# Patient Record
Sex: FEMALE | Race: OTHER | HISPANIC OR LATINO | Employment: OTHER | URBAN - METROPOLITAN AREA
[De-identification: names, ages, dates, MRNs, and addresses within clinical notes are randomized per-mention and may not be internally consistent; named-entity substitution may affect disease eponyms.]

---

## 2017-01-13 ENCOUNTER — TELEPHONE (OUTPATIENT)
Dept: PHARMACY | Facility: CLINIC | Age: 62
End: 2017-01-13

## 2017-02-03 ENCOUNTER — TELEPHONE (OUTPATIENT)
Dept: PHARMACY | Facility: CLINIC | Age: 62
End: 2017-02-03

## 2017-02-03 NOTE — TELEPHONE ENCOUNTER
Approved to receive Lantus (Sanofi) until 2/2/2018.  Shipment will be received within 3-5 business days.  Next refill 5/1/2017

## 2017-04-03 ENCOUNTER — TELEPHONE (OUTPATIENT)
Dept: PHARMACY | Facility: CLINIC | Age: 62
End: 2017-04-03

## 2017-04-03 NOTE — TELEPHONE ENCOUNTER
Patient called in requesting a refill for Lantus (Sanofi).  Medication will ship to  [facility] office within 7-10 business days. (2) refills remaining.

## 2017-04-20 ENCOUNTER — TELEPHONE (OUTPATIENT)
Dept: PHARMACY | Facility: CLINIC | Age: 62
End: 2017-04-20

## 2017-04-20 NOTE — TELEPHONE ENCOUNTER
Patient called in requesting a refill for Humalog (Camilla).  Medication will ship to  [facility] within 7-10 business days. (2) refills remaining.

## 2017-06-05 ENCOUNTER — TELEPHONE (OUTPATIENT)
Dept: PHARMACY | Facility: CLINIC | Age: 62
End: 2017-06-05

## 2017-06-05 NOTE — TELEPHONE ENCOUNTER
Patient called in requesting a refill for Lantus (Sanofi) and Humalog (Camilla).  Medication will ship to  [facility] office within 7-10 business days. (1) refills remaining.

## 2017-06-15 ENCOUNTER — TELEPHONE (OUTPATIENT)
Dept: PHARMACY | Facility: CLINIC | Age: 62
End: 2017-06-15

## 2017-06-15 NOTE — TELEPHONE ENCOUNTER
Informed patient  we received Lantus (Sanofi) from Pharmacy Patient Assistance.  2 refills remaining.

## 2017-09-12 ENCOUNTER — TELEPHONE (OUTPATIENT)
Dept: PHARMACY | Facility: CLINIC | Age: 62
End: 2017-09-12

## 2017-11-10 ENCOUNTER — TELEPHONE (OUTPATIENT)
Dept: ENDOCRINOLOGY | Facility: CLINIC | Age: 62
End: 2017-11-10

## 2017-12-05 ENCOUNTER — HOSPITAL ENCOUNTER (OUTPATIENT)
Dept: RADIOLOGY | Facility: HOSPITAL | Age: 62
Discharge: HOME | End: 2017-12-05
Attending: INTERNAL MEDICINE
Payer: MEDICARE

## 2017-12-05 ENCOUNTER — OFFICE VISIT (OUTPATIENT)
Dept: INTERNAL MEDICINE | Facility: CLINIC | Age: 62
End: 2017-12-05
Payer: MEDICARE

## 2017-12-05 VITALS
HEIGHT: 65 IN | TEMPERATURE: 98 F | BODY MASS INDEX: 27.99 KG/M2 | DIASTOLIC BLOOD PRESSURE: 70 MMHG | SYSTOLIC BLOOD PRESSURE: 130 MMHG | HEART RATE: 68 BPM | WEIGHT: 168 LBS

## 2017-12-05 DIAGNOSIS — K62.5 RECTAL BLEEDING: Primary | ICD-10-CM

## 2017-12-05 DIAGNOSIS — M54.6 THORACIC BACK PAIN, UNSPECIFIED BACK PAIN LATERALITY, UNSPECIFIED CHRONICITY: ICD-10-CM

## 2017-12-05 DIAGNOSIS — E11.65 UNCONTROLLED TYPE 2 DIABETES MELLITUS WITH COMPLICATION, UNSPECIFIED LONG TERM INSULIN USE STATUS: ICD-10-CM

## 2017-12-05 DIAGNOSIS — N93.9 VAGINAL BLEEDING: ICD-10-CM

## 2017-12-05 DIAGNOSIS — Z12.31 VISIT FOR SCREENING MAMMOGRAM: ICD-10-CM

## 2017-12-05 DIAGNOSIS — K62.5 RECTAL BLEEDING: ICD-10-CM

## 2017-12-05 DIAGNOSIS — E11.8 UNCONTROLLED TYPE 2 DIABETES MELLITUS WITH COMPLICATION, UNSPECIFIED LONG TERM INSULIN USE STATUS: ICD-10-CM

## 2017-12-05 PROCEDURE — 90662 IIV NO PRSV INCREASED AG IM: CPT | Mod: S$GLB,,, | Performed by: INTERNAL MEDICINE

## 2017-12-05 PROCEDURE — 74000 XR ABDOMEN AP 1 VIEW: CPT | Mod: TC,PO

## 2017-12-05 PROCEDURE — 99214 OFFICE O/P EST MOD 30 MIN: CPT | Mod: S$GLB,,, | Performed by: INTERNAL MEDICINE

## 2017-12-05 PROCEDURE — 99999 PR PBB SHADOW E&M-EST. PATIENT-LVL IV: CPT | Mod: PBBFAC,,, | Performed by: INTERNAL MEDICINE

## 2017-12-05 PROCEDURE — 72070 X-RAY EXAM THORAC SPINE 2VWS: CPT | Mod: 26,,, | Performed by: RADIOLOGY

## 2017-12-05 PROCEDURE — 72070 X-RAY EXAM THORAC SPINE 2VWS: CPT | Mod: TC,PO

## 2017-12-05 PROCEDURE — 74000 XR ABDOMEN AP 1 VIEW: CPT | Mod: 26,,, | Performed by: RADIOLOGY

## 2017-12-05 PROCEDURE — G0008 ADMIN INFLUENZA VIRUS VAC: HCPCS | Mod: S$GLB,,, | Performed by: INTERNAL MEDICINE

## 2017-12-05 RX ORDER — LISINOPRIL 5 MG/1
5 TABLET ORAL DAILY
COMMUNITY
End: 2017-12-05 | Stop reason: SDUPTHER

## 2017-12-05 RX ORDER — LISINOPRIL 5 MG/1
5 TABLET ORAL DAILY
Qty: 90 TABLET | Refills: 3 | Status: SHIPPED | OUTPATIENT
Start: 2017-12-05 | End: 2018-03-27

## 2017-12-05 NOTE — PROGRESS NOTES
Subjective:       Patient ID: Riana Kay is a 62 y.o. female.    Chief Complaint: Groin Pain    Patient is a 62 y.o.female with type 2 diabetes who presents today for follow up.       Six months ago, she started bleeding from her colon. [facility]. Her colonoscopy showed a brown spot on her colon. Two weeks ago, she had the same problem. Bleeding from rectum and vagina/ urine. Blood was bright red. She went to [facility] and had a CT scan but has not gotten the results. Today, she is not bleeding at all. No fever when she would experience bleeding. Her bowel movements are normal.       Review of Systems   Constitutional: Negative for appetite change, chills, diaphoresis, fatigue and fever.   HENT: Negative for congestion, dental problem, ear discharge, ear pain, hearing loss, postnasal drip, sinus pressure and sore throat.    Eyes: Negative for discharge, redness and itching.   Respiratory: Negative for cough, chest tightness, shortness of breath and wheezing.    Cardiovascular: Negative for chest pain, palpitations and leg swelling.   Gastrointestinal: Positive for anal bleeding and blood in stool. Negative for abdominal pain, constipation, diarrhea, nausea and vomiting.   Endocrine: Negative for cold intolerance and heat intolerance.   Genitourinary: Positive for vaginal bleeding. Negative for difficulty urinating, frequency, hematuria and urgency.   Musculoskeletal: Negative for arthralgias, back pain, gait problem, myalgias and neck pain.   Skin: Negative for color change and rash.   Neurological: Negative for dizziness, syncope and headaches.   Hematological: Negative for adenopathy.   Psychiatric/Behavioral: Negative for behavioral problems and sleep disturbance. The patient is not nervous/anxious.        Objective:      Physical Exam   Constitutional: She is oriented to person, place, and time. She appears well-developed and well-nourished. No distress.   HENT:   Head:  Normocephalic and atraumatic.   Right Ear: External ear normal.   Left Ear: External ear normal.   Nose: Nose normal.   Mouth/Throat: Oropharynx is clear and moist. No oropharyngeal exudate.   Eyes: Conjunctivae and EOM are normal. Pupils are equal, round, and reactive to light. Right eye exhibits no discharge. Left eye exhibits no discharge. No scleral icterus.   Neck: Normal range of motion. Neck supple. No JVD present. No thyromegaly present.   Cardiovascular: Normal rate, regular rhythm, normal heart sounds and intact distal pulses.  Exam reveals no gallop and no friction rub.    No murmur heard.  Pulmonary/Chest: Effort normal and breath sounds normal. No stridor. No respiratory distress. She has no wheezes. She has no rales. She exhibits no tenderness.   Abdominal: Soft. Bowel sounds are normal. She exhibits no distension. There is no tenderness. There is no rebound.   Musculoskeletal: Normal range of motion. She exhibits no edema or tenderness.   Lymphadenopathy:     She has no cervical adenopathy.   Neurological: She is alert and oriented to person, place, and time. No cranial nerve deficit.   Skin: Skin is warm and dry. No rash noted. She is not diaphoretic. No erythema.   Psychiatric: She has a normal mood and affect. Her behavior is normal.   Nursing note and vitals reviewed.      Assessment and Plan:       1. Rectal bleeding  - get records from [place]  - labs, xray and urine today  - CT scan  - referral to colorectal surgery  - CBC auto differential; Future  - Iron and TIBC; Future  - Ferritin; Future  - Urinalysis; Future  - Urine culture; Future  - X-Ray Abdomen AP 1 View; Future  - Fecal Immunochemical Test (iFOBT); Future  - CT Abdomen Without Contrast; Future  - Ambulatory Referral to Colorectal Surgery    2. Vaginal bleeding  - pt unsure if vaginal or from bladder  - CBC auto differential; Future  - Iron and TIBC; Future  - Ferritin; Future  - Urinalysis; Future  - Urine culture; Future  -  X-Ray Abdomen AP 1 View; Future  - Fecal Immunochemical Test (iFOBT); Future  - CT Abdomen Without Contrast; Future  - Ambulatory Referral to Colorectal Surgery    3. Visit for screening mammogram  - Mammo Digital Screening Bilat with CAD; Future    4. Thoracic back pain, unspecified back pain laterality, unspecified chronicity  - X-Ray Thoracic Spine AP Lateral; Future    5. Uncontrolled type 2 diabetes mellitus with complication, unspecified long term insulin use status  - due for labs; will schedule annual          No Follow-up on file.

## 2017-12-06 ENCOUNTER — TELEPHONE (OUTPATIENT)
Dept: INTERNAL MEDICINE | Facility: CLINIC | Age: 62
End: 2017-12-06

## 2017-12-06 ENCOUNTER — TELEPHONE (OUTPATIENT)
Dept: PHARMACY | Facility: CLINIC | Age: 62
End: 2017-12-06

## 2017-12-08 ENCOUNTER — HOSPITAL ENCOUNTER (OUTPATIENT)
Dept: RADIOLOGY | Facility: HOSPITAL | Age: 62
Discharge: HOME | End: 2017-12-08
Attending: INTERNAL MEDICINE
Payer: MEDICARE

## 2017-12-08 DIAGNOSIS — Z12.31 VISIT FOR SCREENING MAMMOGRAM: ICD-10-CM

## 2017-12-08 DIAGNOSIS — E11.9 TYPE 2 DIABETES MELLITUS WITHOUT COMPLICATION: ICD-10-CM

## 2017-12-08 PROCEDURE — 77067 SCR MAMMO BI INCL CAD: CPT | Mod: 26,,, | Performed by: RADIOLOGY

## 2017-12-08 PROCEDURE — 77067 SCR MAMMO BI INCL CAD: CPT | Mod: TC,PO

## 2017-12-08 PROCEDURE — 77063 BREAST TOMOSYNTHESIS BI: CPT | Mod: 26,,, | Performed by: RADIOLOGY

## 2017-12-08 NOTE — TELEPHONE ENCOUNTER
I reached her and gave her dr's comments.  Ct is scheduled next week. I told her we'd be in touch.

## 2017-12-14 ENCOUNTER — LAB VISIT (OUTPATIENT)
Dept: LAB | Facility: HOSPITAL | Age: 62
End: 2017-12-14
Payer: MEDICARE

## 2017-12-14 ENCOUNTER — OFFICE VISIT (OUTPATIENT)
Dept: ENDOCRINOLOGY | Facility: CLINIC | Age: 62
End: 2017-12-14
Payer: MEDICARE

## 2017-12-14 VITALS
HEART RATE: 73 BPM | BODY MASS INDEX: 27.77 KG/M2 | DIASTOLIC BLOOD PRESSURE: 84 MMHG | HEIGHT: 65 IN | WEIGHT: 166.69 LBS | SYSTOLIC BLOOD PRESSURE: 121 MMHG

## 2017-12-14 DIAGNOSIS — Z79.4 TYPE 2 DIABETES MELLITUS WITH DIABETIC POLYNEUROPATHY, WITH LONG-TERM CURRENT USE OF INSULIN: ICD-10-CM

## 2017-12-14 DIAGNOSIS — I10 ESSENTIAL HYPERTENSION: ICD-10-CM

## 2017-12-14 DIAGNOSIS — E66.3 OVERWEIGHT (BMI 25.0-29.9): ICD-10-CM

## 2017-12-14 DIAGNOSIS — E11.42 TYPE 2 DIABETES MELLITUS WITH DIABETIC POLYNEUROPATHY, WITH LONG-TERM CURRENT USE OF INSULIN: Primary | ICD-10-CM

## 2017-12-14 DIAGNOSIS — Z79.4 TYPE 2 DIABETES MELLITUS WITH DIABETIC POLYNEUROPATHY, WITH LONG-TERM CURRENT USE OF INSULIN: Primary | ICD-10-CM

## 2017-12-14 DIAGNOSIS — E11.42 TYPE 2 DIABETES MELLITUS WITH DIABETIC POLYNEUROPATHY, WITH LONG-TERM CURRENT USE OF INSULIN: ICD-10-CM

## 2017-12-14 DIAGNOSIS — G47.30 SLEEP APNEA, UNSPECIFIED TYPE: ICD-10-CM

## 2017-12-14 DIAGNOSIS — R41.3 MEMORY LOSS: ICD-10-CM

## 2017-12-14 DIAGNOSIS — I25.10 ATHEROSCLEROSIS OF CORONARY ARTERY WITHOUT ANGINA PECTORIS, UNSPECIFIED VESSEL OR LESION TYPE, UNSPECIFIED WHETHER NATIVE OR TRANSPLANTED HEART: ICD-10-CM

## 2017-12-14 PROBLEM — E11.69 TYPE 2 DIABETES MELLITUS WITH OTHER SPECIFIED COMPLICATION: Status: ACTIVE | Noted: 2017-12-14

## 2017-12-14 LAB
ALBUMIN SERPL BCP-MCNC: 3.7 G/DL
ALP SERPL-CCNC: 87 U/L
ALT SERPL W/O P-5'-P-CCNC: 18 U/L
ANION GAP SERPL CALC-SCNC: 5 MMOL/L
AST SERPL-CCNC: 17 U/L
BILIRUB SERPL-MCNC: 0.7 MG/DL
BUN SERPL-MCNC: 13 MG/DL
CALCIUM SERPL-MCNC: 9.2 MG/DL
CHLORIDE SERPL-SCNC: 104 MMOL/L
CO2 SERPL-SCNC: 30 MMOL/L
CREAT SERPL-MCNC: 0.8 MG/DL
CREAT UR-MCNC: 115 MG/DL
EST. GFR  (AFRICAN AMERICAN): >60 ML/MIN/1.73 M^2
EST. GFR  (NON AFRICAN AMERICAN): >60 ML/MIN/1.73 M^2
ESTIMATED AVG GLUCOSE: 146 MG/DL
GLUCOSE SERPL-MCNC: 98 MG/DL
HBA1C MFR BLD HPLC: 6.7 %
MICROALBUMIN UR DL<=1MG/L-MCNC: 7 UG/ML
MICROALBUMIN/CREATININE RATIO: 6.1 UG/MG
POTASSIUM SERPL-SCNC: 3.9 MMOL/L
PROT SERPL-MCNC: 7.4 G/DL
SODIUM SERPL-SCNC: 139 MMOL/L
TSH SERPL DL<=0.005 MIU/L-ACNC: 1.02 UIU/ML

## 2017-12-14 PROCEDURE — 82570 ASSAY OF URINE CREATININE: CPT

## 2017-12-14 PROCEDURE — 99999 PR PBB SHADOW E&M-EST. PATIENT-LVL III: CPT | Mod: PBBFAC,,, | Performed by: NURSE PRACTITIONER

## 2017-12-14 PROCEDURE — 99215 OFFICE O/P EST HI 40 MIN: CPT | Mod: S$GLB,,, | Performed by: NURSE PRACTITIONER

## 2017-12-14 PROCEDURE — 36415 COLL VENOUS BLD VENIPUNCTURE: CPT

## 2017-12-14 PROCEDURE — 83036 HEMOGLOBIN GLYCOSYLATED A1C: CPT

## 2017-12-14 PROCEDURE — 80053 COMPREHEN METABOLIC PANEL: CPT

## 2017-12-14 PROCEDURE — 84443 ASSAY THYROID STIM HORMONE: CPT

## 2017-12-14 RX ORDER — INSULIN GLARGINE 100 [IU]/ML
INJECTION, SOLUTION SUBCUTANEOUS
Qty: 1 BOX | Refills: 6 | Status: SHIPPED | OUTPATIENT
Start: 2017-12-14 | End: 2018-02-20 | Stop reason: SDUPTHER

## 2017-12-14 RX ORDER — INSULIN PUMP SYRINGE, 3 ML
EACH MISCELLANEOUS
Qty: 1 EACH | Refills: 0 | Status: SHIPPED | OUTPATIENT
Start: 2017-12-14 | End: 2017-12-21 | Stop reason: SDUPTHER

## 2017-12-14 RX ORDER — INSULIN LISPRO 100 [IU]/ML
INJECTION, SOLUTION INTRAVENOUS; SUBCUTANEOUS
Qty: 1 BOX | Refills: 6 | Status: SHIPPED | OUTPATIENT
Start: 2017-12-14 | End: 2018-06-21

## 2017-12-14 RX ORDER — BLOOD SUGAR DIAGNOSTIC
STRIP MISCELLANEOUS
Qty: 150 EACH | Refills: 6 | Status: SHIPPED | OUTPATIENT
Start: 2017-12-14 | End: 2018-11-28

## 2017-12-14 RX ORDER — LANCETS
EACH MISCELLANEOUS
Qty: 150 EACH | Refills: 6 | Status: SHIPPED | OUTPATIENT
Start: 2017-12-14 | End: 2017-12-21 | Stop reason: SDUPTHER

## 2017-12-14 NOTE — PROGRESS NOTES
CC: This 62 y.o.  female presents for management of Diabetes   along with the current chronic medical conditions including:  Patient Active Problem List   Diagnosis    Anxiety    Fatty liver    S/P total hysterectomy    Memory loss    Osteopenia    GERD (gastroesophageal reflux disease)    DM (diabetes mellitus)    Sleep apnea    AR (allergic rhinitis)    Atrophic gastritis without mention of hemorrhage    Chronic fatigue syndrome    Coronary atherosclerosis of unspecified type of vessel, native or graft    Special screening for malignant neoplasms, colon    Abdominal pain, right upper quadrant    Nausea    Diabetes type 2, uncontrolled    Chest pain    HTN (hypertension)    Acute chest pain    Neck pain    Muscle spasms of neck    Radiculopathy of cervical spine      Status of these conditions is pending review.    HPI:   Diagnosed with T2DM x 19 years ago, pt has been on insulin x 3 years ago.  Accompanied by family member.  Loss insurance, has not been seen by our group since 2015.  Pt is being seen by me again today.  Has continue lantus 30 units qhs, humalog 6 units w/ breakfast, 10 units w/ lunch and dinner.   Off metformin related to kidneys.      CURRENT DM MEDS:   lantus 30 units qhs, humalog 6/10/10 units  with mod dose correction scale, starting at 150.     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   No missing doses of medication.   Pt is monitoring BG at home 3-4 times a day   No hypoglycemia, lowest mid 80s  Riana Kay brought glucometer or log to clinic today revealing the following BG readings:  Am 90s- 120s  Increases during the day time-evening to upper 100s/low 200s.       DIET/ MEAL PATTERN: 3 meals a day  Snacks (between lunch and dinner)     EXERCISE: no formal     STANDARDS OF CARE:   Eye exam: 2016  Foot exam: 2014 (sept/oct)   ASA: none  Statin: none  ACE-I/ARB: none    ROS:   GEN: + fatigue or weakness, no changes w/  appetite  CV:  Denies chest pain, palpitations, edema or cyanosis, denies syncope  SKIN: Skin is intact and heals well, no rashes, pruritis, easy bruising, no hair changes, no intolerance to heat/cold.  RESP: No SOB, cough, FISCHER  FEN/GI: Nml bowel movements, + (R) LQ abdominal pain/discomfort (> 6 mos), normal appetite  RENAL: No urinary complaints, no dysuria/hematuia/oliguria   ENDO: no heat/cold intolerance, no fatigue, no change in weight  ID: No skin breakdown, fevers, chills  PSYCH: Denies drug/ETOH abuse, no hx. of eating disorders or depression +anxiety  MS/NEURO: Denies numbness/ tingling in BLE; FROM of joints without swelling or pain. (L) arm in sling      Lab Results   Component Value Date    HGBA1C 7.7 (H) 08/11/2015     Lab Results   Component Value Date    TSH 0.606 04/10/2015       Chemistry        Component Value Date/Time     08/11/2015 0801    K 4.3 08/11/2015 0801     08/11/2015 0801    CO2 27 08/11/2015 0801    BUN 13 08/11/2015 0801    CREATININE 0.7 08/11/2015 0801     (H) 08/11/2015 0801        Component Value Date/Time    CALCIUM 9.3 08/11/2015 0801    ALKPHOS 106 08/11/2015 0801    AST 17 08/11/2015 0801    ALT 20 08/11/2015 0801    BILITOT 0.5 08/11/2015 0801          Lab Results   Component Value Date    LDLCALC 99.2 02/04/2015       PE:  GEN: Patient WNWD, WF, NAD, AAOx3, Friendly, talkative, well groomed  HEENT: EOMI, PERRLA, no lid lag, Clear oropharynx  NECK: No lymphadenophathy, trachea midline  CV: Regular rate and rhythm, no Murmur, rubs, gallops.    RESP:Clear to auscultation BL, No increase work of breathing, No cough, wheeze.  ABD: bs active x 4 quadsEXT: No C/C/Edema, No skin rash/breakdown, 2+ DP pulses BL  NEURO: CN 2-12 intact, 5/5 strength in 4 extremities, nml gait  FEET: No pressure areas, blisters, ulcers, calluses. Footware appropriate.  Protective Sensation (w/ 10 gram monofilament):  Right: Intact  Left: Intact    Visual Inspection:  Dry Skin -   Bilateral    Pedal Pulses:   Right: Present  Left: Present    Posterior tibialis:   Right:Present  Left: Present        ASSESSMENT and PLAN:  Riana was seen today for diabetes mellitus.    Diagnoses and associated orders for this visit:  1. Type 2 diabetes mellitus with other specified complication, with long-term current use of insulin -neuropathy Continue lantus 30 units at night  Change to novolog in 2018-humalog 8/12/12 w/ scale 180-230+2, etc  A1c, cmp, tsh, urine mac today  A1c, lipid next time  Info given on support group, meal planning/carbs  a1c goal less than 7%  Discussed addition of glp1a- trulicity or victoza in the future-will wait for now.  No h/o pancreatitis or med thyroid ca  Counseling >35 mins  rx sent for testing supplies, new meter, lantus   Info on avs for victoza   Podiatry referral   2. Overweight (BMI 25.0-29.9)  Body mass index is 27.74 kg/m². may increase insulin resistance. Goal lose 5% in the next 3-6 mos.    3. Essential hypertension  Controlled, continue med(s).   4. Sleep apnea, unspecified type  Not using cpap r/t not having insurance for a while, now has medicare   5. Memory loss  Needs assistance by family, still an issue, f/u with primary   6. Atherosclerosis of coronary artery without angina pectoris, unspecified vessel or lesion type, unspecified whether native or transplanted heart  Avoid hypoglycemia             Return in about 3 months (around 3/14/2018).

## 2017-12-14 NOTE — PATIENT INSTRUCTIONS
Humalog/novolog 8 units w/ breakfast, 12 units w/ lunch and dinner plus scale 180-230+2, 231-280+4, 281-330+6, 331-380+8.  lantus 30 units at night            Snacks can be an important part of a balanced, healthy meal plan. They allow you to eat more frequently, feeling full and satisfied throughout the day. Also, they allow you to spread carbohydrates evenly, which may stabilize blood sugars.  Plus, snacks are enjoyable!     The amount of carbohydrate needed at snacks varies. Generally, about 15-30 grams of carbohydrate per snack is recommended.  Below you will find some tasty treats.       0-5 gm carb   Crystal Light   Vitamin Water Zero   Herbal tea, unsweetened   2 tsp peanut butter on celery   1./2 cup sugar-free jell-o   1 sugar-free popsicle   ¼ cup blueberries   8oz Blue Melissa unsweetened almond milk   5 baby carrots & celery sticks, cucumbers, bell peppers dipped in ¼ cup salsa, 2Tbsp light ranch dressing or 2Tbsp plain Greek yogurt   10 Goldfish crackers   ½ oz low-fat cheese or string cheese   1 closed handful of nuts, unsalted   1 Tbsp of sunflower seeds, unsalted   1 cup Smart Pop popcorn   1 whole grain brown rice cake        15 gm carb   1 small piece of fruit or ½ banana or 1/2 cup lite canned fruit   3 tan cracker squares   3 cups Smart Pop popcorn, top spray butter, Beltre lite salt or cinnamon and Truvia   5 Vanilla Wafers   ½ cup low fat, no added sugar ice cream or frozen yogurt (Blue bell, Blue Bunny, Weight Watchers, Skinny Cow)   ½ turkey, ham, or chicken sandwich   ½ c fruit with ½ c Cottage cheese   4-6 unsalted wheat crackers with 1 oz low fat cheese or 1 tbsp peanut butter    30-45 goldfish crackers (depending on flavor)    7-8 Jain mini brown rice cakes (caramel, apple cinnamon, chocolate)    12 Jain mini brown rice cakes (cheddar, bbq, ranch)    1/3 cup hummus dip with raw veg   1/2 whole wheat jossy, 1Tbsp hummus   Mini Pizza (1/2 whole wheat  English muffin, low-fat  cheese, tomato sauce)   100 calorie snack pack (Oreo, Chips Ahoy, Ritz Mix, Baked Cheetos)   4-6 oz. light or Greek Style yogurt (Chobani, Yoplait, Okios, Stoneyfield)   ½ cup sugar-free pudding     6 in. wheat tortilla or jossy oven toasted chips (topped with spray butter flavoring, cinnamon, Truvia OR spray butter, garlic powder, chili powder)    18 BBQ Popchips (available at [employer], [employer], Fresh Market)                   Diabetes Support Group Meetings    Date Topics   February 9 Health Promotion/Nutrition   March 9 Taking Care of Your Kidneys   April 13 Taking Care of Your Feet   May 11 Ease Your Mind with Diabetes   June 8 Hurricane and Emergency Preparedness   July 13 Family & Caregiver Support for Diabetes   *August 17  Taking Care of Your Eyes   September 14 Devices & Technology   October 12 Recipes & Treats   November 9 Getting Pumped Up for Diabetes   December 14 Year-End Close Out            Meetings are held in the [facility] (A) of the [facility] located at [address] [place] [address]. Please call [phone] for additional information.    Free service, offered every 2nd Thursday of every month, except in August! Family members and/or friends are welcome as well!  Support group is for patients with type 1 or type 2 diabetes.    From 3:30p to 4:30p        Liraglutide injection  What is this medicine?  LIRAGLUTIDE (LIR a GLOO tide) is used to improve blood sugar control in adults with type 2 diabetes. This medicine may be used with other oral diabetes medicines.  How should I use this medicine?  This medicine is for injection under the skin of your upper leg, stomach area, or upper arm. You will be taught how to prepare and give this medicine. Use exactly as directed. Take your medicine at regular intervals. Do not take it more often than directed.  It is important that you put your used needles and syringes in a  special sharps container. Do not put them in a trash can. If you do not have a sharps container, call your pharmacist or healthcare provider to get one.  A special MedGuide will be given to you by the pharmacist with each prescription and refill. Be sure to read this information carefully each time.  Talk to your pediatrician regarding the use of this medicine in children. Special care may be needed.  What side effects may I notice from receiving this medicine?  Side effects that you should report to your doctor or health care professional as soon as possible:  · allergic reactions like skin rash, itching or hives, swelling of the face, lips, or tongue  · breathing problems  · fever, chills  · loss of appetite  · signs and symptoms of low blood sugar such as feeling anxious, confusion, dizziness, increased hunger, unusually weak or tired, sweating, shakiness, cold, irritable, headache, blurred vision, fast heartbeat, loss of consciousness  · trouble passing urine or change in the amount of urine  · unusual stomach pain or upset  · vomiting  Side effects that usually do not require medical attention (Report these to your doctor or health care professional if they continue or are bothersome.):  · constipation  · diarrhea  · fatigue  · headache  · nausea  What may interact with this medicine?  · acetaminophen  · atorvastatin  · birth control pills  · digoxin  · griseofulvin  · lisinoprilMany medications may cause changes in blood sugar, these include:  · alcohol containing beverages  · aspirin and aspirin-like drugs  · chloramphenicol  · chromium  · diuretics  · female hormones, such as estrogens or progestins, birth control pills  · heart medicines  · isoniazid  · male hormones or anabolic steroids  · medications for weight loss  · medicines for allergies, asthma, cold, or cough  · medicines for mental problems  · medicines called MAO inhibitors - Nardil, Parnate, Marplan, Eldepryl  · niacin  · NSAIDS, such as  ibuprofen  · pentamidine  · phenytoin  · probenecid  · quinolone antibiotics such as ciprofloxacin, levofloxacin, ofloxacin  · some herbal dietary supplements  · steroid medicines such as prednisone or cortisone  · thyroid hormonesSome medications can hide the warning symptoms of low blood sugar (hypoglycemia). You may need to monitor your blood sugar more closely if you are taking one of these medications. These include:  · beta-blockers, often used for high blood pressure or heart problems (examples include atenolol, metoprolol, propranolol)  · clonidine  · guanethidine  · reserpine  What if I miss a dose?  If you miss a dose, take it as soon as you can. If it is almost time for your next dose, take only that dose. Do not take double or extra doses.  Where should I keep my medicine?  Keep out of the reach of children.  Store unopened pen in a refrigerator between 2 and 8 degrees C (36 and 46 degrees F). Do not freeze or use if the medicine has been frozen. Protect from light and excessive heat. After you first use the pen, it can be stored at room temperature between 15 and 30 degrees C (59 and 86 degrees F) or in a refrigerator. Throw away your used pen after 30 days or after the expiration date, whichever comes first.  Do not store your pen with the needle attached. If the needle is left on, medicine may leak from the pen.  What should I tell my health care provider before I take this medicine?  They need to know if you have any of these conditions:  · endocrine tumors (MEN 2) or if someone in your family had these tumors  · gallstones  · high cholesterol  · history of alcohol abuse problem  · history of pancreatitis  · kidney disease or if you are on dialysis  · liver disease  · previous swelling of the tongue, face, or lips with difficulty breathing, difficulty swallowing, hoarseness, or tightening of the throat  · stomach problems  · suicidal thoughts, plans, or attempt; a previous suicide attempt by you or a  family member  · thyroid cancer or if someone in your family had thyroid cancer  · an unusual or allergic reaction to liraglutide, medicines, foods, dyes, or preservatives  · pregnant or trying to get pregnant  · breast-feeding  What should I watch for while using this medicine?  Visit your doctor or health care professional for regular checks on your progress.  A test called the HbA1C (A1C) will be monitored. This is a simple blood test. It measures your blood sugar control over the last 2 to 3 months. You will receive this test every 3 to 6 months.  Learn how to check your blood sugar. Learn the symptoms of low and high blood sugar and how to manage them.  Always carry a quick-source of sugar with you in case you have symptoms of low blood sugar. Examples include hard sugar candy or glucose tablets. Make sure others know that you can choke if you eat or drink when you develop serious symptoms of low blood sugar, such as seizures or unconsciousness. They must get medical help at once.  Tell your doctor or health care professional if you have high blood sugar. You might need to change the dose of your medicine. If you are sick or exercising more than usual, you might need to change the dose of your medicine.  Do not skip meals. Ask your doctor or health care professional if you should avoid alcohol. Many nonprescription cough and cold products contain sugar or alcohol. These can affect blood sugar.  Liraglutide pens and cartridges should never be shared. Even if the needle is changed, sharing may result in passing of viruses like hepatitis or HIV.  Wear a medical ID bracelet or chain, and carry a card that describes your disease and details of your medicine and dosage times.  Patients and their families should watch out for worsening depression or thoughts of suicide. Also watch out for sudden changes in feelings such as feeling anxious, agitated, panicky, irritable, hostile, aggressive, impulsive, severely  restless, overly excited and hyperactive, or not being able to sleep. If this happens, especially at the beginning of treatment or after a change in dose, call your health care professional.  NOTE:This sheet is a summary. It may not cover all possible information. If you have questions about this medicine, talk to your doctor, pharmacist, or health care provider. Copyright© 2017 [employer]

## 2017-12-18 ENCOUNTER — PATIENT MESSAGE (OUTPATIENT)
Dept: INTERNAL MEDICINE | Facility: CLINIC | Age: 62
End: 2017-12-18

## 2017-12-18 ENCOUNTER — TELEPHONE (OUTPATIENT)
Dept: INTERNAL MEDICINE | Facility: CLINIC | Age: 62
End: 2017-12-18

## 2017-12-18 ENCOUNTER — HOSPITAL ENCOUNTER (OUTPATIENT)
Dept: RADIOLOGY | Facility: HOSPITAL | Age: 62
Discharge: HOME | End: 2017-12-18
Attending: INTERNAL MEDICINE
Payer: MEDICARE

## 2017-12-18 DIAGNOSIS — N93.9 VAGINAL BLEEDING: ICD-10-CM

## 2017-12-18 DIAGNOSIS — K62.5 RECTAL BLEEDING: ICD-10-CM

## 2017-12-18 PROCEDURE — 74176 CT ABD & PELVIS W/O CONTRAST: CPT | Mod: 26,,, | Performed by: RADIOLOGY

## 2017-12-18 PROCEDURE — 25500020 PHARM REV CODE 255: Performed by: INTERNAL MEDICINE

## 2017-12-18 PROCEDURE — 74176 CT ABD & PELVIS W/O CONTRAST: CPT | Mod: TC

## 2017-12-18 PROCEDURE — A9698 NON-RAD CONTRAST MATERIALNOC: HCPCS | Performed by: INTERNAL MEDICINE

## 2017-12-18 RX ADMIN — Medication 900 ML: at 07:12

## 2017-12-18 NOTE — TELEPHONE ENCOUNTER
Notify pt that her CT revealed constipation. Would recommend miralax daily. She needs to f/u wit colorectal for the past rectal bleeding. Would she also like to see gyn as when we spoke, she wasn't sure if the blood may have been from the vaginal area

## 2017-12-19 NOTE — TELEPHONE ENCOUNTER
----- Message from Ewelina Mullins sent at 12/19/2017 11:26 AM CST -----  Contact: / [phone]/ [place]  Return call about c/scan results.

## 2017-12-20 NOTE — PROGRESS NOTES
"CRS Office Visit History and Physical    Referring Md:   Amena Roger,   [address] [facility] [place] [address]    SUBJECTIVE:     Chief Complaint: rectal and vaginal bleeding    History of Present Illness:  Patient is a 62 y.o. female presents with rectal and vaginal bleeding. The patient is a new patient to this practice.   Course is as follows:  12/2017:  Seen by her PCP: Six months ago, she started bleeding from her colon. She saw a doctor In [facility]. Her colonoscopy showed a "brown spot" on her colon, but she never got the formal results from her doctor. Two weeks ago, she had the same problem with Bleeding from rectum. Blood was bright red. She states is is often mixed in with her stool.   There is no pain associated with it.  She has no protrusion or mass.  Her bowel movements are regular.  She has 1 bowel movement every other day.  She denies straining.  She spends 2-3 minutes on the toilet per bowel movement  +FH of rectal cancer in her father at at the age of 80.  At an OSH recently, she had a CT scan that showed constipation and diverticulosis    Fit was done 12/2017 and was negative    Last Colonoscopy: here in 2013  Impression:           - Diverticulosis in the sigmoid colon, in the                         descending colon and in the cecum.                        - The examination was otherwise normal on direct                         and retroflexion views.    Review of patient's allergies indicates:   Allergen Reactions    Iodinated contrast- oral and iv dye      Other reaction(s): Swelling  Other reaction(s): Rash    Macrobid  [nitrofurantoin monohyd/m-cryst]      Other reaction(s): Rash    Metformin      Other reaction(s): Rash    Penicillins      Other reaction(s): Rash    Promethazine      Other reaction(s): rash  Other reaction(s): Unknown    Sulfa (sulfonamide antibiotics)      Other reaction(s): Rash    Sulfamethoxazole-trimethoprim      Other reaction(s): Rash    " "Novolin 70/30 [insulin nph and regular human] Itching and Rash       Past Medical History:   Diagnosis Date    Anxiety     AR (allergic rhinitis)     Diabetes mellitus, type 2     Dizziness     DM (diabetes mellitus)     Fatty liver     GERD (gastroesophageal reflux disease)     HTN (hypertension)     Hyperlipidemia     Memory loss     Osteopenia     S/P total hysterectomy     Sleep apnea      Past Surgical History:   Procedure Laterality Date    CHOLECYSTECTOMY      ELBOW SURGERY Right 7/16/15    ELBOW SURGERY      HYSTERECTOMY      KNEE ARTHROSCOPY W/ DEBRIDEMENT  4/11    Left    ROTATOR CUFF REPAIR      TONSILLECTOMY      TOTAL ABDOMINAL HYSTERECTOMY W/ BILATERAL SALPINGOOPHORECTOMY       Family History   Problem Relation Age of Onset    Colon cancer Father 83     colon cancer    Diabetes Maternal Grandmother     Diabetes Maternal Aunt      Social History   Substance Use Topics    Smoking status: Never Smoker    Smokeless tobacco: Never Used    Alcohol use No        Review of Systems:  Review of Systems   Constitutional: Negative for chills, diaphoresis, fever, malaise/fatigue and weight loss.   HENT: Negative for congestion.    Respiratory: Negative for shortness of breath.    Cardiovascular: Negative for chest pain and leg swelling.   Gastrointestinal: Positive for blood in stool and constipation. Negative for abdominal pain, nausea and vomiting.   Genitourinary: Negative for dysuria.   Musculoskeletal: Negative for back pain and myalgias.   Skin: Negative for rash.   Neurological: Negative for dizziness and weakness.   Endo/Heme/Allergies: Does not bruise/bleed easily.   Psychiatric/Behavioral: Negative for depression.       OBJECTIVE:     Vital Signs (Most Recent)  BP (!) 186/99   Pulse 66   Ht 5' 5" (1.651 m)   Wt 75.5 kg (166 lb 7.2 oz)   BMI 27.70 kg/m²     Physical Exam:  General: White female in no distress   Neuro: alert and oriented x 4.  Moves all extremities.     HEENT: " "no icterus.  Trachea midline  Respiratory: respirations are even and unlabored  Cardiac: regular rate  Abdomen: No masses.  Nontender  Extremities: Warm dry and intact  Skin: no rashes  Anorectal: External exam was normal.  Digital exam was performed and was normal.  There is no gross blood.  There was some firm stool in the rectal vault.  No Masses palpated  Anoscopy was then performed.  She had minimal internal hemorrhoidal disease seen.  The anal canal appeared normal    Labs: Fit negative    Imaging: CT scan personally reviewed.  Stool burden in the rectum.  Normal transit of PO contrast.        ASSESSMENT/PLAN:     Diagnoses and all orders for this visit:    Family history of rectal cancer  -     Case request GI: COLONOSCOPY with Donnell        62-year-old woman with a positive family history of rectal cancer and her father presents with intermittent lower GI bleeding mixed in with the stool.  She has a recent fit test that was negative.  Her last colonoscopy here was in 2013.  She did have colonoscopy performed in [facility] but we do not have the results of that.  This showed an unknown "Brown spot".  I'm not sure what to make of this.  Given her family history and the uncertainty of her last colonoscopy, I recommended repeat colonoscopy.    She should also start taking MiraLAX to alleviate constipation.  This was discussed with her and all questions were answered.    BRANDI Jacobo MD  Staff Surgeon  Colon & Rectal Surgery  "

## 2017-12-21 ENCOUNTER — OFFICE VISIT (OUTPATIENT)
Dept: SURGERY | Facility: CLINIC | Age: 62
End: 2017-12-21
Payer: MEDICARE

## 2017-12-21 ENCOUNTER — TELEPHONE (OUTPATIENT)
Dept: ENDOSCOPY | Facility: HOSPITAL | Age: 62
End: 2017-12-21

## 2017-12-21 VITALS
BODY MASS INDEX: 27.73 KG/M2 | WEIGHT: 166.44 LBS | HEIGHT: 65 IN | SYSTOLIC BLOOD PRESSURE: 186 MMHG | HEART RATE: 66 BPM | DIASTOLIC BLOOD PRESSURE: 99 MMHG

## 2017-12-21 DIAGNOSIS — Z12.11 SPECIAL SCREENING FOR MALIGNANT NEOPLASMS, COLON: Primary | ICD-10-CM

## 2017-12-21 DIAGNOSIS — Z80.0 FAMILY HISTORY OF RECTAL CANCER: Primary | ICD-10-CM

## 2017-12-21 PROCEDURE — 99999 PR PBB SHADOW E&M-EST. PATIENT-LVL III: CPT | Mod: PBBFAC,,, | Performed by: COLON & RECTAL SURGERY

## 2017-12-21 PROCEDURE — 46600 DIAGNOSTIC ANOSCOPY SPX: CPT | Mod: S$GLB,,, | Performed by: COLON & RECTAL SURGERY

## 2017-12-21 PROCEDURE — 99203 OFFICE O/P NEW LOW 30 MIN: CPT | Mod: 25,S$GLB,, | Performed by: COLON & RECTAL SURGERY

## 2017-12-21 RX ORDER — LANCETS
EACH MISCELLANEOUS
Qty: 200 EACH | Refills: 6 | Status: SHIPPED | OUTPATIENT
Start: 2017-12-21 | End: 2017-12-21 | Stop reason: SDUPTHER

## 2017-12-21 RX ORDER — POLYETHYLENE GLYCOL 3350, SODIUM SULFATE ANHYDROUS, SODIUM BICARBONATE, SODIUM CHLORIDE, POTASSIUM CHLORIDE 236; 22.74; 6.74; 5.86; 2.97 G/4L; G/4L; G/4L; G/4L; G/4L
4 POWDER, FOR SOLUTION ORAL ONCE
Qty: 4000 ML | Refills: 0 | Status: SHIPPED | OUTPATIENT
Start: 2017-12-21 | End: 2017-12-21

## 2017-12-21 RX ORDER — INSULIN PUMP SYRINGE, 3 ML
EACH MISCELLANEOUS
Qty: 1 EACH | Refills: 0 | Status: SHIPPED | OUTPATIENT
Start: 2017-12-21 | End: 2017-12-21 | Stop reason: SDUPTHER

## 2017-12-21 RX ORDER — INSULIN PUMP SYRINGE, 3 ML
EACH MISCELLANEOUS
Qty: 1 EACH | Refills: 0 | Status: SHIPPED | OUTPATIENT
Start: 2017-12-21 | End: 2017-12-26 | Stop reason: SDUPTHER

## 2017-12-21 RX ORDER — LANCETS
EACH MISCELLANEOUS
Qty: 200 EACH | Refills: 6 | Status: SHIPPED | OUTPATIENT
Start: 2017-12-21 | End: 2017-12-26 | Stop reason: SDUPTHER

## 2017-12-21 NOTE — LETTER
December 21, 2017      Amena Roger, DO  [address] [facility] [place] [address]           [facility]  [address]  [facility] [address]  Phone: [phone]          Patient: Riana Kay   MR Number: [id number]   YOB: 1955   Date of Visit: 12/21/2017       Dear Dr. Amena Roger:    Thank you for referring Riana Kay to me for evaluation. Attached you will find relevant portions of my assessment and plan of care.    If you have questions, please do not hesitate to call me. I look forward to following Riana Kay along with you.    Sincerely,    Roland Jacobo MD    Enclosure  CC:  No Recipients    If you would like to receive this communication electronically, please contact [email or web address] or [phone] to request more information on [facility] access.    For providers and/or their staff who would like to refer a patient to [facility], please contact us through our one-stop-shop provider referral line, [facility] [place], at [phone].    If you feel you have received this communication in error or would no longer like to receive these types of communications, please e-mail [email or web address]

## 2017-12-21 NOTE — TELEPHONE ENCOUNTER
----- Message from Janelle Blair sent at 12/21/2017 12:14 PM CST -----  Contact: [facility] [phone]  Diagnosis code needs to be in prescription in order for pharmacy to bill Medicare.     pls resend diabetic supply prescriptions with diagnosis code.      ..  [facility]/pharmacy #[id number] - [place] - [address]  [address] [address]  Phone: [phone] Fax: [phone]

## 2017-12-26 ENCOUNTER — TELEPHONE (OUTPATIENT)
Dept: ENDOCRINOLOGY | Facility: CLINIC | Age: 62
End: 2017-12-26

## 2017-12-26 RX ORDER — LANCETS
EACH MISCELLANEOUS
Qty: 400 EACH | Refills: 6 | Status: SHIPPED | OUTPATIENT
Start: 2017-12-26 | End: 2018-07-17 | Stop reason: SDUPTHER

## 2017-12-26 RX ORDER — INSULIN PUMP SYRINGE, 3 ML
EACH MISCELLANEOUS
Qty: 1 EACH | Refills: 0 | Status: SHIPPED | OUTPATIENT
Start: 2017-12-26 | End: 2018-07-17 | Stop reason: SDUPTHER

## 2017-12-26 NOTE — TELEPHONE ENCOUNTER
Spoke with the pt and  per pt  pt insurance  required a Rx faxed over to the [facility] health chart  notes with Rx. Please print [facility]. Thanks.

## 2017-12-26 NOTE — TELEPHONE ENCOUNTER
----- Message from Cherie Macdonald sent at 12/26/2017  4:25 PM CST -----  Contact: [facility]  Called    -       True matrix   Meter,  Strips and test strips has been approved         But the expedition has NOT  been approved     Authorization # [id number]     ROMINA Gastelum

## 2017-12-26 NOTE — TELEPHONE ENCOUNTER
----- Message from Karla Lauren sent at 12/26/2017  1:30 PM CST -----  Contact: :  [facility]  tel:  [phone]  hm.   Caller says:   Pt. Needs testing monitor and testing strips, lancets.  One touch .   Needs you to speak to [employer] for the authorization for this. Testing 4 times a day.    [facility]  [address].

## 2018-01-05 ENCOUNTER — LAB VISIT (OUTPATIENT)
Dept: LAB | Facility: HOSPITAL | Age: 63
End: 2018-01-05
Attending: INTERNAL MEDICINE
Payer: MEDICARE

## 2018-01-05 ENCOUNTER — OFFICE VISIT (OUTPATIENT)
Dept: INTERNAL MEDICINE | Facility: CLINIC | Age: 63
End: 2018-01-05
Payer: MEDICARE

## 2018-01-05 VITALS
HEART RATE: 82 BPM | SYSTOLIC BLOOD PRESSURE: 127 MMHG | HEIGHT: 65 IN | RESPIRATION RATE: 16 BRPM | BODY MASS INDEX: 27.77 KG/M2 | DIASTOLIC BLOOD PRESSURE: 63 MMHG | WEIGHT: 166.69 LBS

## 2018-01-05 DIAGNOSIS — I10 ESSENTIAL HYPERTENSION: ICD-10-CM

## 2018-01-05 DIAGNOSIS — E11.69 TYPE 2 DIABETES MELLITUS WITH OTHER SPECIFIED COMPLICATION, UNSPECIFIED LONG TERM INSULIN USE STATUS: ICD-10-CM

## 2018-01-05 DIAGNOSIS — N39.0 URINARY TRACT INFECTION WITHOUT HEMATURIA, SITE UNSPECIFIED: Primary | ICD-10-CM

## 2018-01-05 LAB
BACTERIA #/AREA URNS AUTO: ABNORMAL /HPF
BILIRUB UR QL STRIP: NEGATIVE
CHOLEST SERPL-MCNC: 189 MG/DL
CHOLEST/HDLC SERPL: 3.4 {RATIO}
CLARITY UR REFRACT.AUTO: ABNORMAL
COLOR UR AUTO: ABNORMAL
GLUCOSE UR QL STRIP: NEGATIVE
HDLC SERPL-MCNC: 56 MG/DL
HDLC SERPL: 29.6 %
HGB UR QL STRIP: NEGATIVE
HYALINE CASTS UR QL AUTO: 4 /LPF
KETONES UR QL STRIP: NEGATIVE
LDLC SERPL CALC-MCNC: 101.2 MG/DL
LEUKOCYTE ESTERASE UR QL STRIP: ABNORMAL
MICROSCOPIC COMMENT: ABNORMAL
NITRITE UR QL STRIP: NEGATIVE
NONHDLC SERPL-MCNC: 133 MG/DL
PH UR STRIP: 5 [PH] (ref 5–8)
PROT UR QL STRIP: NEGATIVE
RBC #/AREA URNS AUTO: 1 /HPF (ref 0–4)
SP GR UR STRIP: 1.02 (ref 1–1.03)
SQUAMOUS #/AREA URNS AUTO: 16 /HPF
TRIGL SERPL-MCNC: 159 MG/DL
URN SPEC COLLECT METH UR: ABNORMAL
UROBILINOGEN UR STRIP-ACNC: NEGATIVE EU/DL
WBC #/AREA URNS AUTO: 57 /HPF (ref 0–5)
WBC CLUMPS UR QL AUTO: ABNORMAL

## 2018-01-05 PROCEDURE — 81001 URINALYSIS AUTO W/SCOPE: CPT

## 2018-01-05 PROCEDURE — 36415 COLL VENOUS BLD VENIPUNCTURE: CPT | Mod: PO

## 2018-01-05 PROCEDURE — 99213 OFFICE O/P EST LOW 20 MIN: CPT | Mod: S$GLB,,, | Performed by: INTERNAL MEDICINE

## 2018-01-05 PROCEDURE — 87086 URINE CULTURE/COLONY COUNT: CPT

## 2018-01-05 PROCEDURE — 87088 URINE BACTERIA CULTURE: CPT

## 2018-01-05 PROCEDURE — 87147 CULTURE TYPE IMMUNOLOGIC: CPT

## 2018-01-05 PROCEDURE — 99999 PR PBB SHADOW E&M-EST. PATIENT-LVL III: CPT | Mod: PBBFAC,,, | Performed by: INTERNAL MEDICINE

## 2018-01-05 PROCEDURE — 80061 LIPID PANEL: CPT

## 2018-01-05 RX ORDER — DOXYCYCLINE HYCLATE 100 MG
100 TABLET ORAL 2 TIMES DAILY
Qty: 14 TABLET | Refills: 0 | Status: SHIPPED | OUTPATIENT
Start: 2018-01-05 | End: 2018-01-12

## 2018-01-05 RX ORDER — MECLIZINE HCL 12.5 MG 12.5 MG/1
12.5 TABLET ORAL 3 TIMES DAILY PRN
Qty: 30 TABLET | Refills: 0 | Status: SHIPPED | OUTPATIENT
Start: 2018-01-05 | End: 2018-03-27

## 2018-01-05 RX ORDER — PHENAZOPYRIDINE HYDROCHLORIDE 200 MG/1
200 TABLET, FILM COATED ORAL 3 TIMES DAILY PRN
Qty: 9 TABLET | Refills: 0 | Status: SHIPPED | OUTPATIENT
Start: 2018-01-05 | End: 2018-01-08

## 2018-01-05 RX ORDER — ONDANSETRON 4 MG/1
4 TABLET, ORALLY DISINTEGRATING ORAL EVERY 8 HOURS PRN
Qty: 30 TABLET | Refills: 0 | Status: SHIPPED | OUTPATIENT
Start: 2018-01-05 | End: 2018-03-27

## 2018-01-05 NOTE — PROGRESS NOTES
Subjective:       Patient ID: Riana Kay is a 62 y.o. female.    Chief Complaint: Urinary Tract Infection (poss. Drak urine ) and Nausea    Patient is a 62 y.o.female with DM 2 who presents today for dark urine and nausea.it started a few days ago. She complains of dysuria, back pain, nausea and dizziness. She admits to increased urinary frequency as well. She denies any blood in urine but admits to a dark and odorous urine.     Review of Systems   Constitutional: Negative for appetite change, chills, diaphoresis, fatigue and fever.   HENT: Negative for congestion, dental problem, ear discharge, ear pain, hearing loss, postnasal drip, sinus pressure and sore throat.    Eyes: Negative for discharge, redness and itching.   Respiratory: Negative for cough, chest tightness, shortness of breath and wheezing.    Cardiovascular: Negative for chest pain, palpitations and leg swelling.   Gastrointestinal: Negative for abdominal pain, constipation, diarrhea, nausea and vomiting.   Endocrine: Negative for cold intolerance and heat intolerance.   Genitourinary: Positive for dysuria, flank pain, frequency and urgency. Negative for difficulty urinating and hematuria.   Musculoskeletal: Negative for arthralgias, back pain, gait problem, myalgias and neck pain.   Skin: Negative for color change and rash.   Neurological: Negative for dizziness, syncope and headaches.   Hematological: Negative for adenopathy.   Psychiatric/Behavioral: Negative for behavioral problems and sleep disturbance. The patient is not nervous/anxious.        Objective:      Physical Exam   Constitutional: She is oriented to person, place, and time. She appears well-developed and well-nourished. No distress.   HENT:   Head: Normocephalic and atraumatic.   Right Ear: External ear normal.   Left Ear: External ear normal.   Nose: Nose normal.   Mouth/Throat: Oropharynx is clear and moist. No oropharyngeal exudate.   Eyes: Conjunctivae and EOM are normal.  Pupils are equal, round, and reactive to light. Right eye exhibits no discharge. Left eye exhibits no discharge. No scleral icterus.   Neck: Normal range of motion. Neck supple. No JVD present. No thyromegaly present.   Cardiovascular: Normal rate, regular rhythm, normal heart sounds and intact distal pulses.  Exam reveals no gallop and no friction rub.    No murmur heard.  Pulmonary/Chest: Effort normal and breath sounds normal. No stridor. No respiratory distress. She has no wheezes. She has no rales. She exhibits no tenderness.   Abdominal: Soft. Bowel sounds are normal. She exhibits no distension. There is no tenderness. There is no rebound.   Musculoskeletal: Normal range of motion. She exhibits no edema or tenderness.   Lymphadenopathy:     She has no cervical adenopathy.   Neurological: She is alert and oriented to person, place, and time. No cranial nerve deficit.   Skin: Skin is warm and dry. No rash noted. She is not diaphoretic. No erythema.   Psychiatric: She has a normal mood and affect. Her behavior is normal.   Nursing note and vitals reviewed.      Assessment and Plan:       1. Urinary tract infection without hematuria, site unspecified    - Urinalysis  - Urine culture  - doxycycline (VIBRA-TABS) 100 MG tablet; Take 1 tablet (100 mg total) by mouth 2 (two) times daily.  Dispense: 14 tablet; Refill: 0  - phenazopyridine (PYRIDIUM) 200 MG tablet; Take 1 tablet (200 mg total) by mouth 3 (three) times daily as needed (bladder pain).  Dispense: 9 tablet; Refill: 0  - ondansetron (ZOFRAN-ODT) 4 MG TbDL; Take 1 tablet (4 mg total) by mouth every 8 (eight) hours as needed (nausea).  Dispense: 30 tablet; Refill: 0    2. Essential hypertension    - Lipid panel; Future    3. DM 2: stable; monitored by endo        No Follow-up on file.

## 2018-01-07 ENCOUNTER — TELEPHONE (OUTPATIENT)
Dept: INTERNAL MEDICINE | Facility: CLINIC | Age: 63
End: 2018-01-07

## 2018-01-07 LAB — BACTERIA UR CULT: NORMAL

## 2018-01-07 NOTE — TELEPHONE ENCOUNTER
Notify pt that her urine grew a bacteria that is usually only sensitive to penicillin. However, she is allergic to PCN. We can try giving her a cousin/ family of meds similar to pcn called cephalosporins. The medication is called ceftin to see if she tolerates it. She will need to be very cautious in taking it though. Does she want to try it? The doxy will likely not treat her infection

## 2018-01-10 RX ORDER — CEFUROXIME AXETIL 500 MG/1
500 TABLET ORAL EVERY 12 HOURS
Qty: 14 TABLET | Refills: 0 | Status: SHIPPED | OUTPATIENT
Start: 2018-01-10 | End: 2018-03-27

## 2018-01-10 NOTE — TELEPHONE ENCOUNTER
Spoke to pt and pt's  and informed of Dr. Can's recommendations. Both verbalized understanding. Pt agreeable to ceftin, [facility]

## 2018-01-10 NOTE — TELEPHONE ENCOUNTER
----- Message from Maddison Sylvester sent at 1/10/2018  8:48 AM CST -----  Contact: pt  [phone]  Patient would like to get test results.  Name of test (lab, mammo, etc.):  labs  Date of test:  1-5  Ordering provider: Pamella  [facility]   Comments:

## 2018-01-17 ENCOUNTER — HOSPITAL ENCOUNTER (OUTPATIENT)
Facility: HOSPITAL | Age: 63
Discharge: HOME | End: 2018-01-17
Attending: COLON & RECTAL SURGERY | Admitting: COLON & RECTAL SURGERY
Payer: MEDICARE

## 2018-01-17 ENCOUNTER — ANESTHESIA EVENT (OUTPATIENT)
Dept: ENDOSCOPY | Facility: HOSPITAL | Age: 63
End: 2018-01-17
Payer: MEDICARE

## 2018-01-17 ENCOUNTER — SURGERY (OUTPATIENT)
Age: 63
End: 2018-01-17

## 2018-01-17 ENCOUNTER — ANESTHESIA (OUTPATIENT)
Dept: ENDOSCOPY | Facility: HOSPITAL | Age: 63
End: 2018-01-17
Payer: MEDICARE

## 2018-01-17 VITALS
HEART RATE: 62 BPM | OXYGEN SATURATION: 96 % | RESPIRATION RATE: 18 BRPM | BODY MASS INDEX: 26.66 KG/M2 | HEIGHT: 65 IN | DIASTOLIC BLOOD PRESSURE: 59 MMHG | TEMPERATURE: 98 F | SYSTOLIC BLOOD PRESSURE: 135 MMHG | WEIGHT: 160 LBS

## 2018-01-17 DIAGNOSIS — Z12.11 SCREENING FOR COLON CANCER: ICD-10-CM

## 2018-01-17 LAB
GLUCOSE SERPL-MCNC: 123 MG/DL (ref 70–110)
POCT GLUCOSE: 123 MG/DL (ref 70–110)

## 2018-01-17 PROCEDURE — D9220A PRA ANESTHESIA: Mod: PT,CRNA,, | Performed by: NURSE ANESTHETIST, CERTIFIED REGISTERED

## 2018-01-17 PROCEDURE — 88305 TISSUE EXAM BY PATHOLOGIST: CPT | Mod: 26,,,

## 2018-01-17 PROCEDURE — 27201012 HC FORCEPS, HOT/COLD, DISP: Performed by: COLON & RECTAL SURGERY

## 2018-01-17 PROCEDURE — 37000008 HC ANESTHESIA 1ST 15 MINUTES: Performed by: COLON & RECTAL SURGERY

## 2018-01-17 PROCEDURE — 25000003 PHARM REV CODE 250: Performed by: NURSE PRACTITIONER

## 2018-01-17 PROCEDURE — 37000009 HC ANESTHESIA EA ADD 15 MINS: Performed by: COLON & RECTAL SURGERY

## 2018-01-17 PROCEDURE — 82962 GLUCOSE BLOOD TEST: CPT | Performed by: COLON & RECTAL SURGERY

## 2018-01-17 PROCEDURE — 45380 COLONOSCOPY AND BIOPSY: CPT | Performed by: COLON & RECTAL SURGERY

## 2018-01-17 PROCEDURE — 45380 COLONOSCOPY AND BIOPSY: CPT | Mod: PT,,, | Performed by: COLON & RECTAL SURGERY

## 2018-01-17 PROCEDURE — D9220A PRA ANESTHESIA: Mod: PT,ANES,, | Performed by: ANESTHESIOLOGY

## 2018-01-17 PROCEDURE — 88305 TISSUE EXAM BY PATHOLOGIST: CPT

## 2018-01-17 PROCEDURE — 63600175 PHARM REV CODE 636 W HCPCS: Performed by: NURSE ANESTHETIST, CERTIFIED REGISTERED

## 2018-01-17 RX ORDER — LIDOCAINE HCL/PF 100 MG/5ML
SYRINGE (ML) INTRAVENOUS
Status: DISCONTINUED | OUTPATIENT
Start: 2018-01-17 | End: 2018-01-17

## 2018-01-17 RX ORDER — PROPOFOL 10 MG/ML
VIAL (ML) INTRAVENOUS
Status: DISCONTINUED | OUTPATIENT
Start: 2018-01-17 | End: 2018-01-17

## 2018-01-17 RX ORDER — PROPOFOL 10 MG/ML
VIAL (ML) INTRAVENOUS CONTINUOUS PRN
Status: DISCONTINUED | OUTPATIENT
Start: 2018-01-17 | End: 2018-01-17

## 2018-01-17 RX ORDER — SODIUM CHLORIDE 9 MG/ML
INJECTION, SOLUTION INTRAVENOUS CONTINUOUS
Status: DISCONTINUED | OUTPATIENT
Start: 2018-01-17 | End: 2018-01-17 | Stop reason: HOSPADM

## 2018-01-17 RX ADMIN — PROPOFOL 20 MG: 10 INJECTION, EMULSION INTRAVENOUS at 09:01

## 2018-01-17 RX ADMIN — PROPOFOL 150 MCG/KG/MIN: 10 INJECTION, EMULSION INTRAVENOUS at 09:01

## 2018-01-17 RX ADMIN — SODIUM CHLORIDE: 900 INJECTION, SOLUTION INTRAVENOUS at 09:01

## 2018-01-17 RX ADMIN — LIDOCAINE HYDROCHLORIDE 100 MG: 20 INJECTION, SOLUTION INTRAVENOUS at 09:01

## 2018-01-17 RX ADMIN — PROPOFOL 100 MG: 10 INJECTION, EMULSION INTRAVENOUS at 09:01

## 2018-01-17 NOTE — PROVATION PATIENT INSTRUCTIONS
Discharge Summary/Instructions after an Endoscopic Procedure  Patient Name: Riana Kay  Patient MRN: [id number]  Patient YOB: 1955 Wednesday, January 17, 2018  Roland Jacobo MD  RESTRICTIONS:  During your procedure today, you received medications for sedation.  These   medications may affect your judgment, balance and coordination.  Therefore,   for 24 hours, you have the following restrictions:   - DO NOT drive a car, operate machinery, make legal/financial decisions,   sign important papers or drink alcohol.    ACTIVITY:  The following day: return to full activity including work, except no heavy   lifting, straining or running for 3 days if polyps were removed.  DIET:  Eat and drink normally unless instructed otherwise.     TREATMENT FOR COMMON SIDE EFFECTS:  - Mild abdominal pain, belching, bloating or excessive gas: rest, eat   lightly and use a heating pad.  - Sore Throat: treat with throat lozenges and/or gargle with warm salt   water.  SYMPTOMS TO WATCH FOR AND REPORT TO YOUR PHYSICIAN:  1. Abdominal pain or bloating, other than gas cramps.  2. Chest pain.  3. Back pain.  4. Chills or fever occurring within 24 hours after the procedure.  5. Rectal bleeding, which would show as bright red, maroon, or black stools.   (A tablespoon of blood from the rectum is not serious, especially if   hemorrhoids are present.)  6. Vomiting.  7. Weakness or dizziness.  8. Because air was used during the procedure, expelling large amounts of air   from your rectum or belching is normal.  9. If a bowel prep was taken, you may not have a bowel movement for 1-3   days.  This is normal.  GO DIRECTLY TO THE NEAREST EMERGENCY ROOM IF YOU HAVE ANY OF THE FOLLOWING:      Difficulty breathing  Chills and/or fever over 101 F   Persistent vomiting and/or vomiting blood   Severe abdominal pain   Severe chest pain   Black, tarry stools   Bleeding- more than one tablespoon   Any other symptom or condition that you may feel  needs urgent attention  Your doctor recommends these additional instructions:  If any biopsies were taken, your doctor s clinic will contact you in 1 to 2   weeks with any results.  You are being discharged to home.   Resume your previous diet.   Continue your present medications.   We are waiting for your pathology results.   Your physician has recommended a repeat colonoscopy in five years for   surveillance.  For questions, problems or results please call your physician - Roland Jacobo MD at .  [facility], EMERGENCY ROOM PHONE NUMBER: [phone]  IF A COMPLICATION OR EMERGENCY SITUATION ARISES AND YOU ARE UNABLE TO REACH   YOUR PHYSICIAN - GO DIRECTLY TO THE EMERGENCY ROOM.  Roland Jacobo MD  1/17/2018 9:42:06 AM  This report has been verified and signed electronically.

## 2018-01-17 NOTE — ANESTHESIA PREPROCEDURE EVALUATION
01/17/2018  Riana Kay is a 62 y.o., female.    Anesthesia Evaluation         Review of Systems  Anesthesia Hx:  No problems with previous Anesthesia   Social:  Non-Smoker, No Alcohol Use    Cardiovascular:   Hypertension 2 METS at best   Pulmonary:   Sleep Apnea Does not use CPAP   Hepatic/GI:   GERD, well controlled    Endocrine:   Diabetes, using insulin        Physical Exam  General:  Well nourished    Airway/Jaw/Neck:  Airway Findings: Mouth Opening: Normal Tongue: Normal  General Airway Assessment: Adult  Mallampati: II  Improves to II with phonation.  TM Distance: Normal, at least 6 cm  Jaw/Neck Findings:  Neck ROM: Normal ROM      Dental:  Dental Findings: In tact   Chest/Lungs:  Chest/Lungs Findings: Clear to auscultation         Mental Status:  Mental Status Findings:  Cooperative         Anesthesia Plan  Type of Anesthesia, risks & benefits discussed:  Anesthesia Type:  general  Patient's Preference:   Intra-op Monitoring Plan: standard ASA monitors  Intra-op Monitoring Plan Comments:   Post Op Pain Control Plan:   Post Op Pain Control Plan Comments:   Induction:   IV  Beta Blocker:  Patient is not currently on a Beta-Blocker (No further documentation required).       Informed Consent: Patient understands risks and agrees with Anesthesia plan.  Questions answered. Anesthesia consent signed with patient.  ASA Score: 2     Day of Surgery Review of History & Physical:    H&P update referred to the surgeon.         Ready For Surgery From Anesthesia Perspective.

## 2018-01-17 NOTE — ANESTHESIA POSTPROCEDURE EVALUATION
"Anesthesia Post Evaluation    Patient: Riana Kay    Procedure(s) Performed: Procedure(s) (LRB):  COLONOSCOPY with Jacobo (N/A)    Final Anesthesia Type: general  Patient location during evaluation: PACU  Patient participation: Yes- Able to Participate  Level of consciousness: awake and alert  Post-procedure vital signs: reviewed and stable  Pain management: adequate  Airway patency: patent  PONV status at discharge: No PONV  Anesthetic complications: no      Cardiovascular status: blood pressure returned to baseline  Respiratory status: unassisted  Hydration status: euvolemic  Follow-up not needed.        Visit Vitals  BP (!) 135/59   Pulse 62   Temp 36.6 °C (97.9 °F) (Skin)   Resp 18   Ht 5' 5" (1.651 m)   Wt 72.6 kg (160 lb)   SpO2 96%   Breastfeeding? No   BMI 26.63 kg/m²       Pain/Jeffery Score: Pain Assessment Performed: Yes (1/17/2018  8:55 AM)  Presence of Pain: denies (1/17/2018  9:47 AM)  Jeffery Score: 10 (1/17/2018 10:13 AM)      "

## 2018-01-17 NOTE — H&P
"COLONOSCOPY HISTORY AND PE    Procedure : Colonoscopy      INDICATIONS: screening exam and rectal bleeding, postive family history    Family Hx of CRC: yes, father in his 80s    Last Colonoscopy:  -FIT test in 2017.  Last CSY in 2016  Findings: "brown spot" in the colon       Past Medical History:   Diagnosis Date    Anxiety     AR (allergic rhinitis)     Diabetes mellitus, type 2     Dizziness     DM (diabetes mellitus)     Fatty liver     GERD (gastroesophageal reflux disease)     HTN (hypertension)     Hyperlipidemia     Memory loss     Osteopenia     S/P total hysterectomy     Sleep apnea      Sedation Problems: NO  Family History   Problem Relation Age of Onset    Colon cancer Father 83     colon cancer    Diabetes Maternal Grandmother     Diabetes Maternal Aunt      Fam Hx of Sedation Problems: NO  Social History     Social History    Marital status:      Spouse name: N/A    Number of children: N/A    Years of education: N/A     Occupational History    Not on file.     Social History Main Topics    Smoking status: Never Smoker    Smokeless tobacco: Never Used    Alcohol use No    Drug use: No    Sexual activity: Yes     Partners: Male     Other Topics Concern    Not on file     Social History Narrative    She works at [employer] in [place]; , 1 kid (21yo).Nonsmoker, social etoh. No exercise but hopes to start walking on the weekend.       Review of Systems - Negative except   Respiratory ROS: no dyspnea  Cardiovascular ROS: no exertional chest pain  Gastrointestinal ROS: NO abdominal discomfort,  YES 3 days but resolved rectal bleeding  Musculoskeletal ROS: no muscular pain  Neurological ROS: no recent stroke    Physical Exam:  Breastfeeding? No   General: no distress  Head: normocephalic  Mallampati Score   Neck: supple, symmetrical, trachea midline  Lungs:  clear to auscultation bilaterally and normal respiratory effort  Heart: regular rate and rhythm and no " murmur  Abdomen: soft, non-tender non-distented; bowel sounds normal; no masses,  no organomegaly  Extremities: no cyanosis or edema, or clubbing    ASA:  II    PLAN  COLONOSCOPY.    SedationPlan :MAC    The details of the procedure, the possible need for biopsy or polypectomy and the potential risks including bleeding, perforation, missed polyps were discussed in detail.

## 2018-01-17 NOTE — TRANSFER OF CARE
"Anesthesia Transfer of Care Note    Patient: Riana Kay    Procedure(s) Performed: Procedure(s) (LRB):  COLONOSCOPY with Donnell (N/A)    Patient location: PACU    Anesthesia Type: general    Transport from OR: Transported from OR on 6-10 L/min O2 by face mask with adequate spontaneous ventilation    Post pain: adequate analgesia    Post assessment: no apparent anesthetic complications    Post vital signs: stable    Level of consciousness: sedated    Nausea/Vomiting: no nausea/vomiting    Complications: none    Transfer of care protocol was followed      Last vitals:   Visit Vitals  BP (!) 100/51 (BP Location: Left arm, Patient Position: Lying)   Pulse 64   Temp 36.6 °C (97.9 °F) (Skin)   Resp 16   Ht 5' 5" (1.651 m)   Wt 72.6 kg (160 lb)   SpO2 99%   Breastfeeding? No   BMI 26.63 kg/m²     "

## 2018-01-17 NOTE — ANESTHESIA RELEASE NOTE
"Anesthesia Release from PACU Note    Patient: Riana Kay    Procedure(s) Performed: Procedure(s) (LRB):  COLONOSCOPY with Jacobo (N/A)    Anesthesia type: general    Post pain: Adequate analgesia    Post assessment: no apparent anesthetic complications    Last Vitals:   Visit Vitals  BP (!) 135/59   Pulse 62   Temp 36.6 °C (97.9 °F) (Skin)   Resp 18   Ht 5' 5" (1.651 m)   Wt 72.6 kg (160 lb)   SpO2 96%   Breastfeeding? No   BMI 26.63 kg/m²       Post vital signs: stable    Level of consciousness: awake, alert  and oriented    Nausea/Vomiting: no nausea/no vomiting    Complications: none    Airway Patency: patent    Respiratory: unassisted    Cardiovascular: stable and blood pressure at baseline    Hydration: euvolemic       "

## 2018-01-23 ENCOUNTER — OFFICE VISIT (OUTPATIENT)
Dept: PODIATRY | Facility: CLINIC | Age: 63
End: 2018-01-23
Payer: MEDICARE

## 2018-01-23 VITALS
BODY MASS INDEX: 27.82 KG/M2 | DIASTOLIC BLOOD PRESSURE: 74 MMHG | WEIGHT: 167 LBS | HEIGHT: 65 IN | SYSTOLIC BLOOD PRESSURE: 139 MMHG | HEART RATE: 71 BPM

## 2018-01-23 DIAGNOSIS — M20.42 HAMMER TOES OF BOTH FEET: ICD-10-CM

## 2018-01-23 DIAGNOSIS — R20.2 PARESTHESIAS: ICD-10-CM

## 2018-01-23 DIAGNOSIS — M79.671 FOOT PAIN, RIGHT: ICD-10-CM

## 2018-01-23 DIAGNOSIS — G57.61 MORTON'S NEUROMA OF RIGHT FOOT: ICD-10-CM

## 2018-01-23 DIAGNOSIS — E11.49 TYPE II DIABETES MELLITUS WITH NEUROLOGICAL MANIFESTATIONS: Primary | ICD-10-CM

## 2018-01-23 DIAGNOSIS — M20.41 HAMMER TOES OF BOTH FEET: ICD-10-CM

## 2018-01-23 DIAGNOSIS — M20.11 HAV (HALLUX ABDUCTO VALGUS), RIGHT: ICD-10-CM

## 2018-01-23 PROCEDURE — 99203 OFFICE O/P NEW LOW 30 MIN: CPT | Mod: S$GLB,,, | Performed by: PODIATRIST

## 2018-01-23 PROCEDURE — 99999 PR PBB SHADOW E&M-EST. PATIENT-LVL III: CPT | Mod: PBBFAC,,, | Performed by: PODIATRIST

## 2018-01-23 RX ORDER — DICLOFENAC SODIUM 10 MG/G
2 GEL TOPICAL 4 TIMES DAILY
Qty: 1 TUBE | Refills: 4 | Status: SHIPPED | OUTPATIENT
Start: 2018-01-23 | End: 2018-10-26 | Stop reason: ALTCHOICE

## 2018-01-23 NOTE — PROGRESS NOTES
Subjective:      Patient ID: Riana Kay is a 62 y.o. female.    Chief Complaint: Diabetes Mellitus (foot exam Dr Lafluer 1/5/18) and Diabetic Foot Exam    Riana is a 62 y.o. female who presents to the clinic upon referral from Dr. Claros  for evaluation and treatment of diabetic feet. Riana has a past medical history of Anxiety; AR (allergic rhinitis); Diabetes mellitus, type 2; Dizziness; DM (diabetes mellitus); Fatty liver; GERD (gastroesophageal reflux disease); HTN (hypertension); Hyperlipidemia; Memory loss; Osteopenia; S/P total hysterectomy; and Sleep apnea. Patient relates no major problem with feet. Only complaints today consist of pain along the ball of the R foot present for several weeks. Does not relate any acute injury. Pain is sharp shooting in nature and it sometimes feels like she is walking on a balled up sock. She is inquiring about treatment options. She has not seen a podiatrist in > 3 years. Patient is here to have comprehensive DM foot exam and to establish care per recommendations of PCP.   .    PCP: Amena Roger, DO    Date Last Seen by PCP:   Chief Complaint   Patient presents with    Diabetes Mellitus     foot exam Dr Lafluer 1/5/18    Diabetic Foot Exam       Current shoe gear: Casual shoes    Hemoglobin A1C   Date Value Ref Range Status   12/14/2017 6.7 (H) 4.0 - 5.6 % Final     Comment:     According to ADA guidelines, hemoglobin A1c <7.0% represents  optimal control in non-pregnant diabetic patients. Different  metrics may apply to specific patient populations.   Standards of Medical Care in Diabetes-2016.  For the purpose of screening for the presence of diabetes:  <5.7%     Consistent with the absence of diabetes  5.7-6.4%  Consistent with increasing risk for diabetes   (prediabetes)  >or=6.5%  Consistent with diabetes  Currently, no consensus exists for use of hemoglobin A1c  for diagnosis of diabetes for children.  This Hemoglobin A1c assay has significant  interference with fetal   hemoglobin   (HbF). The results are invalid for patients with abnormal amounts of   HbF,   including those with known Hereditary Persistence   of Fetal Hemoglobin. Heterozygous hemoglobin variants (HbAS, HbAC,   HbAD, HbAE, HbA2) do not significantly interfere with this assay;   however, presence of multiple variants in a sample may impact the %   interference.     08/11/2015 7.7 (H) 4.5 - 6.2 % Final   05/13/2015 7.9 (H) 4.5 - 6.2 % Final       Past Medical History:   Diagnosis Date    Anxiety     AR (allergic rhinitis)     Diabetes mellitus, type 2     Dizziness     DM (diabetes mellitus)     Fatty liver     GERD (gastroesophageal reflux disease)     HTN (hypertension)     Hyperlipidemia     Memory loss     Osteopenia     S/P total hysterectomy     Sleep apnea        Past Surgical History:   Procedure Laterality Date    CHOLECYSTECTOMY      COLONOSCOPY N/A 1/17/2018    Procedure: COLONOSCOPY with Donnell;  Surgeon: Roland Jacobo MD;  Location: [facility] ([address] [place] [address]);  Service: Endoscopy;  Laterality: N/A;    ELBOW SURGERY Right 7/16/15    ELBOW SURGERY      HYSTERECTOMY      KNEE ARTHROSCOPY W/ DEBRIDEMENT  4/11    Left    ROTATOR CUFF REPAIR      TONSILLECTOMY      TOTAL ABDOMINAL HYSTERECTOMY W/ BILATERAL SALPINGOOPHORECTOMY         Family History   Problem Relation Age of Onset    Colon cancer Father 83     colon cancer    Diabetes Maternal Grandmother     Diabetes Maternal Aunt        Social History     Social History    Marital status:      Spouse name: N/A    Number of children: N/A    Years of education: N/A     Social History Main Topics    Smoking status: Never Smoker    Smokeless tobacco: Never Used    Alcohol use No    Drug use: No    Sexual activity: Yes     Partners: Male     Other Topics Concern    None     Social History Narrative    She works at [employer] in housekeeping; , 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 No exercise  "but hopes to start walking on the weekend.       Current Outpatient Prescriptions   Medication Sig Dispense Refill    blood sugar diagnostic Strp Uses 4 times a day. Preferred by insurance.DX code E11.42 400 each 6    blood-glucose meter kit Use as instructed, preferred meter. DX Code. E11.42 1 each 0    cefUROXime (CEFTIN) 500 MG tablet Take 1 tablet (500 mg total) by mouth every 12 (twelve) hours. 14 tablet 0    cyclobenzaprine (FLEXERIL) 10 MG tablet Take 10 mg by mouth nightly as needed.  0    diclofenac sodium 1 % Gel Apply 2 g topically 4 (four) times daily. 1 Tube 4    fluticasone (FLONASE) 50 mcg/actuation nasal spray 1-2 sprays @ nostril once a day for rhinitis 1 Bottle 2    hydrocodone-acetaminophen 5-325mg (NORCO) 5-325 mg per tablet Take 1 tablet by mouth as needed.   0    insulin glargine (LANTUS SOLOSTAR) 100 unit/mL (3 mL) InPn pen Inject 30 units every night. 1 Box 6    insulin lispro (HUMALOG KWIKPEN) 100 unit/mL InPn pen Inject 8 units w/ breakfast, 12 units w/ lunch and dinner plus scale 180-230+2, 231-280 +4, 281-330+6, 331-380+8. 1 Box 6    insulin needles, disposable, (PEN NEEDLE) 31 X 5/16 " Ndle Use as directed 100 each PRN    lancets Misc Preferred by insurance. Checks sugar 4 times a day.DX code E11.42 400 each 6    lisinopril (PRINIVIL,ZESTRIL) 5 MG tablet Take 1 tablet (5 mg total) by mouth once daily. 90 tablet 3    meclizine (ANTIVERT) 12.5 mg tablet Take 1 tablet (12.5 mg total) by mouth 3 (three) times daily as needed for Dizziness. 30 tablet 0    ondansetron (ZOFRAN-ODT) 4 MG TbDL Take 1 tablet (4 mg total) by mouth every 8 (eight) hours as needed (nausea). 30 tablet 0    pen needle, diabetic (NOVOFINE 32) 32 gauge x 1/4" Ndle Uses 4 times a day. 150 each 6     No current facility-administered medications for this visit.        Review of patient's allergies indicates:   Allergen Reactions    Iodinated contrast- oral and iv dye      Other reaction(s): Swelling  Other " reaction(s): Rash    Macrobid  [nitrofurantoin monohyd/m-cryst]      Other reaction(s): Rash    Metformin      Other reaction(s): Rash    Penicillins      Other reaction(s): Rash    Promethazine      Other reaction(s): rash  Other reaction(s): Unknown    Sulfa (sulfonamide antibiotics)      Other reaction(s): Rash    Sulfamethoxazole-trimethoprim      Other reaction(s): Rash    Novolin 70/30 [insulin nph and regular human] Itching and Rash         Review of Systems   Constitution: Negative for chills and fever.   Cardiovascular: Negative for chest pain, claudication and leg swelling.   Respiratory: Negative for cough and shortness of breath.    Skin: Positive for dry skin and nail changes.   Musculoskeletal:        R foot pain   Gastrointestinal: Negative for nausea and vomiting.   Neurological: Negative for numbness and paresthesias.   Psychiatric/Behavioral: Negative for altered mental status.           Objective:      Physical Exam   Constitutional: She is oriented to person, place, and time. She appears well-developed and well-nourished.   HENT:   Head: Normocephalic.   Cardiovascular: Intact distal pulses.    Pulses:       Dorsalis pedis pulses are 2+ on the right side, and 2+ on the left side.        Posterior tibial pulses are 2+ on the right side, and 2+ on the left side.   CRT < 3 sec to tips of toes. Mild edema noted to b/l LE. No vericosities noted to b/l LEs.      Pulmonary/Chest: No respiratory distress.   Musculoskeletal:   Gastrocnemius equinus noted to b/l ankles with decreased DF noted on exam. MMT 5/5 in DF/PF/Inv/Ev resistance with no reproduction of pain in any direction. Passive range of motion of ankle and pedal joints is painless. Adequate pedal joint ROM.     + pain with palpation of R 3rd intermetatarsal space with + pain on lateral squeeze of metatarsals 1-5 R foot. + mild moulder's click noted. No pain with direct palpation of 3rd and 4th metatarsal heads/MTPJs adjacent to affected  intermet space.    Neurological: She is alert and oriented to person, place, and time. She has normal strength. A sensory deficit is present.   Light touch, proprioception, and sharp/dull sensation are all intact bilaterally. Protective threshold with the [place]-Wienstein monofilament is intact bilaterally. Subjective paresthesias with no clearly identifiable source or trigger.      Skin: Skin is warm, dry and intact. No bruising, no ecchymosis and no lesion noted. No erythema.   No open lesions, lacerations or wounds noted. Nails are dystrophic but well trimmed to R 1-5 and L 1-5. Interdigital spaces clean, dry and intact b/l. No erythema noted to b/l foot. Skin texture atrophic dry. Pedal hair adequate. Skin temperature normal b/l foot.      Psychiatric: She has a normal mood and affect. Her behavior is normal. Judgment and thought content normal.   Vitals reviewed.            Assessment:       Encounter Diagnoses   Name Primary?    Type II diabetes mellitus with neurological manifestations Yes    Paresthesias     Hammer toes of both feet     Hav (hallux abducto valgus), right     Beltre's neuroma of right foot     Foot pain, right          Plan:       Riana was seen today for diabetes mellitus and diabetic foot exam.    Diagnoses and all orders for this visit:    Type II diabetes mellitus with neurological manifestations  -     DIABETIC SHOES FOR HOME USE    Paresthesias  -     DIABETIC SHOES FOR HOME USE    Hammer toes of both feet  -     DIABETIC SHOES FOR HOME USE    Hav (hallux abducto valgus), right  -     DIABETIC SHOES FOR HOME USE    Beltre's neuroma of right foot  -     DIABETIC SHOES FOR HOME USE    Foot pain, right  -     DIABETIC SHOES FOR HOME USE    Other orders  -     diclofenac sodium 1 % Gel; Apply 2 g topically 4 (four) times daily.      I counseled the patient on her conditions, their implications and medical management.    - Shoe inspection. Diabetic Foot Education. Patient reminded  of the importance of good nutrition and blood sugar control to help prevent podiatric complications of diabetes. Patient instructed on proper foot hygeine. We discussed wearing proper shoe gear, daily foot inspections, never walking without protective shoe gear, caution putting sharp instruments to feet     - Discussed DM foot care:  Wear comfortable, proper fitting shoes. Wash feet daily. Dry well. After drying, apply moisturizer to feet (no lotion to webspaces). Inspect feet daily for skin breaks, blisters, swelling, or redness. Wear cotton socks (preferably white)  Change socks every day. Do NOT walk barefoot. Do NOT use heating pads or warm/hot water soaks     Discussed regular and routine moisturizer to skin of both feet to help improve dry skin. Advised to apply twice daily until resolution of symptoms. Avoid between toes. Recommended gold bond DM foot cream.     For Neuroma:   Rx diabetic shoes with custom molded inserts to be worn at all times while ambulating. Prescription provided with list of local retailers.     Metatarsal gel cushion pad dispensed and applied to R foot for additional cushioning of met heads.    Rx Voltaren gel to be applied to affected area up to 4-5 x daily as needed for pain.     Avoid barefoot walking and flat, unsupportive shoes.     RTC 2 months for neuroma follow up and annually for DM foot exam.

## 2018-01-23 NOTE — LETTER
January 23, 2018      Miguel Claros, APRN, FNP  [address] [facility] [facility] [address]           [facility] - Podiatry  [address]  [facility] [place] [address]  Phone: [phone]          Patient: Riana Kay   MR Number: [id number]   YOB: 1955   Date of Visit: 1/23/2018       Dear Miguel Claros:    Thank you for referring Riana Kay to me for evaluation. Attached you will find relevant portions of my assessment and plan of care.    If you have questions, please do not hesitate to call me. I look forward to following Riana Kay along with you.    Sincerely,    Johanna Tamayo DPM    Enclosure  CC:  No Recipients    If you would like to receive this communication electronically, please contact [email or web address] or [phone] to request more information on My[employer] Link access.    For providers and/or their staff who would like to refer a patient to [facility], please contact us through our one-stop-shop provider referral line, [facility], [place], operated [facility], at [phone].    If you feel you have received this communication in error or would no longer like to receive these types of communications, please e-mail [email or web address]

## 2018-01-24 ENCOUNTER — TELEPHONE (OUTPATIENT)
Dept: ENDOSCOPY | Facility: HOSPITAL | Age: 63
End: 2018-01-24

## 2018-01-24 ENCOUNTER — TELEPHONE (OUTPATIENT)
Dept: ENDOCRINOLOGY | Facility: CLINIC | Age: 63
End: 2018-01-24

## 2018-01-24 NOTE — TELEPHONE ENCOUNTER
----- Message from Janelle Blair sent at 1/24/2018  9:59 AM CST -----  Contact:  / Kemal [phone]  PT called to check status of prescription for Truclity. She was told to start the insulin this month. Her  can be reached at [phone].

## 2018-01-25 ENCOUNTER — LAB VISIT (OUTPATIENT)
Dept: LAB | Facility: HOSPITAL | Age: 63
End: 2018-01-25
Attending: INTERNAL MEDICINE
Payer: MEDICARE

## 2018-01-25 ENCOUNTER — OFFICE VISIT (OUTPATIENT)
Dept: INTERNAL MEDICINE | Facility: CLINIC | Age: 63
End: 2018-01-25
Payer: MEDICARE

## 2018-01-25 VITALS
DIASTOLIC BLOOD PRESSURE: 72 MMHG | BODY MASS INDEX: 27.63 KG/M2 | HEIGHT: 65 IN | TEMPERATURE: 98 F | RESPIRATION RATE: 14 BRPM | HEART RATE: 63 BPM | SYSTOLIC BLOOD PRESSURE: 136 MMHG | WEIGHT: 165.81 LBS

## 2018-01-25 DIAGNOSIS — E11.69 TYPE 2 DIABETES MELLITUS WITH OTHER SPECIFIED COMPLICATION, UNSPECIFIED LONG TERM INSULIN USE STATUS: ICD-10-CM

## 2018-01-25 DIAGNOSIS — N93.9 VAGINAL BLEEDING: ICD-10-CM

## 2018-01-25 DIAGNOSIS — Z12.31 VISIT FOR SCREENING MAMMOGRAM: ICD-10-CM

## 2018-01-25 DIAGNOSIS — Z78.0 POSTMENOPAUSAL: ICD-10-CM

## 2018-01-25 DIAGNOSIS — Z00.00 ANNUAL PHYSICAL EXAM: ICD-10-CM

## 2018-01-25 DIAGNOSIS — Z00.00 ANNUAL PHYSICAL EXAM: Primary | ICD-10-CM

## 2018-01-25 DIAGNOSIS — I10 ESSENTIAL HYPERTENSION: ICD-10-CM

## 2018-01-25 LAB
BASOPHILS # BLD AUTO: 0.02 K/UL
BASOPHILS NFR BLD: 0.3 %
DIFFERENTIAL METHOD: ABNORMAL
EOSINOPHIL # BLD AUTO: 0.1 K/UL
EOSINOPHIL NFR BLD: 1.5 %
ERYTHROCYTE [DISTWIDTH] IN BLOOD BY AUTOMATED COUNT: 12 %
FERRITIN SERPL-MCNC: 215 NG/ML
HCT VFR BLD AUTO: 35.7 %
HGB BLD-MCNC: 12.2 G/DL
IMM GRANULOCYTES # BLD AUTO: 0 K/UL
IMM GRANULOCYTES NFR BLD AUTO: 0 %
IRON SERPL-MCNC: 103 UG/DL
LYMPHOCYTES # BLD AUTO: 1.9 K/UL
LYMPHOCYTES NFR BLD: 32.1 %
MCH RBC QN AUTO: 30.9 PG
MCHC RBC AUTO-ENTMCNC: 34.2 G/DL
MCV RBC AUTO: 90 FL
MONOCYTES # BLD AUTO: 0.4 K/UL
MONOCYTES NFR BLD: 6.9 %
NEUTROPHILS # BLD AUTO: 3.5 K/UL
NEUTROPHILS NFR BLD: 59.2 %
NRBC BLD-RTO: 0 /100 WBC
PLATELET # BLD AUTO: 218 K/UL
PMV BLD AUTO: 9.9 FL
RBC # BLD AUTO: 3.95 M/UL
SATURATED IRON: 36 %
TOTAL IRON BINDING CAPACITY: 289 UG/DL
TRANSFERRIN SERPL-MCNC: 195 MG/DL
WBC # BLD AUTO: 5.91 K/UL

## 2018-01-25 PROCEDURE — 99999 PR PBB SHADOW E&M-EST. PATIENT-LVL IV: CPT | Mod: PBBFAC,,, | Performed by: INTERNAL MEDICINE

## 2018-01-25 PROCEDURE — 86803 HEPATITIS C AB TEST: CPT

## 2018-01-25 PROCEDURE — 82728 ASSAY OF FERRITIN: CPT

## 2018-01-25 PROCEDURE — 99396 PREV VISIT EST AGE 40-64: CPT | Mod: S$GLB,,, | Performed by: INTERNAL MEDICINE

## 2018-01-25 PROCEDURE — 83540 ASSAY OF IRON: CPT

## 2018-01-25 PROCEDURE — 85025 COMPLETE CBC W/AUTO DIFF WBC: CPT

## 2018-01-25 PROCEDURE — 36415 COLL VENOUS BLD VENIPUNCTURE: CPT | Mod: PO

## 2018-01-25 NOTE — PROGRESS NOTES
Subjective:       Patient ID: Riana Baker Ng is a 62 y.o. female.    Chief Complaint: Annual Exam and Vaginal Bleeding (started tuesday, off and on)    Patient is a 62 y.o.female with type 2 diabetes with insulin who presents today for annual.      Cholesterol: reviewed  Vaccines: up to date; shingles ( today)  Mammogram: dec 2017; due now  Gyn exam: due now  Colonoscopy: 2018; due in five years  DEXA: due now    Needs to swap from lantus to levemir in February due to cost.     Had an episode of vaginal bleeding last week. Lasted for three days. She states that she had a total hysterectomy in the past. No clots. Needing to wear a pad due to the bleeding; looks like a period bleed.    Past Medical History:   Diagnosis Date    Anxiety     AR (allergic rhinitis)     Diabetes mellitus, type 2     Dizziness     DM (diabetes mellitus)     Fatty liver     GERD (gastroesophageal reflux disease)     HTN (hypertension)     Hyperlipidemia     Memory loss     Osteopenia     S/P total hysterectomy     Sleep apnea      Past Surgical History:   Procedure Laterality Date    CHOLECYSTECTOMY      COLONOSCOPY N/A 1/17/2018    Procedure: COLONOSCOPY with Donnell;  Surgeon: Roland Jacobo MD;  Location: [address]);  Service: Endoscopy;  Laterality: N/A;    ELBOW SURGERY Right 7/16/15    ELBOW SURGERY      HYSTERECTOMY      KNEE ARTHROSCOPY W/ DEBRIDEMENT  4/11    Left    ROTATOR CUFF REPAIR      TONSILLECTOMY      TOTAL ABDOMINAL HYSTERECTOMY W/ BILATERAL SALPINGOOPHORECTOMY       Social History     Social History    Marital status:      Spouse name: N/A    Number of children: N/A    Years of education: N/A     Occupational History    Not on file.     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 No exercise but hopes to start walking on the weekend.     Review of patient's allergies indicates:   Allergen Reactions    Iodinated contrast- oral and iv dye      Other reaction(s): Swelling  Other reaction(s): Rash    Macrobid  [nitrofurantoin monohyd/m-cryst]      Other reaction(s): Rash    Metformin      Other reaction(s): Rash    Penicillins      Other reaction(s): Rash    Promethazine      Other reaction(s): rash  Other reaction(s): Unknown    Sulfa (sulfonamide antibiotics)      Other reaction(s): Rash    Sulfamethoxazole-trimethoprim      Other reaction(s): Rash    Novolin 70/30 [insulin nph and regular human] Itching and Rash     Ms. Kay had no medications administered during this visit.    Review of Systems   Constitutional: Negative for appetite change, chills, diaphoresis, fatigue and fever.   HENT: Negative for congestion, dental problem, ear discharge, ear pain, hearing loss, postnasal drip, sinus pressure and sore throat.    Eyes: Negative for discharge, redness and itching.   Respiratory: Negative for cough, chest tightness, shortness of breath and wheezing.    Cardiovascular: Negative for chest pain, palpitations and leg swelling.   Gastrointestinal: Negative for abdominal pain, constipation, diarrhea, nausea and vomiting.   Endocrine: Negative for cold intolerance and heat intolerance.   Genitourinary: Positive for vaginal bleeding. Negative for difficulty urinating, frequency, hematuria and urgency.   Musculoskeletal: Negative for arthralgias, back pain, gait problem, myalgias and neck pain.   Skin: Negative for color change and rash.   Neurological: Negative for dizziness, syncope and headaches.   Hematological: Negative for adenopathy.   Psychiatric/Behavioral: Negative for behavioral problems and sleep disturbance. The patient is not nervous/anxious.        Objective:      Physical Exam    Constitutional: She is oriented to person, place, and time. She appears well-developed and well-nourished. No distress.   HENT:   Head: Normocephalic and atraumatic.   Right Ear: External ear normal.   Left Ear: External ear normal.   Nose: Nose normal.   Mouth/Throat: Oropharynx is clear and moist. No oropharyngeal exudate.   Eyes: Conjunctivae and EOM are normal. Pupils are equal, round, and reactive to light. Right eye exhibits no discharge. Left eye exhibits no discharge. No scleral icterus.   Neck: Normal range of motion. Neck supple. No JVD present. No thyromegaly present.   Cardiovascular: Normal rate, regular rhythm, normal heart sounds and intact distal pulses.  Exam reveals no gallop and no friction rub.    No murmur heard.  Pulmonary/Chest: Effort normal and breath sounds normal. No stridor. No respiratory distress. She has no wheezes. She has no rales. She exhibits no tenderness.   Abdominal: Soft. Bowel sounds are normal. She exhibits no distension. There is no tenderness. There is no rebound.   Musculoskeletal: Normal range of motion. She exhibits no edema or tenderness.   Lymphadenopathy:     She has no cervical adenopathy.   Neurological: She is alert and oriented to person, place, and time. No cranial nerve deficit.   Skin: Skin is warm and dry. No rash noted. She is not diaphoretic. No erythema.   Psychiatric: She has a normal mood and affect. Her behavior is normal.   Nursing note and vitals reviewed.      Assessment and Plan:       1. Annual physical exam  - labs reviewed  - due for gyn exam   - due for   - Hepatitis C antibody; Future    2. Vaginal bleeding  - Ambulatory Referral to Gynecology  - CBC auto differential; Future  - Iron and TIBC; Future  - Ferritin; Future    3. Postmenopausal  - DXA Bone Density Spine And Hip; Future    4. Type 2 diabetes mellitus with other specified complication, unspecified long term insulin use status  - stable; good control; follows with endo  - Ambulatory  Referral to Optometry    5. Essential hypertension  - stable; good control          No Follow-up on file.

## 2018-01-26 ENCOUNTER — OFFICE VISIT (OUTPATIENT)
Dept: OBSTETRICS AND GYNECOLOGY | Facility: CLINIC | Age: 63
End: 2018-01-26
Payer: MEDICARE

## 2018-01-26 VITALS
DIASTOLIC BLOOD PRESSURE: 95 MMHG | BODY MASS INDEX: 27.77 KG/M2 | HEIGHT: 65 IN | WEIGHT: 166.69 LBS | SYSTOLIC BLOOD PRESSURE: 130 MMHG

## 2018-01-26 DIAGNOSIS — N95.2 VAGINAL ATROPHY: ICD-10-CM

## 2018-01-26 DIAGNOSIS — N95.0 POST-MENOPAUSAL BLEEDING: Primary | ICD-10-CM

## 2018-01-26 LAB — HCV AB SERPL QL IA: NEGATIVE

## 2018-01-26 PROCEDURE — 99213 OFFICE O/P EST LOW 20 MIN: CPT | Mod: S$GLB,,, | Performed by: OBSTETRICS & GYNECOLOGY

## 2018-01-26 PROCEDURE — 99999 PR PBB SHADOW E&M-EST. PATIENT-LVL III: CPT | Mod: PBBFAC,,, | Performed by: OBSTETRICS & GYNECOLOGY

## 2018-01-26 NOTE — LETTER
January 26, 2018      Amena Roger, DO  [address] [facility] [address]           [facility] - OB/GYN 5th Floor  [address]  [place] [address]  Phone: [phone]          Patient: Riana Kay   MR Number: [id number]   YOB: 1955   Date of Visit: 1/26/2018       Dear Dr. Amena Roger:    Thank you for referring Riana Kay to me for evaluation. Attached you will find relevant portions of my assessment and plan of care.    If you have questions, please do not hesitate to call me. I look forward to following Riana Kay along with you.    Sincerely,    Radha Sargent MD    Enclosure  CC:  No Recipients    If you would like to receive this communication electronically, please contact [email or web address] or [phone] to request more information on [employer] Link access.    For providers and/or their staff who would like to refer a patient to [facility], please contact us through our one-stop-shop provider referral line, [facility], [place], operated [facility], at [phone].    If you feel you have received this communication in error or would no longer like to receive these types of communications, please e-mail [email or web address]

## 2018-01-26 NOTE — PROGRESS NOTES
Gynecology    SUBJECTIVE:     Chief Complaint: abnormal bleeding (postmenopausal)       History of Present Illness:  62 year old who presents with three episodes of bleeding.  Patient has had a hysterectomy.  First episode was in May, then November, then recently.  She reports the first time, it lasted a week and also was rectal bleeding, the second two times, it was lighter and vaginal.  She had a CT of abd pelvis that showed diverticulosis and constipation.  She also had a colonoscopy that was negative last week.      Review of Systems:  Review of Systems   Constitutional: Negative for appetite change, fever and unexpected weight change.   Respiratory: Negative for shortness of breath.    Cardiovascular: Negative for chest pain.   Gastrointestinal: Negative for nausea and vomiting.   Genitourinary: Positive for postmenopausal bleeding. Negative for menorrhagia, menstrual problem, pelvic pain, vaginal bleeding, vaginal discharge and vaginal pain.        OBJECTIVE:     Physical Exam:  Physical Exam   Constitutional: She is oriented to person, place, and time. She appears well-developed and well-nourished.   Pulmonary/Chest: Effort normal.   Abdominal: Soft.   Genitourinary: Vagina normal. No labial fusion. There is no rash, tenderness, lesion or injury on the right labia. There is no rash, tenderness, lesion or injury on the left labia. Right adnexum displays no mass, no tenderness and no fullness. Left adnexum displays no mass, no tenderness and no fullness. No erythema, tenderness or bleeding in the vagina. No foreign body in the vagina. No signs of injury around the vagina. No vaginal discharge found.   Genitourinary Comments: Vaginal atrophy   Neurological: She is alert and oriented to person, place, and time.   Psychiatric: She has a normal mood and affect. Her behavior is normal. Judgment and thought content normal.   Nursing note and vitals reviewed.        ASSESSMENT:       ICD-10-CM ICD-9-CM    1.  Post-menopausal bleeding N95.0 627.1 conjugated estrogens (PREMARIN) vaginal cream   2. Vaginal atrophy N95.2 627.3 conjugated estrogens (PREMARIN) vaginal cream          Plan:      Riana was seen today for abnormal bleeding.    Diagnoses and all orders for this visit:    Post-menopausal bleeding  -     conjugated estrogens (PREMARIN) vaginal cream; Place 0.5 g vaginally twice a week.    Vaginal atrophy  -     conjugated estrogens (PREMARIN) vaginal cream; Place 0.5 g vaginally twice a week.    - no bleeding on exam today  - discussed likely due to vaginal atrophy; premarin reviewed with patient and sent to pharmacy    No orders of the defined types were placed in this encounter.      Follow-up for annual or prn.    Radha Sargent

## 2018-02-05 ENCOUNTER — APPOINTMENT (OUTPATIENT)
Dept: RADIOLOGY | Facility: CLINIC | Age: 63
End: 2018-02-05
Attending: INTERNAL MEDICINE
Payer: MEDICARE

## 2018-02-05 DIAGNOSIS — Z78.0 POSTMENOPAUSAL: ICD-10-CM

## 2018-02-05 PROCEDURE — 77080 DXA BONE DENSITY AXIAL: CPT | Mod: 26,,, | Performed by: INTERNAL MEDICINE

## 2018-02-05 PROCEDURE — 77080 DXA BONE DENSITY AXIAL: CPT | Mod: TC,PO

## 2018-02-12 NOTE — TELEPHONE ENCOUNTER
----- Message from Miguel Claros, RADHA, FNP sent at 2/12/2018  8:21 AM CST -----  Need to check status on trulicity  Received email from son, pt did not get trulicity yet  See if a prior auth is needed or if bydureon weekly or victoza daily is preferred.  Thanks,  Miguel

## 2018-02-14 ENCOUNTER — TELEPHONE (OUTPATIENT)
Dept: PODIATRY | Facility: CLINIC | Age: 63
End: 2018-02-14

## 2018-02-14 NOTE — TELEPHONE ENCOUNTER
----- Message from Kiera Luu sent at 2/14/2018 10:25 AM CST -----  Contact: [facility] is requesting orders for Diabetic shoes and pt's chart notes to be sent to fax: [phone] contact: [phone]      Paper work was faxed over to [employer] [facility] on 02/16/2018

## 2018-02-16 ENCOUNTER — OFFICE VISIT (OUTPATIENT)
Dept: OPTOMETRY | Facility: CLINIC | Age: 63
End: 2018-02-16
Payer: MEDICARE

## 2018-02-16 DIAGNOSIS — H52.203 HYPEROPIA OF BOTH EYES WITH ASTIGMATISM AND PRESBYOPIA: ICD-10-CM

## 2018-02-16 DIAGNOSIS — H04.123 DRY EYE SYNDROME OF BILATERAL LACRIMAL GLANDS: ICD-10-CM

## 2018-02-16 DIAGNOSIS — H52.03 HYPEROPIA OF BOTH EYES WITH ASTIGMATISM AND PRESBYOPIA: ICD-10-CM

## 2018-02-16 DIAGNOSIS — E11.9 DIABETES MELLITUS WITHOUT COMPLICATION: Primary | ICD-10-CM

## 2018-02-16 DIAGNOSIS — H25.13 NUCLEAR AGE-RELATED CATARACT, BOTH EYES: ICD-10-CM

## 2018-02-16 DIAGNOSIS — H52.4 HYPEROPIA OF BOTH EYES WITH ASTIGMATISM AND PRESBYOPIA: ICD-10-CM

## 2018-02-16 PROCEDURE — 92004 COMPRE OPH EXAM NEW PT 1/>: CPT | Mod: S$GLB,,, | Performed by: OPTOMETRIST

## 2018-02-16 PROCEDURE — 99999 PR PBB SHADOW E&M-EST. PATIENT-LVL II: CPT | Mod: PBBFAC,,, | Performed by: OPTOMETRIST

## 2018-02-16 NOTE — PROGRESS NOTES
ELIGIO SCHULTE: 2 years ago   Pt is here for a diabetic exam. LBS was 130 this morning. Pt has no visual   complaints but says sometimes she feels pressure behind her left eye.   (-)pain, irritation  (-)flashes, floaters  No gtts  Hemoglobin A1C       Date                     Value               Ref Range             Status                12/14/2017               6.7 (H)             4.0 - 5.6 %           Final                  08/11/2015               7.7 (H)             4.5 - 6.2 %           Final                 05/13/2015               7.9 (H)             4.5 - 6.2 %           Final            ----------      Last edited by Wilma Benavidez, OD on 2/16/2018  9:08 AM. (History)            Assessment /Plan     For exam results, see Encounter Report.    Diabetes mellitus without complication    Nuclear age-related cataract, both eyes    Dry eye syndrome of bilateral lacrimal glands    Hyperopia of both eyes with astigmatism and presbyopia      1. (-)NVI, NVD, NVE, CSME. Educated patient on the importance of bg control. Return in 1 year for dilated eye exam.   2. Educated pt of today's findings. No sx needed at this time. Continue to monitor in 1 yr.  3. Educated pt on today's findings and recommended the use of AT's OU tid/prn to help with dry eyes.  4. Educated pt on change in Rx. Rx Final Rx. RTC in 1 yr.

## 2018-02-20 RX ORDER — INSULIN GLARGINE 100 [IU]/ML
INJECTION, SOLUTION SUBCUTANEOUS
Qty: 1 BOX | Refills: 6 | Status: SHIPPED | OUTPATIENT
Start: 2018-02-20 | End: 2018-06-21

## 2018-02-20 NOTE — TELEPHONE ENCOUNTER
Received a fax from [facility] regarding Cherelle Linn- not included on list of preferred drugs.  They will provide a temporary supply. Will send for PA at [facility].

## 2018-02-26 ENCOUNTER — PATIENT MESSAGE (OUTPATIENT)
Dept: INTERNAL MEDICINE | Facility: CLINIC | Age: 63
End: 2018-02-26

## 2018-03-01 ENCOUNTER — PATIENT MESSAGE (OUTPATIENT)
Dept: INTERNAL MEDICINE | Facility: CLINIC | Age: 63
End: 2018-03-01

## 2018-03-05 ENCOUNTER — PATIENT MESSAGE (OUTPATIENT)
Dept: INTERNAL MEDICINE | Facility: CLINIC | Age: 63
End: 2018-03-05

## 2018-03-06 ENCOUNTER — HOSPITAL ENCOUNTER (OUTPATIENT)
Dept: RADIOLOGY | Facility: HOSPITAL | Age: 63
Discharge: HOME | End: 2018-03-06
Attending: INTERNAL MEDICINE
Payer: MEDICARE

## 2018-03-06 ENCOUNTER — LAB VISIT (OUTPATIENT)
Dept: LAB | Facility: HOSPITAL | Age: 63
End: 2018-03-06
Attending: INTERNAL MEDICINE
Payer: MEDICARE

## 2018-03-06 ENCOUNTER — OFFICE VISIT (OUTPATIENT)
Dept: INTERNAL MEDICINE | Facility: CLINIC | Age: 63
End: 2018-03-06
Payer: MEDICARE

## 2018-03-06 VITALS
HEART RATE: 75 BPM | DIASTOLIC BLOOD PRESSURE: 70 MMHG | RESPIRATION RATE: 16 BRPM | WEIGHT: 165.13 LBS | SYSTOLIC BLOOD PRESSURE: 122 MMHG | TEMPERATURE: 98 F | HEIGHT: 65 IN | BODY MASS INDEX: 27.51 KG/M2

## 2018-03-06 DIAGNOSIS — R10.31 RLQ ABDOMINAL PAIN: ICD-10-CM

## 2018-03-06 DIAGNOSIS — Z79.4 TYPE 2 DIABETES MELLITUS WITH OTHER SPECIFIED COMPLICATION, WITH LONG-TERM CURRENT USE OF INSULIN: ICD-10-CM

## 2018-03-06 DIAGNOSIS — E16.2 HYPOGLYCEMIA: ICD-10-CM

## 2018-03-06 DIAGNOSIS — R11.2 NAUSEA AND VOMITING, INTRACTABILITY OF VOMITING NOT SPECIFIED, UNSPECIFIED VOMITING TYPE: ICD-10-CM

## 2018-03-06 DIAGNOSIS — R10.31 RLQ ABDOMINAL PAIN: Primary | ICD-10-CM

## 2018-03-06 DIAGNOSIS — E11.69 TYPE 2 DIABETES MELLITUS WITH OTHER SPECIFIED COMPLICATION, WITH LONG-TERM CURRENT USE OF INSULIN: ICD-10-CM

## 2018-03-06 LAB
ALBUMIN SERPL BCP-MCNC: 3.8 G/DL
ALP SERPL-CCNC: 86 U/L
ALT SERPL W/O P-5'-P-CCNC: 21 U/L
AMYLASE SERPL-CCNC: 34 U/L
ANION GAP SERPL CALC-SCNC: 7 MMOL/L
AST SERPL-CCNC: 19 U/L
BASOPHILS # BLD AUTO: 0.01 K/UL
BASOPHILS NFR BLD: 0.1 %
BILIRUB SERPL-MCNC: 0.6 MG/DL
BUN SERPL-MCNC: 11 MG/DL
CALCIUM SERPL-MCNC: 9 MG/DL
CHLORIDE SERPL-SCNC: 104 MMOL/L
CO2 SERPL-SCNC: 29 MMOL/L
CREAT SERPL-MCNC: 0.8 MG/DL
DIFFERENTIAL METHOD: ABNORMAL
EOSINOPHIL # BLD AUTO: 0.1 K/UL
EOSINOPHIL NFR BLD: 0.6 %
ERYTHROCYTE [DISTWIDTH] IN BLOOD BY AUTOMATED COUNT: 11.9 %
EST. GFR  (AFRICAN AMERICAN): >60 ML/MIN/1.73 M^2
EST. GFR  (NON AFRICAN AMERICAN): >60 ML/MIN/1.73 M^2
GLUCOSE SERPL-MCNC: 112 MG/DL
HCT VFR BLD AUTO: 37.1 %
HGB BLD-MCNC: 12.9 G/DL
IMM GRANULOCYTES # BLD AUTO: 0.01 K/UL
IMM GRANULOCYTES NFR BLD AUTO: 0.1 %
LIPASE SERPL-CCNC: 17 U/L
LYMPHOCYTES # BLD AUTO: 2.1 K/UL
LYMPHOCYTES NFR BLD: 27.1 %
MCH RBC QN AUTO: 31.8 PG
MCHC RBC AUTO-ENTMCNC: 34.8 G/DL
MCV RBC AUTO: 91 FL
MONOCYTES # BLD AUTO: 0.5 K/UL
MONOCYTES NFR BLD: 6.4 %
NEUTROPHILS # BLD AUTO: 5.1 K/UL
NEUTROPHILS NFR BLD: 65.7 %
NRBC BLD-RTO: 0 /100 WBC
PLATELET # BLD AUTO: 236 K/UL
PMV BLD AUTO: 9.6 FL
POTASSIUM SERPL-SCNC: 4.1 MMOL/L
PROT SERPL-MCNC: 7.3 G/DL
RBC # BLD AUTO: 4.06 M/UL
SODIUM SERPL-SCNC: 140 MMOL/L
WBC # BLD AUTO: 7.71 K/UL

## 2018-03-06 PROCEDURE — 99499 UNLISTED E&M SERVICE: CPT | Mod: S$GLB,,, | Performed by: INTERNAL MEDICINE

## 2018-03-06 PROCEDURE — 74176 CT ABD & PELVIS W/O CONTRAST: CPT | Mod: TC

## 2018-03-06 PROCEDURE — 74176 CT ABD & PELVIS W/O CONTRAST: CPT | Mod: 26,,, | Performed by: RADIOLOGY

## 2018-03-06 PROCEDURE — 85025 COMPLETE CBC W/AUTO DIFF WBC: CPT

## 2018-03-06 PROCEDURE — 83690 ASSAY OF LIPASE: CPT

## 2018-03-06 PROCEDURE — 99214 OFFICE O/P EST MOD 30 MIN: CPT | Mod: S$GLB,,, | Performed by: INTERNAL MEDICINE

## 2018-03-06 PROCEDURE — 3074F SYST BP LT 130 MM HG: CPT | Mod: S$GLB,,, | Performed by: INTERNAL MEDICINE

## 2018-03-06 PROCEDURE — 99999 PR PBB SHADOW E&M-EST. PATIENT-LVL III: CPT | Mod: PBBFAC,,, | Performed by: INTERNAL MEDICINE

## 2018-03-06 PROCEDURE — 82150 ASSAY OF AMYLASE: CPT

## 2018-03-06 PROCEDURE — 3078F DIAST BP <80 MM HG: CPT | Mod: S$GLB,,, | Performed by: INTERNAL MEDICINE

## 2018-03-06 PROCEDURE — 80053 COMPREHEN METABOLIC PANEL: CPT

## 2018-03-06 PROCEDURE — 36415 COLL VENOUS BLD VENIPUNCTURE: CPT | Mod: PO

## 2018-03-06 RX ORDER — HYOSCYAMINE SULFATE 0.12 MG/1
0.12 TABLET SUBLINGUAL EVERY 4 HOURS PRN
Qty: 30 TABLET | Refills: 0 | Status: SHIPPED | OUTPATIENT
Start: 2018-03-06 | End: 2018-03-27

## 2018-03-06 NOTE — PROGRESS NOTES
62 y.o. female abdominal pain w/N/V  Sx started: Sunday morning after eating a donut  Sxs: abdominal pressure in mid abdomen, 8/10, last night spasm  N/V followed initial sx by ~ 30'  Tx: Zofran helped nausea; Nexium daily medication  Assoc 3 weeks w/ Victoza  Denied reflux, heartburn , or early satiety  3 days after the Victoza Initially diarrhea, but now constipation  Denied blood in stool or change in stool caliber  Denied fever, + chills, w/ decreased appetitie    DMw/ tx Lantus and Humalog and Victoza     ++ decreased BS today - pt worried that her BS would decline if no po intake    Weight  Wt Readings from Last 4 Encounters:   03/06/18 74.9 kg (165 lb 2 oz)   01/26/18 75.6 kg (166 lb 10.7 oz)   01/25/18 75.2 kg (165 lb 12.6 oz)   01/23/18 75.8 kg (167 lb)       MEDCARD: Reviewed  ROS:  No dysphagia or early satiety  No angina or palpitations  No cough or wheezing  Remainder of review negative except as previously noted    PMHX: Reviewed- s/p Cholecystectomy, and TAHBSO  SHX: Nonsmoker  FHX: Reviewed    PE:  VSS:  GEN: WDWN, A&O, NAD, conversant and co-operative  EYES: Conj/lids unremarkable, sclera anicteric, lid rim pink; pupils reactive  ENT: O/P w/o erythema or exudate, frenulum pink  NECK: Supple w/o lymphadenopathy or thyromegaly  RESP: Efforts unlabored, lungs CTA  CV: Heart RRR, no LE edema  GI: BS+, soft ++ tenderness, RLQ > mid , no rebound and neg percussion HSM noted  MSK: Gait normal. No CCE. No CVAT  NEURO: NICHOLS. No tremors noted  SKIN: Warm and dry    IMPRESSION:  Abdominal pain, RLQ > mid- concern for appendicitis  N/V, relief w/ ZOfran  DM, low BS this AM, new Victoza injection  Hypoglycemia, earlier today      PLAN:  Lab  CT abdomen w/o contrast  NPO- glucose tablets provided for hypoglycemia  ED acute change in status    Addendum: discussedw /son  @ pt request  CT - unremarkable  Lab-CBC, CMP, amylase and lipase -unremarkable  Will obtain urine and urine culture- son/spouse will bring pt  by in AM  Rx Symax sl, every 4 hrs prn - sedation  ED acute change in status

## 2018-03-07 ENCOUNTER — PATIENT MESSAGE (OUTPATIENT)
Dept: ENDOCRINOLOGY | Facility: CLINIC | Age: 63
End: 2018-03-07

## 2018-03-07 ENCOUNTER — LAB VISIT (OUTPATIENT)
Dept: LAB | Facility: HOSPITAL | Age: 63
End: 2018-03-07
Attending: INTERNAL MEDICINE
Payer: MEDICARE

## 2018-03-07 DIAGNOSIS — R10.31 RLQ ABDOMINAL PAIN: ICD-10-CM

## 2018-03-07 LAB
BILIRUB UR QL STRIP: NEGATIVE
CLARITY UR REFRACT.AUTO: CLEAR
COLOR UR AUTO: YELLOW
GLUCOSE UR QL STRIP: NEGATIVE
HGB UR QL STRIP: NEGATIVE
KETONES UR QL STRIP: NEGATIVE
LEUKOCYTE ESTERASE UR QL STRIP: ABNORMAL
MICROSCOPIC COMMENT: ABNORMAL
NITRITE UR QL STRIP: NEGATIVE
PH UR STRIP: 5 [PH] (ref 5–8)
PROT UR QL STRIP: NEGATIVE
RBC #/AREA URNS AUTO: 0 /HPF (ref 0–4)
SP GR UR STRIP: 1.01 (ref 1–1.03)
SQUAMOUS #/AREA URNS AUTO: 3 /HPF
URN SPEC COLLECT METH UR: ABNORMAL
UROBILINOGEN UR STRIP-ACNC: NEGATIVE EU/DL
WBC #/AREA URNS AUTO: 7 /HPF (ref 0–5)

## 2018-03-07 PROCEDURE — 81001 URINALYSIS AUTO W/SCOPE: CPT

## 2018-03-07 PROCEDURE — 87086 URINE CULTURE/COLONY COUNT: CPT

## 2018-03-08 LAB — BACTERIA UR CULT: NORMAL

## 2018-03-09 ENCOUNTER — TELEPHONE (OUTPATIENT)
Dept: ENDOCRINOLOGY | Facility: CLINIC | Age: 63
End: 2018-03-09

## 2018-03-09 ENCOUNTER — TELEPHONE (OUTPATIENT)
Dept: INTERNAL MEDICINE | Facility: CLINIC | Age: 63
End: 2018-03-09

## 2018-03-09 NOTE — TELEPHONE ENCOUNTER
----- Message from Margarita Lomeli MD sent at 3/9/2018 12:55 PM CST -----  Please note that your urine did not reveal an infection  If your symptoms are persisting or not responding to the medication, you will need to be seen again  Margarita Silva

## 2018-03-09 NOTE — TELEPHONE ENCOUNTER
----- Message from Cherie Macdonald sent at 3/9/2018 12:04 PM CST -----  Contact: pt's   Pt is asking for a sooner work in     Said she is do in March       If possible can you get her in sooner.  Thanks

## 2018-03-09 NOTE — TELEPHONE ENCOUNTER
----- Message from Ewelina Mullins sent at 3/9/2018 11:01 AM CST -----  Contact: self/ [phone]  Patient would like to get test results.  Name of test (lab, mammo, etc.):  urine  Date of test:  3/7/18  Ordering provider:   Where was the test performed:    Comments:

## 2018-03-09 NOTE — TELEPHONE ENCOUNTER
Please advise urine did not reveal an infection  If sx persisting and meds not helping needs f/up

## 2018-03-09 NOTE — TELEPHONE ENCOUNTER
Pt called. No answer. Left message to call back to discuss. Also advised that the results were sent to her [facility] account.

## 2018-03-12 ENCOUNTER — PATIENT MESSAGE (OUTPATIENT)
Dept: ENDOCRINOLOGY | Facility: CLINIC | Age: 63
End: 2018-03-12

## 2018-03-14 RX ORDER — INSULIN ASPART 100 [IU]/ML
INJECTION, SOLUTION INTRAVENOUS; SUBCUTANEOUS
Qty: 3 BOX | Refills: 3 | Status: SHIPPED | OUTPATIENT
Start: 2018-03-14 | End: 2018-06-21 | Stop reason: SDUPTHER

## 2018-03-26 ENCOUNTER — TELEPHONE (OUTPATIENT)
Dept: INTERNAL MEDICINE | Facility: CLINIC | Age: 63
End: 2018-03-26

## 2018-03-26 ENCOUNTER — OFFICE VISIT (OUTPATIENT)
Dept: INTERNAL MEDICINE | Facility: CLINIC | Age: 63
End: 2018-03-26
Payer: MEDICARE

## 2018-03-26 ENCOUNTER — HOSPITAL ENCOUNTER (OUTPATIENT)
Dept: RADIOLOGY | Facility: HOSPITAL | Age: 63
Discharge: HOME | End: 2018-03-26
Attending: INTERNAL MEDICINE
Payer: MEDICARE

## 2018-03-26 VITALS
BODY MASS INDEX: 27.63 KG/M2 | WEIGHT: 165.81 LBS | DIASTOLIC BLOOD PRESSURE: 68 MMHG | HEIGHT: 65 IN | SYSTOLIC BLOOD PRESSURE: 120 MMHG | HEART RATE: 80 BPM | TEMPERATURE: 98 F

## 2018-03-26 DIAGNOSIS — R10.9 ABDOMINAL PAIN, UNSPECIFIED ABDOMINAL LOCATION: ICD-10-CM

## 2018-03-26 DIAGNOSIS — E11.69 TYPE 2 DIABETES MELLITUS WITH OTHER SPECIFIED COMPLICATION, WITH LONG-TERM CURRENT USE OF INSULIN: ICD-10-CM

## 2018-03-26 DIAGNOSIS — R10.9 ABDOMINAL PAIN, UNSPECIFIED ABDOMINAL LOCATION: Primary | ICD-10-CM

## 2018-03-26 DIAGNOSIS — R11.2 NON-INTRACTABLE VOMITING WITH NAUSEA, UNSPECIFIED VOMITING TYPE: ICD-10-CM

## 2018-03-26 DIAGNOSIS — Z79.4 TYPE 2 DIABETES MELLITUS WITH OTHER SPECIFIED COMPLICATION, WITH LONG-TERM CURRENT USE OF INSULIN: ICD-10-CM

## 2018-03-26 PROCEDURE — 74019 RADEX ABDOMEN 2 VIEWS: CPT | Mod: 26,,, | Performed by: RADIOLOGY

## 2018-03-26 PROCEDURE — 99214 OFFICE O/P EST MOD 30 MIN: CPT | Mod: S$GLB,,, | Performed by: INTERNAL MEDICINE

## 2018-03-26 PROCEDURE — 3044F HG A1C LEVEL LT 7.0%: CPT | Mod: CPTII,S$GLB,, | Performed by: INTERNAL MEDICINE

## 2018-03-26 PROCEDURE — 3074F SYST BP LT 130 MM HG: CPT | Mod: CPTII,S$GLB,, | Performed by: INTERNAL MEDICINE

## 2018-03-26 PROCEDURE — 74019 RADEX ABDOMEN 2 VIEWS: CPT | Mod: TC,PO

## 2018-03-26 PROCEDURE — 99999 PR PBB SHADOW E&M-EST. PATIENT-LVL III: CPT | Mod: PBBFAC,,, | Performed by: INTERNAL MEDICINE

## 2018-03-26 PROCEDURE — 3078F DIAST BP <80 MM HG: CPT | Mod: CPTII,S$GLB,, | Performed by: INTERNAL MEDICINE

## 2018-03-26 NOTE — Clinical Note
Greetings, saw our mutual pt today in UC, this was her 2nd visit this month for N/V and abdominal pain Recommended she hold Victoza till she sees you To see GI tomorrow Thanks, Margarita Jorge

## 2018-03-26 NOTE — PROGRESS NOTES
62 y.o. female w/ N/V and + constipation  Sx started: Last week- no ill contacts or new food  Sxs: Tx:  Dulcolax- no relief  Zofran - helped N/V  + vomiting yellow bile  NO BM x 4-5 days  Denied blood in stool or change in stool caliber  Denied fever, occ chills, w/ decreased appetitie  Urinating  Ok, + fluid intake    RECALL: recent similar episode w/ CT 3/6/2018  Diverticulosis coli.  Cholecystectomy.  LAB: unremarkable, in particular amylase and lipase nl    DMw/ tx Lantus and Humalog and Victoza  ++ shaky @ 80, but now running 100-110  Weight  Wt Readings from Last 4 Encounters:   03/06/18 74.9 kg (165 lb 2 oz)   01/26/18 75.6 kg (166 lb 10.7 oz)   01/25/18 75.2 kg (165 lb 12.6 oz)   01/23/18 75.8 kg (167 lb)       MEDCARD: Reviewed  ROS:  No dysphagia or early satiety  No angina or palpitations  No cough or wheezing  Remainder of review negative except as previously noted    PMHX: Reviewed- s/p Cholecystectomy, and TAHBSO  SHX: Nonsmoker  FHX: Reviewed    PE:  VSS:  GEN: WDWN, A&O, NAD, conversant and co-operative  EYES: Conj/lids unremarkable, sclera anicteric, lid rim pink; pupils reactive  ENT: O/P w/o erythema or exudate, frenulum pink  NECK: Supple w/o lymphadenopathy or thyromegaly  RESP: Efforts unlabored, lungs CTA  CV: Heart RRR, no LE edema  GI: BS+, soft ++ tenderness, no rebound and neg percussion HSM noted  MSK: Gait normal. No CCE. No CVAT  NEURO: NICHOLS. No tremors noted  SKIN: Warm and dry    IMPRESSION:  Abdominal pain,diffuse- recurrent since starting VIctoza  N/V, relief w/ Zofran  DM, low BS this AM, new Victoza injection  Hypoglycemia, improved      PLAN:  Lab  Xray flat and upright- +++ stool , no SBO or a/f levels  rec MOM  GI Consult  Hold Victoza, not to ENdo  [place] diet  ED acute change in status

## 2018-03-27 ENCOUNTER — TELEPHONE (OUTPATIENT)
Dept: INTERNAL MEDICINE | Facility: CLINIC | Age: 63
End: 2018-03-27

## 2018-03-27 ENCOUNTER — OFFICE VISIT (OUTPATIENT)
Dept: GASTROENTEROLOGY | Facility: CLINIC | Age: 63
End: 2018-03-27
Payer: MEDICARE

## 2018-03-27 VITALS
SYSTOLIC BLOOD PRESSURE: 121 MMHG | HEIGHT: 65 IN | WEIGHT: 166.44 LBS | DIASTOLIC BLOOD PRESSURE: 70 MMHG | BODY MASS INDEX: 27.73 KG/M2

## 2018-03-27 DIAGNOSIS — Z86.010 HISTORY OF COLON POLYPS: Primary | ICD-10-CM

## 2018-03-27 DIAGNOSIS — K59.00 CONSTIPATION, UNSPECIFIED CONSTIPATION TYPE: ICD-10-CM

## 2018-03-27 PROCEDURE — 99999 PR PBB SHADOW E&M-EST. PATIENT-LVL III: CPT | Mod: PBBFAC,,, | Performed by: NURSE PRACTITIONER

## 2018-03-27 PROCEDURE — 3074F SYST BP LT 130 MM HG: CPT | Mod: CPTII,S$GLB,, | Performed by: NURSE PRACTITIONER

## 2018-03-27 PROCEDURE — 99203 OFFICE O/P NEW LOW 30 MIN: CPT | Mod: S$GLB,,, | Performed by: NURSE PRACTITIONER

## 2018-03-27 PROCEDURE — 3078F DIAST BP <80 MM HG: CPT | Mod: CPTII,S$GLB,, | Performed by: NURSE PRACTITIONER

## 2018-03-27 RX ORDER — POLYETHYLENE GLYCOL 3350 17 G/17G
17 POWDER, FOR SOLUTION ORAL DAILY
Qty: 30 EACH | Refills: 6 | Status: SHIPPED | OUTPATIENT
Start: 2018-03-27 | End: 2018-04-26

## 2018-03-27 NOTE — PROGRESS NOTES
Subjective:       Patient ID: Riana Kay is a 62 y.o. female.    Chief Complaint: Emesis; Nausea; and Constipation    HPI  Reports constipation since starting victoza in February.  Reports regular bowel habit prior.  She will now go up to 5 days between bowel movements and have small stools without complete emptying.  She has used an OTC laxative yesterday and had small volume stool.  Denies blood with bowel movements.  She does experience LLQ discomfort and fullness with constipation.  She notes nausea and vomiting associated with times of constipation.  She has been instructed to hold Victoza for now.  She has held this for one day.  She had colonoscopy in January with one polyp removed.  She has had CT in December and this month.  She had abdomina xray this week.  Xray and CT show constipation.  Labs unremarkable including CMP and lipase, amylase.      Review of Systems   Constitutional: Negative for activity change, appetite change, fatigue, fever and unexpected weight change.   Respiratory: Negative for shortness of breath.    Cardiovascular: Negative for chest pain.   Gastrointestinal: Positive for abdominal distention, abdominal pain, constipation, nausea and vomiting. Negative for blood in stool and diarrhea.   Genitourinary: Negative.    Skin: Negative.    Neurological: Negative.    Psychiatric/Behavioral: Negative.        Objective:      Physical Exam   Constitutional: She is oriented to person, place, and time. She appears well-developed and well-nourished. No distress.   Eyes: No scleral icterus.   Cardiovascular: Normal rate.    Pulmonary/Chest: Effort normal. No respiratory distress.   Abdominal: Soft. Bowel sounds are normal. She exhibits no distension and no mass. There is tenderness (minimal tenderness in LLQ with palpation). There is no guarding.   Neurological: She is alert and oriented to person, place, and time.   Skin: Skin is warm and dry. She is not diaphoretic.   Psychiatric: She  has a normal mood and affect. Her behavior is normal. Judgment and thought content normal.   Vitals reviewed.      Assessment:       1. History of colon polyps    2. Constipation, unspecified constipation type        Plan:         Riana was seen today for emesis, nausea and constipation.    Diagnoses and all orders for this visit:    History of colon polyps    Constipation, unspecified constipation type    Other orders  -     polyethylene glycol (GLYCOLAX) 17 gram PwPk; Take 17 g by mouth once daily.         -   High fiber diet  -   Fiber supplement  -   [place] fluid intake   -   Exercise    Magnesium citrate today and miralax daily.  Will follow up in one month or sooner if needed.  She and her  have been instructed to call if no results with mag citrate or no improvement in bowel habit with the addition of miralax.

## 2018-03-27 NOTE — LETTER
March 27, 2018      Margarita Lomeli MD  [address] [facility] [address]           [facility]  [address] [address]  Phone: [phone]          Patient: Riana Kay   MR Number: [id number]   YOB: 1955   Date of Visit: 3/27/2018       Dear Dr. Margarita Lomeli:    Thank you for referring Riana Kay to me for evaluation. Attached you will find relevant portions of my assessment and plan of care.    If you have questions, please do not hesitate to call me. I look forward to following Riana aKy along with you.    Sincerely,    Juliana Chiang, CESAR    Enclosure  CC:  No Recipients    If you would like to receive this communication electronically, please contact [email or web address] or [phone] to request more information on [employer] Link access.    For providers and/or their staff who would like to refer a patient to [facility], please contact us through our one-stop-shop provider referral line, [facility] , at [phone].    If you feel you have received this communication in error or would no longer like to receive these types of communications, please e-mail [email or web address]

## 2018-03-27 NOTE — TELEPHONE ENCOUNTER
----- Message from Margarita Lomeli MD sent at 3/26/2018  5:38 PM CDT -----  Please note that your blood work has returned and is stable  The amylase and lipase are both pancreatic markers and they are normal  You are not anemic and your cells that fight infection ( WBC's) are in the normal range  Please do not hesitate to call/email if you have any questions or concerns   Margarita Silva

## 2018-04-04 ENCOUNTER — PATIENT MESSAGE (OUTPATIENT)
Dept: INTERNAL MEDICINE | Facility: CLINIC | Age: 63
End: 2018-04-04

## 2018-04-05 ENCOUNTER — PATIENT MESSAGE (OUTPATIENT)
Dept: INTERNAL MEDICINE | Facility: CLINIC | Age: 63
End: 2018-04-05

## 2018-04-05 ENCOUNTER — PATIENT MESSAGE (OUTPATIENT)
Dept: ENDOCRINOLOGY | Facility: CLINIC | Age: 63
End: 2018-04-05

## 2018-04-17 ENCOUNTER — PATIENT MESSAGE (OUTPATIENT)
Dept: ENDOCRINOLOGY | Facility: CLINIC | Age: 63
End: 2018-04-17

## 2018-06-11 ENCOUNTER — HOSPITAL ENCOUNTER (OUTPATIENT)
Dept: RADIOLOGY | Facility: HOSPITAL | Age: 63
Discharge: HOME | End: 2018-06-11
Attending: INTERNAL MEDICINE
Payer: MEDICARE

## 2018-06-11 ENCOUNTER — OFFICE VISIT (OUTPATIENT)
Dept: INTERNAL MEDICINE | Facility: CLINIC | Age: 63
End: 2018-06-11
Payer: MEDICARE

## 2018-06-11 VITALS
TEMPERATURE: 98 F | BODY MASS INDEX: 32.59 KG/M2 | RESPIRATION RATE: 16 BRPM | WEIGHT: 166 LBS | HEIGHT: 60 IN | OXYGEN SATURATION: 96 % | HEART RATE: 87 BPM | SYSTOLIC BLOOD PRESSURE: 144 MMHG | DIASTOLIC BLOOD PRESSURE: 80 MMHG

## 2018-06-11 DIAGNOSIS — J20.9 ACUTE BRONCHITIS, UNSPECIFIED ORGANISM: ICD-10-CM

## 2018-06-11 DIAGNOSIS — R05.3 PERSISTENT COUGH FOR 3 WEEKS OR LONGER: ICD-10-CM

## 2018-06-11 DIAGNOSIS — J20.9 ACUTE BRONCHITIS, UNSPECIFIED ORGANISM: Primary | ICD-10-CM

## 2018-06-11 PROCEDURE — 99213 OFFICE O/P EST LOW 20 MIN: CPT | Mod: S$GLB,,, | Performed by: INTERNAL MEDICINE

## 2018-06-11 PROCEDURE — 71046 X-RAY EXAM CHEST 2 VIEWS: CPT | Mod: TC,PO

## 2018-06-11 PROCEDURE — 99999 PR PBB SHADOW E&M-EST. PATIENT-LVL IV: CPT | Mod: PBBFAC,,, | Performed by: INTERNAL MEDICINE

## 2018-06-11 PROCEDURE — 3077F SYST BP >= 140 MM HG: CPT | Mod: CPTII,S$GLB,, | Performed by: INTERNAL MEDICINE

## 2018-06-11 PROCEDURE — 71046 X-RAY EXAM CHEST 2 VIEWS: CPT | Mod: 26,,, | Performed by: RADIOLOGY

## 2018-06-11 PROCEDURE — 3008F BODY MASS INDEX DOCD: CPT | Mod: CPTII,S$GLB,, | Performed by: INTERNAL MEDICINE

## 2018-06-11 PROCEDURE — 3079F DIAST BP 80-89 MM HG: CPT | Mod: CPTII,S$GLB,, | Performed by: INTERNAL MEDICINE

## 2018-06-11 RX ORDER — DOXYCYCLINE 100 MG/1
100 CAPSULE ORAL 2 TIMES DAILY
Qty: 20 CAPSULE | Refills: 0 | Status: SHIPPED | OUTPATIENT
Start: 2018-06-11 | End: 2018-06-21

## 2018-06-11 NOTE — PROGRESS NOTES
Subjective:       Patient ID: Riana Kay is a 63 y.o. female.    Chief Complaint: Cough (cough; chest congestion x 1 month )    Patient presents accompanied by her  complaining of chest congestion with a productive cough for 1 month.  Maybe some fever, but no chills.  Describes thick mucus which is yellow at times.  No travels.  No one else sick in the household.  No known exposures.  No sore throat; some vague sinus congestion.  She has not tried any OTC medications as yet.    She is a diabetic and is due for her diabetic check this week.    Chest feels tight, but not aware of wheezing and no shortness of breath.      Cough   Associated symptoms include chest pain and a fever. Pertinent negatives include no chills, headaches, rhinorrhea, sore throat, shortness of breath or wheezing.     Review of Systems   Constitutional: Positive for fever. Negative for activity change, chills and unexpected weight change.   HENT: Negative for congestion, hearing loss, rhinorrhea, sinus pain, sinus pressure, sore throat and trouble swallowing.    Eyes: Negative for discharge and visual disturbance.   Respiratory: Positive for cough and chest tightness. Negative for shortness of breath and wheezing.    Cardiovascular: Positive for chest pain. Negative for palpitations.   Gastrointestinal: Positive for constipation and vomiting. Negative for blood in stool and diarrhea.   Endocrine: Negative for polydipsia and polyuria.   Genitourinary: Negative for difficulty urinating, dysuria, hematuria and menstrual problem.   Musculoskeletal: Positive for arthralgias. Negative for joint swelling and neck pain.   Neurological: Negative for weakness and headaches.   Psychiatric/Behavioral: Negative for confusion and dysphoric mood.       Objective:      Physical Exam   Constitutional: She is oriented to person, place, and time. She appears well-developed and well-nourished. No distress.   HENT:   Head: Normocephalic.   Right Ear:  External ear normal.   Left Ear: External ear normal.   Mouth/Throat: Oropharynx is clear and moist. No oropharyngeal exudate.   Canals/TMs clear.  Normal turbinates.   Cardiovascular: Normal rate, regular rhythm, normal heart sounds and intact distal pulses.    Pulmonary/Chest: Effort normal. No respiratory distress.   Scattered upper rhonchi, but no wheezing.   Abdominal: Soft. Bowel sounds are normal. She exhibits no distension. There is no tenderness.   Lymphadenopathy:     She has no cervical adenopathy.   Neurological: She is alert and oriented to person, place, and time.   Vitals reviewed.      Assessment:       1. Acute bronchitis, unspecified organism    2. Persistent cough for 3 weeks or longer        Plan:   CXR done and reviewed with patient and  in the office.  No infiltrate.  Will treat as bronchitis with doxycycline 100 mgm BID for 10 days along with Mucinex-DM BID for 10 days.  High oral fluid intake.  Delsym for cough control as needed.  Continue all regular meds.

## 2018-06-11 NOTE — PATIENT INSTRUCTIONS
1. Doxycycline 100 mgm twice a day for 10 days.  2. High oral fluid intake.  3. Get Mucinex-DM to take twice a day for 10 days.  Can take at same time as antibiotic.  4. OK to use Delsym for the cough as well.  5. Continue all regular meds.

## 2018-06-15 ENCOUNTER — LAB VISIT (OUTPATIENT)
Dept: LAB | Facility: HOSPITAL | Age: 63
End: 2018-06-15
Attending: NURSE PRACTITIONER
Payer: MEDICARE

## 2018-06-15 DIAGNOSIS — Z79.4 TYPE 2 DIABETES MELLITUS WITH DIABETIC POLYNEUROPATHY, WITH LONG-TERM CURRENT USE OF INSULIN: ICD-10-CM

## 2018-06-15 DIAGNOSIS — E11.42 TYPE 2 DIABETES MELLITUS WITH DIABETIC POLYNEUROPATHY, WITH LONG-TERM CURRENT USE OF INSULIN: ICD-10-CM

## 2018-06-15 LAB
CHOLEST SERPL-MCNC: 172 MG/DL
CHOLEST/HDLC SERPL: 3.4 {RATIO}
ESTIMATED AVG GLUCOSE: 174 MG/DL
HBA1C MFR BLD HPLC: 7.7 %
HDLC SERPL-MCNC: 51 MG/DL
HDLC SERPL: 29.7 %
LDLC SERPL CALC-MCNC: 84.8 MG/DL
NONHDLC SERPL-MCNC: 121 MG/DL
TRIGL SERPL-MCNC: 181 MG/DL

## 2018-06-15 PROCEDURE — 80061 LIPID PANEL: CPT

## 2018-06-15 PROCEDURE — 36415 COLL VENOUS BLD VENIPUNCTURE: CPT | Mod: PO

## 2018-06-15 PROCEDURE — 83036 HEMOGLOBIN GLYCOSYLATED A1C: CPT

## 2018-06-20 NOTE — PROGRESS NOTES
CC: This 63 y.o.  female presents for management of Diabetes   along with the current chronic medical conditions including:  Patient Active Problem List   Diagnosis    Fatty liver    S/P total hysterectomy    Memory loss    Osteopenia    GERD (gastroesophageal reflux disease)    Sleep apnea    Atrophic gastritis without mention of hemorrhage    Chronic fatigue syndrome    Coronary atherosclerosis    Abdominal pain, right upper quadrant    HTN (hypertension)    Muscle spasms of neck    Radiculopathy of cervical spine    Overweight (BMI 25.0-29.9)    Type 2 diabetes mellitus with other specified complication    Screening for colon cancer      Status of these conditions is pending review.    HPI:   Diagnosed with T2DM x 19 years ago, pt has been on insulin x 3 years ago.  Accompanied by family member.  Pt was last seen by me in Dec 2017.  Pt not able to tolerate victoza, tried titration to 0.6 mg, but had a lot of GI upset from 0.6 to 1.8 mg.  Has completely stopped x 1 month.  Pt reported #6 lb weight loss.     Off metformin related to kidneys.    CURRENT DM MEDS:   lantus 30 units qhs, humalog 8/12/12 units  with mod dose correction scale, starting at 150.     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   No missing doses of medication.   Pt is monitoring BG at home 3-4 times a day   No hypoglycemia, lowest mid 80s                      Riana Kay brought glucometer or log to clinic today revealing the following BG readings:    See above     DIET/ MEAL PATTERN: 3 meals a day  Snacks (between lunch and dinner)     EXERCISE: no formal     STANDARDS OF CARE:   Eye exam: 2018  Foot exam: 2018  ASA: none  Statin: none  ACE-I/ARB: none    ROS:   GEN: +chronic fatigue or weakness, no changes w/ appetite  CV:  Denies chest pain, palpitations, edema or cyanosis, denies syncope  SKIN: Skin is intact and heals well, no rashes, pruritis, easy bruising, no hair changes, no  intolerance to heat/cold.  RESP: No SOB, cough, FISCHER  FEN/GI: Nml bowel movements, + (R) LQ abdominal pain/discomfort (> 6 mos)-improved, normal appetite  RENAL: No urinary complaints, no dysuria/hematuia/oliguria   ENDO: no heat/cold intolerance, no fatigue, no change in weight  ID: No skin breakdown, fevers, chills  PSYCH: Denies drug/ETOH abuse, no hx. of eating disorders or depression +anxiety  MS/NEURO: Denies numbness/ tingling in BLE; FROM of joints without swelling or pain. (L) arm pain-better      Lab Results   Component Value Date    HGBA1C 7.7 (H) 06/15/2018     Lab Results   Component Value Date    TSH 1.022 12/14/2017       Chemistry        Component Value Date/Time     03/26/2018 1043    K 4.0 03/26/2018 1043     03/26/2018 1043    CO2 30 (H) 03/26/2018 1043    BUN 13 03/26/2018 1043    CREATININE 0.8 03/26/2018 1043     (H) 03/26/2018 1043        Component Value Date/Time    CALCIUM 9.1 03/26/2018 1043    ALKPHOS 88 03/26/2018 1043    AST 22 03/26/2018 1043    ALT 27 03/26/2018 1043    BILITOT 0.7 03/26/2018 1043          Lab Results   Component Value Date    LDLCALC 84.8 06/15/2018       PE:  GEN: Patient WNWD, WF, NAD, AAOx3, Friendly, talkative, well groomed  HEENT: EOMI, PERRLA, no lid lag, Clear oropharynx  NECK: No lymphadenophathy, trachea midline  CV: Regular rate and rhythm  RESP: No increase work of breathing, No cough, wheeze.  ABD: bs active x 4 quadsEXT: No C/C/Edema, No skin rash/breakdown  NEURO: CN 2-12 intact, 5/5 strength in 4 extremities, nml gait  FEET: No pressure areas, blisters, ulcers, calluses. Footware appropriate.    ASSESSMENT and PLAN:  Riana was seen today for diabetes mellitus.    Diagnoses and associated orders for this visit:  1. Type 2 diabetes mellitus with other specified complication, with long-term current use of insulin  Hemoglobin A1c next time  F/u in 3 mos  a1c goal less than 7%  D/c victoza  May be able to tolerate glp1a weekly  ozempic  0.5 mg weekly sent to [facility]  Freestyle carissa/reader/sensor sent to [facility]  Brochure given  Increase by 10% basal to 33 units qhs  Increase by 10-20% with meal insulin 10-14-14 units w/ scale 180-230+2, etc    Counseling >35 mins     Ambulatory Referral to Diabetes Education-nutrition/freestyle carissa    2. Fatty liver  Optimize BG will help, plus exercise   3. Essential hypertension  Controlled   4. Overweight (BMI 25.0-29.9)  Body mass index is 32.46 kg/m². may increase insulin resistance-change code  Benefits of glp1a is important   5. Memory loss   present during visit, f/u with pcp   6. Chronic fatigue syndrome  F/u with specialist   7. Sleep apnea, unspecified type  Improved   If uncontrolled, may increase insulin resistance.    8. Hypertriglyceridemia  Repeat lipid   Follow-up in about 3 months (around 9/21/2018).

## 2018-06-21 ENCOUNTER — OFFICE VISIT (OUTPATIENT)
Dept: ENDOCRINOLOGY | Facility: CLINIC | Age: 63
End: 2018-06-21
Payer: MEDICARE

## 2018-06-21 VITALS
HEART RATE: 78 BPM | HEIGHT: 60 IN | SYSTOLIC BLOOD PRESSURE: 120 MMHG | BODY MASS INDEX: 32.64 KG/M2 | WEIGHT: 166.25 LBS | DIASTOLIC BLOOD PRESSURE: 74 MMHG

## 2018-06-21 DIAGNOSIS — G47.30 SLEEP APNEA, UNSPECIFIED TYPE: ICD-10-CM

## 2018-06-21 DIAGNOSIS — Z79.4 TYPE 2 DIABETES MELLITUS WITH OTHER SPECIFIED COMPLICATION, WITH LONG-TERM CURRENT USE OF INSULIN: Primary | ICD-10-CM

## 2018-06-21 DIAGNOSIS — I10 ESSENTIAL HYPERTENSION: ICD-10-CM

## 2018-06-21 DIAGNOSIS — E66.9 OBESITY (BMI 30-39.9): ICD-10-CM

## 2018-06-21 DIAGNOSIS — R41.3 MEMORY LOSS: ICD-10-CM

## 2018-06-21 DIAGNOSIS — K76.0 FATTY LIVER: ICD-10-CM

## 2018-06-21 DIAGNOSIS — E78.1 HYPERTRIGLYCERIDEMIA: ICD-10-CM

## 2018-06-21 DIAGNOSIS — G93.32 CHRONIC FATIGUE SYNDROME: ICD-10-CM

## 2018-06-21 DIAGNOSIS — E11.69 TYPE 2 DIABETES MELLITUS WITH OTHER SPECIFIED COMPLICATION, WITH LONG-TERM CURRENT USE OF INSULIN: Primary | ICD-10-CM

## 2018-06-21 PROCEDURE — 3078F DIAST BP <80 MM HG: CPT | Mod: CPTII,S$GLB,, | Performed by: NURSE PRACTITIONER

## 2018-06-21 PROCEDURE — 3045F PR MOST RECENT HEMOGLOBIN A1C LEVEL 7.0-9.0%: CPT | Mod: CPTII,S$GLB,, | Performed by: NURSE PRACTITIONER

## 2018-06-21 PROCEDURE — 3074F SYST BP LT 130 MM HG: CPT | Mod: CPTII,S$GLB,, | Performed by: NURSE PRACTITIONER

## 2018-06-21 PROCEDURE — 99214 OFFICE O/P EST MOD 30 MIN: CPT | Mod: S$GLB,,, | Performed by: NURSE PRACTITIONER

## 2018-06-21 PROCEDURE — 3008F BODY MASS INDEX DOCD: CPT | Mod: CPTII,S$GLB,, | Performed by: NURSE PRACTITIONER

## 2018-06-21 PROCEDURE — 99999 PR PBB SHADOW E&M-EST. PATIENT-LVL IV: CPT | Mod: PBBFAC,,, | Performed by: NURSE PRACTITIONER

## 2018-06-21 RX ORDER — INSULIN ASPART 100 [IU]/ML
INJECTION, SOLUTION INTRAVENOUS; SUBCUTANEOUS
Qty: 3 BOX | Refills: 3
Start: 2018-06-21 | End: 2019-02-11 | Stop reason: SDUPTHER

## 2018-06-21 NOTE — PATIENT INSTRUCTIONS
Snacks can be an important part of a balanced, healthy meal plan. They allow you to eat more frequently, feeling full and satisfied throughout the day. Also, they allow you to spread carbohydrates evenly, which may stabilize blood sugars.  Plus, snacks are enjoyable!     The amount of carbohydrate needed at snacks varies. Generally, about 15-30 grams of carbohydrate per snack is recommended.  Below you will find some tasty treats.       0-5 gm carb   Crystal Light   Vitamin Water Zero   Herbal tea, unsweetened   2 tsp peanut butter on celery   1./2 cup sugar-free jell-o   1 sugar-free popsicle   ¼ cup blueberries   8oz Blue Melissa unsweetened almond milk   5 baby carrots & celery sticks, cucumbers, bell peppers dipped in ¼ cup salsa, 2Tbsp light ranch dressing or 2Tbsp plain Greek yogurt   10 Goldfish crackers   ½ oz low-fat cheese or string cheese   1 closed handful of nuts, unsalted   1 Tbsp of sunflower seeds, unsalted   1 cup Smart Pop popcorn   1 whole grain brown rice cake        15 gm carb   1 small piece of fruit or ½ banana or 1/2 cup lite canned fruit   3 tan cracker squares   3 cups Smart Pop popcorn, top spray butter, Beltre lite salt or cinnamon and Truvia   5 Vanilla Wafers   ½ cup low fat, no added sugar ice cream or frozen yogurt (Blue bell, Blue Bunny, Weight Watchers, Skinny Cow)   ½ turkey, ham, or chicken sandwich   ½ c fruit with ½ c Cottage cheese   4-6 unsalted wheat crackers with 1 oz low fat cheese or 1 tbsp peanut butter    30-45 goldfish crackers (depending on flavor)    7-8 Restorationist mini brown rice cakes (caramel, apple cinnamon, chocolate)    12 Restorationist mini brown rice cakes (cheddar, bbq, ranch)    1/3 cup hummus dip with raw veg   1/2 whole wheat jossy, 1Tbsp hummus   Mini Pizza (1/2 whole wheat English muffin, low-fat  cheese, tomato sauce)   100 calorie snack pack (Oreo, Chips Ahoy, Ritz Mix, Baked Cheetos)   4-6 oz. light or Greek Style yogurt  (Chandni Nuno, Hilario, [facility])   ½ cup sugar-free pudding     6 in. wheat tortilla or jossy oven toasted chips (topped with spray butter flavoring, cinnamon, Truvia OR spray butter, garlic powder, chili powder)    18 BBQ Popchips (available at [employer], [employer] Foods, Fresh Market)                   Diabetes Support Group Meetings         Date: Topic:   February 8 Health Promotion/Cooking Demo   March 8 Taking Care of Your Kidneys   April 12 Taking Care of Your Feet   May 10 Ease Your Mind with Diabetes   Nesha 14 Summer Treats/Cooking Demo   July 12 Super Market Sweep   August 9 Taking Care of Your Eyes   Sept 13 Technology/ADA updates   October 11 Recipes & Treats/Cooking Demo   November 8 Heart Health/Pump it up!   December 13 Year-End Close Out        Meetings are held in the [facility] (A) of the [facility] located at [address] [place] [address]. Please call [phone] for additional information.    Free service, offered every 2nd Thursday of every month! Family members and/or friends are welcome as well!  Support group is for patients with type 1 or type 2 diabetes.    From 3:30p to 4:30p

## 2018-07-06 ENCOUNTER — PATIENT MESSAGE (OUTPATIENT)
Dept: ENDOCRINOLOGY | Facility: CLINIC | Age: 63
End: 2018-07-06

## 2018-07-06 ENCOUNTER — TELEPHONE (OUTPATIENT)
Dept: ENDOCRINOLOGY | Facility: CLINIC | Age: 63
End: 2018-07-06

## 2018-07-16 ENCOUNTER — TELEPHONE (OUTPATIENT)
Dept: INTERNAL MEDICINE | Facility: CLINIC | Age: 63
End: 2018-07-16

## 2018-07-16 NOTE — TELEPHONE ENCOUNTER
----- Message from Marilee Parkinson sent at 7/16/2018  9:02 AM CDT -----  Contact: Gustavo/[employer]/[phone]  Requesting order for diabetic supplies, which include meter, lancets and stripes. Also requesting clinic notes. Can be faxed to [phone].

## 2018-07-17 RX ORDER — LANCETS
EACH MISCELLANEOUS
Qty: 400 EACH | Refills: 3 | Status: SHIPPED | OUTPATIENT
Start: 2018-07-17 | End: 2018-11-28

## 2018-07-17 RX ORDER — INSULIN PUMP SYRINGE, 3 ML
EACH MISCELLANEOUS
Qty: 1 EACH | Refills: 0 | Status: SHIPPED | OUTPATIENT
Start: 2018-07-17 | End: 2019-08-28

## 2018-07-17 NOTE — TELEPHONE ENCOUNTER
----- Message from Sarah Irwin sent at 7/17/2018  4:11 PM CDT -----  Contact: Cassie   [phone]  Needs Advice  /  [facility]     Reason for call:    Orders are needed for the pt diabetes supplies  Meter , strips and lancets   Communication Preference:   Phone ,  Any rep can help the caller   Additional Information:

## 2018-07-19 DIAGNOSIS — M25.561 RIGHT KNEE PAIN, UNSPECIFIED CHRONICITY: Primary | ICD-10-CM

## 2018-07-23 ENCOUNTER — OFFICE VISIT (OUTPATIENT)
Dept: INTERNAL MEDICINE | Facility: CLINIC | Age: 63
End: 2018-07-23
Payer: MEDICARE

## 2018-07-23 ENCOUNTER — PATIENT MESSAGE (OUTPATIENT)
Dept: INTERNAL MEDICINE | Facility: CLINIC | Age: 63
End: 2018-07-23

## 2018-07-23 ENCOUNTER — TELEPHONE (OUTPATIENT)
Dept: ENDOCRINOLOGY | Facility: CLINIC | Age: 63
End: 2018-07-23

## 2018-07-23 ENCOUNTER — LAB VISIT (OUTPATIENT)
Dept: LAB | Facility: HOSPITAL | Age: 63
End: 2018-07-23
Attending: INTERNAL MEDICINE
Payer: MEDICARE

## 2018-07-23 VITALS
HEIGHT: 65 IN | DIASTOLIC BLOOD PRESSURE: 80 MMHG | WEIGHT: 164.25 LBS | HEART RATE: 72 BPM | SYSTOLIC BLOOD PRESSURE: 130 MMHG | BODY MASS INDEX: 27.36 KG/M2 | TEMPERATURE: 98 F

## 2018-07-23 DIAGNOSIS — R11.2 NAUSEA AND VOMITING, INTRACTABILITY OF VOMITING NOT SPECIFIED, UNSPECIFIED VOMITING TYPE: Primary | ICD-10-CM

## 2018-07-23 DIAGNOSIS — R53.81 MALAISE AND FATIGUE: ICD-10-CM

## 2018-07-23 DIAGNOSIS — I10 ESSENTIAL HYPERTENSION: ICD-10-CM

## 2018-07-23 DIAGNOSIS — R53.83 MALAISE AND FATIGUE: ICD-10-CM

## 2018-07-23 DIAGNOSIS — Z79.4 TYPE 2 DIABETES MELLITUS WITH OTHER SPECIFIED COMPLICATION, WITH LONG-TERM CURRENT USE OF INSULIN: ICD-10-CM

## 2018-07-23 DIAGNOSIS — E11.69 TYPE 2 DIABETES MELLITUS WITH OTHER SPECIFIED COMPLICATION, WITH LONG-TERM CURRENT USE OF INSULIN: ICD-10-CM

## 2018-07-23 DIAGNOSIS — R11.2 NAUSEA AND VOMITING, INTRACTABILITY OF VOMITING NOT SPECIFIED, UNSPECIFIED VOMITING TYPE: ICD-10-CM

## 2018-07-23 LAB
ALBUMIN SERPL BCP-MCNC: 3.8 G/DL
ALP SERPL-CCNC: 91 U/L
ALT SERPL W/O P-5'-P-CCNC: 25 U/L
ANION GAP SERPL CALC-SCNC: 10 MMOL/L
AST SERPL-CCNC: 18 U/L
BASOPHILS # BLD AUTO: 0.04 K/UL
BASOPHILS NFR BLD: 0.5 %
BILIRUB SERPL-MCNC: 0.5 MG/DL
BUN SERPL-MCNC: 16 MG/DL
CALCIUM SERPL-MCNC: 9.7 MG/DL
CHLORIDE SERPL-SCNC: 102 MMOL/L
CO2 SERPL-SCNC: 25 MMOL/L
CREAT SERPL-MCNC: 0.8 MG/DL
DIFFERENTIAL METHOD: ABNORMAL
EOSINOPHIL # BLD AUTO: 0.1 K/UL
EOSINOPHIL NFR BLD: 1.3 %
ERYTHROCYTE [DISTWIDTH] IN BLOOD BY AUTOMATED COUNT: 12.1 %
EST. GFR  (AFRICAN AMERICAN): >60 ML/MIN/1.73 M^2
EST. GFR  (NON AFRICAN AMERICAN): >60 ML/MIN/1.73 M^2
GLUCOSE SERPL-MCNC: 134 MG/DL
HCT VFR BLD AUTO: 37.8 %
HGB BLD-MCNC: 13 G/DL
IMM GRANULOCYTES # BLD AUTO: 0.02 K/UL
IMM GRANULOCYTES NFR BLD AUTO: 0.3 %
LYMPHOCYTES # BLD AUTO: 2.3 K/UL
LYMPHOCYTES NFR BLD: 30.7 %
MCH RBC QN AUTO: 31.6 PG
MCHC RBC AUTO-ENTMCNC: 34.4 G/DL
MCV RBC AUTO: 92 FL
MONOCYTES # BLD AUTO: 0.5 K/UL
MONOCYTES NFR BLD: 6.4 %
NEUTROPHILS # BLD AUTO: 4.5 K/UL
NEUTROPHILS NFR BLD: 60.8 %
NRBC BLD-RTO: 0 /100 WBC
PLATELET # BLD AUTO: 250 K/UL
PMV BLD AUTO: 9.6 FL
POTASSIUM SERPL-SCNC: 4.1 MMOL/L
PROT SERPL-MCNC: 7.4 G/DL
RBC # BLD AUTO: 4.12 M/UL
SODIUM SERPL-SCNC: 137 MMOL/L
TSH SERPL DL<=0.005 MIU/L-ACNC: 1.31 UIU/ML
WBC # BLD AUTO: 7.47 K/UL

## 2018-07-23 PROCEDURE — 80053 COMPREHEN METABOLIC PANEL: CPT

## 2018-07-23 PROCEDURE — 3045F PR MOST RECENT HEMOGLOBIN A1C LEVEL 7.0-9.0%: CPT | Mod: CPTII,S$GLB,, | Performed by: INTERNAL MEDICINE

## 2018-07-23 PROCEDURE — 3008F BODY MASS INDEX DOCD: CPT | Mod: CPTII,S$GLB,, | Performed by: INTERNAL MEDICINE

## 2018-07-23 PROCEDURE — 85025 COMPLETE CBC W/AUTO DIFF WBC: CPT

## 2018-07-23 PROCEDURE — 84443 ASSAY THYROID STIM HORMONE: CPT

## 2018-07-23 PROCEDURE — 36415 COLL VENOUS BLD VENIPUNCTURE: CPT | Mod: PO

## 2018-07-23 PROCEDURE — 99214 OFFICE O/P EST MOD 30 MIN: CPT | Mod: S$GLB,,, | Performed by: INTERNAL MEDICINE

## 2018-07-23 PROCEDURE — 3079F DIAST BP 80-89 MM HG: CPT | Mod: CPTII,S$GLB,, | Performed by: INTERNAL MEDICINE

## 2018-07-23 PROCEDURE — 3075F SYST BP GE 130 - 139MM HG: CPT | Mod: CPTII,S$GLB,, | Performed by: INTERNAL MEDICINE

## 2018-07-23 PROCEDURE — 99999 PR PBB SHADOW E&M-EST. PATIENT-LVL III: CPT | Mod: PBBFAC,,, | Performed by: INTERNAL MEDICINE

## 2018-07-23 NOTE — TELEPHONE ENCOUNTER
----- Message from RADHA Jones FNP sent at 7/23/2018  9:39 AM CDT -----  Contact: Kemal /  [phone]  SEs of ozempic are bloating, nausea, decreased appetite    She may want to cut back to 0.25 vs 0.5 weekly    Keep me posted    Thanks  Miguel  ----- Message -----  From: Betina Milligan MA  Sent: 7/23/2018   9:05 AM  To: RADHA Jones FNP        ----- Message -----  From: Janelle Blair  Sent: 7/23/2018   8:55 AM  To: [facility] Staff    .Medication question / problems.  PT is experiencing nausea, ,vomitiing, loss of appetite. Kemal wants to know if those are side effects for OZEMPIC.

## 2018-07-23 NOTE — TELEPHONE ENCOUNTER
Called patient  and left a message regards to his wife questions and concerns about her medication. Hyacinth stated that patient can cut back on her medication to 0.25 vs 0.5 weekly.

## 2018-07-23 NOTE — PROGRESS NOTES
Subjective:       Patient ID: Riana Kay is a 63 y.o. female.    Chief Complaint: Nausea; Emesis; and Anorexia    Patient presents for urgent visit accompanied by her  who helps with the history and translation.  Gives a history of N/V with acid reflux and general malaise/fatigue/anorexia over the past 2-3 weeks.  It seems to coincide with starting a new injectable medication for her diabetes.  Has a history of gastric complaints with several normal upper endoscopies over the past several years.      See GI procedure notes.    The N/V is not daily.  Most recent episode of vomiting was 2 days ago.  No sign of blood in the emesis.  Some vague abdominal pain with the nausea.  BMs are said to be normal.  Nothing to suggest blood in the stool or melena.  She is taking Nexium daily for the acid reflux.    The malaise/fatigue is not particularly new, but she thinks it is worse than previously.  Denies chest pain; no shortness of breath.    Reports blood sugars are good.  Range  in past 2 weeks.      Nausea   Associated symptoms include abdominal pain, fatigue, nausea and vomiting. Pertinent negatives include no chest pain, chills, coughing, fever, headaches or rash.   Emesis    Associated symptoms include abdominal pain. Pertinent negatives include no chest pain, chills, coughing, diarrhea, dizziness, fever or headaches.     Review of Systems   Constitutional: Positive for appetite change and fatigue. Negative for chills and fever.   HENT: Negative.    Respiratory: Negative for cough, shortness of breath and wheezing.    Cardiovascular: Negative for chest pain, palpitations and leg swelling.   Gastrointestinal: Positive for abdominal pain, nausea and vomiting. Negative for abdominal distention, blood in stool, constipation and diarrhea.   Endocrine: Negative for cold intolerance and heat intolerance.   Genitourinary: Negative.    Musculoskeletal: Negative.    Skin: Negative for rash.   Neurological:  Negative for dizziness, light-headedness and headaches.       Objective:      Physical Exam   Constitutional: She is oriented to person, place, and time. She appears well-developed and well-nourished. No distress.   HENT:   Head: Normocephalic.   Right Ear: External ear normal.   Left Ear: External ear normal.   Mouth/Throat: Oropharynx is clear and moist.   Moist membranes.   Eyes: Conjunctivae and EOM are normal. Pupils are equal, round, and reactive to light. No scleral icterus.   Neck: Normal range of motion. Neck supple. No thyromegaly present.   Cardiovascular: Normal rate, regular rhythm, normal heart sounds and intact distal pulses.    Pulmonary/Chest: Effort normal and breath sounds normal. No respiratory distress.   Abdominal: Soft. Bowel sounds are normal. She exhibits no distension and no mass. There is no tenderness. There is no rebound and no guarding.   Neurological: She is alert and oriented to person, place, and time.   Skin: Skin is warm. No rash noted.   Psychiatric: She has a normal mood and affect. Her behavior is normal. Judgment and thought content normal.   Vitals reviewed.      Assessment:       1. Nausea and vomiting, intractability of vomiting not specified, unspecified vomiting type    2. Type 2 diabetes mellitus with other specified complication, with long-term current use of insulin    3. Essential hypertension    4. Malaise and fatigue        Plan:   1, Will check CBC, CMP, TSH today.  2. Advised patient and  to call the endocrine specialist.  3. Continue the Nexium.  4. Continue all regular meds as now.  5. Followup with regular PCP if not improving in 4 days.  6. I will email lab results to them.

## 2018-07-23 NOTE — PATIENT INSTRUCTIONS
1. Will check blood tests for thyroid, chemistries, blood count today and email the results.  2. Please call diabetic specialist today about the nausea/vomiting since starting the new medication.  3. Continue all regular meds.  4. High oral fluid intake.

## 2018-07-25 ENCOUNTER — HOSPITAL ENCOUNTER (OUTPATIENT)
Dept: RADIOLOGY | Facility: HOSPITAL | Age: 63
Discharge: HOME | End: 2018-07-25
Attending: ORTHOPAEDIC SURGERY
Payer: MEDICARE

## 2018-07-25 ENCOUNTER — OFFICE VISIT (OUTPATIENT)
Dept: ORTHOPEDICS | Facility: CLINIC | Age: 63
End: 2018-07-25
Payer: MEDICARE

## 2018-07-25 VITALS — HEIGHT: 62 IN | WEIGHT: 165.56 LBS | BODY MASS INDEX: 30.47 KG/M2

## 2018-07-25 DIAGNOSIS — M25.561 RIGHT KNEE PAIN, UNSPECIFIED CHRONICITY: ICD-10-CM

## 2018-07-25 DIAGNOSIS — M17.11 PRIMARY OSTEOARTHRITIS OF RIGHT KNEE: Primary | ICD-10-CM

## 2018-07-25 PROCEDURE — 99999 PR PBB SHADOW E&M-EST. PATIENT-LVL II: CPT | Mod: PBBFAC,,, | Performed by: ORTHOPAEDIC SURGERY

## 2018-07-25 PROCEDURE — 73560 X-RAY EXAM OF KNEE 1 OR 2: CPT | Mod: 26,XS,LT, | Performed by: RADIOLOGY

## 2018-07-25 PROCEDURE — 3008F BODY MASS INDEX DOCD: CPT | Mod: CPTII,S$GLB,, | Performed by: ORTHOPAEDIC SURGERY

## 2018-07-25 PROCEDURE — 99203 OFFICE O/P NEW LOW 30 MIN: CPT | Mod: S$GLB,,, | Performed by: ORTHOPAEDIC SURGERY

## 2018-07-25 PROCEDURE — 73562 X-RAY EXAM OF KNEE 3: CPT | Mod: 26,RT,, | Performed by: RADIOLOGY

## 2018-07-25 PROCEDURE — 73560 X-RAY EXAM OF KNEE 1 OR 2: CPT | Mod: TC,59,LT

## 2018-07-25 PROCEDURE — 73562 X-RAY EXAM OF KNEE 3: CPT | Mod: TC,RT

## 2018-07-25 NOTE — PROGRESS NOTES
Subjective:      Patient ID: Riana Kay is a 63 y.o. female.    Chief Complaint: Pain of the Right Knee    HPI  Riana Kay is a 63 year old female here with a 10 years history of right knee pain. The patient is a  Retired house keeper. There was not a history of trauma.  The pain is moderate. The pain is located in the medial aspect of the knee. There is not radiation. There is catching or locking.   The pain is described as sharp. The patient has not had prior surgery. Of notes, had L knee scope 8 years ago. It is aggravated by standing.  It is alleviated by rest. There is numbness or tingling of the lower extremity.  There is not back pain.  She  has tried medications, tylenol which helps. Has had about 5 injections in past, last one in 2010 which has helped.  She does have difficulty getting in or out of a car, getting dressed, or going up or down stairs.  The patient does not use an assistive device.    Past Medical History:   Diagnosis Date    Anxiety     AR (allergic rhinitis)     Diabetes mellitus, type 2     Dizziness     DM (diabetes mellitus)     Fatty liver     GERD (gastroesophageal reflux disease)     HTN (hypertension)     Hyperlipidemia     Memory loss     Osteopenia     S/P total hysterectomy     Sleep apnea        Current Outpatient Prescriptions on File Prior to Visit   Medication Sig Dispense Refill    blood sugar diagnostic Strp Uses 4 times a day. Preferred by insurance.DX code E11.42 400 each 3    blood-glucose meter kit Use as instructed, preferred meter. DX Code. E11.42 1 each 0    conjugated estrogens (PREMARIN) vaginal cream Place 0.5 g vaginally twice a week. 45 g 1    flash glucose scanning reader (FREESTYLE JERRI READER) Misc 1 Device by Misc.(Non-Drug; Combo Route) route continuous. 1 each 1    flash glucose sensor (FREESTYLE JERRI SENSOR) Kit 1 Device by Misc.(Non-Drug; Combo Route) route continuous. 3 kit 11    insulin aspart U-100 (NOVOLOG  "FLEXPEN U-100 INSULIN) 100 unit/mL InPn pen Inject  10 units w/ breakfast, 14 units w/ lunch and dinner plus scale 180-230+2,231-280 +4, 281-330 +6, 331-380+8. 3 Box 3    insulin detemir U-100 (LEVEMIR FLEXTOUCH U-100 INSULN) 100 unit/mL (3 mL) SubQ InPn pen Inject 33 Units into the skin every evening. 3 Box 3    insulin needles, disposable, (PEN NEEDLE) 31 X 5/16 " Ndle Use as directed 100 each PRN    lancets Misc Preferred by insurance. Checks sugar 4 times a day.DX code E11.42 400 each 3    pen needle, diabetic (NOVOFINE 32) 32 gauge x 1/4" Ndle Uses 4 times a day. 150 each 6    semaglutide (OZEMPIC) 0.25 mg or 0.5 mg(2 mg/1.5 mL) PnIj Inject 0.5 mg into the skin every 7 days. 4 Syringe 6    diclofenac sodium 1 % Gel Apply 2 g topically 4 (four) times daily. 1 Tube 4     No current facility-administered medications on file prior to visit.        Past Surgical History:   Procedure Laterality Date    CHOLECYSTECTOMY      COLONOSCOPY N/A 1/17/2018    Procedure: COLONOSCOPY with Donnell;  Surgeon: Roland Jacobo MD;  Location: [facility] ([address] [place] [address]);  Service: Endoscopy;  Laterality: N/A;    ELBOW SURGERY Right 7/16/15    ELBOW SURGERY      HYSTERECTOMY      KNEE ARTHROSCOPY W/ DEBRIDEMENT  4/11    Left    ROTATOR CUFF REPAIR      TONSILLECTOMY      TOTAL ABDOMINAL HYSTERECTOMY W/ BILATERAL SALPINGOOPHORECTOMY         Family History   Problem Relation Age of Onset    Colon cancer Father 83        colon cancer    Diabetes Maternal Grandmother     Diabetes Maternal Aunt        Social History     Social History    Marital status:      Spouse name: N/A    Number of children: N/A    Years of education: N/A     Occupational History    Not on file.     Social History Main Topics    Smoking status: Never Smoker    Smokeless tobacco: Never Used    Alcohol use No    Drug use: No    Sexual activity: Yes     Partners: Male     Birth control/ protection: Post-menopausal     Other Topics " Concern    Not on file     Social History Narrative    She works at [employer] in housekeeping; , 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 No exercise but hopes to start walking on the weekend.         Review of Systems   Constitution: Negative for chills, fever and night sweats.   HENT: Negative for hearing loss.    Eyes: Negative for blurred vision and double vision.   Cardiovascular: Negative for chest pain, claudication and leg swelling.   Respiratory: Negative for shortness of breath.    Endocrine: Negative for cold intolerance, heat intolerance, polydipsia, polyphagia and polyuria.   Hematologic/Lymphatic: Negative for adenopathy and bleeding problem. Does not bruise/bleed easily.   Skin: Negative for poor wound healing.   Musculoskeletal: Positive for joint pain.   Gastrointestinal: Negative for abdominal pain, diarrhea, heartburn, nausea and vomiting.   Genitourinary: Negative for bladder incontinence and dysuria.   Neurological: Negative for dizziness, focal weakness, headaches, numbness, paresthesias and sensory change.   Psychiatric/Behavioral: Negative for altered mental status. The patient is not nervous/anxious.    Allergic/Immunologic: Negative for persistent infections.         Objective:      Body mass index is 30.28 kg/m².    General    Nursing note and vitals reviewed.  Constitutional: She is oriented to person, place, and time. She appears well-developed and well-nourished.   HENT:   Head: Atraumatic.   Eyes: EOM are normal.   Cardiovascular: Intact distal pulses.    Pulmonary/Chest: Effort normal.   Abdominal: Soft.   Neurological: She is alert and oriented to person, place, and time.   Psychiatric: She has a normal mood and affect.     General Musculoskeletal Exam   Gait: normal       Right Knee Exam     Inspection   Erythema: absent  Scars: absent  Swelling: absent  Effusion: effusion  Deformity: deformity  Bruising: absent    Tenderness   The patient is tender to palpation of the medial  joint line.    Range of Motion   Extension: 0   Flexion: 120     Tests   Ligament Examination Lachman: normal (-1 to 2mm) PCL-Posterior Drawer: normal (0 to 2mm)     MCL - Valgus: normal (0 to 2mm)  LCL - Varus: normal  Patella   Passive Patellar Tilt: neutral    Other   Sensation: normal    Left Knee Exam     Inspection   Erythema: absent  Scars: absent  Swelling: absent  Effusion: absent  Deformity: deformity  Bruising: absent    Tenderness   The patient is experiencing no tenderness.         Range of Motion   Extension: 0   Flexion: 120     Tests   Stability Lachman: normal (-1 to 2mm) PCL-Posterior Drawer: normal (0 to 2mm)  MCL - Valgus: normal (0 to 2mm)  LCL - Varus: normal (0 to 2mm)  Patella   Passive Patellar Tilt: neutral    Other   Sensation: normal    Muscle Strength   Right Lower Extremity   Hip Abduction: 5/5   Quadriceps:  5/5   Hamstrin/5   Left Lower Extremity   Hip Abduction: 5/5   Quadriceps:  5/5   Hamstrin/5     Reflexes     Left Side  Quadriceps:  2+    Right Side   Quadriceps:  2+    Vascular Exam     Right Pulses  Dorsalis Pedis:      2+          Left Pulses  Dorsalis Pedis:      2+          Edema  Right Lower Leg: absent  Left Lower Leg: absent      Radiographs taken today and reviewed by me demonstrate moderate arthritic change of the bilateral KNEE(s).There  is not bone destruction.  There is not a fracture. The medial compartment is most involved.  There is a mild varus deformity.  The changes are tricompartmental.      Assessment:       Encounter Diagnosis   Name Primary?    Primary osteoarthritis of right knee Yes          Plan:       Riana was seen today for pain.    Diagnoses and all orders for this visit:    Primary osteoarthritis of right knee  -     Prior Authorization Order    Other orders  -     sodium hyaluronate (EUFLEXXA) 10 mg/mL(mw 2.4 -3.6 million) Syrg 20 mg; Inject 2 mLs (20 mg total) into the articular space once a week.    Treatment options have been  discussed.  We have decided to proceed with right knee Euflexxa injections.  The risk of pseudoseptic reactions were discussed, as was the minimal risk of infection.  Riana Kay will return for her first injection..

## 2018-07-27 ENCOUNTER — PATIENT MESSAGE (OUTPATIENT)
Dept: INTERNAL MEDICINE | Facility: CLINIC | Age: 63
End: 2018-07-27

## 2018-08-06 ENCOUNTER — TELEPHONE (OUTPATIENT)
Dept: INTERNAL MEDICINE | Facility: CLINIC | Age: 63
End: 2018-08-06

## 2018-08-06 ENCOUNTER — TELEPHONE (OUTPATIENT)
Dept: ORTHOPEDICS | Facility: CLINIC | Age: 63
End: 2018-08-06

## 2018-08-06 NOTE — TELEPHONE ENCOUNTER
Left a message for the Pt.  to call the office back.    ----- Message from Selma King MA sent at 8/6/2018  3:17 PM CDT -----  Contact:  / [phone]  Can you call the pt and let her know please?    ----- Message -----  From: Laurie Martinez MA  Sent: 8/6/2018   3:10 PM  To: ALCIDES Segal Blaze said it's still pending.    ----- Message -----  From: Selma King MA  Sent: 8/6/2018   2:16 PM  To: Laurie Martinez MA    Can you call Donal Cortes and ask her about this pts injection? It shows it hasn't been approved yet but she isnt scheduled until 8/10/18    ----- Message -----  From: Whitney Hill MA  Sent: 8/6/2018   2:07 PM  To: Selma King MA        ----- Message -----  From: Zeina Solitario  Sent: 8/6/2018   2:04 PM  To: Chris Carrera Staff    Patient  would like a call back to make sure the patient injection was approved by the insurance company.

## 2018-08-06 NOTE — TELEPHONE ENCOUNTER
----- Message from Julianna Estrada sent at 8/6/2018  3:16 PM CDT -----  Contact: Kemal//[phone]  Kemal would like the doctor to call in  a prior authorization in regards to a Lifestyle Mariela glucose reader to [employer] [phone].

## 2018-08-09 ENCOUNTER — TELEPHONE (OUTPATIENT)
Dept: ORTHOPEDICS | Facility: CLINIC | Age: 63
End: 2018-08-09

## 2018-08-09 NOTE — TELEPHONE ENCOUNTER
Spoke with  and explained to her that her injections were not yet approved and were still pending. The pt verbalized understanding.

## 2018-08-09 NOTE — TELEPHONE ENCOUNTER
----- Message from Shaw Almazan sent at 8/9/2018 10:25 AM CDT -----  Contact: self   Pt requesting a call regarding an appt for arash. Pt stated her injections has not been approved by her insurance and she may have to reschedule appt. Pt can be reached at [phone].

## 2018-08-10 ENCOUNTER — OFFICE VISIT (OUTPATIENT)
Dept: ORTHOPEDICS | Facility: CLINIC | Age: 63
End: 2018-08-10
Payer: MEDICARE

## 2018-08-10 DIAGNOSIS — M17.11 PRIMARY OSTEOARTHRITIS OF RIGHT KNEE: ICD-10-CM

## 2018-08-10 PROCEDURE — 99999 PR PBB SHADOW E&M-EST. PATIENT-LVL III: CPT | Mod: PBBFAC,,, | Performed by: NURSE PRACTITIONER

## 2018-08-10 PROCEDURE — 99499 UNLISTED E&M SERVICE: CPT | Mod: S$GLB,,, | Performed by: NURSE PRACTITIONER

## 2018-08-10 PROCEDURE — 20610 DRAIN/INJ JOINT/BURSA W/O US: CPT | Mod: RT,S$GLB,, | Performed by: NURSE PRACTITIONER

## 2018-08-10 NOTE — LETTER
August 10, 2018      Sabino Damon MD  [address]  [facility] [address]           [facility] - Orthopedics  [address]  [facility] [address]  Phone: [phone]          Patient: Riana Kay   MR Number: [id number]   YOB: 1955   Date of Visit: 8/10/2018       Dear Dr. Sabino Damon:    Thank you for referring Riana Kay to me for evaluation. Attached you will find relevant portions of my assessment and plan of care.    If you have questions, please do not hesitate to call me. I look forward to following Riana Kay along with you.    Sincerely,    Debi Perez NP    Enclosure  CC:  No Recipients    If you would like to receive this communication electronically, please contact [email or web address] or [phone] to request more information on [facility] Link access.    For providers and/or their staff who would like to refer a patient to [facility], please contact us through our one-stop-shop provider referral line, [facility], at [phone].    If you feel you have received this communication in error or would no longer like to receive these types of communications, please e-mail [email or web address]

## 2018-08-10 NOTE — PROGRESS NOTES
Riana Kay presents to clinic today for the first right knee Euflexxa injection.    Exam demonstrates the no effusion in the  right knee, and the skin is intact.    Diagnosis: osteoarthritis knee    After time out was performed and patient ID, side, and site were verified, the  right  knee was sterilly prepped in the standard fashion.  A 22-gauge needle was introduced into right knee joint from an jasbir-lateral site without complication and knee was then injected with 2 ml of Euflexxa.  Sterile dressing was applied.  The patient was instructed to resume activities as tolerated and to call with any problems.     We will see Riana Kay back next week for the second injection.

## 2018-08-17 ENCOUNTER — OFFICE VISIT (OUTPATIENT)
Dept: ORTHOPEDICS | Facility: CLINIC | Age: 63
End: 2018-08-17
Payer: MEDICARE

## 2018-08-17 VITALS — SYSTOLIC BLOOD PRESSURE: 132 MMHG | DIASTOLIC BLOOD PRESSURE: 75 MMHG | HEART RATE: 77 BPM

## 2018-08-17 DIAGNOSIS — M17.11 PRIMARY OSTEOARTHRITIS OF RIGHT KNEE: ICD-10-CM

## 2018-08-17 PROCEDURE — 20610 DRAIN/INJ JOINT/BURSA W/O US: CPT | Mod: RT,S$GLB,, | Performed by: NURSE PRACTITIONER

## 2018-08-17 PROCEDURE — 99999 PR PBB SHADOW E&M-EST. PATIENT-LVL III: CPT | Mod: PBBFAC,,, | Performed by: NURSE PRACTITIONER

## 2018-08-17 PROCEDURE — 99499 UNLISTED E&M SERVICE: CPT | Mod: S$GLB,,, | Performed by: NURSE PRACTITIONER

## 2018-08-17 NOTE — PROGRESS NOTES
Riana Kay presents to clinic today for the second right knee Euflexxa injection.    Exam demonstrates the no effusion in the  right knee, and the skin is intact.    Diagnosis: osteoarthritis knee    After time out was performed and patient ID, side, and site were verified, the  right  knee was sterilly prepped in the standard fashion.  A 22-gauge needle was introduced into right knee joint from an jasbir-lateral site without complication and knee was then injected with 2 ml of Euflexxa.  Sterile dressing was applied.  The patient was instructed to resume activities as tolerated and to call with any problems.     We will see Riana Kay back next week for the third injection.

## 2018-08-17 NOTE — LETTER
August 17, 2018      Sabino Damon MD  [address]  [facility] [address]           [facility] - Orthopedics  [address]  [facility] [address]  Phone: [phone]          Patient: Riana Kay   MR Number: [id number]   YOB: 1955   Date of Visit: 8/17/2018       Dear Dr. Sabino Damon:    Thank you for referring Riana Kay to me for evaluation. Attached you will find relevant portions of my assessment and plan of care.    If you have questions, please do not hesitate to call me. I look forward to following Riana Kay along with you.    Sincerely,    Debi Perez NP    Enclosure  CC:  No Recipients    If you would like to receive this communication electronically, please contact [email or web address] or [phone] to request more information on [employer] Link access.    For providers and/or their staff who would like to refer a patient to [facility], please contact us through our one-stop-shop provider referral line, [facility], at [phone].    If you feel you have received this communication in error or would no longer like to receive these types of communications, please e-mail [email or web address]

## 2018-08-19 NOTE — PROGRESS NOTES
Subjective:       Patient ID: Riana Kay is a 63 y.o. female.    Chief Complaint: Abdominal Pain; Sinus Problem; Nausea; and Emesis (possible side effect from medication)    Patient is a 63 y.o.female who presents today for stomach discomfort. She saw gastro in march who recommended miralax along with a high fiber diet.      1. She tried victoza but it made her sick. She is now taking ozempic but now feels fatigue.  She also continues to feel nauseated.    She notes sinus congestion, cough is dry in nature, she feels sinus pressure and headache. Nothing over the counter yet. She did try benadryl with no relief.  Review of Systems   Constitutional: Negative for appetite change, chills, diaphoresis, fatigue and fever.   HENT: Positive for congestion, postnasal drip and sinus pressure. Negative for dental problem, ear discharge, ear pain, hearing loss and sore throat.    Eyes: Negative for discharge, redness and itching.   Respiratory: Negative for cough, chest tightness, shortness of breath and wheezing.    Cardiovascular: Negative for chest pain, palpitations and leg swelling.   Gastrointestinal: Negative for abdominal pain, constipation, diarrhea, nausea and vomiting.   Endocrine: Negative for cold intolerance and heat intolerance.   Genitourinary: Negative for difficulty urinating, frequency, hematuria and urgency.   Musculoskeletal: Negative for arthralgias, back pain, gait problem, myalgias and neck pain.   Skin: Negative for color change and rash.   Neurological: Negative for dizziness, syncope and headaches.   Hematological: Negative for adenopathy.   Psychiatric/Behavioral: Negative for behavioral problems and sleep disturbance. The patient is not nervous/anxious.        Objective:      Physical Exam   Constitutional: She is oriented to person, place, and time. She appears well-developed and well-nourished. No distress.   HENT:   Head: Normocephalic and atraumatic.   Right Ear: Tympanic membrane and  external ear normal.   Left Ear: Tympanic membrane and external ear normal.   Nose: Mucosal edema and rhinorrhea present.   Mouth/Throat: Uvula is midline, oropharynx is clear and moist and mucous membranes are normal. No oropharyngeal exudate, posterior oropharyngeal edema, posterior oropharyngeal erythema or tonsillar abscesses.   Eyes: Conjunctivae and EOM are normal. Pupils are equal, round, and reactive to light. Right eye exhibits no discharge. Left eye exhibits no discharge. No scleral icterus.   Neck: Normal range of motion. Neck supple. No JVD present. No thyromegaly present.   Cardiovascular: Normal rate, regular rhythm, normal heart sounds and intact distal pulses. Exam reveals no gallop and no friction rub.   No murmur heard.  Pulmonary/Chest: Effort normal and breath sounds normal. No stridor. No respiratory distress. She has no wheezes. She has no rales. She exhibits no tenderness.   Abdominal: Soft. Bowel sounds are normal. She exhibits no distension. There is no tenderness. There is no rebound.   Musculoskeletal: Normal range of motion. She exhibits no edema or tenderness.   Lymphadenopathy:     She has no cervical adenopathy.   Neurological: She is alert and oriented to person, place, and time. No cranial nerve deficit.   Skin: Skin is warm and dry. No rash noted. She is not diaphoretic. No erythema.   Psychiatric: She has a normal mood and affect. Her behavior is normal.   Nursing note and vitals reviewed.      Assessment and Plan:       1. Sinusitis, unspecified chronicity, unspecified location  - doxycyline  - astelin      2. Acute bronchitis, unspecified organism  - doxycyline  - tessalon perles prn    3. Type 2 diabetes mellitus with other specified complication, with long-term current use of insulin  - will let endo know she does not like the new med    Notify clinic if symptoms change, worsen or do not improve          No Follow-up on file.

## 2018-08-24 ENCOUNTER — TELEPHONE (OUTPATIENT)
Dept: INTERNAL MEDICINE | Facility: CLINIC | Age: 63
End: 2018-08-24

## 2018-08-24 ENCOUNTER — TELEPHONE (OUTPATIENT)
Dept: ENDOCRINOLOGY | Facility: CLINIC | Age: 63
End: 2018-08-24

## 2018-08-24 ENCOUNTER — OFFICE VISIT (OUTPATIENT)
Dept: INTERNAL MEDICINE | Facility: CLINIC | Age: 63
End: 2018-08-24
Payer: MEDICARE

## 2018-08-24 ENCOUNTER — OFFICE VISIT (OUTPATIENT)
Dept: ORTHOPEDICS | Facility: CLINIC | Age: 63
End: 2018-08-24
Payer: MEDICARE

## 2018-08-24 VITALS
DIASTOLIC BLOOD PRESSURE: 72 MMHG | RESPIRATION RATE: 20 BRPM | HEART RATE: 72 BPM | WEIGHT: 164.25 LBS | BODY MASS INDEX: 27.36 KG/M2 | TEMPERATURE: 98 F | HEIGHT: 65 IN | SYSTOLIC BLOOD PRESSURE: 128 MMHG

## 2018-08-24 VITALS — WEIGHT: 164 LBS | BODY MASS INDEX: 27.32 KG/M2 | HEIGHT: 65 IN

## 2018-08-24 DIAGNOSIS — M17.11 PRIMARY OSTEOARTHRITIS OF RIGHT KNEE: ICD-10-CM

## 2018-08-24 DIAGNOSIS — J32.9 SINUSITIS, UNSPECIFIED CHRONICITY, UNSPECIFIED LOCATION: Primary | ICD-10-CM

## 2018-08-24 DIAGNOSIS — E11.69 TYPE 2 DIABETES MELLITUS WITH OTHER SPECIFIED COMPLICATION, WITH LONG-TERM CURRENT USE OF INSULIN: ICD-10-CM

## 2018-08-24 DIAGNOSIS — J20.9 ACUTE BRONCHITIS, UNSPECIFIED ORGANISM: ICD-10-CM

## 2018-08-24 DIAGNOSIS — Z79.4 TYPE 2 DIABETES MELLITUS WITH OTHER SPECIFIED COMPLICATION, WITH LONG-TERM CURRENT USE OF INSULIN: ICD-10-CM

## 2018-08-24 PROCEDURE — 3045F PR MOST RECENT HEMOGLOBIN A1C LEVEL 7.0-9.0%: CPT | Mod: CPTII,S$GLB,, | Performed by: INTERNAL MEDICINE

## 2018-08-24 PROCEDURE — 3074F SYST BP LT 130 MM HG: CPT | Mod: CPTII,S$GLB,, | Performed by: INTERNAL MEDICINE

## 2018-08-24 PROCEDURE — 99499 UNLISTED E&M SERVICE: CPT | Mod: S$GLB,,, | Performed by: NURSE PRACTITIONER

## 2018-08-24 PROCEDURE — 20610 DRAIN/INJ JOINT/BURSA W/O US: CPT | Mod: RT,S$GLB,, | Performed by: NURSE PRACTITIONER

## 2018-08-24 PROCEDURE — 99214 OFFICE O/P EST MOD 30 MIN: CPT | Mod: S$GLB,,, | Performed by: INTERNAL MEDICINE

## 2018-08-24 PROCEDURE — 3008F BODY MASS INDEX DOCD: CPT | Mod: CPTII,S$GLB,, | Performed by: INTERNAL MEDICINE

## 2018-08-24 PROCEDURE — 3078F DIAST BP <80 MM HG: CPT | Mod: CPTII,S$GLB,, | Performed by: INTERNAL MEDICINE

## 2018-08-24 PROCEDURE — 99999 PR PBB SHADOW E&M-EST. PATIENT-LVL IV: CPT | Mod: PBBFAC,,, | Performed by: NURSE PRACTITIONER

## 2018-08-24 PROCEDURE — 99999 PR PBB SHADOW E&M-EST. PATIENT-LVL III: CPT | Mod: PBBFAC,,, | Performed by: INTERNAL MEDICINE

## 2018-08-24 RX ORDER — PYRIDOXINE HCL (VITAMIN B6) 100 MG
100 TABLET ORAL DAILY
COMMUNITY
End: 2021-08-11

## 2018-08-24 RX ORDER — BENZONATATE 200 MG/1
200 CAPSULE ORAL 3 TIMES DAILY PRN
Qty: 30 CAPSULE | Refills: 0 | Status: SHIPPED | OUTPATIENT
Start: 2018-08-24 | End: 2018-08-24

## 2018-08-24 RX ORDER — AZELASTINE 1 MG/ML
1 SPRAY, METERED NASAL 2 TIMES DAILY
Qty: 30 ML | Refills: 0 | Status: SHIPPED | OUTPATIENT
Start: 2018-08-24 | End: 2018-09-21 | Stop reason: SDUPTHER

## 2018-08-24 RX ORDER — BENZONATATE 100 MG/1
100 CAPSULE ORAL 3 TIMES DAILY PRN
Qty: 30 CAPSULE | Refills: 0 | Status: SHIPPED | OUTPATIENT
Start: 2018-08-24 | End: 2018-09-03

## 2018-08-24 RX ORDER — DOXYCYCLINE HYCLATE 100 MG
100 TABLET ORAL 2 TIMES DAILY
Qty: 14 TABLET | Refills: 0 | Status: SHIPPED | OUTPATIENT
Start: 2018-08-24 | End: 2018-08-31

## 2018-08-24 RX ORDER — PNV NO.95/FERROUS FUM/FOLIC AC 28MG-0.8MG
100 TABLET ORAL DAILY
COMMUNITY
End: 2021-08-11

## 2018-08-24 NOTE — LETTER
August 24, 2018      Sabino Damon MD  [address]  [facility] [address]           [facility] - Orthopedics  [address]  [facility] [address]  Phone: [phone]          Patient: Riana Kay   MR Number: [id number]   YOB: 1955   Date of Visit: 8/24/2018       Dear Dr. Sabino Damon:    Thank you for referring Riana Kay to me for evaluation. Attached you will find relevant portions of my assessment and plan of care.    If you have questions, please do not hesitate to call me. I look forward to following Riana Kay along with you.    Sincerely,    Debi Perez NP    Enclosure  CC:  No Recipients    If you would like to receive this communication electronically, please contact [email or web address] or [phone] to request more information on [employer] Link access.    For providers and/or their staff who would like to refer a patient to [facility], please contact us through our one-stop-shop provider referral line, [facility], at [phone].    If you feel you have received this communication in error or would no longer like to receive these types of communications, please e-mail [email or web address]

## 2018-08-24 NOTE — TELEPHONE ENCOUNTER
----- Message from Ranulfo Gabriel sent at 8/24/2018 10:47 AM CDT -----  Contact:  [phone]  Pt will like a new prescription refill to replace benzonatate (TESSALON) 200 MG capsule medication, the insurance will not pay for the medication.

## 2018-08-24 NOTE — PROGRESS NOTES
Riana Baker Rahul presents to clinic today for the third right knee Euflexxa injection.    Exam demonstrates the no effusion in the  right knee, and the skin is intact.    Diagnosis: osteoarthritis knee    After time out was performed and patient ID, side, and site were verified, the  right  knee was sterilly prepped in the standard fashion.  A 22-gauge needle was introduced into right knee joint from an jasbir-lateral site without complication and knee was then injected with 2 ml of Euflexxa.  Sterile dressing was applied.  The patient was instructed to resume activities as tolerated and to call with any problems.     Pt with some relief. She will f/u as needed.

## 2018-08-29 ENCOUNTER — TELEPHONE (OUTPATIENT)
Dept: INTERNAL MEDICINE | Facility: CLINIC | Age: 63
End: 2018-08-29

## 2018-08-29 NOTE — TELEPHONE ENCOUNTER
----- Message from Catrina Zavala sent at 8/28/2018 11:22 AM CDT -----  Contact: [facility]  [employer] calling regarding diabetes instrument - free style reader  Not being covered under insurance and needing a different one that will be covered.     Callback: [phone]

## 2018-08-31 ENCOUNTER — TELEPHONE (OUTPATIENT)
Dept: INTERNAL MEDICINE | Facility: CLINIC | Age: 63
End: 2018-08-31

## 2018-08-31 NOTE — TELEPHONE ENCOUNTER
----- Message from Lana Landry sent at 8/31/2018 11:12 AM CDT -----  Contact: [facility] ([facility]) [phone]  [facility] is requesting freestyle carissa glucose monitor be change to dexcom glucose monitoring due to the vendor the receive the supplies from does not carrying  the freestyle carissa.

## 2018-09-19 ENCOUNTER — TELEPHONE (OUTPATIENT)
Dept: ORTHOPEDICS | Facility: CLINIC | Age: 63
End: 2018-09-19

## 2018-09-19 DIAGNOSIS — M25.521 RIGHT ELBOW PAIN: Primary | ICD-10-CM

## 2018-09-19 NOTE — TELEPHONE ENCOUNTER
Riana Kay reminded of appointment on 9/20/18 with Dr. WILFRED Hubbard w/time and location. Notified of need for xray before OV w/date, time, and location of appts.  Pt and  verbalized understanding, Lab changed to [facility] per pt request.

## 2018-09-20 ENCOUNTER — HOSPITAL ENCOUNTER (OUTPATIENT)
Dept: RADIOLOGY | Facility: OTHER | Age: 63
Discharge: HOME | End: 2018-09-20
Attending: ORTHOPAEDIC SURGERY
Payer: MEDICARE

## 2018-09-20 ENCOUNTER — OFFICE VISIT (OUTPATIENT)
Dept: ORTHOPEDICS | Facility: CLINIC | Age: 63
End: 2018-09-20
Payer: MEDICARE

## 2018-09-20 VITALS
BODY MASS INDEX: 27.32 KG/M2 | HEIGHT: 65 IN | HEART RATE: 66 BPM | WEIGHT: 164 LBS | SYSTOLIC BLOOD PRESSURE: 146 MMHG | DIASTOLIC BLOOD PRESSURE: 77 MMHG

## 2018-09-20 DIAGNOSIS — M25.521 RIGHT ELBOW PAIN: Primary | ICD-10-CM

## 2018-09-20 DIAGNOSIS — M25.521 RIGHT ELBOW PAIN: ICD-10-CM

## 2018-09-20 PROCEDURE — 73080 X-RAY EXAM OF ELBOW: CPT | Mod: 26,RT,, | Performed by: RADIOLOGY

## 2018-09-20 PROCEDURE — 3078F DIAST BP <80 MM HG: CPT | Mod: CPTII,,, | Performed by: ORTHOPAEDIC SURGERY

## 2018-09-20 PROCEDURE — 73080 X-RAY EXAM OF ELBOW: CPT | Mod: TC,FY,RT

## 2018-09-20 PROCEDURE — 99999 PR PBB SHADOW E&M-EST. PATIENT-LVL III: CPT | Mod: PBBFAC,,, | Performed by: ORTHOPAEDIC SURGERY

## 2018-09-20 PROCEDURE — 99204 OFFICE O/P NEW MOD 45 MIN: CPT | Mod: S$PBB,,, | Performed by: ORTHOPAEDIC SURGERY

## 2018-09-20 PROCEDURE — 3077F SYST BP >= 140 MM HG: CPT | Mod: CPTII,,, | Performed by: ORTHOPAEDIC SURGERY

## 2018-09-20 PROCEDURE — 3008F BODY MASS INDEX DOCD: CPT | Mod: CPTII,,, | Performed by: ORTHOPAEDIC SURGERY

## 2018-09-20 PROCEDURE — 99213 OFFICE O/P EST LOW 20 MIN: CPT | Mod: PBBFAC,25 | Performed by: ORTHOPAEDIC SURGERY

## 2018-09-20 NOTE — PROGRESS NOTES
Subjective:      Patient ID: Riana Kay is a 63 y.o. female.    Chief Complaint: Pain of the Right Elbow       HPI  Riana Kay is a  63 y.o. female presenting today for right elbow pain. She notes a hx of a fall about three years ago in which she sustained elbow fracture. This was fixed surgically by an outside physician with a radial head replacement and lateral epicondyle screw. She notes continued pain in the elbow. This has been present since surgery. Pain occurs with use, especially lifting. Denies numbness or tingling.       Review of patient's allergies indicates:   Allergen Reactions    Iodinated contrast- oral and iv dye      Other reaction(s): Swelling  Other reaction(s): Rash    Macrobid  [nitrofurantoin monohyd/m-cryst]      Other reaction(s): Rash    Metformin      Other reaction(s): Rash    Penicillins      Other reaction(s): Rash    Promethazine      Other reaction(s): rash  Other reaction(s): Unknown    Sulfa (sulfonamide antibiotics)      Other reaction(s): Rash    Sulfamethoxazole-trimethoprim      Other reaction(s): Rash         Current Outpatient Medications   Medication Sig Dispense Refill    azelastine (ASTELIN) 137 mcg (0.1 %) nasal spray 1 spray (137 mcg total) by Nasal route 2 (two) times daily. 30 mL 0    blood sugar diagnostic Strp Uses 4 times a day. Preferred by insurance.DX code E11.42 400 each 3    blood-glucose meter kit Use as instructed, preferred meter. DX Code. E11.42 1 each 0    cyanocobalamin (VITAMIN B-12) 100 MCG tablet Take 100 mcg by mouth once daily.      diclofenac sodium 1 % Gel Apply 2 g topically 4 (four) times daily. 1 Tube 4    flash glucose scanning reader (FREESTYLE JERRI READER) Misc 1 Device by Misc.(Non-Drug; Combo Route) route continuous. 1 each 1    flash glucose sensor (FREESTYLE JERRI SENSOR) Kit 1 Device by Misc.(Non-Drug; Combo Route) route continuous. 3 kit 11    insulin aspart U-100 (NOVOLOG FLEXPEN U-100 INSULIN) 100  "unit/mL InPn pen Inject  10 units w/ breakfast, 14 units w/ lunch and dinner plus scale 180-230+2,231-280 +4, 281-330 +6, 331-380+8. 3 Box 3    insulin detemir U-100 (LEVEMIR FLEXTOUCH U-100 INSULN) 100 unit/mL (3 mL) SubQ InPn pen Inject 33 Units into the skin every evening. 3 Box 3    insulin needles, disposable, (PEN NEEDLE) 31 X 5/16 " Ndle Use as directed 100 each PRN    lancets Misc Preferred by insurance. Checks sugar 4 times a day.DX code E11.42 400 each 3    pen needle, diabetic (NOVOFINE 32) 32 gauge x 1/4" Ndle Uses 4 times a day. 150 each 6    pyridoxine, vitamin B6, (VITAMIN B-6) 100 MG Tab Take 100 mg by mouth once daily.      SITagliptin (JANUVIA) 100 MG Tab Take 1 tablet (100 mg total) by mouth once daily. 90 tablet 3     Current Facility-Administered Medications   Medication Dose Route Frequency Provider Last Rate Last Dose    sodium hyaluronate (EUFLEXXA) 10 mg/mL(mw 2.4 -3.6 million) Syrg 20 mg  20 mg Intra-articular Weekly Sabino Damon MD   20 mg at 08/24/18 0855       Past Medical History:   Diagnosis Date    Anxiety     AR (allergic rhinitis)     Diabetes mellitus, type 2     Dizziness     DM (diabetes mellitus)     Fatty liver     GERD (gastroesophageal reflux disease)     HTN (hypertension)     Hyperlipidemia     Memory loss     Osteopenia     S/P total hysterectomy     Sleep apnea        Past Surgical History:   Procedure Laterality Date    CHOLECYSTECTOMY      COLONOSCOPY N/A 1/17/2018    Procedure: COLONOSCOPY with Jacobo;  Surgeon: Roland Jacobo MD;  [facility]  Service: Endoscopy;  Laterality: N/A;    COLONOSCOPY N/A 10/4/2013    [facility]    COLONOSCOPY with Jacobo N/A 1/17/2018    [facility] ([facility])    EGD (ESOPHAGOGASTRODUODENOSCOPY) N/A 2/28/2014    [facility] ([facility])    ELBOW SURGERY Right 7/16/15    ELBOW " "SURGERY      HYSTERECTOMY      INJECTION-STEROID-EPIDURAL-CERVICAL N/A 3/16/2015    Performed by [facility] CATH LAB    KNEE ARTHROSCOPY W/ DEBRIDEMENT  4/11    Left    ROTATOR CUFF REPAIR      TONSILLECTOMY      TOTAL ABDOMINAL HYSTERECTOMY W/ BILATERAL SALPINGOOPHORECTOMY         Review of Systems:  Constitutional: Negative for chills and fever.   Respiratory: Negative for cough and shortness of breath.    Gastrointestinal: Negative for nausea and vomiting.   Skin: Negative for rash.   Neurological: Negative for dizziness and headaches.   Psychiatric/Behavioral: Negative for depression.   MSK as in HPI       OBJECTIVE:     PHYSICAL EXAM:  BP (!) 146/77   Pulse 66   Ht 5' 5" (1.651 m)   Wt 74.4 kg (164 lb)   BMI 27.29 kg/m²     GEN:  NAD, well-developed, well-groomed.  NEURO: Awake, alert, and oriented. Normal attention and concentration.    PSYCH: Normal mood and affect. Behavior is normal.  HEENT: No cervical lymphadenopathy noted.  CARDIOVASCULAR: Radial pulses 2+ bilaterally. No LE edema noted.  PULMONARY: Breath sounds normal. No respiratory distress.  SKIN: Intact, no rashes.      MSK:   RUE:  Good active ROM of the wrist and fingers. She has good ROM of the elbow. Mild ttp over lateral elbow. AIN/PIN/Radial/Median/Ulnar Nerves assessed in isolation without deficit. Radial & Ulnar arteries palpated 2+. Capillary Refill <3s.      RADIOGRAPHS:  Xray right elbow 9/20/18  FINDINGS:  These views demonstrate postsurgical changes to the right elbow with a radial head prosthesis in place.  There is also an orthopedic screw in the lateral epicondyle.  No fracture or dislocation.  Moderate joint space loss in the medial elbow joint is present.  Osteopenia about the elbow particularly at the lateral epicondyle is identified.  There is no joint space effusion.  No loose bodies identified in the joint although there is hypertrophic bone formation in the medial aspect of the " joint.    Comments: I have personally reviewed the imaging and I agree with the above radiologist's report.    ASSESSMENT/PLAN:       ICD-10-CM ICD-9-CM   1. Right elbow pain M25.521 719.42       Orders Placed This Encounter    CT Arm Elbow Without Contrast Right      Plan:   -we will obtain CT of the right elbow to rule out loosening of hardware/ fracture  -RTC for results         The patient indicates understanding of these issues and agrees to the plan.    Olivia Mireles PA-C  [facility]   [facility]  [place], [place]

## 2018-09-21 RX ORDER — AZELASTINE 1 MG/ML
1 SPRAY, METERED NASAL 2 TIMES DAILY
Qty: 30 ML | Refills: 6 | Status: SHIPPED | OUTPATIENT
Start: 2018-09-21 | End: 2018-10-09 | Stop reason: SDUPTHER

## 2018-09-24 ENCOUNTER — HOSPITAL ENCOUNTER (OUTPATIENT)
Dept: RADIOLOGY | Facility: HOSPITAL | Age: 63
Discharge: HOME | End: 2018-09-24
Attending: PHYSICIAN ASSISTANT
Payer: MEDICARE

## 2018-09-24 DIAGNOSIS — M25.521 RIGHT ELBOW PAIN: ICD-10-CM

## 2018-09-24 PROCEDURE — 73200 CT UPPER EXTREMITY W/O DYE: CPT | Mod: TC,RT

## 2018-09-24 PROCEDURE — 73200 CT UPPER EXTREMITY W/O DYE: CPT | Mod: 26,RT,, | Performed by: INTERNAL MEDICINE

## 2018-09-24 NOTE — PROGRESS NOTES
Pt has had pain in elbow since surgery. Pt had large resection of radial head with prosthesis not seated, Will CT for further eval.

## 2018-09-27 ENCOUNTER — OFFICE VISIT (OUTPATIENT)
Dept: ENDOCRINOLOGY | Facility: CLINIC | Age: 63
End: 2018-09-27
Payer: MEDICARE

## 2018-09-27 VITALS
SYSTOLIC BLOOD PRESSURE: 119 MMHG | HEART RATE: 79 BPM | WEIGHT: 163.81 LBS | BODY MASS INDEX: 27.29 KG/M2 | DIASTOLIC BLOOD PRESSURE: 74 MMHG | HEIGHT: 65 IN

## 2018-09-27 DIAGNOSIS — E66.3 OVERWEIGHT (BMI 25.0-29.9): ICD-10-CM

## 2018-09-27 DIAGNOSIS — E11.69 TYPE 2 DIABETES MELLITUS WITH OTHER SPECIFIED COMPLICATION, WITH LONG-TERM CURRENT USE OF INSULIN: Primary | ICD-10-CM

## 2018-09-27 DIAGNOSIS — Z79.4 TYPE 2 DIABETES MELLITUS WITH OTHER SPECIFIED COMPLICATION, WITH LONG-TERM CURRENT USE OF INSULIN: Primary | ICD-10-CM

## 2018-09-27 DIAGNOSIS — G93.32 CHRONIC FATIGUE SYNDROME: ICD-10-CM

## 2018-09-27 DIAGNOSIS — K76.0 FATTY LIVER: ICD-10-CM

## 2018-09-27 DIAGNOSIS — I10 ESSENTIAL HYPERTENSION: ICD-10-CM

## 2018-09-27 PROCEDURE — 3078F DIAST BP <80 MM HG: CPT | Mod: CPTII,,, | Performed by: NURSE PRACTITIONER

## 2018-09-27 PROCEDURE — 99999 PR PBB SHADOW E&M-EST. PATIENT-LVL IV: CPT | Mod: PBBFAC,,, | Performed by: NURSE PRACTITIONER

## 2018-09-27 PROCEDURE — 3008F BODY MASS INDEX DOCD: CPT | Mod: CPTII,,, | Performed by: NURSE PRACTITIONER

## 2018-09-27 PROCEDURE — 3074F SYST BP LT 130 MM HG: CPT | Mod: CPTII,,, | Performed by: NURSE PRACTITIONER

## 2018-09-27 PROCEDURE — 3045F PR MOST RECENT HEMOGLOBIN A1C LEVEL 7.0-9.0%: CPT | Mod: CPTII,,, | Performed by: NURSE PRACTITIONER

## 2018-09-27 PROCEDURE — 99214 OFFICE O/P EST MOD 30 MIN: CPT | Mod: S$PBB,,, | Performed by: NURSE PRACTITIONER

## 2018-09-27 PROCEDURE — 99214 OFFICE O/P EST MOD 30 MIN: CPT | Mod: PBBFAC | Performed by: NURSE PRACTITIONER

## 2018-09-27 NOTE — PATIENT INSTRUCTIONS
Snacks can be an important part of a balanced, healthy meal plan. They allow you to eat more frequently, feeling full and satisfied throughout the day. Also, they allow you to spread carbohydrates evenly, which may stabilize blood sugars.  Plus, snacks are enjoyable!     The amount of carbohydrate needed at snacks varies. Generally, about 15-30 grams of carbohydrate per snack is recommended.  Below you will find some tasty treats.       0-5 gm carb   Crystal Light   Vitamin Water Zero   Herbal tea, unsweetened   2 tsp peanut butter on celery   1./2 cup sugar-free jell-o   1 sugar-free popsicle   ¼ cup blueberries   8oz Blue Melissa unsweetened almond milk   5 baby carrots & celery sticks, cucumbers, bell peppers dipped in ¼ cup salsa, 2Tbsp light ranch dressing or 2Tbsp plain Greek yogurt   10 Goldfish crackers   ½ oz low-fat cheese or string cheese   1 closed handful of nuts, unsalted   1 Tbsp of sunflower seeds, unsalted   1 cup Smart Pop popcorn   1 whole grain brown rice cake        15 gm carb   1 small piece of fruit or ½ banana or 1/2 cup lite canned fruit   3 tan cracker squares   3 cups Smart Pop popcorn, top spray butter, Beltre lite salt or cinnamon and Truvia   5 Vanilla Wafers   ½ cup low fat, no added sugar ice cream or frozen yogurt (Blue bell, Blue Bunny, Weight Watchers, Skinny Cow)   ½ turkey, ham, or chicken sandwich   ½ c fruit with ½ c Cottage cheese   4-6 unsalted wheat crackers with 1 oz low fat cheese or 1 tbsp peanut butter    30-45 goldfish crackers (depending on flavor)    7-8 Hinduism mini brown rice cakes (caramel, apple cinnamon, chocolate)    12 Hinduism mini brown rice cakes (cheddar, bbq, ranch)    1/3 cup hummus dip with raw veg   1/2 whole wheat jossy, 1Tbsp hummus   Mini Pizza (1/2 whole wheat English muffin, low-fat  cheese, tomato sauce)   100 calorie snack pack (Oreo, Chips Ahoy, Ritz Mix, Baked Cheetos)   4-6 oz. light or Greek Style yogurt  (Cahndni Nuno, Hilario, [facility])   ½ cup sugar-free pudding     6 in. wheat tortilla or jossy oven toasted chips (topped with spray butter flavoring, cinnamon, Truvia OR spray butter, garlic powder, chili powder)    18 BBQ Popchips (available at [employer], Whole Foods, Fresh Market)                   Diabetes Support Group Meetings         Date: Topic:   February 8 Health Promotion/Cooking Demo   March 8 Taking Care of Your Kidneys   April 12 Taking Care of Your Feet   May 10 Ease Your Mind with Diabetes   Nesha 14 Summer Treats/Cooking Demo   July 12 Super Market Sweep   August 9 Taking Care of Your Eyes   Sept 13 Technology/ADA updates   October 11 Recipes & Treats/Cooking Demo   November 8 Heart Health/Pump it up!   December 13 Year-End Close Out        Meetings are held in the [facility] (A) of the [facility] located at [address] [place] [address]. Please call [phone] for additional information.    Free service, offered every 2nd Thursday of every month! Family members and/or friends are welcome as well!  Support group is for patients with type 1 or type 2 diabetes.    From 3:30p to 4:30p

## 2018-09-27 NOTE — PROGRESS NOTES
CC: This 63 y.o.  female presents for management of No chief complaint on file.   along with the current chronic medical conditions including:  Patient Active Problem List   Diagnosis    Fatty liver    S/P total hysterectomy    Memory loss    Osteopenia    GERD (gastroesophageal reflux disease)    Sleep apnea    Atrophic gastritis without mention of hemorrhage    Chronic fatigue syndrome    Coronary atherosclerosis    Abdominal pain, right upper quadrant    HTN (hypertension)    Muscle spasms of neck    Radiculopathy of cervical spine    Overweight (BMI 25.0-29.9)    Type 2 diabetes mellitus with other specified complication    Screening for colon cancer      Status of these conditions is pending review.    HPI:   Diagnosed with T2DM x 19 years ago, pt has been on insulin x 3 years ago.  Accompanied by family member.  Pt was last seen by me in summer 2018 and is now being seen by me again today.  Went from 7.7% to 7.0%  Has not started januvia yet, has medication at home  Lab Results   Component Value Date    HGBA1C 7.0 (H) 09/20/2018       Pt did not tolerate victoza or ozempic.  Off metformin related to kidneys.    CURRENT DM MEDS:   lantus 30 units qhs, novalog 8/12/12 units  with mod dose correction scale, starting at 150.     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   No missing doses of medication.   Pt is monitoring BG at home 3-4 times a day   No hypoglycemia                    Riana Kay brought glucometer or log to clinic today revealing the following BG readings:    See above     DIET/ MEAL PATTERN: 3 meals a day  Snacks (between lunch and dinner)     EXERCISE: no formal; does yardwork     STANDARDS OF CARE:   Eye exam: 2018  Foot exam: 2018  ASA: none  Statin: none  ACE-I/ARB: none    ROS:   GEN: +chronic fatigue or weakness, no changes w/ appetite  CV:  Denies chest pain, palpitations, edema or cyanosis, denies syncope  SKIN: Skin is intact  and heals well, no rashes, pruritis, easy bruising, no hair changes, no intolerance to heat/cold.  RESP: No SOB, cough, FISCHER  FEN/GI: Nml bowel movements, normal appetite  RENAL: No urinary complaints, no dysuria/hematuia/oliguria   ENDO: no heat/cold intolerance, no fatigue, no change in weight  ID: No skin breakdown, fevers, chills  PSYCH: Denies drug/ETOH abuse, no hx. of eating disorders or depression +anxiety  MS/NEURO: Denies numbness/ tingling in BLE; +bilateral knee and ankle joint pain      Lab Results   Component Value Date    HGBA1C 7.0 (H) 09/20/2018     Lab Results   Component Value Date    TSH 1.309 07/23/2018       Chemistry        Component Value Date/Time     09/20/2018 0708    K 4.4 09/20/2018 0708     09/20/2018 0708    CO2 30 (H) 09/20/2018 0708    BUN 14 09/20/2018 0708    CREATININE 0.8 09/20/2018 0708     (H) 09/20/2018 0708        Component Value Date/Time    CALCIUM 9.5 09/20/2018 0708    ALKPHOS 91 07/23/2018 0827    AST 18 07/23/2018 0827    ALT 25 07/23/2018 0827    BILITOT 0.5 07/23/2018 0827          Lab Results   Component Value Date    LDLCALC 76.4 09/20/2018       PE:  GEN: Patient WNWD, WF, NAD, AAOx3, Friendly, talkative, well groomed  HEENT: EOMI, PERRLA, no lid lag, Clear oropharynx  NECK: No lymphadenophathy, trachea midline  CV: Regular rate and rhythm  RESP: No increase work of breathing, No cough, wheeze.  ABD: bs active x 4 quadsEXT: No C/C/Edema, No skin rash/breakdown  NEURO: CN 2-12 intact, 5/5 strength in 4 extremities, nml gait  FEET: No pressure areas, blisters, ulcers, calluses. Footware appropriate.    ASSESSMENT and PLAN:  Riana was seen today for diabetes mellitus.    Diagnoses and associated orders for this visit:  1. Type 2 diabetes mellitus with other specified complication, with long-term current use of insulin  Continue current regimen  Pt stated she will start januvia October 1    A1C goal <7%  A1C just above goal    F/U 3  months    dexcom paperwork filled out for g5, letter in mail stated that this will be approved, not freestyle carissa  Cgm will be very beneficial  BG monitoring 4 times a day    2. Essential hypertension  Controlled, continue med(s)   3. Fatty liver  F/u with pcp  Optimize bg will help with condition    4. Overweight (BMI 25.0-29.9)  Body mass index is 27.26 kg/m². may increase insulin resistance.  Encourage walking, exercise 3 times a week- 30-60 mins    5. Chronic fatigue syndrome  May affect ADLs, 15 min interval is a great starting point   On vitamin supplements      No Follow-up on file.

## 2018-09-28 ENCOUNTER — TELEPHONE (OUTPATIENT)
Dept: ENDOCRINOLOGY | Facility: CLINIC | Age: 63
End: 2018-09-28

## 2018-10-04 ENCOUNTER — OFFICE VISIT (OUTPATIENT)
Dept: ORTHOPEDICS | Facility: CLINIC | Age: 63
End: 2018-10-04
Payer: MEDICARE

## 2018-10-04 ENCOUNTER — TELEPHONE (OUTPATIENT)
Dept: DIABETES | Facility: CLINIC | Age: 63
End: 2018-10-04

## 2018-10-04 VITALS
SYSTOLIC BLOOD PRESSURE: 131 MMHG | DIASTOLIC BLOOD PRESSURE: 74 MMHG | HEART RATE: 72 BPM | HEIGHT: 65 IN | BODY MASS INDEX: 27.16 KG/M2 | WEIGHT: 163 LBS

## 2018-10-04 DIAGNOSIS — M54.10 RADICULOPATHY OF ARM: ICD-10-CM

## 2018-10-04 DIAGNOSIS — M25.521 RIGHT ELBOW PAIN: Primary | ICD-10-CM

## 2018-10-04 PROCEDURE — 99214 OFFICE O/P EST MOD 30 MIN: CPT | Mod: S$PBB,,, | Performed by: ORTHOPAEDIC SURGERY

## 2018-10-04 PROCEDURE — 3078F DIAST BP <80 MM HG: CPT | Mod: CPTII,,, | Performed by: ORTHOPAEDIC SURGERY

## 2018-10-04 PROCEDURE — 3075F SYST BP GE 130 - 139MM HG: CPT | Mod: CPTII,,, | Performed by: ORTHOPAEDIC SURGERY

## 2018-10-04 PROCEDURE — 99214 OFFICE O/P EST MOD 30 MIN: CPT | Mod: PBBFAC | Performed by: ORTHOPAEDIC SURGERY

## 2018-10-04 PROCEDURE — 3008F BODY MASS INDEX DOCD: CPT | Mod: CPTII,,, | Performed by: ORTHOPAEDIC SURGERY

## 2018-10-04 PROCEDURE — 99999 PR PBB SHADOW E&M-EST. PATIENT-LVL IV: CPT | Mod: PBBFAC,,, | Performed by: ORTHOPAEDIC SURGERY

## 2018-10-04 NOTE — TELEPHONE ENCOUNTER
Received order for Diabetes education.  Called pt with  on the line.  Pt has not received her Dexcom yet.  Provided contact information for pt to return call to schedule the 2 hour appt after she receives her Dexcom.

## 2018-10-04 NOTE — PROGRESS NOTES
Subjective:      Patient ID: Riana Kay is a 63 y.o. female.    Chief Complaint: Pain of the Right Elbow       HPI  Riana Kay is a  63 y.o. female presenting today for right elbow pain. She notes a hx of a fall about three years ago in which she sustained elbow fracture. This was fixed surgically by an outside physician with a radial head replacement and lateral epicondyle screw. She notes continued pain in the elbow. This has been present since surgery. Pain occurs with use, especially lifting. Denies numbness or tingling.     Interval hx: here for CT results.       Review of patient's allergies indicates:   Allergen Reactions    Iodinated contrast- oral and iv dye      Other reaction(s): Swelling  Other reaction(s): Rash    Macrobid  [nitrofurantoin monohyd/m-cryst]      Other reaction(s): Rash    Metformin      Other reaction(s): Rash    Penicillins      Other reaction(s): Rash    Promethazine      Other reaction(s): rash  Other reaction(s): Unknown    Sulfa (sulfonamide antibiotics)      Other reaction(s): Rash    Sulfamethoxazole-trimethoprim      Other reaction(s): Rash         Current Outpatient Medications   Medication Sig Dispense Refill    azelastine (ASTELIN) 137 mcg (0.1 %) nasal spray 1 SPRAY (137 MCG TOTAL) BY NASAL ROUTE 2 (TWO) TIMES DAILY. 30 mL 6    blood sugar diagnostic Strp Uses 4 times a day. Preferred by insurance.DX code E11.42 400 each 3    blood-glucose meter kit Use as instructed, preferred meter. DX Code. E11.42 1 each 0    blood-glucose meter,continuous (DEXCOM G5 ) Misc 1 Device by Misc.(Non-Drug; Combo Route) route continuous. 1 each 1    blood-glucose sensor (DEXCOM G5-G4 SENSOR) Sapphire Change every 10 days, 90 day supply 9 Device 3    blood-glucose transmitter (DEXCOM G5 TRANSMITTER) Sapphire Change every 3 months 1 Device 3    cyanocobalamin (VITAMIN B-12) 100 MCG tablet Take 100 mcg by mouth once daily.      insulin aspart U-100 (NOVOLOG  "FLEXPEN U-100 INSULIN) 100 unit/mL InPn pen Inject  10 units w/ breakfast, 14 units w/ lunch and dinner plus scale 180-230+2,231-280 +4, 281-330 +6, 331-380+8. 3 Box 3    insulin detemir U-100 (LEVEMIR FLEXTOUCH U-100 INSULN) 100 unit/mL (3 mL) SubQ InPn pen Inject 33 Units into the skin every evening. 3 Box 3    insulin needles, disposable, (PEN NEEDLE) 31 X 5/16 " Ndle Use as directed 100 each PRN    lancets Misc Preferred by insurance. Checks sugar 4 times a day.DX code E11.42 400 each 3    pen needle, diabetic (NOVOFINE 32) 32 gauge x 1/4" Ndle Uses 4 times a day. 150 each 6    pyridoxine, vitamin B6, (VITAMIN B-6) 100 MG Tab Take 100 mg by mouth once daily.      SITagliptin (JANUVIA) 100 MG Tab Take 1 tablet (100 mg total) by mouth once daily. 90 tablet 3    diclofenac sodium 1 % Gel Apply 2 g topically 4 (four) times daily. 1 Tube 4     Current Facility-Administered Medications   Medication Dose Route Frequency Provider Last Rate Last Dose    sodium hyaluronate (EUFLEXXA) 10 mg/mL(mw 2.4 -3.6 million) Syrg 20 mg  20 mg Intra-articular Weekly Sabino Damon MD   20 mg at 08/24/18 0855       Past Medical History:   Diagnosis Date    Anxiety     AR (allergic rhinitis)     Diabetes mellitus, type 2     Dizziness     DM (diabetes mellitus)     Fatty liver     GERD (gastroesophageal reflux disease)     HTN (hypertension)     Hyperlipidemia     Memory loss     Osteopenia     S/P total hysterectomy     Sleep apnea        Past Surgical History:   Procedure Laterality Date    CHOLECYSTECTOMY      COLONOSCOPY N/A 1/17/2018    Procedure: COLONOSCOPY with Donnell;  Surgeon: Roland Jacobo MD;  Location: [facility] (4TH FLR);  Service: Endoscopy;  Laterality: N/A;    COLONOSCOPY N/A 10/4/2013    Performed by [facility]urag Carl MD at [facility] (4TH FLR)    COLONOSCOPY with Jacobo N/A 1/17/2018    Performed by Roland [facility] MD at [facility] (4TH FLR)    EGD (ESOPHAGOGASTRODUODENOSCOPY) N/A " "2/28/2014    [facility] at [facility]O (4TH FLR)    ELBOW SURGERY Right 7/16/15    ELBOW SURGERY      HYSTERECTOMY      INJECTION-STEROID-EPIDURAL-CERVICAL N/A 3/16/2015    Performed by [facility] at [facility] CATH LAB    KNEE ARTHROSCOPY W/ DEBRIDEMENT  4/11    Left    ROTATOR CUFF REPAIR      TONSILLECTOMY      TOTAL ABDOMINAL HYSTERECTOMY W/ BILATERAL SALPINGOOPHORECTOMY         Review of Systems:  Constitutional: Negative for chills and fever.   Respiratory: Negative for cough and shortness of breath.    Gastrointestinal: Negative for nausea and vomiting.   Skin: Negative for rash.   Neurological: Negative for dizziness and headaches.   Psychiatric/Behavioral: Negative for depression.   MSK as in HPI       OBJECTIVE:     PHYSICAL EXAM:  /74   Pulse 72   Ht 5' 5" (1.651 m)   Wt 73.9 kg (163 lb)   BMI 27.12 kg/m²     GEN:  NAD, well-developed, well-groomed.  NEURO: Awake, alert, and oriented. Normal attention and concentration.    PSYCH: Normal mood and affect. Behavior is normal.  HEENT: No cervical lymphadenopathy noted.  CARDIOVASCULAR: Radial pulses 2+ bilaterally. No LE edema noted.  PULMONARY: Breath sounds normal. No respiratory distress.  SKIN: Intact, no rashes.      MSK:   RUE:  Good active ROM of the wrist and fingers. She has good ROM of the elbow. Mild ttp over lateral elbow and medial epicondyle. AIN/PIN/Radial/Median/Ulnar Nerves assessed in isolation without deficit. Radial & Ulnar arteries palpated 2+. Capillary Refill <3s. Mildly + tinels over the elbow. Full pronation and supination with minimal pain. Incision C/D/I   There is pain with DRUJ shucking       RADIOGRAPHS:  Xray right elbow 9/20/18  FINDINGS:  These views demonstrate postsurgical changes to the right elbow with a radial head prosthesis in place.  There is also an orthopedic screw in the lateral epicondyle.  No fracture or dislocation.  Moderate joint space loss in the medial elbow joint is " present.  Osteopenia about the elbow particularly at the lateral epicondyle is identified.  There is no joint space effusion.  No loose bodies identified in the joint although there is hypertrophic bone formation in the medial aspect of the joint.    Comments: I have personally reviewed the imaging and I agree with the above radiologist's report.    ASSESSMENT/PLAN:       ICD-10-CM ICD-9-CM   1. Right elbow pain M25.521 719.42          CT reviewed -- no evidence of loosening. However possible over-stuffing of the RC joint with prominent prosthesis. Also with an osteophyte of the medial epicondyle that may be contributing to her pain.     Will get her into see OT for 1 time visit for  strength measurement  EMG upper extremity to rule out cervical radiculopathy and/or cubital tunnel  FU to discuss revision right radial head replacement, cubital tunnel release

## 2018-10-09 ENCOUNTER — TELEPHONE (OUTPATIENT)
Dept: INTERNAL MEDICINE | Facility: CLINIC | Age: 63
End: 2018-10-09

## 2018-10-09 RX ORDER — AZELASTINE 1 MG/ML
1 SPRAY, METERED NASAL 2 TIMES DAILY
Qty: 30 ML | Refills: 6 | Status: SHIPPED | OUTPATIENT
Start: 2018-10-09 | End: 2019-12-31

## 2018-10-17 NOTE — PROGRESS NOTES
I have personally taken th Plan e history and examined this patient. I agree with the resident's note as stated above. Plan for OT one time eval for pinch and  strength. Plan for nerve testing to further eval possible cubital tunnel

## 2018-10-19 ENCOUNTER — CLINICAL SUPPORT (OUTPATIENT)
Dept: REHABILITATION | Facility: HOSPITAL | Age: 63
End: 2018-10-19
Attending: ORTHOPAEDIC SURGERY
Payer: MEDICARE

## 2018-10-19 DIAGNOSIS — R53.1 DECREASED STRENGTH: ICD-10-CM

## 2018-10-19 DIAGNOSIS — R52 PAIN: ICD-10-CM

## 2018-10-19 DIAGNOSIS — M25.60 DECREASED RANGE OF MOTION: ICD-10-CM

## 2018-10-19 PROCEDURE — G8991 OTHER PT/OT GOAL STATUS: HCPCS | Mod: CK,PO

## 2018-10-19 PROCEDURE — 97165 OT EVAL LOW COMPLEX 30 MIN: CPT | Mod: PO

## 2018-10-19 PROCEDURE — G8992 OTHER PT/OT  D/C STATUS: HCPCS | Mod: CK,PO

## 2018-10-19 PROCEDURE — G8990 OTHER PT/OT CURRENT STATUS: HCPCS | Mod: CK,PO

## 2018-10-19 NOTE — PLAN OF CARE
"  [facility] Occupational Therapy  Initial Evaluation       Evaluation Date: 10/19/2018  Patient: Riana Kay  YOB: 1955  Chart Number: [id number]  Referring Physician: Katja Hubbard, *   Physician Orders: 1 time eval for hand, wrist, elbow measurements, ROM, /pinch strength  Medical Diagnosis: M25.521 (ICD-10-CM) - Right elbow pain  OT Diagnosis:   1. Decreased range of motion     2. Decreased strength     3. Pain       Visit #: 1 of 12 [facility]   Plan of Care Certification Date: 10/19/18   Authorization Period: 12/12/18   Date of Return to MD: 11/27/18     Time In: 0900  Time Out: 0945   Total Billable Time: 45 min     Precautions: Standard    Subjective     Reason for Referral: Riana presents to therapy with a medical referral of R elbow pain.   Involved Side: Right   Dominant Side: Right  Date of Onset: ongoing pain for ~ 3 years   Mechanism of Injury: PMH of elbow fracture from a fall   History of Current Condition:  Patient reports history elbow fracture from a fall about three years ago.  Per chart review, fracture was treated surgically by an outside physician with a radial head replacement and lateral epicondyle screw.  She presents today with complaints of ongoing elbow pain and stiffness since the initial injury.  She indicated pain throughout both the lateral and medial epicondyle.  She states she has experienced a progressive decline in range of motion and strength and is not always able to close her hand - describes this as a catching and stiffness sensation.   Surgical Procedure: radial head replacement and lateral epicondyle screw   Imaging: CT findings on 9/24/18, "Postoperative change radial head prosthesis without evidence of fracture or loosening.  Degenerative change of the elbow."   Previous Therapy: therapy following initial injury     Pain:  Functional Pain Scale Rating 0-10:   5/10 on average  2/10 at best  10/10 at worst  Location: throughout " the R elbow   Description: Aching and Tight  Activities Which Increase Pain: Lifting, carrying, holding   Activities Which Decrease Pain: ice    Functional Limitations/Social History:    Previous functional status includes: Independent with all ADLs.     Current FunctionalStatus   Home/Living environment: lives alone      Limitation of Functional Status as follows:   ADLs/IADLs:     - Feeding: I    - Bathing: I    - Dressing: I    - Grooming: I    - Driving: never      Leisure: Gardening and Cooking     Occupation:  On disability     Patient's Goals for Therapy: relieve pain, increase strength     Past Medical History/Physical Systems Review:   Past Medical History:   Diagnosis Date    Anxiety     AR (allergic rhinitis)     Diabetes mellitus, type 2     Dizziness     DM (diabetes mellitus)     Fatty liver     GERD (gastroesophageal reflux disease)     HTN (hypertension)     Hyperlipidemia     Memory loss     Osteopenia     S/P total hysterectomy     Sleep apnea      Riana Kay  has a past surgical history that includes Cholecystectomy; Knee arthroscopy w/ debridement (4/11); Total abdominal hysterectomy w/ bilateral salpingoophorectomy; Tonsillectomy; Rotator cuff repair; Elbow surgery (Right, 7/16/15); Elbow surgery; Hysterectomy; COLONOSCOPY with Jacobo (N/A, 1/17/2018); INJECTION-STEROID-EPIDURAL-CERVICAL (N/A, 3/16/2015); EGD (ESOPHAGOGASTRODUODENOSCOPY) (N/A, 2/28/2014); and COLONOSCOPY (N/A, 10/4/2013).    Riana has a current medication list which includes the following prescription(s): azelastine, blood sugar diagnostic, blood-glucose meter, blood-glucose meter,continuous, blood-glucose sensor, blood-glucose transmitter, cyanocobalamin, diclofenac sodium, insulin aspart u-100, insulin detemir u-100, pen needle, diabetic, lancets, pen needle, diabetic, pyridoxine (vitamin b6), and sitagliptin, and the following Facility-Administered Medications: sodium hyaluronate  (euflexxa).    Review of patient's allergies indicates:   Allergen Reactions    Iodinated contrast- oral and iv dye      Other reaction(s): Swelling  Other reaction(s): Rash    Macrobid  [nitrofurantoin monohyd/m-cryst]      Other reaction(s): Rash    Metformin      Other reaction(s): Rash    Penicillins      Other reaction(s): Rash    Promethazine      Other reaction(s): rash  Other reaction(s): Unknown    Sulfa (sulfonamide antibiotics)      Other reaction(s): Rash    Sulfamethoxazole-trimethoprim      Other reaction(s): Rash        Barriers:  Environmental Concerns/ Fall Risk:  None  Barriers to Learning: None   Cultural/Spiritual : None   Developmental/Education: None   Abuse/ Neglect: none   Nutritional Deficit: None   Language: None   Hearing/Vision Deficit: None   History of Cancer: none        Objective     Observation:   Mild localized edema present throughout the R elbow     Sensation: Grossly intact. Deneis numbness or tingling sensation.     Edema:   10/19/2018 10/19/2018    Right  Left   Proximal Wrist Crease 15.7 cm 15.5 cm   Mid-forearm   ( 2 in distal from antecubital marino) 26.1 cm 25.4 cm    Upper Arm   (2 in cm proximal from antecubital fossa)  28.5 cm  29.5 cm    Antecubital fossa  26.2 cm 26.0 cm      Range of Motion:    Elbow   Right Left   Extension 10 0   Flexion 145 145   Supination 80 75   Pronation 85 90     Wrist   Right Left   Extension 50 75   Flexion 75 80   Radial Deviation 20 20   Ulnar Deviation 25 30         Hand    Right    INDEX                          MP Ext/Flex 0/95                         PIP Ext/Flex 0/105                         DIP Ext/Flex 0/65   MIDDLE                                         MP Ext/Flex 0/90                         PIP Ext/Flex 0/95                         DIP Ext/Flex 10/65   RING                            MP Ext/Flex 0/100                         PIP Ext/Flex 0/95                         DIP Ext/Flex 10/65   SMALL                          MP  Ext/Flex 0/100                         PIP Ext/Flex 0/90                         DIP Ext/Flex 0/65   THUMB                          MP Ext/Flex 0/75                         IP Ext/Flex 0/70                         Corbin ABD Contact/50                         Radial ABD Contact/55                         Opposition To tip of 5th digit        Palpation:  TTP over lateral and medical epicondyle           Strength: (KATI Dynamometer in lbs.) Average 3 trials, Position II:     10/19/2018 10/19/2018    Right Left    Elbow Flexed 5# 22#   Elbow Extended 4# 17#   Elbow Extended and Supinated  7# 17#   Elbow Extended and Pronated  8# 23#   Comments: patient reports min increase in pain with R  strength assessments.     Pinch Strength (Measured in psi)     10/19/2018 10/19/2018    Right Left    Key Pinch 4 psi 9 psi   3pt Pinch 1 psi 4 psi   2pt Pinch 1 psi 2 psi       CMS Impairment/Limitation/Restriction for FOTO Elbow Survey    Therapist reviewed FOTO scores for Riana on 10/19/2018  FOTO documents entered into [employer] - see Media section.    Limitation Score: 52%  Category: Other physical or occupational primary functional limitation     Current : CK = at least 40% but < 60% impaired, limited or restricted  Goal: CK = at least 40% but < 60% impaired, limited or restricted  Discharge: CK = at least 40% but < 60% impaired, limited or restricted         Treatment   Eval Only.   Patient/Family Education: role of OT, goals for OT. - pt verbalized understanding.     Assessment   Riana Kay is a 63 y.o. female referred to outpatient occupational/hand therapy and presents with a medical diagnosis of R elbow pain, resulting in decreased participation and performance in desired occupations and demonstrates physical limitations as described in the chart below. Following a brief medical record review it is determined that pt will benefit from occupational therapy services to obtain baseline  strength, pinch  strength, and hand, wrist, and elbow range of motion measurements.  The following goals were discussed with the patient and patient is in agreement with goals and POC. The patient's rehab potential is Good.     Anticipated barriers to occupational therapy: none indicated at this time.   Pt has no cultural, educational or language barriers to learning provided.    Profile and History Assessment of Occupational Performance Level of Clinical Decision Making Complexity Score   Occupational Profile:   Riana Kay is a 63 y.o. female who lives alone and is currently on medical disability. Riana Kay has difficulty with reaching, carrying, holding, and overall functional use of her dominant RUE  affecting her daily functional abilities and performance in desired occupations. Her main goal for therapy is decrease pain and improve strength.     Comorbidities:   HTN, osteopenia,   Elbow surgery    Medical and Therapy History Review:   Brief               Performance Deficits    Physical:  Joint Mobility  Muscle Power/Strength  Edema   Strength  Pinch Strength  Pain    Cognitive:  No Deficits    Psychosocial:    No Deficits     Clinical Decision Making:  low    Assessment Process:  Problem-Focused Assessments    Modification/Need for Assistance:  Not Necessary    Intervention Selection:  Limited Treatment Options       low  Based on PMHX, co morbidities , data from assessments and functional level of assistance required with task and clinical presentation directly impacting function.         Goals:   Short Term = Long Term   1.  Patient will verbalize knowledge and understanding of purpose and nature of occupational therapy evaluation. (Met)   2.  Patient will follow up with orthopedics for appropriate care and treatment of ongoing R elbow pain. (Met, follow up scheduled with Dr. Hubbard on 11/27/18)     Plan     Evaluation only with discharge. Patient may be an appropriate future candidate for  occupational therapy intervention following medical intervention/treatment with orthopedics.

## 2018-10-25 ENCOUNTER — PATIENT OUTREACH (OUTPATIENT)
Dept: DIABETES | Facility: CLINIC | Age: 63
End: 2018-10-25

## 2018-10-25 ENCOUNTER — TELEPHONE (OUTPATIENT)
Dept: ENDOCRINOLOGY | Facility: CLINIC | Age: 63
End: 2018-10-25

## 2018-10-25 NOTE — PROGRESS NOTES
"Pt was scheduled for Diabetes education - Dexcom start incorrectly.  Called pt to reschedule and spoke with the .  The pt still does not have the Dexcom.  Providers notes say they it is has been approved.  They called [employer] and they say thet it's "processing".  Sent message to support staff to check on the status with [employer] and also sent an email to Ramsey with Dex[employer].  Pt has my direct contact information to call me to schedule the education once they receive the Dexcom.     "

## 2018-10-25 NOTE — TELEPHONE ENCOUNTER
Called [employer] to get the status on patient Dexcom, rep stated that patient status still say in process. Patient Dexcom is getting process through D[employer]. We should here something back hopefully on tomorrow about patient Dexcom.

## 2018-10-25 NOTE — TELEPHONE ENCOUNTER
"----- Message from RADHA Newman FNP sent at 10/25/2018  1:16 PM CDT -----  Please let me know if there is anything else on my end  thanks  ----- Message -----  From: Lauren Contreras MA  Sent: 10/25/2018  10:32 AM  To: RADHA Newman FNP, #    This pt was scheduled incorrectly for a Dexcom start.  I called them to reschedule () and they do not even have the Dexcom yet.  It's been almost a month.  Can you please find out the status of the Dexcom - the  called [employer] and they are telling them that the code was wrong and that it's "processing".      I sent an email to Ramsey too.    Thanks,  Lauren    "

## 2018-10-26 ENCOUNTER — OFFICE VISIT (OUTPATIENT)
Dept: INTERNAL MEDICINE | Facility: CLINIC | Age: 63
End: 2018-10-26
Payer: MEDICARE

## 2018-10-26 VITALS
HEART RATE: 61 BPM | DIASTOLIC BLOOD PRESSURE: 62 MMHG | RESPIRATION RATE: 18 BRPM | OXYGEN SATURATION: 97 % | TEMPERATURE: 99 F | BODY MASS INDEX: 27.36 KG/M2 | WEIGHT: 164.25 LBS | SYSTOLIC BLOOD PRESSURE: 128 MMHG | HEIGHT: 65 IN

## 2018-10-26 DIAGNOSIS — R07.9 CHEST PAIN, UNSPECIFIED TYPE: Primary | ICD-10-CM

## 2018-10-26 DIAGNOSIS — K21.9 GASTROESOPHAGEAL REFLUX DISEASE, ESOPHAGITIS PRESENCE NOT SPECIFIED: ICD-10-CM

## 2018-10-26 PROCEDURE — 99214 OFFICE O/P EST MOD 30 MIN: CPT | Mod: PBBFAC,PO | Performed by: FAMILY MEDICINE

## 2018-10-26 PROCEDURE — 93010 ELECTROCARDIOGRAM REPORT: CPT | Mod: ,,, | Performed by: INTERNAL MEDICINE

## 2018-10-26 PROCEDURE — 99999 PR PBB SHADOW E&M-EST. PATIENT-LVL IV: CPT | Mod: PBBFAC,,, | Performed by: FAMILY MEDICINE

## 2018-10-26 PROCEDURE — 99214 OFFICE O/P EST MOD 30 MIN: CPT | Mod: S$PBB,,, | Performed by: FAMILY MEDICINE

## 2018-10-26 PROCEDURE — 3074F SYST BP LT 130 MM HG: CPT | Mod: CPTII,,, | Performed by: FAMILY MEDICINE

## 2018-10-26 PROCEDURE — 93005 ELECTROCARDIOGRAM TRACING: CPT | Mod: PBBFAC,PO | Performed by: FAMILY MEDICINE

## 2018-10-26 PROCEDURE — 3008F BODY MASS INDEX DOCD: CPT | Mod: CPTII,,, | Performed by: FAMILY MEDICINE

## 2018-10-26 PROCEDURE — 3078F DIAST BP <80 MM HG: CPT | Mod: CPTII,,, | Performed by: FAMILY MEDICINE

## 2018-10-26 RX ORDER — LISINOPRIL 5 MG/1
5 TABLET ORAL DAILY
COMMUNITY
End: 2018-10-30 | Stop reason: SDUPTHER

## 2018-10-26 RX ORDER — PANTOPRAZOLE SODIUM 40 MG/1
40 TABLET, DELAYED RELEASE ORAL DAILY
Qty: 30 TABLET | Refills: 11 | Status: SHIPPED | OUTPATIENT
Start: 2018-10-26 | End: 2019-04-26 | Stop reason: SDUPTHER

## 2018-10-26 NOTE — PROGRESS NOTES
Subjective:       Patient ID: Riana Kay is a 63 y.o. female.    Chief Complaint: Gastroesophageal Reflux (pt has been feeling acid and dryness from the top of her navel all the way up to her throat) and Nausea    HPI 63-year-old female uncontrolled diabetic presents to clinic today secondary to a complaint of worsening acid reflux, nausea, and cough for the past 2 weeks since starting Januvia.  She reports frequent reflux with acidic taste in her throat and frequent throat pain. She also reports frequent episodes of chest pain.  She reports similar episodes in the past for which she had used over-the-counter Nexium but at this point she is taking no acid reflux medications.  She is interested in acid reflux treatment.  Review of Systems   Constitutional: Negative for appetite change, chills, fatigue and fever.   HENT: Negative for congestion, ear pain, hearing loss, postnasal drip, rhinorrhea, sinus pressure, sore throat and tinnitus.    Eyes: Negative for redness, itching and visual disturbance.   Respiratory: Positive for cough. Negative for chest tightness and shortness of breath.    Cardiovascular: Negative for chest pain and palpitations.   Gastrointestinal: Positive for abdominal pain (Reflux) and diarrhea. Negative for constipation, nausea and vomiting.   Genitourinary: Negative for decreased urine volume, difficulty urinating, dysuria, frequency, hematuria and urgency.   Musculoskeletal: Negative for back pain, myalgias, neck pain and neck stiffness.   Skin: Negative for rash.   Neurological: Negative for dizziness, light-headedness and headaches.   Psychiatric/Behavioral: Negative.        Objective:      Physical Exam   Constitutional: She is oriented to person, place, and time. She appears well-developed and well-nourished. No distress.   HENT:   Head: Normocephalic and atraumatic.   Right Ear: External ear normal.   Left Ear: External ear normal.   Nose: Nose normal.   Mouth/Throat: Oropharynx  is clear and moist. No oropharyngeal exudate.   Eyes: Conjunctivae and EOM are normal. Pupils are equal, round, and reactive to light. Right eye exhibits no discharge. Left eye exhibits no discharge. No scleral icterus.   Neck: Normal range of motion. Neck supple. No JVD present. No tracheal deviation present. No thyromegaly present.   Cardiovascular: Normal rate, regular rhythm, normal heart sounds and intact distal pulses. Exam reveals no gallop and no friction rub.   No murmur heard.  Pulmonary/Chest: Effort normal and breath sounds normal. No stridor. No respiratory distress. She has no wheezes. She has no rales.   Abdominal: Soft. Bowel sounds are normal. She exhibits no distension and no mass. There is tenderness in the epigastric area. There is no rebound and no guarding.   Musculoskeletal: Normal range of motion. She exhibits no edema or tenderness.   Lymphadenopathy:     She has no cervical adenopathy.   Neurological: She is alert and oriented to person, place, and time.   Skin: Skin is warm and dry. No rash noted. She is not diaphoretic. No erythema. No pallor.   Psychiatric: She has a normal mood and affect. Her behavior is normal. Judgment and thought content normal.   Nursing note and vitals reviewed.    EKG:  Sinus bradycardia ventricular rate 55 beats per minute.  Assessment:       1. Chest pain, unspecified type    2. Gastroesophageal reflux disease, esophagitis presence not specified        Plan:       1.  EKG now.  2.  Hold Januvia and discuss further treatment with Endocrinology.  3.  Start pantoprazole 40 mg daily.  4.  Return to clinic as needed if symptoms persist or worsen or follow-up with PCP as scheduled.

## 2018-10-29 ENCOUNTER — OFFICE VISIT (OUTPATIENT)
Dept: ORTHOPEDICS | Facility: CLINIC | Age: 63
End: 2018-10-29
Payer: MEDICARE

## 2018-10-29 VITALS — HEIGHT: 65 IN | WEIGHT: 163.94 LBS | BODY MASS INDEX: 27.31 KG/M2

## 2018-10-29 DIAGNOSIS — M17.11 PRIMARY OSTEOARTHRITIS OF RIGHT KNEE: Primary | ICD-10-CM

## 2018-10-29 PROCEDURE — 99213 OFFICE O/P EST LOW 20 MIN: CPT | Mod: S$PBB,,, | Performed by: ORTHOPAEDIC SURGERY

## 2018-10-29 PROCEDURE — 99213 OFFICE O/P EST LOW 20 MIN: CPT | Mod: PBBFAC | Performed by: ORTHOPAEDIC SURGERY

## 2018-10-29 PROCEDURE — 99999 PR PBB SHADOW E&M-EST. PATIENT-LVL III: CPT | Mod: PBBFAC,,, | Performed by: ORTHOPAEDIC SURGERY

## 2018-10-29 PROCEDURE — 3008F BODY MASS INDEX DOCD: CPT | Mod: CPTII,,, | Performed by: ORTHOPAEDIC SURGERY

## 2018-10-29 NOTE — PROGRESS NOTES
"Subjective:      Patient ID: Riana Kay is a 63 y.o. female.    Chief Complaint: Pain of the Left Knee and Pain of the Right Knee    HPI  Riana Kay has right knee pain.  The pain is unchanged. The pain is located in the global aspect of the knee.  There  is not radiation.  There is associated stiffness.   There is not catching and locking. The pain is described as achy. The pain is aggravated by activity.  It is alleviated by nothing consistently.  There ic not associated back pain.  Her history, medications and problem list were reviewed.    Review of Systems   Constitution: Negative for chills, fever and night sweats.   HENT: Negative for hearing loss.    Eyes: Negative for blurred vision and double vision.   Cardiovascular: Negative for chest pain, claudication and leg swelling.   Respiratory: Negative for shortness of breath.    Endocrine: Negative for polydipsia, polyphagia and polyuria.   Hematologic/Lymphatic: Negative for adenopathy and bleeding problem. Does not bruise/bleed easily.   Skin: Negative for poor wound healing.   Musculoskeletal: Positive for joint pain.   Gastrointestinal: Negative for diarrhea and heartburn.   Genitourinary: Negative for bladder incontinence.   Neurological: Negative for focal weakness, headaches, numbness, paresthesias and sensory change.   Psychiatric/Behavioral: The patient is not nervous/anxious.    Allergic/Immunologic: Negative for persistent infections.         Objective:      Body mass index is 27.28 kg/m².  Vitals:    10/29/18 1124   Weight: 74.3 kg (163 lb 14.6 oz)   Height: 5' 5" (1.651 m)           General    Constitutional: She is oriented to person, place, and time. She appears well-developed and well-nourished.   HENT:   Head: Normocephalic and atraumatic.   Eyes: EOM are normal.   Cardiovascular: Normal rate.    Pulmonary/Chest: Effort normal.   Neurological: She is alert and oriented to person, place, and time.   Psychiatric: She has a " normal mood and affect. Her behavior is normal.     General Musculoskeletal Exam   Gait: abnormal       Right Knee Exam     Inspection   Erythema: absent  Scars: absent  Swelling: absent  Effusion: absent  Deformity: present (varus)  Bruising: absent    Tenderness   The patient is tender to palpation of the medial joint line.    Crepitus   The patient has crepitus of the patella.    Range of Motion   Extension: 0   Flexion: 120     Tests   Ligament Examination Lachman: normal (-1 to 2mm)   MCL - Valgus: normal (0 to 2mm)  LCL - Varus: normal  Patella   Passive Patellar Tilt: neutral    Other   Sensation: normal    Left Knee Exam     Inspection   Erythema: absent  Scars: absent  Swelling: absent  Effusion: absent  Deformity: present (varus)  Bruising: absent    Tenderness   The patient tender to palpation of the medial joint line.    Range of Motion   Extension: 0   Flexion: 120     Tests   Stability Lachman: normal (-1 to 2mm)   MCL - Valgus: normal (0 to 2mm)  LCL - Varus: normal (0 to 2mm)  Patella   Passive Patellar Tilt: neutral    Other   Sensation: normal    Muscle Strength   Right Lower Extremity   Hip Abduction: 5/5   Quadriceps:  5/5   Hamstrin/5   Left Lower Extremity   Hip Abduction: 5/5   Quadriceps:  5/5   Hamstrin/5     Reflexes     Left Side  Quadriceps:  2+    Right Side   Quadriceps:  2+    Vascular Exam     Right Pulses  Dorsalis Pedis:      2+          Left Pulses  Dorsalis Pedis:      2+          Edema  Right Lower Leg: absent  Left Lower Leg: absent              Assessment:       Encounter Diagnosis   Name Primary?    Primary osteoarthritis of right knee Yes          Plan:       Riana was seen today for pain and pain.    Diagnoses and all orders for this visit:    Primary osteoarthritis of right knee  -     Ambulatory Referral to Physical/Occupational Therapy      Options discussed. She would like t o ry a course of PT. Jeff see her back in 4-6 weeks.  Will discuss surgery if no  better.      She has bilateral ankle pain and swelling and is requesting a referral to Dr. John

## 2018-10-30 ENCOUNTER — PATIENT MESSAGE (OUTPATIENT)
Dept: INTERNAL MEDICINE | Facility: CLINIC | Age: 63
End: 2018-10-30

## 2018-10-30 RX ORDER — LISINOPRIL 5 MG/1
5 TABLET ORAL DAILY
Qty: 90 TABLET | Refills: 3 | Status: SHIPPED | OUTPATIENT
Start: 2018-10-30 | End: 2018-12-03

## 2018-11-01 ENCOUNTER — PATIENT MESSAGE (OUTPATIENT)
Dept: INTERNAL MEDICINE | Facility: CLINIC | Age: 63
End: 2018-11-01

## 2018-11-02 ENCOUNTER — OFFICE VISIT (OUTPATIENT)
Dept: INTERNAL MEDICINE | Facility: CLINIC | Age: 63
End: 2018-11-02
Payer: MEDICARE

## 2018-11-02 VITALS
SYSTOLIC BLOOD PRESSURE: 138 MMHG | HEIGHT: 65 IN | BODY MASS INDEX: 27.4 KG/M2 | DIASTOLIC BLOOD PRESSURE: 70 MMHG | HEART RATE: 72 BPM | WEIGHT: 164.44 LBS | TEMPERATURE: 98 F

## 2018-11-02 DIAGNOSIS — R11.2 NAUSEA AND VOMITING, INTRACTABILITY OF VOMITING NOT SPECIFIED, UNSPECIFIED VOMITING TYPE: ICD-10-CM

## 2018-11-02 DIAGNOSIS — K29.70 GASTRITIS, PRESENCE OF BLEEDING UNSPECIFIED, UNSPECIFIED CHRONICITY, UNSPECIFIED GASTRITIS TYPE: ICD-10-CM

## 2018-11-02 DIAGNOSIS — R10.9 RIGHT FLANK PAIN: Primary | ICD-10-CM

## 2018-11-02 LAB
BACTERIA #/AREA URNS AUTO: ABNORMAL /HPF
BILIRUB SERPL-MCNC: NEGATIVE MG/DL
BILIRUB UR QL STRIP: NEGATIVE
BLOOD URINE, POC: ABNORMAL
CLARITY UR REFRACT.AUTO: CLEAR
COLOR UR AUTO: YELLOW
COLOR, POC UA: ABNORMAL
GLUCOSE UR QL STRIP: NEGATIVE
GLUCOSE UR QL STRIP: NORMAL
HGB UR QL STRIP: NEGATIVE
KETONES UR QL STRIP: NEGATIVE
KETONES UR QL STRIP: NEGATIVE
LEUKOCYTE ESTERASE UR QL STRIP: ABNORMAL
LEUKOCYTE ESTERASE URINE, POC: ABNORMAL
MICROSCOPIC COMMENT: ABNORMAL
NITRITE UR QL STRIP: NEGATIVE
NITRITE, POC UA: NEGATIVE
PH UR STRIP: 5 [PH] (ref 5–8)
PH, POC UA: 5
PROT UR QL STRIP: NEGATIVE
PROTEIN, POC: NEGATIVE
RBC #/AREA URNS AUTO: 1 /HPF (ref 0–4)
SP GR UR STRIP: 1.01 (ref 1–1.03)
SPECIFIC GRAVITY, POC UA: 1.02
SQUAMOUS #/AREA URNS AUTO: 7 /HPF
URN SPEC COLLECT METH UR: ABNORMAL
UROBILINOGEN, POC UA: NORMAL
WBC #/AREA URNS AUTO: 15 /HPF (ref 0–5)

## 2018-11-02 PROCEDURE — 81002 URINALYSIS NONAUTO W/O SCOPE: CPT | Mod: S$GLB,,, | Performed by: FAMILY MEDICINE

## 2018-11-02 PROCEDURE — 81001 URINALYSIS AUTO W/SCOPE: CPT

## 2018-11-02 PROCEDURE — 3008F BODY MASS INDEX DOCD: CPT | Mod: CPTII,S$GLB,, | Performed by: FAMILY MEDICINE

## 2018-11-02 PROCEDURE — 99999 PR PBB SHADOW E&M-EST. PATIENT-LVL IV: CPT | Mod: PBBFAC,,, | Performed by: FAMILY MEDICINE

## 2018-11-02 PROCEDURE — 87086 URINE CULTURE/COLONY COUNT: CPT

## 2018-11-02 PROCEDURE — 99214 OFFICE O/P EST MOD 30 MIN: CPT | Mod: PBBFAC,PO | Performed by: FAMILY MEDICINE

## 2018-11-02 PROCEDURE — 3078F DIAST BP <80 MM HG: CPT | Mod: CPTII,S$GLB,, | Performed by: FAMILY MEDICINE

## 2018-11-02 PROCEDURE — 99214 OFFICE O/P EST MOD 30 MIN: CPT | Mod: 25,S$GLB,, | Performed by: FAMILY MEDICINE

## 2018-11-02 PROCEDURE — 3075F SYST BP GE 130 - 139MM HG: CPT | Mod: CPTII,S$GLB,, | Performed by: FAMILY MEDICINE

## 2018-11-02 RX ORDER — TRAMADOL HYDROCHLORIDE 50 MG/1
50 TABLET ORAL 3 TIMES DAILY PRN
Qty: 21 TABLET | Refills: 0 | Status: SHIPPED | OUTPATIENT
Start: 2018-11-02 | End: 2018-11-09

## 2018-11-02 RX ORDER — ONDANSETRON 8 MG/1
8 TABLET, ORALLY DISINTEGRATING ORAL 3 TIMES DAILY PRN
Qty: 30 TABLET | Refills: 0 | Status: SHIPPED | OUTPATIENT
Start: 2018-11-02 | End: 2019-08-28 | Stop reason: SDUPTHER

## 2018-11-02 NOTE — PROGRESS NOTES
Subjective:       Patient ID: Riana Kay is a 63 y.o. female.    Chief Complaint: Back Pain; Headache; Abdominal Pain; and Dysuria    HPI 63-year-old female presents to clinic today accompanied by her  secondary to a complaint of frontal headaches for 1 week for which he has been using Advil daily with mild relief of symptoms.  She also complains of frequent nausea and abdominal pain which has slightly improved with the use of pantoprazole; however, she is concerned secondary to new onset right-sided flank pain with radiation into the right lower abdomen and pelvis since Wednesday.  She also notes acute onset nausea and vomiting this morning.  Review of Systems   Constitutional: Negative for appetite change, chills, fatigue and fever.   HENT: Negative for congestion, ear pain, hearing loss, postnasal drip, rhinorrhea, sinus pressure, sore throat and tinnitus.    Eyes: Negative for redness, itching and visual disturbance.   Respiratory: Negative for cough, chest tightness and shortness of breath.    Cardiovascular: Negative for chest pain and palpitations.   Gastrointestinal: Positive for abdominal pain, nausea and vomiting (Once). Negative for constipation and diarrhea.   Genitourinary: Negative for decreased urine volume, difficulty urinating, dysuria, frequency, hematuria, pelvic pain and urgency.   Musculoskeletal: Positive for back pain (right ). Negative for myalgias, neck pain and neck stiffness.   Skin: Negative for rash.   Neurological: Positive for weakness and headaches. Negative for dizziness, light-headedness and numbness.   Psychiatric/Behavioral: Negative.        Objective:      Physical Exam   Constitutional: She is oriented to person, place, and time. She appears well-developed and well-nourished. No distress.   HENT:   Head: Normocephalic and atraumatic.   Right Ear: External ear normal.   Left Ear: External ear normal.   Nose: Nose normal.   Mouth/Throat: Oropharynx is clear and  moist. No oropharyngeal exudate.   Eyes: Conjunctivae and EOM are normal. Pupils are equal, round, and reactive to light. Right eye exhibits no discharge. Left eye exhibits no discharge. No scleral icterus.   Neck: Normal range of motion. Neck supple. No JVD present. No tracheal deviation present. No thyromegaly present.   Cardiovascular: Normal rate, regular rhythm, normal heart sounds and intact distal pulses. Exam reveals no gallop and no friction rub.   No murmur heard.  Pulmonary/Chest: Effort normal and breath sounds normal. No stridor. No respiratory distress. She has no wheezes. She has no rales.   Abdominal: Soft. Bowel sounds are normal. She exhibits no distension and no mass. There is tenderness in the right upper quadrant and right lower quadrant. There is CVA tenderness (right \). There is no rebound and no guarding.   Musculoskeletal: Normal range of motion. She exhibits no edema or tenderness.   Lymphadenopathy:     She has no cervical adenopathy.   Neurological: She is alert and oriented to person, place, and time.   Skin: Skin is warm and dry. No rash noted. She is not diaphoretic. No erythema. No pallor.   Psychiatric: She has a normal mood and affect. Her behavior is normal. Judgment and thought content normal.   Nursing note and vitals reviewed.      Assessment:       1. Right flank pain    2. Gastritis, presence of bleeding unspecified, unspecified chronicity, unspecified gastritis type    3. Nausea and vomiting, intractability of vomiting not specified, unspecified vomiting type        Plan:       1.  Urine dip, urinalysis, and urine culture now.  2.  Recommend discontinuation of Advil secondary to suspected NSAID induced gastritis.  3.  Continue pantoprazole as prescribed.  4.  Zofran 8 mg ODT t.i.d. p.r.n. nausea or vomiting.  5.  Tramadol 50 mg p.o. t.i.d. p.r.n. pain.  6.  CT of the abdomen and pelvis.  7.  Return to clinic as needed if symptoms persist or worsen.

## 2018-11-04 LAB — BACTERIA UR CULT: NO GROWTH

## 2018-11-06 ENCOUNTER — HOSPITAL ENCOUNTER (OUTPATIENT)
Dept: RADIOLOGY | Facility: HOSPITAL | Age: 63
Discharge: HOME | End: 2018-11-06
Attending: FAMILY MEDICINE
Payer: MEDICARE

## 2018-11-06 DIAGNOSIS — R10.9 RIGHT FLANK PAIN: ICD-10-CM

## 2018-11-06 PROCEDURE — 74176 CT ABD & PELVIS W/O CONTRAST: CPT | Mod: TC

## 2018-11-06 PROCEDURE — 74176 CT ABD & PELVIS W/O CONTRAST: CPT | Mod: 26,,, | Performed by: RADIOLOGY

## 2018-11-07 ENCOUNTER — TELEPHONE (OUTPATIENT)
Dept: ENDOCRINOLOGY | Facility: CLINIC | Age: 63
End: 2018-11-07

## 2018-11-07 ENCOUNTER — PATIENT MESSAGE (OUTPATIENT)
Dept: DIABETES | Facility: CLINIC | Age: 63
End: 2018-11-07

## 2018-11-07 NOTE — TELEPHONE ENCOUNTER
----- Message from Catrina Aubrto RD sent at 11/7/2018  3:51 PM CST -----  Sorry for double messaging if you're getting this twice ! This patient is on my schedule tomorrow at 9am - only for 1 hour slot - and she is Salvadorean speaking and starting dexcom G5, so needs a 2, preferably 3, hour slot. I haven't been able to reach today. Will try again tomorrow morning, but it will be a hot mess if she and my 10am (who is also a dexcom start) show up near the same times.

## 2018-11-19 NOTE — PROGRESS NOTES
Subjective:       Patient ID: Riana Kay is a 63 y.o. female.    Chief Complaint: Follow-up    Patient is a 63 y.o.female who presents today for follow up. Annual is due in February. She notes some depression as her son recently moved to [place].    Cholesterol: reviewed  Vaccines: up to date; shingles ( today)  Mammogram: due this month  Gyn exam: hysterectomy  Colonoscopy: 2018; due in five years  DEXA: 2018    Review of Systems   Constitutional: Negative for appetite change, chills, diaphoresis, fatigue and fever.   HENT: Negative for congestion, dental problem, ear discharge, ear pain, hearing loss, postnasal drip, sinus pressure and sore throat.    Eyes: Negative for discharge, redness and itching.   Respiratory: Negative for cough, chest tightness, shortness of breath and wheezing.    Cardiovascular: Negative for chest pain, palpitations and leg swelling.   Gastrointestinal: Negative for abdominal pain, constipation, diarrhea, nausea and vomiting.   Endocrine: Negative for cold intolerance and heat intolerance.   Genitourinary: Negative for difficulty urinating, frequency, hematuria and urgency.   Musculoskeletal: Negative for arthralgias, back pain, gait problem, myalgias and neck pain.   Skin: Negative for color change and rash.   Neurological: Negative for dizziness, syncope and headaches.   Hematological: Negative for adenopathy.   Psychiatric/Behavioral: Negative for behavioral problems and sleep disturbance. The patient is not nervous/anxious.        Objective:      Physical Exam   Constitutional: She is oriented to person, place, and time. She appears well-developed and well-nourished. No distress.   HENT:   Head: Normocephalic and atraumatic.   Right Ear: External ear normal.   Left Ear: External ear normal.   Nose: Nose normal.   Mouth/Throat: Oropharynx is clear and moist. No oropharyngeal exudate.   Eyes: Conjunctivae and EOM are normal. Pupils are equal, round, and reactive to light.  Right eye exhibits no discharge. Left eye exhibits no discharge. No scleral icterus.   Neck: Normal range of motion. Neck supple. No JVD present. No thyromegaly present.   Cardiovascular: Normal rate, regular rhythm, normal heart sounds and intact distal pulses. Exam reveals no gallop and no friction rub.   No murmur heard.  Pulmonary/Chest: Effort normal and breath sounds normal. No stridor. No respiratory distress. She has no wheezes. She has no rales. She exhibits no tenderness.   Abdominal: Soft. Bowel sounds are normal. She exhibits no distension. There is no tenderness. There is no rebound.   Musculoskeletal: Normal range of motion. She exhibits no edema or tenderness.   Lymphadenopathy:     She has no cervical adenopathy.   Neurological: She is alert and oriented to person, place, and time. No cranial nerve deficit.   Skin: Skin is warm and dry. No rash noted. She is not diaphoretic. No erythema.   Psychiatric: She has a normal mood and affect. Her behavior is normal.   Nursing note and vitals reviewed.      Assessment and Plan:       1. Essential hypertension  - stop lisinopril; start losartan    2. Type 2 diabetes mellitus with other specified complication, with long-term current use of insulin  - continue current meds  - discussed exercise  - Ambulatory Referral to Nutrition Services    3. Visit for screening mammogram    - Mammo Digital Screening Bilat; Future    4. Depression: trial of wellbutrin; f/u in one month          No Follow-up on file.

## 2018-11-23 ENCOUNTER — PROCEDURE VISIT (OUTPATIENT)
Dept: NEUROLOGY | Facility: CLINIC | Age: 63
End: 2018-11-23
Payer: MEDICARE

## 2018-11-23 DIAGNOSIS — M54.10 RADICULOPATHY OF ARM: ICD-10-CM

## 2018-11-23 PROCEDURE — 95886 MUSC TEST DONE W/N TEST COMP: CPT | Mod: S$GLB,,, | Performed by: NEUROMUSCULOSKELETAL MEDICINE & OMM

## 2018-11-23 PROCEDURE — 95911 NRV CNDJ TEST 9-10 STUDIES: CPT | Mod: S$GLB,,, | Performed by: NEUROMUSCULOSKELETAL MEDICINE & OMM

## 2018-11-27 ENCOUNTER — OFFICE VISIT (OUTPATIENT)
Dept: ORTHOPEDICS | Facility: CLINIC | Age: 63
End: 2018-11-27
Payer: MEDICARE

## 2018-11-27 VITALS
HEART RATE: 62 BPM | SYSTOLIC BLOOD PRESSURE: 148 MMHG | HEIGHT: 65 IN | BODY MASS INDEX: 27.32 KG/M2 | WEIGHT: 164 LBS | DIASTOLIC BLOOD PRESSURE: 82 MMHG

## 2018-11-27 DIAGNOSIS — M25.521 RIGHT ELBOW PAIN: Primary | ICD-10-CM

## 2018-11-27 PROCEDURE — 99999 PR PBB SHADOW E&M-EST. PATIENT-LVL IV: CPT | Mod: PBBFAC,,, | Performed by: ORTHOPAEDIC SURGERY

## 2018-11-27 PROCEDURE — 3077F SYST BP >= 140 MM HG: CPT | Mod: CPTII,S$GLB,, | Performed by: ORTHOPAEDIC SURGERY

## 2018-11-27 PROCEDURE — 3079F DIAST BP 80-89 MM HG: CPT | Mod: CPTII,S$GLB,, | Performed by: ORTHOPAEDIC SURGERY

## 2018-11-27 PROCEDURE — 99214 OFFICE O/P EST MOD 30 MIN: CPT | Mod: S$GLB,,, | Performed by: ORTHOPAEDIC SURGERY

## 2018-11-27 PROCEDURE — 3008F BODY MASS INDEX DOCD: CPT | Mod: CPTII,S$GLB,, | Performed by: ORTHOPAEDIC SURGERY

## 2018-11-28 ENCOUNTER — OFFICE VISIT (OUTPATIENT)
Dept: INTERNAL MEDICINE | Facility: CLINIC | Age: 63
End: 2018-11-28
Payer: MEDICARE

## 2018-11-28 ENCOUNTER — LAB VISIT (OUTPATIENT)
Dept: LAB | Facility: HOSPITAL | Age: 63
End: 2018-11-28
Attending: INTERNAL MEDICINE
Payer: MEDICARE

## 2018-11-28 VITALS
WEIGHT: 164.44 LBS | TEMPERATURE: 98 F | DIASTOLIC BLOOD PRESSURE: 70 MMHG | HEIGHT: 65 IN | SYSTOLIC BLOOD PRESSURE: 136 MMHG | HEART RATE: 76 BPM | BODY MASS INDEX: 27.4 KG/M2

## 2018-11-28 DIAGNOSIS — N30.00 ACUTE CYSTITIS WITHOUT HEMATURIA: Primary | ICD-10-CM

## 2018-11-28 DIAGNOSIS — E11.9 TYPE 2 DIABETES MELLITUS WITHOUT COMPLICATION: ICD-10-CM

## 2018-11-28 LAB
CHOLEST SERPL-MCNC: 168 MG/DL
CHOLEST/HDLC SERPL: 3.3 {RATIO}
ESTIMATED AVG GLUCOSE: 166 MG/DL
HBA1C MFR BLD HPLC: 7.4 %
HDLC SERPL-MCNC: 51 MG/DL
HDLC SERPL: 30.4 %
LDLC SERPL CALC-MCNC: 87.8 MG/DL
NONHDLC SERPL-MCNC: 117 MG/DL
TRIGL SERPL-MCNC: 146 MG/DL

## 2018-11-28 PROCEDURE — 3075F SYST BP GE 130 - 139MM HG: CPT | Mod: CPTII,S$GLB,, | Performed by: INTERNAL MEDICINE

## 2018-11-28 PROCEDURE — 3078F DIAST BP <80 MM HG: CPT | Mod: CPTII,S$GLB,, | Performed by: INTERNAL MEDICINE

## 2018-11-28 PROCEDURE — 36415 COLL VENOUS BLD VENIPUNCTURE: CPT | Mod: PO

## 2018-11-28 PROCEDURE — 80061 LIPID PANEL: CPT

## 2018-11-28 PROCEDURE — 99213 OFFICE O/P EST LOW 20 MIN: CPT | Mod: 25,S$GLB,, | Performed by: INTERNAL MEDICINE

## 2018-11-28 PROCEDURE — 90686 IIV4 VACC NO PRSV 0.5 ML IM: CPT | Mod: S$GLB,,, | Performed by: INTERNAL MEDICINE

## 2018-11-28 PROCEDURE — G0008 ADMIN INFLUENZA VIRUS VAC: HCPCS | Mod: S$GLB,,, | Performed by: INTERNAL MEDICINE

## 2018-11-28 PROCEDURE — 3008F BODY MASS INDEX DOCD: CPT | Mod: CPTII,S$GLB,, | Performed by: INTERNAL MEDICINE

## 2018-11-28 PROCEDURE — 99999 PR PBB SHADOW E&M-EST. PATIENT-LVL IV: CPT | Mod: PBBFAC,,, | Performed by: INTERNAL MEDICINE

## 2018-11-28 PROCEDURE — 83036 HEMOGLOBIN GLYCOSYLATED A1C: CPT

## 2018-11-28 RX ORDER — CIPROFLOXACIN 500 MG/1
500 TABLET ORAL EVERY 12 HOURS
Qty: 6 TABLET | Refills: 0 | Status: SHIPPED | OUTPATIENT
Start: 2018-11-28 | End: 2018-12-01

## 2018-11-28 RX ORDER — LANCETS
EACH MISCELLANEOUS
COMMUNITY
Start: 2015-08-18 | End: 2019-10-03

## 2018-11-28 RX ORDER — PHENAZOPYRIDINE HYDROCHLORIDE 200 MG/1
200 TABLET, FILM COATED ORAL 3 TIMES DAILY PRN
Qty: 12 TABLET | Refills: 0 | Status: SHIPPED | OUTPATIENT
Start: 2018-11-28 | End: 2018-12-08

## 2018-11-28 RX ORDER — INSULIN GLARGINE 100 [IU]/ML
INJECTION, SOLUTION SUBCUTANEOUS
COMMUNITY
Start: 2015-11-02 | End: 2018-12-13

## 2018-11-28 RX ORDER — BLOOD SUGAR DIAGNOSTIC
STRIP MISCELLANEOUS 4 TIMES DAILY
COMMUNITY
Start: 2015-08-23 | End: 2019-10-03

## 2018-11-28 RX ORDER — BLOOD SUGAR DIAGNOSTIC
STRIP MISCELLANEOUS
COMMUNITY
Start: 2015-09-02 | End: 2019-10-03 | Stop reason: SDUPTHER

## 2018-11-28 NOTE — PROGRESS NOTES
Subjective:       Patient ID: Riana Kay is a 63 y.o. female.    Chief Complaint: Urinary Tract Infection and Vaginal Itching    HPI     63-year-old female here for evaluation of possible urinary tract infection and vaginal itching.  She has burning and itching when she urinates.  She has lower abdominal/pelvic pain.  This has been going on the last 4 days.  No fevers or chills.  She had improvement from when she saw Dr. Rojas.  She did not get antibiotics for the last episode.    Review of Systems    Objective:      Physical Exam   Constitutional: She is oriented to person, place, and time. She appears well-developed and well-nourished.   HENT:   Head: Normocephalic and atraumatic.   Mouth/Throat: No oropharyngeal exudate.   Eyes: EOM are normal. Pupils are equal, round, and reactive to light. Right eye exhibits no discharge. Left eye exhibits no discharge. No scleral icterus.   Neck: Normal range of motion. Neck supple. No tracheal deviation present. No thyromegaly present.   Cardiovascular: Normal rate, regular rhythm and normal heart sounds. Exam reveals no gallop and no friction rub.   No murmur heard.  Pulmonary/Chest: Effort normal and breath sounds normal. No respiratory distress. She has no wheezes. She has no rales. She exhibits no tenderness.   Abdominal: Soft. Bowel sounds are normal. She exhibits no distension and no mass. There is tenderness in the suprapubic area. There is no rebound and no guarding.   Musculoskeletal: Normal range of motion. She exhibits no edema or tenderness.   Neurological: She is alert and oriented to person, place, and time.   Skin: Skin is warm and dry. No rash noted. No erythema. No pallor.   Psychiatric: She has a normal mood and affect. Her behavior is normal.   Vitals reviewed.      Assessment:       1. Acute cystitis without hematuria        Plan:       1.  Check urinalysis, urine culture.  Cipro 500 mg b.i.d. x3 days, Pyridium prescribed.

## 2018-12-02 ENCOUNTER — PATIENT MESSAGE (OUTPATIENT)
Dept: INTERNAL MEDICINE | Facility: CLINIC | Age: 63
End: 2018-12-02

## 2018-12-03 ENCOUNTER — OFFICE VISIT (OUTPATIENT)
Dept: INTERNAL MEDICINE | Facility: CLINIC | Age: 63
End: 2018-12-03
Payer: MEDICARE

## 2018-12-03 VITALS
TEMPERATURE: 98 F | HEART RATE: 73 BPM | BODY MASS INDEX: 31.72 KG/M2 | HEIGHT: 61 IN | SYSTOLIC BLOOD PRESSURE: 136 MMHG | DIASTOLIC BLOOD PRESSURE: 86 MMHG | WEIGHT: 168 LBS | RESPIRATION RATE: 16 BRPM

## 2018-12-03 DIAGNOSIS — F32.A DEPRESSION, UNSPECIFIED DEPRESSION TYPE: ICD-10-CM

## 2018-12-03 DIAGNOSIS — Z12.31 VISIT FOR SCREENING MAMMOGRAM: ICD-10-CM

## 2018-12-03 DIAGNOSIS — I10 ESSENTIAL HYPERTENSION: Primary | ICD-10-CM

## 2018-12-03 DIAGNOSIS — E11.69 TYPE 2 DIABETES MELLITUS WITH OTHER SPECIFIED COMPLICATION, WITH LONG-TERM CURRENT USE OF INSULIN: ICD-10-CM

## 2018-12-03 DIAGNOSIS — Z79.4 TYPE 2 DIABETES MELLITUS WITH OTHER SPECIFIED COMPLICATION, WITH LONG-TERM CURRENT USE OF INSULIN: ICD-10-CM

## 2018-12-03 PROCEDURE — 99214 OFFICE O/P EST MOD 30 MIN: CPT | Mod: S$GLB,,, | Performed by: INTERNAL MEDICINE

## 2018-12-03 PROCEDURE — 3008F BODY MASS INDEX DOCD: CPT | Mod: CPTII,S$GLB,, | Performed by: INTERNAL MEDICINE

## 2018-12-03 PROCEDURE — 3079F DIAST BP 80-89 MM HG: CPT | Mod: CPTII,S$GLB,, | Performed by: INTERNAL MEDICINE

## 2018-12-03 PROCEDURE — 3075F SYST BP GE 130 - 139MM HG: CPT | Mod: CPTII,S$GLB,, | Performed by: INTERNAL MEDICINE

## 2018-12-03 PROCEDURE — 3045F PR MOST RECENT HEMOGLOBIN A1C LEVEL 7.0-9.0%: CPT | Mod: CPTII,S$GLB,, | Performed by: INTERNAL MEDICINE

## 2018-12-03 PROCEDURE — 99999 PR PBB SHADOW E&M-EST. PATIENT-LVL IV: CPT | Mod: PBBFAC,,, | Performed by: INTERNAL MEDICINE

## 2018-12-03 RX ORDER — BUPROPION HYDROCHLORIDE 150 MG/1
150 TABLET ORAL DAILY
Qty: 30 TABLET | Refills: 11 | Status: SHIPPED | OUTPATIENT
Start: 2018-12-03 | End: 2019-07-03

## 2018-12-03 RX ORDER — LOSARTAN POTASSIUM 25 MG/1
12.5 TABLET ORAL DAILY
Qty: 45 TABLET | Refills: 3 | Status: SHIPPED | OUTPATIENT
Start: 2018-12-03 | End: 2019-04-26

## 2018-12-06 ENCOUNTER — CLINICAL SUPPORT (OUTPATIENT)
Dept: REHABILITATION | Facility: HOSPITAL | Age: 63
End: 2018-12-06
Payer: MEDICARE

## 2018-12-06 DIAGNOSIS — M25.561 CHRONIC PAIN OF RIGHT KNEE: ICD-10-CM

## 2018-12-06 DIAGNOSIS — G89.29 CHRONIC PAIN OF RIGHT KNEE: ICD-10-CM

## 2018-12-06 PROCEDURE — 97110 THERAPEUTIC EXERCISES: CPT | Mod: PO

## 2018-12-06 PROCEDURE — G8981 BODY POS CURRENT STATUS: HCPCS | Mod: CK,PO

## 2018-12-06 PROCEDURE — 97161 PT EVAL LOW COMPLEX 20 MIN: CPT | Mod: PO

## 2018-12-06 PROCEDURE — G8982 BODY POS GOAL STATUS: HCPCS | Mod: CK,PO

## 2018-12-06 NOTE — PLAN OF CARE
[facility]  Physical Therapy Initial Evaluation    Name: Riana Kay  Clinic Number: [id number]    Therapy Diagnosis:   Encounter Diagnosis   Name Primary?    Chronic pain of right knee      Physician: Christiana Cole MD    Physician Orders: PT Eval and Treat   Medical Diagnosis: M17.11 (ICD-10-CM) - Primary osteoarthritis of right knee  Evaluation Date: 12/6/2018  Authorization period Expiration: 10/29/2019  Plan of Care Certification Period: 1/31/2019    Visit #: 1/ Visits authorized: 1  Time In:  0902  Time Out: 0955  Total Billable Time: 53 minutes    Precautions: Standard  Subjective   Date of onset: 1-2 years  History of current condition - Riana reports chronic bilateral knee pain R>L for the last 1-2 years.  Patient reported that she the pain was of an insidious onset.  Patient reported that she thinks that she has had ~3 steroid injections that help a little bit.  Patient reports that she takes OTC medications for the knee pain that help a little bit.         Past Medical History:   Diagnosis Date    Anxiety     AR (allergic rhinitis)     Diabetes mellitus, type 2     Dizziness     DM (diabetes mellitus)     Fatty liver     GERD (gastroesophageal reflux disease)     HTN (hypertension)     Hyperlipidemia     Memory loss     Osteopenia     S/P total hysterectomy     Sleep apnea      Riana Kay  has a past surgical history that includes Cholecystectomy; Knee arthroscopy w/ debridement (4/11); Total abdominal hysterectomy w/ bilateral salpingoophorectomy; Tonsillectomy; Rotator cuff repair; Elbow surgery (Right, 7/16/15); Elbow surgery; Hysterectomy; COLONOSCOPY with Jacobo (N/A, 1/17/2018); INJECTION-STEROID-EPIDURAL-CERVICAL (N/A, 3/16/2015); EGD (ESOPHAGOGASTRODUODENOSCOPY) (N/A, 2/28/2014); and COLONOSCOPY (N/A, 10/4/2013).    Riana has a current medication list which includes the following prescription(s): azelastine, blood sugar  diagnostic, blood-glucose meter, blood-glucose meter,continuous, blood-glucose sensor, blood-glucose transmitter, bupropion, canagliflozin, cyanocobalamin, insulin aspart u-100, insulin detemir u-100, insulin glargine, pen needle, diabetic, lancets, losartan, ondansetron, pantoprazole, pen needle, diabetic, phenazopyridine, pyridoxine (vitamin b6), and sitagliptin, and the following Facility-Administered Medications: sodium hyaluronate (euflexxa).    Review of patient's allergies indicates:   Allergen Reactions    Iodinated contrast- oral and iv dye      Other reaction(s): Swelling  Other reaction(s): Rash    Macrobid  [nitrofurantoin monohyd/m-cryst]      Other reaction(s): Rash    Metformin      Other reaction(s): Rash    Penicillins      Other reaction(s): Rash    Promethazine      Other reaction(s): rash  Other reaction(s): Unknown    Sulfa (sulfonamide antibiotics)      Other reaction(s): Rash    Sulfamethoxazole-trimethoprim      Other reaction(s): Rash        Imaging, x-ray: see results in EMR    Prior Therapy: PT for L knee previously   Social History: Patient lives with their spouse in  1 story home with no stairs to enter.  Patient has a shower stall present in the bathroom.   Occupation: Disability due to R elbow injury  Prior Level of Function: Chronic condition that has been interfering with ADLs for 1-2 years.  Current Level of Function:  Limited tolerance for walking 5 minutes, step to gait pattern for stairs with more difficulty going down the stairs, unable to kneel, difficulty with dressing lower body (stockings/socks), minimal difficulty with going from sit to stand, intermittent difficulty with car transfers, difficulty with squatting, difficulty with heavy household activities, intermittent sleep disturbances, pain with prolonged standing    Pain:  Current 0/10, worst 7/10, best 0/10   Location: knee  bilateral  Description: Aching and Burning  Aggravating Factors: Standing, Walking,  Getting out of bed/chair and stairs  Easing Factors: pain medication and ice    Pts goals: Patient wants to be able to walk for longer periods of time.       Objective     Observation: Unremarkable    Posture: Forward head, rounded shoulders       Range of Motion:   Knee Left active Left Passive Right Active R passive   Flexion 0  n/a 110 124 p!   Extension 2 0 p! 128 132 p!         Lower Extremity Strength  Right LE  Left LE    Knee extension: 4-/5 Knee extension: 4/5    Knee flexion: 4-/5 p! Knee flexion: 4/5 p!   Hip flexion: 3+/5 p! Hip flexion: 4-/5 p!   Hip extension:  4-/5 Hip extension: 4-/5   Hip abduction: 4-/5 p! Hip abduction: 4/5 p!   Hip adduction: 3+/5 p! Hip adduction 3+/5 p!       Function:    - SLS R: fair with pain  - SLS L: good with pain  - Squat: DNT   - Sit <--> Stand: fair  - Bed Mobility: fair  -stair negotiation:        Joint Mobility: Decreased patellar mobility noted in all directions bilaterally with crepitus also present.  Pain in all directions on the R, pain with med/lat on the L     Palpation: TTP along anterior medial joint line, patellar tendon, and tibial tuberosity of the R knee, TTP along anteromedial joint line of L knee    Sensation: WFL    Gait Analysis:  Antalgic, decreased stance time on RLE    Flexibility: decreased hamstring and piriformis flexibility noted bilaterally    Edema:     Girth Measurement Joint line 5 cm below 10 cm above   Left 39 cm 34 cm 44cm   Right 39 cm 34 cm 44 cm           CMS Impairment/Limitation/Restriction for FOTO Intake Survey    Therapist reviewed FOTO scores for Riana Samuel Kay on 12/6/2018.   FOTO documents entered into [employer] - see Media section.    Limitation Score: 59%  Category: Body Position    Current : CK = at least 40% but < 60% impaired, limited or restricted  Goal: CK = at least 40% but < 60% impaired, limited or restricted     PT Evaluation Completed? Yes  Discussed Plan of Care with patient: Yes    TREATMENT   Treatment Time  "In: 0902  Treatment Time Out: 0955  Total Treatment time separate from Evaluation time:  26    Riana received therapeutic exercises to develop strength, endurance, ROM and flexibility for 26 minutes including:    Hamstring stretch with strap 3 x 30"  Prone quad stretch with strap 3 x 30"  Slantboard calf stretch 3 x 30"  SAQ 3 x 10  Bridges 3 x 10  Clamshells 2 x 10    Home Exercises and Patient Education Provided    Education provided re:   - progress towards goals   - role of therapy in multi - disciplinary team, goals for therapy  No spiritual or educational barriers to learning provided    Written Home Exercises Provided: see patient instructions.  Exercises were reviewed and Riana was able to demonstrate them prior to the end of the session.   Pt received a written copy of exercises to perform at home. Riana demonstrated good  understanding of the education provided.       Assessment     Riana is a 63 y.o. female referred to outpatient Physical Therapy with a medical diagnosis of primary osteoarthritis of right knee. Pt presents with limited R knee ROM, weakness of the RLE, and abnormal gait pattern.  Functionally, Riana has difficulty with stair negotiation, prolonged walking, dressing the lower body, standing, and squatting.    Pt prognosis is Good.   Pt will benefit from skilled outpatient Physical Therapy to address the deficits stated above and in the chart below, provide pt/family education, and to maximize pt's level of independence.     Plan of care discussed with patient: Yes  Pt's spiritual, cultural and educational needs considered and pt agreeable to plan of care and goals as stated below:     Anticipated Barriers for therapy: none    Medical Necessity is demonstrated by the following  History  Co-morbidities and personal factors that may impact the plan of care Examination  Body Structures and Functions, activity limitations and participation restrictions that may impact the plan of " care    Clinical Presentation   Co-morbidities:   anxiety, diabetes and HTN        Personal Factors:   no deficits Body Regions:   lower extremities    Body Systems:    gross symmetry  ROM  strength  balance  gait  transfers            Participation Restrictions:   none   Activity limitations:   Learning and applying knowledge  no deficits    General Tasks and Commands  no deficits    Communication  no deficits    Mobility  walking    Self care  dressing    Domestic Life  doing house work (cleaning house, washing dishes, laundry)    Interactions/Relationships  no deficits    Life Areas  no deficits    Community and Social Life  community life  recreation and leisure         stable and uncomplicated                      low   low  low Decision Making/ Complexity Score:  low         GOALS:   Short Term Goals:  4 weeks  1.  Raina will be independent with HEP.  2. Riana will report 3/10 pain at worst with prolonged standing.  3. Riana will demonstrate increased MMT to 4/5  to increase tolerance for ADL and stair negotiation.  4. Riana will be able to walk for 10-15 minutes without pain.    Long Term Goals: 8 weeks  1.Riana will report 1/10 pain at worst with prolonged standing.  2.Riana will be able to walk for 20-25 minutes without pain.  3.Riana will demonstrate increased MMT to 4+/5  to increase tolerance for ADL and squatting activities.  4. Riana will report at CJ level (20<40% impaired) on FOTO ankle score  to demonstrate increase in LE function.       Plan     Certification Period: 12/6/2018 to 1/31/2019.    Outpatient Physical Therapy 2 times weekly for 8 weeks to include the following interventions: Electrical Stimulation TENS, Gait Training, Manual Therapy, Moist Heat/ Ice, Neuromuscular Re-ed, Patient Education, Therapeutic Activites and Therapeutic Exercise.     Tran Paige, PT, DPT

## 2018-12-11 ENCOUNTER — HOSPITAL ENCOUNTER (OUTPATIENT)
Dept: RADIOLOGY | Facility: HOSPITAL | Age: 63
Discharge: HOME | End: 2018-12-11
Attending: INTERNAL MEDICINE
Payer: MEDICARE

## 2018-12-11 DIAGNOSIS — Z12.31 VISIT FOR SCREENING MAMMOGRAM: ICD-10-CM

## 2018-12-11 PROCEDURE — 77067 SCR MAMMO BI INCL CAD: CPT | Mod: 26,,, | Performed by: RADIOLOGY

## 2018-12-11 PROCEDURE — 77063 BREAST TOMOSYNTHESIS BI: CPT | Mod: TC,PO

## 2018-12-11 PROCEDURE — 77063 BREAST TOMOSYNTHESIS BI: CPT | Mod: 26,,, | Performed by: RADIOLOGY

## 2018-12-13 ENCOUNTER — HOSPITAL ENCOUNTER (OUTPATIENT)
Dept: RADIOLOGY | Facility: HOSPITAL | Age: 63
Discharge: HOME | End: 2018-12-13
Attending: ORTHOPAEDIC SURGERY
Payer: MEDICARE

## 2018-12-13 ENCOUNTER — OFFICE VISIT (OUTPATIENT)
Dept: ORTHOPEDICS | Facility: CLINIC | Age: 63
End: 2018-12-13
Payer: MEDICARE

## 2018-12-13 VITALS
SYSTOLIC BLOOD PRESSURE: 144 MMHG | WEIGHT: 163 LBS | HEART RATE: 60 BPM | DIASTOLIC BLOOD PRESSURE: 70 MMHG | HEIGHT: 66 IN | BODY MASS INDEX: 26.2 KG/M2

## 2018-12-13 DIAGNOSIS — M25.572 BILATERAL ANKLE PAIN, UNSPECIFIED CHRONICITY: Primary | ICD-10-CM

## 2018-12-13 DIAGNOSIS — M79.671 BILATERAL FOOT PAIN: ICD-10-CM

## 2018-12-13 DIAGNOSIS — M25.571 BILATERAL ANKLE PAIN, UNSPECIFIED CHRONICITY: Primary | ICD-10-CM

## 2018-12-13 DIAGNOSIS — M79.672 BILATERAL FOOT PAIN: ICD-10-CM

## 2018-12-13 DIAGNOSIS — M25.571 BILATERAL ANKLE PAIN, UNSPECIFIED CHRONICITY: ICD-10-CM

## 2018-12-13 DIAGNOSIS — Z79.4 TYPE 2 DIABETES MELLITUS WITH DIABETIC NEUROPATHY, WITH LONG-TERM CURRENT USE OF INSULIN: ICD-10-CM

## 2018-12-13 DIAGNOSIS — E11.40 TYPE 2 DIABETES MELLITUS WITH DIABETIC NEUROPATHY, WITH LONG-TERM CURRENT USE OF INSULIN: ICD-10-CM

## 2018-12-13 DIAGNOSIS — M25.572 BILATERAL ANKLE PAIN, UNSPECIFIED CHRONICITY: ICD-10-CM

## 2018-12-13 PROCEDURE — 73610 X-RAY EXAM OF ANKLE: CPT | Mod: 26,RT,, | Performed by: RADIOLOGY

## 2018-12-13 PROCEDURE — 99214 OFFICE O/P EST MOD 30 MIN: CPT | Mod: S$GLB,,, | Performed by: ORTHOPAEDIC SURGERY

## 2018-12-13 PROCEDURE — 73630 X-RAY EXAM OF FOOT: CPT | Mod: 26,RT,, | Performed by: RADIOLOGY

## 2018-12-13 PROCEDURE — 3008F BODY MASS INDEX DOCD: CPT | Mod: CPTII,S$GLB,, | Performed by: ORTHOPAEDIC SURGERY

## 2018-12-13 PROCEDURE — 3078F DIAST BP <80 MM HG: CPT | Mod: CPTII,S$GLB,, | Performed by: ORTHOPAEDIC SURGERY

## 2018-12-13 PROCEDURE — 99999 PR PBB SHADOW E&M-EST. PATIENT-LVL III: CPT | Mod: PBBFAC,,, | Performed by: ORTHOPAEDIC SURGERY

## 2018-12-13 PROCEDURE — 73630 X-RAY EXAM OF FOOT: CPT | Mod: 50,TC

## 2018-12-13 PROCEDURE — 99499 UNLISTED E&M SERVICE: CPT | Mod: S$GLB,,, | Performed by: ORTHOPAEDIC SURGERY

## 2018-12-13 PROCEDURE — 3077F SYST BP >= 140 MM HG: CPT | Mod: CPTII,S$GLB,, | Performed by: ORTHOPAEDIC SURGERY

## 2018-12-13 PROCEDURE — 73610 X-RAY EXAM OF ANKLE: CPT | Mod: 50,TC

## 2018-12-13 PROCEDURE — 3045F PR MOST RECENT HEMOGLOBIN A1C LEVEL 7.0-9.0%: CPT | Mod: CPTII,S$GLB,, | Performed by: ORTHOPAEDIC SURGERY

## 2018-12-13 PROCEDURE — 73630 X-RAY EXAM OF FOOT: CPT | Mod: 26,LT,, | Performed by: RADIOLOGY

## 2018-12-13 PROCEDURE — 73610 X-RAY EXAM OF ANKLE: CPT | Mod: 26,LT,, | Performed by: RADIOLOGY

## 2018-12-13 RX ORDER — GABAPENTIN 300 MG/1
300 CAPSULE ORAL 3 TIMES DAILY
Qty: 90 CAPSULE | Refills: 11 | Status: SHIPPED | OUTPATIENT
Start: 2018-12-13 | End: 2019-04-26

## 2018-12-13 NOTE — PROGRESS NOTES
DATE: 12/13/2018  PATIENT: Riana Kay    CHIEF COMPLAINT: B ankle pain    HPI:  63F with h/o IDDM (A1C 7.4) presents for evaluation of L>R ankle pain.  Located globally around ankle.  3-4 years duration.  Reports falling into uncovered .  Described as pinching.  8/10 severity.  No change in severity since onset.  Worsened by nothing.  Improved by nothing.  No history of similar symptoms.  Mild associated paresthesias dorsum of feet.  Prior treatment has included tylenol, custom orthotics.    PAST MEDICAL/SURGICAL HISTORY:  Past Medical History:   Diagnosis Date    Anxiety     AR (allergic rhinitis)     Diabetes mellitus, type 2     Dizziness     DM (diabetes mellitus)     Fatty liver     GERD (gastroesophageal reflux disease)     HTN (hypertension)     Hyperlipidemia     Memory loss     Osteopenia     S/P total hysterectomy     Sleep apnea      Past Surgical History:   Procedure Laterality Date    CHOLECYSTECTOMY      COLONOSCOPY N/A 1/17/2018    Procedure: COLONOSCOPY with Donnell;  Surgeon: Roland Jacobo MD;  [facility]  Service: Endoscopy;  Laterality: N/A;    COLONOSCOPY N/A 10/4/2013    Performed by Anurag Carl MD at [facility] (4TH FLR)    COLONOSCOPY with Jacobo N/A 1/17/2018    Performed by Roland Jacobo MD at [facility] (4TH FLR)    EGD (ESOPHAGOGASTRODUODENOSCOPY) N/A 2/28/2014    Performed by Anurag Carl MD at [facility] (4TH FLR)    ELBOW SURGERY Right 7/16/15    ELBOW SURGERY      HYSTERECTOMY      INJECTION-STEROID-EPIDURAL-CERVICAL N/A 3/16/2015    Performed by [facility] CATH LAB    KNEE ARTHROSCOPY W/ DEBRIDEMENT  4/11    Left    ROTATOR CUFF REPAIR      TONSILLECTOMY      TOTAL ABDOMINAL HYSTERECTOMY W/ BILATERAL SALPINGOOPHORECTOMY         Family History:   Family History   Problem Relation Age of Onset    Colon cancer Father 83        colon cancer    Diabetes Maternal Grandmother     Diabetes  Maternal Aunt        Social History:   Social History     Socioeconomic History    Marital status:      Spouse name: Not on file    Number of children: Not on file    Years of education: Not on file    Highest education level: Not on file   Social Needs    Financial resource strain: Not on file    Food insecurity - worry: Not on file    Food insecurity - inability: Not on file    Transportation needs - medical: Not on file    Transportation needs - non-medical: Not on file   Occupational History    Not on file   Tobacco Use    Smoking status: Never Smoker    Smokeless tobacco: Never Used   Substance and Sexual Activity    Alcohol use: No    Drug use: No    Sexual activity: Yes     Partners: Male     Birth control/protection: Post-menopausal   Other Topics Concern    Not on file   Social History Narrative    She works at [employer] [place]; , 1 kid (21yo).Nonsmoker, social etoh. No exercise but hopes to start walking on the weekend.       Current Medications:   Current Outpatient Medications:     azelastine (ASTELIN) 137 mcg (0.1 %) nasal spray, 1 spray (137 mcg total) by Nasal route 2 (two) times daily., Disp: 30 mL, Rfl: 6    blood sugar diagnostic (ACCU-CHEK SMARTVIEW TEST STRIP) Strp, , Disp: , Rfl:     blood-glucose meter kit, Use as instructed, preferred meter. DX Code. E11.42, Disp: 1 each, Rfl: 0    buPROPion (WELLBUTRIN XL) 150 MG TB24 tablet, Take 1 tablet (150 mg total) by mouth once daily., Disp: 30 tablet, Rfl: 11    cyanocobalamin (VITAMIN B-12) 100 MCG tablet, Take 100 mcg by mouth once daily., Disp: , Rfl:     insulin aspart U-100 (NOVOLOG FLEXPEN U-100 INSULIN) 100 unit/mL InPn pen, Inject  10 units w/ breakfast, 14 units w/ lunch and dinner plus scale 180-230+2,231-280 +4, 281-330 +6, 331-380+8., Disp: 3 Box, Rfl: 3    insulin detemir U-100 (LEVEMIR FLEXTOUCH U-100 INSULN) 100 unit/mL (3 mL) SubQ InPn pen, Inject 33 Units into the skin every evening., Disp:  "3 Box, Rfl: 3    insulin needles, disposable, (PEN NEEDLE) 31 X 5/16 " Ndle, Use as directed, Disp: 100 each, Rfl: PRN    lancets (MICROLET LANCET) Misc, , Disp: , Rfl:     losartan (COZAAR) 25 MG tablet, Take 0.5 tablets (12.5 mg total) by mouth once daily., Disp: 45 tablet, Rfl: 3    ondansetron (ZOFRAN-ODT) 8 MG TbDL, Take 1 tablet (8 mg total) by mouth 3 (three) times daily as needed., Disp: 30 tablet, Rfl: 0    pantoprazole (PROTONIX) 40 MG tablet, Take 1 tablet (40 mg total) by mouth once daily., Disp: 30 tablet, Rfl: 11    pen needle, diabetic (NOVOFINE 32) 32 gauge x 1/4" Ndle, , Disp: , Rfl:     pyridoxine, vitamin B6, (VITAMIN B-6) 100 MG Tab, Take 100 mg by mouth once daily., Disp: , Rfl:     SITagliptin (JANUVIA) 100 MG Tab, Take 1 tablet (100 mg total) by mouth once daily., Disp: 90 tablet, Rfl: 3    gabapentin (NEURONTIN) 300 MG capsule, Take 1 capsule (300 mg total) by mouth 3 (three) times daily., Disp: 90 capsule, Rfl: 11    Current Facility-Administered Medications:     sodium hyaluronate (EUFLEXXA) 10 mg/mL(mw 2.4 -3.6 million) Syrg 20 mg, 20 mg, Intra-articular, Weekly, Sabino Damon MD, 20 mg at 08/24/18 0855    Allergies:   Review of patient's allergies indicates:   Allergen Reactions    Iodinated contrast- oral and iv dye      Other reaction(s): Swelling  Other reaction(s): Rash    Macrobid  [nitrofurantoin monohyd/m-cryst]      Other reaction(s): Rash    Metformin      Other reaction(s): Rash    Penicillins      Other reaction(s): Rash    Promethazine      Other reaction(s): rash  Other reaction(s): Unknown    Sulfa (sulfonamide antibiotics)      Other reaction(s): Rash    Sulfamethoxazole-trimethoprim      Other reaction(s): Rash       REVIEW OF SYSTEMS:  Constitutional: negative for fevers  Musculoskeletal: positive for paresthesias    PHYSICAL EXAMINATION:    BP (!) 144/70   Pulse 60   Ht 5' 6" (1.676 m)   Wt 73.9 kg (163 lb)   BMI 26.31 kg/m²     Vitals:  Vital " signs stable.  General: No acute distress.  Cardio: Regular rate.  Chest: No increased work of breathing.     MSK:  BLE:   Skin intact  No deformity  No ecchymoses  No swelling  TTP mild diffusely over hindfoot and ankle  No pain with ankle/subtalar ROM  Able to perform heel rise with mild pain  SILT T/SP/DP/Kowalski/Sa  Motor intact T/SP/DP  Tinel positive over SP/Kowalski/Sa/T/DP  Brisk cap refill  Warm well perfused extremities  DP palpable  SILT 5.07mm SW filament    IMAGING:     XR B foot/ankle negative for fx, dislocation, significant arthritis.    ASSESSMENT/PLAN:    Diagnoses and all orders for this visit:    Bilateral ankle pain, unspecified chronicity  -     X-Ray Ankle Complete Bilateral; Future    Bilateral foot pain  -     X-Ray Foot Complete Bilateral; Future    Type 2 diabetes mellitus with diabetic neuropathy, with long-term current use of insulin  -     gabapentin (NEURONTIN) 300 MG capsule; Take 1 capsule (300 mg total) by mouth 3 (three) times daily.        63F with B ankle pain.  XR and exam negative for concerning structural issue.  Uncontrolled IDDM with paresthesias make peripheral neuropathy most likely cause of pain.  Discussed importance of primary care f/u, controlling BG with insulin, diabetic foot checks.  Will give rx for gabapentin to try to provide symptomatic relief.  F/u prn.    I have personally taken the history and examined this patient and agree with the residents note as stated above.

## 2018-12-13 NOTE — LETTER
December 14, 2018      Sabino Damon MD  [address]  [facility] [address]           [facility]  [address]  [facility] [address]  Phone: [phone]          Patient: Riana Kay   MR Number: [id number]   YOB: 1955   Date of Visit: 12/13/2018       Dear Dr. Sabino Damon:    Thank you for referring Riana Kay to me for evaluation. Attached you will find relevant portions of my assessment and plan of care.    If you have questions, please do not hesitate to call me. I look forward to following Riana Kay along with you.    Sincerely,    Dar John MD    Enclosure  CC:  No Recipients    If you would like to receive this communication electronically, please contact [email or web address] or [phone] to request more information on [employer] access.    For providers and/or their staff who would like to refer a patient to [facility], please contact us through our one-stop-shop provider referral line, [facility], at [phone].    If you feel you have received this communication in error or would no longer like to receive these types of communications, please e-mail [email or web address]

## 2018-12-14 ENCOUNTER — CLINICAL SUPPORT (OUTPATIENT)
Dept: REHABILITATION | Facility: HOSPITAL | Age: 63
End: 2018-12-14
Payer: MEDICARE

## 2018-12-14 DIAGNOSIS — G89.29 CHRONIC PAIN OF RIGHT KNEE: ICD-10-CM

## 2018-12-14 DIAGNOSIS — M25.561 CHRONIC PAIN OF RIGHT KNEE: ICD-10-CM

## 2018-12-14 PROCEDURE — 97110 THERAPEUTIC EXERCISES: CPT | Mod: PO

## 2018-12-14 NOTE — PROGRESS NOTES
"Physical Therapy Daily Treatment Note    Name: Riana Baker   Clinic Number: [id number]  Date of Treatment: 12/14/2018   Diagnosis:   Encounter Diagnosis   Name Primary?    Chronic pain of right knee        Physician: Katja Hubbard, *    Medical Diagnosis: M17.11 (ICD-10-CM) - Primary osteoarthritis of right knee  Evaluation Date: 12/6/2018  Authorization period Expiration: 10/29/2019  Plan of Care Certification Period: 1/31/2019     Visit #: 2/ Visits authorized: 1  Time In:  0901  Time Out: 1000  Total Billable Time: 25 minutes    Precautions:  Standard    Subjective     Riana reports that her knee feels better but she still has a little bit of pain.    Pain: 5/10  Location: right knee      Objective     Riana received moist hot pack pre treatment to knee to increase blood flow and decreased pain.    Riana received therapeutic exercises to develop strength, endurance, ROM and flexibility for 40 minutes including:        Hamstring stretch with strap 3 x 30"  Prone quad stretch with strap 3 x 30"  Slantboard calf stretch 3 x 30"  SAQ 3 x 10  Bridges 3 x 10  Clamshells 2 x 10  LAQ 1# 3 x 10  SLR flexion 2 x 1    **add bike next visit    Riana received cold pack post treatment to knee for 10 minutes to decreased soreness and pain.    One on one care provided for 25 minutes.    Written Home Exercises Provided: No new written exercises provided today.    Pt educated on purchase and use of stretch out strap at home for exercises. Pt demo good understanding of the education provided. Riana demonstrated good return demonstration of activities.     Assessment   Riana was able to tolerate all therex during tx session today.  Patient was unable to attain full knee extension with SLR flexion and LAQs today secondary to weakness of the quadriceps.  Patient also required verbal cueing for correct performance of clamshells today.  Fatigue noted in hip abductors during activity as well.    Riana " is progressing well towards her goals.   Pt prognosis is Good.     Pt will continue to benefit from skilled PT intervention. Medical Necessity is demonstrated by:  Pain limits function of effected part for some activities, Unable to participate fully in daily activities, Requires skilled supervision to complete and progress HEP and Weakness.    New/Revised goals:  No new goals established at this time.      Plan       Continue with established Plan of Care towards PT goals.     Tran Paige, PT

## 2018-12-19 ENCOUNTER — CLINICAL SUPPORT (OUTPATIENT)
Dept: REHABILITATION | Facility: HOSPITAL | Age: 63
End: 2018-12-19
Payer: MEDICARE

## 2018-12-19 DIAGNOSIS — M25.521 RIGHT ELBOW PAIN: Primary | ICD-10-CM

## 2018-12-19 DIAGNOSIS — G56.21 ULNAR NERVE ENTRAPMENT AT RIGHT ELBOW: ICD-10-CM

## 2018-12-19 DIAGNOSIS — M25.561 CHRONIC PAIN OF RIGHT KNEE: ICD-10-CM

## 2018-12-19 DIAGNOSIS — R53.1 DECREASED STRENGTH: ICD-10-CM

## 2018-12-19 DIAGNOSIS — R52 PAIN: ICD-10-CM

## 2018-12-19 DIAGNOSIS — M25.60 DECREASED RANGE OF MOTION: Primary | ICD-10-CM

## 2018-12-19 DIAGNOSIS — G89.29 CHRONIC PAIN OF RIGHT KNEE: ICD-10-CM

## 2018-12-19 PROCEDURE — 97110 THERAPEUTIC EXERCISES: CPT | Mod: PO

## 2018-12-19 NOTE — PROGRESS NOTES
"Physical Therapy Daily Treatment Note    Name: Riana Baker   Clinic Number: [id number]  Date of Treatment: 12/19/2018   Diagnosis:   Encounter Diagnoses   Name Primary?    Chronic pain of right knee     Decreased range of motion Yes    Decreased strength     Pain        Physician: Christiana Cole MD    Medical Diagnosis: M17.11 (ICD-10-CM) - Primary osteoarthritis of right knee  Evaluation Date: 12/6/2018  Authorization period Expiration: 10/29/2019  Plan of Care Certification Period: 1/31/2019   Visit #: 3   Time In: 8:05  Time Out:9:00  Total Billable Time: 55minutes    Precautions:  Standard    Subjective     Riana reports chronic (B) knee pain and some stiffness (R>L) presently.  Pain: 5/10  Location: right knee      Objective        Riana received therapeutic exercises to develop strength, endurance, ROM and flexibility for 55 minutes including:     Upright stationary bike x 5 minutes level 1     Mat ex's:   Hamstring stretch with strap 3 x 30"  QS with heel prop 10 x 10 sec   SLR 2 x 10 ( cues for quad engagement)   Sidelying hip abd 2 x 10 ( cues for positioning)   SAQ with 1# 3 x 10  Bridges + iso hip abd with OTB 3 x 10  Clamshells 2 x 10  Prone quad stretch with strap 3 x 30"  Prone hip extension 2 x 10  LAQ 1# 2 x 10     Standing ex's:   Gastroc stretch on incline board 3 x 20 sec   Standing TKE with OTB 2 x 10    Machines  Shuttle leg press with 2.5 bands 2 x 10    Riana received cold pack post treatment to knee for 10 minutes to decreased soreness and pain.--NP       Written Home Exercises Provided: Patient educated to continue with previously issued HEP to tolerance along with updated HEP to include:  Standing TKE with OTB , sidelying hip abd and prone hip ext.  Pt educated on purchase and use of stretch out strap at home for exercises. Pt demo good understanding of the education provided. Riana demonstrated good/fair return demonstration of activities.     Assessment   Riana " dharmesh [place] fairly well. Added stationary bike without c/o. Some cuing for quad engagement with SLR and was able to perform without an extensor lag today but some fatigue noted. She was challenged due to fatigue with new sidelying hip abduction ex and moves somewhat slowly with ex's overall . Able to perform shuttle leg press without c/o. She reports a reduction in pain symptoms to 3/10 post session. WIll monitor and attempt to progress as tolerated.  Pt will continue to benefit from skilled PT intervention. Medical Necessity is demonstrated by:  Pain limits function of effected part for some activities, Unable to participate fully in daily activities, Requires skilled supervision to complete and progress HEP and Weakness.    GOALS: from eval  Short Term Goals:  4 weeks  1.  Riana will be independent with HEP.  2. Riana will report 3/10 pain at worst with prolonged standing.  3. Riana will demonstrate increased MMT to 4/5  to increase tolerance for ADL and stair negotiation.  4. Riana will be able to walk for 10-15 minutes without pain.     Long Term Goals: 8 weeks  1.Riana will report 1/10 pain at worst with prolonged standing.  2.Riana will be able to walk for 20-25 minutes without pain.  3.Riana will demonstrate increased MMT to 4+/5  to increase tolerance for ADL and squatting activities.  4. Riana will report at CJ level (20<40% impaired) on FOTO ankle score  to demonstrate increase in LE function.       Plan       Continue with established Plan of Care towards PT goals.     Fidel Almanza, PTA

## 2018-12-20 NOTE — PROGRESS NOTES
Subjective:       Patient ID: Riana Kay is a 63 y.o. female.    Chief Complaint: Follow-up    Patient is a 63 y.o.female who presents today for follow up on mood. She was started on wellbutrin at last visit. She has been taking it for 4-5 weeks now.      Yesterday, she started having blurry vision/ floaters in her right eye. Mildly tender.       Review of Systems   Constitutional: Negative for appetite change, chills, diaphoresis, fatigue and fever.   HENT: Negative for congestion, dental problem, ear discharge, ear pain, hearing loss, postnasal drip, sinus pressure and sore throat.    Eyes: Negative for discharge, redness and itching.   Respiratory: Negative for cough, chest tightness, shortness of breath and wheezing.    Cardiovascular: Negative for chest pain, palpitations and leg swelling.   Gastrointestinal: Negative for abdominal pain, constipation, diarrhea, nausea and vomiting.   Endocrine: Negative for cold intolerance and heat intolerance.   Genitourinary: Negative for difficulty urinating, frequency, hematuria and urgency.   Musculoskeletal: Negative for arthralgias, back pain, gait problem, myalgias and neck pain.   Skin: Negative for color change and rash.   Neurological: Negative for dizziness, syncope and headaches.   Hematological: Negative for adenopathy.   Psychiatric/Behavioral: Negative for behavioral problems and sleep disturbance. The patient is not nervous/anxious.        Objective:      Physical Exam   Constitutional: She is oriented to person, place, and time. She appears well-developed and well-nourished. No distress.   HENT:   Head: Normocephalic and atraumatic.   Right Ear: External ear normal.   Left Ear: External ear normal.   Nose: Nose normal.   Mouth/Throat: Oropharynx is clear and moist. No oropharyngeal exudate.   Eyes: Conjunctivae and EOM are normal. Pupils are equal, round, and reactive to light. Right eye exhibits no discharge. Left eye exhibits no discharge. No  scleral icterus.   Neck: Normal range of motion. Neck supple. No JVD present. No thyromegaly present.   Cardiovascular: Normal rate, regular rhythm, normal heart sounds and intact distal pulses. Exam reveals no gallop and no friction rub.   No murmur heard.  Pulmonary/Chest: Effort normal and breath sounds normal. No stridor. No respiratory distress. She has no wheezes. She has no rales. She exhibits no tenderness.   Abdominal: Soft. Bowel sounds are normal. She exhibits no distension. There is no tenderness. There is no rebound.   Musculoskeletal: Normal range of motion. She exhibits no edema or tenderness.   Lymphadenopathy:     She has no cervical adenopathy.   Neurological: She is alert and oriented to person, place, and time. No cranial nerve deficit.   Skin: Skin is warm and dry. No rash noted. She is not diaphoretic. No erythema.   Psychiatric: She has a normal mood and affect. Her behavior is normal.   Nursing note and vitals reviewed.      Assessment and Plan:       1. Depression, unspecified depression type  - improved with wellbutrin    2. Discomfort of right eye  - Ambulatory Referral to Optometry          No Follow-up on file.

## 2018-12-21 ENCOUNTER — LAB VISIT (OUTPATIENT)
Dept: LAB | Facility: HOSPITAL | Age: 63
End: 2018-12-21
Attending: NURSE PRACTITIONER
Payer: MEDICARE

## 2018-12-21 DIAGNOSIS — Z79.4 TYPE 2 DIABETES MELLITUS WITH OTHER SPECIFIED COMPLICATION, WITH LONG-TERM CURRENT USE OF INSULIN: ICD-10-CM

## 2018-12-21 DIAGNOSIS — E11.69 TYPE 2 DIABETES MELLITUS WITH OTHER SPECIFIED COMPLICATION, WITH LONG-TERM CURRENT USE OF INSULIN: ICD-10-CM

## 2018-12-21 LAB
ESTIMATED AVG GLUCOSE: 160 MG/DL
HBA1C MFR BLD HPLC: 7.2 %

## 2018-12-21 PROCEDURE — 36415 COLL VENOUS BLD VENIPUNCTURE: CPT | Mod: PO

## 2018-12-21 PROCEDURE — 83036 HEMOGLOBIN GLYCOSYLATED A1C: CPT

## 2018-12-27 NOTE — PROGRESS NOTES
CHIEF COMPLAINT:  EMG results right elbow.    HISTORY OF PRESENT ILLNESS:  Ms. Kay is a 63-year-old female.  She is an   established patient in this clinic.  She has a history of a radial head   arthroplasty done at an outside facility.  She is here for her EMG results.    Ever since her injury or surgery, the patient has had ulnar nerve decreased   sensation and weakness.    Nerve testing was normal.    PLAN:  For this patient, we discussed radial head revision and ulnar nerve   release in great detail.  The patient agrees with this plan.  We will perform   this in January when we have a lot more time to proceed with this.  We will also   talk to the company about any specific implants that may need to be ordered.    The patient agrees.  Consents were signed today in clinic.  The patient   understands that she may not gain more motion, she may have continued pain and   she may have continued ulnar nerve difficulty.      LES/HN  dd: 12/26/2018 15:08:34 (CST)  td: 12/27/2018 07:10:39 (CST)  Doc ID   #[id number]  Job ID #[id number]    CC:

## 2019-01-03 ENCOUNTER — OFFICE VISIT (OUTPATIENT)
Dept: INTERNAL MEDICINE | Facility: CLINIC | Age: 64
End: 2019-01-03
Payer: MEDICARE

## 2019-01-03 VITALS
RESPIRATION RATE: 16 BRPM | HEIGHT: 66 IN | WEIGHT: 166.44 LBS | HEART RATE: 61 BPM | BODY MASS INDEX: 26.75 KG/M2 | TEMPERATURE: 98 F | DIASTOLIC BLOOD PRESSURE: 70 MMHG | SYSTOLIC BLOOD PRESSURE: 133 MMHG

## 2019-01-03 DIAGNOSIS — F32.A DEPRESSION, UNSPECIFIED DEPRESSION TYPE: Primary | ICD-10-CM

## 2019-01-03 DIAGNOSIS — H57.11 DISCOMFORT OF RIGHT EYE: ICD-10-CM

## 2019-01-03 PROCEDURE — 99214 PR OFFICE/OUTPT VISIT, EST, LEVL IV, 30-39 MIN: ICD-10-PCS | Mod: S$GLB,,, | Performed by: INTERNAL MEDICINE

## 2019-01-03 PROCEDURE — 99999 PR PBB SHADOW E&M-EST. PATIENT-LVL III: ICD-10-PCS | Mod: PBBFAC,,, | Performed by: INTERNAL MEDICINE

## 2019-01-03 PROCEDURE — 99999 PR PBB SHADOW E&M-EST. PATIENT-LVL III: CPT | Mod: PBBFAC,,, | Performed by: INTERNAL MEDICINE

## 2019-01-03 PROCEDURE — 3075F PR MOST RECENT SYSTOLIC BLOOD PRESS GE 130-139MM HG: ICD-10-PCS | Mod: CPTII,S$GLB,, | Performed by: INTERNAL MEDICINE

## 2019-01-03 PROCEDURE — 3078F PR MOST RECENT DIASTOLIC BLOOD PRESSURE < 80 MM HG: ICD-10-PCS | Mod: CPTII,S$GLB,, | Performed by: INTERNAL MEDICINE

## 2019-01-03 PROCEDURE — 3075F SYST BP GE 130 - 139MM HG: CPT | Mod: CPTII,S$GLB,, | Performed by: INTERNAL MEDICINE

## 2019-01-03 PROCEDURE — 99214 OFFICE O/P EST MOD 30 MIN: CPT | Mod: S$GLB,,, | Performed by: INTERNAL MEDICINE

## 2019-01-03 PROCEDURE — 3008F BODY MASS INDEX DOCD: CPT | Mod: CPTII,S$GLB,, | Performed by: INTERNAL MEDICINE

## 2019-01-03 PROCEDURE — 3078F DIAST BP <80 MM HG: CPT | Mod: CPTII,S$GLB,, | Performed by: INTERNAL MEDICINE

## 2019-01-03 PROCEDURE — 3008F PR BODY MASS INDEX (BMI) DOCUMENTED: ICD-10-PCS | Mod: CPTII,S$GLB,, | Performed by: INTERNAL MEDICINE

## 2019-01-07 ENCOUNTER — OFFICE VISIT (OUTPATIENT)
Dept: ENDOCRINOLOGY | Facility: CLINIC | Age: 64
End: 2019-01-07
Payer: MEDICARE

## 2019-01-07 VITALS
WEIGHT: 166 LBS | BODY MASS INDEX: 26.68 KG/M2 | HEIGHT: 66 IN | DIASTOLIC BLOOD PRESSURE: 77 MMHG | SYSTOLIC BLOOD PRESSURE: 120 MMHG | HEART RATE: 73 BPM

## 2019-01-07 DIAGNOSIS — M25.561 CHRONIC PAIN OF RIGHT KNEE: ICD-10-CM

## 2019-01-07 DIAGNOSIS — Z79.4 TYPE 2 DIABETES MELLITUS WITH OTHER SPECIFIED COMPLICATION, WITH LONG-TERM CURRENT USE OF INSULIN: Primary | ICD-10-CM

## 2019-01-07 DIAGNOSIS — K21.9 GASTROESOPHAGEAL REFLUX DISEASE WITHOUT ESOPHAGITIS: ICD-10-CM

## 2019-01-07 DIAGNOSIS — E66.3 OVERWEIGHT (BMI 25.0-29.9): ICD-10-CM

## 2019-01-07 DIAGNOSIS — E11.69 TYPE 2 DIABETES MELLITUS WITH OTHER SPECIFIED COMPLICATION, WITH LONG-TERM CURRENT USE OF INSULIN: Primary | ICD-10-CM

## 2019-01-07 DIAGNOSIS — G89.29 CHRONIC PAIN OF RIGHT KNEE: ICD-10-CM

## 2019-01-07 DIAGNOSIS — I10 ESSENTIAL HYPERTENSION: ICD-10-CM

## 2019-01-07 PROBLEM — R53.1 DECREASED STRENGTH: Status: RESOLVED | Noted: 2018-10-19 | Resolved: 2019-01-07

## 2019-01-07 PROBLEM — M25.60 DECREASED RANGE OF MOTION: Status: RESOLVED | Noted: 2018-10-19 | Resolved: 2019-01-07

## 2019-01-07 PROCEDURE — 3045F PR MOST RECENT HEMOGLOBIN A1C LEVEL 7.0-9.0%: ICD-10-PCS | Mod: CPTII,S$GLB,, | Performed by: NURSE PRACTITIONER

## 2019-01-07 PROCEDURE — 3078F PR MOST RECENT DIASTOLIC BLOOD PRESSURE < 80 MM HG: ICD-10-PCS | Mod: CPTII,S$GLB,, | Performed by: NURSE PRACTITIONER

## 2019-01-07 PROCEDURE — 99999 PR PBB SHADOW E&M-EST. PATIENT-LVL IV: CPT | Mod: PBBFAC,,, | Performed by: NURSE PRACTITIONER

## 2019-01-07 PROCEDURE — 3074F SYST BP LT 130 MM HG: CPT | Mod: CPTII,S$GLB,, | Performed by: NURSE PRACTITIONER

## 2019-01-07 PROCEDURE — 99499 RISK ADDL DX/OHS AUDIT: ICD-10-PCS | Mod: S$GLB,,, | Performed by: NURSE PRACTITIONER

## 2019-01-07 PROCEDURE — 3045F PR MOST RECENT HEMOGLOBIN A1C LEVEL 7.0-9.0%: CPT | Mod: CPTII,S$GLB,, | Performed by: NURSE PRACTITIONER

## 2019-01-07 PROCEDURE — 3074F PR MOST RECENT SYSTOLIC BLOOD PRESSURE < 130 MM HG: ICD-10-PCS | Mod: CPTII,S$GLB,, | Performed by: NURSE PRACTITIONER

## 2019-01-07 PROCEDURE — 3008F BODY MASS INDEX DOCD: CPT | Mod: CPTII,S$GLB,, | Performed by: NURSE PRACTITIONER

## 2019-01-07 PROCEDURE — 99499 UNLISTED E&M SERVICE: CPT | Mod: S$GLB,,, | Performed by: NURSE PRACTITIONER

## 2019-01-07 PROCEDURE — 3078F DIAST BP <80 MM HG: CPT | Mod: CPTII,S$GLB,, | Performed by: NURSE PRACTITIONER

## 2019-01-07 PROCEDURE — 3008F PR BODY MASS INDEX (BMI) DOCUMENTED: ICD-10-PCS | Mod: CPTII,S$GLB,, | Performed by: NURSE PRACTITIONER

## 2019-01-07 PROCEDURE — 99214 PR OFFICE/OUTPT VISIT, EST, LEVL IV, 30-39 MIN: ICD-10-PCS | Mod: S$GLB,,, | Performed by: NURSE PRACTITIONER

## 2019-01-07 PROCEDURE — 99214 OFFICE O/P EST MOD 30 MIN: CPT | Mod: S$GLB,,, | Performed by: NURSE PRACTITIONER

## 2019-01-07 PROCEDURE — 99999 PR PBB SHADOW E&M-EST. PATIENT-LVL IV: ICD-10-PCS | Mod: PBBFAC,,, | Performed by: NURSE PRACTITIONER

## 2019-01-07 NOTE — PATIENT INSTRUCTIONS
Snacks can be an important part of a balanced, healthy meal plan. They allow you to eat more frequently, feeling full and satisfied throughout the day. Also, they allow you to spread carbohydrates evenly, which may stabilize blood sugars.  Plus, snacks are enjoyable!     The amount of carbohydrate needed at snacks varies. Generally, about 15-30 grams of carbohydrate per snack is recommended.  Below you will find some tasty treats.       0-5 gm carb   Crystal Light   Vitamin Water Zero   Herbal tea, unsweetened   2 tsp peanut butter on celery   1./2 cup sugar-free jell-o   1 sugar-free popsicle   ¼ cup blueberries   8oz Blue Melissa unsweetened almond milk   5 baby carrots & celery sticks, cucumbers, bell peppers dipped in ¼ cup salsa, 2Tbsp light ranch dressing or 2Tbsp plain Greek yogurt   10 Goldfish crackers   ½ oz low-fat cheese or string cheese   1 closed handful of nuts, unsalted   1 Tbsp of sunflower seeds, unsalted   1 cup Smart Pop popcorn   1 whole grain brown rice cake        15 gm carb   1 small piece of fruit or ½ banana or 1/2 cup lite canned fruit   3 tan cracker squares   3 cups Smart Pop popcorn, top spray butter, Beltre lite salt or cinnamon and Truvia   5 Vanilla Wafers   ½ cup low fat, no added sugar ice cream or frozen yogurt (Blue bell, Blue Bunny, Weight Watchers, Skinny Cow)   ½ turkey, ham, or chicken sandwich   ½ c fruit with ½ c Cottage cheese   4-6 unsalted wheat crackers with 1 oz low fat cheese or 1 tbsp peanut butter    30-45 goldfish crackers (depending on flavor)    7-8 Jain mini brown rice cakes (caramel, apple cinnamon, chocolate)    12 Jain mini brown rice cakes (cheddar, bbq, ranch)    1/3 cup hummus dip with raw veg   1/2 whole wheat jossy, 1Tbsp hummus   Mini Pizza (1/2 whole wheat English muffin, low-fat  cheese, tomato sauce)   100 calorie snack pack (Oreo, Chips Ahoy, Ritz Mix, Baked Cheetos)   4-6 oz. light or Greek Style yogurt  (Yaakov, Chandni, Hilario, [facility])   ½ cup sugar-free pudding     6 in. wheat tortilla or jossy oven toasted chips (topped with spray butter flavoring, cinnamon, Truvia OR spray butter, garlic powder, chili powder)    18 BBQ Popchips (available at Target, Whole Foods, Fresh Market)                   Diabetes Support Group Meetings         Date: Topic:   February 14 Eat Fit SALINAS/Health Promotion   March 14 Taking Care of Your Smile   April 11 Spring into Healthy Eating/Cooking Demo   May 9 Ease Your Mind with Diabetes   Nesha 13 Summer Treats/Cooking Demo   July 11 Eat Fit SALINAS/Super Market Sweep   August 8 Taking Care of Your Eyes and Feet   Sept 12 Technology/ADA updates   October 10 Recipes & Treats/Cooking Demo   November 14 Heart Health/Pump it up!   December 12 Year-End Close Out        Meetings are held in the [facility] (A) of the [facility] located at [address] [place] [address]. Please call [phone] for additional information.    Free service, offered every 2nd Thursday of every month! Family members and/or friends are welcome as well!  Support group is for patients with type 1 or type 2 diabetes.    From 3:30p to 4:30p

## 2019-01-07 NOTE — PROGRESS NOTES
CC: This 63 y.o.  female presents for management of Diabetes   along with the current chronic medical conditions including:  Patient Active Problem List   Diagnosis    Fatty liver    S/P total hysterectomy    Osteopenia    GERD (gastroesophageal reflux disease)    Sleep apnea    Atrophic gastritis without mention of hemorrhage    Chronic fatigue syndrome    Coronary atherosclerosis    Abdominal pain, right upper quadrant    HTN (hypertension)    Muscle spasms of neck    Radiculopathy of cervical spine    Overweight (BMI 25.0-29.9)    Type 2 diabetes mellitus with other specified complication    Screening for colon cancer    Pain    Chronic pain of right knee      Status of these conditions is pending review.    HPI:   Diagnosed with T2DM x 19 years ago, pt has been on insulin x 3 years ago.  Accompanied by family member.  Pt was last seen by me in Sept 2018 and is now being seen by me again today.  Went from 7.0 to 7.2% in the past 3 mos.   Lab Results   Component Value Date    HGBA1C 7.2 (H) 12/21/2018     Pt did not tolerate victoza or ozempic.  Off metformin related to kidneys.  Accompanied by .    CURRENT DM MEDS:   lantus 33 units qhs, novolog 10/14/14 units  with mod dose correction scale, starting at 150. 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   No missing doses of medication.   Pt is monitoring BG at home 3-4 times a day   No hypoglycemia  Riana Kay forgot glucometer/logs today    Reports highest readings 250  Lowest 70s  FBGs 120s-150s    DIET/ MEAL PATTERN: 3 meals a day  Snacks (between lunch and dinner)     EXERCISE: no formal; does yardwork     STANDARDS OF CARE:   Eye exam: 2018, (R) eye, appt on 1/11/19  Foot exam: 2018  ASA: none  Statin: none  ACE-I/ARB: none    ROS:   GEN: +chronic fatigue or weakness, no changes w/ appetite, wt loss #1 in the past 6 weeks   CV:  Denies chest pain, palpitations, edema  or cyanosis, denies syncope  SKIN: Skin is intact and heals well, no rashes, pruritis, easy bruising, no hair changes, no intolerance to heat/cold.  RESP: No SOB, cough, FISCHER  FEN/GI: Nml bowel movements, normal appetite, +GERD  RENAL: No urinary complaints, no dysuria/hematuia/oliguria   ENDO: no heat/cold intolerance, no fatigue, no change in weight  ID: No skin breakdown, fevers, chills  PSYCH: Denies drug/ETOH abuse, no hx. of eating disorders or depression +anxiety  MS/NEURO: Denies numbness/ tingling in BLE; +bilateral knee and ankle joint pain-improving       Lab Results   Component Value Date    HGBA1C 7.2 (H) 12/21/2018     Lab Results   Component Value Date    TSH 1.309 07/23/2018       Chemistry        Component Value Date/Time     09/20/2018 0708    K 4.4 09/20/2018 0708     09/20/2018 0708    CO2 30 (H) 09/20/2018 0708    BUN 14 09/20/2018 0708    CREATININE 0.8 09/20/2018 0708     (H) 09/20/2018 0708        Component Value Date/Time    CALCIUM 9.5 09/20/2018 0708    ALKPHOS 91 07/23/2018 0827    AST 18 07/23/2018 0827    ALT 25 07/23/2018 0827    BILITOT 0.5 07/23/2018 0827          Lab Results   Component Value Date    LDLCALC 87.8 11/28/2018       PE:  GEN: Patient WNWD, WF, NAD, AAOx3, Friendly, talkative, well groomed  HEENT: EOMI, PERRLA, no lid lag, Clear oropharynx  NECK: No lymphadenophathy, trachea midline  CV: Regular rate and rhythm  RESP: No increase work of breathing, No cough, wheeze.  ABD: bs active x 4 quadsEXT: No C/C/Edema, No skin rash/breakdown  NEURO: CN 2-12 intact, 5/5 strength in 4 extremities, nml gait  FEET: No pressure areas, blisters, ulcers, calluses. Footware appropriate.    ASSESSMENT and PLAN:  Riana was seen today for diabetes mellitus.    Diagnoses and associated orders for this visit:  1. Type 2 diabetes mellitus with other specified complication, with long-term current use of insulin  Hemoglobin A1c next time  F/u in 3-4 mos  Continue regimen  above  Encourage exercise 3 times a week  At least 30 mins  No refills needed at this time  Logs given.  a1c goal less than 7%   2. Essential hypertension  Controlled, continue med(s)   3. Overweight (BMI 25.0-29.9)  Body mass index is 26.79 kg/m². may increase insulin resistance.    4. Chronic pain of right knee  May increase insulin resistance, work w/PT    5. Gastroesophageal reflux disease without esophagitis  Continue protonix  Did not do well w/ glp1a- 2 drugs in class        Follow-up in about 3 months (around 4/7/2019).

## 2019-01-15 ENCOUNTER — ANESTHESIA EVENT (OUTPATIENT)
Dept: SURGERY | Facility: HOSPITAL | Age: 64
End: 2019-01-15
Payer: MEDICARE

## 2019-01-15 ENCOUNTER — TELEPHONE (OUTPATIENT)
Dept: ORTHOPEDICS | Facility: CLINIC | Age: 64
End: 2019-01-15

## 2019-01-15 NOTE — TELEPHONE ENCOUNTER
Riana Kay notified of arrival time 0730 for surgery on 1/16/19 with Dr. WILFRED Hubbard. At [facility]. Post Op appointment made, slip in mail. Reminded of need for a ride home from surgery.

## 2019-01-15 NOTE — ANESTHESIA PREPROCEDURE EVALUATION
01/15/2019  Riana Kay is a 63 y.o., female.    Anesthesia Evaluation    I have reviewed the Patient Summary Reports.        Review of Systems  Anesthesia Hx:  No problems with previous Anesthesia  Personal Hx of Anesthesia complications, Post-Operative Nausea/Vomiting, in the past, but not with recent anesthetics / prophylaxis   Social:  Non-Smoker    Hematology/Oncology:  Hematology Normal   Oncology Normal     EENT/Dental:EENT/Dental Normal   Cardiovascular:   Hypertension CAD      Pulmonary:   Sleep Apnea, CPAP    Renal/:  Renal/ Normal     Hepatic/GI:   GERD    Musculoskeletal:  Musculoskeletal Normal    Neurological:   Neuromuscular Disease,    Endocrine:   Diabetes, type 2    Dermatological:  Skin Normal    Psych:  Psychiatric Normal           Physical Exam  General:  Well nourished    Airway/Jaw/Neck:  Airway Findings: Mouth Opening: Normal Tongue: Normal  General Airway Assessment: Adult  Mallampati: II  Improves to II with phonation.  TM Distance: Normal, at least 6 cm  Jaw/Neck Findings:  Neck ROM: Normal ROM      Dental:  Dental Findings: In tact   Chest/Lungs:  Chest/Lungs Findings: Clear to auscultation, Normal Respiratory Rate     Heart/Vascular:  Heart Findings: Rate: Normal  Rhythm: Regular Rhythm  Sounds: Normal             Anesthesia Plan  Type of Anesthesia, risks & benefits discussed:  Anesthesia Type:  general  Patient's Preference: General  Intra-op Monitoring Plan:   Intra-op Monitoring Plan Comments:   Post Op Pain Control Plan:   Post Op Pain Control Plan Comments:   Induction:   IV  Beta Blocker:  Patient is not currently on a Beta-Blocker (No further documentation required).       Informed Consent: Patient understands risks and agrees with Anesthesia plan.  Questions answered. Anesthesia consent signed with patient.  ASA Score: 3     Day of Surgery Review of History &  Physical: I have interviewed and examined the patient. I have reviewed the patient's H&P dated:  There are no significant changes.          Ready For Surgery From Anesthesia Perspective.

## 2019-01-15 NOTE — PRE-PROCEDURE INSTRUCTIONS
Preop instructions: NPO solids/ milk products after midnight or clears up to 2 hours before arrival (clear liquids are: water, apple juice, Gatorade & Jell-O, black coffee/no milk, cream or creamer), shower instructions, directions, leave all valuables at home, medication instructions for PM prior & am of procedure explained.  stated an understanding.      states has had PONV with anesthesia in the past, better with prophylaxis      does not know arrival time. Explained that this information comes from the surgeons office and if they have not heard from them by 3pm, to call office.  stated an understanding.

## 2019-01-16 ENCOUNTER — HOSPITAL ENCOUNTER (OUTPATIENT)
Facility: HOSPITAL | Age: 64
Discharge: HOME | End: 2019-01-16
Attending: ORTHOPAEDIC SURGERY | Admitting: ORTHOPAEDIC SURGERY
Payer: MEDICARE

## 2019-01-16 ENCOUNTER — ANESTHESIA (OUTPATIENT)
Dept: SURGERY | Facility: HOSPITAL | Age: 64
End: 2019-01-16
Payer: MEDICARE

## 2019-01-16 VITALS
RESPIRATION RATE: 22 BRPM | SYSTOLIC BLOOD PRESSURE: 128 MMHG | HEART RATE: 88 BPM | DIASTOLIC BLOOD PRESSURE: 63 MMHG | WEIGHT: 163 LBS | TEMPERATURE: 98 F | OXYGEN SATURATION: 95 % | BODY MASS INDEX: 27.16 KG/M2 | HEIGHT: 65 IN

## 2019-01-16 DIAGNOSIS — R52 PAIN: Primary | ICD-10-CM

## 2019-01-16 DIAGNOSIS — M25.521 RIGHT ELBOW PAIN: ICD-10-CM

## 2019-01-16 LAB
POCT GLUCOSE: 111 MG/DL (ref 70–110)
POCT GLUCOSE: 166 MG/DL (ref 70–110)

## 2019-01-16 PROCEDURE — S0077 INJECTION, CLINDAMYCIN PHOSP: HCPCS | Performed by: STUDENT IN AN ORGANIZED HEALTH CARE EDUCATION/TRAINING PROGRAM

## 2019-01-16 PROCEDURE — 25000003 PHARM REV CODE 250: Performed by: STUDENT IN AN ORGANIZED HEALTH CARE EDUCATION/TRAINING PROGRAM

## 2019-01-16 PROCEDURE — 64999 UNLISTED PX NERVOUS SYSTEM: CPT | Mod: ,,, | Performed by: ORTHOPAEDIC SURGERY

## 2019-01-16 PROCEDURE — 63600175 PHARM REV CODE 636 W HCPCS: Performed by: NURSE ANESTHETIST, CERTIFIED REGISTERED

## 2019-01-16 PROCEDURE — 27201423 OPTIME MED/SURG SUP & DEVICES STERILE SUPPLY: Performed by: ORTHOPAEDIC SURGERY

## 2019-01-16 PROCEDURE — 25000003 PHARM REV CODE 250: Performed by: ORTHOPAEDIC SURGERY

## 2019-01-16 PROCEDURE — 24366 PR RECONSTRUCT RADIAL HEAD W IMPLANT: ICD-10-PCS | Mod: 22,RT,, | Performed by: ORTHOPAEDIC SURGERY

## 2019-01-16 PROCEDURE — D9220A PRA ANESTHESIA: ICD-10-PCS | Mod: ANES,,, | Performed by: ANESTHESIOLOGY

## 2019-01-16 PROCEDURE — 27800903 OPTIME MED/SURG SUP & DEVICES OTHER IMPLANTS: Performed by: ORTHOPAEDIC SURGERY

## 2019-01-16 PROCEDURE — D9220A PRA ANESTHESIA: ICD-10-PCS | Mod: CRNA,,, | Performed by: NURSE ANESTHETIST, CERTIFIED REGISTERED

## 2019-01-16 PROCEDURE — 76942 SUPRACLAVICULAR BRACHIAL PLEXUS SINGLE INJECTION BLOCK: ICD-10-PCS | Mod: 26,,, | Performed by: ANESTHESIOLOGY

## 2019-01-16 PROCEDURE — 24366 RECONSTRUCT HEAD OF RADIUS: CPT | Mod: 22,RT,, | Performed by: ORTHOPAEDIC SURGERY

## 2019-01-16 PROCEDURE — 82962 GLUCOSE BLOOD TEST: CPT | Mod: 91 | Performed by: ORTHOPAEDIC SURGERY

## 2019-01-16 PROCEDURE — 63600175 PHARM REV CODE 636 W HCPCS: Performed by: ANESTHESIOLOGY

## 2019-01-16 PROCEDURE — 25000003 PHARM REV CODE 250: Performed by: ANESTHESIOLOGY

## 2019-01-16 PROCEDURE — 64718 PR REVISE ULNAR NERVE AT ELBOW: ICD-10-PCS | Mod: 59,RT,, | Performed by: ORTHOPAEDIC SURGERY

## 2019-01-16 PROCEDURE — 37000009 HC ANESTHESIA EA ADD 15 MINS: Performed by: ORTHOPAEDIC SURGERY

## 2019-01-16 PROCEDURE — 25000003 PHARM REV CODE 250: Performed by: NURSE ANESTHETIST, CERTIFIED REGISTERED

## 2019-01-16 PROCEDURE — 76942 ECHO GUIDE FOR BIOPSY: CPT | Performed by: ANESTHESIOLOGY

## 2019-01-16 PROCEDURE — D9220A PRA ANESTHESIA: Mod: ANES,,, | Performed by: ANESTHESIOLOGY

## 2019-01-16 PROCEDURE — 37000008 HC ANESTHESIA 1ST 15 MINUTES: Performed by: ORTHOPAEDIC SURGERY

## 2019-01-16 PROCEDURE — 64718 REVISE ULNAR NERVE AT ELBOW: CPT | Mod: 59,RT,, | Performed by: ORTHOPAEDIC SURGERY

## 2019-01-16 PROCEDURE — C1769 GUIDE WIRE: HCPCS | Performed by: ORTHOPAEDIC SURGERY

## 2019-01-16 PROCEDURE — 71000015 HC POSTOP RECOV 1ST HR: Performed by: ORTHOPAEDIC SURGERY

## 2019-01-16 PROCEDURE — 82962 GLUCOSE BLOOD TEST: CPT | Performed by: ORTHOPAEDIC SURGERY

## 2019-01-16 PROCEDURE — 64415 NJX AA&/STRD BRCH PLXS IMG: CPT | Mod: 59,RT,, | Performed by: ANESTHESIOLOGY

## 2019-01-16 PROCEDURE — D9220A PRA ANESTHESIA: Mod: CRNA,,, | Performed by: NURSE ANESTHETIST, CERTIFIED REGISTERED

## 2019-01-16 PROCEDURE — C1776 JOINT DEVICE (IMPLANTABLE): HCPCS | Performed by: ORTHOPAEDIC SURGERY

## 2019-01-16 PROCEDURE — 63600175 PHARM REV CODE 636 W HCPCS

## 2019-01-16 PROCEDURE — 25000003 PHARM REV CODE 250

## 2019-01-16 PROCEDURE — 64999 PR, ORTHO NERVE WRAPPING: ICD-10-PCS | Mod: ,,, | Performed by: ORTHOPAEDIC SURGERY

## 2019-01-16 PROCEDURE — 36000710: Performed by: ORTHOPAEDIC SURGERY

## 2019-01-16 PROCEDURE — 36000711: Performed by: ORTHOPAEDIC SURGERY

## 2019-01-16 PROCEDURE — 64415 SUPRACLAVICULAR BRACHIAL PLEXUS SINGLE INJECTION BLOCK: ICD-10-PCS | Mod: 59,RT,, | Performed by: ANESTHESIOLOGY

## 2019-01-16 PROCEDURE — 71000044 HC DOSC ROUTINE RECOVERY FIRST HOUR: Performed by: ORTHOPAEDIC SURGERY

## 2019-01-16 DEVICE — ALLOGRAFT CARTIMAX VIABLE CART: Type: IMPLANTABLE DEVICE | Site: ARM | Status: FUNCTIONAL

## 2019-01-16 DEVICE — MATRIX REGENERATIVE CLARIX FLO: Type: IMPLANTABLE DEVICE | Site: ARM | Status: FUNCTIONAL

## 2019-01-16 DEVICE — IMPLANTABLE DEVICE: Type: IMPLANTABLE DEVICE | Site: ARM | Status: FUNCTIONAL

## 2019-01-16 RX ORDER — BACITRACIN ZINC 500 UNIT/G
OINTMENT (GRAM) TOPICAL
Status: DISCONTINUED | OUTPATIENT
Start: 2019-01-16 | End: 2019-01-16 | Stop reason: HOSPADM

## 2019-01-16 RX ORDER — EPHEDRINE SULFATE 50 MG/ML
INJECTION, SOLUTION INTRAVENOUS
Status: DISCONTINUED | OUTPATIENT
Start: 2019-01-16 | End: 2019-01-16

## 2019-01-16 RX ORDER — EPINEPHRINE 1 MG/ML
INJECTION, SOLUTION INTRACARDIAC; INTRAMUSCULAR; INTRAVENOUS; SUBCUTANEOUS
Status: DISCONTINUED
Start: 2019-01-16 | End: 2019-01-16 | Stop reason: HOSPADM

## 2019-01-16 RX ORDER — VANCOMYCIN HCL IN 5 % DEXTROSE 1G/250ML
PLASTIC BAG, INJECTION (ML) INTRAVENOUS
Status: COMPLETED
Start: 2019-01-16 | End: 2019-01-16

## 2019-01-16 RX ORDER — SODIUM CHLORIDE 9 MG/ML
INJECTION, SOLUTION INTRAVENOUS CONTINUOUS
Status: DISCONTINUED | OUTPATIENT
Start: 2019-01-16 | End: 2019-01-16 | Stop reason: HOSPADM

## 2019-01-16 RX ORDER — VANCOMYCIN HCL IN 5 % DEXTROSE 1G/250ML
1000 PLASTIC BAG, INJECTION (ML) INTRAVENOUS ONCE
Status: COMPLETED | OUTPATIENT
Start: 2019-01-16 | End: 2019-01-16

## 2019-01-16 RX ORDER — BUPIVACAINE HYDROCHLORIDE 5 MG/ML
INJECTION, SOLUTION EPIDURAL; INTRACAUDAL
Status: COMPLETED
Start: 2019-01-16 | End: 2019-01-16

## 2019-01-16 RX ORDER — OXYCODONE AND ACETAMINOPHEN 5; 325 MG/1; MG/1
1 TABLET ORAL EVERY 4 HOURS PRN
Qty: 50 TABLET | Refills: 0 | Status: SHIPPED | OUTPATIENT
Start: 2019-01-16 | End: 2019-01-16 | Stop reason: SDUPTHER

## 2019-01-16 RX ORDER — GLYCOPYRROLATE 0.2 MG/ML
INJECTION INTRAMUSCULAR; INTRAVENOUS
Status: DISCONTINUED | OUTPATIENT
Start: 2019-01-16 | End: 2019-01-16

## 2019-01-16 RX ORDER — CLINDAMYCIN PHOSPHATE 900 MG/50ML
900 INJECTION, SOLUTION INTRAVENOUS ONCE
Status: COMPLETED | OUTPATIENT
Start: 2019-01-16 | End: 2019-01-16

## 2019-01-16 RX ORDER — MUPIROCIN 20 MG/G
OINTMENT TOPICAL
Status: DISCONTINUED | OUTPATIENT
Start: 2019-01-16 | End: 2019-01-16 | Stop reason: HOSPADM

## 2019-01-16 RX ORDER — FENTANYL CITRATE 50 UG/ML
25 INJECTION, SOLUTION INTRAMUSCULAR; INTRAVENOUS EVERY 5 MIN PRN
Status: DISCONTINUED | OUTPATIENT
Start: 2019-01-16 | End: 2019-01-16 | Stop reason: HOSPADM

## 2019-01-16 RX ORDER — MUPIROCIN 20 MG/G
OINTMENT TOPICAL
Status: COMPLETED
Start: 2019-01-16 | End: 2019-01-16

## 2019-01-16 RX ORDER — OXYCODONE AND ACETAMINOPHEN 10; 325 MG/1; MG/1
1 TABLET ORAL EVERY 4 HOURS PRN
Status: DISCONTINUED | OUTPATIENT
Start: 2019-01-16 | End: 2019-01-16 | Stop reason: HOSPADM

## 2019-01-16 RX ORDER — OXYCODONE AND ACETAMINOPHEN 5; 325 MG/1; MG/1
1 TABLET ORAL EVERY 4 HOURS PRN
Qty: 50 TABLET | Refills: 0 | Status: SHIPPED | OUTPATIENT
Start: 2019-01-16 | End: 2019-01-30

## 2019-01-16 RX ORDER — HYDROMORPHONE HYDROCHLORIDE 1 MG/ML
0.2 INJECTION, SOLUTION INTRAMUSCULAR; INTRAVENOUS; SUBCUTANEOUS EVERY 5 MIN PRN
Status: DISCONTINUED | OUTPATIENT
Start: 2019-01-16 | End: 2019-01-16 | Stop reason: HOSPADM

## 2019-01-16 RX ORDER — OXYCODONE AND ACETAMINOPHEN 5; 325 MG/1; MG/1
1 TABLET ORAL EVERY 4 HOURS PRN
Status: DISCONTINUED | OUTPATIENT
Start: 2019-01-16 | End: 2019-01-16 | Stop reason: HOSPADM

## 2019-01-16 RX ORDER — FENTANYL CITRATE 50 UG/ML
INJECTION, SOLUTION INTRAMUSCULAR; INTRAVENOUS
Status: DISCONTINUED | OUTPATIENT
Start: 2019-01-16 | End: 2019-01-16

## 2019-01-16 RX ORDER — BUPIVACAINE HYDROCHLORIDE AND EPINEPHRINE 5; 5 MG/ML; UG/ML
INJECTION, SOLUTION EPIDURAL; INTRACAUDAL; PERINEURAL
Status: COMPLETED | OUTPATIENT
Start: 2019-01-16 | End: 2019-01-16

## 2019-01-16 RX ORDER — CLINDAMYCIN PHOSPHATE 900 MG/50ML
INJECTION, SOLUTION INTRAVENOUS
Status: COMPLETED
Start: 2019-01-16 | End: 2019-01-16

## 2019-01-16 RX ORDER — LORAZEPAM 2 MG/ML
0.25 INJECTION INTRAMUSCULAR ONCE AS NEEDED
Status: DISCONTINUED | OUTPATIENT
Start: 2019-01-16 | End: 2019-01-16 | Stop reason: HOSPADM

## 2019-01-16 RX ORDER — MIDAZOLAM HYDROCHLORIDE 1 MG/ML
0.5 INJECTION INTRAMUSCULAR; INTRAVENOUS
Status: DISCONTINUED | OUTPATIENT
Start: 2019-01-16 | End: 2019-01-16 | Stop reason: HOSPADM

## 2019-01-16 RX ORDER — SODIUM CHLORIDE 0.9 % (FLUSH) 0.9 %
3 SYRINGE (ML) INJECTION
Status: DISCONTINUED | OUTPATIENT
Start: 2019-01-16 | End: 2019-01-16 | Stop reason: HOSPADM

## 2019-01-16 RX ORDER — LIDOCAINE HCL/PF 100 MG/5ML
SYRINGE (ML) INTRAVENOUS
Status: DISCONTINUED | OUTPATIENT
Start: 2019-01-16 | End: 2019-01-16

## 2019-01-16 RX ORDER — ONDANSETRON 2 MG/ML
INJECTION INTRAMUSCULAR; INTRAVENOUS
Status: DISCONTINUED | OUTPATIENT
Start: 2019-01-16 | End: 2019-01-16

## 2019-01-16 RX ORDER — PROPOFOL 10 MG/ML
VIAL (ML) INTRAVENOUS
Status: DISCONTINUED | OUTPATIENT
Start: 2019-01-16 | End: 2019-01-16

## 2019-01-16 RX ADMIN — PROPOFOL 20 MG: 10 INJECTION, EMULSION INTRAVENOUS at 10:01

## 2019-01-16 RX ADMIN — Medication 1000 MG: at 08:01

## 2019-01-16 RX ADMIN — FENTANYL CITRATE 25 MCG: 50 INJECTION, SOLUTION INTRAMUSCULAR; INTRAVENOUS at 10:01

## 2019-01-16 RX ADMIN — PROMETHAZINE HYDROCHLORIDE 6.25 MG: 25 INJECTION INTRAMUSCULAR; INTRAVENOUS at 02:01

## 2019-01-16 RX ADMIN — EPHEDRINE SULFATE 10 MG: 50 INJECTION, SOLUTION INTRAMUSCULAR; INTRAVENOUS; SUBCUTANEOUS at 11:01

## 2019-01-16 RX ADMIN — LIDOCAINE HYDROCHLORIDE 50 MG: 20 INJECTION, SOLUTION INTRAVENOUS at 10:01

## 2019-01-16 RX ADMIN — EPHEDRINE SULFATE 10 MG: 50 INJECTION, SOLUTION INTRAMUSCULAR; INTRAVENOUS; SUBCUTANEOUS at 10:01

## 2019-01-16 RX ADMIN — GLYCOPYRROLATE 0.2 MG: 0.2 INJECTION, SOLUTION INTRAMUSCULAR; INTRAVENOUS at 10:01

## 2019-01-16 RX ADMIN — SODIUM CHLORIDE, SODIUM GLUCONATE, SODIUM ACETATE, POTASSIUM CHLORIDE, MAGNESIUM CHLORIDE, SODIUM PHOSPHATE, DIBASIC, AND POTASSIUM PHOSPHATE: .53; .5; .37; .037; .03; .012; .00082 INJECTION, SOLUTION INTRAVENOUS at 11:01

## 2019-01-16 RX ADMIN — PROPOFOL 30 MG: 10 INJECTION, EMULSION INTRAVENOUS at 10:01

## 2019-01-16 RX ADMIN — BUPIVACAINE HYDROCHLORIDE AND EPINEPHRINE BITARTRATE 30 ML: 5; .0091 INJECTION, SOLUTION EPIDURAL; INTRACAUDAL; PERINEURAL at 10:01

## 2019-01-16 RX ADMIN — FENTANYL CITRATE 25 MCG: 50 INJECTION, SOLUTION INTRAMUSCULAR; INTRAVENOUS at 11:01

## 2019-01-16 RX ADMIN — ONDANSETRON 4 MG: 2 INJECTION INTRAMUSCULAR; INTRAVENOUS at 12:01

## 2019-01-16 RX ADMIN — PROPOFOL 150 MG: 10 INJECTION, EMULSION INTRAVENOUS at 10:01

## 2019-01-16 RX ADMIN — MUPIROCIN: 20 OINTMENT TOPICAL at 08:01

## 2019-01-16 RX ADMIN — FENTANYL CITRATE 25 MCG: 50 INJECTION, SOLUTION INTRAMUSCULAR; INTRAVENOUS at 12:01

## 2019-01-16 RX ADMIN — VANCOMYCIN HYDROCHLORIDE 1000 MG: 1 INJECTION, POWDER, LYOPHILIZED, FOR SOLUTION INTRAVENOUS at 08:01

## 2019-01-16 RX ADMIN — CLINDAMYCIN PHOSPHATE 900 MG: 18 INJECTION, SOLUTION INTRAVENOUS at 10:01

## 2019-01-16 RX ADMIN — MIDAZOLAM HYDROCHLORIDE 1 MG: 1 INJECTION, SOLUTION INTRAMUSCULAR; INTRAVENOUS at 09:01

## 2019-01-16 RX ADMIN — SODIUM CHLORIDE 1000 ML: 0.9 INJECTION, SOLUTION INTRAVENOUS at 08:01

## 2019-01-16 NOTE — ANESTHESIA PROCEDURE NOTES
Supraclavicular Brachial Plexus Single Injection Block    Patient location during procedure: pre-op   Block not for primary anesthetic.  Reason for block: at surgeon's request and post-op pain management   Post-op Pain Location: Right Arm  Start time: 1/16/2019 9:41 AM  Timeout: 1/16/2019 9:40 AM   End time: 1/16/2019 9:48 AM  Staffing  Anesthesiologist: Katja Irwin MD  Resident/CRNA: Chris Auguste MD  Performed: resident/CRNA   Preanesthetic Checklist  Completed: patient identified, site marked, surgical consent, pre-op evaluation, timeout performed, IV checked, risks and benefits discussed and monitors and equipment checked  Peripheral Block  Patient position: supine  Prep: ChloraPrep  Patient monitoring: heart rate, cardiac monitor, continuous pulse ox, continuous capnometry and frequent blood pressure checks  Block type: supraclavicular  Laterality: right  Injection technique: single shot  Needle  Needle type: Stimuplex   Needle gauge: 22 G  Needle length: 2 in  Needle localization: anatomical landmarks and ultrasound guidance   -ultrasound image captured on disc.  Assessment  Injection assessment: negative aspiration, negative parasthesia and local visualized surrounding nerve  Paresthesia pain: none  Heart rate change: no  Slow fractionated injection: yes  Additional Notes  VSS.  [facility] RN monitoring vitals throughout procedure.  Patient tolerated procedure well.                lmom detailed

## 2019-01-16 NOTE — INTERVAL H&P NOTE
The patient has been examined and the H&P has been reviewed:    I concur with the findings and no changes have occurred since H&P was written.    Anesthesia/Surgery risks, benefits and alternative options discussed and understood by patient/family.          Active Hospital Problems    Diagnosis  POA    Right elbow pain [M25.521]  Yes      Resolved Hospital Problems   No resolved problems to display.

## 2019-01-16 NOTE — H&P
H&P  Orthopedic Surgery      SUBJECTIVE:     History of Present Illness:  HISTORY OF PRESENT ILLNESS:  Ms. Kay is a 63-year-old female.  She is an   established patient in this clinic.  She has a history of a radial head   arthroplasty done at an outside facility.   Ever since her injury or surgery, the patient has had ulnar nerve decreased   sensation and weakness.      Past Medical History:   Diagnosis Date    Anxiety     AR (allergic rhinitis)     Diabetes mellitus, type 2     Dizziness     DM (diabetes mellitus)     Fatty liver     GERD (gastroesophageal reflux disease)     HTN (hypertension)     Hyperlipidemia     Memory loss     Osteopenia     S/P total hysterectomy     Sleep apnea        Past Surgical History:   Procedure Laterality Date    CHOLECYSTECTOMY      COLONOSCOPY N/A 10/4/2013    Performed by [facility]urag Carl [facility] at [facility] (4TH FLR)    COLONOSCOPY with Donnell N/A 1/17/2018    Performed by Roland Jacobo MD at [facility] END[facility] (4TH FLR)    EGD (ESOPHAGOGASTRODUODENOSCOPY) N/A 2/28/2014    Performed by [facility]ag [facility] at [facility] (4TH FLR)    ELBOW SURGERY Right 7/16/15    ELBOW SURGERY      HYSTERECTOMY      INJECTION-STEROID-EPIDURAL-CERVICAL N/A 3/16/2015    Performed by [facility] CATH [facility]    KNEE ARTHROSCOPY W/ DEBRIDEMENT  4/11    Left    ROTATOR CUFF REPAIR      TONSILLECTOMY      TOTAL ABDOMINAL HYSTERECTOMY W/ BILATERAL SALPINGOOPHORECTOMY         Family History   Problem Relation Age of Onset    Colon cancer Father 83        colon cancer    Diabetes Maternal Grandmother     Diabetes Maternal Aunt        Social History     Socioeconomic History    Marital status:      Spouse name: Not on file    Number of children: Not on file    Years of education: Not on file    Highest education level: Not on file   Social Needs    Financial resource strain: Not on file    Food insecurity - worry: Not on file    Food insecurity - inability: Not on  file    Transportation needs - medical: Not on file    Transportation needs - non-medical: Not on file   Occupational History    Not on file   Tobacco Use    Smoking status: Never Smoker    Smokeless tobacco: Never Used   Substance and Sexual Activity    Alcohol use: No    Drug use: No    Sexual activity: Yes     Partners: Male     Birth control/protection: Post-menopausal   Other Topics Concern    Not on file   Social History Narrative    She works at [employer] in [employer]; , 1 kid (21yo).Nonsmoker, social etoh. No exercise but hopes to start walking on the weekend.       No current facility-administered medications for this encounter.      Current Outpatient Medications   Medication Sig Dispense Refill    buPROPion (WELLBUTRIN XL) 150 MG TB24 tablet Take 1 tablet (150 mg total) by mouth once daily. 30 tablet 11    cyanocobalamin (VITAMIN B-12) 100 MCG tablet Take 100 mcg by mouth once daily.      gabapentin (NEURONTIN) 300 MG capsule Take 1 capsule (300 mg total) by mouth 3 (three) times daily. 90 capsule 11    insulin aspart U-100 (NOVOLOG FLEXPEN U-100 INSULIN) 100 unit/mL InPn pen Inject  10 units w/ breakfast, 14 units w/ lunch and dinner plus scale 180-230+2,231-280 +4, 281-330 +6, 331-380+8. 3 Box 3    insulin detemir U-100 (LEVEMIR FLEXTOUCH U-100 INSULN) 100 unit/mL (3 mL) SubQ InPn pen Inject 33 Units into the skin every evening. 3 Box 3    losartan (COZAAR) 25 MG tablet Take 0.5 tablets (12.5 mg total) by mouth once daily. (Patient taking differently: Take 12.5 mg by mouth once daily. Takes after lunch) 45 tablet 3    pantoprazole (PROTONIX) 40 MG tablet Take 1 tablet (40 mg total) by mouth once daily. 30 tablet 11    pyridoxine, vitamin B6, (VITAMIN B-6) 100 MG Tab Take 100 mg by mouth once daily.      SITagliptin (JANUVIA) 100 MG Tab Take 1 tablet (100 mg total) by mouth once daily. 90 tablet 3    azelastine (ASTELIN) 137 mcg (0.1 %) nasal spray 1 spray (137 mcg total) by  "Nasal route 2 (two) times daily. 30 mL 6    blood sugar diagnostic (ACCU-CHEK SMARTVIEW TEST STRIP) Strp       blood-glucose meter kit Use as instructed, preferred meter. DX Code. E11.42 1 each 0    insulin needles, disposable, (PEN NEEDLE) 31 X 5/16 " Ndle Use as directed 100 each PRN    lancets (MICROLET LANCET) Misc       ondansetron (ZOFRAN-ODT) 8 MG TbDL Take 1 tablet (8 mg total) by mouth 3 (three) times daily as needed. 30 tablet 0    pen needle, diabetic (NOVOFINE 32) 32 gauge x 1/4" Ndle          Review of patient's allergies indicates:   Allergen Reactions    Iodinated contrast- oral and iv dye Swelling and Rash    Macrobid [nitrofurantoin monohyd/m-cryst] Rash    Metformin Rash    Penicillins Rash    Promethazine Rash    Sulfa (sulfonamide antibiotics) Rash    Sulfamethoxazole-trimethoprim Rash         Review of Systems:  ROS: Patient denies constitutional symptoms, cardiac symptoms, respiratory symptoms, GI symptoms. The remainder of the musculoskeletal ROS is included in the HPI.      OBJECTIVE:         Physical Exam:  Constitutional:  NAD; well-developed and well-nourished  Pulmonary/Chest: Effort normal  Skin: Warm and dry  Neuro: Alert and oriented to person, place, and time; no focal numbness or weakness      RUE:  Good active ROM of the wrist and fingers. She has good ROM of the elbow. Mild ttp over lateral elbow and medial epicondyle. AIN/PIN/Radial/Median/Ulnar Nerves assessed in isolation without deficit. Radial & Ulnar arteries palpated 2+. Capillary Refill <3s. Mildly + tinels over the elbow. Full pronation and supination with minimal pain. Incision C/D/I   There is pain with DRUJ shucking         Diagnostic Results:  Xray right elbow 9/20/18  FINDINGS:  These views demonstrate postsurgical changes to the right elbow with a radial head prosthesis in place.  There is also an orthopedic screw in the lateral epicondyle.  No fracture or dislocation.  Moderate joint space loss in the " medial elbow joint is present.  Osteopenia about the elbow particularly at the lateral epicondyle is identified.  There is no joint space effusion.  No loose bodies identified in the joint although there is hypertrophic bone formation in the medial aspect of the joint.          Nerve testing was normal.      ASSESSMENT/PLAN:      PLAN:  For this patient, we discussed radial head revision and ulnar nerve   release in great detail.  The patient agrees with this plan.  Consents were signed previously in clinic.  The patient   understands that she may not gain more motion, she may have continued pain and   she may have continued ulnar nerve difficulty.

## 2019-01-16 NOTE — TRANSFER OF CARE
"Anesthesia Transfer of Care Note    Patient: Riana Kay    Procedure(s) Performed: Procedure(s) (LRB):  ARTHROPLASTY, ELBOW right radial head arthroplasty revision (Right)  RELEASE, ULNAR TUNNEL right (Right)    Patient location: PACU    Anesthesia Type: general    Transport from OR: Transported from OR on 6-10 L/min O2 by face mask with adequate spontaneous ventilation    Post pain: adequate analgesia    Post assessment: no apparent anesthetic complications and tolerated procedure well    Post vital signs: stable    Level of consciousness: awake and alert    Nausea/Vomiting: no nausea/vomiting    Complications: none    Transfer of care protocol was followed      Last vitals:   Visit Vitals  BP (!) 148/64 (BP Location: Left arm, Patient Position: Lying)   Pulse 61   Temp 36.7 °C (98.1 °F)   Resp 17   Ht 5' 5" (1.651 m)   Wt 73.9 kg (163 lb)   SpO2 98%   Breastfeeding? No   BMI 27.12 kg/m²     "

## 2019-01-16 NOTE — ANESTHESIA POSTPROCEDURE EVALUATION
"Anesthesia Post Evaluation    Patient: Riana Kay    Procedure(s) Performed: Procedure(s) (LRB):  ARTHROPLASTY, ELBOW right radial head arthroplasty revision (Right)  RELEASE, ULNAR TUNNEL right (Right)    Final Anesthesia Type: general  Patient location during evaluation: PACU  Patient participation: Yes- Able to Participate  Level of consciousness: awake and alert and oriented  Post-procedure vital signs: reviewed and stable  Pain management: adequate  Airway patency: patent  PONV status at discharge: No PONV  Anesthetic complications: no      Cardiovascular status: hemodynamically stable  Respiratory status: unassisted  Hydration status: euvolemic  Follow-up not needed.        Visit Vitals  /63   Pulse 88   Temp 36.5 °C (97.7 °F) (Temporal)   Resp (!) 22   Ht 5' 5" (1.651 m)   Wt 73.9 kg (163 lb)   SpO2 95%   Breastfeeding? No   BMI 27.12 kg/m²       Pain/Jeffery Score: Pain Rating Prior to Med Admin: 2 (1/16/2019  9:44 AM)  Jeffery Score: 10 (1/16/2019  3:17 PM)        "

## 2019-01-16 NOTE — BRIEF OP NOTE
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 [G56.21]    Procedure(s) (LRB):  ARTHROPLASTY, ELBOW right radial head arthroplasty revision (Right)  RELEASE, ULNAR TUNNEL right (Right)    Anesthesia: General    Description of the findings of the procedure: see op note    Findings/Key Components: see op note    Estimated Blood Loss: * No values recorded between 1/16/2019 11:00 AM and 1/16/2019  1:14 PM *         Specimens:   Specimen (12h ago, onward)    None          Discharge Note    SUMMARY     Admit Date: 1/16/2019    Discharge Date and Time:  01/16/2019     Hospital Course (synopsis of major diagnoses, care, treatment, and services provided during the course of the hospital stay): Patient presented for outpatient procedure, tolerated well, and was discharged home after appropriate recovery from anesthesia.       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        CHANGE how you take these medications    losartan 25 MG tablet  Commonly known as:  COZAAR  Take 0.5 tablets (12.5 mg total) by mouth once daily.  What changed:  additional instructions        CONTINUE taking these medications    ACCU-CHEK SMARTVIEW TEST STRIP Strp  Generic  "drug:  blood sugar diagnostic     azelastine 137 mcg (0.1 %) nasal spray  Commonly known as:  ASTELIN  1 spray (137 mcg total) by Nasal route 2 (two) times daily.     blood-glucose meter kit  Use as instructed, preferred meter. DX Code. E11.42     buPROPion 150 MG TB24 tablet  Commonly known as:  WELLBUTRIN XL  Take 1 tablet (150 mg total) by mouth once daily.     gabapentin 300 MG capsule  Commonly known as:  NEURONTIN  Take 1 capsule (300 mg total) by mouth 3 (three) times daily.     insulin aspart U-100 100 unit/mL Inpn pen  Commonly known as:  NovoLOG Flexpen U-100 Insulin  Inject  10 units w/ breakfast, 14 units w/ lunch and dinner plus scale 180-230+2,231-280 +4, 281-330 +6, 331-380+8.     insulin detemir U-100 100 unit/mL (3 mL) Inpn pen  Commonly known as:  LEVEMIR FLEXTOUCH U-100 INSULN  Inject 33 Units into the skin every evening.     MICROLET LANCET Misc  Generic drug:  lancets     NOVOFINE 32 32 gauge x 1/4" Ndle  Generic drug:  pen needle, diabetic     ondansetron 8 MG Tbdl  Commonly known as:  ZOFRAN-ODT  Take 1 tablet (8 mg total) by mouth 3 (three) times daily as needed.     pantoprazole 40 MG tablet  Commonly known as:  PROTONIX  Take 1 tablet (40 mg total) by mouth once daily.     pen needle, diabetic 31 gauge x 5/16" Ndle  Commonly known as:  PEN NEEDLE  Use as directed     SITagliptin 100 MG Tab  Commonly known as:  JANUVIA  Take 1 tablet (100 mg total) by mouth once daily.     VITAMIN B-12 100 MCG tablet  Generic drug:  cyanocobalamin  Take 100 mcg by mouth once daily.     VITAMIN B-6 100 MG Tab  Generic drug:  pyridoxine (vitamin B6)  Take 100 mg by mouth once daily.          Discharge Procedure Orders   Call MD for:  temperature >100.4     Call MD for:  persistent nausea and vomiting or diarrhea     Call MD for:  redness, tenderness, or signs of infection (pain, swelling, redness, odor or green/yellow discharge around incision site)     Call MD for:  difficulty breathing or increased cough "     Call MD for:  severe persistent headache     Call MD for:  worsening rash     Call MD for:  persistent dizziness, light-headedness, or visual disturbances     Call MD for:  increased confusion or weakness     Leave dressing on - Keep it clean, dry, and intact until clinic visit     Weight bearing restrictions (specify):   Order Comments: Nonweight bearing operative extremity     Follow-up Information     Olivia Mireles PA-C. Go on 1/30/2019.    Specialties:  Hand Surgery, Orthopedic Surgery  Why:  For wound re-check; 10 am   Contact information:  Gladis DAVISON  [facility] [id number] [phone]

## 2019-01-16 NOTE — DISCHARGE INSTRUCTIONS
Discharge Instructions for Total Elbow Replacement  You had a procedure called total elbow replacement. Artificial elbow parts replace the surfaces of your damaged elbow. The artificial elbow has 2 parts. One part fits into your humerus (upper arm). The other part fits into your ulna (forearm). The resulting hinge allows your elbow to bend. Heres what you need to know about home care after surgery.  Activity  · Dont use your affected arm to get out of bed or up from a chair. Use the other arm instead.  · Do not lift anything with your affected arm after surgery until your doctor says it's OK. You won't be able to place any weight or resistance on your affected elbow for several weeks.   · Avoid contact sports, such as basketball or football.  · Avoid activities such as hammering, heavy or repetitive lifting, or activities that put too much strain on your elbow. Avoid sweeping, mopping, or running the vacuum  using your affected arm. Ask your doctor when you may resume these activities.  · Do the exercises that you learned in the hospital, as instructed by your doctor.  Home care  · Take your pain medicine exactly as directed.  · Dont be alarmed by some swelling around the incision. This is normal. Wear loose-fitting clothing to avoid pressure on the incision.  · Use an ice pack or bag of frozen peas--or something similar--wrapped in a thin towel to reduce the swelling. Apply the ice pack for 20 minutes; then remove it for 20 minutes. Repeat as needed.  · Follow your doctors instructions about wearing and caring for a splint, sling, or dressing.  · Shower as needed. Cover your elbow with plastic to keep it dry.  · Dont sleep on the side of your operation.  · Avoid infection by washing your hands often. If an infection occurs, you will need immediate treatment. So call your doctor right away if you think you have an infection. Symptoms of infection include a fever, chills,  or a wound that leaks white,  green, or yellow fluid.  · If you received an artificial joint, tell all your healthcare providers--including your dentist--about the joint. You may need to take antibiotics before certain procedures, including dental work, to reduce the risk for infection.  Follow-up care  Follow up with your healthcare provider, or as advised.      When to call your healthcare provider  Call 911 right away if you have any of the following:  · Shortness of breath  · Chest pain  Otherwise, call your healthcare provider right away if you have any of these:  · A fever above 100.4°F (38°C) or as advised  · Shaking chills  · Increased redness, tenderness, or swelling of the wound  · Drainage from the incision  · Opening of the incision  · Increased pain with or without activity   Date Last Reviewed: 7/1/2016  © 6574-5093 [employer]. [address] [place] [address], [place] [address]. All rights reserved. This information is not intended as a substitute for professional medical care. Always follow your healthcare professional's instructions.

## 2019-01-17 ENCOUNTER — TELEPHONE (OUTPATIENT)
Dept: ADMINISTRATIVE | Facility: HOSPITAL | Age: 64
End: 2019-01-17

## 2019-01-17 DIAGNOSIS — M25.562 PAIN IN BOTH KNEES, UNSPECIFIED CHRONICITY: Primary | ICD-10-CM

## 2019-01-17 DIAGNOSIS — M25.561 PAIN IN BOTH KNEES, UNSPECIFIED CHRONICITY: Primary | ICD-10-CM

## 2019-01-18 NOTE — OP NOTE
DATE OF PROCEDURE:  01/16/2019    SERVICE:  Orthopedics.    ATTENDING SURGEON:  Katja Hubbard M.D.    RESIDENT SURGEON:  Konstantin Nicolas M.D. (RES)    PREOPERATIVE DIAGNOSES:  1.  Right radial head arthroplasty revision.  2.  Ulnar nerve decompression at the elbow.  3.  Fluoro less than 1 hour.  4.  Splint application, right upper extremity.    POSTOPERATIVE DIAGNOSES:  1.  Right radial head arthroplasty revision.  2.  Ulnar nerve decompression at the elbow.  3.  Fluoro less than 1 hour.  4.  Splint application, right upper extremity.  5.  Osteochondral lesion of right capitellum measuring 5 mm x 5 mm.    PROCEDURE PERFORMED:  Osteochondral allograft to right cartilage lesions,   capitellum.    ANESTHESIA:  General with regional.    FLUIDS:  Lactated Ringer's.    BLOOD LOSS:  None.  No blood was given.    TOURNIQUET TIME:  Less than 2 hours.    PACKS AND DRAINS:  None.    IMPLANTS:  Elongated radial head arthroplasty, an Acumed size 8, Clarix   injectable.    COMPLICATIONS:  None.    INDICATIONS FOR PROCEDURE:  Ms. STEWARD is a 63-year-old female.  Over one year ago,   she was seen by another hand surgeon, who sustained a terrible triage.  She   underwent coronoid repair as well as radial head arthroplasty.  She continued to   have pain.  Per the patient, the doctor stopped taking her insurance and   therefore she sought treatment at our facility.  She states she had ulnar nerve   symptoms since the injury.  No ulnar nerve decompression was done at the time of   the surgery.  She also states she had significant pain laterally near the   radial head.  X-rays and imaging was performed, specifically CT what appeared is   that the radial head was not tapped down into position.  It was slightly proud   and the head did appear to be slightly overstuffed.  Therefore, after much   discussion with the patient, we talked about radial head revision where we had   removed this and placed a new one and ulnar nerve  decompression and we did do a   nerve testing prior.  Risk and benefits were explained to the patient in clinic.    Consents performed in clinic.    PROCEDURE IN DETAIL:  After the correct site was marked with the patient's   participation in the holding area, the patient underwent regional anesthesia,   was brought to the Operating Room, placed in supine position and underwent   general anesthesia.  Her right upper extremity was prepped and draped in normal   sterile fashion.  A well-padded sterile tourniquet was placed in the right upper   extremity.  A timeout was conducted for the correct site, procedure and the   patient to be indicated.  IV antibiotics were given to the patient   preoperatively.  The arm was exsanguinated with an Esmarch.  Tourniquet was   insufflated to 250 mmHg.  Our attention was first turned to the ulnar nerve.    The incision was made.  Careful dissection was made down to the ulnar nerve.    The ulnar nerve was completely decompressed proximally through the cubital   tunnel and distally including the fascia of the two heads of the FCU.  The nerve   did appear in significant amount of scar in the area.  After it was   decompressed, the elbow was placed through range of motion and showed that the   nerve did not sublux.  The area was irrigated with copious amounts of normal   saline.  Clarix injectable was placed.  Vicryl, Monocryl and Dermabond closed   the skin.  Our attention was then turned to the lateral side.  The original   incision was opened and where the original incision through the muscular fascia   was opened was also incised.  Again, the FiberWire stitches were still in place   and this was easy to find that was opened.  Immediately, the radial head did   protrude out of the fascia.  After meticulous exposure around the prosthesis was   performed, Hohmanns were placed gently around the prosthesis with the arm in   pronation and using hardware removal tools the stem was removed  and again it   took a great deal of time because there was significant bony ingrowth.  We then   reamed again and we reamed to a size 8.  The decision was made to do a longer   stem due to the size 22 defect that was missing as there was significant amount   of bone that was not present on the radius.  Of note, we did notice that there   was a defect measuring in the capitellum cartilage defect measuring 5 mm x 5 mm.    Therefore, an osteocartilaginous graft was mixed and placed in that defect   after the unstable cartilage was removed.  After it was in the defect, we did   measure again to the size 8 stem, the Acumed and a size 8 stem was placed in   position with the radial head.  Multiple x-rays were taken during this time.    The area had been irrigated with copious amounts of normal saline.  A FiberWire   stitch was used to close the soft tissue deep.  X-rays were taken and showed   that the elbow was stable, but it was not subluxed or dislocated.  It was in   good position as well as the radial head was at the level of the PRUJ.  The area   was irrigated with copious amounts of normal saline.  Vicryl, Monocryl and   Dermabond closed the skin.  Sterile dressing was applied.  Tourniquet was   deflated.  Brisk capillary refill ensued.  The patient was placed in a   well-padded long arm splint, tolerated the procedure well, was brought to the   Recovery Area in stable condition.    POSTOPERATIVE PLAN FOR THIS PATIENT:  She is to keep the dressing clean, dry and   intact.  We will see her back in 2 weeks.  We will put her in an elbow brace.    We will start therapy at that time.      LES/IN  dd: 01/17/2019 12:28:20 (CST)  td: 01/17/2019 18:29:17 (CST)  Doc ID   #[id number]  Job ID #[id number]    CC:

## 2019-01-29 ENCOUNTER — PATIENT MESSAGE (OUTPATIENT)
Dept: ADMINISTRATIVE | Facility: OTHER | Age: 64
End: 2019-01-29

## 2019-01-29 DIAGNOSIS — Z12.39 ENCOUNTER FOR SCREENING FOR MALIGNANT NEOPLASM OF BREAST: Primary | ICD-10-CM

## 2019-01-30 ENCOUNTER — OFFICE VISIT (OUTPATIENT)
Dept: ORTHOPEDICS | Facility: CLINIC | Age: 64
End: 2019-01-30
Payer: MEDICARE

## 2019-01-30 VITALS — BODY MASS INDEX: 27.16 KG/M2 | HEIGHT: 65 IN | WEIGHT: 163 LBS

## 2019-01-30 DIAGNOSIS — Z98.890 POST-OPERATIVE STATE: Primary | ICD-10-CM

## 2019-01-30 PROCEDURE — 99999 PR PBB SHADOW E&M-EST. PATIENT-LVL III: CPT | Mod: PBBFAC,,, | Performed by: PHYSICIAN ASSISTANT

## 2019-01-30 PROCEDURE — 99024 POSTOP FOLLOW-UP VISIT: CPT | Mod: S$GLB,,, | Performed by: PHYSICIAN ASSISTANT

## 2019-01-30 PROCEDURE — 99999 PR PBB SHADOW E&M-EST. PATIENT-LVL III: ICD-10-PCS | Mod: PBBFAC,,, | Performed by: PHYSICIAN ASSISTANT

## 2019-01-30 PROCEDURE — 99024 PR POST-OP FOLLOW-UP VISIT: ICD-10-PCS | Mod: S$GLB,,, | Performed by: PHYSICIAN ASSISTANT

## 2019-01-30 PROCEDURE — 97760 PR ORTHOTIC MGMT&TRAINJ INITIAL ENC EA 15 MINS: ICD-10-PCS | Mod: GP,S$GLB,, | Performed by: PHYSICIAN ASSISTANT

## 2019-01-30 PROCEDURE — 97760 ORTHOTIC MGMT&TRAING 1ST ENC: CPT | Mod: GP,S$GLB,, | Performed by: PHYSICIAN ASSISTANT

## 2019-01-30 NOTE — PROGRESS NOTES
"Ms. Kay is here today for a post-operative visit.  She is 16 days status post Right radial head arthroplasty revision and Ulnar nerve decompression at the elbow by Dr. Hubbard on 1/16/19. She reports that she is doing well.  Pain is 0/10.  She is not taking pain medication.  She denies fever, chills, and sweats since the time of the surgery.     Physical exam:    Vitals:    01/30/19 0933   Weight: 73.9 kg (163 lb)   Height: 5' 5" (1.651 m)   PainSc: 0-No pain     Vital signs are stable, patient is afebrile.  Patient is well dressed and well groomed, no acute distress.  Alert and oriented to person, place, and time.  Post op dressing taken down.  Incisions are clean, dry and intact.  There is no erythema or exudate.  There is no sign of any infection. She is NVI. Sutures removed without difficulty.      Assessment: status post Right radial head arthroplasty revision and Ulnar nerve decompression at the elbow by Dr. Hubbard on 1/16/19    Plan:  Riana was seen today for post-op evaluation.    Diagnoses and all orders for this visit:    Post-operative state  -     Ambulatory Referral to Occupational Therapy  -     X-Ray Elbow Complete Right; Future      - PO instruction reviewed and provided to patient  -hinged elbow brace applied, locked at 90 degrees. 15 min spent preparing/fitting/educating.   -OT ordered  -RTC 4 weeks with hyacinth Mireles PA-C  [facility]   [facility]        "

## 2019-02-10 ENCOUNTER — PATIENT MESSAGE (OUTPATIENT)
Dept: ENDOCRINOLOGY | Facility: CLINIC | Age: 64
End: 2019-02-10

## 2019-02-11 RX ORDER — INSULIN ASPART 100 [IU]/ML
INJECTION, SOLUTION INTRAVENOUS; SUBCUTANEOUS
Qty: 3 BOX | Refills: 3 | Status: SHIPPED | OUTPATIENT
Start: 2019-02-11 | End: 2019-02-15 | Stop reason: SDUPTHER

## 2019-02-11 NOTE — TELEPHONE ENCOUNTER
----- Message from Seema Sullivan RN sent at 2/11/2019  1:56 PM CST -----  Contact: pt's       ----- Message -----  From: Cherie Macdonald  Sent: 2/11/2019   1:23 PM  To: [facility] Staff    OUT OF INSULIN   -   33 UNITS EVERY NITE -       On script only 30 units     insulin aspart U-100 (NOVOLOG FLEXPEN U-100 INSULIN) 100 unit/mL InPn pen    PT  HAS REFILLS BUT  IS OUT TOMORROW 2/12   AND CANNOT REFILL TILL Friday     PLEASE SEND A NEW SCRIPT TO COVER THE  ENTIRE 30 DAYS     [id number] - [place], [place] - [address].    Thanks

## 2019-02-12 NOTE — TELEPHONE ENCOUNTER
----- Message from Janelle Blair sent at 2/12/2019  8:51 AM CST -----  Contact: So / Mariely [phone]  .Refill request.  insulin detemir U-100 (LEVEMIR FLEXTOUCH U-100 INSULN) 100 unit/mL (3 mL) SubQ InPn pen --33 units daily     Pls cancel Novolog request    ..  [facility]/pharmacy #[id number] - [place]  [address]  [address]  [place] [address]  Phone: [phone] Fax: [phone]

## 2019-02-15 RX ORDER — INSULIN ASPART 100 [IU]/ML
INJECTION, SOLUTION INTRAVENOUS; SUBCUTANEOUS
Qty: 45 SYRINGE | Refills: 3 | Status: SHIPPED | OUTPATIENT
Start: 2019-02-15 | End: 2019-05-15 | Stop reason: SDUPTHER

## 2019-02-19 ENCOUNTER — CLINICAL SUPPORT (OUTPATIENT)
Dept: REHABILITATION | Facility: HOSPITAL | Age: 64
End: 2019-02-19
Payer: MEDICARE

## 2019-02-19 DIAGNOSIS — M25.621 STIFFNESS OF RIGHT ELBOW JOINT: ICD-10-CM

## 2019-02-19 DIAGNOSIS — M25.421 EFFUSION, RIGHT ELBOW: ICD-10-CM

## 2019-02-19 PROCEDURE — 97110 THERAPEUTIC EXERCISES: CPT | Mod: PO

## 2019-02-19 PROCEDURE — 97165 OT EVAL LOW COMPLEX 30 MIN: CPT | Mod: PO

## 2019-02-19 NOTE — PLAN OF CARE
[facility] Occupational Therapy  Initial Evaluation     Date: 2/19/2019  Name: Riana Kay  Clinic Number: [id number]    Therapy Diagnosis: No diagnosis found.  Physician: Olivia Mireles PA-C    Physician Orders: Eval and treat; modalities prn; 2x/wk x 6 weeks  Medical Diagnosis:   Z98.890 (ICD-10-CM) - Post-operative state  Surgical Procedure and Date: R radial head arthroplasty revision/R ulnar nerve compression,1/16/19  Evaluation Date: 2/19/19  Insurance Authorization Period Expiration: 1/30/20  Plan of Care Certification Period: 4/2/19  Date of Return to MD: 2/27/19    Visit # / Visits authorized: 1 / 1  Time In:9am  Time Out: 10am  Total Billable Time: 50 minutes    Precautions:  Diabetes    6 weeks s/p on 2/27/19  Subjective     Involved Side: RUE  Dominant Side: Right  Date of Onset: Approx 5 weeks ago  Mechanism of Injury: Fall  History of Current Condition: Pt report she fell at work approx. 4 years ago and shattered her radial head. She underwent surgery to her radial head. She reports she began having symptoms again and learned that she needed to undergo a revision surgery.  Of note, pt reports he has an old possible fx of MF on R side that did not receive tx.  Surgical Procedure: R radial head arthroplasty revision/R ulnar nerve compression   Previous Therapy: no    Past Medical History/Physical Systems Review:   Riana Kay  has a past medical history of Anxiety, AR (allergic rhinitis), Diabetes mellitus, type 2, Dizziness, DM (diabetes mellitus), Fatty liver, GERD (gastroesophageal reflux disease), HTN (hypertension), Hyperlipidemia, Memory loss, Osteopenia, S/P total hysterectomy, and Sleep apnea.    Riana Kay  has a past surgical history that includes Cholecystectomy; Knee arthroscopy w/ debridement (4/11); Total abdominal hysterectomy w/ bilateral salpingoophorectomy; Tonsillectomy; Rotator cuff repair; Elbow surgery (Right, 7/16/15); Elbow surgery;  "Hysterectomy; Colonoscopy (N/A, 1/17/2018); Elbow Arthroplasty (Right, 1/16/2019); and Release of ulnar nerve at cubital tunnel (Right, 1/16/2019).    Riana has a current medication list which includes the following prescription(s): azelastine, accu-chek smartview test strip, blood-glucose meter, bupropion, cyanocobalamin, gabapentin, insulin aspart u-100, insulin detemir u-100, pen needle, diabetic, lancets, losartan, ondansetron, pantoprazole, pen needle, diabetic, pyridoxine (vitamin b6), and sitagliptin.    Review of patient's allergies indicates:   Allergen Reactions    Iodinated contrast- oral and iv dye Swelling and Rash    Percocet [oxycodone-acetaminophen] Itching    Macrobid [nitrofurantoin monohyd/m-cryst] Rash    Metformin Rash    Penicillins Rash    Promethazine Rash    Sulfa (sulfonamide antibiotics) Rash    Sulfamethoxazole-trimethoprim Rash        Patient's Goals for Therapy: Regain functional use of RUE    Pain:  Functional Pain Scale Rating 0-10:   4/10 on average  Location: Over lateral incision  Description: "Pins and needles"    Occupation:  Former [employer]   Working presently: disability     Functional Limitations/Social History:    Previous functional status includes: Independent with all ADLs.     Current FunctionalStatus   Home/Living environment : lives with their spouse      Limitation of Functional Status as follows:   ADLs/IADLs:     - Feeding: difficulty with utensils    - Bathing: Unable to reach back    - Dressing/Grooming: difficultly donning and doffing    - Driving: Unable            Objective     Observation/Appearance:  Pt presents wearing a hinge elbow brace locked at 90 degrees. Upon removal, there is edema of her elbow.    Edema.    2/19/2019    Right   2in. Below elbow 11.25 in.      AROM:    Right   Wrist Flexion 10   Wrist Extension 58   Elbow Flexion 140   Elbow Extension (pronated) 45   Supination    Pronation WNL     *Composite finger flexion to " palm but it is not a tight fist to DPC    CMS Impairment/Limitation/Restriction for FOTO Wrist/Hand Survey    Therapist reviewed FOTO scores for Riana Kay on 2/19/2019.   FOTO documents entered into [employer] - see Media section.    Limitation Score: 82%  Category: Carrying    Current : CM = at least 80% but < 100% impaired limited or restricted  Goal: CK = at least 40% but < 60% impaired, limited or restricted       Treatment     Total Treatment time separate from Evaluation time:30 mins    Riana received the following supervised modalities after being cleared for contradictions for 10 minutes:   -Patient received MH x 10 min to  increase blood flow, circulation and tissue elasticity prior to therex      Riana received therapeutic exercises for 20 minutes including:  -Initial HEP instruction/demo including  -TGES  -AROM wrist flex/ext with and without composite fist  -AROM forearm supination with elbow flexed to 90  -AROm elbow flex/ext with forearm prontated      Home Exercise Program/Education:  Issued HEP (see patient instructions in EMR) and educated on modality use for pain management . Exercises were reviewed and Riana was able to demonstrate them prior to the end of the session.   Pt received a written copy of exercises to perform at home. Riana demonstrated good  understanding of the education provided.  Pt was advised to perform these exercises free of pain, and to stop performing them if pain occurs.    Patient/Family Education: role of OT, goals for OT, scheduling/cancellations - pt verbalized understanding. Discussed insurance limitations with patient.    Additional Education provided:     Assessment     Riana Kay is a 63 y.o. female presenting to OT approx 5 weeks s/p R radial head arthroplasty revision and ulnar nerve decompression. She demos edema, stiffness, and increased pain, which limits functional use of RUE during ADLs and IADLs.      Following medical record review  it is determined that pt will benefit from occupational therapy services in order to maximize pain free and/or functional use of right UE. The following goals were discussed with the patient and patient is in agreement with them as to be addressed in the treatment plan. The patient's rehab potential is Excellent.     Anticipated barriers to occupational therapy: language  Pt has no cultural, educational or language barriers to learning provided.    Profile and History Assessment of Occupational Performance Level of Clinical Decision Making Complexity Score   Occupational Profile:   Riana Kay is a 63 y.o. female who lives with their spouse and is currently a former lobby attendent. Riana Kay has difficulty with ADLs and  IADLs  affecting his/her daily functional abilities. His/her main goal for therapy is regain functional use of RUE without increased pain.     Comorbidities:   anxiety and HTN    Medical and Therapy History Review:   Brief               Performance Deficits    Physical:  Joint Mobility  Joint Stability  Skin Integrity/Scar Formation  Edema   Strength  Pinch Strength  Pain    Cognitive:  No Deficts    Psychosocial:    No Deficits     Clinical Decision Making:  high    Assessment Process:  Comprehensive Assessments    Modification/Need for Assistance:  Not Necessary    Intervention Selection:  Limited Treatment Options       low  Based on PMHX, co morbidities , data from assessments and functional level of assistance required with task and clinical presentation directly impacting function.       The following goals were discussed with the patient and patient is in agreement with them as to be addressed in the treatment plan.     Goals:   STGs to be met within 3 weeks  1. IND with HEP  2. Decrease edema of elbow to minimal levels  3. Increase composite finger flexion to the DPC  4. Increase wrist flexion to 30 degrees  LTGs to be met by discharge  5. Increase wrist and elbow  AROM to WNL to improve functional ROM during IADLs  6. Initiate strengthening to improve functional performance during household chores        Plan   Certification Period/Plan of care expiration: 2/19/2019 to 4/2/19.    Outpatient Occupational Therapy 2 times weekly for 6 weeks to include the following interventions: Fluidotherapy, Manual therapy/joint mobilizations, Modalities for pain management, US 3 mhz, Therapeutic exercises/activities., Strengthening, Edema Control and Scar Management.      Jenna Underwood, OT

## 2019-02-27 ENCOUNTER — OFFICE VISIT (OUTPATIENT)
Dept: ORTHOPEDICS | Facility: CLINIC | Age: 64
End: 2019-02-27
Payer: MEDICARE

## 2019-02-27 ENCOUNTER — HOSPITAL ENCOUNTER (OUTPATIENT)
Dept: RADIOLOGY | Facility: OTHER | Age: 64
Discharge: HOME | End: 2019-02-27
Attending: PHYSICIAN ASSISTANT
Payer: MEDICARE

## 2019-02-27 VITALS
SYSTOLIC BLOOD PRESSURE: 147 MMHG | WEIGHT: 162.94 LBS | BODY MASS INDEX: 27.15 KG/M2 | HEART RATE: 63 BPM | DIASTOLIC BLOOD PRESSURE: 93 MMHG | HEIGHT: 65 IN

## 2019-02-27 DIAGNOSIS — Z98.890 POST-OPERATIVE STATE: ICD-10-CM

## 2019-02-27 DIAGNOSIS — Z98.890 POST-OPERATIVE STATE: Primary | ICD-10-CM

## 2019-02-27 PROCEDURE — 73080 XR ELBOW COMPLETE 3 VIEW RIGHT: ICD-10-PCS | Mod: 26,RT,, | Performed by: RADIOLOGY

## 2019-02-27 PROCEDURE — 99999 PR PBB SHADOW E&M-EST. PATIENT-LVL IV: ICD-10-PCS | Mod: PBBFAC,,, | Performed by: PHYSICIAN ASSISTANT

## 2019-02-27 PROCEDURE — 73080 X-RAY EXAM OF ELBOW: CPT | Mod: 26,RT,, | Performed by: RADIOLOGY

## 2019-02-27 PROCEDURE — 99024 POSTOP FOLLOW-UP VISIT: CPT | Mod: S$GLB,,, | Performed by: PHYSICIAN ASSISTANT

## 2019-02-27 PROCEDURE — 99024 PR POST-OP FOLLOW-UP VISIT: ICD-10-PCS | Mod: S$GLB,,, | Performed by: PHYSICIAN ASSISTANT

## 2019-02-27 PROCEDURE — 99999 PR PBB SHADOW E&M-EST. PATIENT-LVL IV: CPT | Mod: PBBFAC,,, | Performed by: PHYSICIAN ASSISTANT

## 2019-02-27 PROCEDURE — 73080 X-RAY EXAM OF ELBOW: CPT | Mod: TC,FY,RT

## 2019-02-27 RX ORDER — LISINOPRIL 5 MG/1
5 TABLET ORAL DAILY
Refills: 3 | COMMUNITY
Start: 2019-02-22 | End: 2019-04-26

## 2019-02-27 NOTE — PROGRESS NOTES
"Ms. Kay is here today for a post-operative visit.  She is 6 weeks status post Right radial head arthroplasty revision and Ulnar nerve decompression at the elbow by Dr. Hubbard on 1/16/19. She reports that she is doing well. She has only attended one OT session due to scheduling and insurance problems. Pain is 0/10.  She is taking Tylenol as needed.  She denies fever, chills, and sweats since the time of the surgery.     Physical exam:    Vitals:    02/27/19 1130   BP: (!) 147/93   Pulse: 63   Weight: 73.9 kg (162 lb 14.7 oz)   Height: 5' 5" (1.651 m)   PainSc: 0-No pain     Vital signs are stable, patient is afebrile.  Patient is well dressed and well groomed, no acute distress.  Alert and oriented to person, place, and time.  Incisions are clean, dry and intact.  There is no erythema or exudate.  There is no sign of any infection. She is NVI. Fair elbow motion.     Assessment: status post Right radial head arthroplasty revision and Ulnar nerve decompression at the elbow by Dr. Hubbard on 1/16/19    Plan:  Riana was seen today for post-op evaluation and pain.    Diagnoses and all orders for this visit:    Post-operative state  -     X-Ray Elbow Complete Right; Future      - PO instruction reviewed and provided to patient  -continue OT, continue brace per OT- can gradually increase ROM/wean from brace  -RTC 6 weeks with hyacinth Mireles PA-C  [facility]   [facility] [place], [place]        "

## 2019-02-28 ENCOUNTER — CLINICAL SUPPORT (OUTPATIENT)
Dept: REHABILITATION | Facility: HOSPITAL | Age: 64
End: 2019-02-28
Payer: MEDICARE

## 2019-02-28 DIAGNOSIS — M25.621 STIFFNESS OF RIGHT ELBOW JOINT: ICD-10-CM

## 2019-02-28 DIAGNOSIS — M25.421 EFFUSION, RIGHT ELBOW: ICD-10-CM

## 2019-02-28 PROCEDURE — 97110 THERAPEUTIC EXERCISES: CPT | Mod: PO

## 2019-02-28 PROCEDURE — 97022 WHIRLPOOL THERAPY: CPT | Mod: PO

## 2019-02-28 NOTE — PROGRESS NOTES
Occupational Therapy Daily Treatment Note     Name: Riana Baker   Clinic Number: [id number]    Therapy Diagnosis:   Encounter Diagnoses   Name Primary?    Effusion, right elbow     Stiffness of right elbow joint      Physician: Olivia Mireles PA-C      Insurance Authorization Period Expiration: 1/30/20  Plan of Care Certification Period: 4/2/19  Date of Return to MD: 2/27/19    Visit # / Visits authorized: 4/12--Approved for 12 visist from 02.28.18 to 04.28.18 PT    Time In:10   Time Out: 11    Total Billable Time: 30  minutes    Precautions: Standard    Subjective     Pt reports: she was compliant with home exercise program given last session.   Response to previous treatment: pt arrived with sling and stiff shoulder       Pain: 3/10  Location: right wrist and elbow     Objective       Riana received the following manual therapy techniques for 8 minutes:     -Performed scar massage to right elbow  area for 8 minutes with stick  to decrease dense adhesions and improve tensile glide.        Riana participated in dynamic functional therapeutic exercises to improve functional performance for 8  minutes, including:    - supine sh flex with unweighted dowel for shoulder, elbow motion  - sh abd with recip pulleys x 2     Patient received fluidotherapy to right  hand(s) for 2 minutes each exercises to increase blood flow, circulation, desensitization, sensory re-education and for pain management.     - right wrist AROM in all planes x 2   - TGE  - gentle gripping, soft sponge x 2       -ice post session x 5     Home Exercises and Education Provided       Written Home Exercises Provided: cont with AROM in wrist and elbow .  Exercises were reviewed and Riana was able to demonstrate them prior to the end of the session.  Riana demonstrated good  understanding of the education provided.     Pt received a written copy of exercises to perform at home.   See EMR under patient  instructions for exercises given.     Riana demonstrated good  understanding of the education provided.       Assessment     Pt would continue to benefit from skilled OT. Pt limited with elbow flexion and extension , as expected, wrist is as painful as elbow. Shoulder is stiff from wearing sling.     Riana is progressing well towards her goals and there are no updates to goals at this time. Pt prognosis is Good.    Pt continues to be limited in functional and leisurely pursuits. Pain limits patients participation in ADL's. Pt is not able to carryout necessary vocational tasks. Patient continues to requires cues and skilled supervision to complete HEP     Pt will continue to benefit from skilled outpatient occupational therapy to address the deficits listed in the problem list on initial evaluation provide pt/family education and to maximize pt's level of independence in the home and community environment.     Anticipated barriers to occupational therapy: PT wearing sling     Pt's spiritual, cultural and educational needs considered and pt agreeable to plan of care and goals.    Goals:  Cont goals from POC    Plan   Continue 1-2x week x 6-8  weeks during the certification period from 2-19-19  to 3-30-19  in pursuit of OT goals.    Discussed Plan of Care with patient: Yes  Updates/Grading for next session: not at this time      Argelia Morales, OT , CHT

## 2019-03-06 ENCOUNTER — TELEPHONE (OUTPATIENT)
Dept: ENDOSCOPY | Facility: HOSPITAL | Age: 64
End: 2019-03-06

## 2019-03-06 ENCOUNTER — OFFICE VISIT (OUTPATIENT)
Dept: GASTROENTEROLOGY | Facility: CLINIC | Age: 64
End: 2019-03-06
Payer: MEDICARE

## 2019-03-06 ENCOUNTER — LAB VISIT (OUTPATIENT)
Dept: LAB | Facility: HOSPITAL | Age: 64
End: 2019-03-06
Payer: MEDICARE

## 2019-03-06 VITALS
HEIGHT: 65 IN | HEART RATE: 66 BPM | WEIGHT: 165 LBS | BODY MASS INDEX: 27.49 KG/M2 | DIASTOLIC BLOOD PRESSURE: 66 MMHG | SYSTOLIC BLOOD PRESSURE: 131 MMHG

## 2019-03-06 DIAGNOSIS — Z79.899 ENCOUNTER FOR MONITORING LONG-TERM PROTON PUMP INHIBITOR THERAPY: ICD-10-CM

## 2019-03-06 DIAGNOSIS — R13.10 DYSPHAGIA, UNSPECIFIED TYPE: ICD-10-CM

## 2019-03-06 DIAGNOSIS — Z51.81 ENCOUNTER FOR MONITORING LONG-TERM PROTON PUMP INHIBITOR THERAPY: ICD-10-CM

## 2019-03-06 DIAGNOSIS — R10.13 EPIGASTRIC PAIN: ICD-10-CM

## 2019-03-06 DIAGNOSIS — R11.0 NAUSEA: ICD-10-CM

## 2019-03-06 DIAGNOSIS — K21.9 GASTROESOPHAGEAL REFLUX DISEASE WITHOUT ESOPHAGITIS: Primary | ICD-10-CM

## 2019-03-06 DIAGNOSIS — R13.19 ESOPHAGEAL DYSPHAGIA: ICD-10-CM

## 2019-03-06 LAB
25(OH)D3+25(OH)D2 SERPL-MCNC: 11 NG/ML
ALBUMIN SERPL BCP-MCNC: 3.9 G/DL
ALP SERPL-CCNC: 84 U/L
ALT SERPL W/O P-5'-P-CCNC: 26 U/L
ANION GAP SERPL CALC-SCNC: 8 MMOL/L
AST SERPL-CCNC: 21 U/L
BASOPHILS # BLD AUTO: 0.03 K/UL
BASOPHILS NFR BLD: 0.4 %
BILIRUB SERPL-MCNC: 0.6 MG/DL
BUN SERPL-MCNC: 17 MG/DL
CALCIUM SERPL-MCNC: 9.9 MG/DL
CHLORIDE SERPL-SCNC: 102 MMOL/L
CO2 SERPL-SCNC: 29 MMOL/L
CREAT SERPL-MCNC: 0.8 MG/DL
DIFFERENTIAL METHOD: ABNORMAL
EOSINOPHIL # BLD AUTO: 0.1 K/UL
EOSINOPHIL NFR BLD: 1 %
ERYTHROCYTE [DISTWIDTH] IN BLOOD BY AUTOMATED COUNT: 12 %
EST. GFR  (AFRICAN AMERICAN): >60 ML/MIN/1.73 M^2
EST. GFR  (NON AFRICAN AMERICAN): >60 ML/MIN/1.73 M^2
GLUCOSE SERPL-MCNC: 98 MG/DL
HCT VFR BLD AUTO: 37.1 %
HGB BLD-MCNC: 13 G/DL
IMM GRANULOCYTES # BLD AUTO: 0.02 K/UL
IMM GRANULOCYTES NFR BLD AUTO: 0.3 %
LIPASE SERPL-CCNC: 11 U/L
LYMPHOCYTES # BLD AUTO: 2.2 K/UL
LYMPHOCYTES NFR BLD: 30.9 %
MAGNESIUM SERPL-MCNC: 2.1 MG/DL
MCH RBC QN AUTO: 31.7 PG
MCHC RBC AUTO-ENTMCNC: 35 G/DL
MCV RBC AUTO: 91 FL
MONOCYTES # BLD AUTO: 0.5 K/UL
MONOCYTES NFR BLD: 7.1 %
NEUTROPHILS # BLD AUTO: 4.3 K/UL
NEUTROPHILS NFR BLD: 60.3 %
NRBC BLD-RTO: 0 /100 WBC
PLATELET # BLD AUTO: 221 K/UL
PMV BLD AUTO: 9.7 FL
POTASSIUM SERPL-SCNC: 4 MMOL/L
PROT SERPL-MCNC: 7.4 G/DL
RBC # BLD AUTO: 4.1 M/UL
SODIUM SERPL-SCNC: 139 MMOL/L
VIT B12 SERPL-MCNC: 387 PG/ML
WBC # BLD AUTO: 7.06 K/UL

## 2019-03-06 PROCEDURE — 3008F PR BODY MASS INDEX (BMI) DOCUMENTED: ICD-10-PCS | Mod: CPTII,S$GLB,, | Performed by: PHYSICIAN ASSISTANT

## 2019-03-06 PROCEDURE — 99214 PR OFFICE/OUTPT VISIT, EST, LEVL IV, 30-39 MIN: ICD-10-PCS | Mod: S$GLB,,, | Performed by: PHYSICIAN ASSISTANT

## 2019-03-06 PROCEDURE — 3078F PR MOST RECENT DIASTOLIC BLOOD PRESSURE < 80 MM HG: ICD-10-PCS | Mod: CPTII,S$GLB,, | Performed by: PHYSICIAN ASSISTANT

## 2019-03-06 PROCEDURE — 3008F BODY MASS INDEX DOCD: CPT | Mod: CPTII,S$GLB,, | Performed by: PHYSICIAN ASSISTANT

## 2019-03-06 PROCEDURE — 82607 VITAMIN B-12: CPT

## 2019-03-06 PROCEDURE — 3075F PR MOST RECENT SYSTOLIC BLOOD PRESS GE 130-139MM HG: ICD-10-PCS | Mod: CPTII,S$GLB,, | Performed by: PHYSICIAN ASSISTANT

## 2019-03-06 PROCEDURE — 99214 OFFICE O/P EST MOD 30 MIN: CPT | Mod: S$GLB,,, | Performed by: PHYSICIAN ASSISTANT

## 2019-03-06 PROCEDURE — 99999 PR PBB SHADOW E&M-EST. PATIENT-LVL V: ICD-10-PCS | Mod: PBBFAC,,, | Performed by: PHYSICIAN ASSISTANT

## 2019-03-06 PROCEDURE — 36415 COLL VENOUS BLD VENIPUNCTURE: CPT

## 2019-03-06 PROCEDURE — 80053 COMPREHEN METABOLIC PANEL: CPT

## 2019-03-06 PROCEDURE — 99999 PR PBB SHADOW E&M-EST. PATIENT-LVL V: CPT | Mod: PBBFAC,,, | Performed by: PHYSICIAN ASSISTANT

## 2019-03-06 PROCEDURE — 83735 ASSAY OF MAGNESIUM: CPT

## 2019-03-06 PROCEDURE — 3078F DIAST BP <80 MM HG: CPT | Mod: CPTII,S$GLB,, | Performed by: PHYSICIAN ASSISTANT

## 2019-03-06 PROCEDURE — 85025 COMPLETE CBC W/AUTO DIFF WBC: CPT

## 2019-03-06 PROCEDURE — 3075F SYST BP GE 130 - 139MM HG: CPT | Mod: CPTII,S$GLB,, | Performed by: PHYSICIAN ASSISTANT

## 2019-03-06 PROCEDURE — 82306 VITAMIN D 25 HYDROXY: CPT

## 2019-03-06 PROCEDURE — 83690 ASSAY OF LIPASE: CPT

## 2019-03-06 NOTE — PATIENT INSTRUCTIONS
Ok to take 150mg zantac as needed for your acid reflux    For GERD:    Take your PPI 30-45 minutes before your first protein containing meal (breakfast) every day.    Remain upright for at least 3 hours after eating.    Elevate the head of the bead about 6 inches.  Some patients place cinder blocks under the head of the bed for elevation.    Avoid foods that you have noticed make your symptoms worse.    Set a weight loss goal of 10% of your body weight. (For example, if you weigh 150 lbs, your goal should be to loose 15 lbs).        GERD  Worst Foods for Acid Reflux  Chocolate (milk chocolate worse than dark chocolate)  Soda (all carbonated beverages)  Alcohol (beer, liquor, wine)  Fried foods  Brantley, sausage, ribs  Cream sauce  Fatty meats (beef)  Butter, margarine, lard, shortening  Coffee, tea  Mint   High fat nuts  Hot sauces and pepper  Citrus fruit/juices      Acidic foods (pH - 1 is MORE acidic, 5 is LESS acidic)     Do not eat or drink these (lower numbers are worse)    Induction diet - For 2 weeks eat nothing below pH 5     Lemon juice 2.3  Grape cranberry juice 2.5  Stomach Acid 2.5  Gelatin Dessert 2.6  Lemon/lime 2.9/2.7  Vinegar 2.9  Gatorade 3.0  Fruits - plums, apricots, strawberries, cherries 3.0  Vitamin C (ascorbic acid) 3.0  Iced tea, Snapple 3.1  Mustard 3.2  Soft drinks 3.3  Nectarines 3.3  Pomegranate 3.3  Applesauce 3.4  Grapefruit 3.4  Kiwi 3.4  Barbecue sauce 3.4  Caesar dressing 3.5  Thousand island dressing 3.6  Strawberries 3.5  Pineapple juice 3.5  Beer 3.5  Wine 3.5  Grape 3.6  Apples 3.6  Pineapple 3.7  Pickle 3.7  Blackberries, blueberries 3.7  Salvador 3.7  Orange 3.8  Cherries 3.9  Red Bull 3.9  Tomatoes 4.2  Coffee 5.1      These are Safe foods:  Agave  Aloe Vera  Apple (only red)  Bagels  Banana (worsens reflux in 1%)  Beans - black, red, lima, lentils  Bread - whole grain, rye  Caramel  Celery  Chamomile tea  Chicken - skinless, never fried  Chicken stock or bouillon  Coffee - one  cup/day with milk  Fennel  Fish  Kristen  Green vegetables (no green peppers)  Herbs  Honey  Melon  Milk - skin, soy, or Lactaid skim milk  Mushrooms  Oatmeal  Olive oil  Parsley  Pasta  Pears  Popcorn  Potatoes  Red bell peppers  Rice  Soups  Tofu  Turkey Breast  Turnip  Vegetables - no onion, tomatoes, peppers  Vinaigrette  Water - non carbonated  Whole grain breads, crackers, breakfast cereals      Best Foods for Acid Reflux  Whole grain breads  Oatmeal  Aloe Vera  Salad (no tomatoes, onions, cheese, or high fat dressing)  Banana  Melon  Fennel  Chicken and turkey (skinless, never fried)  Fish/seafood (never fried)  Celery  Parsley  Couscous and Rice    Maybe bad foods (Everyone is unique)  Tomatoes  Garlic  Onion  Nuts (macadamia nuts)  Apples (especially green)  Cucumber  Green peppers  Spicy food  Some herbal teas    GERD tips  Change what you eat:  Eat smaller meals  Eat slowly and chew thoroughly until food is almost liquid  Cut down on junk carbohydrates such as sugar and white flour  Use herbs in your cooking  Eat more raw foods (more than 10 ingredients is not a raw food)  Avoid trans fats and partially hydrogenated oils  Eat more fish and switch to grass fed beef  Switch your cooking oil to macadamia nut or olive oil  Watch extremes of salt intake (too high or too low is bad)    Change these habits:  Stop smoking  Eat dinner earlier (3-4 hours before lying down to sleep)  Elevate the head of your bed 6 inches (blocks under the head of the bed are better than pillows)  Exercise (but wait 2 hours after eating)  Drink more water (between meals)    Take these supplements:  Multi vitamin  Probiotic  Fish oil    Most common food allergens: milk, eggs, peanuts, tree nuts, fish, shellfish, wheat, and soy    All natural immediate relief:  Chew 2-3 soft probiotic capsules - Dr. Dooley's Probiotics 12 Plus  Chew chewable DGL licorice tablet  Chew papaya tablet with high protein meal - [employer]  Drink 2 ounces  of aloe vera juice  Swedish bitters  Prelief- reduce the acid in food to keep it form burning sensitive tissue  Iberogast  Slippery Elm  Drink Chamomile Tea  Teaspoon of baking soda in water  Spoonful of vinegar in water      All natural ulcer healers:  Zinc carnosine - 75.5 mg with food twice a day x 8 weeks   Louisa by Mannie - $8 for 60 pills  DGL (deglycyrrhizinated licorice) - 2 tablets before meals. Heals stomach lining   Natural Factors brand, Enzymatic therapy brand.  Aloe Vera juice  - 2 to 8 ounces a day   [place] or [place]

## 2019-03-06 NOTE — PROGRESS NOTES
[facility] Consultation Note    Reason for Consult:  Gastroesophageal reflux    PCP: Amena Roger     Referring MD:   No referring provider defined for this encounter.    HPI:  This is a 63 y.o. female here for evaluation of gastroesophageal reflux.  Duration- many years  Currently taking protonix 40mg in the morning without relief, started 10/2018    Typical GERD Symptoms  Pyrosis - no   Regurgitation- yes  Nocturnal symptoms - does not wake her up  Nausea and fullness- with lying down  Vomiting-no  Dysphagia/Odynophagia - with eating beef  Epigastric abdominal pain- no   Hx of H. Pylori in 2012  BS - 120, around 200 once or twice a week after lunch, A1c 7.7    Diet / Lifestyle:  Last Meal at 5-6pm lying down / reclining at 10:30        Acidic food intake - oranges,  Acid vegs, spicy foods  Caffeine intake - 2-3 cups tea,  No coffee  NSAID usage - no    Atypical Symptoms  NA    ROS:  Constitutional: No fevers, chills, No weight loss  ENT: No allergies  CV: No chest pain  Pulm: No cough, No shortness of breath  Ophtho: No vision changes  GI: see HPI  Derm: No rash  Heme: No lymphadenopathy, No bruising  MSK: rt arm in elbow brace  : No dysuria, No hematuria  Endo: No hot or cold intolerance  Neuro: No syncope, No seizure  Psych: No anxiety, No depression    Medical History:  has a past medical history of Anxiety, AR (allergic rhinitis), Colon polyp, Diabetes mellitus, type 2, Dizziness, DM (diabetes mellitus), Fatty liver, GERD (gastroesophageal reflux disease), HTN (hypertension), Hyperlipidemia, Memory loss, Osteopenia, S/P total hysterectomy, and Sleep apnea.    Surgical History:  has a past surgical history that includes Cholecystectomy; Knee arthroscopy w/ debridement (4/11); Total abdominal hysterectomy w/ bilateral salpingoophorectomy; Tonsillectomy; Rotator cuff repair; Elbow surgery (Right, 7/16/15); Elbow surgery; Hysterectomy; Colonoscopy (N/A, 1/17/2018); Elbow Arthroplasty  "(Right, 1/16/2019); Release of ulnar nerve at cubital tunnel (Right, 1/16/2019); and Upper gastrointestinal endoscopy.    Family History: family history includes Colon cancer (age of onset: 83) in her father; Diabetes in her maternal aunt and maternal grandmother..     Social History:  reports that  has never smoked. she has never used smokeless tobacco. She reports that she does not drink alcohol or use drugs.    Review of patient's allergies indicates:   Allergen Reactions    Iodinated contrast- oral and iv dye Swelling and Rash    Percocet [oxycodone-acetaminophen] Itching    Macrobid [nitrofurantoin monohyd/m-cryst] Rash    Metformin Rash    Penicillins Rash    Promethazine Rash    Sulfa (sulfonamide antibiotics) Rash    Sulfamethoxazole-trimethoprim Rash       Current Outpatient Medications   Medication Sig    blood sugar diagnostic (ACCU-CHEK SMARTVIEW TEST STRIP) Strp     blood-glucose meter kit Use as instructed, preferred meter. DX Code. E11.42    buPROPion (WELLBUTRIN XL) 150 MG TB24 tablet Take 1 tablet (150 mg total) by mouth once daily.    cyanocobalamin (VITAMIN B-12) 100 MCG tablet Take 100 mcg by mouth once daily.    insulin aspart U-100 (NOVOLOG FLEXPEN U-100 INSULIN) 100 unit/mL InPn pen Inject 8U W/ BREAKFAST AND 12U W/ LUNCH & SUPPER PLUS SCALE(180-230+2, 231-280+4, 281-330+6 & 331-380+8    insulin detemir U-100 (LEVEMIR FLEXTOUCH U-100 INSULN) 100 unit/mL (3 mL) SubQ InPn pen Inject 33 Units into the skin every evening.    insulin needles, disposable, (PEN NEEDLE) 31 X 5/16 " Ndle Use as directed    lancets (MICROLET LANCET) Misc     pantoprazole (PROTONIX) 40 MG tablet Take 1 tablet (40 mg total) by mouth once daily.    pen needle, diabetic (NOVOFINE 32) 32 gauge x 1/4" Ndle     pyridoxine, vitamin B6, (VITAMIN B-6) 100 MG Tab Take 100 mg by mouth once daily.    SITagliptin (JANUVIA) 100 MG Tab Take 1 tablet (100 mg total) by mouth once daily.    azelastine (ASTELIN) 137 " "mcg (0.1 %) nasal spray 1 spray (137 mcg total) by Nasal route 2 (two) times daily.    gabapentin (NEURONTIN) 300 MG capsule Take 1 capsule (300 mg total) by mouth 3 (three) times daily.    lisinopril (PRINIVIL,ZESTRIL) 5 MG tablet Take 5 mg by mouth once daily.    losartan (COZAAR) 25 MG tablet Take 0.5 tablets (12.5 mg total) by mouth once daily. (Patient taking differently: Take 12.5 mg by mouth once daily. Takes after lunch)    ondansetron (ZOFRAN-ODT) 8 MG TbDL Take 1 tablet (8 mg total) by mouth 3 (three) times daily as needed.     No current facility-administered medications for this visit.          Objective Findings:    Vital Signs:  /66   Pulse 66   Ht 5' 5" (1.651 m)   Wt 74.8 kg (165 lb)   BMI 27.46 kg/m²   Body mass index is 27.46 kg/m².    Physical Exam:  General Appearance: Well appearing in no acute distress  Head:   Normocephalic, without obvious abnormality  Eyes:    No scleral icterus  ENT: Neck supple, Lips, mucosa, and tongue normal  Lungs: CTA bilaterally in anterior and posterior fields, no wheezes, no crackles.  Heart:  Regular rate and rhythm, S1, S2 normal, no murmurs heard  Abdomen: Soft, LUQ epigastric, LUQ, and suprapubic >RUQ tenderness, no guarding or rebound tenderness. non distended with positive bowel sounds in all four quadrants.   Extremities: rt arm elbow brace  Skin: No rash  Neurologic: AAO x 3      Labs:  Lab Results   Component Value Date    WBC 7.06 03/06/2019    HGB 13.0 03/06/2019    HCT 37.1 03/06/2019     03/06/2019    CHOL 168 11/28/2018    TRIG 146 11/28/2018    HDL 51 11/28/2018    ALT 26 03/06/2019    AST 21 03/06/2019     03/06/2019    K 4.0 03/06/2019     03/06/2019    CREATININE 0.8 03/06/2019    BUN 17 03/06/2019    CO2 29 03/06/2019    TSH 1.309 07/23/2018    INR 0.9 05/11/2011    HGBA1C 7.2 (H) 12/21/2018     Imaging    Endoscopy:    1/2018 - polyp x 1, f/u in 5    1/2014 EGD - - Normal esophagus.                        - Hiatus " hernia.                        - Normal stomach.                        - Normal examined duodenum. Biopsied.  Assessment:  1. Gastroesophageal reflux disease without esophagitis    2. Dysphagia, unspecified type    3. Esophageal dysphagia    4. Nausea    5. Epigastric pain    6. Encounter for monitoring long-term proton pump inhibitor therapy    Hx of H. Pylori  S/p rosalia     64yo F with Hx of GERD presents with uncontrolled GERD despite PPI use. High intake of acidic foods and drinks may be contributing. Intermittent dysphagia. Frequent nausea and epi fullness    Recommendations:  1. EGD to eval whether HH has grown. Rule out Pearson's,  H. Pylori, esophageal stricture  2. Labs  3. Continue protonix for now. Need to ween in future for osteopenia    No Follow-up on file.      Order summary:  Orders Placed This Encounter    Lipase    Comprehensive metabolic panel    CBC auto differential    Vitamin B12    Vitamin D    Magnesium    Case request GI: EGD (ESOPHAGOGASTRODUODENOSCOPY)         Thank you so much for allowing me to participate in the care of Riana White PA-C

## 2019-03-07 ENCOUNTER — CLINICAL SUPPORT (OUTPATIENT)
Dept: REHABILITATION | Facility: HOSPITAL | Age: 64
End: 2019-03-07
Payer: MEDICARE

## 2019-03-07 DIAGNOSIS — M25.421 EFFUSION, RIGHT ELBOW: ICD-10-CM

## 2019-03-07 DIAGNOSIS — M25.621 STIFFNESS OF RIGHT ELBOW JOINT: ICD-10-CM

## 2019-03-07 PROCEDURE — 97110 THERAPEUTIC EXERCISES: CPT | Mod: PO

## 2019-03-07 PROCEDURE — 97140 MANUAL THERAPY 1/> REGIONS: CPT | Mod: PO

## 2019-03-07 NOTE — PROGRESS NOTES
Occupational Therapy Daily Treatment Note     Name: Riana Baker   Clinic Number: [id number]    Therapy Diagnosis:   Encounter Diagnoses   Name Primary?    Effusion, right elbow     Stiffness of right elbow joint      Physician: Olivia Mireles PA-C      Insurance Authorization Period Expiration: 1/30/20  Plan of Care Certification Period: 4/2/19  Date of Return to MD: 2/27/19    Visit # / Visits authorized: 5/12--Approved for 12 visist from 02.28.18 to 04.28.18 PT    Time In:9am   Time Out: 10am    Total Billable Time: 48  minutes    Precautions: Standard    Subjective     Pt reports: she was compliant with home exercise program given last session.   Response to previous treatment: pt arrived with sling and stiff shoulder       Pain: 3/10  Location: right wrist and elbow     Objective     .Patient received MHP x 12' to increase blood flow, promote healing, and increase tissue elasticity    Riana received the following manual therapy techniques for 8 minutes:     -Performed scar massage to right elbow  area for 8 minutes with stick  to decrease dense adhesions and improve tensile glide.        Riana participated in dynamic functional therapeutic exercises to improve functional performance for 40  minutes, including:    - supine sh flex with unweighted dowel for shoulder, elbow motion  - sh abd with recip pulleys x 4'  -supine sh scaption with unweighted dowel  -seated shoulder blade squeezes x 10 with tactile cues  -Gripping yellow putty x 3'  -AROM: elbow flex/ext (pronated and neutral forearm), supinated elbow flex/ext (avoiding terminal extension to protect surgical site), forearm pro/sup, isolated FDS, hook fist        Home Exercises and Education Provided       Written Home Exercises Provided: cont with AROM in wrist and elbow .  Exercises were reviewed and Riana was able to demonstrate them prior to the end of the session.  Riana demonstrated good  understanding of  the education provided.     Pt received a written copy of exercises to perform at home.   See EMR under patient instructions for exercises given.     Riana demonstrated good  understanding of the education provided.       Assessment     Pt discontinued use of sling as instructed per OT. She is weaning out of her elbow brace, wearing it only at night and when she is out of the house. Wrist AROM improved and it now WNL. Continued shoulder and elbow stiffness.    Riana is progressing well towards her goals and there are no updates to goals at this time. Pt prognosis is Good.    Pt continues to be limited in functional and leisurely pursuits. Pain limits patients participation in ADL's. Pt is not able to carryout necessary vocational tasks. Patient continues to requires cues and skilled supervision to complete HEP     Pt will continue to benefit from skilled outpatient occupational therapy to address the deficits listed in the problem list on initial evaluation provide pt/family education and to maximize pt's level of independence in the home and community environment.     Anticipated barriers to occupational therapy: none at this time    Pt's spiritual, cultural and educational needs considered and pt agreeable to plan of care and goals.    Goals:  Cont goals from POC    Plan   Continue 1-2x week x 6-8  weeks during the certification period from 2-19-19  to 3-30-19  in pursuit of OT goals.    Discussed Plan of Care with patient: Yes  Updates/Grading for next session: not at this time      Jenna Underwood, OT , CHT

## 2019-03-10 ENCOUNTER — PATIENT MESSAGE (OUTPATIENT)
Dept: GASTROENTEROLOGY | Facility: CLINIC | Age: 64
End: 2019-03-10

## 2019-03-10 DIAGNOSIS — E55.9 VITAMIN D INSUFFICIENCY: Primary | ICD-10-CM

## 2019-03-10 RX ORDER — ERGOCALCIFEROL 1.25 MG/1
50000 CAPSULE ORAL
Qty: 8 CAPSULE | Refills: 0 | Status: SHIPPED | OUTPATIENT
Start: 2019-03-10 | End: 2019-05-05

## 2019-03-19 ENCOUNTER — CLINICAL SUPPORT (OUTPATIENT)
Dept: REHABILITATION | Facility: HOSPITAL | Age: 64
End: 2019-03-19
Payer: MEDICARE

## 2019-03-19 DIAGNOSIS — M25.621 STIFFNESS OF RIGHT ELBOW JOINT: ICD-10-CM

## 2019-03-19 DIAGNOSIS — M25.421 EFFUSION, RIGHT ELBOW: ICD-10-CM

## 2019-03-19 PROCEDURE — 97140 MANUAL THERAPY 1/> REGIONS: CPT | Mod: PO

## 2019-03-19 PROCEDURE — 97110 THERAPEUTIC EXERCISES: CPT | Mod: PO

## 2019-03-19 NOTE — PROGRESS NOTES
Occupational Therapy Daily Treatment Note     Name: Riana Baker   Clinic Number: [id number]    Therapy Diagnosis:   Encounter Diagnoses   Name Primary?    Effusion, right elbow     Stiffness of right elbow joint      Physician: Olivia Mireles PA-C      Insurance Authorization Period Expiration: 1/30/20  Plan of Care Certification Period: 4/2/19  Date of Return to MD: 2/27/19    Visit # / Visits authorized: 4/12--Approved for 12 visist from 02.28.18 to 04.28.18 PT    Time In:8am   Time Out: 9am    Total Billable Time: 60  minutes    Precautions: Standard    Subjective     Pt reports: she was compliant with home exercise program given last session.   Response to previous treatment: pt arrived with sling and stiff shoulder       Pain: 3/10  Location: right wrist and elbow     Objective     .Patient received MHP x 12' to increase blood flow, promote healing, and increase tissue elasticity    Riana received the following manual therapy techniques for 8 minutes:     -Performed scar massage to right elbow  area for 8 minutes         Riana participated in dynamic functional therapeutic exercises to improve functional performance for 40  minutes, including:    -Gentle passive elbow ROM in supine  -UBE x 6' (3' forward 3' backward)  - sh abd with recip pulleys x 4'  -supine sh scaption with unweighted dowel  -Bilateral standing rows yellow theraband 2 x 10  -Tricep pull-downs yellow theraband 2 x 10  -Hand helper 1 Y band x 1'  -AROM: elbow flex/ext (pronated and neutral forearm), supinated elbow flex/ext (avoiding terminal extension to protect surgical site), forearm pro/sup, isolated FDS, hook fist        Home Exercises and Education Provided       Written Home Exercises Provided:   Exercises were reviewed and Riana was able to demonstrate them prior to the end of the session.  Riana demonstrated good  understanding of the education provided.     Pt received a written copy of  exercises to perform at home.   See EMR under patient instructions for exercises given.     Riana demonstrated good  understanding of the education provided.       Assessment     Patient unable to maintain neutral wrist during tricep-pull down indicating wrist weakness.     Riana is progressing well towards her goals and there are no updates to goals at this time. Pt prognosis is Good.    Pt continues to be limited in functional and leisurely pursuits. Pain limits patients participation in ADL's. Pt is not able to carryout necessary vocational tasks. Patient continues to requires cues and skilled supervision to complete HEP     Pt will continue to benefit from skilled outpatient occupational therapy to address the deficits listed in the problem list on initial evaluation provide pt/family education and to maximize pt's level of independence in the home and community environment.     Anticipated barriers to occupational therapy: none at this time    Pt's spiritual, cultural and educational needs considered and pt agreeable to plan of care and goals.    Goals:  Cont goals from POC    Plan   Continue 1-2x week x 6-8  weeks during the certification period from 2-19-19  to 3-30-19  in pursuit of OT goals.    Discussed Plan of Care with patient: Yes  Updates/Grading for next session: not at this time      Jenna Underwood, OT

## 2019-03-20 ENCOUNTER — OFFICE VISIT (OUTPATIENT)
Dept: PODIATRY | Facility: CLINIC | Age: 64
End: 2019-03-20
Payer: MEDICARE

## 2019-03-20 VITALS
SYSTOLIC BLOOD PRESSURE: 130 MMHG | BODY MASS INDEX: 27.49 KG/M2 | DIASTOLIC BLOOD PRESSURE: 62 MMHG | HEIGHT: 65 IN | WEIGHT: 165 LBS | HEART RATE: 66 BPM

## 2019-03-20 DIAGNOSIS — E11.69 TYPE 2 DIABETES MELLITUS WITH OTHER SPECIFIED COMPLICATION, WITH LONG-TERM CURRENT USE OF INSULIN: Primary | ICD-10-CM

## 2019-03-20 DIAGNOSIS — Z79.4 TYPE 2 DIABETES MELLITUS WITH OTHER SPECIFIED COMPLICATION, WITH LONG-TERM CURRENT USE OF INSULIN: Primary | ICD-10-CM

## 2019-03-20 DIAGNOSIS — S93.492A SPRAIN OF ANTERIOR TALOFIBULAR LIGAMENT OF LEFT ANKLE, INITIAL ENCOUNTER: ICD-10-CM

## 2019-03-20 PROCEDURE — 3075F SYST BP GE 130 - 139MM HG: CPT | Mod: CPTII,S$GLB,, | Performed by: PODIATRIST

## 2019-03-20 PROCEDURE — 3008F BODY MASS INDEX DOCD: CPT | Mod: CPTII,S$GLB,, | Performed by: PODIATRIST

## 2019-03-20 PROCEDURE — 3045F PR MOST RECENT HEMOGLOBIN A1C LEVEL 7.0-9.0%: CPT | Mod: CPTII,S$GLB,, | Performed by: PODIATRIST

## 2019-03-20 PROCEDURE — 99214 PR OFFICE/OUTPT VISIT, EST, LEVL IV, 30-39 MIN: ICD-10-PCS | Mod: S$GLB,,, | Performed by: PODIATRIST

## 2019-03-20 PROCEDURE — 3045F PR MOST RECENT HEMOGLOBIN A1C LEVEL 7.0-9.0%: ICD-10-PCS | Mod: CPTII,S$GLB,, | Performed by: PODIATRIST

## 2019-03-20 PROCEDURE — 3078F PR MOST RECENT DIASTOLIC BLOOD PRESSURE < 80 MM HG: ICD-10-PCS | Mod: CPTII,S$GLB,, | Performed by: PODIATRIST

## 2019-03-20 PROCEDURE — 99999 PR PBB SHADOW E&M-EST. PATIENT-LVL IV: CPT | Mod: PBBFAC,,, | Performed by: PODIATRIST

## 2019-03-20 PROCEDURE — 99499 RISK ADDL DX/OHS AUDIT: ICD-10-PCS | Mod: S$GLB,,, | Performed by: PODIATRIST

## 2019-03-20 PROCEDURE — 99999 PR PBB SHADOW E&M-EST. PATIENT-LVL IV: ICD-10-PCS | Mod: PBBFAC,,, | Performed by: PODIATRIST

## 2019-03-20 PROCEDURE — 3078F DIAST BP <80 MM HG: CPT | Mod: CPTII,S$GLB,, | Performed by: PODIATRIST

## 2019-03-20 PROCEDURE — 99214 OFFICE O/P EST MOD 30 MIN: CPT | Mod: S$GLB,,, | Performed by: PODIATRIST

## 2019-03-20 PROCEDURE — 3008F PR BODY MASS INDEX (BMI) DOCUMENTED: ICD-10-PCS | Mod: CPTII,S$GLB,, | Performed by: PODIATRIST

## 2019-03-20 PROCEDURE — 99499 UNLISTED E&M SERVICE: CPT | Mod: S$GLB,,, | Performed by: PODIATRIST

## 2019-03-20 PROCEDURE — 3075F PR MOST RECENT SYSTOLIC BLOOD PRESS GE 130-139MM HG: ICD-10-PCS | Mod: CPTII,S$GLB,, | Performed by: PODIATRIST

## 2019-03-20 NOTE — PROGRESS NOTES
"Subjective:      Patient ID: Riana Kay is a 63 y.o. female.    Chief Complaint: Diabetes Mellitus (Dr. Roger 1/3/19); Diabetic Foot Exam; and Routine Foot Care    Riana is a 63 y.o. female who presents to the clinic for evaluation and treatment of high risk feet. Riana has a past medical history of Anxiety, AR (allergic rhinitis), Colon polyp, Diabetes mellitus, type 2, Dizziness, DM (diabetes mellitus), Fatty liver, GERD (gastroesophageal reflux disease), HTN (hypertension), Hyperlipidemia, Memory loss, Osteopenia, S/P total hysterectomy, and Sleep apnea. The patient's chief complaint is long, thick toenails. This patient has documented high risk feet requiring routine maintenance secondary to diabetes mellitis and those secondary complications of diabetes, as mentioned..  She is also complaining of b/l ankle pain but left ankle is worse than right ankle. She was seen by Dr. John for her ankle. His recommendation was "most likely caused by peripheral neuropathy. Discussed the importance of primary care f/u, controlling BG with insulin, diabetic foot checks.  Will give rx for gabapentin to try to provide symptomatic relief". She has history of rolling her ankle in the past. Since then she has been having symptoms. She has pain upon walking and standing. It gets better with resting.      PCP: Amena Roger, DO    Date Last Seen by PCP: in epic     Current shoe gear:  Affected Foot: Casual shoes     Unaffected Foot: Casual shoes    Hemoglobin A1C   Date Value Ref Range Status   12/21/2018 7.2 (H) 4.0 - 5.6 % Final     Comment:     ADA Screening Guidelines:  5.7-6.4%  Consistent with prediabetes  >or=6.5%  Consistent with diabetes  High levels of fetal hemoglobin interfere with the HbA1C  assay. Heterozygous hemoglobin variants (HbS, HgC, etc)do  not significantly interfere with this assay.   However, presence of multiple variants may affect accuracy.     11/28/2018 7.4 (H) 4.0 - 5.6 % Final "     Comment:     ADA Screening Guidelines:  5.7-6.4%  Consistent with prediabetes  >or=6.5%  Consistent with diabetes  High levels of fetal hemoglobin interfere with the HbA1C  assay. Heterozygous hemoglobin variants (HbS, HgC, etc)do  not significantly interfere with this assay.   However, presence of multiple variants may affect accuracy.     09/20/2018 7.0 (H) 4.0 - 5.6 % Final     Comment:     ADA Screening Guidelines:  5.7-6.4%  Consistent with prediabetes  >or=6.5%  Consistent with diabetes  High levels of fetal hemoglobin interfere with the HbA1C  assay. Heterozygous hemoglobin variants (HbS, HgC, etc)do  not significantly interfere with this assay.   However, presence of multiple variants may affect accuracy.         Review of Systems   Constitution: Negative for decreased appetite, fever, weakness and malaise/fatigue.   HENT: Negative for congestion.    Cardiovascular: Negative for chest pain and leg swelling.   Respiratory: Negative for cough and shortness of breath.    Skin: Negative for color change, nail changes and rash.   Musculoskeletal: Negative for arthritis, joint pain, joint swelling and muscle weakness.        Left ankle pain   Gastrointestinal: Negative for bloating, abdominal pain, nausea and vomiting.   Neurological: Negative for headaches and numbness.   Psychiatric/Behavioral: Negative for altered mental status.             Past Medical History:   Diagnosis Date    Anxiety     AR (allergic rhinitis)     Colon polyp     Diabetes mellitus, type 2     Dizziness     DM (diabetes mellitus)     Fatty liver     GERD (gastroesophageal reflux disease)     HTN (hypertension)     Hyperlipidemia     Memory loss     Osteopenia     S/P total hysterectomy     Sleep apnea        Past Surgical History:   Procedure Laterality Date    ARTHROPLASTY, ELBOW right radial head arthroplasty revision Right 1/16/2019    Performed by Katja Hubbard MD at [facility] OR [address] [place] [address]    CHOLECYSTECTOMY       COLONOSCOPY N/A 10/4/2013    Performed by [facility]urag Carl [facility] at [facility] ENDO (4TH FLR)    COLONOSCOPY with Jacobo N/A 1/17/2018    Performed by Roland Jacobo MD at [facility] ENDO (4TH FLR)    EGD (ESOPHAGOGASTRODUODENOSCOPY) N/A 2/28/2014    Performed by Anurag Carl MD at [facility] ENDO (4TH FLR)    ELBOW SURGERY Right 7/16/15    ELBOW SURGERY      HYSTERECTOMY      INJECTION-STEROID-EPIDURAL-CERVICAL N/A 3/16/2015    Performed by [facility] at [facility]    KNEE ARTHROSCOPY W/ DEBRIDEMENT  4/11    Left    RELEASE, ULNAR TUNNEL right Right 1/16/2019    Performed by Katja Hubbard MD at [facility] OR 1ST FLR    ROTATOR CUFF REPAIR      TONSILLECTOMY      TOTAL ABDOMINAL HYSTERECTOMY W/ BILATERAL SALPINGOOPHORECTOMY      UPPER GASTROINTESTINAL ENDOSCOPY         Family History   Problem Relation Age of Onset    Colon cancer Father 83        colon cancer    Diabetes Maternal Grandmother     Diabetes Maternal Aunt     Esophageal cancer Neg Hx     Stomach cancer Neg Hx        Social History     Socioeconomic History    Marital status:      Spouse name: Not on file    Number of children: Not on file    Years of education: Not on file    Highest education level: Not on file   Social Needs    Financial resource strain: Not on file    Food insecurity - worry: Not on file    Food insecurity - inability: Not on file    Transportation needs - medical: Not on file    Transportation needs - non-medical: Not on file   Occupational History    Not on file   Tobacco Use    Smoking status: Never Smoker    Smokeless tobacco: Never Used   Substance and Sexual Activity    Alcohol use: No    Drug use: No    Sexual activity: Yes     Partners: Male     Birth control/protection: Post-menopausal   Other Topics Concern    Not on file   Social History Narrative    She works at [employer] in housekeeping; , 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 No exercise but hopes to start walking on the  "weekend.       Current Outpatient Medications   Medication Sig Dispense Refill    azelastine (ASTELIN) 137 mcg (0.1 %) nasal spray 1 spray (137 mcg total) by Nasal route 2 (two) times daily. 30 mL 6    blood sugar diagnostic (ACCU-CHEK SMARTVIEW TEST STRIP) Strp       blood-glucose meter kit Use as instructed, preferred meter. DX Code. E11.42 1 each 0    buPROPion (WELLBUTRIN XL) 150 MG TB24 tablet Take 1 tablet (150 mg total) by mouth once daily. 30 tablet 11    cyanocobalamin (VITAMIN B-12) 100 MCG tablet Take 100 mcg by mouth once daily.      ergocalciferol (VITAMIN D2) 50,000 unit Cap Take 1 capsule (50,000 Units total) by mouth every 7 days. 8 capsule 0    gabapentin (NEURONTIN) 300 MG capsule Take 1 capsule (300 mg total) by mouth 3 (three) times daily. 90 capsule 11    insulin aspart U-100 (NOVOLOG FLEXPEN U-100 INSULIN) 100 unit/mL InPn pen Inject 8U W/ BREAKFAST AND 12U W/ LUNCH & SUPPER PLUS SCALE(180-230+2, 231-280+4, 281-330+6 & 331-380+8 45 Syringe 3    insulin detemir U-100 (LEVEMIR FLEXTOUCH U-100 INSULN) 100 unit/mL (3 mL) SubQ InPn pen Inject 33 Units into the skin every evening. 3 Box 3    insulin needles, disposable, (PEN NEEDLE) 31 X 5/16 " Ndle Use as directed 100 each PRN    lancets (MICROLET LANCET) Misc       lisinopril (PRINIVIL,ZESTRIL) 5 MG tablet Take 5 mg by mouth once daily.  3    losartan (COZAAR) 25 MG tablet Take 0.5 tablets (12.5 mg total) by mouth once daily. (Patient taking differently: Take 12.5 mg by mouth once daily. Takes after lunch) 45 tablet 3    ondansetron (ZOFRAN-ODT) 8 MG TbDL Take 1 tablet (8 mg total) by mouth 3 (three) times daily as needed. 30 tablet 0    pantoprazole (PROTONIX) 40 MG tablet Take 1 tablet (40 mg total) by mouth once daily. 30 tablet 11    pen needle, diabetic (NOVOFINE 32) 32 gauge x 1/4" Ndle       pyridoxine, vitamin B6, (VITAMIN B-6) 100 MG Tab Take 100 mg by mouth once daily.      SITagliptin (JANUVIA) 100 MG Tab Take 1 tablet " "(100 mg total) by mouth once daily. 90 tablet 3     No current facility-administered medications for this visit.        Review of patient's allergies indicates:   Allergen Reactions    Iodinated contrast- oral and iv dye Swelling and Rash    Percocet [oxycodone-acetaminophen] Itching    Macrobid [nitrofurantoin monohyd/m-cryst] Rash    Metformin Rash    Penicillins Rash    Promethazine Rash    Sulfa (sulfonamide antibiotics) Rash    Sulfamethoxazole-trimethoprim Rash       Vitals:    03/20/19 1025   BP: 130/62   Pulse: 66   Weight: 74.8 kg (165 lb)   Height: 5' 5" (1.651 m)       Objective:      Physical Exam   Constitutional: She is oriented to person, place, and time. She appears well-developed and well-nourished. No distress.   Musculoskeletal: She exhibits tenderness. She exhibits no edema or deformity.   Neurological: She is alert and oriented to person, place, and time.   Skin: Skin is warm. Capillary refill takes less than 2 seconds.   Psychiatric: She has a normal mood and affect. Her behavior is normal.       Vascular: Distal DP/PT pulses palpable 2/4. CRT < 3 sec to tips of toes. No vericosities noted to LEs. Hair growth present LE, warm to touch LE  L ankle: mild swelling laterally    Dermatologic: No open lesions, lacerations or wounds. Interdigital spaces clean, dry and intact. No erythema, rubor, calor noted LE    Musculoskeletal: MMT 5/5 in DF/PF/Inv/Ev resistance with no reproduction of pain in any direction. Passive range of motion of ankle and pedal joints is painless. No calf tenderness LE, Compartments soft/compressible. ROM of ankles with < 10 deg DF is noted b/l  L ankle: pain on palpation along ATFL, mild tenderness CFL. Negative anterior drawer, mild tenderness upon stress inversion. No pain over deltoid.     Neurological: Light touch, proprioception, and sharp/dull sensation are all intact. Protective threshold with the Lester-Wienstein monofilament is intact b/l. Vibratory sensation " intact.         Assessment:       Encounter Diagnoses   Name Primary?    Type 2 diabetes mellitus with other specified complication, with long-term current use of insulin Yes    Sprain of anterior talofibular ligament of left ankle, initial encounter - Left Foot          Plan:       Riana was seen today for diabetes mellitus, diabetic foot exam and routine foot care.    Diagnoses and all orders for this visit:    Type 2 diabetes mellitus with other specified complication, with long-term current use of insulin  -     DIABETIC SHOES FOR HOME USE    Sprain of anterior talofibular ligament of left ankle, initial encounter - Left Foot  -     Ambulatory Referral to Physical/Occupational Therapy      I counseled the patient on her conditions, their implications and medical management.    -62 y/o female with diabetes with 1. Diabetic foot risk assessment 2. Left ankle sprain    -previous x-rays were reviewed with the patient. The bony abnormalities  -Rx physical therapy  -ankle brace p.r.n.  -rx.diabetic shoes   -Shoe inspection. General Foot Education. Patient reminded of the importance of good nutrition. Patient instructed on proper foot hygeine. We discussed wearing proper shoe gear, daily foot inspections, never walking without protective shoe gear, caution putting sharp instruments to feet. Discussed general foot care:  Wear comfortable, proper fitting shoes. Wash feet daily. Dry well. After drying, apply moisturizer to feet (no lotion to webspaces). Inspect feet daily for skin breaks, blisters, swelling, or redness. Wear cotton socks (preferably white)  Change socks every day. Do NOT walk barefoot. Do NOT use heating pads or warm/hot water soaks   -Advised for optimal glucose control and maintenance per primary care physician. Patient was also educated on healthy diet that is naturally rich in nutrients and low in fat and calories.   -It was discussed the importance of wearing shoes with adequate room in toe box  to accommodate toe deformities. Recommended New Balance/[employer]ics shoe brands with adequate arch supports to alleviate abnormal pressure and improve stability of foot while walking. Avoid flat shoes and barefoot walking as these will exacerbate or worsen symptoms.   -The nature of the condition, options for management, as well as potential risks and complications were discussed in detail with patient. Patient was amenable to my recommendations and left my office fully informed and will follow up as instructed or sooner if necessary.    -Patient was advised of signs and symptoms of infection including redness, drainage, purulence, odor, streaking, fever, chills and I advised patient to seek medical attention (ER or urgent care) if these symptoms arise.   -f/u prn    @

## 2019-03-21 ENCOUNTER — CLINICAL SUPPORT (OUTPATIENT)
Dept: REHABILITATION | Facility: HOSPITAL | Age: 64
End: 2019-03-21
Payer: MEDICARE

## 2019-03-21 DIAGNOSIS — M25.621 STIFFNESS OF RIGHT ELBOW JOINT: ICD-10-CM

## 2019-03-21 DIAGNOSIS — M25.421 EFFUSION, RIGHT ELBOW: ICD-10-CM

## 2019-03-21 PROCEDURE — 97140 MANUAL THERAPY 1/> REGIONS: CPT | Mod: PO

## 2019-03-21 PROCEDURE — 97110 THERAPEUTIC EXERCISES: CPT | Mod: PO

## 2019-03-21 NOTE — PROGRESS NOTES
Occupational Therapy Daily Treatment Note     Name: Riana Baker   Clinic Number: [id number]    Therapy Diagnosis:   Encounter Diagnoses   Name Primary?    Effusion, right elbow     Stiffness of right elbow joint      Physician: Olivia Mireles PA-C      Insurance Authorization Period Expiration: 1/30/20  Plan of Care Certification Period: 4/2/19  Date of Return to MD: 2/27/19    Visit # / Visits authorized: 5/12--Approved for 12 visist from 02.28.18 to 04.28.18 PT    Time In:9am   Time Out: 10:10am    Total Billable Time: 60  minutes    Precautions: Standard    Subjective     Pt reports: she was compliant with home exercise program given last session.   Response to previous treatment: patient almost weaned out of brace entirely      Pain: 3/10  Location: right wrist and elbow     Objective     .Patient received MHP x 15' to increase blood flow, promote healing, and increase tissue elasticity    -Patient received ice pack x 8' at end of session to decrease post therapy soreness    Riana received the following manual therapy techniques for 10 minutes:     -Performed scar massage to right elbow  area for 4 minutes to decrease adhesion  -Gentle passive/active assistive elbow ROM in supine to increase flexibility        Riana participated in dynamic functional therapeutic exercises to improve functional performance for 37  minutes, including:    -UBE x 6' (3' forward 3' backward)  -Cone stacking for elbow flex/ext  -Bilateral standing rows yellow theraband 2 x 10  -Tricep pull-downs yellow theraband 2 x 10  -Wrist isotonics 1# flex/ext/RD/UD x 1' ea.  -AROM: elbow flex/ext (pronated and neutral forearm), supinated elbow flex/ex (avoidnig terminal ext to protect surgical site)  -Pulleys x 4' for shoulder and elbow AAROM        Home Exercises and Education Provided       Written Home Exercises Provided:   Exercises were reviewed and Riana was able to demonstrate them prior to the  end of the session.  Riana demonstrated good  understanding of the education provided.     Pt received a written copy of exercises to perform at home.   See EMR under patient instructions for exercises given.     Riana demonstrated good  understanding of the education provided.       Assessment     Patient unable to maintain neutral wrist during tricep-pull down indicating wrist weakness. Initiated wrist isotonic strengthening today. Noted shaking of forearm musculature with all planes of ROM due to weakness.     Riana is progressing well towards her goals and there are no updates to goals at this time. Pt prognosis is Good.    Pt continues to be limited in functional and leisurely pursuits. Pain limits patients participation in ADL's. Pt is not able to carryout necessary vocational tasks.      Pt will continue to benefit from skilled outpatient occupational therapy to address the deficits listed in the problem list on initial evaluation provide pt/family education and to maximize pt's level of independence in the home and community environment.     Anticipated barriers to occupational therapy: none at this time    Pt's spiritual, cultural and educational needs considered and pt agreeable to plan of care and goals.    Goals:  Cont goals from POC    Plan   Continue 1-2x week x 6-8  weeks during the certification period from 2-19-19  to 3-30-19  in pursuit of OT goals.    Discussed Plan of Care with patient: Yes  Updates/Grading for next session: Advance as tolerated      Jenna Underwood, OT

## 2019-03-26 ENCOUNTER — CLINICAL SUPPORT (OUTPATIENT)
Dept: REHABILITATION | Facility: HOSPITAL | Age: 64
End: 2019-03-26
Payer: MEDICARE

## 2019-03-26 DIAGNOSIS — M25.621 STIFFNESS OF RIGHT ELBOW JOINT: ICD-10-CM

## 2019-03-26 DIAGNOSIS — M25.421 EFFUSION, RIGHT ELBOW: ICD-10-CM

## 2019-03-26 PROCEDURE — 97140 MANUAL THERAPY 1/> REGIONS: CPT | Mod: PO

## 2019-03-26 PROCEDURE — 97110 THERAPEUTIC EXERCISES: CPT | Mod: PO

## 2019-03-26 NOTE — PROGRESS NOTES
Occupational Therapy Daily Treatment Note     Name: Riana Baker   Clinic Number: [id number]    Therapy Diagnosis:   Encounter Diagnoses   Name Primary?    Effusion, right elbow     Stiffness of right elbow joint      Physician: Olivia Mireles PA-C      Insurance Authorization Period Expiration: 1/30/20  Plan of Care Certification Period: 4/2/19  Date of Return to MD: 4/10/19    Visit # / Visits authorized: 6/12--Approved for 12 visist from 02.28.18 to 04.28.18 PT    Time In:8am   Time Out: 9am    Total Billable Time: 60  minutes    Precautions: Standard    Subjective     Pt reports: she was compliant with home exercise program given last session.   Response to previous treatment: patient almost weaned out of brace entirely      Pain: 3/10  Location: right wrist and elbow     Objective     .Patient received MHP x 15' to increase blood flow, promote healing, and increase tissue elasticity    -Patient received ice pack x 8' at end of session to decrease post therapy soreness    Riana received the following manual therapy techniques for 10 minutes:     -Performed scar massage to right elbow  area for 4 minutes to decrease adhesion  -Gentle passive/active assistive elbow ROM in supine to increase flexibility        Riana participated in dynamic functional therapeutic exercises to improve functional performance for 37  minutes, including:    -UBE x 6' (3' forward 3' backward)  -Cone stacking for elbow flex/ext  -Bilateral standing rows yellow theraband 2 x 10  -Tricep pull-downs yellow theraband 2 x 10  -Wrist isotonics 1# flex/ext/RD/UD x 1' ea.  -AROM: elbow flex/ext (pronated and neutral forearm), supinated elbow flex/ex (avoidnig terminal ext to protect surgical site)  -Pulleys x 4' for shoulder and elbow AAROM        Home Exercises and Education Provided       Written Home Exercises Provided:   Exercises were reviewed and Riana was able to demonstrate them prior to the end of  the session.  Riana demonstrated good  understanding of the education provided.     Pt received a written copy of exercises to perform at home.   See EMR under patient instructions for exercises given.     Riana demonstrated good  understanding of the education provided.       Assessment     Patient unable to maintain neutral wrist during tricep-pull down indicating wrist weakness. Initiated wrist isotonic strengthening today. Noted shaking of forearm musculature with all planes of ROM due to weakness.     Riana is progressing well towards her goals and there are no updates to goals at this time. Pt prognosis is Good.    Pt continues to be limited in functional and leisurely pursuits. Pain limits patients participation in ADL's. Pt is not able to carryout necessary vocational tasks.      Pt will continue to benefit from skilled outpatient occupational therapy to address the deficits listed in the problem list on initial evaluation provide pt/family education and to maximize pt's level of independence in the home and community environment.     Anticipated barriers to occupational therapy: none at this time    Pt's spiritual, cultural and educational needs considered and pt agreeable to plan of care and goals.    Goals:  Cont goals from POC    Plan   Continue 1-2x week x 6-8  weeks during the certification period from 2-19-19  to 3-30-19  in pursuit of OT goals.    Discussed Plan of Care with patient: Yes  Updates/Grading for next session: Advance as tolerated      Jenna Underwood, OT

## 2019-03-28 ENCOUNTER — CLINICAL SUPPORT (OUTPATIENT)
Dept: REHABILITATION | Facility: HOSPITAL | Age: 64
End: 2019-03-28
Payer: MEDICARE

## 2019-03-28 DIAGNOSIS — M25.621 STIFFNESS OF RIGHT ELBOW JOINT: ICD-10-CM

## 2019-03-28 DIAGNOSIS — M25.421 EFFUSION, RIGHT ELBOW: ICD-10-CM

## 2019-03-28 PROCEDURE — 97110 THERAPEUTIC EXERCISES: CPT | Mod: PO

## 2019-03-28 PROCEDURE — 97140 MANUAL THERAPY 1/> REGIONS: CPT | Mod: PO

## 2019-03-28 NOTE — PROGRESS NOTES
Occupational Therapy Daily Treatment Note     Name: Riana Baker   Clinic Number: [id number]    Therapy Diagnosis:   Encounter Diagnoses   Name Primary?    Effusion, right elbow     Stiffness of right elbow joint      Physician: Olivia Mireles PA-C      Insurance Authorization Period Expiration: 1/30/20  Plan of Care Certification Period: 4/2/19  Date of Return to MD: 4/10/19    Visit # / Visits authorized: 6/12--Approved for 12 visist from 02.28.18 to 04.28.18 PT    Time In:9am   Time Out: 10am    Total Billable Time: 60  minutes    Precautions: Standard    Subjective     Pt reports: she was compliant with home exercise program given last session.   Response to previous treatment: patient almost weaned out of brace entirely      Pain: 3/10  Location: right wrist and elbow     Objective     .Patient received MHP x 9 ' with LLPS with elbow extension to increase tissue elasticity - pre and post session to increase elasticity of soft tissues .       Riana received the following manual therapy techniques for 10 minutes:     -Performed scar massage to right elbow  area for 10 minutes to decrease adhesion and retro  grade massage with soft tissue mobilization        Riana participated in dynamic functional therapeutic exercises to improve functional performance for 37  minutes, including:    -UBE x 6' (3' forward 3' backward)  -Bilateral standing rows yellow theraband 2 x 10  -Tricep pull-downs yellow theraband 2 x 10  -AROM: elbow flex/ext (pronated , and sup  neutral forearm)  with 1# wrist wt x 30 reps each   - sh flex, and and Ir with 1#wt x 15 reps each   -sup wheel x 2 minutes with elbow extension       Home Exercises and Education Provided       Written Home Exercises Provided:   Exercises were reviewed and Riaan was able to demonstrate them prior to the end of the session.  Riana demonstrated good  understanding of the education provided.     Pt received a written copy  of exercises to perform at home.   See EMR under patient instructions for exercises given.     Riana demonstrated good  understanding of the education provided.       Assessment     Patient  Tolerated session well with new extension exercises. Informed patient about making moist heat pack at home  To reduce tissue elasticity . All exercises performed cautiously and slowly to reduce elbow stress. Pain persist in elbow and shoulder.   Riana is progressing well towards her goals and there are no updates to goals at this time. Pt prognosis is Good.    Pt continues to be limited in functional and leisurely pursuits. Pain limits patients participation in ADL's. Pt is not able to carryout necessary vocational tasks.      Pt will continue to benefit from skilled outpatient occupational therapy to address the deficits listed in the problem list on initial evaluation provide pt/family education and to maximize pt's level of independence in the home and community environment.     Anticipated barriers to occupational therapy: none at this time    Pt's spiritual, cultural and educational needs considered and pt agreeable to plan of care and goals.    Goals:  Cont goals from POC    Plan   Continue 1-2x week x 6-8  weeks during the certification period from 2-19-19  to 3-30-19  in pursuit of OT goals.    Discussed Plan of Care with patient: Yes  Updates/Grading for next session: Advance as tolerated      Argelia Morales OT

## 2019-04-10 ENCOUNTER — OFFICE VISIT (OUTPATIENT)
Dept: ORTHOPEDICS | Facility: CLINIC | Age: 64
End: 2019-04-10
Payer: MEDICARE

## 2019-04-10 ENCOUNTER — HOSPITAL ENCOUNTER (OUTPATIENT)
Dept: RADIOLOGY | Facility: OTHER | Age: 64
Discharge: HOME | End: 2019-04-10
Attending: PHYSICIAN ASSISTANT
Payer: MEDICARE

## 2019-04-10 VITALS
DIASTOLIC BLOOD PRESSURE: 79 MMHG | HEIGHT: 65 IN | SYSTOLIC BLOOD PRESSURE: 148 MMHG | BODY MASS INDEX: 27.49 KG/M2 | HEART RATE: 64 BPM | WEIGHT: 165 LBS

## 2019-04-10 DIAGNOSIS — G56.21 ULNAR NERVE ENTRAPMENT AT RIGHT ELBOW: ICD-10-CM

## 2019-04-10 DIAGNOSIS — M25.521 RIGHT ELBOW PAIN: Primary | ICD-10-CM

## 2019-04-10 DIAGNOSIS — Z98.890 POST-OPERATIVE STATE: ICD-10-CM

## 2019-04-10 PROCEDURE — 99999 PR PBB SHADOW E&M-EST. PATIENT-LVL III: CPT | Mod: PBBFAC,,, | Performed by: PHYSICIAN ASSISTANT

## 2019-04-10 PROCEDURE — 99999 PR PBB SHADOW E&M-EST. PATIENT-LVL III: ICD-10-PCS | Mod: PBBFAC,,, | Performed by: PHYSICIAN ASSISTANT

## 2019-04-10 PROCEDURE — 73080 X-RAY EXAM OF ELBOW: CPT | Mod: 26,RT,, | Performed by: RADIOLOGY

## 2019-04-10 PROCEDURE — 99024 PR POST-OP FOLLOW-UP VISIT: ICD-10-PCS | Mod: S$GLB,,, | Performed by: PHYSICIAN ASSISTANT

## 2019-04-10 PROCEDURE — 73080 X-RAY EXAM OF ELBOW: CPT | Mod: TC,FY,RT

## 2019-04-10 PROCEDURE — 73080 XR ELBOW COMPLETE 3 VIEW RIGHT: ICD-10-PCS | Mod: 26,RT,, | Performed by: RADIOLOGY

## 2019-04-10 PROCEDURE — 99024 POSTOP FOLLOW-UP VISIT: CPT | Mod: S$GLB,,, | Performed by: PHYSICIAN ASSISTANT

## 2019-04-10 NOTE — PROGRESS NOTES
"Ms. Kay is here today for a post-operative visit.  She is 6 weeks status post Right radial head arthroplasty revision and Ulnar nerve decompression at the elbow by Dr. Hubbard on 1/16/19. She is doing well. She has been attending OT. She reports decreased sensation from prior to surgery has resolved. She wears her brace occasionally. Pain is tolerable/ mild, occurs with certain activities. She takes Tylenol as needed. She denies fever, chills, and sweats since the time of the surgery.     Physical exam:    Vitals:    04/10/19 0811   BP: (!) 148/79   Pulse: 64   Weight: 74.8 kg (165 lb)   Height: 5' 5" (1.651 m)   PainSc:   3     Vital signs are stable, patient is afebrile.  Patient is well dressed and well groomed, no acute distress.  Alert and oriented to person, place, and time.  Incisions are clean, dry and intact, well healed.  There is no erythema or exudate.  There is no sign of any infection. She is NVI. Fair elbow motion. Equal sensation throughout hand.    Assessment: status post Right radial head arthroplasty revision and Ulnar nerve decompression at the elbow by Dr. Hubbard on 1/16/19    Plan:  Riana was seen today for pain.    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

## 2019-04-15 ENCOUNTER — ANESTHESIA EVENT (OUTPATIENT)
Dept: ENDOSCOPY | Facility: HOSPITAL | Age: 64
End: 2019-04-15
Payer: MEDICARE

## 2019-04-15 ENCOUNTER — ANESTHESIA (OUTPATIENT)
Dept: ENDOSCOPY | Facility: HOSPITAL | Age: 64
End: 2019-04-15
Payer: MEDICARE

## 2019-04-15 ENCOUNTER — HOSPITAL ENCOUNTER (OUTPATIENT)
Facility: HOSPITAL | Age: 64
Discharge: HOME | End: 2019-04-15
Attending: INTERNAL MEDICINE | Admitting: INTERNAL MEDICINE
Payer: MEDICARE

## 2019-04-15 VITALS
HEIGHT: 65 IN | HEART RATE: 65 BPM | SYSTOLIC BLOOD PRESSURE: 145 MMHG | DIASTOLIC BLOOD PRESSURE: 68 MMHG | BODY MASS INDEX: 27.16 KG/M2 | TEMPERATURE: 98 F | OXYGEN SATURATION: 98 % | RESPIRATION RATE: 21 BRPM | WEIGHT: 163 LBS

## 2019-04-15 DIAGNOSIS — K21.9 GASTROESOPHAGEAL REFLUX DISEASE, ESOPHAGITIS PRESENCE NOT SPECIFIED: Primary | ICD-10-CM

## 2019-04-15 DIAGNOSIS — R13.10 DYSPHAGIA: ICD-10-CM

## 2019-04-15 LAB — POCT GLUCOSE: 258 MG/DL (ref 70–110)

## 2019-04-15 PROCEDURE — 82962 GLUCOSE BLOOD TEST: CPT | Performed by: INTERNAL MEDICINE

## 2019-04-15 PROCEDURE — 88305 TISSUE EXAM BY PATHOLOGIST: CPT | Mod: 26,,, | Performed by: PATHOLOGY

## 2019-04-15 PROCEDURE — E9220 PRA ENDO ANESTHESIA: HCPCS | Mod: ,,, | Performed by: NURSE ANESTHETIST, CERTIFIED REGISTERED

## 2019-04-15 PROCEDURE — 25000003 PHARM REV CODE 250: Performed by: INTERNAL MEDICINE

## 2019-04-15 PROCEDURE — 27201012 HC FORCEPS, HOT/COLD, DISP: Performed by: INTERNAL MEDICINE

## 2019-04-15 PROCEDURE — 43249 ESOPH EGD DILATION <30 MM: CPT | Mod: ,,, | Performed by: INTERNAL MEDICINE

## 2019-04-15 PROCEDURE — E9220 PRA ENDO ANESTHESIA: ICD-10-PCS | Mod: ,,, | Performed by: NURSE ANESTHETIST, CERTIFIED REGISTERED

## 2019-04-15 PROCEDURE — 88305 TISSUE EXAM BY PATHOLOGIST: CPT | Performed by: PATHOLOGY

## 2019-04-15 PROCEDURE — 43249 ESOPH EGD DILATION <30 MM: CPT | Performed by: INTERNAL MEDICINE

## 2019-04-15 PROCEDURE — 43249 PR EGD, FLEX, W/BALL DILATION, < 30MM: ICD-10-PCS | Mod: ,,, | Performed by: INTERNAL MEDICINE

## 2019-04-15 PROCEDURE — 37000009 HC ANESTHESIA EA ADD 15 MINS: Performed by: INTERNAL MEDICINE

## 2019-04-15 PROCEDURE — 43239 PR EGD, FLEX, W/BIOPSY, SGL/MULTI: ICD-10-PCS | Mod: 59,,, | Performed by: INTERNAL MEDICINE

## 2019-04-15 PROCEDURE — 37000008 HC ANESTHESIA 1ST 15 MINUTES: Performed by: INTERNAL MEDICINE

## 2019-04-15 PROCEDURE — 25000003 PHARM REV CODE 250: Performed by: NURSE ANESTHETIST, CERTIFIED REGISTERED

## 2019-04-15 PROCEDURE — 63600175 PHARM REV CODE 636 W HCPCS: Performed by: NURSE ANESTHETIST, CERTIFIED REGISTERED

## 2019-04-15 PROCEDURE — 43239 EGD BIOPSY SINGLE/MULTIPLE: CPT | Mod: 59,,, | Performed by: INTERNAL MEDICINE

## 2019-04-15 PROCEDURE — 43239 EGD BIOPSY SINGLE/MULTIPLE: CPT | Performed by: INTERNAL MEDICINE

## 2019-04-15 PROCEDURE — 88342 IMHCHEM/IMCYTCHM 1ST ANTB: CPT | Mod: 26,,, | Performed by: PATHOLOGY

## 2019-04-15 PROCEDURE — 88342 TISSUE SPECIMEN TO PATHOLOGY - SURGERY: ICD-10-PCS | Mod: 26,,, | Performed by: PATHOLOGY

## 2019-04-15 PROCEDURE — 88305 TISSUE SPECIMEN TO PATHOLOGY - SURGERY: ICD-10-PCS | Mod: 26,,, | Performed by: PATHOLOGY

## 2019-04-15 PROCEDURE — C1726 CATH, BAL DIL, NON-VASCULAR: HCPCS | Performed by: INTERNAL MEDICINE

## 2019-04-15 RX ORDER — GLYCOPYRROLATE 0.2 MG/ML
INJECTION INTRAMUSCULAR; INTRAVENOUS
Status: DISCONTINUED | OUTPATIENT
Start: 2019-04-15 | End: 2019-04-15

## 2019-04-15 RX ORDER — PROPOFOL 10 MG/ML
VIAL (ML) INTRAVENOUS CONTINUOUS PRN
Status: DISCONTINUED | OUTPATIENT
Start: 2019-04-15 | End: 2019-04-15

## 2019-04-15 RX ORDER — PROPOFOL 10 MG/ML
VIAL (ML) INTRAVENOUS
Status: DISCONTINUED | OUTPATIENT
Start: 2019-04-15 | End: 2019-04-15

## 2019-04-15 RX ORDER — SODIUM CHLORIDE 9 MG/ML
INJECTION, SOLUTION INTRAVENOUS CONTINUOUS
Status: DISCONTINUED | OUTPATIENT
Start: 2019-04-15 | End: 2019-04-15 | Stop reason: HOSPADM

## 2019-04-15 RX ORDER — LIDOCAINE HCL/PF 100 MG/5ML
SYRINGE (ML) INTRAVENOUS
Status: DISCONTINUED | OUTPATIENT
Start: 2019-04-15 | End: 2019-04-15

## 2019-04-15 RX ADMIN — PROPOFOL 150 MCG/KG/MIN: 10 INJECTION, EMULSION INTRAVENOUS at 07:04

## 2019-04-15 RX ADMIN — GLYCOPYRROLATE 0.2 MG: 0.2 INJECTION, SOLUTION INTRAMUSCULAR; INTRAVENOUS at 07:04

## 2019-04-15 RX ADMIN — LIDOCAINE HYDROCHLORIDE 100 MG: 20 INJECTION, SOLUTION INTRAVENOUS at 07:04

## 2019-04-15 RX ADMIN — PROPOFOL 70 MG: 10 INJECTION, EMULSION INTRAVENOUS at 07:04

## 2019-04-15 RX ADMIN — SODIUM CHLORIDE: 0.9 INJECTION, SOLUTION INTRAVENOUS at 07:04

## 2019-04-15 NOTE — [employer] PATIENT INSTRUCTIONS
Discharge Summary/Instructions after an Endoscopic Procedure  Patient Name: Riana Kay  Patient MRN: [id number]  Patient YOB: 1955  Monday, April 15, 2019  Buck Irwin MD  RESTRICTIONS:  During your procedure today, you received medications for sedation.  These   medications may affect your judgment, balance and coordination.  Therefore,   for 24 hours, you have the following restrictions:   - DO NOT drive a car, operate machinery, make legal/financial decisions,   sign important papers or drink alcohol.    ACTIVITY:  Today: no heavy lifting, straining or running due to procedural   sedation/anesthesia.  The following day: return to full activity including work.  DIET:  Eat and drink normally unless instructed otherwise.     TREATMENT FOR COMMON SIDE EFFECTS:  - Mild abdominal pain, nausea, belching, bloating or excessive gas:  rest,   eat lightly and use a heating pad.  - Sore Throat: treat with throat lozenges and/or gargle with warm salt   water.  - Because air was used during the procedure, expelling large amounts of air   from your rectum or belching is normal.  - If a bowel prep was taken, you may not have a bowel movement for 1-3 days.    This is normal.  SYMPTOMS TO WATCH FOR AND REPORT TO YOUR PHYSICIAN:  1. Abdominal pain or bloating, other than gas cramps.  2. Chest pain.  3. Back pain.  4. Signs of infection such as: chills or fever occurring within 24 hours   after the procedure.  5. Rectal bleeding, which would show as bright red, maroon, or black stools.   (A tablespoon of blood from the rectum is not serious, especially if   hemorrhoids are present.)  6. Vomiting.  7. Weakness or dizziness.  GO DIRECTLY TO THE NEAREST EMERGENCY ROOM IF YOU HAVE ANY OF THE FOLLOWING:      Difficulty breathing              Chills and/or fever over 101 F   Persistent vomiting and/or vomiting blood   Severe abdominal pain   Severe chest pain   Black, tarry stools   Bleeding- more than one tablespoon   Any  other symptom or condition that you feel may need urgent attention  Your doctor recommends these additional instructions:  If any biopsies were taken, your doctors clinic will contact you in 1 to 2   weeks with any results.  - Patient has a contact number available for emergencies.  The signs and   symptoms of potential delayed complications were discussed with the   patient.  Return to normal activities tomorrow.  Written discharge   instructions were provided to the patient.   - Discharge patient to home.   - Resume previous diet.   - Continue present medications.   - Await pathology results.   For questions, problems or results please call your physician - Buck Irwin MD at Work:  [phone].  [facility] [place], EMERGENCY ROOM PHONE NUMBER: [phone]  IF A COMPLICATION OR EMERGENCY SITUATION ARISES AND YOU ARE UNABLE TO REACH   YOUR PHYSICIAN - GO DIRECTLY TO THE EMERGENCY ROOM.  Buck Irwin MD  4/15/2019 7:50:30 AM  This report has been verified and signed electronically.  [employer]

## 2019-04-15 NOTE — ANESTHESIA PREPROCEDURE EVALUATION
04/15/2019  Riana Kay is a 64 y.o., female presenting for an EGD today.    Past Medical History:   Diagnosis Date    Anxiety     AR (allergic rhinitis)     Colon polyp     Diabetes mellitus, type 2     Dizziness     DM (diabetes mellitus)     Fatty liver     GERD (gastroesophageal reflux disease)     HTN (hypertension)     Hyperlipidemia     Memory loss     Osteopenia     S/P total hysterectomy     Sleep apnea      Past Surgical History:   Procedure Laterality Date    ARTHROPLASTY, ELBOW right radial head arthroplasty revision Right 1/16/2019    Performed by [facility] at [facility] OR 1ST FLR    CHOLECYSTECTOMY      COLONOSCOPY N/A 10/4/2013    Performed by Anurag Carl MD at [facility] ENDO (4TH FLR)    COLONOSCOPY with Jacobo N/A 1/17/2018    Performed by Roland Jacobo MD at [facility] ENDO (4TH FLR)    EGD (ESOPHAGOGASTRODUODENOSCOPY) N/A 2/28/2014    Performed by [facility]ag Carl [facility] at [facility] ENDO (4TH FLR)    ELBOW SURGERY Right 7/16/15    ELBOW SURGERY      HYSTERECTOMY      INJECTION-STEROID-EPIDURAL-CERVICAL N/A 3/16/2015    Performed by [facility] CATH LAB    KNEE ARTHROSCOPY W/ DEBRIDEMENT  4/11    Left    RELEASE, ULNAR TUNNEL right Right 1/16/2019    Performed by [facility] at [facility] OR 1ST FLR    ROTATOR CUFF REPAIR      TONSILLECTOMY      TOTAL ABDOMINAL HYSTERECTOMY W/ BILATERAL SALPINGOOPHORECTOMY      UPPER GASTROINTESTINAL ENDOSCOPY           Anesthesia Evaluation    I have reviewed the Patient Summary Reports.     I have reviewed the Medications.     Review of Systems  Anesthesia Hx:  No problems with previous Anesthesia Denies Hx of Anesthetic complications  Denies Family Hx of Anesthesia complications.   Denies Personal Hx of Anesthesia complications.   Hematology/Oncology:  Hematology Normal   Oncology Normal      EENT/Dental:EENT/Dental Normal   Cardiovascular:   Hypertension CAD      Pulmonary:   Sleep Apnea (denies CPAP use)    Renal/:  Renal/ Normal     Hepatic/GI:   GERD    Musculoskeletal:  Musculoskeletal Normal    Neurological:  Neurology Normal    Endocrine:   Diabetes, well controlled, type 2    Dermatological:  Skin Normal    Psych:  Psychiatric Normal           Physical Exam  General:  Well nourished    Airway/Jaw/Neck:  Airway Findings: Mouth Opening: Normal Tongue: Normal  General Airway Assessment: Adult  Mallampati: II  TM Distance: Normal, at least 6 cm  Jaw/Neck Findings:  Neck ROM: Normal ROM     Eyes/Ears/Nose:  EYES/EARS/NOSE FINDINGS: Normal   Dental:  Dental Findings: In tact   Chest/Lungs:  Chest/Lungs Findings: Clear to auscultation, Normal Respiratory Rate     Heart/Vascular:  Heart Findings: Rate: Normal  Rhythm: Regular Rhythm  Sounds: Normal        Mental Status:  Mental Status Findings:  Cooperative, Alert and Oriented         Anesthesia Plan  Type of Anesthesia, risks & benefits discussed:  Anesthesia Type:  general  Patient's Preference: general  Intra-op Monitoring Plan: standard ASA monitors  Intra-op Monitoring Plan Comments:   Post Op Pain Control Plan:   Post Op Pain Control Plan Comments:   Induction:   IV  Beta Blocker:  Patient is not currently on a Beta-Blocker (No further documentation required).       Informed Consent: Patient understands risks and agrees with Anesthesia plan.  Questions answered. Anesthesia consent signed with patient.  ASA Score: 3     Day of Surgery Review of History & Physical: I have interviewed and examined the patient. I have reviewed the patient's H&P dated: 4/15/19.   H&P update referred to the provider.         Ready For Surgery From Anesthesia Perspective.

## 2019-04-15 NOTE — ANESTHESIA POSTPROCEDURE EVALUATION
Anesthesia Post Evaluation    Patient: Riana Kay    Procedure(s) Performed: Procedure(s) (LRB):  EGD (ESOPHAGOGASTRODUODENOSCOPY) (N/A)    Final Anesthesia Type: general  Patient location during evaluation: PACU  Patient participation: Yes- Able to Participate  Level of consciousness: awake and alert and oriented  Post-procedure vital signs: reviewed and stable  Pain management: adequate  Airway patency: patent  PONV status at discharge: No PONV  Anesthetic complications: no      Cardiovascular status: blood pressure returned to baseline  Respiratory status: unassisted  Hydration status: euvolemic  Follow-up not needed.          Vitals Value Taken Time   /68 4/15/2019  8:20 AM   Temp 36.5 °C (97.7 °F) 4/15/2019  7:52 AM   Pulse 65 4/15/2019  8:20 AM   Resp 21 4/15/2019  8:20 AM   SpO2 98 % 4/15/2019  8:20 AM         Event Time     Out of Recovery 08:30:08          Pain/Jeffery Score: Jeffery Score: 9 (4/15/2019  8:05 AM)

## 2019-04-15 NOTE — H&P
Short Stay Endoscopy History and Physical    PCP - Amena Roger, DO    Procedure - EGD  Sedation: GA  ASA - per anesthesia  Mallampati - per anesthesia  History of Anesthesia problems - no  Family history Anesthesia problems -  no     HPI:  This is a 64 y.o. female here for evaluation of : Dysphagia and GERD    Reflux - yes  Dysphagia - yes  Abdominal pain - no  Diarrhea - no    ROS:  Constitutional: No fevers, chills, No weight loss  ENT: No allergies  CV: No chest pain  Pulm: No cough, No shortness of breath  Ophtho: No vision changes  GI: see HPI  Medical History:  has a past medical history of Anxiety, AR (allergic rhinitis), Colon polyp, Diabetes mellitus, type 2, Dizziness, DM (diabetes mellitus), Fatty liver, GERD (gastroesophageal reflux disease), HTN (hypertension), Hyperlipidemia, Memory loss, Osteopenia, S/P total hysterectomy, and Sleep apnea.    Surgical History:  has a past surgical history that includes Cholecystectomy; Knee arthroscopy w/ debridement (4/11); Total abdominal hysterectomy w/ bilateral salpingoophorectomy; Tonsillectomy; Rotator cuff repair; Elbow surgery (Right, 7/16/15); Elbow surgery; Hysterectomy; Colonoscopy (N/A, 1/17/2018); Elbow Arthroplasty (Right, 1/16/2019); Release of ulnar nerve at cubital tunnel (Right, 1/16/2019); and Upper gastrointestinal endoscopy.    Family History: family history includes Colon cancer (age of onset: 83) in her father; Diabetes in her maternal aunt and maternal grandmother.. Otherwise no colon cancer, inflammatory bowel disease, or GI malignancies.    Social History:  reports that she has never smoked. She has never used smokeless tobacco. She reports that she does not drink alcohol or use drugs.    Review of patient's allergies indicates:   Allergen Reactions    Iodinated contrast- oral and iv dye Swelling and Rash    Percocet [oxycodone-acetaminophen] Itching    Macrobid [nitrofurantoin monohyd/m-cryst] Rash    Metformin Rash    Penicillins  "Rash    Promethazine Rash    Sulfa (sulfonamide antibiotics) Rash    Sulfamethoxazole-trimethoprim Rash       Medications:   Medications Prior to Admission   Medication Sig Dispense Refill Last Dose    insulin aspart U-100 (NOVOLOG FLEXPEN U-100 INSULIN) 100 unit/mL InPn pen Inject 8U W/ BREAKFAST AND 12U W/ LUNCH & SUPPER PLUS SCALE(180-230+2, 231-280+4, 281-330+6 & 331-380+8 45 Syringe 3 4/14/2019 at Unknown time    insulin detemir U-100 (LEVEMIR FLEXTOUCH U-100 INSULN) 100 unit/mL (3 mL) SubQ InPn pen Inject 33 Units into the skin every evening. 3 Box 3 4/14/2019 at Unknown time    pantoprazole (PROTONIX) 40 MG tablet Take 1 tablet (40 mg total) by mouth once daily. 30 tablet 11 4/14/2019 at Unknown time    SITagliptin (JANUVIA) 100 MG Tab Take 1 tablet (100 mg total) by mouth once daily. 90 tablet 3 4/14/2019 at Unknown time    azelastine (ASTELIN) 137 mcg (0.1 %) nasal spray 1 spray (137 mcg total) by Nasal route 2 (two) times daily. 30 mL 6 Unknown at Unknown time    blood sugar diagnostic (ACCU-CHEK SMARTVIEW TEST STRIP) Strp    Taking    blood-glucose meter kit Use as instructed, preferred meter. DX Code. E11.42 1 each 0 Taking    buPROPion (WELLBUTRIN XL) 150 MG TB24 tablet Take 1 tablet (150 mg total) by mouth once daily. 30 tablet 11 Unknown at Unknown time    cyanocobalamin (VITAMIN B-12) 100 MCG tablet Take 100 mcg by mouth once daily.   Unknown at Unknown time    ergocalciferol (VITAMIN D2) 50,000 unit Cap Take 1 capsule (50,000 Units total) by mouth every 7 days. 8 capsule 0 Unknown at Unknown time    gabapentin (NEURONTIN) 300 MG capsule Take 1 capsule (300 mg total) by mouth 3 (three) times daily. 90 capsule 11 Unknown at Unknown time    insulin needles, disposable, (PEN NEEDLE) 31 X 5/16 " Ndle Use as directed 100 each PRN Taking    lancets (MICROLET LANCET) Misc    Taking    lisinopril (PRINIVIL,ZESTRIL) 5 MG tablet Take 5 mg by mouth once daily.  3 Unknown at Unknown time    " "losartan (COZAAR) 25 MG tablet Take 0.5 tablets (12.5 mg total) by mouth once daily. (Patient taking differently: Take 12.5 mg by mouth once daily. Takes after lunch) 45 tablet 3 Unknown at Unknown time    ondansetron (ZOFRAN-ODT) 8 MG TbDL Take 1 tablet (8 mg total) by mouth 3 (three) times daily as needed. 30 tablet 0 Unknown at Unknown time    pen needle, diabetic (NOVOFINE 32) 32 gauge x 1/4" Ndle    Taking    pyridoxine, vitamin B6, (VITAMIN B-6) 100 MG Tab Take 100 mg by mouth once daily.   Unknown at Unknown time       Objective Findings:    Vital Signs: Per nursing notes.    Physical Exam:  General Appearance: Well appearing in no acute distress  Head:   Normocephalic, without obvious abnormality  Eyes:    No scleral icterus  Airway: Open  Neck: No restriction in mobility  Lungs: CTA bilaterally in anterior and posterior fields, no wheezes, no crackles.  Heart:  Regular rate and rhythm, S1, S2 normal, no murmurs heard  Abdomen: Soft, non tender, non distended      Labs:  Lab Results   Component Value Date    WBC 7.06 03/06/2019    HGB 13.0 03/06/2019    HCT 37.1 03/06/2019     03/06/2019    CHOL 168 11/28/2018    TRIG 146 11/28/2018    HDL 51 11/28/2018    ALT 26 03/06/2019    AST 21 03/06/2019     03/06/2019    K 4.0 03/06/2019     03/06/2019    CREATININE 0.8 03/06/2019    BUN 17 03/06/2019    CO2 29 03/06/2019    TSH 1.309 07/23/2018    INR 0.9 05/11/2011    HGBA1C 7.2 (H) 12/21/2018         I have explained the risks and benefits of endoscopy procedures to the patient including but not limited to bleeding, perforation, infection, and death.    Thank you so much for allowing me to participate in the care of Riana Irwin MD    "

## 2019-04-15 NOTE — TRANSFER OF CARE
"Anesthesia Transfer of Care Note    Patient: Riana Kay    Procedure(s) Performed: Procedure(s) (LRB):  EGD (ESOPHAGOGASTRODUODENOSCOPY) (N/A)    Patient location: GI    Anesthesia Type: general    Transport from OR: Transported from OR on room air with adequate spontaneous ventilation    Post pain: adequate analgesia    Post assessment: tolerated procedure well and no apparent anesthetic complications    Post vital signs: stable    Level of consciousness: responds to stimulation    Nausea/Vomiting: no nausea/vomiting    Complications: none    Transfer of care protocol was followed      Last vitals:   Visit Vitals  BP (!) 125/59 (BP Location: Left arm, Patient Position: Lying)   Pulse 69   Temp 36.5 °C (97.7 °F) (Temporal)   Resp 18   Ht 5' 5" (1.651 m)   Wt 73.9 kg (163 lb)   SpO2 96%   Breastfeeding? No   BMI 27.12 kg/m²     "

## 2019-04-15 NOTE — DISCHARGE INSTRUCTIONS

## 2019-04-17 ENCOUNTER — CLINICAL SUPPORT (OUTPATIENT)
Dept: REHABILITATION | Facility: HOSPITAL | Age: 64
End: 2019-04-17
Attending: PODIATRIST
Payer: MEDICARE

## 2019-04-17 DIAGNOSIS — G89.29 CHRONIC PAIN OF LEFT ANKLE: ICD-10-CM

## 2019-04-17 DIAGNOSIS — M25.572 CHRONIC PAIN OF LEFT ANKLE: ICD-10-CM

## 2019-04-17 PROCEDURE — 97161 PT EVAL LOW COMPLEX 20 MIN: CPT | Mod: PO

## 2019-04-17 PROCEDURE — 97110 THERAPEUTIC EXERCISES: CPT | Mod: PO

## 2019-04-17 PROCEDURE — 97140 MANUAL THERAPY 1/> REGIONS: CPT | Mod: PO

## 2019-04-17 PROCEDURE — G8978 MOBILITY CURRENT STATUS: HCPCS | Mod: CL,PO

## 2019-04-17 PROCEDURE — G8979 MOBILITY GOAL STATUS: HCPCS | Mod: CK,PO

## 2019-04-17 NOTE — PLAN OF CARE
"OUTPATIENT PHYSICAL THERAPY  PHYSICAL THERAPY EVALUATION    Name: Riana Baker   Clinic Number: [id number]    Evaluation Date: 04/17/2019  Visit #: 1 / 12  Authorization period Expiration: 5/28/19  Plan of Care Expiration: 7/5/19  Precautions: type 2 diabetes, GERD, osteopenia     Diagnosis:   Encounter Diagnosis   Name Primary?    Chronic pain of left ankle      Physician: Jeanna Alatorre DPM  Treatment Orders: PT Eval and Treat  Past Medical History:   Diagnosis Date    Anxiety     AR (allergic rhinitis)     Colon polyp     Diabetes mellitus, type 2     Dizziness     DM (diabetes mellitus)     Fatty liver     GERD (gastroesophageal reflux disease)     HTN (hypertension)     Hyperlipidemia     Memory loss     Osteopenia     S/P total hysterectomy     Sleep apnea      Current Outpatient Medications   Medication Sig    azelastine (ASTELIN) 137 mcg (0.1 %) nasal spray 1 spray (137 mcg total) by Nasal route 2 (two) times daily.    blood sugar diagnostic (ACCU-CHEK SMARTVIEW TEST STRIP) Strp     blood-glucose meter kit Use as instructed, preferred meter. DX Code. E11.42    buPROPion (WELLBUTRIN XL) 150 MG TB24 tablet Take 1 tablet (150 mg total) by mouth once daily.    cyanocobalamin (VITAMIN B-12) 100 MCG tablet Take 100 mcg by mouth once daily.    ergocalciferol (VITAMIN D2) 50,000 unit Cap Take 1 capsule (50,000 Units total) by mouth every 7 days.    gabapentin (NEURONTIN) 300 MG capsule Take 1 capsule (300 mg total) by mouth 3 (three) times daily.    insulin aspart U-100 (NOVOLOG FLEXPEN U-100 INSULIN) 100 unit/mL InPn pen Inject 8U W/ BREAKFAST AND 12U W/ LUNCH & SUPPER PLUS SCALE(180-230+2, 231-280+4, 281-330+6 & 331-380+8    insulin detemir U-100 (LEVEMIR FLEXTOUCH U-100 INSULN) 100 unit/mL (3 mL) SubQ InPn pen Inject 33 Units into the skin every evening.    insulin needles, disposable, (PEN NEEDLE) 31 X 5/16 " Ndle Use as directed    lancets (MICROLET LANCET) Misc     lisinopril " "(PRINIVIL,ZESTRIL) 5 MG tablet Take 5 mg by mouth once daily.    losartan (COZAAR) 25 MG tablet Take 0.5 tablets (12.5 mg total) by mouth once daily. (Patient taking differently: Take 12.5 mg by mouth once daily. Takes after lunch)    ondansetron (ZOFRAN-ODT) 8 MG TbDL Take 1 tablet (8 mg total) by mouth 3 (three) times daily as needed.    pantoprazole (PROTONIX) 40 MG tablet Take 1 tablet (40 mg total) by mouth once daily.    pen needle, diabetic (NOVOFINE 32) 32 gauge x 1/4" Ndle     pyridoxine, vitamin B6, (VITAMIN B-6) 100 MG Tab Take 100 mg by mouth once daily.    SITagliptin (JANUVIA) 100 MG Tab Take 1 tablet (100 mg total) by mouth once daily.     No current facility-administered medications for this visit.      Review of patient's allergies indicates:   Allergen Reactions    Iodinated contrast- oral and iv dye Swelling and Rash    Percocet [oxycodone-acetaminophen] Itching    Macrobid [nitrofurantoin monohyd/m-cryst] Rash    Metformin Rash    Penicillins Rash    Promethazine Rash    Sulfa (sulfonamide antibiotics) Rash    Sulfamethoxazole-trimethoprim Rash       History   Prior Therapy: yes for knee pain  Social History: lives with    Previous functional status: WNL   Current functional status: limited by pain  Work: retired     Subjective   History of Present Illness: patient reports L ankle pain and swelling  That is  > R side. She reports this has been present for 3-4 years after twisting it on a [employer] orthodox pew and reports that it has intermittently been about the same since it started. She reports that for treatment she has rolled a frozen ice bottle which feels better momentarily. She reports that she does exercises minimally performing elbow exercises and leg kicks.     DOI: 3-4 years ago  Imaging, none  Pain: current 4/10, worst 7/10, best 0/10, Aching and Dull, intermittent  Radicular symptoms none  Aggravating factors: prolonged walking or standing  Easing factors: rest   Pts " goals: get rid of pain    Objective   Mental status: alert  Posture/ Alignment: Good    Movement Analysis:   Patient squats with excessive trunk lean and excessive pronation L>R     Ankle Range of Motion: PROM all on a scale of 5/5  Ankle Right Left      Dorsiflexion 4+  4   Plantarflexion 4+  4    Inversion 4-  4-    Eversion 4+  4+      Strength:  Ankle Right Left   Dorsiflexion 5 -5   Plantarflexion 60 45   Inversion 55 45   Eversion 35 30     Edema   Right Left   Figure 8 48 cm 49 cm     Joint Mobility: hypomobile L talocrural joint to distraction and L lateral subtalar arc glide     Palpation: tenderness to L talar dome and ATFL ligament     Balance   Right Left   SLS 2 sec 1 sec     Gait: excessive pronation, L > R         CMS Impairment/Limitation/Restriction for ankle per clinical judgement     Therapist reviewed FOTO scores for Riana Kay on 4/17/2019.   FOTO documents entered into [employer] - see Media section.    Limitation Score: 61%%  Category: Mobility    Current : CL = least 60% but < 80% impaired, limited or restricted  Goal: CK = at least 40% but < 60% impaired, limited or restricted           TREATMENT   Time In: 1100 AM  Time Out: 1145    Discussed Plan of Care with patient: Yes    Riana received 15 minutes of manual therapy including:   Talocrural distraction   Lateral subtalar glides   Calf stretching with posterior talus glide       Riana received 10 minutes of therapeutic exercises including:   Towel calf stretch 3x30 sec   Ankle 4 way OTB - 2x10   Calf stretch on wall - 3x20 seconds        Written Home Exercises Provided: yes   Exercises were reviewed and Riana was able to demonstrate them prior to the end of the session. Pt received a written copy of exercises to perform at home. Riana demonstrated good  understanding of the education provided.     Assessment   Riana is a 64 y.o. female referred to outpatient physical therapy with a medical diagnosis of chronic ankle  paoin due to trauma prolonged inactivity. She will benefit from a general ankle mobility and strengthening program. Demonstrates impairments including limitations as described in the problem list. Pt prognosis is Good. Positive prognostic factors include lack of stretching and strengthening prior to visit. Negative prognostic factors include chronicity of pain. Pt will benefit from skilled outpatient physical therapy to address the above stated deficits, provide pt/family education, and to maximize pt's level of independence.     History  Co-morbidities and personal factors that may impact the plan of care Examination  Body Structures and Functions, activity limitations and participation restrictions that may impact the plan of care    Clinical Presentation   Co-morbidities:   diabetes and osteopenia        Personal Factors:   no deficits Body Regions:   lower extremities    Body Systems:   ROM  strength  balance        Participation Restrictions:   ADLs     Activity limitations:   Learning and applying knowledge  no deficits    General Tasks and Commands  no deficits    Communication  no deficits    Mobility  walking    Self care  no deficits    Domestic Life  no deficits    Interactions/Relationships  no deficits    Life Areas  no deficits    Community and Social Life  no deficits         stable and uncomplicated                      low   low  low Decision Making/ Complexity Score:  low     Pt's spiritual, cultural and educational needs considered and pt agreeable to plan of care and goals as stated below:     Anticipated Barriers for physical therapy: chronicity of pain    Short Term GOALS:  In 5 weeks  1. Pt will reports a decrease in pain at worst from 7/10 to 4/10 or less so that patient can return to ADLs with decreased pain.  2. Pt will demonstrate improved plantar and dorsiflexion MMT grades by 1 or more in order to ambulate with decreased compensations.  3. Patient will report a 75% improvement in overall  ankle function in order to return to PLOF.  Long Term GOALS:  In 10 weeks  1. Pt will be able to walk for 5 mins or more with 1/10 pain or less so that she can ambulate in community.   2. Pt will report ability to clean house for 10 mins or longer with less than a 2/10 pain.   3. Patient will report a 75% improvement in overall ankle function in order to return to PLOF.    Plan   Plan to add heel raises, squat education, weight shifts, and gluteal exercises next visit.    Outpatient physical therapy 2 times weekly to include: pt ed, HEP, therapeutic exercises, therapeutic activities, neuromuscular re-education/ balance exercises, manual therapy, dry needling, and modalities prn. Cont PT for 10 weeks. Pt may be seen by PTA as part of the rehabilitation team.     I certify the need for these services furnished under this plan of treatment and while under my care.    Talha Starks, PT, DPT.

## 2019-04-22 ENCOUNTER — TELEPHONE (OUTPATIENT)
Dept: ENDOSCOPY | Facility: HOSPITAL | Age: 64
End: 2019-04-22

## 2019-04-22 NOTE — PROGRESS NOTES
Subjective:       Patient ID: Riana Kay is a 64 y.o. female.    Chief Complaint: Abdominal Pain    Patient is a 64 y.o.female who presents today for abdominal pain. It is a dull achy pain. Ongoing for 2 weeks. She is having normal bowel movements. It is much worse after eating.  She is passing gas. No new activity. No fever or chills. She is taking pantoprazole daily..     She says her pharmacy told her that her losartan was recalled.  Review of Systems   Constitutional: Negative for appetite change, chills, diaphoresis, fatigue and fever.   HENT: Negative for congestion, dental problem, ear discharge, ear pain, hearing loss, postnasal drip, sinus pressure and sore throat.    Eyes: Negative for discharge, redness and itching.   Respiratory: Negative for cough, chest tightness, shortness of breath and wheezing.    Cardiovascular: Negative for chest pain, palpitations and leg swelling.   Gastrointestinal: Negative for abdominal pain, constipation, diarrhea, nausea and vomiting.   Endocrine: Negative for cold intolerance and heat intolerance.   Genitourinary: Negative for difficulty urinating, frequency, hematuria and urgency.   Musculoskeletal: Negative for arthralgias, back pain, gait problem, myalgias and neck pain.   Skin: Negative for color change and rash.   Neurological: Negative for dizziness, syncope and headaches.   Hematological: Negative for adenopathy.   Psychiatric/Behavioral: Negative for behavioral problems and sleep disturbance. The patient is not nervous/anxious.        Objective:      Physical Exam   Constitutional: She is oriented to person, place, and time. She appears well-developed and well-nourished. No distress.   HENT:   Head: Normocephalic and atraumatic.   Right Ear: External ear normal.   Left Ear: External ear normal.   Nose: Nose normal.   Mouth/Throat: Oropharynx is clear and moist. No oropharyngeal exudate.   Eyes: Pupils are equal, round, and reactive to light. Conjunctivae  and EOM are normal. Right eye exhibits no discharge. Left eye exhibits no discharge. No scleral icterus.   Neck: Normal range of motion. Neck supple. No JVD present. No thyromegaly present.   Cardiovascular: Normal rate, regular rhythm, normal heart sounds and intact distal pulses. Exam reveals no gallop and no friction rub.   No murmur heard.  Pulmonary/Chest: Effort normal and breath sounds normal. No stridor. No respiratory distress. She has no wheezes. She has no rales. She exhibits no tenderness.   Abdominal: Soft. Bowel sounds are normal. She exhibits no distension. There is tenderness. There is no rebound.   Musculoskeletal: Normal range of motion. She exhibits no edema or tenderness.   Lymphadenopathy:     She has no cervical adenopathy.   Neurological: She is alert and oriented to person, place, and time. No cranial nerve deficit.   Skin: Skin is warm and dry. No rash noted. She is not diaphoretic. No erythema.   Psychiatric: She has a normal mood and affect. Her behavior is normal.   Nursing note and vitals reviewed.      Assessment and Plan:       1. Right sided abdominal pain  - increase protonix to bid  - X-Ray Abdomen AP 1 View; Future  - CBC auto differential; Future  - Comprehensive metabolic panel; Future  - CT Abdomen Without Contrast; Future  - pantoprazole (PROTONIX) 40 MG tablet; Take 1 tablet (40 mg total) by mouth 2 (two) times daily.  Dispense: 60 tablet; Refill: 11    2. Generalized abdominal pain  - X-Ray Abdomen AP 1 View; Future  - CBC auto differential; Future  - Comprehensive metabolic panel; Future  - CT Abdomen Without Contrast; Future  - pantoprazole (PROTONIX) 40 MG tablet; Take 1 tablet (40 mg total) by mouth 2 (two) times daily.  Dispense: 60 tablet; Refill: 11    3. Gastroesophageal reflux disease, esophagitis presence not specified  - X-Ray Abdomen AP 1 View; Future  - CBC auto differential; Future  - Comprehensive metabolic panel; Future  - CT Abdomen Without Contrast;  Future  - pantoprazole (PROTONIX) 40 MG tablet; Take 1 tablet (40 mg total) by mouth 2 (two) times daily.  Dispense: 60 tablet; Refill: 11    4. Hypertension, unspecified type  - stop losartan  - amLODIPine (NORVASC) 5 MG tablet; Take 1 tablet (5 mg total) by mouth once daily.  Dispense: 30 tablet; Refill: 11          No follow-ups on file.

## 2019-04-23 ENCOUNTER — CLINICAL SUPPORT (OUTPATIENT)
Dept: REHABILITATION | Facility: HOSPITAL | Age: 64
End: 2019-04-23
Attending: PODIATRIST
Payer: MEDICARE

## 2019-04-23 DIAGNOSIS — M25.572 CHRONIC PAIN OF LEFT ANKLE: ICD-10-CM

## 2019-04-23 DIAGNOSIS — G89.29 CHRONIC PAIN OF LEFT ANKLE: ICD-10-CM

## 2019-04-23 PROCEDURE — 97110 THERAPEUTIC EXERCISES: CPT | Mod: PO

## 2019-04-23 NOTE — PROGRESS NOTES
Physical Therapy Daily Treatment Note     Name: Riana Baker   Clinic Number: [id number]    Therapy Diagnosis:   Encounter Diagnosis   Name Primary?    Chronic pain of left ankle      Physician: Jeanna Alatorre DPM    Visit Date: 4/23/2019    Physician Orders: eval and treat   Medical Diagnosis: chronic pain L ankle   Evaluation Date: 04/17/2019  Authorization period Expiration: 5/28/19  Plan of Care Expiration: 7/5/19  Precautions: type 2 diabetes, GERD, osteopenia       Time In: 1000  Time Out: 1100  Total Billable Time: 30 minutes    Subjective     Pt reports: that she is feeling much better. Reports that she has been performing exercises and has been consciously more active.  She was compliant with home exercise program.  Response to previous treatment/Functional change: increased ambulation ability     Pain: 2/10  Location: bilateral ankle      Objective   Time In: 1000 AM  Time Out: 1100 AM     Discussed Plan of Care with patient: Yes     Riana received 10 minutes of manual therapy including:   Talocrural distraction   Lateral subtalar glides   Calf stretching with posterior talus glide         Riana received 30 minutes of therapeutic exercises including:   Towel calf stretch 3x30 sec   Ankle 4 way OTB - 2x10   Calf stretch on wall - 3x20 seconds  Mini squats at mat   Heel raises 3x8   Single leg heel raises on shuttle - 2x10            Written Home Exercises Provided: yes   Exercises were reviewed and Riana was able to demonstrate them prior to the end of the session. Pt received a written copy of exercises to perform at home. Riana demonstrated good  understanding of the education provided    Home Exercises Provided and Patient Education Provided     Education provided:   - cont. HEP  -Progress toward goals     Written Home Exercises Provided: Patient instructed to cont prior HEP.  Exercises were reviewed and Riana was able to demonstrate them prior to the end of the session.  Riana  demonstrated good  understanding of the education provided.     See EMR under Patient Instructions for exercises provided prior visit.    Assessment   Riana participated in today's treatment session without symptom provocation or adverse effects. Pt demonstrated improved tolerance to load and stretching as demonstrated throughout treatment. She was much less guarded and handled incerase in exercise load well. She will continue to benefit from a slow progression of mobility and load that is at a tolerable level.     Pt prognosis is Good.     Pt will continue to benefit from skilled outpatient physical therapy to address the deficits listed in the problem list box on initial evaluation, provide pt/family education and to maximize pt's level of independence in the home and community environment.     Pt's spiritual, cultural and educational needs considered and pt agreeable to plan of care and goals.     Anticipated barriers to physical therapy: chronicity of pain    Short Term GOALS:  In 5 weeks  1. Pt will reports a decrease in pain at worst from 7/10 to 4/10 or less so that patient can return to ADLs with decreased pain.  2. Pt will demonstrate improved plantar and dorsiflexion MMT grades by 1 or more in order to ambulate with decreased compensations.  3. Patient will report a 75% improvement in overall ankle function in order to return to PLOF.  Long Term GOALS:  In 10 weeks  1. Pt will be able to walk for 5 mins or more with 1/10 pain or less so that she can ambulate in community.   2. Pt will report ability to clean house for 10 mins or longer with less than a 2/10 pain.   3. Patient will report a 75% improvement in overall ankle function in order to return to PLOF.        Plan       Continue with established Plan of Care towards PT goals.       Talha Starks, PT, DPT.

## 2019-04-26 ENCOUNTER — HOSPITAL ENCOUNTER (OUTPATIENT)
Dept: RADIOLOGY | Facility: HOSPITAL | Age: 64
Discharge: HOME | End: 2019-04-26
Attending: INTERNAL MEDICINE
Payer: MEDICARE

## 2019-04-26 ENCOUNTER — OFFICE VISIT (OUTPATIENT)
Dept: INTERNAL MEDICINE | Facility: CLINIC | Age: 64
End: 2019-04-26
Payer: MEDICARE

## 2019-04-26 VITALS
BODY MASS INDEX: 28.17 KG/M2 | HEART RATE: 64 BPM | HEIGHT: 65 IN | SYSTOLIC BLOOD PRESSURE: 140 MMHG | WEIGHT: 169.06 LBS | DIASTOLIC BLOOD PRESSURE: 70 MMHG | RESPIRATION RATE: 20 BRPM | TEMPERATURE: 98 F

## 2019-04-26 DIAGNOSIS — R10.84 GENERALIZED ABDOMINAL PAIN: ICD-10-CM

## 2019-04-26 DIAGNOSIS — I10 HYPERTENSION, UNSPECIFIED TYPE: ICD-10-CM

## 2019-04-26 DIAGNOSIS — R10.9 RIGHT SIDED ABDOMINAL PAIN: Primary | ICD-10-CM

## 2019-04-26 DIAGNOSIS — R10.9 RIGHT SIDED ABDOMINAL PAIN: ICD-10-CM

## 2019-04-26 DIAGNOSIS — K21.9 GASTROESOPHAGEAL REFLUX DISEASE, ESOPHAGITIS PRESENCE NOT SPECIFIED: ICD-10-CM

## 2019-04-26 PROCEDURE — 74018 RADEX ABDOMEN 1 VIEW: CPT | Mod: 26,,, | Performed by: RADIOLOGY

## 2019-04-26 PROCEDURE — 3078F PR MOST RECENT DIASTOLIC BLOOD PRESSURE < 80 MM HG: ICD-10-PCS | Mod: CPTII,S$GLB,, | Performed by: INTERNAL MEDICINE

## 2019-04-26 PROCEDURE — 3078F DIAST BP <80 MM HG: CPT | Mod: CPTII,S$GLB,, | Performed by: INTERNAL MEDICINE

## 2019-04-26 PROCEDURE — 3008F BODY MASS INDEX DOCD: CPT | Mod: CPTII,S$GLB,, | Performed by: INTERNAL MEDICINE

## 2019-04-26 PROCEDURE — 3008F PR BODY MASS INDEX (BMI) DOCUMENTED: ICD-10-PCS | Mod: CPTII,S$GLB,, | Performed by: INTERNAL MEDICINE

## 2019-04-26 PROCEDURE — 99214 PR OFFICE/OUTPT VISIT, EST, LEVL IV, 30-39 MIN: ICD-10-PCS | Mod: S$GLB,,, | Performed by: INTERNAL MEDICINE

## 2019-04-26 PROCEDURE — 74018 XR ABDOMEN AP 1 VIEW: ICD-10-PCS | Mod: 26,,, | Performed by: RADIOLOGY

## 2019-04-26 PROCEDURE — 74018 RADEX ABDOMEN 1 VIEW: CPT | Mod: TC,PO

## 2019-04-26 PROCEDURE — 3077F PR MOST RECENT SYSTOLIC BLOOD PRESSURE >= 140 MM HG: ICD-10-PCS | Mod: CPTII,S$GLB,, | Performed by: INTERNAL MEDICINE

## 2019-04-26 PROCEDURE — 3077F SYST BP >= 140 MM HG: CPT | Mod: CPTII,S$GLB,, | Performed by: INTERNAL MEDICINE

## 2019-04-26 PROCEDURE — 99999 PR PBB SHADOW E&M-EST. PATIENT-LVL III: CPT | Mod: PBBFAC,,, | Performed by: INTERNAL MEDICINE

## 2019-04-26 PROCEDURE — 99214 OFFICE O/P EST MOD 30 MIN: CPT | Mod: S$GLB,,, | Performed by: INTERNAL MEDICINE

## 2019-04-26 PROCEDURE — 99999 PR PBB SHADOW E&M-EST. PATIENT-LVL III: ICD-10-PCS | Mod: PBBFAC,,, | Performed by: INTERNAL MEDICINE

## 2019-04-26 RX ORDER — AMLODIPINE BESYLATE 5 MG/1
5 TABLET ORAL DAILY
Qty: 30 TABLET | Refills: 11 | Status: SHIPPED | OUTPATIENT
Start: 2019-04-26 | End: 2019-06-06

## 2019-04-26 RX ORDER — PANTOPRAZOLE SODIUM 40 MG/1
40 TABLET, DELAYED RELEASE ORAL 2 TIMES DAILY
Qty: 60 TABLET | Refills: 11 | Status: SHIPPED | OUTPATIENT
Start: 2019-04-26 | End: 2020-01-09

## 2019-04-29 ENCOUNTER — HOSPITAL ENCOUNTER (OUTPATIENT)
Dept: RADIOLOGY | Facility: HOSPITAL | Age: 64
Discharge: HOME | End: 2019-04-29
Attending: INTERNAL MEDICINE
Payer: MEDICARE

## 2019-04-29 ENCOUNTER — PATIENT MESSAGE (OUTPATIENT)
Dept: INTERNAL MEDICINE | Facility: CLINIC | Age: 64
End: 2019-04-29

## 2019-04-29 DIAGNOSIS — R10.84 GENERALIZED ABDOMINAL PAIN: ICD-10-CM

## 2019-04-29 DIAGNOSIS — R10.9 RIGHT SIDED ABDOMINAL PAIN: ICD-10-CM

## 2019-04-29 DIAGNOSIS — K21.9 GASTROESOPHAGEAL REFLUX DISEASE, ESOPHAGITIS PRESENCE NOT SPECIFIED: ICD-10-CM

## 2019-04-29 PROCEDURE — 74176 CT ABDOMEN PELVIS WITHOUT CONTRAST: ICD-10-PCS | Mod: 26,,, | Performed by: RADIOLOGY

## 2019-04-29 PROCEDURE — 74176 CT ABD & PELVIS W/O CONTRAST: CPT | Mod: TC

## 2019-04-29 PROCEDURE — 74176 CT ABD & PELVIS W/O CONTRAST: CPT | Mod: 26,,, | Performed by: RADIOLOGY

## 2019-04-29 PROCEDURE — A9698 NON-RAD CONTRAST MATERIALNOC: HCPCS | Performed by: INTERNAL MEDICINE

## 2019-04-29 PROCEDURE — 25500020 PHARM REV CODE 255: Performed by: INTERNAL MEDICINE

## 2019-04-29 RX ADMIN — Medication 900 ML: at 07:04

## 2019-04-30 ENCOUNTER — CLINICAL SUPPORT (OUTPATIENT)
Dept: REHABILITATION | Facility: HOSPITAL | Age: 64
End: 2019-04-30
Attending: PODIATRIST
Payer: MEDICARE

## 2019-04-30 DIAGNOSIS — G89.29 CHRONIC PAIN OF LEFT ANKLE: ICD-10-CM

## 2019-04-30 DIAGNOSIS — M25.572 CHRONIC PAIN OF LEFT ANKLE: ICD-10-CM

## 2019-04-30 PROCEDURE — 97140 MANUAL THERAPY 1/> REGIONS: CPT | Mod: PO

## 2019-04-30 PROCEDURE — 97110 THERAPEUTIC EXERCISES: CPT | Mod: PO

## 2019-04-30 NOTE — PROGRESS NOTES
Physical Therapy Daily Treatment Note     Name: Riana Baker   Clinic Number: [id number]    Therapy Diagnosis:   Encounter Diagnosis   Name Primary?    Chronic pain of left ankle      Physician: Jeanna Alatorre DPM    Visit Date: 4/30/2019    Physician Orders: eval and treat   Medical Diagnosis: chronic pain L ankle   Evaluation Date: 04/17/2019  Authorization period Expiration: 5/28/19  Plan of Care Expiration: 7/5/19  Precautions: type 2 diabetes, GERD, osteopenia       Time In: 8 am  Time Out: 9 am  Total Billable Time: 30 minutes    Subjective     Pt reports: that she is feeling much better. Denies pain.  She was compliant with home exercise program.  Response to previous treatment/Functional change: increased ambulation ability     Pain: 0/10  Location: bilateral ankle      Objective   Time In: 1000 AM  Time Out: 1100 AM     Discussed Plan of Care with patient: Yes     Riana received 10 minutes of manual therapy including:   Joint mobilization  · Talocrural and subtalar joint distraction: Gr III  · posterior talar glides: Gr III  · Lateral subtalar glides Gr III    Riana received 30 minutes of therapeutic exercises including:   Supine  · Long sit towel calf stretch 3x30 sec   · Ankle 4 way OTB - 2x10   Standing  · Calf stretch on wall - 3x20 seconds  · Mini squats at srinivas with light UE support: 10x with verbal/tactile cues for proper form/technique  · Heel raises 3x8   Machines  · Single leg heel raises on shuttle with 1 red band - 2x10 bilaterally     Kinesiotaping  Patient was screened for use of kinesiotape and was cleared for use. Pt. received kinesiotaping on left lateral ankle two fans for swelling  1. Has the patient ever had a reaction to the adhesive in bandaids? Yes/No: No  2. Has the patient ever uses athletic/kinesiotape in the past? Yes/No: No  3. Is the patient hemodynamically impaired (PE, DVT, CHF, Kidney failure)? Yes/No: No  4. Can the PT/PTA apply the tape to your skin? Yes/No:  Yes    Patient was instructed on duration to wear the tape, proper drying techniques for the tape, and to remove the tape if he/she has any skin irritation: including, but not exclusive to, redness/itchiness/discomfort. Patient verbalized understanding of these instructions.    Written Home Exercises Provided: yes   Exercises were reviewed and Riana was able to demonstrate them prior to the end of the session. Pt received a written copy of exercises to perform at home. Riana demonstrated good  understanding of the education provided    Home Exercises Provided and Patient Education Provided     Education provided:   - cont. HEP  -Progress toward goals     Written Home Exercises Provided: Patient instructed to cont prior HEP.  Exercises were reviewed and Riana was able to demonstrate them prior to the end of the session.  Riana demonstrated good  understanding of the education provided.     See EMR under Patient Instructions for exercises provided prior visit.    Assessment   Riana had good tolerance to manual therapy and exercise progression denying increased pain. Notably improved TCJ and subtalar joint motion after manual therapy. Pt. denied pain upon completion of treatment session. She will continue to benefit from a slow progression of mobility and load that is at a tolerable level.     Pt prognosis is Good.     Pt will continue to benefit from skilled outpatient physical therapy to address the deficits listed in the problem list box on initial evaluation, provide pt/family education and to maximize pt's level of independence in the home and community environment.     Pt's spiritual, cultural and educational needs considered and pt agreeable to plan of care and goals.     Anticipated barriers to physical therapy: chronicity of pain    Short Term GOALS:  In 5 weeks  1. Pt will reports a decrease in pain at worst from 7/10 to 4/10 or less so that patient can return to ADLs with decreased pain.  2.  Pt will demonstrate improved plantar and dorsiflexion MMT grades by 1 or more in order to ambulate with decreased compensations.  3. Patient will report a 75% improvement in overall ankle function in order to return to PLOF.  Long Term GOALS:  In 10 weeks  1. Pt will be able to walk for 5 mins or more with 1/10 pain or less so that she can ambulate in community.   2. Pt will report ability to clean house for 10 mins or longer with less than a 2/10 pain.   3. Patient will report a 75% improvement in overall ankle function in order to return to PLOF.        Plan     Continue with established Plan of Care towards PT goals.     Catrina Butt MSPT, DPT, MTC

## 2019-05-01 ENCOUNTER — TELEPHONE (OUTPATIENT)
Dept: GASTROENTEROLOGY | Facility: CLINIC | Age: 64
End: 2019-05-01

## 2019-05-01 NOTE — TELEPHONE ENCOUNTER
----- Message from Buck Irwin MD sent at 5/1/2019  8:12 AM CDT -----  Please notify patient, the stomach biopsies did not reveal any H.pylori.

## 2019-05-02 ENCOUNTER — CLINICAL SUPPORT (OUTPATIENT)
Dept: REHABILITATION | Facility: HOSPITAL | Age: 64
End: 2019-05-02
Attending: PODIATRIST
Payer: MEDICARE

## 2019-05-02 DIAGNOSIS — M25.572 CHRONIC PAIN OF LEFT ANKLE: ICD-10-CM

## 2019-05-02 DIAGNOSIS — G89.29 CHRONIC PAIN OF LEFT ANKLE: ICD-10-CM

## 2019-05-02 PROCEDURE — 97110 THERAPEUTIC EXERCISES: CPT | Mod: PO

## 2019-05-02 PROCEDURE — 97140 MANUAL THERAPY 1/> REGIONS: CPT | Mod: PO

## 2019-05-02 NOTE — PROGRESS NOTES
Physical Therapy Daily Treatment Note     Name: Riana Baker   Clinic Number: [id number]    Therapy Diagnosis:   Encounter Diagnosis   Name Primary?    Chronic pain of left ankle      Physician: Jeanna Alatorre DPM    Visit Date: 5/2/2019    Physician Orders: eval and treat   Medical Diagnosis: chronic pain L ankle   Evaluation Date: 04/17/2019  Authorization period Expiration: 5/28/19  Plan of Care Expiration: 7/5/19  Precautions: type 2 diabetes, GERD, osteopenia       Time In: 8 am  Time Out: 9 am  Total Billable Time: 30 minutes    Subjective     Pt reports: that she is feeling much better. Continues to deny pain.  She was compliant with home exercise program.  Response to previous treatment/Functional change: increased ambulation ability     Pain: 0/10  Location: bilateral ankle      Objective   Time In: 800 AM  Time Out: 900 AM     Discussed Plan of Care with patient: Yes     Riana received 10 minutes of manual therapy including:   Joint mobilization  · Talocrural and subtalar joint distraction: Gr III  · posterior talar glides: Gr III  · Lateral subtalar glides Gr III    Riana received 30 minutes of therapeutic exercises including:   Supine  · Long sit towel calf stretch 3x30 sec   · Ankle 4 way GTB - 2x10   Standing  · Calf stretch on wall - 3x20 seconds  · Mini squats at srinivas with light UE support: 10x with verbal/tactile cues for proper form/technique  · Mini lunges forward 2x10   · Heel raises 3x8 3 way   · Single leg balance 5 sets    Machines  · Single leg heel raises on shuttle with 1 black band - 2x10 bilaterally     Kinesiotaping  Patient was screened for use of kinesiotape and was cleared for use. Pt. received kinesiotaping on left lateral ankle two fans for swelling  1. Has the patient ever had a reaction to the adhesive in bandaids? Yes/No: No  2. Has the patient ever uses athletic/kinesiotape in the past? Yes/No: No  3. Is the patient hemodynamically impaired (PE, DVT, CHF, Kidney  failure)? Yes/No: No  4. Can the PT/PTA apply the tape to your skin? Yes/No: Yes    Patient was instructed on duration to wear the tape, proper drying techniques for the tape, and to remove the tape if he/she has any skin irritation: including, but not exclusive to, redness/itchiness/discomfort. Patient verbalized understanding of these instructions.    Written Home Exercises Provided: yes   Exercises were reviewed and Riana was able to demonstrate them prior to the end of the session. Pt received a written copy of exercises to perform at home. Riana demonstrated good  understanding of the education provided    Home Exercises Provided and Patient Education Provided     Education provided:   - cont. HEP  -Progress toward goals     Written Home Exercises Provided: Patient instructed to cont prior HEP.  Exercises were reviewed and Riana was able to demonstrate them prior to the end of the session.  Riana demonstrated good  understanding of the education provided.     See EMR under Patient Instructions for exercises provided prior visit.    Assessment   Riana had good tolerance to manual therapy and exercise progression denying increased pain. She continued to respond well to an increase in exercise load. She also appeared to have less swelling, although not formally measured. We will continue to progress her as tolerated.      Pt prognosis is Good.     Pt will continue to benefit from skilled outpatient physical therapy to address the deficits listed in the problem list box on initial evaluation, provide pt/family education and to maximize pt's level of independence in the home and community environment.     Pt's spiritual, cultural and educational needs considered and pt agreeable to plan of care and goals.     Anticipated barriers to physical therapy: chronicity of pain    Short Term GOALS:  In 5 weeks  1. Pt will reports a decrease in pain at worst from 7/10 to 4/10 or less so that patient can return  to ADLs with decreased pain.  2. Pt will demonstrate improved plantar and dorsiflexion MMT grades by 1 or more in order to ambulate with decreased compensations.  3. Patient will report a 75% improvement in overall ankle function in order to return to PLOF.  Long Term GOALS:  In 10 weeks  1. Pt will be able to walk for 5 mins or more with 1/10 pain or less so that she can ambulate in community.   2. Pt will report ability to clean house for 10 mins or longer with less than a 2/10 pain.   3. Patient will report a 75% improvement in overall ankle function in order to return to PLOF.        Plan     Continue with established Plan of Care towards PT goals.     Talha Starks PT, DPT.

## 2019-05-06 ENCOUNTER — LAB VISIT (OUTPATIENT)
Dept: LAB | Facility: HOSPITAL | Age: 64
End: 2019-05-06
Attending: INTERNAL MEDICINE
Payer: MEDICARE

## 2019-05-06 ENCOUNTER — OFFICE VISIT (OUTPATIENT)
Dept: GASTROENTEROLOGY | Facility: CLINIC | Age: 64
End: 2019-05-06
Payer: MEDICARE

## 2019-05-06 VITALS
SYSTOLIC BLOOD PRESSURE: 123 MMHG | DIASTOLIC BLOOD PRESSURE: 65 MMHG | WEIGHT: 165 LBS | HEART RATE: 62 BPM | HEIGHT: 65 IN | BODY MASS INDEX: 27.49 KG/M2

## 2019-05-06 DIAGNOSIS — E11.69 TYPE 2 DIABETES MELLITUS WITH OTHER SPECIFIED COMPLICATION, WITH LONG-TERM CURRENT USE OF INSULIN: ICD-10-CM

## 2019-05-06 DIAGNOSIS — R10.11 RUQ ABDOMINAL PAIN: ICD-10-CM

## 2019-05-06 DIAGNOSIS — Z79.4 TYPE 2 DIABETES MELLITUS WITH OTHER SPECIFIED COMPLICATION, WITH LONG-TERM CURRENT USE OF INSULIN: ICD-10-CM

## 2019-05-06 DIAGNOSIS — K59.04 CHRONIC IDIOPATHIC CONSTIPATION: ICD-10-CM

## 2019-05-06 DIAGNOSIS — K21.9 GASTROESOPHAGEAL REFLUX DISEASE WITHOUT ESOPHAGITIS: Primary | ICD-10-CM

## 2019-05-06 PROBLEM — E55.9 VITAMIN D DEFICIENCY: Status: ACTIVE | Noted: 2019-05-06

## 2019-05-06 LAB
ESTIMATED AVG GLUCOSE: 163 MG/DL (ref 68–131)
HBA1C MFR BLD HPLC: 7.3 % (ref 4–5.6)

## 2019-05-06 PROCEDURE — 3074F PR MOST RECENT SYSTOLIC BLOOD PRESSURE < 130 MM HG: ICD-10-PCS | Mod: CPTII,S$GLB,, | Performed by: PHYSICIAN ASSISTANT

## 2019-05-06 PROCEDURE — 99999 PR PBB SHADOW E&M-EST. PATIENT-LVL V: ICD-10-PCS | Mod: PBBFAC,,, | Performed by: PHYSICIAN ASSISTANT

## 2019-05-06 PROCEDURE — 83036 HEMOGLOBIN GLYCOSYLATED A1C: CPT

## 2019-05-06 PROCEDURE — 99213 OFFICE O/P EST LOW 20 MIN: CPT | Mod: S$GLB,,, | Performed by: PHYSICIAN ASSISTANT

## 2019-05-06 PROCEDURE — 99999 PR PBB SHADOW E&M-EST. PATIENT-LVL V: CPT | Mod: PBBFAC,,, | Performed by: PHYSICIAN ASSISTANT

## 2019-05-06 PROCEDURE — 99213 PR OFFICE/OUTPT VISIT, EST, LEVL III, 20-29 MIN: ICD-10-PCS | Mod: S$GLB,,, | Performed by: PHYSICIAN ASSISTANT

## 2019-05-06 PROCEDURE — 3008F BODY MASS INDEX DOCD: CPT | Mod: CPTII,S$GLB,, | Performed by: PHYSICIAN ASSISTANT

## 2019-05-06 PROCEDURE — 36415 COLL VENOUS BLD VENIPUNCTURE: CPT

## 2019-05-06 PROCEDURE — 3074F SYST BP LT 130 MM HG: CPT | Mod: CPTII,S$GLB,, | Performed by: PHYSICIAN ASSISTANT

## 2019-05-06 PROCEDURE — 3078F PR MOST RECENT DIASTOLIC BLOOD PRESSURE < 80 MM HG: ICD-10-PCS | Mod: CPTII,S$GLB,, | Performed by: PHYSICIAN ASSISTANT

## 2019-05-06 PROCEDURE — 3078F DIAST BP <80 MM HG: CPT | Mod: CPTII,S$GLB,, | Performed by: PHYSICIAN ASSISTANT

## 2019-05-06 PROCEDURE — 3008F PR BODY MASS INDEX (BMI) DOCUMENTED: ICD-10-PCS | Mod: CPTII,S$GLB,, | Performed by: PHYSICIAN ASSISTANT

## 2019-05-06 NOTE — PROGRESS NOTES
[facility] Consultation Note    Reason for Consult:  Gastroesophageal reflux    PCP: Amena Roger     Referring MD:   No referring provider defined for this encounter.    HPI:  This is a 64 y.o. female here to follow-up on her GERD  Last seen by myself 2 months ago  04/15/2019 EGD- normal, the esophagus was dilated empirically, gastric biopsy H pylori negative    Interval Hx  GERD is significatnly better  Now avoiding acidic foods, still drinking tea    Her main issue today is abdominal pain  Location-RUQ  Onset-few weeks  Palliative/Worse- improved with a BM  Quality-heaviness  Radiation-no  Severity-mild, can last a few hrs  Timing-comes and goes    X-ray revealed constiaption. She admits to drinking mag citrate after this    Ct scan without contrast revealed-  3.4 cm diverticulum extending off the 3rd portion of the duodenum  FHx Colon cancer, denies family history ovarian, uterine, kidney, small bowel cancers    03/06/2019 visit notes  Duration- many years  Currently taking protonix 40mg in the morning without relief, started 10/2018    Typical GERD Symptoms  Pyrosis - no   Regurgitation- yes  Nocturnal symptoms - does not wake her up  Nausea and fullness- with lying down  Vomiting-no  Dysphagia/Odynophagia - with eating beef  Epigastric abdominal pain- no   Hx of H. Pylori in 2012  BS - 120, around 200 once or twice a week after lunch, A1c 7.7    Diet / Lifestyle:  Last Meal at 5-6pm lying down / reclining at 10:30        Acidic food intake - oranges,  Acid vegs, spicy foods  Caffeine intake - 2-3 cups tea,  No coffee  NSAID usage - no    Atypical Symptoms  NA    ROS:  Constitutional: No fevers, chills, No weight loss  ENT: No allergies  CV: No chest pain  Pulm: No cough, No shortness of breath  Ophtho: No vision changes  GI: see HPI  Derm: No rash  Heme: No lymphadenopathy, No bruising  MSK: rt arm in elbow brace  : No dysuria, No hematuria  Endo: No hot or cold  intolerance  Neuro: No syncope, No seizure  Psych: No anxiety, No depression    Medical History:  has a past medical history of Anxiety, AR (allergic rhinitis), Colon polyp, Diabetes mellitus, type 2, Dizziness, DM (diabetes mellitus), Fatty liver, GERD (gastroesophageal reflux disease), HTN (hypertension), Hyperlipidemia, Memory loss, Osteopenia, S/P total hysterectomy, and Sleep apnea.    Surgical History:  has a past surgical history that includes Cholecystectomy; Knee arthroscopy w/ debridement (4/11); Total abdominal hysterectomy w/ bilateral salpingoophorectomy; Tonsillectomy; Rotator cuff repair; Elbow surgery (Right, 7/16/15); Elbow surgery; Hysterectomy; Colonoscopy (N/A, 1/17/2018); Elbow Arthroplasty (Right, 1/16/2019); Release of ulnar nerve at cubital tunnel (Right, 1/16/2019); Upper gastrointestinal endoscopy; and Esophagogastroduodenoscopy (N/A, 4/15/2019).    Family History: family history includes Colon cancer (age of onset: 83) in her father; Diabetes in her maternal aunt and maternal grandmother..     Social History:  reports that she has never smoked. She has never used smokeless tobacco. She reports that she does not drink alcohol or use drugs.    Review of patient's allergies indicates:   Allergen Reactions    Iodinated contrast- oral and iv dye Swelling and Rash    Percocet [oxycodone-acetaminophen] Itching    Macrobid [nitrofurantoin monohyd/m-cryst] Rash    Metformin Rash    Penicillins Rash    Promethazine Rash    Sulfa (sulfonamide antibiotics) Rash    Sulfamethoxazole-trimethoprim Rash       Current Outpatient Medications   Medication Sig    azelastine (ASTELIN) 137 mcg (0.1 %) nasal spray 1 spray (137 mcg total) by Nasal route 2 (two) times daily.    blood sugar diagnostic (ACCU-CHEK SMARTVIEW TEST STRIP) Strp     blood-glucose meter kit Use as instructed, preferred meter. DX Code. E11.42    cyanocobalamin (VITAMIN B-12) 100 MCG tablet Take 100 mcg by mouth once daily.     "insulin aspart U-100 (NOVOLOG FLEXPEN U-100 INSULIN) 100 unit/mL InPn pen Inject 8U W/ BREAKFAST AND 12U W/ LUNCH & SUPPER PLUS SCALE(180-230+2, 231-280+4, 281-330+6 & 331-380+8    insulin detemir U-100 (LEVEMIR FLEXTOUCH U-100 INSULN) 100 unit/mL (3 mL) SubQ InPn pen Inject 33 Units into the skin every evening.    insulin needles, disposable, (PEN NEEDLE) 31 X 5/16 " Ndle Use as directed    lancets (MICROLET LANCET) Misc     pantoprazole (PROTONIX) 40 MG tablet Take 1 tablet (40 mg total) by mouth 2 (two) times daily.    pen needle, diabetic (NOVOFINE 32) 32 gauge x 1/4" Ndle     pyridoxine, vitamin B6, (VITAMIN B-6) 100 MG Tab Take 100 mg by mouth once daily.    SITagliptin (JANUVIA) 100 MG Tab Take 1 tablet (100 mg total) by mouth once daily.    amLODIPine (NORVASC) 5 MG tablet Take 1 tablet (5 mg total) by mouth once daily.    buPROPion (WELLBUTRIN XL) 150 MG TB24 tablet Take 1 tablet (150 mg total) by mouth once daily.    ondansetron (ZOFRAN-ODT) 8 MG TbDL Take 1 tablet (8 mg total) by mouth 3 (three) times daily as needed.     No current facility-administered medications for this visit.          Objective Findings:    Vital Signs:  /65   Pulse 62   Ht 5' 5" (1.651 m)   Wt 74.8 kg (165 lb)   BMI 27.46 kg/m²   Body mass index is 27.46 kg/m².    Physical Exam:  General Appearance: Well appearing in no acute distress  Head:   Normocephalic, without obvious abnormality  Eyes:    No scleral icterus  ENT: Neck supple, Lips, mucosa, and tongue normal  Lungs: CTA bilaterally in anterior and posterior fields, no wheezes, no crackles.  Heart:  Regular rate and rhythm, S1, S2 normal, no murmurs heard  Abdomen: Soft, RUQ, and diffuse lower abdominal tenderness, no guarding or rebound tenderness. non distended with positive bowel sounds in all four quadrants.   Extremities: rt arm elbow brace  Skin: No rash  Neurologic: AAO x 3      Labs:  Lab Results   Component Value Date    WBC 6.87 04/26/2019    HGB " 12.7 04/26/2019    HCT 37.7 04/26/2019     04/26/2019    CHOL 168 11/28/2018    TRIG 146 11/28/2018    HDL 51 11/28/2018    ALT 24 04/26/2019    AST 20 04/26/2019     04/26/2019    K 4.4 04/26/2019     04/26/2019    CREATININE 0.8 04/26/2019    BUN 19 04/26/2019    CO2 29 04/26/2019    TSH 1.309 07/23/2018    INR 0.9 05/11/2011    HGBA1C 7.3 (H) 05/06/2019     Imaging    Endoscopy:    04/15/2019 EGD- normal, the esophagus was dilated empirically, gastric biopsy H pylori negative    1/2018 - polyp x 1, f/u in 5    1/2014 EGD - - Normal esophagus.                        - Hiatus hernia.                        - Normal stomach.                        - Normal examined duodenum. Biopsied.  Assessment:  1. Gastroesophageal reflux disease without esophagitis    2. RUQ abdominal pain    3. Chronic idiopathic constipation    Hx of H. Pylori  S/p rosalia     62yo F presents with RUQ pain that improves with a BM. X-ray revealed constipation. CT scan was unrevealing other than a duodenal diverticulum    GERD is controlled on protonix 40mg. No further dysphagia since empiric dilation      Recommendations:  1. Trial or miralax to see if this helps her abdominal pain  2. Low fodmap diet  3. Continue protonix for now. Need to ween in future for osteopenia  4. Will talk with Dr Irwin about whether her duodenal diverticulum could be causing her pain    No follow-ups on file.      Order summary:         Thank you so much for allowing me to participate in the care of Riana White PA-C

## 2019-05-06 NOTE — PROGRESS NOTES
Subjective:      Patient ID: Riana Kay is a 64 y.o. female.    Chief Complaint:  Diabetes    History of Present Illness  Riana Kay presents today for follow up of T2DM. Previous patient of CHERRY Mccormick NP. Last seen 19. This is her first visit with me.     With regards to the diabetes:    Diagnosed with T2DM x 19 years ago, pt has been on insulin x 3 years ago.  Known complications:  DKA-  RN-  PN-  Nephropathy-    Current regimen:  lantus 33 units qhs  novolog 10/14/14 units with mod dose correction scale, starting at 150/50  januvia 100 mg daily    Other medications tried:  Victoza  Ozempic  Metformin- kidneys    Glucose Monitor:  4 times a day testing  Log reviewed: yes oral recall  Fastin  Before meals about 115-170, tends to run higher before dinner since lunch is her biggest meal of the day.     Diet/Exercise:  Eats 3 meals a day. Biggest meal is lunch.   Exercise - tries to stay active     Hypoglycemic event? None   Knows how to correct with 15 grams of carbs- juice, coke, or a peppermint.      Education - last visit: 18    Diabetes Management Status  Statin: Not taking  ACE/ARB: Not taking  Screening or Prevention Patient's value Goal Complete/Controlled?   HgA1C Testing and Control   Lab Results   Component Value Date    HGBA1C 7.3 (H) 2019      Annually/Less than 8% Yes   Lipid profile : 2018 Annually Yes   LDL control Lab Results   Component Value Date    LDLCALC 87.8 2018    Annually/Less than 100 mg/dl  Yes   Nephropathy screening Lab Results   Component Value Date    LABMICR 4.0 2018     Lab Results   Component Value Date    PROTEINUA Negative 2018    Annually Yes   Blood pressure BP Readings from Last 1 Encounters:   19 108/62    Less than 140/90 Yes   Dilated retinal exam : 2019 Annually Yes   Foot exam   : 2018 Annually No     With regards to the vitamin d deficiency:   Ref. Range 3/6/2019 10:05   Vit D, 25-Hydroxy  Latest Ref Range: 30 - 96 ng/mL 11 (L)     currently taking otc 1000 iu a day    Last BMD dated 2/5/18  Lumbar Spine: Lumbar bone mineral density L1-L4 is 0.871g/cm2, which is a t-score of -1.6. The z-score is 0.0.  Total Hip: The total hip bone mineral density is 0.873g/cm2.  The t-score is -0.6, and the z-score is 0.5. Femoral neck BMD is 0.656g/cm2 and the t-score is -1.7.  COMPARISONS:  Date Location BMD T-score  05/21/13 L-spine 0.907 -1.3  Total Hip 0.936 0.0  Osteopenia of the femoral neck and lumbar spine. There is a significant decline in BMD at the total hip (-6.7%) and lumbar spine (-4.0%) when compared to previous study.   FRAX calculation does not support treatment for osteoporosis  RECOMMENDATIONS:  1) Adequate calcium and Vitamin D therapy  2) Appropriate exercise  3) Consider repeat BMD in 2- 4 years.    No recent fractures     Review of Systems   Constitutional: Negative for unexpected weight change.   Eyes: Negative for visual disturbance.   Respiratory: Negative for shortness of breath.    Cardiovascular: Negative for chest pain.   Gastrointestinal: Negative for abdominal pain.   Endocrine: Negative for cold intolerance, heat intolerance, polydipsia, polyphagia and polyuria.   Musculoskeletal: Negative for arthralgias.   Skin: Negative for wound.   Neurological: Negative for headaches.   Hematological: Does not bruise/bleed easily.   Psychiatric/Behavioral: Negative for sleep disturbance.     Objective:   Physical Exam   Neck: No thyromegaly present.   Cardiovascular: Normal rate.   No edema present   Pulmonary/Chest: Effort normal.   Abdominal: Soft.   Vitals reviewed.  Appropriate footwear, Foot exam deferred, done 3/20/19 with podiatry   injection sites are ok. No lipo hypertropthy or atrophy    Body mass index is 27.94 kg/m².    Lab Review:   Lab Results   Component Value Date    HGBA1C 7.3 (H) 05/06/2019     Lab Results   Component Value Date    CHOL 168 11/28/2018    HDL 51 11/28/2018     LDLCALC 87.8 11/28/2018    TRIG 146 11/28/2018    CHOLHDL 30.4 11/28/2018     Lab Results   Component Value Date     04/26/2019    K 4.4 04/26/2019     04/26/2019    CO2 29 04/26/2019     (H) 04/26/2019    BUN 19 04/26/2019    CREATININE 0.8 04/26/2019    CALCIUM 10.1 04/26/2019    PROT 7.4 04/26/2019    ALBUMIN 3.8 04/26/2019    BILITOT 0.5 04/26/2019    ALKPHOS 87 04/26/2019    AST 20 04/26/2019    ALT 24 04/26/2019    ANIONGAP 8 04/26/2019    ESTGFRAFRICA >60.0 04/26/2019    EGFRNONAA >60.0 04/26/2019    TSH 1.309 07/23/2018       Assessment and Plan     1. Type 2 diabetes mellitus with other specified complication, with long-term current use of insulin  Hemoglobin A1c    Comprehensive metabolic panel    insulin detemir U-100 (LEVEMIR FLEXTOUCH U-100 INSULN) 100 unit/mL (3 mL) SubQ InPn pen   2. Osteopenia, unspecified location     3. Vitamin D deficiency  Vitamin D   4. Overweight (BMI 25.0-29.9)     5. Gastroesophageal reflux disease, esophagitis presence not specified     6. Essential hypertension       Type 2 diabetes mellitus with other specified complication  -- Continue: Januvia, and Novolog  -- Increase: Levemir to 35units nightly  -- Labs prior  -- Reviewed the option to try Freestyle Mariela since the patient is checking her blood sugar 4 times a day, patient reports trying it in the past and not liking it.   -- Reviewed goals of therapy are to get the best control we can without hypoglycemia  -- Reviewed patient's current insulin regimen. Clarified proper insulin dose and timing in relation to meals, etc. Insulin injection sites and proper rotation instructed.    -- Advised frequent self blood glucose monitoring.  Patient encouraged to document glucose results and bring them to every clinic visit    -- Hypoglycemia precautions discussed. Instructed on precautions before driving.    -- Call for Bg repeatedly < 90 or > 180.   -- Close adherence to lifestyle changes recommended.   --  Periodic follow ups for eye evaluations, foot care and dental care suggested. UTD    Osteopenia  --  Repeat bone density in 2/2020  -- Reassuring she is not fracturing  -- Continue: OTC vit D and Ca    Vitamin D deficiency  -- Increase: 2000iu vitamin D3 daily  -- Check labs prior    Overweight (BMI 25.0-29.9)  -- encouraged dietary and lifestyle modifications   -- emphasized weight loss goals     GERD (gastroesophageal reflux disease)  -- Contraindication for PO bisphosphonates should the need arise in the future for treatment    HTN (hypertension)  -- Controlled    Follow up in about 2 months (around 7/8/2019).  Labs prior

## 2019-05-06 NOTE — PATIENT INSTRUCTIONS
Start taking miralax 17g nightly in 10oz of water - let's see if that helps your abdominal pain    Low Fodmap Diet    Fodmaps (fructo,oligo,mono saccharides and polyols) are contained in certain foods and can cause significant bloating in some people. Reducing these foods can reduce bloating. Start off by cutting out anything with high fructose corn syrup in the ingredients.        Foods suitable on a low-fodmap diet  fruit  banana, blueberry, boysenberry, canteloupe, cranberry, durian, grape,  grapefruit, honeydew melon, kiwifruit, lemon,lime, mandarin, orange,  passionfruit, pawpaw, raspberry, rhubarb, rockmelon, star anise,  strawberry, tangelo  vegetables  alfalfa, artichoke, bamboo shoots, bean shoots, bok chele,  carrot, celery, choko, chele sum, endive, jose armando, green beans,  lettuce, olives, parsnip, potato, pumpkin, red capsicum (bell pepper),  silver beet, spinach, summer squash (yellow), swede, sweet potato, taro, tomato,  turnip, yam, zucchini   herbs  basil, chili, coriander, jose armando, lemongrass,   marjoram, mint, oregano, parsley, rosemary, thyme  milk  lactose-free milk, oat milk*, rice milk, soy milk*  cheeses  hard cheeses, and brie and camembert  yoghurt  lactose-free varieties  ice-cream substitutes  gelati, sorbet  butter substitutes  olive oilgrain foods  cereals  gluten-free bread or cereal products  bread  100% spelt bread  Rice, oats, polenta, other  arrowroot, millet, psyllium, quinoa, sorgum, tapioca  sweeteners  sugar* (sucrose), glucose, artificial sweeteners not ending in -ol  honey substitutes  aguilar syrup*, maple syrup*, molasses, treacle  *small quantities  *check for additives  Note: if fruit is dried, eat in small quantities    excess fructose lactose fructans galactans polyols      Eliminate foods containing fodmaps  fruit  apple, apricot, avocado, blackberry, cherry, lychee, nashi, nectarine, peach, pear, plum,  prune, watermelon, ana, tinned fruit in natural juice,    sweeteners  sorbitol (420)  mannitol (421)  isomalt (953)  maltitol (965)  xylitol (967)  legumes  baked beans, chickpeas, kidney beans, lentils  vegetables  asparagus, beetroot, broccoli, brussels sprouts, cabbage, eggplant, fennel, garlic,  kenneth, okra, onion (all), shallots, spring onion, cauliflower, green capsicum (bell pepper), mushroom, sweet corn  cereals  wheat and rye, in large amounts eg. Bread, crackers, cookies, couscous, pasta  fruit  custard apple, persimmon, watermelon  miscellaneous  chicory, dandelion, inulin  sweeteners  fructose, high fructose corn syrup  large total fructose dose  concentrated fruit sources, large servings of fruit, dried fruit, fruit juice  honey  corn syrup, fruisana  milk  milk from cows, goats or sheep, custard, ice cream, yoghurt  cheeses  soft unripened cheeses eg. cottage, cream, mascarpone    Additional FODMAPs Diet Reading List    IBS-Free At Last!: Second Edition: Change Your Carbs, Change Your Life with the FODMAP Elimination Diet, 2 nd edition by Magda Tran MS, RD     2012 copyright; [employer].   ISBN: 039-6-0084306-2-1    The Complete Idiots Guide To Living Well With IBS by Mariann Shelby RD, LDN  [facility], 2010    The FODMAPs Approach:--Minimize Consumption of Fermentable Carbs to Manage Functional Gut Disorder Symptoms  by Mariann Shelby RD, LDN article found in  Todays Dietitian, vol. 12, no. 8, p. 30    The Inside Tract: Your Good Gut Guide To Great Digestive Health by Edd James MD and Lidya Noland MS, RD, LDN  2011 copyright, [employer]   ISBN: 318-5-18455-264-9    List assembled by: Chastity Rodriguez MS, RD, LDN, E  [facility]  Last Updated: 7/13/12

## 2019-05-07 ENCOUNTER — CLINICAL SUPPORT (OUTPATIENT)
Dept: REHABILITATION | Facility: HOSPITAL | Age: 64
End: 2019-05-07
Attending: PODIATRIST
Payer: MEDICARE

## 2019-05-07 DIAGNOSIS — M25.572 CHRONIC PAIN OF LEFT ANKLE: ICD-10-CM

## 2019-05-07 DIAGNOSIS — G89.29 CHRONIC PAIN OF LEFT ANKLE: ICD-10-CM

## 2019-05-07 PROCEDURE — 97140 MANUAL THERAPY 1/> REGIONS: CPT | Mod: PO

## 2019-05-07 PROCEDURE — 97110 THERAPEUTIC EXERCISES: CPT | Mod: PO

## 2019-05-07 NOTE — PROGRESS NOTES
Physical Therapy Daily Treatment Note     Name: Riana Baker   Clinic Number: [id number]    Therapy Diagnosis:   Encounter Diagnosis   Name Primary?    Chronic pain of left ankle      Physician: Jeanna Alatorre DPM    Visit Date: 5/7/2019    Physician Orders: eval and treat   Medical Diagnosis: chronic pain L ankle   Evaluation Date: 04/17/2019  Authorization period Expiration: 5/28/19  Plan of Care Expiration: 7/5/19  Precautions: type 2 diabetes, GERD, osteopenia       Time In: 8:00 am  Time Out: 8:45 am  Total Billable Time: 45 minutes    Subjective     Pt reports: she is feeling better and reports no pain.   She was compliant with home exercise program.  Response to previous treatment/Functional change: increased ambulation ability     Pain: 0/10  Location: bilateral ankle      Objective      Riana received 10 minutes of manual therapy including:   Joint mobilization  · Talocrural and subtalar joint distraction: Gr II  · posterior talar glides: Gr II  · Lateral subtalar glides Gr II    Riana received 30 minutes of therapeutic exercises including:   Supine  · Long sit towel calf stretch 3x30 sec   · Ankle 4 way GTB - 2x10   Standing  · Calf stretch on wall - 3x20 seconds  · Mini squats at srinivas with light UE support: 2x10x with verbal/tactile cues for proper form/technique  · Mini lunges forward 2x10   · Heel raises 3x8 3 way   · Single leg balance 5 sets    Machines  · Single leg heel raises on shuttle with 1 black band - 2x10 bilaterally    Written Home Exercises Provided: no change   Exercises were reviewed and Riana was able to demonstrate them prior to the end of the session. Pt received a written copy of exercises to perform at home. Riana demonstrated good  understanding of the education provided    Home Exercises Provided and Patient Education Provided     Education provided:   - cont. HEP  -Progress toward goals     Written Home Exercises Provided: Patient instructed to cont prior  HEP.  Exercises were reviewed and Riana was able to demonstrate them prior to the end of the session.  Riana demonstrated good  understanding of the education provided.     See EMR under Patient Instructions for exercises provided prior visit.    Assessment   Riana had good tolerance to today's session with no reports of increased pain. Patient requires cueing during exercises. Patient with reports of ankle weakness noted during single leg standing. We will continue to progress her as tolerated.      Pt prognosis is Good.     Pt will continue to benefit from skilled outpatient physical therapy to address the deficits listed in the problem list box on initial evaluation, provide pt/family education and to maximize pt's level of independence in the home and community environment.     Pt's spiritual, cultural and educational needs considered and pt agreeable to plan of care and goals.     Anticipated barriers to physical therapy: chronicity of pain    Short Term GOALS:  In 5 weeks  1. Pt will reports a decrease in pain at worst from 7/10 to 4/10 or less so that patient can return to ADLs with decreased pain.  2. Pt will demonstrate improved plantar and dorsiflexion MMT grades by 1 or more in order to ambulate with decreased compensations.  3. Patient will report a 75% improvement in overall ankle function in order to return to PLOF.  Long Term GOALS:  In 10 weeks  1. Pt will be able to walk for 5 mins or more with 1/10 pain or less so that she can ambulate in community.   2. Pt will report ability to clean house for 10 mins or longer with less than a 2/10 pain.   3. Patient will report a 75% improvement in overall ankle function in order to return to PLOF.        Plan     Continue with established Plan of Care towards PT goals.     Shyanne Gant, SHANELL

## 2019-05-08 ENCOUNTER — OFFICE VISIT (OUTPATIENT)
Dept: ENDOCRINOLOGY | Facility: CLINIC | Age: 64
End: 2019-05-08
Payer: MEDICARE

## 2019-05-08 VITALS
HEIGHT: 65 IN | DIASTOLIC BLOOD PRESSURE: 62 MMHG | BODY MASS INDEX: 27.97 KG/M2 | WEIGHT: 167.88 LBS | SYSTOLIC BLOOD PRESSURE: 108 MMHG | HEART RATE: 69 BPM

## 2019-05-08 DIAGNOSIS — E11.69 TYPE 2 DIABETES MELLITUS WITH OTHER SPECIFIED COMPLICATION, WITH LONG-TERM CURRENT USE OF INSULIN: Primary | ICD-10-CM

## 2019-05-08 DIAGNOSIS — E66.3 OVERWEIGHT (BMI 25.0-29.9): ICD-10-CM

## 2019-05-08 DIAGNOSIS — E55.9 VITAMIN D DEFICIENCY: ICD-10-CM

## 2019-05-08 DIAGNOSIS — M85.80 OSTEOPENIA, UNSPECIFIED LOCATION: ICD-10-CM

## 2019-05-08 DIAGNOSIS — Z79.4 TYPE 2 DIABETES MELLITUS WITH OTHER SPECIFIED COMPLICATION, WITH LONG-TERM CURRENT USE OF INSULIN: Primary | ICD-10-CM

## 2019-05-08 DIAGNOSIS — K21.9 GASTROESOPHAGEAL REFLUX DISEASE, ESOPHAGITIS PRESENCE NOT SPECIFIED: ICD-10-CM

## 2019-05-08 DIAGNOSIS — I10 ESSENTIAL HYPERTENSION: ICD-10-CM

## 2019-05-08 PROCEDURE — 99214 PR OFFICE/OUTPT VISIT, EST, LEVL IV, 30-39 MIN: ICD-10-PCS | Mod: S$GLB,,, | Performed by: NURSE PRACTITIONER

## 2019-05-08 PROCEDURE — 99499 UNLISTED E&M SERVICE: CPT | Mod: S$GLB,,, | Performed by: NURSE PRACTITIONER

## 2019-05-08 PROCEDURE — 99999 PR PBB SHADOW E&M-EST. PATIENT-LVL III: CPT | Mod: PBBFAC,,, | Performed by: NURSE PRACTITIONER

## 2019-05-08 PROCEDURE — 99214 OFFICE O/P EST MOD 30 MIN: CPT | Mod: S$GLB,,, | Performed by: NURSE PRACTITIONER

## 2019-05-08 PROCEDURE — 3045F PR MOST RECENT HEMOGLOBIN A1C LEVEL 7.0-9.0%: CPT | Mod: CPTII,S$GLB,, | Performed by: NURSE PRACTITIONER

## 2019-05-08 PROCEDURE — 3045F PR MOST RECENT HEMOGLOBIN A1C LEVEL 7.0-9.0%: ICD-10-PCS | Mod: CPTII,S$GLB,, | Performed by: NURSE PRACTITIONER

## 2019-05-08 PROCEDURE — 3078F DIAST BP <80 MM HG: CPT | Mod: CPTII,S$GLB,, | Performed by: NURSE PRACTITIONER

## 2019-05-08 PROCEDURE — 3008F BODY MASS INDEX DOCD: CPT | Mod: CPTII,S$GLB,, | Performed by: NURSE PRACTITIONER

## 2019-05-08 PROCEDURE — 3078F PR MOST RECENT DIASTOLIC BLOOD PRESSURE < 80 MM HG: ICD-10-PCS | Mod: CPTII,S$GLB,, | Performed by: NURSE PRACTITIONER

## 2019-05-08 PROCEDURE — 99499 RISK ADDL DX/OHS AUDIT: ICD-10-PCS | Mod: S$GLB,,, | Performed by: NURSE PRACTITIONER

## 2019-05-08 PROCEDURE — 3074F SYST BP LT 130 MM HG: CPT | Mod: CPTII,S$GLB,, | Performed by: NURSE PRACTITIONER

## 2019-05-08 PROCEDURE — 99999 PR PBB SHADOW E&M-EST. PATIENT-LVL III: ICD-10-PCS | Mod: PBBFAC,,, | Performed by: NURSE PRACTITIONER

## 2019-05-08 PROCEDURE — 3008F PR BODY MASS INDEX (BMI) DOCUMENTED: ICD-10-PCS | Mod: CPTII,S$GLB,, | Performed by: NURSE PRACTITIONER

## 2019-05-08 PROCEDURE — 3074F PR MOST RECENT SYSTOLIC BLOOD PRESSURE < 130 MM HG: ICD-10-PCS | Mod: CPTII,S$GLB,, | Performed by: NURSE PRACTITIONER

## 2019-05-08 NOTE — ASSESSMENT & PLAN NOTE
-- Continue: Januvia, and Novolog  -- Increase: Levemir to 35units nightly  -- Labs prior  -- Reviewed the option to try Freestyle Mariela since the patient is checking her blood sugar 4 times a day, patient reports trying it in the past and not liking it.   -- Reviewed goals of therapy are to get the best control we can without hypoglycemia  -- Reviewed patient's current insulin regimen. Clarified proper insulin dose and timing in relation to meals, etc. Insulin injection sites and proper rotation instructed.    -- Advised frequent self blood glucose monitoring.  Patient encouraged to document glucose results and bring them to every clinic visit    -- Hypoglycemia precautions discussed. Instructed on precautions before driving.    -- Call for Bg repeatedly < 90 or > 180.   -- Close adherence to lifestyle changes recommended.   -- Periodic follow ups for eye evaluations, foot care and dental care suggested. UTD

## 2019-05-08 NOTE — ASSESSMENT & PLAN NOTE
--  Repeat bone density in 2/2020  -- Reassuring she is not fracturing  -- Continue: OTC vit D and Ca

## 2019-05-13 ENCOUNTER — CLINICAL SUPPORT (OUTPATIENT)
Dept: REHABILITATION | Facility: HOSPITAL | Age: 64
End: 2019-05-13
Attending: PODIATRIST
Payer: MEDICARE

## 2019-05-13 DIAGNOSIS — M25.621 STIFFNESS OF RIGHT ELBOW JOINT: ICD-10-CM

## 2019-05-13 DIAGNOSIS — M25.421 EFFUSION, RIGHT ELBOW: ICD-10-CM

## 2019-05-13 PROCEDURE — 97110 THERAPEUTIC EXERCISES: CPT | Mod: PO

## 2019-05-13 NOTE — PROGRESS NOTES
Occupational Therapy Daily Treatment Note     Name: Riana Baker   Clinic Number: [id number]    Therapy Diagnosis:   Encounter Diagnoses   Name Primary?    Effusion, right elbow     Stiffness of right elbow joint      Physician: Olivia Mireles PA-C      Insurance Authorization Period Expiration: 1/30/20  Plan of Care Certification Period: 4/2/19  Date of Return to MD: 4/10/19    Visit # / Visits authorized: 6/12--Approved for 12 visist from 02.28.18 to 04.28.18 PT    Time In:10am   Time Out: 10:45am    Total Billable Time: 30  minutes    Precautions: Standard    Subjective     Patient has not been seen in OT since 3/28/19. She states she was taking care of her injured ankle.    Pain: 8/10  Location: right wrist and elbow     Objective     See POC for objective findings.    .Patient received MHP x 10 ' with LLPS with elbow extension to increase tissue elasticity - pre and post session to increase elasticity of soft tissues .       Riana participated in dynamic functional therapeutic exercises to improve functional performance for 30  minutes, including:    -UBE x 6' L1 (3' forward 3' backward)  -AROM: elbow flex/ext (pronated , and sup  neutral forearm)  with 1# wrist wt x 30 reps each   - sh flex, and and Ir with 1#wt x 15 reps each   -sup wheel x 2 minutes with elbow extension     Home Exercises and Education Provided       Written Home Exercises Provided:   Exercises were reviewed and Riana was able to demonstrate them prior to the end of the session.  Riana demonstrated good  understanding of the education provided.     Pt received a written copy of exercises to perform at home.   See EMR under patient instructions for exercises given.     Riana demonstrated good  understanding of the education provided.       Assessment     Pt demos about 30 degree deficit elbow extension which limits reaching during ADL and IADL performance. She also demos  strength limitations  compared to non dominant UE. Pt would benefit from continued skilled intervention to improve functional independence and safety.    Riana is progressing well towards her goals and there are no updates to goals at this time. Pt prognosis is Good.    Pt continues to be limited in functional and leisurely pursuits. Pain limits patients participation in ADL's. Pt is not able to carryout necessary vocational tasks.      Pt will continue to benefit from skilled outpatient occupational therapy to address the deficits listed in the problem list on initial evaluation provide pt/family education and to maximize pt's level of independence in the home and community environment.     Anticipated barriers to occupational therapy: none at this time    Pt's spiritual, cultural and educational needs considered and pt agreeable to plan of care and goals.    Goals:  Cont goals from POC    Plan   Continue 1-2x week x 6 weeks    Discussed Plan of Care with patient: Yes  Updates/Grading for next session: Advance as tolerated      Jenna Underwood, OT

## 2019-05-13 NOTE — PLAN OF CARE
[facility] Occupational Therapy  Plan of Care Update     Date: 5/13/2019  Patient: Riana aKy  Chart Number: [id number]  Referring Physician: Olivia Mireles PA-C  Therapy Diagnosis:   1. Effusion, right elbow     2. Stiffness of right elbow joint         Medical Diagnosis: Z98.890 (ICD-10-CM) - Post-operative state  Physician Orders: Eval and treat  Evaluation Date: 5/13/2019  NEW Plan of Care Certification Date: 6/28/19  Authorization Period: 4/28/19  Date of Return to MD: 7/10/19    Visit #: 7 of 12  Time In: 10:00AM  Time Out: 10:45AM  Total Billable Time: 30    Precautions:  Standard    *Patient will need interpretor for future therapy appointments*    Subjective     Patient has not been seen in OT since 3/28/19. She states she injured her ankle.  Pain: 8/10  Location: R Elbow, posterior/medial/lateral aspects    Objective     Update:    Elbow AROM Measured in degrees   Left Right   Forearm Sup/Pro NT WNL   Elbow Ext/Flex NT 30/135      Strength: (KATI Dynamometer in lbs.) Average 3 trials, Position II:   Left Right    41 33       Assessment     Since beginning OT, pt has made adequate, steady progress with her OT treatments and has worked hard towards all of her OT goals as evidenced by subjective and objective improvements. Despite these improvements,she remains with deficits with elbow extension ROM and  strength and will continue to benefit from skilled OT to address remaining limitations and increase functional mobility.    Goals:  STGs to be met within 3 weeks  1. IND with HEP-Ongoing  2. Decrease edema of elbow to minimal levels-Progressing  3. Increase composite finger flexion to the DPC-Met  4. Increase wrist flexion to 30 degrees-Met  5. Increase R elbow extension AROM to 10 degrees to improve reaching performance during ADLs  6. Increase R  strength by 10 lbs. For gripping and lifting during IADLs  LTGs to be met by discharge  5. Increase wrist and elbow AROM to  WNL to improve functional ROM during IADLs-Not met  6. Initiate strengthening to improve functional performance during household chores-Met        Reasons for Recertification of Therapy: Decreased ROM, Decreased  strength, Decreased functional hand use, Increased pain, Scar Adhesions and Edema  New Certification Period: 5/13/2019 to 6/28/19  Recommended Treatment Plan: 2 times per week for 6 weeks:   Other Recommendations: Manual therapy/joint mobilizations, Modalities for pain management, US 3 mhz, Therapeutic exercises/activities., Strengthening, Orthotic Fabrication/Fit/Training, Edema Control and Joint Protection            I CERTIFY THE NEED FOR THESE SERVICES FURNISHED UNDER THIS PLAN OF TREATMENT AND WHILE UNDER MY CARE    Physician's comments: ____________________________________________________________________________________________________________________________________________    Physician's Name: ___________________________________

## 2019-05-15 RX ORDER — INSULIN ASPART 100 [IU]/ML
INJECTION, SOLUTION INTRAVENOUS; SUBCUTANEOUS
Qty: 45 ML | Refills: 2 | Status: SHIPPED | OUTPATIENT
Start: 2019-05-15 | End: 2019-10-03 | Stop reason: SDUPTHER

## 2019-05-29 ENCOUNTER — CLINICAL SUPPORT (OUTPATIENT)
Dept: REHABILITATION | Facility: HOSPITAL | Age: 64
End: 2019-05-29
Attending: PODIATRIST
Payer: MEDICARE

## 2019-05-29 DIAGNOSIS — M25.421 EFFUSION, RIGHT ELBOW: ICD-10-CM

## 2019-05-29 DIAGNOSIS — M25.621 STIFFNESS OF RIGHT ELBOW JOINT: ICD-10-CM

## 2019-05-29 PROCEDURE — 97140 MANUAL THERAPY 1/> REGIONS: CPT

## 2019-05-29 PROCEDURE — 97530 THERAPEUTIC ACTIVITIES: CPT

## 2019-05-29 NOTE — PROGRESS NOTES
Subjective:       Patient ID: Riana Baker Ng is a 64 y.o. female.    Chief Complaint: med dicuss    Patient is a 64 y.o.female who presents today for annual    Labs: due now  Mammogram: dec 2018  Gyn exam: declines  Colonoscopy: 2018; due in five years  DEXA: 2018    1. Left ankle swelling; no pain thinks it is due to amlodipine    2. Left arm pain: swelling; pain is severe; keeping her up at night sometimes.       Review of Systems   Constitutional: Negative for appetite change, chills, diaphoresis, fatigue and fever.   HENT: Negative for congestion, dental problem, ear discharge, ear pain, hearing loss, postnasal drip, sinus pressure and sore throat.    Eyes: Negative for discharge, redness and itching.   Respiratory: Negative for cough, chest tightness, shortness of breath and wheezing.    Cardiovascular: Negative for chest pain, palpitations and leg swelling.   Gastrointestinal: Negative for abdominal pain, constipation, diarrhea, nausea and vomiting.   Endocrine: Negative for cold intolerance and heat intolerance.   Genitourinary: Negative for difficulty urinating, frequency, hematuria and urgency.   Musculoskeletal: Negative for arthralgias, back pain, gait problem, myalgias and neck pain.   Skin: Negative for color change and rash.   Neurological: Negative for dizziness, syncope and headaches.   Hematological: Negative for adenopathy.   Psychiatric/Behavioral: Negative for behavioral problems and sleep disturbance. The patient is not nervous/anxious.        Objective:      Physical Exam   Constitutional: She is oriented to person, place, and time. She appears well-developed and well-nourished. No distress.   HENT:   Head: Normocephalic and atraumatic.   Right Ear: External ear normal.   Left Ear: External ear normal.   Nose: Nose normal.   Mouth/Throat: Oropharynx is clear and moist. No oropharyngeal exudate.   Eyes: Pupils are equal, round, and reactive to light. Conjunctivae and EOM are normal. Right  eye exhibits no discharge. Left eye exhibits no discharge. No scleral icterus.   Neck: Normal range of motion. Neck supple. No JVD present. No thyromegaly present.   Cardiovascular: Normal rate, regular rhythm, normal heart sounds and intact distal pulses. Exam reveals no gallop and no friction rub.   No murmur heard.  Pulmonary/Chest: Effort normal and breath sounds normal. No stridor. No respiratory distress. She has no wheezes. She has no rales. She exhibits no tenderness.   Abdominal: Soft. Bowel sounds are normal. She exhibits no distension. There is no tenderness. There is no rebound.   Musculoskeletal: Normal range of motion. She exhibits no edema or tenderness.   Lymphadenopathy:     She has no cervical adenopathy.   Neurological: She is alert and oriented to person, place, and time. No cranial nerve deficit.   Skin: Skin is warm and dry. No rash noted. She is not diaphoretic. No erythema.   Psychiatric: She has a normal mood and affect. Her behavior is normal.   Nursing note and vitals reviewed.      Assessment and Plan:       1. Annual physical exam  - labs due now    2. Atherosclerosis of coronary artery without angina pectoris, unspecified vessel or lesion type, unspecified whether native or transplanted heart  - stable    3. Essential hypertension  - stop norvasc  - start losartan 12.5 mg daily; call clinic with bp readings in one week    4. Type 2 diabetes mellitus with other specified complication, with long-term current use of insulin  - sees endo    5. Vitamin D deficiency  - stable    6. Ankle swelling, unspecified laterality  - stop norvasc    7. Left arm pain  - CV Ultrasound doppler venous arm left; Future  - EKG 12-lead  - X-Ray Cervical Spine Complete 5 view; Future  - X-Ray Shoulder 2 or More Views Left; Future          No follow-ups on file.

## 2019-05-29 NOTE — PROGRESS NOTES
Occupational Therapy Daily Treatment Note     Name: Riana Baker   Clinic Number: [id number]    Therapy Diagnosis:   Encounter Diagnoses   Name Primary?    Effusion, right elbow     Stiffness of right elbow joint      Physician: Jeanna Alatorre DPM      Insurance Authorization Period Expiration: 1/30/20  Plan of Care Certification Period: 4/2/19  Date of Return to MD: 4/10/19    Visit # / Visits authorized: 1/ 6 (8 total completed)    Time In:8:05AM   Time Out: 9:00AM    Total Billable Time: 55  minutes    Precautions: Standard    Subjective     Patient has not been able to attend therapy consistently due to insurance authorization factors.    Pain: 8/10  Location: right wrist and elbow     Objective     See POC for objective findings.    .Patient received MHP to upper right arm and forearm during LLPS 2.5# for elbow extension ROM x 10' (TherEx)    Riana received the following manual therapy techniques for 12 minutes:   -IASTM to right upper arm and forearm to decrease adhesion and increase tissue flexibility    Riana participated in dynamic functional therapeutic exercises to improve functional performance for 32 minutes, including:  -Gentle PROM elbow flexion and extension with varying forearm position  -AAROM for shoulder flexion and ER in supine  -Passive biceps stretch against wall 3 x 15 seconds  -Stacking cone activity for active elbow flexion and extension    Home Exercises and Education Provided       Written Home Exercises Provided: Added biceps stretch against wall to HEP  Exercises were reviewed and Riana was able to demonstrate them prior to the end of the session.  Riana demonstrated good  understanding of the education provided.     Pt received a written copy of exercises to perform at home.   See EMR under patient instructions for exercises given.     Riana demonstrated good  understanding of the education provided.       Assessment     Patient with would  benefit from 5 more sessions of skilled intervention to increase elbow extension ROM, decrease pain, and increase strength to improve functional performance of RUE and improve safety during daily activities.    Riana is progressing well towards her goals and there are no updates to goals at this time. Pt prognosis is Good.    Pt continues to be limited in functional and leisurely pursuits. Pain limits patients participation in ADL's. Pt is not able to carryout necessary vocational tasks.      Pt will continue to benefit from skilled outpatient occupational therapy to address the deficits listed in the problem list on initial evaluation provide pt/family education and to maximize pt's level of independence in the home and community environment.     Anticipated barriers to occupational therapy: none at this time    Pt's spiritual, cultural and educational needs considered and pt agreeable to plan of care and goals.    Goals:  Cont goals from POC    Plan   Continue 1-2x week x 6 weeks    Discussed Plan of Care with patient: Yes  Updates/Grading for next session: Advance as tolerated      Jenna Underwood, OT

## 2019-05-29 NOTE — PATIENT INSTRUCTIONS
ELBOW: Biceps - Sitting        Sitting in doorway, place one hand on wall, elbow straight. Lean forward. Hold ___ seconds.  ___ reps per set, ___ sets per day, ___ days per week    Copyright © I. All rights reserved.

## 2019-06-05 ENCOUNTER — TELEPHONE (OUTPATIENT)
Dept: ENDOCRINOLOGY | Facility: CLINIC | Age: 64
End: 2019-06-05

## 2019-06-05 ENCOUNTER — CLINICAL SUPPORT (OUTPATIENT)
Dept: REHABILITATION | Facility: HOSPITAL | Age: 64
End: 2019-06-05
Attending: PODIATRIST
Payer: MEDICARE

## 2019-06-05 DIAGNOSIS — M25.621 STIFFNESS OF RIGHT ELBOW JOINT: ICD-10-CM

## 2019-06-05 DIAGNOSIS — M25.421 EFFUSION, RIGHT ELBOW: ICD-10-CM

## 2019-06-05 PROCEDURE — 97110 THERAPEUTIC EXERCISES: CPT

## 2019-06-05 PROCEDURE — 97140 MANUAL THERAPY 1/> REGIONS: CPT

## 2019-06-05 NOTE — PROGRESS NOTES
Occupational Therapy Daily Treatment Note     Name: Riana Kay  Clinic Number: [id number]    Therapy Diagnosis:   Encounter Diagnoses   Name Primary?    Effusion, right elbow     Stiffness of right elbow joint      Physician: Jeanna Alatorre DPM      Insurance Authorization Period Expiration: 1/30/20  Plan of Care Certification Period: 4/2/19  Date of Return to MD: 4/10/19    Visit # / Visits authorized: 2/ 6 (8 total completed)    Time In:8:00AM   Time Out: 8:50AM    Total Billable Time: 40  minutes    Precautions: Standard    Subjective     Patient reports her L elbow and shoulder is now hurting worse than her right due to using it more to rest the right arm.  Patient has not been able to attend therapy consistently due to insurance authorization factors.    Pain: 8/10  Location: right wrist and elbow     Objective     .Patient received MHP to upper right arm and forearm during LLPS 2.5# for elbow extension ROM x 10' (TherEx)    Riana received the following manual therapy techniques for 12 minutes:   -IASTM to right biceps and forearm extensor and flexor compartments to decrease adhesion and increase tissue flexibility    Riana participated in dynamic functional therapeutic exercises to improve functional performance for 32 minutes, including:  -Gentle PROM elbow flexion and extension with varying forearm position  -Gentle shoulder PROM for abd, flex, and ER  -Passive composite flexor and extensor compartment stretches  -Reciprocal pulleys x 3'  -Elbow extension with light resistance orange TB for triceps strengthening    Home Exercises and Education Provided       Written Home Exercises Provided: Added biceps stretch against wall to HEP  Exercises were reviewed and Riana was able to demonstrate them prior to the end of the session.  Riana demonstrated good  understanding of the education provided.     Pt received a written copy of exercises to perform at home.   See  EMR under patient instructions for exercises given.     Riana demonstrated good  understanding of the education provided.       Assessment     Patient elbow extension improving gradually. She tolerates treatment well.    Riana is progressing well towards her goals and there are no updates to goals at this time. Pt prognosis is Good.    Pt continues to be limited in functional and leisurely pursuits. Pain limits patients participation in ADL's. Pt is not able to carryout necessary vocational tasks.      Pt will continue to benefit from skilled outpatient occupational therapy to address the deficits listed in the problem list on initial evaluation provide pt/family education and to maximize pt's level of independence in the home and community environment.     Anticipated barriers to occupational therapy: none at this time    Pt's spiritual, cultural and educational needs considered and pt agreeable to plan of care and goals.    Goals:  Cont goals from POC    Plan   Continue 1-2x week x 6 weeks    Discussed Plan of Care with patient: Yes  Updates/Grading for next session: Advance as tolerated      Jenna Underwood, OT

## 2019-06-05 NOTE — TELEPHONE ENCOUNTER
----- Message from Cici Martini sent at 6/5/2019  9:47 AM CDT -----  Contact:       Reason for call: Patient needs f/u appt would like early morning earliest available         Communication Preference: [phone]    Additional Information:

## 2019-06-06 ENCOUNTER — OFFICE VISIT (OUTPATIENT)
Dept: INTERNAL MEDICINE | Facility: CLINIC | Age: 64
End: 2019-06-06
Payer: MEDICARE

## 2019-06-06 ENCOUNTER — HOSPITAL ENCOUNTER (OUTPATIENT)
Dept: RADIOLOGY | Facility: HOSPITAL | Age: 64
Discharge: HOME | End: 2019-06-06
Attending: INTERNAL MEDICINE
Payer: MEDICARE

## 2019-06-06 VITALS
SYSTOLIC BLOOD PRESSURE: 147 MMHG | WEIGHT: 168.44 LBS | HEART RATE: 62 BPM | DIASTOLIC BLOOD PRESSURE: 70 MMHG | HEIGHT: 65 IN | TEMPERATURE: 98 F | BODY MASS INDEX: 28.06 KG/M2 | RESPIRATION RATE: 16 BRPM

## 2019-06-06 DIAGNOSIS — I10 ESSENTIAL HYPERTENSION: ICD-10-CM

## 2019-06-06 DIAGNOSIS — M25.473 ANKLE SWELLING, UNSPECIFIED LATERALITY: ICD-10-CM

## 2019-06-06 DIAGNOSIS — I25.10 ATHEROSCLEROSIS OF CORONARY ARTERY WITHOUT ANGINA PECTORIS, UNSPECIFIED VESSEL OR LESION TYPE, UNSPECIFIED WHETHER NATIVE OR TRANSPLANTED HEART: ICD-10-CM

## 2019-06-06 DIAGNOSIS — E11.69 TYPE 2 DIABETES MELLITUS WITH OTHER SPECIFIED COMPLICATION, WITH LONG-TERM CURRENT USE OF INSULIN: ICD-10-CM

## 2019-06-06 DIAGNOSIS — E55.9 VITAMIN D DEFICIENCY: ICD-10-CM

## 2019-06-06 DIAGNOSIS — M79.602 LEFT ARM PAIN: ICD-10-CM

## 2019-06-06 DIAGNOSIS — Z00.00 ANNUAL PHYSICAL EXAM: Primary | ICD-10-CM

## 2019-06-06 DIAGNOSIS — Z79.4 TYPE 2 DIABETES MELLITUS WITH OTHER SPECIFIED COMPLICATION, WITH LONG-TERM CURRENT USE OF INSULIN: ICD-10-CM

## 2019-06-06 PROCEDURE — 93005 EKG 12-LEAD: ICD-10-PCS | Mod: S$GLB,,, | Performed by: INTERNAL MEDICINE

## 2019-06-06 PROCEDURE — 72050 X-RAY EXAM NECK SPINE 4/5VWS: CPT | Mod: 26,,, | Performed by: RADIOLOGY

## 2019-06-06 PROCEDURE — 73030 X-RAY EXAM OF SHOULDER: CPT | Mod: 26,LT,, | Performed by: RADIOLOGY

## 2019-06-06 PROCEDURE — 73030 XR SHOULDER COMPLETE 2 OR MORE VIEWS LEFT: ICD-10-PCS | Mod: 26,LT,, | Performed by: RADIOLOGY

## 2019-06-06 PROCEDURE — 72050 X-RAY EXAM NECK SPINE 4/5VWS: CPT | Mod: TC,PO

## 2019-06-06 PROCEDURE — 3078F DIAST BP <80 MM HG: CPT | Mod: CPTII,S$GLB,, | Performed by: INTERNAL MEDICINE

## 2019-06-06 PROCEDURE — 3078F PR MOST RECENT DIASTOLIC BLOOD PRESSURE < 80 MM HG: ICD-10-PCS | Mod: CPTII,S$GLB,, | Performed by: INTERNAL MEDICINE

## 2019-06-06 PROCEDURE — 99999 PR PBB SHADOW E&M-EST. PATIENT-LVL III: ICD-10-PCS | Mod: PBBFAC,,, | Performed by: INTERNAL MEDICINE

## 2019-06-06 PROCEDURE — 93010 ELECTROCARDIOGRAM REPORT: CPT | Mod: S$GLB,,, | Performed by: INTERNAL MEDICINE

## 2019-06-06 PROCEDURE — 3045F PR MOST RECENT HEMOGLOBIN A1C LEVEL 7.0-9.0%: CPT | Mod: CPTII,S$GLB,, | Performed by: INTERNAL MEDICINE

## 2019-06-06 PROCEDURE — 99214 OFFICE O/P EST MOD 30 MIN: CPT | Mod: S$GLB,,, | Performed by: INTERNAL MEDICINE

## 2019-06-06 PROCEDURE — 73030 X-RAY EXAM OF SHOULDER: CPT | Mod: TC,PO,LT

## 2019-06-06 PROCEDURE — 3077F PR MOST RECENT SYSTOLIC BLOOD PRESSURE >= 140 MM HG: ICD-10-PCS | Mod: CPTII,S$GLB,, | Performed by: INTERNAL MEDICINE

## 2019-06-06 PROCEDURE — 3077F SYST BP >= 140 MM HG: CPT | Mod: CPTII,S$GLB,, | Performed by: INTERNAL MEDICINE

## 2019-06-06 PROCEDURE — 3045F PR MOST RECENT HEMOGLOBIN A1C LEVEL 7.0-9.0%: ICD-10-PCS | Mod: CPTII,S$GLB,, | Performed by: INTERNAL MEDICINE

## 2019-06-06 PROCEDURE — 93010 EKG 12-LEAD: ICD-10-PCS | Mod: S$GLB,,, | Performed by: INTERNAL MEDICINE

## 2019-06-06 PROCEDURE — 99999 PR PBB SHADOW E&M-EST. PATIENT-LVL III: CPT | Mod: PBBFAC,,, | Performed by: INTERNAL MEDICINE

## 2019-06-06 PROCEDURE — 99499 UNLISTED E&M SERVICE: CPT | Mod: S$GLB,,, | Performed by: INTERNAL MEDICINE

## 2019-06-06 PROCEDURE — 99499 RISK ADDL DX/OHS AUDIT: ICD-10-PCS | Mod: S$GLB,,, | Performed by: INTERNAL MEDICINE

## 2019-06-06 PROCEDURE — 99214 PR OFFICE/OUTPT VISIT, EST, LEVL IV, 30-39 MIN: ICD-10-PCS | Mod: S$GLB,,, | Performed by: INTERNAL MEDICINE

## 2019-06-06 PROCEDURE — 72050 XR CERVICAL SPINE COMPLETE 5 VIEW: ICD-10-PCS | Mod: 26,,, | Performed by: RADIOLOGY

## 2019-06-06 PROCEDURE — 93005 ELECTROCARDIOGRAM TRACING: CPT | Mod: S$GLB,,, | Performed by: INTERNAL MEDICINE

## 2019-06-06 RX ORDER — LOSARTAN POTASSIUM 25 MG/1
12.5 TABLET ORAL DAILY
Qty: 45 TABLET | Refills: 3 | Status: SHIPPED | OUTPATIENT
Start: 2019-06-06 | End: 2019-10-03 | Stop reason: SDUPTHER

## 2019-06-07 ENCOUNTER — TELEPHONE (OUTPATIENT)
Dept: INTERNAL MEDICINE | Facility: CLINIC | Age: 64
End: 2019-06-07

## 2019-06-07 DIAGNOSIS — Z79.4 TYPE 2 DIABETES MELLITUS WITH OTHER SPECIFIED COMPLICATION, WITH LONG-TERM CURRENT USE OF INSULIN: Primary | ICD-10-CM

## 2019-06-07 DIAGNOSIS — I10 ESSENTIAL HYPERTENSION: ICD-10-CM

## 2019-06-07 DIAGNOSIS — E11.69 TYPE 2 DIABETES MELLITUS WITH OTHER SPECIFIED COMPLICATION, WITH LONG-TERM CURRENT USE OF INSULIN: Primary | ICD-10-CM

## 2019-06-12 ENCOUNTER — CLINICAL SUPPORT (OUTPATIENT)
Dept: REHABILITATION | Facility: HOSPITAL | Age: 64
End: 2019-06-12
Attending: PODIATRIST
Payer: MEDICARE

## 2019-06-12 ENCOUNTER — CLINICAL SUPPORT (OUTPATIENT)
Dept: CARDIOLOGY | Facility: CLINIC | Age: 64
End: 2019-06-12
Attending: INTERNAL MEDICINE
Payer: MEDICARE

## 2019-06-12 DIAGNOSIS — M25.621 STIFFNESS OF RIGHT ELBOW JOINT: ICD-10-CM

## 2019-06-12 DIAGNOSIS — M25.421 EFFUSION, RIGHT ELBOW: ICD-10-CM

## 2019-06-12 DIAGNOSIS — M79.602 LEFT ARM PAIN: ICD-10-CM

## 2019-06-12 PROCEDURE — 97140 MANUAL THERAPY 1/> REGIONS: CPT

## 2019-06-12 PROCEDURE — 97110 THERAPEUTIC EXERCISES: CPT

## 2019-06-12 PROCEDURE — 93971 CV US DOPPLER VENOUS ARM LEFT (CUPID ONLY): ICD-10-PCS | Mod: LT,S$GLB,, | Performed by: INTERNAL MEDICINE

## 2019-06-12 PROCEDURE — 93971 EXTREMITY STUDY: CPT | Mod: LT,S$GLB,, | Performed by: INTERNAL MEDICINE

## 2019-06-12 NOTE — PROGRESS NOTES
Occupational Therapy Daily Treatment Note     Name: Riana Baker   Clinic Number: [id number]    Therapy Diagnosis:   Encounter Diagnoses   Name Primary?    Effusion, right elbow     Stiffness of right elbow joint      Physician: Jeanna Alatorre DPM      Insurance Authorization Period Expiration: 1/30/20  Plan of Care Certification Period: 4/2/19  Date of Return to MD: 4/10/19    Visit # / Visits authorized:  3/ 6   (  6 completed prior)    Time In:8:00AM   Time Out: 9:00AM    Total Billable Time: 50  minutes    Precautions: Standard    Subjective     Patient reports pain is easing gradually in her LUE.  Patient has not been able to attend therapy consistently due to insurance authorization factors.    Pain: 8/10  Location: right wrist and elbow     Objective     .Patient received MHP to upper right arm and forearm during LLPS 2.5# for elbow extension ROM x 10' (TherEx)    Riana received the following manual therapy techniques for 10 minutes:   -IASTM to right biceps/triceps and forearm extensor and flexor compartments to decrease adhesion and increase tissue flexibility    Riana participated in dynamic functional therapeutic exercises to improve functional performance for 40 minutes, including:  -Gentle PROM elbow flexion and extension with varying forearm position  -Supine shoulder presses with brown dowel x 10  -Supine scaption with brown dowel keeping elbow in extension x 10  -Passive composite flexor and extensor compartment stretches  -UBE L1.5 3' forward, 3' backward  -Bicep curls with orange dowel 2 x 10    Home Exercises and Education Provided       Written Home Exercises Provided: Added biceps stretch against wall to HEP  Exercises were reviewed and Riana was able to demonstrate them prior to the end of the session.  Riana demonstrated good  understanding of the education provided.     Pt received a written copy of exercises to perform at home.   See EMR under  patient instructions for exercises given.     Riana demonstrated good  understanding of the education provided.       Assessment     Patient elbow extension improving gradually. She tolerates treatment well.    Riana is progressing well towards her goals and there are no updates to goals at this time. Pt prognosis is Good.    Pt continues to be limited in functional and leisurely pursuits. Pain limits patients participation in ADL's. Pt is not able to carryout necessary vocational tasks.      Pt will continue to benefit from skilled outpatient occupational therapy to address the deficits listed in the problem list on initial evaluation provide pt/family education and to maximize pt's level of independence in the home and community environment.     Anticipated barriers to occupational therapy: none at this time    Pt's spiritual, cultural and educational needs considered and pt agreeable to plan of care and goals.    Goals:  Cont goals from POC    Plan   Continue 1-2x week x 6 weeks    Discussed Plan of Care with patient: Yes  Updates/Grading for next session: Advance as tolerated      Jenna Underwood, OT

## 2019-06-19 ENCOUNTER — CLINICAL SUPPORT (OUTPATIENT)
Dept: REHABILITATION | Facility: HOSPITAL | Age: 64
End: 2019-06-19
Attending: PODIATRIST
Payer: MEDICARE

## 2019-06-19 DIAGNOSIS — M25.421 EFFUSION, RIGHT ELBOW: ICD-10-CM

## 2019-06-19 DIAGNOSIS — M25.621 STIFFNESS OF RIGHT ELBOW JOINT: ICD-10-CM

## 2019-06-19 PROCEDURE — 97110 THERAPEUTIC EXERCISES: CPT

## 2019-06-19 NOTE — PROGRESS NOTES
Occupational Therapy Daily Treatment Note     Name: Riana Baker   Clinic Number: [id number]    Therapy Diagnosis:   Encounter Diagnoses   Name Primary?    Effusion, right elbow     Stiffness of right elbow joint      Physician: Jeanna Alatorre DPM      Insurance Authorization Period Expiration: 1/30/20  Plan of Care Certification Period: 4/2/19  Date of Return to MD: 4/10/19    Visit # / Visits authorized:  4/ 6   (  6 completed prior)    Time In:8:00AM   Time Out: 9:00AM    Total Billable Time: 60  minutes    Precautions: Standard    Subjective     Patient reports pain is easing gradually in her LUE.  Patient has not been able to attend therapy consistently due to insurance authorization factors.    Pain: 8/10  Location: right wrist and elbow     Objective     .Patient received MHP to upper right arm and forearm during LLPS 2# for elbow extension ROM x 10' (TherEx)    Riana received the following manual therapy techniques for 10 minutes:   -STM to right biceps/triceps and forearm extensor and flexor compartments to decrease adhesion and increase tissue flexibility    Riana participated in dynamic functional therapeutic exercises to improve functional performance for 40 minutes, including:  -Gentle PROM elbow flexion and extension with varying forearm position  -Supine chest presses with orange dowel for elbow extension x 10  -Passive composite flexor and extensor compartment stretches  -UBE L1.5 3' forward, 3' backward  -Standing shoulder extension with orange dowel x 15  -Bicep curls with orange dowel 2 x 10  -Orange theraband tricep pull downs 2 x 15    Home Exercises and Education Provided     Written Home Exercises Provided: Added biceps stretch against wall to HEP  Exercises were reviewed and Riana was able to demonstrate them prior to the end of the session.  Riana demonstrated good  understanding of the education provided.     Pt received a written copy of  exercises to perform at home.   See EMR under patient instructions for exercises given.     Riana demonstrated good  understanding of the education provided.       Assessment     Elbow ROM improving and pain is decreasing per patient report. She is progressing gradually with strengthening of the UE. Anticipate discharge to HEP next session.    Riana is progressing well towards her goals and there are no updates to goals at this time. Pt prognosis is Good.    Pt continues to be limited in functional and leisurely pursuits. Pain limits patients participation in ADL's. Pt is not able to carryout necessary vocational tasks.      Pt will continue to benefit from skilled outpatient occupational therapy to address the deficits listed in the problem list on initial evaluation provide pt/family education and to maximize pt's level of independence in the home and community environment.     Anticipated barriers to occupational therapy: none at this time    Pt's spiritual, cultural and educational needs considered and pt agreeable to plan of care and goals.    Goals:  Cont goals from POC    Plan   Discharge to HEP    Discussed Plan of Care with patient: Yes  Updates/Grading for next session: Advance as tolerated      Jenna Underwood, OT

## 2019-06-26 ENCOUNTER — CLINICAL SUPPORT (OUTPATIENT)
Dept: REHABILITATION | Facility: HOSPITAL | Age: 64
End: 2019-06-26
Attending: PODIATRIST
Payer: MEDICARE

## 2019-06-26 DIAGNOSIS — M25.421 EFFUSION, RIGHT ELBOW: ICD-10-CM

## 2019-06-26 DIAGNOSIS — M25.621 STIFFNESS OF RIGHT ELBOW JOINT: ICD-10-CM

## 2019-06-26 PROCEDURE — 97110 THERAPEUTIC EXERCISES: CPT

## 2019-06-26 PROCEDURE — 97140 MANUAL THERAPY 1/> REGIONS: CPT

## 2019-06-26 NOTE — PROGRESS NOTES
Occupational Therapy Daily Treatment Note     Name: Riana Baker   Clinic Number: [id number]    Therapy Diagnosis:   Encounter Diagnoses   Name Primary?    Effusion, right elbow     Stiffness of right elbow joint      Physician: Jeanna Alatorre DPM      Insurance Authorization Period Expiration: 1/30/20  Plan of Care Certification Period: 4/2/19  Date of Return to MD: 4/10/19    Visit # / Visits authorized:  5/ 6   (  6 completed prior)    Time In:8:05AM   Time Out: 9:00AM    Total Billable Time: 55  minutes    Precautions: Standard    Subjective     Patient reports pain is easing gradually in her LUE.  Patient has not been able to attend therapy consistently due to insurance authorization factors.    Pain: 5/10  Location: right wrist and elbow     Objective     Wrist and Elbow AROM Measured in degrees  6/27/19 Left Right   Wrist Ext/Flex NT 60/76   Forearm Sup/Pro NT 85/85   Elbow Ext/Flex NT 16/146      Strength: (KATI Dynamometer in lbs.) Average 3 trials, Position II:  6/27/19 Left Right    36 28       .Patient received MHP to upper right arm and forearm during LLPS 2# for elbow extension ROM x 10' (TherEx)    Riana received the following manual therapy techniques for 10 minutes:   -STM to right biceps/triceps and forearm extensor and flexor compartments to decrease adhesion and increase tissue flexibility    Riana participated in dynamic functional therapeutic exercises to improve functional performance for 40 minutes, including:  -Gentle PROM elbow flexion and extension with varying forearm position  -Supine chest presses with orange dowel for elbow extension x 10  -Passive composite flexor and extensor compartment stretches  -UBE L1.5 3' forward, 3' backward  -Standing shoulder extension with orange dowel x 15  -Bicep curls with orange dowel 2 x 10  -Orange theraband tricep pull downs 2 x 15    Home Exercises and Education Provided     Written Home Exercises Provided:  Continue HEP  Exercises were reviewed and Riana was able to demonstrate them prior to the end of the session.  Riana demonstrated good  understanding of the education provided.     Pt received a written copy of exercises to perform at home.   See EMR under patient instructions for exercises given.     Riana demonstrated good  understanding of the education provided.       Assessment     Pt has made significant progress with wrist flexion and elbow flexion AROM since initiating therapy. She reports overall decrease in pain, though it still bothers her sometimes. She   Has been making gradual progress with strengthening of the RUE. She reports she is able to perform ADLs and IADLs independently.  Goals:  STGs to be met within 3 weeks  1. IND with HEP-Ongoing  2. Decrease edema of elbow to minimal levels-Met  3. Increase composite finger flexion to the DPC-Met  4. Increase wrist flexion to 30 degrees-Met  LTGs to be met by discharge  5. Increase wrist and elbow AROM to WNL to improve functional ROM during IADLs-85% Met  6. Initiate strengthening to improve functional performance during household chores-Met      Plan   Discharge to HEP    Discussed Plan of Care with patient: Yes  Updates/Grading for next session: Advance as tolerated      Jenna Underwood, OT

## 2019-07-03 ENCOUNTER — OFFICE VISIT (OUTPATIENT)
Dept: INTERNAL MEDICINE | Facility: CLINIC | Age: 64
End: 2019-07-03
Payer: MEDICARE

## 2019-07-03 VITALS
HEART RATE: 68 BPM | BODY MASS INDEX: 27.95 KG/M2 | DIASTOLIC BLOOD PRESSURE: 62 MMHG | SYSTOLIC BLOOD PRESSURE: 138 MMHG | HEIGHT: 65 IN | TEMPERATURE: 98 F | WEIGHT: 167.75 LBS | RESPIRATION RATE: 16 BRPM

## 2019-07-03 DIAGNOSIS — M47.22 OSTEOARTHRITIS OF SPINE WITH RADICULOPATHY, CERVICAL REGION: Primary | ICD-10-CM

## 2019-07-03 PROCEDURE — 99999 PR PBB SHADOW E&M-EST. PATIENT-LVL IV: CPT | Mod: PBBFAC,,, | Performed by: FAMILY MEDICINE

## 2019-07-03 PROCEDURE — 3078F PR MOST RECENT DIASTOLIC BLOOD PRESSURE < 80 MM HG: ICD-10-PCS | Mod: CPTII,S$GLB,, | Performed by: FAMILY MEDICINE

## 2019-07-03 PROCEDURE — 3008F BODY MASS INDEX DOCD: CPT | Mod: CPTII,S$GLB,, | Performed by: FAMILY MEDICINE

## 2019-07-03 PROCEDURE — 3075F PR MOST RECENT SYSTOLIC BLOOD PRESS GE 130-139MM HG: ICD-10-PCS | Mod: CPTII,S$GLB,, | Performed by: FAMILY MEDICINE

## 2019-07-03 PROCEDURE — 99999 PR PBB SHADOW E&M-EST. PATIENT-LVL IV: ICD-10-PCS | Mod: PBBFAC,,, | Performed by: FAMILY MEDICINE

## 2019-07-03 PROCEDURE — 3078F DIAST BP <80 MM HG: CPT | Mod: CPTII,S$GLB,, | Performed by: FAMILY MEDICINE

## 2019-07-03 PROCEDURE — 3075F SYST BP GE 130 - 139MM HG: CPT | Mod: CPTII,S$GLB,, | Performed by: FAMILY MEDICINE

## 2019-07-03 PROCEDURE — 3008F PR BODY MASS INDEX (BMI) DOCUMENTED: ICD-10-PCS | Mod: CPTII,S$GLB,, | Performed by: FAMILY MEDICINE

## 2019-07-03 PROCEDURE — 99214 PR OFFICE/OUTPT VISIT, EST, LEVL IV, 30-39 MIN: ICD-10-PCS | Mod: S$GLB,,, | Performed by: FAMILY MEDICINE

## 2019-07-03 PROCEDURE — 99214 OFFICE O/P EST MOD 30 MIN: CPT | Mod: S$GLB,,, | Performed by: FAMILY MEDICINE

## 2019-07-03 RX ORDER — GABAPENTIN 300 MG/1
300 CAPSULE ORAL NIGHTLY
Qty: 30 CAPSULE | Refills: 11 | Status: SHIPPED | OUTPATIENT
Start: 2019-07-03 | End: 2019-10-03 | Stop reason: SDUPTHER

## 2019-07-03 NOTE — PROGRESS NOTES
Subjective:       Patient ID: Riana Kay is a 64 y.o. female.    Chief Complaint: Headache; Neck Pain; and Tingling (left upper arm)    HPI 64-year-old female presents to clinic today secondary to a complaint of continued neck pain with radiation into the left arm and frequent reports of left arm numbness and tingling.  She reports difficulty sleeping secondary to this pain and reports associated headaches and weakness to her left upper extremity.  She reported that these complaints to her PCP during physical exam at which time cervical spine x-ray, shoulder x-ray, and upper extremity ultrasound were performed.  Upper extremity ultrasound returned negative but cervical spine x-ray and shoulder x-ray showed substantial degenerative joint disease. No treatment has been initiated and the patient has taken no medication for these symptoms.  Review of Systems   Constitutional: Negative for activity change, appetite change, chills, fatigue, fever and unexpected weight change.   HENT: Negative for congestion, ear pain, hearing loss, postnasal drip, rhinorrhea, sinus pressure, sore throat, tinnitus and trouble swallowing.    Eyes: Negative for discharge, redness, itching and visual disturbance.   Respiratory: Negative for cough, chest tightness, shortness of breath and wheezing.    Cardiovascular: Negative for chest pain and palpitations.   Gastrointestinal: Negative for abdominal pain, blood in stool, constipation, diarrhea, nausea and vomiting.   Endocrine: Negative for polydipsia and polyuria.   Genitourinary: Negative for decreased urine volume, difficulty urinating, dysuria, frequency, hematuria, menstrual problem and urgency.   Musculoskeletal: Positive for arthralgias and neck pain. Negative for back pain, joint swelling, myalgias and neck stiffness.   Skin: Negative for rash.   Neurological: Positive for headaches. Negative for dizziness, weakness and light-headedness.   Psychiatric/Behavioral: Negative.   Negative for confusion and dysphoric mood.       Objective:      Physical Exam   Constitutional: She is oriented to person, place, and time. She appears well-developed and well-nourished. No distress.   HENT:   Head: Normocephalic and atraumatic.   Right Ear: External ear normal.   Left Ear: External ear normal.   Nose: Nose normal.   Mouth/Throat: Oropharynx is clear and moist. No oropharyngeal exudate.   Eyes: Pupils are equal, round, and reactive to light. Conjunctivae and EOM are normal. Right eye exhibits no discharge. Left eye exhibits no discharge. No scleral icterus.   Neck: Normal range of motion. Neck supple. No JVD present. No tracheal deviation present. No thyromegaly present.   Cardiovascular: Normal rate, regular rhythm, normal heart sounds and intact distal pulses. Exam reveals no gallop and no friction rub.   No murmur heard.  Pulmonary/Chest: Effort normal and breath sounds normal. No stridor. No respiratory distress. She has no wheezes. She has no rales.   Abdominal: Soft. Bowel sounds are normal. She exhibits no distension and no mass. There is no tenderness. There is no rebound and no guarding.   Musculoskeletal: She exhibits no edema.        Cervical back: She exhibits decreased range of motion, tenderness, pain and spasm.   Lymphadenopathy:     She has no cervical adenopathy.   Neurological: She is alert and oriented to person, place, and time.   Skin: Skin is warm and dry. No rash noted. She is not diaphoretic. No erythema. No pallor.   Psychiatric: She has a normal mood and affect. Her behavior is normal. Judgment and thought content normal.   Nursing note and vitals reviewed.      Assessment:       1. Osteoarthritis of spine with radiculopathy, cervical region        Plan:       Osteoarthritis of spine with radiculopathy, cervical region  -     Ambulatory Referral to Physical/Occupational Therapy  -     gabapentin (NEURONTIN) 300 MG capsule; Take 1 capsule (300 mg total) by mouth every  evening.  Dispense: 30 capsule; Refill: 11      Heat, massage, stretching as discussed.  Return to clinic as needed if symptoms persist or worsen.

## 2019-07-09 ENCOUNTER — PATIENT OUTREACH (OUTPATIENT)
Dept: ADMINISTRATIVE | Facility: OTHER | Age: 64
End: 2019-07-09

## 2019-07-10 ENCOUNTER — HOSPITAL ENCOUNTER (EMERGENCY)
Facility: OTHER | Age: 64
Discharge: HOME | End: 2019-07-10
Attending: EMERGENCY MEDICINE
Payer: MEDICARE

## 2019-07-10 ENCOUNTER — HOSPITAL ENCOUNTER (OUTPATIENT)
Dept: RADIOLOGY | Facility: OTHER | Age: 64
Discharge: HOME | End: 2019-07-10
Attending: PHYSICIAN ASSISTANT
Payer: MEDICARE

## 2019-07-10 ENCOUNTER — OFFICE VISIT (OUTPATIENT)
Dept: ORTHOPEDICS | Facility: CLINIC | Age: 64
End: 2019-07-10
Payer: MEDICARE

## 2019-07-10 VITALS
TEMPERATURE: 98 F | WEIGHT: 164 LBS | HEART RATE: 60 BPM | RESPIRATION RATE: 18 BRPM | BODY MASS INDEX: 27.32 KG/M2 | HEIGHT: 65 IN | OXYGEN SATURATION: 99 % | DIASTOLIC BLOOD PRESSURE: 75 MMHG | SYSTOLIC BLOOD PRESSURE: 194 MMHG

## 2019-07-10 VITALS
HEART RATE: 58 BPM | DIASTOLIC BLOOD PRESSURE: 87 MMHG | WEIGHT: 167 LBS | BODY MASS INDEX: 27.82 KG/M2 | SYSTOLIC BLOOD PRESSURE: 143 MMHG | HEIGHT: 65 IN

## 2019-07-10 DIAGNOSIS — G56.21 ULNAR NERVE ENTRAPMENT AT RIGHT ELBOW: ICD-10-CM

## 2019-07-10 DIAGNOSIS — M25.521 RIGHT ELBOW PAIN: Primary | ICD-10-CM

## 2019-07-10 DIAGNOSIS — Z98.890 POST-OPERATIVE STATE: ICD-10-CM

## 2019-07-10 DIAGNOSIS — M25.521 RIGHT ELBOW PAIN: ICD-10-CM

## 2019-07-10 LAB
POCT GLUCOSE: 148 MG/DL (ref 70–110)
POCT GLUCOSE: 161 MG/DL (ref 70–110)

## 2019-07-10 PROCEDURE — 99999 PR PBB SHADOW E&M-EST. PATIENT-LVL III: CPT | Mod: PBBFAC,,, | Performed by: PHYSICIAN ASSISTANT

## 2019-07-10 PROCEDURE — 99999 PR PBB SHADOW E&M-EST. PATIENT-LVL III: ICD-10-PCS | Mod: PBBFAC,,, | Performed by: PHYSICIAN ASSISTANT

## 2019-07-10 PROCEDURE — 99213 PR OFFICE/OUTPT VISIT, EST, LEVL III, 20-29 MIN: ICD-10-PCS | Mod: S$GLB,,, | Performed by: PHYSICIAN ASSISTANT

## 2019-07-10 PROCEDURE — 96372 THER/PROPH/DIAG INJ SC/IM: CPT

## 2019-07-10 PROCEDURE — 99213 OFFICE O/P EST LOW 20 MIN: CPT | Mod: S$GLB,,, | Performed by: PHYSICIAN ASSISTANT

## 2019-07-10 PROCEDURE — 99284 EMERGENCY DEPT VISIT MOD MDM: CPT | Mod: 25

## 2019-07-10 PROCEDURE — 3008F PR BODY MASS INDEX (BMI) DOCUMENTED: ICD-10-PCS | Mod: CPTII,S$GLB,, | Performed by: PHYSICIAN ASSISTANT

## 2019-07-10 PROCEDURE — 73080 XR ELBOW COMPLETE 3 VIEW RIGHT: ICD-10-PCS | Mod: 26,RT,, | Performed by: RADIOLOGY

## 2019-07-10 PROCEDURE — 3077F SYST BP >= 140 MM HG: CPT | Mod: CPTII,S$GLB,, | Performed by: PHYSICIAN ASSISTANT

## 2019-07-10 PROCEDURE — 73080 X-RAY EXAM OF ELBOW: CPT | Mod: 26,RT,, | Performed by: RADIOLOGY

## 2019-07-10 PROCEDURE — 82962 GLUCOSE BLOOD TEST: CPT

## 2019-07-10 PROCEDURE — 3077F PR MOST RECENT SYSTOLIC BLOOD PRESSURE >= 140 MM HG: ICD-10-PCS | Mod: CPTII,S$GLB,, | Performed by: PHYSICIAN ASSISTANT

## 2019-07-10 PROCEDURE — 63600175 PHARM REV CODE 636 W HCPCS: Performed by: EMERGENCY MEDICINE

## 2019-07-10 PROCEDURE — 73080 X-RAY EXAM OF ELBOW: CPT | Mod: TC,FY,RT

## 2019-07-10 PROCEDURE — 3008F BODY MASS INDEX DOCD: CPT | Mod: CPTII,S$GLB,, | Performed by: PHYSICIAN ASSISTANT

## 2019-07-10 PROCEDURE — 3079F DIAST BP 80-89 MM HG: CPT | Mod: CPTII,S$GLB,, | Performed by: PHYSICIAN ASSISTANT

## 2019-07-10 PROCEDURE — 3079F PR MOST RECENT DIASTOLIC BLOOD PRESSURE 80-89 MM HG: ICD-10-PCS | Mod: CPTII,S$GLB,, | Performed by: PHYSICIAN ASSISTANT

## 2019-07-10 RX ORDER — INSULIN ASPART 100 [IU]/ML
12 INJECTION, SOLUTION INTRAVENOUS; SUBCUTANEOUS
Status: DISCONTINUED | OUTPATIENT
Start: 2019-07-10 | End: 2019-07-10 | Stop reason: HOSPADM

## 2019-07-10 RX ORDER — DICLOFENAC SODIUM 10 MG/G
2 GEL TOPICAL 2 TIMES DAILY
Qty: 100 G | Refills: 2 | Status: SHIPPED | OUTPATIENT
Start: 2019-07-10 | End: 2020-11-25

## 2019-07-10 RX ADMIN — INSULIN ASPART 12 UNITS: 100 INJECTION, SOLUTION INTRAVENOUS; SUBCUTANEOUS at 12:07
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
## 2019-07-14 ENCOUNTER — OFFICE VISIT (OUTPATIENT)
Dept: URGENT CARE | Facility: CLINIC | Age: 64
End: 2019-07-14
Payer: MEDICARE

## 2019-07-14 VITALS
HEIGHT: 65 IN | BODY MASS INDEX: 27.32 KG/M2 | OXYGEN SATURATION: 96 % | TEMPERATURE: 98 F | DIASTOLIC BLOOD PRESSURE: 72 MMHG | HEART RATE: 62 BPM | SYSTOLIC BLOOD PRESSURE: 151 MMHG | WEIGHT: 164 LBS

## 2019-07-14 DIAGNOSIS — M62.838 NECK MUSCLE SPASM: ICD-10-CM

## 2019-07-14 DIAGNOSIS — M50.30 DDD (DEGENERATIVE DISC DISEASE), CERVICAL: Primary | ICD-10-CM

## 2019-07-14 PROCEDURE — 96372 PR INJECTION,THERAP/PROPH/DIAG2ST, IM OR SUBCUT: ICD-10-PCS | Mod: S$GLB,,, | Performed by: PHYSICIAN ASSISTANT

## 2019-07-14 PROCEDURE — 99214 OFFICE O/P EST MOD 30 MIN: CPT | Mod: 25,S$GLB,, | Performed by: PHYSICIAN ASSISTANT

## 2019-07-14 PROCEDURE — 99214 PR OFFICE/OUTPT VISIT, EST, LEVL IV, 30-39 MIN: ICD-10-PCS | Mod: 25,S$GLB,, | Performed by: PHYSICIAN ASSISTANT

## 2019-07-14 PROCEDURE — 3008F PR BODY MASS INDEX (BMI) DOCUMENTED: ICD-10-PCS | Mod: CPTII,S$GLB,, | Performed by: PHYSICIAN ASSISTANT

## 2019-07-14 PROCEDURE — 3078F PR MOST RECENT DIASTOLIC BLOOD PRESSURE < 80 MM HG: ICD-10-PCS | Mod: CPTII,S$GLB,, | Performed by: PHYSICIAN ASSISTANT

## 2019-07-14 PROCEDURE — 3078F DIAST BP <80 MM HG: CPT | Mod: CPTII,S$GLB,, | Performed by: PHYSICIAN ASSISTANT

## 2019-07-14 PROCEDURE — 96372 THER/PROPH/DIAG INJ SC/IM: CPT | Mod: S$GLB,,, | Performed by: PHYSICIAN ASSISTANT

## 2019-07-14 PROCEDURE — 3077F SYST BP >= 140 MM HG: CPT | Mod: CPTII,S$GLB,, | Performed by: PHYSICIAN ASSISTANT

## 2019-07-14 PROCEDURE — 3008F BODY MASS INDEX DOCD: CPT | Mod: CPTII,S$GLB,, | Performed by: PHYSICIAN ASSISTANT

## 2019-07-14 PROCEDURE — 3077F PR MOST RECENT SYSTOLIC BLOOD PRESSURE >= 140 MM HG: ICD-10-PCS | Mod: CPTII,S$GLB,, | Performed by: PHYSICIAN ASSISTANT

## 2019-07-14 RX ORDER — KETOROLAC TROMETHAMINE 30 MG/ML
30 INJECTION, SOLUTION INTRAMUSCULAR; INTRAVENOUS
Status: COMPLETED | OUTPATIENT
Start: 2019-07-14 | End: 2019-07-14

## 2019-07-14 RX ORDER — METHYLPREDNISOLONE 4 MG/1
4 TABLET ORAL DAILY
Qty: 1 PACKAGE | Refills: 0 | Status: SHIPPED | OUTPATIENT
Start: 2019-07-14 | End: 2019-07-21

## 2019-07-14 RX ADMIN — KETOROLAC TROMETHAMINE 30 MG: 30 INJECTION, SOLUTION INTRAMUSCULAR; INTRAVENOUS at 10:07

## 2019-07-14 NOTE — PROGRESS NOTES
"Subjective:       Patient ID: Riana Kay is a 64 y.o. female.    Vitals:  height is 5' 5" (1.651 m) and weight is 74.4 kg (164 lb). Her oral temperature is 98.1 °F (36.7 °C). Her blood pressure is 151/72 (abnormal) and her pulse is 62. Her oxygen saturation is 96%.     Chief Complaint: Neck Pain    Pt has had chronic neck pain which is followed by PCP. She recently had XR done that showed cervical DDD and was started on nightly Gabapentin. She was also referred for PT which she has not started. Pain in neck does not radiate. She also reports groin pain that started last night with intermittent numbness/tingling of anterior thigh.     Neck Pain    This is a new problem. The current episode started 1 to 4 weeks ago (approx. 2 weeks ago). The problem has been unchanged. The pain is associated with nothing. The pain is present in the right side. The quality of the pain is described as burning and aching. The pain is at a severity of 7/10. The pain is moderate. The symptoms are aggravated by twisting (right hip/groin area/thigh pain worse with walking; neck worse with movement/rotation). The pain is same all the time. Stiffness is present all day. Associated symptoms include leg pain and numbness. Pertinent negatives include no chest pain, fever, headaches or weakness. Associated symptoms comments: Right thigh numbness/tingling  Right hip/groin pain radiating into right thigh. Treatments tried: Gabapentin. The treatment provided no relief.       Constitution: Negative for chills, fatigue and fever.   HENT: Negative for congestion and sore throat.    Neck: Positive for neck pain. Negative for painful lymph nodes.   Cardiovascular: Negative for chest pain and leg swelling.   Eyes: Negative for double vision and blurred vision.   Respiratory: Negative for cough and shortness of breath.    Gastrointestinal: Negative for nausea, vomiting and diarrhea.   Genitourinary: Negative for dysuria, frequency, urgency and " history of kidney stones.   Musculoskeletal: Positive for joint pain. Negative for joint swelling, muscle cramps and muscle ache.   Skin: Negative for color change, pale, rash and bruising.   Allergic/Immunologic: Negative for seasonal allergies.   Neurological: Positive for numbness. Negative for dizziness, history of vertigo, light-headedness, passing out and headaches.   Hematologic/Lymphatic: Negative for swollen lymph nodes.   Psychiatric/Behavioral: Negative for nervous/anxious, sleep disturbance and depression. The patient is not nervous/anxious.        Objective:      Physical Exam   Constitutional: She is oriented to person, place, and time. Vital signs are normal. She appears well-developed and well-nourished. She is active and cooperative. No distress.   HENT:   Head: Normocephalic and atraumatic.   Nose: Nose normal.   Mouth/Throat: Oropharynx is clear and moist and mucous membranes are normal.   Eyes: Conjunctivae and lids are normal.   Neck: Trachea normal, normal range of motion, full passive range of motion without pain and phonation normal. Neck supple.   Cardiovascular: Normal rate, regular rhythm, normal heart sounds, intact distal pulses and normal pulses.   Pulmonary/Chest: Effort normal and breath sounds normal.   Abdominal: Soft. Normal appearance and bowel sounds are normal. She exhibits no abdominal bruit, no pulsatile midline mass and no mass.   Musculoskeletal: She exhibits no edema or deformity.        Cervical back: She exhibits spasm. She exhibits no bony tenderness.        Lumbar back: She exhibits normal range of motion, no bony tenderness, no swelling and no spasm.   Decreased lateral rotation in cervical spine 2/2 pain. Muscle tightness in right trapezius. No midline ttp.    Neurological: She is alert and oriented to person, place, and time. She has normal strength and normal reflexes. No sensory deficit.   Negative Hoffmans bilaterally. Negative SLR bilaterally.    Skin: Skin is  warm, dry and intact. She is not diaphoretic.   Psychiatric: She has a normal mood and affect. Her speech is normal and behavior is normal. Judgment and thought content normal. Cognition and memory are normal.   Nursing note and vitals reviewed.      Assessment:       1. DDD (degenerative disc disease), cervical    2. Neck muscle spasm        Plan:         DDD (degenerative disc disease), cervical  -     ketorolac injection 30 mg  -     methylPREDNISolone (MEDROL DOSEPACK) 4 mg tablet; Take 1 tablet (4 mg total) by mouth once daily. use as directed for 7 days  Dispense: 1 Package; Refill: 0    Neck muscle spasm  -     ketorolac injection 30 mg  -     methylPREDNISolone (MEDROL DOSEPACK) 4 mg tablet; Take 1 tablet (4 mg total) by mouth once daily. use as directed for 7 days  Dispense: 1 Package; Refill: 0    As discussed, please monitor your blood sugars daily while taking oral steroids. If you blood glucose is > 200 contact your PCP and stop steroids.     Contact your PCP regarding increasing daily dose of Gabapentin.     Degenerative Disk Disease    Spinal disks are gel-filled cushions between the bones, or vertebrae, of the spine. The disks act like shock absorbers. Over time, the disks may break down. This is called degenerative disk disease.  This condition can affect the neck or back. It is one of the most common causes of low back pain. It is the leading cause of disability in people under age 45 in the [place]. The pain often remains localized to the lower back or neck. Muscle spasm is often present and adds to the pain.  Disk degeneration is a natural part of aging. But it is not painful for most people. It may also occur as a result of repeated minor injuries due to daily activities, sports, or accidents. It may lead to osteoarthritis of the spine. Back pain related to disk disease may come and go. Or it may become chronic and last for months or years. The disk may bulge or rupture. This is called a  slipped disk or herniated disk. That can put pressure on a nearby spinal nerve and cause neck or back pain that spreads down one arm or leg.  X-rays or an MRI may help to diagnose this condition. For acute pain, treatment includes anti-inflammatory medicines, muscle relaxants, rest, ice, or heat. Strong prescription pain medicines, called opioids, may be needed for short-term treatment if pain suddenly gets worse. Opioid medicines can be addictive. So they are not advised for long-term pain management. Other types of medicines are preferred. Surgery is generally not used to treat this condition unless there is a complication.    Home care  · For neck pain: Use a comfortable pillow that supports the head and keeps the spine in a neutral position. Your head should not be tilted forward or backward.  · For back pain: Avoid sitting for long periods of time. This puts more stress on the lower back than standing or walking. Starting a regular exercise program to strengthen the supporting muscles of the spine will make it easier to live with degenerative disk disease.  · Apply an ice pack over the injured area for no more than 15 to 20 minutes. Do this every 3 to 6 hours for the first 24 to 48 hours. To make an ice pack, put ice cubes in a plastic bag that seals at the top. Wrap the bag in a clean, thin towel or cloth. Never put ice or an ice pack directly on the skin. Keep using ice packs to ease pain and swelling as needed. After 48 hours, apply heat (warm shower or warm bath) for 20 minutes several times a day, or switch between ice and heat.   · You may use over-the-counter pain medicine to control pain, unless another pain medicine was prescribed. If you have chronic liver or kidney disease or ever had a stomach ulcer or GI bleeding, talk with your provider before using these medicines.  Follow-up care  Follow up with your healthcare provider, or as directed.  If X-rays, a CT scan or an MRI were done, you will be  notified of any new findings that may affect your care.  When to seek medical advice  Contact your healthcare provider right away if any of these occur:  · Increasing back pain  · Your foot drags when you walk, a condition called foot drop  · You have new weakness, numbness, or pain in one or both arms or legs  · Loss of bowel or bladder control  · Numbness or tingling in the buttock or groin area  Date Last Reviewed: 11/23/2015 © 2000-2017 [employer]. [address] [place] [address]. All rights reserved. This information is not intended as a substitute for professional medical care. Always follow your healthcare professional's instructions.

## 2019-07-14 NOTE — PATIENT INSTRUCTIONS
Degenerative Disk Disease    Spinal disks are gel-filled cushions between the bones, or vertebrae, of the spine. The disks act like shock absorbers. Over time, the disks may break down. This is called degenerative disk disease.  This condition can affect the neck or back. It is one of the most common causes of low back pain. It is the leading cause of disability in people under age 45 in the [place]. The pain often remains localized to the lower back or neck. Muscle spasm is often present and adds to the pain.  Disk degeneration is a natural part of aging. But it is not painful for most people. It may also occur as a result of repeated minor injuries due to daily activities, sports, or accidents. It may lead to osteoarthritis of the spine. Back pain related to disk disease may come and go. Or it may become chronic and last for months or years. The disk may bulge or rupture. This is called a slipped disk or herniated disk. That can put pressure on a nearby spinal nerve and cause neck or back pain that spreads down one arm or leg.  X-rays or an MRI may help to diagnose this condition. For acute pain, treatment includes anti-inflammatory medicines, muscle relaxants, rest, ice, or heat. Strong prescription pain medicines, called opioids, may be needed for short-term treatment if pain suddenly gets worse. Opioid medicines can be addictive. So they are not advised for long-term pain management. Other types of medicines are preferred. Surgery is generally not used to treat this condition unless there is a complication.    Home care  · For neck pain: Use a comfortable pillow that supports the head and keeps the spine in a neutral position. Your head should not be tilted forward or backward.  · For back pain: Avoid sitting for long periods of time. This puts more stress on the lower back than standing or walking. Starting a regular exercise program to strengthen the supporting muscles of the spine will make it easier  to live with degenerative disk disease.  · Apply an ice pack over the injured area for no more than 15 to 20 minutes. Do this every 3 to 6 hours for the first 24 to 48 hours. To make an ice pack, put ice cubes in a plastic bag that seals at the top. Wrap the bag in a clean, thin towel or cloth. Never put ice or an ice pack directly on the skin. Keep using ice packs to ease pain and swelling as needed. After 48 hours, apply heat (warm shower or warm bath) for 20 minutes several times a day, or switch between ice and heat.   · You may use over-the-counter pain medicine to control pain, unless another pain medicine was prescribed. If you have chronic liver or kidney disease or ever had a stomach ulcer or GI bleeding, talk with your provider before using these medicines.  Follow-up care  Follow up with your healthcare provider, or as directed.  If X-rays, a CT scan or an MRI were done, you will be notified of any new findings that may affect your care.  When to seek medical advice  Contact your healthcare provider right away if any of these occur:  · Increasing back pain  · Your foot drags when you walk, a condition called foot drop  · You have new weakness, numbness, or pain in one or both arms or legs  · Loss of bowel or bladder control  · Numbness or tingling in the buttock or groin area  Date Last Reviewed: 11/23/2015  © 1194-7279 [employer]. [address] [place] [address], [place] [address]. All rights reserved. This information is not intended as a substitute for professional medical care. Always follow your healthcare professional's instructions.    As discussed, please monitor your blood sugars daily while taking oral steroids. If you blood glucose is > 200 contact your PCP and stop steroids.     Contact your PCP regarding increasing daily dose of Gabapentin.

## 2019-07-23 ENCOUNTER — OFFICE VISIT (OUTPATIENT)
Dept: ORTHOPEDICS | Facility: CLINIC | Age: 64
End: 2019-07-23
Payer: MEDICARE

## 2019-07-23 VITALS
DIASTOLIC BLOOD PRESSURE: 64 MMHG | WEIGHT: 169.19 LBS | HEIGHT: 65 IN | HEART RATE: 64 BPM | SYSTOLIC BLOOD PRESSURE: 123 MMHG | BODY MASS INDEX: 28.19 KG/M2

## 2019-07-23 DIAGNOSIS — M50.30 DDD (DEGENERATIVE DISC DISEASE), CERVICAL: Primary | ICD-10-CM

## 2019-07-23 DIAGNOSIS — M75.42 IMPINGEMENT SYNDROME OF SHOULDER, LEFT: ICD-10-CM

## 2019-07-23 PROCEDURE — 3078F DIAST BP <80 MM HG: CPT | Mod: CPTII,S$GLB,, | Performed by: PHYSICIAN ASSISTANT

## 2019-07-23 PROCEDURE — 3074F PR MOST RECENT SYSTOLIC BLOOD PRESSURE < 130 MM HG: ICD-10-PCS | Mod: CPTII,S$GLB,, | Performed by: PHYSICIAN ASSISTANT

## 2019-07-23 PROCEDURE — 3074F SYST BP LT 130 MM HG: CPT | Mod: CPTII,S$GLB,, | Performed by: PHYSICIAN ASSISTANT

## 2019-07-23 PROCEDURE — 3008F BODY MASS INDEX DOCD: CPT | Mod: CPTII,S$GLB,, | Performed by: PHYSICIAN ASSISTANT

## 2019-07-23 PROCEDURE — 99214 OFFICE O/P EST MOD 30 MIN: CPT | Mod: S$GLB,,, | Performed by: PHYSICIAN ASSISTANT

## 2019-07-23 PROCEDURE — 3078F PR MOST RECENT DIASTOLIC BLOOD PRESSURE < 80 MM HG: ICD-10-PCS | Mod: CPTII,S$GLB,, | Performed by: PHYSICIAN ASSISTANT

## 2019-07-23 PROCEDURE — 99999 PR PBB SHADOW E&M-EST. PATIENT-LVL IV: ICD-10-PCS | Mod: PBBFAC,,, | Performed by: PHYSICIAN ASSISTANT

## 2019-07-23 PROCEDURE — 3008F PR BODY MASS INDEX (BMI) DOCUMENTED: ICD-10-PCS | Mod: CPTII,S$GLB,, | Performed by: PHYSICIAN ASSISTANT

## 2019-07-23 PROCEDURE — 99999 PR PBB SHADOW E&M-EST. PATIENT-LVL IV: CPT | Mod: PBBFAC,,, | Performed by: PHYSICIAN ASSISTANT

## 2019-07-23 PROCEDURE — 99214 PR OFFICE/OUTPT VISIT, EST, LEVL IV, 30-39 MIN: ICD-10-PCS | Mod: S$GLB,,, | Performed by: PHYSICIAN ASSISTANT

## 2019-07-23 RX ORDER — AMLODIPINE BESYLATE 5 MG/1
TABLET ORAL
COMMUNITY
Start: 2019-07-22 | End: 2019-10-03 | Stop reason: SDUPTHER

## 2019-07-23 RX ORDER — MELOXICAM 15 MG/1
15 TABLET ORAL DAILY
Qty: 30 TABLET | Refills: 0 | Status: SHIPPED | OUTPATIENT
Start: 2019-07-23 | End: 2019-10-21 | Stop reason: SDUPTHER

## 2019-07-23 NOTE — PROGRESS NOTES
DATE: 7/23/2019  PATIENT: Riana Kay    Supervising Physician: Missael Byers M.D.    CHIEF COMPLAINT: neck pain    HISTORY:  Riana Kay is a 64 y.o. female  here for initial evaluation of neck and left arm pain (Neck - 5, Arm - 0). The pain has been present for a couple of months but worsened about 2 weeks ago.  She was seen in urgent care one week ago because her symptoms were severe.  Upon completing a medrol dose pack, the symptoms have greatly improved.  The patient describes the pain as achy. The pain is worse with sleeping and laying down. There was associated numbness and tingling down her left arm, which has resolved.  She is having pain in her left shoulder that is worse with reaching and lifting. There is subjective weakness. Prior treatments have included toradol injection, oral steroids, and  gabapentin, but no PT or MIRTHA.  She does report having MIRTHA about 6 years ago.  She has never had surgery.     The patient denies myelopathic symptoms such as handwriting changes or difficulty with buttons/coins/keys. Denies perineal paresthesias, bowel/bladder dysfunction.    PAST MEDICAL/SURGICAL HISTORY:  Past Medical History:   Diagnosis Date    Anxiety     AR (allergic rhinitis)     Colon polyp     Diabetes mellitus, type 2     Dizziness     DM (diabetes mellitus)     Fatty liver     GERD (gastroesophageal reflux disease)     HTN (hypertension)     Hyperlipidemia     Memory loss     Osteopenia     S/P total hysterectomy     Sleep apnea      Past Surgical History:   Procedure Laterality Date    ARTHROPLASTY, ELBOW right radial head arthroplasty revision Right 1/16/2019    Performed [facility] at [facility] OR 1ST FLR    CHOLECYSTECTOMY      COLONOSCOPY N/A 10/4/2013    Performed by [facility]urag Carl [facility] at [facility] ENDO (4TH FLR)    COLONOSCOPY with Donnell N/A 1/17/2018    Performed by Roland Jacobo [facility] at [facility] ENDO (4TH FLR)    EGD (ESOPHAGOGASTRODUODENOSCOPY) N/A  "4/15/2019    Performed by Buck [facility]win [facility] at [facility] ENDO (4TH FLR)    EGD (ESOPHAGOGASTRODUODENOSCOPY) N/A 2/28/2014    Performed by [facility] at [facility] ENDO (4TH FLR)    ELBOW SURGERY Right 7/16/15    ELBOW SURGERY      HYSTERECTOMY      INJECTION-STEROID-EPIDURAL-CERVICAL N/A 3/16/2015    Performed by [facility] at [facility]    KNEE ARTHROSCOPY W/ DEBRIDEMENT  4/11    Left    RELEASE, ULNAR TUNNEL right Right 1/16/2019    Performed by [facility] at [facility] OR 1ST FLR    ROTATOR CUFF REPAIR      TONSILLECTOMY      TOTAL ABDOMINAL HYSTERECTOMY W/ BILATERAL SALPINGOOPHORECTOMY      UPPER GASTROINTESTINAL ENDOSCOPY         Medications:  Current Outpatient Medications on File Prior to Visit   Medication Sig Dispense Refill    amLODIPine (NORVASC) 5 MG tablet       azelastine (ASTELIN) 137 mcg (0.1 %) nasal spray 1 spray (137 mcg total) by Nasal route 2 (two) times daily. 30 mL 6    blood sugar diagnostic (ACCU-CHEK SMARTVIEW TEST STRIP) Strp 4 (four) times daily.       cyanocobalamin (VITAMIN B-12) 100 MCG tablet Take 100 mcg by mouth once daily.      diclofenac sodium (VOLTAREN) 1 % Gel Apply 2 g topically 2 (two) times daily. 100 g 2    gabapentin (NEURONTIN) 300 MG capsule Take 1 capsule (300 mg total) by mouth every evening. 30 capsule 11    insulin aspart U-100 (NOVOLOG FLEXPEN U-100 INSULIN) 100 unit/mL (3 mL) InPn pen INJECT 8U W/ BREAKFAST, 12U W/ LUNCH & SUPPER PLUS SCALE(180-230+2, 231-280+4, 281-330+6 & 331-380+8 45 mL 2    insulin detemir U-100 (LEVEMIR FLEXTOUCH U-100 INSULN) 100 unit/mL (3 mL) SubQ InPn pen Inject 35 Units into the skin every evening. Prime with 2 units before every use.  TDD 37units 3 Box 3    insulin needles, disposable, (PEN NEEDLE) 31 X 5/16 " Ndle Use as directed 100 each PRN    lancets (MICROLET LANCET) Misc       losartan (COZAAR) 25 MG tablet Take 0.5 tablets (12.5 mg total) by mouth once daily. 45 tablet 3    ondansetron " "(ZOFRAN-ODT) 8 MG TbDL Take 1 tablet (8 mg total) by mouth 3 (three) times daily as needed. 30 tablet 0    pantoprazole (PROTONIX) 40 MG tablet Take 1 tablet (40 mg total) by mouth 2 (two) times daily. 60 tablet 11    pen needle, diabetic (NOVOFINE 32) 32 gauge x 1/4" Ndle       pyridoxine, vitamin B6, (VITAMIN B-6) 100 MG Tab Take 100 mg by mouth once daily.      SITagliptin (JANUVIA) 100 MG Tab Take 1 tablet (100 mg total) by mouth once daily. 90 tablet 3    blood-glucose meter kit Use as instructed, preferred meter. DX Code. E11.42 1 each 0     No current facility-administered medications on file prior to visit.        Social History:   Social History     Socioeconomic History    Marital status:      Spouse name: Not on file    Number of children: Not on file    Years of education: Not on file    Highest education level: Not on file   Occupational History    Not on file   Social Needs    Financial resource strain: Hard    Food insecurity:     Worry: Never true     Inability: Never true    Transportation needs:     Medical: No     Non-medical: No   Tobacco Use    Smoking status: Never Smoker    Smokeless tobacco: Never Used   Substance and Sexual Activity    Alcohol use: No     Frequency: Never     Drinks per session: Patient refused     Binge frequency: Patient refused    Drug use: No    Sexual activity: Yes     Partners: Male     Birth control/protection: Post-menopausal   Lifestyle    Physical activity:     Days per week: 5 days     Minutes per session: 40 min    Stress: Only a little   Relationships    Social connections:     Talks on phone: More than three times a week     Gets together: Once a week     Attends Nondenominational service: Not on file     Active member of club or organization: No     Attends meetings of clubs or organizations: Never     Relationship status:    Other Topics Concern    Not on file   Social History Narrative    She works at [employer] in housekeeping; " ", 1 kid (21yo).Nonsmoker, social etoh. No exercise but hopes to start walking on the weekend.       REVIEW OF SYSTEMS:  Constitution: Negative. Negative for chills, fever and night sweats.   Cardiovascular: Negative for chest pain and syncope.   Respiratory: Negative for cough and shortness of breath.   Gastrointestinal: See HPI. Negative for nausea/vomiting. Negative for abdominal pain.  Genitourinary: See HPI. Negative for discoloration or dysuria.  Skin: Negative for dry skin, itching and rash.   Hematologic/Lymphatic: Negative for bleeding problem. Does not bruise/bleed easily.   Musculoskeletal: Negative for falls and muscle weakness.   Neurological: See HPI. No seizures.   Endocrine: Negative for polydipsia, polyphagia and polyuria.   Allergic/Immunologic: Negative for hives and persistent infections.  Psychiatric/Behavioral: Negative for depression and insomnia.         EXAM:  /64   Pulse 64   Ht 5' 5" (1.651 m)   Wt 76.8 kg (169 lb 3.3 oz)   BMI 28.16 kg/m²     General: The patient is a pleasant 64 y.o. female in no apparent distress, the patient is oriented to person, place and time.  Psych: Normal mood and affect  HEENT: Vision grossly intact, hearing intact to the spoken word.  Lungs: Respirations unlabored.  Gait: Normal station and gait, no difficulty with toe or heel walk.   Skin: Cervical skin negative for rashes, lesions, hairy patches and surgical scars.  Range of motion: Cervical range of motion is acceptable. There is mild tenderness to palpation.  Spinal Balance: Global saggital and coronal spinal balance acceptable, no significant for scoliosis and kyphosis.  Musculoskeletal: No pain with the range of motion of the bilateral shoulders and elbows. Normal bulk and contour of the bilateral hands.  Vascular: Bilateral hands warm and well perfused, radial pulses 2+ bilaterally.  Neurological: Normal strength and tone in all major motor groups in the bilateral upper and lower " extremities. Normal sensation to light touch in the C5-T1 and L2-S1 dermatomes bilaterally.  Deep tendon reflexes symmetric 1+ in the bilateral upper and lower extremities.  Negative Inverted Radial Reflex and Gordon's bilaterally. Negative Babinski bilaterally.     IMAGING:   Today I personally reviewed AP, Lat and Flex/Ex  upright C-spine films that demonstrate DJD, most significant along C5-C6.  No acute abnormalities.      X-rays of the left shoulder, reviewed by me, show no fracture or dislocation.  There is mild DJD with subacromial osteophyte formation.    Body mass index is 28.16 kg/m².    Hemoglobin A1C   Date Value Ref Range Status   05/06/2019 7.3 (H) 4.0 - 5.6 % Final     Comment:     ADA Screening Guidelines:  5.7-6.4%  Consistent with prediabetes  >or=6.5%  Consistent with diabetes  High levels of fetal hemoglobin interfere with the HbA1C  assay. Heterozygous hemoglobin variants (HbS, HgC, etc)do  not significantly interfere with this assay.   However, presence of multiple variants may affect accuracy.     12/21/2018 7.2 (H) 4.0 - 5.6 % Final     Comment:     ADA Screening Guidelines:  5.7-6.4%  Consistent with prediabetes  >or=6.5%  Consistent with diabetes  High levels of fetal hemoglobin interfere with the HbA1C  assay. Heterozygous hemoglobin variants (HbS, HgC, etc)do  not significantly interfere with this assay.   However, presence of multiple variants may affect accuracy.     11/28/2018 7.4 (H) 4.0 - 5.6 % Final     Comment:     ADA Screening Guidelines:  5.7-6.4%  Consistent with prediabetes  >or=6.5%  Consistent with diabetes  High levels of fetal hemoglobin interfere with the HbA1C  assay. Heterozygous hemoglobin variants (HbS, HgC, etc)do  not significantly interfere with this assay.   However, presence of multiple variants may affect accuracy.             ASSESSMENT/PLAN:    Riana was seen today for pain and pain.    Diagnoses and all orders for this visit:    DDD (degenerative disc  disease), cervical    Impingement syndrome of shoulder, left    Other orders  -     meloxicam (MOBIC) 15 MG tablet; Take 1 tablet (15 mg total) by mouth once daily.        - Recommend MRI cervical spine if symptoms do not continue to improve.    - PT to start this week for her neck  - We will try short trial of meloxicam for shoulder and neck pain.     Follow up if symptoms persist, sooner with any new or worsening symptoms.

## 2019-07-26 ENCOUNTER — CLINICAL SUPPORT (OUTPATIENT)
Dept: REHABILITATION | Facility: HOSPITAL | Age: 64
End: 2019-07-26
Payer: MEDICARE

## 2019-07-26 DIAGNOSIS — M25.421 EFFUSION, RIGHT ELBOW: ICD-10-CM

## 2019-07-26 DIAGNOSIS — M25.621 STIFFNESS OF RIGHT ELBOW JOINT: ICD-10-CM

## 2019-07-26 PROCEDURE — 97165 OT EVAL LOW COMPLEX 30 MIN: CPT | Mod: PN

## 2019-07-26 NOTE — PATIENT INSTRUCTIONS
[facility]    TERAPIA OCUPACIONAL  PROGRAMA DE EJERCICIO EN CASA        .Elbow Flexion: Resisted        Con el brazo derecho extendido, pulgar hacia adelante, david pesa de __2__ libras, doble el codo. Regrese lento. Completamente doble y extiende el codo en 3 formas: mano arriba, abajo y al lado  Repita __20__ veces por rutina. Realice _1___ rutinas por sesión. Realice __1__ sesiones por día.    Copyright © I. All rights reserved.   Extension (Resistive Band)        Coloque la triana alrededor de sykes mano y johnathan, fíjela con sykes mano opuesta. Usando movimientos de codo solamente, enderece el codo empujando la triana hacia abajo.  Mantenga __3__ segundos. Repita ___20_ veces. Lisa __1__ sesiones por día.    Copyright © I. All rights reserved.             Completa los siguientes ejercicios de fortalecimiento con 1 yvette de peso.  Lisa 20 repeticiones de cada david, 1 vez por día.         Flexión de johnathan resistente.  Con la [place] hacia arriba y el peso en la mano, doblar la johnathan hacia arriba. Regrese despacio.      Extensión de johnathan resistente    Con la [place] hacia abajo y el peso en la mano, doble la johnathan hacia arriba. Regrese despacio.      Desviación de johnathan resistente     Con el pulgar hacia arriba y el peso en la mano, doble la johnathan hacia arriba. Regrese despacio.    Copyright © I. All rights reserved.            Rellene ejercicio _1__ veces todos los prince:

## 2019-07-26 NOTE — PLAN OF CARE
[facility] Occupational Therapy  Initial Evaluation/ DISCHARGE SUMMARY     Date: 7/26/2019  Name: Riana Kay  Clinic Number: [id number]    Therapy Diagnosis:   Encounter Diagnoses   Name Primary?    Effusion, right elbow     Stiffness of right elbow joint      Physician: Katja Hubbard, *    Physician Orders:1 more visit for HEP  Medical Diagnosis: Z98.890 (ICD-10-CM) - Post-operative state   Surgical Procedure and Date: 01/16/2019, Right radial head arthroplasty revision and Ulnar nerve decompression at the elbow by Dr. Hubbard   Evaluation Date: 07/26/2019  Insurance Authorization Period Expiration: 09/10/2019  Plan of Care Certification Period: 07/26/2019  Date of Return to MD: PRN    Visit # / Visits authorized: 1 / 12 ( 13 previous OT visits at [facility])    FOTO:   G Codes:  Medicare Amount: $1419.70    Time In:900AM  Time Out: 927AM  Total treatment time: 27minutes  Total Timed minutes: 15  minutes    Precautions:  Standard    Subjective     Involved Side: Right  Dominant Side: Right  Date of Onset: 01/16/2019 SX Date (~6 months ago)  Mechanism of Injury: Fall  History of Current Condition: Pt report she fell at work approx. 4 years ago and shattered her radial head. She underwent surgery to her radial head. She reports she began having symptoms again and learned that she needed to undergo a revision surgery.  Of note, pt reports he has an old possible fx of MF on R side that did not receive tx.  Surgical Procedure: Right radial head arthroplasty revision and Ulnar nerve decompression at the elbow by Dr. Hubbard   Imaging: See EMR  Previous Therapy: Pt. Seen for 13 visits at [facility]. She was d/c'd to HEP on her last visit on 06/27/2019. Per. Prior OT notes pt was not consistent with attendance to OT sessions.     Past Medical History/Physical Systems Review:   Riana Kay  has a past medical history of Anxiety, AR (allergic rhinitis), Colon  polyp, Diabetes mellitus, type 2, Dizziness, DM (diabetes mellitus), Fatty liver, GERD (gastroesophageal reflux disease), HTN (hypertension), Hyperlipidemia, Memory loss, Osteopenia, S/P total hysterectomy, and Sleep apnea.    Riana Kay  has a past surgical history that includes Cholecystectomy; Knee arthroscopy w/ debridement (4/11); Total abdominal hysterectomy w/ bilateral salpingoophorectomy; Tonsillectomy; Rotator cuff repair; Elbow surgery (Right, 7/16/15); Elbow surgery; Hysterectomy; Colonoscopy (N/A, 1/17/2018); Elbow Arthroplasty (Right, 1/16/2019); Release of ulnar nerve at cubital tunnel (Right, 1/16/2019); Upper gastrointestinal endoscopy; and Esophagogastroduodenoscopy (N/A, 4/15/2019).    Riana has a current medication list which includes the following prescription(s): amlodipine, azelastine, accu-chek smartview test strip, blood-glucose meter, cyanocobalamin, diclofenac sodium, gabapentin, insulin aspart u-100, insulin detemir u-100, pen needle, diabetic, lancets, losartan, meloxicam, ondansetron, pantoprazole, pen needle, diabetic, pyridoxine (vitamin b6), and sitagliptin.    Review of patient's allergies indicates:   Allergen Reactions    Iodinated contrast- oral and iv dye Swelling and Rash    Percocet [oxycodone-acetaminophen] Itching    Macrobid [nitrofurantoin monohyd/m-cryst] Rash    Metformin Rash    Penicillins Rash    Promethazine Rash    Sulfa (sulfonamide antibiotics) Rash    Sulfamethoxazole-trimethoprim Rash        Patient's Goals for Therapy:  Decrease pain    Pain:  Functional Pain Scale Rating 0-10:   5/10 on average  5/10 at best  10/10 at worst  Location: right elbow  Description: Variable  Aggravating Factors: Lifting  Easing Factors: rest    Occupation:  retired      Functional Limitations/Social History:    Previous functional status includes: Independent with all ADLs prior to injury. Pt. States she has a lot of neck pain.     Current  FunctionalStatus   Home/Living environment : lives with their spouse      Limitation of Functional Status as follows:   ADLs/IADLs:     - Feeding: no deficits    - Bathing: no deficits    - Dressing/Grooming: no deficits    - Driving: no deficits     Leisure: NA        Objective     Observation/Appearance: well healed incision to elbow  Wrist and Elbow AROM Measured in degrees  6/27/19 Left  6/27/2019 Right  6/27/2019 Right  7/26/2019   Wrist Ext/Flex NT 60/76 55/75  (-5/-1)   Forearm Sup/Pro NT 85/85 85/85  (=)   Elbow Ext/Flex NT 16/146 10/160  (+6/+14)      Strength: (KATI Dynamometer in lbs.) Average 3 trials, Position II:  Left  6/27/2019 Right  6/27/2019 Right  7/26/2019   36 28 24 (-4)             CMS Impairment/Limitation/Restriction for FOTO elbow Survey    Therapist reviewed FOTO scores for Riana Kay on 7/26/2019.   FOTO documents entered into [employer] - see Media section.    Limitation Score:did not capture  Category: Carrying    Current : did not capture  Goal: did not capture  Discharge: did not capture         Treatment     Treatment Time In: 912AM  Treatment Time Out: 927AM  Total Treatment time separate from Evaluation time:15 minutes    Riana received therapeutic exercises for 15 minutes including:  -  Wrist PRE x 3 ways X 20 each   Elbow PRE x 3 ways  Triceps extension X 20 each     Red Theraband   Peach putty  Mold    Pancake/bloom  Pinch (3 pt/lateral)   X 2 min  X 10  X 5  X 3 logs         Home Exercise Program/Education:  Issued HEP (see patient instructions in EMR) and educated on modality use for pain management . Exercises were reviewed and Riana was able to demonstrate them prior to the end of the session.   Pt received a written copy of exercises to perform at home. Riana demonstrated good  understanding of the education provided.  Pt was advised to perform these exercises free of pain, and to stop performing them if pain occurs.    Patient/Family Education: role  of OT, goals for OT, scheduling/cancellations - pt verbalized understanding. Discussed insurance limitations with patient.      Assessment     Riana Kay is a 64 y.o. female referred to outpatient occupational therapy and presents with a medical diagnosis of Right radial head arthroplasty revision and Ulnar nerve decompression at the elbow by Dr. Hubbard , resulting in increased pain and demonstrates limitations as described in the chart below. Following medical record review it is determined that pt is appropriate for DC to HEP. The following goals were discussed with the patient and patient is in agreement with them as to be addressed in the treatment plan. The patient's rehab potential is Good.     Anticipated barriers to occupational therapy: English as a second language- pt denied need for . HEP printed in [employer].  Pt has no cultural, educational or language barriers to learning provided.    Profile and History Assessment of Occupational Performance Level of Clinical Decision Making Complexity Score   Occupational Profile:   Riana Kay is a 64 y.o. female who lives with their spouse and is currently retireed . Riana Kay has difficulty with  lifting or pushing up off of arm  heavy ADLs  affecting his/her daily functional abilities. His/her main goal for therapy is to decrease pain.     Comorbidities:   Past Medical History:   Diagnosis Date    Anxiety     AR (allergic rhinitis)     Colon polyp     Diabetes mellitus, type 2     Dizziness     DM (diabetes mellitus)     Fatty liver     GERD (gastroesophageal reflux disease)     HTN (hypertension)     Hyperlipidemia     Memory loss     Osteopenia     S/P total hysterectomy     Sleep apnea          Medical and Therapy History Review:   Brief               Performance Deficits    Physical:   Strength    Cognitive:  Memory    Psychosocial:    No Deficits     Clinical Decision Making:  low    Assessment  Process:  Problem-Focused Assessments    Modification/Need for Assistance:  Not Necessary    Intervention Selection:  Limited Treatment Options       low  Based on PMHX, co morbidities , data from assessments and functional level of assistance required with task and clinical presentation directly impacting function.         Goals:   The following goals were discussed with the patient and patient is in agreement with them as to be addressed in the treatment plan.   Long Term Goals (LTGs); to be met by discharge.  LTG #1: Pt will demo independence with HEP- MET          Plan   Certification Period/Plan of care expiration: 7/26/2019           Lucrecia Carrillo OTR/SURESH,CHT

## 2019-07-30 ENCOUNTER — CLINICAL SUPPORT (OUTPATIENT)
Dept: REHABILITATION | Facility: HOSPITAL | Age: 64
End: 2019-07-30
Payer: MEDICARE

## 2019-07-30 DIAGNOSIS — R29.898 DECREASED ROM OF NECK: ICD-10-CM

## 2019-07-30 DIAGNOSIS — M54.2 NECK PAIN: ICD-10-CM

## 2019-07-30 DIAGNOSIS — M25.511 ACUTE PAIN OF RIGHT SHOULDER: ICD-10-CM

## 2019-07-30 PROCEDURE — G8984 CARRY CURRENT STATUS: HCPCS | Mod: CK,PN

## 2019-07-30 PROCEDURE — 97161 PT EVAL LOW COMPLEX 20 MIN: CPT | Mod: PN

## 2019-07-30 PROCEDURE — G8985 CARRY GOAL STATUS: HCPCS | Mod: CJ,PN

## 2019-07-30 NOTE — PLAN OF CARE
[facility]  Physical Therapy Initial Evaluation    Name: Riana Kay  Clinic Number: [id number]    Therapy Diagnosis:   Encounter Diagnoses   Name Primary?    Neck pain     Acute pain of right shoulder     Decreased ROM of neck      Physician: Jose Rojas MD    Physician Orders: PT Eval and Treat  Medical Diagnosis:  M47.22 (ICD-10-CM) - Osteoarthritis of spine with radiculopathy, cervical region  Evaluation Date: 7/30/2019   Authorization Period: 09/30/2019  Plan of Care Certification Period: 7/30/2019 to 10/4/2019  Visit # / Visits authorized: 1/ 12    Time In: 0810  Time Out: 0850  Total Billable Time: 40 minutes (1 LCE)  Precautions: Standard, R elbow reconstruction    Subjective   Riana reports bilateral neck pain (L > R).  Acute onset 2 months ago. Reports lessened pain since onset; describes the pain as mild.  Reports episodes of LUE tingling; intermittent but none today.  Daily occurrence. Denies headaches.  Wears glasses all the time.  R handed dominant.  Has seen OT for several months due to fall resulting in R elbow arthroplasty.  Still has mild pain and residual weakness in R elbow/wrist.        Past Medical History:   Diagnosis Date    Anxiety     AR (allergic rhinitis)     Colon polyp     Diabetes mellitus, type 2     Dizziness     DM (diabetes mellitus)     Fatty liver     GERD (gastroesophageal reflux disease)     HTN (hypertension)     Hyperlipidemia     Memory loss     Osteopenia     S/P total hysterectomy     Sleep apnea      Riana Kay  has a past surgical history that includes Cholecystectomy; Knee arthroscopy w/ debridement (4/11); Total abdominal hysterectomy w/ bilateral salpingoophorectomy; Tonsillectomy; Rotator cuff repair; Elbow surgery (Right, 7/16/15); Elbow surgery; Hysterectomy; Colonoscopy (N/A, 1/17/2018); Elbow Arthroplasty (Right, 1/16/2019); Release of ulnar nerve at cubital tunnel (Right, 1/16/2019); Upper  gastrointestinal endoscopy; and Esophagogastroduodenoscopy (N/A, 4/15/2019).    Riana has a current medication list which includes the following prescription(s): amlodipine, azelastine, accu-chek smartview test strip, blood-glucose meter, cyanocobalamin, diclofenac sodium, gabapentin, insulin aspart u-100, insulin detemir u-100, pen needle, diabetic, lancets, losartan, meloxicam, ondansetron, pantoprazole, pen needle, diabetic, pyridoxine (vitamin b6), and sitagliptin.    Review of patient's allergies indicates:   Allergen Reactions    Iodinated contrast- oral and iv dye Swelling and Rash    Percocet [oxycodone-acetaminophen] Itching    Macrobid [nitrofurantoin monohyd/m-cryst] Rash    Metformin Rash    Penicillins Rash    Promethazine Rash    Sulfa (sulfonamide antibiotics) Rash    Sulfamethoxazole-trimethoprim Rash      Imaging: Cervical and Shoulder Xrays; see chart for reports   Prior Therapy: OT for her elbow  Social History: Lives at home with her    Occupation: Homemaker.  Prior Level of Function: no prior history of neck pain.   Current Level of Function: having difficulty performing her household chores including sweeping/mopping due cervical pain.     Pain:  Current 4/10, worst 6/10, best 1/10   Location: bilateral neck   Description: Dull    Pts goals: to be rid of her neck pain.     Objective   Palpation:  TTP along R supraclavicular soft tissue.  Posture:  Forward head and rounded shoulders, humeral IR, scapular abd/DR B, increased thoracic kyphosis                   Hypertrophied supraclavicular muscle B    Gross Movement:  -Gait: non-antalgic   -Shoulder flexion: limited AROM with compensatory shoulder hiking.   -Cervical quadrant test: negative B    Cervicothoracic Range of Motion:    Degrees Pain/Dysfunction   Flexion 52 NP   Extension 31 NP   Right Rotation 55 NP   Left Rotation 41 Right sided neck pain    Right Side Bending 30 NP   Left Side Bending 30 NP     UE Range of Motion:    Shoulder Left Right Pain/Dysfunction   Shoulder Flexion A: 110 P: 160 A: 110 P: 160 Pain R UT area   Shoulder Abduction A: 100 P: 140 A: 110 P: 160    Shoulder ER WNL WNL    Shoulder IR WNL 55    Elbow WNL WNL    Wrist WNL WNL        Upper Extremity Strength:  LUE  RUE    Shoulder flexion: 3-/5 Shoulder flexion: 3-/5   Shoulder Abduction: 3-/5 Shoulder abduction: 3-/5   Shoulder ER 3+/5 Shoulder ER 3+/5   Shoulder IR 4/5 Shoulder IR 3+/5   Elbow flexion: 5/5 Elbow flexion: 3+/5   Elbow extension: 5/5 Elbow extension: 3/5   Wrist flexion: 5/5 Wrist flexion: 4/5   Wrist extension: 5/5 Wrist extension: 3+/5    5/5 : 3/5   Scapular adductors 3/5 Scapular adductors 3/5     Special Tests:  -Cervical distraction: negative  -Spurling's test: negative B  - Arrieta-Eb shoulder impingement: negative B  - Ping's test: (+) weakness but no pain    Joint Mobility: incr'd stiffness in R cervical articular pillar throughout.  Limited thoracic extension with hypomobility with segmental mobility testing.   Flexibility: tight anterior chest wall.    Sensation: Normal light touch sensation C4-T2 throughout BUE      CMS Impairment/Limitation/Restriction for FOTO Neck Survey    Therapist reviewed FOTO scores for Riana Kay on 7/30/2019.   FOTO documents entered into [employer] - see Media section.    Limitation Score: 41%  Category: Self Care    Current : CK = at least 40% but < 60% impaired, limited or restricted  Goal: CJ = at least 20% but < 40% impaired, limited or restricted         TREATMENT   No treatment today      Home Exercises and Patient Education Provided  Education provided re:   - progress towards goals   - role of therapy in multi - disciplinary team, goals for therapy  No spiritual or educational barriers to learning provided    Written Home Exercises Provided: not today    Assessment   Riana is a 64 y.o. female referred to outpatient Physical Therapy with a medical diagnosis of cervical  osteoarthritis and cervical radiculopathy. Pt presents with R-side neck pain, decreased B shoulder AROM especially flexion with compensatory scapular 'hiking', upper crossed posturing with stiff thoracic mobility, and R elbow/wrist weakness from recent elbow reconstruction.  Her lack of shoulder strength/AROM is stressing her lower cervical spine due to increased scapular elevator participation with overhead/forward reaching.  Overall, her cervical and shoulder dysfunction is limiting her participation in household chore performance, has impaired her performance of ADLs including dressing, and has resulted in a decreased overall quality of life.     Pt prognosis is Excellent.   Pt will benefit from skilled outpatient Physical Therapy to address the deficits stated above and in the chart below, provide pt/family education, and to maximize pt's level of independence.     Plan of care discussed with patient: Yes  Pt's spiritual, cultural and educational needs considered and pt agreeable to plan of care and goals as stated below:     Anticipated Barriers for therapy: language barrier    Medical Necessity is demonstrated by the following  History  Co-morbidities and personal factors that may impact the plan of care Co-morbidities:   Osteopenia, prior surgery    Personal Factors:   lifestyle     low   Examination  Body Structures and Functions, activity limitations and participation restrictions that may impact the plan of care Body Regions:   neck  upper extremities    Body Systems:    ROM  strength  transfers  motor control  edema    Participation Restrictions:   none    Activity limitations:   Learning and applying knowledge  no deficits    General Tasks and Commands  undertaking multiple tasks    Communication  communicating with/receiving spoken language    Mobility  lifting and carrying objects  driving (bike, car, motorcycle)    Self care  washing oneself (bathing, drying, washing hands)  caring for body parts  (brushing teeth, shaving, grooming)  dressing    Domestic Life  shopping  cooking  doing house work (cleaning house, washing dishes, laundry)  assisting others    Interactions/Relationships  no deficits    Life Areas  no deficits    Community and Social Life  community life  recreation and leisure         moderate   Clinical Presentation stable and uncomplicated low   Decision Making/ Complexity Score: low     Goals:  Short Term Goals: 3-4 weeks:  1. Patient will demonstrate 55 degrees cervical left and right rotation for improved environment visual scanning.   2. Patient will demonstrate 160 degrees of B shoulder AROM flexion.  3. Patient will report <3/10 cervical pain with during her ADL performance.    Long Term Goals: 8 -12 weeks:  1. Patient will demonstrate normal shoulder flexion B x 10 reps without compensatory hiking.  2. Patient will report <35% limitation on Neck FOTO survey.  3. Patient will report ability to manage her cervical pain at home with HEP consistently.       Plan   Certification Period: 7/30/2019 to 10/04/2019    Outpatient Physical Therapy 2 times weekly for 10 weeks to include the following interventions: Cervical/Lumbar Traction, Electrical Stimulation IFC, Manual Therapy, Moist Heat/ Ice, Neuromuscular Re-ed, Patient Education, Self Care, Therapeutic Activites and Therapeutic Exercise, Dry Needling, IASTM, Kinesiotaping.     Kwasi Paige, PT

## 2019-07-31 PROBLEM — M25.511 ACUTE PAIN OF RIGHT SHOULDER: Status: ACTIVE | Noted: 2019-07-31

## 2019-07-31 PROBLEM — R29.898 DECREASED ROM OF NECK: Status: ACTIVE | Noted: 2019-07-31

## 2019-07-31 PROBLEM — M54.2 NECK PAIN: Status: ACTIVE | Noted: 2019-07-31

## 2019-08-01 ENCOUNTER — CLINICAL SUPPORT (OUTPATIENT)
Dept: REHABILITATION | Facility: HOSPITAL | Age: 64
End: 2019-08-01
Payer: MEDICARE

## 2019-08-01 DIAGNOSIS — M54.2 NECK PAIN: ICD-10-CM

## 2019-08-01 DIAGNOSIS — M25.511 ACUTE PAIN OF RIGHT SHOULDER: ICD-10-CM

## 2019-08-01 DIAGNOSIS — R29.898 DECREASED ROM OF NECK: ICD-10-CM

## 2019-08-01 PROCEDURE — 97110 THERAPEUTIC EXERCISES: CPT | Mod: PN

## 2019-08-01 PROCEDURE — 97140 MANUAL THERAPY 1/> REGIONS: CPT | Mod: PN

## 2019-08-01 NOTE — PROGRESS NOTES
Physical Therapy Daily Treatment Note     Name: Riana Baker   Clinic Number: [id number]    Therapy Diagnosis:   Encounter Diagnoses   Name Primary?    Neck pain     Acute pain of right shoulder     Decreased ROM of neck      Physician: Jose Rojas MD    Visit Date: 8/1/2019    Physician Orders: PT Eval and Treat  Medical Diagnosis:  M47.22 (ICD-10-CM) - Osteoarthritis of spine with radiculopathy, cervical region  Evaluation Date: 7/30/2019   Authorization Period: 09/30/2019  Plan of Care Certification Period: 7/30/2019 to 10/4/2019  Visit # / Visits authorized: 2/ 12    FOTO: 2/5  G-code:  2/10  Visit: 85.11  Total: 167.99     Time In: 0800  Time Out: 0850  Total Billable Time: 40 minutes (2 TE, 1 MT)  Precautions: Standard, R elbow reconstruction    Subjective     Pt reports: primary complaint of R upper trapezius area pain with left cervical rotation today.   She was compliant with home exercise program.  Response to previous treatment: eval only   Functional change: none voiced  Pain: 5/10  Location: right neck      Objective   O: PROM B shoulder flexion > AROM.       Cervical flexion-rotation: equal and normal B    Riana received therapeutic exercises to develop strength, endurance, ROM, flexibility, posture and core stabilization for 20 minutes with PT 1:1 including assessment and 20 minutes under supervision:  - chin tucks     2x10  - supine wand flexion    2x10 T-bar  - seated scapular pinches   20 reps x 3 second hold  - seated thoracic extension self-mob  2x10 chair with small bolster  - wall Y-slide      2x10 with towel    Riana received the following manual therapy techniques: 20 minutes  - cervical traction upper and lower   - grade III lower cervical sidegliding B  - STM/MFR to B scapular elevators  - grade I/II/III/IV B shoulder passive physiological flexion, IR, ER  - B shoulder lat dorsi MFR    Home Exercises Provided and Patient Education Provided   Education provided:   -  wall Y-slides two rounds daily at home.    Written Home Exercises Provided: Wall Y-slides at home. .      Assessment   A:  B shoulder weakness.  Resulting in shoulder hiking with shoulder flexion and cervical pain. Complicated by RHD and recent right elbow arthroplasty with residual weakness.      Riana is progressing well towards her goals.   Pt prognosis is Excellent.     Pt will continue to benefit from skilled outpatient physical therapy to address the deficits listed in the problem list box on initial evaluation, provide pt/family education and to maximize pt's level of independence in the home and community environment.     Pt's spiritual, cultural and educational needs considered and pt agreeable to plan of care and goals.     Anticipated barriers to physical therapy: language barrier    Goals:   Short Term Goals: 3-4 weeks:  1. Patient will demonstrate 55 degrees cervical left and right rotation for improved environment visual scanning.  Progressing towards; not met  2. Patient will demonstrate 160 degrees of B shoulder AROM flexion. Progressing towards; not met  3. Patient will report <3/10 cervical pain with during her ADL performance. Progressing towards; not met     Long Term Goals: 8 -12 weeks:  1. Patient will demonstrate normal shoulder flexion B x 10 reps without compensatory hiking. Progressing towards; not met  2. Patient will report <35% limitation on Neck FOTO survey. Progressing towards; not met  3. Patient will report ability to manage her cervical pain at home with HEP consistently. Progressing towards; not met    Plan   Continue plan of care.  Incorporate RTC strengthening.     Kwasi Paige, PT

## 2019-08-02 ENCOUNTER — PATIENT MESSAGE (OUTPATIENT)
Dept: ENDOCRINOLOGY | Facility: CLINIC | Age: 64
End: 2019-08-02

## 2019-08-05 RX ORDER — MECLIZINE HCL 12.5 MG 12.5 MG/1
12.5 TABLET ORAL 3 TIMES DAILY PRN
Qty: 30 TABLET | Refills: 6 | Status: SHIPPED | OUTPATIENT
Start: 2019-08-05 | End: 2019-08-28 | Stop reason: SDUPTHER

## 2019-08-09 ENCOUNTER — CLINICAL SUPPORT (OUTPATIENT)
Dept: REHABILITATION | Facility: HOSPITAL | Age: 64
End: 2019-08-09
Payer: MEDICARE

## 2019-08-09 DIAGNOSIS — M54.2 NECK PAIN: ICD-10-CM

## 2019-08-09 DIAGNOSIS — R29.898 DECREASED ROM OF NECK: ICD-10-CM

## 2019-08-09 DIAGNOSIS — M25.511 ACUTE PAIN OF RIGHT SHOULDER: ICD-10-CM

## 2019-08-09 PROCEDURE — 97110 THERAPEUTIC EXERCISES: CPT | Mod: PN

## 2019-08-09 PROCEDURE — 97140 MANUAL THERAPY 1/> REGIONS: CPT | Mod: PN

## 2019-08-09 NOTE — PROGRESS NOTES
Physical Therapy Daily Treatment Note     Name: Riana Baker   Clinic Number: [id number]    Therapy Diagnosis:   Encounter Diagnoses   Name Primary?    Neck pain     Acute pain of right shoulder     Decreased ROM of neck      Physician: Jose Rojas MD    Visit Date: 8/9/2019    Physician Orders: PT Eval and Treat  Medical Diagnosis:  M47.22 (ICD-10-CM) - Osteoarthritis of spine with radiculopathy, cervical region  Evaluation Date: 7/30/2019   Authorization Period: 09/30/2019  Plan of Care Certification Period: 7/30/2019 to 10/4/2019  Visit # / Visits authorized: 3/ 12    FOTO: 3/5  G-code:  3/10  Visit: 54.79  Total: 167.99     Time In: 0800  Time Out: 0900  Total Billable Time: 30 minutes (1 TE, 1 MT)  Precautions: Standard, R elbow reconstruction    Subjective     Pt reports: that she if 'feeling better'.    She was compliant with home exercise program.  Response to previous treatment: eval only   Functional change: none voiced  Pain: 5/10  Location: right neck      Objective   O: PROM B shoulder flexion > AROM.       Cervical flexion-rotation: equal and normal B       Comparable sign: L cervical rotation    Riana received therapeutic exercises to develop strength, endurance, ROM, flexibility, posture and core stabilization for 20 minutes with PT 1:1 including assessment and 15 minutes under supervision:  - chin tucks     2x10  - supine wand flexion    2x10 T-bar  - seated scapular pinches   20 reps x 3 second hold  - seated thoracic extension self-mob  2x10 chair with small bolster  - seated  press   2x10 in chair with small bolster  - seated cervical retractions   2x10 followed by 1x10 left cervical rotation  - wall Y-slide      2x10 with towel  - scapular rows    3x10 YTB  - lat pull downs    3x10 YTB      Riana received the following manual therapy techniques: 15 minutes  - cervical traction upper and lower   - grade III lower cervical sidegliding B  - STM/MFR to B scapular  elevators  - grade I/II/III/IV B shoulder passive physiological flexion, IR, ER  - B shoulder lat dorsi MFR    Home Exercises Provided and Patient Education Provided   Education provided:   - wall Y-slides two rounds daily at home.    Written Home Exercises Provided: Wall Y-slides at home. .      Assessment   A:  B shoulder weakness.  Resulting in shoulder hiking with shoulder flexion and cervical pain. Complicated by RHD and recent right elbow arthroplasty with residual weakness.  Improved left cervical rotation ROM and pain following cervical retraction repeated motion.      Riana is progressing well towards her goals.   Pt prognosis is Excellent.     Pt will continue to benefit from skilled outpatient physical therapy to address the deficits listed in the problem list box on initial evaluation, provide pt/family education and to maximize pt's level of independence in the home and community environment.   Pt's spiritual, cultural and educational needs considered and pt agreeable to plan of care and goals.     Anticipated barriers to physical therapy: language barrier    Goals:   Short Term Goals: 3-4 weeks:  1. Patient will demonstrate 55 degrees cervical left and right rotation for improved environment visual scanning.  Progressing towards; not met  2. Patient will demonstrate 160 degrees of B shoulder AROM flexion. Progressing towards; not met  3. Patient will report <3/10 cervical pain with during her ADL performance. Progressing towards; not met     Long Term Goals: 8 -12 weeks:  1. Patient will demonstrate normal shoulder flexion B x 10 reps without compensatory hiking. Progressing towards; not met  2. Patient will report <35% limitation on Neck FOTO survey. Progressing towards; not met  3. Patient will report ability to manage her cervical pain at home with HEP consistently. Progressing towards; not met    Plan   Continue plan of care.  Incorporate RTC strengthening.    Kwasi Paige, PT

## 2019-08-15 ENCOUNTER — CLINICAL SUPPORT (OUTPATIENT)
Dept: REHABILITATION | Facility: HOSPITAL | Age: 64
End: 2019-08-15
Payer: MEDICARE

## 2019-08-15 DIAGNOSIS — M25.511 ACUTE PAIN OF RIGHT SHOULDER: ICD-10-CM

## 2019-08-15 DIAGNOSIS — R29.898 DECREASED ROM OF NECK: ICD-10-CM

## 2019-08-15 DIAGNOSIS — M54.2 NECK PAIN: ICD-10-CM

## 2019-08-15 PROCEDURE — 97110 THERAPEUTIC EXERCISES: CPT | Mod: PN

## 2019-08-15 PROCEDURE — 97140 MANUAL THERAPY 1/> REGIONS: CPT | Mod: PN

## 2019-08-15 NOTE — PROGRESS NOTES
"  Physical Therapy Daily Treatment Note     Name: Riana Baker   Clinic Number: [id number]    Therapy Diagnosis:   Encounter Diagnoses   Name Primary?    Neck pain     Acute pain of right shoulder     Decreased ROM of neck      Physician: Jose Rojas MD    Visit Date: 8/15/2019    Physician Orders: PT Eval and Treat  Medical Diagnosis:  M47.22 (ICD-10-CM) - Osteoarthritis of spine with radiculopathy, cervical region  Evaluation Date: 7/30/2019   Authorization Period: 09/30/2019  Plan of Care Certification Period: 7/30/2019 to 10/4/2019  Visit # / Visits authorized: 4/ 12    FOTO: 4/5  G-code:  3/10  Visit: 54.79  Total: 225.77     Time In: 0800  Time Out: 0845  Total Billable Time: 25 minutes (1 TE, 1 MT)  Precautions: Standard, R elbow reconstruction    Subjective     Pt reports: that she is 'feeling better'.  States decreased right shoulder pain today.  "The left is bothering me"  She was compliant with home exercise program.  Response to previous treatment: some increased soreness   Functional change: none voiced  Pain: 3/10  Location: left neck      Objective     Riana received therapeutic exercises to develop strength, endurance, ROM, flexibility, posture and core stabilization for 20 minutes   - chin tucks     2x10  - supine wand flexion    2x10 T-bar  - seated scapular pinches   20 reps x 3 second hold  - seated thoracic extension self-mob  2x10 chair with small bolster  - seated  press   2x10 in chair with small bolster  - seated cervical retractions   2x10 followed by 1x10 left cervical rotation  - wall Y-slide      2x10 with towel  - scapular rows    3x10 YTB  - lat pull downs    3x10 YTB      Riana received the following manual therapy techniques: 15 minutes including:  - cervical traction upper and lower   - grade III lower cervical sidegliding B  - STM/MFR to B scapular elevators  - grade I/II/III/IV B shoulder passive physiological flexion, IR, ER  - B shoulder lat dorsi " "MFR    Home Exercises Provided and Patient Education Provided   Education provided:   - wall Y-slides two rounds daily at home.    Written Home Exercises Provided: Wall Y-slides at home. .      Assessment   Required verbal cues and demonstration for performance of therex with correct technique.  Muscle tension noted left upper traps and cervical paraspinals with manual therapy  Decreased after interventions.  States "bettter now - just some soreness"      Riana is progressing well towards her goals.   Pt prognosis is Excellent.     Pt will continue to benefit from skilled outpatient physical therapy to address the deficits listed in the problem list box on initial evaluation, provide pt/family education and to maximize pt's level of independence in the home and community environment.   Pt's spiritual, cultural and educational needs considered and pt agreeable to plan of care and goals.     Anticipated barriers to physical therapy: language barrier    Goals:   Short Term Goals: 3-4 weeks:  1. Patient will demonstrate 55 degrees cervical left and right rotation for improved environment visual scanning.  Progressing towards; not met  2. Patient will demonstrate 160 degrees of B shoulder AROM flexion. Progressing towards; not met  3. Patient will report <3/10 cervical pain with during her ADL performance. Progressing towards; not met     Long Term Goals: 8 -12 weeks:  1. Patient will demonstrate normal shoulder flexion B x 10 reps without compensatory hiking. Progressing towards; not met  2. Patient will report <35% limitation on Neck FOTO survey. Progressing towards; not met  3. Patient will report ability to manage her cervical pain at home with HEP consistently. Progressing towards; not met    Plan   Continue plan of care.  Incorporate RTC strengthening.    Marge Liu PTA   "

## 2019-08-20 RX ORDER — SITAGLIPTIN 100 MG/1
TABLET, FILM COATED ORAL
Qty: 90 TABLET | Refills: 3 | Status: SHIPPED | OUTPATIENT
Start: 2019-08-20 | End: 2019-10-03 | Stop reason: SDUPTHER

## 2019-08-20 NOTE — PROGRESS NOTES
Subjective:       Patient ID: Riana Kay is a 64 y.o. female.    Chief Complaint: Abdominal Pain    Patient is a 64 y.o.female who presents today for stomach pain. She saw Gi in may 2019.    04/15/2019 EGD- normal, the esophagus was dilated empirically, gastric biopsy H pylori negative    Ct in April: No acute findings.  Diverticulosis coli.    She was advised by gi to start miralax for her abdominal pain back in may. She was also advised to follow a low [place] diet.    - feels a tightening to the bilateral upper abdomen some mornings  - occurs at least three times per week  - not associated with diarrhea or constipation  - it is associated with nausea, vomiting and headaches  - she is still taking pantoprazole twice per day  - she is having a BP every 1-2 days  - on exam, pt is tender on rib cage    - she notes pain to her neck and left upper extremity; especially worse at location of tricep and bicep muscle. She tried PT in the past with no improvement. Limited nsaid wise due to gerd.    Review of Systems   Constitutional: Negative for appetite change, chills, diaphoresis and fever.   HENT: Negative for congestion, ear discharge, ear pain, postnasal drip, tinnitus, trouble swallowing and voice change.    Eyes: Negative for discharge, redness and itching.   Respiratory: Negative for cough, chest tightness, shortness of breath and wheezing.    Cardiovascular: Negative for chest pain, palpitations and leg swelling.   Gastrointestinal: Positive for abdominal pain. Negative for constipation, diarrhea, nausea and vomiting.   Endocrine: Negative for cold intolerance and heat intolerance.   Genitourinary: Negative for difficulty urinating, flank pain, hematuria and urgency.   Musculoskeletal: Positive for arthralgias and myalgias. Negative for gait problem and neck stiffness.   Skin: Negative for color change and rash.   Neurological: Negative for dizziness, seizures, syncope and headaches.   Hematological:  Negative for adenopathy.   Psychiatric/Behavioral: Negative for agitation, behavioral problems, confusion and sleep disturbance.       Objective:      Physical Exam   Constitutional: She is oriented to person, place, and time. She appears well-developed and well-nourished. No distress.   HENT:   Head: Normocephalic and atraumatic.   Right Ear: External ear normal.   Left Ear: External ear normal.   Nose: Nose normal.   Mouth/Throat: Oropharynx is clear and moist. No oropharyngeal exudate.   Eyes: Pupils are equal, round, and reactive to light. Conjunctivae and EOM are normal. Right eye exhibits no discharge. Left eye exhibits no discharge. No scleral icterus.   Neck: Neck supple. No JVD present. No tracheal deviation present. No thyromegaly present.   Cardiovascular: Normal rate, normal heart sounds and intact distal pulses. Exam reveals no gallop and no friction rub.   No murmur heard.  Pulmonary/Chest: Effort normal and breath sounds normal. No stridor. No respiratory distress. She has no wheezes. She has no rales. She exhibits no tenderness.   Abdominal: Soft. Bowel sounds are normal. She exhibits no distension. There is tenderness. There is no rebound.       Musculoskeletal: She exhibits no edema.        Left shoulder: She exhibits tenderness.        Cervical back: She exhibits decreased range of motion and tenderness.        Right upper arm: She exhibits tenderness.   Lymphadenopathy:     She has no cervical adenopathy.   Neurological: She is alert and oriented to person, place, and time.   Skin: Skin is warm and dry. No rash noted. She is not diaphoretic. No erythema.   Psychiatric: She has a normal mood and affect. Her behavior is normal.   Nursing note and vitals reviewed.      Assessment and Plan:       1. Abdominal pain, unspecified abdominal location  - X-Ray Abdomen AP 1 View; Future  - ranitidine (ZANTAC) 150 MG tablet; Take 1 tablet (150 mg total) by mouth 2 (two) times daily.  Dispense: 60 tablet;  Refill: 11    2. Rib cage dysfunction  - possible costochondritis  - X-Ray Ribs 3 Views Bilateral; Future  - diclofenac sodium (VOLTAREN) 1 % Gel; Apply 2 g topically 4 (four) times daily.  Dispense: 100 g; Refill: 1    3.  Gastritis, presence of bleeding unspecified, unspecified chronicity, unspecified gastritis type  - ondansetron (ZOFRAN-ODT) 8 MG TbDL; Take 1 tablet (8 mg total) by mouth 3 (three) times daily as needed.  Dispense: 30 tablet; Refill: 6    5. Nausea and vomiting, intractability of vomiting not specified, unspecified vomiting type  - ondansetron (ZOFRAN-ODT) 8 MG TbDL; Take 1 tablet (8 mg total) by mouth 3 (three) times daily as needed.  Dispense: 30 tablet; Refill: 6    6. Acute pain of left shoulder  - trial of voltaren gel  - Ambulatory consult to Physical Medicine Rehab    7. Neck pain  - tried PT in past  - voltaren gel; limit nsaid oral use due to gerd  - Ambulatory consult to Physical Medicine Rehab    Notify clinic if symptoms change, worsen or do not improve          No follow-ups on file.

## 2019-08-21 NOTE — PROGRESS NOTES
"  Physical Therapy Daily Treatment Note     Name: Riana Baker   Clinic Number: [id number]    Therapy Diagnosis:   Encounter Diagnoses   Name Primary?    Neck pain     Acute pain of right shoulder     Decreased ROM of neck      Physician: Jose Rojas MD    Visit Date: 8/22/2019    Physician Orders: PT Eval and Treat  Medical Diagnosis:  M47.22 (ICD-10-CM) - Osteoarthritis of spine with radiculopathy, cervical region  Evaluation Date: 7/30/2019   Authorization Period: 09/30/2019  Plan of Care Certification Period: 7/30/2019 to 10/4/2019  Visit # / Visits authorized: 5/ 12    FOTO: 5/5  DONE  G-code:  5/10    Visit: 57.78 Total: 283.55     Time In: 0800  Time Out: 0855  Total Billable Time: 25 minutes (1 TE, 1 MT)  Precautions: Standard, R elbow reconstruction    Subjective     Pt reports: that she is 'feeling better'.  "Just some soreness"  She was compliant with home exercise program.  Response to previous treatment: no adverse reaction  Functional change: none voiced  Pain: 3/10  Location: left neck      Objective     Riana received therapeutic exercises to develop strength, endurance, ROM, flexibility, posture and core stabilization for 40 minutes   - chin tucks     2x10  - supine wand flexion    3x10 T-bar  - seated scapular pinches   20 reps x 3 second hold  - seated thoracic extension self-mob  2x10 chair with small bolster  - seated  press   2x10 in chair with small bolster  - seated cervical retractions   2x10 followed by 1x10 left cervical rotation  - wall Y-slide      2x10 with towel  - scapular rows    RTB 2x10  - lat pull downs    RTB 2x10  - Sidelying abduction   NEXT  - Sidelying ER    NEXT  - [place]     NEXT       Riana received the following manual therapy techniques: 15 minutes including:  - cervical traction upper and lower   - grade III lower cervical sidegliding B  - STM/MFR to B scapular elevators  - grade I/II/III/IV B shoulder passive physiological flexion, IR, " "ER  - B shoulder lat dorsi MFR    Home Exercises Provided and Patient Education Provided   Education provided:   - wall Y-slides two rounds daily at home.    Written Home Exercises Provided: Wall Y-slides at home. .      Assessment   Required verbal cues and demonstration for performance of therex with correct technique.  Muscle tension noted left upper traps and cervical paraspinals with manual therapy  Decreased after interventions.  States "bettter still some soreness"  Not specific to scale.     Riana is progressing well towards her goals.   Pt prognosis is Excellent.     Pt will continue to benefit from skilled outpatient physical therapy to address the deficits listed in the problem list box on initial evaluation, provide pt/family education and to maximize pt's level of independence in the home and community environment.   Pt's spiritual, cultural and educational needs considered and pt agreeable to plan of care and goals.     Anticipated barriers to physical therapy: language barrier    Goals:   Short Term Goals: 3-4 weeks:  1. Patient will demonstrate 55 degrees cervical left and right rotation for improved environment visual scanning.  Progressing towards; not met  2. Patient will demonstrate 160 degrees of B shoulder AROM flexion. Progressing towards; not met  3. Patient will report <3/10 cervical pain with during her ADL performance. Progressing towards; not met     Long Term Goals: 8 -12 weeks:  1. Patient will demonstrate normal shoulder flexion B x 10 reps without compensatory hiking. Progressing towards; not met  2. Patient will report <35% limitation on Neck FOTO survey. Progressing towards; not met  3. Patient will report ability to manage her cervical pain at home with HEP consistently. Progressing towards; not met    Plan   Continue plan of care.  Incorporate RTC strengthening next visit.    Marge Liu, PTA   "

## 2019-08-22 ENCOUNTER — CLINICAL SUPPORT (OUTPATIENT)
Dept: REHABILITATION | Facility: HOSPITAL | Age: 64
End: 2019-08-22
Payer: MEDICARE

## 2019-08-22 DIAGNOSIS — M54.2 NECK PAIN: ICD-10-CM

## 2019-08-22 DIAGNOSIS — R29.898 DECREASED ROM OF NECK: ICD-10-CM

## 2019-08-22 DIAGNOSIS — M25.511 ACUTE PAIN OF RIGHT SHOULDER: ICD-10-CM

## 2019-08-22 PROCEDURE — 97110 THERAPEUTIC EXERCISES: CPT | Mod: PN

## 2019-08-22 PROCEDURE — 97140 MANUAL THERAPY 1/> REGIONS: CPT | Mod: PN

## 2019-08-28 ENCOUNTER — HOSPITAL ENCOUNTER (OUTPATIENT)
Dept: RADIOLOGY | Facility: HOSPITAL | Age: 64
Discharge: HOME | End: 2019-08-28
Attending: INTERNAL MEDICINE
Payer: MEDICARE

## 2019-08-28 ENCOUNTER — OFFICE VISIT (OUTPATIENT)
Dept: INTERNAL MEDICINE | Facility: CLINIC | Age: 64
End: 2019-08-28
Payer: MEDICARE

## 2019-08-28 VITALS
SYSTOLIC BLOOD PRESSURE: 128 MMHG | TEMPERATURE: 98 F | WEIGHT: 168.44 LBS | HEART RATE: 68 BPM | DIASTOLIC BLOOD PRESSURE: 64 MMHG | BODY MASS INDEX: 28.06 KG/M2 | RESPIRATION RATE: 16 BRPM | HEIGHT: 65 IN

## 2019-08-28 DIAGNOSIS — M54.2 NECK PAIN: ICD-10-CM

## 2019-08-28 DIAGNOSIS — R10.9 RIGHT FLANK PAIN: ICD-10-CM

## 2019-08-28 DIAGNOSIS — M99.08 RIB CAGE DYSFUNCTION: ICD-10-CM

## 2019-08-28 DIAGNOSIS — R11.2 NAUSEA AND VOMITING, INTRACTABILITY OF VOMITING NOT SPECIFIED, UNSPECIFIED VOMITING TYPE: ICD-10-CM

## 2019-08-28 DIAGNOSIS — M25.512 ACUTE PAIN OF LEFT SHOULDER: ICD-10-CM

## 2019-08-28 DIAGNOSIS — R10.9 ABDOMINAL PAIN, UNSPECIFIED ABDOMINAL LOCATION: Primary | ICD-10-CM

## 2019-08-28 DIAGNOSIS — R10.9 ABDOMINAL PAIN, UNSPECIFIED ABDOMINAL LOCATION: ICD-10-CM

## 2019-08-28 DIAGNOSIS — K29.70 GASTRITIS, PRESENCE OF BLEEDING UNSPECIFIED, UNSPECIFIED CHRONICITY, UNSPECIFIED GASTRITIS TYPE: ICD-10-CM

## 2019-08-28 PROCEDURE — 71110 X-RAY EXAM RIBS BIL 3 VIEWS: CPT | Mod: 26,,, | Performed by: RADIOLOGY

## 2019-08-28 PROCEDURE — 3074F PR MOST RECENT SYSTOLIC BLOOD PRESSURE < 130 MM HG: ICD-10-PCS | Mod: CPTII,S$GLB,, | Performed by: INTERNAL MEDICINE

## 2019-08-28 PROCEDURE — 3078F DIAST BP <80 MM HG: CPT | Mod: CPTII,S$GLB,, | Performed by: INTERNAL MEDICINE

## 2019-08-28 PROCEDURE — 99499 UNLISTED E&M SERVICE: CPT | Mod: S$GLB,,, | Performed by: INTERNAL MEDICINE

## 2019-08-28 PROCEDURE — 74018 RADEX ABDOMEN 1 VIEW: CPT | Mod: TC,PO

## 2019-08-28 PROCEDURE — 71110 X-RAY EXAM RIBS BIL 3 VIEWS: CPT | Mod: TC,PO

## 2019-08-28 PROCEDURE — 71110 XR RIBS 3 VIEWS BILATERAL: ICD-10-PCS | Mod: 26,,, | Performed by: RADIOLOGY

## 2019-08-28 PROCEDURE — 3078F PR MOST RECENT DIASTOLIC BLOOD PRESSURE < 80 MM HG: ICD-10-PCS | Mod: CPTII,S$GLB,, | Performed by: INTERNAL MEDICINE

## 2019-08-28 PROCEDURE — 99214 PR OFFICE/OUTPT VISIT, EST, LEVL IV, 30-39 MIN: ICD-10-PCS | Mod: S$GLB,,, | Performed by: INTERNAL MEDICINE

## 2019-08-28 PROCEDURE — 3074F SYST BP LT 130 MM HG: CPT | Mod: CPTII,S$GLB,, | Performed by: INTERNAL MEDICINE

## 2019-08-28 PROCEDURE — 74018 RADEX ABDOMEN 1 VIEW: CPT | Mod: 26,,, | Performed by: RADIOLOGY

## 2019-08-28 PROCEDURE — 3008F PR BODY MASS INDEX (BMI) DOCUMENTED: ICD-10-PCS | Mod: CPTII,S$GLB,, | Performed by: INTERNAL MEDICINE

## 2019-08-28 PROCEDURE — 99499 RISK ADDL DX/OHS AUDIT: ICD-10-PCS | Mod: S$GLB,,, | Performed by: INTERNAL MEDICINE

## 2019-08-28 PROCEDURE — 99999 PR PBB SHADOW E&M-EST. PATIENT-LVL IV: CPT | Mod: PBBFAC,,, | Performed by: INTERNAL MEDICINE

## 2019-08-28 PROCEDURE — 74018 XR ABDOMEN AP 1 VIEW: ICD-10-PCS | Mod: 26,,, | Performed by: RADIOLOGY

## 2019-08-28 PROCEDURE — 99214 OFFICE O/P EST MOD 30 MIN: CPT | Mod: S$GLB,,, | Performed by: INTERNAL MEDICINE

## 2019-08-28 PROCEDURE — 99999 PR PBB SHADOW E&M-EST. PATIENT-LVL IV: ICD-10-PCS | Mod: PBBFAC,,, | Performed by: INTERNAL MEDICINE

## 2019-08-28 PROCEDURE — 3008F BODY MASS INDEX DOCD: CPT | Mod: CPTII,S$GLB,, | Performed by: INTERNAL MEDICINE

## 2019-08-28 RX ORDER — ONDANSETRON 8 MG/1
8 TABLET, ORALLY DISINTEGRATING ORAL 3 TIMES DAILY PRN
Qty: 30 TABLET | Refills: 6 | Status: SHIPPED | OUTPATIENT
Start: 2019-08-28 | End: 2020-11-06

## 2019-08-28 RX ORDER — DICLOFENAC SODIUM 10 MG/G
2 GEL TOPICAL 4 TIMES DAILY
Qty: 100 G | Refills: 1 | Status: SHIPPED | OUTPATIENT
Start: 2019-08-28 | End: 2021-11-29 | Stop reason: SDUPTHER

## 2019-08-28 RX ORDER — MECLIZINE HCL 12.5 MG 12.5 MG/1
12.5 TABLET ORAL 3 TIMES DAILY PRN
Qty: 30 TABLET | Refills: 6 | Status: SHIPPED | OUTPATIENT
Start: 2019-08-28 | End: 2019-12-04

## 2019-09-03 ENCOUNTER — DOCUMENTATION ONLY (OUTPATIENT)
Dept: REHABILITATION | Facility: HOSPITAL | Age: 64
End: 2019-09-03

## 2019-09-03 PROBLEM — M54.2 NECK PAIN: Status: RESOLVED | Noted: 2019-07-31 | Resolved: 2019-09-03

## 2019-09-03 PROBLEM — R29.898 DECREASED ROM OF NECK: Status: RESOLVED | Noted: 2019-07-31 | Resolved: 2019-09-03

## 2019-09-03 PROBLEM — M25.511 ACUTE PAIN OF RIGHT SHOULDER: Status: RESOLVED | Noted: 2019-07-31 | Resolved: 2019-09-03

## 2019-09-03 NOTE — PROGRESS NOTES
Mrs. Kay came to PT for 6 total visits for cervical pain.  Self-discharged stating that she is feeling much better via SMS.  Will remove her remaining appointments.  Discharge from [facility].      MIGUEL ANGEL YbarraT

## 2019-09-03 NOTE — PROGRESS NOTES
Subjective:      Patient ID: Riana Kay is a 64 y.o. female.    Chief Complaint: Neck Pain    Ms Kay is a 65 yo female sent in consultation by Dr. Roger for evaluation of neck and left arm pain.  She has had the pain for the  Past 2 months.  She had a fall in 2015 and hurt the right arm and so has been using the left arm more.  She has had left arm pain sincee 2015 and sometimes whole arm and sometimes numbness in the fingers.  The left arm feels heavy and swollen.  The arm pain is worse than the neck pain.   The arm hurts with over head activities.  She has increased neck pain with looking down but no increase arm pain.  There is some neck pain with sleeping.  The pain is an achy pain.  There is no burning.  The pain is better with PT for her neck.  She has not had injections.  She has not had any treatment for her left arm.  She will take the gabapentin as needed, she usually takes it at night.  She will take the mobic at night as well.  Pain is 2/10 now, worst 10/10 using left arm, best 2/10    X-ray shoulder left  Mild degenerative changes seen at the acromioclavicular joint.  No soft tissue calcification is noted.    X-ray cervical  Five views: There is moderate DJD is C5-C6, mild elsewhere.  The odontoid prevertebral soft tissues and posterior elements are intact.  Neural foramina are grossly patent.  No fracture dislocation bone destruction seen.    Impression      No acute process seen.    Past Medical History:  No date: Anxiety  No date: AR (allergic rhinitis)  No date: Colon polyp  No date: Diabetes mellitus, type 2  No date: Dizziness  No date: DM (diabetes mellitus)  No date: Fatty liver  No date: GERD (gastroesophageal reflux disease)  No date: HTN (hypertension)  No date: Hyperlipidemia  No date: Memory loss  No date: Osteopenia  No date: S/P total hysterectomy  No date: Sleep apnea    Past Surgical History:  1/16/2019: ARTHROPLASTY, ELBOW right radial head arthroplasty   revision; Right       Comment:  Performed by Katja Hubbard MD at [facility] OR 1ST FLR  No date: CHOLECYSTECTOMY  10/4/2013: COLONOSCOPY; N/A      Comment:  Performed by Anurag Carl MD at [facility] ENDO (4TH FLR)  1/17/2018: COLONOSCOPY with Donnell; N/A      Comment:  Performed by Roland Jacobo MD at [facility] ENDO (4TH                FLR)  4/15/2019: EGD (ESOPHAGOGASTRODUODENOSCOPY); N/A      Comment:  Performed by Buck Irwin MD at [facility] ENDO (4TH FLR)  2/28/2014: EGD (ESOPHAGOGASTRODUODENOSCOPY); N/A      Comment:  Performed by Anurag Carl MD at [facility] ENDO (4TH FLR)  7/16/15: ELBOW SURGERY; Right  No date: ELBOW SURGERY  No date: HYSTERECTOMY  3/16/2015: INJECTION-STEROID-EPIDURAL-CERVICAL; N/A      Comment:  Performed by [facility] at [facility] CATH LAB  4/11: KNEE ARTHROSCOPY W/ DEBRIDEMENT      Comment:  Left  1/16/2019: RELEASE, ULNAR TUNNEL right; Right      Comment:  Performed by Katja Hubbard MD at [facility] OR 1ST FLR  No date: ROTATOR CUFF REPAIR  No date: TONSILLECTOMY  No date: TOTAL ABDOMINAL HYSTERECTOMY W/ BILATERAL SALPINGOOPHORECTOMY  No date: UPPER GASTROINTESTINAL ENDOSCOPY    Review of patient's family history indicates:  Problem: Colon cancer      Relation: Father          Age of Onset: 83          Comment: colon cancer  Problem: Diabetes      Relation: Maternal Grandmother          Age of Onset: (Not Specified)  Problem: Diabetes      Relation: Maternal Aunt          Age of Onset: (Not Specified)  Problem: Esophageal cancer      Relation: Neg Hx          Age of Onset: (Not Specified)  Problem: Stomach cancer      Relation: Neg Hx          Age of Onset: (Not Specified)      Social History    Socioeconomic History      Marital status:       Spouse name: Not on file      Number of children: Not on file      Years of education: Not on file      Highest education level: Not on file    Occupational History      Not on file    Social Needs      Financial resource strain: Hard      Food  insecurity:        Worry: Never true        Inability: Never true      Transportation needs:        Medical: No        Non-medical: No    Tobacco Use      Smoking status: Never Smoker      Smokeless tobacco: Never Used    Substance and Sexual Activity      Alcohol use: No        Frequency: Never        Drinks per session: Patient refused        Binge frequency: Patient refused      Drug use: No      Sexual activity: Yes        Partners: Male        Birth control/protection: Post-menopausal    Lifestyle      Physical activity:        Days per week: 5 days        Minutes per session: 40 min      Stress: Only a little    Relationships      Social connections:        Talks on phone: More than three times a week        Gets together: Once a week        Attends Spiritism service: Not on file        Active member of club or organization: No        Attends meetings of clubs or organizations: Never        Relationship status:     Other Topics      Concerns:        Not on file    Social History Narrative      She works at [email or web address] in housekeeping; , 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 No exercise but hopes to start walking on the weekend.      Current Outpatient Medications:  amLODIPine (NORVASC) 5 MG tablet, , Disp: , Rfl:   azelastine (ASTELIN) 137 mcg (0.1 %) nasal spray, 1 spray (137 mcg total) by Nasal route 2 (two) times daily., Disp: 30 mL, Rfl: 6  blood sugar diagnostic (ACCU-CHEK SMARTVIEW TEST STRIP) Strp, 4 (four) times daily. , Disp: , Rfl:   cyanocobalamin (VITAMIN B-12) 100 MCG tablet, Take 100 mcg by mouth once daily., Disp: , Rfl:   diclofenac sodium (VOLTAREN) 1 % Gel, Apply 2 g topically 2 (two) times daily., Disp: 100 g, Rfl: 2  diclofenac sodium (VOLTAREN) 1 % Gel, Apply 2 g topically 4 (four) times daily., Disp: 100 g, Rfl: 1  gabapentin (NEURONTIN) 300 MG capsule, Take 1 capsule (300 mg total) by mouth every evening., Disp: 30 capsule, Rfl: 11  insulin aspart U-100 (NOVOLOG FLEXPEN U-100  "INSULIN) 100 unit/mL (3 mL) InPn pen, INJECT 8U W/ BREAKFAST, 12U W/ LUNCH & SUPPER PLUS SCALE(180-230+2, 231-280+4, 281-330+6 & 331-380+8, Disp: 45 mL, Rfl: 2  insulin detemir U-100 (LEVEMIR FLEXTOUCH U-100 INSULN) 100 unit/mL (3 mL) SubQ InPn pen, Inject 35 Units into the skin every evening. Prime with 2 units before every use.  TDD 37units, Disp: 3 Box, Rfl: 3  insulin needles, disposable, (PEN NEEDLE) 31 X 5/16 " Ndle, Use as directed, Disp: 100 each, Rfl: PRN  JANUVIA 100 mg Tab, TAKE 1 TABLET BY MOUTH EVERY DAY, Disp: 90 tablet, Rfl: 3  lancets (MICROLET LANCET) Misc, , Disp: , Rfl:   losartan (COZAAR) 25 MG tablet, Take 0.5 tablets (12.5 mg total) by mouth once daily., Disp: 45 tablet, Rfl: 3  meclizine (ANTIVERT) 12.5 mg tablet, Take 1 tablet (12.5 mg total) by mouth 3 (three) times daily as needed for Dizziness., Disp: 30 tablet, Rfl: 6  meloxicam (MOBIC) 15 MG tablet, Take 1 tablet (15 mg total) by mouth once daily., Disp: 30 tablet, Rfl: 0  ondansetron (ZOFRAN-ODT) 8 MG TbDL, Take 1 tablet (8 mg total) by mouth 3 (three) times daily as needed., Disp: 30 tablet, Rfl: 6  pantoprazole (PROTONIX) 40 MG tablet, Take 1 tablet (40 mg total) by mouth 2 (two) times daily., Disp: 60 tablet, Rfl: 11  pen needle, diabetic (NOVOFINE 32) 32 gauge x 1/4" Ndle, , Disp: , Rfl:   pyridoxine, vitamin B6, (VITAMIN B-6) 100 MG Tab, Take 100 mg by mouth once daily., Disp: , Rfl:   ranitidine (ZANTAC) 150 MG tablet, Take 1 tablet (150 mg total) by mouth 2 (two) times daily., Disp: 60 tablet, Rfl: 11    No current facility-administered medications for this visit.       Review of patient's allergies indicates:   -- Iodinated contrast media -- Swelling and Rash   -- Percocet (oxycodone-acetaminophen) -- Itching   -- Macrobid (nitrofurantoin monohyd/m-cryst) -- Rash   -- Metformin -- Rash   -- Penicillins -- Rash   -- Promethazine -- Rash   -- Sulfa (sulfonamide antibiotics) -- Rash   -- Sulfamethoxazole-trimethoprim -- " Rash        Review of Systems   Constitution: Negative for weight gain and weight loss.   Cardiovascular: Positive for chest pain.   Respiratory: Negative for shortness of breath.    Musculoskeletal: Positive for joint pain (left shoulder) and neck pain. Negative for back pain and joint swelling.   Gastrointestinal: Positive for nausea and vomiting. Negative for abdominal pain and bowel incontinence.   Genitourinary: Negative for bladder incontinence.   Neurological: Positive for numbness (left hand comes and goes).         Objective:        General: Riana is well-developed, well-nourished, appears stated age, in no acute distress, alert and oriented to time, place and person.     General    Vitals reviewed.  Constitutional: She is oriented to person, place, and time. She appears well-developed and well-nourished.   HENT:   Head: Normocephalic and atraumatic.   Pulmonary/Chest: Effort normal.   Neurological: She is alert and oriented to person, place, and time.   Psychiatric: She has a normal mood and affect. Her behavior is normal. Judgment and thought content normal.     General Musculoskeletal Exam   Gait: normal     Right Ankle/Foot Exam     Tests   Heel Walk: able to perform  Tiptoe Walk: able to perform    Left Ankle/Foot Exam     Tests   Heel Walk: able to perform  Tiptoe Walk: able to perform  Back (L-Spine & T-Spine) / Neck (C-Spine) Exam     Neck (C-Spine) Range of Motion   Flexion:     40  Extension: 40Right Lateral Bend: 20 (with pain on left) Left Lateral Bend: 20 (with pain on right) Right Rotation: 40 Left Rotation: 40     Spinal Sensation   Right Side Sensation  C-Spine Level: normal   L-Spine Level: normal  S-Spine Level: normal  Left Side Sensation  C-Spine Level: normal  L-Spine Level: normal  S-Spine Level: normal    Back (L-Spine & T-Spine) Tests   Right Side Tests  Straight leg raise:      Sitting SLR: > 70 degrees      Left Side Tests  Straight leg raise:     Sitting SLR: > 70  degrees          Other She has no scoliosis .  Spinal Kyphosis:  Absent      Left Shoulder Exam     Tenderness   The patient is tender to palpation of the acromioclavicular joint and biceps tendon.    Range of Motion   Active abduction: 160   Forward Flexion: 160     Tests & Signs   Impingement: positive  Rotator Cuff Painful Arc/Range: severe    Comments:  Shoulder motion re creates arm pain complaining of      Muscle Strength   Right Upper Extremity   Biceps: 5/5/5   Deltoid:  5/5  Triceps:  5/5  Wrist extension: 5/5/5   Finger Flexors:  5/5  Left Upper Extremity  Biceps: 5/5/5   Deltoid:  5/5  Triceps:  5/5  Wrist extension: 5/5/5   Finger Flexors:  5/5  Right Lower Extremity   Hip Flexion: 5/5   Quadriceps:  5/5   Anterior tibial:  5/5/5  EHL:  5/5  Left Lower Extremity   Hip Flexion: 5/5   Quadriceps:  5/5   Anterior tibial:  5/5/5   EHL:  5/5    Reflexes     Left Side  Biceps:  2+  Triceps:  2+  Brachioradialis:  2+  Quadriceps:  2+  Achilles:  2+  Left Gordon's Sign:  Absent  Babinski Sign:  absent    Right Side   Biceps:  2+  Triceps:  2+  Brachioradialis:  2+  Quadriceps:  2+  Achilles:  2+  Right Gordon's Sign:  absent  Babinski Sign:  absent    Vascular Exam     Right Pulses        Carotid:                  2+    Left Pulses        Carotid:                  2+              Assessment:       1. Shoulder impingement syndrome, left    2. Spondylosis of cervical region without myelopathy or radiculopathy    3. Muscle spasm           Plan:       Orders Placed This Encounter    Ambulatory Referral to Physical/Occupational Therapy     1.  We discussed her neck posture and trying to sit with a small curve in lower back to get head over spine and shoulders back so not rolled forward  2.  We discussed her head is coming forward, and important not to spend to much time looking down.    3.  We discussed neck pain vs shoulder pain.  She does have some neck soreness and cervical DJD but neck pain is not as severe as  arm pain, and arm pain is reproduced with ROM of the left shoulder.  We discussed shoulder impingement and shoulder inflammation  4.  mobic daily  5.  discussed trying a left shoulder subacromial bursa injection, however last time had steroid shot blood sugar was very alaina so will wait  6.  PT for shoulder ROM, scapula stabilization RTC strengthening and US and HEP at [facility]  7.  RTC 10 weeks      Follow-up: No follow-ups on file. If there are any questions prior to this, the patient was instructed to contact the office.

## 2019-09-04 ENCOUNTER — IMMUNIZATION (OUTPATIENT)
Dept: PHARMACY | Facility: CLINIC | Age: 64
End: 2019-09-04
Payer: MEDICARE

## 2019-09-04 ENCOUNTER — PATIENT OUTREACH (OUTPATIENT)
Dept: ADMINISTRATIVE | Facility: OTHER | Age: 64
End: 2019-09-04

## 2019-09-04 ENCOUNTER — OFFICE VISIT (OUTPATIENT)
Dept: SPINE | Facility: CLINIC | Age: 64
End: 2019-09-04
Attending: PHYSICAL MEDICINE & REHABILITATION
Payer: MEDICARE

## 2019-09-04 VITALS
DIASTOLIC BLOOD PRESSURE: 70 MMHG | HEIGHT: 65 IN | BODY MASS INDEX: 28.32 KG/M2 | SYSTOLIC BLOOD PRESSURE: 162 MMHG | WEIGHT: 170 LBS | HEART RATE: 68 BPM | TEMPERATURE: 98 F

## 2019-09-04 DIAGNOSIS — M75.42 SHOULDER IMPINGEMENT SYNDROME, LEFT: Primary | ICD-10-CM

## 2019-09-04 DIAGNOSIS — M47.812 SPONDYLOSIS OF CERVICAL REGION WITHOUT MYELOPATHY OR RADICULOPATHY: ICD-10-CM

## 2019-09-04 DIAGNOSIS — M62.838 MUSCLE SPASM: ICD-10-CM

## 2019-09-04 PROCEDURE — 99204 PR OFFICE/OUTPT VISIT, NEW, LEVL IV, 45-59 MIN: ICD-10-PCS | Mod: S$GLB,,, | Performed by: PHYSICAL MEDICINE & REHABILITATION

## 2019-09-04 PROCEDURE — 3078F DIAST BP <80 MM HG: CPT | Mod: CPTII,S$GLB,, | Performed by: PHYSICAL MEDICINE & REHABILITATION

## 2019-09-04 PROCEDURE — 99999 PR PBB SHADOW E&M-EST. PATIENT-LVL IV: CPT | Mod: PBBFAC,,, | Performed by: PHYSICAL MEDICINE & REHABILITATION

## 2019-09-04 PROCEDURE — 99204 OFFICE O/P NEW MOD 45 MIN: CPT | Mod: S$GLB,,, | Performed by: PHYSICAL MEDICINE & REHABILITATION

## 2019-09-04 PROCEDURE — 3077F SYST BP >= 140 MM HG: CPT | Mod: CPTII,S$GLB,, | Performed by: PHYSICAL MEDICINE & REHABILITATION

## 2019-09-04 PROCEDURE — 99999 PR PBB SHADOW E&M-EST. PATIENT-LVL IV: ICD-10-PCS | Mod: PBBFAC,,, | Performed by: PHYSICAL MEDICINE & REHABILITATION

## 2019-09-04 PROCEDURE — 3078F PR MOST RECENT DIASTOLIC BLOOD PRESSURE < 80 MM HG: ICD-10-PCS | Mod: CPTII,S$GLB,, | Performed by: PHYSICAL MEDICINE & REHABILITATION

## 2019-09-04 PROCEDURE — 3008F BODY MASS INDEX DOCD: CPT | Mod: CPTII,S$GLB,, | Performed by: PHYSICAL MEDICINE & REHABILITATION

## 2019-09-04 PROCEDURE — 3077F PR MOST RECENT SYSTOLIC BLOOD PRESSURE >= 140 MM HG: ICD-10-PCS | Mod: CPTII,S$GLB,, | Performed by: PHYSICAL MEDICINE & REHABILITATION

## 2019-09-04 PROCEDURE — 3008F PR BODY MASS INDEX (BMI) DOCUMENTED: ICD-10-PCS | Mod: CPTII,S$GLB,, | Performed by: PHYSICAL MEDICINE & REHABILITATION

## 2019-09-04 NOTE — PATIENT INSTRUCTIONS
Try taking mobic daily  Ice shoulder  Watch posture and get shoulders back    We discussed having some neck pain, but some of pain from shoulder.  We discussed steroid injection but will wait due to blood sugar

## 2019-09-04 NOTE — LETTER
September 4, 2019      Amena Roger, DO  [address] [facility] [address] [address]           [facility] [address] [facility] [address]  [address], [address]  [facility] [address]  Phone: [phone]  Fax: [phone]          Patient: Riana Kay   MR Number: [id number]   YOB: 1955   Date of Visit: 9/4/2019       Dear Dr. Amena Roger:    Thank you for referring Riana Kay to me for evaluation. Attached you will find relevant portions of my assessment and plan of care.    If you have questions, please do not hesitate to call me. I look forward to following Riana Kay along with you.    Sincerely,    Nicole Salomon MD    Enclosure  CC:  No Recipients    If you would like to receive this communication electronically, please contact [email or web address] or [phone] to request more information on [facility] Link access.    For providers and/or their staff who would like to refer a patient to [facility], please contact us through our one-stop-shop provider referral line, [facility], at [phone].    If you feel you have received this communication in error or would no longer like to receive these types of communications, please e-mail [email or web address]

## 2019-09-13 ENCOUNTER — TELEPHONE (OUTPATIENT)
Dept: INTERNAL MEDICINE | Facility: CLINIC | Age: 64
End: 2019-09-13

## 2019-09-13 NOTE — TELEPHONE ENCOUNTER
----- Message from Sarah Irwin sent at 9/13/2019  3:57 PM CDT -----  Contact:    Antonia  [phone]  [employer]  /    Physician orders are needed for the pt Glucose meter with test strips and lancets (MICROLET LANCET) Misc   ...    Call back to verify

## 2019-09-16 ENCOUNTER — TELEPHONE (OUTPATIENT)
Dept: INTERNAL MEDICINE | Facility: CLINIC | Age: 64
End: 2019-09-16

## 2019-09-16 RX ORDER — DEXTROSE 4 G
TABLET,CHEWABLE ORAL
Qty: 1 EACH | Refills: 0 | Status: SHIPPED | OUTPATIENT
Start: 2019-09-16 | End: 2019-10-03

## 2019-09-16 RX ORDER — LANCETS
EACH MISCELLANEOUS
Qty: 100 EACH | Refills: 3 | Status: SHIPPED | OUTPATIENT
Start: 2019-09-16 | End: 2019-10-03

## 2019-09-16 NOTE — TELEPHONE ENCOUNTER
Spoke to representative from [employer] and informed that pt does have osteoarthritis. She stated that she will update form and medication should be approved.

## 2019-09-16 NOTE — TELEPHONE ENCOUNTER
----- Message from Mandy Sparks sent at 9/16/2019  2:22 PM CDT -----  Contact: PERORA [phone]  [employer] is calling to ask if patient has any form of arthritidis.  Please advise, thanks

## 2019-09-16 NOTE — TELEPHONE ENCOUNTER
----- Message from Tennille Argueta MA sent at 9/16/2019 11:11 AM CDT -----  Contact: [phone]  Has this been completed? Was it sent to [employer]?  ----- Message -----  From: Liyah Kunz MA  Sent: 9/16/2019  10:42 AM  To: Jace Rivera Staff    Pt  states are trying to get a refill and [facility] has sent over a request and never heard back and its been over a week.    lancets (MICROLET LANCET) Misc  Test Script from [facility]

## 2019-09-23 ENCOUNTER — OFFICE VISIT (OUTPATIENT)
Dept: INTERNAL MEDICINE | Facility: CLINIC | Age: 64
End: 2019-09-23
Payer: MEDICARE

## 2019-09-23 VITALS
RESPIRATION RATE: 16 BRPM | HEIGHT: 65 IN | SYSTOLIC BLOOD PRESSURE: 136 MMHG | BODY MASS INDEX: 28.28 KG/M2 | WEIGHT: 169.75 LBS | HEART RATE: 60 BPM | DIASTOLIC BLOOD PRESSURE: 60 MMHG | TEMPERATURE: 98 F

## 2019-09-23 DIAGNOSIS — N39.0 URINARY TRACT INFECTION WITHOUT HEMATURIA, SITE UNSPECIFIED: Primary | ICD-10-CM

## 2019-09-23 DIAGNOSIS — M75.42 INTERNAL IMPINGEMENT OF LEFT SHOULDER: ICD-10-CM

## 2019-09-23 LAB
BACTERIA #/AREA URNS AUTO: ABNORMAL /HPF
BILIRUB UR QL STRIP: NEGATIVE
CAOX CRY UR QL COMP ASSIST: ABNORMAL
CLARITY UR REFRACT.AUTO: CLEAR
COLOR UR AUTO: YELLOW
GLUCOSE UR QL STRIP: NEGATIVE
HGB UR QL STRIP: NEGATIVE
KETONES UR QL STRIP: NEGATIVE
LEUKOCYTE ESTERASE UR QL STRIP: ABNORMAL
MICROSCOPIC COMMENT: ABNORMAL
NITRITE UR QL STRIP: NEGATIVE
PH UR STRIP: 5 [PH] (ref 5–8)
PROT UR QL STRIP: NEGATIVE
RBC #/AREA URNS AUTO: 0 /HPF (ref 0–4)
SP GR UR STRIP: 1.02 (ref 1–1.03)
SQUAMOUS #/AREA URNS AUTO: 5 /HPF
URN SPEC COLLECT METH UR: ABNORMAL
WBC #/AREA URNS AUTO: 13 /HPF (ref 0–5)

## 2019-09-23 PROCEDURE — 3075F SYST BP GE 130 - 139MM HG: CPT | Mod: CPTII,S$GLB,, | Performed by: INTERNAL MEDICINE

## 2019-09-23 PROCEDURE — 99214 PR OFFICE/OUTPT VISIT, EST, LEVL IV, 30-39 MIN: ICD-10-PCS | Mod: S$GLB,,, | Performed by: INTERNAL MEDICINE

## 2019-09-23 PROCEDURE — 99214 OFFICE O/P EST MOD 30 MIN: CPT | Mod: S$GLB,,, | Performed by: INTERNAL MEDICINE

## 2019-09-23 PROCEDURE — 99999 PR PBB SHADOW E&M-EST. PATIENT-LVL III: CPT | Mod: PBBFAC,,, | Performed by: INTERNAL MEDICINE

## 2019-09-23 PROCEDURE — 3078F DIAST BP <80 MM HG: CPT | Mod: CPTII,S$GLB,, | Performed by: INTERNAL MEDICINE

## 2019-09-23 PROCEDURE — 3075F PR MOST RECENT SYSTOLIC BLOOD PRESS GE 130-139MM HG: ICD-10-PCS | Mod: CPTII,S$GLB,, | Performed by: INTERNAL MEDICINE

## 2019-09-23 PROCEDURE — 3008F PR BODY MASS INDEX (BMI) DOCUMENTED: ICD-10-PCS | Mod: CPTII,S$GLB,, | Performed by: INTERNAL MEDICINE

## 2019-09-23 PROCEDURE — 3078F PR MOST RECENT DIASTOLIC BLOOD PRESSURE < 80 MM HG: ICD-10-PCS | Mod: CPTII,S$GLB,, | Performed by: INTERNAL MEDICINE

## 2019-09-23 PROCEDURE — 87086 URINE CULTURE/COLONY COUNT: CPT

## 2019-09-23 PROCEDURE — 3008F BODY MASS INDEX DOCD: CPT | Mod: CPTII,S$GLB,, | Performed by: INTERNAL MEDICINE

## 2019-09-23 PROCEDURE — 81001 URINALYSIS AUTO W/SCOPE: CPT

## 2019-09-23 PROCEDURE — 99999 PR PBB SHADOW E&M-EST. PATIENT-LVL III: ICD-10-PCS | Mod: PBBFAC,,, | Performed by: INTERNAL MEDICINE

## 2019-09-23 RX ORDER — CIPROFLOXACIN 500 MG/1
500 TABLET ORAL 2 TIMES DAILY
Qty: 14 TABLET | Refills: 0 | Status: SHIPPED | OUTPATIENT
Start: 2019-09-23 | End: 2019-09-30

## 2019-09-23 RX ORDER — PHENAZOPYRIDINE HYDROCHLORIDE 200 MG/1
200 TABLET, FILM COATED ORAL 3 TIMES DAILY PRN
Qty: 9 TABLET | Refills: 0 | Status: SHIPPED | OUTPATIENT
Start: 2019-09-23 | End: 2019-09-26

## 2019-09-23 NOTE — PROGRESS NOTES
Subjective:       Patient ID: Riana Kay is a 64 y.o. female.    Chief Complaint: Urinary Tract Infection    Patient is a 64 y.o.female who presents today for bladder tenderness. It started one week ago. No fever or chills. She admits to burning with urination; no back pain. She did not take anything otc for her symptoms.     She still is having left arm pain; was referred to PT by dr. hair but no one called to schedule the therapy  Yet.    Review of Systems   Constitutional: Negative for appetite change, chills, diaphoresis and fever.   HENT: Negative for congestion, ear discharge, ear pain, postnasal drip, tinnitus, trouble swallowing and voice change.    Eyes: Negative for discharge, redness and itching.   Respiratory: Negative for cough, chest tightness, shortness of breath and wheezing.    Cardiovascular: Negative for chest pain, palpitations and leg swelling.   Gastrointestinal: Negative for abdominal pain, constipation, diarrhea, nausea and vomiting.   Endocrine: Negative for cold intolerance and heat intolerance.   Genitourinary: Negative for difficulty urinating, flank pain, hematuria and urgency.   Musculoskeletal: Negative for arthralgias, gait problem, myalgias and neck stiffness.   Skin: Negative for color change and rash.   Neurological: Negative for dizziness, seizures, syncope and headaches.   Hematological: Negative for adenopathy.   Psychiatric/Behavioral: Negative for agitation, behavioral problems, confusion and sleep disturbance.       Objective:      Physical Exam   Constitutional: She is oriented to person, place, and time. She appears well-developed and well-nourished. No distress.   HENT:   Head: Normocephalic and atraumatic.   Right Ear: External ear normal.   Left Ear: External ear normal.   Nose: Nose normal.   Mouth/Throat: Oropharynx is clear and moist. No oropharyngeal exudate.   Eyes: Pupils are equal, round, and reactive to light. Conjunctivae and EOM are normal. Right  eye exhibits no discharge. Left eye exhibits no discharge. No scleral icterus.   Neck: Neck supple. No JVD present. No tracheal deviation present. No thyromegaly present.   Cardiovascular: Normal rate, normal heart sounds and intact distal pulses. Exam reveals no gallop and no friction rub.   No murmur heard.  Pulmonary/Chest: Effort normal and breath sounds normal. No stridor. No respiratory distress. She has no wheezes. She has no rales. She exhibits no tenderness.   Abdominal: Soft. Bowel sounds are normal. She exhibits no distension. There is no tenderness. There is no rebound.   Musculoskeletal: She exhibits no edema or tenderness.   Lymphadenopathy:     She has no cervical adenopathy.   Neurological: She is alert and oriented to person, place, and time.   Skin: Skin is warm and dry. No rash noted. She is not diaphoretic. No erythema.   Psychiatric: She has a normal mood and affect. Her behavior is normal.   Nursing note and vitals reviewed.      Assessment and Plan:       1. Urinary tract infection without hematuria, site unspecified    - phenazopyridine (PYRIDIUM) 200 MG tablet; Take 1 tablet (200 mg total) by mouth 3 (three) times daily as needed for Pain.  Dispense: 9 tablet; Refill: 0  - ciprofloxacin HCl (CIPRO) 500 MG tablet; Take 1 tablet (500 mg total) by mouth 2 (two) times daily. for 7 days  Dispense: 14 tablet; Refill: 0  - Urinalysis  - Urine culture    2. Internal impingement of left shoulder    - Ambulatory Referral to Physical/Occupational Therapy          No follow-ups on file.

## 2019-09-25 LAB — BACTERIA UR CULT: NO GROWTH

## 2019-09-26 ENCOUNTER — PATIENT MESSAGE (OUTPATIENT)
Dept: DERMATOLOGY | Facility: CLINIC | Age: 64
End: 2019-09-26

## 2019-09-30 ENCOUNTER — TELEPHONE (OUTPATIENT)
Dept: DERMATOLOGY | Facility: CLINIC | Age: 64
End: 2019-09-30

## 2019-09-30 NOTE — TELEPHONE ENCOUNTER
L/M on pt's cell, she's to call back to reschedule her appointment.  ----- Message from Jesi Guaman sent at 9/30/2019  9:21 AM CDT -----  Contact:    Type: Patient Call Back    Who called:     What is the request in detail:patient called to r/s appt. Per [facility] message she received     Can the clinic reply by [facility]? No     Would the patient rather a call back or a response via [facility]?  Call     Best call back number:[phone]

## 2019-10-03 ENCOUNTER — OFFICE VISIT (OUTPATIENT)
Dept: INTERNAL MEDICINE | Facility: CLINIC | Age: 64
End: 2019-10-03
Payer: MEDICARE

## 2019-10-03 ENCOUNTER — LAB VISIT (OUTPATIENT)
Dept: LAB | Facility: HOSPITAL | Age: 64
End: 2019-10-03
Payer: MEDICARE

## 2019-10-03 VITALS
DIASTOLIC BLOOD PRESSURE: 72 MMHG | BODY MASS INDEX: 27.82 KG/M2 | SYSTOLIC BLOOD PRESSURE: 118 MMHG | WEIGHT: 167 LBS | HEIGHT: 65 IN

## 2019-10-03 DIAGNOSIS — K76.0 FATTY LIVER: ICD-10-CM

## 2019-10-03 DIAGNOSIS — M47.22 OSTEOARTHRITIS OF SPINE WITH RADICULOPATHY, CERVICAL REGION: ICD-10-CM

## 2019-10-03 DIAGNOSIS — G93.32 CHRONIC FATIGUE SYNDROME: ICD-10-CM

## 2019-10-03 DIAGNOSIS — E66.3 OVERWEIGHT (BMI 25.0-29.9): ICD-10-CM

## 2019-10-03 DIAGNOSIS — Z79.4 TYPE 2 DIABETES MELLITUS WITH OTHER SPECIFIED COMPLICATION, WITH LONG-TERM CURRENT USE OF INSULIN: ICD-10-CM

## 2019-10-03 DIAGNOSIS — Z79.4 TYPE 2 DIABETES MELLITUS WITH OTHER SPECIFIED COMPLICATION, WITH LONG-TERM CURRENT USE OF INSULIN: Primary | ICD-10-CM

## 2019-10-03 DIAGNOSIS — I10 ESSENTIAL HYPERTENSION: ICD-10-CM

## 2019-10-03 DIAGNOSIS — E11.69 TYPE 2 DIABETES MELLITUS WITH OTHER SPECIFIED COMPLICATION, WITH LONG-TERM CURRENT USE OF INSULIN: ICD-10-CM

## 2019-10-03 DIAGNOSIS — M25.521 RIGHT ELBOW PAIN: ICD-10-CM

## 2019-10-03 DIAGNOSIS — E11.69 TYPE 2 DIABETES MELLITUS WITH OTHER SPECIFIED COMPLICATION, WITH LONG-TERM CURRENT USE OF INSULIN: Primary | ICD-10-CM

## 2019-10-03 LAB
CHOLEST SERPL-MCNC: 173 MG/DL (ref 120–199)
CHOLEST/HDLC SERPL: 3.3 {RATIO} (ref 2–5)
ESTIMATED AVG GLUCOSE: 166 MG/DL (ref 68–131)
HBA1C MFR BLD HPLC: 7.4 % (ref 4–5.6)
HDLC SERPL-MCNC: 52 MG/DL (ref 40–75)
HDLC SERPL: 30.1 % (ref 20–50)
LDLC SERPL CALC-MCNC: 78 MG/DL (ref 63–159)
NONHDLC SERPL-MCNC: 121 MG/DL
TRIGL SERPL-MCNC: 215 MG/DL (ref 30–150)
TSH SERPL DL<=0.005 MIU/L-ACNC: 1.31 UIU/ML (ref 0.4–4)

## 2019-10-03 PROCEDURE — 3051F PR MOST RECENT HEMOGLOBIN A1C LEVEL 7.0 - < 8.0%: ICD-10-PCS | Mod: CPTII,S$GLB,, | Performed by: NURSE PRACTITIONER

## 2019-10-03 PROCEDURE — 99214 PR OFFICE/OUTPT VISIT, EST, LEVL IV, 30-39 MIN: ICD-10-PCS | Mod: S$GLB,ICN,, | Performed by: NURSE PRACTITIONER

## 2019-10-03 PROCEDURE — 3051F HG A1C>EQUAL 7.0%<8.0%: CPT | Mod: CPTII,S$GLB,, | Performed by: NURSE PRACTITIONER

## 2019-10-03 PROCEDURE — 3078F PR MOST RECENT DIASTOLIC BLOOD PRESSURE < 80 MM HG: ICD-10-PCS | Mod: CPTII,S$GLB,ICN, | Performed by: NURSE PRACTITIONER

## 2019-10-03 PROCEDURE — 99999 PR PBB SHADOW E&M-EST. PATIENT-LVL IV: ICD-10-PCS | Mod: PBBFAC,,, | Performed by: NURSE PRACTITIONER

## 2019-10-03 PROCEDURE — 3008F PR BODY MASS INDEX (BMI) DOCUMENTED: ICD-10-PCS | Mod: CPTII,S$GLB,ICN, | Performed by: NURSE PRACTITIONER

## 2019-10-03 PROCEDURE — 3078F DIAST BP <80 MM HG: CPT | Mod: CPTII,S$GLB,ICN, | Performed by: NURSE PRACTITIONER

## 2019-10-03 PROCEDURE — 99499 RISK ADDL DX/OHS AUDIT: ICD-10-PCS | Mod: S$GLB,,, | Performed by: NURSE PRACTITIONER

## 2019-10-03 PROCEDURE — 3074F SYST BP LT 130 MM HG: CPT | Mod: CPTII,S$GLB,ICN, | Performed by: NURSE PRACTITIONER

## 2019-10-03 PROCEDURE — 99214 OFFICE O/P EST MOD 30 MIN: CPT | Mod: S$GLB,ICN,, | Performed by: NURSE PRACTITIONER

## 2019-10-03 PROCEDURE — 3008F BODY MASS INDEX DOCD: CPT | Mod: CPTII,S$GLB,ICN, | Performed by: NURSE PRACTITIONER

## 2019-10-03 PROCEDURE — 80061 LIPID PANEL: CPT

## 2019-10-03 PROCEDURE — 36415 COLL VENOUS BLD VENIPUNCTURE: CPT

## 2019-10-03 PROCEDURE — 99499 UNLISTED E&M SERVICE: CPT | Mod: S$GLB,,, | Performed by: NURSE PRACTITIONER

## 2019-10-03 PROCEDURE — 3074F PR MOST RECENT SYSTOLIC BLOOD PRESSURE < 130 MM HG: ICD-10-PCS | Mod: CPTII,S$GLB,ICN, | Performed by: NURSE PRACTITIONER

## 2019-10-03 PROCEDURE — 99999 PR PBB SHADOW E&M-EST. PATIENT-LVL IV: CPT | Mod: PBBFAC,,, | Performed by: NURSE PRACTITIONER

## 2019-10-03 PROCEDURE — 83036 HEMOGLOBIN GLYCOSYLATED A1C: CPT

## 2019-10-03 PROCEDURE — 84443 ASSAY THYROID STIM HORMONE: CPT

## 2019-10-03 RX ORDER — LOSARTAN POTASSIUM 25 MG/1
12.5 TABLET ORAL DAILY
Qty: 45 TABLET | Refills: 3 | Status: SHIPPED | OUTPATIENT
Start: 2019-10-03 | End: 2020-11-13 | Stop reason: SDUPTHER

## 2019-10-03 RX ORDER — INSULIN PUMP SYRINGE, 3 ML
EACH MISCELLANEOUS
Qty: 1 EACH | Refills: 1
Start: 2019-10-03 | End: 2022-12-13

## 2019-10-03 RX ORDER — AMLODIPINE BESYLATE 5 MG/1
5 TABLET ORAL DAILY
Qty: 90 TABLET | Refills: 3 | Status: SHIPPED | OUTPATIENT
Start: 2019-10-03 | End: 2020-09-14 | Stop reason: SDUPTHER

## 2019-10-03 RX ORDER — INSULIN ASPART 100 [IU]/ML
INJECTION, SOLUTION INTRAVENOUS; SUBCUTANEOUS
Qty: 60 ML | Refills: 3 | Status: SHIPPED | OUTPATIENT
Start: 2019-10-03 | End: 2020-01-14

## 2019-10-03 RX ORDER — GABAPENTIN 300 MG/1
300 CAPSULE ORAL NIGHTLY
Qty: 90 CAPSULE | Refills: 3 | Status: SHIPPED | OUTPATIENT
Start: 2019-10-03 | End: 2019-12-04

## 2019-10-03 RX ORDER — LANCETS
EACH MISCELLANEOUS
Qty: 400 EACH | Refills: 3 | Status: SHIPPED | OUTPATIENT
Start: 2019-10-03 | End: 2021-03-08 | Stop reason: SDUPTHER

## 2019-10-03 RX ORDER — BLOOD SUGAR DIAGNOSTIC
STRIP MISCELLANEOUS
Qty: 400 EACH | Refills: 3 | Status: SHIPPED | OUTPATIENT
Start: 2019-10-03 | End: 2021-03-16 | Stop reason: SDUPTHER

## 2019-10-03 NOTE — PROGRESS NOTES
CC: This 64 y.o.  female presents for management of No chief complaint on file.   along with the current chronic medical conditions including:  Patient Active Problem List   Diagnosis    Fatty liver    S/P total hysterectomy    Osteopenia    GERD (gastroesophageal reflux disease)    Sleep apnea    Atrophic gastritis without mention of hemorrhage    Chronic fatigue syndrome    Coronary atherosclerosis    Abdominal pain, right upper quadrant    HTN (hypertension)    Muscle spasms of neck    Radiculopathy of cervical spine    Overweight (BMI 25.0-29.9)    Type 2 diabetes mellitus with other specified complication    Screening for colon cancer    Pain    Chronic pain of right knee    Right elbow pain    Dysphagia    Chronic pain of left ankle    Vitamin D deficiency      Status of these conditions is pending review.    HPI:   Diagnosed with T2DM x 20 years ago, pt has been on insulin x 3-4years ago.  H/o fatty liver, HTN, overweight, osteopenia, coronary atherosclerosis.  Accompanied by .  Last seen by me in jan 2019 and is now being seen by me again today.  Needs lab work.  Has logs today.   Pt needs refills 90 day, goes through [facility] for strips, lancets, pen needles.  All other rx via [facility].  Testing 4 times a day.  Injections 4 times a day.   Has h/o hypoglycemia    Of note, , retired, fall in 2015, injured (R) elbow, still has pain, can't lift heavier than 10 lbs.     Lab Results   Component Value Date    HGBA1C 7.3 (H) 05/06/2019     Pt did not tolerate victoza or ozempic.  Off metformin related to kidneys.      CURRENT DM MEDS:   lantus 35 units qhs, novolog 12-14-14 units  with mod dose correction scale, starting at 180. 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   No missing doses of medication.   No hypoglycemia in the past 2 weeks  Riana Kay forgot glucometer/logs today                            DIET/ MEAL  PATTERN: 3 meals a day  Snacks (between lunch and dinner)     EXERCISE: no formal; does yardwork     STANDARDS OF CARE:     Diabetes Management Status    Statin: Not taking  ACE/ARB: Taking    Screening or Prevention Patient's value Goal Complete/Controlled?   HgA1C Testing and Control   Lab Results   Component Value Date    HGBA1C 7.3 (H) 05/06/2019      Annually/Less than 8% Yes   Lipid profile : 11/28/2018 Annually Yes   LDL control Lab Results   Component Value Date    LDLCALC 87.8 11/28/2018    Annually/Less than 100 mg/dl  Yes   Nephropathy screening Lab Results   Component Value Date    LABMICR 4.0 12/21/2018     Lab Results   Component Value Date    PROTEINUA Negative 09/23/2019    Annually Yes   Blood pressure BP Readings from Last 1 Encounters:   10/03/19 118/72    Less than 140/90 Yes   Dilated retinal exam : 01/04/2019 Annually Yes   Foot exam   : 01/23/2018 Annually No       ROS:   GEN: +chronic fatigue or weakness, no changes w/ appetite, wt stable  CV:  Denies chest pain, palpitations, edema or cyanosis, denies syncope  SKIN: Skin is intact and heals well, no rashes, pruritis, easy bruising, no hair changes, no intolerance to heat/cold.  RESP: No SOB, cough, FISCHER  FEN/GI: Nml bowel movements, normal appetite, +GERD  RENAL: No urinary complaints, no dysuria/hematuia/oliguria   ENDO: no heat/cold intolerance  ID: No skin breakdown, fevers, chills  PSYCH: Denies drug/ETOH abuse, no hx. of eating disorders or depression +anxiety  MS/NEURO: Denies numbness/ tingling in BLE; +bilateral knee and ankle joint pain-improving  (R) elbow pain -fall in 2015, surgery      Lab Results   Component Value Date    HGBA1C 7.3 (H) 05/06/2019     Lab Results   Component Value Date    TSH 1.309 07/23/2018       Chemistry        Component Value Date/Time     04/26/2019 0806    K 4.4 04/26/2019 0806     04/26/2019 0806    CO2 29 04/26/2019 0806    BUN 19 04/26/2019 0806    CREATININE 0.8 04/26/2019 0806      (H) 04/26/2019 0806        Component Value Date/Time    CALCIUM 10.1 04/26/2019 0806    ALKPHOS 87 04/26/2019 0806    AST 20 04/26/2019 0806    ALT 24 04/26/2019 0806    BILITOT 0.5 04/26/2019 0806          Lab Results   Component Value Date    LDLCALC 87.8 11/28/2018       PE:  GEN: Patient WNWD, WF, NAD, AAOx3, Friendly, talkative, well groomed  HEENT: EOMI, PERRLA, no lid lag, Clear oropharynx  NECK: trachea midline  CV: Regular rate and rhythm, +trace edema pedal/ankles  RESP: No increase work of breathing, No cough, wheeze.  ABD: bs active x 4 quadsEXT: No C/C/Edema, No skin rash/breakdown  NEURO: CN 2-12 intact, 5/5 strength in 4 extremities, nml gait  FEET: No pressure areas, blisters, ulcers, calluses. Footware appropriate.    Protective Sensation (w/ 10 gram monofilament):  Right: Intact 6/6  Left: Intact 6/6    Visual Inspection:  Normal -  Bilateral    Pedal Pulses:   Right: Present  Left: Present    Posterior tibialis:   Right:Present  Left: Present    Vibratory sensation intact in BLE     ASSESSMENT and PLAN:  Riana was seen today for diabetes mellitus.    Diagnoses and associated orders for this visit:  1. Type 2 diabetes mellitus with other specified complication, with long-term current use of insulin  insulin detemir U-100 (LEVEMIR FLEXTOUCH U-100 INSULN) 100 unit/mL (3 mL) SubQ InPn pen    Hemoglobin A1c    TSH    Lipid panel    Hemoglobin A1c   2. Osteoarthritis of spine with radiculopathy, cervical region  gabapentin (NEURONTIN) 300 MG capsule   3. Overweight (BMI 25.0-29.9)     4. Essential hypertension     5. Chronic fatigue syndrome     6. Fatty liver  Lipid panel   7. Right elbow pain     Follow up in about 3 months (around 1/3/2020).     1. F/u in 3 mos w/ me  New contact info given  a1c goal less than 7%  Increase basal to 38 units at night  Change meal insulin to 14-14-14 units w/ scale 150-200+2, etc  Continue januvia  Did not tolerate glp1a  Watch snacking in between meals  Discussed  dietary habits  2. May increase insulin resistance  3. Body mass index is 27.79 kg/m². may increase insulin resistance.  Encourage exercise 3x a week  4. Controlled, continue med(s)  On arb  5. May increase insulin resistance.  If increased inactivity  6. Optimize bg will help with condition  F/u with pcp  7. Fall in 2015, s/p surgery, may increase insulin resistance w/ pain

## 2019-10-03 NOTE — PATIENT INSTRUCTIONS
Snacks can be an important part of a balanced, healthy meal plan. They allow you to eat more frequently, feeling full and satisfied throughout the day. Also, they allow you to spread carbohydrates evenly, which may stabilize blood sugars.  Plus, snacks are enjoyable!     The amount of carbohydrate needed at snacks varies. Generally, about 15-30 grams of carbohydrate per snack is recommended.  Below you will find some tasty treats.       0-5 gm carb   Crystal Light   Vitamin Water Zero   Herbal tea, unsweetened   2 tsp peanut butter on celery   1./2 cup sugar-free jell-o   1 sugar-free popsicle   ¼ cup blueberries   8oz Blue Melissa unsweetened almond milk   5 baby carrots & celery sticks, cucumbers, bell peppers dipped in ¼ cup salsa, 2Tbsp light ranch dressing or 2Tbsp plain Greek yogurt   10 Goldfish crackers   ½ oz low-fat cheese or string cheese   1 closed handful of nuts, unsalted   1 Tbsp of sunflower seeds, unsalted   1 cup Smart Pop popcorn   1 whole grain brown rice cake        15 gm carb   1 small piece of fruit or ½ banana or 1/2 cup lite canned fruit   3 tan cracker squares   3 cups Smart Pop popcorn, top spray butter, Beltre lite salt or cinnamon and Truvia   5 Vanilla Wafers   ½ cup low fat, no added sugar ice cream or frozen yogurt (Blue bell, Blue Bunny, Weight Watchers, Skinny Cow)   ½ turkey, ham, or chicken sandwich   ½ c fruit with ½ c Cottage cheese   4-6 unsalted wheat crackers with 1 oz low fat cheese or 1 tbsp peanut butter    30-45 goldfish crackers (depending on flavor)    7-8 Synagogue mini brown rice cakes (caramel, apple cinnamon, chocolate)    12 Synagogue mini brown rice cakes (cheddar, bbq, ranch)    1/3 cup hummus dip with raw veg   1/2 whole wheat jossy, 1Tbsp hummus   Mini Pizza (1/2 whole wheat English muffin, low-fat  cheese, tomato sauce)   100 calorie snack pack (Oreo, Chips Ahoy, Ritz Mix, Baked Cheetos)   4-6 oz. light or Greek Style yogurt  (Yaakov, Chandni, Hilario, [facility])   ½ cup sugar-free pudding     6 in. wheat tortilla or jossy oven toasted chips (topped with spray butter flavoring, cinnamon, Truvia OR spray butter, garlic powder, chili powder)    18 BBQ Popchips (available at [employer], Whole Foods, Fresh Market)                   Diabetes Support Group Meetings         Date: Topic:   February 14 Eat Fit SALINAS/Health Promotion   March 14 Taking Care of Your Smile   April 11 Spring into Healthy Eating/Cooking Demo   May 9 Ease Your Mind with Diabetes   Nesha 13 Summer Treats/Cooking Demo   July 11 Eat Fit SALINAS/Super Market Sweep   August 8 Taking Care of Your Eyes and Feet   Sept 12 Technology/ADA updates   October 10 Recipes & Treats/Cooking Demo   November 14 Heart Health/Pump it up!   December 12 Year-End Close Out        Meetings are held in the [facility] (A) of the [facility] located at [address] [place] [address]. Please call [phone] for additional information.    Free service, offered every 2nd Thursday of every month! Family members and/or friends are welcome as well!  Support group is for patients with type 1 or type 2 diabetes.    From 3:30p to 4:30p

## 2019-10-07 ENCOUNTER — TELEPHONE (OUTPATIENT)
Dept: INTERNAL MEDICINE | Facility: CLINIC | Age: 64
End: 2019-10-07

## 2019-10-07 NOTE — TELEPHONE ENCOUNTER
----- Message from Jaida Mckeon sent at 10/7/2019  3:18 PM CDT -----  Contact: [phone]  Patient is requesting a call from the office regarding a prescription for diabetic supplies lancets and test strips.  Please send to [facility]     Please advise, thank you.

## 2019-10-21 ENCOUNTER — OFFICE VISIT (OUTPATIENT)
Dept: INTERNAL MEDICINE | Facility: CLINIC | Age: 64
End: 2019-10-21
Payer: MEDICARE

## 2019-10-21 ENCOUNTER — TELEPHONE (OUTPATIENT)
Dept: INTERNAL MEDICINE | Facility: CLINIC | Age: 64
End: 2019-10-21

## 2019-10-21 VITALS
WEIGHT: 170 LBS | TEMPERATURE: 98 F | SYSTOLIC BLOOD PRESSURE: 126 MMHG | HEIGHT: 65 IN | BODY MASS INDEX: 28.32 KG/M2 | RESPIRATION RATE: 18 BRPM | DIASTOLIC BLOOD PRESSURE: 62 MMHG | HEART RATE: 68 BPM

## 2019-10-21 DIAGNOSIS — M54.32 LEFT SIDED SCIATICA: Primary | ICD-10-CM

## 2019-10-21 PROCEDURE — 99999 PR PBB SHADOW E&M-EST. PATIENT-LVL III: ICD-10-PCS | Mod: PBBFAC,,, | Performed by: FAMILY MEDICINE

## 2019-10-21 PROCEDURE — 99214 OFFICE O/P EST MOD 30 MIN: CPT | Mod: 25,S$GLB,, | Performed by: FAMILY MEDICINE

## 2019-10-21 PROCEDURE — 3008F BODY MASS INDEX DOCD: CPT | Mod: CPTII,S$GLB,, | Performed by: FAMILY MEDICINE

## 2019-10-21 PROCEDURE — 99999 PR PBB SHADOW E&M-EST. PATIENT-LVL III: CPT | Mod: PBBFAC,,, | Performed by: FAMILY MEDICINE

## 2019-10-21 PROCEDURE — 96372 PR INJECTION,THERAP/PROPH/DIAG2ST, IM OR SUBCUT: ICD-10-PCS | Mod: S$GLB,,, | Performed by: FAMILY MEDICINE

## 2019-10-21 PROCEDURE — 3078F DIAST BP <80 MM HG: CPT | Mod: CPTII,S$GLB,, | Performed by: FAMILY MEDICINE

## 2019-10-21 PROCEDURE — 3074F PR MOST RECENT SYSTOLIC BLOOD PRESSURE < 130 MM HG: ICD-10-PCS | Mod: CPTII,S$GLB,, | Performed by: FAMILY MEDICINE

## 2019-10-21 PROCEDURE — 3078F PR MOST RECENT DIASTOLIC BLOOD PRESSURE < 80 MM HG: ICD-10-PCS | Mod: CPTII,S$GLB,, | Performed by: FAMILY MEDICINE

## 2019-10-21 PROCEDURE — 96372 THER/PROPH/DIAG INJ SC/IM: CPT | Mod: S$GLB,,, | Performed by: FAMILY MEDICINE

## 2019-10-21 PROCEDURE — 99214 PR OFFICE/OUTPT VISIT, EST, LEVL IV, 30-39 MIN: ICD-10-PCS | Mod: 25,S$GLB,, | Performed by: FAMILY MEDICINE

## 2019-10-21 PROCEDURE — 3074F SYST BP LT 130 MM HG: CPT | Mod: CPTII,S$GLB,, | Performed by: FAMILY MEDICINE

## 2019-10-21 PROCEDURE — 3008F PR BODY MASS INDEX (BMI) DOCUMENTED: ICD-10-PCS | Mod: CPTII,S$GLB,, | Performed by: FAMILY MEDICINE

## 2019-10-21 RX ORDER — METHOCARBAMOL 750 MG/1
750 TABLET, FILM COATED ORAL 4 TIMES DAILY PRN
Qty: 40 TABLET | Refills: 0 | Status: SHIPPED | OUTPATIENT
Start: 2019-10-21 | End: 2019-10-21

## 2019-10-21 RX ORDER — KETOROLAC TROMETHAMINE 30 MG/ML
30 INJECTION, SOLUTION INTRAMUSCULAR; INTRAVENOUS
Status: COMPLETED | OUTPATIENT
Start: 2019-10-21 | End: 2019-10-21

## 2019-10-21 RX ORDER — MELOXICAM 15 MG/1
15 TABLET ORAL DAILY
Qty: 30 TABLET | Refills: 0 | Status: SHIPPED | OUTPATIENT
Start: 2019-10-21 | End: 2019-11-13 | Stop reason: SDUPTHER

## 2019-10-21 RX ORDER — TIZANIDINE 2 MG/1
2 TABLET ORAL EVERY 6 HOURS PRN
Qty: 40 TABLET | Refills: 0 | Status: SHIPPED | OUTPATIENT
Start: 2019-10-21 | End: 2019-10-31

## 2019-10-21 RX ADMIN — KETOROLAC TROMETHAMINE 30 MG: 30 INJECTION, SOLUTION INTRAMUSCULAR; INTRAVENOUS at 09:10

## 2019-10-21 NOTE — TELEPHONE ENCOUNTER
----- Message from Jaida Mckeon sent at 10/21/2019  9:36 AM CDT -----  Contact: [phone]  Patient's family member called and stated the medication methocarbamol (ROBAXIN) 750 MG Tab, is not covered by the patient insurance.  Can the doctor prescribed something else that is covered by her insurance.    Please advise, thank you.

## 2019-11-13 DIAGNOSIS — M54.32 LEFT SIDED SCIATICA: ICD-10-CM

## 2019-11-13 RX ORDER — MELOXICAM 15 MG/1
TABLET ORAL
Qty: 30 TABLET | Refills: 0 | Status: SHIPPED | OUTPATIENT
Start: 2019-11-13 | End: 2020-02-19

## 2019-12-02 NOTE — PROGRESS NOTES
Subjective:       Patient ID: Riana Baker Ng is a 64 y.o. female.    Chief Complaint: Leg Pain and Hip Pain    Patient is a 64 y.o.female who presents today for hip pain.  Ongoing for one week. Started having doing deep cleaning housework. Notes pain is in left buttock and travels down her left leg. Pain is 8/10. Has not taken anything over the counter recently.  Review of Systems   Constitutional: Negative for activity change, appetite change, chills, diaphoresis, fever and unexpected weight change.   HENT: Negative for congestion, ear discharge, ear pain, hearing loss, postnasal drip, rhinorrhea, tinnitus, trouble swallowing and voice change.    Eyes: Negative for discharge, redness, itching and visual disturbance.   Respiratory: Negative for cough, chest tightness, shortness of breath and wheezing.    Cardiovascular: Negative for chest pain, palpitations and leg swelling.   Gastrointestinal: Negative for abdominal pain, blood in stool, constipation, diarrhea, nausea and vomiting.   Endocrine: Negative for cold intolerance, heat intolerance, polydipsia and polyuria.   Genitourinary: Negative for difficulty urinating, dysuria, flank pain, hematuria, menstrual problem and urgency.   Musculoskeletal: Positive for back pain. Negative for arthralgias, gait problem, joint swelling, myalgias, neck pain and neck stiffness.   Skin: Negative for color change and rash.   Neurological: Negative for dizziness, seizures, syncope, weakness and headaches.   Hematological: Negative for adenopathy.   Psychiatric/Behavioral: Negative for agitation, behavioral problems, confusion, dysphoric mood and sleep disturbance.       Objective:      Physical Exam   Constitutional: She is oriented to person, place, and time. She appears well-developed and well-nourished. No distress.   HENT:   Head: Normocephalic and atraumatic.   Right Ear: External ear normal.   Left Ear: External ear normal.   Nose: Nose normal.   Mouth/Throat:  Oropharynx is clear and moist. No oropharyngeal exudate.   Eyes: Pupils are equal, round, and reactive to light. Conjunctivae and EOM are normal. Right eye exhibits no discharge. Left eye exhibits no discharge. No scleral icterus.   Neck: Neck supple. No JVD present. No tracheal deviation present. No thyromegaly present.   Cardiovascular: Normal rate, normal heart sounds and intact distal pulses. Exam reveals no gallop and no friction rub.   No murmur heard.  Pulmonary/Chest: Effort normal and breath sounds normal. No stridor. No respiratory distress. She has no wheezes. She has no rales. She exhibits no tenderness.   Abdominal: Soft. Bowel sounds are normal. She exhibits no distension. There is no tenderness. There is no rebound.   Musculoskeletal: She exhibits no edema.        Lumbar back: She exhibits decreased range of motion and tenderness.   Lymphadenopathy:     She has no cervical adenopathy.   Neurological: She is alert and oriented to person, place, and time.   Skin: Skin is warm and dry. No rash noted. She is not diaphoretic. No erythema.   Psychiatric: She has a normal mood and affect. Her behavior is normal.   Nursing note and vitals reviewed.      Assessment and Plan:       1. Left sided sciatica  - heating pad to affected area nightly x 10 min  - piriformis stretching bid x 1 week  - methylPREDNISolone (MEDROL DOSEPACK) 4 mg tablet; Take as directed  Dispense: 1 Package; Refill: 0  - triamcinolone acetonide injection 40 mg  - cyclobenzaprine (FLEXERIL) 5 MG tablet; Take 1 tablet (5 mg total) by mouth nightly. for 14 days  Dispense: 14 tablet; Refill: 0    2. Type 2 diabetes mellitus with other specified complication, with long-term current use of insulin  - steroid may temporarily raise sugar; if sugars get too high, notify clinic aspa          No follow-ups on file.

## 2019-12-04 ENCOUNTER — OFFICE VISIT (OUTPATIENT)
Dept: INTERNAL MEDICINE | Facility: CLINIC | Age: 64
End: 2019-12-04
Payer: MEDICARE

## 2019-12-04 VITALS
DIASTOLIC BLOOD PRESSURE: 66 MMHG | SYSTOLIC BLOOD PRESSURE: 120 MMHG | BODY MASS INDEX: 27.95 KG/M2 | HEIGHT: 65 IN | HEART RATE: 72 BPM | WEIGHT: 167.75 LBS | TEMPERATURE: 98 F

## 2019-12-04 DIAGNOSIS — Z79.4 TYPE 2 DIABETES MELLITUS WITH OTHER SPECIFIED COMPLICATION, WITH LONG-TERM CURRENT USE OF INSULIN: ICD-10-CM

## 2019-12-04 DIAGNOSIS — M54.32 LEFT SIDED SCIATICA: Primary | ICD-10-CM

## 2019-12-04 DIAGNOSIS — E11.69 TYPE 2 DIABETES MELLITUS WITH OTHER SPECIFIED COMPLICATION, WITH LONG-TERM CURRENT USE OF INSULIN: ICD-10-CM

## 2019-12-04 PROCEDURE — 99214 OFFICE O/P EST MOD 30 MIN: CPT | Mod: 25,S$GLB,, | Performed by: INTERNAL MEDICINE

## 2019-12-04 PROCEDURE — 3074F PR MOST RECENT SYSTOLIC BLOOD PRESSURE < 130 MM HG: ICD-10-PCS | Mod: CPTII,S$GLB,, | Performed by: INTERNAL MEDICINE

## 2019-12-04 PROCEDURE — 99214 PR OFFICE/OUTPT VISIT, EST, LEVL IV, 30-39 MIN: ICD-10-PCS | Mod: 25,S$GLB,, | Performed by: INTERNAL MEDICINE

## 2019-12-04 PROCEDURE — 3008F BODY MASS INDEX DOCD: CPT | Mod: CPTII,S$GLB,, | Performed by: INTERNAL MEDICINE

## 2019-12-04 PROCEDURE — 3074F SYST BP LT 130 MM HG: CPT | Mod: CPTII,S$GLB,, | Performed by: INTERNAL MEDICINE

## 2019-12-04 PROCEDURE — 96372 THER/PROPH/DIAG INJ SC/IM: CPT | Mod: S$GLB,,, | Performed by: INTERNAL MEDICINE

## 2019-12-04 PROCEDURE — 3078F PR MOST RECENT DIASTOLIC BLOOD PRESSURE < 80 MM HG: ICD-10-PCS | Mod: CPTII,S$GLB,, | Performed by: INTERNAL MEDICINE

## 2019-12-04 PROCEDURE — 99499 UNLISTED E&M SERVICE: CPT | Mod: S$GLB,,, | Performed by: INTERNAL MEDICINE

## 2019-12-04 PROCEDURE — 99999 PR PBB SHADOW E&M-EST. PATIENT-LVL III: ICD-10-PCS | Mod: PBBFAC,,, | Performed by: INTERNAL MEDICINE

## 2019-12-04 PROCEDURE — 3078F DIAST BP <80 MM HG: CPT | Mod: CPTII,S$GLB,, | Performed by: INTERNAL MEDICINE

## 2019-12-04 PROCEDURE — 3008F PR BODY MASS INDEX (BMI) DOCUMENTED: ICD-10-PCS | Mod: CPTII,S$GLB,, | Performed by: INTERNAL MEDICINE

## 2019-12-04 PROCEDURE — 99999 PR PBB SHADOW E&M-EST. PATIENT-LVL III: CPT | Mod: PBBFAC,,, | Performed by: INTERNAL MEDICINE

## 2019-12-04 PROCEDURE — 96372 PR INJECTION,THERAP/PROPH/DIAG2ST, IM OR SUBCUT: ICD-10-PCS | Mod: S$GLB,,, | Performed by: INTERNAL MEDICINE

## 2019-12-04 PROCEDURE — 99499 RISK ADDL DX/OHS AUDIT: ICD-10-PCS | Mod: S$GLB,,, | Performed by: INTERNAL MEDICINE

## 2019-12-04 RX ORDER — TRIAMCINOLONE ACETONIDE 40 MG/ML
40 INJECTION, SUSPENSION INTRA-ARTICULAR; INTRAMUSCULAR
Status: COMPLETED | OUTPATIENT
Start: 2019-12-04 | End: 2019-12-04

## 2019-12-04 RX ORDER — METHYLPREDNISOLONE 4 MG/1
TABLET ORAL
Qty: 1 PACKAGE | Refills: 0 | Status: SHIPPED | OUTPATIENT
Start: 2019-12-04 | End: 2019-12-30 | Stop reason: ALTCHOICE

## 2019-12-04 RX ORDER — CYCLOBENZAPRINE HCL 5 MG
5 TABLET ORAL NIGHTLY
Qty: 14 TABLET | Refills: 0 | Status: SHIPPED | OUTPATIENT
Start: 2019-12-04 | End: 2019-12-18

## 2019-12-04 RX ADMIN — TRIAMCINOLONE ACETONIDE 40 MG: 40 INJECTION, SUSPENSION INTRA-ARTICULAR; INTRAMUSCULAR at 09:12

## 2019-12-12 DIAGNOSIS — M25.561 PAIN IN BOTH KNEES, UNSPECIFIED CHRONICITY: Primary | ICD-10-CM

## 2019-12-12 DIAGNOSIS — M25.562 PAIN IN BOTH KNEES, UNSPECIFIED CHRONICITY: Primary | ICD-10-CM

## 2019-12-15 ENCOUNTER — PATIENT OUTREACH (OUTPATIENT)
Dept: ADMINISTRATIVE | Facility: OTHER | Age: 64
End: 2019-12-15

## 2019-12-15 DIAGNOSIS — Z12.31 ENCOUNTER FOR SCREENING MAMMOGRAM FOR MALIGNANT NEOPLASM OF BREAST: Primary | ICD-10-CM

## 2019-12-16 ENCOUNTER — OFFICE VISIT (OUTPATIENT)
Dept: ORTHOPEDICS | Facility: CLINIC | Age: 64
End: 2019-12-16
Payer: MEDICARE

## 2019-12-16 ENCOUNTER — HOSPITAL ENCOUNTER (OUTPATIENT)
Dept: RADIOLOGY | Facility: HOSPITAL | Age: 64
Discharge: HOME | End: 2019-12-16
Attending: ORTHOPAEDIC SURGERY
Payer: MEDICARE

## 2019-12-16 VITALS — HEIGHT: 65 IN | WEIGHT: 168.13 LBS | BODY MASS INDEX: 28.01 KG/M2

## 2019-12-16 DIAGNOSIS — M25.562 PAIN IN BOTH KNEES, UNSPECIFIED CHRONICITY: ICD-10-CM

## 2019-12-16 DIAGNOSIS — M17.11 PRIMARY OSTEOARTHRITIS OF RIGHT KNEE: ICD-10-CM

## 2019-12-16 DIAGNOSIS — M25.561 PAIN IN BOTH KNEES, UNSPECIFIED CHRONICITY: ICD-10-CM

## 2019-12-16 DIAGNOSIS — M17.12 PRIMARY OSTEOARTHRITIS OF LEFT KNEE: Primary | ICD-10-CM

## 2019-12-16 PROCEDURE — 3008F PR BODY MASS INDEX (BMI) DOCUMENTED: ICD-10-PCS | Mod: CPTII,S$GLB,, | Performed by: ORTHOPAEDIC SURGERY

## 2019-12-16 PROCEDURE — 99999 PR PBB SHADOW E&M-EST. PATIENT-LVL III: CPT | Mod: PBBFAC,,, | Performed by: ORTHOPAEDIC SURGERY

## 2019-12-16 PROCEDURE — 3008F BODY MASS INDEX DOCD: CPT | Mod: CPTII,S$GLB,, | Performed by: ORTHOPAEDIC SURGERY

## 2019-12-16 PROCEDURE — 20610 DRAIN/INJ JOINT/BURSA W/O US: CPT | Mod: LT,S$GLB,, | Performed by: ORTHOPAEDIC SURGERY

## 2019-12-16 PROCEDURE — 73562 X-RAY EXAM OF KNEE 3: CPT | Mod: TC,50

## 2019-12-16 PROCEDURE — 99213 PR OFFICE/OUTPT VISIT, EST, LEVL III, 20-29 MIN: ICD-10-PCS | Mod: 25,S$GLB,, | Performed by: ORTHOPAEDIC SURGERY

## 2019-12-16 PROCEDURE — 99213 OFFICE O/P EST LOW 20 MIN: CPT | Mod: 25,S$GLB,, | Performed by: ORTHOPAEDIC SURGERY

## 2019-12-16 PROCEDURE — 73562 XR KNEE ORTHO BILAT: ICD-10-PCS | Mod: 26,50,, | Performed by: RADIOLOGY

## 2019-12-16 PROCEDURE — 73562 X-RAY EXAM OF KNEE 3: CPT | Mod: 26,50,, | Performed by: RADIOLOGY

## 2019-12-16 PROCEDURE — 20610 PR DRAIN/INJECT LARGE JOINT/BURSA: ICD-10-PCS | Mod: LT,S$GLB,, | Performed by: ORTHOPAEDIC SURGERY

## 2019-12-16 PROCEDURE — 99999 PR PBB SHADOW E&M-EST. PATIENT-LVL III: ICD-10-PCS | Mod: PBBFAC,,, | Performed by: ORTHOPAEDIC SURGERY

## 2019-12-16 RX ORDER — TRIAMCINOLONE ACETONIDE 40 MG/ML
40 INJECTION, SUSPENSION INTRA-ARTICULAR; INTRAMUSCULAR
Status: COMPLETED | OUTPATIENT
Start: 2019-12-16 | End: 2019-12-16

## 2019-12-16 RX ADMIN — TRIAMCINOLONE ACETONIDE 40 MG: 40 INJECTION, SUSPENSION INTRA-ARTICULAR; INTRAMUSCULAR at 09:12

## 2019-12-16 NOTE — PROGRESS NOTES
"Subjective:      Patient ID: Riana Kay is a 64 y.o. female.    Chief Complaint: Pain of the Right Knee and Pain of the Left Knee    HPI  Riana Kay has left knee pain.  The pain has worsened over the past several weeks. The pain is located in the global aspect of the knee.  There  is radiation.  The pain radiates to and from the left hip.  There is associated stiffness.   There is not catching and locking. The pain is described as achy. The pain is aggravated by activity.  It is alleviated by rest.  There is not associated back pain.  Her history, medications and problem list were reviewed.    Review of Systems   Constitution: Negative for chills, fever and night sweats.   HENT: Negative for hearing loss.    Eyes: Negative for blurred vision and double vision.   Cardiovascular: Negative for chest pain, claudication and leg swelling.   Respiratory: Negative for shortness of breath.    Endocrine: Negative for polydipsia, polyphagia and polyuria.   Hematologic/Lymphatic: Negative for adenopathy and bleeding problem. Does not bruise/bleed easily.   Skin: Negative for poor wound healing.   Musculoskeletal: Positive for joint pain.   Gastrointestinal: Negative for diarrhea and heartburn.   Genitourinary: Negative for bladder incontinence.   Neurological: Negative for focal weakness, headaches, numbness, paresthesias and sensory change.   Psychiatric/Behavioral: The patient is not nervous/anxious.    Allergic/Immunologic: Negative for persistent infections.         Objective:      Body mass index is 27.97 kg/m².  Vitals:    12/16/19 0909   Weight: 76.2 kg (168 lb 1.6 oz)   Height: 5' 5" (1.651 m)           General    Constitutional: She is oriented to person, place, and time. She appears well-developed and well-nourished.   HENT:   Head: Normocephalic and atraumatic.   Eyes: EOM are normal.   Cardiovascular: Normal rate.    Pulmonary/Chest: Effort normal.   Neurological: She is alert and oriented to " person, place, and time.   Psychiatric: She has a normal mood and affect. Her behavior is normal.     General Musculoskeletal Exam   Gait: normal   Pelvic Obliquity: none      Right Knee Exam     Inspection   Alignment:  normal  Erythema: absent  Scars: absent  Swelling: absent  Effusion: absent  Deformity: present (varus)  Bruising: absent    Tenderness   The patient is tender to palpation of the medial joint line.    Crepitus   The patient has crepitus of the patella.    Range of Motion   Extension: 0   Flexion: 120     Tests   Ligament Examination Lachman: normal (-1 to 2mm)   MCL - Valgus: normal (0 to 2mm)  LCL - Varus: normal  Patella   Passive Patellar Tilt: neutral    Other   Sensation: normal    Left Knee Exam     Inspection   Alignment:  normal  Erythema: absent  Scars: absent  Swelling: absent  Effusion: absent  Deformity: present (varus)  Bruising: absent    Tenderness   The patient tender to palpation of the medial joint line and lateral joint line.    Crepitus   The patient has crepitus of the patella.    Range of Motion   Extension: 0   Flexion: 120     Tests   Stability Lachman: normal (-1 to 2mm)   MCL - Valgus: normal (0 to 2mm)  LCL - Varus: normal (0 to 2mm)  Patella   Passive Patellar Tilt: neutral    Other   Sensation: normal    Right Hip Exam     Inspection   Scars: absent  Swelling: absent  Bruising: absent  No deformity of hip.  Quadriceps Atrophy:  Negative  Erythema: absent    Range of Motion   Abduction: 25   Adduction: 20   Extension: 0   Flexion: 100   External rotation: 30   Internal rotation: 25     Tests   Pain w/ forced internal rotation (TERESITA): absent  Stinchfield test: negative    Other   Sensation: normal  Left Hip Exam     Inspection   Scars: absent  Swelling: absent  No deformity of hip.  Quadriceps Atrophy:  negative  Erythema: absent  Bruising: absent    Range of Motion   Abduction: 25   Adduction: 20   Extension: 0   Flexion: 100   External rotation: 20   Internal rotation:  20     Tests   Pain w/ forced internal rotation (TERESITA): absent  Stinchfield test: negative    Other   Sensation: normal      Back (L-Spine & T-Spine) / Neck (C-Spine) Exam   Back exam is normal.      Muscle Strength   Right Lower Extremity   Hip Abduction: 5/5   Hip Adduction: 5/5   Hip Flexion: 5/5   Quadriceps:  5/5   Hamstrin/5   Ankle Dorsiflexion:  5/5   Left Lower Extremity   Hip Abduction: 5/5   Hip Adduction: 5/5   Hip Flexion: 5/5   Quadriceps:  5/5   Hamstrin/5   Ankle Dorsiflexion:  5/5     Reflexes     Left Side  Quadriceps:  2+    Right Side   Quadriceps:  2+    Vascular Exam     Right Pulses  Dorsalis Pedis:      2+          Left Pulses  Dorsalis Pedis:      2+          Capillary Refill  Right Hand: normal capillary refill  Left Hand: normal capillary refill    Edema  Right Upper Leg: absent  Right Lower Leg: absent  Left Upper Leg: absent  Left Lower Leg: absent    Radiographs taken today and reviewed by me demonstrate moderate arthritic change of the bilateral KNEE(s).There  is not bone destruction.  There is not a fracture. The medial compartment is most involved.  There is a varus deformity.  The changes are tricompartmental.              Assessment:       Encounter Diagnoses   Name Primary?    Primary osteoarthritis of left knee Yes    Primary osteoarthritis of right knee           Plan:       Riana was seen today for pain and pain.    Diagnoses and all orders for this visit:    Primary osteoarthritis of left knee    Primary osteoarthritis of right knee    Other orders  -     triamcinolone acetonide injection 40 mg      Treatment options have been discussed.  We have decided to proceed with a  cortico- steroid injection.  The risk of elevated blood glucose and the extremely low risk of infection were discussed. Verbal consent was obtained. .    After time out was performed and patient ID, side, and site were verified, the left knee  was prepped in the standard sterile fashion.  A  22-gauge needle was introduced into the left knee joint from an jasbir-lateral site without complication. The left knee(s) was then injected with 40mg of triamcinolone.  Sterile dressing was applied.  The patient was informed that they may resume activities as tolerated. She was instructed to call if there were any problems.     We will see Riana Kay  back in 6 weeks to see how she has responded.     Left hip xrays on return

## 2019-12-30 ENCOUNTER — PATIENT OUTREACH (OUTPATIENT)
Dept: ADMINISTRATIVE | Facility: OTHER | Age: 64
End: 2019-12-30

## 2019-12-30 ENCOUNTER — OFFICE VISIT (OUTPATIENT)
Dept: PODIATRY | Facility: CLINIC | Age: 64
End: 2019-12-30
Payer: MEDICARE

## 2019-12-30 VITALS
WEIGHT: 168 LBS | HEIGHT: 65 IN | HEART RATE: 69 BPM | DIASTOLIC BLOOD PRESSURE: 71 MMHG | BODY MASS INDEX: 27.99 KG/M2 | SYSTOLIC BLOOD PRESSURE: 141 MMHG | RESPIRATION RATE: 18 BRPM

## 2019-12-30 DIAGNOSIS — M25.572 CHRONIC PAIN OF LEFT ANKLE: ICD-10-CM

## 2019-12-30 DIAGNOSIS — Z79.4 TYPE 2 DIABETES MELLITUS WITH OTHER SPECIFIED COMPLICATION, WITH LONG-TERM CURRENT USE OF INSULIN: Primary | ICD-10-CM

## 2019-12-30 DIAGNOSIS — M79.89 MASS OF SOFT TISSUE OF FOOT: ICD-10-CM

## 2019-12-30 DIAGNOSIS — E11.69 TYPE 2 DIABETES MELLITUS WITH OTHER SPECIFIED COMPLICATION, WITH LONG-TERM CURRENT USE OF INSULIN: Primary | ICD-10-CM

## 2019-12-30 DIAGNOSIS — G89.29 CHRONIC PAIN OF LEFT ANKLE: ICD-10-CM

## 2019-12-30 DIAGNOSIS — M25.572 LEFT LATERAL ANKLE PAIN: Primary | ICD-10-CM

## 2019-12-30 DIAGNOSIS — Z12.31 ENCOUNTER FOR SCREENING MAMMOGRAM FOR MALIGNANT NEOPLASM OF BREAST: ICD-10-CM

## 2019-12-30 PROCEDURE — 99999 PR PBB SHADOW E&M-EST. PATIENT-LVL III: CPT | Mod: PBBFAC,,, | Performed by: PODIATRIST

## 2019-12-30 PROCEDURE — 99214 OFFICE O/P EST MOD 30 MIN: CPT | Mod: 25,S$GLB,, | Performed by: PODIATRIST

## 2019-12-30 PROCEDURE — 3008F PR BODY MASS INDEX (BMI) DOCUMENTED: ICD-10-PCS | Mod: CPTII,S$GLB,, | Performed by: PODIATRIST

## 2019-12-30 PROCEDURE — 3008F BODY MASS INDEX DOCD: CPT | Mod: CPTII,S$GLB,, | Performed by: PODIATRIST

## 2019-12-30 PROCEDURE — 20605 DRAIN/INJ JOINT/BURSA W/O US: CPT | Mod: LT,S$GLB,, | Performed by: PODIATRIST

## 2019-12-30 PROCEDURE — 3077F SYST BP >= 140 MM HG: CPT | Mod: CPTII,S$GLB,, | Performed by: PODIATRIST

## 2019-12-30 PROCEDURE — 99999 PR PBB SHADOW E&M-EST. PATIENT-LVL III: ICD-10-PCS | Mod: PBBFAC,,, | Performed by: PODIATRIST

## 2019-12-30 PROCEDURE — 3078F PR MOST RECENT DIASTOLIC BLOOD PRESSURE < 80 MM HG: ICD-10-PCS | Mod: CPTII,S$GLB,, | Performed by: PODIATRIST

## 2019-12-30 PROCEDURE — 99214 PR OFFICE/OUTPT VISIT, EST, LEVL IV, 30-39 MIN: ICD-10-PCS | Mod: 25,S$GLB,, | Performed by: PODIATRIST

## 2019-12-30 PROCEDURE — 20605 PR DRAIN/INJECT INTERMEDIATE JOINT/BURSA: ICD-10-PCS | Mod: LT,S$GLB,, | Performed by: PODIATRIST

## 2019-12-30 PROCEDURE — 3077F PR MOST RECENT SYSTOLIC BLOOD PRESSURE >= 140 MM HG: ICD-10-PCS | Mod: CPTII,S$GLB,, | Performed by: PODIATRIST

## 2019-12-30 PROCEDURE — 3078F DIAST BP <80 MM HG: CPT | Mod: CPTII,S$GLB,, | Performed by: PODIATRIST

## 2019-12-30 RX ORDER — GABAPENTIN 300 MG/1
CAPSULE ORAL
COMMUNITY
Start: 2019-12-27 | End: 2020-02-19

## 2019-12-30 RX ORDER — DEXAMETHASONE SODIUM PHOSPHATE 4 MG/ML
4 INJECTION, SOLUTION INTRA-ARTICULAR; INTRALESIONAL; INTRAMUSCULAR; INTRAVENOUS; SOFT TISSUE ONCE
Status: COMPLETED | OUTPATIENT
Start: 2019-12-30 | End: 2019-12-30

## 2019-12-30 RX ADMIN — DEXAMETHASONE SODIUM PHOSPHATE 4 MG: 4 INJECTION, SOLUTION INTRA-ARTICULAR; INTRALESIONAL; INTRAMUSCULAR; INTRAVENOUS; SOFT TISSUE at 10:12

## 2019-12-30 NOTE — PROGRESS NOTES
Subjective:      Patient ID: Riana Kay is a 64 y.o. female.    Chief Complaint: PCP (Amena Roger,  12/04/19); Diabetic Foot Exam; and Ankle Pain (left foot )    Riana is a 64 y.o. female who presents to the podiatry clinic  with complaint of  left foot pain. Onset of the symptoms was several months ago. Precipitating event: none known. Current symptoms include: ability to bear weight, but with some pain, stiffness and worsening symptoms after a period of activity. Aggravating factors: standing and walking. Symptoms have waxed and waned. Patient has had prior foot problems. Evaluation to date: none. Treatment to date: avoidance of offending activity. Patients rates pain 8/10 on pain scale.        Review of Systems   Constitution: Negative for chills, decreased appetite and fever.   Cardiovascular: Negative for leg swelling.   Musculoskeletal: Positive for joint pain. Negative for arthritis, joint swelling and myalgias.   Gastrointestinal: Negative for nausea and vomiting.   Neurological: Negative for loss of balance, numbness and paresthesias.         Patient Active Problem List   Diagnosis    Fatty liver    S/P total hysterectomy    Osteopenia    GERD (gastroesophageal reflux disease)    Sleep apnea    Atrophic gastritis without mention of hemorrhage    Chronic fatigue syndrome    Coronary atherosclerosis    Abdominal pain, right upper quadrant    HTN (hypertension)    Muscle spasms of neck    Radiculopathy of cervical spine    Overweight (BMI 25.0-29.9)    Type 2 diabetes mellitus with other specified complication    Screening for colon cancer    Pain    Chronic pain of right knee    Right elbow pain    Dysphagia    Chronic pain of left ankle    Vitamin D deficiency       Current Outpatient Medications on File Prior to Visit   Medication Sig Dispense Refill    amLODIPine (NORVASC) 5 MG tablet Take 1 tablet (5 mg total) by mouth once daily. 90 tablet 3    blood sugar  "diagnostic Strp To check BG 4 times daily, to use with insurance preferred meter-true metrix 400 each 3    blood-glucose meter kit To check BG 4 times daily, to use with insurance preferred meter-true metrix 1 each 1    cyanocobalamin (VITAMIN B-12) 100 MCG tablet Take 100 mcg by mouth once daily.      diclofenac sodium (VOLTAREN) 1 % Gel Apply 2 g topically 2 (two) times daily. 100 g 2    diclofenac sodium (VOLTAREN) 1 % Gel Apply 2 g topically 4 (four) times daily. 100 g 1    gabapentin (NEURONTIN) 300 MG capsule       insulin aspart U-100 (NOVOLOG FLEXPEN U-100 INSULIN) 100 unit/mL (3 mL) InPn pen INJECT 14 units w/ meals plus scale 150-200+2, 201-250+4, 251-300+6, 301-350+8, >350+10. Max daily 72 units 60 mL 3    insulin detemir U-100 (LEVEMIR FLEXTOUCH U-100 INSULN) 100 unit/mL (3 mL) SubQ InPn pen Inject 38 Units into the skin every evening. Prime with 2 units before every use.  TDD 37units 3 Box 3    lancets Misc To check BG 4  times daily, to use with insurance preferred meter-true metrix 400 each 3    losartan (COZAAR) 25 MG tablet Take 0.5 tablets (12.5 mg total) by mouth once daily. 45 tablet 3    ondansetron (ZOFRAN-ODT) 8 MG TbDL Take 1 tablet (8 mg total) by mouth 3 (three) times daily as needed. 30 tablet 6    pantoprazole (PROTONIX) 40 MG tablet Take 1 tablet (40 mg total) by mouth 2 (two) times daily. 60 tablet 11    pen needle, diabetic (NOVOFINE 32) 32 gauge x 1/4" Ndle Uses 4 times a day. 90 day via duramed e 11.65 400 each 3    pyridoxine, vitamin B6, (VITAMIN B-6) 100 MG Tab Take 100 mg by mouth once daily.      SITagliptin (JANUVIA) 100 MG Tab Take 1 tablet (100 mg total) by mouth once daily. 90 tablet 3    azelastine (ASTELIN) 137 mcg (0.1 %) nasal spray 1 spray (137 mcg total) by Nasal route 2 (two) times daily. 30 mL 6    meloxicam (MOBIC) 15 MG tablet TAKE 1 TABLET BY MOUTH EVERY DAY (Patient not taking: Reported on 12/30/2019) 30 tablet 0    methylPREDNISolone (MEDROL " DOSEPACK) 4 mg tablet Take as directed (Patient not taking: Reported on 12/30/2019) 1 Package 0     No current facility-administered medications on file prior to visit.        Review of patient's allergies indicates:   Allergen Reactions    Iodinated contrast media Swelling and Rash    Percocet [oxycodone-acetaminophen] Itching    Macrobid [nitrofurantoin monohyd/m-cryst] Rash    Metformin Rash    Penicillins Rash    Promethazine Rash    Sulfa (sulfonamide antibiotics) Rash    Sulfamethoxazole-trimethoprim Rash       Past Surgical History:   Procedure Laterality Date    CHOLECYSTECTOMY      COLONOSCOPY N/A 1/17/2018    Procedure: COLONOSCOPY with Donnell;  Surgeon: Roland Jacobo MD;  Location: [facility] (4TH FLR);  Service: Endoscopy;  Laterality: N/A;    ELBOW ARTHROPLASTY Right 1/16/2019    Procedure: ARTHROPLASTY, ELBOW right radial head arthroplasty revision;  Surgeon: Katja Hubbard MD;  Location: [facility] OR [address] [place] [address];  Service: Orthopedics;  Laterality: Right;  Anesthesia: General and Regional. Stretcher, hand pan 1 and pan 2, CALL RUCHI SANTAMARIA notified 1-14 LO    ELBOW SURGERY Right 7/16/15    ELBOW SURGERY      ESOPHAGOGASTRODUODENOSCOPY N/A 4/15/2019    Procedure: EGD (ESOPHAGOGASTRODUODENOSCOPY);  Surgeon: Buck Irwin MD;  Location: [facility] (4TH FLR);  Service: Endoscopy;  Laterality: N/A;  ray she    HYSTERECTOMY      KNEE ARTHROSCOPY W/ DEBRIDEMENT  4/11    Left    RELEASE OF ULNAR NERVE AT CUBITAL TUNNEL Right 1/16/2019    Procedure: RELEASE, ULNAR TUNNEL right;  Surgeon: Katja Hubbard MD;  Location: [facility] OR [address] [place] [address];  Service: Orthopedics;  Laterality: Right;  Anesthesia: General and Regional. Stretcher, hand pan 1 and pan 2, CALL RUCHI SANTAMARIA    ROTATOR CUFF REPAIR      TONSILLECTOMY      TOTAL ABDOMINAL HYSTERECTOMY W/ BILATERAL SALPINGOOPHORECTOMY      UPPER GASTROINTESTINAL ENDOSCOPY         Family History   Problem Relation  "Age of Onset    Colon cancer Father 83        colon cancer    Diabetes Maternal Grandmother     Diabetes Maternal Aunt     Esophageal cancer Neg Hx     Stomach cancer Neg Hx        Social History     Socioeconomic History    Marital status:      Spouse name: Not on file    Number of children: Not on file    Years of education: Not on file    Highest education level: Not on file   Occupational History    Not on file   Social Needs    Financial resource strain: Hard    Food insecurity:     Worry: Never true     Inability: Never true    Transportation needs:     Medical: No     Non-medical: No   Tobacco Use    Smoking status: Never Smoker    Smokeless tobacco: Never Used   Substance and Sexual Activity    Alcohol use: No     Frequency: Never     Drinks per session: Patient refused     Binge frequency: Patient refused    Drug use: No    Sexual activity: Yes     Partners: Male     Birth control/protection: Post-menopausal   Lifestyle    Physical activity:     Days per week: 5 days     Minutes per session: 40 min    Stress: Only a little   Relationships    Social connections:     Talks on phone: More than three times a week     Gets together: Once a week     Attends [employer] witness service: Not on file     Active member of club or organization: No     Attends meetings of clubs or organizations: Never     Relationship status:    Other Topics Concern    Not on file   Social History Narrative    She works at [employer] in [place]; , 1 kid (21yo).Nonsmoker, social etoh. No exercise but hopes to start walking on the weekend.               Objective:       Vitals:    12/30/19 0919   BP: (!) 141/71   Pulse: 69   Resp: 18   Weight: 76.2 kg (168 lb)   Height: 5' 5" (1.651 m)   PainSc:   8   PainLoc: Ankle        Physical Exam   Constitutional: She is oriented to person, place, and time. She appears well-developed and well-nourished.   Cardiovascular:   Pulses:       Dorsalis pedis pulses are " 2+ on the right side, and 2+ on the left side.        Posterior tibial pulses are 2+ on the right side, and 2+ on the left side.   Musculoskeletal: She exhibits tenderness (L ankle ). She exhibits no edema.        Right ankle: Normal.        Left ankle: Normal.        Right foot: There is no swelling, no crepitus and no deformity.        Left foot: There is no swelling, no crepitus and no deformity.   Adequate joint range of motion without pain, limitation, nor crepitation Bilateral feet and ankle joints. Muscle strength is 5/5 in all groups bilaterally.      Pain upon palpation of lateral >>> and medial  L  ankle ligaments. Pain w/ inversion of affected limb     Firm non mobile fatty circular soft tissue mass l lateral ankle No surrounding erythema, edema, malodor, nor drainage noted   '   Lymphadenopathy:   No palpable lymph nodes   Neurological: She is alert and oriented to person, place, and time. She has normal strength.   Skin: Skin is warm, dry and intact. No rash noted. No erythema. Nails show no clubbing.   Psychiatric: She has a normal mood and affect. Her behavior is normal.             Assessment:       Encounter Diagnoses   Name Primary?    Left lateral ankle pain Yes    Mass of soft tissue of foot     Chronic pain of left ankle          Plan:       Riana was seen today for pcp, diabetic foot exam and ankle pain.    Diagnoses and all orders for this visit:    Left lateral ankle pain    Mass of soft tissue of foot    Chronic pain of left ankle      I counseled the patient on her conditions, their implications and medical management.    Lateral L ankle mass c/w lipoma vs g cyst     Patient instructed on adequate icing techniques. Patient should ice the affected area at least once per day x 10 minutes for 10 days . I advised the  patient that extra icing would also be beneficial to ensure adequate anti inflammatory effect     Discussed possible side effects from injection including but NOT limited to  discoloration of skin, erosion of soft tissue, and increased likelihood of rupture of a soft tissue structure (ie. Plantar fascia, muscle, tendon, ligament, or capsule in the area of injection). Patient indicates understanding of my explanation, and patient gives verbal consent for injection of affected area. A time out was performed to verify the  correct  patient and site, prior to initiation of procedure.     After sterilizing the area with an alcohol prep, the affected area of the L lateral ankle  was injected as per MAR  The patient tolerated the injection well and reported comfort to the area     Advised patient to monitor blood sugar levels. Steroid injections may cause temporary elevations in BS. Patient verbally reports understanding and will closely monitor BS and follow up as needed if any elevated or  uncontrollable BS should occur     Patient fitted and dispensed Gauntlet style lace up and instructed on use      .

## 2019-12-31 RX ORDER — AZELASTINE 1 MG/ML
1 SPRAY, METERED NASAL 2 TIMES DAILY
Qty: 90 ML | Refills: 2 | Status: SHIPPED | OUTPATIENT
Start: 2019-12-31 | End: 2021-04-14 | Stop reason: SDUPTHER

## 2020-01-02 ENCOUNTER — TELEPHONE (OUTPATIENT)
Dept: DERMATOLOGY | Facility: CLINIC | Age: 65
End: 2020-01-02

## 2020-01-02 NOTE — TELEPHONE ENCOUNTER
----- Message from Misty Villalpando sent at 1/2/2020 10:52 AM CST -----  Contact: VILMA STEWARD [[id number]]  Name of Who is Calling: VILMA STEWARD [[id number]]      What is the request in detail: Pt is calling to speak to staff in regards to rescheduling her appointment.... Please call to further assist .       Can the clinic reply by [facility]:  N       [facility] Back [facility]: [phone]

## 2020-01-06 ENCOUNTER — PATIENT OUTREACH (OUTPATIENT)
Dept: ADMINISTRATIVE | Facility: OTHER | Age: 65
End: 2020-01-06

## 2020-01-06 ENCOUNTER — OFFICE VISIT (OUTPATIENT)
Dept: DERMATOLOGY | Facility: CLINIC | Age: 65
End: 2020-01-06
Payer: MEDICARE

## 2020-01-06 DIAGNOSIS — L21.9 SEBORRHEIC DERMATITIS: Primary | ICD-10-CM

## 2020-01-06 DIAGNOSIS — L65.8 FEMALE PATTERN BALDNESS: ICD-10-CM

## 2020-01-06 PROCEDURE — 99202 OFFICE O/P NEW SF 15 MIN: CPT | Mod: S$GLB,,, | Performed by: DERMATOLOGY

## 2020-01-06 PROCEDURE — 99202 PR OFFICE/OUTPT VISIT, NEW, LEVL II, 15-29 MIN: ICD-10-PCS | Mod: S$GLB,,, | Performed by: DERMATOLOGY

## 2020-01-06 RX ORDER — KETOCONAZOLE 20 MG/ML
SHAMPOO, SUSPENSION TOPICAL
Qty: 240 ML | Refills: 5 | Status: SHIPPED | OUTPATIENT
Start: 2020-01-06 | End: 2021-01-22

## 2020-01-06 NOTE — PATIENT INSTRUCTIONS
Recommended a trial of topical minoxidil 5% foam (Rogaine). Must use for at least 6 months to see full effect. Counseled patient that if regrowth occurs, she must continue using it as directed or the new growth will fall out. Only apply to areas on scalp with hair loss, and discontinue if redness/irritation occurs.

## 2020-01-06 NOTE — PROGRESS NOTES
Subjective:       Patient ID:  Riana Kay is a 64 y.o. female who presents for   Chief Complaint   Patient presents with    Hair Loss     crown of scalp, 2 years, no prev tx      Hair Loss  - Initial  Affected locations: scalp  Duration: 2 years  Signs and Symptoms: hair thinning, sometimes scaly or bumps.  Severity: mild to moderate  Timing: constant  Aggravated by: nothing  Relieving factors/Treatments tried: nothing (no prev tx )      Review of Systems   Skin: Positive for itching. Negative for rash.   Hematologic/Lymphatic: Bruises/bleeds easily.        Objective:    Physical Exam   Constitutional: She appears well-developed and well-nourished. No distress.   HENT:   Mouth/Throat: Lips normal.    Eyes: Lids are normal.  No conjunctival no injection.   Neurological: She is alert and oriented to person, place, and time. She is not disoriented.   Psychiatric: She has a normal mood and affect.   Skin:   Areas Examined (abnormalities noted in diagram):   Scalp / Hair Palpated and Inspected  Head / Face Inspection Performed  Neck Inspection Performed              Diagram Legend     Erythematous scaling macule/papule c/w actinic keratosis       Vascular papule c/w angioma      Pigmented verrucoid papule/plaque c/w seborrheic keratosis      Yellow umbilicated papule c/w sebaceous hyperplasia      Irregularly shaped tan macule c/w lentigo     1-2 mm smooth white papules consistent with Milia      Movable subcutaneous cyst with punctum c/w epidermal inclusion cyst      Subcutaneous movable cyst c/w pilar cyst      Firm pink to brown papule c/w dermatofibroma      Pedunculated fleshy papule(s) c/w skin tag(s)      Evenly pigmented macule c/w junctional nevus     Mildly variegated pigmented, slightly irregular-bordered macule c/w mildly atypical nevus      Flesh colored to evenly pigmented papule c/w intradermal nevus       Pink pearly papule/plaque c/w basal cell carcinoma      Erythematous hyperkeratotic  cursted plaque c/w SCC      Surgical scar with no sign of skin cancer recurrence      Open and closed comedones      Inflammatory papules and pustules      Verrucoid papule consistent consistent with wart     Erythematous eczematous patches and plaques     Dystrophic onycholytic nail with subungual debris c/w onychomycosis     Umbilicated papule    Erythematous-base heme-crusted tan verrucoid plaque consistent with inflamed seborrheic keratosis     Erythematous Silvery Scaling Plaque c/w Psoriasis     See annotation      Assessment / Plan:        Seborrheic dermatitis  -     ketoconazole (NIZORAL) 2 % shampoo; Wash scalp with medicated shampoo at least 2x/week. 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 Must use for at least 6 months to see full effect. Counseled patient that if regrowth occurs, she must continue using it as directed or the new growth will fall out. Only apply to areas on scalp with hair loss, and discontinue if redness/irritation occurs.      Follow up in about 6 months (around 7/6/2020) for follow up, or sooner if symptoms worsening or not improving.

## 2020-01-07 ENCOUNTER — LAB VISIT (OUTPATIENT)
Dept: LAB | Facility: HOSPITAL | Age: 65
End: 2020-01-07
Attending: INTERNAL MEDICINE
Payer: MEDICARE

## 2020-01-07 DIAGNOSIS — E11.69 TYPE 2 DIABETES MELLITUS WITH OTHER SPECIFIED COMPLICATION, WITH LONG-TERM CURRENT USE OF INSULIN: ICD-10-CM

## 2020-01-07 DIAGNOSIS — Z79.4 TYPE 2 DIABETES MELLITUS WITH OTHER SPECIFIED COMPLICATION, WITH LONG-TERM CURRENT USE OF INSULIN: ICD-10-CM

## 2020-01-07 LAB
ESTIMATED AVG GLUCOSE: 169 MG/DL (ref 68–131)
HBA1C MFR BLD HPLC: 7.5 % (ref 4–5.6)

## 2020-01-07 PROCEDURE — 36415 COLL VENOUS BLD VENIPUNCTURE: CPT | Mod: PO

## 2020-01-07 PROCEDURE — 83036 HEMOGLOBIN GLYCOSYLATED A1C: CPT

## 2020-01-08 ENCOUNTER — HOSPITAL ENCOUNTER (OUTPATIENT)
Dept: RADIOLOGY | Facility: HOSPITAL | Age: 65
Discharge: HOME | End: 2020-01-08
Attending: INTERNAL MEDICINE
Payer: MEDICARE

## 2020-01-08 DIAGNOSIS — Z12.31 ENCOUNTER FOR SCREENING MAMMOGRAM FOR MALIGNANT NEOPLASM OF BREAST: ICD-10-CM

## 2020-01-08 PROCEDURE — 77063 BREAST TOMOSYNTHESIS BI: CPT | Mod: 26,,, | Performed by: RADIOLOGY

## 2020-01-08 PROCEDURE — 77067 SCR MAMMO BI INCL CAD: CPT | Mod: TC,PO

## 2020-01-08 PROCEDURE — 77067 MAMMO DIGITAL SCREENING BILAT WITH TOMOSYNTHESIS_CAD: ICD-10-PCS | Mod: 26,,, | Performed by: RADIOLOGY

## 2020-01-08 PROCEDURE — 77063 MAMMO DIGITAL SCREENING BILAT WITH TOMOSYNTHESIS_CAD: ICD-10-PCS | Mod: 26,,, | Performed by: RADIOLOGY

## 2020-01-08 PROCEDURE — 77067 SCR MAMMO BI INCL CAD: CPT | Mod: 26,,, | Performed by: RADIOLOGY

## 2020-01-09 DIAGNOSIS — K21.9 GASTROESOPHAGEAL REFLUX DISEASE, ESOPHAGITIS PRESENCE NOT SPECIFIED: ICD-10-CM

## 2020-01-09 DIAGNOSIS — R10.9 RIGHT SIDED ABDOMINAL PAIN: ICD-10-CM

## 2020-01-09 DIAGNOSIS — R10.84 GENERALIZED ABDOMINAL PAIN: ICD-10-CM

## 2020-01-09 RX ORDER — PANTOPRAZOLE SODIUM 40 MG/1
TABLET, DELAYED RELEASE ORAL
Qty: 90 TABLET | Refills: 3 | Status: SHIPPED | OUTPATIENT
Start: 2020-01-09 | End: 2020-06-09 | Stop reason: SDUPTHER

## 2020-01-14 ENCOUNTER — OFFICE VISIT (OUTPATIENT)
Dept: INTERNAL MEDICINE | Facility: CLINIC | Age: 65
End: 2020-01-14
Payer: MEDICARE

## 2020-01-14 VITALS
WEIGHT: 167 LBS | DIASTOLIC BLOOD PRESSURE: 60 MMHG | SYSTOLIC BLOOD PRESSURE: 124 MMHG | BODY MASS INDEX: 27.82 KG/M2 | HEIGHT: 65 IN

## 2020-01-14 DIAGNOSIS — E55.9 VITAMIN D DEFICIENCY: ICD-10-CM

## 2020-01-14 DIAGNOSIS — E11.69 TYPE 2 DIABETES MELLITUS WITH OTHER SPECIFIED COMPLICATION, WITH LONG-TERM CURRENT USE OF INSULIN: Primary | ICD-10-CM

## 2020-01-14 DIAGNOSIS — E66.3 OVERWEIGHT (BMI 25.0-29.9): ICD-10-CM

## 2020-01-14 DIAGNOSIS — I25.10 ATHEROSCLEROSIS OF CORONARY ARTERY WITHOUT ANGINA PECTORIS, UNSPECIFIED VESSEL OR LESION TYPE, UNSPECIFIED WHETHER NATIVE OR TRANSPLANTED HEART: ICD-10-CM

## 2020-01-14 DIAGNOSIS — Z79.4 TYPE 2 DIABETES MELLITUS WITH OTHER SPECIFIED COMPLICATION, WITH LONG-TERM CURRENT USE OF INSULIN: Primary | ICD-10-CM

## 2020-01-14 DIAGNOSIS — M62.838 MUSCLE SPASMS OF NECK: ICD-10-CM

## 2020-01-14 DIAGNOSIS — K76.0 FATTY LIVER: ICD-10-CM

## 2020-01-14 DIAGNOSIS — M25.572 CHRONIC PAIN OF LEFT ANKLE: ICD-10-CM

## 2020-01-14 DIAGNOSIS — I10 ESSENTIAL HYPERTENSION: ICD-10-CM

## 2020-01-14 DIAGNOSIS — G89.29 CHRONIC PAIN OF LEFT ANKLE: ICD-10-CM

## 2020-01-14 PROBLEM — R13.10 DYSPHAGIA: Status: RESOLVED | Noted: 2019-04-15 | Resolved: 2020-01-14

## 2020-01-14 PROCEDURE — 3074F SYST BP LT 130 MM HG: CPT | Mod: CPTII,S$GLB,, | Performed by: NURSE PRACTITIONER

## 2020-01-14 PROCEDURE — 3008F PR BODY MASS INDEX (BMI) DOCUMENTED: ICD-10-PCS | Mod: CPTII,S$GLB,, | Performed by: NURSE PRACTITIONER

## 2020-01-14 PROCEDURE — 3008F BODY MASS INDEX DOCD: CPT | Mod: CPTII,S$GLB,, | Performed by: NURSE PRACTITIONER

## 2020-01-14 PROCEDURE — 3078F PR MOST RECENT DIASTOLIC BLOOD PRESSURE < 80 MM HG: ICD-10-PCS | Mod: CPTII,S$GLB,, | Performed by: NURSE PRACTITIONER

## 2020-01-14 PROCEDURE — 99999 PR PBB SHADOW E&M-EST. PATIENT-LVL III: ICD-10-PCS | Mod: PBBFAC,,, | Performed by: NURSE PRACTITIONER

## 2020-01-14 PROCEDURE — 3074F PR MOST RECENT SYSTOLIC BLOOD PRESSURE < 130 MM HG: ICD-10-PCS | Mod: CPTII,S$GLB,, | Performed by: NURSE PRACTITIONER

## 2020-01-14 PROCEDURE — 99214 OFFICE O/P EST MOD 30 MIN: CPT | Mod: S$GLB,,, | Performed by: NURSE PRACTITIONER

## 2020-01-14 PROCEDURE — 99214 PR OFFICE/OUTPT VISIT, EST, LEVL IV, 30-39 MIN: ICD-10-PCS | Mod: S$GLB,,, | Performed by: NURSE PRACTITIONER

## 2020-01-14 PROCEDURE — 3078F DIAST BP <80 MM HG: CPT | Mod: CPTII,S$GLB,, | Performed by: NURSE PRACTITIONER

## 2020-01-14 PROCEDURE — 99999 PR PBB SHADOW E&M-EST. PATIENT-LVL III: CPT | Mod: PBBFAC,,, | Performed by: NURSE PRACTITIONER

## 2020-01-14 RX ORDER — INSULIN GLARGINE 100 [IU]/ML
INJECTION, SOLUTION SUBCUTANEOUS
Qty: 3 BOX | Refills: 3 | Status: SHIPPED | OUTPATIENT
Start: 2020-02-03 | End: 2020-01-14

## 2020-01-14 RX ORDER — INSULIN LISPRO 100 [IU]/ML
INJECTION, SOLUTION INTRAVENOUS; SUBCUTANEOUS
Qty: 4 BOX | Refills: 3 | Status: SHIPPED | OUTPATIENT
Start: 2020-02-03 | End: 2020-04-03 | Stop reason: SDUPTHER

## 2020-01-14 RX ORDER — INSULIN GLARGINE 100 [IU]/ML
INJECTION, SOLUTION SUBCUTANEOUS
Qty: 3 BOX | Refills: 3 | Status: SHIPPED | OUTPATIENT
Start: 2020-02-03 | End: 2020-07-14

## 2020-01-14 NOTE — PATIENT INSTRUCTIONS
Snacks can be an important part of a balanced, healthy meal plan. They allow you to eat more frequently, feeling full and satisfied throughout the day. Also, they allow you to spread carbohydrates evenly, which may stabilize blood sugars.  Plus, snacks are enjoyable!     The amount of carbohydrate needed at snacks varies. Generally, about 15-30 grams of carbohydrate per snack is recommended.  Below you will find some tasty treats.       0-5 gm carb   Crystal Light   Vitamin Water Zero   Herbal tea, unsweetened   2 tsp peanut butter on celery   1./2 cup sugar-free jell-o   1 sugar-free popsicle   ¼ cup blueberries   8oz Blue Melissa unsweetened almond milk   5 baby carrots & celery sticks, cucumbers, bell peppers dipped in ¼ cup salsa, 2Tbsp light ranch dressing or 2Tbsp plain Greek yogurt   10 Goldfish crackers   ½ oz low-fat cheese or string cheese   1 closed handful of nuts, unsalted   1 Tbsp of sunflower seeds, unsalted   1 cup Smart Pop popcorn   1 whole grain brown rice cake        15 gm carb   1 small piece of fruit or ½ banana or 1/2 cup lite canned fruit   3 tan cracker squares   3 cups Smart Pop popcorn, top spray butter, Beltre lite salt or cinnamon and Truvia   5 Vanilla Wafers   ½ cup low fat, no added sugar ice cream or frozen yogurt (Blue bell, Blue Bunny, Weight Watchers, Skinny Cow)   ½ turkey, ham, or chicken sandwich   ½ c fruit with ½ c Cottage cheese   4-6 unsalted wheat crackers with 1 oz low fat cheese or 1 tbsp peanut butter    30-45 goldfish crackers (depending on flavor)    7-8 Synagogue mini brown rice cakes (caramel, apple cinnamon, chocolate)    12 Synagogue mini brown rice cakes (cheddar, bbq, ranch)    1/3 cup hummus dip with raw veg   1/2 whole wheat jossy, 1Tbsp hummus   Mini Pizza (1/2 whole wheat English muffin, low-fat  cheese, tomato sauce)   100 calorie snack pack (Oreo, Chips Ahoy, Ritz Mix, Baked Cheetos)   4-6 oz. light or Greek Style yogurt  (Yaakov, Yoplailesly, [place], [facility])   ½ cup sugar-free pudding     6 in. wheat tortilla or jossy oven toasted chips (topped with spray butter flavoring, cinnamon, Truvia OR spray butter, garlic powder, chili powder)    18 BBQ Popchips (available at Target, Whole Foods, Fresh Market)                   Diabetes Support Group Meetings         Date: Topic:   February 13 Eat Fit SALINAS/Health Promotion   March 12 Taking Care of Your Smile   April 9 Spring into Healthy Eating/Cooking Demo   May 14 Ease Your Mind with Diabetes   Nesha 11 Summer Treats/Cooking Demo   July 9 Eat Fit SALINAS/Grocery Tour   August 13 Taking Care of Your Eyes and Feet   Sept 10 Chair Exercises/ADA updates   October 8 Recipes & Treats/Cooking Demo   November 12 Heart Health/Pump it up!   December 10 Year-End Close Out Party!        Meetings are held in the [facility] (A) of the [facility] located at [address] [place] [address]. Please call [phone] for additional information.    Free service, offered every 2nd Thursday of every month! Family members and/or friends are welcome as well!  Support group is for patients with type 1 or type 2 diabetes.    From 3:30p to 4:30p      Hypoglycemia (Low Blood Sugar)     Fast-acting sugar includes a cup of nonfat milk.     Too little sugar (glucose) in your blood is called hypoglycemia or low blood sugar. Low blood sugar usually means anything lower than 70 mg/dL. Talk with your healthcare provider about your target range and what level is too low for you. Diabetes itself doesnt cause low blood sugar. But some of the treatments for diabetes, such as pills or insulin, may raise your risk for it. Low blood sugar may cause you to pass out or have a seizure. So always treat low blood sugar right away, but don't overeat.  Special note: Always carry a source of fast-acting sugar and a snack in case of hypoglycemia.   What you may notice  If you have low  blood sugar, you may have one or more of these symptoms:  · Shakiness or dizziness  · Cold, clammy skin or sweating  · Feelings of hunger  · Headache  · Nervousness  · A hard, fast heartbeat  · Weakness  · Confusion or irritability  · Blurred vision  · Having nightmares or waking up confused or sweating  · Numbness or tingling in the lips or tongue  What you should do  Here are tips to follow if you have hypoglycemia:   · First check your blood sugar. If it is too low (out of your target range), eat or drink 15 to 20 grams of fast-acting sugar. This may be 3 to 4 glucose tablets, 4 ounces (half a cup) of fruit juice or regular (nondiet) soda, 8 ounces (1 cup) of fat-free milk, or 1 tablespoon of honey. Dont take more than this, or your blood sugar may go too high.  · Wait 15 minutes. Then recheck your blood sugar if you can.  · If your blood sugar is still too low, repeat the steps above and check your blood sugar again. If your blood sugar still has not returned to your target range, contact your healthcare provider or seek emergency care.  · Once your blood sugar returns to target range, eat a snack or meal.  Preventing low blood sugar  Things you can do include the following:   · If your condition needs a strict treatment plan, eat your meals and snacks at the same times each day. Dont skip meals!  · If your treatment plan lets you change when you eat and what you eat, learn how to change the time and dose of your rapid-acting insulin to match this.   · Ask your healthcare provider if it is safe for you to drink alcohol. Never drink on an empty stomach.  · Take your medicine at the prescribed times.  · Always carry a source of fast-acting sugar and a snack when youre away from home.  Other things to do  Additional tips include the following:  · Carry a medical ID card, a compact USB drive, or wear a medical alert bracelet or necklace. It should say that you have diabetes. It should also say what to do if you  pass out or have a seizure.  · Make sure your family, friends, and coworkers know the signs of low blood sugar. Tell them what to do if your blood sugar falls very low and you cant treat yourself.  · Keep a glucagon emergency kit handy. Be sure your family, friends, and coworkers know how and when to use it. Check it regularly and replace the glucagon before it expires.  · Talk with your health care team about other things you can do to prevent low blood sugar.     If you have unexplained hypoglycemia or hypoglycemia several times, call your healthcare provider.   Date Last Reviewed: 5/1/2016 © 2000-2017 [employer]. [address] [place] [address], [place] [address]. All rights reserved. This information is not intended as a substitute for professional medical care. Always follow your healthcare professional's instructions.        Understanding Carbohydrates    A car needs the right type of fuel to run. And you need the right kind of food to function. To keep your energy level up, your body needs food that has carbohydrates. But carbohydrates raise blood sugar levels higher and faster than other kinds of food. Your dietitian will work with you to figure out the amount of carbohydrates you need.  Starches  Starches are found in grains, some vegetables, and beans. Grain products include bread, pasta, cereal, and tortillas. Starchy vegetables include potatoes, peas, corn, lima beans, yams, and squash. Kidney beans, piper beans, black beans, garbanzo beans, and lentils also contain starches.  Sugars  Sugars are found naturally in many foods. Or sugar can be added. Foods that contain natural sugar include fruits and fruit juices, dairy products, honey, and molasses. Added sugars are found in most desserts, processed foods, candy, regular soda, and fruit drinks. These are very helpful for treating low blood sugar, or hypoglycemia. They provide sugar quickly. Try to keep at least 15 to 20 grams of these simple  sugars with you at all times. Eat this if you begin to have low blood sugar symptoms.  Fiber  Fiber comes from plant foods. Most fiber isnt digested by the body. Instead of raising blood sugar levels like other carbohydrates, it actually stops blood sugar from rising too fast. Fiber is found in fruits, vegetables, whole grains, beans, peas, and many nuts.  Carb counting  Keep track of the amount of carbohydrates you eat. This can help you keep the right balance of physical activity and medicine. The amount of carbohydrates needed will vary for each person. It depends on many things such as your health, the medicines you take, and how active you are. Your healthcare team will help you figure out the right amount of carbohydrates for you. You may start with around 45 to 60 grams of carbohydrate per meal, depending on your situation. Carb counting is a system that helps you keep track of the carbohydrates you eat at each meal.  Carbohydrates come from a variety of foods. These include grains, starchy vegetables, fruit, milk, beans, and snack foods. You can either count carbohydrate grams or carbohydrate servings. When you count carbohydrate servings, 1 carbohydrate serving = 15 grams of carbohydrates.  Here are some examples of foods containing about 15 grams of carbohydrates (1 serving of carbohydrates):  · 1/2 cup of canned or frozen fruit  · A small piece of fresh fruit (4 ounces)  · 1 slice of bread  · 1/2 cup of oatmeal  · 1/3 cup of rice  · 4 to 6 crackers  · 1/2 English muffin  · 1/2 cup of black beans  · 1/4 of a large baked potato (3 ounces)  · 2/3 cup of plain fat-free yogurt  · 1 cup of soup  · 1/2 cup of casserole  · 6 chicken nuggets  · 2-inch-square brownie or cake without frosting  · 2 small cookies  · 1/2 cup of ice cream or sherbet  Carb counting is easier when food labels are available. Look at the label to see how many grams of total carbohydrates the food contains. Then you can figure out how much  you should eat.  Two very important lines to look at on the label are the serving size and the total carbohydrate amount. Here are some tips for using food labels to count your carbohydrate intake:  · Check the serving size. The information on the label is based on that serving size. If you eat more than the listed serving size, you may have to double or triple the other information on the label.   · Check the total grams of carbohydrates. Total carbohydrate from the label includes sugar, starch, and fiber. Be sure to use the total carbohydrate number and not sugar alone.  · Know how many grams of carbohydrates you can have.  Be familiar with the matching portion sizes.  · Compare labels. Compare the labels of different products, looking at serving sizes and total carbohydrates to find the products that work best for you.   · Don't forget protein and fat. With all the focus on carb counting, it might be easy to forget protein and fat in your meals. Don't forget to include sources of protein and healthy fat to balance your meals.  Its also important to be consistent with the amount and time you eat when taking a fixed dose of diabetes medicine. Work with your healthcare provider or dietitian if you need additional help. He or she can help you keep track of your carbohydrate intake. He or she can also help you figure out how many grams of carbohydrates you should have.  Date Last Reviewed: 7/1/2016 © 2000-2017 [employer]. [address] [place] [address], [place] [address]. All rights reserved. This information is not intended as a substitute for professional medical care. Always follow your healthcare professional's instructions.        Hypoglycemia (Low Blood Sugar)     Fast-acting sugar includes a cup of nonfat milk.     Too little sugar (glucose) in your blood is called hypoglycemia or low blood sugar. Low blood sugar usually means anything lower than 70 mg/dL. Talk with your healthcare provider about your  target range and what level is too low for you. Diabetes itself doesnt cause low blood sugar. But some of the treatments for diabetes, such as pills or insulin, may raise your risk for it. Low blood sugar may cause you to pass out or have a seizure. So always treat low blood sugar right away, but don't overeat.  Special note: Always carry a source of fast-acting sugar and a snack in case of hypoglycemia.   What you may notice  If you have low blood sugar, you may have one or more of these symptoms:  · Shakiness or dizziness  · Cold, clammy skin or sweating  · Feelings of hunger  · Headache  · Nervousness  · A hard, fast heartbeat  · Weakness  · Confusion or irritability  · Blurred vision  · Having nightmares or waking up confused or sweating  · Numbness or tingling in the lips or tongue  What you should do  Here are tips to follow if you have hypoglycemia:   · First check your blood sugar. If it is too low (out of your target range), eat or drink 15 to 20 grams of fast-acting sugar. This may be 3 to 4 glucose tablets, 4 ounces (half a cup) of fruit juice or regular (nondiet) soda, 8 ounces (1 cup) of fat-free milk, or 1 tablespoon of honey. Dont take more than this, or your blood sugar may go too high.  · Wait 15 minutes. Then recheck your blood sugar if you can.  · If your blood sugar is still too low, repeat the steps above and check your blood sugar again. If your blood sugar still has not returned to your target range, contact your healthcare provider or seek emergency care.  · Once your blood sugar returns to target range, eat a snack or meal.  Preventing low blood sugar  Things you can do include the following:   · If your condition needs a strict treatment plan, eat your meals and snacks at the same times each day. Dont skip meals!  · If your treatment plan lets you change when you eat and what you eat, learn how to change the time and dose of your rapid-acting insulin to match this.   · Ask your  healthcare provider if it is safe for you to drink alcohol. Never drink on an empty stomach.  · Take your medicine at the prescribed times.  · Always carry a source of fast-acting sugar and a snack when youre away from home.  Other things to do  Additional tips include the following:  · Carry a medical ID card, a compact USB drive, or wear a medical alert bracelet or necklace. It should say that you have diabetes. It should also say what to do if you pass out or have a seizure.  · Make sure your family, friends, and coworkers know the signs of low blood sugar. Tell them what to do if your blood sugar falls very low and you cant treat yourself.  · Keep a glucagon emergency kit handy. Be sure your family, friends, and coworkers know how and when to use it. Check it regularly and replace the glucagon before it expires.  · Talk with your health care team about other things you can do to prevent low blood sugar.     If you have unexplained hypoglycemia or hypoglycemia several times, call your healthcare provider.   Date Last Reviewed: 5/1/2016 © 2000-2017 [employer]. [address] [place] [address]. All rights reserved. This information is not intended as a substitute for professional medical care. Always follow your healthcare professional's instructions.        Diabetes and Your Child: The Hemoglobin A1C Test  What is the A1C test?  The A1C is a simple blood test. It measures your childs average blood sugar level over a period of 2 to 3 months. This is helpful because it tells how well your child's blood sugar is controlled. The better your child's blood sugar is controlled, the less likely he will develop complications of diabetes. Other tests, like fasting blood glucose, just show a blood sugar level at a specific time. These tests don't give much information about control.  It works by measuring the amount of glucose that sticks to a protein called hemoglobin. Hemoglobin is found in red  blood cells. The more glucose stuck to the red blood cells, the higher your childs average blood sugar has been.  An A1C result is given as a percentage. Most people without diabetes have an A1C level of 5.7% or lower.     Healthy red blood cells have some glucose stuck to them.       When your child has high blood sugar, much more glucose sticks to the red blood cells. This is what the A1c test measures.      Your childs target A1C number  Your childs healthcare provider will tell you what your childs target A1C number should be. It will depend on his or her age, overall health, and other factors. Your child will likely need an A1C test about once every 3 months. In general, the A1C goal for children and adolescents is less than 7.5%.   Resources  For more information about diabetes, visit these websites:  · American Diabetes Association www.diabetes.org  · Children with Diabetes www.childrenwithdiabetes.org  · Juvenile Diabetes Research Foundation www.jdrf.org  · American Association of Diabetes Educators www.aadenet.org  · American Association of Clinical Endocrinologists www.aace.com  · National Diabetes Information Clearinghouse www.diabetes.niddk.nih.gov  Date Last Reviewed: 7/1/2016 © 2000-2017 [employer]. [address] [place] [address], [place] [address]. All rights reserved. This information is not intended as a substitute for professional medical care. Always follow your healthcare professional's instructions.

## 2020-01-14 NOTE — PROGRESS NOTES
CC: This 64 y.o.  female presents for management of type 2 dm along with the current chronic medical conditions including:  Patient Active Problem List   Diagnosis    Fatty liver    S/P total hysterectomy    Osteopenia    GERD (gastroesophageal reflux disease)    Sleep apnea    Atrophic gastritis without mention of hemorrhage    Chronic fatigue syndrome    Coronary atherosclerosis    Abdominal pain, right upper quadrant    HTN (hypertension)    Muscle spasms of neck    Radiculopathy of cervical spine    Overweight (BMI 25.0-29.9)    Type 2 diabetes mellitus with other specified complication    Screening for colon cancer    Pain    Chronic pain of right knee    Right elbow pain    Dysphagia    Chronic pain of left ankle    Vitamin D deficiency      Status of these conditions is pending review.    HPI:   Diagnosed with T2DM x 20 years ago, pt has been on insulin x 4 years ago.  H/o fatty liver, HTN, overweight, osteopenia, coronary atherosclerosis, gout, arthritic pain (L) knee, ankle-recently  Recently had steroid injection 12/30/19.  Noted elevations, see media for logs.     Accompanied by .  Last seen by me in October 2019 and is now being seen by me again today.  a1c went up slightly from 7.4% to 7.5%  Lab Results   Component Value Date    HGBA1C 7.5 (H) 01/07/2020       Pt needs refills 90 day, goes through [facility] for strips, lancets, pen needles.  Needs to switch to lantus and humalog place order 2/2020.  Will start back gym work outs in 2/2020  Not interested in cgm at this time.  Testing 4 times a day.  Injections 4 times a day.   Has h/o hypoglycemia 39 mg/dl     Of note, , retired, fall in 2015, injured (R) elbow, still has pain, can't lift heavier than 10 lbs.     Lab Results   Component Value Date    HGBA1C 7.5 (H) 01/07/2020     Pt did not tolerate victoza or ozempic.  Off metformin related to kidneys.      CURRENT DM MEDS:   lantus 38 units qhs, novolog 14  units  with mod dose correction scale, starting at 180. 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   No missing doses of medication.   No hypoglycemia in the past 2 weeks  Riana Kay forgot glucometer/logs today    See media   BG 80 lowest  Every now and then --- has a few hypoglycemic events in am.     DIET/ MEAL PATTERN: 3 meals a day  Snacks (between lunch and dinner)     EXERCISE: no formal; does yardwork     STANDARDS OF CARE:     Diabetes Management Status    Statin: Not taking  ACE/ARB: Taking    Screening or Prevention Patient's value Goal Complete/Controlled?   HgA1C Testing and Control   Lab Results   Component Value Date    HGBA1C 7.5 (H) 01/07/2020      Annually/Less than 8% Yes   Lipid profile : 10/03/2019 Annually Yes   LDL control Lab Results   Component Value Date    LDLCALC 78.0 10/03/2019    Annually/Less than 100 mg/dl  Yes   Nephropathy screening Lab Results   Component Value Date    LABMICR 4.0 12/21/2018     Lab Results   Component Value Date    PROTEINUA Negative 09/23/2019    Annually Yes   Blood pressure BP Readings from Last 1 Encounters:   01/14/20 124/60    Less than 140/90 Yes   Dilated retinal exam : 01/04/2019 Annually Yes   Foot exam   : 10/03/2019 Annually No       ROS:   GEN: +chronic fatigue or weakness, no changes w/ appetite, wt stable 1-2 # fluctuations   CV:  Denies chest pain, palpitations, edema or cyanosis, denies syncope  SKIN: Skin is intact and heals well, no rashes, pruritis, easy bruising, no hair changes, no intolerance to heat/cold.  RESP: No SOB, cough, FISCHER  FEN/GI: Nml bowel movements, normal appetite, +GERD  RENAL: No urinary complaints, no dysuria/hematuia/oliguria   ENDO: no heat/cold intolerance  ID: No skin breakdown, fevers, chills  PSYCH: Denies drug/ETOH abuse, no hx. of eating disorders or depression +anxiety  MS/NEURO: Denies numbness/ tingling in BLE; +bilateral knee and  ankle joint pain-(L) worse than (R)- recent injection (steroid)  (R) elbow pain -fall in 2015, surgery      Lab Results   Component Value Date    HGBA1C 7.5 (H) 01/07/2020     Lab Results   Component Value Date    TSH 1.315 10/03/2019       Chemistry        Component Value Date/Time     04/26/2019 0806    K 4.4 04/26/2019 0806     04/26/2019 0806    CO2 29 04/26/2019 0806    BUN 19 04/26/2019 0806    CREATININE 0.8 04/26/2019 0806     (H) 04/26/2019 0806        Component Value Date/Time    CALCIUM 10.1 04/26/2019 0806    ALKPHOS 87 04/26/2019 0806    AST 20 04/26/2019 0806    ALT 24 04/26/2019 0806    BILITOT 0.5 04/26/2019 0806          Lab Results   Component Value Date    LDLCALC 78.0 10/03/2019       PE:  GEN: Patient WNWD, WF, NAD, AAOx3, Friendly, talkative, well groomed  HEENT: EOMI, PERRLA, no lid lag, Clear oropharynx  NECK: trachea midline  CV: Regular rate and rhythm, +trace edema pedal/ankles  RESP: No increase work of breathing, No cough, wheeze.  ABD: bs active x 4 quads EXT: No C/C/Edema, No skin rash/breakdown  NEURO: CN 2-12 intact, 5/5 strength in 4 extremities, nml gait   FEET: No pressure areas, blisters, ulcers, calluses. Footware appropriate.      ASSESSMENT and PLAN:  Riana was seen today for diabetes mellitus.    Diagnoses and associated orders for this visit:  1. Type 2 diabetes mellitus with other specified complication, with long-term current use of insulin  Hemoglobin A1c    Comprehensive metabolic panel   2. Chronic pain of left ankle     3. Fatty liver     4. Essential hypertension     5. Overweight (BMI 25.0-29.9)     6. Vitamin D deficiency     7. Muscle spasms of neck     8. Atherosclerosis of coronary artery without angina pectoris, unspecified vessel or lesion type, unspecified whether native or transplanted heart     Follow up in about 3 months (around 4/14/2020).       1. F/u in 3 mos w/ me  Discussed hypoglycemia, cgm -not interested at this time  Give logs  - goal  mg/dl  Continue januvia 100 mg daily , did not do well with glp1a  a1c trending up, goal < 7% w/ minimal hypoglycemia  Change to lantus and humalog in 2/2020  humalog 16 units w/ meals for total carbs >35 gm   10 units for lighter meal   lantus 36 units at night  Change to these doses w/ levemir and novolog- just received rx    2. May increase insulin resistance  3. Optimize bg will help with condition   4. Controlled, continue med(s)  5. Body mass index is 27.79 kg/m². may increase insulin resistance.  Encourage exercise 3x a week   6. On supplement  7. Discussed dysphagia in past-- has gotten better  F/u with pcp  8. Avoid hypoglycemia  F/u with cards prn

## 2020-02-04 ENCOUNTER — PATIENT OUTREACH (OUTPATIENT)
Dept: ADMINISTRATIVE | Facility: OTHER | Age: 65
End: 2020-02-04

## 2020-02-04 DIAGNOSIS — M25.552 LEFT HIP PAIN: Primary | ICD-10-CM

## 2020-02-05 ENCOUNTER — OFFICE VISIT (OUTPATIENT)
Dept: ORTHOPEDICS | Facility: CLINIC | Age: 65
End: 2020-02-05
Payer: MEDICARE

## 2020-02-05 ENCOUNTER — HOSPITAL ENCOUNTER (OUTPATIENT)
Dept: RADIOLOGY | Facility: HOSPITAL | Age: 65
Discharge: HOME | End: 2020-02-05
Attending: ORTHOPAEDIC SURGERY
Payer: MEDICARE

## 2020-02-05 VITALS — WEIGHT: 168.63 LBS | BODY MASS INDEX: 28.1 KG/M2 | HEIGHT: 65 IN

## 2020-02-05 DIAGNOSIS — M17.12 PRIMARY OSTEOARTHRITIS OF LEFT KNEE: Primary | ICD-10-CM

## 2020-02-05 DIAGNOSIS — M16.12 PRIMARY OSTEOARTHRITIS OF LEFT HIP: ICD-10-CM

## 2020-02-05 DIAGNOSIS — M25.552 LEFT HIP PAIN: ICD-10-CM

## 2020-02-05 DIAGNOSIS — M17.11 PRIMARY OSTEOARTHRITIS OF RIGHT KNEE: ICD-10-CM

## 2020-02-05 PROCEDURE — 73502 X-RAY EXAM HIP UNI 2-3 VIEWS: CPT | Mod: 26,LT,, | Performed by: RADIOLOGY

## 2020-02-05 PROCEDURE — 73502 X-RAY EXAM HIP UNI 2-3 VIEWS: CPT | Mod: TC,LT

## 2020-02-05 PROCEDURE — 99213 PR OFFICE/OUTPT VISIT, EST, LEVL III, 20-29 MIN: ICD-10-PCS | Mod: S$GLB,,, | Performed by: ORTHOPAEDIC SURGERY

## 2020-02-05 PROCEDURE — 99213 OFFICE O/P EST LOW 20 MIN: CPT | Mod: S$GLB,,, | Performed by: ORTHOPAEDIC SURGERY

## 2020-02-05 PROCEDURE — 3008F BODY MASS INDEX DOCD: CPT | Mod: CPTII,S$GLB,, | Performed by: ORTHOPAEDIC SURGERY

## 2020-02-05 PROCEDURE — 73502 XR HIP 2 VIEW LEFT: ICD-10-PCS | Mod: 26,LT,, | Performed by: RADIOLOGY

## 2020-02-05 PROCEDURE — 99999 PR PBB SHADOW E&M-EST. PATIENT-LVL III: ICD-10-PCS | Mod: PBBFAC,,, | Performed by: ORTHOPAEDIC SURGERY

## 2020-02-05 PROCEDURE — 3008F PR BODY MASS INDEX (BMI) DOCUMENTED: ICD-10-PCS | Mod: CPTII,S$GLB,, | Performed by: ORTHOPAEDIC SURGERY

## 2020-02-05 PROCEDURE — 99999 PR PBB SHADOW E&M-EST. PATIENT-LVL III: CPT | Mod: PBBFAC,,, | Performed by: ORTHOPAEDIC SURGERY

## 2020-02-05 NOTE — PROGRESS NOTES
"Subjective:      Patient ID: Riana Kay is a 64 y.o. female.    Chief Complaint: Pain of the Left Knee and Pain of the Right Knee    HPI  Riana Kay has left knee pain.  The pain has significantly improved with the injection.  The hip pain has also improved.  Minimal left hip pain. The pain is located in the global aspect of the knee.  There  is not radiation.  There is associated stiffness.   There is not catching and locking. The pain is described as achy. The pain is aggravated by activity.  It is alleviated by rst.  There is not associated back pain.  Her history, medications and problem list were reviewed.    Review of Systems   Constitution: Negative for chills, fever and night sweats.   HENT: Negative for hearing loss.    Eyes: Negative for blurred vision and double vision.   Cardiovascular: Negative for chest pain, claudication and leg swelling.   Respiratory: Negative for shortness of breath.    Endocrine: Negative for polydipsia, polyphagia and polyuria.   Hematologic/Lymphatic: Negative for adenopathy and bleeding problem. Does not bruise/bleed easily.   Skin: Negative for poor wound healing.   Musculoskeletal: Positive for joint pain.   Gastrointestinal: Negative for diarrhea and heartburn.   Genitourinary: Negative for bladder incontinence.   Neurological: Negative for focal weakness, headaches, numbness, paresthesias and sensory change.   Psychiatric/Behavioral: The patient is not nervous/anxious.    Allergic/Immunologic: Negative for persistent infections.         Objective:      Body mass index is 28.07 kg/m².  Vitals:    02/05/20 0838   Weight: 76.5 kg (168 lb 10.4 oz)   Height: 5' 5" (1.651 m)           General    Constitutional: She is oriented to person, place, and time. She appears well-developed and well-nourished.   HENT:   Head: Normocephalic and atraumatic.   Eyes: EOM are normal.   Cardiovascular: Normal rate.    Pulmonary/Chest: Effort normal.   Neurological: She is " alert and oriented to person, place, and time.   Psychiatric: She has a normal mood and affect. Her behavior is normal.     General Musculoskeletal Exam   Gait: normal       Right Knee Exam     Inspection   Erythema: absent  Scars: absent  Swelling: absent  Effusion: absent  Deformity: absent  Bruising: absent    Tenderness   The patient is experiencing no tenderness.     Range of Motion   Extension: 0   Flexion: 130     Tests   Ligament Examination Lachman: normal (-1 to 2mm)   MCL - Valgus: normal (0 to 2mm)  LCL - Varus: normal  Patella   Passive Patellar Tilt: neutral    Other   Sensation: normal    Left Knee Exam     Inspection   Erythema: absent  Scars: absent  Swelling: absent  Effusion: absent  Deformity: absent  Bruising: absent    Tenderness   The patient is experiencing no tenderness.     Range of Motion   Extension: 0   Flexion: 130     Tests   Stability Lachman: normal (-1 to 2mm)   MCL - Valgus: normal (0 to 2mm)  LCL - Varus: normal (0 to 2mm)  Patella   Passive Patellar Tilt: neutral    Other   Sensation: normalLeft Hip Exam     Range of Motion   Abduction: 30   Adduction: 20   Extension: 0   Flexion: 100   External rotation: 40   Internal rotation: 20     Tests   Stinchfield test: negative          Muscle Strength   Right Lower Extremity   Hip Abduction: 5/5   Quadriceps:  5/5   Hamstrin/5   Left Lower Extremity   Hip Abduction: 5/5   Quadriceps:  5/5   Hamstrin/5     Reflexes     Left Side  Quadriceps:  2+    Right Side   Quadriceps:  2+    Vascular Exam     Right Pulses  Dorsalis Pedis:      2+          Left Pulses  Dorsalis Pedis:      2+          Edema  Right Lower Leg: absent  Left Lower Leg: absent              Assessment:       Encounter Diagnoses   Name Primary?    Primary osteoarthritis of left knee Yes    Primary osteoarthritis of right knee     Primary osteoarthritis of left hip           Plan:       Riana was seen today for pain and pain.    Diagnoses and all orders for  this visit:    Primary osteoarthritis of left knee    Primary osteoarthritis of right knee    Primary osteoarthritis of left hip      Doing well.  Discussed low impact exercise.  F/U 4 months with bilateral knee xrays.  Sooner if needed.

## 2020-02-06 ENCOUNTER — TELEPHONE (OUTPATIENT)
Dept: ORTHOPEDICS | Facility: CLINIC | Age: 65
End: 2020-02-06

## 2020-02-06 ENCOUNTER — PATIENT MESSAGE (OUTPATIENT)
Dept: ORTHOPEDICS | Facility: CLINIC | Age: 65
End: 2020-02-06

## 2020-02-06 DIAGNOSIS — M25.521 RIGHT ELBOW PAIN: Primary | ICD-10-CM

## 2020-02-06 NOTE — TELEPHONE ENCOUNTER
Attempted to contact pt. Left voicemail. Informed pt that x-rays need to be taken prior to appt. Advised pt to arrive for x-ray appt 60 minutes prior to scheduled appt c Dr. Garcia. Asked pt to return call to clinic at [phone] additional questions.

## 2020-02-10 ENCOUNTER — TELEPHONE (OUTPATIENT)
Dept: ORTHOPEDICS | Facility: CLINIC | Age: 65
End: 2020-02-10

## 2020-02-10 NOTE — TELEPHONE ENCOUNTER
Left patient a reminder for her appointment on 2/11/20.Also if she had any questions she could call the office.

## 2020-02-11 ENCOUNTER — OFFICE VISIT (OUTPATIENT)
Dept: ORTHOPEDICS | Facility: CLINIC | Age: 65
End: 2020-02-11
Payer: MEDICARE

## 2020-02-11 ENCOUNTER — HOSPITAL ENCOUNTER (OUTPATIENT)
Dept: RADIOLOGY | Facility: OTHER | Age: 65
Discharge: HOME | End: 2020-02-11
Attending: ORTHOPAEDIC SURGERY
Payer: MEDICARE

## 2020-02-11 VITALS
WEIGHT: 168 LBS | DIASTOLIC BLOOD PRESSURE: 74 MMHG | SYSTOLIC BLOOD PRESSURE: 141 MMHG | BODY MASS INDEX: 27.99 KG/M2 | HEART RATE: 76 BPM | HEIGHT: 65 IN

## 2020-02-11 DIAGNOSIS — G89.29 ELBOW PAIN, CHRONIC, RIGHT: ICD-10-CM

## 2020-02-11 DIAGNOSIS — Z98.890 POST-OPERATIVE STATE: Primary | ICD-10-CM

## 2020-02-11 DIAGNOSIS — M25.521 ELBOW PAIN, CHRONIC, RIGHT: ICD-10-CM

## 2020-02-11 DIAGNOSIS — M25.521 RIGHT ELBOW PAIN: ICD-10-CM

## 2020-02-11 PROCEDURE — 73080 X-RAY EXAM OF ELBOW: CPT | Mod: TC,FY,RT

## 2020-02-11 PROCEDURE — 99213 OFFICE O/P EST LOW 20 MIN: CPT | Mod: S$GLB,,, | Performed by: ORTHOPAEDIC SURGERY

## 2020-02-11 PROCEDURE — 99999 PR PBB SHADOW E&M-EST. PATIENT-LVL III: CPT | Mod: PBBFAC,,, | Performed by: ORTHOPAEDIC SURGERY

## 2020-02-11 PROCEDURE — 3008F BODY MASS INDEX DOCD: CPT | Mod: CPTII,S$GLB,, | Performed by: ORTHOPAEDIC SURGERY

## 2020-02-11 PROCEDURE — 3008F PR BODY MASS INDEX (BMI) DOCUMENTED: ICD-10-PCS | Mod: CPTII,S$GLB,, | Performed by: ORTHOPAEDIC SURGERY

## 2020-02-11 PROCEDURE — 3078F DIAST BP <80 MM HG: CPT | Mod: CPTII,S$GLB,, | Performed by: ORTHOPAEDIC SURGERY

## 2020-02-11 PROCEDURE — 3077F PR MOST RECENT SYSTOLIC BLOOD PRESSURE >= 140 MM HG: ICD-10-PCS | Mod: CPTII,S$GLB,, | Performed by: ORTHOPAEDIC SURGERY

## 2020-02-11 PROCEDURE — 99999 PR PBB SHADOW E&M-EST. PATIENT-LVL III: ICD-10-PCS | Mod: PBBFAC,,, | Performed by: ORTHOPAEDIC SURGERY

## 2020-02-11 PROCEDURE — 99213 PR OFFICE/OUTPT VISIT, EST, LEVL III, 20-29 MIN: ICD-10-PCS | Mod: S$GLB,,, | Performed by: ORTHOPAEDIC SURGERY

## 2020-02-11 PROCEDURE — 73080 X-RAY EXAM OF ELBOW: CPT | Mod: 26,RT,, | Performed by: RADIOLOGY

## 2020-02-11 PROCEDURE — 3077F SYST BP >= 140 MM HG: CPT | Mod: CPTII,S$GLB,, | Performed by: ORTHOPAEDIC SURGERY

## 2020-02-11 PROCEDURE — 3078F PR MOST RECENT DIASTOLIC BLOOD PRESSURE < 80 MM HG: ICD-10-PCS | Mod: CPTII,S$GLB,, | Performed by: ORTHOPAEDIC SURGERY

## 2020-02-11 PROCEDURE — 73080 XR ELBOW COMPLETE 3 VIEW RIGHT: ICD-10-PCS | Mod: 26,RT,, | Performed by: RADIOLOGY

## 2020-02-11 NOTE — PROGRESS NOTES
Have seen the patient and agree with plan patient has some pain on range of motion but she has got full range of motion for flexion extension pronation supination is full and symmetric to the contralateral side no snapping or popping on palpation    X-rays reviewed show the prosthesis in good placement no loosening no instability noted plan of patient does have arthritis at the capitellum we discussed the this with the patient thumb we discussed briefly elbow replacement and weight restrictions and that would make pain-free at this time because she is only 64 do not think this is indicated we will have her seen by pain management to see if there is any kind of procedure that could be done to decrease pain to the elbow joint patient agrees with this plan she will continue Voltaren gel during this time

## 2020-02-11 NOTE — PROGRESS NOTES
"Ms. Kay is here today for a post-operative visit.  She is one year status post Right radial head arthroplasty revision and Ulnar nerve decompression at the elbow by Dr. Hubbard on 1/16/19. She has previously attended OT. Pain is improved compared to before surgery however she does still complain of pain in the elbow. She is using Tylenol as needed as well as topical diclofenac with some relief. She did not get compound cream since it was not covered under her insurance. Pt is able to complete ADLs. She denies fever, chills, and sweats since the time of the surgery.     Physical exam:    Vitals:    02/11/20 0807   BP: (!) 141/74   Pulse: 76   Weight: 76.2 kg (168 lb)   Height: 5' 5" (1.651 m)   PainSc:   5     Vital signs are stable, patient is afebrile.  Patient is well dressed and well groomed, no acute distress.  Alert and oriented to person, place, and time.  Incisions are clean, dry and intact, well healed.  There is no erythema or exudate.  There is no sign of any infection. She is NVI. Good elbow motion. Equal sensation throughout hand.     Assessment: status post Right radial head arthroplasty revision and Ulnar nerve decompression at the elbow by Dr. Hubbard on 1/16/19    Plan:  Riana was seen today for pain.    Diagnoses and all orders for this visit:    Post-operative state    -hardware in good position, discussed she does have some arthritis. Elbow replacement discussed by Dr. Hubbard however this is not indicated at this time.  -referral to pain management   -continue voltaren   -RTC prn     Olivia Mireles PA-C  [facility]   [facility]  [place], [place]        "

## 2020-02-12 ENCOUNTER — OFFICE VISIT (OUTPATIENT)
Dept: ALLERGY | Facility: CLINIC | Age: 65
End: 2020-02-12
Payer: MEDICARE

## 2020-02-12 ENCOUNTER — PATIENT OUTREACH (OUTPATIENT)
Dept: ADMINISTRATIVE | Facility: OTHER | Age: 65
End: 2020-02-12

## 2020-02-12 VITALS — BODY MASS INDEX: 28.76 KG/M2 | WEIGHT: 172.63 LBS | HEIGHT: 65 IN

## 2020-02-12 DIAGNOSIS — J31.0 CHRONIC RHINITIS: Primary | ICD-10-CM

## 2020-02-12 PROCEDURE — 3008F BODY MASS INDEX DOCD: CPT | Mod: CPTII,S$GLB,, | Performed by: ALLERGY & IMMUNOLOGY

## 2020-02-12 PROCEDURE — 99999 PR PBB SHADOW E&M-EST. PATIENT-LVL III: CPT | Mod: PBBFAC,,, | Performed by: ALLERGY & IMMUNOLOGY

## 2020-02-12 PROCEDURE — 99204 OFFICE O/P NEW MOD 45 MIN: CPT | Mod: S$GLB,,, | Performed by: ALLERGY & IMMUNOLOGY

## 2020-02-12 PROCEDURE — 3008F PR BODY MASS INDEX (BMI) DOCUMENTED: ICD-10-PCS | Mod: CPTII,S$GLB,, | Performed by: ALLERGY & IMMUNOLOGY

## 2020-02-12 PROCEDURE — 99204 PR OFFICE/OUTPT VISIT, NEW, LEVL IV, 45-59 MIN: ICD-10-PCS | Mod: S$GLB,,, | Performed by: ALLERGY & IMMUNOLOGY

## 2020-02-12 PROCEDURE — 99999 PR PBB SHADOW E&M-EST. PATIENT-LVL III: ICD-10-PCS | Mod: PBBFAC,,, | Performed by: ALLERGY & IMMUNOLOGY

## 2020-02-12 RX ORDER — FLUTICASONE PROPIONATE 50 MCG
2 SPRAY, SUSPENSION (ML) NASAL DAILY
Qty: 16 G | Refills: 11 | Status: SHIPPED | OUTPATIENT
Start: 2020-02-12 | End: 2021-04-14 | Stop reason: SDUPTHER

## 2020-02-12 RX ORDER — LATANOPROST 50 UG/ML
1 SOLUTION/ DROPS OPHTHALMIC NIGHTLY
COMMUNITY
Start: 2020-01-29

## 2020-02-12 NOTE — PROGRESS NOTES
Subjective:       Patient ID: Riana Kay is a 64 y.o. female.    Chief Complaint:  Nasal Congestion      HPI:   Patient's symptoms include itchy nose, nasal congestion, postnasal drip and sinus pressure. These symptoms are perennial. Maybe worse in winter. Nasal congestion is most bothersome sx and present only at night. Post nasal drip present in am. Current triggers include exposure to fumes/strong odors. The patient has been suffering from these symptoms for approximately 2 years. The patient has tried prn astelin with good relief of symptoms. Immunotherapy has never been tried. The patient has never had nasal polyps. The patient has no history of asthma. The patient has no history of eczema. The patient does not suffer from frequent sinopulmonary infections. The patient has not had sinus surgery in the past.   Does have hx GERD, reportedly controlled on protonix    Environmental History: Pets in the home: none.  Sid: hardwood floors  Tobacco Smoke in Home: no    Past Medical History:   Diagnosis Date    Anxiety     AR (allergic rhinitis)     Colon polyp     Diabetes mellitus, type 2     Dizziness     DM (diabetes mellitus)     Fatty liver     GERD (gastroesophageal reflux disease)     HTN (hypertension)     Hyperlipidemia     Memory loss     Osteopenia     S/P total hysterectomy     Sleep apnea          Family History   Problem Relation Age of Onset    Colon cancer Father 83        colon cancer    Diabetes Maternal Grandmother     Diabetes Maternal Aunt     Esophageal cancer Neg Hx     Stomach cancer Neg Hx     Melanoma Neg Hx    no parental atopy      Review of Systems   Constitutional: Negative for activity change, fatigue and fever.   HENT: Positive for postnasal drip and voice change (occ hoarseness). Negative for congestion, rhinorrhea, sinus pressure and sneezing.    Eyes: Negative for discharge, redness and itching.   Respiratory: Negative for cough, shortness of breath  and wheezing.    Cardiovascular: Negative for chest pain.   Gastrointestinal: Negative for diarrhea, nausea and vomiting.   Genitourinary: Negative for dysuria.   Musculoskeletal: Negative for arthralgias and joint swelling.   Skin: Negative for rash.   Neurological: Negative for headaches.   Hematological: Does not bruise/bleed easily.   Psychiatric/Behavioral: Negative for behavioral problems and sleep disturbance.          Objective:   Physical Exam   Constitutional: She is oriented to person, place, and time. She appears well-developed and well-nourished. No distress.   HENT:   Head: Normocephalic.   Right Ear: Tympanic membrane and external ear normal.   Left Ear: Tympanic membrane and external ear normal.   Nose: No mucosal edema, rhinorrhea, sinus tenderness or septal deviation. Right sinus exhibits no maxillary sinus tenderness and no frontal sinus tenderness. Left sinus exhibits no maxillary sinus tenderness and no frontal sinus tenderness.   Mouth/Throat: Uvula is midline, oropharynx is clear and moist and mucous membranes are normal. No uvula swelling.   Eyes: Conjunctivae are normal. Right eye exhibits no discharge. Left eye exhibits no discharge.   Neck: Normal range of motion. Neck supple.   Cardiovascular: Normal rate and regular rhythm.   Pulmonary/Chest: Effort normal and breath sounds normal. No respiratory distress. She has no wheezes.   Abdominal: Soft. Bowel sounds are normal. There is no tenderness.   Musculoskeletal: Normal range of motion. She exhibits no edema or tenderness.   Lymphadenopathy:     She has no cervical adenopathy.   Neurological: She is alert and oriented to person, place, and time.   Skin: Skin is warm. No rash noted. No erythema.   Psychiatric: She has a normal mood and affect. Her behavior is normal. Judgment and thought content normal.   Nursing note and vitals reviewed.          Assessment:       1. Chronic rhinitis         Plan:       Riana was seen today for nasal  congestion.    Diagnoses and all orders for this visit:    Chronic rhinitis  -     Cat epithelium IgE; Future  -     Dog dander IgE; Future  -     D. farinae IgE; Future  -     D. pteronyssinus IgE; Future  -     Aspergillus fumagatus IgE; Future  -     Allergen-Alternaria Alternata; Future  -     Cockroach, American IgE; Future  -     Bahia grass IgE; Future  -     Oswaldo IgE; Future  -     Oak, white IgE; Future  -     Allergen, Pecan Tree IgE; Future  -     Allergen-Cedar; Future  -     Ragweed, short, common IgE; Future  -     Marsh elder, rough IgE; Future  -     Plantain, English IgE; Future    Other orders  -     fluticasone propionate (FLONASE) 50 mcg/actuation nasal spray; 2 sprays (100 mcg total) by Each Nostril route once daily.    Ok routine use of astelin, up to 2 sen twice daily. If no improvement add flonase

## 2020-02-17 ENCOUNTER — TELEPHONE (OUTPATIENT)
Dept: PAIN MEDICINE | Facility: CLINIC | Age: 65
End: 2020-02-17

## 2020-02-17 NOTE — TELEPHONE ENCOUNTER
My name is Staff, I am contacting you from [facility] regarding your appointment scheduled for 02.19.2020, with Provider Dr. Beck, just confirming you will be able to make it.    If you feel you need to reschedule or canceled please give our office a call so we can better assist you.      Staff requesting patient to arrive 15 mins ahead of schedule appointment time.    Staff left a voicemail reminding pt of their schedule appt

## 2020-02-18 ENCOUNTER — PATIENT OUTREACH (OUTPATIENT)
Dept: ADMINISTRATIVE | Facility: OTHER | Age: 65
End: 2020-02-18

## 2020-02-19 ENCOUNTER — OFFICE VISIT (OUTPATIENT)
Dept: PAIN MEDICINE | Facility: CLINIC | Age: 65
End: 2020-02-19
Attending: ANESTHESIOLOGY
Payer: MEDICARE

## 2020-02-19 VITALS
DIASTOLIC BLOOD PRESSURE: 68 MMHG | TEMPERATURE: 98 F | SYSTOLIC BLOOD PRESSURE: 128 MMHG | RESPIRATION RATE: 18 BRPM | HEART RATE: 71 BPM | WEIGHT: 171.75 LBS | HEIGHT: 65 IN | OXYGEN SATURATION: 100 % | BODY MASS INDEX: 28.61 KG/M2

## 2020-02-19 DIAGNOSIS — M25.521 ELBOW PAIN, CHRONIC, RIGHT: ICD-10-CM

## 2020-02-19 DIAGNOSIS — G89.29 ELBOW PAIN, CHRONIC, RIGHT: ICD-10-CM

## 2020-02-19 PROCEDURE — 3078F DIAST BP <80 MM HG: CPT | Mod: CPTII,S$GLB,, | Performed by: ANESTHESIOLOGY

## 2020-02-19 PROCEDURE — 99999 PR PBB SHADOW E&M-EST. PATIENT-LVL V: ICD-10-PCS | Mod: PBBFAC,,, | Performed by: ANESTHESIOLOGY

## 2020-02-19 PROCEDURE — 3074F SYST BP LT 130 MM HG: CPT | Mod: CPTII,S$GLB,, | Performed by: ANESTHESIOLOGY

## 2020-02-19 PROCEDURE — 3008F PR BODY MASS INDEX (BMI) DOCUMENTED: ICD-10-PCS | Mod: CPTII,S$GLB,, | Performed by: ANESTHESIOLOGY

## 2020-02-19 PROCEDURE — 99204 PR OFFICE/OUTPT VISIT, NEW, LEVL IV, 45-59 MIN: ICD-10-PCS | Mod: S$GLB,,, | Performed by: ANESTHESIOLOGY

## 2020-02-19 PROCEDURE — 99204 OFFICE O/P NEW MOD 45 MIN: CPT | Mod: S$GLB,,, | Performed by: ANESTHESIOLOGY

## 2020-02-19 PROCEDURE — 3078F PR MOST RECENT DIASTOLIC BLOOD PRESSURE < 80 MM HG: ICD-10-PCS | Mod: CPTII,S$GLB,, | Performed by: ANESTHESIOLOGY

## 2020-02-19 PROCEDURE — 3008F BODY MASS INDEX DOCD: CPT | Mod: CPTII,S$GLB,, | Performed by: ANESTHESIOLOGY

## 2020-02-19 PROCEDURE — 99999 PR PBB SHADOW E&M-EST. PATIENT-LVL V: CPT | Mod: PBBFAC,,, | Performed by: ANESTHESIOLOGY

## 2020-02-19 PROCEDURE — 3074F PR MOST RECENT SYSTOLIC BLOOD PRESSURE < 130 MM HG: ICD-10-PCS | Mod: CPTII,S$GLB,, | Performed by: ANESTHESIOLOGY

## 2020-02-19 NOTE — LETTER
February 19, 2020      Katja Hubbard MD  [address]  [address]  [facility] [place] [address]           [facility] [address] [facility] [address]  [address]  [place] [address]  Phone: [phone]  Fax: [phone]          Patient: Riana Kay   MR Number: [id number]   YOB: 1955   Date of Visit: 2/19/2020       Dear Dr. Katja Hubbard:    Thank you for referring Riana Kay to me for evaluation. Attached you will find relevant portions of my assessment and plan of care.    If you have questions, please do not hesitate to call me. I look forward to following Riana Kay along with you.    Sincerely,    Juan Jose Beck MD    Enclosure  CC:  No Recipients    If you would like to receive this communication electronically, please contact [email or web address] or [phone] to request more information on [facility] Link access.    For providers and/or their staff who would like to refer a patient to [facility], please contact us through our one-stop-shop provider referral line, [facility], at [phone].    If you feel you have received this communication in error or would no longer like to receive these types of communications, please e-mail [email or web address]

## 2020-02-19 NOTE — PROGRESS NOTES
Subjective:      Patient ID: Riana Kay is a 64 y.o. female.    Chief Complaint: Elbow Pain (right)    Referred by: Katja Hubbard, *     Ms. Kay presents to clinic for evaluation of chronic right elbow pain. She states her pain began in June 2015 following a fall and symptoms have been worsening. She did undergo initial right radial head arthroplasty in 2015 followed by recent rright radial head arthroplasty revision and ulnar nerve decompression at the elbow by Dr. Hubbard on 1/16/19. She describes her pain as a constant, dull, aching right elbow pain. She states it is exacerbated by right hand grabbing and right arm supination and is improved with stretch and rest.     Pain Medications:  Current:  Voltaren gel PRN  Tylenol PRN    Tried in Past:  NSAIDs -Advil/Aleve  TCA -Never  SNRI -Never  Anti-convulsants -gabapentin  Muscle Relaxants -Never  Opioids-Never    Physical Therapy/Home Exercise: yes  (most recently in January 2020 with some relief)     report:  Not applicable    Pain Procedures:   None    Chiropractor -never  Acupuncture - never  TENS unit -never  Spinal decompression -never  Joint replacement -never    Imaging:     Xray Right Elbow (2/2020)    FINDINGS:  Radial head arthroplasty noted.  No evidence of hardware failure or loosening.  Alignment unchanged.  No marrow replacement process.  Degenerative changes at the ulna humeral joint noted.      Impression       No detrimental changes.       Past Medical History:   Diagnosis Date    Anxiety     AR (allergic rhinitis)     Colon polyp     Diabetes mellitus, type 2     Dizziness     DM (diabetes mellitus)     Fatty liver     GERD (gastroesophageal reflux disease)     HTN (hypertension)     Hyperlipidemia     Memory loss     Osteopenia     S/P total hysterectomy     Sleep apnea        Past Surgical History:   Procedure Laterality Date    CHOLECYSTECTOMY      COLONOSCOPY N/A 1/17/2018    Procedure: COLONOSCOPY with  Donnell;  Surgeon: Roland Jacobo MD;  Location: [facility] ENDO (4TH FLR);  Service: Endoscopy;  Laterality: N/A;    ELBOW ARTHROPLASTY Right 1/16/2019    Procedure: ARTHROPLASTY, ELBOW right radial head arthroplasty revision;  Surgeon: Katja Hubbard MD;  Location: [facility] OR 1ST FLR;  Service: Orthopedics;  Laterality: Right;  Anesthesia: General and Regional. Stretcher, hand pan 1 and pan 2, CALL ACCUMRUCHI LR & Sol notified 1-14 LO    ELBOW SURGERY Right 7/16/15    ELBOW SURGERY      ESOPHAGOGASTRODUODENOSCOPY N/A 4/15/2019    Procedure: EGD (ESOPHAGOGASTRODUODENOSCOPY);  Surgeon: Buck Irwin MD;  Location: [facility] ENDO (4TH FLR);  Service: Endoscopy;  Laterality: N/A;  ray she    HYSTERECTOMY      KNEE ARTHROSCOPY W/ DEBRIDEMENT  4/11    Left    RELEASE OF ULNAR NERVE AT CUBITAL TUNNEL Right 1/16/2019    Procedure: RELEASE, ULNAR TUNNEL right;  Surgeon: Katja Hubbard MD;  Location: [facility] OR 1ST FLR;  Service: Orthopedics;  Laterality: Right;  Anesthesia: General and Regional. Stretcher, hand pan 1 and pan 2, CALL RUCHI SANTAMARIA    ROTATOR CUFF REPAIR      TONSILLECTOMY      TOTAL ABDOMINAL HYSTERECTOMY W/ BILATERAL SALPINGOOPHORECTOMY      UPPER GASTROINTESTINAL ENDOSCOPY         Review of patient's allergies indicates:   Allergen Reactions    Iodinated contrast media Swelling and Rash    Percocet [oxycodone-acetaminophen] Itching    Macrobid [nitrofurantoin monohyd/m-cryst] Rash    Metformin Rash    Penicillins Rash    Promethazine Rash    Sulfa (sulfonamide antibiotics) Rash    Sulfamethoxazole-trimethoprim Rash       Current Outpatient Medications   Medication Sig Dispense Refill    amLODIPine (NORVASC) 5 MG tablet Take 1 tablet (5 mg total) by mouth once daily. 90 tablet 3    azelastine (ASTELIN) 137 mcg (0.1 %) nasal spray 1 SPRAY (137 MCG TOTAL) BY NASAL ROUTE 2 (TWO) TIMES DAILY. 90 mL 2    blood sugar diagnostic Strp To check BG 4 times daily, to  "use with insurance preferred meter-true metrix 400 each 3    blood-glucose meter kit To check BG 4 times daily, to use with insurance preferred meter-true metrix 1 each 1    cyanocobalamin (VITAMIN B-12) 100 MCG tablet Take 100 mcg by mouth once daily.      diclofenac sodium (VOLTAREN) 1 % Gel Apply 2 g topically 2 (two) times daily. 100 g 2    diclofenac sodium (VOLTAREN) 1 % Gel Apply 2 g topically 4 (four) times daily. 100 g 1    fluticasone propionate (FLONASE) 50 mcg/actuation nasal spray 2 sprays (100 mcg total) by Each Nostril route once daily. 16 g 11    insulin (LANTUS SOLOSTAR U-100 INSULIN) glargine 100 units/mL (3mL) SubQ pen Inject 36 units at night. (new order) 3 Box 3    insulin lispro (HUMALOG KWIKPEN INSULIN) 100 unit/mL pen Inject 16 units w/ meals plus scale 150-200+2, 201-250+4, 251-300+6, 301-350+8, >350+10. Max daily 68 units. 4 Box 3    ketoconazole (NIZORAL) 2 % shampoo Wash scalp with medicated shampoo at least 2x/week. Let sit on scalp at least 5 minutes prior to rinsing 240 mL 5    lancets Misc To check BG 4  times daily, to use with insurance preferred meter-true metrix 400 each 3    latanoprost 0.005 % ophthalmic solution Place 1 drop into both eyes nightly.      losartan (COZAAR) 25 MG tablet Take 0.5 tablets (12.5 mg total) by mouth once daily. 45 tablet 3    ondansetron (ZOFRAN-ODT) 8 MG TbDL Take 1 tablet (8 mg total) by mouth 3 (three) times daily as needed. 30 tablet 6    pantoprazole (PROTONIX) 40 MG tablet TAKE 1 TABLET BY MOUTH EVERY DAY 90 tablet 3    pen needle, diabetic (NOVOFINE 32) 32 gauge x 1/4" Ndle Uses 4 times a day. 90 day via duramed e 11.65 400 each 3    pyridoxine, vitamin B6, (VITAMIN B-6) 100 MG Tab Take 100 mg by mouth once daily.      SITagliptin (JANUVIA) 100 MG Tab Take 1 tablet (100 mg total) by mouth once daily. 90 tablet 3     No current facility-administered medications for this visit.        Family History   Problem Relation Age of Onset "    Colon cancer Father 83        colon cancer    Diabetes Maternal Grandmother     Diabetes Maternal Aunt     Esophageal cancer Neg Hx     Stomach cancer Neg Hx     Melanoma Neg Hx        Social History     Socioeconomic History    Marital status:      Spouse name: Not on file    Number of children: Not on file    Years of education: Not on file    Highest education level: Not on file   Occupational History    Not on file   Social Needs    Financial resource strain: Hard    Food insecurity:     Worry: Never true     Inability: Never true    Transportation needs:     Medical: No     Non-medical: No   Tobacco Use    Smoking status: Never Smoker    Smokeless tobacco: Never Used   Substance and Sexual Activity    Alcohol use: No     Frequency: Never     Drinks per session: Patient refused     Binge frequency: Patient refused    Drug use: No    Sexual activity: Yes     Partners: Male     Birth control/protection: Post-menopausal   Lifestyle    Physical activity:     Days per week: 5 days     Minutes per session: 40 min    Stress: Only a little   Relationships    Social connections:     Talks on phone: More than three times a week     Gets together: Once a week     Attends Baptism service: Not on file     Active member of club or organization: No     Attends meetings of clubs or organizations: Never     Relationship status:    Other Topics Concern    Are you pregnant or think you may be? No    Breast-feeding No   Social History Narrative    She works at [employer] in [place]; , 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 No exercise but hopes to start walking on the weekend.       REVIEW OF SYSTEMS:    GENERAL:  No weight loss, malaise or fevers.  HEENT:   No recent changes in vision or hearing  NECK:  Negative for lumps, no difficulty with swallowing.  RESPIRATORY:  Negative for cough, wheezing or shortness of breath, patient denies any recent URI.  CARDIOVASCULAR:  Negative for  "chest pain, leg swelling or palpitations. +HTN  GI:  Negative for abdominal discomfort, blood in stools or black stools or change in bowel habits. +PUD  MUSCULOSKELETAL:  See HPI.  SKIN:  Negative for lesions, rash, and itching.  PSYCH:  No mood disorder or recent psychosocial stressors.  Patients sleep is not disturbed secondary to pain.  HEMATOLOGY/LYMPHOLOGY:  Negative for prolonged bleeding, bruising easily or swollen nodes.  Patient is not currently taking any anti-coagulants  ENDO: No history of thyroid dysfunction. +DM  NEURO:   No history of headaches, syncope, paralysis, seizures or tremors.  All other reviewed and negative other than HPI.          Objective:   /68   Pulse 71   Temp 97.9 °F (36.6 °C)   Resp 18   Ht 5' 5" (1.651 m)   Wt 77.9 kg (171 lb 11.8 oz)   SpO2 100%   BMI 28.58 kg/m²   Pain Disability Index Review:  Last 3 PDI Scores 2/19/2020   Pain Disability Index (PDI) 14     Normocephalic.  Atraumatic.  Affect appropriate.  Breathing unlabored.  Extra ocular muscles intact.         PHYSICAL EXAMINATION:    GENERAL: Well appearing, in no acute distress, alert and oriented x3.  PSYCH:  Mood and affect appropriate.  SKIN: Skin color, texture, turgor normal, no rashes or lesions.  HEAD/FACE:  Normocephalic, atraumatic. Cranial nerves grossly intact.  CV: RRR with palpation of the radial artery.  PULM: No evidence of respiratory difficulty, symmetric chest rise.  GI:  Soft and non-tender.  EXTREMITIES: Peripheral joint ROM is full and pain free without obvious instability or laxity in all LUE and BLE extremities. Pain with palpation, flexion, extension, supination, and pronation at right elbow joint. No deformities, edema, or skin discoloration. Good capillary refill.  MUSCULOSKELETAL: Bilateral upper and lower extremity strength is normal and symmetric.  No atrophy or tone abnormalities are noted.  NEURO: Bilateral upper and lower extremity coordination and muscle stretch reflexes are " physiologic and symmetric.  Plantar response are downgoing. No clonus.  No loss of sensation is noted.  GAIT: normal.      Assessment:       Encounter Diagnosis   Name Primary?    Elbow pain, chronic, right          Plan:   We discussed with the patient the assessment and recommendations. The following is the plan we agreed on:    - Will refer to OT for strenghtening, stretching, and improved mobility and function.    - Will order Compounded cream to be applied to right elbow for inflammatory pain.     - Counseled on importance of physical activity and home exercise.    - RTC in 3 months.        Riana was seen today for elbow pain.    Diagnoses and all orders for this visit:    Elbow pain, chronic, right  -     Ambulatory referral/consult to Pain Clinic  -     Ambulatory referral/consult to Occupational Therapy; Future       Chris Auguste MD  [facility], PGY V      I have personally taken the history and examined this patient and agree with the fellow's note as stated above.

## 2020-03-03 ENCOUNTER — CLINICAL SUPPORT (OUTPATIENT)
Dept: REHABILITATION | Facility: HOSPITAL | Age: 65
End: 2020-03-03
Payer: MEDICARE

## 2020-03-03 DIAGNOSIS — G89.29 ELBOW PAIN, CHRONIC, RIGHT: ICD-10-CM

## 2020-03-03 DIAGNOSIS — M25.521 ELBOW PAIN, CHRONIC, RIGHT: ICD-10-CM

## 2020-03-03 DIAGNOSIS — M25.521 PAIN IN RIGHT ELBOW: ICD-10-CM

## 2020-03-03 PROCEDURE — 97165 OT EVAL LOW COMPLEX 30 MIN: CPT

## 2020-03-03 NOTE — PLAN OF CARE
[facility] Occupational Therapy  Initial Evaluation     Name: Riana Baker   Clinic Number: [id number]    Therapy Diagnosis:   Encounter Diagnoses   Name Primary?    Elbow pain, chronic, right     Pain in right elbow      Physician: Chris Auguste MD    Physician Orders: Eval and treat  Medical Diagnosis: M25.521,G89.29 (ICD-10-CM) - Elbow pain, chronic, right     Surgical Procedure and Date: N/A  Date of Injury: 6/30/2015  Evaluation Date: 3/3/2020  Insurance: [employer]  Insurance Authorization period Expiration: 02/18/2021     Plan of Care Certification Period: 3/3/2020 to 4/15/2020    Visit # / Visits Authortized: 1 / 1  Time In:9:00 AM  Time Out: 10:00 AM  Total Billable Time: 60 minutes    Precautions: Diabetes    Subjective     Involved Side: right  Dominant Side: Right  Date of Onset: 7/16/2015  Mechanism of Injury: Pt reports that she initially fractured her elbow on 6/30/15 from a fall at work.     History of Current Condition:  Pt reports she had and ORIF to her right radial head arthroplasty on 7/16/2015 by Dr. Cortney Nunes. Pt reports that she received therapy from 7/2015 to 12/2015. Pt continued to have right elbow pain and saw Dr. Hubbard. Pt reports that she had a second surgery on her elbow on 1/16/2019 and Dr. Hubbard performed a radial head arthroplasty revision, ulnar nerve decompression and osteochondral allograft to right cartilage lesions. Pt received several therapy visits following her second surgery.  Pt reports increased pain since her second surgery. Pt reports that she was prescribed a cream recently that helps with her elbow pain.    Imaging: X-ray 2/11/2020   FINDINGS:  Radial head arthroplasty noted.  No evidence of hardware failure or loosening.  Alignment unchanged.  No marrow replacement process.  Degenerative changes at the ulna humeral joint noted.  Previous Therapy: yes    Patient's Goals for Therapy: Pt reports that she would like  to get stronger and have less pain in her elbow.    Pain:  Functional Pain Scale Rating 0-10:   5/10 on average  2/10 at best  7/10 at worst  Location: right elbow  Description: Burning  Aggravating Factors: Lifting  Easing Factors: heating pad    Occupation:  unemployed  Working presently: unemployed  Duties: N/A    Functional Limitations/Social History:    Previous functional status includes: Independent with all ADLs.     Current FunctionalStatus   Home/Living environment : lives with their spouse      Limitation of Functional Status as follows:   ADLs/IADLs:     - Feeding:Independent    - Bathing: Independent    - Dressing/Grooming: Independent    - Driving: Pt reports that she is unable to drive due to her right arm                          - Household chores: Pt reports that when she sweeps, she uses mostly her left arm     Leisure: Gardening, cooking - modified independent because she cannot lift with her right arm      Past Medical History/Physical Systems Review:   Riana Kay  has a past medical history of Anxiety, AR (allergic rhinitis), Colon polyp, Diabetes mellitus, type 2, Dizziness, DM (diabetes mellitus), Fatty liver, GERD (gastroesophageal reflux disease), HTN (hypertension), Hyperlipidemia, Memory loss, Osteopenia, S/P total hysterectomy, and Sleep apnea.    Riana Kay  has a past surgical history that includes Cholecystectomy; Knee arthroscopy w/ debridement (4/11); Total abdominal hysterectomy w/ bilateral salpingoophorectomy; Tonsillectomy; Rotator cuff repair; Elbow surgery (Right, 7/16/15); Elbow surgery; Hysterectomy; Colonoscopy (N/A, 1/17/2018); Elbow Arthroplasty (Right, 1/16/2019); Release of ulnar nerve at cubital tunnel (Right, 1/16/2019); Upper gastrointestinal endoscopy; and Esophagogastroduodenoscopy (N/A, 4/15/2019).    Riana has a current medication list which includes the following prescription(s): amlodipine, azelastine, blood sugar diagnostic,  blood-glucose meter, cyanocobalamin, diclofenac sodium, diclofenac sodium, fluticasone propionate, insulin, insulin lispro, ketoconazole, lancets, latanoprost, losartan, ondansetron, pantoprazole, pen needle, diabetic, pyridoxine (vitamin b6), and sitagliptin.    Review of patient's allergies indicates:   Allergen Reactions    Iodinated contrast media Swelling and Rash    Percocet [oxycodone-acetaminophen] Itching    Macrobid [nitrofurantoin monohyd/m-cryst] Rash    Metformin Rash    Penicillins Rash    Promethazine Rash    Sulfa (sulfonamide antibiotics) Rash    Sulfamethoxazole-trimethoprim Rash          Objective   Observation/Appearance:  Skin intact and scars are flat.    Edema. Measured in centimeters.   3/3/2020 3/3/2020    Left Right   2in. Above elbow 29 28.5   2in. Below elbow 25.5 25.5   Elbow Crease 26 26.5       Elbow AROM:   3/3/2020 3/3/2020    Left Right   Elbow ext/flex 0/140 15/140   Supination/Pronation 90/90 90/90     Sensation:  Intact     Strength (Dyanmometer) and Pinch Strength (Pinch Gauge)  Measured in pounds and psi. Average of three trials.   3/3/2020 3/3/2020    Left Right   Rung II 39 30           CMS Impairment/Limitation/Restriction for FOTO Elbow Survey    Therapist reviewed FOTO scores for Riana Kay on 3/3/2020.   FOTO documents entered into [employer] - see Media section.    Limitation Score: 40%  Category: Carrying    Current : CK = at least 40% but < 60% impaired, limited or restricted  Goal: CJ = at least 20% but < 40% impaired, limited or restricted           Treatment     Treatment Time In: 9:00 AM  Treatment Time Out: 10:00 AM  Total Treatment time separate from Evaluation time:60 minutes    Riana received the following supervised modalities after being cleared for contradictions for 15 minutes:   -Patient received MH x 15 min in supine with 3 lb weight to right wrist to increase blood flow, circulation and tissue elasticity prior to therex      Riana  received the following therapeutic exercises for 5 minutes:   -A/AAROM as follows: right elbow ext/flex x 10 reps      Home Exercise Program/Education:  Issued HEP (see patient instructions in EMR) and educated on modality use for pain management . Exercises were reviewed and Riana was able to demonstrate them prior to the end of the session. Reprinted exercise program from 7/26/2020 and added moist heat with 3 lb weight 1 x daily.  Pt received a written copy of exercises to perform at home. Riana demonstrated good  understanding of the education provided.  Pt was advised to perform these exercises free of pain, and to stop performing them if pain occurs.    Patient/Family Education: role of OT, goals for OT, scheduling/cancellations - pt verbalized understanding. Discussed insurance limitations with patient.    Additional Education provided: none    Assessment     Riana Kay is a 64 y.o. female referred to outpatient occupational/hand therapy and presents with a medical diagnosis of chronic right elbow pain, resulting in decreased range of motion, decreased strength, pain and demonstrates limitations as described in the chart below. Following a brief medical record review it is determined that pt will benefit from occupational therapy services in order to maximize pain free and/or functional use of right elbow.     The patient's rehab potential is Fair.     Anticipated barriers to occupational therapy: chronic pain  Pt has no cultural, educational or language barriers to learning provided.    Profile and History Assessment of Occupational Performance Level of Clinical Decision Making Complexity Score   Occupational Profile:   Riana Kay is a 64 y.o. female who lives with their spouse and is currently unemployed. He has difficulty withhousework/household chores, gardening  affecting his/her daily functional abilities. His/her main goal for therapy is pt reports that she would like to get  stronger and have less pain in her elbow.    Comorbidities:    has a past medical history of Anxiety, AR (allergic rhinitis), Colon polyp, Diabetes mellitus, type 2, Dizziness, DM (diabetes mellitus), Fatty liver, GERD (gastroesophageal reflux disease), HTN (hypertension), Hyperlipidemia, Memory loss, Osteopenia, S/P total hysterectomy, and Sleep apnea.        Medical and Therapy History Review:   Brief               Performance Deficits    Physical:  Joint Mobility  Muscle Power/Strength  Muscle Endurance   Strength, Pain    Cognitive:  No Deficits    Psychosocial:    No Deficits     Clinical Decision Making:  low    Assessment Process:  Problem-Focused Assessments    Modification/Need for Assistance:  Not Necessary    Intervention Selection:  Limited Treatment Options       low  Based on PMHX, co morbidities , data from assessments and functional level of assistance required with task and clinical presentation directly impacting function.       The following goals were discussed with the patient and patient is in agreement with them as to be addressed in the treatment plan.     Goals:   Short Term (4 weeks on 4/3/2020):  1)   Patient to be IND with HEP and modalities for pain management  2)   Increase ROM 5-10 degrees for elbow ext  to increase functional hand use for household activities.  3)   Increase  strength 3-5 lbs. to grasp objects.    Long Term (by discharge):  1)   Pt will report 2 out of 10 pain on average with lifting objects.  2)   Patient to score at CJ on FOTO to demonstrate improved perception of functional RUE use.  3)   Pt will return to prior level of function for household management and gardening.      Plan   Certification Period/Plan of care expiration: 3/3/2020 to 4/15/2020.    Outpatient Occupational Therapy 1 times weekly for 6 weeks may include the following interventions: Manual therapy/joint mobilizations, Modalities for pain management, Therapeutic exercises/activities. and  Strengthening.      Emily Acosta, OT

## 2020-03-11 ENCOUNTER — CLINICAL SUPPORT (OUTPATIENT)
Dept: REHABILITATION | Facility: HOSPITAL | Age: 65
End: 2020-03-11
Payer: MEDICARE

## 2020-03-11 DIAGNOSIS — M25.521 PAIN IN RIGHT ELBOW: ICD-10-CM

## 2020-03-11 PROBLEM — G89.29 CHRONIC PAIN OF LEFT ANKLE: Status: RESOLVED | Noted: 2019-04-17 | Resolved: 2020-03-11

## 2020-03-11 PROBLEM — M25.561 CHRONIC PAIN OF RIGHT KNEE: Status: RESOLVED | Noted: 2018-12-06 | Resolved: 2020-03-11

## 2020-03-11 PROBLEM — G89.29 CHRONIC PAIN OF RIGHT KNEE: Status: RESOLVED | Noted: 2018-12-06 | Resolved: 2020-03-11

## 2020-03-11 PROBLEM — M25.572 CHRONIC PAIN OF LEFT ANKLE: Status: RESOLVED | Noted: 2019-04-17 | Resolved: 2020-03-11

## 2020-03-11 PROCEDURE — 97110 THERAPEUTIC EXERCISES: CPT

## 2020-03-11 NOTE — PROGRESS NOTES
"  Occupational Therapy Daily Treatment Note      Date: 3/11/2020  Name: Riana Kay  Clinic Number: [id number]    Therapy Diagnosis:   Encounter Diagnosis   Name Primary?    Pain in right elbow      Physician: Chris Auguste MD       Physician Orders: Eval and treat  Medical Diagnosis: M25.521,G89.29 (ICD-10-CM) - Elbow pain, chronic, right      Surgical Procedure and Date: N/A  Date of Injury: 6/30/2015  Evaluation Date: 3/3/2020  Insurance: [employer]  Insurance Authorization period Expiration: 03/25/2021      Plan of Care Certification Period: 3/3/2020 to 4/15/2020     Visit # / Visits Authortized: 2 / 7 (including initial eval)  Time In:9:00 AM  Time Out: 945AM  Total Billable Time: 45 minutes     Precautions: Diabetes    Subjective     Pt reports: " I'm feeling much better"  she was compliant with home exercise program given last session.   Response to previous treatment: decreased pain/stiffness  Functional change: able to use it more    Pain: 3/10  Location: right elbow    Objective     Elbow AROM:    3/3/2020 3/3/2020     Left Right   Elbow ext/flex 0/140 15/140   Supination/Pronation 90/90 90/90      Sensation:  Intact      Strength (Dyanmometer) and Pinch Strength (Pinch Gauge)  Measured in pounds and psi. Average of three trials.    3/3/2020 3/3/2020     Left Right   Rung II 39 30                 Riana received the following therapeutic exercises for 45 minutes:     -Patient received MH x 15 min in supine with 3 lb weight to right wrist to increase blood flow, circulation and tissue elasticity prior to therex       - -  AAROM/AROM  Elbow flex x 10 reps   Wrist PRE x 3 ways X 20 each x 1#   Elbow PRE x 3 ways  Triceps extension X 20 each x 2 #     Orange Theraband   Peach putty  Mold    Pancake/bloom  Pinch (3 pt/lateral)    X 2 min  X 10  X 5  X 3 logs            Home Exercises and Education Provided     Education provided: cont HEP  - Progress towards goals     Written Home " Exercises Provided: Patient instructed to cont prior HEP.  Exercises were reviewed and Riana was able to demonstrate them prior to the end of the session.  Riana demonstrated good  understanding of the education provided.   .   See EMR under Patient Instructions for exercises provided initial eval.     Assessment   Pt. Presents for first visit after initial evaluation. She states she is doing much better. She states she is using the weight at home to stretch and do the exercises. She still has the peach putty. She required min verbal cues and demonstration for elbow PRE 3 ways this date.     Riana is progressing well towards her goals and there are no updates to goals at this time. Pt prognosis continues as Good. Pt will continue to benefit from skilled outpatient occupational therapy to address the deficits listed in the problem list on initial evaluation, provide pt/family education and to maximize pt's level of independence in the home and community environment.     Anticipated barriers to continued occupational therapy: english as a second language. Multiple previous therapy courses with continued pain    Pt's spiritual, cultural and educational needs considered and pt agreeable to plan of care and goals.    Goals   Goals:   Short Term (4 weeks on 4/3/2020):  1)   Patient to be IND with HEP and modalities for pain management-progressing  2)   Increase ROM 5-10 degrees for elbow ext  to increase functional hand use for household activities.-progressing  3)   Increase  strength 3-5 lbs. to grasp objects -progressing.     Long Term (by discharge):  1)   Pt will report 2 out of 10 pain on average with lifting objects.-progressing  2)   Patient to score at CJ on FOTO to demonstrate improved perception of functional RUE use.-progressing  3)   Pt will return to prior level of function for household management and gardening.- progressing        Plan     Continue skilled occupational therapy with  individualized plan of care focusing on increasing AROM and strength for return to daily occupations    Updates/Grading for next session: cont to progress with strength as able    Lucrecia Carrillo, OTR/L,CHT

## 2020-03-26 ENCOUNTER — TELEPHONE (OUTPATIENT)
Dept: PAIN MEDICINE | Facility: CLINIC | Age: 65
End: 2020-03-26

## 2020-03-26 NOTE — TELEPHONE ENCOUNTER
Staff left several messages for patient regarding her 5/19/20 appointment with provider Dr. Juan Jose Beck MD at this time the clinic will canceled patient appointment(she can reschedule appointment if she calls back).

## 2020-03-26 NOTE — TELEPHONE ENCOUNTER
Staff left a message requesting patient to contact office in regards to her appt 5/19/20 with provider Dr. Beck    Staff wanted to go over options about appt. Patient can be schedule for a telephone conference call or a virtual visit(Must have MyChart and is active on it).

## 2020-04-03 RX ORDER — INSULIN LISPRO 100 [IU]/ML
INJECTION, SOLUTION INTRAVENOUS; SUBCUTANEOUS
Qty: 4 BOX | Refills: 3 | Status: SHIPPED | OUTPATIENT
Start: 2020-04-03 | End: 2020-04-17 | Stop reason: SDUPTHER

## 2020-04-17 ENCOUNTER — TELEPHONE (OUTPATIENT)
Dept: INTERNAL MEDICINE | Facility: CLINIC | Age: 65
End: 2020-04-17

## 2020-04-17 RX ORDER — INSULIN LISPRO 100 [IU]/ML
INJECTION, SOLUTION INTRAVENOUS; SUBCUTANEOUS
Qty: 4 BOX | Refills: 3 | Status: SHIPPED | OUTPATIENT
Start: 2020-04-17 | End: 2020-07-14

## 2020-04-17 NOTE — TELEPHONE ENCOUNTER
----- Message from Damaris Sylvester sent at 4/17/2020  9:19 AM CDT -----  Contact: Kemal ( PT  )   Pt  is requesting a refill on insulin lispro (HUMALOG KWIKPEN INSULIN) 100 unit/mL pen. Advised a generic was provided and they prefer Humalog.       [phone] (home)

## 2020-05-25 ENCOUNTER — PATIENT OUTREACH (OUTPATIENT)
Dept: ADMINISTRATIVE | Facility: OTHER | Age: 65
End: 2020-05-25

## 2020-05-25 DIAGNOSIS — M17.11 PRIMARY OSTEOARTHRITIS OF RIGHT KNEE: ICD-10-CM

## 2020-05-25 DIAGNOSIS — M17.12 PRIMARY OSTEOARTHRITIS OF LEFT KNEE: Primary | ICD-10-CM

## 2020-05-27 ENCOUNTER — HOSPITAL ENCOUNTER (OUTPATIENT)
Dept: RADIOLOGY | Facility: HOSPITAL | Age: 65
Discharge: HOME | End: 2020-05-27
Attending: ORTHOPAEDIC SURGERY
Payer: MEDICARE

## 2020-05-27 ENCOUNTER — OFFICE VISIT (OUTPATIENT)
Dept: ORTHOPEDICS | Facility: CLINIC | Age: 65
End: 2020-05-27
Payer: MEDICARE

## 2020-05-27 VITALS — BODY MASS INDEX: 28.61 KG/M2 | HEIGHT: 65 IN | WEIGHT: 171.75 LBS

## 2020-05-27 DIAGNOSIS — M17.12 PRIMARY OSTEOARTHRITIS OF LEFT KNEE: ICD-10-CM

## 2020-05-27 DIAGNOSIS — M17.11 PRIMARY OSTEOARTHRITIS OF RIGHT KNEE: ICD-10-CM

## 2020-05-27 DIAGNOSIS — M17.12 PRIMARY OSTEOARTHRITIS OF LEFT KNEE: Primary | ICD-10-CM

## 2020-05-27 PROCEDURE — 99999 PR PBB SHADOW E&M-EST. PATIENT-LVL III: ICD-10-PCS | Mod: PBBFAC,,, | Performed by: ORTHOPAEDIC SURGERY

## 2020-05-27 PROCEDURE — 1101F PR PT FALLS ASSESS DOC 0-1 FALLS W/OUT INJ PAST YR: ICD-10-PCS | Mod: CPTII,S$GLB,, | Performed by: ORTHOPAEDIC SURGERY

## 2020-05-27 PROCEDURE — 99213 OFFICE O/P EST LOW 20 MIN: CPT | Mod: S$GLB,,, | Performed by: ORTHOPAEDIC SURGERY

## 2020-05-27 PROCEDURE — 73562 XR KNEE ORTHO BILAT: ICD-10-PCS | Mod: 26,50,, | Performed by: RADIOLOGY

## 2020-05-27 PROCEDURE — 3008F BODY MASS INDEX DOCD: CPT | Mod: CPTII,S$GLB,, | Performed by: ORTHOPAEDIC SURGERY

## 2020-05-27 PROCEDURE — 99999 PR PBB SHADOW E&M-EST. PATIENT-LVL III: CPT | Mod: PBBFAC,,, | Performed by: ORTHOPAEDIC SURGERY

## 2020-05-27 PROCEDURE — 73562 X-RAY EXAM OF KNEE 3: CPT | Mod: TC,50

## 2020-05-27 PROCEDURE — 3008F PR BODY MASS INDEX (BMI) DOCUMENTED: ICD-10-PCS | Mod: CPTII,S$GLB,, | Performed by: ORTHOPAEDIC SURGERY

## 2020-05-27 PROCEDURE — 1101F PT FALLS ASSESS-DOCD LE1/YR: CPT | Mod: CPTII,S$GLB,, | Performed by: ORTHOPAEDIC SURGERY

## 2020-05-27 PROCEDURE — 99213 PR OFFICE/OUTPT VISIT, EST, LEVL III, 20-29 MIN: ICD-10-PCS | Mod: S$GLB,,, | Performed by: ORTHOPAEDIC SURGERY

## 2020-05-27 PROCEDURE — 73562 X-RAY EXAM OF KNEE 3: CPT | Mod: 26,50,, | Performed by: RADIOLOGY

## 2020-05-27 NOTE — PROGRESS NOTES
"Subjective:      Patient ID: Riana Kay is a 65 y.o. female.    Chief Complaint: Pain of the Left Knee and Pain of the Right Knee    HPI  Riana Kay has bilateral knee pain. Left worse than right.  The pain has worsened. The pain is located in the global aspect of the knee.  There  is not radiation. There is not associated stiffness.   There is not catching and locking. The pain is described as achy. The pain is aggravated by standing, walking, sitting.  It is alleviated by nothing consistently.  There is not associated back pain.  Her history, medications and problem list were reviewed.    Review of Systems   Constitution: Negative for chills, fever and night sweats.   HENT: Negative for hearing loss.    Eyes: Negative for blurred vision and double vision.   Cardiovascular: Negative for chest pain, claudication and leg swelling.   Respiratory: Negative for shortness of breath.    Endocrine: Negative for polydipsia, polyphagia and polyuria.   Hematologic/Lymphatic: Negative for adenopathy and bleeding problem. Does not bruise/bleed easily.   Skin: Negative for poor wound healing.   Musculoskeletal: Positive for joint pain.   Gastrointestinal: Negative for diarrhea and heartburn.   Genitourinary: Negative for bladder incontinence.   Neurological: Negative for focal weakness, headaches, numbness, paresthesias and sensory change.   Psychiatric/Behavioral: The patient is not nervous/anxious.    Allergic/Immunologic: Negative for persistent infections.         Objective:      Body mass index is 28.58 kg/m².  Vitals:    05/27/20 0956   Weight: 77.9 kg (171 lb 11.8 oz)   Height: 5' 5" (1.651 m)           General    Constitutional: She is oriented to person, place, and time. She appears well-developed and well-nourished.   HENT:   Head: Normocephalic and atraumatic.   Eyes: EOM are normal.   Cardiovascular: Normal rate.    Pulmonary/Chest: Effort normal.   Neurological: She is alert and oriented to " person, place, and time.   Psychiatric: She has a normal mood and affect. Her behavior is normal.     General Musculoskeletal Exam   Gait: normal       Right Knee Exam     Inspection   Erythema: absent  Scars: absent  Swelling: present  Effusion: absent  Deformity: present (mild varus)  Bruising: absent    Tenderness   The patient is tender to palpation of the medial joint line, lateral joint line and patella.    Crepitus   The patient has crepitus of the patella.    Range of Motion   Extension: 0   Flexion: 120     Tests   Ligament Examination Lachman: normal (-1 to 2mm)   MCL - Valgus: normal (0 to 2mm)  LCL - Varus: normal  Patella   Passive Patellar Tilt: neutral    Other   Sensation: normal    Left Knee Exam     Inspection   Erythema: absent  Scars: absent  Swelling: present  Effusion: absent  Deformity: present (mild varus)  Bruising: absent    Tenderness   The patient tender to palpation of the medial joint line and patella.    Crepitus   The patient has crepitus of the patella.    Range of Motion   Extension: 0   Flexion: 120     Tests   Stability Lachman: normal (-1 to 2mm)   MCL - Valgus: normal (0 to 2mm)  LCL - Varus: normal (0 to 2mm)  Patella   Passive Patellar Tilt: neutral    Other   Sensation: normal    Muscle Strength   Right Lower Extremity   Hip Abduction: 5/5   Quadriceps:  5/5   Hamstrin/5   Left Lower Extremity   Hip Abduction: 5/5   Quadriceps:  5/5   Hamstrin/5     Reflexes     Left Side  Quadriceps:  2+    Right Side   Quadriceps:  2+    Vascular Exam     Right Pulses  Dorsalis Pedis:      2+          Left Pulses  Dorsalis Pedis:      2+          Edema  Right Lower Leg: absent  Left Lower Leg: absent    Radiographs taken yesterday and reviewed by me demonstrate moderately severe arthritic change of the bilateral KNEE(s).There  is not bone destruction.  There is not a fracture. The medial compartment is most involved.  There is a varus deformity.  The changes are  tricompartmental.              Assessment:       Encounter Diagnoses   Name Primary?    Primary osteoarthritis of left knee Yes    Primary osteoarthritis of right knee           Plan:       Riana was seen today for pain and pain.    Diagnoses and all orders for this visit:    Primary osteoarthritis of left knee  -     Prior authorization Order    Primary osteoarthritis of right knee  -     Prior authorization Order    Other orders  -     sodium hyaluronate (EUFLEXXA) 10 mg/mL(mw 2.4 -3.6 million) injection 20 mg      Treatment options have been discussed.  We have decided to proceed with bilaterl Euflexxa injections.  The risk of pseudoseptic reactions were discussed, as was the minimal risk of infection.  Riana Baker Rahul will return for her first injection..    She is going to work on lowerng Blood glucose in anticipation of TKA. ( Left most likely)

## 2020-06-09 ENCOUNTER — PATIENT OUTREACH (OUTPATIENT)
Dept: ADMINISTRATIVE | Facility: OTHER | Age: 65
End: 2020-06-09

## 2020-06-11 ENCOUNTER — OFFICE VISIT (OUTPATIENT)
Dept: ORTHOPEDICS | Facility: CLINIC | Age: 65
End: 2020-06-11
Payer: MEDICARE

## 2020-06-11 VITALS — WEIGHT: 171.75 LBS | BODY MASS INDEX: 28.61 KG/M2 | HEIGHT: 65 IN

## 2020-06-11 DIAGNOSIS — M17.0 PRIMARY OSTEOARTHRITIS OF BOTH KNEES: ICD-10-CM

## 2020-06-11 PROCEDURE — 99499 NO LOS: ICD-10-PCS | Mod: S$GLB,,, | Performed by: NURSE PRACTITIONER

## 2020-06-11 PROCEDURE — 99499 UNLISTED E&M SERVICE: CPT | Mod: S$GLB,,, | Performed by: NURSE PRACTITIONER

## 2020-06-11 PROCEDURE — 20610 PR DRAIN/INJECT LARGE JOINT/BURSA: ICD-10-PCS | Mod: 50,S$GLB,, | Performed by: NURSE PRACTITIONER

## 2020-06-11 PROCEDURE — 20610 DRAIN/INJ JOINT/BURSA W/O US: CPT | Mod: 50,S$GLB,, | Performed by: NURSE PRACTITIONER

## 2020-06-11 PROCEDURE — 99999 PR PBB SHADOW E&M-EST. PATIENT-LVL III: ICD-10-PCS | Mod: PBBFAC,,, | Performed by: NURSE PRACTITIONER

## 2020-06-11 PROCEDURE — 99999 PR PBB SHADOW E&M-EST. PATIENT-LVL III: CPT | Mod: PBBFAC,,, | Performed by: NURSE PRACTITIONER

## 2020-06-11 NOTE — PROGRESS NOTES
Riana Kay presents to clinic today for the first bilateral knee Euflexxa injection.    Exam demonstrates the no effusion in the  bilateral knee, and the skin is intact.    Diagnosis: primary osteoarthritis bilateral knee    After time out was performed and patient ID, side, and site were verified, the  bilateral  knee was sterilly prepped in the standard fashion.  A 22-gauge needle was introduced into bilateral knee joint from an jasbir-lateral site without complication and knee was then injected with 2 ml of Euflexxa.  Sterile dressing was applied.  The patient was instructed to resume activities as tolerated and to call with any problems.     We will see Riana Kay back next week for the second injection.

## 2020-06-11 NOTE — LETTER
June 11, 2020      Sabino Damon MD  [address]  [facility] [address]           [facility] - Orthoped[facility]  [address]  [facility] [address]  Phone: [phone]          Patient: Riana Kay   MR Number: [id number]   YOB: 1955   Date of Visit: 6/11/2020       Dear Dr. Sabino Damon:    Thank you for referring Riana Kay to me for evaluation. Attached you will find relevant portions of my assessment and plan of care.    If you have questions, please do not hesitate to call me. I look forward to following Riana Kay along with you.    Sincerely,    Debi Perez NP    Enclosure  CC:  No Recipients    If you would like to receive this communication electronically, please contact [email or web address] or [phone] to request more information on [employer] access.    For providers and/or their staff who would like to refer a patient to [facility], please contact us through our one-stop-shop provider referral line, [facility], at [phone].    If you feel you have received this communication in error or would no longer like to receive these types of communications, please e-mail [email or web address]

## 2020-06-17 ENCOUNTER — PATIENT OUTREACH (OUTPATIENT)
Dept: ADMINISTRATIVE | Facility: OTHER | Age: 65
End: 2020-06-17

## 2020-06-18 ENCOUNTER — OFFICE VISIT (OUTPATIENT)
Dept: ORTHOPEDICS | Facility: CLINIC | Age: 65
End: 2020-06-18
Payer: MEDICARE

## 2020-06-18 ENCOUNTER — OFFICE VISIT (OUTPATIENT)
Dept: INTERNAL MEDICINE | Facility: CLINIC | Age: 65
End: 2020-06-18
Payer: MEDICARE

## 2020-06-18 VITALS
DIASTOLIC BLOOD PRESSURE: 66 MMHG | HEIGHT: 65 IN | WEIGHT: 172.81 LBS | BODY MASS INDEX: 28.79 KG/M2 | TEMPERATURE: 99 F | HEART RATE: 71 BPM | SYSTOLIC BLOOD PRESSURE: 120 MMHG

## 2020-06-18 VITALS
BODY MASS INDEX: 28.81 KG/M2 | HEART RATE: 62 BPM | DIASTOLIC BLOOD PRESSURE: 72 MMHG | WEIGHT: 172.94 LBS | TEMPERATURE: 98 F | SYSTOLIC BLOOD PRESSURE: 138 MMHG | HEIGHT: 65 IN

## 2020-06-18 DIAGNOSIS — J31.0 CHRONIC RHINITIS: Primary | ICD-10-CM

## 2020-06-18 DIAGNOSIS — M17.0 PRIMARY OSTEOARTHRITIS OF BOTH KNEES: Primary | ICD-10-CM

## 2020-06-18 PROBLEM — Z12.11 SCREENING FOR COLON CANCER: Status: RESOLVED | Noted: 2018-01-17 | Resolved: 2020-06-18

## 2020-06-18 PROCEDURE — 3074F PR MOST RECENT SYSTOLIC BLOOD PRESSURE < 130 MM HG: ICD-10-PCS | Mod: CPTII,S$GLB,, | Performed by: FAMILY MEDICINE

## 2020-06-18 PROCEDURE — 3078F PR MOST RECENT DIASTOLIC BLOOD PRESSURE < 80 MM HG: ICD-10-PCS | Mod: CPTII,S$GLB,, | Performed by: FAMILY MEDICINE

## 2020-06-18 PROCEDURE — 1101F PR PT FALLS ASSESS DOC 0-1 FALLS W/OUT INJ PAST YR: ICD-10-PCS | Mod: CPTII,S$GLB,, | Performed by: FAMILY MEDICINE

## 2020-06-18 PROCEDURE — 3078F DIAST BP <80 MM HG: CPT | Mod: CPTII,S$GLB,, | Performed by: FAMILY MEDICINE

## 2020-06-18 PROCEDURE — 99214 PR OFFICE/OUTPT VISIT, EST, LEVL IV, 30-39 MIN: ICD-10-PCS | Mod: S$GLB,,, | Performed by: FAMILY MEDICINE

## 2020-06-18 PROCEDURE — 99999 PR PBB SHADOW E&M-EST. PATIENT-LVL IV: ICD-10-PCS | Mod: PBBFAC,,, | Performed by: PHYSICIAN ASSISTANT

## 2020-06-18 PROCEDURE — 3074F SYST BP LT 130 MM HG: CPT | Mod: CPTII,S$GLB,, | Performed by: FAMILY MEDICINE

## 2020-06-18 PROCEDURE — 20610 DRAIN/INJ JOINT/BURSA W/O US: CPT | Mod: 50,S$GLB,, | Performed by: PHYSICIAN ASSISTANT

## 2020-06-18 PROCEDURE — 1101F PT FALLS ASSESS-DOCD LE1/YR: CPT | Mod: CPTII,S$GLB,, | Performed by: FAMILY MEDICINE

## 2020-06-18 PROCEDURE — 3008F PR BODY MASS INDEX (BMI) DOCUMENTED: ICD-10-PCS | Mod: CPTII,S$GLB,, | Performed by: FAMILY MEDICINE

## 2020-06-18 PROCEDURE — 99214 OFFICE O/P EST MOD 30 MIN: CPT | Mod: S$GLB,,, | Performed by: FAMILY MEDICINE

## 2020-06-18 PROCEDURE — 99999 PR PBB SHADOW E&M-EST. PATIENT-LVL IV: CPT | Mod: PBBFAC,,, | Performed by: PHYSICIAN ASSISTANT

## 2020-06-18 PROCEDURE — 99999 PR PBB SHADOW E&M-EST. PATIENT-LVL V: ICD-10-PCS | Mod: PBBFAC,,, | Performed by: FAMILY MEDICINE

## 2020-06-18 PROCEDURE — 99499 UNLISTED E&M SERVICE: CPT | Mod: S$GLB,,, | Performed by: PHYSICIAN ASSISTANT

## 2020-06-18 PROCEDURE — 20610 PR DRAIN/INJECT LARGE JOINT/BURSA: ICD-10-PCS | Mod: 50,S$GLB,, | Performed by: PHYSICIAN ASSISTANT

## 2020-06-18 PROCEDURE — 99499 NO LOS: ICD-10-PCS | Mod: S$GLB,,, | Performed by: PHYSICIAN ASSISTANT

## 2020-06-18 PROCEDURE — 99999 PR PBB SHADOW E&M-EST. PATIENT-LVL V: CPT | Mod: PBBFAC,,, | Performed by: FAMILY MEDICINE

## 2020-06-18 PROCEDURE — 3008F BODY MASS INDEX DOCD: CPT | Mod: CPTII,S$GLB,, | Performed by: FAMILY MEDICINE

## 2020-06-18 NOTE — PROGRESS NOTES
Riana Kay presents to clinic today for the second bilateral knee Euflexxa injection.    Exam demonstrates the no effusion in the bilateral knee, and the skin is intact.    Diagnosis: primary osteoarthritis of both knees     After time out was performed and patient ID, side, and site were verified, the right knee and the left knee were sterilly prepped in the standard fashion.  A 22-gauge needle was introduced into the right knee and the left knee joint from an jasbir-lateral site without complication. The right knee and the left knee were then injected with 2 ml of Euflexxa each. Sterile dressing was applied.  The patient was instructed to resume activities as tolerated and to call with any problems.     We will see Riana aKy back next week for the third injection.

## 2020-06-18 NOTE — PROGRESS NOTES
Subjective:       Patient ID: Riana Kay is a 65 y.o. female.    Chief Complaint: dry throat, tongue burning    HPI 65-year-old female presents to clinic today accompanied by her  who provides translation secondary to a complaint of a sensation of throat dryness and currently tongue soreness.  She reports the throat dryness for approximately 3 months but since the weekend she has began noticing a sensation of soreness to her tongue.  She she does have chronic rhinitis and has been prescribed Flonase nasal spray and Astelin which she states she uses on rare occasions.  Her  reports that this past weekend she was doing a lot of work in her garden.  Review of Systems   Constitutional: Negative for appetite change, chills, fatigue and fever.   HENT: Positive for mouth sores (tongue sore) and sore throat (Dry throat). Negative for nasal congestion, drooling, ear pain, hearing loss, postnasal drip, rhinorrhea, sinus pressure/congestion and tinnitus.    Eyes: Negative for redness, itching and visual disturbance.   Respiratory: Negative for cough, chest tightness, shortness of breath and stridor.    Cardiovascular: Negative for chest pain and palpitations.   Gastrointestinal: Negative for abdominal pain, constipation, diarrhea, nausea and vomiting.   Genitourinary: Negative for decreased urine volume, difficulty urinating, dysuria, frequency, hematuria and urgency.   Musculoskeletal: Negative for back pain, myalgias, neck pain and neck stiffness.   Integumentary:  Negative for rash.   Neurological: Negative for dizziness, light-headedness and headaches.   Psychiatric/Behavioral: Negative.          Objective:      Physical Exam  Vitals signs and nursing note reviewed.   Constitutional:       General: She is not in acute distress.     Appearance: She is well-developed. She is not diaphoretic.   HENT:      Head: Normocephalic and atraumatic.      Right Ear: External ear normal. A middle ear effusion is  present.      Left Ear: External ear normal. A middle ear effusion is present.      Nose: Nose normal.      Right Turbinates: Swollen.      Left Turbinates: Swollen.      Mouth/Throat:      Pharynx: No oropharyngeal exudate.   Eyes:      General: No scleral icterus.        Right eye: No discharge.         Left eye: No discharge.      Conjunctiva/sclera: Conjunctivae normal.      Pupils: Pupils are equal, round, and reactive to light.   Neck:      Musculoskeletal: Normal range of motion and neck supple.      Thyroid: No thyromegaly.      Vascular: No JVD.      Trachea: No tracheal deviation.   Cardiovascular:      Rate and Rhythm: Normal rate and regular rhythm.      Heart sounds: Normal heart sounds. No murmur. No friction rub. No gallop.    Pulmonary:      Effort: Pulmonary effort is normal. No respiratory distress.      Breath sounds: Normal breath sounds. No stridor. No wheezing or rales.   Abdominal:      General: Bowel sounds are normal. There is no distension.      Palpations: Abdomen is soft. There is no mass.      Tenderness: There is no abdominal tenderness. There is no guarding or rebound.   Musculoskeletal: Normal range of motion.         General: No tenderness.   Lymphadenopathy:      Cervical: No cervical adenopathy.   Skin:     General: Skin is warm and dry.      Coloration: Skin is not pale.      Findings: No erythema or rash.   Neurological:      Mental Status: She is alert and oriented to person, place, and time.   Psychiatric:         Behavior: Behavior normal.         Thought Content: Thought content normal.         Judgment: Judgment normal.         Assessment:       1. Chronic rhinitis        Plan:       1.  Encourage restarting both Flonase nasal spray and Astelin nasal spray as prescribed.  2.  Encourage hydration.  3.  Recommend use of over-the-counter biotin mouthwash as needed.  4.  Return to clinic as needed if symptoms persist or worsen.

## 2020-06-25 ENCOUNTER — OFFICE VISIT (OUTPATIENT)
Dept: ORTHOPEDICS | Facility: CLINIC | Age: 65
End: 2020-06-25
Payer: MEDICARE

## 2020-06-25 VITALS — WEIGHT: 171.5 LBS | BODY MASS INDEX: 28.57 KG/M2 | HEIGHT: 65 IN

## 2020-06-25 DIAGNOSIS — M17.0 PRIMARY OSTEOARTHRITIS OF BOTH KNEES: ICD-10-CM

## 2020-06-25 PROCEDURE — 20610 DRAIN/INJ JOINT/BURSA W/O US: CPT | Mod: 50,S$GLB,, | Performed by: NURSE PRACTITIONER

## 2020-06-25 PROCEDURE — 99999 PR PBB SHADOW E&M-EST. PATIENT-LVL IV: CPT | Mod: PBBFAC,,, | Performed by: NURSE PRACTITIONER

## 2020-06-25 PROCEDURE — 99499 UNLISTED E&M SERVICE: CPT | Mod: S$GLB,,, | Performed by: NURSE PRACTITIONER

## 2020-06-25 PROCEDURE — 99499 NO LOS: ICD-10-PCS | Mod: S$GLB,,, | Performed by: NURSE PRACTITIONER

## 2020-06-25 PROCEDURE — 20610 PR DRAIN/INJECT LARGE JOINT/BURSA: ICD-10-PCS | Mod: 50,S$GLB,, | Performed by: NURSE PRACTITIONER

## 2020-06-25 PROCEDURE — 99999 PR PBB SHADOW E&M-EST. PATIENT-LVL IV: ICD-10-PCS | Mod: PBBFAC,,, | Performed by: NURSE PRACTITIONER

## 2020-06-25 NOTE — LETTER
June 25, 2020      Sabino Damon MD  [address]  [facility] [address]           [facility] - Orthopedics  [address]  [facility] [address]  Phone: [phone]          Patient: Riana Kay   MR Number: [id number]   YOB: 1955   Date of Visit: 6/25/2020       Dear Dr. Sabino Damon:    Thank you for referring Riana Kay to me for evaluation. Attached you will find relevant portions of my assessment and plan of care.    If you have questions, please do not hesitate to call me. I look forward to following Riana Kay along with you.    Sincerely,    Debi Perez NP    Enclosure  CC:  No Recipients    If you would like to receive this communication electronically, please contact [email or web address] or [phone] to request more information on [employer] Link access.    For providers and/or their staff who would like to refer a patient to [facility], please contact us through our one-stop-shop provider referral line, [facility], at [phone].    If you feel you have received this communication in error or would no longer like to receive these types of communications, please e-mail [email or web address]

## 2020-06-25 NOTE — PROGRESS NOTES
Riana Kay presents to clinic today for the third bilateral knee Euflexxa injection.    Exam demonstrates the no effusion in the  bilateral knee, and the skin is intact.    Diagnosis: primary osteoarthritis bilateral knee    After time out was performed and patient ID, side, and site were verified, the  bilateral  knee was sterilly prepped in the standard fashion.  A 22-gauge needle was introduced into bilateral knee joint from an jasbir-lateral site without complication and knee was then injected with 2 ml of Euflexxa.  Sterile dressing was applied.  The patient was instructed to resume activities as tolerated and to call with any problems.     She reports some relief, but there is still pain in the left knee. She will f/u as needed

## 2020-06-29 ENCOUNTER — PATIENT OUTREACH (OUTPATIENT)
Dept: ADMINISTRATIVE | Facility: OTHER | Age: 65
End: 2020-06-29

## 2020-06-30 ENCOUNTER — OFFICE VISIT (OUTPATIENT)
Dept: PODIATRY | Facility: CLINIC | Age: 65
End: 2020-06-30
Payer: MEDICARE

## 2020-06-30 VITALS
RESPIRATION RATE: 17 BRPM | BODY MASS INDEX: 28.49 KG/M2 | HEART RATE: 63 BPM | DIASTOLIC BLOOD PRESSURE: 69 MMHG | WEIGHT: 171 LBS | SYSTOLIC BLOOD PRESSURE: 148 MMHG | HEIGHT: 65 IN

## 2020-06-30 DIAGNOSIS — R60.0 LOCALIZED EDEMA: ICD-10-CM

## 2020-06-30 DIAGNOSIS — M79.89 LEG SWELLING: Primary | ICD-10-CM

## 2020-06-30 DIAGNOSIS — E11.69 TYPE 2 DIABETES MELLITUS WITH OTHER SPECIFIED COMPLICATION, WITH LONG-TERM CURRENT USE OF INSULIN: ICD-10-CM

## 2020-06-30 DIAGNOSIS — Z79.4 TYPE 2 DIABETES MELLITUS WITH OTHER SPECIFIED COMPLICATION, WITH LONG-TERM CURRENT USE OF INSULIN: ICD-10-CM

## 2020-06-30 PROCEDURE — 3078F PR MOST RECENT DIASTOLIC BLOOD PRESSURE < 80 MM HG: ICD-10-PCS | Mod: CPTII,S$GLB,, | Performed by: PODIATRIST

## 2020-06-30 PROCEDURE — 1101F PR PT FALLS ASSESS DOC 0-1 FALLS W/OUT INJ PAST YR: ICD-10-PCS | Mod: CPTII,S$GLB,, | Performed by: PODIATRIST

## 2020-06-30 PROCEDURE — 99214 OFFICE O/P EST MOD 30 MIN: CPT | Mod: S$GLB,,, | Performed by: PODIATRIST

## 2020-06-30 PROCEDURE — 3077F SYST BP >= 140 MM HG: CPT | Mod: CPTII,S$GLB,, | Performed by: PODIATRIST

## 2020-06-30 PROCEDURE — 99214 PR OFFICE/OUTPT VISIT, EST, LEVL IV, 30-39 MIN: ICD-10-PCS | Mod: S$GLB,,, | Performed by: PODIATRIST

## 2020-06-30 PROCEDURE — 3077F PR MOST RECENT SYSTOLIC BLOOD PRESSURE >= 140 MM HG: ICD-10-PCS | Mod: CPTII,S$GLB,, | Performed by: PODIATRIST

## 2020-06-30 PROCEDURE — 3008F BODY MASS INDEX DOCD: CPT | Mod: CPTII,S$GLB,, | Performed by: PODIATRIST

## 2020-06-30 PROCEDURE — 99499 UNLISTED E&M SERVICE: CPT | Mod: S$GLB,,, | Performed by: PODIATRIST

## 2020-06-30 PROCEDURE — 1101F PT FALLS ASSESS-DOCD LE1/YR: CPT | Mod: CPTII,S$GLB,, | Performed by: PODIATRIST

## 2020-06-30 PROCEDURE — 3078F DIAST BP <80 MM HG: CPT | Mod: CPTII,S$GLB,, | Performed by: PODIATRIST

## 2020-06-30 PROCEDURE — 3008F PR BODY MASS INDEX (BMI) DOCUMENTED: ICD-10-PCS | Mod: CPTII,S$GLB,, | Performed by: PODIATRIST

## 2020-06-30 PROCEDURE — 3051F HG A1C>EQUAL 7.0%<8.0%: CPT | Mod: CPTII,S$GLB,, | Performed by: PODIATRIST

## 2020-06-30 PROCEDURE — 99999 PR PBB SHADOW E&M-EST. PATIENT-LVL V: ICD-10-PCS | Mod: PBBFAC,,, | Performed by: PODIATRIST

## 2020-06-30 PROCEDURE — 99999 PR PBB SHADOW E&M-EST. PATIENT-LVL V: CPT | Mod: PBBFAC,,, | Performed by: PODIATRIST

## 2020-06-30 PROCEDURE — 3051F PR MOST RECENT HEMOGLOBIN A1C LEVEL 7.0 - < 8.0%: ICD-10-PCS | Mod: CPTII,S$GLB,, | Performed by: PODIATRIST

## 2020-06-30 PROCEDURE — 99499 RISK ADDL DX/OHS AUDIT: ICD-10-PCS | Mod: S$GLB,,, | Performed by: PODIATRIST

## 2020-06-30 NOTE — PROGRESS NOTES
Subjective:      Patient ID: Riana Kay is a 65 y.o. female.    Chief Complaint: PCP (Jose Rojas MD 6/18/20) and Diabetic Foot Exam (ankle swelling )    Riana is a 65 y.o. female who presents to the podiatry clinic  with complaint of  bilateral ankle swelling . Onset of the symptoms was several weeks ago. Precipitating event: none known. Current symptoms include: worsening symptoms after a period of activity. Aggravating factors: standing. Symptoms have waxed and waned. Patient has had prior foot problems. Evaluation to date: none. Treatment to date: none. Patients rates pain 0/10 on pain scale.        Review of Systems   Constitution: Negative for chills, decreased appetite and fever.   Cardiovascular: Positive for leg swelling.   Musculoskeletal: Negative for arthritis, joint pain, joint swelling and myalgias.   Gastrointestinal: Negative for nausea and vomiting.   Neurological: Negative for loss of balance, numbness and paresthesias.         Patient Active Problem List   Diagnosis    Fatty liver    S/P total hysterectomy    Osteopenia    GERD (gastroesophageal reflux disease)    Sleep apnea    Atrophic gastritis without mention of hemorrhage    Chronic fatigue syndrome    Coronary atherosclerosis    Abdominal pain, right upper quadrant    HTN (hypertension)    Muscle spasms of neck    Radiculopathy of cervical spine    Overweight (BMI 25.0-29.9)    Type 2 diabetes mellitus with other specified complication    Pain    Right elbow pain    Vitamin D deficiency    Pain in right elbow       Current Outpatient Medications on File Prior to Visit   Medication Sig Dispense Refill    amLODIPine (NORVASC) 5 MG tablet Take 1 tablet (5 mg total) by mouth once daily. 90 tablet 3    azelastine (ASTELIN) 137 mcg (0.1 %) nasal spray 1 SPRAY (137 MCG TOTAL) BY NASAL ROUTE 2 (TWO) TIMES DAILY. 90 mL 2    blood sugar diagnostic Strp To check BG 4 times daily, to use with insurance  "preferred meter-true metrix 400 each 3    blood-glucose meter kit To check BG 4 times daily, to use with insurance preferred meter-true metrix 1 each 1    cyanocobalamin (VITAMIN B-12) 100 MCG tablet Take 100 mcg by mouth once daily.      diclofenac sodium (VOLTAREN) 1 % Gel Apply 2 g topically 2 (two) times daily. 100 g 2    diclofenac sodium (VOLTAREN) 1 % Gel Apply 2 g topically 4 (four) times daily. 100 g 1    fluticasone propionate (FLONASE) 50 mcg/actuation nasal spray 2 sprays (100 mcg total) by Each Nostril route once daily. 16 g 11    insulin (LANTUS SOLOSTAR U-100 INSULIN) glargine 100 units/mL (3mL) SubQ pen Inject 36 units at night. (new order) 3 Box 3    insulin lispro (HUMALOG KWIKPEN INSULIN) 100 unit/mL pen Inject 16 units w/ meals plus scale 150-200+2, 201-250+4, 251-300+6, 301-350+8, >350+10. Max daily 68 units. 4 Box 3    ketoconazole (NIZORAL) 2 % shampoo Wash scalp with medicated shampoo at least 2x/week. Let sit on scalp at least 5 minutes prior to rinsing 240 mL 5    lancets Misc To check BG 4  times daily, to use with insurance preferred meter-true metrix 400 each 3    latanoprost 0.005 % ophthalmic solution Place 1 drop into both eyes nightly.      losartan (COZAAR) 25 MG tablet Take 0.5 tablets (12.5 mg total) by mouth once daily. 45 tablet 3    ondansetron (ZOFRAN-ODT) 8 MG TbDL Take 1 tablet (8 mg total) by mouth 3 (three) times daily as needed. 30 tablet 6    pantoprazole (PROTONIX) 40 MG tablet Take 1 tablet (40 mg total) by mouth once daily. 90 tablet 0    pen needle, diabetic (NOVOFINE 32) 32 gauge x 1/4" Ndle Uses 4 times a day. 90 day via duramed e 11.65 400 each 3    pyridoxine, vitamin B6, (VITAMIN B-6) 100 MG Tab Take 100 mg by mouth once daily.      SITagliptin (JANUVIA) 100 MG Tab Take 1 tablet (100 mg total) by mouth once daily. 90 tablet 3     Current Facility-Administered Medications on File Prior to Visit   Medication Dose Route Frequency Provider Last Rate " Last Dose    sodium hyaluronate (EUFLEXXA) 10 mg/mL(mw 2.4 -3.6 million) injection 40 mg  40 mg Intra-articular Weekly Debi Perez OMEGA   40 mg at 06/25/20 0932       Review of patient's allergies indicates:   Allergen Reactions    Iodinated contrast media Swelling and Rash    Percocet [oxycodone-acetaminophen] Itching    Macrobid [nitrofurantoin monohyd/m-cryst] Rash    Metformin Rash    Penicillins Rash    Promethazine Rash    Sulfa (sulfonamide antibiotics) Rash    Sulfamethoxazole-trimethoprim Rash       Past Surgical History:   Procedure Laterality Date    CHOLECYSTECTOMY      COLONOSCOPY N/A 1/17/2018    Procedure: COLONOSCOPY with Donnell;  Surgeon: Roland Jacobo MD;  Location: [facility] (4TH FLR);  Service: Endoscopy;  Laterality: N/A;    ELBOW ARTHROPLASTY Right 1/16/2019    Procedure: ARTHROPLASTY, ELBOW right radial head arthroplasty revision;  Surgeon: Katja Hubbard MD;  Location: [facility] OR [address] [place] [address];  Service: Orthopedics;  Laterality: Right;  Anesthesia: General and Regional. Stretcher, hand pan 1 and pan 2, CALL RUCHI SANTAMARIA notified 1-14 LO    ELBOW SURGERY Right 7/16/15    ELBOW SURGERY      ESOPHAGOGASTRODUODENOSCOPY N/A 4/15/2019    Procedure: EGD (ESOPHAGOGASTRODUODENOSCOPY);  Surgeon: Buck Irwin MD;  Location: [facility] (4TH FLR);  Service: Endoscopy;  Laterality: N/A;  ray she    HYSTERECTOMY      KNEE ARTHROSCOPY W/ DEBRIDEMENT  4/11    Left    RELEASE OF ULNAR NERVE AT CUBITAL TUNNEL Right 1/16/2019    Procedure: RELEASE, ULNAR TUNNEL right;  Surgeon: Katja Hubbard MD;  Location: [facility] OR [address] [place] [address];  Service: Orthopedics;  Laterality: Right;  Anesthesia: General and Regional. Stretcher, hand pan 1 and pan 2, CALL RUCHI SANTAMARIA    ROTATOR CUFF REPAIR      TONSILLECTOMY      TOTAL ABDOMINAL HYSTERECTOMY W/ BILATERAL SALPINGOOPHORECTOMY      UPPER GASTROINTESTINAL ENDOSCOPY         Family History   Problem Relation Age  "of Onset    Colon cancer Father 83        colon cancer    Diabetes Maternal Grandmother     Diabetes Maternal Aunt     Esophageal cancer Neg Hx     Stomach cancer Neg Hx     Melanoma Neg Hx        Social History     Socioeconomic History    Marital status:      Spouse name: Not on file    Number of children: Not on file    Years of education: Not on file    Highest education level: Not on file   Occupational History    Not on file   Social Needs    Financial resource strain: Somewhat hard    Food insecurity     Worry: Never true     Inability: Never true    Transportation needs     Medical: No     Non-medical: No   Tobacco Use    Smoking status: Never Smoker    Smokeless tobacco: Never Used   Substance and Sexual Activity    Alcohol use: No     Frequency: Never     Drinks per session: Patient refused     Binge frequency: Never    Drug use: No    Sexual activity: Yes     Partners: Male     Birth control/protection: Post-menopausal   Lifestyle    Physical activity     Days per week: 2 days     Minutes per session: 60 min    Stress: To some extent   Relationships    Social connections     Talks on phone: More than three times a week     Gets together: Once a week     Attends Buddhist service: Not on file     Active member of club or organization: Yes     Attends meetings of clubs or organizations: More than 4 times per year     Relationship status:    Other Topics Concern    Are you pregnant or think you may be? No    Breast-feeding No   Social History Narrative    She works at [employer] in [employer]; , 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 No exercise but hopes to start walking on the weekend.               Objective:       Vitals:    06/30/20 0855   BP: (!) 148/69   Pulse: 63   Resp: 17   Weight: 77.6 kg (171 lb)   Height: 5' 5" (1.651 m)   PainSc: 0-No pain        Physical Exam  Constitutional:       Appearance: She is well-developed.   Cardiovascular:      Pulses:    "        Dorsalis pedis pulses are 2+ on the right side and 2+ on the left side.        Posterior tibial pulses are 2+ on the right side and 2+ on the left side.      Comments: telangiectasias    Musculoskeletal:         General: No tenderness.      Right ankle: Normal.      Left ankle: Normal.      Right lower leg: Edema present.      Left lower leg: Edema present.      Right foot: No swelling, crepitus or deformity.      Left foot: No swelling, crepitus or deformity.      Comments: Adequate joint range of motion without pain, limitation, nor crepitation Bilateral feet and ankle joints. Muscle strength is 5/5 in all groups bilaterally.         Lymphadenopathy:      Comments: No palpable lymph nodes   Skin:     General: Skin is warm and dry.      Findings: No erythema or rash.      Nails: There is no clubbing.     Neurological:      Mental Status: She is alert and oriented to person, place, and time.   Psychiatric:         Behavior: Behavior normal.               Assessment:       Encounter Diagnoses   Name Primary?    Leg swelling Yes    Type 2 diabetes mellitus with other specified complication, with long-term current use of insulin     Localized edema           Plan:       Riana was seen today for pcp and diabetic foot exam.    Diagnoses and all orders for this visit:    Leg swelling  -     VAS US Venous Legs Bilateral; Future    Type 2 diabetes mellitus with other specified complication, with long-term current use of insulin  -     DIABETIC SHOES FOR HOME USE  -     VAS US Venous Legs Bilateral; Future    Localized edema   -     VAS US Venous Legs Bilateral; Future      I counseled the patient on her conditions, their implications and medical management.    Stable lateral ankle lipoma   No current indicators for swelling etiology aside from telangiectasias  Venous US ordered   Discussed etiology of swelling at length. Discussed treatment options including but not limited to :  low sodium diet, elevation of  limbs, and compression stockings. Pt will elevate limbs daily.     .

## 2020-07-02 ENCOUNTER — TELEPHONE (OUTPATIENT)
Dept: PODIATRY | Facility: CLINIC | Age: 65
End: 2020-07-02

## 2020-07-02 ENCOUNTER — HOSPITAL ENCOUNTER (OUTPATIENT)
Dept: VASCULAR SURGERY | Facility: CLINIC | Age: 65
Discharge: HOME | End: 2020-07-02
Attending: PODIATRIST
Payer: MEDICARE

## 2020-07-02 DIAGNOSIS — M79.89 LEG SWELLING: ICD-10-CM

## 2020-07-02 DIAGNOSIS — R60.0 LOCALIZED EDEMA: ICD-10-CM

## 2020-07-02 DIAGNOSIS — E11.69 TYPE 2 DIABETES MELLITUS WITH OTHER SPECIFIED COMPLICATION, WITH LONG-TERM CURRENT USE OF INSULIN: ICD-10-CM

## 2020-07-02 DIAGNOSIS — Z79.4 TYPE 2 DIABETES MELLITUS WITH OTHER SPECIFIED COMPLICATION, WITH LONG-TERM CURRENT USE OF INSULIN: ICD-10-CM

## 2020-07-02 PROCEDURE — 93970 PR US DUPLEX, UPPER OR LOWER EXT VENOUS,COMPLETE BILAT: ICD-10-PCS | Mod: S$GLB,,, | Performed by: SURGERY

## 2020-07-02 PROCEDURE — 93970 EXTREMITY STUDY: CPT | Mod: S$GLB,,, | Performed by: SURGERY

## 2020-07-14 ENCOUNTER — OFFICE VISIT (OUTPATIENT)
Dept: INTERNAL MEDICINE | Facility: CLINIC | Age: 65
End: 2020-07-14
Payer: MEDICARE

## 2020-07-14 ENCOUNTER — LAB VISIT (OUTPATIENT)
Dept: LAB | Facility: HOSPITAL | Age: 65
End: 2020-07-14
Attending: NURSE PRACTITIONER
Payer: MEDICARE

## 2020-07-14 VITALS
OXYGEN SATURATION: 96 % | SYSTOLIC BLOOD PRESSURE: 126 MMHG | BODY MASS INDEX: 27.82 KG/M2 | WEIGHT: 167 LBS | DIASTOLIC BLOOD PRESSURE: 68 MMHG | HEIGHT: 65 IN | HEART RATE: 67 BPM

## 2020-07-14 DIAGNOSIS — E11.69 TYPE 2 DIABETES MELLITUS WITH OTHER SPECIFIED COMPLICATION, WITH LONG-TERM CURRENT USE OF INSULIN: ICD-10-CM

## 2020-07-14 DIAGNOSIS — E66.3 OVERWEIGHT (BMI 25.0-29.9): ICD-10-CM

## 2020-07-14 DIAGNOSIS — Z79.4 TYPE 2 DIABETES MELLITUS WITH OTHER SPECIFIED COMPLICATION, WITH LONG-TERM CURRENT USE OF INSULIN: Primary | ICD-10-CM

## 2020-07-14 DIAGNOSIS — E11.69 TYPE 2 DIABETES MELLITUS WITH OTHER SPECIFIED COMPLICATION, WITH LONG-TERM CURRENT USE OF INSULIN: Primary | ICD-10-CM

## 2020-07-14 DIAGNOSIS — K76.0 FATTY LIVER: ICD-10-CM

## 2020-07-14 DIAGNOSIS — G93.32 CHRONIC FATIGUE SYNDROME: ICD-10-CM

## 2020-07-14 DIAGNOSIS — I10 ESSENTIAL HYPERTENSION: ICD-10-CM

## 2020-07-14 DIAGNOSIS — Z79.4 TYPE 2 DIABETES MELLITUS WITH OTHER SPECIFIED COMPLICATION, WITH LONG-TERM CURRENT USE OF INSULIN: ICD-10-CM

## 2020-07-14 PROBLEM — M25.562 CHRONIC PAIN OF BOTH KNEES: Status: ACTIVE | Noted: 2018-12-06

## 2020-07-14 LAB
ALBUMIN SERPL BCP-MCNC: 4 G/DL (ref 3.5–5.2)
ALP SERPL-CCNC: 80 U/L (ref 55–135)
ALT SERPL W/O P-5'-P-CCNC: 22 U/L (ref 10–44)
ANION GAP SERPL CALC-SCNC: 7 MMOL/L (ref 8–16)
AST SERPL-CCNC: 22 U/L (ref 10–40)
BILIRUB SERPL-MCNC: 0.5 MG/DL (ref 0.1–1)
BUN SERPL-MCNC: 14 MG/DL (ref 8–23)
CALCIUM SERPL-MCNC: 9.6 MG/DL (ref 8.7–10.5)
CHLORIDE SERPL-SCNC: 106 MMOL/L (ref 95–110)
CO2 SERPL-SCNC: 27 MMOL/L (ref 23–29)
CREAT SERPL-MCNC: 0.8 MG/DL (ref 0.5–1.4)
EST. GFR  (AFRICAN AMERICAN): >60 ML/MIN/1.73 M^2
EST. GFR  (NON AFRICAN AMERICAN): >60 ML/MIN/1.73 M^2
ESTIMATED AVG GLUCOSE: 140 MG/DL (ref 68–131)
GLUCOSE SERPL-MCNC: 83 MG/DL (ref 70–110)
HBA1C MFR BLD HPLC: 6.5 % (ref 4–5.6)
POTASSIUM SERPL-SCNC: 4.6 MMOL/L (ref 3.5–5.1)
PROT SERPL-MCNC: 7.5 G/DL (ref 6–8.4)
SODIUM SERPL-SCNC: 140 MMOL/L (ref 136–145)

## 2020-07-14 PROCEDURE — 3074F SYST BP LT 130 MM HG: CPT | Mod: CPTII,S$GLB,, | Performed by: NURSE PRACTITIONER

## 2020-07-14 PROCEDURE — 99999 PR PBB SHADOW E&M-EST. PATIENT-LVL V: ICD-10-PCS | Mod: PBBFAC,,, | Performed by: NURSE PRACTITIONER

## 2020-07-14 PROCEDURE — 1101F PR PT FALLS ASSESS DOC 0-1 FALLS W/OUT INJ PAST YR: ICD-10-PCS | Mod: CPTII,S$GLB,, | Performed by: NURSE PRACTITIONER

## 2020-07-14 PROCEDURE — 80053 COMPREHEN METABOLIC PANEL: CPT

## 2020-07-14 PROCEDURE — 3078F PR MOST RECENT DIASTOLIC BLOOD PRESSURE < 80 MM HG: ICD-10-PCS | Mod: CPTII,S$GLB,, | Performed by: NURSE PRACTITIONER

## 2020-07-14 PROCEDURE — 1101F PT FALLS ASSESS-DOCD LE1/YR: CPT | Mod: CPTII,S$GLB,, | Performed by: NURSE PRACTITIONER

## 2020-07-14 PROCEDURE — 99214 OFFICE O/P EST MOD 30 MIN: CPT | Mod: S$GLB,,, | Performed by: NURSE PRACTITIONER

## 2020-07-14 PROCEDURE — 3008F BODY MASS INDEX DOCD: CPT | Mod: CPTII,S$GLB,, | Performed by: NURSE PRACTITIONER

## 2020-07-14 PROCEDURE — 3008F PR BODY MASS INDEX (BMI) DOCUMENTED: ICD-10-PCS | Mod: CPTII,S$GLB,, | Performed by: NURSE PRACTITIONER

## 2020-07-14 PROCEDURE — 99499 RISK ADDL DX/OHS AUDIT: ICD-10-PCS | Mod: S$GLB,,, | Performed by: NURSE PRACTITIONER

## 2020-07-14 PROCEDURE — 99214 PR OFFICE/OUTPT VISIT, EST, LEVL IV, 30-39 MIN: ICD-10-PCS | Mod: S$GLB,,, | Performed by: NURSE PRACTITIONER

## 2020-07-14 PROCEDURE — 99999 PR PBB SHADOW E&M-EST. PATIENT-LVL V: CPT | Mod: PBBFAC,,, | Performed by: NURSE PRACTITIONER

## 2020-07-14 PROCEDURE — 3074F PR MOST RECENT SYSTOLIC BLOOD PRESSURE < 130 MM HG: ICD-10-PCS | Mod: CPTII,S$GLB,, | Performed by: NURSE PRACTITIONER

## 2020-07-14 PROCEDURE — 99499 UNLISTED E&M SERVICE: CPT | Mod: S$GLB,,, | Performed by: NURSE PRACTITIONER

## 2020-07-14 PROCEDURE — 83036 HEMOGLOBIN GLYCOSYLATED A1C: CPT

## 2020-07-14 PROCEDURE — 3051F HG A1C>EQUAL 7.0%<8.0%: CPT | Mod: CPTII,S$GLB,, | Performed by: NURSE PRACTITIONER

## 2020-07-14 PROCEDURE — 36415 COLL VENOUS BLD VENIPUNCTURE: CPT

## 2020-07-14 PROCEDURE — 3078F DIAST BP <80 MM HG: CPT | Mod: CPTII,S$GLB,, | Performed by: NURSE PRACTITIONER

## 2020-07-14 PROCEDURE — 3051F PR MOST RECENT HEMOGLOBIN A1C LEVEL 7.0 - < 8.0%: ICD-10-PCS | Mod: CPTII,S$GLB,, | Performed by: NURSE PRACTITIONER

## 2020-07-14 RX ORDER — INSULIN LISPRO 100 [IU]/ML
INJECTION, SOLUTION INTRAVENOUS; SUBCUTANEOUS
Qty: 4 BOX | Refills: 3
Start: 2020-07-14 | End: 2021-01-22

## 2020-07-14 RX ORDER — INSULIN GLARGINE 100 [IU]/ML
INJECTION, SOLUTION SUBCUTANEOUS
Qty: 3 BOX | Refills: 3
Start: 2020-07-14 | End: 2021-01-22

## 2020-07-14 NOTE — PROGRESS NOTES
CC: This 65 y.o.  female presents for management of type 2 dm along with the current chronic medical conditions including:  Patient Active Problem List   Diagnosis    Fatty liver    S/P total hysterectomy    Osteopenia    GERD (gastroesophageal reflux disease)    Sleep apnea    Atrophic gastritis without mention of hemorrhage    Chronic fatigue syndrome    Coronary atherosclerosis    Abdominal pain, right upper quadrant    HTN (hypertension)    Muscle spasms of neck    Radiculopathy of cervical spine    Overweight (BMI 25.0-29.9)    Type 2 diabetes mellitus with other specified complication    Pain    Chronic pain of both knees    Right elbow pain    Vitamin D deficiency    Pain in right elbow    Localized edema    Leg swelling                  Status of these conditions is pending review.    HPI:   Diagnosed with T2DM x 20 years ago, pt has been on insulin x 5 years ago.  Last seen by me in 1/2020.  Accompanied by .    H/o fatty liver, HTN, overweight, osteopenia, coronary atherosclerosis, gout, arthritic pain (L) knee, ankle-recently  Recently had steroid injection 12/30/19.    Noted elevations, see media for logs.   Overdue on labs        Lab Results   Component Value Date    HGBA1C 7.5 (H) 01/07/2020       Pt needs refills 90 day, goes through [employer] for strips, lancets, pen needles.  Needs to switch to lantus and humalog place order 2/2020.  Will start back gym work outs in 2/2020  Not interested in cgm at this time.  Testing 4 times a day.  Injections 4 times a day.   Has h/o hypoglycemia 39 mg/dl     Of note, , retired, fall in 2015, injured (R) elbow, still has pain, can't lift heavier than 10 lbs.     Lab Results   Component Value Date    HGBA1C 7.5 (H) 01/07/2020     Pt did not tolerate victoza or ozempic.  Off metformin related to kidneys.    CURRENT DM MEDS:   lantus 36 units qhs, novolog 14 units  with mod dose correction scale, starting at 150. 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   No missing doses of medication.   + hypoglycemia 50, 60 mg/dl    Riana Kay forgot glucometer/logs today    DIET/ MEAL PATTERN: 3 meals a day  Snacks 1-2     EXERCISE: no formal; does yardwork     STANDARDS OF CARE:     Diabetes Management Status    Statin: Not taking  ACE/ARB: Taking    Screening or Prevention Patient's value Goal Complete/Controlled?   HgA1C Testing and Control   Lab Results   Component Value Date    HGBA1C 7.5 (H) 01/07/2020      Annually/Less than 8% Yes   Lipid profile : 10/03/2019 Annually Yes   LDL control Lab Results   Component Value Date    LDLCALC 78.0 10/03/2019    Annually/Less than 100 mg/dl  Yes   Nephropathy screening Lab Results   Component Value Date    LABMICR 4.0 12/21/2018     Lab Results   Component Value Date    PROTEINUA Negative 09/23/2019    Annually Yes   Blood pressure BP Readings from Last 1 Encounters:   07/14/20 126/68    Less than 140/90 Yes   Dilated retinal exam : 01/29/2020 Annually Yes   Foot exam   : 10/03/2019 Annually No       ROS:   GEN: +chronic fatigue or weakness, no changes w/ appetite, wt loss 4 # in past 4 mos    CV:  Denies chest pain, palpitations, edema or cyanosis, denies syncope  SKIN: Skin is intact and heals well, no rashes, pruritis, easy bruising, no hair changes, no intolerance to heat/cold.  RESP: No SOB, cough, FISCHER  FEN/GI: Nml bowel movements, normal appetite, +GERD  RENAL: No urinary complaints, no dysuria/hematuia/oliguria   ENDO: no heat/cold intolerance  ID: No skin breakdown, fevers, chills  PSYCH: Denies drug/ETOH abuse, no hx. of eating disorders or depression +anxiety  MS/NEURO: Denies numbness/ tingling in BLE; +bilateral knee and ankle joint pain-(L) worse than (R), (l) knee - 6/2020-last steroid  (R) elbow pain -fall in 2015, surgery      Lab Results   Component Value Date    HGBA1C 7.5 (H) 01/07/2020     Lab Results   Component Value Date     TSH 1.315 10/03/2019       Chemistry        Component Value Date/Time     04/26/2019 0806    K 4.4 04/26/2019 0806     04/26/2019 0806    CO2 29 04/26/2019 0806    BUN 19 04/26/2019 0806    CREATININE 0.8 04/26/2019 0806     (H) 04/26/2019 0806        Component Value Date/Time    CALCIUM 10.1 04/26/2019 0806    ALKPHOS 87 04/26/2019 0806    AST 20 04/26/2019 0806    ALT 24 04/26/2019 0806    BILITOT 0.5 04/26/2019 0806          Lab Results   Component Value Date    LDLCALC 78.0 10/03/2019       PE:  GEN: Patient WNWD, WF, NAD, AAOx3, Friendly, talkative, well groomed  HEENT: EOMI, PERRLA, no lid lag, Clear oropharynx  NECK: trachea midline  CV: Regular rate and rhythm, +trace edema pedal/ankles, (l) worse than (R)  RESP: No increase work of breathing, No cough, wheeze.  ABD: no tenderness EXT: No C/C/Edema, No skin rash/breakdown  NEURO: CN 2-12 intact, nml gait   FEET: No pressure areas, blisters, ulcers, calluses. Footware appropriate.      ASSESSMENT and PLAN:  Riana was seen today for diabetes mellitus.    Diagnoses and associated orders for this visit:  1. Type 2 diabetes mellitus with other specified complication, with long-term current use of insulin  Hemoglobin A1C    Comprehensive metabolic panel    Hemoglobin A1C   2. Overweight (BMI 25.0-29.9)     3. Essential hypertension     4. Fatty liver     5. Chronic fatigue syndrome     No follow-ups on file.       1. F/u in 3-4 mos w/ me  Change humalog 14-14-12   W/ scale   Continue januvia 100 mg daily  Cut back lantus 32 units at night  Logs given  a1c goal less than 7%  Discussed carissa 2 -aug 2020  a1c cmp today  a1c next time    2. Body mass index is 27.79 kg/m². may increase insulin resistance.  Encourage chair exercises  Has 4#  3. Continue med(s)  4. Optimize bg will help with condition  5. May affect insulin resistance.

## 2020-07-14 NOTE — PATIENT INSTRUCTIONS
Snacks can be an important part of a balanced, healthy meal plan. They allow you to eat more frequently, feeling full and satisfied throughout the day. Also, they allow you to spread carbohydrates evenly, which may stabilize blood sugars.  Plus, snacks are enjoyable!     The amount of carbohydrate needed at snacks varies. Generally, about 15-30 grams of carbohydrate per snack is recommended.  Below you will find some tasty treats.       0-5 gm carb   Crystal Light   Vitamin Water Zero   Herbal tea, unsweetened   2 tsp peanut butter on celery   1./2 cup sugar-free jell-o   1 sugar-free popsicle   ¼ cup blueberries   8oz Blue Melissa unsweetened almond milk   5 baby carrots & celery sticks, cucumbers, bell peppers dipped in ¼ cup salsa, 2Tbsp light ranch dressing or 2Tbsp plain Greek yogurt   10 Goldfish crackers   ½ oz low-fat cheese or string cheese   1 closed handful of nuts, unsalted   1 Tbsp of sunflower seeds, unsalted   1 cup Smart Pop popcorn   1 whole grain brown rice cake        15 gm carb   1 small piece of fruit or ½ banana or 1/2 cup lite canned fruit   3 tan cracker squares   3 cups Smart Pop popcorn, top spray butter, Beltre lite salt or cinnamon and Truvia   5 Vanilla Wafers   ½ cup low fat, no added sugar ice cream or frozen yogurt (Blue bell, Blue Bunny, Weight Watchers, Skinny Cow)   ½ turkey, ham, or chicken sandwich   ½ c fruit with ½ c Cottage cheese   4-6 unsalted wheat crackers with 1 oz low fat cheese or 1 tbsp peanut butter    30-45 goldfish crackers (depending on flavor)    7-8 Sabianist mini brown rice cakes (caramel, apple cinnamon, chocolate)    12 Sabianist mini brown rice cakes (cheddar, bbq, ranch)    1/3 cup hummus dip with raw veg   1/2 whole wheat jossy, 1Tbsp hummus   Mini Pizza (1/2 whole wheat English muffin, low-fat  cheese, tomato sauce)   100 calorie snack pack (Oreo, Chips Ahoy, Ritz Mix, Baked Cheetos)   4-6 oz. light or Greek Style yogurt  (Yaakov, Chandni, Hilario, [facility])   ½ cup sugar-free pudding     6 in. wheat tortilla or jossy oven toasted chips (topped with spray butter flavoring, cinnamon, Truvia OR spray butter, garlic powder, chili powder)    18 BBQ Popchips (available at [employer], Whole Foods, Fresh Market)                   Managing Diabetes: The A1C Test       Healthy red blood cells have some glucose stuck to them. A high A1C means that unhealthy amounts of glucose are stuck to the cells.   What is the A1C test?  Using your meter helps you track your blood sugar every day. But your glucose meter tells you the value at the time of testing only. You also need to know if your treatment plan is keeping you healthy over time. The hemoglobin A1C (or glycated hemoglobin) test can help. This test measures your average blood sugar level over a few months. A higher A1C result means that you have a higher risk of developing complications.  The A1C test  The A1C is a blood test done by your healthcare provider. You will likely have an A1C test every 3 to 6 months.  Your blood glucose goal  A1C has been shown as a percentage. But it can also be shown as a number representing the estimated Average Glucose (eAG). Unlike the A1C percentage, eAG is a number similar to the numbers listed on your daily glucose monitor. Both A1C and eAG measure the amount of glucose stuck to a protein called hemoglobin in red blood cells. Your healthcare provider will help you figure out what your ideal A1C or eAG should be. Your target number will depend on your age, general health, and other factors. If your current number is too high, your treatment plan may need changes, such as different medicines.  Sample results  Most people aim for an A1c lower than 7%. Thats an eAG less than 154 mg/dL. Or, your healthcare provider may want you to aim for an A1C of 6%. Thats an eAG of 126 mg/dL.     Glucose  calculator  Visit http://[email or web address].diabetes.org/diapro/glucose_calc for a chart that helps convert your A1C percentages into eAG numbers.   Date Last Reviewed: 6/1/2016 © 2000-2017 [employer]. [address] [place] [address], [place] [address]. All rights reserved. This information is not intended as a substitute for professional medical care. Always follow your healthcare professional's instructions.        Hypoglycemia (Low Blood Sugar)     Fast-acting sugar includes a cup of nonfat milk.     Too little sugar (glucose) in your blood is called hypoglycemia or low blood sugar. Low blood sugar usually means anything lower than 70 mg/dL. Talk with your healthcare provider about your target range and what level is too low for you. Diabetes itself doesnt cause low blood sugar. But some of the treatments for diabetes, such as pills or insulin, may raise your risk for it. Low blood sugar may cause you to pass out or have a seizure. So always treat low blood sugar right away, but don't overeat.  Special note: Always carry a source of fast-acting sugar and a snack in case of hypoglycemia.   What you may notice  If you have low blood sugar, you may have one or more of these symptoms:  · Shakiness or dizziness  · Cold, clammy skin or sweating  · Feelings of hunger  · Headache  · Nervousness  · A hard, fast heartbeat  · Weakness  · Confusion or irritability  · Blurred vision  · Having nightmares or waking up confused or sweating  · Numbness or tingling in the lips or tongue  What you should do  Here are tips to follow if you have hypoglycemia:   · First check your blood sugar. If it is too low (out of your target range), eat or drink 15 to 20 grams of fast-acting sugar. This may be 3 to 4 glucose tablets, 4 ounces (half a cup) of fruit juice or regular (nondiet) soda, 8 ounces (1 cup) of fat-free milk, or 1 tablespoon of honey. Dont take more than this, or your blood sugar may go too high.  · Wait 15 minutes. Then  recheck your blood sugar if you can.  · If your blood sugar is still too low, repeat the steps above and check your blood sugar again. If your blood sugar still has not returned to your target range, contact your healthcare provider or seek emergency care.  · Once your blood sugar returns to target range, eat a snack or meal.  Preventing low blood sugar  Things you can do include the following:   · If your condition needs a strict treatment plan, eat your meals and snacks at the same times each day. Dont skip meals!  · If your treatment plan lets you change when you eat and what you eat, learn how to change the time and dose of your rapid-acting insulin to match this.   · Ask your healthcare provider if it is safe for you to drink alcohol. Never drink on an empty stomach.  · Take your medicine at the prescribed times.  · Always carry a source of fast-acting sugar and a snack when youre away from home.  Other things to do  Additional tips include the following:  · Carry a medical ID card, a compact USB drive, or wear a medical alert bracelet or necklace. It should say that you have diabetes. It should also say what to do if you pass out or have a seizure.  · Make sure your family, friends, and coworkers know the signs of low blood sugar. Tell them what to do if your blood sugar falls very low and you cant treat yourself.  · Keep a glucagon emergency kit handy. Be sure your family, friends, and coworkers know how and when to use it. Check it regularly and replace the glucagon before it expires.  · Talk with your health care team about other things you can do to prevent low blood sugar.     If you have unexplained hypoglycemia or hypoglycemia several times, call your healthcare provider.   Date Last Reviewed: 5/1/2016  © 1079-3842 [employer]. [address] [place] [address], [place] [address]. All rights reserved. This information is not intended as a substitute for professional medical care. Always follow  your healthcare professional's instructions.        Exercise for a Healthier Heart  You may wonder how you can improve the health of your heart. If youre thinking about exercise, youre on the right track. You dont need to become an athlete, but you do need a certain amount of brisk exercise to help strengthen your heart. If you have been diagnosed with a heart condition, your doctor may recommend exercise to help stabilize your condition. To help make exercise a habit, choose safe, fun activities.     Exercise with a friend. When activity is fun, you're more likely to stick with it.     Be sure to check with your healthcare provider before starting an exercise program.   Why exercise?  Exercising regularly offers many healthy rewards. It can help you do all of the following:  · Improve your blood cholesterol level to help prevent further heart trouble  · Lower your blood pressure to help prevent a stroke or heart attack  · Control diabetes, or reduce your risk of getting this disease  · Improve your heart and lung function  · Reach and maintain a healthy weight  · Make your muscles stronger and more limber so you can stay active  · Prevent falls and fractures by slowing the loss of bone mass (osteoporosis)  · Manage stress better  · Reduce your blood pressure  · Improve your sense of self and your body image  Exercise tips  Ease into your routine. Set small goals. Then build on them.  Exercise on most days. Aim for a total of 150 or more minutes of moderate to  vigorous intensity activity each week. Consider 40 minutes, 3 to 4 times a week. For best results, activity should last for 40 minutes on average. It is OK to work up to the 40 minute period over time. Examples of moderate-intensity activity is walking 1 mile in 15 minutes or 30 to 45 minutes of yard work.  Step up your daily activity level. Along with your exercise program, try being more active throughout the day. Walk instead of drive. Do more household  tasks or yard work.  Choose one or more activities you enjoy. Walking is one of the easiest things you can do. You can also try swimming, riding a bike, dancing, or taking an exercise class.  Stop exercising and call your doctor if you:  · Have chest pain or feel dizzy or lightheaded  · Feel burning, tightness, pressure, or heaviness in your chest, neck, shoulders, back, or arms  · Have unusual shortness of breath  · Have increased joint or muscle pain  · Have palpitations or an irregular heartbeat   Date Last Reviewed: 5/1/2016  © 9027-5893 [employer]. [address] [place] [address], [place] [address]. All rights reserved. This information is not intended as a substitute for professional medical care. Always follow your healthcare professional's instructions.

## 2020-09-14 RX ORDER — AMLODIPINE BESYLATE 5 MG/1
5 TABLET ORAL DAILY
Qty: 90 TABLET | Refills: 3 | Status: SHIPPED | OUTPATIENT
Start: 2020-09-14 | End: 2021-01-22

## 2020-09-15 ENCOUNTER — PATIENT OUTREACH (OUTPATIENT)
Dept: ADMINISTRATIVE | Facility: OTHER | Age: 65
End: 2020-09-15

## 2020-09-16 ENCOUNTER — OFFICE VISIT (OUTPATIENT)
Dept: ORTHOPEDICS | Facility: CLINIC | Age: 65
End: 2020-09-16
Payer: MEDICARE

## 2020-09-16 VITALS — BODY MASS INDEX: 28.04 KG/M2 | WEIGHT: 168.31 LBS | HEIGHT: 65 IN

## 2020-09-16 DIAGNOSIS — M17.11 PRIMARY OSTEOARTHRITIS OF RIGHT KNEE: Primary | ICD-10-CM

## 2020-09-16 DIAGNOSIS — Z01.818 PRE-OP TESTING: ICD-10-CM

## 2020-09-16 DIAGNOSIS — M17.12 PRIMARY OSTEOARTHRITIS OF LEFT KNEE: ICD-10-CM

## 2020-09-16 PROCEDURE — 99213 OFFICE O/P EST LOW 20 MIN: CPT | Mod: S$GLB,,, | Performed by: ORTHOPAEDIC SURGERY

## 2020-09-16 PROCEDURE — 99213 PR OFFICE/OUTPT VISIT, EST, LEVL III, 20-29 MIN: ICD-10-PCS | Mod: S$GLB,,, | Performed by: ORTHOPAEDIC SURGERY

## 2020-09-16 PROCEDURE — 3008F BODY MASS INDEX DOCD: CPT | Mod: CPTII,S$GLB,, | Performed by: ORTHOPAEDIC SURGERY

## 2020-09-16 PROCEDURE — 99999 PR PBB SHADOW E&M-EST. PATIENT-LVL IV: CPT | Mod: PBBFAC,,, | Performed by: ORTHOPAEDIC SURGERY

## 2020-09-16 PROCEDURE — 99999 PR PBB SHADOW E&M-EST. PATIENT-LVL IV: ICD-10-PCS | Mod: PBBFAC,,, | Performed by: ORTHOPAEDIC SURGERY

## 2020-09-16 PROCEDURE — 1101F PT FALLS ASSESS-DOCD LE1/YR: CPT | Mod: CPTII,S$GLB,, | Performed by: ORTHOPAEDIC SURGERY

## 2020-09-16 PROCEDURE — 3008F PR BODY MASS INDEX (BMI) DOCUMENTED: ICD-10-PCS | Mod: CPTII,S$GLB,, | Performed by: ORTHOPAEDIC SURGERY

## 2020-09-16 PROCEDURE — 1101F PR PT FALLS ASSESS DOC 0-1 FALLS W/OUT INJ PAST YR: ICD-10-PCS | Mod: CPTII,S$GLB,, | Performed by: ORTHOPAEDIC SURGERY

## 2020-09-16 NOTE — PROGRESS NOTES
Subjective:      Patient ID: Riana Kay is a 65 y.o. female.    Chief Complaint: Pain of the Left Knee and Pain of the Right Knee    HPI  Riana Kay has bilateral knee pain. The pain is equal in both knees.  The pain has worsened slightly. The symptoms have worsened to the point where it is interfering with her activities of daily living.  She has difficulty with stairs, getting dressed and getting in an out of a car.   No help with the Euflexxa. The pain is located in the medial aspect of the knee.  There  is radiation.  The pain radiates to the proximal legs.  There is associated stiffness.   There is not catching and locking. The pain is described as achy. The pain is aggravated by standing, sitting, walking.  It is alleviated by nothing consistently.  There is occasional associated back pain.  Her history, medications and problem list were reviewed.  Past Medical History:   Diagnosis Date    Anxiety     AR (allergic rhinitis)     Colon polyp     Diabetes mellitus, type 2     Dizziness     DM (diabetes mellitus)     Fatty liver     GERD (gastroesophageal reflux disease)     HTN (hypertension)     Hyperlipidemia     Memory loss     Osteopenia     S/P total hysterectomy     Sleep apnea        Review of Systems   Constitution: Negative for chills, fever and night sweats.   HENT: Negative for hearing loss.    Eyes: Negative for blurred vision and double vision.   Cardiovascular: Negative for chest pain, claudication and leg swelling.   Respiratory: Negative for shortness of breath.    Endocrine: Negative for polydipsia, polyphagia and polyuria.   Hematologic/Lymphatic: Negative for adenopathy and bleeding problem. Does not bruise/bleed easily.   Skin: Negative for poor wound healing.   Gastrointestinal: Negative for diarrhea and heartburn.   Genitourinary: Negative for bladder incontinence.   Neurological: Negative for focal weakness, headaches, numbness, paresthesias and sensory  "change.   Psychiatric/Behavioral: The patient is not nervous/anxious.    Allergic/Immunologic: Negative for persistent infections.         Objective:      Body mass index is 28.01 kg/m².  Vitals:    20 0954   Weight: 76.4 kg (168 lb 5.1 oz)   Height: 5' 5" (1.651 m)           General    Constitutional: She is oriented to person, place, and time. She appears well-developed and well-nourished.   HENT:   Head: Normocephalic and atraumatic.   Eyes: EOM are normal.   Cardiovascular: Normal rate.    Pulmonary/Chest: Effort normal.   Neurological: She is alert and oriented to person, place, and time.   Psychiatric: She has a normal mood and affect. Her behavior is normal.     General Musculoskeletal Exam   Gait: abnormal       Right Knee Exam     Inspection   Erythema: absent  Scars: absent  Swelling: present  Effusion: absent  Deformity: absent  Bruising: absent    Tenderness   The patient is tender to palpation of the medial joint line.    Crepitus   The patient has crepitus of the patella.    Range of Motion   Extension: 5   Flexion: 110     Tests   Ligament Examination Lachman: normal (-1 to 2mm)   MCL - Valgus: normal (0 to 2mm)  LCL - Varus: normal  Patella   Passive Patellar Tilt: neutral    Other   Sensation: normal    Left Knee Exam     Inspection   Erythema: absent  Scars: absent  Swelling: present  Effusion: absent  Deformity: absent  Bruising: absent    Tenderness   The patient tender to palpation of the medial joint line.    Crepitus   The patient has crepitus of the patella.    Range of Motion   Extension: 5   Flexion: 110     Tests   Stability Lachman: normal (-1 to 2mm)   MCL - Valgus: normal (0 to 2mm)  LCL - Varus: normal (0 to 2mm)  Patella   Passive Patellar Tilt: neutral    Other   Sensation: normal    Muscle Strength   Right Lower Extremity   Hip Abduction: 5/5   Quadriceps:  5/5   Hamstrin/5   Left Lower Extremity   Hip Abduction: 5/5   Quadriceps:  5/5   Hamstrin/5     Reflexes "     Left Side  Quadriceps:  2+    Right Side   Quadriceps:  2+    Vascular Exam     Right Pulses  Dorsalis Pedis:      2+          Left Pulses  Dorsalis Pedis:      2+          Edema  Right Lower Leg: absent  Left Lower Leg: absent              Assessment:       Encounter Diagnoses   Name Primary?    Primary osteoarthritis of left knee     Primary osteoarthritis of right knee Yes          Plan:       Riana was seen today for pain and pain.    Diagnoses and all orders for this visit:    Primary osteoarthritis of right knee    Primary osteoarthritis of left knee    Treatment options were discussed. The surgical process of RIGHT knee replacement was discussed in detail with the patient including a detailed discussion of the procedure itself (including visual model, x-ray review, and literature review). The typical perioperative and post-operative course was discussed and perioperative risks were discussed to the patient's satisfaction.  Risks and complications discussed included but were not limited to the risks of anesthetic complications, infection, bleeding, wound healing complications, stiffness, aseptic loosening, instability, limb length inequality, neurologic dysfunction including numbness and weakness, additional surgery,  DVT, pulmonary embolism, perioperative medical risks (cardiac, pulmonary, renal, neurologic), and death and the patient elects to proceed.  The patient should get medically cleared and attend the joint seminar.  She is aware that she has contralateral arthritis and may need the left replaced.  We discussed options, risks and benefits and have decided to stage the procedures    ASA for DVT prophylaxis    Same day protocol

## 2020-09-17 DIAGNOSIS — M17.11 PRIMARY OSTEOARTHRITIS OF RIGHT KNEE: Primary | ICD-10-CM

## 2020-09-18 DIAGNOSIS — M17.11 PRIMARY OSTEOARTHRITIS OF RIGHT KNEE: Primary | ICD-10-CM

## 2020-09-21 ENCOUNTER — OFFICE VISIT (OUTPATIENT)
Dept: ORTHOPEDICS | Facility: CLINIC | Age: 65
End: 2020-09-21
Payer: MEDICARE

## 2020-09-21 ENCOUNTER — HOSPITAL ENCOUNTER (OUTPATIENT)
Dept: RADIOLOGY | Facility: HOSPITAL | Age: 65
Discharge: HOME | End: 2020-09-21
Attending: PHYSICIAN ASSISTANT
Payer: MEDICARE

## 2020-09-21 ENCOUNTER — TELEPHONE (OUTPATIENT)
Dept: PREADMISSION TESTING | Facility: HOSPITAL | Age: 65
End: 2020-09-21

## 2020-09-21 VITALS
HEART RATE: 77 BPM | BODY MASS INDEX: 27.99 KG/M2 | TEMPERATURE: 97 F | SYSTOLIC BLOOD PRESSURE: 153 MMHG | DIASTOLIC BLOOD PRESSURE: 72 MMHG | WEIGHT: 168 LBS | HEIGHT: 65 IN

## 2020-09-21 DIAGNOSIS — M17.11 PRIMARY OSTEOARTHRITIS OF RIGHT KNEE: Primary | ICD-10-CM

## 2020-09-21 DIAGNOSIS — M17.11 PRIMARY OSTEOARTHRITIS OF RIGHT KNEE: ICD-10-CM

## 2020-09-21 DIAGNOSIS — M79.604 PAIN OF RIGHT LOWER EXTREMITY: ICD-10-CM

## 2020-09-21 DIAGNOSIS — Z01.818 PREOP TESTING: Primary | ICD-10-CM

## 2020-09-21 PROCEDURE — 99499 UNLISTED E&M SERVICE: CPT | Mod: S$GLB,,, | Performed by: PHYSICIAN ASSISTANT

## 2020-09-21 PROCEDURE — 99999 PR PBB SHADOW E&M-EST. PATIENT-LVL V: CPT | Mod: PBBFAC,,, | Performed by: PHYSICIAN ASSISTANT

## 2020-09-21 PROCEDURE — 99499 NO LOS: ICD-10-PCS | Mod: S$GLB,,, | Performed by: PHYSICIAN ASSISTANT

## 2020-09-21 PROCEDURE — 99999 PR PBB SHADOW E&M-EST. PATIENT-LVL V: ICD-10-PCS | Mod: PBBFAC,,, | Performed by: PHYSICIAN ASSISTANT

## 2020-09-21 PROCEDURE — 73560 XR KNEE 1 OR 2 VIEW RIGHT: ICD-10-PCS | Mod: 26,RT,, | Performed by: RADIOLOGY

## 2020-09-21 PROCEDURE — 73560 X-RAY EXAM OF KNEE 1 OR 2: CPT | Mod: TC,RT

## 2020-09-21 PROCEDURE — 73560 X-RAY EXAM OF KNEE 1 OR 2: CPT | Mod: 26,RT,, | Performed by: RADIOLOGY

## 2020-09-21 RX ORDER — PREGABALIN 25 MG/1
75 CAPSULE ORAL NIGHTLY
Status: CANCELLED | OUTPATIENT
Start: 2020-09-21

## 2020-09-21 RX ORDER — FENTANYL CITRATE 50 UG/ML
25 INJECTION, SOLUTION INTRAMUSCULAR; INTRAVENOUS EVERY 5 MIN PRN
Status: CANCELLED | OUTPATIENT
Start: 2020-09-21

## 2020-09-21 RX ORDER — ONDANSETRON 2 MG/ML
4 INJECTION INTRAMUSCULAR; INTRAVENOUS EVERY 8 HOURS PRN
Status: CANCELLED | OUTPATIENT
Start: 2020-09-21

## 2020-09-21 RX ORDER — MIDAZOLAM HYDROCHLORIDE 1 MG/ML
1 INJECTION INTRAMUSCULAR; INTRAVENOUS EVERY 5 MIN PRN
Status: CANCELLED | OUTPATIENT
Start: 2020-09-21

## 2020-09-21 RX ORDER — OXYCODONE HYDROCHLORIDE 5 MG/1
10 TABLET ORAL
Status: CANCELLED | OUTPATIENT
Start: 2020-09-21

## 2020-09-21 RX ORDER — POLYETHYLENE GLYCOL 3350 17 G/17G
17 POWDER, FOR SOLUTION ORAL DAILY
Status: CANCELLED | OUTPATIENT
Start: 2020-09-22

## 2020-09-21 RX ORDER — LIDOCAINE HYDROCHLORIDE 10 MG/ML
1 INJECTION, SOLUTION EPIDURAL; INFILTRATION; INTRACAUDAL; PERINEURAL
Status: CANCELLED | OUTPATIENT
Start: 2020-09-21

## 2020-09-21 RX ORDER — ACETAMINOPHEN 500 MG
1000 TABLET ORAL EVERY 6 HOURS
Status: CANCELLED | OUTPATIENT
Start: 2020-09-21 | End: 2020-09-23

## 2020-09-21 RX ORDER — ASPIRIN 81 MG/1
81 TABLET ORAL 2 TIMES DAILY
Status: CANCELLED | OUTPATIENT
Start: 2020-09-21

## 2020-09-21 RX ORDER — NALOXONE HCL 0.4 MG/ML
0.02 VIAL (ML) INJECTION
Status: CANCELLED | OUTPATIENT
Start: 2020-09-21

## 2020-09-21 RX ORDER — ACETAMINOPHEN 500 MG
1000 TABLET ORAL
Status: CANCELLED | OUTPATIENT
Start: 2020-09-21

## 2020-09-21 RX ORDER — SODIUM CHLORIDE 9 MG/ML
INJECTION, SOLUTION INTRAVENOUS
Status: CANCELLED | OUTPATIENT
Start: 2020-09-21

## 2020-09-21 RX ORDER — FAMOTIDINE 20 MG/1
20 TABLET, FILM COATED ORAL 2 TIMES DAILY
Status: CANCELLED | OUTPATIENT
Start: 2020-09-21

## 2020-09-21 RX ORDER — AMOXICILLIN 250 MG
1 CAPSULE ORAL 2 TIMES DAILY
Status: CANCELLED | OUTPATIENT
Start: 2020-09-21

## 2020-09-21 RX ORDER — BISACODYL 10 MG
10 SUPPOSITORY, RECTAL RECTAL EVERY 12 HOURS PRN
Status: CANCELLED | OUTPATIENT
Start: 2020-09-21

## 2020-09-21 RX ORDER — CELECOXIB 100 MG/1
400 CAPSULE ORAL
Status: CANCELLED | OUTPATIENT
Start: 2020-09-21

## 2020-09-21 RX ORDER — PREGABALIN 25 MG/1
75 CAPSULE ORAL
Status: CANCELLED | OUTPATIENT
Start: 2020-09-21

## 2020-09-21 RX ORDER — SODIUM CHLORIDE 9 MG/ML
INJECTION, SOLUTION INTRAVENOUS CONTINUOUS
Status: CANCELLED | OUTPATIENT
Start: 2020-09-21 | End: 2020-09-22

## 2020-09-21 RX ORDER — MUPIROCIN 20 MG/G
1 OINTMENT TOPICAL
Status: CANCELLED | OUTPATIENT
Start: 2020-09-21

## 2020-09-21 RX ORDER — SODIUM CHLORIDE 0.9 % (FLUSH) 0.9 %
10 SYRINGE (ML) INJECTION
Status: CANCELLED | OUTPATIENT
Start: 2020-09-21

## 2020-09-21 RX ORDER — OXYCODONE HYDROCHLORIDE 5 MG/1
5 TABLET ORAL
Status: CANCELLED | OUTPATIENT
Start: 2020-09-21

## 2020-09-21 NOTE — PROGRESS NOTES
Riana Kay is a 65 y.o. year old here today for a pre-operative visit in preparation for a Right total knee arthroplasty to be performed by Dr. Damon on 10/6/2020.  she was last seen and treated in the clinic on 9/16/2020. she will be medically optimized by the pre op center. There has been no significant change in medical status since last visit. No fever, chills, malaise, or unexplained weight change.      Allergies, Medications, past medical and surgical history reviewed.    Focused examination performed.    Patient declined to see surgeon today. All questions answered. Patient encouraged to call with questions. Contact information given.     Pre, michelle, and post operative procedures and expectations discussed. Questions were answered. Riana Kay has been educated and is ready to proceed with surgery. Approximately 30 minutes was spent discussing surgical outcomes, plans, procedures pre, michelle, and post operative expections and care.  Surgical consent signed.    Riana Kay will contact us if there are any questions, concerns, or changes in medical status prior to surgery.       Joint class done during ortho clinic visit    COVID-19 test date: 10/3     patient will be scheduled with [facility] [place]

## 2020-09-21 NOTE — H&P (VIEW-ONLY)
CC: Right knee pain    Riana Kay is a 65 y.o. female with history of Right knee pain. Pain is worse with activity and weight bearing.  Patient has experienced interference of activities of daily living due to decreased range of motion and an increase in joint pain and swelling.  Patient has failed non-operative treatment including NSAIDs, corticosteroid injections, viscosupplement injections, and activity modification.  Riana Kay currently ambulates independently.     Relevant medical conditions of significance in perioperative period:  T2DM: last A1c 6.5  ZAHIRA: uses CPAP  HTN: on meds followed by PCP    Past Medical History:   Diagnosis Date    Anxiety     AR (allergic rhinitis)     Colon polyp     Diabetes mellitus, type 2     Dizziness     DM (diabetes mellitus)     Fatty liver     GERD (gastroesophageal reflux disease)     HTN (hypertension)     Hyperlipidemia     Memory loss     Osteopenia     S/P total hysterectomy     Sleep apnea        Past Surgical History:   Procedure Laterality Date    CHOLECYSTECTOMY      COLONOSCOPY N/A 1/17/2018    Procedure: COLONOSCOPY with Donnell;  Surgeon: Roland Jacobo MD;  Location: [facility] (4TH [facility] [place]);  Service: Endoscopy;  Laterality: N/A;    ELBOW ARTHROPLASTY Right 1/16/2019    Procedure: ARTHROPLASTY, ELBOW right radial head arthroplasty revision;  Surgeon: Katja Hubbard MD;  Location: [facility] OR [address] [place] [address];  Service: Orthopedics;  Laterality: Right;  Anesthesia: General and Regional. Stretcher, hand pan 1 and pan 2, CALL RUCHI SANTAMARIA & Sol notified 1-14 LO    ELBOW SURGERY Right 7/16/15    ELBOW SURGERY      ESOPHAGOGASTRODUODENOSCOPY N/A 4/15/2019    Procedure: EGD (ESOPHAGOGASTRODUODENOSCOPY);  Surgeon: Buck Irwin MD;  Location: [facility] (4TH FLR);  Service: Endoscopy;  Laterality: N/A;  amanda she    HYSTERECTOMY      KNEE ARTHROSCOPY W/ DEBRIDEMENT  4/11    Left    RELEASE OF ULNAR NERVE AT  CUBITAL TUNNEL Right 1/16/2019    Procedure: RELEASE, ULNAR TUNNEL right;  Surgeon: Katja Hubbard MD;  Location: [facility] OR [address] [place] [address];  Service: Orthopedics;  Laterality: Right;  Anesthesia: General and Regional. Stretcher, hand pan 1 and pan 2, CALL ACCUMED, CLAIRX    ROTATOR CUFF REPAIR      TONSILLECTOMY      TOTAL ABDOMINAL HYSTERECTOMY W/ BILATERAL SALPINGOOPHORECTOMY      UPPER GASTROINTESTINAL ENDOSCOPY         Family History   Problem Relation Age of Onset    Colon cancer Father 83        colon cancer    Diabetes Maternal Grandmother     Diabetes Maternal Aunt     Esophageal cancer Neg Hx     Stomach cancer Neg Hx     Melanoma Neg Hx        Review of patient's allergies indicates:   Allergen Reactions    Iodinated contrast media Swelling and Rash    Percocet [oxycodone-acetaminophen] Itching    Macrobid [nitrofurantoin monohyd/m-cryst] Rash    Metformin Rash    Penicillins Rash    Promethazine Rash    Sulfa (sulfonamide antibiotics) Rash    Sulfamethoxazole-trimethoprim Rash         Current Outpatient Medications:     amLODIPine (NORVASC) 5 MG tablet, Take 1 tablet (5 mg total) by mouth once daily., Disp: 90 tablet, Rfl: 3    azelastine (ASTELIN) 137 mcg (0.1 %) nasal spray, 1 SPRAY (137 MCG TOTAL) BY NASAL ROUTE 2 (TWO) TIMES DAILY., Disp: 90 mL, Rfl: 2    blood sugar diagnostic Strp, To check BG 4 times daily, to use with insurance preferred meter-true metrix, Disp: 400 each, Rfl: 3    blood-glucose meter kit, To check BG 4 times daily, to use with insurance preferred meter-true metrix, Disp: 1 each, Rfl: 1    cyanocobalamin (VITAMIN B-12) 100 MCG tablet, Take 100 mcg by mouth once daily., Disp: , Rfl:     diclofenac sodium (VOLTAREN) 1 % Gel, Apply 2 g topically 2 (two) times daily., Disp: 100 g, Rfl: 2    diclofenac sodium (VOLTAREN) 1 % Gel, Apply 2 g topically 4 (four) times daily., Disp: 100 g, Rfl: 1    fluticasone propionate (FLONASE) 50 mcg/actuation nasal spray, 2  "sprays (100 mcg total) by Each Nostril route once daily., Disp: 16 g, Rfl: 11    insulin (LANTUS SOLOSTAR U-100 INSULIN) glargine 100 units/mL (3mL) SubQ pen, Inject 32 units at night. (new order), Disp: 3 Box, Rfl: 3    insulin lispro (HUMALOG KWIKPEN INSULIN) 100 unit/mL pen, Inject 14 units w/ breakfast and lunch, 12 units w/ dinner plus scale 150-200+2, 201-250+4, 251-300+6, 301-350+8, >350+10. Max daily 66 units., Disp: 4 Box, Rfl: 3    ketoconazole (NIZORAL) 2 % shampoo, Wash scalp with medicated shampoo at least 2x/week. Let sit on scalp at least 5 minutes prior to rinsing, Disp: 240 mL, Rfl: 5    lancets Misc, To check BG 4  times daily, to use with insurance preferred meter-true metrix, Disp: 400 each, Rfl: 3    latanoprost 0.005 % ophthalmic solution, Place 1 drop into both eyes nightly., Disp: , Rfl:     losartan (COZAAR) 25 MG tablet, Take 0.5 tablets (12.5 mg total) by mouth once daily., Disp: 45 tablet, Rfl: 3    ondansetron (ZOFRAN-ODT) 8 MG TbDL, Take 1 tablet (8 mg total) by mouth 3 (three) times daily as needed., Disp: 30 tablet, Rfl: 6    pantoprazole (PROTONIX) 40 MG tablet, TAKE 1 TABLET BY MOUTH EVERY DAY, Disp: 90 tablet, Rfl: 0    pen needle, diabetic (NOVOFINE 32) 32 gauge x 1/4" Ndle, Uses 4 times a day. 90 day via duramed e 11.65, Disp: 400 each, Rfl: 3    pyridoxine, vitamin B6, (VITAMIN B-6) 100 MG Tab, Take 100 mg by mouth once daily., Disp: , Rfl:     SITagliptin (JANUVIA) 100 MG Tab, Take 1 tablet (100 mg total) by mouth once daily., Disp: 90 tablet, Rfl: 3    Current Facility-Administered Medications:     sodium hyaluronate (EUFLEXXA) 10 mg/mL(mw 2.4 -3.6 million) injection 40 mg, 40 mg, Intra-articular, Weekly, Debi Perez NP, 40 mg at 06/25/20 0932    Review of Systems:  Constitutional: no fever or chills  Eyes: no visual changes  ENT: no nasal congestion or sore throat  Respiratory: no cough or shortness of breath  Cardiovascular: no chest pain or " "palpitations  Gastrointestinal: no nausea or vomiting, tolerating diet  Genitourinary: no hematuria or dysuria  Integument/Breast: no rash or pruritis  Hematologic/Lymphatic: no easy bruising or lymphadenopathy  Musculoskeletal: positive for knee pain  Neurological: no seizures or tremors  Behavioral/Psych: no auditory or visual hallucinations  Endocrine: no heat or cold intolerance    PE:  BP (!) 153/72 (BP Location: Left arm, Patient Position: Sitting, BP Method: Medium (Automatic))   Pulse 77   Temp 97.3 °F (36.3 °C) (Oral)   Ht 5' 5" (1.651 m)   Wt 76.2 kg (168 lb)   BMI 27.96 kg/m²   General: Pleasant, cooperative, NAD   Gait: antalgic  HEENT: NCAT, sclera nonicteric   Lungs: Respirations clear bilaterally; equal and unlabored.   CV: S1S2; 2+ bilateral upper and lower extremity pulses.   Skin: Intact throughout with no rashes, erythema, or lesions  Extremities: No LE edema,  no erythema or warmth of the skin in either lower extremity.    Right knee exam:  Knee Range of Motion:5-110 active, pain with passive range of motion  Effusion:none  Condition of skin:intact  Location of tenderness:Medial joint line   Strength:5 of 5 quadriceps strength and 5 of 5 hamstring strength  Stability: stable to testing    Hip Examination: painless PROM of hip     Radiographs: Radiographs reveal advanced degenerative changes including subchondral cyst formation, subchondral sclerosis, osteophyte formation, joint space narrowing.     Knee Alignment:  Moderate varus    Diagnosis: osteoarthritis Right knee    Plan: Right total knee arthroplasty    Due to the serious nature of total joint infection and the high prevalence of community acquired MRSA, vancomycin will be used perioperatively.            "

## 2020-09-21 NOTE — H&P
CC: Right knee pain    Riana Kay is a 65 y.o. female with history of Right knee pain. Pain is worse with activity and weight bearing.  Patient has experienced interference of activities of daily living due to decreased range of motion and an increase in joint pain and swelling.  Patient has failed non-operative treatment including NSAIDs, corticosteroid injections, viscosupplement injections, and activity modification.  Riana Kay currently ambulates independently.     Relevant medical conditions of significance in perioperative period:  T2DM: last A1c 6.5  ZAHIRA: uses CPAP  HTN: on meds followed by PCP    Past Medical History:   Diagnosis Date    Anxiety     AR (allergic rhinitis)     Colon polyp     Diabetes mellitus, type 2     Dizziness     DM (diabetes mellitus)     Fatty liver     GERD (gastroesophageal reflux disease)     HTN (hypertension)     Hyperlipidemia     Memory loss     Osteopenia     S/P total hysterectomy     Sleep apnea        Past Surgical History:   Procedure Laterality Date    CHOLECYSTECTOMY      COLONOSCOPY N/A 1/17/2018    Procedure: COLONOSCOPY with Donnell;  Surgeon: Roland Jacobo MD;  Location: [facility] (4TH [facility]);  Service: Endoscopy;  Laterality: N/A;    ELBOW ARTHROPLASTY Right 1/16/2019    Procedure: ARTHROPLASTY, ELBOW right radial head arthroplasty revision;  Surgeon: Katja Hubbard MD;  Location: [facility] OR [address] [place] [address];  Service: Orthopedics;  Laterality: Right;  Anesthesia: General and Regional. Stretcher, hand pan 1 and pan 2, CALL RUCHI SANTAMARIA & Sol notified 1-14 LO    ELBOW SURGERY Right 7/16/15    ELBOW SURGERY      ESOPHAGOGASTRODUODENOSCOPY N/A 4/15/2019    Procedure: EGD (ESOPHAGOGASTRODUODENOSCOPY);  Surgeon: Buck Irwin MD;  Location: [facility] (4TH FLR);  Service: Endoscopy;  Laterality: N/A;  amanda she    HYSTERECTOMY      KNEE ARTHROSCOPY W/ DEBRIDEMENT  4/11    Left    RELEASE OF ULNAR NERVE AT  CUBITAL TUNNEL Right 1/16/2019    Procedure: RELEASE, ULNAR TUNNEL right;  Surgeon: Katja Hubbard MD;  Location: [facility] OR [address] [place] [address];  Service: Orthopedics;  Laterality: Right;  Anesthesia: General and Regional. Stretcher, hand pan 1 and pan 2, CALL ACCUMED, CLAIRX    ROTATOR CUFF REPAIR      TONSILLECTOMY      TOTAL ABDOMINAL HYSTERECTOMY W/ BILATERAL SALPINGOOPHORECTOMY      UPPER GASTROINTESTINAL ENDOSCOPY         Family History   Problem Relation Age of Onset    Colon cancer Father 83        colon cancer    Diabetes Maternal Grandmother     Diabetes Maternal Aunt     Esophageal cancer Neg Hx     Stomach cancer Neg Hx     Melanoma Neg Hx        Review of patient's allergies indicates:   Allergen Reactions    Iodinated contrast media Swelling and Rash    Percocet [oxycodone-acetaminophen] Itching    Macrobid [nitrofurantoin monohyd/m-cryst] Rash    Metformin Rash    Penicillins Rash    Promethazine Rash    Sulfa (sulfonamide antibiotics) Rash    Sulfamethoxazole-trimethoprim Rash         Current Outpatient Medications:     amLODIPine (NORVASC) 5 MG tablet, Take 1 tablet (5 mg total) by mouth once daily., Disp: 90 tablet, Rfl: 3    azelastine (ASTELIN) 137 mcg (0.1 %) nasal spray, 1 SPRAY (137 MCG TOTAL) BY NASAL ROUTE 2 (TWO) TIMES DAILY., Disp: 90 mL, Rfl: 2    blood sugar diagnostic Strp, To check BG 4 times daily, to use with insurance preferred meter-true metrix, Disp: 400 each, Rfl: 3    blood-glucose meter kit, To check BG 4 times daily, to use with insurance preferred meter-true metrix, Disp: 1 each, Rfl: 1    cyanocobalamin (VITAMIN B-12) 100 MCG tablet, Take 100 mcg by mouth once daily., Disp: , Rfl:     diclofenac sodium (VOLTAREN) 1 % Gel, Apply 2 g topically 2 (two) times daily., Disp: 100 g, Rfl: 2    diclofenac sodium (VOLTAREN) 1 % Gel, Apply 2 g topically 4 (four) times daily., Disp: 100 g, Rfl: 1    fluticasone propionate (FLONASE) 50 mcg/actuation nasal spray, 2  "sprays (100 mcg total) by Each Nostril route once daily., Disp: 16 g, Rfl: 11    insulin (LANTUS SOLOSTAR U-100 INSULIN) glargine 100 units/mL (3mL) SubQ pen, Inject 32 units at night. (new order), Disp: 3 Box, Rfl: 3    insulin lispro (HUMALOG KWIKPEN INSULIN) 100 unit/mL pen, Inject 14 units w/ breakfast and lunch, 12 units w/ dinner plus scale 150-200+2, 201-250+4, 251-300+6, 301-350+8, >350+10. Max daily 66 units., Disp: 4 Box, Rfl: 3    ketoconazole (NIZORAL) 2 % shampoo, Wash scalp with medicated shampoo at least 2x/week. Let sit on scalp at least 5 minutes prior to rinsing, Disp: 240 mL, Rfl: 5    lancets Misc, To check BG 4  times daily, to use with insurance preferred meter-true metrix, Disp: 400 each, Rfl: 3    latanoprost 0.005 % ophthalmic solution, Place 1 drop into both eyes nightly., Disp: , Rfl:     losartan (COZAAR) 25 MG tablet, Take 0.5 tablets (12.5 mg total) by mouth once daily., Disp: 45 tablet, Rfl: 3    ondansetron (ZOFRAN-ODT) 8 MG TbDL, Take 1 tablet (8 mg total) by mouth 3 (three) times daily as needed., Disp: 30 tablet, Rfl: 6    pantoprazole (PROTONIX) 40 MG tablet, TAKE 1 TABLET BY MOUTH EVERY DAY, Disp: 90 tablet, Rfl: 0    pen needle, diabetic (NOVOFINE 32) 32 gauge x 1/4" Ndle, Uses 4 times a day. 90 day via duramed e 11.65, Disp: 400 each, Rfl: 3    pyridoxine, vitamin B6, (VITAMIN B-6) 100 MG Tab, Take 100 mg by mouth once daily., Disp: , Rfl:     SITagliptin (JANUVIA) 100 MG Tab, Take 1 tablet (100 mg total) by mouth once daily., Disp: 90 tablet, Rfl: 3    Current Facility-Administered Medications:     sodium hyaluronate (EUFLEXXA) 10 mg/mL(mw 2.4 -3.6 million) injection 40 mg, 40 mg, Intra-articular, Weekly, Debi Perez NP, 40 mg at 06/25/20 0932    Review of Systems:  Constitutional: no fever or chills  Eyes: no visual changes  ENT: no nasal congestion or sore throat  Respiratory: no cough or shortness of breath  Cardiovascular: no chest pain or " "palpitations  Gastrointestinal: no nausea or vomiting, tolerating diet  Genitourinary: no hematuria or dysuria  Integument/Breast: no rash or pruritis  Hematologic/Lymphatic: no easy bruising or lymphadenopathy  Musculoskeletal: positive for knee pain  Neurological: no seizures or tremors  Behavioral/Psych: no auditory or visual hallucinations  Endocrine: no heat or cold intolerance    PE:  BP (!) 153/72 (BP Location: Left arm, Patient Position: Sitting, BP Method: Medium (Automatic))   Pulse 77   Temp 97.3 °F (36.3 °C) (Oral)   Ht 5' 5" (1.651 m)   Wt 76.2 kg (168 lb)   BMI 27.96 kg/m²   General: Pleasant, cooperative, NAD   Gait: antalgic  HEENT: NCAT, sclera nonicteric   Lungs: Respirations clear bilaterally; equal and unlabored.   CV: S1S2; 2+ bilateral upper and lower extremity pulses.   Skin: Intact throughout with no rashes, erythema, or lesions  Extremities: No LE edema,  no erythema or warmth of the skin in either lower extremity.    Right knee exam:  Knee Range of Motion:5-110 active, pain with passive range of motion  Effusion:none  Condition of skin:intact  Location of tenderness:Medial joint line   Strength:5 of 5 quadriceps strength and 5 of 5 hamstring strength  Stability: stable to testing    Hip Examination: painless PROM of hip     Radiographs: Radiographs reveal advanced degenerative changes including subchondral cyst formation, subchondral sclerosis, osteophyte formation, joint space narrowing.     Knee Alignment:  Moderate varus    Diagnosis: osteoarthritis Right knee    Plan: Right total knee arthroplasty    Due to the serious nature of total joint infection and the high prevalence of community acquired MRSA, vancomycin will be used perioperatively.            "

## 2020-09-21 NOTE — ANESTHESIA PAT ROS NOTE
09/21/2020  Riana Kay is a 65 y.o., female.      Pre-op Assessment          Review of Systems         Anesthesia Assessment: Preoperative EQUATION    Planned Procedure: Procedure(s) (LRB):  ARTHROPLASTY, KNEE-SAME DAY PROTOCOL (Right)  Requested Anesthesia Type:Regional  Surgeon: Sabino Damon MD  Service: Orthopedics  Known or anticipated Date of Surgery:10/6/2020    Surgeon notes: reviewed    Electronic QUestionnaire Assessment completed via nurse interview with patient.        Triage considerations:     The patient has no apparent active cardiac condition (No unstable coronary Syndrome such as severe unstable angina or recent [<1 month] myocardial infarction, decompensated CHF, severe valvular   disease or significant arrhythmia)    Previous anesthesia records:LMA General, MAC, Hx PONV and Post-Operative Nausea/Vomiting, in the past, but not with recent anesthetics / prophylaxis    4/15/19 egd   Airway/Jaw/Neck:  Airway Findings: Mouth Opening: Normal Tongue: Normal  General Airway Assessment: Adult  Mallampati: II  TM Distance: Normal, at least 6 cm  Jaw/Neck Findings:  Neck ROM: Normal ROM    1/16/19    ARTHROPLASTY, ELBOW right radial head arthroplasty revision (Right Arm Upper) RELEASE, ULNAR TUNNEL right (Right Arm Lower)     01/16/19 Placement Time: 0846 Inserted by: CRNA Airway Device: LMA Removal Date: 01/16/19 Removal Time: 1306      Last PCP note: 6-12 months ago , within [facility]  Subspecialty notes: Allergy, Endocrinology, Gastroenterology, Ortho, Spine Service, Podiatry    Other important co-morbidities: DM2, GERD, HLD, HTN, ZAHIRA and fatty liver, coronary atherosclerosis, intermittent dsyphagia -dilated 4/2019, ostwopenia, chronic pain, cervical radiculopathy, chronic fatigue syn, bilat ankle edema, R shoulder arthroplasty, anxiety, PONV      Tests already available:   Available tests,  within 3 months , within [facility] . Results have been reviewed.   9/22/20 UA, CBC, CMP, lipiase  9/22/20 CT abdomen  1. No acute findings within the abdomen or pelvis.  2. Colonic diverticulosis without acute diverticulitis.  7/14/20 a1c= 6.5, CMP  7/2/20 Bilateral Venous US  6/12/19  Left arm venous US - No signs of clot in arm   6/6/19 ekg  8/28/14 stress echo   CONCLUSIONS     1 - Normal left ventricular systolic function (EF 60-65%).     2 - Normal left ventricular diastolic function.     3 - Normal right ventricular systolic function .   No evidence of stress induced myocardial ischemia.            Instructions given. (See in Nurse's note)    Optimization:  Anesthesia Preop Clinic Assessment  Indicated.    Medical Opinion Indicated.         Plan:    Testing:  EKG and PT/INR   Pre-anesthesia  visit       Visit focus: concerns in complex and/or prolonged anesthesia, possible regional anesthesia and/or nerve block      Consultation:IM Perioperative Hospitalist     Patient  has previously scheduled Medical Appointment:    Navigation: Tests Scheduled.              Consults scheduled.             Results will be tracked by Preop Clinic.     9/28/20 Urine culture- Urine culture is negative for bacteria.    10/2/20 Fransico Jin NP, Perioperative Medicine - Patient is optimized. No further cardiac work up is indicated prior to proceeding with the surgery.

## 2020-09-22 ENCOUNTER — HOSPITAL ENCOUNTER (EMERGENCY)
Facility: HOSPITAL | Age: 65
Discharge: HOME | End: 2020-09-22
Attending: EMERGENCY MEDICINE
Payer: MEDICARE

## 2020-09-22 VITALS
WEIGHT: 168 LBS | TEMPERATURE: 98 F | HEIGHT: 65 IN | HEART RATE: 59 BPM | RESPIRATION RATE: 16 BRPM | DIASTOLIC BLOOD PRESSURE: 58 MMHG | BODY MASS INDEX: 27.99 KG/M2 | OXYGEN SATURATION: 95 % | SYSTOLIC BLOOD PRESSURE: 113 MMHG

## 2020-09-22 DIAGNOSIS — R10.9 ABDOMINAL PAIN, UNSPECIFIED ABDOMINAL LOCATION: Primary | ICD-10-CM

## 2020-09-22 DIAGNOSIS — N39.0 URINARY TRACT INFECTION WITHOUT HEMATURIA, SITE UNSPECIFIED: ICD-10-CM

## 2020-09-22 LAB
ALBUMIN SERPL BCP-MCNC: 4 G/DL (ref 3.5–5.2)
ALP SERPL-CCNC: 90 U/L (ref 55–135)
ALT SERPL W/O P-5'-P-CCNC: 28 U/L (ref 10–44)
ANION GAP SERPL CALC-SCNC: 9 MMOL/L (ref 8–16)
AST SERPL-CCNC: 24 U/L (ref 10–40)
BACTERIA #/AREA URNS AUTO: ABNORMAL /HPF
BASOPHILS # BLD AUTO: 0.03 K/UL (ref 0–0.2)
BASOPHILS NFR BLD: 0.3 % (ref 0–1.9)
BILIRUB SERPL-MCNC: 0.4 MG/DL (ref 0.1–1)
BILIRUB UR QL STRIP: NEGATIVE
BUN SERPL-MCNC: 12 MG/DL (ref 8–23)
CALCIUM SERPL-MCNC: 9 MG/DL (ref 8.7–10.5)
CAOX CRY UR QL COMP ASSIST: ABNORMAL
CHLORIDE SERPL-SCNC: 103 MMOL/L (ref 95–110)
CLARITY UR REFRACT.AUTO: ABNORMAL
CO2 SERPL-SCNC: 25 MMOL/L (ref 23–29)
COLOR UR AUTO: YELLOW
CREAT SERPL-MCNC: 0.9 MG/DL (ref 0.5–1.4)
DIFFERENTIAL METHOD: ABNORMAL
EOSINOPHIL # BLD AUTO: 0.1 K/UL (ref 0–0.5)
EOSINOPHIL NFR BLD: 0.6 % (ref 0–8)
ERYTHROCYTE [DISTWIDTH] IN BLOOD BY AUTOMATED COUNT: 12.2 % (ref 11.5–14.5)
EST. GFR  (AFRICAN AMERICAN): >60 ML/MIN/1.73 M^2
EST. GFR  (NON AFRICAN AMERICAN): >60 ML/MIN/1.73 M^2
GLUCOSE SERPL-MCNC: 215 MG/DL (ref 70–110)
GLUCOSE UR QL STRIP: NEGATIVE
HCT VFR BLD AUTO: 39.7 % (ref 37–48.5)
HGB BLD-MCNC: 13.1 G/DL (ref 12–16)
HGB UR QL STRIP: ABNORMAL
IMM GRANULOCYTES # BLD AUTO: 0.04 K/UL (ref 0–0.04)
IMM GRANULOCYTES NFR BLD AUTO: 0.4 % (ref 0–0.5)
KETONES UR QL STRIP: NEGATIVE
LEUKOCYTE ESTERASE UR QL STRIP: ABNORMAL
LIPASE SERPL-CCNC: 20 U/L (ref 4–60)
LYMPHOCYTES # BLD AUTO: 1.7 K/UL (ref 1–4.8)
LYMPHOCYTES NFR BLD: 15 % (ref 18–48)
MCH RBC QN AUTO: 31 PG (ref 27–31)
MCHC RBC AUTO-ENTMCNC: 33 G/DL (ref 32–36)
MCV RBC AUTO: 94 FL (ref 82–98)
MICROSCOPIC COMMENT: ABNORMAL
MONOCYTES # BLD AUTO: 0.4 K/UL (ref 0.3–1)
MONOCYTES NFR BLD: 3.9 % (ref 4–15)
NEUTROPHILS # BLD AUTO: 8.9 K/UL (ref 1.8–7.7)
NEUTROPHILS NFR BLD: 79.8 % (ref 38–73)
NITRITE UR QL STRIP: NEGATIVE
NRBC BLD-RTO: 0 /100 WBC
PH UR STRIP: 5 [PH] (ref 5–8)
PLATELET # BLD AUTO: 206 K/UL (ref 150–350)
PMV BLD AUTO: 9.8 FL (ref 9.2–12.9)
POCT GLUCOSE: 156 MG/DL (ref 70–110)
POTASSIUM SERPL-SCNC: 4.2 MMOL/L (ref 3.5–5.1)
PROT SERPL-MCNC: 7.6 G/DL (ref 6–8.4)
PROT UR QL STRIP: NEGATIVE
RBC # BLD AUTO: 4.23 M/UL (ref 4–5.4)
RBC #/AREA URNS AUTO: 1 /HPF (ref 0–4)
SODIUM SERPL-SCNC: 137 MMOL/L (ref 136–145)
SP GR UR STRIP: 1.02 (ref 1–1.03)
SQUAMOUS #/AREA URNS AUTO: 9 /HPF
URN SPEC COLLECT METH UR: ABNORMAL
WBC # BLD AUTO: 11.1 K/UL (ref 3.9–12.7)
WBC #/AREA URNS AUTO: 46 /HPF (ref 0–5)

## 2020-09-22 PROCEDURE — 99285 PR EMERGENCY DEPT VISIT,LEVEL V: ICD-10-PCS | Mod: ,,, | Performed by: EMERGENCY MEDICINE

## 2020-09-22 PROCEDURE — 82962 GLUCOSE BLOOD TEST: CPT

## 2020-09-22 PROCEDURE — 81001 URINALYSIS AUTO W/SCOPE: CPT

## 2020-09-22 PROCEDURE — 83690 ASSAY OF LIPASE: CPT

## 2020-09-22 PROCEDURE — 87086 URINE CULTURE/COLONY COUNT: CPT

## 2020-09-22 PROCEDURE — 96361 HYDRATE IV INFUSION ADD-ON: CPT

## 2020-09-22 PROCEDURE — 25000003 PHARM REV CODE 250: Performed by: EMERGENCY MEDICINE

## 2020-09-22 PROCEDURE — 96374 THER/PROPH/DIAG INJ IV PUSH: CPT

## 2020-09-22 PROCEDURE — 99285 EMERGENCY DEPT VISIT HI MDM: CPT | Mod: ,,, | Performed by: EMERGENCY MEDICINE

## 2020-09-22 PROCEDURE — 96375 TX/PRO/DX INJ NEW DRUG ADDON: CPT

## 2020-09-22 PROCEDURE — 80053 COMPREHEN METABOLIC PANEL: CPT

## 2020-09-22 PROCEDURE — 63600175 PHARM REV CODE 636 W HCPCS: Performed by: EMERGENCY MEDICINE

## 2020-09-22 PROCEDURE — 99284 EMERGENCY DEPT VISIT MOD MDM: CPT | Mod: 25

## 2020-09-22 PROCEDURE — 85025 COMPLETE CBC W/AUTO DIFF WBC: CPT

## 2020-09-22 RX ORDER — HYDROMORPHONE HYDROCHLORIDE 1 MG/ML
0.5 INJECTION, SOLUTION INTRAMUSCULAR; INTRAVENOUS; SUBCUTANEOUS
Status: COMPLETED | OUTPATIENT
Start: 2020-09-22 | End: 2020-09-22

## 2020-09-22 RX ORDER — CIPROFLOXACIN 500 MG/1
250 TABLET ORAL 2 TIMES DAILY
Qty: 3 TABLET | Refills: 0 | Status: SHIPPED | OUTPATIENT
Start: 2020-09-22 | End: 2020-09-25

## 2020-09-22 RX ORDER — ONDANSETRON 2 MG/ML
4 INJECTION INTRAMUSCULAR; INTRAVENOUS
Status: COMPLETED | OUTPATIENT
Start: 2020-09-22 | End: 2020-09-22

## 2020-09-22 RX ORDER — ONDANSETRON 4 MG/1
4 TABLET, ORALLY DISINTEGRATING ORAL EVERY 6 HOURS PRN
Qty: 12 TABLET | Refills: 0 | Status: SHIPPED | OUTPATIENT
Start: 2020-09-22 | End: 2020-10-14

## 2020-09-22 RX ORDER — METOCLOPRAMIDE HYDROCHLORIDE 5 MG/ML
10 INJECTION INTRAMUSCULAR; INTRAVENOUS
Status: COMPLETED | OUTPATIENT
Start: 2020-09-22 | End: 2020-09-22

## 2020-09-22 RX ADMIN — METOCLOPRAMIDE 10 MG: 5 INJECTION, SOLUTION INTRAMUSCULAR; INTRAVENOUS at 08:09

## 2020-09-22 RX ADMIN — ONDANSETRON 4 MG: 2 INJECTION INTRAMUSCULAR; INTRAVENOUS at 07:09

## 2020-09-22 RX ADMIN — HYDROMORPHONE HYDROCHLORIDE 0.5 MG: 1 INJECTION, SOLUTION INTRAMUSCULAR; INTRAVENOUS; SUBCUTANEOUS at 07:09

## 2020-09-22 RX ADMIN — SODIUM CHLORIDE 1000 ML: 0.9 INJECTION, SOLUTION INTRAVENOUS at 07:09

## 2020-09-22 NOTE — ED TRIAGE NOTES
Patient arrives via EMS form home with concerns of mid lower abd pain on set this am, emesis  x3-4 today, denies fevers. Took insulin but no other meds today

## 2020-09-22 NOTE — ED PROVIDER NOTES
Encounter Date: 9/22/2020       History     Chief Complaint   Patient presents with    Abdominal Pain     Woke up with RUQ&LUQ pain that radiates to umbilicus described as cramping wtih nausea. Last bm yesterday.     64 yo W with pmhx HTN, HLD, IDDM, GERD, cholecystectomy presents with a chief complaint of abdominal pain.  Pain began at 5:00 a.m. this morning, 2 hrs prior to arrival.  Pain is located in the suprapubic region with radiation to the right upper quadrant.  Pain has been constant since onset.  Pain is severe in quality.  Associated with nausea and vomiting.  No blood in vomit.  This is been in the setting of constipation.  Patient did take 2 laxatives yesterday evening.  She has since had a bowel movement.  No history of similar pain.  No dysuria or urinary frequency.        Review of patient's allergies indicates:   Allergen Reactions    Iodinated contrast media Swelling and Rash    Percocet [oxycodone-acetaminophen] Itching    Macrobid [nitrofurantoin monohyd/m-cryst] Rash    Metformin Rash    Penicillins Rash    Promethazine Rash    Sulfa (sulfonamide antibiotics) Rash    Sulfamethoxazole-trimethoprim Rash     Past Medical History:   Diagnosis Date    Anxiety     AR (allergic rhinitis)     Colon polyp     Diabetes mellitus, type 2     Dizziness     DM (diabetes mellitus)     Fatty liver     GERD (gastroesophageal reflux disease)     HTN (hypertension)     Hyperlipidemia     Memory loss     Osteopenia     S/P total hysterectomy     Sleep apnea      Past Surgical History:   Procedure Laterality Date    CHOLECYSTECTOMY      COLONOSCOPY N/A 1/17/2018    Procedure: COLONOSCOPY with Donnell;  Surgeon: Roland Jacobo MD;  Location: [facility] (4TH FLR);  Service: Endoscopy;  Laterality: N/A;    ELBOW ARTHROPLASTY Right 1/16/2019    Procedure: ARTHROPLASTY, ELBOW right radial head arthroplasty revision;  Surgeon: Katja Hubbard MD;  [facility];  Service:  Orthopedics;  Laterality: Right;  Anesthesia: General and Regional. Stretcher, hand pan 1 and pan 2, CALL KENJIRUCHI LR & Sol notified 1-14 LO    ELBOW SURGERY Right 7/16/15    ELBOW SURGERY      ESOPHAGOGASTRODUODENOSCOPY N/A 4/15/2019    Procedure: EGD (ESOPHAGOGASTRODUODENOSCOPY);  Surgeon: Buck Irwin MD;  Location: [facility] (4TH FLR);  Service: Endoscopy;  Laterality: N/A;  ray she    HYSTERECTOMY      KNEE ARTHROSCOPY W/ DEBRIDEMENT  4/11    Left    RELEASE OF ULNAR NERVE AT CUBITAL TUNNEL Right 1/16/2019    Procedure: RELEASE, ULNAR TUNNEL right;  Surgeon: Katja Hubbard MD;  [facility];  Service: Orthopedics;  Laterality: Right;  Anesthesia: General and Regional. Stretcher, hand pan 1 and pan 2, CALL RUCHI SANTAMARIA    ROTATOR CUFF REPAIR      TONSILLECTOMY      TOTAL ABDOMINAL HYSTERECTOMY W/ BILATERAL SALPINGOOPHORECTOMY      UPPER GASTROINTESTINAL ENDOSCOPY       Family History   Problem Relation Age of Onset    Colon cancer Father 83        colon cancer    Diabetes Maternal Grandmother     Diabetes Maternal Aunt     Esophageal cancer Neg Hx     Stomach cancer Neg Hx     Melanoma Neg Hx      Social History     Tobacco Use    Smoking status: Never Smoker    Smokeless tobacco: Never Used   Substance Use Topics    Alcohol use: No     Frequency: Never     Drinks per session: Patient refused     Binge frequency: Never    Drug use: No     Review of Systems   Constitutional: Negative for fever.   HENT: Negative for sore throat.    Respiratory: Negative for shortness of breath.    Cardiovascular: Negative for chest pain.   Gastrointestinal: Positive for abdominal pain, constipation, nausea and vomiting. Negative for diarrhea.   Genitourinary: Negative for dysuria.   Musculoskeletal: Negative for back pain.   Skin: Negative for rash.   Neurological: Negative for weakness.   Hematological: Does not bruise/bleed easily.       Physical Exam     Initial  Vitals [09/22/20 0611]   BP Pulse Resp Temp SpO2   (!) 136/54 60 18 97.7 °F (36.5 °C) 97 %      MAP       --         Physical Exam    Nursing note and vitals reviewed.  Constitutional: She appears well-developed and well-nourished. She is not diaphoretic. She appears distressed.   HENT:   Head: Normocephalic and atraumatic.   Eyes: EOM are normal. Right eye exhibits no discharge. Left eye exhibits no discharge. No scleral icterus.   Neck: Normal range of motion. Neck supple. No JVD present.   Cardiovascular: Normal rate, regular rhythm, normal heart sounds and intact distal pulses. Exam reveals no gallop and no friction rub.    No murmur heard.  Pulmonary/Chest: Breath sounds normal. No respiratory distress. She has no wheezes. She has no rhonchi. She has no rales. She exhibits no tenderness.   Abdominal: Soft. She exhibits no distension and no mass. Bowel sounds are decreased. There is generalized abdominal tenderness and tenderness in the right upper quadrant and suprapubic area. There is no rigidity, no rebound, no guarding and no CVA tenderness.   Musculoskeletal: Normal range of motion. No tenderness or edema.   Neurological: She is alert and oriented to person, place, and time.   Skin: Skin is warm and dry. Capillary refill takes less than 2 seconds.   Psychiatric:   Flat affect         ED Course   Procedures  Labs Reviewed   CBC W/ AUTO DIFFERENTIAL - Abnormal; Notable for the following components:       Result Value    Gran # (ANC) 8.9 (*)     Gran% 79.8 (*)     Lymph% 15.0 (*)     Mono% 3.9 (*)     All other components within normal limits   COMPREHENSIVE METABOLIC PANEL - Abnormal; Notable for the following components:    Glucose 215 (*)     All other components within normal limits   URINALYSIS, REFLEX TO URINE CULTURE - Abnormal; Notable for the following components:    Appearance, UA Hazy (*)     Occult Blood UA 1+ (*)     Leukocytes, UA 2+ (*)     All other components within normal limits    Narrative:      Specimen Source->Urine   URINALYSIS MICROSCOPIC - Abnormal; Notable for the following components:    WBC, UA 46 (*)     Bacteria Few (*)     All other components within normal limits    Narrative:     Specimen Source->Urine   POCT GLUCOSE - Abnormal; Notable for the following components:    POCT Glucose 156 (*)     All other components within normal limits   CULTURE, URINE   LIPASE          Imaging Results          CT Abdomen Pelvis  Without Contrast (Final result)  Result time 09/22/20 08:11:50    Final result by Randy Sylvester DO (09/22/20 08:11:50)                 Impression:      1. No acute findings within the abdomen or pelvis.  2. Colonic diverticulosis without acute diverticulitis.      Electronically signed by: Randy Sylvester  Date:    09/22/2020  Time:    08:11             Narrative:    EXAMINATION:  CT ABDOMEN PELVIS WITHOUT CONTRAST    CLINICAL HISTORY:  Abdominal pain, acute, nonlocalized;    TECHNIQUE:  Multiplanar images were obtained of the abdomen and pelvis from the hemidiaphragms through the symphysis pubis without intravenous contrast.    COMPARISON:  CT abdomen and pelvis from 04/29/2019.    FINDINGS:  Lung Bases: There is a calcified granuloma within the lingula.  There is no focal consolidation.  The pleural spaces are clear.    Distal esophagus: Mild fluid within the distal esophagus which can be seen with reflux.    Heart: Heart size is normal. No pericardial effusion.    Liver: The liver is enlarged, measuring up to 18 cm.  No focal hepatic lesion.    Biliary tract: No intrahepatic or extrahepatic biliary ductal dilatation.    Gallbladder: The gallbladder is surgically absent.    Pancreas: Normal. No pancreatic ductal dilatation.    Spleen: Normal.    Adrenals: Normal.    Kidneys and urinary collecting systems: Normal. No hydronephrosis or urolithiasis.    Lymph nodes: None enlarged.    Stomach and bowel: There is a small hiatal hernia.  There is a duodenal diverticulum, not  significantly changed in size from prior.  Loops of small and large bowel are normal in caliber without evidence for inflammation or obstruction.  There is colonic diverticulosis without evidence of acute diverticulitis.  The appendix is normal.    Peritoneum and mesentery: No ascites or free intraperitoneal air. No abdominal fluid collection.    Vasculature: Mild atherosclerosis of the abdominal aorta.    Urinary bladder: Normal.    Reproductive organs: The uterus is not seen and may be surgically absent.    Body wall: No abnormality.    Musculoskeletal: No aggressive osseous lesion.                                 Medical Decision Making:   History:   I obtained history from: someone other than patient.  Old Medical Records: I decided to obtain old medical records.  Initial Assessment:   66 yo W with pmhx HTN, HLD, IDDM, GERD, cholecystectomy presents with a chief complaint of abdominal pain.   Differential Diagnosis:   UTI, pyelonephritis, nephrolithiasis, diverticulitis, gas pains  Clinical Tests:   Lab Tests: Ordered  Radiological Study: Ordered  ED Management:  Will administer IV fluids, antiemetics, analgesics.  Will obtain labs and CT abdomen/pelvis.    Reassessment:  Patient reports pain resolved but nausea persists, will administer Reglan.     Reassessment:  UA borderline for UTI.  There were 9 squamous epithelial cells, few bacteria, 46 WBCs and 2+ leukocytes.  Negative nitrites.  CBC negative for leukocytosis or anemia.  CMP with mild hyperglycemia, otherwise normal.  Lipase normal.  CT negative for acute process.  There is diverticulosis.  On repeat assessment, vital signs within normal limits and patient is well-appearing.  She reports her symptoms have improved.  Her abdomen is soft and nontender.  Overall I do not appreciate any emergent etiology of her symptoms.  Will discharge on course of antibiotics for possible UTI.  Given multiple allergies, will prescribe Cipro 250 b.i.d. x3 days.  Patient  encouraged p.o. hydration.  Script for Zofran provided.  Return precautions.                             Clinical Impression:     ICD-10-CM ICD-9-CM   1. Abdominal pain, unspecified abdominal location  R10.9 789.00   2. Urinary tract infection without hematuria, site unspecified  N39.0 599.0                          ED Disposition Condition    Discharge Stable        ED Prescriptions     Medication Sig Dispense Start Date End Date Auth. Provider    ondansetron (ZOFRAN-ODT) 4 MG TbDL Take 1 tablet (4 mg total) by mouth every 6 (six) hours as needed (nausea). 12 tablet 9/22/2020  Michael Marin MD    ciprofloxacin HCl (CIPRO) 500 MG tablet Take 0.5 tablets (250 mg total) by mouth 2 (two) times daily. for 3 days 3 tablet 9/22/2020 9/25/2020 Michael Marin MD        Follow-up Information     Follow up With Specialties Details Why Contact Info    Amena Roger,  Internal Medicine   [address] [facility] [address]  [phone]                                         Michael Marin MD  09/22/20 0920

## 2020-09-22 NOTE — ED NOTES
"Patient identifiers verified and correct for Ms Ng  C/C: Abdominal pain , vomiting SEE NN  APPEARANCE: awake and alert in NAD.  SKIN: warm, dry and intact. No breakdown or bruising.  MUSCULOSKELETAL: Patient moving all extremities spontaneously, no obvious swelling or deformities noted. Ambulates independently.  RESPIRATORY: Denies shortness of breath.Respirations unlabored.Denies fevers   CARDIAC: Denies CP, 2+ distal pulses; no peripheral edema  ABDOMEN: Abdomen soft, pain to hemant lower abdomen, positive nausea and emesis, BM today "normal"   : voids spontaneously, denies difficulty  Neurologic: AAO x 4; follows commands equal strength in all extremities; denies numbness/tingling. Denies dizziness Denies weakness    "

## 2020-09-22 NOTE — PRE-PROCEDURE INSTRUCTIONS
Spoke with patient's . Instructed to hold vitamins, herbal supplements and NSAIDS one week prior to surgery;  verbalized understanding.   instructed to call POC with any questions or changes.

## 2020-09-23 ENCOUNTER — TELEPHONE (OUTPATIENT)
Dept: PREADMISSION TESTING | Facility: HOSPITAL | Age: 65
End: 2020-09-23

## 2020-09-23 ENCOUNTER — TELEPHONE (OUTPATIENT)
Dept: INTERNAL MEDICINE | Facility: CLINIC | Age: 65
End: 2020-09-23

## 2020-09-23 DIAGNOSIS — N39.0 URINARY TRACT INFECTION WITHOUT HEMATURIA, SITE UNSPECIFIED: Primary | ICD-10-CM

## 2020-09-23 LAB
BACTERIA UR CULT: NORMAL
BACTERIA UR CULT: NORMAL

## 2020-09-23 NOTE — TELEPHONE ENCOUNTER
----- Message from Josy Griffith RN sent at 9/23/2020 12:06 PM CDT -----  Please call 's number or home number as well.   ----- Message -----  From: Josy Griffith RN  Sent: 9/21/2020  11:55 AM CDT  To: [facility] ([facility]) Scheduling    Surgery date: 10/6  PreOp appt date:    Please call patient to schedule the following appointments:    Lab  Ekg  POC  OPOC/NP    Thanks!    Josy Griffith

## 2020-09-25 ENCOUNTER — CLINICAL SUPPORT (OUTPATIENT)
Dept: REHABILITATION | Facility: HOSPITAL | Age: 65
End: 2020-09-25
Attending: ORTHOPAEDIC SURGERY
Payer: MEDICARE

## 2020-09-25 DIAGNOSIS — M17.11 PRIMARY OSTEOARTHRITIS OF RIGHT KNEE: ICD-10-CM

## 2020-09-25 DIAGNOSIS — M25.561 ACUTE PAIN OF RIGHT KNEE: Primary | ICD-10-CM

## 2020-09-25 PROCEDURE — 97161 PT EVAL LOW COMPLEX 20 MIN: CPT

## 2020-09-25 PROCEDURE — 97110 THERAPEUTIC EXERCISES: CPT

## 2020-09-25 NOTE — PLAN OF CARE
[facility]  Physical Therapy Initial Evaluation    Name: Riana Kay  Clinic Number: [id number]    Therapy Diagnosis:   Encounter Diagnoses   Name Primary?    Primary osteoarthritis of right knee     Acute pain of right knee Yes     Physician: Sabino Damon MD    Physician Orders: PT Eval and Treat   Medical Diagnosis from Referral: M17.11 (ICD-10-CM) - Primary osteoarthritis of right knee  Evaluation Date: 9/25/2020  Authorization Period Expiration: 9/21/20  Plan of Care Expiration: 12/18/20  Visit # / Visits authorized: 1/ 1    Time In: 755 am  Time Out: 830 am  Total Billable Time: 35 minutes    Precautions: Standard    Subjective   Date of onset: 3 years prior  History of current condition - Riana reports: a 3 year history of gradual knee pain on R. She has had issues and a prior surgery on her L as well. She states her pain is worse in the morning, evening and with walking. She is scheduled for a TKA in 2 weeks and the PA ordered pre-hab to improve knee ROM and strength prior to sx.        Past Medical History:   Diagnosis Date    Anxiety     AR (allergic rhinitis)     Colon polyp     Diabetes mellitus, type 2     Dizziness     DM (diabetes mellitus)     Fatty liver     GERD (gastroesophageal reflux disease)     HTN (hypertension)     Hyperlipidemia     Memory loss     Osteopenia     S/P total hysterectomy     Sleep apnea      Riana Kay  has a past surgical history that includes Cholecystectomy; Knee arthroscopy w/ debridement (4/11); Total abdominal hysterectomy w/ bilateral salpingoophorectomy; Tonsillectomy; Rotator cuff repair; Elbow surgery (Right, 7/16/15); Elbow surgery; Hysterectomy; Colonoscopy (N/A, 1/17/2018); Elbow Arthroplasty (Right, 1/16/2019); Release of ulnar nerve at cubital tunnel (Right, 1/16/2019); Upper gastrointestinal endoscopy; and Esophagogastroduodenoscopy (N/A, 4/15/2019).    Riana has a current medication  "list which includes the following prescription(s): amlodipine, azelastine, blood sugar diagnostic, blood-glucose meter, ciprofloxacin hcl, cyanocobalamin, diclofenac sodium, diclofenac sodium, fluticasone propionate, lantus solostar u-100 insulin, insulin lispro, ketoconazole, lancets, latanoprost, losartan, ondansetron, ondansetron, pantoprazole, pen needle, diabetic, pyridoxine (vitamin b6), and sitagliptin, and the following Facility-Administered Medications: sodium hyaluronate (euflexxa).    Review of patient's allergies indicates:   Allergen Reactions    Iodinated contrast media Swelling and Rash    Percocet [oxycodone-acetaminophen] Itching    Macrobid [nitrofurantoin monohyd/m-cryst] Rash    Metformin Rash    Penicillins Rash    Promethazine Rash    Sulfa (sulfonamide antibiotics) Rash    Sulfamethoxazole-trimethoprim Rash        Imaging, X-ray:     Prior Therapy: yes  Social History: she lives with their spouse  Occupation: disabled   Prior Level of Function: walking 1 hour everyday prior to knee pain  Current Level of Function: limited with WB    Pain:  Current 8/10, worst 10/10, best 6/10   Location: right knee   Description: Burning  Aggravating Factors: Walking, Night Time and Morning  Easing Factors: elevation    Pts goals: "I need to walk"    Objective       Observation: patient appears stated age, pleasant      Posture: B knee flexion, R hip slight ER at hip      Gait: narrow REENA, increased adduction of hips, no terminal knee ext on R    Range of Motion: (PROM):    Knee Left Right   Extension  (+3) degrees  (+2) degrees   Flexion  (115) degrees  (110) degrees           Strength: *denotes pain  Hip Left Right   Flexion 4/5 3+/5 *     Knee Left Right   Extension 4/5 3+/5 *   Flexion 4/5 3+/5 *         Special Tests: not indicated pre-hab status        Joint Mobility: WNL R patella P! In all planes    Palpation: TTP medial and lateral R knee joint line    Sensation: WNL BLE    Flexibility: tight " HS B        TREATMENT   Treatment Time In: 810 am  Treatment Time Out: 820 am  Total Treatment time separate from Evaluation: 10  minutes    Riana received therapeutic exercises to develop strength, endurance, ROM and core stabilization for 10 minutes including:  Quad sets 10 sec x10 with towel under ankle  Heel slides 2x10 with 5 sec hold  Heel prop x3 min   Calf stretch with towel 2x 30 sec hold  Ankle pumps and circles reviewed for HEP      Home Exercises and Patient Education Provided    Education provided re: HEP to Go    Written Home Exercises Provided: yes.  Exercises were reviewed and Riana was able to demonstrate them prior to the end of the session.   Pt received a written copy of exercises to perform at home. Riana demonstrated good  understanding of the education provided.     See EMR under patient instructions for exercises given.   Assessment   Riana is a 65 y.o. female referred to outpatient Physical Therapy with a medical diagnosis of pre-hab TKA L knee. Pt presents with slight decreased R knee ROM, weakness in all planes of R knee and hip secondary to pain, and gait impairments due to pain with WB.     Pt prognosis is Good.   Pt will benefit from skilled outpatient Physical Therapy to address the deficits stated above and in the chart below, provide pt/family education, and to maximize pt's level of independence.     Plan of care discussed with patient: Yes  Pt's spiritual, cultural and educational needs considered and patient is agreeable to the plan of care and goals as stated below:     Anticipated Barriers for therapy: none    Medical Necessity is demonstrated by the following  History  Co-morbidities and personal factors that may impact the plan of care Co-morbidities:   HTN    Personal Factors:   no deficits     low   Examination  Body Structures and Functions, activity limitations and participation restrictions that may impact the plan of care Body Regions:   lower  extremities    Body Systems:    ROM  strength  balance  gait  motor control    Participation Restrictions:   walking    Activity limitations:   Learning and applying knowledge  no deficits    General Tasks and Commands  no deficits    Communication  no deficits    Mobility  walking    Self care  no deficits    Domestic Life  no deficits    Interactions/Relationships  no deficits    Life Areas  no deficits    Community and Social Life  no deficits         low   Clinical Presentation stable and uncomplicated low   Decision Making/ Complexity Score: low     Goals:  Short Term Goals: (4 weeks)  Post op  - Pt will increase ROM to 0 deg R knee extension and 90 deg flexion  - Pt will increase strength to 3/5 RLE to tolerate leg raises in all planes  - Decrease Pain to 4/10 as worst with walking >15 min   - Pt to self correct posture and gait with minimal cues and no AD  - Pt independent with HEP with progressions.     Long Term Goals (12 Weeks):Post op  - Pt will increase ROM to +3 deg R knee ext and 120 deg flexion  - Pt will increase strength to 5/5 RLE to return to walking for exercise 1 hour a day  - Decrease Pain to 1/10 as worst with all ADLs  - Pt to return to 80% PLOF      Plan   Plan of care Certification: 9/25/2020 to 12/18/20.    Outpatient Physical Therapy 2 times weekly for 12 weeks to include the following interventions: Manual Therapy, Moist Heat/ Ice, Neuromuscular Re-ed, Therapeutic Activites and Therapeutic Exercise.     Bernadette Palacios, PT

## 2020-09-28 ENCOUNTER — LAB VISIT (OUTPATIENT)
Dept: LAB | Facility: HOSPITAL | Age: 65
End: 2020-09-28
Attending: INTERNAL MEDICINE
Payer: MEDICARE

## 2020-09-28 DIAGNOSIS — N39.0 URINARY TRACT INFECTION WITHOUT HEMATURIA, SITE UNSPECIFIED: ICD-10-CM

## 2020-09-28 PROCEDURE — 87086 URINE CULTURE/COLONY COUNT: CPT

## 2020-09-29 LAB — BACTERIA UR CULT: NO GROWTH

## 2020-10-01 NOTE — ASSESSMENT & PLAN NOTE
This client has a diagnosis of obstructive sleep apnea (ZAHIRA)  Not using CPAP- needs new supplies  Instructions were given to avoid the following: sleeping supine, weight gain ,alcoholic beverages , sedative medications, and CNS depressant use as these can all worsen ZAHIRA.    Untreated sleep apnea has been shown to increase daytime sleepiness, hypertension, heart disease and stroke which were discussed with the patient at this time    Please use caution with medications that induce respiratory depression in the perioperative period

## 2020-10-01 NOTE — ASSESSMENT & PLAN NOTE
DM x over 20 years  years.    Currently takes:   Lantus insulin  Lispro Insulin 14 u with meals plus sliding scale    Most recent A1c is 6.5 .  Average daily accuchecks are 118-130.  Denies peripheral neuropathy. Denies visual changes; goes to eye doctor every 6 months.  Maintain healthy weight. Exercise at least 150 minutes weekly. Encouraged diet rich in nutrients such as fruits, vegetables, and whole grains; reduce sugar intake from cakes, candy, and sugared drinks.    Diabetes Mellitus-I suggest monitoring the glucose in the perioperative period ( Before meals and bed time,if the patient is on oral feeds or every 6 hourly ,if the patient is NPO )  Blood glucose target in hospitalized patients is 140-180. Oral Hypoglycemic agents are generally avoided during the hospital stay . If glucose is consistently elevated ,I suggest using basal ,prandial Insulin regimen to control the glucose , as elevated glucose can be associated with adverse surgical out comes. Please consider involving Hospital Medicine or Endocrinology ,if any help is needed with Glucose control. Patient will be instructed based on the pre op clinic guidelines  about adjustment of diabetic treatment (If applicable )  considering the NPO status for Surgery

## 2020-10-01 NOTE — ASSESSMENT & PLAN NOTE
Currently being treated with   pantoprazole (PROTONIX) 40 MG tablet  Encouraged to elevate HOB, avoid laying down for 2 hours after meals.   Weight loss encouraged and dietary changes such as reduction or avoidance of fatty foods, caffeine, spicy foods, and chocolate.  Smoking cessation was recommended as well as reduction of alcohol consumption.

## 2020-10-01 NOTE — ASSESSMENT & PLAN NOTE
Current /74   today.   Patient reports home BP readings of: 130's/86  Encouraged keeping a healthy weight and BMI  Education was provided regarding lifestyle changes to reduce systolic BP; Exercise 30 minutes per day,  5 days per week or 150 minutes weekly;  Sodium reduction and avoidance of high salt foods such as processed meats, frozen meals, fast foods.     amLODIPine (NORVASC) 5 MG tablet  losartan (COZAAR) 25 MG tablet

## 2020-10-02 ENCOUNTER — INITIAL CONSULT (OUTPATIENT)
Dept: INTERNAL MEDICINE | Facility: CLINIC | Age: 65
End: 2020-10-02
Payer: MEDICARE

## 2020-10-02 ENCOUNTER — HOSPITAL ENCOUNTER (OUTPATIENT)
Dept: CARDIOLOGY | Facility: CLINIC | Age: 65
Discharge: HOME | End: 2020-10-02
Payer: MEDICARE

## 2020-10-02 ENCOUNTER — TELEPHONE (OUTPATIENT)
Dept: ORTHOPEDICS | Facility: CLINIC | Age: 65
End: 2020-10-02

## 2020-10-02 VITALS
HEIGHT: 65 IN | WEIGHT: 173 LBS | BODY MASS INDEX: 28.82 KG/M2 | SYSTOLIC BLOOD PRESSURE: 169 MMHG | TEMPERATURE: 98 F | OXYGEN SATURATION: 96 % | HEART RATE: 64 BPM | DIASTOLIC BLOOD PRESSURE: 74 MMHG

## 2020-10-02 DIAGNOSIS — E11.69 TYPE 2 DIABETES MELLITUS WITH OTHER SPECIFIED COMPLICATION, WITH LONG-TERM CURRENT USE OF INSULIN: ICD-10-CM

## 2020-10-02 DIAGNOSIS — I10 ESSENTIAL HYPERTENSION: ICD-10-CM

## 2020-10-02 DIAGNOSIS — K21.9 GASTROESOPHAGEAL REFLUX DISEASE, UNSPECIFIED WHETHER ESOPHAGITIS PRESENT: ICD-10-CM

## 2020-10-02 DIAGNOSIS — Z01.818 PREOP TESTING: ICD-10-CM

## 2020-10-02 DIAGNOSIS — R42 DIZZINESS: ICD-10-CM

## 2020-10-02 DIAGNOSIS — Z79.4 TYPE 2 DIABETES MELLITUS WITH OTHER SPECIFIED COMPLICATION, WITH LONG-TERM CURRENT USE OF INSULIN: ICD-10-CM

## 2020-10-02 DIAGNOSIS — G47.30 SLEEP APNEA, UNSPECIFIED TYPE: ICD-10-CM

## 2020-10-02 PROCEDURE — 99214 PR OFFICE/OUTPT VISIT, EST, LEVL IV, 30-39 MIN: ICD-10-PCS | Mod: S$GLB,,, | Performed by: HOSPITALIST

## 2020-10-02 PROCEDURE — 93010 ELECTROCARDIOGRAM REPORT: CPT | Mod: S$GLB,,, | Performed by: INTERNAL MEDICINE

## 2020-10-02 PROCEDURE — 3044F PR MOST RECENT HEMOGLOBIN A1C LEVEL <7.0%: ICD-10-PCS | Mod: CPTII,S$GLB,, | Performed by: HOSPITALIST

## 2020-10-02 PROCEDURE — 3078F PR MOST RECENT DIASTOLIC BLOOD PRESSURE < 80 MM HG: ICD-10-PCS | Mod: CPTII,S$GLB,, | Performed by: HOSPITALIST

## 2020-10-02 PROCEDURE — 99499 RISK ADDL DX/OHS AUDIT: ICD-10-PCS | Mod: S$GLB,,, | Performed by: NURSE PRACTITIONER

## 2020-10-02 PROCEDURE — 93005 ELECTROCARDIOGRAM TRACING: CPT | Mod: S$GLB,,, | Performed by: ANESTHESIOLOGY

## 2020-10-02 PROCEDURE — 99499 UNLISTED E&M SERVICE: CPT | Mod: S$GLB,,, | Performed by: NURSE PRACTITIONER

## 2020-10-02 PROCEDURE — 3008F BODY MASS INDEX DOCD: CPT | Mod: CPTII,S$GLB,, | Performed by: HOSPITALIST

## 2020-10-02 PROCEDURE — 3044F HG A1C LEVEL LT 7.0%: CPT | Mod: CPTII,S$GLB,, | Performed by: HOSPITALIST

## 2020-10-02 PROCEDURE — 3008F PR BODY MASS INDEX (BMI) DOCUMENTED: ICD-10-PCS | Mod: CPTII,S$GLB,, | Performed by: HOSPITALIST

## 2020-10-02 PROCEDURE — 93005 EKG 12-LEAD: ICD-10-PCS | Mod: S$GLB,,, | Performed by: ANESTHESIOLOGY

## 2020-10-02 PROCEDURE — 1101F PR PT FALLS ASSESS DOC 0-1 FALLS W/OUT INJ PAST YR: ICD-10-PCS | Mod: CPTII,S$GLB,, | Performed by: HOSPITALIST

## 2020-10-02 PROCEDURE — 99214 OFFICE O/P EST MOD 30 MIN: CPT | Mod: S$GLB,,, | Performed by: HOSPITALIST

## 2020-10-02 PROCEDURE — 99999 PR PBB SHADOW E&M-EST. PATIENT-LVL V: CPT | Mod: PBBFAC,,,

## 2020-10-02 PROCEDURE — 1101F PT FALLS ASSESS-DOCD LE1/YR: CPT | Mod: CPTII,S$GLB,, | Performed by: HOSPITALIST

## 2020-10-02 PROCEDURE — 3078F DIAST BP <80 MM HG: CPT | Mod: CPTII,S$GLB,, | Performed by: HOSPITALIST

## 2020-10-02 PROCEDURE — 99999 PR PBB SHADOW E&M-EST. PATIENT-LVL V: ICD-10-PCS | Mod: PBBFAC,,,

## 2020-10-02 PROCEDURE — 93010 EKG 12-LEAD: ICD-10-PCS | Mod: S$GLB,,, | Performed by: INTERNAL MEDICINE

## 2020-10-02 PROCEDURE — 3077F SYST BP >= 140 MM HG: CPT | Mod: CPTII,S$GLB,, | Performed by: HOSPITALIST

## 2020-10-02 PROCEDURE — 3077F PR MOST RECENT SYSTOLIC BLOOD PRESSURE >= 140 MM HG: ICD-10-PCS | Mod: CPTII,S$GLB,, | Performed by: HOSPITALIST

## 2020-10-02 NOTE — HPI
"This is a 65 y.o. female  who presents today for a preoperative evaulation in preparation for Orthopedic  surgery. Scheduled for 10/6/2020.  States  surgery is indicated for " right knee replacement".   Patient is new to me.  Details of current problem: The duration of problem is 3 years with it getting worse in the past year.   Reports symptoms of a lot of Pain, burning.  Aggravating Factors include: hurts more in the evening, difficult to get up from a sitting positions   Relieving factors are elevating leg on pillow, ice and diclofenac ceam  .  Treated with [facility] vitamin B 12 helps .  Reports pain: 0/10  The history has been obtained by speaking with the patient and reviewing the electronic medical record and/or outside health information. Significant health conditions for the perioperative period are discussed below in assessment and plan.     Patient reports current health status to be Fair.  Reports UTI on 9/22/2020 placed her on antibiotics Cipro which she completed the full course.      Problems of concern for the perioperative period include:    HTN  DM2  GERD  Fatty   Liver  ZAHIRA- non adherent to CPAP      "

## 2020-10-02 NOTE — PROGRESS NOTES
"[facility]  Progress Note    Patient Name: Riana Kay  MRN: [id number]  Date of Evaluation- 10/02/2020  PCP- Amena Roger, DO    Future cases for Riana Kay [[id number]]     Case ID Status Date Time Scooby Procedure Provider Location    [id number] [facility] 10/6/2020  7:00  ARTHROPLASTY, KNEE-SAME DAY PROTOCOL Sabino Damon MD [[id number]] [facility]          HPI:  This is a 65 y.o. female  who presents today for a preoperative evaulation in preparation for Orthopedic  surgery. Scheduled for 10/6/2020.  States  surgery is indicated for " right knee replacement".   Patient is new to me.  Details of current problem: The duration of problem is 3 years with it getting worse in the past year.   Reports symptoms of a lot of Pain, burning.  Aggravating Factors include: hurts more in the evening, difficult to get up from a sitting positions   Relieving factors are elevating leg on pillow, ice and diclofenac ceam  .  Treated with hospitals vitamin B 12 helps .  Reports pain: 0/10  The history has been obtained by speaking with the patient and reviewing the electronic medical record and/or outside health information. Significant health conditions for the perioperative period are discussed below in assessment and plan.     Patient reports current health status to be Fair.  Reports UTI on 9/22/2020 placed her on antibiotics Cipro which she completed the full course.      Problems of concern for the perioperative period include:    HTN  DM2  GERD  Fatty   Liver  ZAHIRA- non adherent to CPAP        Subjective/ Objective:     Chief complaint-Preoperative evaluation, Perioperative Medical management, complication reduction plan     Active cardiac conditions- none    Revised cardiac risk index predictors- insulin requiring diabetes mellitus    Functional capacity -Examples of physical activity walked from garage to clinic with out trouble,  house work----- She can undertake all the above activities without  " chest pain,chest tightness, Shortness of breath ,dizziness,lightheadedness making her exercise tolerance more,  than 4 Mets.     Review of Systems   Constitutional: Negative for chills, fatigue and fever.   HENT: Negative for trouble swallowing and voice change.    Eyes: Negative for photophobia and visual disturbance.        No acute visual changes   Respiratory: Negative for apnea, cough, chest tightness, shortness of breath and wheezing.         STOP bang  Score  Low risk ZAHIRA  High risk ZAHIRA   Cardiovascular: Negative for chest pain, palpitations and leg swelling.   Gastrointestinal: Positive for constipation, nausea and vomiting. Negative for abdominal distention, abdominal pain, blood in stool and diarrhea.        Positive FLD, No hepatitis, cirrhosis  No BRB or black tarry stool     Endocrine: Negative for cold intolerance, heat intolerance, polydipsia, polyphagia and polyuria.   Genitourinary: Negative for difficulty urinating, dysuria, flank pain, frequency, hematuria and urgency.        Had UTI,    Musculoskeletal: Positive for arthralgias, gait problem and neck pain. Negative for back pain, joint swelling, myalgias and neck stiffness.        Hurts neck on left   Neurological: Negative for dizziness, tremors, seizures, syncope, weakness, light-headedness, numbness and headaches.   Psychiatric/Behavioral: Negative for suicidal ideas. The patient is not nervous/anxious.      Family History   Problem Relation Age of Onset    Colon cancer Father 83        colon cancer    Diabetes Maternal Grandmother     Diabetes Maternal Aunt     Esophageal cancer Neg Hx     Stomach cancer Neg Hx     Melanoma Neg Hx      Past Surgical History:   Procedure Laterality Date    CHOLECYSTECTOMY      COLONOSCOPY N/A 1/17/2018    Procedure: COLONOSCOPY with Donnell;  Surgeon: oRland Jacobo MD;  Location: [facility] ([address] [place] [address]);  Service: Endoscopy;  Laterality: N/A;    ELBOW ARTHROPLASTY Right 1/16/2019    Procedure:  ARTHROPLASTY, ELBOW right radial head arthroplasty revision;  Surgeon: Katja Hubbard MD;  Location: [facility] OR 1ST FLR;  Service: Orthopedics;  Laterality: Right;  Anesthesia: General and Regional. Stretcher, hand pan 1 and pan 2, CALL RUCHI SANTAMARIA & Sol notified 1-14 LO    ELBOW SURGERY Right 7/16/15    ELBOW SURGERY      ESOPHAGOGASTRODUODENOSCOPY N/A 4/15/2019    Procedure: EGD (ESOPHAGOGASTRODUODENOSCOPY);  Surgeon: Buck Irwin MD;  Location: [facility] (4TH FLR);  Service: Endoscopy;  Laterality: N/A;  ray she    HYSTERECTOMY      KNEE ARTHROSCOPY W/ DEBRIDEMENT  4/11    Left    RELEASE OF ULNAR NERVE AT CUBITAL TUNNEL Right 1/16/2019    Procedure: RELEASE, ULNAR TUNNEL right;  Surgeon: Katja Hubbard MD;  Location: [facility] OR 1ST FLR;  Service: Orthopedics;  Laterality: Right;  Anesthesia: General and Regional. Stretcher, hand pan 1 and pan 2, CALL RUCHI SANTAMARIA    ROTATOR CUFF REPAIR      TOTAL ABDOMINAL HYSTERECTOMY W/ BILATERAL SALPINGOOPHORECTOMY      UPPER GASTROINTESTINAL ENDOSCOPY       Patient reports PONV and she takes a long time to awaken after surgery anesthesia, bleeding, cardiac problems with previous surgeries/procedures. NO PE DVT    No known family problems with anesthesia, bleeding, cardiac problems with previous surgeries/procedures. NO PE DVT    Medications and Allergies reviewed in epic.     Physical Exam   Constitutional: She is oriented to person, place, and time. Vital signs are normal. She appears well-developed and well-nourished. She is cooperative.   HENT:   Nose: Nose normal.   Mouth/Throat: Uvula is midline, oropharynx is clear and moist and mucous membranes are normal.   Eyes: Pupils are equal, round, and reactive to light. Lids are normal.   Neck: Trachea normal, normal range of motion and phonation normal. Neck supple. Carotid bruit is not present.   Cardiovascular: Normal rate, regular rhythm, normal heart sounds and normal pulses.  "Exam reveals no gallop and no friction rub.   No murmur heard.  Pulmonary/Chest: Effort normal and breath sounds normal.    Abdominal: Normal appearance and bowel sounds are normal. There is no abdominal tenderness.   Musculoskeletal: Normal range of motion.   Lymphadenopathy:        Head (right side): No submental, no submandibular, no tonsillar, no preauricular, no posterior auricular and no occipital adenopathy present.        Head (left side): No submental, no submandibular, no tonsillar, no preauricular, no posterior auricular and no occipital adenopathy present.        Right cervical: No superficial cervical adenopathy present.       Left cervical: No superficial cervical adenopathy present.   Neurological: She is alert and oriented to person, place, and time. GCS eye subscore is 4. GCS verbal subscore is 5. GCS motor subscore is 6.   Skin: Skin is warm and dry. Capillary refill takes 2 to 3 seconds.   Psychiatric: Her speech is normal and behavior is normal. Mood, memory, affect, judgment and thought content normal. Thought content is not paranoid.     Blood pressure (!) 169/74, pulse 64, temperature 98.2 °F (36.8 °C), temperature source Oral, height 5' 5" (1.651 m), weight 78.5 kg (173 lb), SpO2 96 %.    Investigations  Lab and Imaging have been reviewed in epic.    LABS  Hgb 13.1  HCT 39.7      BUN 12  Cr 0.9  GFR >60    EKG 10/2/2020    Vent. Rate : 068 BPM     Atrial Rate : 068 BPM      P-R Int : 148 ms          QRS Dur : 086 ms       QT Int : 414 ms       P-R-T Axes : 059 -20 073 degrees      QTc Int : 440 ms     Normal sinus rhythm   Minimal voltage criteria for LVH, may be normal variant   Borderline Abnormal ECG   When compared with ECG of 06-JUN-2019 10:12,   No significant change was found   Confirmed by Rodolfo MCKOY, Wan ([id number]) on 10/2/2020 3:31:46 PM     Referred By: CLARK GARDNER           Confirmed By:Wan Reynolds MD         Exercise Stress Echo 8/28/14    CONCLUSIONS     1 - Normal left " ventricular systolic function (EF 60-65%).     2 - Normal left ventricular diastolic function.     3 - Normal right ventricular systolic function .     No evidence of stress induced myocardial ischemia.     Review of old records- Was done and information gathered regards to events leading to surgery and health conditions of significance in the perioperative period.      Preoperative cardiac risk assessment-  The patient does not have any active cardiac conditions . Revised cardiac risk index predictors- ---.Functional capacity is more than 4 Mets. She will be undergoing a Orthopedic procedure that carries a Moderate Risk risk     The estimated risk of the rate of adverse cardiac outcomes  6%    No further cardiac work up is indicated prior to proceeding with the surgery     Orders Placed This Encounter    Ambulatory referral/consult to Sleep Disorders       American Society of Anesthesiologists Physical status classification ( ASA ) class: 3     Postoperative pulmonary complication risk assessment: 1.6%     ARISCAT ( Canet) risk index- risk class -  Low, if duration of surgery is under 3 hours       Assessment/Plan:     HTN (hypertension)  Current /74   today.   Patient reports home BP readings of: 130's/86  Encouraged keeping a healthy weight and BMI  Education was provided regarding lifestyle changes to reduce systolic BP; Exercise 30 minutes per day,  5 days per week or 150 minutes weekly;  Sodium reduction and avoidance of high salt foods such as processed meats, frozen meals, fast foods.     amLODIPine (NORVASC) 5 MG tablet  losartan (COZAAR) 25 MG tablet    Type 2 diabetes mellitus with other specified complication               DM x over 20 years  years.    Currently takes:   Lantus insulin  Lispro Insulin 14 u with meals plus sliding scale    Most recent A1c is 6.5 .  Average daily accuchecks are 118-130.  Denies peripheral neuropathy. Denies visual changes; goes to eye doctor every 6  months.  Maintain healthy weight. Exercise at least 150 minutes weekly. Encouraged diet rich in nutrients such as fruits, vegetables, and whole grains; reduce sugar intake from cakes, candy, and sugared drinks.    Diabetes Mellitus-I suggest monitoring the glucose in the perioperative period ( Before meals and bed time,if the patient is on oral feeds or every 6 hourly ,if the patient is NPO )  Blood glucose target in hospitalized patients is 140-180. Oral Hypoglycemic agents are generally avoided during the hospital stay . If glucose is consistently elevated ,I suggest using basal ,prandial Insulin regimen to control the glucose , as elevated glucose can be associated with adverse surgical out comes. Please consider involving Hospital Medicine or Endocrinology ,if any help is needed with Glucose control. Patient will be instructed based on the pre op clinic guidelines  about adjustment of diabetic treatment (If applicable )  considering the NPO status for Surgery           GERD (gastroesophageal reflux disease)  Currently being treated with   pantoprazole (PROTONIX) 40 MG tablet  Encouraged to elevate HOB, avoid laying down for 2 hours after meals.   Weight loss encouraged and dietary changes such as reduction or avoidance of fatty foods, caffeine, spicy foods, and chocolate.  Smoking cessation was recommended as well as reduction of alcohol consumption.    Sleep apnea  This client has a diagnosis of obstructive sleep apnea (ZAHIRA)  Not using CPAP- needs new supplies  Instructions were given to avoid the following: sleeping supine, weight gain ,alcoholic beverages , sedative medications, and CNS depressant use as these can all worsen ZAHIRA.    Untreated sleep apnea has been shown to increase daytime sleepiness, hypertension, heart disease and stroke which were discussed with the patient at this time    Please use caution with medications that induce respiratory depression in the perioperative period      Preventive  perioperative care    Thromboembolic prophylaxis:  Her risk factors for thrombosis include surgical procedure, age and reduced mobility.I suggest  thromboembolic prophylaxis ( mechanical/pharmacological, weighing the risk benefits of pharmacological agent use considering michelle procedural bleeding )  during the perioperative period.I suggested being active in the post operative period.      Postoperative pulmonary complication prophylaxis-Risk factors for post operative pulmonary complications include ASA class >2- I suggest incentive spirometry use, early ambulation and pain control so as to avoid diaphragmatic splinting  Brush you teet twice per day, oral rinses with mouth wash, sleep with the head of the bed up 30 degrees     Renal complication prophylaxis-Risk factors for renal complications include diabetes mellitus and hypertension . I suggest keeping her well hydrated and avoidance/ minimizing the use of  NSAID's,MAYER 2 Inhibitors ,IV contrast if possible in the perioperative period.I suggested drinking 2 litre's of water a day      This visit was focused on Preoperative evaluation, Perioperative Medical management, complication reduction plans. I suggest that the patient follows up with primary care or relevant sub specialists for ongoing health care.    I appreciate the opportunity to be involved in this patients care. Please feel free to contact me if there were any questions about this consultation.    Patient is optimized    Fransico Jin NP  Perioperative Medicine  [facility]   Pager [phone]

## 2020-10-02 NOTE — PATIENT INSTRUCTIONS
Your surgery has been scheduled for:_10/6__  You should report to:  ____[facility], located on the [place] side of the first floor of the           [facility] ([phone])  ____The Second Floor Surgery Center, located on the [facility] side of the            Second floor of the [facility] ([phone])  ____3rd Floor [facility] located on the [facility] side of the [facility] [phone]  __X__[facility]: located at [address] [place] [address]. Check in on the first floor of the hospital.  Please Note   - Tell your doctor if you take Aspirin, products containing Aspirin, herbal medications  or blood thinners, such as Coumadin, Ticlid, or Plavix.  (Consult your provider regarding holding or stopping before surgery).  - Arrange for someone to drive you home following surgery.  You will not be allowed to leave the surgical facility alone or drive yourself home following sedation and anesthesia.  Before Surgery  - Stop taking all herbal medications 7 days prior to surgery  - No Motrin/Advil (Ibuprofen)  7 day before surgery  - No Aleve (Naproxen) 7 days before surgery  - Stop taking blood thinners_______days before surgery  - No Goody's/BC  Powder 7 days before surgery  - Refrain from drinking alcoholic beverages for 24hours before and after surgery  - Stop or limit smoking _7_days before surgery  - You may take Tylenol for pain  Night before Surgery  - Do not eat or drink after midnight  - Take a shower or bath (shower is recommended).  Bathe with Hibiclens soap or an antibacterial soap from the neck down.  If not supplied by your surgeon, hibiclens soap will need to be purchased over the counter in pharmacy.  Rinse soap off thoroughly.  - Shampoo your hair with your regular shampoo  The Day of Surgery  - Take another bath or shower with hibiclens or any antibacterial soap, to reduce the chance of  infection.  - Take heart and blood pressure medications with a small sip of water, as advised by the perioperative team.  - Do not take fluid pills  - You may brush your teeth and rinse your mouth, but do not swall any additional water.   - Do not apply perfumes, powder, body lotions or deodorant on the day of surgery.  - Nail polish should be removed.  - Do not wear makeup or moisturizer  - Wear comfortable clothes, such as a button front shirt and loose fitting pants.  - Leave all jewelry, including body piercings, and valuables at home.    - Bring any devices you will neeed after surgery such as crutches or canes.  - If you have sleep apnea, please bring your CPAP machine  In the event that your physical condition changes including the onset of a cold or respiratory illness, or if you have to delay or cancel your surgery, please notify your surgeon.     Anesthesia: Regional Anesthesia    Youre scheduled for surgery. During surgery, youll receive medicine called anesthesia to keep you comfortable and pain-free. Your surgeon has decided that youll receive regional anesthesia. This sheet tells you what to expect with this type of anesthesia.  What is regional anesthesia?  Regional anesthesia numbs one region of your body. The anesthesia may be given around nerves or into veins in your arms, neck, or legs (nerve block or [place] block). Or it may be sent into the spinal fluid (spinal anesthesia) or into the space just outside the spinal fluid (epidural anesthesia). You may also be given sedatives to help you relax.  Nerve block or Mannie block  A small area of the body, such as an arm or leg, can be numbed using a nerve block or Mannie block.  · Nerve block. During a nerve block, your skin is numbed. A needle is then inserted near nerves that serve the area to be numbed. Anesthetic is sent through the needle.  · IV regional or [place] block. For this type of block, an IV line is put into a vein. The blood flow to the area  to be numbed is blocked for a short time. Anesthetic is sent through the IV.  Spinal anesthesia  Spinal anesthesia numbs your body from about the waist down.  · Anesthetic is injected into the spinal fluid. This is a substance that surrounds the spinal cord in your spinal column. The anesthetic blocks pain traveling from the body to the brain.  · To receive the anesthetic, your skin is numbed at the injection site on your back.  · A needle is then inserted into the spinal space. Anesthetic is sent into the spinal fluid through the needle.  Epidural anesthesia  Epidural anesthesia is most commonly used during childbirth and may also be used after surgical procedures of the chest, belly, and legs.  · Anesthetic is injected into the epidural space. This is just outside the dural sac which contains the spinal fluid.  · To receive the anesthetic, your skin is numbed at the injection site on your back.  · A needle is then inserted into the epidural space. Anesthetic is sent into the epidural space through the needle.  · A small flexible catheter may be attached to the needle and left in place. This allows for continuous injections or infusions of anesthetic.  Anesthesia tools and medicines that might be near you during your procedure  · Local anesthetic. This medicine is given through a needle numbs one region of your body.  · Electrocardiography leads (electrodes). These are used to record your heart rate and rhythm.  · Blood pressure cuff. A cuff is placed on your arm to keep track of your blood pressure.  · Pulse oximeter. This small clip is placed on the end of the finger. It measures your blood oxygen level.  · Sedatives. These medicines may be given through an IV. They help to relax you and keep you comfortable. You may stay awake or sleep lightly.  · Oxygen. You may be given oxygen through a facemask.  Risks and possible complications  Regional anesthesia carries some risks. These include:  · Nausea and  vomiting  · Headache  · Backache  · Decreased blood pressure  · Allergic reaction to the anesthetic  · Ongoing numbness (rare)  · Irregular heartbeat (rare)  · Cardiac arrest (rare)   Date Last Reviewed: 12/1/2016  © 6069-9235 [employer]. [address] [place] [address]. All rights reserved. This information is not intended as a substitute for professional medical care. Always follow your healthcare professional's instructions.    Preventive perioperative care    Thromboembolic prophylaxis:  Her risk factors for thrombosis include surgical procedure, age and reduced mobility.I suggest  thromboembolic prophylaxis ( mechanical/pharmacological, weighing the risk benefits of pharmacological agent use considering michelle procedural bleeding )  during the perioperative period.I suggested being active in the post operative period.      Postoperative pulmonary complication prophylaxis-Risk factors for post operative pulmonary complications include ASA class >2- I suggest incentive spirometry use, early ambulation and pain control so as to avoid diaphragmatic splinting  Brush you teet twice per day, oral rinses with mouth wash, sleep with the head of the bed up 30 degrees     Renal complication prophylaxis-Risk factors for renal complications include diabetes mellitus and hypertension . I suggest keeping her well hydrated and avoidance/ minimizing the use of  NSAID's,MAYER 2 Inhibitors ,IV contrast if possible in the perioperative period.I suggested drinking 2 litre's of water a day      This visit was focused on Preoperative evaluation, Perioperative Medical management, complication reduction plans. I suggest that the patient follows up with primary care or relevant sub specialists for ongoing health care.    I appreciate the opportunity to be involved in this patients care. Please feel free to contact me if there were any questions about this consultation.

## 2020-10-02 NOTE — TELEPHONE ENCOUNTER
Spoke with pt, notified her of her Iovera procedure at [facility] on Monday at 10:00. Pt was given the address and verbalized understanding with no further questions.     ----- Message from CESAR Burden sent at 10/2/2020 12:57 PM CDT -----  Regarding: RE: Iovera  I tried calling her and she did not answer either number.  If you can get her tell her come to [facility] [address] at 10:00 am.  Thank you.  Just let me know.  ----- Message -----  From: Selma King MA  Sent: 10/2/2020  11:27 AM CDT  To: CESAR Burden  Subject: RE: Iovera                                       Ok. Thank you just let me know and I will call her and let her know.  ----- Message -----  From: CSEAR Burden  Sent: 10/2/2020  11:21 AM CDT  To: Selma King MA  Subject: RE: Iovera                                       I may be able to do Monday,  Give me a little bit to look into it.  The issue is that they are supposed to be off antibiotics for 14 days and she just stopped from a UTI.  I will let you know in a bit though, I want to ask my doc what he thinks/  ----- Message -----  From: Selma King MA  Sent: 10/2/2020  11:00 AM CDT  To: Marilee Jimenez, #  Subject: RE: Iovera                                         ----- Message -----  From: CESAR Burden  Sent: 10/2/2020  10:49 AM CDT  To: Marilee Jimenez, #  Subject: RE: Iovera                                       Hey I dont see it linked to a patient.  Can you send me the patient info so I can check with insurance.  ----- Message -----  From: Selma King MA  Sent: 10/2/2020  10:02 AM CDT  To: Marilee Jimenez, #  Subject: Iovera                                           This pt is having surgery on 10/6 is it at all possible for her to get the Iovera done today or Monday?

## 2020-10-02 NOTE — OUTPATIENT SUBJECTIVE & OBJECTIVE
Outpatient Subjective & Objective     Chief complaint-Preoperative evaluation, Perioperative Medical management, complication reduction plan     Active cardiac conditions- none    Revised cardiac risk index predictors- insulin requiring diabetes mellitus    Functional capacity -Examples of physical activity walked from garage to clinic with out trouble,  house work----- She can undertake all the above activities without  chest pain,chest tightness, Shortness of breath ,dizziness,lightheadedness making her exercise tolerance more,  than 4 Mets.     Review of Systems   Constitutional: Negative for chills, fatigue and fever.   HENT: Negative for trouble swallowing and voice change.    Eyes: Negative for photophobia and visual disturbance.        No acute visual changes   Respiratory: Negative for apnea, cough, chest tightness, shortness of breath and wheezing.         STOP bang  Score  Low risk ZAHIRA  High risk ZAHIRA   Cardiovascular: Negative for chest pain, palpitations and leg swelling.   Gastrointestinal: Positive for constipation, nausea and vomiting. Negative for abdominal distention, abdominal pain, blood in stool and diarrhea.        Positive FLD, No hepatitis, cirrhosis  No BRB or black tarry stool     Endocrine: Negative for cold intolerance, heat intolerance, polydipsia, polyphagia and polyuria.   Genitourinary: Negative for difficulty urinating, dysuria, flank pain, frequency, hematuria and urgency.        Had UTI,    Musculoskeletal: Positive for arthralgias, gait problem and neck pain. Negative for back pain, joint swelling, myalgias and neck stiffness.        Hurts neck on left   Neurological: Negative for dizziness, tremors, seizures, syncope, weakness, light-headedness, numbness and headaches.   Psychiatric/Behavioral: Negative for suicidal ideas. The patient is not nervous/anxious.      Family History   Problem Relation Age of Onset    Colon cancer Father 83        colon cancer    Diabetes Maternal  Grandmother     Diabetes Maternal Aunt     Esophageal cancer Neg Hx     Stomach cancer Neg Hx     Melanoma Neg Hx      Past Surgical History:   Procedure Laterality Date    CHOLECYSTECTOMY      COLONOSCOPY N/A 1/17/2018    Procedure: COLONOSCOPY with Donnell;  Surgeon: Roland Jacobo MD;  Location: [facility] (4TH FLR);  Service: Endoscopy;  Laterality: N/A;    ELBOW ARTHROPLASTY Right 1/16/2019    Procedure: ARTHROPLASTY, ELBOW right radial head arthroplasty revision;  Surgeon: Katja Hubbard MD;  Location: [facility] OR 1ST FLR;  Service: Orthopedics;  Laterality: Right;  Anesthesia: General and Regional. Stretcher, hand pan 1 and pan 2, CALL ACCUMRUCHI LR & Sol notified 1-14 LO    ELBOW SURGERY Right 7/16/15    ELBOW SURGERY      ESOPHAGOGASTRODUODENOSCOPY N/A 4/15/2019    Procedure: EGD (ESOPHAGOGASTRODUODENOSCOPY);  Surgeon: Buck Irwin MD;  Location: [facility] ENDO (4TH FLR);  Service: Endoscopy;  Laterality: N/A;  ray she    HYSTERECTOMY      KNEE ARTHROSCOPY W/ DEBRIDEMENT  4/11    Left    RELEASE OF ULNAR NERVE AT CUBITAL TUNNEL Right 1/16/2019    Procedure: RELEASE, ULNAR TUNNEL right;  Surgeon: Katja Hubbard MD;  [facility];  Service: Orthopedics;  Laterality: Right;  Anesthesia: General and Regional. Stretcher, hand pan 1 and pan 2, CALL RUCHI SANTAMARIA    ROTATOR CUFF REPAIR      TOTAL ABDOMINAL HYSTERECTOMY W/ BILATERAL SALPINGOOPHORECTOMY      UPPER GASTROINTESTINAL ENDOSCOPY       Patient reports PONV and she takes a long time to awaken after surgery anesthesia, bleeding, cardiac problems with previous surgeries/procedures. NO PE DVT    No known family problems with anesthesia, bleeding, cardiac problems with previous surgeries/procedures. NO PE DVT    Medications and Allergies reviewed in epic.     Physical Exam   Constitutional: She is oriented to person, place, and time. Vital signs are normal. She appears well-developed and  "well-nourished. She is cooperative.   HENT:   Nose: Nose normal.   Mouth/Throat: Uvula is midline, oropharynx is clear and moist and mucous membranes are normal.   Eyes: Pupils are equal, round, and reactive to light. Lids are normal.   Neck: Trachea normal, normal range of motion and phonation normal. Neck supple. Carotid bruit is not present.   Cardiovascular: Normal rate, regular rhythm, normal heart sounds and normal pulses. Exam reveals no gallop and no friction rub.   No murmur heard.  Pulmonary/Chest: Effort normal and breath sounds normal.   Abdominal: Normal appearance and bowel sounds are normal. There is no abdominal tenderness.   Musculoskeletal: Normal range of motion.   Lymphadenopathy:        Head (right side): No submental, no submandibular, no tonsillar, no preauricular, no posterior auricular and no occipital adenopathy present.        Head (left side): No submental, no submandibular, no tonsillar, no preauricular, no posterior auricular and no occipital adenopathy present.        Right cervical: No superficial cervical adenopathy present.       Left cervical: No superficial cervical adenopathy present.   Neurological: She is alert and oriented to person, place, and time. GCS eye subscore is 4. GCS verbal subscore is 5. GCS motor subscore is 6.   Skin: Skin is warm and dry. Capillary refill takes 2 to 3 seconds.   Psychiatric: Her speech is normal and behavior is normal. Mood, memory, affect, judgment and thought content normal. Thought content is not paranoid.     Blood pressure (!) 169/74, pulse 64, temperature 98.2 °F (36.8 °C), temperature source Oral, height 5' 5" (1.651 m), weight 78.5 kg (173 lb), SpO2 96 %.    Investigations  Lab and Imaging have been reviewed in epic.    LABS  Hgb 13.1  HCT 39.7      BUN 12  Cr 0.9  GFR >60    EKG 10/2/2020    Vent. Rate : 068 BPM     Atrial Rate : 068 BPM      P-R Int : 148 ms          QRS Dur : 086 ms       QT Int : 414 ms       P-R-T Axes : 059 " -20 073 degrees      QTc Int : 440 ms     Normal sinus rhythm   Minimal voltage criteria for LVH, may be normal variant   Borderline Abnormal ECG   When compared with ECG of 06-JUN-2019 10:12,   No significant change was found   Confirmed by Wan Reynolds MD ([id number]) on 10/2/2020 3:31:46 PM     Referred By: CLARK GARDNER           Confirmed By:Wan Reynolds MD         Exercise Stress Echo 8/28/14    CONCLUSIONS     1 - Normal left ventricular systolic function (EF 60-65%).     2 - Normal left ventricular diastolic function.     3 - Normal right ventricular systolic function .     No evidence of stress induced myocardial ischemia.     Review of old records- Was done and information gathered regards to events leading to surgery and health conditions of significance in the perioperative period.    Outpatient Subjective & Objective

## 2020-10-03 ENCOUNTER — LAB VISIT (OUTPATIENT)
Dept: SPORTS MEDICINE | Facility: CLINIC | Age: 65
End: 2020-10-03
Payer: MEDICARE

## 2020-10-03 DIAGNOSIS — Z01.818 PRE-OP TESTING: ICD-10-CM

## 2020-10-03 PROCEDURE — U0003 INFECTIOUS AGENT DETECTION BY NUCLEIC ACID (DNA OR RNA); SEVERE ACUTE RESPIRATORY SYNDROME CORONAVIRUS 2 (SARS-COV-2) (CORONAVIRUS DISEASE [COVID-19]), AMPLIFIED PROBE TECHNIQUE, MAKING USE OF HIGH THROUGHPUT TECHNOLOGIES AS DESCRIBED BY CMS-2020-01-R: HCPCS

## 2020-10-04 LAB — SARS-COV-2 RNA RESP QL NAA+PROBE: NOT DETECTED

## 2020-10-05 ENCOUNTER — OFFICE VISIT (OUTPATIENT)
Dept: PAIN MEDICINE | Facility: CLINIC | Age: 65
End: 2020-10-05
Payer: MEDICARE

## 2020-10-05 ENCOUNTER — ANESTHESIA EVENT (OUTPATIENT)
Dept: SURGERY | Facility: HOSPITAL | Age: 65
End: 2020-10-05
Payer: MEDICARE

## 2020-10-05 ENCOUNTER — PATIENT MESSAGE (OUTPATIENT)
Dept: ADMINISTRATIVE | Facility: HOSPITAL | Age: 65
End: 2020-10-05

## 2020-10-05 ENCOUNTER — TELEPHONE (OUTPATIENT)
Dept: ORTHOPEDICS | Facility: CLINIC | Age: 65
End: 2020-10-05

## 2020-10-05 VITALS
SYSTOLIC BLOOD PRESSURE: 159 MMHG | DIASTOLIC BLOOD PRESSURE: 82 MMHG | RESPIRATION RATE: 18 BRPM | HEIGHT: 65 IN | OXYGEN SATURATION: 100 % | HEART RATE: 74 BPM | WEIGHT: 173.06 LBS | BODY MASS INDEX: 28.83 KG/M2

## 2020-10-05 DIAGNOSIS — M17.11 PRIMARY OSTEOARTHRITIS OF RIGHT KNEE: Primary | ICD-10-CM

## 2020-10-05 DIAGNOSIS — M25.561 CHRONIC PAIN OF RIGHT KNEE: ICD-10-CM

## 2020-10-05 DIAGNOSIS — G89.29 CHRONIC PAIN OF RIGHT KNEE: ICD-10-CM

## 2020-10-05 DIAGNOSIS — Z96.651 STATUS POST TOTAL RIGHT KNEE REPLACEMENT: Primary | ICD-10-CM

## 2020-10-05 PROCEDURE — 99212 PR OFFICE/OUTPT VISIT, EST, LEVL II, 10-19 MIN: ICD-10-PCS | Mod: 25,S$GLB,, | Performed by: NURSE PRACTITIONER

## 2020-10-05 PROCEDURE — 3079F DIAST BP 80-89 MM HG: CPT | Mod: CPTII,S$GLB,, | Performed by: NURSE PRACTITIONER

## 2020-10-05 PROCEDURE — 3008F BODY MASS INDEX DOCD: CPT | Mod: CPTII,S$GLB,, | Performed by: NURSE PRACTITIONER

## 2020-10-05 PROCEDURE — 1101F PR PT FALLS ASSESS DOC 0-1 FALLS W/OUT INJ PAST YR: ICD-10-PCS | Mod: CPTII,S$GLB,, | Performed by: NURSE PRACTITIONER

## 2020-10-05 PROCEDURE — 99999 PR PBB SHADOW E&M-EST. PATIENT-LVL V: CPT | Mod: PBBFAC,,, | Performed by: NURSE PRACTITIONER

## 2020-10-05 PROCEDURE — 3008F PR BODY MASS INDEX (BMI) DOCUMENTED: ICD-10-PCS | Mod: CPTII,S$GLB,, | Performed by: NURSE PRACTITIONER

## 2020-10-05 PROCEDURE — 3077F PR MOST RECENT SYSTOLIC BLOOD PRESSURE >= 140 MM HG: ICD-10-PCS | Mod: CPTII,S$GLB,, | Performed by: NURSE PRACTITIONER

## 2020-10-05 PROCEDURE — 64640 PR DESTRUCT BY NEURO AGENT; OTHER PERIPH NERVE: ICD-10-PCS | Mod: RT,S$GLB,, | Performed by: NURSE PRACTITIONER

## 2020-10-05 PROCEDURE — 1101F PT FALLS ASSESS-DOCD LE1/YR: CPT | Mod: CPTII,S$GLB,, | Performed by: NURSE PRACTITIONER

## 2020-10-05 PROCEDURE — 3079F PR MOST RECENT DIASTOLIC BLOOD PRESSURE 80-89 MM HG: ICD-10-PCS | Mod: CPTII,S$GLB,, | Performed by: NURSE PRACTITIONER

## 2020-10-05 PROCEDURE — 99999 PR PBB SHADOW E&M-EST. PATIENT-LVL V: ICD-10-PCS | Mod: PBBFAC,,, | Performed by: NURSE PRACTITIONER

## 2020-10-05 PROCEDURE — 99212 OFFICE O/P EST SF 10 MIN: CPT | Mod: 25,S$GLB,, | Performed by: NURSE PRACTITIONER

## 2020-10-05 PROCEDURE — 3077F SYST BP >= 140 MM HG: CPT | Mod: CPTII,S$GLB,, | Performed by: NURSE PRACTITIONER

## 2020-10-05 PROCEDURE — 64640 INJECTION TREATMENT OF NERVE: CPT | Mod: RT,S$GLB,, | Performed by: NURSE PRACTITIONER

## 2020-10-05 RX ORDER — ASPIRIN 81 MG/1
81 TABLET ORAL 2 TIMES DAILY
Qty: 60 TABLET | Refills: 0 | Status: SHIPPED | OUTPATIENT
Start: 2020-10-05 | End: 2021-10-18 | Stop reason: HOSPADM

## 2020-10-05 RX ORDER — DOCUSATE SODIUM 100 MG/1
100 CAPSULE, LIQUID FILLED ORAL 2 TIMES DAILY PRN
Qty: 60 CAPSULE | Refills: 0 | Status: SHIPPED | OUTPATIENT
Start: 2020-10-05 | End: 2021-08-11

## 2020-10-05 RX ORDER — LIDOCAINE HYDROCHLORIDE 10 MG/ML
10 INJECTION INFILTRATION; PERINEURAL
Status: DISCONTINUED | OUTPATIENT
Start: 2020-10-05 | End: 2020-10-05 | Stop reason: HOSPADM

## 2020-10-05 RX ORDER — DEXTROMETHORPHAN HYDROBROMIDE, GUAIFENESIN 5; 100 MG/5ML; MG/5ML
650 LIQUID ORAL EVERY 8 HOURS PRN
Qty: 120 TABLET | Refills: 0 | Status: SHIPPED | OUTPATIENT
Start: 2020-10-05 | End: 2021-10-18 | Stop reason: HOSPADM

## 2020-10-05 RX ORDER — CELECOXIB 200 MG/1
200 CAPSULE ORAL DAILY
Qty: 30 CAPSULE | Refills: 0 | Status: SHIPPED | OUTPATIENT
Start: 2020-10-05 | End: 2020-11-05

## 2020-10-05 RX ORDER — HYDROCODONE BITARTRATE AND ACETAMINOPHEN 10; 325 MG/1; MG/1
1 TABLET ORAL
Qty: 50 TABLET | Refills: 0 | Status: SHIPPED | OUTPATIENT
Start: 2020-10-05 | End: 2021-01-22

## 2020-10-05 RX ADMIN — LIDOCAINE HYDROCHLORIDE 10 ML: 10 INJECTION INFILTRATION; PERINEURAL at 09:10

## 2020-10-05 NOTE — TELEPHONE ENCOUNTER
Spoke with pt via , notified her of her 0615 arrival time for surgery tomorrow at [facility]. She verbalized understanding and had no further questions.

## 2020-10-05 NOTE — PROGRESS NOTES
Chronic Pain - Follow up    Referring Physician: No ref. provider found    Chief Complaint:   Chief Complaint   Patient presents with    Knee Pain        SUBJECTIVE: Disclaimer: This note has been generated using voice-recognition software. There may be typographical errors that have been missed during proof-reading    Initial encounter:    Riana Kay presents to the clinic for the evaluation of right knee pain. The pain started year ago symptoms have been worsening.  She is scheduled for right TKA tomorrow with Dr. Damon.  She is here for right knee Iovera.    Brief history:    Symptoms interfere with daily activity.     Exacerbating factors: Extension.      Mitigating factors rest.     Pain Medications:  Current:  None    Physical Therapy/Home Exercise: yes       report:  Not applicable    Pain Procedures: None    Imaging:     Narrative & Impression     EXAMINATION:  XR KNEE 1 OR 2 VIEW RIGHT     CLINICAL HISTORY:  Template GC;  Unilateral primary osteoarthritis, right knee     TECHNIQUE:  AP and lateral views of the right knee were performed.     COMPARISON:  05/27/2020     FINDINGS:  No acute fracture or dislocation seen.  Tricompartmental osteophyte formation with mild to moderate narrowing of the medial tibiofemoral compartment.  No suprapatellar joint effusion or significant soft tissue edema.     Impression:     No acute osseous abnormality seen.  Tricompartmental osteoarthritis with mild-to-moderate joint space narrowing.             Past Medical History:   Diagnosis Date    Abdominal pain, right upper quadrant 2/4/2014    Anxiety     AR (allergic rhinitis)     Atrophic gastritis without mention of hemorrhage 2/13/2012     Dx updated per 2019 IMO Load    Chronic fatigue syndrome 6/10/2012    Dizziness     Fatty liver     GERD (gastroesophageal reflux disease)     HTN (hypertension)     Hyperlipidemia     Memory loss     Osteopenia     S/P total hysterectomy     Sleep apnea       Past Surgical History:   Procedure Laterality Date    CHOLECYSTECTOMY      COLONOSCOPY N/A 1/17/2018    Procedure: COLONOSCOPY with Donnell;  Surgeon: Roland Jacobo MD;  Location: [facility] (4TH FLR);  Service: Endoscopy;  Laterality: N/A;    ELBOW ARTHROPLASTY Right 1/16/2019    Procedure: ARTHROPLASTY, ELBOW right radial head arthroplasty revision;  Surgeon: Katja Hubbard MD;  [facility];  Service: Orthopedics;  Laterality: Right;  Anesthesia: General and Regional. Stretcher, hand pan 1 and pan 2, CALL RUCHI SANTAMARIA & Sol notified 1-14 LO    ELBOW SURGERY Right 7/16/15    ELBOW SURGERY      ESOPHAGOGASTRODUODENOSCOPY N/A 4/15/2019    Procedure: EGD (ESOPHAGOGASTRODUODENOSCOPY);  Surgeon: Buck Irwin MD;  Location: [facility] ENDO (4TH FLR);  Service: Endoscopy;  Laterality: N/A;  ray she    HYSTERECTOMY      KNEE ARTHROSCOPY W/ DEBRIDEMENT  4/11    Left    RELEASE OF ULNAR NERVE AT CUBITAL TUNNEL Right 1/16/2019    Procedure: RELEASE, ULNAR TUNNEL right;  Surgeon: Katja Hubbard MD;  Location: [facility] OR [address] [place] [address];  Service: Orthopedics;  Laterality: Right;  Anesthesia: General and Regional. Stretcher, hand pan 1 and pan 2, CALL RUCHI SANTAMARIA    ROTATOR CUFF REPAIR      TOTAL ABDOMINAL HYSTERECTOMY W/ BILATERAL SALPINGOOPHORECTOMY      UPPER GASTROINTESTINAL ENDOSCOPY       Social History     Socioeconomic History    Marital status:      Spouse name: Kemal    Number of children: 1    Years of education: Not on file    Highest education level: Not on file   Occupational History    Occupation: Housekeeping     Comment: [employer]   Social Needs    Financial resource strain: Somewhat hard    Food insecurity     Worry: Never true     Inability: Never true    Transportation needs     Medical: No     Non-medical: No   Tobacco Use    Smoking status: Never Smoker    Smokeless tobacco: Never Used   Substance and Sexual Activity    Alcohol  use: No     Frequency: Never     Drinks per session: Patient refused     Binge frequency: Never    Drug use: No    Sexual activity: Yes     Partners: Male     Birth control/protection: Post-menopausal   Lifestyle    Physical activity     Days per week: 2 days     Minutes per session: 60 min    Stress: To some extent   Relationships    Social connections     Talks on phone: More than three times a week     Gets together: Once a week     Attends Buddhist service: Not on file     Active member of club or organization: Yes     Attends meetings of clubs or organizations: More than 4 times per year     Relationship status:    Other Topics Concern    Are you pregnant or think you may be? No    Breast-feeding No   Social History Narrative    She works at [employer] in [employer]; , 1 kid (21yo).Nonsmoker, social etoh. No exercise but hopes to start walking on the weekend.     Family History   Problem Relation Age of Onset    Colon cancer Father 83        colon cancer    Diabetes Maternal Grandmother     Diabetes Maternal Aunt     Esophageal cancer Neg Hx     Stomach cancer Neg Hx     Melanoma Neg Hx        Review of patient's allergies indicates:   Allergen Reactions    Iodinated contrast media Swelling and Rash    Percocet [oxycodone-acetaminophen] Itching    Macrobid [nitrofurantoin monohyd/m-cryst] Rash    Metformin Rash    Penicillins Rash    Promethazine Rash    Sulfa (sulfonamide antibiotics) Rash    Sulfamethoxazole-trimethoprim Rash       Current Outpatient Medications   Medication Sig    amLODIPine (NORVASC) 5 MG tablet Take 1 tablet (5 mg total) by mouth once daily.    azelastine (ASTELIN) 137 mcg (0.1 %) nasal spray 1 SPRAY (137 MCG TOTAL) BY NASAL ROUTE 2 (TWO) TIMES DAILY.    blood sugar diagnostic Strp To check BG 4 times daily, to use with insurance preferred meter-true metrix    blood-glucose meter kit To check BG 4 times daily, to use with insurance preferred  "meter-true metrix    cyanocobalamin (VITAMIN B-12) 100 MCG tablet Take 100 mcg by mouth once daily.    diclofenac sodium (VOLTAREN) 1 % Gel Apply 2 g topically 2 (two) times daily.    diclofenac sodium (VOLTAREN) 1 % Gel Apply 2 g topically 4 (four) times daily.    fluticasone propionate (FLONASE) 50 mcg/actuation nasal spray 2 sprays (100 mcg total) by Each Nostril route once daily.    insulin (LANTUS SOLOSTAR U-100 INSULIN) glargine 100 units/mL (3mL) SubQ pen Inject 32 units at night. (new order)    insulin lispro (HUMALOG KWIKPEN INSULIN) 100 unit/mL pen Inject 14 units w/ breakfast and lunch, 12 units w/ dinner plus scale 150-200+2, 201-250+4, 251-300+6, 301-350+8, >350+10. Max daily 66 units. (Patient taking differently: Inject 14 units w/ breakfast and lunch, and dinner plus scale 150-200+2, 201-250+4, 251-300+6, 301-350+8, >350+10. Max daily 66 units.)    ketoconazole (NIZORAL) 2 % shampoo Wash scalp with medicated shampoo at least 2x/week. Let sit on scalp at least 5 minutes prior to rinsing    lancets Misc To check BG 4  times daily, to use with insurance preferred meter-true metrix    latanoprost 0.005 % ophthalmic solution Place 1 drop into both eyes nightly.    losartan (COZAAR) 25 MG tablet Take 0.5 tablets (12.5 mg total) by mouth once daily.    ondansetron (ZOFRAN-ODT) 4 MG TbDL Take 1 tablet (4 mg total) by mouth every 6 (six) hours as needed (nausea).    ondansetron (ZOFRAN-ODT) 8 MG TbDL Take 1 tablet (8 mg total) by mouth 3 (three) times daily as needed.    pantoprazole (PROTONIX) 40 MG tablet TAKE 1 TABLET BY MOUTH EVERY DAY    pen needle, diabetic (NOVOFINE 32) 32 gauge x 1/4" Ndle Uses 4 times a day. 90 day via duramed e 11.65    pyridoxine, vitamin B6, (VITAMIN B-6) 100 MG Tab Take 100 mg by mouth once daily.    SITagliptin (JANUVIA) 100 MG Tab Take 1 tablet (100 mg total) by mouth once daily.     No current facility-administered medications for this visit.        REVIEW OF " "SYSTEMS:    GENERAL:  No weight loss, malaise or fevers.  HEENT:   No recent changes in vision or hearing  NECK:  Negative for lumps, no difficulty with swallowing.  RESPIRATORY:  Negative for cough, wheezing or shortness of breath, patient denies any recent URI.  CARDIOVASCULAR:  Negative for chest pain, leg swelling or palpitations.  GI:  Negative for abdominal discomfort, blood in stools or black stools or change in bowel habits. GERD.  MUSCULOSKELETAL:  See HPI.  SKIN:  Negative for lesions, rash, and itching.  PSYCH:  No mood disorder or recent psychosocial stressors.  Patients sleep is not disturbed secondary to pain.  HEMATOLOGY/LYMPHOLOGY:  Negative for prolonged bleeding, bruising easily or swollen nodes.  Patient is not currently taking any anti-coagulants  ENDO: No history of diabetes or thyroid dysfunction  NEURO:   No history of headaches, syncope, paralysis, seizures or tremors.  All other reviewed and negative other than HPI.    OBJECTIVE:    BP (!) 159/82   Pulse 74   Resp 18   Ht 5' 5" (1.651 m)   Wt 78.5 kg (173 lb 1 oz)   SpO2 100%   BMI 28.80 kg/m²     PHYSICAL EXAMINATION:    GENERAL: Well appearing, in no acute distress, alert and oriented x3.  PSYCH:  Mood and affect appropriate.  SKIN: Skin color, texture, turgor normal, no rashes or lesions.  HEAD/FACE:  Normocephalic, atraumatic. Cranial nerves grossly intact.  EXTREMITIES: No deformities, edema, or skin discoloration. Good capillary refill.  MUSCULOSKELETAL: Medial and lateral joint line tenderness to right knee.  Pain on extension of right knee.  GAIT: Antalgic.    ASSESSMENT: 65 y.o. year old female with right knee pain, consistent with the following diagnoses:    Encounter Diagnoses   Name Primary?    Primary osteoarthritis of right knee Yes    Chronic pain of right knee        PLAN:     - Proceed with Iovera to right knee as below.    - She is aware to contact our office with any fever, drainage or redness to the site.    - " Keep appointment for right TKA tomorrow with Dr. Damon.    Procedure Note Iovera:    DATE OF PROCEDURE:  10/05/2020    PREOPERATIVE DIAGNOSIS: right knee osteoarthritis.     POSTOPERATIVE DIAGNOSIS: right knee osteoarthritis.     PROCEDURE: Iovera treatment of anterior femoral cutaneous nerve, and both branches of infrapatellar saphenous nerve using at least 3 different punctures to treat all 3 nerves. (cpt 64640 x3)    ATTENDING SURGEON: Perla Lanier NP    Interventional Pain Management    ASSISTANT: Kasey Goode NP    COMPLICATIONS: None.     IMPLANTS:  None    ESTIMATED BLOOD LOSS:  < 5cc    SPECIMENS REMOVED:  None    ANESTHESIA: Local lidocaine 1%    INDICATIONS FOR PROCEDURE: This is a 65 y.o. male with longstanding knee pain. They have failed non operative management including injections.I discussed a new treatment therapy called Iovera, which is cryotherapy, to provide symptomatic relief along the sensory distribution of the infrapatellar tendon branch of the saphenous nerve  and AFCN. The patient elected to move forward with the procedure. The patient was given patient information and literature to review prior to the procedure as well.  Based on this, the patient agreed to move forward with doing the procedure.      PROCEDURE:    The patient was placed supine on the exam table and the proximal medial aspect of the right tibia and anterior aspect of distal femur was prepped with Chlorhexidine.  A line was drawn extending approximately 5 cm medial to inferior pole of the patella distally to a point approximately 5 cm medial to the tibial tubercle.  A second line was drawn in a medial to lateral direction the width of the patella approximately 7 cm proximal to the patella. We then infiltrated the skin with lidocaine 1% along both lines using a 25g needle. We then introduced the Iovera device along these lines and this device penetrated the skin, creating cryotherapy to both branches of the  infrapatellar saphenous nerve and a third treatment to the anterior femoral cutaneous nerve. 7 punctures of the skin were made to treat the 2 branches of the ISN and another 7 punctures were made to treat the AFCN. There were a total of 3 nerves treated with iovera. The patient tolerated the procedure well with no problem     Pain before procedure 10/10.     Pain after procedure 5/10.    Perla Laneir  10/05/2020

## 2020-10-06 ENCOUNTER — HOSPITAL ENCOUNTER (OUTPATIENT)
Facility: HOSPITAL | Age: 65
Discharge: HOME | End: 2020-10-07
Attending: ORTHOPAEDIC SURGERY | Admitting: ORTHOPAEDIC SURGERY
Payer: MEDICARE

## 2020-10-06 ENCOUNTER — ANESTHESIA (OUTPATIENT)
Dept: SURGERY | Facility: HOSPITAL | Age: 65
End: 2020-10-06
Payer: MEDICARE

## 2020-10-06 DIAGNOSIS — M17.11 PRIMARY OSTEOARTHRITIS OF RIGHT KNEE: ICD-10-CM

## 2020-10-06 DIAGNOSIS — Z79.4 TYPE 2 DIABETES MELLITUS WITH OTHER SPECIFIED COMPLICATION, WITH LONG-TERM CURRENT USE OF INSULIN: ICD-10-CM

## 2020-10-06 DIAGNOSIS — Z96.651 STATUS POST TOTAL RIGHT KNEE REPLACEMENT: Primary | ICD-10-CM

## 2020-10-06 DIAGNOSIS — E11.69 TYPE 2 DIABETES MELLITUS WITH OTHER SPECIFIED COMPLICATION, WITH LONG-TERM CURRENT USE OF INSULIN: ICD-10-CM

## 2020-10-06 LAB
POCT GLUCOSE: 169 MG/DL (ref 70–110)
POCT GLUCOSE: 201 MG/DL (ref 70–110)
POCT GLUCOSE: 237 MG/DL (ref 70–110)
POCT GLUCOSE: 273 MG/DL (ref 70–110)

## 2020-10-06 PROCEDURE — 97530 THERAPEUTIC ACTIVITIES: CPT

## 2020-10-06 PROCEDURE — 82962 GLUCOSE BLOOD TEST: CPT | Performed by: ORTHOPAEDIC SURGERY

## 2020-10-06 PROCEDURE — 97116 GAIT TRAINING THERAPY: CPT

## 2020-10-06 PROCEDURE — D9220A PRA ANESTHESIA: Mod: ANES,,, | Performed by: ANESTHESIOLOGY

## 2020-10-06 PROCEDURE — 36000710: Performed by: ORTHOPAEDIC SURGERY

## 2020-10-06 PROCEDURE — 94761 N-INVAS EAR/PLS OXIMETRY MLT: CPT

## 2020-10-06 PROCEDURE — 25000003 PHARM REV CODE 250: Performed by: NURSE ANESTHETIST, CERTIFIED REGISTERED

## 2020-10-06 PROCEDURE — 63600175 PHARM REV CODE 636 W HCPCS: Performed by: ANESTHESIOLOGY

## 2020-10-06 PROCEDURE — C1776 JOINT DEVICE (IMPLANTABLE): HCPCS | Performed by: ORTHOPAEDIC SURGERY

## 2020-10-06 PROCEDURE — D9220A PRA ANESTHESIA: Mod: CRNA,,, | Performed by: NURSE ANESTHETIST, CERTIFIED REGISTERED

## 2020-10-06 PROCEDURE — S0028 INJECTION, FAMOTIDINE, 20 MG: HCPCS | Performed by: NURSE ANESTHETIST, CERTIFIED REGISTERED

## 2020-10-06 PROCEDURE — 25000003 PHARM REV CODE 250: Performed by: PHYSICIAN ASSISTANT

## 2020-10-06 PROCEDURE — 37000008 HC ANESTHESIA 1ST 15 MINUTES: Performed by: ORTHOPAEDIC SURGERY

## 2020-10-06 PROCEDURE — 25000003 PHARM REV CODE 250: Performed by: ANESTHESIOLOGY

## 2020-10-06 PROCEDURE — 94799 UNLISTED PULMONARY SVC/PX: CPT

## 2020-10-06 PROCEDURE — 64448 ADDUCTOR CANAL CATHETER: ICD-10-PCS | Mod: 59,RT,, | Performed by: ANESTHESIOLOGY

## 2020-10-06 PROCEDURE — 36000711: Performed by: ORTHOPAEDIC SURGERY

## 2020-10-06 PROCEDURE — 27100025 HC TUBING, SET FLUID WARMER: Performed by: NURSE ANESTHETIST, CERTIFIED REGISTERED

## 2020-10-06 PROCEDURE — 63600175 PHARM REV CODE 636 W HCPCS: Performed by: PHYSICIAN ASSISTANT

## 2020-10-06 PROCEDURE — 27201423 OPTIME MED/SURG SUP & DEVICES STERILE SUPPLY: Performed by: ORTHOPAEDIC SURGERY

## 2020-10-06 PROCEDURE — 76942 ADDUCTOR CANAL CATHETER: ICD-10-PCS | Mod: 26,,, | Performed by: ANESTHESIOLOGY

## 2020-10-06 PROCEDURE — D9220A PRA ANESTHESIA: ICD-10-PCS | Mod: ANES,,, | Performed by: ANESTHESIOLOGY

## 2020-10-06 PROCEDURE — 37000009 HC ANESTHESIA EA ADD 15 MINS: Performed by: ORTHOPAEDIC SURGERY

## 2020-10-06 PROCEDURE — D9220A PRA ANESTHESIA: ICD-10-PCS | Mod: CRNA,,, | Performed by: NURSE ANESTHETIST, CERTIFIED REGISTERED

## 2020-10-06 PROCEDURE — 97165 OT EVAL LOW COMPLEX 30 MIN: CPT

## 2020-10-06 PROCEDURE — 27447 PR TOTAL KNEE ARTHROPLASTY: ICD-10-PCS | Mod: RT,,, | Performed by: ORTHOPAEDIC SURGERY

## 2020-10-06 PROCEDURE — 64448 NJX AA&/STRD FEM NRV NFS IMG: CPT | Mod: 59,RT,, | Performed by: ANESTHESIOLOGY

## 2020-10-06 PROCEDURE — 27447 TOTAL KNEE ARTHROPLASTY: CPT | Mod: RT,,, | Performed by: ORTHOPAEDIC SURGERY

## 2020-10-06 PROCEDURE — 97161 PT EVAL LOW COMPLEX 20 MIN: CPT

## 2020-10-06 PROCEDURE — 99900035 HC TECH TIME PER 15 MIN (STAT)

## 2020-10-06 PROCEDURE — 97535 SELF CARE MNGMENT TRAINING: CPT

## 2020-10-06 PROCEDURE — C1713 ANCHOR/SCREW BN/BN,TIS/BN: HCPCS | Performed by: ORTHOPAEDIC SURGERY

## 2020-10-06 PROCEDURE — 71000033 HC RECOVERY, INTIAL HOUR: Performed by: ORTHOPAEDIC SURGERY

## 2020-10-06 PROCEDURE — 94770 HC EXHALED C02 TEST: CPT

## 2020-10-06 PROCEDURE — 76942 ECHO GUIDE FOR BIOPSY: CPT | Performed by: ANESTHESIOLOGY

## 2020-10-06 PROCEDURE — C9399 UNCLASSIFIED DRUGS OR BIOLOG: HCPCS | Performed by: ANESTHESIOLOGY

## 2020-10-06 PROCEDURE — 63600175 PHARM REV CODE 636 W HCPCS: Performed by: NURSE ANESTHETIST, CERTIFIED REGISTERED

## 2020-10-06 PROCEDURE — 71000039 HC RECOVERY, EACH ADD'L HOUR: Performed by: ORTHOPAEDIC SURGERY

## 2020-10-06 DEVICE — CEMENT BONE SMPLX HV GENTMYCN: Type: IMPLANTABLE DEVICE | Site: KNEE | Status: FUNCTIONAL

## 2020-10-06 DEVICE — PATELLA TRIATHLON 27X8 SYMTRC: Type: IMPLANTABLE DEVICE | Site: KNEE | Status: FUNCTIONAL

## 2020-10-06 DEVICE — IMPLANTABLE DEVICE: Type: IMPLANTABLE DEVICE | Site: KNEE | Status: FUNCTIONAL

## 2020-10-06 DEVICE — BASEPLATE TIB CEM PRIM SZ 2: Type: IMPLANTABLE DEVICE | Site: KNEE | Status: FUNCTIONAL

## 2020-10-06 DEVICE — COMP FEM POST STAB CEM SZ 3 RT: Type: IMPLANTABLE DEVICE | Site: KNEE | Status: FUNCTIONAL

## 2020-10-06 RX ORDER — ONDANSETRON 2 MG/ML
4 INJECTION INTRAMUSCULAR; INTRAVENOUS EVERY 8 HOURS PRN
Status: DISCONTINUED | OUTPATIENT
Start: 2020-10-06 | End: 2020-10-07 | Stop reason: HOSPADM

## 2020-10-06 RX ORDER — FAMOTIDINE 10 MG/ML
INJECTION INTRAVENOUS
Status: DISCONTINUED | OUTPATIENT
Start: 2020-10-06 | End: 2020-10-06

## 2020-10-06 RX ORDER — GLUCAGON 1 MG
1 KIT INJECTION
Status: DISCONTINUED | OUTPATIENT
Start: 2020-10-06 | End: 2020-10-07 | Stop reason: HOSPADM

## 2020-10-06 RX ORDER — MIDAZOLAM HYDROCHLORIDE 1 MG/ML
1 INJECTION INTRAMUSCULAR; INTRAVENOUS EVERY 5 MIN PRN
Status: DISCONTINUED | OUTPATIENT
Start: 2020-10-06 | End: 2020-10-06 | Stop reason: HOSPADM

## 2020-10-06 RX ORDER — SODIUM CHLORIDE 9 MG/ML
INJECTION, SOLUTION INTRAVENOUS CONTINUOUS PRN
Status: DISCONTINUED | OUTPATIENT
Start: 2020-10-06 | End: 2020-10-06

## 2020-10-06 RX ORDER — POLYETHYLENE GLYCOL 3350 17 G/17G
17 POWDER, FOR SOLUTION ORAL DAILY
Status: DISCONTINUED | OUTPATIENT
Start: 2020-10-06 | End: 2020-10-07 | Stop reason: HOSPADM

## 2020-10-06 RX ORDER — AZELASTINE 1 MG/ML
1 SPRAY, METERED NASAL 2 TIMES DAILY
Status: DISCONTINUED | OUTPATIENT
Start: 2020-10-06 | End: 2020-10-07 | Stop reason: HOSPADM

## 2020-10-06 RX ORDER — SODIUM CHLORIDE 0.9 % (FLUSH) 0.9 %
3 SYRINGE (ML) INJECTION EVERY 6 HOURS
Status: DISCONTINUED | OUTPATIENT
Start: 2020-10-06 | End: 2020-10-06 | Stop reason: HOSPADM

## 2020-10-06 RX ORDER — FENTANYL CITRATE 50 UG/ML
25 INJECTION, SOLUTION INTRAMUSCULAR; INTRAVENOUS EVERY 5 MIN PRN
Status: COMPLETED | OUTPATIENT
Start: 2020-10-06 | End: 2020-10-06

## 2020-10-06 RX ORDER — HYDROCODONE BITARTRATE AND ACETAMINOPHEN 5; 325 MG/1; MG/1
2 TABLET ORAL
Status: DISCONTINUED | OUTPATIENT
Start: 2020-10-06 | End: 2020-10-06 | Stop reason: HOSPADM

## 2020-10-06 RX ORDER — SODIUM CHLORIDE 9 MG/ML
INJECTION, SOLUTION INTRAVENOUS CONTINUOUS
Status: ACTIVE | OUTPATIENT
Start: 2020-10-06 | End: 2020-10-07

## 2020-10-06 RX ORDER — MIDAZOLAM HYDROCHLORIDE 1 MG/ML
INJECTION, SOLUTION INTRAMUSCULAR; INTRAVENOUS
Status: DISCONTINUED | OUTPATIENT
Start: 2020-10-06 | End: 2020-10-06

## 2020-10-06 RX ORDER — AMLODIPINE BESYLATE 5 MG/1
5 TABLET ORAL DAILY
Status: DISCONTINUED | OUTPATIENT
Start: 2020-10-06 | End: 2020-10-07 | Stop reason: HOSPADM

## 2020-10-06 RX ORDER — AMLODIPINE BESYLATE 5 MG/1
5 TABLET ORAL DAILY
Status: DISCONTINUED | OUTPATIENT
Start: 2020-10-07 | End: 2020-10-06

## 2020-10-06 RX ORDER — SODIUM CHLORIDE 0.9 % (FLUSH) 0.9 %
10 SYRINGE (ML) INJECTION
Status: DISCONTINUED | OUTPATIENT
Start: 2020-10-06 | End: 2020-10-06 | Stop reason: HOSPADM

## 2020-10-06 RX ORDER — INSULIN ASPART 100 [IU]/ML
1-10 INJECTION, SOLUTION INTRAVENOUS; SUBCUTANEOUS
Status: DISCONTINUED | OUTPATIENT
Start: 2020-10-06 | End: 2020-10-07 | Stop reason: HOSPADM

## 2020-10-06 RX ORDER — BISACODYL 10 MG
10 SUPPOSITORY, RECTAL RECTAL EVERY 12 HOURS PRN
Status: DISCONTINUED | OUTPATIENT
Start: 2020-10-06 | End: 2020-10-07 | Stop reason: HOSPADM

## 2020-10-06 RX ORDER — DEXMEDETOMIDINE HYDROCHLORIDE 100 UG/ML
INJECTION, SOLUTION INTRAVENOUS
Status: DISCONTINUED | OUTPATIENT
Start: 2020-10-06 | End: 2020-10-06

## 2020-10-06 RX ORDER — NALOXONE HCL 0.4 MG/ML
0.02 VIAL (ML) INJECTION
Status: DISCONTINUED | OUTPATIENT
Start: 2020-10-06 | End: 2020-10-07 | Stop reason: HOSPADM

## 2020-10-06 RX ORDER — NICARDIPINE HYDROCHLORIDE 0.2 MG/ML
INJECTION INTRAVENOUS
Status: DISCONTINUED | OUTPATIENT
Start: 2020-10-06 | End: 2020-10-06

## 2020-10-06 RX ORDER — INSULIN ASPART 100 [IU]/ML
11 INJECTION, SOLUTION INTRAVENOUS; SUBCUTANEOUS
Status: DISCONTINUED | OUTPATIENT
Start: 2020-10-06 | End: 2020-10-07 | Stop reason: HOSPADM

## 2020-10-06 RX ORDER — MUPIROCIN 20 MG/G
1 OINTMENT TOPICAL
Status: COMPLETED | OUTPATIENT
Start: 2020-10-06 | End: 2020-10-06

## 2020-10-06 RX ORDER — KETAMINE HCL IN 0.9 % NACL 50 MG/5 ML
SYRINGE (ML) INTRAVENOUS
Status: DISCONTINUED | OUTPATIENT
Start: 2020-10-06 | End: 2020-10-06

## 2020-10-06 RX ORDER — PREGABALIN 75 MG/1
75 CAPSULE ORAL
Status: COMPLETED | OUTPATIENT
Start: 2020-10-06 | End: 2020-10-06

## 2020-10-06 RX ORDER — IBUPROFEN 200 MG
24 TABLET ORAL
Status: DISCONTINUED | OUTPATIENT
Start: 2020-10-06 | End: 2020-10-07 | Stop reason: HOSPADM

## 2020-10-06 RX ORDER — DEXAMETHASONE SODIUM PHOSPHATE 4 MG/ML
INJECTION, SOLUTION INTRA-ARTICULAR; INTRALESIONAL; INTRAMUSCULAR; INTRAVENOUS; SOFT TISSUE
Status: DISCONTINUED | OUTPATIENT
Start: 2020-10-06 | End: 2020-10-06

## 2020-10-06 RX ORDER — FAMOTIDINE 20 MG/1
20 TABLET, FILM COATED ORAL 2 TIMES DAILY
Status: DISCONTINUED | OUTPATIENT
Start: 2020-10-06 | End: 2020-10-07 | Stop reason: HOSPADM

## 2020-10-06 RX ORDER — VANCOMYCIN HCL IN 5 % DEXTROSE 1G/250ML
1000 PLASTIC BAG, INJECTION (ML) INTRAVENOUS
Status: COMPLETED | OUTPATIENT
Start: 2020-10-06 | End: 2020-10-06

## 2020-10-06 RX ORDER — PROPOFOL 10 MG/ML
VIAL (ML) INTRAVENOUS CONTINUOUS PRN
Status: DISCONTINUED | OUTPATIENT
Start: 2020-10-06 | End: 2020-10-06

## 2020-10-06 RX ORDER — ACETAMINOPHEN 500 MG
1000 TABLET ORAL EVERY 6 HOURS
Status: DISCONTINUED | OUTPATIENT
Start: 2020-10-06 | End: 2020-10-06

## 2020-10-06 RX ORDER — CEFAZOLIN SODIUM 1 G/3ML
2 INJECTION, POWDER, FOR SOLUTION INTRAMUSCULAR; INTRAVENOUS
Status: COMPLETED | OUTPATIENT
Start: 2020-10-06 | End: 2020-10-06

## 2020-10-06 RX ORDER — SODIUM CHLORIDE 9 MG/ML
INJECTION, SOLUTION INTRAVENOUS
Status: COMPLETED | OUTPATIENT
Start: 2020-10-06 | End: 2020-10-06

## 2020-10-06 RX ORDER — IBUPROFEN 200 MG
16 TABLET ORAL
Status: DISCONTINUED | OUTPATIENT
Start: 2020-10-06 | End: 2020-10-07 | Stop reason: HOSPADM

## 2020-10-06 RX ORDER — ACETAMINOPHEN 500 MG
1000 TABLET ORAL
Status: COMPLETED | OUTPATIENT
Start: 2020-10-06 | End: 2020-10-06

## 2020-10-06 RX ORDER — AMOXICILLIN 250 MG
1 CAPSULE ORAL 2 TIMES DAILY
Status: DISCONTINUED | OUTPATIENT
Start: 2020-10-06 | End: 2020-10-07 | Stop reason: HOSPADM

## 2020-10-06 RX ORDER — CELECOXIB 200 MG/1
400 CAPSULE ORAL
Status: DISCONTINUED | OUTPATIENT
Start: 2020-10-06 | End: 2020-10-06 | Stop reason: HOSPADM

## 2020-10-06 RX ORDER — HYDROCODONE BITARTRATE AND ACETAMINOPHEN 5; 325 MG/1; MG/1
1 TABLET ORAL EVERY 6 HOURS PRN
Status: DISCONTINUED | OUTPATIENT
Start: 2020-10-06 | End: 2020-10-07 | Stop reason: HOSPADM

## 2020-10-06 RX ORDER — FENTANYL CITRATE 50 UG/ML
25 INJECTION, SOLUTION INTRAMUSCULAR; INTRAVENOUS EVERY 5 MIN PRN
Status: DISCONTINUED | OUTPATIENT
Start: 2020-10-06 | End: 2020-10-06 | Stop reason: HOSPADM

## 2020-10-06 RX ORDER — ONDANSETRON 2 MG/ML
INJECTION INTRAMUSCULAR; INTRAVENOUS
Status: DISCONTINUED | OUTPATIENT
Start: 2020-10-06 | End: 2020-10-06

## 2020-10-06 RX ORDER — OXYCODONE HYDROCHLORIDE 5 MG/1
5 TABLET ORAL
Status: DISCONTINUED | OUTPATIENT
Start: 2020-10-06 | End: 2020-10-06

## 2020-10-06 RX ORDER — INSULIN ASPART 100 [IU]/ML
4 INJECTION, SOLUTION INTRAVENOUS; SUBCUTANEOUS ONCE
Status: COMPLETED | OUTPATIENT
Start: 2020-10-06 | End: 2020-10-06

## 2020-10-06 RX ORDER — ESMOLOL HYDROCHLORIDE 10 MG/ML
INJECTION INTRAVENOUS
Status: DISCONTINUED | OUTPATIENT
Start: 2020-10-06 | End: 2020-10-06

## 2020-10-06 RX ORDER — OXYCODONE HYDROCHLORIDE 5 MG/1
10 TABLET ORAL
Status: DISCONTINUED | OUTPATIENT
Start: 2020-10-06 | End: 2020-10-06

## 2020-10-06 RX ORDER — CEFAZOLIN SODIUM 1 G/3ML
2 INJECTION, POWDER, FOR SOLUTION INTRAMUSCULAR; INTRAVENOUS
Status: COMPLETED | OUTPATIENT
Start: 2020-10-06 | End: 2020-10-07

## 2020-10-06 RX ORDER — FLUTICASONE PROPIONATE 50 MCG
2 SPRAY, SUSPENSION (ML) NASAL DAILY
Status: DISCONTINUED | OUTPATIENT
Start: 2020-10-07 | End: 2020-10-07 | Stop reason: HOSPADM

## 2020-10-06 RX ORDER — PREGABALIN 75 MG/1
75 CAPSULE ORAL NIGHTLY
Status: DISCONTINUED | OUTPATIENT
Start: 2020-10-06 | End: 2020-10-07 | Stop reason: HOSPADM

## 2020-10-06 RX ORDER — LIDOCAINE HYDROCHLORIDE 10 MG/ML
1 INJECTION, SOLUTION EPIDURAL; INFILTRATION; INTRACAUDAL; PERINEURAL
Status: DISCONTINUED | OUTPATIENT
Start: 2020-10-06 | End: 2020-10-06 | Stop reason: HOSPADM

## 2020-10-06 RX ORDER — ASPIRIN 81 MG/1
81 TABLET ORAL 2 TIMES DAILY
Status: DISCONTINUED | OUTPATIENT
Start: 2020-10-06 | End: 2020-10-07 | Stop reason: HOSPADM

## 2020-10-06 RX ORDER — LIDOCAINE HCL/PF 100 MG/5ML
SYRINGE (ML) INTRAVENOUS
Status: DISCONTINUED | OUTPATIENT
Start: 2020-10-06 | End: 2020-10-06

## 2020-10-06 RX ADMIN — SODIUM CHLORIDE: 0.9 INJECTION, SOLUTION INTRAVENOUS at 06:10

## 2020-10-06 RX ADMIN — NICARDIPINE HYDROCHLORIDE 0.6 MG: 0.2 INJECTION, SOLUTION INTRAVENOUS at 08:10

## 2020-10-06 RX ADMIN — AMLODIPINE BESYLATE 5 MG: 5 TABLET ORAL at 05:10

## 2020-10-06 RX ADMIN — MIDAZOLAM HYDROCHLORIDE 1 MG: 1 INJECTION, SOLUTION INTRAMUSCULAR; INTRAVENOUS at 08:10

## 2020-10-06 RX ADMIN — INSULIN ASPART 4 UNITS: 100 INJECTION, SOLUTION INTRAVENOUS; SUBCUTANEOUS at 05:10

## 2020-10-06 RX ADMIN — CEFAZOLIN 2 G: 330 INJECTION, POWDER, FOR SOLUTION INTRAMUSCULAR; INTRAVENOUS at 05:10

## 2020-10-06 RX ADMIN — DEXMEDETOMIDINE HYDROCHLORIDE 4 MCG: 100 INJECTION, SOLUTION, CONCENTRATE INTRAVENOUS at 09:10

## 2020-10-06 RX ADMIN — ROPIVACAINE HYDROCHLORIDE: 2 INJECTION, SOLUTION EPIDURAL; INFILTRATION at 10:10

## 2020-10-06 RX ADMIN — PROPOFOL 75 MCG/KG/MIN: 10 INJECTION, EMULSION INTRAVENOUS at 08:10

## 2020-10-06 RX ADMIN — FENTANYL CITRATE 25 MCG: 50 INJECTION INTRAMUSCULAR; INTRAVENOUS at 11:10

## 2020-10-06 RX ADMIN — ASPIRIN 81 MG: 81 TABLET, COATED ORAL at 10:10

## 2020-10-06 RX ADMIN — LIDOCAINE HYDROCHLORIDE 60 MG: 20 INJECTION, SOLUTION INTRAVENOUS at 08:10

## 2020-10-06 RX ADMIN — VANCOMYCIN HYDROCHLORIDE 1000 MG: 1 INJECTION, POWDER, LYOPHILIZED, FOR SOLUTION INTRAVENOUS at 06:10

## 2020-10-06 RX ADMIN — ESMOLOL HYDROCHLORIDE 20 MG: 10 INJECTION INTRAVENOUS at 08:10

## 2020-10-06 RX ADMIN — ESMOLOL HYDROCHLORIDE 20 MG: 10 INJECTION INTRAVENOUS at 09:10

## 2020-10-06 RX ADMIN — ACETAMINOPHEN 1000 MG: 500 TABLET ORAL at 07:10

## 2020-10-06 RX ADMIN — FENTANYL CITRATE 25 MCG: 50 INJECTION INTRAMUSCULAR; INTRAVENOUS at 10:10

## 2020-10-06 RX ADMIN — FAMOTIDINE 20 MG: 20 TABLET, FILM COATED ORAL at 10:10

## 2020-10-06 RX ADMIN — MUPIROCIN 1 G: 20 OINTMENT TOPICAL at 06:10

## 2020-10-06 RX ADMIN — FENTANYL CITRATE 50 MCG: 50 INJECTION INTRAMUSCULAR; INTRAVENOUS at 07:10

## 2020-10-06 RX ADMIN — FAMOTIDINE 20 MG: 10 INJECTION, SOLUTION INTRAVENOUS at 08:10

## 2020-10-06 RX ADMIN — Medication 10 MG: at 08:10

## 2020-10-06 RX ADMIN — SODIUM CHLORIDE, SODIUM GLUCONATE, SODIUM ACETATE, POTASSIUM CHLORIDE, MAGNESIUM CHLORIDE, SODIUM PHOSPHATE, DIBASIC, AND POTASSIUM PHOSPHATE: .53; .5; .37; .037; .03; .012; .00082 INJECTION, SOLUTION INTRAVENOUS at 08:10

## 2020-10-06 RX ADMIN — INSULIN ASPART 3 UNITS: 100 INJECTION, SOLUTION INTRAVENOUS; SUBCUTANEOUS at 10:10

## 2020-10-06 RX ADMIN — ESMOLOL HYDROCHLORIDE 30 MG: 10 INJECTION INTRAVENOUS at 08:10

## 2020-10-06 RX ADMIN — DEXAMETHASONE SODIUM PHOSPHATE 8 MG: 4 INJECTION, SOLUTION INTRAMUSCULAR; INTRAVENOUS at 08:10

## 2020-10-06 RX ADMIN — DOCUSATE SODIUM 50MG AND SENNOSIDES 8.6MG 1 TABLET: 8.6; 5 TABLET, FILM COATED ORAL at 10:10

## 2020-10-06 RX ADMIN — SODIUM CHLORIDE, SODIUM GLUCONATE, SODIUM ACETATE, POTASSIUM CHLORIDE, MAGNESIUM CHLORIDE, SODIUM PHOSPHATE, DIBASIC, AND POTASSIUM PHOSPHATE: .53; .5; .37; .037; .03; .012; .00082 INJECTION, SOLUTION INTRAVENOUS at 09:10

## 2020-10-06 RX ADMIN — PREGABALIN 75 MG: 75 CAPSULE ORAL at 06:10

## 2020-10-06 RX ADMIN — CEFAZOLIN 2 G: 330 INJECTION, POWDER, FOR SOLUTION INTRAMUSCULAR; INTRAVENOUS at 08:10

## 2020-10-06 RX ADMIN — Medication 5 MG: at 09:10

## 2020-10-06 RX ADMIN — HYDROCODONE BITARTRATE AND ACETAMINOPHEN 2 TABLET: 5; 325 TABLET ORAL at 11:10

## 2020-10-06 RX ADMIN — ONDANSETRON 4 MG: 2 INJECTION, SOLUTION INTRAMUSCULAR; INTRAVENOUS at 09:10

## 2020-10-06 RX ADMIN — INSULIN DETEMIR 25 UNITS: 100 INJECTION, SOLUTION SUBCUTANEOUS at 10:10

## 2020-10-06 RX ADMIN — MEPIVACAINE HYDROCHLORIDE 3 ML: 15 INJECTION, SOLUTION EPIDURAL; INFILTRATION at 08:10

## 2020-10-06 RX ADMIN — MIDAZOLAM HYDROCHLORIDE 2 MG: 1 INJECTION, SOLUTION INTRAMUSCULAR; INTRAVENOUS at 07:10

## 2020-10-06 RX ADMIN — PREGABALIN 75 MG: 75 CAPSULE ORAL at 10:10

## 2020-10-06 RX ADMIN — INSULIN ASPART 4 UNITS: 100 INJECTION, SOLUTION INTRAVENOUS; SUBCUTANEOUS at 11:10

## 2020-10-06 RX ADMIN — HYDROCODONE BITARTRATE AND ACETAMINOPHEN 1 TABLET: 5; 325 TABLET ORAL at 10:10

## 2020-10-06 RX ADMIN — SODIUM CHLORIDE: 0.9 INJECTION, SOLUTION INTRAVENOUS at 11:10

## 2020-10-06 RX ADMIN — ONDANSETRON 4 MG: 2 INJECTION INTRAMUSCULAR; INTRAVENOUS at 02:10

## 2020-10-06 RX ADMIN — DEXMEDETOMIDINE HYDROCHLORIDE 8 MCG: 100 INJECTION, SOLUTION, CONCENTRATE INTRAVENOUS at 08:10

## 2020-10-06 NOTE — ANESTHESIA PREPROCEDURE EVALUATION
"                                                                                                             10/06/2020  Riana Kay is a 65 y.o., female.      Anesthesia Evaluation    I have reviewed the Patient Summary Reports.    I have reviewed the Nursing Notes.       Review of Systems  Anesthesia Hx:  Hx of Anesthetic complications (PONV) Multiple drug allergies - all of which cause a "rash", she says she believes she can take hydrocodone   Social:  Non-Smoker    Cardiovascular:   Exercise tolerance: good Hypertension CAD asymptomatic   Denies Angina.     Negative stress echo 2014   Pulmonary:   Denies COPD.  Denies Asthma.  Denies Shortness of breath. Sleep Apnea (Does not use CPAP)    Renal/:   Denies Chronic Renal Disease.     Hepatic/GI:   GERD, well controlled Liver Disease, (Fatty liver)    Musculoskeletal:   Arthritis     Neurological:   Denies CVA. Denies Seizures.    Endocrine:   Diabetes, poorly controlled, type 2 Denies Hypothyroidism.        Physical Exam  General:  Well nourished    Airway/Jaw/Neck:  Airway Findings: Mallampati: II TM Distance: Normal, at least 6 cm  Jaw/Neck Findings:  Neck ROM: Normal ROM       Chest/Lungs:  Chest/Lungs Clear    Heart/Vascular:  Heart Findings: Normal       Mental Status:  Mental Status Findings:  Cooperative, Alert and Oriented         Anesthesia Assessment: Preoperative EQUATION    Planned Procedure: Procedure(s) (LRB):  ARTHROPLASTY, KNEE-SAME DAY PROTOCOL (Right)  Requested Anesthesia Type:Regional  Surgeon: Sabino Damon MD  Service: Orthopedics  Known or anticipated Date of Surgery:10/6/2020    Surgeon notes: reviewed    Electronic QUestionnaire Assessment completed via nurse interview with patient.        Triage considerations:     The patient has no apparent active cardiac condition (No unstable coronary Syndrome such as severe unstable angina or recent [<1 month] myocardial infarction, decompensated CHF, severe valvular   disease or " significant arrhythmia)    Previous anesthesia records:LMA General, MAC, Hx PONV and Post-Operative Nausea/Vomiting, in the past, but not with recent anesthetics / prophylaxis    4/15/19 egd   Airway/Jaw/Neck:  Airway Findings: Mouth Opening: Normal Tongue: Normal  General Airway Assessment: Adult  Mallampati: II  TM Distance: Normal, at least 6 cm  Jaw/Neck Findings:  Neck ROM: Normal ROM    1/16/19    ARTHROPLASTY, ELBOW right radial head arthroplasty revision (Right Arm Upper) RELEASE, ULNAR TUNNEL right (Right Arm Lower)     01/16/19 Placement Time: 0846 Inserted by: CRNA Airway Device: LMA Removal Date: 01/16/19 Removal Time: 1306      Last PCP note: 6-12 months ago , within [facility]  Dr Roger  Subspecialty notes: Allergy, Endocrinology, Gastroenterology, Ortho, Spine Service, Podiatry    Other important co-morbidities: DM2, GERD, HLD, HTN, ZAHIRA and fatty liver, coronary atherosclerosis, intermittent dsyphagia -dilated 4/2019, ostwopenia, chronic pain, cervical radiculopathy, chronic fatigue syn, bilat ankle edema, R shoulder arthroplasty, anxiety, PONV      Tests already available:  Available tests,  within 3 months , within [facility] . Results have been reviewed.   9/22/20 UA, CBC, CMP, lipiase  9/22/20 CT abdomen  1. No acute findings within the abdomen or pelvis.  2. Colonic diverticulosis without acute diverticulitis.  7/14/20 a1c= 6.5, CMP  7/2/20 Bilateral Venous US  6/12/19  Left arm venous US - No signs of clot in arm   6/6/19 ekg  8/28/14 stress echo   CONCLUSIONS     1 - Normal left ventricular systolic function (EF 60-65%).     2 - Normal left ventricular diastolic function.     3 - Normal right ventricular systolic function .   No evidence of stress induced myocardial ischemia.            Instructions given. (See in Nurse's note)    Optimization:  Anesthesia Preop Clinic Assessment  Indicated.    Medical Opinion Indicated.         Plan:    Testing:  EKG and PT/INR   Pre-anesthesia  visit       Visit  focus: concerns in complex and/or prolonged anesthesia, possible regional anesthesia and/or nerve block      Consultation:IM Perioperative Hospitalist     Patient  has previously scheduled Medical Appointment:    Navigation: Tests Scheduled.              Consults scheduled.             Results will be tracked by Preop Clinic.     9/28/20 Urine culture- Urine culture is negative for bacteria.    10/2/20 Fransico Jin NP, Perioperative Medicine - Patient is optimized. No further cardiac work up is indicated prior to proceeding with the surgery.                       Anesthesia Plan  Type of Anesthesia, risks & benefits discussed:  Anesthesia Type:  CSE, spinal, general  Patient's Preference: Neuraxial vs GA  Intra-op Monitoring Plan: standard ASA monitors  Intra-op Monitoring Plan Comments:   Post Op Pain Control Plan: multimodal analgesia, IV/PO Opiods PRN and peripheral nerve block  Post Op Pain Control Plan Comments:   Induction:   IV  Beta Blocker:  Patient is not currently on a Beta-Blocker (No further documentation required).       Informed Consent: Patient understands risks and agrees with Anesthesia plan.  Questions answered. Anesthesia consent signed with patient.  ASA Score: 3     Day of Surgery Review of History & Physical:    H&P update referred to the surgeon.     Anesthesia Plan Notes: Discussed Risks/Benefits of anesthetic plan  PMH was reviewed and PE was performed  Will plan for neuraxial technique and convert to GA if needed        Ready For Surgery From Anesthesia Perspective.

## 2020-10-06 NOTE — INTERVAL H&P NOTE
Riana Kay has been seen, the chart reviewed, and the patient examined.  I agree with the residents assessment and plan.

## 2020-10-06 NOTE — OP NOTE
DATE OF PROCEDURE: 10/6/2020  PREOPERATIVE DIAGNOSIS: Arthritis, Right knee.   POSTOPERATIVE DIAGNOSIS: Arthritis, Right knee.   PROCEDURES PERFORMED: Right total knee arthroplasty.   SURGEON: Sabino Damon M.D.   ASSISTANT: JUSTINO Hassan MD  ANESTHESIA: Regional.   COMPLICATIONS: None.   COUNTS: Correct.   DISPOSITION: Recovery Room, stable.   SPECIMENS: Bone and cartilage.   FINDINGS: Tricompartmental degenerative change.   FLUIDS: 2000 mL.   BLOOD LOSS: Less than 50 mL.   IMPLANTS: Patrick Triathlon, size 3 Right posterior stabilized   cemented femoral component with a 2 cemented tibial tray, a 27 mm 3-peg   patella and a size 2, 13 mm posterior stabilized polyethylene insert.   INDICATIONS FOR PROCEDURE: Riana Kay is a 65-year-old female who has   severe arthritis in the Right knee . She is having continued pain in the   Right knee and did not respond to nonoperative measures, decided to undergo   Right knee replacement. She is aware of reasonable treatment options as   well as risks and benefits. {She did not respond to NSAIDS or injections. She received a course or pre-operative physical therapy.  PROCEDURE IN DETAIL: After appropriate consent was obtained, the patient   Was brought in the Operating Room, anesthesia was administered. She received   antibiotic prophylaxis. Cast   padding and tourniquet was applied to the proximal Right thigh. Right  lower extremity was prepped and draped in usual sterile fashion. Limb was   elevated, tourniquet was inflated. The knee was flexed. An incision was   made from the tibial tubercle just proximal to the superior pole of the   patella. It was taken down through the skin and retinacular. A medial   parapatellar arthrotomy was performed followed by a standard medial   release. Patellar fat pad was partially excised. ACL was resected.   Femoral punch was used to make the guide hole for the femoral drill. The   distal cutting guide was set at 6 degrees valgus  right.A standard distal femoral cut was made.We were pleased with the alignment and quality of the cut.  The PCL retractor was used to bring   the tibia into the field. Meniscal remnants were resected. The   extramedullary tibial guide was pinned in place using the medial side, as most   defective, 2 mm bone was taken off of this. We checked the alignment in   both planes both before and after making the cut. We were satisfied with the   alignment of the cut and the amount of bone removed.The femur was then  sized, found to be a size 3. A size 3 cutting guide was pinned in 3   degrees of external rotation. This was based off the posterior condyle,   checked the transepicondylar axis. Anterior, posterior and chamfer cuts   were made. The box guide was pinned in position. Femoral box cut was   made. Bone fragments were removed. Lamina spreaders were   then used to open up posteriorly. The PCL was resected and some soft   tissue debris was removed.  A 13 mm spacer block gave excellent flexion-extension gap balance.   I was also satisfied with the medial and lateral stability. At this   point, the femoral trial was placed. The tibia was sized, found to be a   Size 2. A size 2 tibial trial was pinned in appropriate alignment and   rotation. This was based on the medial one third of the tibial tubercle.  A stem extension hole was drilled. A keel hole was punched and a   trial reduction was performed with a size 2, 13 mm insert. She  achieved full   extension. There was no recurvatum. She easily had 130 degrees of flexion.   The femoral component ranged symmetrically in the tibial tray. There was   no lift off. There was no instability of full extension, 30 degrees of   flexion, mid flexion and full flexion. Patella was measured and found to   be 24 mm thick, 8 mm of bone removed. The 3-peg holes were drilled 27 mm   3-peg trial was placed. The knee was brought through range of motion. The   patella tracked extremely well.  Therefore, at this point, we were   satisfied with knee range of motion and stability, component position,   sizing and alignment as well as patella tracking. All trial components   were removed. Bone was prepared for cementing by pulsatile lavage and   drying. Components were cemented into place, femur followed by tibia.   Meticulous care was taken to remove excess cement. Tibial insert was   firmly seated in the tibial tray. The knee was inspected. There was no   loose body, foreign body or soft tissue interposition. Knee was then   reduced, brought into extension. Patella button was cemented in place.   Excess cement was removed. The wound was then copiously irrigated with   antibiotic-impregnated solution. Once the cement was dried, tranexamic   acid was applied. The arthrotomy was closed with #1 Vicryl. Once the   arthrotomy was closed, the knee was brought through range of motion, stable   as previously described. Patella tracked very well. Retinacular was   closed with 0 Vicryl, subcutaneous layer with 2-0 Vicryl, skin with   monocryl and dermabond. Sterile dressing was applied. Tourniquet was let down.  She was   transferred to a stretcher, brought to Recovery Room in stable condition,   tolerated the procedure well, no known complications.

## 2020-10-06 NOTE — ANESTHESIA PROCEDURE NOTES
Adductor Canal Catheter    Patient location during procedure: pre-op   Block not for primary anesthetic.  Reason for block: at surgeon's request and post-op pain management   Post-op Pain Location: Right Knee pain  Start time: 10/6/2020 7:30 AM  Timeout: 10/6/2020 7:27 AM   End time: 10/6/2020 7:40 AM    Staffing  Authorizing Provider: Yong Ervin MD  Performing Provider: Yong Ervin MD    Preanesthetic Checklist  Completed: patient identified, site marked, surgical consent, pre-op evaluation, timeout performed, IV checked, risks and benefits discussed and monitors and equipment checked  Peripheral Block  Patient position: supine  Prep: ChloraPrep and site prepped and draped  Patient monitoring: heart rate, cardiac monitor, continuous pulse ox, continuous capnometry and frequent blood pressure checks  Block type: adductor canal  Laterality: right  Injection technique: continuous  Needle  Needle type: Tuohy   Needle gauge: 17 G  Needle length: 3.5 in  Needle localization: anatomical landmarks and ultrasound guidance  Catheter type: spring wound  Catheter size: 19 G  Test dose: lidocaine 1.5% with Epi 1-to-200,000 and negative   -ultrasound image captured on disc.  Assessment  Injection assessment: negative aspiration, negative parasthesia and local visualized surrounding nerve  Paresthesia pain: none  Heart rate change: no  Slow fractionated injection: yes  Additional Notes  VSS.  [facility]OS[facility] RN monitoring vitals throughout procedure.  Patient tolerated procedure well.     Ropivacaine 0.25% with Epi 1:300K x 15 ml used for the block

## 2020-10-06 NOTE — ANESTHESIA PROCEDURE NOTES
Spinal    Diagnosis: Right knee pain  Patient location during procedure: OR  Start time: 10/6/2020 8:07 AM  Timeout: 10/6/2020 8:05 AM  End time: 10/6/2020 8:08 AM    Staffing  Authorizing Provider: Yong Ervin MD  Performing Provider: Yong Ervin MD    Preanesthetic Checklist  Completed: patient identified, site marked, surgical consent, pre-op evaluation, timeout performed, IV checked, risks and benefits discussed and monitors and equipment checked  Spinal Block  Patient position: sitting  Prep: ChloraPrep  Patient monitoring: heart rate, continuous pulse ox and frequent blood pressure checks  Approach: midline  Location: L4-5  Injection technique: single shot  CSF Fluid: clear free-flowing CSF  Needle  Needle type: Ilda   Needle gauge: 24 G  Needle length: 3.5 in  Additional Documentation: negative aspiration for heme and no paresthesia on injection  Needle localization: anatomical landmarks  Assessment  Sensory level: T8   Dermatomal levels determined by pinch or prick  Ease of block: easy  Patient's tolerance of the procedure: comfortable throughout block and no complaints  Additional Notes  No complications

## 2020-10-06 NOTE — ANESTHESIA POSTPROCEDURE EVALUATION
Anesthesia Post Evaluation    Patient: Riana Kay    Procedure(s) Performed: Procedure(s) (LRB):  ARTHROPLASTY, KNEE-SAME DAY PROTOCOL (Right)    Final Anesthesia Type: spinal    Patient location during evaluation: PACU  Patient participation: Yes- Able to Participate  Level of consciousness: awake and alert  Post-procedure vital signs: reviewed and stable  Pain management: adequate  Airway patency: patent  ZAHIRA mitigation strategies: Multimodal analgesia  PONV status at discharge: No PONV  Anesthetic complications: no      Cardiovascular status: blood pressure returned to baseline  Respiratory status: unassisted and spontaneous ventilation  Hydration status: euvolemic  Follow-up not needed.          Vitals Value Taken Time   /72 10/06/20 1535   Temp 36.2 °C (97.2 °F) 10/06/20 1030   Pulse 65 10/06/20 1535   Resp 16 10/06/20 1535   SpO2 98 % 10/06/20 1535         Event Time   Out of Recovery 14:57:24         Pain/Jeffery Score: Pain Rating Prior to Med Admin: 8 (10/6/2020 11:48 AM)  Pain Rating Post Med Admin: 6 (10/6/2020  2:35 PM)  Jeffery Score: 9 (10/6/2020  3:00 PM)

## 2020-10-06 NOTE — BRIEF OP NOTE
[facility]  Brief Operative Note  Cardiology    SUMMARY     Surgery Date: 10/6/2020     Surgeon(s) and Role:     * Sabino Damon MD - Primary    Assisting Surgeon: None    Pre-op Diagnosis:  Primary osteoarthritis of right knee [M17.11]    Post-op Diagnosis: Post-Op Diagnosis Codes:     * Primary osteoarthritis of right knee [M17.11]    Procedure Performed:     Procedure(s) (LRB):  ARTHROPLASTY, KNEE-SAME DAY PROTOCOL (Right)    Technical Procedures Used: right knee arthroplasty    Description of the findings of the procedure: OA right knee    Estimated Blood Loss: <50cc       Specimens:   Specimen (12h ago, onward)    None

## 2020-10-06 NOTE — ASSESSMENT & PLAN NOTE
65 y.o. female s/p R TKA on 10/6/2020 by Dr. Damon      Nerve block: spinal, adductor canal PNC currently at 6cc/hr  WBS: WBAT RLE  DVT ppx:  ASA 81mg 12 hours post op and BID for 30 days  PT/OT:  daily while inpatient, outpatient PT at [facility]  Labs:  None needed  Cultures:  None  Abx:  periop ancef  Drain:  None  Gurrola:  None  Dispo:  Pending pain control, PT clearance  F/u:  10/19/2020 with Sydney Ortiz for post op visit

## 2020-10-06 NOTE — PT/OT/SLP EVAL
"Occupational Therapy   Evaluation    Name: Riana Kay  MRN: [id number]  Admitting Diagnosis:  Primary osteoarthritis of right knee Day of Surgery    Recommendations:     Discharge Recommendations: home  Discharge Equipment Recommendations:  none  Barriers to discharge:       Assessment:     Riana Kay is a 65 y.o. female with a medical diagnosis of Primary osteoarthritis of right knee.  Patient is s/p right TKA. She completed bed mobility at contact guard assistance. She required minimal assistance from sit-stand from bed level but improved to contact guard assistance from toilet. She would benefit from skilled OT needs s/p right TKA to increase independence with ADLs and ADL transfers.  Performance deficits affecting function: weakness, impaired self care skills, impaired functional mobilty, gait instability, impaired balance, decreased lower extremity function, decreased ROM.      Rehab Prognosis: Good; patient would benefit from acute skilled OT services to address these deficits and reach maximum level of function.       Plan:     Patient to be seen daily to address the above listed problems via self-care/home management, therapeutic activities, therapeutic exercises  · Plan of Care Expires: 10/09/20  · Plan of Care Reviewed with: patient    Subjective     Chief Complaint: "pain in calf"   Patient/Family Comments/goals: "get back to gardening"     Occupational Profile:  Living Environment: lives with  in 1 level home with threshold, tub/shower combo, rolling walker and bedside commode   Previous level of function: independent with ADLs   Roles and Routines: enjoys gardening and cooking   Equipment Used at Home:  bedside commode, walker, rolling  Assistance upon Discharge:      Pain/Comfort:  · Pain Rating 1: 10/10  · Location - Side 1: Right  · Location 1: calf  · Pain Addressed 1: Pre-medicate for activity, Reposition, Distraction    Patients cultural, spiritual, Buddhism " conflicts given the current situation: no    Objective:     Communicated with: RN prior to session.  Patient found supine with cryotherapy, FCD, peripheral IV(On-Q Ball) upon OT entry to room.    General Precautions: Standard, fall   Orthopedic Precautions:RLE weight bearing as tolerated   Braces: N/A     Occupational Performance:    Bed Mobility:    · Patient completed Scooting/Bridging with contact guard assistance  · Patient completed Supine to Sit with contact guard assistance    Functional Mobility/Transfers:  · Patient completed Sit <> Stand Transfer with minimum assistance  with  rolling walker from bed level   · Patient completed Toilet Transfer Step Transfer technique with contact guard assistance with  rolling walker   · Patient completed chair transfer with step transfer technique with contact guard assistance with rolling walker   · Functional Mobility: patient ambulated 10 feet and then 20 feet with rolling walker at contact guard assistance, verbal cues for proper placement/distance of walker with ambulation      Activities of Daily Living:  · Grooming: contact guard assistance standing at sink to wash hands  · Toileting: contact guard assistance completed on bedside commode placed over toilet    Cognitive/Visual Perceptual:  Cognitive/Psychosocial Skills:     -       Oriented to: Person, Place and Time   -       Follows Commands/attention:Follows multistep  commands    Physical Exam:  Upper Extremity Range of Motion:     -       Right Upper Extremity: WFL  -       Left Upper Extremity: WFL  Upper Extremity Strength:    -       Right Upper Extremity: WFL  -       Left Upper Extremity: WFL    AMPAC 6 Click ADL:  AMPAC Total Score: 19    Treatment & Education:  -Patient educated on hand placement for transfers   -Patient educated on rolling walker placement for ADLs  -Patient educated on polar ice use  -Patient educated on role OT and plan of care s/p surgery at [facility], [facility]  updated as needed   Education:    Patient left up in chair with all lines intact, call button in reach and RN notified    GOALS:   Multidisciplinary Problems     Occupational Therapy Goals        Problem: Occupational Therapy Goal    Goal Priority Disciplines Outcome Interventions   Occupational Therapy Goal     OT, PT/OT Ongoing, Progressing    Description: Goals to be met by: 10-9-20    Patient will increase functional independence with ADLs by performing:    UE Dressing with Modified [place].  LE Dressing with Supervision.  Grooming while standing at sink with Modified [place].  Toileting from toilet with Modified [place] for hygiene and clothing management.   Toilet transfer to toilet with Modified [place].                     History:     Past Medical History:   Diagnosis Date    Abdominal pain, right upper quadrant 2/4/2014    Anxiety     AR (allergic rhinitis)     Atrophic gastritis without mention of hemorrhage 2/13/2012     Dx updated per 2019 IMO Load    Chronic fatigue syndrome 6/10/2012    Dizziness     Fatty liver     GERD (gastroesophageal reflux disease)     HTN (hypertension)     Hyperlipidemia     Memory loss     Osteopenia     PONV (postoperative nausea and vomiting)     Primary osteoarthritis of right knee 10/6/2020    S/P total hysterectomy     Sleep apnea        Past Surgical History:   Procedure Laterality Date    CHOLECYSTECTOMY      COLONOSCOPY N/A 1/17/2018    Procedure: COLONOSCOPY with Donnell;  Surgeon: Roland Jacobo MD;  Location: [facility] (4TH FLR);  Service: Endoscopy;  Laterality: N/A;    ELBOW ARTHROPLASTY Right 1/16/2019    Procedure: ARTHROPLASTY, ELBOW right radial head arthroplasty revision;  Surgeon: Katja Hubbard MD;  [facility];  Service: Orthopedics;  Laterality: Right;  Anesthesia: General and Regional. Stretcher, hand pan 1 and pan 2, CALL RUCHI SANTAMARIA & Sol notified 1-14 LO    ELBOW  SURGERY Right 7/16/15    ELBOW SURGERY      ESOPHAGOGASTRODUODENOSCOPY N/A 4/15/2019    Procedure: EGD (ESOPHAGOGASTRODUODENOSCOPY);  Surgeon: Buck Irwin MD;  Location: [facility] (4TH [facility]);  Service: Endoscopy;  Laterality: N/A;  ray she    HYSTERECTOMY      KNEE ARTHROSCOPY W/ DEBRIDEMENT  4/11    Left    RELEASE OF ULNAR NERVE AT CUBITAL TUNNEL Right 1/16/2019    Procedure: RELEASE, ULNAR TUNNEL right;  Surgeon: Katja Hubbard MD;  Location: [address];  Service: Orthopedics;  Laterality: Right;  Anesthesia: General and Regional. Stretcher, hand pan 1 and pan 2, CALL ACCUMED, CLAIRX    ROTATOR CUFF REPAIR      TOTAL ABDOMINAL HYSTERECTOMY W/ BILATERAL SALPINGOOPHORECTOMY      UPPER GASTROINTESTINAL ENDOSCOPY         Time Tracking:     OT Date of Treatment: 10/06/20  OT Start Time: 1529  OT Stop Time: 1555  OT Total Time (min): 26 min    Billable Minutes:Evaluation 10  Self Care/Home Management 16    Debi Canela OT  10/6/2020

## 2020-10-06 NOTE — BRIEF OP NOTE
Drowsy  Vitals Stable  Dressing Dry/Intact  Bilateral lower extremities: Palpable DP, good capillary refill  Motor sensation intact  xrays taken today demonstrate a well fixed and positioned TKA.  S/P TKA  Continue current treatment. Will keep overnight.

## 2020-10-06 NOTE — TRANSFER OF CARE
"Anesthesia Transfer of Care Note    Patient: Riana Kay    Procedure(s) Performed: Procedure(s) (LRB):  ARTHROPLASTY, KNEE-SAME DAY PROTOCOL (Right)    Patient location: PACU    Anesthesia Type: regional    Transport from OR: Transported from OR on 6-10 L/min O2 by face mask with adequate spontaneous ventilation    Post pain: adequate analgesia    Post assessment: no apparent anesthetic complications    Post vital signs: stable    Level of consciousness: sedated    Nausea/Vomiting: no nausea/vomiting    Complications: none    Transfer of care protocol was followed      Last vitals:   Visit Vitals  BP (!) 109/59   Pulse 70   Temp 36.8 °C (98.2 °F) (Oral)   Resp 18   Ht 5' 5" (1.651 m)   Wt 76.2 kg (168 lb)   SpO2 96%   Breastfeeding No   BMI 27.96 kg/m²     "

## 2020-10-06 NOTE — HPI
CC: Right knee pain     Riana Kay is a 65 y.o. female with history of Right knee pain. Pain is worse with activity and weight bearing.  Patient has experienced interference of activities of daily living due to decreased range of motion and an increase in joint pain and swelling.  Patient has failed non-operative treatment including NSAIDs, corticosteroid injections, viscosupplement injections, and activity modification.  Riana Kay currently ambulates independently.      Relevant medical conditions of significance in perioperative period:  T2DM: last A1c 6.5  ZAHIRA: uses CPAP  HTN: on meds followed by PCP

## 2020-10-06 NOTE — PROGRESS NOTES
Acute Pain Service and Perioperative Surgical Home Progress Note    HPI  Riana Kay is a 65 y.o., female.    Interval history  No acute events overnight    Surgery:  Procedure(s) (LRB):  ARTHROPLASTY, KNEE-SAME DAY PROTOCOL (Right)    Post Op Day #: 1    Catheter type: Perineural Adductor Canal    Infusion type: Ropivacaine 0.2%  6 ml/hr basal    Problem List:    Active Hospital Problems    Diagnosis  POA    *Primary osteoarthritis of right knee [M17.11]  Yes      Resolved Hospital Problems   No resolved problems to display.       Subjective:       General appearance of alert, oriented, no complaints   Pain with rest: 2    Numbers   Pain with movement: 5    Numbers   Side Effects    1. Pruritis No    2. Nausea No    3. Motor Blockade No, 0=Ability to raise lower extremities off bed    4. Sedation No, 1=awake and alert    Schedule Medications:    amLODIPine  5 mg Oral Daily    aspirin  81 mg Oral BID    azelastine  1 spray Nasal BID    famotidine  20 mg Oral BID    fluticasone propionate  2 spray Each Nostril Daily    insulin aspart U-100  11 Units Subcutaneous TIDWM    insulin detemir U-100  25 Units Subcutaneous QHS    losartan  12.5 mg Oral Daily    polyethylene glycol  17 g Oral Daily    pregabalin  75 mg Oral QHS    senna-docusate 8.6-50 mg  1 tablet Oral BID    SITagliptin  100 mg Oral Daily        Continuous Infusions:   sodium chloride 0.9% 150 mL/hr at 10/07/20 0153    ropivacaine 6 mL/hr at 10/06/20 1059        PRN Medications:  bisacodyL, dextrose 50%, dextrose 50%, glucagon (human recombinant), glucose, glucose, HYDROcodone-acetaminophen, insulin aspart U-100, naloxone, naloxone, ondansetron, ropivacaine       Antibiotics:  Antibiotics (From admission, onward)    None             Objective:     Catheter site clean, dry, intact          Vital Signs (Most Recent):  Temp: 97.9 °F (36.6 °C) (10/07/20 0050)  Pulse: (!) 55 (10/07/20 0321)  Resp: 16 (10/07/20 0050)  BP: (!) 151/68  (10/07/20 0050)  SpO2: 99 % (10/07/20 0408) Vital Signs Range (Last 24H):  Temp:  [97.2 °F (36.2 °C)-98.2 °F (36.8 °C)]   Pulse:  [52-78]   Resp:  [15-24]   BP: (109-159)/(56-72)   SpO2:  [90 %-100 %]          I & O (Last 24H):    Intake/Output Summary (Last 24 hours) at 10/7/2020 0440  Last data filed at 10/6/2020 1829  Gross per 24 hour   Intake 3477.5 ml   Output --   Net 3477.5 ml       Physical Exam:    GA: Alert, comfortable, no acute distress.   Pulmonary: Clear to auscultation A/P/L. No wheezing, crackles, or rhonchi.  Cardiac: RRR S1 & S2 w/o rubs/murmurs/gallops.   Abdominal:Bowel sounds present. No tenderness to palpation or distension. No appreciable hepatosplenomegaly.   Skin: No jaundice, rashes, or visible lesions.         Laboratory:  CBC: No results for input(s): WBC, RBC, HGB, HCT, PLT, MCV, MCH, MCHC in the last 72 hours.    BMP: No results for input(s): NA, K, CO2, CL, BUN, CREATININE, GLU, MG, PHOS, CALCIUM in the last 72 hours.    No results for input(s): PT, INR, PROTIME, APTT in the last 72 hours.      Anticoagulant dose ASA 81mg at 10/6/2020 at 1736.    Assessment:         Pain control adequate    Plan:     1) Pain:    Adductor canal perineural catheter in place and infusing. Good level. Multimodal pain regimen with acetaminophen, Lyrica, and prn hydrocodone given  Will continue to monitor. Plan to discharge with On-Q on POD #1.   2) HTN: Blood pressure stable overnight. Continue home medications.    3) DM: Sliding scale while in hospital, will restart home medications on discharge.   4) ZAHIRA: Declined CPAP overnight. ETCO2 in place.    5) FEN/GI: Tolerating liquids, advance diet as tolerated. (+) flatus. (-) BM.   6) Dispo: Pt working well with PT/OT. Case management and SW for placement. Plan for discharge POD #1.        Evaluator CESAR Cloud

## 2020-10-06 NOTE — PLAN OF CARE
Problem: Occupational Therapy Goal  Goal: Occupational Therapy Goal  Description: Goals to be met by: 10-9-20    Patient will increase functional independence with ADLs by performing:    UE Dressing with Modified [place].  LE Dressing with Supervision.  Grooming while standing at sink with Modified [place].  Toileting from toilet with Modified [place] for hygiene and clothing management.   Toilet transfer to toilet with Modified [place].    Outcome: Ongoing, Progressing    OT evaluation with goals established. Patient was contact guard assistance for toilet transfer/task. She ambulated 10 feet to bathroom and 20 feet after to chair.     Debi Canela, OT   10/6/2020

## 2020-10-06 NOTE — PLAN OF CARE
VSS.  Patient tolerating oral liquids without difficulty. No apparent s&s of distress noted at this time, no complaints voiced at this time. Transfer instructions reviewed with patient and spouse with good verbal feedback received.   Post op medications delivered to spouse, brought home.  Walker at bedside.   Patient ready for transfer to recovery suites.

## 2020-10-06 NOTE — PROGRESS NOTES
On-Q teaching done with patient's  at bedside. Verbalizes understanding.  agrees to stay with patient for the next 72 hours while medication is infusing. All questions answered. On-Q contract signed, home care instruction pamphlet and extra home care supplies provided to patient upon discharge. Encouraged to contact anesthesia with any questions/concerns.

## 2020-10-06 NOTE — PROGRESS NOTES
Pre op completed. All concerns addressed. Patient belongings to be placed in locker and walker placed in PP4. Bed in lowest position. Call light within reach. Family at beside.

## 2020-10-06 NOTE — PLAN OF CARE
Problem: Physical Therapy Goal  Goal: Physical Therapy Goal  Description: Goals to be met by: 10/08/2020     Patient will increase functional independence with mobility by performin. Supine to sit with Stand-by Assistance  2. Sit to stand transfer with Stand-by Assistance with RW  3. Gait  x 125 feet with Stand-by Assistance using Rolling Walker WBAT RLE   4. Ascend/Descend 6 inch curb step with Minimal Assistance using Rolling Walker.  5. Lower extremity exercise program x 20 reps per handout, with assistance as needed    Outcome: Ongoing, Progressing   PT evaluation was performed and plan of care initiated.  Wilma Jose PT  10/6/2020

## 2020-10-07 VITALS
TEMPERATURE: 97 F | BODY MASS INDEX: 27.99 KG/M2 | RESPIRATION RATE: 18 BRPM | OXYGEN SATURATION: 98 % | WEIGHT: 168 LBS | DIASTOLIC BLOOD PRESSURE: 61 MMHG | SYSTOLIC BLOOD PRESSURE: 140 MMHG | HEART RATE: 59 BPM | HEIGHT: 65 IN

## 2020-10-07 LAB
POCT GLUCOSE: 163 MG/DL (ref 70–110)
POCT GLUCOSE: 185 MG/DL (ref 70–110)

## 2020-10-07 PROCEDURE — 99213 PR OFFICE/OUTPT VISIT, EST, LEVL III, 20-29 MIN: ICD-10-PCS | Mod: ,,, | Performed by: NURSE PRACTITIONER

## 2020-10-07 PROCEDURE — 97110 THERAPEUTIC EXERCISES: CPT

## 2020-10-07 PROCEDURE — 63600175 PHARM REV CODE 636 W HCPCS: Performed by: ANESTHESIOLOGY

## 2020-10-07 PROCEDURE — 97116 GAIT TRAINING THERAPY: CPT

## 2020-10-07 PROCEDURE — 25000003 PHARM REV CODE 250: Performed by: ANESTHESIOLOGY

## 2020-10-07 PROCEDURE — 25000242 PHARM REV CODE 250 ALT 637 W/ HCPCS: Performed by: ANESTHESIOLOGY

## 2020-10-07 PROCEDURE — 94761 N-INVAS EAR/PLS OXIMETRY MLT: CPT

## 2020-10-07 PROCEDURE — 97535 SELF CARE MNGMENT TRAINING: CPT

## 2020-10-07 PROCEDURE — 99900035 HC TECH TIME PER 15 MIN (STAT)

## 2020-10-07 PROCEDURE — 25000003 PHARM REV CODE 250: Performed by: PHYSICIAN ASSISTANT

## 2020-10-07 PROCEDURE — 27000221 HC OXYGEN, UP TO 24 HOURS

## 2020-10-07 PROCEDURE — 99213 OFFICE O/P EST LOW 20 MIN: CPT | Mod: ,,, | Performed by: NURSE PRACTITIONER

## 2020-10-07 PROCEDURE — 94770 HC EXHALED C02 TEST: CPT

## 2020-10-07 PROCEDURE — 63600175 PHARM REV CODE 636 W HCPCS: Performed by: PHYSICIAN ASSISTANT

## 2020-10-07 RX ADMIN — SITAGLIPTIN 100 MG: 50 TABLET, FILM COATED ORAL at 08:10

## 2020-10-07 RX ADMIN — FAMOTIDINE 20 MG: 20 TABLET, FILM COATED ORAL at 08:10

## 2020-10-07 RX ADMIN — ROPIVACAINE HYDROCHLORIDE: 2 INJECTION, SOLUTION EPIDURAL; INFILTRATION at 11:10

## 2020-10-07 RX ADMIN — DOCUSATE SODIUM 50MG AND SENNOSIDES 8.6MG 1 TABLET: 8.6; 5 TABLET, FILM COATED ORAL at 08:10

## 2020-10-07 RX ADMIN — HYDROCODONE BITARTRATE AND ACETAMINOPHEN 1 TABLET: 5; 325 TABLET ORAL at 06:10

## 2020-10-07 RX ADMIN — INSULIN ASPART 11 UNITS: 100 INJECTION, SOLUTION INTRAVENOUS; SUBCUTANEOUS at 06:10

## 2020-10-07 RX ADMIN — POLYETHYLENE GLYCOL 3350 17 G: 17 POWDER, FOR SOLUTION ORAL at 08:10

## 2020-10-07 RX ADMIN — AMLODIPINE BESYLATE 5 MG: 5 TABLET ORAL at 08:10

## 2020-10-07 RX ADMIN — INSULIN ASPART 11 UNITS: 100 INJECTION, SOLUTION INTRAVENOUS; SUBCUTANEOUS at 11:10

## 2020-10-07 RX ADMIN — AZELASTINE HYDROCHLORIDE 137 MCG: 137 SPRAY, METERED NASAL at 08:10

## 2020-10-07 RX ADMIN — FLUTICASONE PROPIONATE 100 MCG: 50 SPRAY, METERED NASAL at 08:10

## 2020-10-07 RX ADMIN — ASPIRIN 81 MG: 81 TABLET, COATED ORAL at 08:10

## 2020-10-07 RX ADMIN — LOSARTAN POTASSIUM 12.5 MG: 25 TABLET, FILM COATED ORAL at 08:10

## 2020-10-07 RX ADMIN — SODIUM CHLORIDE: 0.9 INJECTION, SOLUTION INTRAVENOUS at 01:10

## 2020-10-07 RX ADMIN — CEFAZOLIN 2 G: 330 INJECTION, POWDER, FOR SOLUTION INTRAMUSCULAR; INTRAVENOUS at 01:10

## 2020-10-07 NOTE — SUBJECTIVE & OBJECTIVE
Principal Problem:Primary osteoarthritis of right knee    Principal Orthopedic Problem: sp right TKA 10/6/2020 by Dr. Damon    Interval History: NAEON. Peripheral nerve catheter in place at 6cc/hr. Having some pain but improved with pain meds. Ambulated 14 ft with PT yesterday and has ambulated around the room. Voiding. No N/V. No CP, SOB. Wants to [facility].    Review of patient's allergies indicates:   Allergen Reactions    Iodinated contrast media Swelling and Rash    Percocet [oxycodone-acetaminophen] Itching    Macrobid [nitrofurantoin monohyd/m-cryst] Rash    Metformin Rash    Penicillins Rash    Promethazine Rash    Sulfa (sulfonamide antibiotics) Rash    Sulfamethoxazole-trimethoprim Rash       Current Facility-Administered Medications   Medication    0.9%  NaCl infusion    amLODIPine tablet 5 mg    aspirin EC tablet 81 mg    azelastine 137 mcg (0.1 %) nasal spray 137 mcg    bisacodyL suppository 10 mg    dextrose 50% injection 12.5 g    dextrose 50% injection 25 g    famotidine tablet 20 mg    fluticasone propionate 50 mcg/actuation nasal spray 100 mcg    glucagon (human recombinant) injection 1 mg    glucose chewable tablet 16 g    glucose chewable tablet 24 g    HYDROcodone-acetaminophen 5-325 mg per tablet 1 tablet    insulin aspart U-100 pen 1-10 Units    insulin aspart U-100 pen 11 Units    insulin detemir U-100 pen 25 Units    losartan split tablet 12.5 mg    naloxone 0.4 mg/mL injection 0.02 mg    naloxone 0.4 mg/mL injection 0.02 mg    ondansetron injection 4 mg    polyethylene glycol packet 17 g    pregabalin capsule 75 mg    ropivacaine 0.2% ON-Q C-BLOC 400 ML (SELECT A FLOW)    senna-docusate 8.6-50 mg per tablet 1 tablet    SITagliptin tablet 100 mg     Objective:     Vital Signs (Most Recent):  Temp: 97.7 °F (36.5 °C) (10/07/20 0458)  Pulse: (!) 54 (10/07/20 0458)  Resp: 17 (10/07/20 0616)  BP: (!) 144/63 (10/07/20 0458)  SpO2: 99 % (10/07/20 0458) Vital  "Signs (24h Range):  Temp:  [97.2 °F (36.2 °C)-98.2 °F (36.8 °C)] 97.7 °F (36.5 °C)  Pulse:  [52-78] 54  Resp:  [15-24] 17  SpO2:  [90 %-100 %] 99 %  BP: (109-159)/(56-72) 144/63     Weight: 76.2 kg (168 lb)  Height: 5' 5" (165.1 cm)  Body mass index is 27.96 kg/m².      Intake/Output Summary (Last 24 hours) at 10/7/2020 0621  Last data filed at 10/6/2020 1829  Gross per 24 hour   Intake 3477.5 ml   Output --   Net 3477.5 ml       Ortho/SPM Exam   Right knee exam  Polar pack in place over bulky dressings   Bulky dressings removed and surgical dressing inspected- c/d/i with no strike through  Appropriate post operative swelling and TTP   Able to straight leg raise slightly  5/5 ankle PF/DF  SILT throughout except some numbness c/w adductor canal block  Toes WWP    Adductor canal PNC in place at 6cc/hr    Significant Labs:   POC glucose 185-273    Significant Imaging: I have reviewed all pertinent imaging results/findings.  "

## 2020-10-07 NOTE — PLAN OF CARE
Problem: Physical Therapy Goal  Goal: Physical Therapy Goal  Description: Goals to be met by: 10/08/2020     Patient will increase functional independence with mobility by performin. Supine to sit with Stand-by Assistance  2. Sit to stand transfer with Stand-by Assistance with RW  3. Gait  x 125 feet with Stand-by Assistance using Rolling Walker WBAT RLE   4. Ascend/Descend 6 inch curb step with Minimal Assistance using Rolling Walker.  5. Lower extremity exercise program x 20 reps per handout, with assistance as needed    Outcome: Met   Pt has achieved goals for transition to home with assistance from  and will begin OPPT 10/08/2020.  Wilma Jose PT  10/7/2020

## 2020-10-07 NOTE — PLAN OF CARE
Problem: Occupational Therapy Goal  Goal: Occupational Therapy Goal  Description: Goals to be met by: 10-9-20    Patient will increase functional independence with ADLs by performing:    UE Dressing with Modified [place].  LE Dressing with Supervision.  Grooming while standing at sink with Modified [place].  Toileting from toilet with Modified [place] for hygiene and clothing management.   Toilet transfer to toilet with Modified [place].    Outcome: Met    OT goals met for discharge. Patient did require assistance with socks/shoes and will have  to assist as needed.     Debi Canela, OT  10/7/2020

## 2020-10-07 NOTE — PLAN OF CARE
Safety precautions maintained. Fall risk band on pt. Call bell within reach. Instructed pt to call for assistance as needed and pt voiced understanding. Complains of intermittent pain and is being medicated as needed. Dressing to the right knee clean, dry and intact. Polar ice in place.  Afebrile.  Accu checks ac/hs with scheduled and sliding scale insulin

## 2020-10-07 NOTE — PT/OT/SLP PROGRESS
Physical Therapy Treatment/Discharge Summary      Patient Name:  Riana Kay   MRN:  [id number]    Recommendations:     Discharge Recommendations:  home, outpatient PT   Discharge Equipment Recommendations: none   Barriers to discharge: None    Assessment:     Riana Kay is a 65 y.o. female admitted with a medical diagnosis of Primary osteoarthritis of right knee.  She presents with the following impairments/functional limitations:  weakness, impaired functional mobilty, gait instability, pain, impaired balance, decreased lower extremity function, impaired self care skills, decreased ROM, edema, orthopedic precautions. Pt has achieved goals for transition to home with assistance from  and will begin OPPT 10/08/2020.    Rehab Prognosis: Good; patient would benefit from acute skilled PT services to address these deficits and reach maximum level of function.    Recent Surgery: Procedure(s) (LRB):  ARTHROPLASTY, KNEE-SAME DAY PROTOCOL (Right) 1 Day Post-Op    Plan:     During this hospitalization, patient to be seen daily to address the identified rehab impairments via gait training, therapeutic exercises and progress toward the following goals:    · Plan of Care Expires:  11/06/20    Subjective     Chief Complaint: Right knee pain  Patient/Family Comments/goals: Moving is getting easier  Pain/Comfort:  · Pain Rating 1: 7/10  · Location - Side 1: Right  · Location - Orientation 1: generalized  · Location 1: knee  · Pain Addressed 1: Pre-medicate for activity, Reposition, Distraction  · Pain Rating Post-Intervention 1: 8/10      Objective:     Communicated with NSG, pt and pt's  prior to session.  Patient found up in chair with telemetry, pulse ox (continuous), cryotherapy, perineural catheter upon PT entry to room.     General Precautions: Standard, fall   Orthopedic Precautions:RLE weight bearing as tolerated   Braces: N/A     Functional Mobility:  · Bed Mobility:     · Supine to Sit:  "stand by assistance with extra time  · Sit to Supine: stand by assistance with extra time  · Transfers:     · Sit to Stand:  stand by assistance with rolling walker  · Gait: SBA to amb 220 ft with RW WBAT RLE with step through gait pattern; decreased viktor and mildly decreased stance time RLE; decreased R knee flexion during swing phase. Rest in standing twice < 1min intervals to decrease RLE fatigue and pain  · Curb/ step:  Pt ascended/descended 6" curb step with Rolling Walker with WBAT RLE with Contact Guard Assistance. Pt's  was instructed in assisting her      AM-PAC 6 CLICK MOBILITY  Turning over in bed (including adjusting bedclothes, sheets and blankets)?: 3  Sitting down on and standing up from a chair with arms (e.g., wheelchair, bedside commode, etc.): 3  Moving from lying on back to sitting on the side of the bed?: 3  Moving to and from a bed to a chair (including a wheelchair)?: 3  Need to walk in hospital room?: 3  Climbing 3-5 steps with a railing?: 3  Basic Mobility Total Score: 18       Therapeutic Activities and Exercises:   Patient and pt's  were educated in/ reviewed with:  - Plan of Care and goals prior to discharge  -Discharge plan from IPPT to OPPT  -safety with transfers including hand placement  -gait sequencing and RW management  -OOB activity to maximize recovery including ambulating with  at home  -polar ice use and management  -verbal instruction in car transfer  -verbal and written instruction in and performed 2 sets 10 reps TKA home exercises RLE with physical assistance needed with SLR     Patient left up in chair with all lines intact, call button in reach, NSG notified and  present..    GOALS:   Multidisciplinary Problems     Physical Therapy Goals     Not on file          Multidisciplinary Problems (Resolved)        Problem: Physical Therapy Goal    Goal Priority Disciplines Outcome Goal Variances Interventions   Physical Therapy Goal   (Resolved)     " PT, PT/OT Met     Description: Goals to be met by: 10/08/2020     Patient will increase functional independence with mobility by performin. Supine to sit with Stand-by Assistance  2. Sit to stand transfer with Stand-by Assistance with RW  3. Gait  x 125 feet with Stand-by Assistance using Rolling Walker WBAT RLE   4. Ascend/Descend 6 inch curb step with Minimal Assistance using Rolling Walker.  5. Lower extremity exercise program x 20 reps per handout, with assistance as needed                     Time Tracking:     PT Received On: 10/07/20  PT Start Time: 1035     PT Stop Time: 1114  PT Total Time (min): 39 min     Billable Minutes: Gait Training 23 and Therapeutic Exercise 16    Treatment Type: Treatment  PT/PTA: PT     PTA Visit Number: 0     Wilma Jose, PT  10/07/2020

## 2020-10-07 NOTE — PROGRESS NOTES
Notified Dr. Freedman of pt being nauseated and vomited up 250 ccs of emesis.  Patient already received Zofran at 1400 and is allergic to Phenergan.  Patient stating that she feels a little better since vomiting however is not eating her dinner and her BS is 237.  Patient has 11 units of Novolog scheduled with meals however patient does not have a sliding scale ordered.  MD stated to hold Novolog with meals and entered a sliding scale for patient.  Will cover patient with sliding scale insulin.  Will continue to monitor.

## 2020-10-07 NOTE — DISCHARGE SUMMARY
[facility]  Orthopedics  Discharge Summary      Patient Name: iRana Kay  MRN: [id number]  Admission Date: 10/6/2020  Hospital Length of Stay: 0 days  Discharge Date and Time:  10/07/2020 4:01 PM  Attending Physician: Lucretia att. providers found   Discharging Provider: Charley Hassan MD  Primary Care Provider: Amena Roger DO    HPI:   CC: Right knee pain     Riana Kay is a 65 y.o. female with history of Right knee pain. Pain is worse with activity and weight bearing.  Patient has experienced interference of activities of daily living due to decreased range of motion and an increase in joint pain and swelling.  Patient has failed non-operative treatment including NSAIDs, corticosteroid injections, viscosupplement injections, and activity modification.  Riana Kay currently ambulates independently.      Relevant medical conditions of significance in perioperative period:  T2DM: last A1c 6.5  ZAHIRA: uses CPAP  HTN: on meds followed by PCP    Procedure(s) (LRB):  ARTHROPLASTY, KNEE-SAME DAY PROTOCOL (Right)      Hospital Course:  * Primary osteoarthritis of right knee  65 y.o. female underwent R TKA on 10/6/2020 by Dr. Damon. Stayed overnight for post operative pain control and PT. Was [facility] on POD1 in good condition.        Nerve block: spinal, adductor canal PNC  WBS: WBAT RLE  DVT ppx:  ASA 81mg 12 hours post op and BID for 30 days  PT/OT:  daily while inpatient, outpatient PT at [facility]  Labs: POC glucose 185-273  Cultures:  None  Abx:  periop ancef  Drain:  None  Gurrola:  None  Dispo:  home on POD1  F/u:  10/19/2020 with Sydney Ortiz for post op visit        Diabetes mellitus, on home insulin  -c/t home insulin     HTN  -continue home meds                 Significant Diagnostic Studies: Labs: All labs within the past 24 hours have been reviewed    Pending Diagnostic Studies:     None        Final Active Diagnoses:    Diagnosis Date Noted POA    PRINCIPAL PROBLEM:   Primary osteoarthritis of right knee [M17.11] 10/06/2020 Yes      Problems Resolved During this Admission:      Discharged Condition: good    Disposition: [facility]    Follow Up:    Patient Instructions:      Activity as tolerated     Call MD for:  difficulty breathing, headache or visual disturbances     Call MD for:  extreme fatigue     Call MD for:  hives     Call MD for:  persistent dizziness or light-headedness     Call MD for:  persistent nausea and vomiting     Call MD for:  redness, tenderness, or signs of infection (pain, swelling, redness, odor or green/yellow discharge around incision site)     Call MD for:  severe uncontrolled pain     Call MD for:  temperature >100.4     Keep surgical extremity elevated when not ambulating     Leave dressing on - Keep it clean, dry, and intact until clinic visit   Order Comments: Do not remove surgical dressing for 2 weeks post-op. This will be done only by MD at initial post-op visit. If dressing is completely saturated, replace with identical dressing - silver-impregnated hydrocolloid dressing.     Do not get dressings wet. Do not shower.     If dressing continues to be saturated or there are signs of infection, please call [facility] [phone] for further instructions and to make appt to be seen.     Lifting restrictions   Order Comments: No strenuous exercise or lifting of > 10 lbs     No driving, operating heavy equipment or signing legal documents while taking pain medication     On POD1 remove ace wrap and cotton padding. Leave Aquacel bandage on until 2  week post op visit in clinic     Sponge bath only until clinic visit     Weight bearing as tolerated     Medications:  Reconciled Home Medications:      Medication List      CHANGE how you take these medications    insulin lispro 100 unit/mL pen  Commonly known as: HumaLOG KwikPen Insulin  Inject 14 units w/ breakfast and lunch, 12 units w/ dinner plus scale 150-200+2, 201-250+4, 251-300+6,  301-350+8, >350+10. Max daily 66 units.  What changed: additional instructions        CONTINUE taking these medications    amLODIPine 5 MG tablet  Commonly known as: NORVASC  Take 1 tablet (5 mg total) by mouth once daily.     aspirin 81 MG EC tablet  Commonly known as: ECOTRIN  Take 1 tablet (81 mg total) by mouth 2 (two) times daily.     azelastine 137 mcg (0.1 %) nasal spray  Commonly known as: ASTELIN  1 SPRAY (137 MCG TOTAL) BY NASAL ROUTE 2 (TWO) TIMES DAILY.     blood sugar diagnostic Strp  To check BG 4 times daily, to use with insurance preferred meter-true metrix     blood-glucose meter kit  To check BG 4 times daily, to use with insurance preferred meter-true metrix     celecoxib 200 MG capsule  Commonly known as: CeleBREX  [place] david cápsula (200 mg en total) por vía oral diariamente.  (Take 1 capsule (200 mg total) by mouth once daily.)     * diclofenac sodium 1 % Gel  Commonly known as: VOLTAREN  Apply 2 g topically 2 (two) times daily.     * diclofenac sodium 1 % Gel  Commonly known as: VOLTAREN  Apply 2 g topically 4 (four) times daily.     fluticasone propionate 50 mcg/actuation nasal spray  Commonly known as: FLONASE  2 sprays (100 mcg total) by Each Nostril route once daily.     HYDROcodone-acetaminophen  mg per tablet  Commonly known as: NORCO  Take 1 tablet by mouth every 4 to 6 hours as needed for Pain.     ketoconazole 2 % shampoo  Commonly known as: NIZORAL  Wash scalp with medicated shampoo at least 2x/week. Let sit on scalp at least 5 minutes prior to rinsing     lancets Misc  To check BG 4  times daily, to use with insurance preferred meter-true metrix     LANTUS SOLOSTAR U-100 INSULIN glargine 100 units/mL (3mL) SubQ pen  Generic drug: insulin  Inject 32 units at night. (new order)     latanoprost 0.005 % ophthalmic solution  Place 1 drop into both eyes nightly.     losartan 25 MG tablet  Commonly known as: COZAAR  Take 0.5 tablets (12.5 mg total) by mouth once daily.     MAPAP  "ARTHRITIS PAIN 650 MG Tbsr  Generic drug: acetaminophen  Take 1 tablet (650 mg total) by mouth every 8 (eight) hours as needed.     * ondansetron 8 MG Tbdl  Commonly known as: ZOFRAN-ODT  Take 1 tablet (8 mg total) by mouth 3 (three) times daily as needed.     * ondansetron 4 MG Tbdl  Commonly known as: ZOFRAN-ODT  Take 1 tablet (4 mg total) by mouth every 6 (six) hours as needed (nausea).     pantoprazole 40 MG tablet  Commonly known as: PROTONIX  TAKE 1 TABLET BY MOUTH EVERY DAY     pen needle, diabetic 32 gauge x 1/4" Ndle  Commonly known as: NOVOFINE 32  Uses 4 times a day. 90 day via duramed e 11.65     SITagliptin 100 MG Tab  Commonly known as: JANUVIA  Take 1 tablet (100 mg total) by mouth once daily.     STOOL SOFTENER 100 MG capsule  Generic drug: docusate sodium  Take 1 capsule (100 mg total) by mouth 2 (two) times daily as needed for Constipation.     VITAMIN B-12 100 MCG tablet  Generic drug: cyanocobalamin  Take 100 mcg by mouth once daily.     VITAMIN B-6 100 MG Tab  Generic drug: pyridoxine (vitamin B6)  Take 100 mg by mouth once daily.         * This list has 4 medication(s) that are the same as other medications prescribed for you. Read the directions carefully, and ask your doctor or other care provider to review them with you.                Charley Hassan MD  Orthopedics  [facility]  "

## 2020-10-07 NOTE — HOSPITAL COURSE
* Primary osteoarthritis of right knee  65 y.o. female underwent R TKA on 10/6/2020 by Dr. Damon. Stayed overnight for post operative pain control and PT. Was [facility] on POD1 in good condition.        Nerve block: spinal, adductor canal PNC  WBS: WBAT RLE  DVT ppx:  ASA 81mg 12 hours post op and BID for 30 days  PT/OT:  daily while inpatient, outpatient PT at [facility]  Labs: POC glucose 185-273  Cultures:  None  Abx:  periop ancef  Drain:  None  Gurrola:  None  Dispo:  home on POD1  F/u:  10/19/2020 with Sydney Ortiz for post op visit        Diabetes mellitus, on home insulin  -c/t home insulin     HTN  -continue home meds

## 2020-10-07 NOTE — PLAN OF CARE
Plan of care reviewed with patient; verbalized understanding.   Medications reviewed and administered as ordered.  Rounding for safety and patient care per policy.   Safety precautions maintained.  DME at bedside.  Polar ice in place throughout shift.    Call light within reach, bed wheels locked, bed in lowest position, side rails ^x2, safety maintained. NADN, Will continue monitor.

## 2020-10-07 NOTE — PROGRESS NOTES
Received pt to floor from PACU via bed accompanied by RN/PCT.  Sleepy but easily aroused.  Oriented x 4.  Complains of a 4/10 tolerable pain rating to the right knee.  Dressing to the right knee clean, dry and intact. Polar ice in use.  No distress noted. Pt stable. Pt oriented to room and call bell placed within reach. Will continue to monitor.

## 2020-10-07 NOTE — ASSESSMENT & PLAN NOTE
65 y.o. female s/p R TKA on 10/6/2020 by Dr. Damon      Nerve block: spinal, adductor canal PNC currently at 6cc/hr  WBS: WBAT RLE  DVT ppx:  ASA 81mg 12 hours post op and BID for 30 days  PT/OT:  daily while inpatient, outpatient PT at [facility]  Labs:  None needed  Cultures:  None  Abx:  periop ancef  Drain:  None  Gurrola:  None  Dispo:  Pending pain control, PT clearance  F/u:  10/19/2020 with Sydney Ortiz for post op visit

## 2020-10-07 NOTE — PROGRESS NOTES
[facility]  Orthopedics  Progress Note    Patient Name: Riana Kay  MRN: [id number]  Admission Date: 10/6/2020  Hospital Length of Stay: 0 days  Attending Provider: Sabino Damon MD  Primary Care Provider: Amena Roger DO  Follow-up For: Procedure(s) (LRB):  ARTHROPLASTY, KNEE-SAME DAY PROTOCOL (Right)    Post-Operative Day: 1 Day Post-Op  Subjective:     Principal Problem:Primary osteoarthritis of right knee    Principal Orthopedic Problem: sp right TKA 10/6/2020 by Dr. Damon    Interval History: NAEON. Started on O2 NC for O2 sat 91%. Denies feeling SOB or having CP. Peripheral nerve catheter in place at 6cc/hr. Having some pain but improved with pain meds. Ambulated 14 ft with PT yesterday and has ambulated around the room. Voiding. No N/V. Wants to dch today.    Review of patient's allergies indicates:   Allergen Reactions    Iodinated contrast media Swelling and Rash    Percocet [oxycodone-acetaminophen] Itching    Macrobid [nitrofurantoin monohyd/m-cryst] Rash    Metformin Rash    Penicillins Rash    Promethazine Rash    Sulfa (sulfonamide antibiotics) Rash    Sulfamethoxazole-trimethoprim Rash       Current Facility-Administered Medications   Medication    0.9%  NaCl infusion    amLODIPine tablet 5 mg    aspirin EC tablet 81 mg    azelastine 137 mcg (0.1 %) nasal spray 137 mcg    bisacodyL suppository 10 mg    dextrose 50% injection 12.5 g    dextrose 50% injection 25 g    famotidine tablet 20 mg    fluticasone propionate 50 mcg/actuation nasal spray 100 mcg    glucagon (human recombinant) injection 1 mg    glucose chewable tablet 16 g    glucose chewable tablet 24 g    HYDROcodone-acetaminophen 5-325 mg per tablet 1 tablet    insulin aspart U-100 pen 1-10 Units    insulin aspart U-100 pen 11 Units    insulin detemir U-100 pen 25 Units    losartan split tablet 12.5 mg    naloxone 0.4 mg/mL injection 0.02 mg    naloxone 0.4 mg/mL injection  "0.02 mg    ondansetron injection 4 mg    polyethylene glycol packet 17 g    pregabalin capsule 75 mg    ropivacaine 0.2% ON-Q C-BLOC 400 ML (SELECT A FLOW)    senna-docusate 8.6-50 mg per tablet 1 tablet    SITagliptin tablet 100 mg     Objective:     Vital Signs (Most Recent):  Temp: 97.7 °F (36.5 °C) (10/07/20 0458)  Pulse: (!) 54 (10/07/20 0458)  Resp: 17 (10/07/20 0616)  BP: (!) 144/63 (10/07/20 0458)  SpO2: 99 % (10/07/20 0458) Vital Signs (24h Range):  Temp:  [97.2 °F (36.2 °C)-98.2 °F (36.8 °C)] 97.7 °F (36.5 °C)  Pulse:  [52-78] 54  Resp:  [15-24] 17  SpO2:  [90 %-100 %] 99 %  BP: (109-159)/(56-72) 144/63     Weight: 76.2 kg (168 lb)  Height: 5' 5" (165.1 cm)  Body mass index is 27.96 kg/m².      Intake/Output Summary (Last 24 hours) at 10/7/2020 0621  Last data filed at 10/6/2020 1829  Gross per 24 hour   Intake 3477.5 ml   Output --   Net 3477.5 ml       Ortho/SPM Exam   Right knee exam  Polar pack in place over bulky dressings   Bulky dressings removed and surgical dressing inspected- c/d/i with no strike through  Appropriate post operative swelling and TTP   Able to straight leg raise slightly  5/5 ankle PF/DF  SILT throughout except some numbness c/w adductor canal block  Toes WWP    Adductor canal PNC in place at 6cc/hr    Significant Labs:   POC glucose 185-273    Significant Imaging: I have reviewed all pertinent imaging results/findings.    Assessment/Plan:     * Primary osteoarthritis of right knee  65 y.o. female s/p R TKA on 10/6/2020 by Dr. Damon      Nerve block: spinal, adductor canal PNC currently at 6cc/hr  WBS: WBAT RLE  DVT ppx:  ASA 81mg 12 hours post op and BID for 30 days  PT/OT:  daily while inpatient, outpatient PT at [facility]  Labs: POC glucose 185-273  Cultures:  None  Abx:  periop ancef  Drain:  None  Gurrola:  None  Dispo:  Pending pain control, PT clearance  F/u:  10/19/2020 with Sydney Ortiz for post op visit      Diabetes mellitus, on home insulin  -continue " SSI    HTN  -continue home meds    ZAHIRA  -on oxygen nasal cannula               Charley Hassan MD  Orthopedics  [facility]

## 2020-10-07 NOTE — PT/OT/SLP PROGRESS
Occupational Therapy   Treatment/Discharge    Name: Riana Kay  MRN: [id number]  Admitting Diagnosis:  Primary osteoarthritis of right knee  1 Day Post-Op    Recommendations:     Discharge Recommendations: home  Discharge Equipment Recommendations:  none  Barriers to discharge:       Assessment:     Riana Kay is a 65 y.o. female with a medical diagnosis of Primary osteoarthritis of right knee.  Patient is s/p right TKA. Patient completed lower body dressing at supervision. She did require assistance with socks/shoes. Patient will have assistance of  at discharge to assist as needed. Performance deficits affecting function are weakness, impaired self care skills, impaired functional mobilty, gait instability, impaired balance, decreased lower extremity function, decreased ROM.     Rehab Prognosis:  Good; patient would benefit from acute skilled OT services to address these deficits and reach maximum level of function.       Plan:     · DC from OT     Subjective     Patient reported she went to the bathroom 5 time overnight.   Pain/Comfort:  · Pain Rating 1: 10/10  · Location - Side 1: Right  · Location - Orientation 1: generalized  · Location 1: knee  · Pain Addressed 1: Distraction, Reposition, Pre-medicate for activity  · Pain Rating Post-Intervention 1: 7/10    Objective:     Communicated with: RN prior to session.  Patient found supine with telemetry, perineural catheter, cryotherapy, FCD upon OT entry to room.    General Precautions: Standard, fall   Orthopedic Precautions:RLE weight bearing as tolerated   Braces: N/A     Occupational Performance:     Bed Mobility:    · Patient completed Scooting/Bridging with modified independence  · Patient completed Supine to Sit with modified independence     Functional Mobility/Transfers:  · Patient completed Sit <> Stand Transfer with modified independence  with  rolling walker   · Patient completed Toilet Transfer Step Transfer technique with  modified independence with  rolling walker   · Patient completed chair transfer with step transfer technique with modified independence with rolling walker   · Functional Mobility: patient ambulated to/from bathroom with use of rolling walker at supervision     Activities of Daily Living:  · Grooming: modified independence standing at sink to brush teeth, wash face, wash hands  · Upper Body Dressing: modified independence sitting on bedside commode placed over toilet to don bra and shirt  · Lower Body Dressing: supervision sitting on bedside commode placed over toilet to don underwear/pants: patient was sitting in chair to doff/miguel socks and miguel shoes: patient required minimal assistance with socks/shoes  · Toileting: modified independence completed on bedside commode placed over toilet    [facility] 6 Click ADL: 23    Treatment & Education:  -Patient educated to dress surgical side first and further dressing techniques s/p surgery   -Patient educated on hand placement for transfers   -Patient educated on rolling walker placement for ADLs  -Patient educated on proper foot wear s/p surgery   -Patient educated on polar ice use  -Patient educated on car transfers s/p surgery  -Patient educated on role OT and plan of care s/p surgery at [facility], white board updated as needed     Patient left up in chair with all lines intact, call button in reach, RN notified and  presentEducation:      GOALS:   Multidisciplinary Problems     Occupational Therapy Goals     Not on file          Multidisciplinary Problems (Resolved)        Problem: Occupational Therapy Goal    Goal Priority Disciplines Outcome Interventions   Occupational Therapy Goal   (Resolved)     OT, PT/OT Met    Description: Goals to be met by: 10-9-20    Patient will increase functional independence with ADLs by performing:    UE Dressing with Modified [place].  LE Dressing with Supervision.  Grooming while standing at sink with Modified  [place].  Toileting from toilet with Modified [place] for hygiene and clothing management.   Toilet transfer to toilet with Modified [place].                     Time Tracking:     OT Date of Treatment: 10/07/20  OT Start Time: 0938  OT Stop Time: 1016  OT Total Time (min): 38 min    Billable Minutes:Self Care/Home Management 38    Debi Canela OT  10/7/2020

## 2020-10-08 ENCOUNTER — CLINICAL SUPPORT (OUTPATIENT)
Dept: REHABILITATION | Facility: HOSPITAL | Age: 65
End: 2020-10-08
Attending: ORTHOPAEDIC SURGERY
Payer: MEDICARE

## 2020-10-08 DIAGNOSIS — M25.561 ACUTE PAIN OF RIGHT KNEE: ICD-10-CM

## 2020-10-08 PROCEDURE — 97110 THERAPEUTIC EXERCISES: CPT

## 2020-10-08 NOTE — PROGRESS NOTES
"                          Physical Therapy Daily Re-Assessment Note     Name: Riana Baker   Clinic Number: [id number]    Therapy Diagnosis:   Encounter Diagnosis   Name Primary?    Acute pain of right knee      Physician: Sabino Damon MD    Visit Date: 10/8/2020    Physician Orders: PT Eval and Treat   Medical Diagnosis from Referral: M17.11 (ICD-10-CM) - Primary osteoarthritis of right knee  Evaluation Date: 9/25/2020  Authorization Period Expiration: 9/21/20  Plan of Care Expiration: 12/18/20  Visit # / Visits authorized: 2/ 1     Time In:850  am  Time Out: 910 am  Total Billable Time: 20 minutes     Precautions: Standard    Subjective      Pt reports:she is having a really bad day feeling extremely nauseous and in a lot of pain.  Pt returns to PT 2 days s/p R TKA.    she was compliant with home exercise program given last session.   Response to previous treatment:fair  Functional change: NA    Pain: 10/10  Location: right knee      Objective   Range of Motion: (PROM):10/8/2020 (post op)     Knee Left Right   Extension  (+3) degrees  (-6) degrees   Flexion  (115) degrees  (80) degrees            Riana received therapeutic exercises to develop strength, endurance and ROM for 20 minutes including:  Quad sets 10 sec x10 with towel under ankle  Heel slides 2x10 with 5 sec hold-np  AAROM knee flexion off EOT 10 sec x10  Heel prop x3 min   Calf stretch with towel 2x 30 sec hold-np  Ankle pumps and circles x30 ea way          Home Exercises Provided and Patient Education Provided     Education provided:   - HEP to Go    Written Home Exercises Provided: Patient instructed to cont prior HEP.  Exercises were reviewed and Riana was able to demonstrate them prior to the end of the session.  Riana demonstrated good  understanding of the education provided.     See EMR under Patient Instructions for exercises provided {Blank single:49637::"10/8/2020","prior visit"    Assessment     Upon reassessment, pt " treatment was limited due to high pain levels and stomach discomfort. She is showing fairly good flexion for first post op PT, but did show difficulty firing quad. Pt provided additional HEP exercise for knee flexion and was re-educated on gait and knee extension.   Riana is progressing well towards her goals.   Pt prognosis is Good.     Pt will continue to benefit from skilled outpatient physical therapy to address the deficits listed in the problem list box on initial evaluation, provide pt/family education and to maximize pt's level of independence in the home and community environment.     Pt's spiritual, cultural and educational needs considered and pt agreeable to plan of care and goals.    Anticipated barriers to physical therapy: none    Goals:   Short Term Goals: (4 weeks)  Post op  - Pt will increase ROM to 0 deg R knee extension and 90 deg flexion (Progressing, not met)  - Pt will increase strength to 3/5 RLE to tolerate leg raises in all planes (Progressing, not met)  - Decrease Pain to 4/10 as worst with walking >15 min (Progressing, not met)  - Pt to self correct posture and gait with minimal cues and no AD (Progressing, not met)  - Pt independent with HEP with progressions. (Progressing, not met)     Long Term Goals (12 Weeks):Post op  - Pt will increase ROM to +3 deg R knee ext and 120 deg flexion (Progressing, not met)  - Pt will increase strength to 5/5 RLE to return to walking for exercise 1 hour a day (Progressing, not met)  - Decrease Pain to 1/10 as worst with all ADLs (Progressing, not met)  - Pt to return to 80% PLOF (Progressing, not met)       Plan     Continue POC per patient tolerance progressing knee ROM and strength.     Bernadette Palacios, PT

## 2020-10-08 NOTE — PROGRESS NOTES
10/8/2020 1140    Patient's  called at home. Reports patient's pain controlled with On-Q in addition to pain medications as needed. Patient denies signs of local anesthetic toxicity. PNC dressing remains clean, dry, and intact. All questions answered. Encouraged to call if any issues arise.

## 2020-10-09 NOTE — ADDENDUM NOTE
Addendum  created 10/09/20 1109 by Roberto Dillard RN    Clinical Note Signed, Intraprocedure Event edited

## 2020-10-09 NOTE — PROGRESS NOTES
10/9/2020 1108    Called and spoke with patient's . Reports patient's On-Q pump removed without difficulty. Stated that blue tip to end of catheter remained intact upon removal. Patient otherwise doing well this morning. Denies any concerns at this time.

## 2020-10-12 NOTE — PLAN OF CARE
10/07/20 0830   TELLEZ Message   Medicare Outpatient and Observation Notification regarding financial responsibility Given to patient/caregiver;Explained to patient/caregiver;Signed/date by patient/caregiver   Date TELLEZ was signed 10/07/20   Time TELLEZ was signed 0810

## 2020-10-12 NOTE — PLAN OF CARE
10/06/20 1630   Discharge Assessment   Assessment Type Discharge Planning Assessment   Confirmed/corrected address and phone number on facesheet? Yes   Assessment information obtained from? Patient;Medical Record   Expected Length of Stay (days) 1   Communicated expected length of stay with patient/caregiver yes   Prior to hospitilization cognitive status: Alert/Oriented   Prior to hospitalization functional status: Assistive Equipment   Current cognitive status: Alert/Oriented   Current Functional Status: Assistive Equipment;Needs Assistance   Facility Arrived From: N/A   Lives With spouse   Able to Return to Prior Arrangements yes   Is patient able to care for self after discharge? Unable to determine at this time (comments)   Who are your caregiver(s) and their phone number(s)? spouse - Kemal [phone]   Patient's perception of discharge disposition home or selfcare   Readmission Within the Last 30 Days no previous admission in last 30 days   Patient currently being followed by outpatient case management? No   Patient currently receives any other outside agency services? No   Equipment Currently Used at Home bedside commode;walker, rolling   Do you have any problems affording any of your prescribed medications? No   Is the patient taking medications as prescribed? yes   Does the patient have transportation home? Yes   Transportation Anticipated car, drives self;family or friend will provide   Dialysis Name and Scheduled days N/A   Does the patient receive services at the [facility]? No   Discharge Plan A Home with family   Discharge Plan B Home with family;Home Health   DME Needed Upon Discharge  other (see comments)   Patient/Family in Agreement with Plan yes

## 2020-10-12 NOTE — PLAN OF CARE
Pt discharged to home. Pt's 1st outpt PT appt is 10/8.  Pt's DME is in place.    Future Appointments   Date Time Provider Department Center   10/13/2020  9:30 AM Bernadette Palacios, PT [facility] [place]   10/15/2020  8:45 AM Bernadette Palacios, PT [facility] [place]   10/19/2020  8:30 AM MAISHA Guadarrama [facility] [facility]   10/21/2020  9:00 AM Cristal Marie, PTA [facility] [place]   10/23/2020  9:30 AM Cristal Marie, PTA [facility] [place]   10/27/2020  8:30 AM Cristal Marie, PTA [facility] [place]   10/30/2020  9:15 AM Bernadette Palacios, PT [facility] [place]   11/19/2020  9:15 AM MAISHA Guadarrama        10/12/20 0945   Final Note   Assessment Type Final Discharge Note   Anticipated Discharge Disposition Home   Hospital Follow Up  Appt(s) scheduled? Yes   Right Care Referral Info   Post Acute Recommendation No Care   Post-Acute Status   Post-Acute Authorization Other   Other Status No Post-Acute Service Needs   Discharge Delays None known at this time

## 2020-10-13 ENCOUNTER — CLINICAL SUPPORT (OUTPATIENT)
Dept: REHABILITATION | Facility: HOSPITAL | Age: 65
End: 2020-10-13
Attending: ORTHOPAEDIC SURGERY
Payer: MEDICARE

## 2020-10-13 DIAGNOSIS — M25.561 ACUTE PAIN OF RIGHT KNEE: ICD-10-CM

## 2020-10-13 PROCEDURE — 97110 THERAPEUTIC EXERCISES: CPT

## 2020-10-13 NOTE — PROGRESS NOTES
Physical Therapy Daily Re-Assessment Note     Name: Riana Baekr   Clinic Number: [id number]    Therapy Diagnosis:   Encounter Diagnosis   Name Primary?    Acute pain of right knee      Physician: Sabino Damon MD    Visit Date: 10/13/2020    Physician Orders: PT Eval and Treat   Medical Diagnosis from Referral: M17.11 (ICD-10-CM) - Primary osteoarthritis of right knee  Evaluation Date: 9/25/2020  Authorization Period Expiration: 9/21/20  Plan of Care Expiration: 12/18/20  Visit # / Visits authorized: 3/ 1     Time In:930 am  Time Out: 1015 am  Total Billable Time: 45 minutes     Precautions: Standard    Subjective      Pt reports:she is not feeling as nauseous, but still having a lot of pain.    she was compliant with home exercise program given last session.   Response to previous treatment:fair  Functional change: NA    Pain: 8/10  Location: right knee      Objective   Range of Motion: (PROM):10/13/2020      Knee Left Right   Extension  (+3) degrees  (-3) degrees   Flexion  (115) degrees  (80) degrees          Riana received therapeutic exercises to develop strength, endurance and ROM for 40 minutes including:  Quad sets x30 with 5 sec hold with towel under ankle  Heel slides 3x10 with 5 sec hold  AAROM knee flexion off EOT 10 sec x10  Heel prop x3 min with 3# above and below knee  Calf stretch with towel 2x 30 sec hold-np  Ankle pumps and circles x30 ea way  Gait training with walker x5 min    Riana received cold pack for 5 minutes to to decrease circulation, pain, and swelling.            Home Exercises Provided and Patient Education Provided     Education provided:   - HEP to Go    Written Home Exercises Provided: Patient instructed to cont prior HEP.  Exercises were reviewed and Riana was able to demonstrate them prior to the end of the session.  Riana demonstrated good  understanding of the education provided.     See EMR under Patient Instructions for  "exercises provided {Makenna single:22250::"10/13/2020","prior visit"    Assessment     Pt was able to tolerate more interventions this session despite continued high pain level. She was educated on importance of using rolling walker and not using WC. Pt making good progress toward functional goals.     Riana is progressing well towards her goals.   Pt prognosis is Good.     Pt will continue to benefit from skilled outpatient physical therapy to address the deficits listed in the problem list box on initial evaluation, provide pt/family education and to maximize pt's level of independence in the home and community environment.     Pt's spiritual, cultural and educational needs considered and pt agreeable to plan of care and goals.    Anticipated barriers to physical therapy: none    Goals:   Short Term Goals: (4 weeks)  Post op  - Pt will increase ROM to 0 deg R knee extension and 90 deg flexion (Progressing, not met)  - Pt will increase strength to 3/5 RLE to tolerate leg raises in all planes (Progressing, not met)  - Decrease Pain to 4/10 as worst with walking >15 min (Progressing, not met)  - Pt to self correct posture and gait with minimal cues and no AD (Progressing, not met)  - Pt independent with HEP with progressions. (Progressing, not met)     Long Term Goals (12 Weeks):Post op  - Pt will increase ROM to +3 deg R knee ext and 120 deg flexion (Progressing, not met)  - Pt will increase strength to 5/5 RLE to return to walking for exercise 1 hour a day (Progressing, not met)  - Decrease Pain to 1/10 as worst with all ADLs (Progressing, not met)  - Pt to return to 80% PLOF (Progressing, not met)       Plan     Continue POC per patient tolerance progressing knee ROM and strength.     Bernadette Palacios, PT       "

## 2020-10-14 ENCOUNTER — OFFICE VISIT (OUTPATIENT)
Dept: INTERNAL MEDICINE | Facility: CLINIC | Age: 65
End: 2020-10-14
Payer: MEDICARE

## 2020-10-14 VITALS
HEIGHT: 65 IN | BODY MASS INDEX: 28.01 KG/M2 | WEIGHT: 168.13 LBS | HEART RATE: 78 BPM | SYSTOLIC BLOOD PRESSURE: 110 MMHG | DIASTOLIC BLOOD PRESSURE: 50 MMHG | OXYGEN SATURATION: 98 %

## 2020-10-14 DIAGNOSIS — R31.9 HEMATURIA, UNSPECIFIED TYPE: ICD-10-CM

## 2020-10-14 DIAGNOSIS — N89.8 VAGINAL ITCHING: ICD-10-CM

## 2020-10-14 DIAGNOSIS — N30.00 ACUTE CYSTITIS WITHOUT HEMATURIA: Primary | ICD-10-CM

## 2020-10-14 PROCEDURE — 3074F PR MOST RECENT SYSTOLIC BLOOD PRESSURE < 130 MM HG: ICD-10-PCS | Mod: CPTII,S$GLB,, | Performed by: PHYSICIAN ASSISTANT

## 2020-10-14 PROCEDURE — 99999 PR PBB SHADOW E&M-EST. PATIENT-LVL V: CPT | Mod: PBBFAC,,, | Performed by: PHYSICIAN ASSISTANT

## 2020-10-14 PROCEDURE — 99999 PR PBB SHADOW E&M-EST. PATIENT-LVL V: ICD-10-PCS | Mod: PBBFAC,,, | Performed by: PHYSICIAN ASSISTANT

## 2020-10-14 PROCEDURE — 99213 OFFICE O/P EST LOW 20 MIN: CPT | Mod: S$GLB,,, | Performed by: PHYSICIAN ASSISTANT

## 2020-10-14 PROCEDURE — 3078F PR MOST RECENT DIASTOLIC BLOOD PRESSURE < 80 MM HG: ICD-10-PCS | Mod: CPTII,S$GLB,, | Performed by: PHYSICIAN ASSISTANT

## 2020-10-14 PROCEDURE — 1101F PT FALLS ASSESS-DOCD LE1/YR: CPT | Mod: CPTII,S$GLB,, | Performed by: PHYSICIAN ASSISTANT

## 2020-10-14 PROCEDURE — 3074F SYST BP LT 130 MM HG: CPT | Mod: CPTII,S$GLB,, | Performed by: PHYSICIAN ASSISTANT

## 2020-10-14 PROCEDURE — 3078F DIAST BP <80 MM HG: CPT | Mod: CPTII,S$GLB,, | Performed by: PHYSICIAN ASSISTANT

## 2020-10-14 PROCEDURE — 3008F BODY MASS INDEX DOCD: CPT | Mod: CPTII,S$GLB,, | Performed by: PHYSICIAN ASSISTANT

## 2020-10-14 PROCEDURE — 87086 URINE CULTURE/COLONY COUNT: CPT

## 2020-10-14 PROCEDURE — 3008F PR BODY MASS INDEX (BMI) DOCUMENTED: ICD-10-PCS | Mod: CPTII,S$GLB,, | Performed by: PHYSICIAN ASSISTANT

## 2020-10-14 PROCEDURE — 99213 PR OFFICE/OUTPT VISIT, EST, LEVL III, 20-29 MIN: ICD-10-PCS | Mod: S$GLB,,, | Performed by: PHYSICIAN ASSISTANT

## 2020-10-14 PROCEDURE — 1101F PR PT FALLS ASSESS DOC 0-1 FALLS W/OUT INJ PAST YR: ICD-10-PCS | Mod: CPTII,S$GLB,, | Performed by: PHYSICIAN ASSISTANT

## 2020-10-14 RX ORDER — CIPROFLOXACIN 250 MG/1
250 TABLET, FILM COATED ORAL 2 TIMES DAILY
Qty: 10 TABLET | Refills: 0 | Status: SHIPPED | OUTPATIENT
Start: 2020-10-14 | End: 2020-11-25

## 2020-10-14 RX ORDER — NYSTATIN 100000 U/G
CREAM TOPICAL 2 TIMES DAILY
Qty: 30 G | Refills: 0 | Status: SHIPPED | OUTPATIENT
Start: 2020-10-14 | End: 2021-08-11

## 2020-10-14 NOTE — PATIENT INSTRUCTIONS
Lena En La Orina [Blood In The Urine]    La lena en la orina (hematuria) tiene muchas causas posibles. Si se presenta después de david lesión (por ejemplo, por david caída o un accidente de automóvil), suele ser david señal de hematoma (bruising) en el riñón on en la vejiga. Las causas médicas comunes que pueden hacer que haya lena en la orina son: infección del tracto urinario (urinary tract infection), cálculo renal (kidney stone [lupillo en los riñones]), inflamación (inflammation), tumores (tumors), u otras enfermedades del riñón o la vejiga. La menstruación (menstruation) puede hacer que aparezca lena en la muestra de orina, aunque no provenga del tracto urinario.  Si sólo hay presente un rastro de lena, aparecerá en la prueba de orina (urine test), aunque la orina se bonilla de color amarillento y no chandra ni rojizo. [place] puede ocurrir en cualquiera de los casos antes mencionados, así padmaja cuando se malloy practicado ejercicio físico muy german o se tiene fiebre bella. En marvin doroteo, es posible que el médico le solicite repetir la prueba de orina otro día. Eso mostrará si todavía hay lena en la orina. De ser así, se pueden realizar otras pruebas para establecer la causa.  Cuidados En [place]:  1. Si el aspecto de sykes orina no demuestra que haya presencia de lena (la orina no es rosada, amarronada ni rojiza), entonces no es necesario que restrinja sykes actividad en forma alguna.  2. Si puede anabel la presencia de lena en la orina, descanse y evite realizar actividades agotadoras hasta sykes próxima prueba. No use aspirina, ni medicamentos anticoagulantes ni medicamentos antiplaquetarios ni medicamentos antiinflamatorios, tales padmaja ibuprofeno (Advil o Motrin) o naproxeno (Aleve o Naprosyn). Estos medicamentos aligeran la lena y pueden aumentar el sangrado.  Visita de control     Programe david visita de control con skyes médico, o según le indique nuestro personal médico. Si tuvo david lesión y tiene lena en la orina,  deberá repetir la prueba de orina en 1 o 2 días. Comuníquese con sykes médico o regrese a brian centro para hacerse la prueba.     Un radiólogo evaluará las radiografías que le hayan hecho. Le informaremos de los nuevos hallazgos que puedan afectar la atención médica.  Busque Prontamente Atención Médica  si algo de lo siguiente ocurre:  · Lena de color henriquez vivo o coágulos de lena en la orina (si es un síntoma nuevo).  · Debilidad, mareo o desmayo.  · Dolor nuevo que se presenta en la barber, el abdomen o la espalda.  · Fiebre de 100.4°F (38°C) o más bella, o padmaja le haya indicado sykes proveedor de atención médica.  · Vómito persistente.  · Sangrado de la nariz o las encías; o se le hacen hematomas (moretones [bruising] con facilidad.  Date Last Reviewed: 12/20/2016  © 8466-2642 [employer] [address] [place] [address], [place] [address]. Todos los derechos reservados. Esta información no pretende sustituir la atención médica profesional. Sólo sykes médico puede diagnosticar y tratar un problema de angélica.        Qué son las infecciones de las vías urinarias (IVU)  La mayoría de las IVU son causadas por bacterias, aunque también pueden ser causadas por virus u hongos. Las bacterias del intestino son la flakita más común de infección. Esta puede aparecer por cualquiera de las siguientes razones:  · La actividad sexual. Uriel las relaciones sexuales, los microbios pueden pasar desde el pene, la vagina o el recto hasta la uretra.  · Los microbios que están en la piel o el recto pueden pasar al interior de la uretra. [place] es más común en las mujeres, ya que el recto y la uretra están más cerca entre sí que en los hombres. Limpiarse de adelante hacia atrás después de usar el inodoro y mantener el área limpia puede ayudar a evitar que los gérmenes lleguen a la uretra.  · Bloqueo de la orina que fluye por las vías urinarias. Si la orina se queda estancada por demasiado tiempo, los microbios pueden crecer de forma  descontrolada.      Partes de las vías urinarias  La infección puede afectar cualquier parte de las vías urinarias.  · Los riñones recogen y almacenan la orina.  · Los uréteres transportan la orina de los riñones a la vejiga.  · La vejiga almacena la orina hasta que el momento de ser expulsada.  · Lauretra transporta la orina desde la vejiga hasta el exterior del cuerpo. La uretra es más corta en las mujeres, por lo que las bacterias pueden viajar en julia leah más fácilmente. La uretra masculina es más larga, es menos probable que david IVU llegue a la vejiga o los riñones de los hombres.  Date Last Reviewed: 9/8/2014  © 7491-5076 [employer]. [address] [place] [address], [place] [address]. Todos los derechos reservados. Esta información no pretende sustituir la atención médica profesional. Sólo sykes médico puede diagnosticar y tratar un problema de angélica.

## 2020-10-14 NOTE — PROGRESS NOTES
Subjective:       Patient ID: Riana Kay is a 65 y.o. female.        Chief Complaint: Urinary Tract Infection    Riana Kay is an established patient of Amena Roger, DO here today for urgent care visit.    She complains of dysuria, urgency, and frequency x 2 days.  She also had one episode of hematuria, described as tiny amount of blood on toilet paper when she wiped.  She also complains of vaginal itching but no discharge.  No N/V.  No flank pain but she is having suprapubic pain.  She had a UTI 9/22 treated to resolution and then had right TKR 10/6.  She has been drinking plenty of water.  No fever.         Review of Systems   Constitutional: Negative for chills, diaphoresis, fatigue and fever.   HENT: Negative for congestion and sore throat.    Eyes: Negative for visual disturbance.   Respiratory: Negative for cough, chest tightness and shortness of breath.    Cardiovascular: Negative for chest pain, palpitations and leg swelling.   Gastrointestinal: Negative for abdominal pain, blood in stool, constipation, diarrhea, nausea and vomiting.   Genitourinary: Positive for dysuria, frequency, hematuria and urgency.   Musculoskeletal: Negative for arthralgias and back pain.   Skin: Negative for rash.   Neurological: Negative for dizziness, syncope, weakness and headaches.   Psychiatric/Behavioral: Negative for dysphoric mood and sleep disturbance. The patient is not nervous/anxious.        Objective:      Physical Exam  Vitals signs and nursing note reviewed.   Constitutional:       Appearance: Normal appearance. She is well-developed.   HENT:      Head: Normocephalic.      Right Ear: External ear normal.      Left Ear: External ear normal.   Eyes:      Pupils: Pupils are equal, round, and reactive to light.   Cardiovascular:      Rate and Rhythm: Normal rate and regular rhythm.      Heart sounds: Normal heart sounds. No murmur. No friction rub. No gallop.    Pulmonary:      Effort: Pulmonary  "effort is normal. No respiratory distress.      Breath sounds: Normal breath sounds.   Abdominal:      Palpations: Abdomen is soft.      Tenderness: There is abdominal tenderness in the suprapubic area. There is no right CVA tenderness or left CVA tenderness.   Skin:     General: Skin is warm and dry.   Neurological:      Mental Status: She is alert.         Assessment:       1. Acute cystitis without hematuria    2. Hematuria, unspecified type    3. Vaginal itching        Plan:       Riana was seen today for urinary tract infection.    Diagnoses and all orders for this visit:    Acute cystitis without hematuria  -     Urinalysis  -     Urine culture  -     ciprofloxacin HCl (CIPRO) 250 MG tablet; Take 1 tablet (250 mg total) by mouth 2 (two) times daily.  -     Ambulatory referral/consult to Urology; Future    Hematuria, unspecified type  -     Ambulatory referral/consult to Urology; Future    Vaginal itching  -     nystatin (MYCOSTATIN) cream; Apply topically 2 (two) times daily.    Multiple allergies to medications so will tx with cipro, tolerated fine previously   Risks/benefits of medication discussed  Urology consult given hematuria    Pt has been given instructions populated from [employer] database and has verbalized understanding of the after visit summary and information contained wherein.    Follow up with a primary care provider. May go to ER for acute shortness of breath, lightheadedness, fever, or any other emergent complaints or changes in condition.    "This note will be shared with the patient"    Future Appointments   Date Time Provider Department Center   10/15/2020  8:45 AM Bernadette Palacios, [facility] [address]   10/19/2020  8:30 AM Sydney Ortiz PA-C [facility]   10/21/2020  9:00 AM Cristal Marie PTA [address]   10/22/2020  8:40 AM Suzette Montilla PA-C [facility] UROLOGY [facility] [facility]   10/23/2020  9:30 AM Cristal Marie PTA [address]   10/27/2020  8:30 AM " Cristal Marie, PTA [facility] [place]   10/30/2020  9:15 AM Bernadette Palacios, PT [facility] [place]   11/19/2020  9:15 AM Sydney Ortiz PA-C [facility] [place]

## 2020-10-15 ENCOUNTER — CLINICAL SUPPORT (OUTPATIENT)
Dept: REHABILITATION | Facility: HOSPITAL | Age: 65
End: 2020-10-15
Attending: ORTHOPAEDIC SURGERY
Payer: MEDICARE

## 2020-10-15 DIAGNOSIS — M25.561 ACUTE PAIN OF RIGHT KNEE: ICD-10-CM

## 2020-10-15 LAB — BACTERIA UR CULT: NO GROWTH

## 2020-10-15 PROCEDURE — 97110 THERAPEUTIC EXERCISES: CPT

## 2020-10-15 NOTE — PROGRESS NOTES
Physical Therapy Daily Re-Assessment Note     Name: Riana Baker   Clinic Number: [id number]    Therapy Diagnosis:   Encounter Diagnosis   Name Primary?    Acute pain of right knee      Physician: Sabino Damon MD    Visit Date: 10/15/2020    Physician Orders: PT Eval and Treat   Medical Diagnosis from Referral: M17.11 (ICD-10-CM) - Primary osteoarthritis of right knee  Evaluation Date: 9/25/2020  Authorization Period Expiration: 9/21/20  Plan of Care Expiration: 12/18/20  Visit # / Visits authorized: 4/ 1     Time In:845 am  Time Out: 930 am  Total Billable Time: 40 minutes     Precautions: Standard    Subjective      Pt reports:she is doing her exercises at home.    she was compliant with home exercise program given last session.   Response to previous treatment:fair  Functional change: NA    Pain: 8/10  Location: right knee      Objective   Range of Motion: (PROM):10/15/2020      Knee Left Right   Extension  (+3) degrees  (-3) degrees   Flexion  (115) degrees  (85) degrees          Riana received therapeutic exercises to develop strength, endurance and ROM for 40 minutes including:  Recumbent bike x5 min (in available ROM)  Quad sets x30 with 5 sec hold with towel under ankle  Heel slides 3x10 with 5 sec hold  AAROM knee flexion off EOT 10 sec x10  Heel prop x5 min with 4# above and below knee  Calf stretch with towel 3x 30 sec hold  Ankle pumps and circles x30 ea way-np  Gait training with walker x5 min-np  Patellar mobs (inferior/superior and medial/lateral) x3 min    Riana received cold pack for 5 minutes to to decrease circulation, pain, and swelling.            Home Exercises Provided and Patient Education Provided     Education provided:   - HEP to Go    Written Home Exercises Provided: Patient instructed to cont prior HEP.  Exercises were reviewed and Riana was able to demonstrate them prior to the end of the session.  Riana demonstrated good  understanding  "of the education provided.     See EMR under Patient Instructions for exercises provided {Blank single:35808::"10/15/2020","prior visit"    Assessment     Pt demonstrated improved knee flexion today and demonstrated fair tolerance to first time on bike.  Pt making good progress toward functional goals.     Riana is progressing well towards her goals.   Pt prognosis is Good.     Pt will continue to benefit from skilled outpatient physical therapy to address the deficits listed in the problem list box on initial evaluation, provide pt/family education and to maximize pt's level of independence in the home and community environment.     Pt's spiritual, cultural and educational needs considered and pt agreeable to plan of care and goals.    Anticipated barriers to physical therapy: none    Goals:   Short Term Goals: (4 weeks)  Post op  - Pt will increase ROM to 0 deg R knee extension and 90 deg flexion (Progressing, not met)  - Pt will increase strength to 3/5 RLE to tolerate leg raises in all planes (Progressing, not met)  - Decrease Pain to 4/10 as worst with walking >15 min (Progressing, not met)  - Pt to self correct posture and gait with minimal cues and no AD (Progressing, not met)  - Pt independent with HEP with progressions. (Progressing, not met)     Long Term Goals (12 Weeks):Post op  - Pt will increase ROM to +3 deg R knee ext and 120 deg flexion (Progressing, not met)  - Pt will increase strength to 5/5 RLE to return to walking for exercise 1 hour a day (Progressing, not met)  - Decrease Pain to 1/10 as worst with all ADLs (Progressing, not met)  - Pt to return to 80% PLOF (Progressing, not met)       Plan     Continue POC per patient tolerance progressing knee ROM and strength.     Bernadette Palacios, PT       "

## 2020-10-19 ENCOUNTER — OFFICE VISIT (OUTPATIENT)
Dept: ORTHOPEDICS | Facility: CLINIC | Age: 65
End: 2020-10-19
Payer: MEDICARE

## 2020-10-19 VITALS — BODY MASS INDEX: 27.66 KG/M2 | HEIGHT: 65 IN | WEIGHT: 166 LBS

## 2020-10-19 DIAGNOSIS — Z96.651 STATUS POST TOTAL RIGHT KNEE REPLACEMENT: Primary | ICD-10-CM

## 2020-10-19 PROCEDURE — 99999 PR PBB SHADOW E&M-EST. PATIENT-LVL IV: CPT | Mod: PBBFAC,,, | Performed by: PHYSICIAN ASSISTANT

## 2020-10-19 PROCEDURE — 99999 PR PBB SHADOW E&M-EST. PATIENT-LVL IV: ICD-10-PCS | Mod: PBBFAC,,, | Performed by: PHYSICIAN ASSISTANT

## 2020-10-19 PROCEDURE — 99024 PR POST-OP FOLLOW-UP VISIT: ICD-10-PCS | Mod: S$GLB,,, | Performed by: PHYSICIAN ASSISTANT

## 2020-10-19 PROCEDURE — 99024 POSTOP FOLLOW-UP VISIT: CPT | Mod: S$GLB,,, | Performed by: PHYSICIAN ASSISTANT

## 2020-10-19 NOTE — PROGRESS NOTES
"Riana Kay presents for initial post-operative visit following a right total knee arthroplasty performed by Dr. Damon on 10/6/2020. Patient is doing well. Having minimal pain. Taking Norco 10 twice daily. In PT at [facility]. Taking asa 81 mg bid for vte ppx. States that she gets recurrent UTIs. Saw primary care and diagnosed with UTI 10/14 and started on cipro. Following up with urology 10/22 for hematuria.     Exam:   Height 5' 5" (1.651 m), weight 75.3 kg (166 lb).   Ambulating well with assistive device.  Incision is clean and dry without drainage or erythema.   ROM:0-80    Initial post-operative radiographs reviewed today revealing a well fixed and aligned prosthesis.    A/P:  2 weeks s/p right total knee replacement    - The patient was advised to keep the incision clean and dry for the next 24 hours after which she may wash the area with antibacterial soap in the shower. Will not submerge until the incision is completely healed.   - Outpatient PT ongoing at [facility]  - Continue ASA for 1 month from surgery.  - Pain medication: She will call when she needs a refill.   - Reviewed antibiotic prophylaxis   - Follow up in 4 weeks with new x-rays. Pt will call clinic with problems/concerns.     "

## 2020-10-21 ENCOUNTER — CLINICAL SUPPORT (OUTPATIENT)
Dept: REHABILITATION | Facility: HOSPITAL | Age: 65
End: 2020-10-21
Attending: ORTHOPAEDIC SURGERY
Payer: MEDICARE

## 2020-10-21 ENCOUNTER — PATIENT OUTREACH (OUTPATIENT)
Dept: ADMINISTRATIVE | Facility: OTHER | Age: 65
End: 2020-10-21

## 2020-10-21 DIAGNOSIS — M25.561 ACUTE PAIN OF RIGHT KNEE: ICD-10-CM

## 2020-10-21 PROCEDURE — 97140 MANUAL THERAPY 1/> REGIONS: CPT | Mod: CQ

## 2020-10-21 PROCEDURE — 97110 THERAPEUTIC EXERCISES: CPT | Mod: CQ

## 2020-10-21 NOTE — PROGRESS NOTES
Physical Therapy Daily Re-Assessment Note     Name: Riana Baker   Clinic Number: [id number]    Therapy Diagnosis:   Encounter Diagnosis   Name Primary?    Acute pain of right knee      Physician: Sabino Damon MD    Visit Date: 10/21/2020    Physician Orders: PT Eval and Treat   Medical Diagnosis from Referral: M17.11 (ICD-10-CM) - Primary osteoarthritis of right knee  Evaluation Date: 9/25/2020  Authorization Period Expiration: 9/21/20  Plan of Care Expiration: 12/18/20  Visit # / Visits authorized: 5/ 1     Time In:9:05 am  Time Out: 9:45 am  Total Billable Time: 45 minutes     Precautions: Standard    Subjective      Pt reports:Got bandage off yesterday. Still cant get knee around on bike   she was compliant with home exercise program given last session.   Response to previous treatment:fair  Functional change: NA    Pain: 8/10  Location: right knee      Objective   Range of Motion: (PROM):10/15/2020      AROM ext: -1 hypo   PROM: 0  PROM FL: 100       Riana received therapeutic exercises to develop strength, endurance and ROM for 32 minutes including:  Recumbent bike x8 min (in available ROM)  Quad sets x30 with 5 sec hold with towel under ankle  Heel slides 3x10 with 5 sec hold  AAROM knee flexion off EOT 10 sec x10  Heel prop x5 min with 4# above and below knee  Sit to stand 2x8    np:  Calf stretch with towel 3x 30 sec hold-np  Ankle pumps and circles x30 ea way-np  Gait training with walker x5 min-np    MT: x 8min  Patellar mobs (inferior/superior and medial/lateral) x3 min  Femoral AP mobs    Riana received cold pack for 5 minutes to to decrease circulation, pain, and swelling.            Home Exercises Provided and Patient Education Provided     Education provided:   - HEP to Go    Written Home Exercises Provided: Patient instructed to cont prior HEP.  Exercises were reviewed and Riana was able to demonstrate them prior to the end of the session.  Riana  "demonstrated good  understanding of the education provided.     See EMR under Patient Instructions for exercises provided {Blank single:31766::"10/21/2020","prior visit"    Assessment     ROM has improved since previous measurements. Pt lacks confidence for FWB through R LE. ED pt to start doing wt shifts and sit to stands at home. Unable to perform full rev on bike today.    Riana is progressing well towards her goals.   Pt prognosis is Good.     Pt will continue to benefit from skilled outpatient physical therapy to address the deficits listed in the problem list box on initial evaluation, provide pt/family education and to maximize pt's level of independence in the home and community environment.     Pt's spiritual, cultural and educational needs considered and pt agreeable to plan of care and goals.    Anticipated barriers to physical therapy: none    Goals:   Short Term Goals: (4 weeks)  Post op  - Pt will increase ROM to 0 deg R knee extension and 90 deg flexion (Progressing, not met)  - Pt will increase strength to 3/5 RLE to tolerate leg raises in all planes (Progressing, not met)  - Decrease Pain to 4/10 as worst with walking >15 min (Progressing, not met)  - Pt to self correct posture and gait with minimal cues and no AD (Progressing, not met)  - Pt independent with HEP with progressions. (Progressing, not met)     Long Term Goals (12 Weeks):Post op  - Pt will increase ROM to +3 deg R knee ext and 120 deg flexion (Progressing, not met)  - Pt will increase strength to 5/5 RLE to return to walking for exercise 1 hour a day (Progressing, not met)  - Decrease Pain to 1/10 as worst with all ADLs (Progressing, not met)  - Pt to return to 80% PLOF (Progressing, not met)       Plan     Continue POC per patient tolerance progressing knee ROM and strength.     Cristal Marie, PTA       "

## 2020-10-21 NOTE — PROGRESS NOTES
LINKS immunization registry not responding  Care Everywhere updated  Health Maintenance updated  Chart reviewed for overdue Proactive [facility] Encounters (BHARATH) health maintenance testing (CRS, Breast Ca, Diabetic Eye Exam)   Orders entered:N/A

## 2020-10-22 ENCOUNTER — OFFICE VISIT (OUTPATIENT)
Dept: UROLOGY | Facility: CLINIC | Age: 65
End: 2020-10-22
Payer: MEDICARE

## 2020-10-22 VITALS
SYSTOLIC BLOOD PRESSURE: 140 MMHG | HEART RATE: 77 BPM | WEIGHT: 166 LBS | BODY MASS INDEX: 27.66 KG/M2 | DIASTOLIC BLOOD PRESSURE: 68 MMHG | HEIGHT: 65 IN

## 2020-10-22 DIAGNOSIS — Z88.8 ALLERGY TO IODINE: Primary | ICD-10-CM

## 2020-10-22 DIAGNOSIS — R31.9 HEMATURIA, UNSPECIFIED TYPE: ICD-10-CM

## 2020-10-22 PROCEDURE — 99999 PR PBB SHADOW E&M-EST. PATIENT-LVL V: CPT | Mod: PBBFAC,,, | Performed by: PHYSICIAN ASSISTANT

## 2020-10-22 PROCEDURE — 1101F PR PT FALLS ASSESS DOC 0-1 FALLS W/OUT INJ PAST YR: ICD-10-PCS | Mod: CPTII,S$GLB,, | Performed by: PHYSICIAN ASSISTANT

## 2020-10-22 PROCEDURE — 3008F PR BODY MASS INDEX (BMI) DOCUMENTED: ICD-10-PCS | Mod: CPTII,S$GLB,, | Performed by: PHYSICIAN ASSISTANT

## 2020-10-22 PROCEDURE — 88112 PR  CYTOPATH, CELL ENHANCE TECH: ICD-10-PCS | Mod: 26,,, | Performed by: PATHOLOGY

## 2020-10-22 PROCEDURE — 99204 PR OFFICE/OUTPT VISIT, NEW, LEVL IV, 45-59 MIN: ICD-10-PCS | Mod: S$GLB,,, | Performed by: PHYSICIAN ASSISTANT

## 2020-10-22 PROCEDURE — 99204 OFFICE O/P NEW MOD 45 MIN: CPT | Mod: S$GLB,,, | Performed by: PHYSICIAN ASSISTANT

## 2020-10-22 PROCEDURE — 3008F BODY MASS INDEX DOCD: CPT | Mod: CPTII,S$GLB,, | Performed by: PHYSICIAN ASSISTANT

## 2020-10-22 PROCEDURE — 99999 PR PBB SHADOW E&M-EST. PATIENT-LVL V: ICD-10-PCS | Mod: PBBFAC,,, | Performed by: PHYSICIAN ASSISTANT

## 2020-10-22 PROCEDURE — 3078F DIAST BP <80 MM HG: CPT | Mod: CPTII,S$GLB,, | Performed by: PHYSICIAN ASSISTANT

## 2020-10-22 PROCEDURE — 1101F PT FALLS ASSESS-DOCD LE1/YR: CPT | Mod: CPTII,S$GLB,, | Performed by: PHYSICIAN ASSISTANT

## 2020-10-22 PROCEDURE — 3077F PR MOST RECENT SYSTOLIC BLOOD PRESSURE >= 140 MM HG: ICD-10-PCS | Mod: CPTII,S$GLB,, | Performed by: PHYSICIAN ASSISTANT

## 2020-10-22 PROCEDURE — 3077F SYST BP >= 140 MM HG: CPT | Mod: CPTII,S$GLB,, | Performed by: PHYSICIAN ASSISTANT

## 2020-10-22 PROCEDURE — 3078F PR MOST RECENT DIASTOLIC BLOOD PRESSURE < 80 MM HG: ICD-10-PCS | Mod: CPTII,S$GLB,, | Performed by: PHYSICIAN ASSISTANT

## 2020-10-22 PROCEDURE — 88112 CYTOPATH CELL ENHANCE TECH: CPT | Mod: 26,,, | Performed by: PATHOLOGY

## 2020-10-22 PROCEDURE — 88112 CYTOPATH CELL ENHANCE TECH: CPT | Performed by: PATHOLOGY

## 2020-10-22 RX ORDER — INFLUENZA A VIRUS A/MICHIGAN/45/2015 X-275 (H1N1) ANTIGEN (FORMALDEHYDE INACTIVATED), INFLUENZA A VIRUS A/SINGAPORE/INFIMH-16-0019/2016 IVR-186 (H3N2) ANTIGEN (FORMALDEHYDE INACTIVATED), INFLUENZA B VIRUS B/PHUKET/3073/2013 ANTIGEN (FORMALDEHYDE INACTIVATED), AND INFLUENZA B VIRUS B/MARYLAND/15/2016 BX-69A ANTIGEN (FORMALDEHYDE INACTIVATED) 60; 60; 60; 60 UG/.7ML; UG/.7ML; UG/.7ML; UG/.7ML
INJECTION, SUSPENSION INTRAMUSCULAR
COMMUNITY
Start: 2020-10-03 | End: 2021-01-22

## 2020-10-22 NOTE — LETTER
October 26, 2020      Nilda Darnell PA-C  [address]  [facility] [address]           [facility] 4[facility]  [address]  [facility] [address]  Phone: [phone]          Patient: Riana Kay   MR Number: [id number]   YOB: 1955   Date of Visit: 10/22/2020       Dear Nilda Darnell:    Thank you for referring Riana Kay to me for evaluation. Attached you will find relevant portions of my assessment and plan of care.    If you have questions, please do not hesitate to call me. I look forward to following Riana Kay along with you.    Sincerely,    Suzette Montilla PA-C    Enclosure  CC:  No Recipients    If you would like to receive this communication electronically, please contact [email or web address] or [phone] to request more information on [employer] Link access.    For providers and/or their staff who would like to refer a patient to [facility], please contact us through our one-stop-shop provider referral line, [facility], at [phone].    If you feel you have received this communication in error or would no longer like to receive these types of communications, please e-mail [email or web address]

## 2020-10-22 NOTE — PROGRESS NOTES
9CHIEF COMPLAINT:    Mrs. Kay is a 65 y.o. female presenting for gross hematuria.  PRESENTING ILLNESS:    Riana Kay is a 65 y.o. female with a PMH of HTN, DM, who presents for gross hematuria.     She reports dysuria, suprapubic cramping, gross hematuria, frequency 2 weeks ago.  She was treated with cipro.  Her urine culture was negative.   Her symptoms are better since she started taking cipro.  She hasnt seen blood in her urine since 10/9.  She reports a previous episode of hematuria about 6 months ago.    She does not have any urinary symptoms today.   No urgency.  She has frequency but attributes it to the amount of water she drinks.  No flank pain.      Previous/Current Smoker: no  Radiation therapy to pelvis: no  Chemotherapy: no  Personal/ family history of bladder/ kidney cancer: no  Exposure to harmful chemicals: no  History of kidney stones: no    She presents today with his wife.    REVIEW OF SYSTEMS:    Constitutional: Negative for fever and chills.   HENT: Negative for hearing loss.   Eyes: Negative for visual disturbance.   Respiratory: Negative for shortness of breath.   Cardiovascular: Negative for chest pain.   Gastrointestinal: Negative for vomiting, and constipation.   Genitourinary:  See HPI  Neurological: Negative for dizziness.   Hematological: Does not bruise/bleed easily.   Psychiatric/Behavioral: Negative for confusion.     PATIENT HISTORY:    Past Medical History:   Diagnosis Date    Abdominal pain, right upper quadrant 2/4/2014    Anxiety     AR (allergic rhinitis)     Atrophic gastritis without mention of hemorrhage 2/13/2012     Dx updated per 2019 IMO Load    Chronic fatigue syndrome 6/10/2012    Dizziness     Fatty liver     GERD (gastroesophageal reflux disease)     HTN (hypertension)     Hyperlipidemia     Memory loss     Osteopenia     PONV (postoperative nausea and vomiting)     Primary osteoarthritis of right knee 10/6/2020    S/P total hysterectomy      Sleep apnea        Past Surgical History:   Procedure Laterality Date    CHOLECYSTECTOMY      COLONOSCOPY N/A 1/17/2018    Procedure: COLONOSCOPY with Donnell;  Surgeon: Roland Jacobo MD;  Location: [facility] (4TH FLR);  Service: Endoscopy;  Laterality: N/A;    ELBOW ARTHROPLASTY Right 1/16/2019    Procedure: ARTHROPLASTY, ELBOW right radial head arthroplasty revision;  Surgeon: Katja Hubbard MD;  [address]  Service: Orthopedics;  Laterality: Right;  Anesthesia: General and Regional. Stretcher, hand pan 1 and pan 2, CALL ACCUMED, CLAIRX  [employer] notified 1-14 LO    ELBOW SURGERY Right 7/16/15    ELBOW SURGERY      ESOPHAGOGASTRODUODENOSCOPY N/A 4/15/2019    Procedure: EGD (ESOPHAGOGASTRODUODENOSCOPY);  Surgeon: Buck Irwin MD;  Location: [facility] (4TH FLR);  Service: Endoscopy;  Laterality: N/A;  ray she    HYSTERECTOMY      KNEE ARTHROPLASTY Right 10/6/2020    Procedure: ARTHROPLASTY, KNEE-SAME DAY PROTOCOL;  Surgeon: Sabino Damon MD;  Location: [facility];  Service: Orthopedics;  Laterality: Right;    KNEE ARTHROSCOPY W/ DEBRIDEMENT  4/11    Left    RELEASE OF ULNAR NERVE AT CUBITAL TUNNEL Right 1/16/2019    Procedure: RELEASE, ULNAR TUNNEL right;  Surgeon: Katja Hubbard MD;  Location: [address];  Service: Orthopedics;  Laterality: Right;  Anesthesia: General and Regional. Stretcher, hand pan 1 and pan 2, CALL ACCUMED, CLAIRX    ROTATOR CUFF REPAIR      TOTAL ABDOMINAL HYSTERECTOMY W/ BILATERAL SALPINGOOPHORECTOMY      UPPER GASTROINTESTINAL ENDOSCOPY         Family History   Problem Relation Age of Onset    Colon cancer Father 83        colon cancer    Diabetes Maternal Grandmother     Diabetes Maternal Aunt     Esophageal cancer Neg Hx     Stomach cancer Neg Hx     Melanoma Neg Hx        Social History     Socioeconomic History    Marital status:      Spouse name: Kemal    Number of children: 1    Years of  education: Not on file    Highest education level: Not on file   Occupational History    Occupation: Housekeeping     Comment: [employer]   Social Needs    Financial resource strain: Somewhat hard    Food insecurity     Worry: Never true     Inability: Never true    Transportation needs     Medical: No     Non-medical: No   Tobacco Use    Smoking status: Never Smoker    Smokeless tobacco: Never Used   Substance and Sexual Activity    Alcohol use: No     Frequency: Never     Drinks per session: Patient refused     Binge frequency: Never    Drug use: No    Sexual activity: Yes     Partners: Male     Birth control/protection: Post-menopausal   Lifestyle    Physical activity     Days per week: 2 days     Minutes per session: 60 min    Stress: To some extent   Relationships    Social connections     Talks on phone: More than three times a week     Gets together: Once a week     Attends [employer] service: Not on file     Active member of club or organization: Yes     Attends meetings of clubs or organizations: More than 4 times per year     Relationship status:    Other Topics Concern    Are you pregnant or think you may be? No    Breast-feeding No   Social History Narrative    She works at [employer] in [employer]; , 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 No exercise but hopes to start walking on the weekend.       Allergies:  Iodinated contrast media, Percocet [oxycodone-acetaminophen], Macrobid [nitrofurantoin monohyd/m-cryst], Metformin, Penicillins, Promethazine, Sulfa (sulfonamide antibiotics), and Sulfamethoxazole-trimethoprim    Medications:    Current Outpatient Medications:     acetaminophen (TYLENOL) 650 MG TbSR, Take 1 tablet (650 mg total) by mouth every 8 (eight) hours as needed., Disp: 120 tablet, Rfl: 0    amLODIPine (NORVASC) 5 MG tablet, Take 1 tablet (5 mg total) by mouth once daily., Disp: 90 tablet, Rfl: 3    aspirin (ECOTRIN) 81 MG EC tablet, Take 1 tablet (81 mg total) by  mouth 2 (two) times daily., Disp: 60 tablet, Rfl: 0    azelastine (ASTELIN) 137 mcg (0.1 %) nasal spray, 1 SPRAY (137 MCG TOTAL) BY NASAL ROUTE 2 (TWO) TIMES DAILY., Disp: 90 mL, Rfl: 2    blood sugar diagnostic Strp, To check BG 4 times daily, to use with insurance preferred meter-true metrix, Disp: 400 each, Rfl: 3    celecoxib (CELEBREX) 200 MG capsule, Take 1 capsule (200 mg total) by mouth once daily., Disp: 30 capsule, Rfl: 0    ciprofloxacin HCl (CIPRO) 250 MG tablet, Take 1 tablet (250 mg total) by mouth 2 (two) times daily., Disp: 10 tablet, Rfl: 0    cyanocobalamin (VITAMIN B-12) 100 MCG tablet, Take 100 mcg by mouth once daily., Disp: , Rfl:     diclofenac sodium (VOLTAREN) 1 % Gel, Apply 2 g topically 2 (two) times daily., Disp: 100 g, Rfl: 2    diclofenac sodium (VOLTAREN) 1 % Gel, Apply 2 g topically 4 (four) times daily., Disp: 100 g, Rfl: 1    docusate sodium (COLACE) 100 MG capsule, Take 1 capsule (100 mg total) by mouth 2 (two) times daily as needed for Constipation., Disp: 60 capsule, Rfl: 0    fluticasone propionate (FLONASE) 50 mcg/actuation nasal spray, 2 sprays (100 mcg total) by Each Nostril route once daily., Disp: 16 g, Rfl: 11    FLUZONE HIGHDOSE QUAD 20-21  mcg/0.7 mL Syrg, PHARMACY ADMINISTERED, Disp: , Rfl:     HYDROcodone-acetaminophen (NORCO)  mg per tablet, Take 1 tablet by mouth every 4 to 6 hours as needed for Pain., Disp: 50 tablet, Rfl: 0    insulin (LANTUS SOLOSTAR U-100 INSULIN) glargine 100 units/mL (3mL) SubQ pen, Inject 32 units at night. (new order), Disp: 3 Box, Rfl: 3    insulin lispro (HUMALOG KWIKPEN INSULIN) 100 unit/mL pen, Inject 14 units w/ breakfast and lunch, 12 units w/ dinner plus scale 150-200+2, 201-250+4, 251-300+6, 301-350+8, >350+10. Max daily 66 units. (Patient taking differently: Inject 14 units w/ breakfast and lunch, and dinner plus scale 150-200+2, 201-250+4, 251-300+6, 301-350+8, >350+10. Max daily 66 units.), Disp: 4 Box,  "Rfl: 3    ketoconazole (NIZORAL) 2 % shampoo, Wash scalp with medicated shampoo at least 2x/week. Let sit on scalp at least 5 minutes prior to rinsing, Disp: 240 mL, Rfl: 5    lancets Misc, To check BG 4  times daily, to use with insurance preferred meter-true metrix, Disp: 400 each, Rfl: 3    latanoprost 0.005 % ophthalmic solution, Place 1 drop into both eyes nightly., Disp: , Rfl:     nystatin (MYCOSTATIN) cream, Apply topically 2 (two) times daily., Disp: 30 g, Rfl: 0    ondansetron (ZOFRAN-ODT) 8 MG TbDL, Take 1 tablet (8 mg total) by mouth 3 (three) times daily as needed., Disp: 30 tablet, Rfl: 6    pantoprazole (PROTONIX) 40 MG tablet, TAKE 1 TABLET BY MOUTH EVERY DAY, Disp: 90 tablet, Rfl: 0    pen needle, diabetic (NOVOFINE 32) 32 gauge x 1/4" Ndle, Uses 4 times a day. 90 day via duramed e 11.65, Disp: 400 each, Rfl: 3    pyridoxine, vitamin B6, (VITAMIN B-6) 100 MG Tab, Take 100 mg by mouth once daily., Disp: , Rfl:     SITagliptin (JANUVIA) 100 MG Tab, Take 1 tablet (100 mg total) by mouth once daily., Disp: 90 tablet, Rfl: 3    blood-glucose meter kit, To check BG 4 times daily, to use with insurance preferred meter-true metrix, Disp: 1 each, Rfl: 1    losartan (COZAAR) 25 MG tablet, Take 0.5 tablets (12.5 mg total) by mouth once daily., Disp: 45 tablet, Rfl: 3    PHYSICAL EXAMINATION:    Constitutional: She appears well-developed and well-nourished.  She is in no apparent distress.    Eyes: No scleral icterus noted bilaterally. No discharge bilaterally.    Nose: No nasal congestion    Cardiovascular: Normal rate.  No pitting edema noted in lower extremities bilaterally    Pulmonary/Chest: Effort normal. No respiratory distress.     Abdominal:  She exhibits no distension.      Neurological: She is alert and oriented to person, place, and time.     Skin: Skin is warm and dry.     Psych: Cooperative with normal affect.    Genitourinary: Normal external female genitalia  Urethral meatus is " normal      Physical Exam      LABS:    U/a: 1.020, pH 5, + leuks, otherwise  negative.      IMPRESSION:    Encounter Diagnoses   Name Primary?    Allergy to iodine Yes    Hematuria, unspecified type        PLAN:      I explained to the patient that the causes of hematuria, whether it be gross hematuria or microhematuria, are many, including but not limited to  infections, kidney stones,  malignancy.  In patients with gross hematuria, the chances of an underlying  malignancy are low at 15-20%.    Nevertheless, I explained to the patient that the evaluation in both cases consists of upper tract imaging followed by flexible cystoscopy. I described the rationale and procedure for both and answered all questions.    Hematuria work up discussed in detail which includes CT urogram, cysto and urine cytology.  Will proceed with hematuria work up:    She has an iodine allergy with a history of swelling and rash.  She cant remember if she had facial swelling or difficulty breathing as it was so long ago.  Therefore I will not do a ct urogram but a renal ultrasound instead with a cysto with retrogrades.    Urine cytology today.        Suzette Montilla PA-C

## 2020-10-23 ENCOUNTER — CLINICAL SUPPORT (OUTPATIENT)
Dept: REHABILITATION | Facility: HOSPITAL | Age: 65
End: 2020-10-23
Attending: ORTHOPAEDIC SURGERY
Payer: MEDICARE

## 2020-10-23 DIAGNOSIS — M25.561 ACUTE PAIN OF RIGHT KNEE: ICD-10-CM

## 2020-10-23 PROCEDURE — 97110 THERAPEUTIC EXERCISES: CPT | Mod: CQ

## 2020-10-23 NOTE — PROGRESS NOTES
"                          Physical Therapy Daily Re-Assessment Note     Name: Riana Baker   Clinic Number: [id number]    Therapy Diagnosis:   Encounter Diagnosis   Name Primary?    Acute pain of right knee      Physician: Sabino Damon MD    Visit Date: 10/23/2020    Physician Orders: PT Eval and Treat   Medical Diagnosis from Referral: M17.11 (ICD-10-CM) - Primary osteoarthritis of right knee  Evaluation Date: 9/25/2020  Authorization Period Expiration: 9/21/20  Plan of Care Expiration: 12/18/20  Visit # / Visits authorized: 6/ 1     Time In:9:30 am  Time Out: 10:45 am  Total Billable Time: 45 minutes     Precautions: Standard    Subjective      Pt reports:knee is doing good   she was compliant with home exercise program given last session.   Response to previous treatment:fair  Functional change: NA    Pain: 8/10  Location: right knee      Objective   Range of Motion: (PROM):10/15/2020      AROM ext: 0  PROM FL: 105       Riana received therapeutic exercises to develop strength, endurance and ROM for 40 minutes including:  Recumbent bike x8 min (in available ROM)  Quad sets w/ heel prop x30 with 5 sec   Heel slides 3x10 with 5 sec hold  Sit to stand 2x8  Knee FL at steps 3x10  Step up 3" 3x10    np:  Calf stretch with towel 3x 30 sec hold-np  Ankle pumps and circles x30 ea way-np  Gait training with walker x5 min-np    MT: x 5 min  Patellar mobs (inferior/superior and medial/lateral) x3 min  Femoral AP mobs    Riana received cold pack for 5 minutes to to decrease circulation, pain, and swelling.            Home Exercises Provided and Patient Education Provided     Education provided:   - HEP to Go    Written Home Exercises Provided: Patient instructed to cont prior HEP.  Exercises were reviewed and Riana was able to demonstrate them prior to the end of the session.  Riana demonstrated good  understanding of the education provided.     See EMR under Patient Instructions for exercises " "provided {Makenna single:80182::"10/23/2020","prior visit"    Assessment     Pt cont to have fear of putting full weight through R LE. Fear of falling because R LE feels weak. Performed more WB ex today to show pt that R LE is strong. Trouble bending knee on step ups on ecc due to strength and fear. Cont VC needed when walking to FL knee and rock onto toes.     Riana is progressing well towards her goals.   Pt prognosis is Good.     Pt will continue to benefit from skilled outpatient physical therapy to address the deficits listed in the problem list box on initial evaluation, provide pt/family education and to maximize pt's level of independence in the home and community environment.     Pt's spiritual, cultural and educational needs considered and pt agreeable to plan of care and goals.    Anticipated barriers to physical therapy: none    Goals:   Short Term Goals: (4 weeks)  Post op  - Pt will increase ROM to 0 deg R knee extension and 90 deg flexion (Progressing, not met)  - Pt will increase strength to 3/5 RLE to tolerate leg raises in all planes (Progressing, not met)  - Decrease Pain to 4/10 as worst with walking >15 min (Progressing, not met)  - Pt to self correct posture and gait with minimal cues and no AD (Progressing, not met)  - Pt independent with HEP with progressions. (Progressing, not met)     Long Term Goals (12 Weeks):Post op  - Pt will increase ROM to +3 deg R knee ext and 120 deg flexion (Progressing, not met)  - Pt will increase strength to 5/5 RLE to return to walking for exercise 1 hour a day (Progressing, not met)  - Decrease Pain to 1/10 as worst with all ADLs (Progressing, not met)  - Pt to return to 80% PLOF (Progressing, not met)       Plan     Continue POC per patient tolerance progressing knee ROM and strength.     Cristal Marie, PTA       "

## 2020-10-26 ENCOUNTER — HOSPITAL ENCOUNTER (OUTPATIENT)
Dept: RADIOLOGY | Facility: HOSPITAL | Age: 65
Discharge: HOME | End: 2020-10-26
Attending: PHYSICIAN ASSISTANT
Payer: MEDICARE

## 2020-10-26 DIAGNOSIS — R31.9 HEMATURIA, UNSPECIFIED TYPE: ICD-10-CM

## 2020-10-26 PROCEDURE — 76770 US EXAM ABDO BACK WALL COMP: CPT | Mod: 26,,, | Performed by: RADIOLOGY

## 2020-10-26 PROCEDURE — 76770 US RETROPERITONEAL COMPLETE: ICD-10-PCS | Mod: 26,,, | Performed by: RADIOLOGY

## 2020-10-26 PROCEDURE — 76770 US EXAM ABDO BACK WALL COMP: CPT | Mod: TC

## 2020-10-27 ENCOUNTER — CLINICAL SUPPORT (OUTPATIENT)
Dept: REHABILITATION | Facility: HOSPITAL | Age: 65
End: 2020-10-27
Attending: ORTHOPAEDIC SURGERY
Payer: MEDICARE

## 2020-10-27 DIAGNOSIS — M25.561 ACUTE PAIN OF RIGHT KNEE: ICD-10-CM

## 2020-10-27 LAB — FINAL PATHOLOGIC DIAGNOSIS: NORMAL

## 2020-10-27 PROCEDURE — 97110 THERAPEUTIC EXERCISES: CPT | Mod: CQ

## 2020-10-27 NOTE — PROGRESS NOTES
"                          Physical Therapy Daily Re-Assessment Note     Name: Riana Baker   Clinic Number: [id number]    Therapy Diagnosis:   Encounter Diagnosis   Name Primary?    Acute pain of right knee      Physician: Sabino Damon MD    Visit Date: 10/27/2020    Physician Orders: PT Eval and Treat   Medical Diagnosis from Referral: M17.11 (ICD-10-CM) - Primary osteoarthritis of right knee  Evaluation Date: 9/25/2020  Authorization Period Expiration: 9/21/20  Plan of Care Expiration: 12/18/20  Visit # / Visits authorized: 6/ 1     Time In:8:30 am (has to leave by 9:00)  Time Out: 9:00 am  Total Billable Time: 30 minutes     Precautions: Standard    Subjective      Pt reports:knee is doing good   she was compliant with home exercise program given last session.   Response to previous treatment:fair  Functional change: NA    Pain: 8/10  Location: right knee      Objective   Range of Motion: (PROM):10/15/2020      AROM ext: 0  PROM FL: 105       Riana received therapeutic exercises to develop strength, endurance and ROM for 25 minutes including:  Recumbent bike x5 min (full rev)  Quad sets w/ heel prop x20x10"hold   SLR w/ QS 30x  Heel slides 3x10 with 5 sec hold-NP  Sit to stand 2x8-NP  Knee FL at steps 3x10  Step up 4" 3x10  Gait training with SPC x5 min    np:  Calf stretch with towel 3x 30 sec hold-np  Ankle pumps and circles x30 ea way-np      MT: x 5 min  Patellar mobs (inferior/superior and medial/lateral) x3 min  Femoral AP mobs    Riana received cold pack for 5 minutes to to decrease circulation, pain, and swelling.            Home Exercises Provided and Patient Education Provided     Education provided:   - HEP to Go    Written Home Exercises Provided: Patient instructed to cont prior HEP.  Exercises were reviewed and Riana was able to demonstrate them prior to the end of the session.  Riana demonstrated good  understanding of the education provided.     See EMR under Patient " "Instructions for exercises provided {Makenna single:63723::"10/27/2020","prior visit"    Assessment     Pt was able to perform full rev on bike today. Session cut short 2* pt having conflicting appt. Pt practiced walking w/ SPC to start weaning off of walker. Instructed to use cane home and to use walking in public until she feels confident enough to switch to cane. Need to work on ecc quad control for stepping down, decreased knee flexion. Able to perform SLR w/o ext lag. Pt needs to build confidence that R LE is stronger than she thinks.   Riana is progressing well towards her goals.   Pt prognosis is Good.     Pt will continue to benefit from skilled outpatient physical therapy to address the deficits listed in the problem list box on initial evaluation, provide pt/family education and to maximize pt's level of independence in the home and community environment.     Pt's spiritual, cultural and educational needs considered and pt agreeable to plan of care and goals.    Anticipated barriers to physical therapy: none    Goals:   Short Term Goals: (4 weeks)  Post op  - Pt will increase ROM to 0 deg R knee extension and 90 deg flexion (Progressing, not met)  - Pt will increase strength to 3/5 RLE to tolerate leg raises in all planes (Progressing, not met)  - Decrease Pain to 4/10 as worst with walking >15 min (Progressing, not met)  - Pt to self correct posture and gait with minimal cues and no AD (Progressing, not met)  - Pt independent with HEP with progressions. (Progressing, not met)     Long Term Goals (12 Weeks):Post op  - Pt will increase ROM to +3 deg R knee ext and 120 deg flexion (Progressing, not met)  - Pt will increase strength to 5/5 RLE to return to walking for exercise 1 hour a day (Progressing, not met)  - Decrease Pain to 1/10 as worst with all ADLs (Progressing, not met)  - Pt to return to 80% PLOF (Progressing, not met)       Plan     Continue POC per patient tolerance progressing knee ROM and " strength.     Cristal Marie, PTA

## 2020-11-03 ENCOUNTER — CLINICAL SUPPORT (OUTPATIENT)
Dept: REHABILITATION | Facility: HOSPITAL | Age: 65
End: 2020-11-03
Attending: ORTHOPAEDIC SURGERY
Payer: MEDICARE

## 2020-11-03 DIAGNOSIS — M25.561 ACUTE PAIN OF RIGHT KNEE: ICD-10-CM

## 2020-11-03 PROCEDURE — 97140 MANUAL THERAPY 1/> REGIONS: CPT

## 2020-11-03 PROCEDURE — 97110 THERAPEUTIC EXERCISES: CPT

## 2020-11-03 NOTE — PROGRESS NOTES
"                          Physical Therapy Daily Re-Assessment Note     Name: Riana Baker   Clinic Number: [id number]    Therapy Diagnosis:   Encounter Diagnosis   Name Primary?    Acute pain of right knee      Physician: Sabino Damon MD    Visit Date: 11/3/2020    Physician Orders: PT Eval and Treat   Medical Diagnosis from Referral: M17.11 (ICD-10-CM) - Primary osteoarthritis of right knee  Evaluation Date: 9/25/2020  Authorization Period Expiration: 11/25/20  Plan of Care Expiration: 12/18/20  Visit # / Visits authorized: 8/ 18     Time In:1100 am  Time Out: 1145 am  Total Billable Time: 45 minutes     Precautions: Standard    Subjective      Pt reports:she still feels that she needs the cane for balance during gait.    she was compliant with home exercise program given last session.   Response to previous treatment:fair  Functional change: NA    Pain: 0/10  Location: right knee      Objective   Range of Motion: (PROM):11/3/2020      AROM ext: 0  PROM FL: 112 deg      Riana received therapeutic exercises to develop strength, endurance and ROM for 35 minutes including:  Recumbent bike x8 min (full rev)  Heel prop on bolster 5# x3 min  SLR and SL hip abd w/ QS 30x  Heel slides 3x10 with 5 sec hold-  Shuttle dbl leg press 3x10 50#   Sit to stand 2x8-NP  Knee FL at steps 3x10-np  Step up 4" 3x10-np  Gait training with SPC x5 min-np        Riana received the following manual therapy techniques: Joint mobilizations and Soft tissue Mobilization were applied to the: R knee for 10 minutes, including:  Patellar mobs (inferior/superior and medial/lateral) x3 min  Femoral AP mobs  STM for scar tissue management      Home Exercises Provided and Patient Education Provided     Education provided:   - HEP to Go    Written Home Exercises Provided: Patient instructed to cont prior HEP.  Exercises were reviewed and Riana was able to demonstrate them prior to the end of the session.  Riana demonstrated good " " understanding of the education provided.     See EMR under Patient Instructions for exercises provided {Blank single:51319::"11/3/2020","prior visit"    Assessment     Pt tolerated leg press with with good quad control and minimal compensations initially. Pt has some fear with gait without SPC and needed cues to remember full knee extension prior to heel strike. Pt responded well to STM/manual therapy applied to decrease scar tissue and help with patellar mobility.   Riana is progressing well towards her goals.   Pt prognosis is Good.     Pt will continue to benefit from skilled outpatient physical therapy to address the deficits listed in the problem list box on initial evaluation, provide pt/family education and to maximize pt's level of independence in the home and community environment.     Pt's spiritual, cultural and educational needs considered and pt agreeable to plan of care and goals.    Anticipated barriers to physical therapy: none    Goals:   Short Term Goals: (4 weeks)  Post op  - Pt will increase ROM to 0 deg R knee extension and 90 deg flexion (Progressing, not met)  - Pt will increase strength to 3/5 RLE to tolerate leg raises in all planes (Progressing, not met)  - Decrease Pain to 4/10 as worst with walking >15 min (Progressing, not met)  - Pt to self correct posture and gait with minimal cues and no AD (Progressing, not met)  - Pt independent with HEP with progressions. (Progressing, not met)     Long Term Goals (12 Weeks):Post op  - Pt will increase ROM to +3 deg R knee ext and 120 deg flexion (Progressing, not met)  - Pt will increase strength to 5/5 RLE to return to walking for exercise 1 hour a day (Progressing, not met)  - Decrease Pain to 1/10 as worst with all ADLs (Progressing, not met)  - Pt to return to 80% PLOF (Progressing, not met)       Plan     Continue POC per patient tolerance progressing knee ROM and strength.     Bernadette Palacios, PT       "

## 2020-11-04 ENCOUNTER — TELEPHONE (OUTPATIENT)
Dept: UROLOGY | Facility: CLINIC | Age: 65
End: 2020-11-04

## 2020-11-04 DIAGNOSIS — Z01.818 PREOP EXAMINATION: ICD-10-CM

## 2020-11-04 DIAGNOSIS — R31.0 GROSS HEMATURIA: Primary | ICD-10-CM

## 2020-11-06 ENCOUNTER — CLINICAL SUPPORT (OUTPATIENT)
Dept: REHABILITATION | Facility: HOSPITAL | Age: 65
End: 2020-11-06
Payer: MEDICARE

## 2020-11-06 DIAGNOSIS — M25.561 ACUTE PAIN OF RIGHT KNEE: ICD-10-CM

## 2020-11-06 PROCEDURE — 97110 THERAPEUTIC EXERCISES: CPT | Mod: CQ

## 2020-11-06 NOTE — PROGRESS NOTES
"                          Physical Therapy Daily Re-Assessment Note     Name: Riana Baker   Clinic Number: [id number]    Therapy Diagnosis:   Encounter Diagnosis   Name Primary?    Acute pain of right knee      Physician: Sabino Damon MD    Visit Date: 11/6/2020    Physician Orders: PT Eval and Treat   Medical Diagnosis from Referral: M17.11 (ICD-10-CM) - Primary osteoarthritis of right knee  Evaluation Date: 9/25/2020  Authorization Period Expiration: 11/25/20  Plan of Care Expiration: 12/18/20  Visit # / Visits authorized: 9/ 18     Time In: 8:45 am  Time Out: 9:45 am  Total Billable Time: 60 minutes     Precautions: Standard    Subjective      Pt reports:she still feels that she needs the cane for balance during gait.    she was compliant with home exercise program given last session.   Response to previous treatment:fair  Functional change: NA    Pain: 0/10  Location: right knee      Objective   Range of Motion: (PROM):11/3/2020      AROM ext: 0  PROM FL: 115 deg      Riana received therapeutic exercises to develop strength, endurance and ROM for 55 minutes including:  Recumbent bike x8 min (full rev)  Heel prop on bolster 5# x3 min  SLR and SL hip abd w/ QS 30x  Heel slides x 3'  Shuttle dbl leg press 3x10 50#   Heel raises 3x12  Step up fwd 4" 30x        Riana received the following manual therapy techniques: Joint mobilizations and Soft tissue Mobilization were applied to the: R knee for 5 minutes, including:  Patellar mobs (inferior/superior and medial/lateral) x0 min  Femoral AP mobs  STM for scar tissue management      Home Exercises Provided and Patient Education Provided     Education provided:   - HEP to Go    Written Home Exercises Provided: Patient instructed to cont prior HEP.  Exercises were reviewed and Riana was able to demonstrate them prior to the end of the session.  Riana demonstrated good  understanding of the education provided.     See EMR under Patient " "Instructions for exercises provided {Makenna single:30873::"11/6/2020","prior visit"    Assessment     Pt started session lacking hyperext but after manual S and heel prop S she was able to achieve full knee ext to 0. Amb w/ TKE today. Cont to show quad weakness and lack of confidence of R LE function. Pt also states that L LE feels weak.   Riana is progressing well towards her goals.   Pt prognosis is Good.     Pt will continue to benefit from skilled outpatient physical therapy to address the deficits listed in the problem list box on initial evaluation, provide pt/family education and to maximize pt's level of independence in the home and community environment.     Pt's spiritual, cultural and educational needs considered and pt agreeable to plan of care and goals.    Anticipated barriers to physical therapy: none    Goals:   Short Term Goals: (4 weeks)  Post op  - Pt will increase ROM to 0 deg R knee extension and 90 deg flexion (Progressing, not met)  - Pt will increase strength to 3/5 RLE to tolerate leg raises in all planes (Progressing, not met)  - Decrease Pain to 4/10 as worst with walking >15 min (Progressing, not met)  - Pt to self correct posture and gait with minimal cues and no AD (Progressing, not met)  - Pt independent with HEP with progressions. (Progressing, not met)     Long Term Goals (12 Weeks):Post op  - Pt will increase ROM to +3 deg R knee ext and 120 deg flexion (Progressing, not met)  - Pt will increase strength to 5/5 RLE to return to walking for exercise 1 hour a day (Progressing, not met)  - Decrease Pain to 1/10 as worst with all ADLs (Progressing, not met)  - Pt to return to 80% PLOF (Progressing, not met)       Plan     Continue POC per patient tolerance progressing knee ROM and strength.     Cristal Marie, PTA       "

## 2020-11-10 ENCOUNTER — CLINICAL SUPPORT (OUTPATIENT)
Dept: REHABILITATION | Facility: HOSPITAL | Age: 65
End: 2020-11-10
Attending: ORTHOPAEDIC SURGERY
Payer: MEDICARE

## 2020-11-10 DIAGNOSIS — M25.561 ACUTE PAIN OF RIGHT KNEE: ICD-10-CM

## 2020-11-10 DIAGNOSIS — Z96.651 STATUS POST TOTAL RIGHT KNEE REPLACEMENT: Primary | ICD-10-CM

## 2020-11-10 PROCEDURE — 97110 THERAPEUTIC EXERCISES: CPT

## 2020-11-10 NOTE — PROGRESS NOTES
"                          Physical Therapy Daily Re-Assessment Note     Name: Riana Baker   Clinic Number: [id number]    Therapy Diagnosis:   Encounter Diagnosis   Name Primary?    Acute pain of right knee      Physician: Sabino Damon MD    Visit Date: 11/10/2020    Physician Orders: PT Eval and Treat   Medical Diagnosis from Referral: M17.11 (ICD-10-CM) - Primary osteoarthritis of right knee  Evaluation Date: 9/25/2020  Authorization Period Expiration: 11/25/20  Plan of Care Expiration: 12/18/20  Visit # / Visits authorized: 10/ 18     Time In: 8:00 am  Time Out: 8:40 am   Total Billable Time: 40 minutes     Precautions: Standard    Subjective      Pt reports:she feels very little pain is any at all.    she was compliant with home exercise program given last session.   Response to previous treatment:fair  Functional change: NA    Pain: 0/10  Location: right knee      Objective   Range of Motion: (PROM):11/10/2020      AROM ext: 0  PROM FL: 115 deg      Riana received therapeutic exercises to develop strength, endurance and ROM for 40 minutes including:  Recumbent bike x10 min (full rev)  Heel prop on bolster 5# x3 min-np  SLR and SL hip abd w/ QS 30x 1#  Heel slides x 3'-np  Shuttle dbl leg press 3x10 62.5#   Heel raises 3x12-np  Step up fwd 6" 20x        Riana received the following manual therapy techniques: Joint mobilizations and Soft tissue Mobilization were applied to the: R knee for 0 minutes, including:  Patellar mobs (inferior/superior and medial/lateral) x0 min  Femoral AP mobs  STM for scar tissue management      Home Exercises Provided and Patient Education Provided     Education provided:   - HEP to Go    Written Home Exercises Provided: Patient instructed to cont prior HEP.  Exercises were reviewed and Riana was able to demonstrate them prior to the end of the session.  Riana demonstrated good  understanding of the education provided.     See EMR under Patient Instructions " "for exercises provided {Makenna single:93405::"11/10/2020","prior visit"    Assessment     Pt was able to progress resistance well with leg raises and leg press well. She is showing improved knee flexion and close to meeting her functional goals.   Riana is progressing well towards her goals.   Pt prognosis is Good.     Pt will continue to benefit from skilled outpatient physical therapy to address the deficits listed in the problem list box on initial evaluation, provide pt/family education and to maximize pt's level of independence in the home and community environment.     Pt's spiritual, cultural and educational needs considered and pt agreeable to plan of care and goals.    Anticipated barriers to physical therapy: none    Goals:   Short Term Goals: (4 weeks)  Post op  - Pt will increase ROM to 0 deg R knee extension and 90 deg flexion (met)  - Pt will increase strength to 3/5 RLE to tolerate leg raises in all planes (Progressing, not met)  - Decrease Pain to 4/10 as worst with walking >15 min ( met)  - Pt to self correct posture and gait with minimal cues and no AD (met)  - Pt independent with HEP with progressions. ( met)     Long Term Goals (12 Weeks):Post op  - Pt will increase ROM to +3 deg R knee ext and 120 deg flexion (Progressing, not met)  - Pt will increase strength to 5/5 RLE to return to walking for exercise 1 hour a day (Progressing, not met)  - Decrease Pain to 1/10 as worst with all ADLs (Progressing, not met)  - Pt to return to 80% PLOF (Progressing, not met)       Plan     Continue POC per patient tolerance progressing knee ROM and strength.     Bernadette Palacios, PT       "

## 2020-11-11 ENCOUNTER — TELEPHONE (OUTPATIENT)
Dept: ORTHOPEDICS | Facility: CLINIC | Age: 65
End: 2020-11-11

## 2020-11-11 NOTE — TELEPHONE ENCOUNTER
I spoke to the patient and conformed her appointment time for 11-. I also told her she have to get an x-ray before she comes to clinic. [facility]. She understood and was ok with the phone call.

## 2020-11-12 ENCOUNTER — CLINICAL SUPPORT (OUTPATIENT)
Dept: REHABILITATION | Facility: HOSPITAL | Age: 65
End: 2020-11-12
Attending: ORTHOPAEDIC SURGERY
Payer: MEDICARE

## 2020-11-12 DIAGNOSIS — M25.561 ACUTE PAIN OF RIGHT KNEE: ICD-10-CM

## 2020-11-12 PROCEDURE — 97110 THERAPEUTIC EXERCISES: CPT | Mod: CQ

## 2020-11-12 NOTE — PROGRESS NOTES
"                          Physical Therapy Daily Re-Assessment Note     Name: Riana Baker   Clinic Number: [id number]    Therapy Diagnosis:   Encounter Diagnosis   Name Primary?    Acute pain of right knee      Physician: Sabino Damon MD    Visit Date: 11/12/2020    Physician Orders: PT Eval and Treat   Medical Diagnosis from Referral: M17.11 (ICD-10-CM) - Primary osteoarthritis of right knee  Evaluation Date: 9/25/2020  Authorization Period Expiration: 11/25/20  Plan of Care Expiration: 12/18/20  Visit # / Visits authorized: 11/ 18     Time In: 7:50 am  Time Out: 8:30 am   Total Billable Time: 40 minutes     Precautions: Standard    Subjective      Pt reports:knee is feeling better   she was compliant with home exercise program given last session.   Response to previous treatment:fair  Functional change: NA    Pain: 0/10  Location: right knee      Objective   Range of Motion: (PROM):11/112/2020      AROM ext: 1 hyper  PROM FL: 115 deg      Riana received therapeutic exercises to develop strength, endurance and ROM for 40 minutes including:  Recumbent bike x10 min (full rev)  Heel prop on bolster 5# x3 min-np  SLR and SL hip abd w/ QS 30x 1#  Heel slides x 3'-np  Shuttle dbl leg press 3x10 62.5#   Heel raises 3x12-np  Lateral step up 6" 4x8  Tandem walking 3 laps      Riana received the following manual therapy techniques: Joint mobilizations and Soft tissue Mobilization were applied to the: R knee for 0 minutes, including:  Patellar mobs (inferior/superior and medial/lateral) x0 min  Femoral AP mobs  STM for scar tissue management      Home Exercises Provided and Patient Education Provided     Education provided:   - HEP to Go    Written Home Exercises Provided: Patient instructed to cont prior HEP.  Exercises were reviewed and Riana was able to demonstrate them prior to the end of the session.  Riana demonstrated good  understanding of the education provided.     See EMR under Patient " "Instructions for exercises provided {Makenna single:51397::"11/12/2020","prior visit"    Assessment     Pt dharmesh session well. Maintaining full knee ext carry over. Able to amb w/o LOB w/ AD in clinic. VC to use UE as little as possible w/ lateral step up.   Riana is progressing well towards her goals.   Pt prognosis is Good.     Pt will continue to benefit from skilled outpatient physical therapy to address the deficits listed in the problem list box on initial evaluation, provide pt/family education and to maximize pt's level of independence in the home and community environment.     Pt's spiritual, cultural and educational needs considered and pt agreeable to plan of care and goals.    Anticipated barriers to physical therapy: none    Goals:   Short Term Goals: (4 weeks)  Post op  - Pt will increase ROM to 0 deg R knee extension and 90 deg flexion (met)  - Pt will increase strength to 3/5 RLE to tolerate leg raises in all planes (Progressing, not met)  - Decrease Pain to 4/10 as worst with walking >15 min ( met)  - Pt to self correct posture and gait with minimal cues and no AD (met)  - Pt independent with HEP with progressions. ( met)     Long Term Goals (12 Weeks):Post op  - Pt will increase ROM to +3 deg R knee ext and 120 deg flexion (Progressing, not met)  - Pt will increase strength to 5/5 RLE to return to walking for exercise 1 hour a day (Progressing, not met)  - Decrease Pain to 1/10 as worst with all ADLs (Progressing, not met)  - Pt to return to 80% PLOF (Progressing, not met)       Plan     Continue POC per patient tolerance progressing knee ROM and strength.     Cristal Marie, PTA       "

## 2020-11-13 RX ORDER — LOSARTAN POTASSIUM 25 MG/1
12.5 TABLET ORAL DAILY
Qty: 45 TABLET | Refills: 3 | Status: SHIPPED | OUTPATIENT
Start: 2020-11-13 | End: 2021-05-20 | Stop reason: SDUPTHER

## 2020-11-17 ENCOUNTER — CLINICAL SUPPORT (OUTPATIENT)
Dept: REHABILITATION | Facility: HOSPITAL | Age: 65
End: 2020-11-17
Attending: ORTHOPAEDIC SURGERY
Payer: MEDICARE

## 2020-11-17 DIAGNOSIS — M25.561 ACUTE PAIN OF RIGHT KNEE: ICD-10-CM

## 2020-11-17 PROCEDURE — 97110 THERAPEUTIC EXERCISES: CPT | Mod: CQ

## 2020-11-17 NOTE — PROGRESS NOTES
"                          Physical Therapy Daily Re-Assessment Note     Name: Riana Baker   Clinic Number: [id number]    Therapy Diagnosis:   Encounter Diagnosis   Name Primary?    Acute pain of right knee      Physician: Sabino Damon MD    Visit Date: 11/17/2020    Physician Orders: PT Eval and Treat   Medical Diagnosis from Referral: M17.11 (ICD-10-CM) - Primary osteoarthritis of right knee  Evaluation Date: 9/25/2020  Authorization Period Expiration: 11/25/20  Plan of Care Expiration: 12/18/20  Visit # / Visits authorized: 12/ 18     Time In: 7:45 am  Time Out: 8:30 am   Total Billable Time: 45 minutes     Precautions: Standard    Subjective      Pt reports: knee is stiff today   she was compliant with home exercise program given last session.   Response to previous treatment:fair  Functional change: NA    Pain: 0/10  Location: right knee      Objective   Range of Motion: (PROM):11/112/2020      AROM ext: 1 hyper  PROM FL: 115 deg      Riana received therapeutic exercises to develop strength, endurance and ROM for 45 minutes including:  Recumbent bike x10 min (full rev)  Heel prop on bolster 5# x3 min-np  SLR and SL hip abd w/ QS 30x 1#  Shuttle dbl leg press 3x10 62.5#   Heel raises 3x15  Lateral step up 6" 3x10  Tandem walking 3 laps      Riana received the following manual therapy techniques: Joint mobilizations and Soft tissue Mobilization were applied to the: R knee for 0 minutes, including:  Patellar mobs (inferior/superior and medial/lateral) x0 min  Femoral AP mobs  STM for scar tissue management      Home Exercises Provided and Patient Education Provided     Education provided:   - HEP to Go    Written Home Exercises Provided: Patient instructed to cont prior HEP.  Exercises were reviewed and Riana was able to demonstrate them prior to the end of the session.  Riana demonstrated good  understanding of the education provided.     See EMR under Patient Instructions for exercises " "provided {Makenna single:01313::"11/17/2020","prior visit"    Assessment     R LE cont to improve in strength. Decreased L push off w/ R lateral step up. Able to perform tandem walking w/o LOB but with increased stride length.   Riana is progressing well towards her goals.   Pt prognosis is Good.     Pt will continue to benefit from skilled outpatient physical therapy to address the deficits listed in the problem list box on initial evaluation, provide pt/family education and to maximize pt's level of independence in the home and community environment.     Pt's spiritual, cultural and educational needs considered and pt agreeable to plan of care and goals.    Anticipated barriers to physical therapy: none    Goals:   Short Term Goals: (4 weeks)  Post op  - Pt will increase ROM to 0 deg R knee extension and 90 deg flexion (met)  - Pt will increase strength to 3/5 RLE to tolerate leg raises in all planes (Progressing, not met)  - Decrease Pain to 4/10 as worst with walking >15 min ( met)  - Pt to self correct posture and gait with minimal cues and no AD (met)  - Pt independent with HEP with progressions. ( met)     Long Term Goals (12 Weeks):Post op  - Pt will increase ROM to +3 deg R knee ext and 120 deg flexion (Progressing, not met)  - Pt will increase strength to 5/5 RLE to return to walking for exercise 1 hour a day (Progressing, not met)  - Decrease Pain to 1/10 as worst with all ADLs (Progressing, not met)  - Pt to return to 80% PLOF (Progressing, not met)       Plan     Continue POC per patient tolerance progressing knee ROM and strength.     Cristal Marie, PTA       "

## 2020-11-19 ENCOUNTER — OFFICE VISIT (OUTPATIENT)
Dept: ORTHOPEDICS | Facility: CLINIC | Age: 65
End: 2020-11-19
Payer: MEDICARE

## 2020-11-19 ENCOUNTER — HOSPITAL ENCOUNTER (OUTPATIENT)
Dept: RADIOLOGY | Facility: HOSPITAL | Age: 65
Discharge: HOME | End: 2020-11-19
Attending: PHYSICIAN ASSISTANT
Payer: MEDICARE

## 2020-11-19 ENCOUNTER — PATIENT MESSAGE (OUTPATIENT)
Dept: ADMINISTRATIVE | Facility: OTHER | Age: 65
End: 2020-11-19

## 2020-11-19 VITALS — BODY MASS INDEX: 27.93 KG/M2 | HEIGHT: 65 IN | WEIGHT: 167.63 LBS

## 2020-11-19 DIAGNOSIS — Z96.651 STATUS POST TOTAL RIGHT KNEE REPLACEMENT: ICD-10-CM

## 2020-11-19 DIAGNOSIS — Z96.651 STATUS POST TOTAL RIGHT KNEE REPLACEMENT: Primary | ICD-10-CM

## 2020-11-19 PROCEDURE — 73562 X-RAY EXAM OF KNEE 3: CPT | Mod: 26,RT,, | Performed by: RADIOLOGY

## 2020-11-19 PROCEDURE — 1101F PR PT FALLS ASSESS DOC 0-1 FALLS W/OUT INJ PAST YR: ICD-10-PCS | Mod: CPTII,S$GLB,, | Performed by: PHYSICIAN ASSISTANT

## 2020-11-19 PROCEDURE — 73562 X-RAY EXAM OF KNEE 3: CPT | Mod: TC,RT

## 2020-11-19 PROCEDURE — 3008F PR BODY MASS INDEX (BMI) DOCUMENTED: ICD-10-PCS | Mod: CPTII,S$GLB,, | Performed by: PHYSICIAN ASSISTANT

## 2020-11-19 PROCEDURE — 73560 X-RAY EXAM OF KNEE 1 OR 2: CPT | Mod: TC,LT

## 2020-11-19 PROCEDURE — 1157F PR ADVANCE CARE PLAN OR EQUIV PRESENT IN MEDICAL RECORD: ICD-10-PCS | Mod: S$GLB,,, | Performed by: PHYSICIAN ASSISTANT

## 2020-11-19 PROCEDURE — 3288F FALL RISK ASSESSMENT DOCD: CPT | Mod: CPTII,S$GLB,, | Performed by: PHYSICIAN ASSISTANT

## 2020-11-19 PROCEDURE — 99999 PR PBB SHADOW E&M-EST. PATIENT-LVL IV: ICD-10-PCS | Mod: PBBFAC,,, | Performed by: PHYSICIAN ASSISTANT

## 2020-11-19 PROCEDURE — 1126F AMNT PAIN NOTED NONE PRSNT: CPT | Mod: S$GLB,,, | Performed by: PHYSICIAN ASSISTANT

## 2020-11-19 PROCEDURE — 3008F BODY MASS INDEX DOCD: CPT | Mod: CPTII,S$GLB,, | Performed by: PHYSICIAN ASSISTANT

## 2020-11-19 PROCEDURE — 1157F ADVNC CARE PLAN IN RCRD: CPT | Mod: S$GLB,,, | Performed by: PHYSICIAN ASSISTANT

## 2020-11-19 PROCEDURE — 1101F PT FALLS ASSESS-DOCD LE1/YR: CPT | Mod: CPTII,S$GLB,, | Performed by: PHYSICIAN ASSISTANT

## 2020-11-19 PROCEDURE — 73560 X-RAY EXAM OF KNEE 1 OR 2: CPT | Mod: 26,59,LT, | Performed by: RADIOLOGY

## 2020-11-19 PROCEDURE — 99024 PR POST-OP FOLLOW-UP VISIT: ICD-10-PCS | Mod: S$GLB,,, | Performed by: PHYSICIAN ASSISTANT

## 2020-11-19 PROCEDURE — 99999 PR PBB SHADOW E&M-EST. PATIENT-LVL IV: CPT | Mod: PBBFAC,,, | Performed by: PHYSICIAN ASSISTANT

## 2020-11-19 PROCEDURE — 1126F PR PAIN SEVERITY QUANTIFIED, NO PAIN PRESENT: ICD-10-PCS | Mod: S$GLB,,, | Performed by: PHYSICIAN ASSISTANT

## 2020-11-19 PROCEDURE — 73560 XR KNEE ORTHO RIGHT: ICD-10-PCS | Mod: 26,59,LT, | Performed by: RADIOLOGY

## 2020-11-19 PROCEDURE — 73562 XR KNEE ORTHO RIGHT: ICD-10-PCS | Mod: 26,RT,, | Performed by: RADIOLOGY

## 2020-11-19 PROCEDURE — 3288F PR FALLS RISK ASSESSMENT DOCUMENTED: ICD-10-PCS | Mod: CPTII,S$GLB,, | Performed by: PHYSICIAN ASSISTANT

## 2020-11-19 PROCEDURE — 99024 POSTOP FOLLOW-UP VISIT: CPT | Mod: S$GLB,,, | Performed by: PHYSICIAN ASSISTANT

## 2020-11-19 NOTE — PROGRESS NOTES
"Riana Kay presents for 6 week post-operative visit following a right total knee arthroplasty performed by Dr. Damon on 10/6/2020. Patient is doing well. Having minimal pain. Off of pain medication. In PT at [place].     Exam:   Height 5' 5" (1.651 m), weight 76 kg (167 lb 9.6 oz).   Ambulating well with assistive device.  Incision is well healed   ROM:0-115    New radiographs obtained reviewed today revealing a well fixed and aligned prosthesis.    A/P:  6 weeks s/p right total knee replacement  - Outpatient PT ongoing at [facility]. May d/c to [facility].   - Reviewed antibiotic prophylaxis   - Follow up in 6 weeks with Dr. Damon. Pt will call clinic with problems/concerns.     "

## 2020-11-25 ENCOUNTER — CLINICAL SUPPORT (OUTPATIENT)
Dept: REHABILITATION | Facility: HOSPITAL | Age: 65
End: 2020-11-25
Attending: ORTHOPAEDIC SURGERY
Payer: MEDICARE

## 2020-11-25 ENCOUNTER — TELEPHONE (OUTPATIENT)
Dept: ORTHOPEDICS | Facility: CLINIC | Age: 65
End: 2020-11-25

## 2020-11-25 DIAGNOSIS — M25.561 ACUTE PAIN OF RIGHT KNEE: ICD-10-CM

## 2020-11-25 PROCEDURE — 97110 THERAPEUTIC EXERCISES: CPT

## 2020-11-25 NOTE — PROGRESS NOTES
"                          Physical Therapy Daily Re-Assessment Note     Name: Riana Baker   Clinic Number: [id number]    Therapy Diagnosis:   Encounter Diagnosis   Name Primary?    Acute pain of right knee      Physician: Sabino Damon MD    Visit Date: 11/25/2020    Physician Orders: PT Eval and Treat   Medical Diagnosis from Referral: M17.11 (ICD-10-CM) - Primary osteoarthritis of right knee  Evaluation Date: 9/25/2020  Authorization Period Expiration: 11/25/20  Plan of Care Expiration: 12/18/20  Visit # / Visits authorized: 1/ 18     Time In: 7:50 am  Time Out: 8:30 am   Total Billable Time: 40 minutes     Precautions: Standard    Subjective      Pt reports: knee is stiff today and wants to know how long that will last.    she was compliant with home exercise program given last session.   Response to previous treatment:fair  Functional change: NA    Pain: 0/10  Location: right knee      Objective   Range of Motion: (PROM):11/12/2020      AROM ext: 1 hyper  PROM FL: 115 deg      Riana received therapeutic exercises to develop strength, endurance and ROM for 35 minutes including:  Recumbent bike x15 min (full rev)  Heel prop on bolster 5# x3 min-np  SLR and SL hip abd w/ QS 30x 1#  Shuttle dbl leg press 3x10 62.5#   Heel raises 3x15-np  LAQ on hammer xfatigue R  Lateral step up 6" 3x10-np  Tandem walking 3 laps-np      Riana received the following manual therapy techniques: Joint mobilizations and Soft tissue Mobilization were applied to the: R knee for 5 minutes, including:  Patellar mobs (inferior/superior and medial/lateral) x0 min  Femoral AP mobs  STM for scar tissue management      Home Exercises Provided and Patient Education Provided     Education provided:   - HEP to Go    Written Home Exercises Provided: Patient instructed to cont prior HEP.  Exercises were reviewed and Riana was able to demonstrate them prior to the end of the session.  Riana demonstrated good  understanding of " "the education provided.     See EMR under Patient Instructions for exercises provided {Makenna single:62395::"11/25/2020","prior visit"    Assessment     Pt requested increased duration of bike due to quick improvements she notices with her knee ROM. She is tolerating open chain knee extension better.   Riana is progressing well towards her goals.   Pt prognosis is Good.     Pt will continue to benefit from skilled outpatient physical therapy to address the deficits listed in the problem list box on initial evaluation, provide pt/family education and to maximize pt's level of independence in the home and community environment.     Pt's spiritual, cultural and educational needs considered and pt agreeable to plan of care and goals.    Anticipated barriers to physical therapy: none    Goals:   Short Term Goals: (4 weeks)  Post op  - Pt will increase ROM to 0 deg R knee extension and 90 deg flexion (met)  - Pt will increase strength to 3/5 RLE to tolerate leg raises in all planes (Progressing, not met)  - Decrease Pain to 4/10 as worst with walking >15 min ( met)  - Pt to self correct posture and gait with minimal cues and no AD (met)  - Pt independent with HEP with progressions. ( met)     Long Term Goals (12 Weeks):Post op  - Pt will increase ROM to +3 deg R knee ext and 120 deg flexion (Progressing, not met)  - Pt will increase strength to 5/5 RLE to return to walking for exercise 1 hour a day (Progressing, not met)  - Decrease Pain to 1/10 as worst with all ADLs (Progressing, not met)  - Pt to return to 80% PLOF (Progressing, not met)       Plan     Continue POC per patient tolerance progressing knee ROM and strength.     Bernadette Palacios, PT       "

## 2020-11-25 NOTE — TELEPHONE ENCOUNTER
Spoke with pts , notified him her appt is r/s to an earlier time. He was pleased and had no further questions.    ----- Message from Anna Tamez MA sent at 11/24/2020  9:36 AM CST -----  Regarding: FW: POST OP APPOINTMENT  Contact:  - Kemal Kay  Spoke with Mr. Sommer regarding an early arrival for appointment for his wife ,I stated I could  not move the time ,Dr. Damon medical  assit will contact him  ----- Message -----  From: Mojgan Mcrae  Sent: 11/24/2020   9:02 AM CST  To: [facility] Staff  Subject: POST OP APPOINTMENT                               - KEMAL KAY states the pt need an earlier appointment time. As early as possible. Request 7:30a if possible  ask for a call      Contact info

## 2020-11-25 NOTE — PRE-PROCEDURE INSTRUCTIONS
Spoke to patient -- does speak some English -- reviewed meds -- sending instructions to her  thru My chart

## 2020-11-25 NOTE — ANESTHESIA PAT ROS NOTE
11/25/2020  Riana Kay is a 65 y.o., female.      Pre-op Assessment          Review of Systems  Anesthesia Hx:  Hx of Anesthetic complications PONV History of prior surgery of interest to airway management or planning: Previous anesthesia: Spinal  10/6/20 arthroplasty with spinal.    Social:  Non-Smoker, No Alcohol Use    Hematology/Oncology:        Hematology Comments: Hx of gross hematuria  Has been on ASA 81mg   EENT/Dental:   chronic allergic rhinitis   Cardiovascular:   Hypertension CAD   hyperlipidemia Coronary arthrosclerosis   Pulmonary:   Sleep Apnea Doesn't use c-pap   Renal/:   Gross hematuria   Hepatic/GI:   GERD Liver Disease, Hx fatty liver   Musculoskeletal:   Arthritis  Osteopenia  Muscle spasms of neck  Acute pain of knee  Using cane  osteoarthritis   Neurological:   Neuromuscular Disease, Radiculopathy of cervical spine   Endocrine:   Diabetes, type 2, using insulin Last A1c in July 6.5  Vit D deficiency   Dermatological:  Skin Normal    Psych:   anxiety             Anesthesia Assessment: Preoperative EQUATION    Planned Procedure: Procedure(s) (LRB):  CYSTOSCOPY (N/A)  PYELOGRAM, RETROGRADE (Bilateral)  Requested Anesthesia Type:Monitor Anesthesia Care  Surgeon: Ines Stanford MD  Service: Urology  Known or anticipated Date of Surgery:12/1/2020    Surgeon notes: reviewed    Electronic QUestionnaire Assessment completed via nurse interview with patient.        Triage considerations:   Speaks some English        Previous anesthesia records:Spinal Anesthesia  10/6/20    Last PCP note: within 3 months , within [facility] saw PA J. Saleeby//  PCP Amena Roger  Subspecialty notes: Dermatology, Endocrinology, Gastroenterology, Neurology, Pain Management, PM&R, sleep medicine/ ortho/    Other important co-morbidities: GERD, HLD, HTN and ZAHIRA    DM   Fatty Liver  Coronary  atherosclerosis  Intermittent -dsyphagia - dilated 4/2019  Osteopenia  Cervical radiculopathy  Chronic fatique  Anxiety  PONV  Rt. Shouldar arthroplasty    Tests already available:  Available tests,  within 3 months , within [facility] .   9/22/20 CT abdomen  1. No acute findings within the abdomen or pelvis.  2. Colonic diverticulosis without acute diverticulitis.  7/14/20 a1c= 6.5, CMP  7/2/20 Bilateral Venous US  6/12/19  Left arm venous US - No signs of clot in arm   6/6/19 ekg  8/28/14 stress echo   CONCLUSIONS     1 - Normal left ventricular systolic function (EF 60-65%).     2 - Normal left ventricular diastolic function.     3 - Normal right ventricular systolic function .   No evidence of stress induced myocardial ischemia.    EKG 12-lead  Order: [id number]  Status:  Final result   Visible to patient:  Yes (Patient Portal) Next appt:  11/27/2020 at 08:45 AM in [facility] (Cristal Marie PTA) Dx:  Preop testing     Narrative  Performed by: ASHELY  Test Reason : Z01.818,     Vent. Rate : 068 BPM     Atrial Rate : 068 BPM      P-R Int : 148 ms          QRS Dur : 086 ms       QT Int : 414 ms       P-R-T Axes : 059 -20 073 degrees      QTc Int : 440 ms     Normal sinus rhythm   Minimal voltage criteria for LVH, may be normal variant   Borderline Abnormal ECG   When compared with ECG of 06-JUN-2019 10:12,   No significant change was found   Confirmed by Wan Reynolds MD ([id number]) on 10/2/2020 3:31:46 PM     Referred By: CLARK GARDNER           Confirmed By:Wan Reynolds MD      Specimen Collected: 10/02/20 10:22 Last Resulted: 10/02/20 15:31                             Instructions given. (See in Nurse's note)    Optimization:   Recent surgery in October               Seen By Lauren-op NP      Plan:    Testing:  covid   Pre-anesthesia  visit       Visit focus: none     Consultation:will notify PCP of procedure      Navigation: Tests Scheduled.              Consults scheduled.             Results will be tracked by  Preop Clinic.

## 2020-11-27 ENCOUNTER — CLINICAL SUPPORT (OUTPATIENT)
Dept: REHABILITATION | Facility: HOSPITAL | Age: 65
End: 2020-11-27
Attending: ORTHOPAEDIC SURGERY
Payer: MEDICARE

## 2020-11-27 DIAGNOSIS — M25.561 ACUTE PAIN OF RIGHT KNEE: ICD-10-CM

## 2020-11-27 PROCEDURE — 97110 THERAPEUTIC EXERCISES: CPT | Mod: CQ

## 2020-11-27 NOTE — PROGRESS NOTES
"                          Physical Therapy Daily Re-Assessment Note     Name: Riana Baker   Clinic Number: [id number]    Therapy Diagnosis:   Encounter Diagnosis   Name Primary?    Acute pain of right knee      Physician: Sabino Damon MD    Visit Date: 11/27/2020    Physician Orders: PT Eval and Treat   Medical Diagnosis from Referral: M17.11 (ICD-10-CM) - Primary osteoarthritis of right knee  Evaluation Date: 9/25/2020  Authorization Period Expiration: 11/25/20  Plan of Care Expiration: 12/18/20  Visit # / Visits authorized: 2/ 18     Time In: 8:50am  Time Out: 9:30 am   Total Billable Time: 40 minutes     Precautions: Standard    Subjective      Pt reports: knee is stiff today and wants to know how long that will last.    she was compliant with home exercise program given last session.   Response to previous treatment:fair  Functional change: NA    Pain: 0/10  Location: right knee      Objective   Range of Motion: (PROM):11/12/2020      AROM ext: 1 hyper  PROM FL: 115 deg      Riana received therapeutic exercises to develop strength, endurance and ROM for 40 minutes including:  Recumbent bike x15 min (full rev)  Heel prop on bolster 5# x3 min-np  SLR and SL hip abd w/ QS 30x 1#  Shuttle dbl leg press 3x10 62.5#   Heel raises 3x15-np  LAQ on hammer xfatigue R  Lateral step up 6" 3x10-np  Tandem walking 3 laps-np      Riana received the following manual therapy techniques: Joint mobilizations and Soft tissue Mobilization were applied to the: R knee for 00 minutes, including:  Patellar mobs (inferior/superior and medial/lateral) x0 min  Femoral AP mobs  STM for scar tissue management      Home Exercises Provided and Patient Education Provided     Education provided:   - HEP to Go    Written Home Exercises Provided: Patient instructed to cont prior HEP.  Exercises were reviewed and Riana was able to demonstrate them prior to the end of the session.  Riana demonstrated good  understanding of " "the education provided.     See EMR under Patient Instructions for exercises provided {Makenna single:[id number]::"11/27/2020","prior visit"    Assessment     Cont to show improvements in R LE strength and stability. LAQ challenging for quad strength. Maintaining full knee ext.  Riana is progressing well towards her goals.   Pt prognosis is Good.     Pt will continue to benefit from skilled outpatient physical therapy to address the deficits listed in the problem list box on initial evaluation, provide pt/family education and to maximize pt's level of independence in the home and community environment.     Pt's spiritual, cultural and educational needs considered and pt agreeable to plan of care and goals.    Anticipated barriers to physical therapy: none    Goals:   Short Term Goals: (4 weeks)  Post op  - Pt will increase ROM to 0 deg R knee extension and 90 deg flexion (met)  - Pt will increase strength to 3/5 RLE to tolerate leg raises in all planes (Progressing, not met)  - Decrease Pain to 4/10 as worst with walking >15 min ( met)  - Pt to self correct posture and gait with minimal cues and no AD (met)  - Pt independent with HEP with progressions. ( met)     Long Term Goals (12 Weeks):Post op  - Pt will increase ROM to +3 deg R knee ext and 120 deg flexion (Progressing, not met)  - Pt will increase strength to 5/5 RLE to return to walking for exercise 1 hour a day (Progressing, not met)  - Decrease Pain to 1/10 as worst with all ADLs (Progressing, not met)  - Pt to return to 80% PLOF (Progressing, not met)       Plan     Continue POC per patient tolerance progressing knee ROM and strength.     Cristal Marie, PTA       "

## 2020-11-28 ENCOUNTER — LAB VISIT (OUTPATIENT)
Dept: SPORTS MEDICINE | Facility: CLINIC | Age: 65
End: 2020-11-28
Payer: MEDICARE

## 2020-11-28 DIAGNOSIS — R31.0 GROSS HEMATURIA: ICD-10-CM

## 2020-11-28 DIAGNOSIS — Z01.818 PREOP EXAMINATION: ICD-10-CM

## 2020-11-28 LAB — SARS-COV-2 RNA RESP QL NAA+PROBE: NOT DETECTED

## 2020-11-28 PROCEDURE — U0003 INFECTIOUS AGENT DETECTION BY NUCLEIC ACID (DNA OR RNA); SEVERE ACUTE RESPIRATORY SYNDROME CORONAVIRUS 2 (SARS-COV-2) (CORONAVIRUS DISEASE [COVID-19]), AMPLIFIED PROBE TECHNIQUE, MAKING USE OF HIGH THROUGHPUT TECHNOLOGIES AS DESCRIBED BY CMS-2020-01-R: HCPCS

## 2020-11-30 ENCOUNTER — TELEPHONE (OUTPATIENT)
Dept: UROLOGY | Facility: CLINIC | Age: 65
End: 2020-11-30

## 2020-11-30 NOTE — TELEPHONE ENCOUNTER
Called pt to confirm arrival time of 7:00 for procedure on 12/1/2020. Gave pt NPO instructions and gave pt opportunity to ask questions. Pt verbalized understanding.    Pt was informed that only 1 person would be allowed to accompany them the morning of surgery.  Pt verbalized understanding.

## 2020-12-01 ENCOUNTER — HOSPITAL ENCOUNTER (OUTPATIENT)
Facility: HOSPITAL | Age: 65
Discharge: HOME | End: 2020-12-01
Attending: UROLOGY | Admitting: UROLOGY
Payer: MEDICARE

## 2020-12-01 ENCOUNTER — ANESTHESIA (OUTPATIENT)
Dept: SURGERY | Facility: HOSPITAL | Age: 65
End: 2020-12-01
Payer: MEDICARE

## 2020-12-01 ENCOUNTER — ANESTHESIA EVENT (OUTPATIENT)
Dept: SURGERY | Facility: HOSPITAL | Age: 65
End: 2020-12-01
Payer: MEDICARE

## 2020-12-01 VITALS
OXYGEN SATURATION: 99 % | RESPIRATION RATE: 16 BRPM | BODY MASS INDEX: 27.82 KG/M2 | SYSTOLIC BLOOD PRESSURE: 172 MMHG | HEIGHT: 65 IN | DIASTOLIC BLOOD PRESSURE: 69 MMHG | HEART RATE: 55 BPM | WEIGHT: 167 LBS | TEMPERATURE: 98 F

## 2020-12-01 DIAGNOSIS — R31.0 GROSS HEMATURIA: Primary | ICD-10-CM

## 2020-12-01 LAB
POCT GLUCOSE: 169 MG/DL (ref 70–110)
POCT GLUCOSE: 177 MG/DL (ref 70–110)

## 2020-12-01 PROCEDURE — 25000003 PHARM REV CODE 250: Performed by: NURSE ANESTHETIST, CERTIFIED REGISTERED

## 2020-12-01 PROCEDURE — 37000008 HC ANESTHESIA 1ST 15 MINUTES: Performed by: UROLOGY

## 2020-12-01 PROCEDURE — 82962 GLUCOSE BLOOD TEST: CPT | Mod: 91 | Performed by: UROLOGY

## 2020-12-01 PROCEDURE — 36000707: Performed by: UROLOGY

## 2020-12-01 PROCEDURE — 82962 GLUCOSE BLOOD TEST: CPT | Performed by: UROLOGY

## 2020-12-01 PROCEDURE — 36000706: Performed by: UROLOGY

## 2020-12-01 PROCEDURE — 25000003 PHARM REV CODE 250: Performed by: UROLOGY

## 2020-12-01 PROCEDURE — D9220A PRA ANESTHESIA: ICD-10-PCS | Mod: CRNA,,, | Performed by: NURSE ANESTHETIST, CERTIFIED REGISTERED

## 2020-12-01 PROCEDURE — 52005 CYSTO W/URTRL CATHJ: CPT | Mod: ,,, | Performed by: UROLOGY

## 2020-12-01 PROCEDURE — D9220A PRA ANESTHESIA: ICD-10-PCS | Mod: ANES,,, | Performed by: ANESTHESIOLOGY

## 2020-12-01 PROCEDURE — 74420 UROGRAPHY RTRGR +-KUB: CPT | Mod: 26,,, | Performed by: UROLOGY

## 2020-12-01 PROCEDURE — 25500020 PHARM REV CODE 255: Performed by: UROLOGY

## 2020-12-01 PROCEDURE — 63600175 PHARM REV CODE 636 W HCPCS: Performed by: NURSE ANESTHETIST, CERTIFIED REGISTERED

## 2020-12-01 PROCEDURE — 74420 PR  X-RAY RETROGRADE PYELOGRAM: ICD-10-PCS | Mod: 26,,, | Performed by: UROLOGY

## 2020-12-01 PROCEDURE — D9220A PRA ANESTHESIA: Mod: ANES,,, | Performed by: ANESTHESIOLOGY

## 2020-12-01 PROCEDURE — D9220A PRA ANESTHESIA: Mod: CRNA,,, | Performed by: NURSE ANESTHETIST, CERTIFIED REGISTERED

## 2020-12-01 PROCEDURE — 71000044 HC DOSC ROUTINE RECOVERY FIRST HOUR: Performed by: UROLOGY

## 2020-12-01 PROCEDURE — 37000009 HC ANESTHESIA EA ADD 15 MINS: Performed by: UROLOGY

## 2020-12-01 PROCEDURE — 25000003 PHARM REV CODE 250: Performed by: STUDENT IN AN ORGANIZED HEALTH CARE EDUCATION/TRAINING PROGRAM

## 2020-12-01 PROCEDURE — 63600175 PHARM REV CODE 636 W HCPCS: Performed by: STUDENT IN AN ORGANIZED HEALTH CARE EDUCATION/TRAINING PROGRAM

## 2020-12-01 PROCEDURE — 52005 PR CYSTOURETHROSCOPY,URETER CATHETER: ICD-10-PCS | Mod: ,,, | Performed by: UROLOGY

## 2020-12-01 PROCEDURE — 71000015 HC POSTOP RECOV 1ST HR: Performed by: UROLOGY

## 2020-12-01 RX ORDER — CEFAZOLIN SODIUM 1 G/3ML
2 INJECTION, POWDER, FOR SOLUTION INTRAMUSCULAR; INTRAVENOUS
Status: COMPLETED | OUTPATIENT
Start: 2020-12-01 | End: 2020-12-01

## 2020-12-01 RX ORDER — SODIUM CHLORIDE 9 MG/ML
INJECTION, SOLUTION INTRAVENOUS CONTINUOUS
Status: DISCONTINUED | OUTPATIENT
Start: 2020-12-01 | End: 2020-12-01 | Stop reason: HOSPADM

## 2020-12-01 RX ORDER — ONDANSETRON 2 MG/ML
INJECTION INTRAMUSCULAR; INTRAVENOUS
Status: DISCONTINUED | OUTPATIENT
Start: 2020-12-01 | End: 2020-12-01

## 2020-12-01 RX ORDER — LIDOCAINE HYDROCHLORIDE 20 MG/ML
INJECTION, SOLUTION EPIDURAL; INFILTRATION; INTRACAUDAL; PERINEURAL
Status: DISCONTINUED | OUTPATIENT
Start: 2020-12-01 | End: 2020-12-01

## 2020-12-01 RX ORDER — LIDOCAINE HYDROCHLORIDE 20 MG/ML
JELLY TOPICAL
Status: DISCONTINUED | OUTPATIENT
Start: 2020-12-01 | End: 2020-12-01 | Stop reason: HOSPADM

## 2020-12-01 RX ORDER — DIPHENHYDRAMINE HYDROCHLORIDE 50 MG/ML
25 INJECTION INTRAMUSCULAR; INTRAVENOUS EVERY 6 HOURS PRN
Status: DISCONTINUED | OUTPATIENT
Start: 2020-12-01 | End: 2020-12-01 | Stop reason: HOSPADM

## 2020-12-01 RX ORDER — HYDROMORPHONE HYDROCHLORIDE 1 MG/ML
0.2 INJECTION, SOLUTION INTRAMUSCULAR; INTRAVENOUS; SUBCUTANEOUS EVERY 5 MIN PRN
Status: DISCONTINUED | OUTPATIENT
Start: 2020-12-01 | End: 2020-12-01 | Stop reason: HOSPADM

## 2020-12-01 RX ORDER — ONDANSETRON 2 MG/ML
4 INJECTION INTRAMUSCULAR; INTRAVENOUS ONCE AS NEEDED
Status: DISCONTINUED | OUTPATIENT
Start: 2020-12-01 | End: 2020-12-01 | Stop reason: HOSPADM

## 2020-12-01 RX ORDER — FENTANYL CITRATE 50 UG/ML
25 INJECTION, SOLUTION INTRAMUSCULAR; INTRAVENOUS EVERY 5 MIN PRN
Status: DISCONTINUED | OUTPATIENT
Start: 2020-12-01 | End: 2020-12-01 | Stop reason: HOSPADM

## 2020-12-01 RX ORDER — MIDAZOLAM HYDROCHLORIDE 1 MG/ML
INJECTION, SOLUTION INTRAMUSCULAR; INTRAVENOUS
Status: DISCONTINUED | OUTPATIENT
Start: 2020-12-01 | End: 2020-12-01

## 2020-12-01 RX ORDER — PROPOFOL 10 MG/ML
VIAL (ML) INTRAVENOUS
Status: DISCONTINUED | OUTPATIENT
Start: 2020-12-01 | End: 2020-12-01

## 2020-12-01 RX ORDER — PROPOFOL 10 MG/ML
VIAL (ML) INTRAVENOUS CONTINUOUS PRN
Status: DISCONTINUED | OUTPATIENT
Start: 2020-12-01 | End: 2020-12-01

## 2020-12-01 RX ORDER — LIDOCAINE HYDROCHLORIDE 10 MG/ML
1 INJECTION, SOLUTION EPIDURAL; INFILTRATION; INTRACAUDAL; PERINEURAL ONCE
Status: DISCONTINUED | OUTPATIENT
Start: 2020-12-01 | End: 2020-12-01 | Stop reason: HOSPADM

## 2020-12-01 RX ADMIN — PROPOFOL 50 MG: 10 INJECTION, EMULSION INTRAVENOUS at 08:12

## 2020-12-01 RX ADMIN — LIDOCAINE HYDROCHLORIDE 100 MG: 20 INJECTION, SOLUTION EPIDURAL; INFILTRATION; INTRACAUDAL at 08:12

## 2020-12-01 RX ADMIN — CEFAZOLIN 2 G: 330 INJECTION, POWDER, FOR SOLUTION INTRAMUSCULAR; INTRAVENOUS at 08:12

## 2020-12-01 RX ADMIN — PROPOFOL 150 MCG/KG/MIN: 10 INJECTION, EMULSION INTRAVENOUS at 08:12

## 2020-12-01 RX ADMIN — SODIUM CHLORIDE: 0.9 INJECTION, SOLUTION INTRAVENOUS at 08:12

## 2020-12-01 RX ADMIN — MIDAZOLAM 2 MG: 1 INJECTION INTRAMUSCULAR; INTRAVENOUS at 08:12

## 2020-12-01 RX ADMIN — ONDANSETRON 4 MG: 2 INJECTION INTRAMUSCULAR; INTRAVENOUS at 08:12

## 2020-12-01 NOTE — DISCHARGE INSTRUCTIONS
Post Cystoscopy Instructions  Do not strain to have a bowel movement  No strenuous exercise x 7 days  No driving while you are on narcotic pain medications or if your bahena  catheter is in place    You can expect:  To pass stone fragments if you had a stone procedure  Have pain when you void from your stent if you have a stent in place  See blood in your urine if you have a stent in place    If you have a catheter, please return to the ER if your catheter stops draining or you are having abdominal pain.    Call the doctor if:   Temperature is greater than 101F   Persistent vomiting and inability to keep food down   Inability to void if you do not have a catheter

## 2020-12-01 NOTE — PLAN OF CARE
Patient states they are ready to be discharged. Instructions and given to patient and spouse. Both verbalize understanding. Patient tolerating po liquids with no difficulty. Patient states pain is at a tolerable level for them. Anesthesia consent and surgical consent in chart upon patient's discharge from [facility].

## 2020-12-01 NOTE — OP NOTE
[facility]  Operative Note    Date: 12/01/2020    Pre-Op Diagnosis: Gross Hematuria    Patient Active Problem List    Diagnosis Date Noted    Gross hematuria 12/01/2020    Primary osteoarthritis of right knee 10/06/2020    Status post total right knee replacement 10/6/2020 10/05/2020    Localized edema     Leg swelling     Pain in right elbow 03/03/2020    Vitamin D deficiency 05/06/2019    Right elbow pain 01/16/2019    Acute pain of right knee 12/06/2018    Pain 10/19/2018    Overweight (BMI 25.0-29.9) 12/14/2017    Type 2 diabetes mellitus with other specified complication 12/14/2017    Muscle spasms of neck 11/11/2014    Radiculopathy of cervical spine 11/11/2014    HTN (hypertension) 08/06/2014    Sleep apnea     Fatty liver     Osteopenia     GERD (gastroesophageal reflux disease)     Coronary atherosclerosis 06/10/2012       Post-Op Diagnosis: same    Procedure(s) Performed:   1.  Cystoscopy with bilateral retrograde pyelogram  2.  Fluoroscopy < 1 hour  3.  Intra-operative interpretation of radiographic images    Specimen(s): none    Staff Surgeon: MD Abdoulaye    Assistant Surgeon: Araceli Bill MD    Anesthesia: Monitored Local Anesthesia with Sedation    Indications: Riana Kay is a 65 y.o. female with a history of gross hematuria who is allergic to iodine contrast.     Findings:   Normal cystoscopy with no bladder tumors or lesions.     Right retrograde pyelogram with no filling defects, contrast extravasation, or hydronephrosis. Ureter of normal course and caliber and calyxes are delicate and infundibula thin.     Left retrograde pyelogram with incomplete duplicated system, bifid at the level just distal to UPJ. No filling defects, contrast extravasation, or hydronephrosis. Ureter of normal course and caliber and calyxes are delicate and infundibula thin.       Estimated Blood Loss: min    Drains: none    Procedure in Detail:  After risks, benefits and  possible complications of cystoscopy were explained, the patient elected to undergo the procedure and informed consent was obtained. All questions were answered in the michelle-operative area. The patient was transferred to the cystoscopy suite and placed in the supine position.  SCDs were applied and working.  MAC anesthesia was administered.  Once adequately sedated, the patient was placed in the dorsal lithotomy position and prepped and draped in the usual sterile fashion.  Time out was performed, michelle-procedural antibiotics were confirmed.     A rigid cystoscope in a 22 Fr sheath was introduced into the bladder per urethra. This passed easily. The entire urethra was visualized which showed no masses or strictures.  The right and left ureteral orifices were identified in the normal anatomic position.  Formal cystoscopy was performed which revealed no masses or lesions suspicious for malignancy, no trabeculations, no bladder stones and no bladder diverticula.     The left UO was identified and cannulated with a 5 Fr open-ended ureteral catheter. Using fluoroscopy, a RPG was performed which showed the above findings. This was then repeated on the right side, revealing the above findings.     The bladder was drained, and the patient was removed from lithotomy.     The patient tolerated the procedure well and was transferred to recovery in stable condition.    Disposition:  The patient will follow up with Suzette Montilla in 1 year.     MD BALBINA Garcia was present for the entire case and agree with the above note.

## 2020-12-01 NOTE — DISCHARGE SUMMARY
[facility]  Discharge Note  Short Stay    Admit Date: 12/1/2020    Discharge Date and Time: 12/01/2020 9:11 AM      Attending Physician: Ines Stanford MD     Discharge Provider: Araceli Bill MD    Diagnoses:  Active Hospital Problems    Diagnosis  POA    *Gross hematuria [R31.0]  Yes      Resolved Hospital Problems   No resolved problems to display.       Discharged Condition: stable    Hospital Course: Patient was admitted for cysto Bilateral retrogrades and tolerated the procedure well with no complications. She was discharged home in good condition on the same day.       Final Diagnoses: Same as principal problem.    Disposition: Home or Self Care    Follow up/Patient Instructions:    Medications:  Reconciled Home Medications:   Current Discharge Medication List      CONTINUE these medications which have NOT CHANGED    Details   acetaminophen (TYLENOL) 650 MG TbSR Take 1 tablet (650 mg total) by mouth every 8 (eight) hours as needed.  Qty: 120 tablet, Refills: 0    Associated Diagnoses: Status post total right knee replacement      amLODIPine (NORVASC) 5 MG tablet Take 1 tablet (5 mg total) by mouth once daily.  Qty: 90 tablet, Refills: 3    Comments: .      aspirin (ECOTRIN) 81 MG EC tablet Take 1 tablet (81 mg total) by mouth 2 (two) times daily.  Qty: 60 tablet, Refills: 0    Associated Diagnoses: Status post total right knee replacement      azelastine (ASTELIN) 137 mcg (0.1 %) nasal spray 1 SPRAY (137 MCG TOTAL) BY NASAL ROUTE 2 (TWO) TIMES DAILY.  Qty: 90 mL, Refills: 2      diclofenac sodium (VOLTAREN) 1 % Gel Apply 2 g topically 4 (four) times daily.  Qty: 100 g, Refills: 1    Associated Diagnoses: Rib cage dysfunction      docusate sodium (COLACE) 100 MG capsule Take 1 capsule (100 mg total) by mouth 2 (two) times daily as needed for Constipation.  Qty: 60 capsule, Refills: 0    Associated Diagnoses: Status post total right knee replacement      fluticasone propionate (FLONASE) 50  mcg/actuation nasal spray 2 sprays (100 mcg total) by Each Nostril route once daily.  Qty: 16 g, Refills: 11      insulin (LANTUS SOLOSTAR U-100 INSULIN) glargine 100 units/mL (3mL) SubQ pen Inject 32 units at night. (new order)  Qty: 3 Box, Refills: 3      insulin lispro (HUMALOG KWIKPEN INSULIN) 100 unit/mL pen Inject 14 units w/ breakfast and lunch, 12 units w/ dinner plus scale 150-200+2, 201-250+4, 251-300+6, 301-350+8, >350+10. Max daily 66 units.  Qty: 4 Box, Refills: 3      latanoprost 0.005 % ophthalmic solution Place 1 drop into both eyes nightly.      losartan (COZAAR) 25 MG tablet Take 0.5 tablets (12.5 mg total) by mouth once daily.  Qty: 45 tablet, Refills: 3    Comments: .      pantoprazole (PROTONIX) 40 MG tablet TAKE 1 TABLET BY MOUTH EVERY DAY  Qty: 90 tablet, Refills: 0    Associated Diagnoses: Gastroesophageal reflux disease, esophagitis presence not specified; Right sided abdominal pain; Generalized abdominal pain      SITagliptin (JANUVIA) 100 MG Tab Take 1 tablet (100 mg total) by mouth once daily.  Qty: 90 tablet, Refills: 3      blood sugar diagnostic Strp To check BG 4 times daily, to use with insurance preferred meter-true metrix  Qty: 400 each, Refills: 3      blood-glucose meter kit To check BG 4 times daily, to use with insurance preferred meter-true metrix  Qty: 1 each, Refills: 1      cyanocobalamin (VITAMIN B-12) 100 MCG tablet Take 100 mcg by mouth once daily.      FLUZONE HIGHDOSE QUAD 20-21  mcg/0.7 mL Syrg PHARMACY ADMINISTERED      HYDROcodone-acetaminophen (NORCO)  mg per tablet Take 1 tablet by mouth every 4 to 6 hours as needed for Pain.  Qty: 50 tablet, Refills: 0    Comments: Greater than 7 day supply is medically necessary. Deliver to [place] for surgery 10/6/2020.  Associated Diagnoses: Status post total right knee replacement      ketoconazole (NIZORAL) 2 % shampoo Wash scalp with medicated shampoo at least 2x/week. Let sit on scalp at least 5 minutes prior  "to rinsing  Qty: 240 mL, Refills: 5    Associated Diagnoses: Seborrheic dermatitis      lancets Misc To check BG 4  times daily, to use with insurance preferred meter-true metrix  Qty: 400 each, Refills: 3      nystatin (MYCOSTATIN) cream Apply topically 2 (two) times daily.  Qty: 30 g, Refills: 0    Associated Diagnoses: Vaginal itching      ondansetron (ZOFRAN-ODT) 8 MG TbDL DISSOLVE 1 TABLET UNDER TONGUE 3 TIMES DAILY AS NEEDED.  Qty: 30 tablet, Refills: 6    Associated Diagnoses: Right flank pain; Gastritis, presence of bleeding unspecified, unspecified chronicity, unspecified gastritis type; Nausea and vomiting, intractability of vomiting not specified, unspecified vomiting type      pen needle, diabetic (NOVOFINE 32) 32 gauge x 1/4" Ndle Uses 4 times a day. 90 day via duramed e 11.65  Qty: 400 each, Refills: 3      pyridoxine, vitamin B6, (VITAMIN B-6) 100 MG Tab Take 100 mg by mouth once daily.           Discharge Procedure Orders   Notify your health care provider if you experience any of the following:  severe uncontrolled pain     Notify your health care provider if you experience any of the following:  temperature >100.4     Notify your health care provider if you experience any of the following:  persistent nausea and vomiting or diarrhea     Activity as tolerated       As above.     "

## 2020-12-01 NOTE — H&P
9CHIEF COMPLAINT:    Mrs. Kay is a 65 y.o. female presenting for gross hematuria.    PRESENTING ILLNESS:    Riana Kay is a 65 y.o. female with a PMH of HTN, DM, who presents for gross hematuria.     She reports dysuria, suprapubic cramping, gross hematuria, frequency. She was treated with cipro.  Her urine culture was negative.   Her symptoms are better since she started taking cipro.  She hasnt seen blood in her urine since 10/9.  She reports a previous episode of hematuria about 6 months ago.    No urgency.  She has frequency but attributes it to the amount of water she drinks.  No flank pain.      Previous/Current Smoker: no  Radiation therapy to pelvis: no  Chemotherapy: no  Personal/ family history of bladder/ kidney cancer: no  Exposure to harmful chemicals: no  History of kidney stones: no    She presents today with his wife.    REVIEW OF SYSTEMS:    Constitutional: Negative for fever and chills.   HENT: Negative for hearing loss.   Eyes: Negative for visual disturbance.   Respiratory: Negative for shortness of breath.   Cardiovascular: Negative for chest pain.   Gastrointestinal: Negative for vomiting, and constipation.   Genitourinary:  See HPI  Neurological: Negative for dizziness.   Hematological: Does not bruise/bleed easily.   Psychiatric/Behavioral: Negative for confusion.     PATIENT HISTORY:    Past Medical History:   Diagnosis Date    Abdominal pain, right upper quadrant 2/4/2014    Anticoagulant long-term use     Anxiety     AR (allergic rhinitis)     Atrophic gastritis without mention of hemorrhage 2/13/2012     Dx updated per 2019 IMO Load    Chronic fatigue syndrome 6/10/2012    Diabetes mellitus     Dizziness     Fatty liver     GERD (gastroesophageal reflux disease)     HTN (hypertension)     Hyperlipidemia     Memory loss     Osteopenia     PONV (postoperative nausea and vomiting)     Primary osteoarthritis of right knee 10/6/2020    S/P total hysterectomy      Sleep apnea        Past Surgical History:   Procedure Laterality Date    CHOLECYSTECTOMY      COLONOSCOPY N/A 1/17/2018    Procedure: COLONOSCOPY with Donnell;  Surgeon: Roland Jacobo MD;  Location: [facility] (4TH FLR);  Service: Endoscopy;  Laterality: N/A;    ELBOW ARTHROPLASTY Right 1/16/2019    Procedure: ARTHROPLASTY, ELBOW right radial head arthroplasty revision;  Surgeon: Katja Hubbard MD;  [address];  Service: Orthopedics;  Laterality: Right;  Anesthesia: General and Regional. Stretcher, hand pan 1 and pan 2, CALL ACCUMED, CLAIRX  [employer] notified 1-14 LO    ELBOW SURGERY Right 7/16/15    ELBOW SURGERY      ESOPHAGOGASTRODUODENOSCOPY N/A 4/15/2019    Procedure: EGD (ESOPHAGOGASTRODUODENOSCOPY);  Surgeon: Buck Irwin MD;  Location: [facility] (4TH FLR);  Service: Endoscopy;  Laterality: N/A;  ray she    HYSTERECTOMY      KNEE ARTHROPLASTY Right 10/6/2020    Procedure: ARTHROPLASTY, KNEE-SAME DAY PROTOCOL;  Surgeon: Sabino Damon MD;  Location: [facility];  Service: Orthopedics;  Laterality: Right;    KNEE ARTHROSCOPY W/ DEBRIDEMENT  4/11    Left    RELEASE OF ULNAR NERVE AT CUBITAL TUNNEL Right 1/16/2019    Procedure: RELEASE, ULNAR TUNNEL right;  Surgeon: Katja Hubbard MD;  Location: [address];  Service: Orthopedics;  Laterality: Right;  Anesthesia: General and Regional. Stretcher, hand pan 1 and pan 2, CALL ACCUMED, CLAIRX    ROTATOR CUFF REPAIR      TOTAL ABDOMINAL HYSTERECTOMY W/ BILATERAL SALPINGOOPHORECTOMY      UPPER GASTROINTESTINAL ENDOSCOPY         Family History   Problem Relation Age of Onset    Colon cancer Father 83        colon cancer    Diabetes Maternal Grandmother     Diabetes Maternal Aunt     Esophageal cancer Neg Hx     Stomach cancer Neg Hx     Melanoma Neg Hx        Social History     Socioeconomic History    Marital status:      Spouse name: Kemal    Number of children: 1    Years of  education: Not on file    Highest education level: Not on file   Occupational History    Occupation: Housekeeping     Comment: [employer]   Social Needs    Financial resource strain: Somewhat hard    Food insecurity     Worry: Never true     Inability: Never true    Transportation needs     Medical: No     Non-medical: No   Tobacco Use    Smoking status: Never Smoker    Smokeless tobacco: Never Used   Substance and Sexual Activity    Alcohol use: No     Frequency: Never     Drinks per session: Patient refused     Binge frequency: Never    Drug use: No    Sexual activity: Yes     Partners: Male     Birth control/protection: Post-menopausal   Lifestyle    Physical activity     Days per week: 2 days     Minutes per session: 60 min    Stress: To some extent   Relationships    Social connections     Talks on phone: More than three times a week     Gets together: Once a week     Attends Jainism service: Not on file     Active member of club or organization: Yes     Attends meetings of clubs or organizations: More than 4 times per year     Relationship status:    Other Topics Concern    Are you pregnant or think you may be? No    Breast-feeding No   Social History Narrative    She works at [email or web address] in [place]; , 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 No exercise but hopes to start walking on the weekend.       Allergies:  Iodinated contrast media, Percocet [oxycodone-acetaminophen], Macrobid [nitrofurantoin monohyd/m-cryst], Metformin, Penicillins, Promethazine, Sulfa (sulfonamide antibiotics), and Sulfamethoxazole-trimethoprim    Medications:    Current Facility-Administered Medications:     0.9%  NaCl infusion, , Intravenous, Continuous, Araceli Bill MD    ceFAZolin injection 2 g, 2 g, Intravenous, On Call Procedure, Araceli Bill MD    lidocaine (PF) 10 mg/ml (1%) injection 10 mg, 1 mL, Intradermal, Once, Araceli Bill MD    PHYSICAL EXAMINATION:    Constitutional: She appears  well-developed and well-nourished.  She is in no apparent distress.    Eyes: No scleral icterus noted bilaterally. No discharge bilaterally.    Nose: No nasal congestion    Cardiovascular: Normal rate.  No pitting edema noted in lower extremities bilaterally    Pulmonary/Chest: Effort normal. No respiratory distress.     Abdominal:  She exhibits no distension.      Neurological: She is alert and oriented to person, place, and time.     Skin: Skin is warm and dry.     Psych: Cooperative with normal affect.        Physical Exam      LABS:    U/a: 1.020, pH 5, negative for all other components    IMPRESSION:    Encounter Diagnoses   Name Primary?    Allergy to iodine Yes    Hematuria, unspecified type        PLAN:    To OR for cysto with BL RPG today.   Consented and all questions answered.     Patient seen in holding.  No changes in clinical condition.  Proceed with planned procedure.

## 2020-12-01 NOTE — TRANSFER OF CARE
"Anesthesia Transfer of Care Note    Patient: Riana Kay    Procedure(s) Performed: Procedure(s) (LRB):  CYSTOSCOPY (N/A)  PYELOGRAM, RETROGRADE (Bilateral)    Patient location: [facility]    Transport from OR: Transported from OR on 6-10 L/min O2 by face mask with adequate spontaneous ventilation    Post pain: adequate analgesia    Post assessment: no apparent anesthetic complications and tolerated procedure well    Post vital signs: stable    Level of consciousness: sedated    Nausea/Vomiting: no nausea/vomiting    Complications: none    Transfer of care protocol was followed      Last vitals:   Visit Vitals  /70 (BP Location: Left arm, Patient Position: Lying)   Pulse 66   Temp 36.6 °C (97.9 °F) (Temporal)   Resp 17   Ht 5' 5" (1.651 m)   Wt 75.8 kg (167 lb)   SpO2 100%   Breastfeeding No   BMI 27.79 kg/m²     "

## 2020-12-01 NOTE — ANESTHESIA PREPROCEDURE EVALUATION
12/01/2020  Riana Kay is a 65 y.o., female   Patient Active Problem List   Diagnosis    Fatty liver    Osteopenia    GERD (gastroesophageal reflux disease)    Sleep apnea    Coronary atherosclerosis    HTN (hypertension)    Muscle spasms of neck    Radiculopathy of cervical spine    Overweight (BMI 25.0-29.9)    Type 2 diabetes mellitus with other specified complication    Pain    Acute pain of right knee    Right elbow pain    Vitamin D deficiency    Pain in right elbow    Localized edema    Leg swelling    Status post total right knee replacement 10/6/2020    Primary osteoarthritis of right knee    Gross hematuria     .    Anesthesia Evaluation          Review of Systems      Physical Exam   Airway/Jaw/Neck:  Airway Findings: Mouth Opening: Normal Tongue: Normal  General Airway Assessment: Adult  Mallampati: II  Improves to II with phonation.  TM Distance: Normal, at least 6 cm      Dental:  No active dental problems.    Chest/Lungs:  Chest/Lungs Findings: Clear to auscultation     Heart/Vascular:  Heart Findings: Rate: Normal  Rhythm: Regular Rhythm  Sounds: Normal        Mental Status:  Mental Status Findings:  Cooperative, Alert and Oriented         Anesthesia Plan  Type of Anesthesia, risks & benefits discussed:  Anesthesia Type:  general  Patient's Preference:   Intra-op Monitoring Plan: standard ASA monitors  Intra-op Monitoring Plan Comments:   Post Op Pain Control Plan:   Post Op Pain Control Plan Comments:   Induction:   IV  Beta Blocker:         Informed Consent: Patient understands risks and agrees with Anesthesia plan.  Questions answered. Anesthesia consent signed with patient.  ASA Score: 3     Day of Surgery Review of History & Physical:        Anesthesia Plan Notes: Chart reviewed, patient interviewed and examined.  The plan for anesthesia for this case was  discussed.  Questions were answered and the consent was signed.  Arcadio Stein M.D.         Ready For Surgery From Anesthesia Perspective.

## 2020-12-01 NOTE — ANESTHESIA POSTPROCEDURE EVALUATION
Anesthesia Post Evaluation    Patient: Riana Kay    Procedure(s) Performed: Procedure(s) (LRB):  CYSTOSCOPY (N/A)  PYELOGRAM, RETROGRADE (Bilateral)    Final Anesthesia Type: general    Patient location during evaluation: PACU  Patient participation: Yes- Able to Participate  Level of consciousness: awake and alert  Post-procedure vital signs: reviewed and stable  Pain management: adequate  Airway patency: patent    PONV status at discharge: No PONV  Anesthetic complications: no      Cardiovascular status: hemodynamically stable  Respiratory status: unassisted  Hydration status: euvolemic  Follow-up not needed.          Vitals Value Taken Time   /69 12/01/20 1002   Temp 36.3 °C (97.3 °F) 12/01/20 0900   Pulse 59 12/01/20 1008   Resp 21 12/01/20 1007   SpO2 100 % 12/01/20 1008   Vitals shown include unvalidated device data.      No case tracking events are documented in the log.      Pain/Jeffery Score: Jeffery Score: 10 (12/1/2020  9:15 AM)

## 2020-12-07 NOTE — TELEPHONE ENCOUNTER
Called a pt for appointment and left a message to give a call back regarding appointment with Payam in order to get refill and Rx for lantus from [facility]. Contact no was provided.    Dupixent Pregnancy And Lactation Text: This medication likely crosses the placenta but the risk for the fetus is uncertain. This medication is excreted in breast milk.

## 2020-12-08 ENCOUNTER — CLINICAL SUPPORT (OUTPATIENT)
Dept: REHABILITATION | Facility: HOSPITAL | Age: 65
End: 2020-12-08
Attending: ORTHOPAEDIC SURGERY
Payer: MEDICARE

## 2020-12-08 DIAGNOSIS — M25.561 ACUTE PAIN OF RIGHT KNEE: ICD-10-CM

## 2020-12-08 PROCEDURE — 97110 THERAPEUTIC EXERCISES: CPT

## 2020-12-08 NOTE — PROGRESS NOTES
"                          Physical Therapy Daily Re-Assessment Note     Name: Riana Baker   Clinic Number: [id number]    Therapy Diagnosis:   Encounter Diagnosis   Name Primary?    Acute pain of right knee      Physician: Sabino Damon MD    Visit Date: 12/8/2020    Physician Orders: PT Eval and Treat   Medical Diagnosis from Referral: M17.11 (ICD-10-CM) - Primary osteoarthritis of right knee  Evaluation Date: 9/25/2020  Authorization Period Expiration: 11/25/20  Plan of Care Expiration: 12/18/20  Visit # / Visits authorized: 3/ 18     Time In: 915 am  Time Out: 1005 am   Total Billable Time: 50 minutes     Precautions: Standard    Subjective      Pt reports: her L knee is starting to bother her.    she was compliant with home exercise program given last session.   Response to previous treatment:fair  Functional change: NA    Pain: 0/10  Location: right knee      Objective   Range of Motion: (PROM):11/12/2020      AROM ext: 1 hyper  PROM FL: 115 deg      Riana received therapeutic exercises to develop strength, endurance and ROM for 50 minutes including:  Recumbent bike x15 min (full rev)  Heel prop on bolster 5# x3 min-np  SLR and SL hip abd w/ QS 30x 1#-np  Shuttle dbl leg press 3x15 62.5#   Heel raises 3x15-np  LAQ on hammer xfatigue R  Lateral step up 6" 3x10-np  Tandem walking 3 laps-np  Modified SL with yoga block 24" box 3x10      Riana received the following manual therapy techniques: Joint mobilizations and Soft tissue Mobilization were applied to the: R knee for 00 minutes, including:  Patellar mobs (inferior/superior and medial/lateral) x0 min  Femoral AP mobs  STM for scar tissue management      Home Exercises Provided and Patient Education Provided     Education provided:   - HEP to Go    Written Home Exercises Provided: Patient instructed to cont prior HEP.  Exercises were reviewed and Riana was able to demonstrate them prior to the end of the session.  Riana demonstrated good  " "understanding of the education provided.     See EMR under Patient Instructions for exercises provided {Blank single:50723::"12/8/2020","prior visit"    Assessment     Pt able to progress functional strengthening with modified SL balance, but did need a higher box to complete exercise. She is still showing a lot of quad weakness, but showing steady improvements each session.   Riana is progressing well towards her goals.   Pt prognosis is Good.     Pt will continue to benefit from skilled outpatient physical therapy to address the deficits listed in the problem list box on initial evaluation, provide pt/family education and to maximize pt's level of independence in the home and community environment.     Pt's spiritual, cultural and educational needs considered and pt agreeable to plan of care and goals.    Anticipated barriers to physical therapy: none    Goals:   Short Term Goals: (4 weeks)  Post op  - Pt will increase ROM to 0 deg R knee extension and 90 deg flexion (met)  - Pt will increase strength to 3/5 RLE to tolerate leg raises in all planes (Progressing, not met)  - Decrease Pain to 4/10 as worst with walking >15 min ( met)  - Pt to self correct posture and gait with minimal cues and no AD (met)  - Pt independent with HEP with progressions. ( met)     Long Term Goals (12 Weeks):Post op  - Pt will increase ROM to +3 deg R knee ext and 120 deg flexion (Progressing, not met)  - Pt will increase strength to 5/5 RLE to return to walking for exercise 1 hour a day (Progressing, not met)  - Decrease Pain to 1/10 as worst with all ADLs (Progressing, not met)  - Pt to return to 80% PLOF (Progressing, not met)       Plan     Continue POC per patient tolerance progressing knee ROM and strength.     Bernadette Palacios, PT       "

## 2020-12-14 ENCOUNTER — TELEPHONE (OUTPATIENT)
Dept: UROLOGY | Facility: CLINIC | Age: 65
End: 2020-12-14

## 2020-12-14 NOTE — TELEPHONE ENCOUNTER
----- Message from Lesli Francisco LPN sent at 12/14/2020  8:28 AM CST -----  Contact: - marcin    ----- Message -----  From: Melissa Gonzalez  Sent: 12/14/2020   8:17 AM CST  To: Abdoulaye Chacon Staff    Pts  is asking for a call back in regards to the pts results     Contact info- he is asking for the results to be sent to his e- mail [email or web address]

## 2020-12-14 NOTE — TELEPHONE ENCOUNTER
----- Message from Lesli Francisco LPN sent at 12/14/2020  8:28 AM CST -----  Contact: - marcin    ----- Message -----  From: Melissa Gonzalez  Sent: 12/14/2020   8:17 AM CST  To: Abdoulaye Chacon Staff    Pts  is asking for a call back in regards to the pts results     Contact info- he is asking for the results to be sent to his e- mail [email or web address]

## 2020-12-16 ENCOUNTER — TELEPHONE (OUTPATIENT)
Dept: INTERNAL MEDICINE | Facility: CLINIC | Age: 65
End: 2020-12-16

## 2020-12-16 DIAGNOSIS — Z12.11 SCREEN FOR COLON CANCER: Primary | ICD-10-CM

## 2020-12-16 NOTE — TELEPHONE ENCOUNTER
----- Message from Vadim Macario sent at 12/16/2020  7:47 AM CST -----  Regarding: Pt [phone]  The patient requested a referral to colonoscopy.    Thank you

## 2020-12-17 ENCOUNTER — CLINICAL SUPPORT (OUTPATIENT)
Dept: REHABILITATION | Facility: HOSPITAL | Age: 65
End: 2020-12-17
Attending: ORTHOPAEDIC SURGERY
Payer: MEDICARE

## 2020-12-17 DIAGNOSIS — M25.561 ACUTE PAIN OF RIGHT KNEE: ICD-10-CM

## 2020-12-17 PROCEDURE — 97110 THERAPEUTIC EXERCISES: CPT | Mod: CQ

## 2020-12-17 NOTE — PROGRESS NOTES
"                          Physical Therapy Daily Re-Assessment Note     Name: Riana Baker   Clinic Number: [id number]    Therapy Diagnosis:   Encounter Diagnosis   Name Primary?    Acute pain of right knee      Physician: Sabino Damon MD    Visit Date: 12/17/2020    Physician Orders: PT Eval and Treat   Medical Diagnosis from Referral: M17.11 (ICD-10-CM) - Primary osteoarthritis of right knee  Evaluation Date: 9/25/2020  Authorization Period Expiration: 11/25/20  Plan of Care Expiration: 12/18/20  Visit # / Visits authorized: 4/ 18     Time In: 10:05 am  Time Out: 10:55 am   Total Billable Time: 50 minutes     Precautions: Standard    Subjective      Pt reports: still using cane b/c L LE is weak.    she was compliant with home exercise program given last session.   Response to previous treatment:fair  Functional change: NA    Pain: 0/10  Location: right knee      Objective   Range of Motion: (PROM):11/12/2020      AROM ext: 1 hyper  PROM FL: 115 deg      Riana received therapeutic exercises to develop strength, endurance and ROM for 50 minutes including:  Recumbent bike x15 min (full rev)  LAQ on hammer xfatigue R  Tandem walking 2 laps  Modified SL with yoga block 24" box 3x10  Rhomberg on airex w/ head turns side to side 30x  Static tandem stance 2x30"  SLS 3x30"    NP:  Lateral step up 6" 3x10-np  SLR and SL hip abd w/ QS 30x 1#-np  Heel prop on bolster 5# x3 min-np  Shuttle dbl leg press 3x15 62.5#   Heel raises 3x15-np    Riana received the following manual therapy techniques: Joint mobilizations and Soft tissue Mobilization were applied to the: R knee for 00 minutes, including:  Patellar mobs (inferior/superior and medial/lateral) x0 min  Femoral AP mobs  STM for scar tissue management      Home Exercises Provided and Patient Education Provided     Education provided:   - HEP to Go    Written Home Exercises Provided: Patient instructed to cont prior HEP.  Exercises were reviewed and " "Riana was able to demonstrate them prior to the end of the session.  Riana demonstrated good  understanding of the education provided.     See EMR under Patient Instructions for exercises provided {Makenna watts:34118::"12/17/2020","prior visit"    Assessment     Pt R LE is now stronger than L. She cont to use cane b/c L LE gives out. Pt shows poor balance strategy. Unable to balance on R LE for less than 10" w/o UE support. Would benefit from cont progression of balance activity.   Riana is progressing well towards her goals.   Pt prognosis is Good.     Pt will continue to benefit from skilled outpatient physical therapy to address the deficits listed in the problem list box on initial evaluation, provide pt/family education and to maximize pt's level of independence in the home and community environment.     Pt's spiritual, cultural and educational needs considered and pt agreeable to plan of care and goals.    Anticipated barriers to physical therapy: none    Goals:   Short Term Goals: (4 weeks)  Post op  - Pt will increase ROM to 0 deg R knee extension and 90 deg flexion (met)  - Pt will increase strength to 3/5 RLE to tolerate leg raises in all planes (Progressing, not met)  - Decrease Pain to 4/10 as worst with walking >15 min ( met)  - Pt to self correct posture and gait with minimal cues and no AD (met)  - Pt independent with HEP with progressions. ( met)     Long Term Goals (12 Weeks):Post op  - Pt will increase ROM to +3 deg R knee ext and 120 deg flexion (Progressing, not met)  - Pt will increase strength to 5/5 RLE to return to walking for exercise 1 hour a day (Progressing, not met)  - Decrease Pain to 1/10 as worst with all ADLs (Progressing, not met)  - Pt to return to 80% PLOF (Progressing, not met)       Plan     Continue POC per patient tolerance progressing knee ROM and strength.     Cristal Marie, PTA       "

## 2020-12-23 DIAGNOSIS — Z12.11 SPECIAL SCREENING FOR MALIGNANT NEOPLASMS, COLON: Primary | ICD-10-CM

## 2020-12-23 DIAGNOSIS — Z01.818 PRE-OP TESTING: ICD-10-CM

## 2020-12-23 RX ORDER — SODIUM, POTASSIUM,MAG SULFATES 17.5-3.13G
1 SOLUTION, RECONSTITUTED, ORAL ORAL DAILY
Qty: 1 KIT | Refills: 0 | Status: SHIPPED | OUTPATIENT
Start: 2020-12-23 | End: 2020-12-25

## 2020-12-28 ENCOUNTER — PATIENT OUTREACH (OUTPATIENT)
Dept: ADMINISTRATIVE | Facility: HOSPITAL | Age: 65
End: 2020-12-28

## 2020-12-29 ENCOUNTER — CLINICAL SUPPORT (OUTPATIENT)
Dept: REHABILITATION | Facility: HOSPITAL | Age: 65
End: 2020-12-29
Payer: MEDICARE

## 2020-12-29 DIAGNOSIS — M25.561 ACUTE PAIN OF RIGHT KNEE: ICD-10-CM

## 2020-12-29 PROCEDURE — 97110 THERAPEUTIC EXERCISES: CPT | Mod: CQ

## 2020-12-29 NOTE — PROGRESS NOTES
"                          Physical Therapy Daily Re-Assessment Note     Name: Riana Baker   Clinic Number: [id number]    Therapy Diagnosis:   Encounter Diagnosis   Name Primary?    Acute pain of right knee      Physician: Sabino Damon MD    Visit Date: 12/29/2020    Physician Orders: PT Eval and Treat   Medical Diagnosis from Referral: M17.11 (ICD-10-CM) - Primary osteoarthritis of right knee  Evaluation Date: 9/25/2020  Authorization Period Expiration: 11/25/20  Plan of Care Expiration: 12/18/20  Visit # / Visits authorized: 5/ 18     Time In: 9:20 am  Time Out: 10:00 am   Total Billable Time: 40 minutes     Precautions: Standard    Subjective      Pt reports: R knee is doing well. L knee is the problem now   she was compliant with home exercise program given last session.   Response to previous treatment:fair  Functional change: NA    Pain: 0/10  Location: right knee      Objective   Range of Motion: (PROM):11/12/2020      AROM ext: 1 hyper  PROM FL: 115 deg      Riana received therapeutic exercises to develop strength, endurance and ROM for 40 minutes including:  Recumbent bike x 8 min (full rev)  LAQ on hammer xfatigue R  Tandem walking 2 laps  Modified SL with yoga block 24" box 3x10  Rhomberg on airex w/ head turns side to side 30x  B SLS 3x30"  Lateral step up on airex 30x B    Riana received the following manual therapy techniques: Joint mobilizations and Soft tissue Mobilization were applied to the: R knee for 00 minutes, including:  Patellar mobs (inferior/superior and medial/lateral) x0 min  Femoral AP mobs  STM for scar tissue management      Home Exercises Provided and Patient Education Provided     Education provided:   - HEP to Go    Written Home Exercises Provided: Patient instructed to cont prior HEP.  Exercises were reviewed and Riana was able to demonstrate them prior to the end of the session.  Riana demonstrated good  understanding of the education provided.     See EMR " "under Patient Instructions for exercises provided {Makenna single:29296::"12/29/2020","prior visit"    Assessment     Balance has improved since previous session. Cont to use cane 2* L LE weakness. Cont to progress as dharmesh.  Riana is progressing well towards her goals.   Pt prognosis is Good.     Pt will continue to benefit from skilled outpatient physical therapy to address the deficits listed in the problem list box on initial evaluation, provide pt/family education and to maximize pt's level of independence in the home and community environment.     Pt's spiritual, cultural and educational needs considered and pt agreeable to plan of care and goals.    Anticipated barriers to physical therapy: none    Goals:   Short Term Goals: (4 weeks)  Post op  - Pt will increase ROM to 0 deg R knee extension and 90 deg flexion (met)  - Pt will increase strength to 3/5 RLE to tolerate leg raises in all planes (Progressing, not met)  - Decrease Pain to 4/10 as worst with walking >15 min ( met)  - Pt to self correct posture and gait with minimal cues and no AD (met)  - Pt independent with HEP with progressions. ( met)     Long Term Goals (12 Weeks):Post op  - Pt will increase ROM to +3 deg R knee ext and 120 deg flexion (Progressing, not met)  - Pt will increase strength to 5/5 RLE to return to walking for exercise 1 hour a day (Progressing, not met)  - Decrease Pain to 1/10 as worst with all ADLs (Progressing, not met)  - Pt to return to 80% PLOF (Progressing, not met)       Plan     Continue POC per patient tolerance progressing knee ROM and strength.     Cristal Marie, PTA       "

## 2021-01-03 ENCOUNTER — LAB VISIT (OUTPATIENT)
Dept: URGENT CARE | Facility: CLINIC | Age: 66
End: 2021-01-03
Payer: MEDICARE

## 2021-01-03 DIAGNOSIS — Z01.818 PRE-OP TESTING: ICD-10-CM

## 2021-01-03 LAB — SARS-COV-2 RNA RESP QL NAA+PROBE: NOT DETECTED

## 2021-01-03 PROCEDURE — U0003 INFECTIOUS AGENT DETECTION BY NUCLEIC ACID (DNA OR RNA); SEVERE ACUTE RESPIRATORY SYNDROME CORONAVIRUS 2 (SARS-COV-2) (CORONAVIRUS DISEASE [COVID-19]), AMPLIFIED PROBE TECHNIQUE, MAKING USE OF HIGH THROUGHPUT TECHNOLOGIES AS DESCRIBED BY CMS-2020-01-R: HCPCS

## 2021-01-04 ENCOUNTER — PATIENT MESSAGE (OUTPATIENT)
Dept: INTERNAL MEDICINE | Facility: CLINIC | Age: 66
End: 2021-01-04

## 2021-01-04 ENCOUNTER — TELEPHONE (OUTPATIENT)
Dept: INTERNAL MEDICINE | Facility: CLINIC | Age: 66
End: 2021-01-04

## 2021-01-04 DIAGNOSIS — Z12.31 VISIT FOR SCREENING MAMMOGRAM: Primary | ICD-10-CM

## 2021-01-05 ENCOUNTER — CLINICAL SUPPORT (OUTPATIENT)
Dept: REHABILITATION | Facility: HOSPITAL | Age: 66
End: 2021-01-05
Attending: INTERNAL MEDICINE
Payer: MEDICARE

## 2021-01-05 ENCOUNTER — PATIENT MESSAGE (OUTPATIENT)
Dept: INTERNAL MEDICINE | Facility: CLINIC | Age: 66
End: 2021-01-05

## 2021-01-05 DIAGNOSIS — M25.561 ACUTE PAIN OF RIGHT KNEE: ICD-10-CM

## 2021-01-05 PROCEDURE — 97110 THERAPEUTIC EXERCISES: CPT

## 2021-01-06 ENCOUNTER — ANESTHESIA (OUTPATIENT)
Dept: ENDOSCOPY | Facility: HOSPITAL | Age: 66
End: 2021-01-06
Payer: MEDICARE

## 2021-01-06 ENCOUNTER — HOSPITAL ENCOUNTER (OUTPATIENT)
Facility: HOSPITAL | Age: 66
Discharge: HOME | End: 2021-01-06
Attending: COLON & RECTAL SURGERY | Admitting: COLON & RECTAL SURGERY
Payer: MEDICARE

## 2021-01-06 ENCOUNTER — ANESTHESIA EVENT (OUTPATIENT)
Dept: ENDOSCOPY | Facility: HOSPITAL | Age: 66
End: 2021-01-06
Payer: MEDICARE

## 2021-01-06 VITALS
OXYGEN SATURATION: 99 % | SYSTOLIC BLOOD PRESSURE: 146 MMHG | RESPIRATION RATE: 20 BRPM | TEMPERATURE: 98 F | DIASTOLIC BLOOD PRESSURE: 66 MMHG | HEART RATE: 58 BPM

## 2021-01-06 DIAGNOSIS — Z80.0 FAMILY HISTORY OF COLON CANCER IN FATHER: Primary | ICD-10-CM

## 2021-01-06 DIAGNOSIS — Z12.11 SCREENING FOR COLON CANCER: ICD-10-CM

## 2021-01-06 LAB
GLUCOSE SERPL-MCNC: 169 MG/DL (ref 70–110)
POCT GLUCOSE: 169 MG/DL (ref 70–110)

## 2021-01-06 PROCEDURE — E9220 PRA ENDO ANESTHESIA: ICD-10-PCS | Mod: PT,,, | Performed by: NURSE ANESTHETIST, CERTIFIED REGISTERED

## 2021-01-06 PROCEDURE — 88305 TISSUE EXAM BY PATHOLOGIST: ICD-10-PCS | Mod: 26,,, | Performed by: PATHOLOGY

## 2021-01-06 PROCEDURE — 88305 TISSUE EXAM BY PATHOLOGIST: CPT | Performed by: PATHOLOGY

## 2021-01-06 PROCEDURE — 88305 TISSUE EXAM BY PATHOLOGIST: CPT | Mod: 26,,, | Performed by: PATHOLOGY

## 2021-01-06 PROCEDURE — 45385 COLONOSCOPY W/LESION REMOVAL: CPT | Mod: PT,,, | Performed by: COLON & RECTAL SURGERY

## 2021-01-06 PROCEDURE — 37000009 HC ANESTHESIA EA ADD 15 MINS: Performed by: COLON & RECTAL SURGERY

## 2021-01-06 PROCEDURE — 27201089 HC SNARE, DISP (ANY): Performed by: COLON & RECTAL SURGERY

## 2021-01-06 PROCEDURE — E9220 PRA ENDO ANESTHESIA: HCPCS | Mod: PT,,, | Performed by: NURSE ANESTHETIST, CERTIFIED REGISTERED

## 2021-01-06 PROCEDURE — 45385 COLONOSCOPY W/LESION REMOVAL: CPT | Performed by: COLON & RECTAL SURGERY

## 2021-01-06 PROCEDURE — 45385 PR COLONOSCOPY,REMV LESN,SNARE: ICD-10-PCS | Mod: PT,,, | Performed by: COLON & RECTAL SURGERY

## 2021-01-06 PROCEDURE — 37000008 HC ANESTHESIA 1ST 15 MINUTES: Performed by: COLON & RECTAL SURGERY

## 2021-01-06 PROCEDURE — 25000003 PHARM REV CODE 250: Performed by: NURSE ANESTHETIST, CERTIFIED REGISTERED

## 2021-01-06 PROCEDURE — 63600175 PHARM REV CODE 636 W HCPCS: Performed by: NURSE ANESTHETIST, CERTIFIED REGISTERED

## 2021-01-06 PROCEDURE — 25000003 PHARM REV CODE 250: Performed by: COLON & RECTAL SURGERY

## 2021-01-06 RX ORDER — PROPOFOL 10 MG/ML
VIAL (ML) INTRAVENOUS CONTINUOUS PRN
Status: DISCONTINUED | OUTPATIENT
Start: 2021-01-06 | End: 2021-01-06

## 2021-01-06 RX ORDER — LIDOCAINE HYDROCHLORIDE 20 MG/ML
INJECTION INTRAVENOUS
Status: DISCONTINUED | OUTPATIENT
Start: 2021-01-06 | End: 2021-01-06

## 2021-01-06 RX ORDER — PROPOFOL 10 MG/ML
VIAL (ML) INTRAVENOUS
Status: DISCONTINUED | OUTPATIENT
Start: 2021-01-06 | End: 2021-01-06

## 2021-01-06 RX ORDER — SODIUM CHLORIDE 9 MG/ML
INJECTION, SOLUTION INTRAVENOUS CONTINUOUS
Status: DISCONTINUED | OUTPATIENT
Start: 2021-01-06 | End: 2021-01-06 | Stop reason: HOSPADM

## 2021-01-06 RX ADMIN — SODIUM CHLORIDE 10 ML/HR: 0.9 INJECTION, SOLUTION INTRAVENOUS at 08:01

## 2021-01-06 RX ADMIN — LIDOCAINE HYDROCHLORIDE 50 MG: 20 INJECTION, SOLUTION INTRAVENOUS at 09:01

## 2021-01-06 RX ADMIN — PROPOFOL 50 MG: 10 INJECTION, EMULSION INTRAVENOUS at 09:01

## 2021-01-06 RX ADMIN — PROPOFOL 150 MCG/KG/MIN: 10 INJECTION, EMULSION INTRAVENOUS at 09:01

## 2021-01-06 RX ADMIN — PROPOFOL 20 MG: 10 INJECTION, EMULSION INTRAVENOUS at 09:01

## 2021-01-08 ENCOUNTER — HOSPITAL ENCOUNTER (OUTPATIENT)
Dept: RADIOLOGY | Facility: HOSPITAL | Age: 66
Discharge: HOME | End: 2021-01-08
Attending: INTERNAL MEDICINE
Payer: MEDICARE

## 2021-01-08 VITALS — HEIGHT: 65 IN | WEIGHT: 167.56 LBS | BODY MASS INDEX: 27.92 KG/M2

## 2021-01-08 DIAGNOSIS — Z12.31 VISIT FOR SCREENING MAMMOGRAM: ICD-10-CM

## 2021-01-08 PROCEDURE — 77063 MAMMO DIGITAL SCREENING BILAT WITH TOMO: ICD-10-PCS | Mod: 26,,, | Performed by: INTERNAL MEDICINE

## 2021-01-08 PROCEDURE — 77063 BREAST TOMOSYNTHESIS BI: CPT | Mod: 26,,, | Performed by: INTERNAL MEDICINE

## 2021-01-08 PROCEDURE — 77067 MAMMO DIGITAL SCREENING BILAT WITH TOMO: ICD-10-PCS | Mod: 26,,, | Performed by: INTERNAL MEDICINE

## 2021-01-08 PROCEDURE — 77067 SCR MAMMO BI INCL CAD: CPT | Mod: TC

## 2021-01-08 PROCEDURE — 77067 SCR MAMMO BI INCL CAD: CPT | Mod: 26,,, | Performed by: INTERNAL MEDICINE

## 2021-01-11 ENCOUNTER — OFFICE VISIT (OUTPATIENT)
Dept: ORTHOPEDICS | Facility: CLINIC | Age: 66
End: 2021-01-11
Payer: MEDICARE

## 2021-01-11 VITALS — BODY MASS INDEX: 28.61 KG/M2 | WEIGHT: 171.75 LBS | HEIGHT: 65 IN

## 2021-01-11 DIAGNOSIS — Z96.651 STATUS POST TOTAL RIGHT KNEE REPLACEMENT: Primary | ICD-10-CM

## 2021-01-11 LAB
FINAL PATHOLOGIC DIAGNOSIS: NORMAL
GROSS: NORMAL
Lab: NORMAL

## 2021-01-11 PROCEDURE — 3288F FALL RISK ASSESSMENT DOCD: CPT | Mod: CPTII,S$GLB,, | Performed by: ORTHOPAEDIC SURGERY

## 2021-01-11 PROCEDURE — 99024 PR POST-OP FOLLOW-UP VISIT: ICD-10-PCS | Mod: S$GLB,,, | Performed by: ORTHOPAEDIC SURGERY

## 2021-01-11 PROCEDURE — 1157F ADVNC CARE PLAN IN RCRD: CPT | Mod: S$GLB,,, | Performed by: ORTHOPAEDIC SURGERY

## 2021-01-11 PROCEDURE — 1125F AMNT PAIN NOTED PAIN PRSNT: CPT | Mod: S$GLB,,, | Performed by: ORTHOPAEDIC SURGERY

## 2021-01-11 PROCEDURE — 3008F PR BODY MASS INDEX (BMI) DOCUMENTED: ICD-10-PCS | Mod: CPTII,S$GLB,, | Performed by: ORTHOPAEDIC SURGERY

## 2021-01-11 PROCEDURE — 99999 PR PBB SHADOW E&M-EST. PATIENT-LVL IV: CPT | Mod: PBBFAC,,, | Performed by: ORTHOPAEDIC SURGERY

## 2021-01-11 PROCEDURE — 99024 POSTOP FOLLOW-UP VISIT: CPT | Mod: S$GLB,,, | Performed by: ORTHOPAEDIC SURGERY

## 2021-01-11 PROCEDURE — 1157F PR ADVANCE CARE PLAN OR EQUIV PRESENT IN MEDICAL RECORD: ICD-10-PCS | Mod: S$GLB,,, | Performed by: ORTHOPAEDIC SURGERY

## 2021-01-11 PROCEDURE — 1101F PR PT FALLS ASSESS DOC 0-1 FALLS W/OUT INJ PAST YR: ICD-10-PCS | Mod: CPTII,S$GLB,, | Performed by: ORTHOPAEDIC SURGERY

## 2021-01-11 PROCEDURE — 3288F PR FALLS RISK ASSESSMENT DOCUMENTED: ICD-10-PCS | Mod: CPTII,S$GLB,, | Performed by: ORTHOPAEDIC SURGERY

## 2021-01-11 PROCEDURE — 3008F BODY MASS INDEX DOCD: CPT | Mod: CPTII,S$GLB,, | Performed by: ORTHOPAEDIC SURGERY

## 2021-01-11 PROCEDURE — 99999 PR PBB SHADOW E&M-EST. PATIENT-LVL IV: ICD-10-PCS | Mod: PBBFAC,,, | Performed by: ORTHOPAEDIC SURGERY

## 2021-01-11 PROCEDURE — 1101F PT FALLS ASSESS-DOCD LE1/YR: CPT | Mod: CPTII,S$GLB,, | Performed by: ORTHOPAEDIC SURGERY

## 2021-01-11 PROCEDURE — 1125F PR PAIN SEVERITY QUANTIFIED, PAIN PRESENT: ICD-10-PCS | Mod: S$GLB,,, | Performed by: ORTHOPAEDIC SURGERY

## 2021-01-14 ENCOUNTER — PATIENT MESSAGE (OUTPATIENT)
Dept: ADMINISTRATIVE | Facility: OTHER | Age: 66
End: 2021-01-14

## 2021-01-22 ENCOUNTER — LAB VISIT (OUTPATIENT)
Dept: LAB | Facility: HOSPITAL | Age: 66
End: 2021-01-22
Attending: INTERNAL MEDICINE
Payer: MEDICARE

## 2021-01-22 ENCOUNTER — OFFICE VISIT (OUTPATIENT)
Dept: INTERNAL MEDICINE | Facility: CLINIC | Age: 66
End: 2021-01-22
Payer: MEDICARE

## 2021-01-22 ENCOUNTER — CLINICAL SUPPORT (OUTPATIENT)
Dept: INTERNAL MEDICINE | Facility: CLINIC | Age: 66
End: 2021-01-22
Payer: MEDICARE

## 2021-01-22 VITALS
SYSTOLIC BLOOD PRESSURE: 128 MMHG | WEIGHT: 169.06 LBS | HEIGHT: 65 IN | DIASTOLIC BLOOD PRESSURE: 84 MMHG | BODY MASS INDEX: 28.17 KG/M2

## 2021-01-22 DIAGNOSIS — Z79.4 TYPE 2 DIABETES MELLITUS WITH OTHER SPECIFIED COMPLICATION, WITH LONG-TERM CURRENT USE OF INSULIN: Primary | ICD-10-CM

## 2021-01-22 DIAGNOSIS — Z78.0 POSTMENOPAUSAL: ICD-10-CM

## 2021-01-22 DIAGNOSIS — E11.69 TYPE 2 DIABETES MELLITUS WITH OTHER SPECIFIED COMPLICATION, WITH LONG-TERM CURRENT USE OF INSULIN: ICD-10-CM

## 2021-01-22 DIAGNOSIS — I10 ESSENTIAL HYPERTENSION: ICD-10-CM

## 2021-01-22 DIAGNOSIS — I25.10 ATHEROSCLEROSIS OF CORONARY ARTERY WITHOUT ANGINA PECTORIS, UNSPECIFIED VESSEL OR LESION TYPE, UNSPECIFIED WHETHER NATIVE OR TRANSPLANTED HEART: ICD-10-CM

## 2021-01-22 DIAGNOSIS — Z79.4 TYPE 2 DIABETES MELLITUS WITH OTHER SPECIFIED COMPLICATION, WITH LONG-TERM CURRENT USE OF INSULIN: ICD-10-CM

## 2021-01-22 DIAGNOSIS — E66.3 OVERWEIGHT (BMI 25.0-29.9): ICD-10-CM

## 2021-01-22 DIAGNOSIS — E11.69 TYPE 2 DIABETES MELLITUS WITH OTHER SPECIFIED COMPLICATION, WITH LONG-TERM CURRENT USE OF INSULIN: Primary | ICD-10-CM

## 2021-01-22 DIAGNOSIS — K76.0 FATTY LIVER: ICD-10-CM

## 2021-01-22 DIAGNOSIS — Z11.4 ENCOUNTER FOR SCREENING FOR HIV: ICD-10-CM

## 2021-01-22 LAB
ESTIMATED AVG GLUCOSE: 131 MG/DL (ref 68–131)
HBA1C MFR BLD HPLC: 6.2 % (ref 4–5.6)

## 2021-01-22 PROCEDURE — 1126F AMNT PAIN NOTED NONE PRSNT: CPT | Mod: S$GLB,,, | Performed by: NURSE PRACTITIONER

## 2021-01-22 PROCEDURE — 99999 PR PBB SHADOW E&M-EST. PATIENT-LVL IV: ICD-10-PCS | Mod: PBBFAC,,, | Performed by: NURSE PRACTITIONER

## 2021-01-22 PROCEDURE — 99499 RISK ADDL DX/OHS AUDIT: ICD-10-PCS | Mod: S$GLB,,, | Performed by: NURSE PRACTITIONER

## 2021-01-22 PROCEDURE — 3008F BODY MASS INDEX DOCD: CPT | Mod: CPTII,S$GLB,, | Performed by: NURSE PRACTITIONER

## 2021-01-22 PROCEDURE — 3074F SYST BP LT 130 MM HG: CPT | Mod: CPTII,S$GLB,, | Performed by: NURSE PRACTITIONER

## 2021-01-22 PROCEDURE — 1126F PR PAIN SEVERITY QUANTIFIED, NO PAIN PRESENT: ICD-10-PCS | Mod: S$GLB,,, | Performed by: NURSE PRACTITIONER

## 2021-01-22 PROCEDURE — 3008F PR BODY MASS INDEX (BMI) DOCUMENTED: ICD-10-PCS | Mod: CPTII,S$GLB,, | Performed by: NURSE PRACTITIONER

## 2021-01-22 PROCEDURE — 99214 OFFICE O/P EST MOD 30 MIN: CPT | Mod: 25,S$GLB,, | Performed by: NURSE PRACTITIONER

## 2021-01-22 PROCEDURE — 1157F ADVNC CARE PLAN IN RCRD: CPT | Mod: S$GLB,,, | Performed by: NURSE PRACTITIONER

## 2021-01-22 PROCEDURE — 3044F HG A1C LEVEL LT 7.0%: CPT | Mod: CPTII,S$GLB,, | Performed by: NURSE PRACTITIONER

## 2021-01-22 PROCEDURE — 99499 UNLISTED E&M SERVICE: CPT | Mod: S$GLB,,, | Performed by: NURSE PRACTITIONER

## 2021-01-22 PROCEDURE — 3074F PR MOST RECENT SYSTOLIC BLOOD PRESSURE < 130 MM HG: ICD-10-PCS | Mod: CPTII,S$GLB,, | Performed by: NURSE PRACTITIONER

## 2021-01-22 PROCEDURE — 3079F DIAST BP 80-89 MM HG: CPT | Mod: CPTII,S$GLB,, | Performed by: NURSE PRACTITIONER

## 2021-01-22 PROCEDURE — 90670 PNEUMOCOCCAL CONJUGATE VACCINE 13-VALENT LESS THAN 5YO & GREATER THAN: ICD-10-PCS | Mod: S$GLB,,, | Performed by: NURSE PRACTITIONER

## 2021-01-22 PROCEDURE — 99999 PR PBB SHADOW E&M-EST. PATIENT-LVL IV: CPT | Mod: PBBFAC,,, | Performed by: NURSE PRACTITIONER

## 2021-01-22 PROCEDURE — G0009 ADMIN PNEUMOCOCCAL VACCINE: HCPCS | Mod: S$GLB,,, | Performed by: NURSE PRACTITIONER

## 2021-01-22 PROCEDURE — 3288F PR FALLS RISK ASSESSMENT DOCUMENTED: ICD-10-PCS | Mod: CPTII,S$GLB,, | Performed by: NURSE PRACTITIONER

## 2021-01-22 PROCEDURE — G0009 PNEUMOCOCCAL CONJUGATE VACCINE 13-VALENT LESS THAN 5YO & GREATER THAN: ICD-10-PCS | Mod: S$GLB,,, | Performed by: NURSE PRACTITIONER

## 2021-01-22 PROCEDURE — 1101F PT FALLS ASSESS-DOCD LE1/YR: CPT | Mod: CPTII,S$GLB,, | Performed by: NURSE PRACTITIONER

## 2021-01-22 PROCEDURE — 3288F FALL RISK ASSESSMENT DOCD: CPT | Mod: CPTII,S$GLB,, | Performed by: NURSE PRACTITIONER

## 2021-01-22 PROCEDURE — 3044F PR MOST RECENT HEMOGLOBIN A1C LEVEL <7.0%: ICD-10-PCS | Mod: CPTII,S$GLB,, | Performed by: NURSE PRACTITIONER

## 2021-01-22 PROCEDURE — 1101F PR PT FALLS ASSESS DOC 0-1 FALLS W/OUT INJ PAST YR: ICD-10-PCS | Mod: CPTII,S$GLB,, | Performed by: NURSE PRACTITIONER

## 2021-01-22 PROCEDURE — 90670 PCV13 VACCINE IM: CPT | Mod: S$GLB,,, | Performed by: NURSE PRACTITIONER

## 2021-01-22 PROCEDURE — 3079F PR MOST RECENT DIASTOLIC BLOOD PRESSURE 80-89 MM HG: ICD-10-PCS | Mod: CPTII,S$GLB,, | Performed by: NURSE PRACTITIONER

## 2021-01-22 PROCEDURE — 83036 HEMOGLOBIN GLYCOSYLATED A1C: CPT

## 2021-01-22 PROCEDURE — 1157F PR ADVANCE CARE PLAN OR EQUIV PRESENT IN MEDICAL RECORD: ICD-10-PCS | Mod: S$GLB,,, | Performed by: NURSE PRACTITIONER

## 2021-01-22 PROCEDURE — 99214 PR OFFICE/OUTPT VISIT, EST, LEVL IV, 30-39 MIN: ICD-10-PCS | Mod: 25,S$GLB,, | Performed by: NURSE PRACTITIONER

## 2021-01-22 PROCEDURE — 36415 COLL VENOUS BLD VENIPUNCTURE: CPT

## 2021-01-22 RX ORDER — INSULIN GLARGINE 100 [IU]/ML
INJECTION, SOLUTION SUBCUTANEOUS
Qty: 3 BOX | Refills: 3
Start: 2021-01-22 | End: 2021-03-11

## 2021-01-22 RX ORDER — INSULIN LISPRO 100 [IU]/ML
INJECTION, SOLUTION INTRAVENOUS; SUBCUTANEOUS
Qty: 4 BOX | Refills: 3
Start: 2021-01-22 | End: 2021-03-22

## 2021-02-01 ENCOUNTER — APPOINTMENT (OUTPATIENT)
Dept: RADIOLOGY | Facility: CLINIC | Age: 66
End: 2021-02-01
Attending: NURSE PRACTITIONER
Payer: MEDICARE

## 2021-02-01 DIAGNOSIS — Z78.0 POSTMENOPAUSAL: ICD-10-CM

## 2021-02-01 PROCEDURE — 77080 DEXA BONE DENSITY SPINE HIP: ICD-10-PCS | Mod: 26,,, | Performed by: INTERNAL MEDICINE

## 2021-02-01 PROCEDURE — 77080 DXA BONE DENSITY AXIAL: CPT | Mod: TC,PO

## 2021-02-01 PROCEDURE — 77080 DXA BONE DENSITY AXIAL: CPT | Mod: 26,,, | Performed by: INTERNAL MEDICINE

## 2021-02-10 ENCOUNTER — OFFICE VISIT (OUTPATIENT)
Dept: PODIATRY | Facility: CLINIC | Age: 66
End: 2021-02-10
Payer: MEDICARE

## 2021-02-10 VITALS — HEIGHT: 65 IN | BODY MASS INDEX: 28.16 KG/M2 | RESPIRATION RATE: 18 BRPM | WEIGHT: 169 LBS

## 2021-02-10 DIAGNOSIS — E11.69 TYPE 2 DIABETES MELLITUS WITH OTHER SPECIFIED COMPLICATION, WITH LONG-TERM CURRENT USE OF INSULIN: Primary | ICD-10-CM

## 2021-02-10 DIAGNOSIS — Z79.4 TYPE 2 DIABETES MELLITUS WITH OTHER SPECIFIED COMPLICATION, WITH LONG-TERM CURRENT USE OF INSULIN: Primary | ICD-10-CM

## 2021-02-10 PROCEDURE — 1157F PR ADVANCE CARE PLAN OR EQUIV PRESENT IN MEDICAL RECORD: ICD-10-PCS | Mod: S$GLB,,, | Performed by: PODIATRIST

## 2021-02-10 PROCEDURE — 99999 PR PBB SHADOW E&M-EST. PATIENT-LVL III: CPT | Mod: PBBFAC,,, | Performed by: PODIATRIST

## 2021-02-10 PROCEDURE — 1126F PR PAIN SEVERITY QUANTIFIED, NO PAIN PRESENT: ICD-10-PCS | Mod: S$GLB,,, | Performed by: PODIATRIST

## 2021-02-10 PROCEDURE — 1157F ADVNC CARE PLAN IN RCRD: CPT | Mod: S$GLB,,, | Performed by: PODIATRIST

## 2021-02-10 PROCEDURE — 99213 PR OFFICE/OUTPT VISIT, EST, LEVL III, 20-29 MIN: ICD-10-PCS | Mod: S$GLB,,, | Performed by: PODIATRIST

## 2021-02-10 PROCEDURE — 99213 OFFICE O/P EST LOW 20 MIN: CPT | Mod: S$GLB,,, | Performed by: PODIATRIST

## 2021-02-10 PROCEDURE — 99499 RISK ADDL DX/OHS AUDIT: ICD-10-PCS | Mod: S$GLB,,, | Performed by: PODIATRIST

## 2021-02-10 PROCEDURE — 99999 PR PBB SHADOW E&M-EST. PATIENT-LVL III: ICD-10-PCS | Mod: PBBFAC,,, | Performed by: PODIATRIST

## 2021-02-10 PROCEDURE — 3044F PR MOST RECENT HEMOGLOBIN A1C LEVEL <7.0%: ICD-10-PCS | Mod: CPTII,S$GLB,, | Performed by: PODIATRIST

## 2021-02-10 PROCEDURE — 3044F HG A1C LEVEL LT 7.0%: CPT | Mod: CPTII,S$GLB,, | Performed by: PODIATRIST

## 2021-02-10 PROCEDURE — 99499 UNLISTED E&M SERVICE: CPT | Mod: S$GLB,,, | Performed by: PODIATRIST

## 2021-02-10 PROCEDURE — 3008F PR BODY MASS INDEX (BMI) DOCUMENTED: ICD-10-PCS | Mod: CPTII,S$GLB,, | Performed by: PODIATRIST

## 2021-02-10 PROCEDURE — 3008F BODY MASS INDEX DOCD: CPT | Mod: CPTII,S$GLB,, | Performed by: PODIATRIST

## 2021-02-10 PROCEDURE — 1126F AMNT PAIN NOTED NONE PRSNT: CPT | Mod: S$GLB,,, | Performed by: PODIATRIST

## 2021-03-05 ENCOUNTER — IMMUNIZATION (OUTPATIENT)
Dept: PRIMARY CARE CLINIC | Facility: CLINIC | Age: 66
End: 2021-03-05
Payer: MEDICARE

## 2021-03-05 DIAGNOSIS — Z23 NEED FOR VACCINATION: Primary | ICD-10-CM

## 2021-03-05 PROCEDURE — 91303 PR SARSCOV2 VAC AD26 .5ML IM: ICD-10-PCS | Mod: S$GLB,,, | Performed by: INTERNAL MEDICINE

## 2021-03-05 PROCEDURE — 91303 PR SARSCOV2 VAC AD26 .5ML IM: CPT | Mod: S$GLB,,, | Performed by: INTERNAL MEDICINE

## 2021-03-05 PROCEDURE — 0031A PR IMMUNIZ ADMIN, SARS-COV-2 COVID-19 VACC, 5X10VP/0.5ML: ICD-10-PCS | Mod: CV19,S$GLB,, | Performed by: INTERNAL MEDICINE

## 2021-03-05 PROCEDURE — 0031A PR IMMUNIZ ADMIN, SARS-COV-2 COVID-19 VACC, 5X10VP/0.5ML: CPT | Mod: CV19,S$GLB,, | Performed by: INTERNAL MEDICINE

## 2021-03-08 RX ORDER — LANCETS
EACH MISCELLANEOUS
Qty: 400 EACH | Refills: 3 | Status: SHIPPED | OUTPATIENT
Start: 2021-03-08 | End: 2021-03-08 | Stop reason: SDUPTHER

## 2021-03-08 RX ORDER — LANCETS
EACH MISCELLANEOUS
Qty: 400 EACH | Refills: 3 | Status: SHIPPED | OUTPATIENT
Start: 2021-03-08 | End: 2022-12-13

## 2021-03-11 ENCOUNTER — PATIENT MESSAGE (OUTPATIENT)
Dept: INTERNAL MEDICINE | Facility: CLINIC | Age: 66
End: 2021-03-11

## 2021-03-12 ENCOUNTER — PATIENT MESSAGE (OUTPATIENT)
Dept: INTERNAL MEDICINE | Facility: CLINIC | Age: 66
End: 2021-03-12

## 2021-03-12 LAB
LEFT EYE DM RETINOPATHY: NEGATIVE
RIGHT EYE DM RETINOPATHY: NEGATIVE

## 2021-03-16 ENCOUNTER — PATIENT OUTREACH (OUTPATIENT)
Dept: ADMINISTRATIVE | Facility: HOSPITAL | Age: 66
End: 2021-03-16

## 2021-03-22 RX ORDER — INSULIN LISPRO 100 [IU]/ML
INJECTION, SOLUTION INTRAVENOUS; SUBCUTANEOUS
Qty: 60 ML | Refills: 3 | Status: SHIPPED | OUTPATIENT
Start: 2021-03-22 | End: 2022-04-07

## 2021-03-24 ENCOUNTER — TELEPHONE (OUTPATIENT)
Dept: INTERNAL MEDICINE | Facility: CLINIC | Age: 66
End: 2021-03-24

## 2021-03-24 DIAGNOSIS — K76.0 FATTY LIVER: ICD-10-CM

## 2021-03-24 DIAGNOSIS — Z79.4 TYPE 2 DIABETES MELLITUS WITH OTHER SPECIFIED COMPLICATION, WITH LONG-TERM CURRENT USE OF INSULIN: ICD-10-CM

## 2021-03-24 DIAGNOSIS — Z00.00 PREVENTATIVE HEALTH CARE: Primary | ICD-10-CM

## 2021-03-24 DIAGNOSIS — G47.30 SLEEP APNEA, UNSPECIFIED TYPE: ICD-10-CM

## 2021-03-24 DIAGNOSIS — M85.80 OSTEOPENIA, UNSPECIFIED LOCATION: ICD-10-CM

## 2021-03-24 DIAGNOSIS — E55.9 VITAMIN D DEFICIENCY: ICD-10-CM

## 2021-03-24 DIAGNOSIS — E11.69 TYPE 2 DIABETES MELLITUS WITH OTHER SPECIFIED COMPLICATION, WITH LONG-TERM CURRENT USE OF INSULIN: ICD-10-CM

## 2021-03-24 DIAGNOSIS — K21.9 GASTROESOPHAGEAL REFLUX DISEASE, UNSPECIFIED WHETHER ESOPHAGITIS PRESENT: ICD-10-CM

## 2021-03-24 DIAGNOSIS — E66.3 OVERWEIGHT (BMI 25.0-29.9): ICD-10-CM

## 2021-03-24 DIAGNOSIS — I10 ESSENTIAL HYPERTENSION: ICD-10-CM

## 2021-04-06 ENCOUNTER — TELEPHONE (OUTPATIENT)
Dept: INTERNAL MEDICINE | Facility: CLINIC | Age: 66
End: 2021-04-06

## 2021-04-07 ENCOUNTER — TELEPHONE (OUTPATIENT)
Dept: INTERNAL MEDICINE | Facility: CLINIC | Age: 66
End: 2021-04-07

## 2021-04-08 DIAGNOSIS — M25.562 PAIN IN BOTH KNEES, UNSPECIFIED CHRONICITY: Primary | ICD-10-CM

## 2021-04-08 DIAGNOSIS — M25.561 PAIN IN BOTH KNEES, UNSPECIFIED CHRONICITY: Primary | ICD-10-CM

## 2021-04-09 ENCOUNTER — LAB VISIT (OUTPATIENT)
Dept: LAB | Facility: HOSPITAL | Age: 66
End: 2021-04-09
Payer: MEDICARE

## 2021-04-09 DIAGNOSIS — M85.80 OSTEOPENIA, UNSPECIFIED LOCATION: ICD-10-CM

## 2021-04-09 DIAGNOSIS — Z00.00 PREVENTATIVE HEALTH CARE: ICD-10-CM

## 2021-04-09 DIAGNOSIS — K21.9 GASTROESOPHAGEAL REFLUX DISEASE, UNSPECIFIED WHETHER ESOPHAGITIS PRESENT: ICD-10-CM

## 2021-04-09 DIAGNOSIS — Z79.4 TYPE 2 DIABETES MELLITUS WITH OTHER SPECIFIED COMPLICATION, WITH LONG-TERM CURRENT USE OF INSULIN: ICD-10-CM

## 2021-04-09 DIAGNOSIS — G47.30 SLEEP APNEA, UNSPECIFIED TYPE: ICD-10-CM

## 2021-04-09 DIAGNOSIS — E11.69 TYPE 2 DIABETES MELLITUS WITH OTHER SPECIFIED COMPLICATION, WITH LONG-TERM CURRENT USE OF INSULIN: ICD-10-CM

## 2021-04-09 DIAGNOSIS — E55.9 VITAMIN D DEFICIENCY: ICD-10-CM

## 2021-04-09 DIAGNOSIS — I10 ESSENTIAL HYPERTENSION: ICD-10-CM

## 2021-04-09 DIAGNOSIS — E66.3 OVERWEIGHT (BMI 25.0-29.9): ICD-10-CM

## 2021-04-09 DIAGNOSIS — K76.0 FATTY LIVER: ICD-10-CM

## 2021-04-09 LAB
25(OH)D3+25(OH)D2 SERPL-MCNC: 16 NG/ML (ref 30–96)
ALBUMIN SERPL BCP-MCNC: 3.8 G/DL (ref 3.5–5.2)
ALP SERPL-CCNC: 92 U/L (ref 55–135)
ALT SERPL W/O P-5'-P-CCNC: 20 U/L (ref 10–44)
ANION GAP SERPL CALC-SCNC: 9 MMOL/L (ref 8–16)
AST SERPL-CCNC: 20 U/L (ref 10–40)
BASOPHILS # BLD AUTO: 0.02 K/UL (ref 0–0.2)
BASOPHILS NFR BLD: 0.3 % (ref 0–1.9)
BILIRUB SERPL-MCNC: 0.5 MG/DL (ref 0.1–1)
BUN SERPL-MCNC: 13 MG/DL (ref 8–23)
CALCIUM SERPL-MCNC: 9.1 MG/DL (ref 8.7–10.5)
CHLORIDE SERPL-SCNC: 106 MMOL/L (ref 95–110)
CHOLEST SERPL-MCNC: 164 MG/DL (ref 120–199)
CHOLEST/HDLC SERPL: 3.3 {RATIO} (ref 2–5)
CO2 SERPL-SCNC: 27 MMOL/L (ref 23–29)
CREAT SERPL-MCNC: 0.7 MG/DL (ref 0.5–1.4)
DIFFERENTIAL METHOD: NORMAL
EOSINOPHIL # BLD AUTO: 0.1 K/UL (ref 0–0.5)
EOSINOPHIL NFR BLD: 1.4 % (ref 0–8)
ERYTHROCYTE [DISTWIDTH] IN BLOOD BY AUTOMATED COUNT: 12.6 % (ref 11.5–14.5)
EST. GFR  (AFRICAN AMERICAN): >60 ML/MIN/1.73 M^2
EST. GFR  (NON AFRICAN AMERICAN): >60 ML/MIN/1.73 M^2
ESTIMATED AVG GLUCOSE: 128 MG/DL (ref 68–131)
GLUCOSE SERPL-MCNC: 93 MG/DL (ref 70–110)
HBA1C MFR BLD: 6.1 % (ref 4–5.6)
HCT VFR BLD AUTO: 39.5 % (ref 37–48.5)
HDLC SERPL-MCNC: 49 MG/DL (ref 40–75)
HDLC SERPL: 29.9 % (ref 20–50)
HGB BLD-MCNC: 13.2 G/DL (ref 12–16)
IMM GRANULOCYTES # BLD AUTO: 0.01 K/UL (ref 0–0.04)
IMM GRANULOCYTES NFR BLD AUTO: 0.1 % (ref 0–0.5)
LDLC SERPL CALC-MCNC: 79.2 MG/DL (ref 63–159)
LYMPHOCYTES # BLD AUTO: 2.6 K/UL (ref 1–4.8)
LYMPHOCYTES NFR BLD: 34.3 % (ref 18–48)
MCH RBC QN AUTO: 30.9 PG (ref 27–31)
MCHC RBC AUTO-ENTMCNC: 33.4 G/DL (ref 32–36)
MCV RBC AUTO: 93 FL (ref 82–98)
MONOCYTES # BLD AUTO: 0.5 K/UL (ref 0.3–1)
MONOCYTES NFR BLD: 6 % (ref 4–15)
NEUTROPHILS # BLD AUTO: 4.4 K/UL (ref 1.8–7.7)
NEUTROPHILS NFR BLD: 57.9 % (ref 38–73)
NONHDLC SERPL-MCNC: 115 MG/DL
NRBC BLD-RTO: 0 /100 WBC
PLATELET # BLD AUTO: 227 K/UL (ref 150–450)
PMV BLD AUTO: 9.8 FL (ref 9.2–12.9)
POTASSIUM SERPL-SCNC: 4.4 MMOL/L (ref 3.5–5.1)
PROT SERPL-MCNC: 7.5 G/DL (ref 6–8.4)
RBC # BLD AUTO: 4.27 M/UL (ref 4–5.4)
SODIUM SERPL-SCNC: 142 MMOL/L (ref 136–145)
T4 FREE SERPL-MCNC: 1 NG/DL (ref 0.71–1.51)
TRIGL SERPL-MCNC: 179 MG/DL (ref 30–150)
TSH SERPL DL<=0.005 MIU/L-ACNC: 1.3 UIU/ML (ref 0.4–4)
WBC # BLD AUTO: 7.62 K/UL (ref 3.9–12.7)

## 2021-04-09 PROCEDURE — 82306 VITAMIN D 25 HYDROXY: CPT | Performed by: FAMILY MEDICINE

## 2021-04-09 PROCEDURE — 36415 COLL VENOUS BLD VENIPUNCTURE: CPT | Mod: PO | Performed by: FAMILY MEDICINE

## 2021-04-09 PROCEDURE — 83036 HEMOGLOBIN GLYCOSYLATED A1C: CPT | Performed by: FAMILY MEDICINE

## 2021-04-09 PROCEDURE — 85025 COMPLETE CBC W/AUTO DIFF WBC: CPT | Performed by: FAMILY MEDICINE

## 2021-04-09 PROCEDURE — 84439 ASSAY OF FREE THYROXINE: CPT | Performed by: FAMILY MEDICINE

## 2021-04-09 PROCEDURE — 80053 COMPREHEN METABOLIC PANEL: CPT | Performed by: FAMILY MEDICINE

## 2021-04-09 PROCEDURE — 80061 LIPID PANEL: CPT | Performed by: FAMILY MEDICINE

## 2021-04-09 PROCEDURE — 84443 ASSAY THYROID STIM HORMONE: CPT | Performed by: FAMILY MEDICINE

## 2021-04-11 ENCOUNTER — PATIENT MESSAGE (OUTPATIENT)
Dept: INTERNAL MEDICINE | Facility: CLINIC | Age: 66
End: 2021-04-11

## 2021-04-11 DIAGNOSIS — E55.9 VITAMIN D DEFICIENCY: Primary | ICD-10-CM

## 2021-04-11 RX ORDER — ERGOCALCIFEROL 1.25 MG/1
50000 CAPSULE ORAL
Qty: 4 CAPSULE | Refills: 2 | Status: SHIPPED | OUTPATIENT
Start: 2021-04-11 | End: 2021-06-28

## 2021-04-12 ENCOUNTER — HOSPITAL ENCOUNTER (OUTPATIENT)
Dept: RADIOLOGY | Facility: HOSPITAL | Age: 66
Discharge: HOME | End: 2021-04-12
Attending: ORTHOPAEDIC SURGERY
Payer: MEDICARE

## 2021-04-12 ENCOUNTER — OFFICE VISIT (OUTPATIENT)
Dept: ORTHOPEDICS | Facility: CLINIC | Age: 66
End: 2021-04-12
Payer: MEDICARE

## 2021-04-12 ENCOUNTER — TELEPHONE (OUTPATIENT)
Dept: SLEEP MEDICINE | Facility: CLINIC | Age: 66
End: 2021-04-12

## 2021-04-12 VITALS — BODY MASS INDEX: 28.17 KG/M2 | HEIGHT: 65 IN | WEIGHT: 169.06 LBS

## 2021-04-12 DIAGNOSIS — Z96.651 STATUS POST TOTAL RIGHT KNEE REPLACEMENT: ICD-10-CM

## 2021-04-12 DIAGNOSIS — G89.29 CHRONIC PAIN OF RIGHT KNEE: Primary | ICD-10-CM

## 2021-04-12 DIAGNOSIS — M25.561 CHRONIC PAIN OF RIGHT KNEE: Primary | ICD-10-CM

## 2021-04-12 DIAGNOSIS — M25.561 PAIN IN BOTH KNEES, UNSPECIFIED CHRONICITY: ICD-10-CM

## 2021-04-12 DIAGNOSIS — M25.562 PAIN IN BOTH KNEES, UNSPECIFIED CHRONICITY: ICD-10-CM

## 2021-04-12 PROCEDURE — 1101F PR PT FALLS ASSESS DOC 0-1 FALLS W/OUT INJ PAST YR: ICD-10-PCS | Mod: CPTII,S$GLB,, | Performed by: ORTHOPAEDIC SURGERY

## 2021-04-12 PROCEDURE — 73562 XR KNEE ORTHO BILAT: ICD-10-PCS | Mod: 26,50,, | Performed by: RADIOLOGY

## 2021-04-12 PROCEDURE — 99999 PR PBB SHADOW E&M-EST. PATIENT-LVL IV: CPT | Mod: PBBFAC,,, | Performed by: ORTHOPAEDIC SURGERY

## 2021-04-12 PROCEDURE — 1157F ADVNC CARE PLAN IN RCRD: CPT | Mod: S$GLB,,, | Performed by: ORTHOPAEDIC SURGERY

## 2021-04-12 PROCEDURE — 1125F PR PAIN SEVERITY QUANTIFIED, PAIN PRESENT: ICD-10-PCS | Mod: S$GLB,,, | Performed by: ORTHOPAEDIC SURGERY

## 2021-04-12 PROCEDURE — 99999 PR PBB SHADOW E&M-EST. PATIENT-LVL IV: ICD-10-PCS | Mod: PBBFAC,,, | Performed by: ORTHOPAEDIC SURGERY

## 2021-04-12 PROCEDURE — 73562 X-RAY EXAM OF KNEE 3: CPT | Mod: TC,50

## 2021-04-12 PROCEDURE — 3008F PR BODY MASS INDEX (BMI) DOCUMENTED: ICD-10-PCS | Mod: CPTII,S$GLB,, | Performed by: ORTHOPAEDIC SURGERY

## 2021-04-12 PROCEDURE — 1101F PT FALLS ASSESS-DOCD LE1/YR: CPT | Mod: CPTII,S$GLB,, | Performed by: ORTHOPAEDIC SURGERY

## 2021-04-12 PROCEDURE — 73562 X-RAY EXAM OF KNEE 3: CPT | Mod: 26,50,, | Performed by: RADIOLOGY

## 2021-04-12 PROCEDURE — 1159F PR MEDICATION LIST DOCUMENTED IN MEDICAL RECORD: ICD-10-PCS | Mod: S$GLB,,, | Performed by: ORTHOPAEDIC SURGERY

## 2021-04-12 PROCEDURE — 1125F AMNT PAIN NOTED PAIN PRSNT: CPT | Mod: S$GLB,,, | Performed by: ORTHOPAEDIC SURGERY

## 2021-04-12 PROCEDURE — 3008F BODY MASS INDEX DOCD: CPT | Mod: CPTII,S$GLB,, | Performed by: ORTHOPAEDIC SURGERY

## 2021-04-12 PROCEDURE — 99212 OFFICE O/P EST SF 10 MIN: CPT | Mod: S$GLB,,, | Performed by: ORTHOPAEDIC SURGERY

## 2021-04-12 PROCEDURE — 99212 PR OFFICE/OUTPT VISIT, EST, LEVL II, 10-19 MIN: ICD-10-PCS | Mod: S$GLB,,, | Performed by: ORTHOPAEDIC SURGERY

## 2021-04-12 PROCEDURE — 1157F PR ADVANCE CARE PLAN OR EQUIV PRESENT IN MEDICAL RECORD: ICD-10-PCS | Mod: S$GLB,,, | Performed by: ORTHOPAEDIC SURGERY

## 2021-04-12 PROCEDURE — 1159F MED LIST DOCD IN RCRD: CPT | Mod: S$GLB,,, | Performed by: ORTHOPAEDIC SURGERY

## 2021-04-12 PROCEDURE — 3288F PR FALLS RISK ASSESSMENT DOCUMENTED: ICD-10-PCS | Mod: CPTII,S$GLB,, | Performed by: ORTHOPAEDIC SURGERY

## 2021-04-12 PROCEDURE — 3288F FALL RISK ASSESSMENT DOCD: CPT | Mod: CPTII,S$GLB,, | Performed by: ORTHOPAEDIC SURGERY

## 2021-04-13 ENCOUNTER — OFFICE VISIT (OUTPATIENT)
Dept: SLEEP MEDICINE | Facility: CLINIC | Age: 66
End: 2021-04-13
Payer: MEDICARE

## 2021-04-13 VITALS
BODY MASS INDEX: 28.32 KG/M2 | HEIGHT: 65 IN | DIASTOLIC BLOOD PRESSURE: 69 MMHG | SYSTOLIC BLOOD PRESSURE: 142 MMHG | WEIGHT: 170 LBS | HEART RATE: 71 BPM

## 2021-04-13 DIAGNOSIS — G47.30 SLEEP APNEA, UNSPECIFIED TYPE: Primary | ICD-10-CM

## 2021-04-13 PROCEDURE — 1159F MED LIST DOCD IN RCRD: CPT | Mod: S$GLB,,, | Performed by: PSYCHIATRY & NEUROLOGY

## 2021-04-13 PROCEDURE — 1157F ADVNC CARE PLAN IN RCRD: CPT | Mod: S$GLB,,, | Performed by: PSYCHIATRY & NEUROLOGY

## 2021-04-13 PROCEDURE — 1159F PR MEDICATION LIST DOCUMENTED IN MEDICAL RECORD: ICD-10-PCS | Mod: S$GLB,,, | Performed by: PSYCHIATRY & NEUROLOGY

## 2021-04-13 PROCEDURE — 1126F AMNT PAIN NOTED NONE PRSNT: CPT | Mod: S$GLB,,, | Performed by: PSYCHIATRY & NEUROLOGY

## 2021-04-13 PROCEDURE — 99999 PR PBB SHADOW E&M-EST. PATIENT-LVL V: ICD-10-PCS | Mod: PBBFAC,,, | Performed by: PSYCHIATRY & NEUROLOGY

## 2021-04-13 PROCEDURE — 1126F PR PAIN SEVERITY QUANTIFIED, NO PAIN PRESENT: ICD-10-PCS | Mod: S$GLB,,, | Performed by: PSYCHIATRY & NEUROLOGY

## 2021-04-13 PROCEDURE — 1101F PT FALLS ASSESS-DOCD LE1/YR: CPT | Mod: CPTII,S$GLB,, | Performed by: PSYCHIATRY & NEUROLOGY

## 2021-04-13 PROCEDURE — 3288F PR FALLS RISK ASSESSMENT DOCUMENTED: ICD-10-PCS | Mod: CPTII,S$GLB,, | Performed by: PSYCHIATRY & NEUROLOGY

## 2021-04-13 PROCEDURE — 3008F PR BODY MASS INDEX (BMI) DOCUMENTED: ICD-10-PCS | Mod: CPTII,S$GLB,, | Performed by: PSYCHIATRY & NEUROLOGY

## 2021-04-13 PROCEDURE — 3008F BODY MASS INDEX DOCD: CPT | Mod: CPTII,S$GLB,, | Performed by: PSYCHIATRY & NEUROLOGY

## 2021-04-13 PROCEDURE — 1157F PR ADVANCE CARE PLAN OR EQUIV PRESENT IN MEDICAL RECORD: ICD-10-PCS | Mod: S$GLB,,, | Performed by: PSYCHIATRY & NEUROLOGY

## 2021-04-13 PROCEDURE — 99204 PR OFFICE/OUTPT VISIT, NEW, LEVL IV, 45-59 MIN: ICD-10-PCS | Mod: S$GLB,,, | Performed by: PSYCHIATRY & NEUROLOGY

## 2021-04-13 PROCEDURE — 99204 OFFICE O/P NEW MOD 45 MIN: CPT | Mod: S$GLB,,, | Performed by: PSYCHIATRY & NEUROLOGY

## 2021-04-13 PROCEDURE — 3288F FALL RISK ASSESSMENT DOCD: CPT | Mod: CPTII,S$GLB,, | Performed by: PSYCHIATRY & NEUROLOGY

## 2021-04-13 PROCEDURE — 99999 PR PBB SHADOW E&M-EST. PATIENT-LVL V: CPT | Mod: PBBFAC,,, | Performed by: PSYCHIATRY & NEUROLOGY

## 2021-04-13 PROCEDURE — 1101F PR PT FALLS ASSESS DOC 0-1 FALLS W/OUT INJ PAST YR: ICD-10-PCS | Mod: CPTII,S$GLB,, | Performed by: PSYCHIATRY & NEUROLOGY

## 2021-04-14 ENCOUNTER — OFFICE VISIT (OUTPATIENT)
Dept: INTERNAL MEDICINE | Facility: CLINIC | Age: 66
End: 2021-04-14
Payer: MEDICARE

## 2021-04-14 VITALS
TEMPERATURE: 97 F | BODY MASS INDEX: 28.72 KG/M2 | RESPIRATION RATE: 18 BRPM | SYSTOLIC BLOOD PRESSURE: 134 MMHG | OXYGEN SATURATION: 97 % | DIASTOLIC BLOOD PRESSURE: 64 MMHG | WEIGHT: 172.38 LBS | HEART RATE: 75 BPM | HEIGHT: 65 IN

## 2021-04-14 DIAGNOSIS — E55.9 VITAMIN D DEFICIENCY: ICD-10-CM

## 2021-04-14 DIAGNOSIS — G47.30 SLEEP APNEA, UNSPECIFIED TYPE: ICD-10-CM

## 2021-04-14 DIAGNOSIS — Z00.00 PREVENTATIVE HEALTH CARE: Primary | ICD-10-CM

## 2021-04-14 DIAGNOSIS — Z79.4 TYPE 2 DIABETES MELLITUS WITH OTHER SPECIFIED COMPLICATION, WITH LONG-TERM CURRENT USE OF INSULIN: ICD-10-CM

## 2021-04-14 DIAGNOSIS — M85.80 OSTEOPENIA, UNSPECIFIED LOCATION: ICD-10-CM

## 2021-04-14 DIAGNOSIS — K21.9 GASTROESOPHAGEAL REFLUX DISEASE WITHOUT ESOPHAGITIS: ICD-10-CM

## 2021-04-14 DIAGNOSIS — E11.69 TYPE 2 DIABETES MELLITUS WITH OTHER SPECIFIED COMPLICATION, WITH LONG-TERM CURRENT USE OF INSULIN: ICD-10-CM

## 2021-04-14 DIAGNOSIS — I10 ESSENTIAL HYPERTENSION: ICD-10-CM

## 2021-04-14 DIAGNOSIS — I25.10 ATHEROSCLEROSIS OF CORONARY ARTERY WITHOUT ANGINA PECTORIS, UNSPECIFIED VESSEL OR LESION TYPE, UNSPECIFIED WHETHER NATIVE OR TRANSPLANTED HEART: ICD-10-CM

## 2021-04-14 PROCEDURE — 1126F AMNT PAIN NOTED NONE PRSNT: CPT | Mod: S$GLB,,, | Performed by: FAMILY MEDICINE

## 2021-04-14 PROCEDURE — 99499 UNLISTED E&M SERVICE: CPT | Mod: S$GLB,,, | Performed by: FAMILY MEDICINE

## 2021-04-14 PROCEDURE — 1157F ADVNC CARE PLAN IN RCRD: CPT | Mod: S$GLB,,, | Performed by: FAMILY MEDICINE

## 2021-04-14 PROCEDURE — 1157F PR ADVANCE CARE PLAN OR EQUIV PRESENT IN MEDICAL RECORD: ICD-10-PCS | Mod: S$GLB,,, | Performed by: FAMILY MEDICINE

## 2021-04-14 PROCEDURE — 99499 RISK ADDL DX/OHS AUDIT: ICD-10-PCS | Mod: S$GLB,,, | Performed by: FAMILY MEDICINE

## 2021-04-14 PROCEDURE — 99214 PR OFFICE/OUTPT VISIT, EST, LEVL IV, 30-39 MIN: ICD-10-PCS | Mod: S$GLB,,, | Performed by: FAMILY MEDICINE

## 2021-04-14 PROCEDURE — 3008F PR BODY MASS INDEX (BMI) DOCUMENTED: ICD-10-PCS | Mod: CPTII,S$GLB,, | Performed by: FAMILY MEDICINE

## 2021-04-14 PROCEDURE — 3288F PR FALLS RISK ASSESSMENT DOCUMENTED: ICD-10-PCS | Mod: CPTII,S$GLB,, | Performed by: FAMILY MEDICINE

## 2021-04-14 PROCEDURE — 99214 OFFICE O/P EST MOD 30 MIN: CPT | Mod: S$GLB,,, | Performed by: FAMILY MEDICINE

## 2021-04-14 PROCEDURE — 99999 PR PBB SHADOW E&M-EST. PATIENT-LVL V: CPT | Mod: PBBFAC,,, | Performed by: FAMILY MEDICINE

## 2021-04-14 PROCEDURE — 99999 PR PBB SHADOW E&M-EST. PATIENT-LVL V: ICD-10-PCS | Mod: PBBFAC,,, | Performed by: FAMILY MEDICINE

## 2021-04-14 PROCEDURE — 3008F BODY MASS INDEX DOCD: CPT | Mod: CPTII,S$GLB,, | Performed by: FAMILY MEDICINE

## 2021-04-14 PROCEDURE — 1101F PT FALLS ASSESS-DOCD LE1/YR: CPT | Mod: CPTII,S$GLB,, | Performed by: FAMILY MEDICINE

## 2021-04-14 PROCEDURE — 3288F FALL RISK ASSESSMENT DOCD: CPT | Mod: CPTII,S$GLB,, | Performed by: FAMILY MEDICINE

## 2021-04-14 PROCEDURE — 1101F PR PT FALLS ASSESS DOC 0-1 FALLS W/OUT INJ PAST YR: ICD-10-PCS | Mod: CPTII,S$GLB,, | Performed by: FAMILY MEDICINE

## 2021-04-14 PROCEDURE — 1126F PR PAIN SEVERITY QUANTIFIED, NO PAIN PRESENT: ICD-10-PCS | Mod: S$GLB,,, | Performed by: FAMILY MEDICINE

## 2021-04-14 RX ORDER — AZELASTINE 1 MG/ML
1 SPRAY, METERED NASAL 2 TIMES DAILY
Qty: 90 ML | Refills: 3 | Status: SHIPPED | OUTPATIENT
Start: 2021-04-14 | End: 2021-05-12 | Stop reason: SDUPTHER

## 2021-04-14 RX ORDER — FLUTICASONE PROPIONATE 50 MCG
2 SPRAY, SUSPENSION (ML) NASAL DAILY
Qty: 48 G | Refills: 3 | Status: SHIPPED | OUTPATIENT
Start: 2021-04-14 | End: 2022-02-02

## 2021-04-14 RX ORDER — PANTOPRAZOLE SODIUM 40 MG/1
40 TABLET, DELAYED RELEASE ORAL DAILY
Qty: 90 TABLET | Refills: 3 | Status: SHIPPED | OUTPATIENT
Start: 2021-04-14 | End: 2022-08-31 | Stop reason: SDUPTHER

## 2021-04-19 ENCOUNTER — TELEPHONE (OUTPATIENT)
Dept: SLEEP MEDICINE | Facility: OTHER | Age: 66
End: 2021-04-19

## 2021-05-04 ENCOUNTER — OFFICE VISIT (OUTPATIENT)
Dept: INTERNAL MEDICINE | Facility: CLINIC | Age: 66
End: 2021-05-04
Payer: MEDICARE

## 2021-05-04 VITALS
HEIGHT: 65 IN | WEIGHT: 165.56 LBS | HEART RATE: 62 BPM | OXYGEN SATURATION: 97 % | DIASTOLIC BLOOD PRESSURE: 70 MMHG | BODY MASS INDEX: 27.58 KG/M2 | SYSTOLIC BLOOD PRESSURE: 126 MMHG

## 2021-05-04 DIAGNOSIS — Z79.4 TYPE 2 DIABETES MELLITUS WITH OTHER SPECIFIED COMPLICATION, WITH LONG-TERM CURRENT USE OF INSULIN: Primary | ICD-10-CM

## 2021-05-04 DIAGNOSIS — E66.3 OVERWEIGHT (BMI 25.0-29.9): ICD-10-CM

## 2021-05-04 DIAGNOSIS — E55.9 VITAMIN D DEFICIENCY: ICD-10-CM

## 2021-05-04 DIAGNOSIS — G47.30 SLEEP APNEA, UNSPECIFIED TYPE: ICD-10-CM

## 2021-05-04 DIAGNOSIS — I10 ESSENTIAL HYPERTENSION: ICD-10-CM

## 2021-05-04 DIAGNOSIS — K76.0 FATTY LIVER: ICD-10-CM

## 2021-05-04 DIAGNOSIS — Z20.822 ENCOUNTER FOR LABORATORY TESTING FOR COVID-19 VIRUS: ICD-10-CM

## 2021-05-04 DIAGNOSIS — E11.69 TYPE 2 DIABETES MELLITUS WITH OTHER SPECIFIED COMPLICATION, WITH LONG-TERM CURRENT USE OF INSULIN: Primary | ICD-10-CM

## 2021-05-04 PROCEDURE — 99214 OFFICE O/P EST MOD 30 MIN: CPT | Mod: S$GLB,,, | Performed by: NURSE PRACTITIONER

## 2021-05-04 PROCEDURE — 1157F ADVNC CARE PLAN IN RCRD: CPT | Mod: S$GLB,,, | Performed by: NURSE PRACTITIONER

## 2021-05-04 PROCEDURE — 1126F AMNT PAIN NOTED NONE PRSNT: CPT | Mod: S$GLB,,, | Performed by: NURSE PRACTITIONER

## 2021-05-04 PROCEDURE — 3008F BODY MASS INDEX DOCD: CPT | Mod: CPTII,S$GLB,, | Performed by: NURSE PRACTITIONER

## 2021-05-04 PROCEDURE — 1157F PR ADVANCE CARE PLAN OR EQUIV PRESENT IN MEDICAL RECORD: ICD-10-PCS | Mod: S$GLB,,, | Performed by: NURSE PRACTITIONER

## 2021-05-04 PROCEDURE — 3044F HG A1C LEVEL LT 7.0%: CPT | Mod: CPTII,S$GLB,, | Performed by: NURSE PRACTITIONER

## 2021-05-04 PROCEDURE — 1159F MED LIST DOCD IN RCRD: CPT | Mod: S$GLB,,, | Performed by: NURSE PRACTITIONER

## 2021-05-04 PROCEDURE — 3288F PR FALLS RISK ASSESSMENT DOCUMENTED: ICD-10-PCS | Mod: CPTII,S$GLB,, | Performed by: NURSE PRACTITIONER

## 2021-05-04 PROCEDURE — 3044F PR MOST RECENT HEMOGLOBIN A1C LEVEL <7.0%: ICD-10-PCS | Mod: CPTII,S$GLB,, | Performed by: NURSE PRACTITIONER

## 2021-05-04 PROCEDURE — 3008F PR BODY MASS INDEX (BMI) DOCUMENTED: ICD-10-PCS | Mod: CPTII,S$GLB,, | Performed by: NURSE PRACTITIONER

## 2021-05-04 PROCEDURE — 1159F PR MEDICATION LIST DOCUMENTED IN MEDICAL RECORD: ICD-10-PCS | Mod: S$GLB,,, | Performed by: NURSE PRACTITIONER

## 2021-05-04 PROCEDURE — 99999 PR PBB SHADOW E&M-EST. PATIENT-LVL IV: CPT | Mod: PBBFAC,,, | Performed by: NURSE PRACTITIONER

## 2021-05-04 PROCEDURE — 99499 UNLISTED E&M SERVICE: CPT | Mod: S$GLB,,, | Performed by: NURSE PRACTITIONER

## 2021-05-04 PROCEDURE — 1101F PT FALLS ASSESS-DOCD LE1/YR: CPT | Mod: CPTII,S$GLB,, | Performed by: NURSE PRACTITIONER

## 2021-05-04 PROCEDURE — 99499 RISK ADDL DX/OHS AUDIT: ICD-10-PCS | Mod: S$GLB,,, | Performed by: NURSE PRACTITIONER

## 2021-05-04 PROCEDURE — 99999 PR PBB SHADOW E&M-EST. PATIENT-LVL IV: ICD-10-PCS | Mod: PBBFAC,,, | Performed by: NURSE PRACTITIONER

## 2021-05-04 PROCEDURE — 1126F PR PAIN SEVERITY QUANTIFIED, NO PAIN PRESENT: ICD-10-PCS | Mod: S$GLB,,, | Performed by: NURSE PRACTITIONER

## 2021-05-04 PROCEDURE — 3288F FALL RISK ASSESSMENT DOCD: CPT | Mod: CPTII,S$GLB,, | Performed by: NURSE PRACTITIONER

## 2021-05-04 PROCEDURE — 1101F PR PT FALLS ASSESS DOC 0-1 FALLS W/OUT INJ PAST YR: ICD-10-PCS | Mod: CPTII,S$GLB,, | Performed by: NURSE PRACTITIONER

## 2021-05-04 PROCEDURE — 99214 PR OFFICE/OUTPT VISIT, EST, LEVL IV, 30-39 MIN: ICD-10-PCS | Mod: S$GLB,,, | Performed by: NURSE PRACTITIONER

## 2021-05-04 RX ORDER — INSULIN GLARGINE 100 [IU]/ML
INJECTION, SOLUTION SUBCUTANEOUS
Qty: 2 BOX | Refills: 3 | Status: SHIPPED | OUTPATIENT
Start: 2021-06-07 | End: 2021-05-04

## 2021-05-04 RX ORDER — INSULIN GLARGINE 100 [IU]/ML
INJECTION, SOLUTION SUBCUTANEOUS
Qty: 1 BOX | Refills: 5 | Status: SHIPPED | OUTPATIENT
Start: 2021-06-07 | End: 2022-03-21 | Stop reason: SDUPTHER

## 2021-05-07 ENCOUNTER — TELEPHONE (OUTPATIENT)
Dept: SLEEP MEDICINE | Facility: OTHER | Age: 66
End: 2021-05-07

## 2021-05-08 ENCOUNTER — HOSPITAL ENCOUNTER (OUTPATIENT)
Dept: SLEEP MEDICINE | Facility: OTHER | Age: 66
Discharge: HOME | End: 2021-05-08
Attending: PSYCHIATRY & NEUROLOGY
Payer: MEDICARE

## 2021-05-08 DIAGNOSIS — G47.30 SLEEP APNEA, UNSPECIFIED TYPE: ICD-10-CM

## 2021-05-08 DIAGNOSIS — G47.33 OSA (OBSTRUCTIVE SLEEP APNEA): ICD-10-CM

## 2021-05-08 PROCEDURE — 95810 POLYSOM 6/> YRS 4/> PARAM: CPT | Mod: 26,,, | Performed by: PSYCHIATRY & NEUROLOGY

## 2021-05-08 PROCEDURE — 95810 PR POLYSOMNOGRAPHY, 4 OR MORE: ICD-10-PCS | Mod: 26,,, | Performed by: PSYCHIATRY & NEUROLOGY

## 2021-05-08 PROCEDURE — 95810 POLYSOM 6/> YRS 4/> PARAM: CPT

## 2021-05-12 ENCOUNTER — OFFICE VISIT (OUTPATIENT)
Dept: OTOLARYNGOLOGY | Facility: CLINIC | Age: 66
End: 2021-05-12
Payer: MEDICARE

## 2021-05-12 VITALS — HEART RATE: 84 BPM | DIASTOLIC BLOOD PRESSURE: 74 MMHG | SYSTOLIC BLOOD PRESSURE: 162 MMHG

## 2021-05-12 DIAGNOSIS — R09.81 NASAL CONGESTION: ICD-10-CM

## 2021-05-12 DIAGNOSIS — J34.3 HYPERTROPHY OF NASAL TURBINATES: ICD-10-CM

## 2021-05-12 DIAGNOSIS — J34.2 NASAL SEPTAL DEVIATION: ICD-10-CM

## 2021-05-12 DIAGNOSIS — J30.1 SEASONAL ALLERGIC RHINITIS DUE TO POLLEN: Primary | ICD-10-CM

## 2021-05-12 PROCEDURE — 1101F PR PT FALLS ASSESS DOC 0-1 FALLS W/OUT INJ PAST YR: ICD-10-PCS | Mod: CPTII,S$GLB,, | Performed by: OTOLARYNGOLOGY

## 2021-05-12 PROCEDURE — 1157F ADVNC CARE PLAN IN RCRD: CPT | Mod: S$GLB,,, | Performed by: OTOLARYNGOLOGY

## 2021-05-12 PROCEDURE — 99203 OFFICE O/P NEW LOW 30 MIN: CPT | Mod: S$GLB,,, | Performed by: OTOLARYNGOLOGY

## 2021-05-12 PROCEDURE — 1101F PT FALLS ASSESS-DOCD LE1/YR: CPT | Mod: CPTII,S$GLB,, | Performed by: OTOLARYNGOLOGY

## 2021-05-12 PROCEDURE — 3288F FALL RISK ASSESSMENT DOCD: CPT | Mod: CPTII,S$GLB,, | Performed by: OTOLARYNGOLOGY

## 2021-05-12 PROCEDURE — 1159F MED LIST DOCD IN RCRD: CPT | Mod: S$GLB,,, | Performed by: OTOLARYNGOLOGY

## 2021-05-12 PROCEDURE — 1126F AMNT PAIN NOTED NONE PRSNT: CPT | Mod: S$GLB,,, | Performed by: OTOLARYNGOLOGY

## 2021-05-12 PROCEDURE — 99999 PR PBB SHADOW E&M-EST. PATIENT-LVL IV: ICD-10-PCS | Mod: PBBFAC,,, | Performed by: OTOLARYNGOLOGY

## 2021-05-12 PROCEDURE — 1126F PR PAIN SEVERITY QUANTIFIED, NO PAIN PRESENT: ICD-10-PCS | Mod: S$GLB,,, | Performed by: OTOLARYNGOLOGY

## 2021-05-12 PROCEDURE — 3288F PR FALLS RISK ASSESSMENT DOCUMENTED: ICD-10-PCS | Mod: CPTII,S$GLB,, | Performed by: OTOLARYNGOLOGY

## 2021-05-12 PROCEDURE — 1159F PR MEDICATION LIST DOCUMENTED IN MEDICAL RECORD: ICD-10-PCS | Mod: S$GLB,,, | Performed by: OTOLARYNGOLOGY

## 2021-05-12 PROCEDURE — 99203 PR OFFICE/OUTPT VISIT, NEW, LEVL III, 30-44 MIN: ICD-10-PCS | Mod: S$GLB,,, | Performed by: OTOLARYNGOLOGY

## 2021-05-12 PROCEDURE — 99999 PR PBB SHADOW E&M-EST. PATIENT-LVL IV: CPT | Mod: PBBFAC,,, | Performed by: OTOLARYNGOLOGY

## 2021-05-12 PROCEDURE — 1157F PR ADVANCE CARE PLAN OR EQUIV PRESENT IN MEDICAL RECORD: ICD-10-PCS | Mod: S$GLB,,, | Performed by: OTOLARYNGOLOGY

## 2021-05-12 RX ORDER — AZELASTINE 1 MG/ML
2 SPRAY, METERED NASAL 2 TIMES DAILY
Qty: 30 ML | Refills: 3 | Status: SHIPPED | OUTPATIENT
Start: 2021-05-12 | End: 2022-02-02

## 2021-05-14 ENCOUNTER — OFFICE VISIT (OUTPATIENT)
Dept: ORTHOPEDICS | Facility: CLINIC | Age: 66
End: 2021-05-14
Payer: MEDICARE

## 2021-05-14 VITALS — WEIGHT: 174.19 LBS | HEIGHT: 65 IN | BODY MASS INDEX: 29.02 KG/M2

## 2021-05-14 DIAGNOSIS — M17.12 PRIMARY OSTEOARTHRITIS OF LEFT KNEE: Primary | ICD-10-CM

## 2021-05-14 PROCEDURE — 99213 PR OFFICE/OUTPT VISIT, EST, LEVL III, 20-29 MIN: ICD-10-PCS | Mod: 25,S$GLB,, | Performed by: PHYSICIAN ASSISTANT

## 2021-05-14 PROCEDURE — 1101F PR PT FALLS ASSESS DOC 0-1 FALLS W/OUT INJ PAST YR: ICD-10-PCS | Mod: CPTII,S$GLB,, | Performed by: PHYSICIAN ASSISTANT

## 2021-05-14 PROCEDURE — 1159F PR MEDICATION LIST DOCUMENTED IN MEDICAL RECORD: ICD-10-PCS | Mod: S$GLB,,, | Performed by: PHYSICIAN ASSISTANT

## 2021-05-14 PROCEDURE — 3008F BODY MASS INDEX DOCD: CPT | Mod: CPTII,S$GLB,, | Performed by: PHYSICIAN ASSISTANT

## 2021-05-14 PROCEDURE — 1125F AMNT PAIN NOTED PAIN PRSNT: CPT | Mod: S$GLB,,, | Performed by: PHYSICIAN ASSISTANT

## 2021-05-14 PROCEDURE — 20610 DRAIN/INJ JOINT/BURSA W/O US: CPT | Mod: LT,S$GLB,, | Performed by: PHYSICIAN ASSISTANT

## 2021-05-14 PROCEDURE — 1157F ADVNC CARE PLAN IN RCRD: CPT | Mod: S$GLB,,, | Performed by: PHYSICIAN ASSISTANT

## 2021-05-14 PROCEDURE — 1157F PR ADVANCE CARE PLAN OR EQUIV PRESENT IN MEDICAL RECORD: ICD-10-PCS | Mod: S$GLB,,, | Performed by: PHYSICIAN ASSISTANT

## 2021-05-14 PROCEDURE — 1125F PR PAIN SEVERITY QUANTIFIED, PAIN PRESENT: ICD-10-PCS | Mod: S$GLB,,, | Performed by: PHYSICIAN ASSISTANT

## 2021-05-14 PROCEDURE — 3008F PR BODY MASS INDEX (BMI) DOCUMENTED: ICD-10-PCS | Mod: CPTII,S$GLB,, | Performed by: PHYSICIAN ASSISTANT

## 2021-05-14 PROCEDURE — 1101F PT FALLS ASSESS-DOCD LE1/YR: CPT | Mod: CPTII,S$GLB,, | Performed by: PHYSICIAN ASSISTANT

## 2021-05-14 PROCEDURE — 3288F PR FALLS RISK ASSESSMENT DOCUMENTED: ICD-10-PCS | Mod: CPTII,S$GLB,, | Performed by: PHYSICIAN ASSISTANT

## 2021-05-14 PROCEDURE — 99999 PR PBB SHADOW E&M-EST. PATIENT-LVL IV: ICD-10-PCS | Mod: PBBFAC,,, | Performed by: PHYSICIAN ASSISTANT

## 2021-05-14 PROCEDURE — 3288F FALL RISK ASSESSMENT DOCD: CPT | Mod: CPTII,S$GLB,, | Performed by: PHYSICIAN ASSISTANT

## 2021-05-14 PROCEDURE — 20610 PR DRAIN/INJECT LARGE JOINT/BURSA: ICD-10-PCS | Mod: LT,S$GLB,, | Performed by: PHYSICIAN ASSISTANT

## 2021-05-14 PROCEDURE — 99999 PR PBB SHADOW E&M-EST. PATIENT-LVL IV: CPT | Mod: PBBFAC,,, | Performed by: PHYSICIAN ASSISTANT

## 2021-05-14 PROCEDURE — 99213 OFFICE O/P EST LOW 20 MIN: CPT | Mod: 25,S$GLB,, | Performed by: PHYSICIAN ASSISTANT

## 2021-05-14 PROCEDURE — 1159F MED LIST DOCD IN RCRD: CPT | Mod: S$GLB,,, | Performed by: PHYSICIAN ASSISTANT

## 2021-05-14 RX ORDER — TRIAMCINOLONE ACETONIDE 40 MG/ML
40 INJECTION, SUSPENSION INTRA-ARTICULAR; INTRAMUSCULAR
Status: COMPLETED | OUTPATIENT
Start: 2021-05-14 | End: 2021-05-14

## 2021-05-14 RX ADMIN — TRIAMCINOLONE ACETONIDE 40 MG: 40 INJECTION, SUSPENSION INTRA-ARTICULAR; INTRAMUSCULAR at 04:05

## 2021-05-17 ENCOUNTER — PATIENT MESSAGE (OUTPATIENT)
Dept: SLEEP MEDICINE | Facility: CLINIC | Age: 66
End: 2021-05-17

## 2021-05-17 ENCOUNTER — TELEPHONE (OUTPATIENT)
Dept: SLEEP MEDICINE | Facility: CLINIC | Age: 66
End: 2021-05-17

## 2021-05-17 DIAGNOSIS — G47.33 OSA (OBSTRUCTIVE SLEEP APNEA): Primary | ICD-10-CM

## 2021-05-17 LAB
LEFT EYE DM RETINOPATHY: POSITIVE
RIGHT EYE DM RETINOPATHY: POSITIVE

## 2021-05-19 ENCOUNTER — TELEPHONE (OUTPATIENT)
Dept: INTERNAL MEDICINE | Facility: CLINIC | Age: 66
End: 2021-05-19

## 2021-05-19 ENCOUNTER — PATIENT OUTREACH (OUTPATIENT)
Dept: ADMINISTRATIVE | Facility: HOSPITAL | Age: 66
End: 2021-05-19

## 2021-05-22 ENCOUNTER — OFFICE VISIT (OUTPATIENT)
Dept: INTERNAL MEDICINE | Facility: CLINIC | Age: 66
End: 2021-05-22
Payer: MEDICARE

## 2021-05-22 VITALS
HEIGHT: 65 IN | SYSTOLIC BLOOD PRESSURE: 150 MMHG | HEART RATE: 67 BPM | OXYGEN SATURATION: 97 % | TEMPERATURE: 98 F | WEIGHT: 169.56 LBS | BODY MASS INDEX: 28.25 KG/M2 | DIASTOLIC BLOOD PRESSURE: 74 MMHG

## 2021-05-22 DIAGNOSIS — H81.12 VERTIGO, BENIGN PAROXYSMAL, LEFT: Primary | ICD-10-CM

## 2021-05-22 PROCEDURE — 99999 PR PBB SHADOW E&M-EST. PATIENT-LVL V: ICD-10-PCS | Mod: PBBFAC,,, | Performed by: INTERNAL MEDICINE

## 2021-05-22 PROCEDURE — 1126F AMNT PAIN NOTED NONE PRSNT: CPT | Mod: S$GLB,,, | Performed by: INTERNAL MEDICINE

## 2021-05-22 PROCEDURE — 99213 PR OFFICE/OUTPT VISIT, EST, LEVL III, 20-29 MIN: ICD-10-PCS | Mod: S$GLB,,, | Performed by: INTERNAL MEDICINE

## 2021-05-22 PROCEDURE — 1157F PR ADVANCE CARE PLAN OR EQUIV PRESENT IN MEDICAL RECORD: ICD-10-PCS | Mod: S$GLB,,, | Performed by: INTERNAL MEDICINE

## 2021-05-22 PROCEDURE — 3288F FALL RISK ASSESSMENT DOCD: CPT | Mod: CPTII,S$GLB,, | Performed by: INTERNAL MEDICINE

## 2021-05-22 PROCEDURE — 99213 OFFICE O/P EST LOW 20 MIN: CPT | Mod: S$GLB,,, | Performed by: INTERNAL MEDICINE

## 2021-05-22 PROCEDURE — 3288F PR FALLS RISK ASSESSMENT DOCUMENTED: ICD-10-PCS | Mod: CPTII,S$GLB,, | Performed by: INTERNAL MEDICINE

## 2021-05-22 PROCEDURE — 99999 PR PBB SHADOW E&M-EST. PATIENT-LVL V: CPT | Mod: PBBFAC,,, | Performed by: INTERNAL MEDICINE

## 2021-05-22 PROCEDURE — 1101F PT FALLS ASSESS-DOCD LE1/YR: CPT | Mod: CPTII,S$GLB,, | Performed by: INTERNAL MEDICINE

## 2021-05-22 PROCEDURE — 3008F PR BODY MASS INDEX (BMI) DOCUMENTED: ICD-10-PCS | Mod: CPTII,S$GLB,, | Performed by: INTERNAL MEDICINE

## 2021-05-22 PROCEDURE — 1126F PR PAIN SEVERITY QUANTIFIED, NO PAIN PRESENT: ICD-10-PCS | Mod: S$GLB,,, | Performed by: INTERNAL MEDICINE

## 2021-05-22 PROCEDURE — 3008F BODY MASS INDEX DOCD: CPT | Mod: CPTII,S$GLB,, | Performed by: INTERNAL MEDICINE

## 2021-05-22 PROCEDURE — 1159F PR MEDICATION LIST DOCUMENTED IN MEDICAL RECORD: ICD-10-PCS | Mod: S$GLB,,, | Performed by: INTERNAL MEDICINE

## 2021-05-22 PROCEDURE — 1101F PR PT FALLS ASSESS DOC 0-1 FALLS W/OUT INJ PAST YR: ICD-10-PCS | Mod: CPTII,S$GLB,, | Performed by: INTERNAL MEDICINE

## 2021-05-22 PROCEDURE — 1159F MED LIST DOCD IN RCRD: CPT | Mod: S$GLB,,, | Performed by: INTERNAL MEDICINE

## 2021-05-22 PROCEDURE — 1157F ADVNC CARE PLAN IN RCRD: CPT | Mod: S$GLB,,, | Performed by: INTERNAL MEDICINE

## 2021-05-22 RX ORDER — MECLIZINE HCL 12.5 MG 12.5 MG/1
12.5 TABLET ORAL 3 TIMES DAILY PRN
Qty: 30 TABLET | Refills: 0 | Status: SHIPPED | OUTPATIENT
Start: 2021-05-22 | End: 2021-05-27

## 2021-05-24 ENCOUNTER — OFFICE VISIT (OUTPATIENT)
Dept: SLEEP MEDICINE | Facility: CLINIC | Age: 66
End: 2021-05-24
Payer: MEDICARE

## 2021-05-24 VITALS
HEIGHT: 65 IN | HEART RATE: 76 BPM | BODY MASS INDEX: 28.49 KG/M2 | DIASTOLIC BLOOD PRESSURE: 69 MMHG | SYSTOLIC BLOOD PRESSURE: 135 MMHG | WEIGHT: 171 LBS

## 2021-05-24 DIAGNOSIS — G47.33 OSA (OBSTRUCTIVE SLEEP APNEA): Primary | ICD-10-CM

## 2021-05-24 PROCEDURE — 1159F MED LIST DOCD IN RCRD: CPT | Mod: S$GLB,,, | Performed by: PSYCHIATRY & NEUROLOGY

## 2021-05-24 PROCEDURE — 1126F AMNT PAIN NOTED NONE PRSNT: CPT | Mod: S$GLB,,, | Performed by: PSYCHIATRY & NEUROLOGY

## 2021-05-24 PROCEDURE — 99999 PR PBB SHADOW E&M-EST. PATIENT-LVL IV: ICD-10-PCS | Mod: PBBFAC,,, | Performed by: PSYCHIATRY & NEUROLOGY

## 2021-05-24 PROCEDURE — 1157F PR ADVANCE CARE PLAN OR EQUIV PRESENT IN MEDICAL RECORD: ICD-10-PCS | Mod: S$GLB,,, | Performed by: PSYCHIATRY & NEUROLOGY

## 2021-05-24 PROCEDURE — 1101F PT FALLS ASSESS-DOCD LE1/YR: CPT | Mod: CPTII,S$GLB,, | Performed by: PSYCHIATRY & NEUROLOGY

## 2021-05-24 PROCEDURE — 99214 PR OFFICE/OUTPT VISIT, EST, LEVL IV, 30-39 MIN: ICD-10-PCS | Mod: S$GLB,,, | Performed by: PSYCHIATRY & NEUROLOGY

## 2021-05-24 PROCEDURE — 1101F PR PT FALLS ASSESS DOC 0-1 FALLS W/OUT INJ PAST YR: ICD-10-PCS | Mod: CPTII,S$GLB,, | Performed by: PSYCHIATRY & NEUROLOGY

## 2021-05-24 PROCEDURE — 3008F PR BODY MASS INDEX (BMI) DOCUMENTED: ICD-10-PCS | Mod: CPTII,S$GLB,, | Performed by: PSYCHIATRY & NEUROLOGY

## 2021-05-24 PROCEDURE — 3288F FALL RISK ASSESSMENT DOCD: CPT | Mod: CPTII,S$GLB,, | Performed by: PSYCHIATRY & NEUROLOGY

## 2021-05-24 PROCEDURE — 99999 PR PBB SHADOW E&M-EST. PATIENT-LVL IV: CPT | Mod: PBBFAC,,, | Performed by: PSYCHIATRY & NEUROLOGY

## 2021-05-24 PROCEDURE — 3288F PR FALLS RISK ASSESSMENT DOCUMENTED: ICD-10-PCS | Mod: CPTII,S$GLB,, | Performed by: PSYCHIATRY & NEUROLOGY

## 2021-05-24 PROCEDURE — 99214 OFFICE O/P EST MOD 30 MIN: CPT | Mod: S$GLB,,, | Performed by: PSYCHIATRY & NEUROLOGY

## 2021-05-24 PROCEDURE — 3008F BODY MASS INDEX DOCD: CPT | Mod: CPTII,S$GLB,, | Performed by: PSYCHIATRY & NEUROLOGY

## 2021-05-24 PROCEDURE — 1126F PR PAIN SEVERITY QUANTIFIED, NO PAIN PRESENT: ICD-10-PCS | Mod: S$GLB,,, | Performed by: PSYCHIATRY & NEUROLOGY

## 2021-05-24 PROCEDURE — 1159F PR MEDICATION LIST DOCUMENTED IN MEDICAL RECORD: ICD-10-PCS | Mod: S$GLB,,, | Performed by: PSYCHIATRY & NEUROLOGY

## 2021-05-24 PROCEDURE — 1157F ADVNC CARE PLAN IN RCRD: CPT | Mod: S$GLB,,, | Performed by: PSYCHIATRY & NEUROLOGY

## 2021-05-26 ENCOUNTER — LAB VISIT (OUTPATIENT)
Dept: INTERNAL MEDICINE | Facility: CLINIC | Age: 66
End: 2021-05-26
Payer: MEDICARE

## 2021-05-26 DIAGNOSIS — Z20.822 ENCOUNTER FOR LABORATORY TESTING FOR COVID-19 VIRUS: ICD-10-CM

## 2021-05-26 PROCEDURE — U0003 INFECTIOUS AGENT DETECTION BY NUCLEIC ACID (DNA OR RNA); SEVERE ACUTE RESPIRATORY SYNDROME CORONAVIRUS 2 (SARS-COV-2) (CORONAVIRUS DISEASE [COVID-19]), AMPLIFIED PROBE TECHNIQUE, MAKING USE OF HIGH THROUGHPUT TECHNOLOGIES AS DESCRIBED BY CMS-2020-01-R: HCPCS | Performed by: NURSE PRACTITIONER

## 2021-05-26 PROCEDURE — U0005 INFEC AGEN DETEC AMPLI PROBE: HCPCS | Performed by: NURSE PRACTITIONER

## 2021-05-27 ENCOUNTER — OFFICE VISIT (OUTPATIENT)
Dept: INTERNAL MEDICINE | Facility: CLINIC | Age: 66
End: 2021-05-27
Payer: MEDICARE

## 2021-05-27 VITALS
OXYGEN SATURATION: 97 % | SYSTOLIC BLOOD PRESSURE: 118 MMHG | WEIGHT: 165.81 LBS | TEMPERATURE: 98 F | HEART RATE: 70 BPM | DIASTOLIC BLOOD PRESSURE: 68 MMHG | HEIGHT: 65 IN | BODY MASS INDEX: 27.63 KG/M2

## 2021-05-27 DIAGNOSIS — H81.10 BENIGN PAROXYSMAL POSITIONAL VERTIGO, UNSPECIFIED LATERALITY: Primary | ICD-10-CM

## 2021-05-27 LAB — SARS-COV-2 RNA RESP QL NAA+PROBE: NOT DETECTED

## 2021-05-27 PROCEDURE — 3008F PR BODY MASS INDEX (BMI) DOCUMENTED: ICD-10-PCS | Mod: CPTII,S$GLB,, | Performed by: INTERNAL MEDICINE

## 2021-05-27 PROCEDURE — 1101F PT FALLS ASSESS-DOCD LE1/YR: CPT | Mod: CPTII,S$GLB,, | Performed by: INTERNAL MEDICINE

## 2021-05-27 PROCEDURE — 1125F PR PAIN SEVERITY QUANTIFIED, PAIN PRESENT: ICD-10-PCS | Mod: S$GLB,,, | Performed by: INTERNAL MEDICINE

## 2021-05-27 PROCEDURE — 3008F BODY MASS INDEX DOCD: CPT | Mod: CPTII,S$GLB,, | Performed by: INTERNAL MEDICINE

## 2021-05-27 PROCEDURE — 1101F PR PT FALLS ASSESS DOC 0-1 FALLS W/OUT INJ PAST YR: ICD-10-PCS | Mod: CPTII,S$GLB,, | Performed by: INTERNAL MEDICINE

## 2021-05-27 PROCEDURE — 1157F ADVNC CARE PLAN IN RCRD: CPT | Mod: S$GLB,,, | Performed by: INTERNAL MEDICINE

## 2021-05-27 PROCEDURE — 3288F FALL RISK ASSESSMENT DOCD: CPT | Mod: CPTII,S$GLB,, | Performed by: INTERNAL MEDICINE

## 2021-05-27 PROCEDURE — 1159F MED LIST DOCD IN RCRD: CPT | Mod: S$GLB,,, | Performed by: INTERNAL MEDICINE

## 2021-05-27 PROCEDURE — 3288F PR FALLS RISK ASSESSMENT DOCUMENTED: ICD-10-PCS | Mod: CPTII,S$GLB,, | Performed by: INTERNAL MEDICINE

## 2021-05-27 PROCEDURE — 99213 PR OFFICE/OUTPT VISIT, EST, LEVL III, 20-29 MIN: ICD-10-PCS | Mod: S$GLB,,, | Performed by: INTERNAL MEDICINE

## 2021-05-27 PROCEDURE — 1157F PR ADVANCE CARE PLAN OR EQUIV PRESENT IN MEDICAL RECORD: ICD-10-PCS | Mod: S$GLB,,, | Performed by: INTERNAL MEDICINE

## 2021-05-27 PROCEDURE — 99213 OFFICE O/P EST LOW 20 MIN: CPT | Mod: S$GLB,,, | Performed by: INTERNAL MEDICINE

## 2021-05-27 PROCEDURE — 99999 PR PBB SHADOW E&M-EST. PATIENT-LVL IV: CPT | Mod: PBBFAC,,, | Performed by: INTERNAL MEDICINE

## 2021-05-27 PROCEDURE — 99999 PR PBB SHADOW E&M-EST. PATIENT-LVL IV: ICD-10-PCS | Mod: PBBFAC,,, | Performed by: INTERNAL MEDICINE

## 2021-05-27 PROCEDURE — 1159F PR MEDICATION LIST DOCUMENTED IN MEDICAL RECORD: ICD-10-PCS | Mod: S$GLB,,, | Performed by: INTERNAL MEDICINE

## 2021-05-27 PROCEDURE — 1125F AMNT PAIN NOTED PAIN PRSNT: CPT | Mod: S$GLB,,, | Performed by: INTERNAL MEDICINE

## 2021-05-27 RX ORDER — MECLIZINE HYDROCHLORIDE 25 MG/1
25 TABLET ORAL 3 TIMES DAILY PRN
Qty: 30 TABLET | Refills: 0 | Status: SHIPPED | OUTPATIENT
Start: 2021-05-27 | End: 2021-12-09 | Stop reason: SDUPTHER

## 2021-07-16 ENCOUNTER — PATIENT OUTREACH (OUTPATIENT)
Dept: ADMINISTRATIVE | Facility: OTHER | Age: 66
End: 2021-07-16

## 2021-07-19 ENCOUNTER — OFFICE VISIT (OUTPATIENT)
Dept: ORTHOPEDICS | Facility: CLINIC | Age: 66
End: 2021-07-19
Payer: MEDICARE

## 2021-07-19 VITALS — WEIGHT: 179 LBS | BODY MASS INDEX: 29.82 KG/M2 | HEIGHT: 65 IN

## 2021-07-19 DIAGNOSIS — M17.12 PRIMARY OSTEOARTHRITIS OF LEFT KNEE: Primary | ICD-10-CM

## 2021-07-19 PROCEDURE — 1159F MED LIST DOCD IN RCRD: CPT | Mod: CPTII,S$GLB,, | Performed by: ORTHOPAEDIC SURGERY

## 2021-07-19 PROCEDURE — 99499 UNLISTED E&M SERVICE: CPT | Mod: S$GLB,,, | Performed by: ORTHOPAEDIC SURGERY

## 2021-07-19 PROCEDURE — 99214 OFFICE O/P EST MOD 30 MIN: CPT | Mod: S$GLB,,, | Performed by: ORTHOPAEDIC SURGERY

## 2021-07-19 PROCEDURE — 99499 RISK ADDL DX/OHS AUDIT: ICD-10-PCS | Mod: S$GLB,,, | Performed by: ORTHOPAEDIC SURGERY

## 2021-07-19 PROCEDURE — 1157F ADVNC CARE PLAN IN RCRD: CPT | Mod: CPTII,S$GLB,, | Performed by: ORTHOPAEDIC SURGERY

## 2021-07-19 PROCEDURE — 99214 PR OFFICE/OUTPT VISIT, EST, LEVL IV, 30-39 MIN: ICD-10-PCS | Mod: S$GLB,,, | Performed by: ORTHOPAEDIC SURGERY

## 2021-07-19 PROCEDURE — 1125F AMNT PAIN NOTED PAIN PRSNT: CPT | Mod: CPTII,S$GLB,, | Performed by: ORTHOPAEDIC SURGERY

## 2021-07-19 PROCEDURE — 99999 PR PBB SHADOW E&M-EST. PATIENT-LVL IV: CPT | Mod: PBBFAC,,, | Performed by: ORTHOPAEDIC SURGERY

## 2021-07-19 PROCEDURE — 1157F PR ADVANCE CARE PLAN OR EQUIV PRESENT IN MEDICAL RECORD: ICD-10-PCS | Mod: CPTII,S$GLB,, | Performed by: ORTHOPAEDIC SURGERY

## 2021-07-19 PROCEDURE — 99999 PR PBB SHADOW E&M-EST. PATIENT-LVL IV: ICD-10-PCS | Mod: PBBFAC,,, | Performed by: ORTHOPAEDIC SURGERY

## 2021-07-19 PROCEDURE — 1159F PR MEDICATION LIST DOCUMENTED IN MEDICAL RECORD: ICD-10-PCS | Mod: CPTII,S$GLB,, | Performed by: ORTHOPAEDIC SURGERY

## 2021-07-19 PROCEDURE — 3008F PR BODY MASS INDEX (BMI) DOCUMENTED: ICD-10-PCS | Mod: CPTII,S$GLB,, | Performed by: ORTHOPAEDIC SURGERY

## 2021-07-19 PROCEDURE — 1125F PR PAIN SEVERITY QUANTIFIED, PAIN PRESENT: ICD-10-PCS | Mod: CPTII,S$GLB,, | Performed by: ORTHOPAEDIC SURGERY

## 2021-07-19 PROCEDURE — 3008F BODY MASS INDEX DOCD: CPT | Mod: CPTII,S$GLB,, | Performed by: ORTHOPAEDIC SURGERY

## 2021-07-30 ENCOUNTER — OFFICE VISIT (OUTPATIENT)
Dept: INTERNAL MEDICINE | Facility: CLINIC | Age: 66
End: 2021-07-30
Payer: MEDICARE

## 2021-07-30 ENCOUNTER — LAB VISIT (OUTPATIENT)
Dept: LAB | Facility: HOSPITAL | Age: 66
End: 2021-07-30
Attending: FAMILY MEDICINE
Payer: MEDICARE

## 2021-07-30 VITALS
WEIGHT: 175.25 LBS | OXYGEN SATURATION: 98 % | HEIGHT: 65 IN | BODY MASS INDEX: 29.2 KG/M2 | TEMPERATURE: 99 F | SYSTOLIC BLOOD PRESSURE: 124 MMHG | HEART RATE: 82 BPM | DIASTOLIC BLOOD PRESSURE: 66 MMHG

## 2021-07-30 DIAGNOSIS — E86.0 DEHYDRATION: ICD-10-CM

## 2021-07-30 DIAGNOSIS — R19.7 DIARRHEA OF PRESUMED INFECTIOUS ORIGIN: ICD-10-CM

## 2021-07-30 DIAGNOSIS — R19.7 DIARRHEA OF PRESUMED INFECTIOUS ORIGIN: Primary | ICD-10-CM

## 2021-07-30 DIAGNOSIS — R19.7 DIARRHEA: ICD-10-CM

## 2021-07-30 LAB
ALBUMIN SERPL BCP-MCNC: 3.7 G/DL (ref 3.5–5.2)
ALP SERPL-CCNC: 93 U/L (ref 55–135)
ALT SERPL W/O P-5'-P-CCNC: 22 U/L (ref 10–44)
ANION GAP SERPL CALC-SCNC: 12 MMOL/L (ref 8–16)
AST SERPL-CCNC: 20 U/L (ref 10–40)
BASOPHILS # BLD AUTO: 0.02 K/UL (ref 0–0.2)
BASOPHILS NFR BLD: 0.3 % (ref 0–1.9)
BILIRUB SERPL-MCNC: 1 MG/DL (ref 0.1–1)
BUN SERPL-MCNC: 12 MG/DL (ref 8–23)
CALCIUM SERPL-MCNC: 9.6 MG/DL (ref 8.7–10.5)
CHLORIDE SERPL-SCNC: 103 MMOL/L (ref 95–110)
CO2 SERPL-SCNC: 23 MMOL/L (ref 23–29)
CREAT SERPL-MCNC: 0.9 MG/DL (ref 0.5–1.4)
DIFFERENTIAL METHOD: ABNORMAL
EOSINOPHIL # BLD AUTO: 0 K/UL (ref 0–0.5)
EOSINOPHIL NFR BLD: 0.3 % (ref 0–8)
ERYTHROCYTE [DISTWIDTH] IN BLOOD BY AUTOMATED COUNT: 12.5 % (ref 11.5–14.5)
EST. GFR  (AFRICAN AMERICAN): >60 ML/MIN/1.73 M^2
EST. GFR  (NON AFRICAN AMERICAN): >60 ML/MIN/1.73 M^2
GLUCOSE SERPL-MCNC: 107 MG/DL (ref 70–110)
HCT VFR BLD AUTO: 40.5 % (ref 37–48.5)
HGB BLD-MCNC: 14.1 G/DL (ref 12–16)
IMM GRANULOCYTES # BLD AUTO: 0.01 K/UL (ref 0–0.04)
IMM GRANULOCYTES NFR BLD AUTO: 0.1 % (ref 0–0.5)
LYMPHOCYTES # BLD AUTO: 1.2 K/UL (ref 1–4.8)
LYMPHOCYTES NFR BLD: 17 % (ref 18–48)
MCH RBC QN AUTO: 32.2 PG (ref 27–31)
MCHC RBC AUTO-ENTMCNC: 34.8 G/DL (ref 32–36)
MCV RBC AUTO: 93 FL (ref 82–98)
MONOCYTES # BLD AUTO: 0.5 K/UL (ref 0.3–1)
MONOCYTES NFR BLD: 6.6 % (ref 4–15)
NEUTROPHILS # BLD AUTO: 5.1 K/UL (ref 1.8–7.7)
NEUTROPHILS NFR BLD: 75.7 % (ref 38–73)
NRBC BLD-RTO: 0 /100 WBC
PLATELET # BLD AUTO: 198 K/UL (ref 150–450)
PMV BLD AUTO: 9.7 FL (ref 9.2–12.9)
POTASSIUM SERPL-SCNC: 4.1 MMOL/L (ref 3.5–5.1)
PROT SERPL-MCNC: 7.4 G/DL (ref 6–8.4)
RBC # BLD AUTO: 4.38 M/UL (ref 4–5.4)
SODIUM SERPL-SCNC: 138 MMOL/L (ref 136–145)
WBC # BLD AUTO: 6.78 K/UL (ref 3.9–12.7)

## 2021-07-30 PROCEDURE — 99213 OFFICE O/P EST LOW 20 MIN: CPT | Mod: S$GLB,,, | Performed by: INTERNAL MEDICINE

## 2021-07-30 PROCEDURE — 3044F PR MOST RECENT HEMOGLOBIN A1C LEVEL <7.0%: ICD-10-PCS | Mod: CPTII,S$GLB,, | Performed by: INTERNAL MEDICINE

## 2021-07-30 PROCEDURE — 85025 COMPLETE CBC W/AUTO DIFF WBC: CPT | Performed by: INTERNAL MEDICINE

## 2021-07-30 PROCEDURE — 1157F PR ADVANCE CARE PLAN OR EQUIV PRESENT IN MEDICAL RECORD: ICD-10-PCS | Mod: CPTII,S$GLB,, | Performed by: INTERNAL MEDICINE

## 2021-07-30 PROCEDURE — 36415 COLL VENOUS BLD VENIPUNCTURE: CPT | Performed by: INTERNAL MEDICINE

## 2021-07-30 PROCEDURE — 1101F PR PT FALLS ASSESS DOC 0-1 FALLS W/OUT INJ PAST YR: ICD-10-PCS | Mod: CPTII,S$GLB,, | Performed by: INTERNAL MEDICINE

## 2021-07-30 PROCEDURE — 3074F PR MOST RECENT SYSTOLIC BLOOD PRESSURE < 130 MM HG: ICD-10-PCS | Mod: CPTII,S$GLB,, | Performed by: INTERNAL MEDICINE

## 2021-07-30 PROCEDURE — 1159F PR MEDICATION LIST DOCUMENTED IN MEDICAL RECORD: ICD-10-PCS | Mod: CPTII,S$GLB,, | Performed by: INTERNAL MEDICINE

## 2021-07-30 PROCEDURE — 3008F BODY MASS INDEX DOCD: CPT | Mod: CPTII,S$GLB,, | Performed by: INTERNAL MEDICINE

## 2021-07-30 PROCEDURE — 3074F SYST BP LT 130 MM HG: CPT | Mod: CPTII,S$GLB,, | Performed by: INTERNAL MEDICINE

## 2021-07-30 PROCEDURE — 80053 COMPREHEN METABOLIC PANEL: CPT | Performed by: INTERNAL MEDICINE

## 2021-07-30 PROCEDURE — 3008F PR BODY MASS INDEX (BMI) DOCUMENTED: ICD-10-PCS | Mod: CPTII,S$GLB,, | Performed by: INTERNAL MEDICINE

## 2021-07-30 PROCEDURE — 3288F PR FALLS RISK ASSESSMENT DOCUMENTED: ICD-10-PCS | Mod: CPTII,S$GLB,, | Performed by: INTERNAL MEDICINE

## 2021-07-30 PROCEDURE — 1159F MED LIST DOCD IN RCRD: CPT | Mod: CPTII,S$GLB,, | Performed by: INTERNAL MEDICINE

## 2021-07-30 PROCEDURE — 99213 PR OFFICE/OUTPT VISIT, EST, LEVL III, 20-29 MIN: ICD-10-PCS | Mod: S$GLB,,, | Performed by: INTERNAL MEDICINE

## 2021-07-30 PROCEDURE — 1125F AMNT PAIN NOTED PAIN PRSNT: CPT | Mod: CPTII,S$GLB,, | Performed by: INTERNAL MEDICINE

## 2021-07-30 PROCEDURE — 99999 PR PBB SHADOW E&M-EST. PATIENT-LVL V: ICD-10-PCS | Mod: PBBFAC,,, | Performed by: INTERNAL MEDICINE

## 2021-07-30 PROCEDURE — 3078F PR MOST RECENT DIASTOLIC BLOOD PRESSURE < 80 MM HG: ICD-10-PCS | Mod: CPTII,S$GLB,, | Performed by: INTERNAL MEDICINE

## 2021-07-30 PROCEDURE — 99999 PR PBB SHADOW E&M-EST. PATIENT-LVL V: CPT | Mod: PBBFAC,,, | Performed by: INTERNAL MEDICINE

## 2021-07-30 PROCEDURE — 3044F HG A1C LEVEL LT 7.0%: CPT | Mod: CPTII,S$GLB,, | Performed by: INTERNAL MEDICINE

## 2021-07-30 PROCEDURE — 1160F PR REVIEW ALL MEDS BY PRESCRIBER/CLIN PHARMACIST DOCUMENTED: ICD-10-PCS | Mod: CPTII,S$GLB,, | Performed by: INTERNAL MEDICINE

## 2021-07-30 PROCEDURE — 1157F ADVNC CARE PLAN IN RCRD: CPT | Mod: CPTII,S$GLB,, | Performed by: INTERNAL MEDICINE

## 2021-07-30 PROCEDURE — U0003 INFECTIOUS AGENT DETECTION BY NUCLEIC ACID (DNA OR RNA); SEVERE ACUTE RESPIRATORY SYNDROME CORONAVIRUS 2 (SARS-COV-2) (CORONAVIRUS DISEASE [COVID-19]), AMPLIFIED PROBE TECHNIQUE, MAKING USE OF HIGH THROUGHPUT TECHNOLOGIES AS DESCRIBED BY CMS-2020-01-R: HCPCS | Performed by: INTERNAL MEDICINE

## 2021-07-30 PROCEDURE — 1125F PR PAIN SEVERITY QUANTIFIED, PAIN PRESENT: ICD-10-PCS | Mod: CPTII,S$GLB,, | Performed by: INTERNAL MEDICINE

## 2021-07-30 PROCEDURE — 3288F FALL RISK ASSESSMENT DOCD: CPT | Mod: CPTII,S$GLB,, | Performed by: INTERNAL MEDICINE

## 2021-07-30 PROCEDURE — 3078F DIAST BP <80 MM HG: CPT | Mod: CPTII,S$GLB,, | Performed by: INTERNAL MEDICINE

## 2021-07-30 PROCEDURE — 1160F RVW MEDS BY RX/DR IN RCRD: CPT | Mod: CPTII,S$GLB,, | Performed by: INTERNAL MEDICINE

## 2021-07-30 PROCEDURE — 1101F PT FALLS ASSESS-DOCD LE1/YR: CPT | Mod: CPTII,S$GLB,, | Performed by: INTERNAL MEDICINE

## 2021-07-30 RX ORDER — SODIUM CHLORIDE 9 MG/ML
INJECTION, SOLUTION INTRAVENOUS
Status: DISCONTINUED | OUTPATIENT
Start: 2021-07-30 | End: 2021-08-11

## 2021-07-31 ENCOUNTER — LAB VISIT (OUTPATIENT)
Dept: LAB | Facility: HOSPITAL | Age: 66
End: 2021-07-31
Attending: INTERNAL MEDICINE
Payer: MEDICARE

## 2021-07-31 DIAGNOSIS — R19.7 DIARRHEA OF PRESUMED INFECTIOUS ORIGIN: ICD-10-CM

## 2021-07-31 DIAGNOSIS — E86.0 DEHYDRATION: ICD-10-CM

## 2021-07-31 PROCEDURE — 87209 SMEAR COMPLEX STAIN: CPT | Performed by: INTERNAL MEDICINE

## 2021-08-02 LAB
SARS-COV-2 RNA RESP QL NAA+PROBE: NOT DETECTED
SARS-COV-2- CYCLE NUMBER: -1

## 2021-08-04 ENCOUNTER — TELEPHONE (OUTPATIENT)
Dept: INTERNAL MEDICINE | Facility: CLINIC | Age: 66
End: 2021-08-04

## 2021-08-04 LAB — O+P STL MICRO: NORMAL

## 2021-08-05 ENCOUNTER — PATIENT MESSAGE (OUTPATIENT)
Dept: ORTHOPEDICS | Facility: CLINIC | Age: 66
End: 2021-08-05

## 2021-08-05 DIAGNOSIS — Z01.818 PRE-OP TESTING: Primary | ICD-10-CM

## 2021-08-05 DIAGNOSIS — M17.12 PRIMARY OSTEOARTHRITIS OF LEFT KNEE: Primary | ICD-10-CM

## 2021-08-09 ENCOUNTER — OFFICE VISIT (OUTPATIENT)
Dept: SLEEP MEDICINE | Facility: CLINIC | Age: 66
End: 2021-08-09
Payer: MEDICARE

## 2021-08-09 VITALS
HEART RATE: 63 BPM | DIASTOLIC BLOOD PRESSURE: 72 MMHG | WEIGHT: 176 LBS | SYSTOLIC BLOOD PRESSURE: 149 MMHG | BODY MASS INDEX: 29.32 KG/M2 | HEIGHT: 65 IN

## 2021-08-09 DIAGNOSIS — G47.33 OSA (OBSTRUCTIVE SLEEP APNEA): Primary | ICD-10-CM

## 2021-08-09 PROCEDURE — 1101F PR PT FALLS ASSESS DOC 0-1 FALLS W/OUT INJ PAST YR: ICD-10-PCS | Mod: CPTII,S$GLB,, | Performed by: PSYCHIATRY & NEUROLOGY

## 2021-08-09 PROCEDURE — 1157F ADVNC CARE PLAN IN RCRD: CPT | Mod: CPTII,S$GLB,, | Performed by: PSYCHIATRY & NEUROLOGY

## 2021-08-09 PROCEDURE — 99214 OFFICE O/P EST MOD 30 MIN: CPT | Mod: S$GLB,,, | Performed by: PSYCHIATRY & NEUROLOGY

## 2021-08-09 PROCEDURE — 1160F PR REVIEW ALL MEDS BY PRESCRIBER/CLIN PHARMACIST DOCUMENTED: ICD-10-PCS | Mod: CPTII,S$GLB,, | Performed by: PSYCHIATRY & NEUROLOGY

## 2021-08-09 PROCEDURE — 3288F FALL RISK ASSESSMENT DOCD: CPT | Mod: CPTII,S$GLB,, | Performed by: PSYCHIATRY & NEUROLOGY

## 2021-08-09 PROCEDURE — 3008F PR BODY MASS INDEX (BMI) DOCUMENTED: ICD-10-PCS | Mod: CPTII,S$GLB,, | Performed by: PSYCHIATRY & NEUROLOGY

## 2021-08-09 PROCEDURE — 3078F DIAST BP <80 MM HG: CPT | Mod: CPTII,S$GLB,, | Performed by: PSYCHIATRY & NEUROLOGY

## 2021-08-09 PROCEDURE — 1126F PR PAIN SEVERITY QUANTIFIED, NO PAIN PRESENT: ICD-10-PCS | Mod: CPTII,S$GLB,, | Performed by: PSYCHIATRY & NEUROLOGY

## 2021-08-09 PROCEDURE — 1159F MED LIST DOCD IN RCRD: CPT | Mod: CPTII,S$GLB,, | Performed by: PSYCHIATRY & NEUROLOGY

## 2021-08-09 PROCEDURE — 3078F PR MOST RECENT DIASTOLIC BLOOD PRESSURE < 80 MM HG: ICD-10-PCS | Mod: CPTII,S$GLB,, | Performed by: PSYCHIATRY & NEUROLOGY

## 2021-08-09 PROCEDURE — 99214 PR OFFICE/OUTPT VISIT, EST, LEVL IV, 30-39 MIN: ICD-10-PCS | Mod: S$GLB,,, | Performed by: PSYCHIATRY & NEUROLOGY

## 2021-08-09 PROCEDURE — 1126F AMNT PAIN NOTED NONE PRSNT: CPT | Mod: CPTII,S$GLB,, | Performed by: PSYCHIATRY & NEUROLOGY

## 2021-08-09 PROCEDURE — 1159F PR MEDICATION LIST DOCUMENTED IN MEDICAL RECORD: ICD-10-PCS | Mod: CPTII,S$GLB,, | Performed by: PSYCHIATRY & NEUROLOGY

## 2021-08-09 PROCEDURE — 3077F PR MOST RECENT SYSTOLIC BLOOD PRESSURE >= 140 MM HG: ICD-10-PCS | Mod: CPTII,S$GLB,, | Performed by: PSYCHIATRY & NEUROLOGY

## 2021-08-09 PROCEDURE — 99999 PR PBB SHADOW E&M-EST. PATIENT-LVL V: ICD-10-PCS | Mod: PBBFAC,,, | Performed by: PSYCHIATRY & NEUROLOGY

## 2021-08-09 PROCEDURE — 1101F PT FALLS ASSESS-DOCD LE1/YR: CPT | Mod: CPTII,S$GLB,, | Performed by: PSYCHIATRY & NEUROLOGY

## 2021-08-09 PROCEDURE — 3008F BODY MASS INDEX DOCD: CPT | Mod: CPTII,S$GLB,, | Performed by: PSYCHIATRY & NEUROLOGY

## 2021-08-09 PROCEDURE — 3288F PR FALLS RISK ASSESSMENT DOCUMENTED: ICD-10-PCS | Mod: CPTII,S$GLB,, | Performed by: PSYCHIATRY & NEUROLOGY

## 2021-08-09 PROCEDURE — 99999 PR PBB SHADOW E&M-EST. PATIENT-LVL V: CPT | Mod: PBBFAC,,, | Performed by: PSYCHIATRY & NEUROLOGY

## 2021-08-09 PROCEDURE — 3044F PR MOST RECENT HEMOGLOBIN A1C LEVEL <7.0%: ICD-10-PCS | Mod: CPTII,S$GLB,, | Performed by: PSYCHIATRY & NEUROLOGY

## 2021-08-09 PROCEDURE — 1157F PR ADVANCE CARE PLAN OR EQUIV PRESENT IN MEDICAL RECORD: ICD-10-PCS | Mod: CPTII,S$GLB,, | Performed by: PSYCHIATRY & NEUROLOGY

## 2021-08-09 PROCEDURE — 1160F RVW MEDS BY RX/DR IN RCRD: CPT | Mod: CPTII,S$GLB,, | Performed by: PSYCHIATRY & NEUROLOGY

## 2021-08-09 PROCEDURE — 3044F HG A1C LEVEL LT 7.0%: CPT | Mod: CPTII,S$GLB,, | Performed by: PSYCHIATRY & NEUROLOGY

## 2021-08-09 PROCEDURE — 3077F SYST BP >= 140 MM HG: CPT | Mod: CPTII,S$GLB,, | Performed by: PSYCHIATRY & NEUROLOGY

## 2021-08-11 ENCOUNTER — OFFICE VISIT (OUTPATIENT)
Dept: INTERNAL MEDICINE | Facility: CLINIC | Age: 66
End: 2021-08-11
Payer: MEDICARE

## 2021-08-11 ENCOUNTER — LAB VISIT (OUTPATIENT)
Dept: LAB | Facility: HOSPITAL | Age: 66
End: 2021-08-11
Payer: MEDICARE

## 2021-08-11 VITALS
HEART RATE: 76 BPM | BODY MASS INDEX: 29.17 KG/M2 | SYSTOLIC BLOOD PRESSURE: 108 MMHG | HEIGHT: 65 IN | DIASTOLIC BLOOD PRESSURE: 62 MMHG | WEIGHT: 175.06 LBS

## 2021-08-11 DIAGNOSIS — Z87.19 HISTORY OF GASTROENTERITIS: ICD-10-CM

## 2021-08-11 DIAGNOSIS — R53.83 FATIGUE, UNSPECIFIED TYPE: ICD-10-CM

## 2021-08-11 DIAGNOSIS — R53.83 FATIGUE, UNSPECIFIED TYPE: Primary | ICD-10-CM

## 2021-08-11 LAB
ALBUMIN SERPL BCP-MCNC: 3.7 G/DL (ref 3.5–5.2)
ALP SERPL-CCNC: 85 U/L (ref 55–135)
ALT SERPL W/O P-5'-P-CCNC: 24 U/L (ref 10–44)
ANION GAP SERPL CALC-SCNC: 8 MMOL/L (ref 8–16)
AST SERPL-CCNC: 18 U/L (ref 10–40)
BASOPHILS # BLD AUTO: 0.02 K/UL (ref 0–0.2)
BASOPHILS NFR BLD: 0.3 % (ref 0–1.9)
BILIRUB SERPL-MCNC: 0.6 MG/DL (ref 0.1–1)
BUN SERPL-MCNC: 11 MG/DL (ref 8–23)
CALCIUM SERPL-MCNC: 9.1 MG/DL (ref 8.7–10.5)
CHLORIDE SERPL-SCNC: 102 MMOL/L (ref 95–110)
CO2 SERPL-SCNC: 28 MMOL/L (ref 23–29)
CREAT SERPL-MCNC: 0.8 MG/DL (ref 0.5–1.4)
DIFFERENTIAL METHOD: ABNORMAL
EOSINOPHIL # BLD AUTO: 0 K/UL (ref 0–0.5)
EOSINOPHIL NFR BLD: 0.6 % (ref 0–8)
ERYTHROCYTE [DISTWIDTH] IN BLOOD BY AUTOMATED COUNT: 12.5 % (ref 11.5–14.5)
EST. GFR  (AFRICAN AMERICAN): >60 ML/MIN/1.73 M^2
EST. GFR  (NON AFRICAN AMERICAN): >60 ML/MIN/1.73 M^2
GLUCOSE SERPL-MCNC: 157 MG/DL (ref 70–110)
HCT VFR BLD AUTO: 38.4 % (ref 37–48.5)
HGB BLD-MCNC: 12.6 G/DL (ref 12–16)
IMM GRANULOCYTES # BLD AUTO: 0.02 K/UL (ref 0–0.04)
IMM GRANULOCYTES NFR BLD AUTO: 0.3 % (ref 0–0.5)
LYMPHOCYTES # BLD AUTO: 1.7 K/UL (ref 1–4.8)
LYMPHOCYTES NFR BLD: 25.1 % (ref 18–48)
MCH RBC QN AUTO: 31.4 PG (ref 27–31)
MCHC RBC AUTO-ENTMCNC: 32.8 G/DL (ref 32–36)
MCV RBC AUTO: 96 FL (ref 82–98)
MONOCYTES # BLD AUTO: 0.6 K/UL (ref 0.3–1)
MONOCYTES NFR BLD: 8.4 % (ref 4–15)
NEUTROPHILS # BLD AUTO: 4.5 K/UL (ref 1.8–7.7)
NEUTROPHILS NFR BLD: 65.3 % (ref 38–73)
NRBC BLD-RTO: 0 /100 WBC
PLATELET # BLD AUTO: 245 K/UL (ref 150–450)
PMV BLD AUTO: 9.4 FL (ref 9.2–12.9)
POTASSIUM SERPL-SCNC: 4.4 MMOL/L (ref 3.5–5.1)
PROT SERPL-MCNC: 7 G/DL (ref 6–8.4)
RBC # BLD AUTO: 4.01 M/UL (ref 4–5.4)
SODIUM SERPL-SCNC: 138 MMOL/L (ref 136–145)
WBC # BLD AUTO: 6.88 K/UL (ref 3.9–12.7)

## 2021-08-11 PROCEDURE — 3044F HG A1C LEVEL LT 7.0%: CPT | Mod: CPTII,S$GLB,, | Performed by: INTERNAL MEDICINE

## 2021-08-11 PROCEDURE — 1160F RVW MEDS BY RX/DR IN RCRD: CPT | Mod: CPTII,S$GLB,, | Performed by: INTERNAL MEDICINE

## 2021-08-11 PROCEDURE — 3074F PR MOST RECENT SYSTOLIC BLOOD PRESSURE < 130 MM HG: ICD-10-PCS | Mod: CPTII,S$GLB,, | Performed by: INTERNAL MEDICINE

## 2021-08-11 PROCEDURE — 3008F BODY MASS INDEX DOCD: CPT | Mod: CPTII,S$GLB,, | Performed by: INTERNAL MEDICINE

## 2021-08-11 PROCEDURE — 1157F ADVNC CARE PLAN IN RCRD: CPT | Mod: CPTII,S$GLB,, | Performed by: INTERNAL MEDICINE

## 2021-08-11 PROCEDURE — 1160F PR REVIEW ALL MEDS BY PRESCRIBER/CLIN PHARMACIST DOCUMENTED: ICD-10-PCS | Mod: CPTII,S$GLB,, | Performed by: INTERNAL MEDICINE

## 2021-08-11 PROCEDURE — 99213 PR OFFICE/OUTPT VISIT, EST, LEVL III, 20-29 MIN: ICD-10-PCS | Mod: S$GLB,,, | Performed by: INTERNAL MEDICINE

## 2021-08-11 PROCEDURE — 1157F PR ADVANCE CARE PLAN OR EQUIV PRESENT IN MEDICAL RECORD: ICD-10-PCS | Mod: CPTII,S$GLB,, | Performed by: INTERNAL MEDICINE

## 2021-08-11 PROCEDURE — 3074F SYST BP LT 130 MM HG: CPT | Mod: CPTII,S$GLB,, | Performed by: INTERNAL MEDICINE

## 2021-08-11 PROCEDURE — 3288F PR FALLS RISK ASSESSMENT DOCUMENTED: ICD-10-PCS | Mod: CPTII,S$GLB,, | Performed by: INTERNAL MEDICINE

## 2021-08-11 PROCEDURE — 85025 COMPLETE CBC W/AUTO DIFF WBC: CPT | Performed by: INTERNAL MEDICINE

## 2021-08-11 PROCEDURE — 3078F PR MOST RECENT DIASTOLIC BLOOD PRESSURE < 80 MM HG: ICD-10-PCS | Mod: CPTII,S$GLB,, | Performed by: INTERNAL MEDICINE

## 2021-08-11 PROCEDURE — 3044F PR MOST RECENT HEMOGLOBIN A1C LEVEL <7.0%: ICD-10-PCS | Mod: CPTII,S$GLB,, | Performed by: INTERNAL MEDICINE

## 2021-08-11 PROCEDURE — 1101F PT FALLS ASSESS-DOCD LE1/YR: CPT | Mod: CPTII,S$GLB,, | Performed by: INTERNAL MEDICINE

## 2021-08-11 PROCEDURE — 1159F MED LIST DOCD IN RCRD: CPT | Mod: CPTII,S$GLB,, | Performed by: INTERNAL MEDICINE

## 2021-08-11 PROCEDURE — 1126F PR PAIN SEVERITY QUANTIFIED, NO PAIN PRESENT: ICD-10-PCS | Mod: CPTII,S$GLB,, | Performed by: INTERNAL MEDICINE

## 2021-08-11 PROCEDURE — 3288F FALL RISK ASSESSMENT DOCD: CPT | Mod: CPTII,S$GLB,, | Performed by: INTERNAL MEDICINE

## 2021-08-11 PROCEDURE — 3078F DIAST BP <80 MM HG: CPT | Mod: CPTII,S$GLB,, | Performed by: INTERNAL MEDICINE

## 2021-08-11 PROCEDURE — 3008F PR BODY MASS INDEX (BMI) DOCUMENTED: ICD-10-PCS | Mod: CPTII,S$GLB,, | Performed by: INTERNAL MEDICINE

## 2021-08-11 PROCEDURE — 80053 COMPREHEN METABOLIC PANEL: CPT | Performed by: INTERNAL MEDICINE

## 2021-08-11 PROCEDURE — 1159F PR MEDICATION LIST DOCUMENTED IN MEDICAL RECORD: ICD-10-PCS | Mod: CPTII,S$GLB,, | Performed by: INTERNAL MEDICINE

## 2021-08-11 PROCEDURE — 36415 COLL VENOUS BLD VENIPUNCTURE: CPT | Performed by: INTERNAL MEDICINE

## 2021-08-11 PROCEDURE — 99999 PR PBB SHADOW E&M-EST. PATIENT-LVL V: CPT | Mod: PBBFAC,,, | Performed by: INTERNAL MEDICINE

## 2021-08-11 PROCEDURE — 99999 PR PBB SHADOW E&M-EST. PATIENT-LVL V: ICD-10-PCS | Mod: PBBFAC,,, | Performed by: INTERNAL MEDICINE

## 2021-08-11 PROCEDURE — 1126F AMNT PAIN NOTED NONE PRSNT: CPT | Mod: CPTII,S$GLB,, | Performed by: INTERNAL MEDICINE

## 2021-08-11 PROCEDURE — 99213 OFFICE O/P EST LOW 20 MIN: CPT | Mod: S$GLB,,, | Performed by: INTERNAL MEDICINE

## 2021-08-11 PROCEDURE — 1101F PR PT FALLS ASSESS DOC 0-1 FALLS W/OUT INJ PAST YR: ICD-10-PCS | Mod: CPTII,S$GLB,, | Performed by: INTERNAL MEDICINE

## 2021-08-11 RX ORDER — ZOSTER VACCINE RECOMBINANT, ADJUVANTED 50 MCG/0.5
KIT INTRAMUSCULAR
COMMUNITY
Start: 2021-05-06 | End: 2021-08-11 | Stop reason: ALTCHOICE

## 2021-08-22 ENCOUNTER — PATIENT MESSAGE (OUTPATIENT)
Dept: INTERNAL MEDICINE | Facility: CLINIC | Age: 66
End: 2021-08-22

## 2021-08-22 DIAGNOSIS — E11.69 TYPE 2 DIABETES MELLITUS WITH OTHER SPECIFIED COMPLICATION, WITH LONG-TERM CURRENT USE OF INSULIN: Primary | ICD-10-CM

## 2021-08-22 DIAGNOSIS — Z79.4 TYPE 2 DIABETES MELLITUS WITH OTHER SPECIFIED COMPLICATION, WITH LONG-TERM CURRENT USE OF INSULIN: Primary | ICD-10-CM

## 2021-08-23 ENCOUNTER — PATIENT MESSAGE (OUTPATIENT)
Dept: ADMINISTRATIVE | Facility: OTHER | Age: 66
End: 2021-08-23

## 2021-09-22 ENCOUNTER — TELEPHONE (OUTPATIENT)
Dept: INTERNAL MEDICINE | Facility: CLINIC | Age: 66
End: 2021-09-22

## 2021-09-27 ENCOUNTER — PATIENT MESSAGE (OUTPATIENT)
Dept: ADMINISTRATIVE | Facility: OTHER | Age: 66
End: 2021-09-27

## 2021-09-30 ENCOUNTER — HOSPITAL ENCOUNTER (OUTPATIENT)
Dept: RADIOLOGY | Facility: HOSPITAL | Age: 66
Discharge: HOME | End: 2021-09-30
Attending: STUDENT IN AN ORGANIZED HEALTH CARE EDUCATION/TRAINING PROGRAM
Payer: MEDICARE

## 2021-09-30 ENCOUNTER — OFFICE VISIT (OUTPATIENT)
Dept: INTERNAL MEDICINE | Facility: CLINIC | Age: 66
End: 2021-09-30
Payer: MEDICARE

## 2021-09-30 VITALS
DIASTOLIC BLOOD PRESSURE: 70 MMHG | HEIGHT: 65 IN | WEIGHT: 177.5 LBS | SYSTOLIC BLOOD PRESSURE: 136 MMHG | OXYGEN SATURATION: 98 % | BODY MASS INDEX: 29.57 KG/M2 | HEART RATE: 69 BPM | RESPIRATION RATE: 18 BRPM | TEMPERATURE: 97 F

## 2021-09-30 DIAGNOSIS — M54.9 DORSALGIA, UNSPECIFIED: ICD-10-CM
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
## 2021-09-30 DIAGNOSIS — M54.9 MID BACK PAIN: ICD-10-CM

## 2021-09-30 DIAGNOSIS — R07.81 RIB PAIN ON RIGHT SIDE: ICD-10-CM

## 2021-09-30 PROCEDURE — 3078F PR MOST RECENT DIASTOLIC BLOOD PRESSURE < 80 MM HG: ICD-10-PCS | Mod: CPTII,S$GLB,, | Performed by: STUDENT IN AN ORGANIZED HEALTH CARE EDUCATION/TRAINING PROGRAM

## 2021-09-30 PROCEDURE — 72110 XR LUMBAR SPINE COMPLETE 5 VIEW: ICD-10-PCS | Mod: 26,,, | Performed by: RADIOLOGY

## 2021-09-30 PROCEDURE — 3008F PR BODY MASS INDEX (BMI) DOCUMENTED: ICD-10-PCS | Mod: CPTII,S$GLB,, | Performed by: STUDENT IN AN ORGANIZED HEALTH CARE EDUCATION/TRAINING PROGRAM

## 2021-09-30 PROCEDURE — 3288F FALL RISK ASSESSMENT DOCD: CPT | Mod: CPTII,S$GLB,, | Performed by: STUDENT IN AN ORGANIZED HEALTH CARE EDUCATION/TRAINING PROGRAM

## 2021-09-30 PROCEDURE — 71110 X-RAY EXAM RIBS BIL 3 VIEWS: CPT | Mod: 26,,, | Performed by: RADIOLOGY

## 2021-09-30 PROCEDURE — 72070 XR THORACIC SPINE AP LATERAL: ICD-10-PCS | Mod: 26,,, | Performed by: RADIOLOGY

## 2021-09-30 PROCEDURE — 99214 PR OFFICE/OUTPT VISIT, EST, LEVL IV, 30-39 MIN: ICD-10-PCS | Mod: S$GLB,,, | Performed by: STUDENT IN AN ORGANIZED HEALTH CARE EDUCATION/TRAINING PROGRAM

## 2021-09-30 PROCEDURE — 1125F AMNT PAIN NOTED PAIN PRSNT: CPT | Mod: CPTII,S$GLB,, | Performed by: STUDENT IN AN ORGANIZED HEALTH CARE EDUCATION/TRAINING PROGRAM

## 2021-09-30 PROCEDURE — 3066F NEPHROPATHY DOC TX: CPT | Mod: CPTII,S$GLB,, | Performed by: STUDENT IN AN ORGANIZED HEALTH CARE EDUCATION/TRAINING PROGRAM

## 2021-09-30 PROCEDURE — 1125F PR PAIN SEVERITY QUANTIFIED, PAIN PRESENT: ICD-10-PCS | Mod: CPTII,S$GLB,, | Performed by: STUDENT IN AN ORGANIZED HEALTH CARE EDUCATION/TRAINING PROGRAM

## 2021-09-30 PROCEDURE — 99214 OFFICE O/P EST MOD 30 MIN: CPT | Mod: S$GLB,,, | Performed by: STUDENT IN AN ORGANIZED HEALTH CARE EDUCATION/TRAINING PROGRAM

## 2021-09-30 PROCEDURE — 3061F NEG MICROALBUMINURIA REV: CPT | Mod: CPTII,S$GLB,, | Performed by: STUDENT IN AN ORGANIZED HEALTH CARE EDUCATION/TRAINING PROGRAM

## 2021-09-30 PROCEDURE — 71110 X-RAY EXAM RIBS BIL 3 VIEWS: CPT | Mod: TC,PO

## 2021-09-30 PROCEDURE — 99999 PR PBB SHADOW E&M-EST. PATIENT-LVL V: CPT | Mod: PBBFAC,,, | Performed by: STUDENT IN AN ORGANIZED HEALTH CARE EDUCATION/TRAINING PROGRAM

## 2021-09-30 PROCEDURE — 1101F PT FALLS ASSESS-DOCD LE1/YR: CPT | Mod: CPTII,S$GLB,, | Performed by: STUDENT IN AN ORGANIZED HEALTH CARE EDUCATION/TRAINING PROGRAM

## 2021-09-30 PROCEDURE — 72110 X-RAY EXAM L-2 SPINE 4/>VWS: CPT | Mod: TC,PO

## 2021-09-30 PROCEDURE — 3288F PR FALLS RISK ASSESSMENT DOCUMENTED: ICD-10-PCS | Mod: CPTII,S$GLB,, | Performed by: STUDENT IN AN ORGANIZED HEALTH CARE EDUCATION/TRAINING PROGRAM

## 2021-09-30 PROCEDURE — 99999 PR PBB SHADOW E&M-EST. PATIENT-LVL V: ICD-10-PCS | Mod: PBBFAC,,, | Performed by: STUDENT IN AN ORGANIZED HEALTH CARE EDUCATION/TRAINING PROGRAM

## 2021-09-30 PROCEDURE — 1159F PR MEDICATION LIST DOCUMENTED IN MEDICAL RECORD: ICD-10-PCS | Mod: CPTII,S$GLB,, | Performed by: STUDENT IN AN ORGANIZED HEALTH CARE EDUCATION/TRAINING PROGRAM

## 2021-09-30 PROCEDURE — 3008F BODY MASS INDEX DOCD: CPT | Mod: CPTII,S$GLB,, | Performed by: STUDENT IN AN ORGANIZED HEALTH CARE EDUCATION/TRAINING PROGRAM

## 2021-09-30 PROCEDURE — 3078F DIAST BP <80 MM HG: CPT | Mod: CPTII,S$GLB,, | Performed by: STUDENT IN AN ORGANIZED HEALTH CARE EDUCATION/TRAINING PROGRAM

## 2021-09-30 PROCEDURE — 72070 X-RAY EXAM THORAC SPINE 2VWS: CPT | Mod: 26,,, | Performed by: RADIOLOGY

## 2021-09-30 PROCEDURE — 3066F PR DOCUMENTATION OF TREATMENT FOR NEPHROPATHY: ICD-10-PCS | Mod: CPTII,S$GLB,, | Performed by: STUDENT IN AN ORGANIZED HEALTH CARE EDUCATION/TRAINING PROGRAM

## 2021-09-30 PROCEDURE — 1159F MED LIST DOCD IN RCRD: CPT | Mod: CPTII,S$GLB,, | Performed by: STUDENT IN AN ORGANIZED HEALTH CARE EDUCATION/TRAINING PROGRAM

## 2021-09-30 PROCEDURE — 1157F ADVNC CARE PLAN IN RCRD: CPT | Mod: CPTII,S$GLB,, | Performed by: STUDENT IN AN ORGANIZED HEALTH CARE EDUCATION/TRAINING PROGRAM

## 2021-09-30 PROCEDURE — 4010F ACE/ARB THERAPY RXD/TAKEN: CPT | Mod: CPTII,S$GLB,, | Performed by: STUDENT IN AN ORGANIZED HEALTH CARE EDUCATION/TRAINING PROGRAM

## 2021-09-30 PROCEDURE — 3061F PR NEG MICROALBUMINURIA RESULT DOCUMENTED/REVIEW: ICD-10-PCS | Mod: CPTII,S$GLB,, | Performed by: STUDENT IN AN ORGANIZED HEALTH CARE EDUCATION/TRAINING PROGRAM

## 2021-09-30 PROCEDURE — 4010F PR ACE/ARB THEARPY RXD/TAKEN: ICD-10-PCS | Mod: CPTII,S$GLB,, | Performed by: STUDENT IN AN ORGANIZED HEALTH CARE EDUCATION/TRAINING PROGRAM

## 2021-09-30 PROCEDURE — 3075F SYST BP GE 130 - 139MM HG: CPT | Mod: CPTII,S$GLB,, | Performed by: STUDENT IN AN ORGANIZED HEALTH CARE EDUCATION/TRAINING PROGRAM

## 2021-09-30 PROCEDURE — 3044F PR MOST RECENT HEMOGLOBIN A1C LEVEL <7.0%: ICD-10-PCS | Mod: CPTII,S$GLB,, | Performed by: STUDENT IN AN ORGANIZED HEALTH CARE EDUCATION/TRAINING PROGRAM

## 2021-09-30 PROCEDURE — 3075F PR MOST RECENT SYSTOLIC BLOOD PRESS GE 130-139MM HG: ICD-10-PCS | Mod: CPTII,S$GLB,, | Performed by: STUDENT IN AN ORGANIZED HEALTH CARE EDUCATION/TRAINING PROGRAM

## 2021-09-30 PROCEDURE — 71110 XR RIBS 3 VIEWS BILATERAL: ICD-10-PCS | Mod: 26,,, | Performed by: RADIOLOGY

## 2021-09-30 PROCEDURE — 1157F PR ADVANCE CARE PLAN OR EQUIV PRESENT IN MEDICAL RECORD: ICD-10-PCS | Mod: CPTII,S$GLB,, | Performed by: STUDENT IN AN ORGANIZED HEALTH CARE EDUCATION/TRAINING PROGRAM

## 2021-09-30 PROCEDURE — 72110 X-RAY EXAM L-2 SPINE 4/>VWS: CPT | Mod: 26,,, | Performed by: RADIOLOGY

## 2021-09-30 PROCEDURE — 72070 X-RAY EXAM THORAC SPINE 2VWS: CPT | Mod: TC,PO

## 2021-09-30 PROCEDURE — 3044F HG A1C LEVEL LT 7.0%: CPT | Mod: CPTII,S$GLB,, | Performed by: STUDENT IN AN ORGANIZED HEALTH CARE EDUCATION/TRAINING PROGRAM

## 2021-09-30 PROCEDURE — 1101F PR PT FALLS ASSESS DOC 0-1 FALLS W/OUT INJ PAST YR: ICD-10-PCS | Mod: CPTII,S$GLB,, | Performed by: STUDENT IN AN ORGANIZED HEALTH CARE EDUCATION/TRAINING PROGRAM

## 2021-09-30 RX ORDER — CYCLOBENZAPRINE HCL 5 MG
5 TABLET ORAL 3 TIMES DAILY PRN
Qty: 90 TABLET | Refills: 0 | Status: SHIPPED | OUTPATIENT
Start: 2021-09-30 | End: 2021-11-08

## 2021-10-01 ENCOUNTER — CLINICAL SUPPORT (OUTPATIENT)
Dept: REHABILITATION | Facility: HOSPITAL | Age: 66
End: 2021-10-01
Attending: ORTHOPAEDIC SURGERY
Payer: MEDICARE

## 2021-10-01 DIAGNOSIS — M25.562 CHRONIC PAIN OF LEFT KNEE: Primary | ICD-10-CM

## 2021-10-01 DIAGNOSIS — R26.2 DIFFICULTY WALKING: ICD-10-CM

## 2021-10-01 DIAGNOSIS — G89.29 CHRONIC PAIN OF LEFT KNEE: Primary | ICD-10-CM

## 2021-10-01 DIAGNOSIS — M17.12 PRIMARY OSTEOARTHRITIS OF LEFT KNEE: ICD-10-CM

## 2021-10-01 PROCEDURE — 97161 PT EVAL LOW COMPLEX 20 MIN: CPT

## 2021-10-01 PROCEDURE — 97110 THERAPEUTIC EXERCISES: CPT

## 2021-10-04 ENCOUNTER — PATIENT MESSAGE (OUTPATIENT)
Dept: ADMINISTRATIVE | Facility: OTHER | Age: 66
End: 2021-10-04

## 2021-10-04 ENCOUNTER — HOSPITAL ENCOUNTER (OUTPATIENT)
Dept: RADIOLOGY | Facility: HOSPITAL | Age: 66
Discharge: HOME | End: 2021-10-04
Attending: PHYSICIAN ASSISTANT
Payer: MEDICARE

## 2021-10-04 ENCOUNTER — OFFICE VISIT (OUTPATIENT)
Dept: ORTHOPEDICS | Facility: CLINIC | Age: 66
End: 2021-10-04
Payer: MEDICARE

## 2021-10-04 VITALS — BODY MASS INDEX: 29.76 KG/M2 | WEIGHT: 178.63 LBS | HEIGHT: 65 IN

## 2021-10-04 DIAGNOSIS — M17.12 PRIMARY OSTEOARTHRITIS OF LEFT KNEE: Primary | ICD-10-CM

## 2021-10-04 DIAGNOSIS — M17.12 PRIMARY OSTEOARTHRITIS OF LEFT KNEE: ICD-10-CM

## 2021-10-04 PROCEDURE — 1125F AMNT PAIN NOTED PAIN PRSNT: CPT | Mod: CPTII,S$GLB,, | Performed by: PHYSICIAN ASSISTANT

## 2021-10-04 PROCEDURE — 99499 UNLISTED E&M SERVICE: CPT | Mod: S$GLB,,, | Performed by: PHYSICIAN ASSISTANT

## 2021-10-04 PROCEDURE — 3061F NEG MICROALBUMINURIA REV: CPT | Mod: CPTII,S$GLB,, | Performed by: PHYSICIAN ASSISTANT

## 2021-10-04 PROCEDURE — 3044F HG A1C LEVEL LT 7.0%: CPT | Mod: CPTII,S$GLB,, | Performed by: PHYSICIAN ASSISTANT

## 2021-10-04 PROCEDURE — 3066F NEPHROPATHY DOC TX: CPT | Mod: CPTII,S$GLB,, | Performed by: PHYSICIAN ASSISTANT

## 2021-10-04 PROCEDURE — 1160F RVW MEDS BY RX/DR IN RCRD: CPT | Mod: CPTII,S$GLB,, | Performed by: PHYSICIAN ASSISTANT

## 2021-10-04 PROCEDURE — 3288F FALL RISK ASSESSMENT DOCD: CPT | Mod: CPTII,S$GLB,, | Performed by: PHYSICIAN ASSISTANT

## 2021-10-04 PROCEDURE — 3008F BODY MASS INDEX DOCD: CPT | Mod: CPTII,S$GLB,, | Performed by: PHYSICIAN ASSISTANT

## 2021-10-04 PROCEDURE — 1159F MED LIST DOCD IN RCRD: CPT | Mod: CPTII,S$GLB,, | Performed by: PHYSICIAN ASSISTANT

## 2021-10-04 PROCEDURE — 73560 X-RAY EXAM OF KNEE 1 OR 2: CPT | Mod: 26,LT,, | Performed by: RADIOLOGY

## 2021-10-04 PROCEDURE — 1157F ADVNC CARE PLAN IN RCRD: CPT | Mod: CPTII,S$GLB,, | Performed by: PHYSICIAN ASSISTANT

## 2021-10-04 PROCEDURE — 3044F PR MOST RECENT HEMOGLOBIN A1C LEVEL <7.0%: ICD-10-PCS | Mod: CPTII,S$GLB,, | Performed by: PHYSICIAN ASSISTANT

## 2021-10-04 PROCEDURE — 73560 XR KNEE 1 OR 2 VIEW LEFT: ICD-10-PCS | Mod: 26,LT,, | Performed by: RADIOLOGY

## 2021-10-04 PROCEDURE — 3008F PR BODY MASS INDEX (BMI) DOCUMENTED: ICD-10-PCS | Mod: CPTII,S$GLB,, | Performed by: PHYSICIAN ASSISTANT

## 2021-10-04 PROCEDURE — 99999 PR PBB SHADOW E&M-EST. PATIENT-LVL IV: CPT | Mod: PBBFAC,,, | Performed by: PHYSICIAN ASSISTANT

## 2021-10-04 PROCEDURE — 3061F PR NEG MICROALBUMINURIA RESULT DOCUMENTED/REVIEW: ICD-10-PCS | Mod: CPTII,S$GLB,, | Performed by: PHYSICIAN ASSISTANT

## 2021-10-04 PROCEDURE — 1101F PT FALLS ASSESS-DOCD LE1/YR: CPT | Mod: CPTII,S$GLB,, | Performed by: PHYSICIAN ASSISTANT

## 2021-10-04 PROCEDURE — 4010F PR ACE/ARB THEARPY RXD/TAKEN: ICD-10-PCS | Mod: CPTII,S$GLB,, | Performed by: PHYSICIAN ASSISTANT

## 2021-10-04 PROCEDURE — 99999 PR PBB SHADOW E&M-EST. PATIENT-LVL IV: ICD-10-PCS | Mod: PBBFAC,,, | Performed by: PHYSICIAN ASSISTANT

## 2021-10-04 PROCEDURE — 1157F PR ADVANCE CARE PLAN OR EQUIV PRESENT IN MEDICAL RECORD: ICD-10-PCS | Mod: CPTII,S$GLB,, | Performed by: PHYSICIAN ASSISTANT

## 2021-10-04 PROCEDURE — 3066F PR DOCUMENTATION OF TREATMENT FOR NEPHROPATHY: ICD-10-PCS | Mod: CPTII,S$GLB,, | Performed by: PHYSICIAN ASSISTANT

## 2021-10-04 PROCEDURE — 3288F PR FALLS RISK ASSESSMENT DOCUMENTED: ICD-10-PCS | Mod: CPTII,S$GLB,, | Performed by: PHYSICIAN ASSISTANT

## 2021-10-04 PROCEDURE — 99499 NO LOS: ICD-10-PCS | Mod: S$GLB,,, | Performed by: PHYSICIAN ASSISTANT

## 2021-10-04 PROCEDURE — 1159F PR MEDICATION LIST DOCUMENTED IN MEDICAL RECORD: ICD-10-PCS | Mod: CPTII,S$GLB,, | Performed by: PHYSICIAN ASSISTANT

## 2021-10-04 PROCEDURE — 4010F ACE/ARB THERAPY RXD/TAKEN: CPT | Mod: CPTII,S$GLB,, | Performed by: PHYSICIAN ASSISTANT

## 2021-10-04 PROCEDURE — 1125F PR PAIN SEVERITY QUANTIFIED, PAIN PRESENT: ICD-10-PCS | Mod: CPTII,S$GLB,, | Performed by: PHYSICIAN ASSISTANT

## 2021-10-04 PROCEDURE — 73560 X-RAY EXAM OF KNEE 1 OR 2: CPT | Mod: TC,LT

## 2021-10-04 PROCEDURE — 1101F PR PT FALLS ASSESS DOC 0-1 FALLS W/OUT INJ PAST YR: ICD-10-PCS | Mod: CPTII,S$GLB,, | Performed by: PHYSICIAN ASSISTANT

## 2021-10-04 PROCEDURE — 1160F PR REVIEW ALL MEDS BY PRESCRIBER/CLIN PHARMACIST DOCUMENTED: ICD-10-PCS | Mod: CPTII,S$GLB,, | Performed by: PHYSICIAN ASSISTANT

## 2021-10-04 RX ORDER — ACETAMINOPHEN 500 MG
1000 TABLET ORAL EVERY 6 HOURS
Status: CANCELLED | OUTPATIENT
Start: 2021-10-04 | End: 2021-10-06

## 2021-10-04 RX ORDER — ROPIVACAINE HYDROCHLORIDE 2 MG/ML
8 INJECTION, SOLUTION EPIDURAL; INFILTRATION; PERINEURAL CONTINUOUS
Status: CANCELLED | OUTPATIENT
Start: 2021-10-04

## 2021-10-04 RX ORDER — MORPHINE SULFATE 2 MG/ML
2 INJECTION, SOLUTION INTRAMUSCULAR; INTRAVENOUS
Status: CANCELLED | OUTPATIENT
Start: 2021-10-04

## 2021-10-04 RX ORDER — PROCHLORPERAZINE EDISYLATE 5 MG/ML
5 INJECTION INTRAMUSCULAR; INTRAVENOUS EVERY 6 HOURS PRN
Status: CANCELLED | OUTPATIENT
Start: 2021-10-04

## 2021-10-04 RX ORDER — CELECOXIB 200 MG/1
200 CAPSULE ORAL DAILY
Status: CANCELLED | OUTPATIENT
Start: 2021-10-04

## 2021-10-04 RX ORDER — MIDAZOLAM HYDROCHLORIDE 1 MG/ML
1 INJECTION INTRAMUSCULAR; INTRAVENOUS EVERY 5 MIN PRN
Status: CANCELLED | OUTPATIENT
Start: 2021-10-04

## 2021-10-04 RX ORDER — MUPIROCIN 20 MG/G
1 OINTMENT TOPICAL 2 TIMES DAILY
Status: CANCELLED | OUTPATIENT
Start: 2021-10-04 | End: 2021-10-09

## 2021-10-04 RX ORDER — CELECOXIB 200 MG/1
400 CAPSULE ORAL
Status: CANCELLED | OUTPATIENT
Start: 2021-10-04

## 2021-10-04 RX ORDER — CEFAZOLIN SODIUM 2 G/50ML
2 SOLUTION INTRAVENOUS
Status: CANCELLED | OUTPATIENT
Start: 2021-10-04 | End: 2021-10-05

## 2021-10-04 RX ORDER — SODIUM CHLORIDE 9 MG/ML
INJECTION, SOLUTION INTRAVENOUS
Status: CANCELLED | OUTPATIENT
Start: 2021-10-04

## 2021-10-04 RX ORDER — AMOXICILLIN 250 MG
1 CAPSULE ORAL 2 TIMES DAILY
Status: CANCELLED | OUTPATIENT
Start: 2021-10-04

## 2021-10-04 RX ORDER — NALOXONE HCL 0.4 MG/ML
0.02 VIAL (ML) INJECTION
Status: CANCELLED | OUTPATIENT
Start: 2021-10-04

## 2021-10-04 RX ORDER — ONDANSETRON 2 MG/ML
4 INJECTION INTRAMUSCULAR; INTRAVENOUS EVERY 8 HOURS PRN
Status: CANCELLED | OUTPATIENT
Start: 2021-10-04

## 2021-10-04 RX ORDER — PREGABALIN 75 MG/1
75 CAPSULE ORAL
Status: CANCELLED | OUTPATIENT
Start: 2021-10-04

## 2021-10-04 RX ORDER — POLYETHYLENE GLYCOL 3350 17 G/17G
17 POWDER, FOR SOLUTION ORAL DAILY
Status: CANCELLED | OUTPATIENT
Start: 2021-10-04

## 2021-10-04 RX ORDER — MUPIROCIN 20 MG/G
1 OINTMENT TOPICAL
Status: CANCELLED | OUTPATIENT
Start: 2021-10-04

## 2021-10-04 RX ORDER — CEFAZOLIN SODIUM 2 G/50ML
2 SOLUTION INTRAVENOUS
Status: CANCELLED | OUTPATIENT
Start: 2021-10-04

## 2021-10-04 RX ORDER — OXYCODONE HYDROCHLORIDE 5 MG/1
10 TABLET ORAL
Status: CANCELLED | OUTPATIENT
Start: 2021-10-04

## 2021-10-04 RX ORDER — LIDOCAINE HYDROCHLORIDE 10 MG/ML
1 INJECTION, SOLUTION EPIDURAL; INFILTRATION; INTRACAUDAL; PERINEURAL
Status: CANCELLED | OUTPATIENT
Start: 2021-10-04

## 2021-10-04 RX ORDER — FENTANYL CITRATE 50 UG/ML
25 INJECTION, SOLUTION INTRAMUSCULAR; INTRAVENOUS EVERY 5 MIN PRN
Status: CANCELLED | OUTPATIENT
Start: 2021-10-04

## 2021-10-04 RX ORDER — FAMOTIDINE 20 MG/1
20 TABLET, FILM COATED ORAL 2 TIMES DAILY
Status: CANCELLED | OUTPATIENT
Start: 2021-10-04

## 2021-10-04 RX ORDER — SODIUM CHLORIDE 9 MG/ML
INJECTION, SOLUTION INTRAVENOUS CONTINUOUS
Status: CANCELLED | OUTPATIENT
Start: 2021-10-04 | End: 2021-10-05

## 2021-10-04 RX ORDER — ACETAMINOPHEN 500 MG
1000 TABLET ORAL
Status: CANCELLED | OUTPATIENT
Start: 2021-10-04

## 2021-10-04 RX ORDER — OXYCODONE HYDROCHLORIDE 5 MG/1
5 TABLET ORAL
Status: CANCELLED | OUTPATIENT
Start: 2021-10-04

## 2021-10-04 RX ORDER — SODIUM CHLORIDE 0.9 % (FLUSH) 0.9 %
10 SYRINGE (ML) INJECTION
Status: CANCELLED | OUTPATIENT
Start: 2021-10-04

## 2021-10-04 RX ORDER — BISACODYL 10 MG
10 SUPPOSITORY, RECTAL RECTAL EVERY 12 HOURS PRN
Status: CANCELLED | OUTPATIENT
Start: 2021-10-04

## 2021-10-04 RX ORDER — PREGABALIN 75 MG/1
75 CAPSULE ORAL NIGHTLY
Status: CANCELLED | OUTPATIENT
Start: 2021-10-04

## 2021-10-04 RX ORDER — TALC
6 POWDER (GRAM) TOPICAL NIGHTLY PRN
Status: CANCELLED | OUTPATIENT
Start: 2021-10-04

## 2021-10-04 RX ORDER — ASPIRIN 81 MG/1
81 TABLET ORAL 2 TIMES DAILY
Status: CANCELLED | OUTPATIENT
Start: 2021-10-04

## 2021-10-05 ENCOUNTER — CLINICAL SUPPORT (OUTPATIENT)
Dept: REHABILITATION | Facility: HOSPITAL | Age: 66
End: 2021-10-05
Attending: ORTHOPAEDIC SURGERY
Payer: MEDICARE

## 2021-10-05 DIAGNOSIS — R26.2 DIFFICULTY WALKING: ICD-10-CM

## 2021-10-05 PROCEDURE — 97110 THERAPEUTIC EXERCISES: CPT

## 2021-10-05 PROCEDURE — 97010 HOT OR COLD PACKS THERAPY: CPT

## 2021-10-11 ENCOUNTER — TELEPHONE (OUTPATIENT)
Dept: PREADMISSION TESTING | Facility: HOSPITAL | Age: 66
End: 2021-10-11
Payer: MEDICARE

## 2021-10-11 DIAGNOSIS — Z01.818 PREOP TESTING: Primary | ICD-10-CM

## 2021-10-11 DIAGNOSIS — Z79.4 TYPE 2 DIABETES MELLITUS WITHOUT COMPLICATION, WITH LONG-TERM CURRENT USE OF INSULIN: ICD-10-CM

## 2021-10-11 DIAGNOSIS — E11.9 TYPE 2 DIABETES MELLITUS WITHOUT COMPLICATION, WITH LONG-TERM CURRENT USE OF INSULIN: ICD-10-CM

## 2021-10-11 DIAGNOSIS — M79.609 PAIN IN EXTREMITY, UNSPECIFIED EXTREMITY: ICD-10-CM

## 2021-10-12 ENCOUNTER — CLINICAL SUPPORT (OUTPATIENT)
Dept: REHABILITATION | Facility: HOSPITAL | Age: 66
End: 2021-10-12
Attending: ORTHOPAEDIC SURGERY
Payer: MEDICARE

## 2021-10-12 DIAGNOSIS — R26.2 DIFFICULTY WALKING: ICD-10-CM

## 2021-10-12 PROCEDURE — 97110 THERAPEUTIC EXERCISES: CPT

## 2021-10-14 ENCOUNTER — OFFICE VISIT (OUTPATIENT)
Dept: INTERNAL MEDICINE | Facility: CLINIC | Age: 66
End: 2021-10-14
Payer: MEDICARE

## 2021-10-14 ENCOUNTER — HOSPITAL ENCOUNTER (OUTPATIENT)
Dept: CARDIOLOGY | Facility: CLINIC | Age: 66
Discharge: HOME | End: 2021-10-14
Payer: MEDICARE

## 2021-10-14 VITALS
BODY MASS INDEX: 29.66 KG/M2 | OXYGEN SATURATION: 98 % | TEMPERATURE: 99 F | HEART RATE: 62 BPM | WEIGHT: 178 LBS | DIASTOLIC BLOOD PRESSURE: 72 MMHG | HEIGHT: 65 IN | SYSTOLIC BLOOD PRESSURE: 168 MMHG

## 2021-10-14 DIAGNOSIS — K21.9 GASTROESOPHAGEAL REFLUX DISEASE, UNSPECIFIED WHETHER ESOPHAGITIS PRESENT: ICD-10-CM

## 2021-10-14 DIAGNOSIS — E66.3 OVERWEIGHT (BMI 25.0-29.9): ICD-10-CM

## 2021-10-14 DIAGNOSIS — G89.29 CHRONIC PAIN OF LEFT KNEE: ICD-10-CM

## 2021-10-14 DIAGNOSIS — G47.33 OSA (OBSTRUCTIVE SLEEP APNEA): ICD-10-CM

## 2021-10-14 DIAGNOSIS — I10 PRIMARY HYPERTENSION: ICD-10-CM

## 2021-10-14 DIAGNOSIS — I25.10 ATHEROSCLEROSIS OF CORONARY ARTERY WITHOUT ANGINA PECTORIS, UNSPECIFIED VESSEL OR LESION TYPE, UNSPECIFIED WHETHER NATIVE OR TRANSPLANTED HEART: ICD-10-CM

## 2021-10-14 DIAGNOSIS — E11.69 TYPE 2 DIABETES MELLITUS WITH OTHER SPECIFIED COMPLICATION, WITH LONG-TERM CURRENT USE OF INSULIN: ICD-10-CM

## 2021-10-14 DIAGNOSIS — Z01.818 PREOP TESTING: ICD-10-CM

## 2021-10-14 DIAGNOSIS — M25.562 CHRONIC PAIN OF LEFT KNEE: ICD-10-CM

## 2021-10-14 DIAGNOSIS — Z79.4 TYPE 2 DIABETES MELLITUS WITH OTHER SPECIFIED COMPLICATION, WITH LONG-TERM CURRENT USE OF INSULIN: ICD-10-CM

## 2021-10-14 PROBLEM — Z96.651 STATUS POST TOTAL RIGHT KNEE REPLACEMENT: Status: RESOLVED | Noted: 2020-10-05 | Resolved: 2021-10-14

## 2021-10-14 PROBLEM — Z12.11 SCREENING FOR COLON CANCER: Status: RESOLVED | Noted: 2021-01-06 | Resolved: 2021-10-14

## 2021-10-14 PROBLEM — M17.11 PRIMARY OSTEOARTHRITIS OF RIGHT KNEE: Status: RESOLVED | Noted: 2020-10-06 | Resolved: 2021-10-14

## 2021-10-14 PROBLEM — M25.521 RIGHT ELBOW PAIN: Status: RESOLVED | Noted: 2019-01-16 | Resolved: 2021-10-14

## 2021-10-14 PROBLEM — R31.0 GROSS HEMATURIA: Status: RESOLVED | Noted: 2020-12-01 | Resolved: 2021-10-14

## 2021-10-14 PROCEDURE — 1160F PR REVIEW ALL MEDS BY PRESCRIBER/CLIN PHARMACIST DOCUMENTED: ICD-10-PCS | Mod: CPTII,S$GLB,, | Performed by: HOSPITALIST

## 2021-10-14 PROCEDURE — 3008F BODY MASS INDEX DOCD: CPT | Mod: CPTII,S$GLB,, | Performed by: HOSPITALIST

## 2021-10-14 PROCEDURE — 3044F HG A1C LEVEL LT 7.0%: CPT | Mod: CPTII,S$GLB,, | Performed by: HOSPITALIST

## 2021-10-14 PROCEDURE — 99499 RISK ADDL DX/OHS AUDIT: ICD-10-PCS | Mod: S$GLB,,, | Performed by: NURSE PRACTITIONER

## 2021-10-14 PROCEDURE — 93005 EKG 12-LEAD: ICD-10-PCS | Mod: S$GLB,,, | Performed by: ANESTHESIOLOGY

## 2021-10-14 PROCEDURE — 99999 PR PBB SHADOW E&M-EST. PATIENT-LVL V: CPT | Mod: PBBFAC,,,

## 2021-10-14 PROCEDURE — 3078F DIAST BP <80 MM HG: CPT | Mod: CPTII,S$GLB,, | Performed by: HOSPITALIST

## 2021-10-14 PROCEDURE — 93010 EKG 12-LEAD: ICD-10-PCS | Mod: S$GLB,,, | Performed by: INTERNAL MEDICINE

## 2021-10-14 PROCEDURE — 99215 PR OFFICE/OUTPT VISIT, EST, LEVL V, 40-54 MIN: ICD-10-PCS | Mod: S$GLB,,, | Performed by: HOSPITALIST

## 2021-10-14 PROCEDURE — 99999 PR PBB SHADOW E&M-EST. PATIENT-LVL V: ICD-10-PCS | Mod: PBBFAC,,,

## 2021-10-14 PROCEDURE — 3066F PR DOCUMENTATION OF TREATMENT FOR NEPHROPATHY: ICD-10-PCS | Mod: CPTII,S$GLB,, | Performed by: HOSPITALIST

## 2021-10-14 PROCEDURE — 93010 ELECTROCARDIOGRAM REPORT: CPT | Mod: S$GLB,,, | Performed by: INTERNAL MEDICINE

## 2021-10-14 PROCEDURE — 1159F PR MEDICATION LIST DOCUMENTED IN MEDICAL RECORD: ICD-10-PCS | Mod: CPTII,S$GLB,, | Performed by: HOSPITALIST

## 2021-10-14 PROCEDURE — 3061F PR NEG MICROALBUMINURIA RESULT DOCUMENTED/REVIEW: ICD-10-PCS | Mod: CPTII,S$GLB,, | Performed by: HOSPITALIST

## 2021-10-14 PROCEDURE — 4010F ACE/ARB THERAPY RXD/TAKEN: CPT | Mod: CPTII,S$GLB,, | Performed by: HOSPITALIST

## 2021-10-14 PROCEDURE — 1160F RVW MEDS BY RX/DR IN RCRD: CPT | Mod: CPTII,S$GLB,, | Performed by: HOSPITALIST

## 2021-10-14 PROCEDURE — 93005 ELECTROCARDIOGRAM TRACING: CPT | Mod: S$GLB,,, | Performed by: ANESTHESIOLOGY

## 2021-10-14 PROCEDURE — 1157F ADVNC CARE PLAN IN RCRD: CPT | Mod: CPTII,S$GLB,, | Performed by: HOSPITALIST

## 2021-10-14 PROCEDURE — 3008F PR BODY MASS INDEX (BMI) DOCUMENTED: ICD-10-PCS | Mod: CPTII,S$GLB,, | Performed by: HOSPITALIST

## 2021-10-14 PROCEDURE — 99215 OFFICE O/P EST HI 40 MIN: CPT | Mod: S$GLB,,, | Performed by: HOSPITALIST

## 2021-10-14 PROCEDURE — 3077F PR MOST RECENT SYSTOLIC BLOOD PRESSURE >= 140 MM HG: ICD-10-PCS | Mod: CPTII,S$GLB,, | Performed by: HOSPITALIST

## 2021-10-14 PROCEDURE — 1157F PR ADVANCE CARE PLAN OR EQUIV PRESENT IN MEDICAL RECORD: ICD-10-PCS | Mod: CPTII,S$GLB,, | Performed by: HOSPITALIST

## 2021-10-14 PROCEDURE — 4010F PR ACE/ARB THEARPY RXD/TAKEN: ICD-10-PCS | Mod: CPTII,S$GLB,, | Performed by: HOSPITALIST

## 2021-10-14 PROCEDURE — 1159F MED LIST DOCD IN RCRD: CPT | Mod: CPTII,S$GLB,, | Performed by: HOSPITALIST

## 2021-10-14 PROCEDURE — 3077F SYST BP >= 140 MM HG: CPT | Mod: CPTII,S$GLB,, | Performed by: HOSPITALIST

## 2021-10-14 PROCEDURE — 3078F PR MOST RECENT DIASTOLIC BLOOD PRESSURE < 80 MM HG: ICD-10-PCS | Mod: CPTII,S$GLB,, | Performed by: HOSPITALIST

## 2021-10-14 PROCEDURE — 99499 UNLISTED E&M SERVICE: CPT | Mod: S$GLB,,, | Performed by: NURSE PRACTITIONER

## 2021-10-14 PROCEDURE — 3061F NEG MICROALBUMINURIA REV: CPT | Mod: CPTII,S$GLB,, | Performed by: HOSPITALIST

## 2021-10-14 PROCEDURE — 3044F PR MOST RECENT HEMOGLOBIN A1C LEVEL <7.0%: ICD-10-PCS | Mod: CPTII,S$GLB,, | Performed by: HOSPITALIST

## 2021-10-14 PROCEDURE — 3066F NEPHROPATHY DOC TX: CPT | Mod: CPTII,S$GLB,, | Performed by: HOSPITALIST

## 2021-10-16 ENCOUNTER — LAB VISIT (OUTPATIENT)
Dept: SPORTS MEDICINE | Facility: CLINIC | Age: 66
End: 2021-10-16
Payer: MEDICARE

## 2021-10-16 DIAGNOSIS — Z01.818 PRE-OP TESTING: ICD-10-CM

## 2021-10-16 LAB
SARS-COV-2 RNA RESP QL NAA+PROBE: NOT DETECTED
SARS-COV-2- CYCLE NUMBER: NORMAL

## 2021-10-16 PROCEDURE — U0003 INFECTIOUS AGENT DETECTION BY NUCLEIC ACID (DNA OR RNA); SEVERE ACUTE RESPIRATORY SYNDROME CORONAVIRUS 2 (SARS-COV-2) (CORONAVIRUS DISEASE [COVID-19]), AMPLIFIED PROBE TECHNIQUE, MAKING USE OF HIGH THROUGHPUT TECHNOLOGIES AS DESCRIBED BY CMS-2020-01-R: HCPCS | Performed by: ORTHOPAEDIC SURGERY

## 2021-10-16 PROCEDURE — U0005 INFEC AGEN DETEC AMPLI PROBE: HCPCS | Performed by: ORTHOPAEDIC SURGERY

## 2021-10-18 ENCOUNTER — TELEPHONE (OUTPATIENT)
Dept: ORTHOPEDICS | Facility: CLINIC | Age: 66
End: 2021-10-18

## 2021-10-18 ENCOUNTER — ANESTHESIA EVENT (OUTPATIENT)
Dept: SURGERY | Facility: HOSPITAL | Age: 66
End: 2021-10-18
Payer: MEDICARE

## 2021-10-18 RX ORDER — HYDROCODONE BITARTRATE AND ACETAMINOPHEN 10; 325 MG/1; MG/1
1 TABLET ORAL
Qty: 50 TABLET | Refills: 0 | Status: SHIPPED | OUTPATIENT
Start: 2021-10-18 | End: 2021-10-18 | Stop reason: SDUPTHER

## 2021-10-18 RX ORDER — METHOCARBAMOL 750 MG/1
750 TABLET, FILM COATED ORAL 3 TIMES DAILY PRN
Qty: 30 TABLET | Refills: 0 | Status: SHIPPED | OUTPATIENT
Start: 2021-10-18 | End: 2022-04-12

## 2021-10-18 RX ORDER — ONDANSETRON 8 MG/1
8 TABLET, ORALLY DISINTEGRATING ORAL EVERY 12 HOURS PRN
Qty: 20 TABLET | Refills: 0 | Status: SHIPPED | OUTPATIENT
Start: 2021-10-18 | End: 2023-02-09

## 2021-10-18 RX ORDER — ASPIRIN 81 MG/1
81 TABLET ORAL 2 TIMES DAILY
Qty: 60 TABLET | Refills: 0 | Status: SHIPPED | OUTPATIENT
Start: 2021-10-18 | End: 2021-11-29

## 2021-10-18 RX ORDER — HYDROCODONE BITARTRATE AND ACETAMINOPHEN 10; 325 MG/1; MG/1
1 TABLET ORAL
Qty: 50 TABLET | Refills: 0 | Status: SHIPPED | OUTPATIENT
Start: 2021-10-18 | End: 2022-04-12

## 2021-10-18 RX ORDER — DOCUSATE SODIUM 100 MG/1
100 CAPSULE, LIQUID FILLED ORAL 2 TIMES DAILY PRN
Qty: 60 CAPSULE | Refills: 0 | Status: SHIPPED | OUTPATIENT
Start: 2021-10-18 | End: 2022-04-12

## 2021-10-18 RX ORDER — CELECOXIB 200 MG/1
200 CAPSULE ORAL DAILY
Qty: 30 CAPSULE | Refills: 0 | Status: SHIPPED | OUTPATIENT
Start: 2021-10-18 | End: 2021-11-20

## 2021-10-19 ENCOUNTER — HOSPITAL ENCOUNTER (OUTPATIENT)
Facility: HOSPITAL | Age: 66
Discharge: HOME | End: 2021-10-20
Attending: ORTHOPAEDIC SURGERY | Admitting: ORTHOPAEDIC SURGERY
Payer: MEDICARE

## 2021-10-19 ENCOUNTER — ANESTHESIA (OUTPATIENT)
Dept: SURGERY | Facility: HOSPITAL | Age: 66
End: 2021-10-19
Payer: MEDICARE

## 2021-10-19 DIAGNOSIS — M17.12 PRIMARY OSTEOARTHRITIS OF LEFT KNEE: ICD-10-CM

## 2021-10-19 LAB
GLUCOSE SERPL-MCNC: 261 MG/DL (ref 70–110)
GLUCOSE SERPL-MCNC: 261 MG/DL (ref 70–110)
POCT GLUCOSE: 139 MG/DL (ref 70–110)
POCT GLUCOSE: 211 MG/DL (ref 70–110)
POCT GLUCOSE: 227 MG/DL (ref 70–110)

## 2021-10-19 PROCEDURE — 99900035 HC TECH TIME PER 15 MIN (STAT)

## 2021-10-19 PROCEDURE — 64448 NJX AA&/STRD FEM NRV NFS IMG: CPT | Mod: 59,LT,, | Performed by: ANESTHESIOLOGY

## 2021-10-19 PROCEDURE — C1776 JOINT DEVICE (IMPLANTABLE): HCPCS | Performed by: ORTHOPAEDIC SURGERY

## 2021-10-19 PROCEDURE — 97162 PT EVAL MOD COMPLEX 30 MIN: CPT

## 2021-10-19 PROCEDURE — 76942 ECHO GUIDE FOR BIOPSY: CPT | Performed by: STUDENT IN AN ORGANIZED HEALTH CARE EDUCATION/TRAINING PROGRAM

## 2021-10-19 PROCEDURE — 97165 OT EVAL LOW COMPLEX 30 MIN: CPT

## 2021-10-19 PROCEDURE — 27000190 HC CPAP FULL FACE MASK W/VALVE

## 2021-10-19 PROCEDURE — 27100025 HC TUBING, SET FLUID WARMER: Performed by: ANESTHESIOLOGY

## 2021-10-19 PROCEDURE — D9220A PRA ANESTHESIA: ICD-10-PCS | Mod: ANES,,, | Performed by: ANESTHESIOLOGY

## 2021-10-19 PROCEDURE — 25000003 PHARM REV CODE 250: Performed by: STUDENT IN AN ORGANIZED HEALTH CARE EDUCATION/TRAINING PROGRAM

## 2021-10-19 PROCEDURE — 27201423 OPTIME MED/SURG SUP & DEVICES STERILE SUPPLY: Performed by: ORTHOPAEDIC SURGERY

## 2021-10-19 PROCEDURE — 71000039 HC RECOVERY, EACH ADD'L HOUR: Performed by: ORTHOPAEDIC SURGERY

## 2021-10-19 PROCEDURE — 94660 CPAP INITIATION&MGMT: CPT

## 2021-10-19 PROCEDURE — 63600175 PHARM REV CODE 636 W HCPCS: Performed by: STUDENT IN AN ORGANIZED HEALTH CARE EDUCATION/TRAINING PROGRAM

## 2021-10-19 PROCEDURE — 76942 PR U/S GUIDANCE FOR NEEDLE GUIDANCE: ICD-10-PCS | Mod: 26,,, | Performed by: ANESTHESIOLOGY

## 2021-10-19 PROCEDURE — 25000003 PHARM REV CODE 250: Performed by: ANESTHESIOLOGY

## 2021-10-19 PROCEDURE — 63600175 PHARM REV CODE 636 W HCPCS: Performed by: PHYSICIAN ASSISTANT

## 2021-10-19 PROCEDURE — 76942 ECHO GUIDE FOR BIOPSY: CPT | Mod: 26,,, | Performed by: ANESTHESIOLOGY

## 2021-10-19 PROCEDURE — C1713 ANCHOR/SCREW BN/BN,TIS/BN: HCPCS | Performed by: ORTHOPAEDIC SURGERY

## 2021-10-19 PROCEDURE — 25000003 PHARM REV CODE 250: Performed by: PHYSICIAN ASSISTANT

## 2021-10-19 PROCEDURE — 37000009 HC ANESTHESIA EA ADD 15 MINS: Performed by: ORTHOPAEDIC SURGERY

## 2021-10-19 PROCEDURE — 97535 SELF CARE MNGMENT TRAINING: CPT | Mod: 59

## 2021-10-19 PROCEDURE — 64999 PR BLOCK, IPACK: ICD-10-PCS | Mod: ,,, | Performed by: ANESTHESIOLOGY

## 2021-10-19 PROCEDURE — 64448 PR NERVE BLOCK INJ, ANES/STEROID, FEMORAL, CONT INFUSION, INCL IMAG GUIDANCE: ICD-10-PCS | Mod: 59,LT,, | Performed by: ANESTHESIOLOGY

## 2021-10-19 PROCEDURE — 36000710: Performed by: ORTHOPAEDIC SURGERY

## 2021-10-19 PROCEDURE — 71000033 HC RECOVERY, INTIAL HOUR: Performed by: ORTHOPAEDIC SURGERY

## 2021-10-19 PROCEDURE — 64999 UNLISTED PX NERVOUS SYSTEM: CPT | Mod: ,,, | Performed by: ANESTHESIOLOGY

## 2021-10-19 PROCEDURE — 63600175 PHARM REV CODE 636 W HCPCS: Performed by: ANESTHESIOLOGY

## 2021-10-19 PROCEDURE — 94761 N-INVAS EAR/PLS OXIMETRY MLT: CPT

## 2021-10-19 PROCEDURE — 97530 THERAPEUTIC ACTIVITIES: CPT

## 2021-10-19 PROCEDURE — D9220A PRA ANESTHESIA: ICD-10-PCS | Mod: CRNA,,, | Performed by: NURSE ANESTHETIST, CERTIFIED REGISTERED

## 2021-10-19 PROCEDURE — D9220A PRA ANESTHESIA: Mod: CRNA,,, | Performed by: NURSE ANESTHETIST, CERTIFIED REGISTERED

## 2021-10-19 PROCEDURE — 82962 GLUCOSE BLOOD TEST: CPT | Mod: 91 | Performed by: ORTHOPAEDIC SURGERY

## 2021-10-19 PROCEDURE — 27447 TOTAL KNEE ARTHROPLASTY: CPT | Mod: LT,,, | Performed by: ORTHOPAEDIC SURGERY

## 2021-10-19 PROCEDURE — 63600175 PHARM REV CODE 636 W HCPCS: Performed by: NURSE ANESTHETIST, CERTIFIED REGISTERED

## 2021-10-19 PROCEDURE — 36000711: Performed by: ORTHOPAEDIC SURGERY

## 2021-10-19 PROCEDURE — C9399 UNCLASSIFIED DRUGS OR BIOLOG: HCPCS | Performed by: ANESTHESIOLOGY

## 2021-10-19 PROCEDURE — 27447 PR TOTAL KNEE ARTHROPLASTY: ICD-10-PCS | Mod: LT,,, | Performed by: ORTHOPAEDIC SURGERY

## 2021-10-19 PROCEDURE — 25000003 PHARM REV CODE 250: Performed by: NURSE ANESTHETIST, CERTIFIED REGISTERED

## 2021-10-19 PROCEDURE — D9220A PRA ANESTHESIA: Mod: ANES,,, | Performed by: ANESTHESIOLOGY

## 2021-10-19 PROCEDURE — 37000008 HC ANESTHESIA 1ST 15 MINUTES: Performed by: ORTHOPAEDIC SURGERY

## 2021-10-19 DEVICE — BASEPLATE TIB CEM PRIM SZ 2: Type: IMPLANTABLE DEVICE | Site: KNEE | Status: FUNCTIONAL

## 2021-10-19 DEVICE — COMP FEM POST STAB CEM SZ 3 LF: Type: IMPLANTABLE DEVICE | Site: KNEE | Status: FUNCTIONAL

## 2021-10-19 DEVICE — CEMENT BONE SMPLX HV GENTMYCN: Type: IMPLANTABLE DEVICE | Site: KNEE | Status: FUNCTIONAL

## 2021-10-19 DEVICE — PATELLA TRIATHLON 27X8 SYMTRC: Type: IMPLANTABLE DEVICE | Site: KNEE | Status: FUNCTIONAL

## 2021-10-19 DEVICE — IMPLANTABLE DEVICE: Type: IMPLANTABLE DEVICE | Site: KNEE | Status: FUNCTIONAL

## 2021-10-19 RX ORDER — TRANEXAMIC ACID 100 MG/ML
1000 INJECTION, SOLUTION INTRAVENOUS
Status: DISCONTINUED | OUTPATIENT
Start: 2021-10-19 | End: 2021-10-19 | Stop reason: HOSPADM

## 2021-10-19 RX ORDER — FENTANYL CITRATE 50 UG/ML
INJECTION, SOLUTION INTRAMUSCULAR; INTRAVENOUS
Status: DISCONTINUED | OUTPATIENT
Start: 2021-10-19 | End: 2021-10-19

## 2021-10-19 RX ORDER — ACETAMINOPHEN 500 MG
1000 TABLET ORAL
Status: COMPLETED | OUTPATIENT
Start: 2021-10-19 | End: 2021-10-19

## 2021-10-19 RX ORDER — SODIUM CHLORIDE 9 MG/ML
INJECTION, SOLUTION INTRAVENOUS CONTINUOUS
Status: DISCONTINUED | OUTPATIENT
Start: 2021-10-19 | End: 2021-10-20 | Stop reason: HOSPADM

## 2021-10-19 RX ORDER — BISACODYL 10 MG
10 SUPPOSITORY, RECTAL RECTAL EVERY 12 HOURS PRN
Status: DISCONTINUED | OUTPATIENT
Start: 2021-10-19 | End: 2021-10-20 | Stop reason: HOSPADM

## 2021-10-19 RX ORDER — ROPIVACAINE HYDROCHLORIDE 2 MG/ML
INJECTION, SOLUTION EPIDURAL; INFILTRATION; PERINEURAL CONTINUOUS
Status: DISCONTINUED | OUTPATIENT
Start: 2021-10-19 | End: 2021-10-20 | Stop reason: HOSPADM

## 2021-10-19 RX ORDER — CELECOXIB 200 MG/1
400 CAPSULE ORAL
Status: COMPLETED | OUTPATIENT
Start: 2021-10-19 | End: 2021-10-19

## 2021-10-19 RX ORDER — POLYETHYLENE GLYCOL 3350 17 G/17G
17 POWDER, FOR SOLUTION ORAL DAILY
Status: DISCONTINUED | OUTPATIENT
Start: 2021-10-19 | End: 2021-10-20 | Stop reason: HOSPADM

## 2021-10-19 RX ORDER — MORPHINE SULFATE 2 MG/ML
2 INJECTION, SOLUTION INTRAMUSCULAR; INTRAVENOUS
Status: DISCONTINUED | OUTPATIENT
Start: 2021-10-19 | End: 2021-10-20

## 2021-10-19 RX ORDER — TALC
6 POWDER (GRAM) TOPICAL NIGHTLY PRN
Status: DISCONTINUED | OUTPATIENT
Start: 2021-10-19 | End: 2021-10-19 | Stop reason: HOSPADM

## 2021-10-19 RX ORDER — INSULIN ASPART 100 [IU]/ML
4 INJECTION, SOLUTION INTRAVENOUS; SUBCUTANEOUS
Status: DISCONTINUED | OUTPATIENT
Start: 2021-10-19 | End: 2021-10-20 | Stop reason: HOSPADM

## 2021-10-19 RX ORDER — PROPOFOL 10 MG/ML
VIAL (ML) INTRAVENOUS CONTINUOUS PRN
Status: DISCONTINUED | OUTPATIENT
Start: 2021-10-19 | End: 2021-10-19

## 2021-10-19 RX ORDER — MUPIROCIN 20 MG/G
1 OINTMENT TOPICAL
Status: COMPLETED | OUTPATIENT
Start: 2021-10-19 | End: 2021-10-19

## 2021-10-19 RX ORDER — PROPOFOL 10 MG/ML
VIAL (ML) INTRAVENOUS
Status: DISCONTINUED | OUTPATIENT
Start: 2021-10-19 | End: 2021-10-19

## 2021-10-19 RX ORDER — METHOCARBAMOL 750 MG/1
750 TABLET, FILM COATED ORAL 3 TIMES DAILY
Status: DISCONTINUED | OUTPATIENT
Start: 2021-10-19 | End: 2021-10-20 | Stop reason: HOSPADM

## 2021-10-19 RX ORDER — ONDANSETRON 2 MG/ML
INJECTION INTRAMUSCULAR; INTRAVENOUS
Status: DISCONTINUED | OUTPATIENT
Start: 2021-10-19 | End: 2021-10-19

## 2021-10-19 RX ORDER — EPHEDRINE SULFATE 50 MG/ML
INJECTION, SOLUTION INTRAVENOUS
Status: DISCONTINUED | OUTPATIENT
Start: 2021-10-19 | End: 2021-10-19

## 2021-10-19 RX ORDER — ROPIVACAINE HYDROCHLORIDE 5 MG/ML
INJECTION, SOLUTION EPIDURAL; INFILTRATION; PERINEURAL
Status: COMPLETED | OUTPATIENT
Start: 2021-10-19 | End: 2021-10-19

## 2021-10-19 RX ORDER — FAMOTIDINE 10 MG/ML
INJECTION INTRAVENOUS
Status: DISCONTINUED | OUTPATIENT
Start: 2021-10-19 | End: 2021-10-19

## 2021-10-19 RX ORDER — AMOXICILLIN 250 MG
1 CAPSULE ORAL 2 TIMES DAILY
Status: DISCONTINUED | OUTPATIENT
Start: 2021-10-19 | End: 2021-10-20 | Stop reason: HOSPADM

## 2021-10-19 RX ORDER — PROCHLORPERAZINE EDISYLATE 5 MG/ML
5 INJECTION INTRAMUSCULAR; INTRAVENOUS EVERY 6 HOURS PRN
Status: DISCONTINUED | OUTPATIENT
Start: 2021-10-19 | End: 2021-10-20 | Stop reason: HOSPADM

## 2021-10-19 RX ORDER — MECLIZINE HYDROCHLORIDE 25 MG/1
25 TABLET ORAL 3 TIMES DAILY PRN
Status: DISCONTINUED | OUTPATIENT
Start: 2021-10-19 | End: 2021-10-20 | Stop reason: HOSPADM

## 2021-10-19 RX ORDER — GLUCAGON 1 MG
1 KIT INJECTION
Status: DISCONTINUED | OUTPATIENT
Start: 2021-10-19 | End: 2021-10-20 | Stop reason: HOSPADM

## 2021-10-19 RX ORDER — CARBOXYMETHYLCELLULOSE SODIUM 10 MG/ML
GEL OPHTHALMIC
Status: DISCONTINUED | OUTPATIENT
Start: 2021-10-19 | End: 2021-10-19

## 2021-10-19 RX ORDER — IBUPROFEN 200 MG
24 TABLET ORAL
Status: DISCONTINUED | OUTPATIENT
Start: 2021-10-19 | End: 2021-10-20 | Stop reason: HOSPADM

## 2021-10-19 RX ORDER — FENTANYL CITRATE 50 UG/ML
25 INJECTION, SOLUTION INTRAMUSCULAR; INTRAVENOUS EVERY 5 MIN PRN
Status: DISCONTINUED | OUTPATIENT
Start: 2021-10-19 | End: 2021-10-19 | Stop reason: HOSPADM

## 2021-10-19 RX ORDER — DEXAMETHASONE SODIUM PHOSPHATE 4 MG/ML
INJECTION, SOLUTION INTRA-ARTICULAR; INTRALESIONAL; INTRAMUSCULAR; INTRAVENOUS; SOFT TISSUE
Status: DISCONTINUED | OUTPATIENT
Start: 2021-10-19 | End: 2021-10-19

## 2021-10-19 RX ORDER — PREGABALIN 75 MG/1
75 CAPSULE ORAL NIGHTLY
Status: DISCONTINUED | OUTPATIENT
Start: 2021-10-19 | End: 2021-10-20 | Stop reason: HOSPADM

## 2021-10-19 RX ORDER — SODIUM CHLORIDE 9 MG/ML
INJECTION, SOLUTION INTRAVENOUS
Status: COMPLETED | OUTPATIENT
Start: 2021-10-19 | End: 2021-10-19

## 2021-10-19 RX ORDER — CEFAZOLIN SODIUM 1 G/3ML
2 INJECTION, POWDER, FOR SOLUTION INTRAMUSCULAR; INTRAVENOUS
Status: COMPLETED | OUTPATIENT
Start: 2021-10-19 | End: 2021-10-20

## 2021-10-19 RX ORDER — MIDAZOLAM HYDROCHLORIDE 1 MG/ML
1 INJECTION INTRAMUSCULAR; INTRAVENOUS EVERY 5 MIN PRN
Status: DISCONTINUED | OUTPATIENT
Start: 2021-10-19 | End: 2021-10-19 | Stop reason: HOSPADM

## 2021-10-19 RX ORDER — LIDOCAINE HYDROCHLORIDE 20 MG/ML
INJECTION INTRAVENOUS
Status: DISCONTINUED | OUTPATIENT
Start: 2021-10-19 | End: 2021-10-19

## 2021-10-19 RX ORDER — FLUTICASONE PROPIONATE 50 MCG
2 SPRAY, SUSPENSION (ML) NASAL DAILY
Status: DISCONTINUED | OUTPATIENT
Start: 2021-10-19 | End: 2021-10-20 | Stop reason: HOSPADM

## 2021-10-19 RX ORDER — LIDOCAINE HYDROCHLORIDE 10 MG/ML
1 INJECTION, SOLUTION EPIDURAL; INFILTRATION; INTRACAUDAL; PERINEURAL
Status: DISCONTINUED | OUTPATIENT
Start: 2021-10-19 | End: 2021-10-19 | Stop reason: HOSPADM

## 2021-10-19 RX ORDER — SODIUM CHLORIDE 0.9 % (FLUSH) 0.9 %
10 SYRINGE (ML) INJECTION
Status: DISCONTINUED | OUTPATIENT
Start: 2021-10-19 | End: 2021-10-19 | Stop reason: HOSPADM

## 2021-10-19 RX ORDER — ACETAMINOPHEN 500 MG
1000 TABLET ORAL EVERY 6 HOURS
Status: DISCONTINUED | OUTPATIENT
Start: 2021-10-19 | End: 2021-10-20 | Stop reason: HOSPADM

## 2021-10-19 RX ORDER — PREGABALIN 75 MG/1
75 CAPSULE ORAL
Status: COMPLETED | OUTPATIENT
Start: 2021-10-19 | End: 2021-10-19

## 2021-10-19 RX ORDER — INSULIN ASPART 100 [IU]/ML
1-10 INJECTION, SOLUTION INTRAVENOUS; SUBCUTANEOUS
Status: DISCONTINUED | OUTPATIENT
Start: 2021-10-19 | End: 2021-10-20 | Stop reason: HOSPADM

## 2021-10-19 RX ORDER — PHENYLEPHRINE HYDROCHLORIDE 10 MG/ML
INJECTION INTRAVENOUS
Status: DISCONTINUED | OUTPATIENT
Start: 2021-10-19 | End: 2021-10-19

## 2021-10-19 RX ORDER — AZELASTINE 1 MG/ML
2 SPRAY, METERED NASAL 2 TIMES DAILY
Status: DISCONTINUED | OUTPATIENT
Start: 2021-10-19 | End: 2021-10-20 | Stop reason: HOSPADM

## 2021-10-19 RX ORDER — ASPIRIN 81 MG/1
81 TABLET ORAL 2 TIMES DAILY
Status: DISCONTINUED | OUTPATIENT
Start: 2021-10-19 | End: 2021-10-20 | Stop reason: HOSPADM

## 2021-10-19 RX ORDER — NALOXONE HCL 0.4 MG/ML
0.02 VIAL (ML) INJECTION
Status: DISCONTINUED | OUTPATIENT
Start: 2021-10-19 | End: 2021-10-20 | Stop reason: HOSPADM

## 2021-10-19 RX ORDER — OXYCODONE HYDROCHLORIDE 5 MG/1
5 TABLET ORAL
Status: DISCONTINUED | OUTPATIENT
Start: 2021-10-19 | End: 2021-10-20

## 2021-10-19 RX ORDER — MUPIROCIN 20 MG/G
1 OINTMENT TOPICAL 2 TIMES DAILY
Status: DISCONTINUED | OUTPATIENT
Start: 2021-10-19 | End: 2021-10-20 | Stop reason: HOSPADM

## 2021-10-19 RX ORDER — KETAMINE HCL IN 0.9 % NACL 50 MG/5 ML
SYRINGE (ML) INTRAVENOUS
Status: DISCONTINUED | OUTPATIENT
Start: 2021-10-19 | End: 2021-10-19

## 2021-10-19 RX ORDER — ONDANSETRON 2 MG/ML
4 INJECTION INTRAMUSCULAR; INTRAVENOUS EVERY 8 HOURS PRN
Status: DISCONTINUED | OUTPATIENT
Start: 2021-10-19 | End: 2021-10-20 | Stop reason: HOSPADM

## 2021-10-19 RX ORDER — IBUPROFEN 200 MG
16 TABLET ORAL
Status: DISCONTINUED | OUTPATIENT
Start: 2021-10-19 | End: 2021-10-20 | Stop reason: HOSPADM

## 2021-10-19 RX ORDER — TRANEXAMIC ACID 100 MG/ML
1000 INJECTION, SOLUTION INTRAVENOUS
Status: COMPLETED | OUTPATIENT
Start: 2021-10-19 | End: 2021-10-19

## 2021-10-19 RX ORDER — ROPIVACAINE HYDROCHLORIDE 2 MG/ML
8 INJECTION, SOLUTION EPIDURAL; INFILTRATION; PERINEURAL CONTINUOUS
Status: DISCONTINUED | OUTPATIENT
Start: 2021-10-19 | End: 2021-10-20 | Stop reason: HOSPADM

## 2021-10-19 RX ORDER — OXYCODONE HYDROCHLORIDE 10 MG/1
10 TABLET ORAL
Status: DISCONTINUED | OUTPATIENT
Start: 2021-10-19 | End: 2021-10-20

## 2021-10-19 RX ORDER — CEFAZOLIN SODIUM 1 G/3ML
2 INJECTION, POWDER, FOR SOLUTION INTRAMUSCULAR; INTRAVENOUS
Status: COMPLETED | OUTPATIENT
Start: 2021-10-19 | End: 2021-10-19

## 2021-10-19 RX ORDER — FAMOTIDINE 20 MG/1
20 TABLET, FILM COATED ORAL 2 TIMES DAILY
Status: DISCONTINUED | OUTPATIENT
Start: 2021-10-19 | End: 2021-10-20 | Stop reason: HOSPADM

## 2021-10-19 RX ADMIN — METHOCARBAMOL 750 MG: 750 TABLET ORAL at 09:10

## 2021-10-19 RX ADMIN — SODIUM CHLORIDE: 0.9 INJECTION, SOLUTION INTRAVENOUS at 11:10

## 2021-10-19 RX ADMIN — INSULIN ASPART 2 UNITS: 100 INJECTION, SOLUTION INTRAVENOUS; SUBCUTANEOUS at 09:10

## 2021-10-19 RX ADMIN — ROPIVACAINE HYDROCHLORIDE 10 ML: 5 INJECTION, SOLUTION EPIDURAL; INFILTRATION; PERINEURAL at 09:10

## 2021-10-19 RX ADMIN — FENTANYL CITRATE 25 MCG: 50 INJECTION INTRAMUSCULAR; INTRAVENOUS at 01:10

## 2021-10-19 RX ADMIN — INSULIN ASPART 6 UNITS: 100 INJECTION, SOLUTION INTRAVENOUS; SUBCUTANEOUS at 05:10

## 2021-10-19 RX ADMIN — FENTANYL CITRATE 25 MCG: 50 INJECTION, SOLUTION INTRAMUSCULAR; INTRAVENOUS at 10:10

## 2021-10-19 RX ADMIN — OXYCODONE 5 MG: 5 TABLET ORAL at 12:10

## 2021-10-19 RX ADMIN — SODIUM CHLORIDE: 0.9 INJECTION, SOLUTION INTRAVENOUS at 09:10

## 2021-10-19 RX ADMIN — CELECOXIB 400 MG: 200 CAPSULE ORAL at 09:10

## 2021-10-19 RX ADMIN — PREGABALIN 75 MG: 75 CAPSULE ORAL at 09:10

## 2021-10-19 RX ADMIN — CARBOXYMETHYLCELLULOSE SODIUM 4 DROP: 10 GEL OPHTHALMIC at 10:10

## 2021-10-19 RX ADMIN — TRANEXAMIC ACID 1000 MG: 100 INJECTION, SOLUTION INTRAVENOUS at 11:10

## 2021-10-19 RX ADMIN — TRANEXAMIC ACID 1000 MG: 100 INJECTION, SOLUTION INTRAVENOUS at 10:10

## 2021-10-19 RX ADMIN — ASPIRIN 81 MG: 81 TABLET, COATED ORAL at 09:10

## 2021-10-19 RX ADMIN — OXYCODONE 5 MG: 5 TABLET ORAL at 10:10

## 2021-10-19 RX ADMIN — VANCOMYCIN HYDROCHLORIDE 1500 MG: 1.5 INJECTION, POWDER, LYOPHILIZED, FOR SOLUTION INTRAVENOUS at 09:10

## 2021-10-19 RX ADMIN — Medication: at 12:10

## 2021-10-19 RX ADMIN — FAMOTIDINE 20 MG: 10 INJECTION, SOLUTION INTRAVENOUS at 09:10

## 2021-10-19 RX ADMIN — OXYCODONE 5 MG: 5 TABLET ORAL at 06:10

## 2021-10-19 RX ADMIN — PHENYLEPHRINE HYDROCHLORIDE 100 MCG: 10 INJECTION INTRAVENOUS at 11:10

## 2021-10-19 RX ADMIN — Medication 10 MG: at 10:10

## 2021-10-19 RX ADMIN — ONDANSETRON 4 MG: 2 INJECTION, SOLUTION INTRAMUSCULAR; INTRAVENOUS at 11:10

## 2021-10-19 RX ADMIN — DEXAMETHASONE SODIUM PHOSPHATE 4 MG: 4 INJECTION, SOLUTION INTRAMUSCULAR; INTRAVENOUS at 10:10

## 2021-10-19 RX ADMIN — METHOCARBAMOL 750 MG: 750 TABLET ORAL at 02:10

## 2021-10-19 RX ADMIN — ACETAMINOPHEN 1000 MG: 500 TABLET ORAL at 02:10

## 2021-10-19 RX ADMIN — AZELASTINE HYDROCHLORIDE 274 MCG: 137 SPRAY, METERED NASAL at 09:10

## 2021-10-19 RX ADMIN — DOCUSATE SODIUM 50MG AND SENNOSIDES 8.6MG 1 TABLET: 8.6; 5 TABLET, FILM COATED ORAL at 12:10

## 2021-10-19 RX ADMIN — EPHEDRINE SULFATE 10 MG: 50 INJECTION INTRAVENOUS at 11:10

## 2021-10-19 RX ADMIN — ACETAMINOPHEN 1000 MG: 500 TABLET ORAL at 09:10

## 2021-10-19 RX ADMIN — FAMOTIDINE 20 MG: 20 TABLET, FILM COATED ORAL at 09:10

## 2021-10-19 RX ADMIN — MIDAZOLAM 2 MG: 1 INJECTION INTRAMUSCULAR; INTRAVENOUS at 09:10

## 2021-10-19 RX ADMIN — CEFAZOLIN 2 G: 330 INJECTION, POWDER, FOR SOLUTION INTRAMUSCULAR; INTRAVENOUS at 06:10

## 2021-10-19 RX ADMIN — FENTANYL CITRATE 50 MCG: 50 INJECTION INTRAMUSCULAR; INTRAVENOUS at 09:10

## 2021-10-19 RX ADMIN — MUPIROCIN 1 G: 20 OINTMENT TOPICAL at 09:10

## 2021-10-19 RX ADMIN — PHENYLEPHRINE HYDROCHLORIDE 100 MCG: 10 INJECTION INTRAVENOUS at 10:10

## 2021-10-19 RX ADMIN — INSULIN ASPART 4 UNITS: 100 INJECTION, SOLUTION INTRAVENOUS; SUBCUTANEOUS at 05:10

## 2021-10-19 RX ADMIN — LIDOCAINE HYDROCHLORIDE 60 MG: 20 INJECTION, SOLUTION INTRAVENOUS at 10:10

## 2021-10-19 RX ADMIN — DOCUSATE SODIUM 50MG AND SENNOSIDES 8.6MG 1 TABLET: 8.6; 5 TABLET, FILM COATED ORAL at 09:10

## 2021-10-19 RX ADMIN — PROPOFOL 100 MCG/KG/MIN: 10 INJECTION, EMULSION INTRAVENOUS at 10:10

## 2021-10-19 RX ADMIN — PROCHLORPERAZINE EDISYLATE 5 MG: 5 INJECTION INTRAMUSCULAR; INTRAVENOUS at 03:10

## 2021-10-19 RX ADMIN — INSULIN DETEMIR 28 UNITS: 100 INJECTION, SOLUTION SUBCUTANEOUS at 09:10

## 2021-10-19 RX ADMIN — PROPOFOL 20 MG: 10 INJECTION, EMULSION INTRAVENOUS at 10:10

## 2021-10-19 RX ADMIN — MEPIVACAINE HYDROCHLORIDE 3.5 ML: 15 INJECTION, SOLUTION EPIDURAL; INFILTRATION at 09:10

## 2021-10-19 RX ADMIN — CEFAZOLIN 2 G: 330 INJECTION, POWDER, FOR SOLUTION INTRAMUSCULAR; INTRAVENOUS at 10:10

## 2021-10-20 ENCOUNTER — PATIENT MESSAGE (OUTPATIENT)
Dept: ORTHOPEDICS | Facility: CLINIC | Age: 66
End: 2021-10-20
Payer: MEDICARE

## 2021-10-20 ENCOUNTER — TELEPHONE (OUTPATIENT)
Dept: ORTHOPEDICS | Facility: CLINIC | Age: 66
End: 2021-10-20

## 2021-10-20 VITALS
HEART RATE: 66 BPM | TEMPERATURE: 97 F | HEIGHT: 65 IN | WEIGHT: 178 LBS | RESPIRATION RATE: 17 BRPM | SYSTOLIC BLOOD PRESSURE: 126 MMHG | OXYGEN SATURATION: 94 % | BODY MASS INDEX: 29.66 KG/M2 | DIASTOLIC BLOOD PRESSURE: 55 MMHG

## 2021-10-20 LAB
POCT GLUCOSE: 194 MG/DL (ref 70–110)
POCT GLUCOSE: 201 MG/DL (ref 70–110)
POCT GLUCOSE: 209 MG/DL (ref 70–110)
POCT GLUCOSE: 261 MG/DL (ref 70–110)

## 2021-10-20 PROCEDURE — 25000003 PHARM REV CODE 250: Performed by: PHYSICIAN ASSISTANT

## 2021-10-20 PROCEDURE — 97116 GAIT TRAINING THERAPY: CPT

## 2021-10-20 PROCEDURE — 94761 N-INVAS EAR/PLS OXIMETRY MLT: CPT

## 2021-10-20 PROCEDURE — 25000003 PHARM REV CODE 250: Performed by: STUDENT IN AN ORGANIZED HEALTH CARE EDUCATION/TRAINING PROGRAM

## 2021-10-20 PROCEDURE — 97535 SELF CARE MNGMENT TRAINING: CPT

## 2021-10-20 PROCEDURE — 97110 THERAPEUTIC EXERCISES: CPT

## 2021-10-20 PROCEDURE — 99900035 HC TECH TIME PER 15 MIN (STAT)

## 2021-10-20 PROCEDURE — 99213 PR OFFICE/OUTPT VISIT, EST, LEVL III, 20-29 MIN: ICD-10-PCS | Mod: ,,, | Performed by: ANESTHESIOLOGY

## 2021-10-20 PROCEDURE — 25000003 PHARM REV CODE 250: Performed by: ANESTHESIOLOGY

## 2021-10-20 PROCEDURE — 63600175 PHARM REV CODE 636 W HCPCS: Performed by: PHYSICIAN ASSISTANT

## 2021-10-20 PROCEDURE — 99213 OFFICE O/P EST LOW 20 MIN: CPT | Mod: ,,, | Performed by: ANESTHESIOLOGY

## 2021-10-20 PROCEDURE — 94660 CPAP INITIATION&MGMT: CPT

## 2021-10-20 RX ORDER — OXYCODONE HYDROCHLORIDE 5 MG/1
5 TABLET ORAL
Status: DISCONTINUED | OUTPATIENT
Start: 2021-10-20 | End: 2021-10-20 | Stop reason: HOSPADM

## 2021-10-20 RX ORDER — DIPHENHYDRAMINE HCL 25 MG
25 CAPSULE ORAL EVERY 6 HOURS PRN
Status: DISCONTINUED | OUTPATIENT
Start: 2021-10-20 | End: 2021-10-20 | Stop reason: HOSPADM

## 2021-10-20 RX ORDER — CELECOXIB 200 MG/1
200 CAPSULE ORAL DAILY
Status: DISCONTINUED | OUTPATIENT
Start: 2021-10-20 | End: 2021-10-20 | Stop reason: HOSPADM

## 2021-10-20 RX ADMIN — CEFAZOLIN 2 G: 330 INJECTION, POWDER, FOR SOLUTION INTRAMUSCULAR; INTRAVENOUS at 02:10

## 2021-10-20 RX ADMIN — METHOCARBAMOL 750 MG: 750 TABLET ORAL at 08:10

## 2021-10-20 RX ADMIN — POLYETHYLENE GLYCOL 3350 17 G: 17 POWDER, FOR SOLUTION ORAL at 08:10

## 2021-10-20 RX ADMIN — MUPIROCIN 1 G: 20 OINTMENT TOPICAL at 08:10

## 2021-10-20 RX ADMIN — ACETAMINOPHEN 1000 MG: 500 TABLET ORAL at 03:10

## 2021-10-20 RX ADMIN — CELECOXIB 200 MG: 200 CAPSULE ORAL at 08:10

## 2021-10-20 RX ADMIN — DIPHENHYDRAMINE HYDROCHLORIDE 25 MG: 25 CAPSULE ORAL at 03:10

## 2021-10-20 RX ADMIN — LOSARTAN POTASSIUM 12.5 MG: 25 TABLET, FILM COATED ORAL at 08:10

## 2021-10-20 RX ADMIN — ACETAMINOPHEN 1000 MG: 500 TABLET ORAL at 08:10

## 2021-10-20 RX ADMIN — DOCUSATE SODIUM 50MG AND SENNOSIDES 8.6MG 1 TABLET: 8.6; 5 TABLET, FILM COATED ORAL at 08:10

## 2021-10-20 RX ADMIN — FAMOTIDINE 20 MG: 20 TABLET, FILM COATED ORAL at 08:10

## 2021-10-20 RX ADMIN — INSULIN ASPART 2 UNITS: 100 INJECTION, SOLUTION INTRAVENOUS; SUBCUTANEOUS at 07:10

## 2021-10-20 RX ADMIN — SODIUM CHLORIDE: 0.9 INJECTION, SOLUTION INTRAVENOUS at 12:10

## 2021-10-20 RX ADMIN — ASPIRIN 81 MG: 81 TABLET, COATED ORAL at 08:10

## 2021-10-20 RX ADMIN — INSULIN ASPART 4 UNITS: 100 INJECTION, SOLUTION INTRAVENOUS; SUBCUTANEOUS at 07:10

## 2021-10-21 ENCOUNTER — CLINICAL SUPPORT (OUTPATIENT)
Dept: ORTHOPEDICS | Facility: CLINIC | Age: 66
End: 2021-10-21
Payer: MEDICARE

## 2021-10-21 ENCOUNTER — CLINICAL SUPPORT (OUTPATIENT)
Dept: REHABILITATION | Facility: HOSPITAL | Age: 66
End: 2021-10-21
Attending: ORTHOPAEDIC SURGERY
Payer: MEDICARE

## 2021-10-21 DIAGNOSIS — Z96.652 AFTERCARE FOLLOWING LEFT KNEE JOINT REPLACEMENT SURGERY: ICD-10-CM

## 2021-10-21 DIAGNOSIS — Z47.1 AFTERCARE FOLLOWING LEFT KNEE JOINT REPLACEMENT SURGERY: ICD-10-CM

## 2021-10-21 DIAGNOSIS — Z96.659 STATUS POST KNEE REPLACEMENT, UNSPECIFIED LATERALITY: Primary | ICD-10-CM

## 2021-10-21 PROCEDURE — 97110 THERAPEUTIC EXERCISES: CPT

## 2021-10-21 PROCEDURE — 99499 NO LOS: ICD-10-PCS | Mod: 95,,, | Performed by: ORTHOPAEDIC SURGERY

## 2021-10-21 PROCEDURE — 97010 HOT OR COLD PACKS THERAPY: CPT

## 2021-10-21 PROCEDURE — 99499 UNLISTED E&M SERVICE: CPT | Mod: 95,,, | Performed by: ORTHOPAEDIC SURGERY

## 2021-10-21 PROCEDURE — 97161 PT EVAL LOW COMPLEX 20 MIN: CPT

## 2021-10-22 ENCOUNTER — CLINICAL SUPPORT (OUTPATIENT)
Dept: REHABILITATION | Facility: HOSPITAL | Age: 66
End: 2021-10-22
Attending: ORTHOPAEDIC SURGERY
Payer: MEDICARE

## 2021-10-22 DIAGNOSIS — Z96.652 AFTERCARE FOLLOWING LEFT KNEE JOINT REPLACEMENT SURGERY: ICD-10-CM

## 2021-10-22 DIAGNOSIS — Z47.1 AFTERCARE FOLLOWING LEFT KNEE JOINT REPLACEMENT SURGERY: ICD-10-CM

## 2021-10-22 DIAGNOSIS — Z96.659 STATUS POST KNEE REPLACEMENT, UNSPECIFIED LATERALITY: Primary | ICD-10-CM

## 2021-10-22 PROCEDURE — 97110 THERAPEUTIC EXERCISES: CPT

## 2021-10-22 RX ORDER — PREGABALIN 75 MG/1
75 CAPSULE ORAL 2 TIMES DAILY
Qty: 28 CAPSULE | Refills: 0 | Status: SHIPPED | OUTPATIENT
Start: 2021-10-22 | End: 2022-04-12

## 2021-10-26 ENCOUNTER — DOCUMENTATION ONLY (OUTPATIENT)
Dept: REHABILITATION | Facility: HOSPITAL | Age: 66
End: 2021-10-26
Payer: MEDICARE

## 2021-10-26 ENCOUNTER — TELEPHONE (OUTPATIENT)
Dept: REHABILITATION | Facility: HOSPITAL | Age: 66
End: 2021-10-26
Payer: MEDICARE

## 2021-10-27 ENCOUNTER — CLINICAL SUPPORT (OUTPATIENT)
Dept: REHABILITATION | Facility: HOSPITAL | Age: 66
End: 2021-10-27
Attending: ORTHOPAEDIC SURGERY
Payer: MEDICARE

## 2021-10-27 DIAGNOSIS — Z47.1 AFTERCARE FOLLOWING LEFT KNEE JOINT REPLACEMENT SURGERY: ICD-10-CM

## 2021-10-27 DIAGNOSIS — M17.12 PRIMARY OSTEOARTHRITIS OF LEFT KNEE: Primary | ICD-10-CM

## 2021-10-27 DIAGNOSIS — Z96.652 AFTERCARE FOLLOWING LEFT KNEE JOINT REPLACEMENT SURGERY: ICD-10-CM

## 2021-10-27 PROBLEM — R26.2 DIFFICULTY WALKING: Status: RESOLVED | Noted: 2021-10-01 | Resolved: 2021-10-27

## 2021-10-27 PROBLEM — G89.29 CHRONIC PAIN OF LEFT KNEE: Status: RESOLVED | Noted: 2018-12-06 | Resolved: 2021-10-27

## 2021-10-27 PROBLEM — M25.562 CHRONIC PAIN OF LEFT KNEE: Status: RESOLVED | Noted: 2018-12-06 | Resolved: 2021-10-27

## 2021-10-27 PROCEDURE — 97110 THERAPEUTIC EXERCISES: CPT

## 2021-10-29 ENCOUNTER — CLINICAL SUPPORT (OUTPATIENT)
Dept: REHABILITATION | Facility: HOSPITAL | Age: 66
End: 2021-10-29
Attending: ORTHOPAEDIC SURGERY
Payer: MEDICARE

## 2021-10-29 DIAGNOSIS — Z47.1 AFTERCARE FOLLOWING LEFT KNEE JOINT REPLACEMENT SURGERY: ICD-10-CM

## 2021-10-29 DIAGNOSIS — Z96.652 AFTERCARE FOLLOWING LEFT KNEE JOINT REPLACEMENT SURGERY: ICD-10-CM

## 2021-10-29 PROCEDURE — 97110 THERAPEUTIC EXERCISES: CPT | Mod: CQ

## 2021-11-01 ENCOUNTER — OFFICE VISIT (OUTPATIENT)
Dept: ORTHOPEDICS | Facility: CLINIC | Age: 66
End: 2021-11-01
Payer: MEDICARE

## 2021-11-01 ENCOUNTER — CLINICAL SUPPORT (OUTPATIENT)
Dept: REHABILITATION | Facility: HOSPITAL | Age: 66
End: 2021-11-01
Attending: ORTHOPAEDIC SURGERY
Payer: MEDICARE

## 2021-11-01 VITALS — BODY MASS INDEX: 28.66 KG/M2 | HEIGHT: 65 IN | WEIGHT: 172 LBS

## 2021-11-01 DIAGNOSIS — Z96.652 AFTERCARE FOLLOWING LEFT KNEE JOINT REPLACEMENT SURGERY: ICD-10-CM

## 2021-11-01 DIAGNOSIS — Z96.659 STATUS POST KNEE REPLACEMENT, UNSPECIFIED LATERALITY: Primary | ICD-10-CM

## 2021-11-01 DIAGNOSIS — Z47.1 AFTERCARE FOLLOWING LEFT KNEE JOINT REPLACEMENT SURGERY: ICD-10-CM

## 2021-11-01 PROCEDURE — 1160F PR REVIEW ALL MEDS BY PRESCRIBER/CLIN PHARMACIST DOCUMENTED: ICD-10-PCS | Mod: CPTII,S$GLB,, | Performed by: PHYSICIAN ASSISTANT

## 2021-11-01 PROCEDURE — 3044F PR MOST RECENT HEMOGLOBIN A1C LEVEL <7.0%: ICD-10-PCS | Mod: CPTII,S$GLB,, | Performed by: PHYSICIAN ASSISTANT

## 2021-11-01 PROCEDURE — 3008F BODY MASS INDEX DOCD: CPT | Mod: CPTII,S$GLB,, | Performed by: PHYSICIAN ASSISTANT

## 2021-11-01 PROCEDURE — 1157F PR ADVANCE CARE PLAN OR EQUIV PRESENT IN MEDICAL RECORD: ICD-10-PCS | Mod: CPTII,S$GLB,, | Performed by: PHYSICIAN ASSISTANT

## 2021-11-01 PROCEDURE — 3288F FALL RISK ASSESSMENT DOCD: CPT | Mod: CPTII,S$GLB,, | Performed by: PHYSICIAN ASSISTANT

## 2021-11-01 PROCEDURE — 99999 PR PBB SHADOW E&M-EST. PATIENT-LVL IV: ICD-10-PCS | Mod: PBBFAC,,, | Performed by: PHYSICIAN ASSISTANT

## 2021-11-01 PROCEDURE — 1125F PR PAIN SEVERITY QUANTIFIED, PAIN PRESENT: ICD-10-PCS | Mod: CPTII,S$GLB,, | Performed by: PHYSICIAN ASSISTANT

## 2021-11-01 PROCEDURE — 3066F NEPHROPATHY DOC TX: CPT | Mod: CPTII,S$GLB,, | Performed by: PHYSICIAN ASSISTANT

## 2021-11-01 PROCEDURE — 4010F ACE/ARB THERAPY RXD/TAKEN: CPT | Mod: CPTII,S$GLB,, | Performed by: PHYSICIAN ASSISTANT

## 2021-11-01 PROCEDURE — 1101F PT FALLS ASSESS-DOCD LE1/YR: CPT | Mod: CPTII,S$GLB,, | Performed by: PHYSICIAN ASSISTANT

## 2021-11-01 PROCEDURE — 1159F MED LIST DOCD IN RCRD: CPT | Mod: CPTII,S$GLB,, | Performed by: PHYSICIAN ASSISTANT

## 2021-11-01 PROCEDURE — 1101F PR PT FALLS ASSESS DOC 0-1 FALLS W/OUT INJ PAST YR: ICD-10-PCS | Mod: CPTII,S$GLB,, | Performed by: PHYSICIAN ASSISTANT

## 2021-11-01 PROCEDURE — 3288F PR FALLS RISK ASSESSMENT DOCUMENTED: ICD-10-PCS | Mod: CPTII,S$GLB,, | Performed by: PHYSICIAN ASSISTANT

## 2021-11-01 PROCEDURE — 1157F ADVNC CARE PLAN IN RCRD: CPT | Mod: CPTII,S$GLB,, | Performed by: PHYSICIAN ASSISTANT

## 2021-11-01 PROCEDURE — 3044F HG A1C LEVEL LT 7.0%: CPT | Mod: CPTII,S$GLB,, | Performed by: PHYSICIAN ASSISTANT

## 2021-11-01 PROCEDURE — 99024 PR POST-OP FOLLOW-UP VISIT: ICD-10-PCS | Mod: S$GLB,,, | Performed by: PHYSICIAN ASSISTANT

## 2021-11-01 PROCEDURE — 3066F PR DOCUMENTATION OF TREATMENT FOR NEPHROPATHY: ICD-10-PCS | Mod: CPTII,S$GLB,, | Performed by: PHYSICIAN ASSISTANT

## 2021-11-01 PROCEDURE — 99024 POSTOP FOLLOW-UP VISIT: CPT | Mod: S$GLB,,, | Performed by: PHYSICIAN ASSISTANT

## 2021-11-01 PROCEDURE — 3061F NEG MICROALBUMINURIA REV: CPT | Mod: CPTII,S$GLB,, | Performed by: PHYSICIAN ASSISTANT

## 2021-11-01 PROCEDURE — 4010F PR ACE/ARB THEARPY RXD/TAKEN: ICD-10-PCS | Mod: CPTII,S$GLB,, | Performed by: PHYSICIAN ASSISTANT

## 2021-11-01 PROCEDURE — 1159F PR MEDICATION LIST DOCUMENTED IN MEDICAL RECORD: ICD-10-PCS | Mod: CPTII,S$GLB,, | Performed by: PHYSICIAN ASSISTANT

## 2021-11-01 PROCEDURE — 1160F RVW MEDS BY RX/DR IN RCRD: CPT | Mod: CPTII,S$GLB,, | Performed by: PHYSICIAN ASSISTANT

## 2021-11-01 PROCEDURE — 97110 THERAPEUTIC EXERCISES: CPT | Mod: CQ

## 2021-11-01 PROCEDURE — 99999 PR PBB SHADOW E&M-EST. PATIENT-LVL IV: CPT | Mod: PBBFAC,,, | Performed by: PHYSICIAN ASSISTANT

## 2021-11-01 PROCEDURE — 3008F PR BODY MASS INDEX (BMI) DOCUMENTED: ICD-10-PCS | Mod: CPTII,S$GLB,, | Performed by: PHYSICIAN ASSISTANT

## 2021-11-01 PROCEDURE — 1125F AMNT PAIN NOTED PAIN PRSNT: CPT | Mod: CPTII,S$GLB,, | Performed by: PHYSICIAN ASSISTANT

## 2021-11-01 PROCEDURE — 3061F PR NEG MICROALBUMINURIA RESULT DOCUMENTED/REVIEW: ICD-10-PCS | Mod: CPTII,S$GLB,, | Performed by: PHYSICIAN ASSISTANT

## 2021-11-03 ENCOUNTER — CLINICAL SUPPORT (OUTPATIENT)
Dept: REHABILITATION | Facility: HOSPITAL | Age: 66
End: 2021-11-03
Attending: ORTHOPAEDIC SURGERY
Payer: MEDICARE

## 2021-11-03 DIAGNOSIS — Z47.1 AFTERCARE FOLLOWING LEFT KNEE JOINT REPLACEMENT SURGERY: ICD-10-CM

## 2021-11-03 DIAGNOSIS — Z96.652 AFTERCARE FOLLOWING LEFT KNEE JOINT REPLACEMENT SURGERY: ICD-10-CM

## 2021-11-03 PROCEDURE — 97110 THERAPEUTIC EXERCISES: CPT

## 2021-11-05 ENCOUNTER — CLINICAL SUPPORT (OUTPATIENT)
Dept: REHABILITATION | Facility: HOSPITAL | Age: 66
End: 2021-11-05
Attending: ORTHOPAEDIC SURGERY
Payer: MEDICARE

## 2021-11-05 DIAGNOSIS — Z47.1 AFTERCARE FOLLOWING LEFT KNEE JOINT REPLACEMENT SURGERY: ICD-10-CM

## 2021-11-05 DIAGNOSIS — Z96.652 AFTERCARE FOLLOWING LEFT KNEE JOINT REPLACEMENT SURGERY: ICD-10-CM

## 2021-11-05 PROCEDURE — 97110 THERAPEUTIC EXERCISES: CPT | Mod: CQ

## 2021-11-05 PROCEDURE — 97140 MANUAL THERAPY 1/> REGIONS: CPT | Mod: CQ

## 2021-11-07 ENCOUNTER — PATIENT MESSAGE (OUTPATIENT)
Dept: ORTHOPEDICS | Facility: CLINIC | Age: 66
End: 2021-11-07
Payer: MEDICARE

## 2021-11-08 RX ORDER — CYCLOBENZAPRINE HCL 5 MG
TABLET ORAL
Qty: 90 TABLET | Refills: 0 | Status: SHIPPED | OUTPATIENT
Start: 2021-11-08 | End: 2022-08-24 | Stop reason: ALTCHOICE

## 2021-11-10 ENCOUNTER — CLINICAL SUPPORT (OUTPATIENT)
Dept: REHABILITATION | Facility: HOSPITAL | Age: 66
End: 2021-11-10
Attending: ORTHOPAEDIC SURGERY
Payer: MEDICARE

## 2021-11-10 DIAGNOSIS — Z96.652 AFTERCARE FOLLOWING LEFT KNEE JOINT REPLACEMENT SURGERY: ICD-10-CM

## 2021-11-10 DIAGNOSIS — Z47.1 AFTERCARE FOLLOWING LEFT KNEE JOINT REPLACEMENT SURGERY: ICD-10-CM

## 2021-11-10 PROCEDURE — 97110 THERAPEUTIC EXERCISES: CPT

## 2021-11-12 ENCOUNTER — CLINICAL SUPPORT (OUTPATIENT)
Dept: REHABILITATION | Facility: HOSPITAL | Age: 66
End: 2021-11-12
Attending: ORTHOPAEDIC SURGERY
Payer: MEDICARE

## 2021-11-12 DIAGNOSIS — Z96.652 AFTERCARE FOLLOWING LEFT KNEE JOINT REPLACEMENT SURGERY: ICD-10-CM

## 2021-11-12 DIAGNOSIS — Z47.1 AFTERCARE FOLLOWING LEFT KNEE JOINT REPLACEMENT SURGERY: ICD-10-CM

## 2021-11-12 PROCEDURE — 97110 THERAPEUTIC EXERCISES: CPT | Mod: CQ

## 2021-11-15 ENCOUNTER — CLINICAL SUPPORT (OUTPATIENT)
Dept: REHABILITATION | Facility: HOSPITAL | Age: 66
End: 2021-11-15
Attending: ORTHOPAEDIC SURGERY
Payer: MEDICARE

## 2021-11-15 DIAGNOSIS — Z96.652 AFTERCARE FOLLOWING LEFT KNEE JOINT REPLACEMENT SURGERY: ICD-10-CM

## 2021-11-15 DIAGNOSIS — Z47.1 AFTERCARE FOLLOWING LEFT KNEE JOINT REPLACEMENT SURGERY: ICD-10-CM

## 2021-11-15 PROCEDURE — 97110 THERAPEUTIC EXERCISES: CPT

## 2021-11-17 ENCOUNTER — CLINICAL SUPPORT (OUTPATIENT)
Dept: REHABILITATION | Facility: HOSPITAL | Age: 66
End: 2021-11-17
Attending: ORTHOPAEDIC SURGERY
Payer: MEDICARE

## 2021-11-17 DIAGNOSIS — Z47.1 AFTERCARE FOLLOWING LEFT KNEE JOINT REPLACEMENT SURGERY: ICD-10-CM

## 2021-11-17 DIAGNOSIS — Z96.652 AFTERCARE FOLLOWING LEFT KNEE JOINT REPLACEMENT SURGERY: ICD-10-CM

## 2021-11-17 PROCEDURE — 97110 THERAPEUTIC EXERCISES: CPT

## 2021-11-19 ENCOUNTER — CLINICAL SUPPORT (OUTPATIENT)
Dept: REHABILITATION | Facility: HOSPITAL | Age: 66
End: 2021-11-19
Attending: ORTHOPAEDIC SURGERY
Payer: MEDICARE

## 2021-11-19 DIAGNOSIS — Z47.1 AFTERCARE FOLLOWING LEFT KNEE JOINT REPLACEMENT SURGERY: ICD-10-CM

## 2021-11-19 DIAGNOSIS — Z96.652 AFTERCARE FOLLOWING LEFT KNEE JOINT REPLACEMENT SURGERY: ICD-10-CM

## 2021-11-19 PROCEDURE — 97110 THERAPEUTIC EXERCISES: CPT | Mod: CQ

## 2021-11-22 ENCOUNTER — CLINICAL SUPPORT (OUTPATIENT)
Dept: REHABILITATION | Facility: HOSPITAL | Age: 66
End: 2021-11-22
Attending: ORTHOPAEDIC SURGERY
Payer: MEDICARE

## 2021-11-22 DIAGNOSIS — Z47.1 AFTERCARE FOLLOWING LEFT KNEE JOINT REPLACEMENT SURGERY: ICD-10-CM

## 2021-11-22 DIAGNOSIS — Z96.652 AFTERCARE FOLLOWING LEFT KNEE JOINT REPLACEMENT SURGERY: ICD-10-CM

## 2021-11-22 PROCEDURE — 97110 THERAPEUTIC EXERCISES: CPT

## 2021-11-24 ENCOUNTER — HOSPITAL ENCOUNTER (OUTPATIENT)
Dept: RADIOLOGY | Facility: HOSPITAL | Age: 66
Discharge: HOME | End: 2021-11-24
Attending: INTERNAL MEDICINE
Payer: MEDICARE

## 2021-11-24 ENCOUNTER — OFFICE VISIT (OUTPATIENT)
Dept: INTERNAL MEDICINE | Facility: CLINIC | Age: 66
End: 2021-11-24
Payer: MEDICARE

## 2021-11-24 ENCOUNTER — DOCUMENTATION ONLY (OUTPATIENT)
Dept: REHABILITATION | Facility: HOSPITAL | Age: 66
End: 2021-11-24
Payer: MEDICARE

## 2021-11-24 ENCOUNTER — TELEPHONE (OUTPATIENT)
Dept: INTERNAL MEDICINE | Facility: CLINIC | Age: 66
End: 2021-11-24

## 2021-11-24 VITALS
HEART RATE: 70 BPM | HEIGHT: 65 IN | WEIGHT: 170.63 LBS | RESPIRATION RATE: 18 BRPM | OXYGEN SATURATION: 98 % | DIASTOLIC BLOOD PRESSURE: 66 MMHG | TEMPERATURE: 98 F | BODY MASS INDEX: 28.43 KG/M2 | SYSTOLIC BLOOD PRESSURE: 130 MMHG

## 2021-11-24 DIAGNOSIS — R10.2 SUPRAPUBIC PAIN: ICD-10-CM

## 2021-11-24 DIAGNOSIS — R14.0 ABDOMINAL BLOATING: ICD-10-CM

## 2021-11-24 DIAGNOSIS — R10.84 ABDOMINAL PAIN, GENERALIZED: ICD-10-CM

## 2021-11-24 DIAGNOSIS — R35.0 FREQUENT URINATION: ICD-10-CM

## 2021-11-24 DIAGNOSIS — R10.13 EPIGASTRIC PAIN: Primary | ICD-10-CM

## 2021-11-24 DIAGNOSIS — R11.0 NAUSEA: ICD-10-CM

## 2021-11-24 PROCEDURE — 1157F PR ADVANCE CARE PLAN OR EQUIV PRESENT IN MEDICAL RECORD: ICD-10-PCS | Mod: CPTII,S$GLB,, | Performed by: INTERNAL MEDICINE

## 2021-11-24 PROCEDURE — 3066F NEPHROPATHY DOC TX: CPT | Mod: CPTII,S$GLB,, | Performed by: INTERNAL MEDICINE

## 2021-11-24 PROCEDURE — 3061F NEG MICROALBUMINURIA REV: CPT | Mod: CPTII,S$GLB,, | Performed by: INTERNAL MEDICINE

## 2021-11-24 PROCEDURE — 4010F ACE/ARB THERAPY RXD/TAKEN: CPT | Mod: CPTII,S$GLB,, | Performed by: INTERNAL MEDICINE

## 2021-11-24 PROCEDURE — 74019 XR ABDOMEN FLAT AND ERECT: ICD-10-PCS | Mod: 26,,, | Performed by: RADIOLOGY

## 2021-11-24 PROCEDURE — 99214 OFFICE O/P EST MOD 30 MIN: CPT | Mod: S$GLB,,, | Performed by: INTERNAL MEDICINE

## 2021-11-24 PROCEDURE — 74019 RADEX ABDOMEN 2 VIEWS: CPT | Mod: TC,PO

## 2021-11-24 PROCEDURE — 3066F PR DOCUMENTATION OF TREATMENT FOR NEPHROPATHY: ICD-10-PCS | Mod: CPTII,S$GLB,, | Performed by: INTERNAL MEDICINE

## 2021-11-24 PROCEDURE — 1157F ADVNC CARE PLAN IN RCRD: CPT | Mod: CPTII,S$GLB,, | Performed by: INTERNAL MEDICINE

## 2021-11-24 PROCEDURE — 99214 PR OFFICE/OUTPT VISIT, EST, LEVL IV, 30-39 MIN: ICD-10-PCS | Mod: S$GLB,,, | Performed by: INTERNAL MEDICINE

## 2021-11-24 PROCEDURE — 3061F PR NEG MICROALBUMINURIA RESULT DOCUMENTED/REVIEW: ICD-10-PCS | Mod: CPTII,S$GLB,, | Performed by: INTERNAL MEDICINE

## 2021-11-24 PROCEDURE — 99999 PR PBB SHADOW E&M-EST. PATIENT-LVL V: ICD-10-PCS | Mod: PBBFAC,,, | Performed by: INTERNAL MEDICINE

## 2021-11-24 PROCEDURE — 99999 PR PBB SHADOW E&M-EST. PATIENT-LVL V: CPT | Mod: PBBFAC,,, | Performed by: INTERNAL MEDICINE

## 2021-11-24 PROCEDURE — 4010F PR ACE/ARB THEARPY RXD/TAKEN: ICD-10-PCS | Mod: CPTII,S$GLB,, | Performed by: INTERNAL MEDICINE

## 2021-11-24 PROCEDURE — 74019 RADEX ABDOMEN 2 VIEWS: CPT | Mod: 26,,, | Performed by: RADIOLOGY

## 2021-11-24 NOTE — TELEPHONE ENCOUNTER
Please notify her that I added on an ultrasound of the abdomen. Were able to schedule it for Friday at [facility] @4:15pm. Please make sure that it is ok.

## 2021-11-26 ENCOUNTER — PATIENT MESSAGE (OUTPATIENT)
Dept: INTERNAL MEDICINE | Facility: CLINIC | Age: 66
End: 2021-11-26
Payer: MEDICARE

## 2021-11-26 ENCOUNTER — PES CALL (OUTPATIENT)
Dept: ADMINISTRATIVE | Facility: CLINIC | Age: 66
End: 2021-11-26
Payer: MEDICARE

## 2021-11-26 DIAGNOSIS — Z96.659 STATUS POST KNEE REPLACEMENT, UNSPECIFIED LATERALITY: Primary | ICD-10-CM

## 2021-11-29 ENCOUNTER — OFFICE VISIT (OUTPATIENT)
Dept: ORTHOPEDICS | Facility: CLINIC | Age: 66
End: 2021-11-29
Payer: MEDICARE

## 2021-11-29 ENCOUNTER — HOSPITAL ENCOUNTER (OUTPATIENT)
Dept: RADIOLOGY | Facility: HOSPITAL | Age: 66
Discharge: HOME | End: 2021-11-29
Attending: PHYSICIAN ASSISTANT
Payer: MEDICARE

## 2021-11-29 ENCOUNTER — CLINICAL SUPPORT (OUTPATIENT)
Dept: REHABILITATION | Facility: HOSPITAL | Age: 66
End: 2021-11-29
Attending: ORTHOPAEDIC SURGERY
Payer: MEDICARE

## 2021-11-29 ENCOUNTER — PATIENT MESSAGE (OUTPATIENT)
Dept: INTERNAL MEDICINE | Facility: CLINIC | Age: 66
End: 2021-11-29
Payer: MEDICARE

## 2021-11-29 ENCOUNTER — TELEPHONE (OUTPATIENT)
Dept: INTERNAL MEDICINE | Facility: CLINIC | Age: 66
End: 2021-11-29
Payer: MEDICARE

## 2021-11-29 VITALS — BODY MASS INDEX: 28.66 KG/M2 | WEIGHT: 172 LBS | HEIGHT: 65 IN

## 2021-11-29 DIAGNOSIS — Z47.1 AFTERCARE FOLLOWING LEFT KNEE JOINT REPLACEMENT SURGERY: ICD-10-CM

## 2021-11-29 DIAGNOSIS — Z96.652 STATUS POST TOTAL LEFT KNEE REPLACEMENT: Primary | ICD-10-CM

## 2021-11-29 DIAGNOSIS — Z96.659 STATUS POST KNEE REPLACEMENT, UNSPECIFIED LATERALITY: ICD-10-CM

## 2021-11-29 DIAGNOSIS — Z96.652 AFTERCARE FOLLOWING LEFT KNEE JOINT REPLACEMENT SURGERY: ICD-10-CM

## 2021-11-29 DIAGNOSIS — M99.08 RIB CAGE DYSFUNCTION: ICD-10-CM

## 2021-11-29 DIAGNOSIS — Z96.659 STATUS POST KNEE REPLACEMENT, UNSPECIFIED LATERALITY: Primary | ICD-10-CM

## 2021-11-29 PROCEDURE — 73560 XR KNEE ORTHO LEFT: ICD-10-PCS | Mod: 26,RT,, | Performed by: RADIOLOGY

## 2021-11-29 PROCEDURE — 4010F ACE/ARB THERAPY RXD/TAKEN: CPT | Mod: CPTII,S$GLB,, | Performed by: PHYSICIAN ASSISTANT

## 2021-11-29 PROCEDURE — 1157F PR ADVANCE CARE PLAN OR EQUIV PRESENT IN MEDICAL RECORD: ICD-10-PCS | Mod: CPTII,S$GLB,, | Performed by: PHYSICIAN ASSISTANT

## 2021-11-29 PROCEDURE — 1157F ADVNC CARE PLAN IN RCRD: CPT | Mod: CPTII,S$GLB,, | Performed by: PHYSICIAN ASSISTANT

## 2021-11-29 PROCEDURE — 73560 X-RAY EXAM OF KNEE 1 OR 2: CPT | Mod: 26,RT,, | Performed by: RADIOLOGY

## 2021-11-29 PROCEDURE — 99024 POSTOP FOLLOW-UP VISIT: CPT | Mod: S$GLB,,, | Performed by: PHYSICIAN ASSISTANT

## 2021-11-29 PROCEDURE — 3066F NEPHROPATHY DOC TX: CPT | Mod: CPTII,S$GLB,, | Performed by: PHYSICIAN ASSISTANT

## 2021-11-29 PROCEDURE — 4010F PR ACE/ARB THEARPY RXD/TAKEN: ICD-10-PCS | Mod: CPTII,S$GLB,, | Performed by: PHYSICIAN ASSISTANT

## 2021-11-29 PROCEDURE — 3061F NEG MICROALBUMINURIA REV: CPT | Mod: CPTII,S$GLB,, | Performed by: PHYSICIAN ASSISTANT

## 2021-11-29 PROCEDURE — 99024 PR POST-OP FOLLOW-UP VISIT: ICD-10-PCS | Mod: S$GLB,,, | Performed by: PHYSICIAN ASSISTANT

## 2021-11-29 PROCEDURE — 3061F PR NEG MICROALBUMINURIA RESULT DOCUMENTED/REVIEW: ICD-10-PCS | Mod: CPTII,S$GLB,, | Performed by: PHYSICIAN ASSISTANT

## 2021-11-29 PROCEDURE — 3066F PR DOCUMENTATION OF TREATMENT FOR NEPHROPATHY: ICD-10-PCS | Mod: CPTII,S$GLB,, | Performed by: PHYSICIAN ASSISTANT

## 2021-11-29 PROCEDURE — 99999 PR PBB SHADOW E&M-EST. PATIENT-LVL III: CPT | Mod: PBBFAC,,, | Performed by: PHYSICIAN ASSISTANT

## 2021-11-29 PROCEDURE — 73560 X-RAY EXAM OF KNEE 1 OR 2: CPT | Mod: 59,TC,RT

## 2021-11-29 PROCEDURE — 73562 XR KNEE ORTHO LEFT: ICD-10-PCS | Mod: 26,LT,, | Performed by: RADIOLOGY

## 2021-11-29 PROCEDURE — 73562 X-RAY EXAM OF KNEE 3: CPT | Mod: 26,LT,, | Performed by: RADIOLOGY

## 2021-11-29 PROCEDURE — 97110 THERAPEUTIC EXERCISES: CPT

## 2021-11-29 PROCEDURE — 99999 PR PBB SHADOW E&M-EST. PATIENT-LVL III: ICD-10-PCS | Mod: PBBFAC,,, | Performed by: PHYSICIAN ASSISTANT

## 2021-11-29 RX ORDER — DICLOFENAC SODIUM 10 MG/G
2 GEL TOPICAL 2 TIMES DAILY PRN
Qty: 100 G | Refills: 0 | Status: SHIPPED | OUTPATIENT
Start: 2021-11-29 | End: 2021-12-15

## 2021-11-30 ENCOUNTER — IMMUNIZATION (OUTPATIENT)
Dept: INTERNAL MEDICINE | Facility: CLINIC | Age: 66
End: 2021-11-30
Payer: MEDICARE

## 2021-11-30 DIAGNOSIS — Z23 NEED FOR VACCINATION: Primary | ICD-10-CM

## 2021-11-30 PROCEDURE — 91303 COVID-19,VECTOR-NR,RS-AD26,PF,0.5 ML DOSE VACCINE (JANSSEN): CPT | Mod: PBBFAC | Performed by: INTERNAL MEDICINE

## 2021-11-30 PROCEDURE — 0034A COVID-19,VECTOR-NR,RS-AD26,PF,0.5 ML DOSE VACCINE (JANSSEN): CPT | Mod: CV19,PBBFAC | Performed by: INTERNAL MEDICINE

## 2021-12-03 ENCOUNTER — CLINICAL SUPPORT (OUTPATIENT)
Dept: REHABILITATION | Facility: HOSPITAL | Age: 66
End: 2021-12-03
Attending: ORTHOPAEDIC SURGERY
Payer: MEDICARE

## 2021-12-03 DIAGNOSIS — Z96.652 AFTERCARE FOLLOWING LEFT KNEE JOINT REPLACEMENT SURGERY: ICD-10-CM

## 2021-12-03 DIAGNOSIS — Z47.1 AFTERCARE FOLLOWING LEFT KNEE JOINT REPLACEMENT SURGERY: ICD-10-CM

## 2021-12-03 PROCEDURE — 97110 THERAPEUTIC EXERCISES: CPT

## 2021-12-04 ENCOUNTER — IMMUNIZATION (OUTPATIENT)
Dept: INTERNAL MEDICINE | Facility: CLINIC | Age: 66
End: 2021-12-04
Payer: MEDICARE

## 2021-12-04 PROCEDURE — G0008 FLU VACCINE - QUADRIVALENT - ADJUVANTED: ICD-10-PCS | Mod: S$GLB,,, | Performed by: FAMILY MEDICINE

## 2021-12-04 PROCEDURE — 90694 FLU VACCINE - QUADRIVALENT - ADJUVANTED: ICD-10-PCS | Mod: S$GLB,,, | Performed by: FAMILY MEDICINE

## 2021-12-04 PROCEDURE — G0008 ADMIN INFLUENZA VIRUS VAC: HCPCS | Mod: S$GLB,,, | Performed by: FAMILY MEDICINE

## 2021-12-04 PROCEDURE — 90694 VACC AIIV4 NO PRSRV 0.5ML IM: CPT | Mod: S$GLB,,, | Performed by: FAMILY MEDICINE

## 2021-12-06 ENCOUNTER — CLINICAL SUPPORT (OUTPATIENT)
Dept: REHABILITATION | Facility: HOSPITAL | Age: 66
End: 2021-12-06
Attending: ORTHOPAEDIC SURGERY
Payer: MEDICARE

## 2021-12-06 DIAGNOSIS — Z47.1 AFTERCARE FOLLOWING LEFT KNEE JOINT REPLACEMENT SURGERY: ICD-10-CM

## 2021-12-06 DIAGNOSIS — Z96.652 AFTERCARE FOLLOWING LEFT KNEE JOINT REPLACEMENT SURGERY: ICD-10-CM

## 2021-12-06 PROCEDURE — 97110 THERAPEUTIC EXERCISES: CPT

## 2021-12-08 ENCOUNTER — DOCUMENTATION ONLY (OUTPATIENT)
Dept: REHABILITATION | Facility: HOSPITAL | Age: 66
End: 2021-12-08
Payer: MEDICARE

## 2021-12-09 ENCOUNTER — LAB VISIT (OUTPATIENT)
Dept: LAB | Facility: HOSPITAL | Age: 66
End: 2021-12-09
Attending: INTERNAL MEDICINE
Payer: MEDICARE

## 2021-12-09 ENCOUNTER — OFFICE VISIT (OUTPATIENT)
Dept: INTERNAL MEDICINE | Facility: CLINIC | Age: 66
End: 2021-12-09
Payer: MEDICARE

## 2021-12-09 VITALS
OXYGEN SATURATION: 97 % | TEMPERATURE: 98 F | WEIGHT: 170.63 LBS | DIASTOLIC BLOOD PRESSURE: 70 MMHG | SYSTOLIC BLOOD PRESSURE: 138 MMHG | BODY MASS INDEX: 28.43 KG/M2 | HEART RATE: 72 BPM | RESPIRATION RATE: 18 BRPM | HEIGHT: 65 IN

## 2021-12-09 DIAGNOSIS — K57.92 ACUTE DIVERTICULITIS: Primary | ICD-10-CM

## 2021-12-09 DIAGNOSIS — H81.10 BENIGN PAROXYSMAL POSITIONAL VERTIGO, UNSPECIFIED LATERALITY: ICD-10-CM

## 2021-12-09 DIAGNOSIS — R10.32 LEFT LOWER QUADRANT ABDOMINAL PAIN: ICD-10-CM

## 2021-12-09 DIAGNOSIS — K57.92 ACUTE DIVERTICULITIS: ICD-10-CM

## 2021-12-09 LAB
ALBUMIN SERPL BCP-MCNC: 4 G/DL (ref 3.5–5.2)
ALP SERPL-CCNC: 93 U/L (ref 55–135)
ALT SERPL W/O P-5'-P-CCNC: 18 U/L (ref 10–44)
ANION GAP SERPL CALC-SCNC: 14 MMOL/L (ref 8–16)
AST SERPL-CCNC: 20 U/L (ref 10–40)
BASOPHILS # BLD AUTO: 0.03 K/UL (ref 0–0.2)
BASOPHILS NFR BLD: 0.4 % (ref 0–1.9)
BILIRUB SERPL-MCNC: 0.7 MG/DL (ref 0.1–1)
BUN SERPL-MCNC: 11 MG/DL (ref 8–23)
CALCIUM SERPL-MCNC: 9.9 MG/DL (ref 8.7–10.5)
CHLORIDE SERPL-SCNC: 103 MMOL/L (ref 95–110)
CO2 SERPL-SCNC: 24 MMOL/L (ref 23–29)
CREAT SERPL-MCNC: 0.7 MG/DL (ref 0.5–1.4)
CRP SERPL-MCNC: 4.1 MG/L (ref 0–8.2)
DIFFERENTIAL METHOD: ABNORMAL
EOSINOPHIL # BLD AUTO: 0.1 K/UL (ref 0–0.5)
EOSINOPHIL NFR BLD: 1.6 % (ref 0–8)
ERYTHROCYTE [DISTWIDTH] IN BLOOD BY AUTOMATED COUNT: 12.1 % (ref 11.5–14.5)
EST. GFR  (AFRICAN AMERICAN): >60 ML/MIN/1.73 M^2
EST. GFR  (NON AFRICAN AMERICAN): >60 ML/MIN/1.73 M^2
GLUCOSE SERPL-MCNC: 56 MG/DL (ref 70–110)
HCT VFR BLD AUTO: 40.2 % (ref 37–48.5)
HGB BLD-MCNC: 13.5 G/DL (ref 12–16)
IMM GRANULOCYTES # BLD AUTO: 0.03 K/UL (ref 0–0.04)
IMM GRANULOCYTES NFR BLD AUTO: 0.4 % (ref 0–0.5)
LYMPHOCYTES # BLD AUTO: 1.9 K/UL (ref 1–4.8)
LYMPHOCYTES NFR BLD: 24.4 % (ref 18–48)
MCH RBC QN AUTO: 31.3 PG (ref 27–31)
MCHC RBC AUTO-ENTMCNC: 33.6 G/DL (ref 32–36)
MCV RBC AUTO: 93 FL (ref 82–98)
MONOCYTES # BLD AUTO: 0.5 K/UL (ref 0.3–1)
MONOCYTES NFR BLD: 6.9 % (ref 4–15)
NEUTROPHILS # BLD AUTO: 5.2 K/UL (ref 1.8–7.7)
NEUTROPHILS NFR BLD: 66.3 % (ref 38–73)
NRBC BLD-RTO: 0 /100 WBC
PLATELET # BLD AUTO: 284 K/UL (ref 150–450)
PMV BLD AUTO: 9.8 FL (ref 9.2–12.9)
POTASSIUM SERPL-SCNC: 4.1 MMOL/L (ref 3.5–5.1)
PROT SERPL-MCNC: 7.7 G/DL (ref 6–8.4)
RBC # BLD AUTO: 4.31 M/UL (ref 4–5.4)
SODIUM SERPL-SCNC: 141 MMOL/L (ref 136–145)
WBC # BLD AUTO: 7.88 K/UL (ref 3.9–12.7)

## 2021-12-09 PROCEDURE — 1157F ADVNC CARE PLAN IN RCRD: CPT | Mod: CPTII,S$GLB,, | Performed by: INTERNAL MEDICINE

## 2021-12-09 PROCEDURE — 3061F NEG MICROALBUMINURIA REV: CPT | Mod: CPTII,S$GLB,, | Performed by: INTERNAL MEDICINE

## 2021-12-09 PROCEDURE — 99999 PR PBB SHADOW E&M-EST. PATIENT-LVL V: CPT | Mod: PBBFAC,,, | Performed by: INTERNAL MEDICINE

## 2021-12-09 PROCEDURE — 99214 PR OFFICE/OUTPT VISIT, EST, LEVL IV, 30-39 MIN: ICD-10-PCS | Mod: S$GLB,,, | Performed by: INTERNAL MEDICINE

## 2021-12-09 PROCEDURE — 99214 OFFICE O/P EST MOD 30 MIN: CPT | Mod: S$GLB,,, | Performed by: INTERNAL MEDICINE

## 2021-12-09 PROCEDURE — 1157F PR ADVANCE CARE PLAN OR EQUIV PRESENT IN MEDICAL RECORD: ICD-10-PCS | Mod: CPTII,S$GLB,, | Performed by: INTERNAL MEDICINE

## 2021-12-09 PROCEDURE — 99999 PR PBB SHADOW E&M-EST. PATIENT-LVL V: ICD-10-PCS | Mod: PBBFAC,,, | Performed by: INTERNAL MEDICINE

## 2021-12-09 PROCEDURE — 3066F PR DOCUMENTATION OF TREATMENT FOR NEPHROPATHY: ICD-10-PCS | Mod: CPTII,S$GLB,, | Performed by: INTERNAL MEDICINE

## 2021-12-09 PROCEDURE — 4010F ACE/ARB THERAPY RXD/TAKEN: CPT | Mod: CPTII,S$GLB,, | Performed by: INTERNAL MEDICINE

## 2021-12-09 PROCEDURE — 85025 COMPLETE CBC W/AUTO DIFF WBC: CPT | Performed by: INTERNAL MEDICINE

## 2021-12-09 PROCEDURE — 36415 COLL VENOUS BLD VENIPUNCTURE: CPT | Mod: PO | Performed by: INTERNAL MEDICINE

## 2021-12-09 PROCEDURE — 86140 C-REACTIVE PROTEIN: CPT | Performed by: INTERNAL MEDICINE

## 2021-12-09 PROCEDURE — 3061F PR NEG MICROALBUMINURIA RESULT DOCUMENTED/REVIEW: ICD-10-PCS | Mod: CPTII,S$GLB,, | Performed by: INTERNAL MEDICINE

## 2021-12-09 PROCEDURE — 80053 COMPREHEN METABOLIC PANEL: CPT | Performed by: INTERNAL MEDICINE

## 2021-12-09 PROCEDURE — 4010F PR ACE/ARB THEARPY RXD/TAKEN: ICD-10-PCS | Mod: CPTII,S$GLB,, | Performed by: INTERNAL MEDICINE

## 2021-12-09 PROCEDURE — 3066F NEPHROPATHY DOC TX: CPT | Mod: CPTII,S$GLB,, | Performed by: INTERNAL MEDICINE

## 2021-12-09 RX ORDER — MECLIZINE HYDROCHLORIDE 25 MG/1
25 TABLET ORAL 3 TIMES DAILY PRN
Qty: 30 TABLET | Refills: 1 | Status: SHIPPED | OUTPATIENT
Start: 2021-12-09 | End: 2022-04-08 | Stop reason: SDUPTHER

## 2021-12-10 ENCOUNTER — HOSPITAL ENCOUNTER (OUTPATIENT)
Dept: RADIOLOGY | Facility: HOSPITAL | Age: 66
Discharge: HOME | End: 2021-12-10
Attending: INTERNAL MEDICINE
Payer: MEDICARE

## 2021-12-10 DIAGNOSIS — K57.92 ACUTE DIVERTICULITIS: ICD-10-CM

## 2021-12-10 PROCEDURE — A9698 NON-RAD CONTRAST MATERIALNOC: HCPCS | Performed by: INTERNAL MEDICINE

## 2021-12-10 PROCEDURE — 74176 CT ABD & PELVIS W/O CONTRAST: CPT | Mod: 26,,, | Performed by: RADIOLOGY

## 2021-12-10 PROCEDURE — 25500020 PHARM REV CODE 255: Performed by: INTERNAL MEDICINE

## 2021-12-10 PROCEDURE — 74176 CT ABD & PELVIS W/O CONTRAST: CPT | Mod: TC

## 2021-12-10 PROCEDURE — 74176 CT ABDOMEN PELVIS WITHOUT CONTRAST: ICD-10-PCS | Mod: 26,,, | Performed by: RADIOLOGY

## 2021-12-10 RX ADMIN — Medication 900 ML: at 08:12

## 2021-12-12 ENCOUNTER — PATIENT MESSAGE (OUTPATIENT)
Dept: INTERNAL MEDICINE | Facility: CLINIC | Age: 66
End: 2021-12-12
Payer: MEDICARE

## 2021-12-13 ENCOUNTER — PATIENT MESSAGE (OUTPATIENT)
Dept: INTERNAL MEDICINE | Facility: CLINIC | Age: 66
End: 2021-12-13
Payer: MEDICARE

## 2021-12-13 DIAGNOSIS — R10.9 ABDOMINAL PAIN, UNSPECIFIED ABDOMINAL LOCATION: Primary | ICD-10-CM

## 2021-12-13 RX ORDER — DICYCLOMINE HYDROCHLORIDE 20 MG/1
TABLET ORAL
Qty: 60 TABLET | Refills: 1 | Status: SHIPPED | OUTPATIENT
Start: 2021-12-13 | End: 2021-12-21

## 2021-12-15 ENCOUNTER — CLINICAL SUPPORT (OUTPATIENT)
Dept: REHABILITATION | Facility: HOSPITAL | Age: 66
End: 2021-12-15
Attending: ORTHOPAEDIC SURGERY
Payer: MEDICARE

## 2021-12-15 DIAGNOSIS — Z47.1 AFTERCARE FOLLOWING LEFT KNEE JOINT REPLACEMENT SURGERY: ICD-10-CM

## 2021-12-15 DIAGNOSIS — Z96.652 AFTERCARE FOLLOWING LEFT KNEE JOINT REPLACEMENT SURGERY: ICD-10-CM

## 2021-12-15 PROCEDURE — 97110 THERAPEUTIC EXERCISES: CPT

## 2021-12-16 ENCOUNTER — HOSPITAL ENCOUNTER (OUTPATIENT)
Dept: RADIOLOGY | Facility: HOSPITAL | Age: 66
Discharge: HOME | End: 2021-12-16
Attending: INTERNAL MEDICINE
Payer: MEDICARE

## 2021-12-16 DIAGNOSIS — R11.0 NAUSEA: ICD-10-CM

## 2021-12-16 DIAGNOSIS — R10.13 EPIGASTRIC PAIN: ICD-10-CM

## 2021-12-16 PROCEDURE — 76705 US ABDOMEN LIMITED: ICD-10-PCS | Mod: 26,,, | Performed by: INTERNAL MEDICINE

## 2021-12-16 PROCEDURE — 76705 ECHO EXAM OF ABDOMEN: CPT | Mod: 26,,, | Performed by: INTERNAL MEDICINE

## 2021-12-16 PROCEDURE — 76705 ECHO EXAM OF ABDOMEN: CPT | Mod: TC

## 2021-12-17 ENCOUNTER — PATIENT MESSAGE (OUTPATIENT)
Dept: ORTHOPEDICS | Facility: CLINIC | Age: 66
End: 2021-12-17
Payer: MEDICARE

## 2021-12-21 RX ORDER — DICYCLOMINE HYDROCHLORIDE 20 MG/1
TABLET ORAL
Qty: 60 TABLET | Refills: 0 | Status: SHIPPED | OUTPATIENT
Start: 2021-12-21 | End: 2022-01-05

## 2021-12-23 ENCOUNTER — PATIENT MESSAGE (OUTPATIENT)
Dept: NEUROLOGY | Facility: CLINIC | Age: 66
End: 2021-12-23
Payer: MEDICARE

## 2022-01-05 RX ORDER — DICYCLOMINE HYDROCHLORIDE 20 MG/1
TABLET ORAL
Qty: 60 TABLET | Refills: 4 | Status: SHIPPED | OUTPATIENT
Start: 2022-01-05 | End: 2022-01-09

## 2022-01-06 NOTE — PROGRESS NOTES
"INTERNAL MEDICINE CLINIC - SAME DAY APPOINTMENT  Progress Note    PRESENTING HISTORY     PCP: Jose Rojas MD    Chief Complaint/Reason for Visit:   No chief complaint on file.      History of Present Illness & ROS : Ms. Riana Kay is a 66 y.o. female.    Same day appt.   New to this provider and clinic. Very pleasant lady. Here with her supportive spouse, reports that has been having "intermittet bouts of lower abdominal pain with some noticeable blood to both urine and stool for about 4 years, off and on; most recent was a few days ago; having some lower back pain and not big appetite  No fever, chills, vomiting, headaches, dizziness, SOB, chest discomforts or unintentional wt loss. Not taking anything for relief. Denies history of "kidney stones", denies "ever seeing Urologist and will be seeing a GI doctor, but no appt yet".  Having some occasional "burning" with urination.   She and her spouse report that "this pain comes and goes and has been for years; none one knows what to do and all the test are ok".   Reports that "work up in the past was with DR. Can and uncertain if current PCP".   *This provider will share my note, respectfully, with her current Primary.     Review of Systems:  Eyes: denies visual changes at this time denies floaters   ENT: no nasal congestion or sore throat  Respiratory: no cough or shorness of breath  Cardiovascular: no chest pain or palpitations  Gastrointestinal: no nausea or vomiting, no abdominal pain or change in bowel habits  Genitourinary: no hematuria or dysuria; denies frequency  Hematologic/Lymphatic: no easy bruising or lymphadenopathy  Musculoskeletal: no arthralgias or myalgias  Neurological: no seizures or tremors  Endocrine: no heat or cold intolerance      PAST HISTORY:     Past Medical History:   Diagnosis Date    Abdominal pain, right upper quadrant 2/4/2014    Anticoagulant long-term use     Anxiety     AR (allergic rhinitis)     Atrophic " gastritis without mention of hemorrhage 2/13/2012     Dx updated per 2019 IMO Load    Chronic fatigue syndrome 6/10/2012    Diabetes mellitus     Dizziness     Fatty liver     GERD (gastroesophageal reflux disease)     Gross hematuria 12/1/2020    HTN (hypertension)     Hyperlipidemia     Leg swelling     Memory loss     Osteopenia     PONV (postoperative nausea and vomiting)     Primary osteoarthritis of right knee 10/6/2020    Primary osteoarthritis of right knee 10/6/2020    Right elbow pain 1/16/2019    S/P total hysterectomy     Screening for colon cancer 1/6/2021    Sleep apnea     Status post total right knee replacement 10/6/2020 10/5/2020       Past Surgical History:   Procedure Laterality Date    CHOLECYSTECTOMY      COLONOSCOPY N/A 1/17/2018    Procedure: COLONOSCOPY with Donnell;  Surgeon: Roland Jacobo MD;  Location: [facility] (4TH FLR);  Service: Endoscopy;  Laterality: N/A;    COLONOSCOPY N/A 1/6/2021    Procedure: COLONOSCOPY;  Surgeon: Yong Rowland MD;  Location: [facility] (4TH FLR);  Service: Endoscopy;  Laterality: N/A;  prep ins. emailed - Martiniquais speaking,  [facility] - ERW  [facility] - ER[facility]    CYSTOSCOPY N/A 12/1/2020    Procedure: CYSTOSCOPY;  Surgeon: Ines Stanford MD;  Location: [facility] OR 1ST FLR;  Service: Urology;  Laterality: N/A;  45 minutes     ELBOW ARTHROPLASTY Right 1/16/2019    Procedure: ARTHROPLASTY, ELBOW right radial head arthroplasty revision;  Surgeon: Katja Hubbard MD;  Location: [facility] OR 1ST FLR;  Service: Orthopedics;  Laterality: Right;  Anesthesia: General and Regional. Stretcher, hand pan 1 and pan 2, CALL RUCHI SANTAMARIA & Sol notified 1-14 LO    ELBOW SURGERY Right 7/16/15    ELBOW SURGERY      ESOPHAGOGASTRODUODENOSCOPY N/A 4/15/2019    Procedure: EGD (ESOPHAGOGASTRODUODENOSCOPY);  Surgeon: Buck Irwin MD;  Location: [facility] (4TH FLR);  Service: Endoscopy;  Laterality:  N/A;  ray she    HYSTERECTOMY      KNEE ARTHROPLASTY Right 10/6/2020    Procedure: ARTHROPLASTY, KNEE-SAME DAY PROTOCOL;  Surgeon: Sabino Damon MD;  Location: [facility];  Service: Orthopedics;  Laterality: Right;    KNEE ARTHROSCOPY W/ DEBRIDEMENT  4/11    Left    RELEASE OF ULNAR NERVE AT CUBITAL TUNNEL Right 1/16/2019    Procedure: RELEASE, ULNAR TUNNEL right;  Surgeon: Katja Hubbard MD;  Location: [facility] OR [address] [place] [address];  Service: Orthopedics;  Laterality: Right;  Anesthesia: General and Regional. Stretcher, hand pan 1 and pan 2, CALL ACCUMED, CLAIRX    RETROGRADE PYELOGRAPHY Bilateral 12/1/2020    Procedure: PYELOGRAM, RETROGRADE;  Surgeon: Ines Stanford MD;  Location: [facility] OR [address] [place] [address];  Service: Urology;  Laterality: Bilateral;    ROTATOR CUFF REPAIR      TOTAL ABDOMINAL HYSTERECTOMY W/ BILATERAL SALPINGOOPHORECTOMY      TOTAL KNEE ARTHROPLASTY Left 10/19/2021    Procedure: ARTHROPLASTY, KNEE, TOTAL;  Surgeon: Sabino Damon MD;  Location: [facility];  Service: Orthopedics;  Laterality: Left;    UPPER GASTROINTESTINAL ENDOSCOPY         Family History   Problem Relation Age of Onset    Colon cancer Father 83        colon cancer    Diabetes Maternal Grandmother     Diabetes Maternal Aunt     Esophageal cancer Neg Hx     Stomach cancer Neg Hx     Melanoma Neg Hx        Social History     Socioeconomic History    Marital status:      Spouse name: Kemal    Number of children: 1   Occupational History    Occupation: Housekeeping     Comment: On disability   Tobacco Use    Smoking status: Never Smoker    Smokeless tobacco: Never Used   Substance and Sexual Activity    Alcohol use: No    Drug use: No    Sexual activity: Yes     Partners: Male     Birth control/protection: Post-menopausal   Other Topics Concern    Are you pregnant or think you may be? No    Breast-feeding No   Social History Narrative    She retired at [employer] in [place]; , 1 kid  (23yo).Nonsmoker, social etoh.    No stairs     Social Determinants of Health     Financial Resource Strain: Low Risk     Difficulty of Paying Living Expenses: Not very hard   Food Insecurity: No Food Insecurity    Worried About Running Out of Food in the Last Year: Never true    Ran Out of Food in the Last Year: Never true   Transportation Needs: No Transportation Needs    Lack of Transportation (Medical): No    Lack of Transportation (Non-Medical): No   Physical Activity: Sufficiently Active    Days of Exercise per Week: 7 days    Minutes of Exercise per Session: 30 min   Stress: Stress Concern Present    Feeling of Stress : To some extent   Social Connections: Unknown    Frequency of Communication with Friends and Family: More than three times a week    Frequency of Social Gatherings with Friends and Family: Once a week    Active Member of Clubs or Organizations: Yes    Attends Club or Organization Meetings: More than 4 times per year    Marital Status:    Housing Stability: Low Risk     Unable to Pay for Housing in the Last Year: No    Number of Places Lived in the Last Year: 1    Unstable Housing in the Last Year: No       MEDICATIONS & ALLERGIES:     Current Outpatient Medications on File Prior to Visit   Medication Sig Dispense Refill    azelastine (ASTELIN) 137 mcg (0.1 %) nasal spray 2 sprays (274 mcg total) by Nasal route 2 (two) times daily. 30 mL 3    blood sugar diagnostic Strp To check BG 4 times daily, to use with insurance preferred meter-true metrix 400 each 3    blood-glucose meter kit To check BG 4 times daily, to use with insurance preferred meter-true metrix 1 each 1    cyclobenzaprine (FLEXERIL) 5 MG tablet TAKE 1 TABLET BY MOUTH THREE TIMES A DAY AS NEEDED FOR MUSCLE SPASMS 90 tablet 0    diclofenac sodium (VOLTAREN) 1 % Gel APPLY 2 G TOPICALLY 2 (TWO) TIMES DAILY AS NEEDED (PAIN). DO NOT USE DIRECTLY ON INCISION 100 g 0    dicyclomine (BENTYL) 20 mg tablet TAKE 1  "TABLET BY MOUTH EVERY 6 HOURS AS NEEDED FOR ABDOMINAL CRAMPING/PAIN 60 tablet 4    docusate sodium (COLACE) 100 MG capsule Take 1 capsule (100 mg total) by mouth 2 (two) times daily as needed for Constipation. 60 capsule 0    ergocalciferol (ERGOCALCIFEROL) 50,000 unit Cap TAKE 1 CAPSULE BY MOUTH ONE TIME PER WEEK 12 capsule 0    fluticasone propionate (FLONASE) 50 mcg/actuation nasal spray 2 sprays (100 mcg total) by Each Nostril route once daily. 48 g 3    HYDROcodone-acetaminophen (NORCO)  mg per tablet Take 1 tablet by mouth every 4 to 6 hours as needed for Pain. 50 tablet 0    insulin (LANTUS SOLOSTAR U-100 INSULIN) glargine 100 units/mL (3mL) SubQ pen Inject 28-30  units at night. 1 Box 5    insulin lispro (HUMALOG KWIKPEN INSULIN) 100 unit/mL pen INJECT 16 UNITS W/ MEALS PLUS SCALE 150-200+2, 201-250+4, 251-300+6, 301-350+8, >350+10. MAX DAILY 68 UNITS. 60 mL 3    JANUVIA 100 mg Tab TAKE 1 TABLET BY MOUTH EVERY DAY 90 tablet 3    lancets Misc To check BG 4  times daily, to use with insurance preferred meter-true metrix 400 each 3    latanoprost 0.005 % ophthalmic solution Place 1 drop into both eyes nightly.      losartan (COZAAR) 25 MG tablet Take 0.5 tablets (12.5 mg total) by mouth once daily. 45 tablet 3    meclizine (ANTIVERT) 25 mg tablet Take 1 tablet (25 mg total) by mouth 3 (three) times daily as needed for Dizziness. 30 tablet 1    methocarbamoL (ROBAXIN) 750 MG Tab Take 1 tablet (750 mg total) by mouth 3 (three) times daily as needed (muscle spasms). 30 tablet 0    ondansetron (ZOFRAN-ODT) 8 MG TbDL Dissolve 1 tablet (8 mg total) by mouth every 12 (twelve) hours as needed (nausea). 20 tablet 0    pantoprazole (PROTONIX) 40 MG tablet Take 1 tablet (40 mg total) by mouth once daily. 90 tablet 3    pen needle, diabetic (NOVOFINE 32) 32 gauge x 1/4" Ndle Uses 4 times a day. 90 day via duramed e 11.65 400 each 3    pregabalin (LYRICA) 75 MG capsule Take 1 capsule (75 mg total) by " mouth 2 (two) times daily. for 14 days 28 capsule 0     No current facility-administered medications on file prior to visit.        Review of patient's allergies indicates:   Allergen Reactions    Iodinated contrast media Swelling and Rash    Percocet [oxycodone-acetaminophen] Itching    Macrobid [nitrofurantoin monohyd/m-cryst] Rash    Metformin Rash    Penicillins Rash     Had ancef in 2020 with no adverse rxn     Promethazine Rash    Sulfa (sulfonamide antibiotics) Rash    Sulfamethoxazole-trimethoprim Rash       Medications Reconciliation:   I have reconciled the patient's home medications with the patient/family. I have updated all changes.  Refer to After-Visit Medication List.    OBJECTIVE:     Vital Signs:  There were no vitals filed for this visit.  Wt Readings from Last 3 Encounters:   12/09/21 0806 77.4 kg (170 lb 10.2 oz)   11/29/21 1042 78 kg (172 lb)   11/24/21 1345 77.4 kg (170 lb 10.2 oz)     There is no height or weight on file to calculate BMI.     Wt Readings from Last 3 Encounters:   01/07/22 77.6 kg (171 lb 1.2 oz)   12/09/21 77.4 kg (170 lb 10.2 oz)   11/29/21 78 kg (172 lb)     Temp Readings from Last 3 Encounters:   12/09/21 97.7 °F (36.5 °C) (Other (see comments))   11/24/21 97.9 °F (36.6 °C) (Temporal)   10/20/21 96.7 °F (35.9 °C) (Tympanic)     BP Readings from Last 3 Encounters:   01/07/22 130/70   12/09/21 138/70   11/24/21 130/66     Pulse Readings from Last 3 Encounters:   01/07/22 70   12/09/21 72   11/24/21 70         Physical Exam:  General: Well developed, well nourished. No distress.  HEENT: Head is normocephalic, atraumatic; ears are normal.   Eyes: Clear conjunctiva.  Neck: Supple, symmetrical neck; trachea midline.  Lungs: Clear to auscultation bilaterally and normal respiratory effort.  Cardiovascular: Heart with regular rate and rhythm. No murmurs, gallops or rubs  Extremities: No LE edema. Pulses 2+ and symmetric.   Abdomen: Abdomen is soft, non-tender non-distended  with normal bowel sounds.  Skin: Skin color, texture, turgor normal. No rashes.  Musculoskeletal: Normal gait.   Lymph Nodes: No cervical or supraclavicular adenopathy.  Neurologic: Normal strength and tone. No focal numbness or weakness.   Psychiatric: Not depressed.      Laboratory  Lab Results   Component Value Date    WBC 7.88 12/09/2021    HGB 13.5 12/09/2021    HCT 40.2 12/09/2021     12/09/2021    CHOL 164 04/09/2021    TRIG 179 (H) 04/09/2021    HDL 49 04/09/2021    ALT 18 12/09/2021    AST 20 12/09/2021     12/09/2021    K 4.1 12/09/2021     12/09/2021    CREATININE 0.7 12/09/2021    BUN 11 12/09/2021    CO2 24 12/09/2021    TSH 1.302 04/09/2021    INR 0.9 10/14/2021    HGBA1C 6.4 (H) 10/14/2021       CT ABD  FINDINGS:  This examination is limited due to lack of intravenous contrast.     Lower chest: Unremarkable.     Liver: Normal contour.     Gallbladder and bile ducts: The gallbladder is surgically absent.  No intra or extrahepatic biliary ductal dilatation.     Pancreas: Normal contour.     Spleen: Normal contour.     Adrenals: Normal contour.     Kidneys: Normal contour.     Lymph nodes: No abdominal or pelvic lymphadenopathy.     Bowel and mesentery: Extensive diverticulosis is present without evidence of diverticulitis.  The small bowel and large bowel are normal caliber.     Abdominal aorta: Unremarkable.     Inferior vena cava: Unremarkable.     Free fluid or free air: None.     Pelvis: Unremarkable.     Urinary bladder: Unremarkable.     Body wall: Unremarkable.     Bones: Unremarkable.     Impression:     Diverticulosis without evidence of diverticulitis.  No acute finding to explain the patient's pain.        Electronically signed by: Yolanda Driscoll  Date:                                            12/10/2021  Time:                                           09:35             Exam Ended: 12/10/21 09:18 Last Resulted: 12/10/21 09:35                ASSESSMENT & PLAN:     Same  "day apt.     Noted allergies:   PCN  Bactrim  Macrobid    Dysuria /  Hematuria, unspecified type  (Acute on chronic; unclear etiology; noted the CT from 12/2021, unremarkable in regards; unable to undergo contrast due to "allergy"; check repeat Urine today for possible underlying infectious etiology and or gross amount of microscopic "blood" that may indicate other underlying pathology and given the reported chronicity of this, will refer to Urology)  *Have reviewed Micro tab with no growth on the cultures; however, + bacteruria  -     URINALYSIS; Future; Expected date: 01/07/2022  -     Urinalysis, Reflex to Urine Culture Urine, Clean Catch; Future; Expected date: 01/07/2022  -     Ambulatory referral/consult to Urology; Future; Expected date: 01/14/2022  -     CBC Auto Differential; Future; Expected date: 01/07/2022  -     Comprehensive Metabolic Panel; Future; Expected date: 01/07/2022      Colitis, highly suspected based on clinical exam findings today...  Acute on Chronic abdominal pain  *Understands that will need an appt with GI at this time for further evaluation. Offered another repeat CT today, declines and did not feel medically urgent; know of the diverticulosis condition. Will defer checking inflammatory markers (ESR, CRP , etc) to GI or her Primary.   -     CBC Auto Differential; Future; Expected date: 01/07/2022  -     Comprehensive Metabolic Panel; Future; Expected date: 01/07/2022  -     ciprofloxacin HCl (CIPRO) 500 MG tablet; Take 1 tablet (500 mg total) by mouth every 12 (twelve) hours.  Dispense: 14 tablet; Refill: 0  -     metroNIDAZOLE (FLAGYL) 500 MG tablet; Take 1 tablet (500 mg total) by mouth 3 (three) times daily.  Dispense: 21 tablet; Refill: 0    *She understands that if pain persist, will need to go to the ER.   *This note has been shared with her PCP.     Future Appointments   Date Time Provider Department Center   1/18/2022  9:20 AM Coleen Allen NP [facility] GASTRO [facility]   1/19/2022 "  8:00 AM Allen Lemon MD [facility] UROLOGC [facility]   1/24/2022  2:15 PM Sabino Damon MD [facility] ORTHO [facility] [place]   2/2/2022  8:30 AM Jose Rojas MD [facility] IM [place]   2/15/2022  8:00 AM Beckie Bernabe MD [facility] SLEEP [place]   2/15/2022  9:15 AM Laura Raygoza DPM [facility] POD [place]        Medication List          Accurate as of January 7, 2022  7:53 AM. If you have any questions, ask your nurse or doctor.            START taking these medications    ciprofloxacin HCl 500 MG tablet  Commonly known as: CIPRO  Take 1 tablet (500 mg total) by mouth every 12 (twelve) hours.  Started by: CESAR Killian     metroNIDAZOLE 500 MG tablet  Commonly known as: FLAGYL  Take 1 tablet (500 mg total) by mouth 3 (three) times daily.  Started by: CESAR Killian        CONTINUE taking these medications    azelastine 137 mcg (0.1 %) nasal spray  Commonly known as: ASTELIN  2 sprays (274 mcg total) by Nasal route 2 (two) times daily.     blood sugar diagnostic Strp  To check BG 4 times daily, to use with insurance preferred meter-true metrix     blood-glucose meter kit  To check BG 4 times daily, to use with insurance preferred meter-true metrix     cyclobenzaprine 5 MG tablet  Commonly known as: FLEXERIL  TAKE 1 TABLET BY MOUTH THREE TIMES A DAY AS NEEDED FOR MUSCLE SPASMS     diclofenac sodium 1 % Gel  Commonly known as: VOLTAREN  APPLY 2 G TOPICALLY 2 (TWO) TIMES DAILY AS NEEDED (PAIN). DO NOT USE DIRECTLY ON INCISION     dicyclomine 20 mg tablet  Commonly known as: BENTYL  TAKE 1 TABLET BY MOUTH EVERY 6 HOURS AS NEEDED FOR ABDOMINAL CRAMPING/PAIN     docusate sodium 100 MG capsule  Commonly known as: COLACE  Take 1 capsule (100 mg total) by mouth 2 (two) times daily as needed for Constipation.     ergocalciferol 50,000 unit Cap  Commonly known as: ERGOCALCIFEROL  TAKE 1 CAPSULE BY MOUTH ONE TIME PER WEEK     fluticasone propionate 50 mcg/actuation nasal spray  Commonly known as:  "FLONASE  2 sprays (100 mcg total) by Each Nostril route once daily.     HYDROcodone-acetaminophen  mg per tablet  Commonly known as: NORCO  Take 1 tablet by mouth every 4 to 6 hours as needed for Pain.     insulin lispro 100 unit/mL pen  Commonly known as: HumaLOG KwikPen Insulin  INJECT 16 UNITS W/ MEALS PLUS SCALE 150-200+2, 201-250+4, 251-300+6, 301-350+8, >350+10. MAX DAILY 68 UNITS.     JANUVIA 100 MG Tab  Generic drug: SITagliptin  TAKE 1 TABLET BY MOUTH EVERY DAY     lancets Misc  To check BG 4  times daily, to use with insurance preferred meter-true metrix     LANTUS SOLOSTAR U-100 INSULIN glargine 100 units/mL (3mL) SubQ pen  Generic drug: insulin  Inject 28-30  units at night.     latanoprost 0.005 % ophthalmic solution     losartan 25 MG tablet  Commonly known as: COZAAR  Take 0.5 tablets (12.5 mg total) by mouth once daily.     meclizine 25 mg tablet  Commonly known as: ANTIVERT  Take 1 tablet (25 mg total) by mouth 3 (three) times daily as needed for Dizziness.     methocarbamoL 750 MG Tab  Commonly known as: ROBAXIN  Take 1 tablet (750 mg total) by mouth 3 (three) times daily as needed (muscle spasms).     ondansetron 8 MG Tbdl  Commonly known as: ZOFRAN-ODT  Dissolve 1 tablet (8 mg total) by mouth every 12 (twelve) hours as needed (nausea).     pantoprazole 40 MG tablet  Commonly known as: PROTONIX  Take 1 tablet (40 mg total) by mouth once daily.     pen needle, diabetic 32 gauge x 1/4" Ndle  Commonly known as: NOVOFINE 32  Uses 4 times a day. 90 day via duramed e 11.65     pregabalin 75 MG capsule  Commonly known as: LYRICA  Take 1 capsule (75 mg total) by mouth 2 (two) times daily. for 14 days           Where to Get Your Medications      These medications were sent to [facility]/pharmacy #[id number] - [place] - [address]  [address] [place] [address]    Phone: [phone]   · ciprofloxacin HCl 500 MG tablet  · metroNIDAZOLE 500 MG tablet       Signing Physician:  Catina STAUFFER" CESAR Landin

## 2022-01-07 ENCOUNTER — OFFICE VISIT (OUTPATIENT)
Dept: INTERNAL MEDICINE | Facility: CLINIC | Age: 67
End: 2022-01-07
Payer: MEDICARE

## 2022-01-07 ENCOUNTER — LAB VISIT (OUTPATIENT)
Dept: LAB | Facility: HOSPITAL | Age: 67
End: 2022-01-07
Payer: MEDICARE

## 2022-01-07 VITALS
SYSTOLIC BLOOD PRESSURE: 130 MMHG | BODY MASS INDEX: 28.5 KG/M2 | HEIGHT: 65 IN | WEIGHT: 171.06 LBS | HEART RATE: 70 BPM | DIASTOLIC BLOOD PRESSURE: 70 MMHG | OXYGEN SATURATION: 97 %

## 2022-01-07 DIAGNOSIS — G89.29 CHRONIC ABDOMINAL PAIN: ICD-10-CM

## 2022-01-07 DIAGNOSIS — K52.9 COLITIS: ICD-10-CM

## 2022-01-07 DIAGNOSIS — R31.9 HEMATURIA, UNSPECIFIED TYPE: ICD-10-CM

## 2022-01-07 DIAGNOSIS — R10.9 CHRONIC ABDOMINAL PAIN: ICD-10-CM

## 2022-01-07 DIAGNOSIS — R30.0 DYSURIA: ICD-10-CM

## 2022-01-07 DIAGNOSIS — R30.0 DYSURIA: Primary | ICD-10-CM

## 2022-01-07 LAB
ALBUMIN SERPL BCP-MCNC: 4 G/DL (ref 3.5–5.2)
ALP SERPL-CCNC: 96 U/L (ref 55–135)
ALT SERPL W/O P-5'-P-CCNC: 19 U/L (ref 10–44)
ANION GAP SERPL CALC-SCNC: 9 MMOL/L (ref 8–16)
AST SERPL-CCNC: 19 U/L (ref 10–40)
BASOPHILS # BLD AUTO: 0.04 K/UL (ref 0–0.2)
BASOPHILS # BLD AUTO: 0.04 K/UL (ref 0–0.2)
BASOPHILS NFR BLD: 0.5 % (ref 0–1.9)
BASOPHILS NFR BLD: 0.5 % (ref 0–1.9)
BILIRUB SERPL-MCNC: 0.6 MG/DL (ref 0.1–1)
BUN SERPL-MCNC: 11 MG/DL (ref 8–23)
CALCIUM SERPL-MCNC: 9.7 MG/DL (ref 8.7–10.5)
CHLORIDE SERPL-SCNC: 103 MMOL/L (ref 95–110)
CO2 SERPL-SCNC: 28 MMOL/L (ref 23–29)
CREAT SERPL-MCNC: 0.7 MG/DL (ref 0.5–1.4)
DIFFERENTIAL METHOD: ABNORMAL
DIFFERENTIAL METHOD: ABNORMAL
EOSINOPHIL # BLD AUTO: 0.1 K/UL (ref 0–0.5)
EOSINOPHIL # BLD AUTO: 0.1 K/UL (ref 0–0.5)
EOSINOPHIL NFR BLD: 1.4 % (ref 0–8)
EOSINOPHIL NFR BLD: 1.4 % (ref 0–8)
ERYTHROCYTE [DISTWIDTH] IN BLOOD BY AUTOMATED COUNT: 12.2 % (ref 11.5–14.5)
ERYTHROCYTE [DISTWIDTH] IN BLOOD BY AUTOMATED COUNT: 12.2 % (ref 11.5–14.5)
EST. GFR  (AFRICAN AMERICAN): >60 ML/MIN/1.73 M^2
EST. GFR  (NON AFRICAN AMERICAN): >60 ML/MIN/1.73 M^2
GLUCOSE SERPL-MCNC: 103 MG/DL (ref 70–110)
HCT VFR BLD AUTO: 39.3 % (ref 37–48.5)
HCT VFR BLD AUTO: 39.3 % (ref 37–48.5)
HGB BLD-MCNC: 13.3 G/DL (ref 12–16)
HGB BLD-MCNC: 13.3 G/DL (ref 12–16)
IMM GRANULOCYTES # BLD AUTO: 0.01 K/UL (ref 0–0.04)
IMM GRANULOCYTES # BLD AUTO: 0.01 K/UL (ref 0–0.04)
IMM GRANULOCYTES NFR BLD AUTO: 0.1 % (ref 0–0.5)
IMM GRANULOCYTES NFR BLD AUTO: 0.1 % (ref 0–0.5)
LYMPHOCYTES # BLD AUTO: 2.2 K/UL (ref 1–4.8)
LYMPHOCYTES # BLD AUTO: 2.2 K/UL (ref 1–4.8)
LYMPHOCYTES NFR BLD: 28.4 % (ref 18–48)
LYMPHOCYTES NFR BLD: 28.4 % (ref 18–48)
MCH RBC QN AUTO: 31.1 PG (ref 27–31)
MCH RBC QN AUTO: 31.1 PG (ref 27–31)
MCHC RBC AUTO-ENTMCNC: 33.8 G/DL (ref 32–36)
MCHC RBC AUTO-ENTMCNC: 33.8 G/DL (ref 32–36)
MCV RBC AUTO: 92 FL (ref 82–98)
MCV RBC AUTO: 92 FL (ref 82–98)
MONOCYTES # BLD AUTO: 0.4 K/UL (ref 0.3–1)
MONOCYTES # BLD AUTO: 0.4 K/UL (ref 0.3–1)
MONOCYTES NFR BLD: 5.3 % (ref 4–15)
MONOCYTES NFR BLD: 5.3 % (ref 4–15)
NEUTROPHILS # BLD AUTO: 5 K/UL (ref 1.8–7.7)
NEUTROPHILS # BLD AUTO: 5 K/UL (ref 1.8–7.7)
NEUTROPHILS NFR BLD: 64.3 % (ref 38–73)
NEUTROPHILS NFR BLD: 64.3 % (ref 38–73)
NRBC BLD-RTO: 0 /100 WBC
NRBC BLD-RTO: 0 /100 WBC
PLATELET # BLD AUTO: 257 K/UL (ref 150–450)
PLATELET # BLD AUTO: 257 K/UL (ref 150–450)
PMV BLD AUTO: 9.7 FL (ref 9.2–12.9)
PMV BLD AUTO: 9.7 FL (ref 9.2–12.9)
POTASSIUM SERPL-SCNC: 4.3 MMOL/L (ref 3.5–5.1)
PROT SERPL-MCNC: 7.7 G/DL (ref 6–8.4)
RBC # BLD AUTO: 4.27 M/UL (ref 4–5.4)
RBC # BLD AUTO: 4.27 M/UL (ref 4–5.4)
SODIUM SERPL-SCNC: 140 MMOL/L (ref 136–145)
WBC # BLD AUTO: 7.79 K/UL (ref 3.9–12.7)
WBC # BLD AUTO: 7.79 K/UL (ref 3.9–12.7)

## 2022-01-07 PROCEDURE — 99999 PR PBB SHADOW E&M-EST. PATIENT-LVL V: CPT | Mod: PBBFAC,,, | Performed by: NURSE PRACTITIONER

## 2022-01-07 PROCEDURE — 3288F FALL RISK ASSESSMENT DOCD: CPT | Mod: CPTII,S$GLB,, | Performed by: NURSE PRACTITIONER

## 2022-01-07 PROCEDURE — 85025 COMPLETE CBC W/AUTO DIFF WBC: CPT | Performed by: NURSE PRACTITIONER

## 2022-01-07 PROCEDURE — 99214 OFFICE O/P EST MOD 30 MIN: CPT | Mod: S$GLB,,, | Performed by: NURSE PRACTITIONER

## 2022-01-07 PROCEDURE — 1101F PR PT FALLS ASSESS DOC 0-1 FALLS W/OUT INJ PAST YR: ICD-10-PCS | Mod: CPTII,S$GLB,, | Performed by: NURSE PRACTITIONER

## 2022-01-07 PROCEDURE — 1157F ADVNC CARE PLAN IN RCRD: CPT | Mod: CPTII,S$GLB,, | Performed by: NURSE PRACTITIONER

## 2022-01-07 PROCEDURE — 1126F AMNT PAIN NOTED NONE PRSNT: CPT | Mod: CPTII,S$GLB,, | Performed by: NURSE PRACTITIONER

## 2022-01-07 PROCEDURE — 3288F PR FALLS RISK ASSESSMENT DOCUMENTED: ICD-10-PCS | Mod: CPTII,S$GLB,, | Performed by: NURSE PRACTITIONER

## 2022-01-07 PROCEDURE — 1126F PR PAIN SEVERITY QUANTIFIED, NO PAIN PRESENT: ICD-10-PCS | Mod: CPTII,S$GLB,, | Performed by: NURSE PRACTITIONER

## 2022-01-07 PROCEDURE — 99999 PR PBB SHADOW E&M-EST. PATIENT-LVL V: ICD-10-PCS | Mod: PBBFAC,,, | Performed by: NURSE PRACTITIONER

## 2022-01-07 PROCEDURE — 80053 COMPREHEN METABOLIC PANEL: CPT | Performed by: NURSE PRACTITIONER

## 2022-01-07 PROCEDURE — 1159F MED LIST DOCD IN RCRD: CPT | Mod: CPTII,S$GLB,, | Performed by: NURSE PRACTITIONER

## 2022-01-07 PROCEDURE — 3078F DIAST BP <80 MM HG: CPT | Mod: CPTII,S$GLB,, | Performed by: NURSE PRACTITIONER

## 2022-01-07 PROCEDURE — 1157F PR ADVANCE CARE PLAN OR EQUIV PRESENT IN MEDICAL RECORD: ICD-10-PCS | Mod: CPTII,S$GLB,, | Performed by: NURSE PRACTITIONER

## 2022-01-07 PROCEDURE — 3008F PR BODY MASS INDEX (BMI) DOCUMENTED: ICD-10-PCS | Mod: CPTII,S$GLB,, | Performed by: NURSE PRACTITIONER

## 2022-01-07 PROCEDURE — 3075F SYST BP GE 130 - 139MM HG: CPT | Mod: CPTII,S$GLB,, | Performed by: NURSE PRACTITIONER

## 2022-01-07 PROCEDURE — 3075F PR MOST RECENT SYSTOLIC BLOOD PRESS GE 130-139MM HG: ICD-10-PCS | Mod: CPTII,S$GLB,, | Performed by: NURSE PRACTITIONER

## 2022-01-07 PROCEDURE — 36415 COLL VENOUS BLD VENIPUNCTURE: CPT | Performed by: NURSE PRACTITIONER

## 2022-01-07 PROCEDURE — 3008F BODY MASS INDEX DOCD: CPT | Mod: CPTII,S$GLB,, | Performed by: NURSE PRACTITIONER

## 2022-01-07 PROCEDURE — 1159F PR MEDICATION LIST DOCUMENTED IN MEDICAL RECORD: ICD-10-PCS | Mod: CPTII,S$GLB,, | Performed by: NURSE PRACTITIONER

## 2022-01-07 PROCEDURE — 99214 PR OFFICE/OUTPT VISIT, EST, LEVL IV, 30-39 MIN: ICD-10-PCS | Mod: S$GLB,,, | Performed by: NURSE PRACTITIONER

## 2022-01-07 PROCEDURE — 1101F PT FALLS ASSESS-DOCD LE1/YR: CPT | Mod: CPTII,S$GLB,, | Performed by: NURSE PRACTITIONER

## 2022-01-07 PROCEDURE — 3078F PR MOST RECENT DIASTOLIC BLOOD PRESSURE < 80 MM HG: ICD-10-PCS | Mod: CPTII,S$GLB,, | Performed by: NURSE PRACTITIONER

## 2022-01-07 RX ORDER — METRONIDAZOLE 500 MG/1
500 TABLET ORAL 3 TIMES DAILY
Qty: 21 TABLET | Refills: 0 | Status: SHIPPED | OUTPATIENT
Start: 2022-01-07 | End: 2022-04-12

## 2022-01-07 RX ORDER — CIPROFLOXACIN 500 MG/1
500 TABLET ORAL EVERY 12 HOURS
Qty: 14 TABLET | Refills: 0 | Status: SHIPPED | OUTPATIENT
Start: 2022-01-07 | End: 2022-04-12

## 2022-01-09 RX ORDER — DICYCLOMINE HYDROCHLORIDE 20 MG/1
TABLET ORAL
Qty: 360 TABLET | Refills: 1 | Status: SHIPPED | OUTPATIENT
Start: 2022-01-09 | End: 2022-01-18

## 2022-01-13 ENCOUNTER — PATIENT MESSAGE (OUTPATIENT)
Dept: ADMINISTRATIVE | Facility: OTHER | Age: 67
End: 2022-01-13
Payer: MEDICARE

## 2022-01-13 ENCOUNTER — PATIENT OUTREACH (OUTPATIENT)
Dept: ADMINISTRATIVE | Facility: OTHER | Age: 67
End: 2022-01-13
Payer: MEDICARE

## 2022-01-13 DIAGNOSIS — Z12.31 ENCOUNTER FOR SCREENING MAMMOGRAM FOR MALIGNANT NEOPLASM OF BREAST: Primary | ICD-10-CM

## 2022-01-13 NOTE — PROGRESS NOTES
Health Maintenance Due   Topic Date Due    Aspirin/Antiplatelet Therapy  Never done    High Dose Statin  Never done    Shingles Vaccine (3 of 3) 07/01/2021    Mammogram  01/08/2022    Foot Exam  02/10/2022     Updates were requested from care everywhere.  Chart was reviewed for overdue Proactive [facility] Encounters ([facility]ARATH) topics (CRS, Breast Cancer Screening, Eye exam)  Health Maintenance has been updated.  LINKS immunization registry triggered.  Immunizations were reconciled.  Order placed for mammogram and tasked to pt.

## 2022-01-18 ENCOUNTER — OFFICE VISIT (OUTPATIENT)
Dept: GASTROENTEROLOGY | Facility: CLINIC | Age: 67
End: 2022-01-18
Payer: MEDICARE

## 2022-01-18 VITALS — WEIGHT: 170.63 LBS | BODY MASS INDEX: 28.43 KG/M2 | HEIGHT: 65 IN

## 2022-01-18 DIAGNOSIS — R10.9 ABDOMINAL PAIN, UNSPECIFIED ABDOMINAL LOCATION: ICD-10-CM

## 2022-01-18 PROCEDURE — 1159F PR MEDICATION LIST DOCUMENTED IN MEDICAL RECORD: ICD-10-PCS | Mod: CPTII,S$GLB,, | Performed by: NURSE PRACTITIONER

## 2022-01-18 PROCEDURE — 3008F PR BODY MASS INDEX (BMI) DOCUMENTED: ICD-10-PCS | Mod: CPTII,S$GLB,, | Performed by: NURSE PRACTITIONER

## 2022-01-18 PROCEDURE — 1126F PR PAIN SEVERITY QUANTIFIED, NO PAIN PRESENT: ICD-10-PCS | Mod: CPTII,S$GLB,, | Performed by: NURSE PRACTITIONER

## 2022-01-18 PROCEDURE — 3008F BODY MASS INDEX DOCD: CPT | Mod: CPTII,S$GLB,, | Performed by: NURSE PRACTITIONER

## 2022-01-18 PROCEDURE — 3288F PR FALLS RISK ASSESSMENT DOCUMENTED: ICD-10-PCS | Mod: CPTII,S$GLB,, | Performed by: NURSE PRACTITIONER

## 2022-01-18 PROCEDURE — 1101F PR PT FALLS ASSESS DOC 0-1 FALLS W/OUT INJ PAST YR: ICD-10-PCS | Mod: CPTII,S$GLB,, | Performed by: NURSE PRACTITIONER

## 2022-01-18 PROCEDURE — 99999 PR PBB SHADOW E&M-EST. PATIENT-LVL V: ICD-10-PCS | Mod: PBBFAC,,, | Performed by: NURSE PRACTITIONER

## 2022-01-18 PROCEDURE — 99214 PR OFFICE/OUTPT VISIT, EST, LEVL IV, 30-39 MIN: ICD-10-PCS | Mod: S$GLB,,, | Performed by: NURSE PRACTITIONER

## 2022-01-18 PROCEDURE — 1157F PR ADVANCE CARE PLAN OR EQUIV PRESENT IN MEDICAL RECORD: ICD-10-PCS | Mod: CPTII,S$GLB,, | Performed by: NURSE PRACTITIONER

## 2022-01-18 PROCEDURE — 1159F MED LIST DOCD IN RCRD: CPT | Mod: CPTII,S$GLB,, | Performed by: NURSE PRACTITIONER

## 2022-01-18 PROCEDURE — 1101F PT FALLS ASSESS-DOCD LE1/YR: CPT | Mod: CPTII,S$GLB,, | Performed by: NURSE PRACTITIONER

## 2022-01-18 PROCEDURE — 99999 PR PBB SHADOW E&M-EST. PATIENT-LVL V: CPT | Mod: PBBFAC,,, | Performed by: NURSE PRACTITIONER

## 2022-01-18 PROCEDURE — 1157F ADVNC CARE PLAN IN RCRD: CPT | Mod: CPTII,S$GLB,, | Performed by: NURSE PRACTITIONER

## 2022-01-18 PROCEDURE — 3288F FALL RISK ASSESSMENT DOCD: CPT | Mod: CPTII,S$GLB,, | Performed by: NURSE PRACTITIONER

## 2022-01-18 PROCEDURE — 1126F AMNT PAIN NOTED NONE PRSNT: CPT | Mod: CPTII,S$GLB,, | Performed by: NURSE PRACTITIONER

## 2022-01-18 PROCEDURE — 99214 OFFICE O/P EST MOD 30 MIN: CPT | Mod: S$GLB,,, | Performed by: NURSE PRACTITIONER

## 2022-01-18 RX ORDER — DICYCLOMINE HYDROCHLORIDE 10 MG/1
10 CAPSULE ORAL 3 TIMES DAILY
Qty: 30 CAPSULE | Refills: 1 | Status: SHIPPED | OUTPATIENT
Start: 2022-01-18 | End: 2022-04-12

## 2022-01-18 NOTE — PROGRESS NOTES
GASTROENTEROLOGY CLINIC NOTE    Chief Complaint: The encounter diagnosis was Abdominal pain, unspecified abdominal location.  Referring provider/PCP: Jose Rojas MD    HPI:  Riana Kay is a 66 y.o. 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  She was previously followed at [facility] but is here today to establish care for abdominal pain.  This is a new problem that began about three months ago.  She reports experiencing episode of diarrhea that lasted a few days and was followed by nausea and abdominal cramping.  The cramping is predominately located in her lower abdomen and described as a burning pain.  It occurs daily and radiates to her lower back.  Pain is slightly improved following a bowel movement.  Additionally, she reports a h/o reflux that is controlled with Protonix 40mg daily.  Occasionally she will experience breakthrough reflux with certain foods.  Denies vomiting, constipation, diarrhea, nocturnal symptoms, melena, or hematochezia.  She has recently sought care with internal medicine d/t abdominal pain and for bladder infection.  She is scheduled to complete her antibiotics for the bladder infection today.  She was also prescribed Bentyl for the abdominal pain but patient reports she did not take the Bentyl.      Prior Upper Endoscopy: 4/2019  Findings: The Z-line was regular and was found  35 cm from the incisors. No endoscopic abnormality was evident in the esophagus to explain the patient's complaint of dysphagia. It was decided, however, to proceed with dilation at the gastroesophageal junction. A TTS dilator was passed through the scope. Dilation with an 18-19-20 mm pyloric balloon dilator was performed. The dilation site was examined following endoscope reinsertion and showed no change. Estimated blood loss: none. The entire examined stomach was normal. Biopsies were taken with a cold forceps for Helicobacter pylori testing. Estimated blood loss was minimal. The examined duodenum was normal.     Impression:  - No endoscopic esophageal abnormality to explain patient's dysphagia; esophagus empirically dilated up to 20 mm at the GE junction.                         - Stomach biopsied to r/o H.pylori.     Recommendation:  - Discharge patient to home.                         - Resume previous diet.                         - Continue present medications.                         - Await pathology results.   Pathology:  STOMACH, BIOPSY:  Antral mucosa with features consistent with chemical/reactive gastropathy and mild chronic inactive gastritis. No intestinal metaplasia or dysplasia.  An immunohistochemical stain for Helicobacter pylori is pending and will be included in a supplemental report.  An immunohistochemical stain for Helicobacter pylori is negative.    Prior Colonoscopy: 1/2021  Findings: The perianal and digital rectal examinations were normal. A 4 mm polyp was found in the transverse colon. The polyp was semi-pedunculated. The polyp was removed with a cold snare.  Resection and retrieval were complete. Estimated blood loss was minimal. Many small and large-mouthed diverticula were found in the entire colon. Non-bleeding internal hemorrhoids were found. The exam was otherwise without abnormality on direct and retroflexion views.     Impression:   - One 4 mm polyp in the transverse colon,  removed with a cold snare. Resected and retrieved.                         - Diverticulosis in the entire examined colon.                         - Non-bleeding internal hemorrhoids.                         - The examination was otherwise normal on direct and retroflexion views.     Recommendation:       - Discharge patient to home.                         - High fiber diet.                         - Continue present medications.                         - Await pathology results.                         - Repeat colonoscopy in 5 years for surveillance.                         - Return to primary care physician PRN.     Pathology:  COLON, TRANSVERSE, BIOPSY:  - Tubular adenoma    Family h/o Colon Cancer: Father   Family h/o Crohn's Disease or Ulcerative Colitis: No  Abdominal Surgeries: Cholecystectomy, Hysterectomy    GI ROS:  Reflux: No  Dysphagia: No   Constipation: No  Diarrhea: No  Rectal bleeding/Melena/hematemesis: No  NSAIDs: No  Anticoagulation or Antiplatelet: No      Review of Systems   Constitutional: Negative for weight loss.   HENT: Negative for sore throat.    Eyes: Negative for blurred vision.   Respiratory: Negative for cough.    Cardiovascular: Negative for chest pain.   Gastrointestinal: Positive for abdominal pain (lower). Negative for blood in stool, constipation, diarrhea, heartburn, melena, nausea and vomiting.   Genitourinary: Negative for dysuria.   Musculoskeletal: Negative for myalgias.   Skin: Negative for rash.   Neurological: Negative for headaches.   Endo/Heme/Allergies: Negative for environmental allergies.   Psychiatric/Behavioral: Negative for suicidal ideas. The patient is not nervous/anxious.        Past Medical History: has a past medical history of Abdominal pain, right upper quadrant, Anticoagulant long-term use, Anxiety, AR (allergic rhinitis), Atrophic gastritis without mention of hemorrhage, Chronic fatigue syndrome, Diabetes mellitus, Dizziness, Fatty liver, GERD  (gastroesophageal reflux disease), Gross hematuria, HTN (hypertension), Hyperlipidemia, Leg swelling, Memory loss, Osteopenia, PONV (postoperative nausea and vomiting), Primary osteoarthritis of right knee, Primary osteoarthritis of right knee, Right elbow pain, S/P total hysterectomy, Screening for colon cancer, Sleep apnea, and 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    Past Surgical History: has a past surgical history that includes Cholecystectomy; Knee arthroscopy w/ debridement (4/11); Total abdominal hysterectomy w/ bilateral salpingoophorectomy; Rotator cuff repair; Elbow surgery (Right, 7/16/15); Elbow surgery; Hysterectomy; Colonoscopy (N/A, 1/17/2018); Elbow Arthroplasty (Right, 1/16/2019); Release of ulnar nerve at cubital tunnel (Right, 1/16/2019); Upper gastrointestinal endoscopy; Esophagogastroduodenoscopy (N/A, 4/15/2019); Knee Arthroplasty (Right, 10/6/2020); Cystoscopy (N/A, 12/1/2020); Retrograde pyelography (Bilateral, 12/1/2020); Colonoscopy (N/A, 1/6/2021); and Total knee arthroplasty (Left, 10/19/2021).    Family History:family history includes Colon cancer (age of onset: 83) in her father; Diabetes in her maternal aunt and maternal grandmother.    Allergies:   Review of patient's allergies indicates:   Allergen Reactions    Iodinated contrast media Swelling and Rash    Percocet [oxycodone-acetaminophen] Itching    Macrobid [nitrofurantoin monohyd/m-cryst] Rash    Metformin Rash    Penicillins Rash     Had ancef in 2020 with no adverse rxn     Promethazine Rash    Sulfa (sulfonamide antibiotics) Rash    Sulfamethoxazole-trimethoprim Rash       Social History: reports that she has never smoked. She has never used smokeless tobacco. She reports that she does not drink alcohol and does not use drugs.    Home medications:   Current Outpatient Medications on File Prior to Visit   Medication Sig Dispense Refill    azelastine (ASTELIN) 137 mcg (0.1 %) nasal spray 2 sprays (274  mcg total) by Nasal route 2 (two) times daily. 30 mL 3    blood sugar diagnostic Strp To check BG 4 times daily, to use with insurance preferred meter-true metrix 400 each 3    blood-glucose meter kit To check BG 4 times daily, to use with insurance preferred meter-true metrix 1 each 1    ciprofloxacin HCl (CIPRO) 500 MG tablet Take 1 tablet (500 mg total) by mouth every 12 (twelve) hours. 14 tablet 0    cyclobenzaprine (FLEXERIL) 5 MG tablet TAKE 1 TABLET BY MOUTH THREE TIMES A DAY AS NEEDED FOR MUSCLE SPASMS 90 tablet 0    diclofenac sodium (VOLTAREN) 1 % Gel APPLY 2 G TOPICALLY 2 (TWO) TIMES DAILY AS NEEDED (PAIN). DO NOT USE DIRECTLY ON INCISION 100 g 0    dicyclomine (BENTYL) 20 mg tablet TAKE 1 TABLET BY MOUTH EVERY 6 HOURS AS NEEDED FOR ABDOMINAL CRAMPING/PAIN 360 tablet 1    docusate sodium (COLACE) 100 MG capsule Take 1 capsule (100 mg total) by mouth 2 (two) times daily as needed for Constipation. 60 capsule 0    ergocalciferol (ERGOCALCIFEROL) 50,000 unit Cap TAKE 1 CAPSULE BY MOUTH ONE TIME PER WEEK 12 capsule 0    fluticasone propionate (FLONASE) 50 mcg/actuation nasal spray 2 sprays (100 mcg total) by Each Nostril route once daily. 48 g 3    HYDROcodone-acetaminophen (NORCO)  mg per tablet Take 1 tablet by mouth every 4 to 6 hours as needed for Pain. 50 tablet 0    insulin (LANTUS SOLOSTAR U-100 INSULIN) glargine 100 units/mL (3mL) SubQ pen Inject 28-30  units at night. 1 Box 5    insulin lispro (HUMALOG KWIKPEN INSULIN) 100 unit/mL pen INJECT 16 UNITS W/ MEALS PLUS SCALE 150-200+2, 201-250+4, 251-300+6, 301-350+8, >350+10. MAX DAILY 68 UNITS. 60 mL 3    JANUVIA 100 mg Tab TAKE 1 TABLET BY MOUTH EVERY DAY 90 tablet 3    lancets Misc To check BG 4  times daily, to use with insurance preferred meter-true metrix 400 each 3    latanoprost 0.005 % ophthalmic solution Place 1 drop into both eyes nightly.      losartan (COZAAR) 25 MG tablet Take 0.5 tablets (12.5 mg total) by mouth once  "daily. 45 tablet 3    meclizine (ANTIVERT) 25 mg tablet Take 1 tablet (25 mg total) by mouth 3 (three) times daily as needed for Dizziness. 30 tablet 1    methocarbamoL (ROBAXIN) 750 MG Tab Take 1 tablet (750 mg total) by mouth 3 (three) times daily as needed (muscle spasms). 30 tablet 0    metroNIDAZOLE (FLAGYL) 500 MG tablet Take 1 tablet (500 mg total) by mouth 3 (three) times daily. 21 tablet 0    ondansetron (ZOFRAN-ODT) 8 MG TbDL Dissolve 1 tablet (8 mg total) by mouth every 12 (twelve) hours as needed (nausea). 20 tablet 0    pantoprazole (PROTONIX) 40 MG tablet Take 1 tablet (40 mg total) by mouth once daily. 90 tablet 3    pen needle, diabetic (NOVOFINE 32) 32 gauge x 1/4" Ndle Uses 4 times a day. 90 day via duramed e 11.65 400 each 3    pregabalin (LYRICA) 75 MG capsule Take 1 capsule (75 mg total) by mouth 2 (two) times daily. for 14 days 28 capsule 0     No current facility-administered medications on file prior to visit.       Vital signs:  Ht 5' 5" (1.651 m)   Wt 77.4 kg (170 lb 10.2 oz)   BMI 28.40 kg/m²     Physical Exam  Vitals reviewed.   Constitutional:       General: She is not in acute distress.     Appearance: Normal appearance. She is not ill-appearing.   HENT:      Head: Normocephalic.   Cardiovascular:      Rate and Rhythm: Normal rate and regular rhythm.      Heart sounds: Normal heart sounds. No murmur heard.      Pulmonary:      Effort: Pulmonary effort is normal. No respiratory distress.      Breath sounds: Normal breath sounds.   Chest:      Chest wall: No tenderness.   Abdominal:      General: Bowel sounds are normal. There is no distension.      Palpations: Abdomen is soft.      Tenderness: There is abdominal tenderness in the right lower quadrant and left lower quadrant. Negative signs include Stephens's sign.      Hernia: No hernia is present.      Comments: Mild tenderness in lower quadrant   Skin:     General: Skin is warm.   Neurological:      Mental Status: She is alert " and oriented to person, place, and time.   Psychiatric:         Mood and Affect: Mood normal.         Behavior: Behavior normal.         Routine labs:  Lab Results   Component Value Date    WBC 7.79 01/07/2022    WBC 7.79 01/07/2022    HGB 13.3 01/07/2022    HGB 13.3 01/07/2022    HCT 39.3 01/07/2022    HCT 39.3 01/07/2022    MCV 92 01/07/2022    MCV 92 01/07/2022     01/07/2022     01/07/2022     Lab Results   Component Value Date    INR 0.9 10/14/2021     Lab Results   Component Value Date    IRON 103 01/25/2018    FERRITIN 215 01/25/2018    TIBC 289 01/25/2018    FESATURATED 36 01/25/2018     Lab Results   Component Value Date     01/07/2022    K 4.3 01/07/2022     01/07/2022    CO2 28 01/07/2022    BUN 11 01/07/2022    CREATININE 0.7 01/07/2022     Lab Results   Component Value Date    ALBUMIN 4.0 01/07/2022    ALT 19 01/07/2022    AST 19 01/07/2022    ALKPHOS 96 01/07/2022    BILITOT 0.6 01/07/2022     No results found for: GLUCOSE  Lab Results   Component Value Date    TSH 1.302 04/09/2021     Lab Results   Component Value Date    CALCIUM 9.7 01/07/2022    PHOS 4.1 09/20/2018       Imaging:  US Abdomen Limited  Narrative: EXAMINATION:  US ABDOMEN LIMITED    CLINICAL HISTORY:  epigastric pain, nausea; Nausea    TECHNIQUE:  Abdominal ultrasound was performed.    COMPARISON:  CT abdomen pelvis without contrast 12/10/2021, ultrasound retroperitoneal complete 10/26/2020    FINDINGS:  The liver measures 19.5 cm and demonstrates diffusely increased echogenicity. HRI measures 1.9.  No focal hepatic lesions.  There is no intra or extrahepatic bile duct dilatation.  The common duct measures 4 mm.    The gallbladder is surgically absent.    The visualized portions of the pancreas, IVC, and abdominal aorta are unremarkable.    The spleen measures 10.9 x 4.3 cm and is unremarkable.    There is no free fluid within the visualized abdomen.  Impression: Hepatomegaly and echogenic liver parenchyma,  which could reflect steatosis and/or chronic liver disease.    Electronically signed by resident: Lorenzo Sarah  Date:    12/16/2021  Time:    08:28    Electronically signed by: Hneok Lo  Date:    12/16/2021  Time:    09:09    EXAMINATION:  CT ABDOMEN PELVIS WITHOUT CONTRAST 12/10/2021     CLINICAL HISTORY:  LLQ abdominal pain, r/o diverticulitis; Diverticulitis of intestine, part unspecified, without perforation or abscess without bleeding     TECHNIQUE:  Low dose axial images, sagittal and coronal reformations were obtained from the lung bases to the pubic symphysis.  900 mL barium was given for oral contrast     COMPARISON:  09/22/2020     FINDINGS:  This examination is limited due to lack of intravenous contrast.     Lower chest: Unremarkable.     Liver: Normal contour.     Gallbladder and bile ducts: The gallbladder is surgically absent.  No intra or extrahepatic biliary ductal dilatation.     Pancreas: Normal contour.     Spleen: Normal contour.     Adrenals: Normal contour.     Kidneys: Normal contour.     Lymph nodes: No abdominal or pelvic lymphadenopathy.     Bowel and mesentery: Extensive diverticulosis is present without evidence of diverticulitis.  The small bowel and large bowel are normal caliber.     Abdominal aorta: Unremarkable.     Inferior vena cava: Unremarkable.     Free fluid or free air: None.     Pelvis: Unremarkable.     Urinary bladder: Unremarkable.     Body wall: Unremarkable.     Bones: Unremarkable.     Impression:     Diverticulosis without evidence of diverticulitis.  No acute finding to explain the patient's pain.        Electronically signed by: Yolanda Driscoll  Date:                                            12/10/2021  Time:                                           09:35    I have reviewed prior labs, imaging, and notes.      Assessment:  1. Abdominal pain, unspecified abdominal location        Plan:  Orders Placed This Encounter    dicyclomine (BENTYL) 10 MG capsule      Bentyl 10mg TID as needed for abdominal pain and cramping.    Recommend completing antibiotics for bladder infection and trial of Bentyl for abdominal cramping.  Discussed with both patient and  pain is likely r/t bladder infection or IBS as the pain improves following bowel movement.   Metamucil daily to help promote regular bowel movements.     Plan of care discussed with patient who is in agreement and verbalized understanding.     I have explained the planned procedures to the patient.The risks, benefits and alternatives of the procedure were also explained in detail. Patient verbalized understanding, all questions were answered. The patient agrees to proceed as planned    Follow Up: 6 weeks          RADHA Khoury,FNP-BC  [facility]/[place]

## 2022-01-18 NOTE — PATIENT INSTRUCTIONS
Foods High in Fiber are fruit, oatmeal, whole wheat bread, brown rice, almonds, pecans, broccoli, peas, carrots, brussel sprouts.

## 2022-01-24 ENCOUNTER — OFFICE VISIT (OUTPATIENT)
Dept: ORTHOPEDICS | Facility: CLINIC | Age: 67
End: 2022-01-24
Payer: MEDICARE

## 2022-01-24 VITALS — HEIGHT: 65 IN | BODY MASS INDEX: 28.96 KG/M2 | WEIGHT: 173.81 LBS

## 2022-01-24 DIAGNOSIS — Z96.659 STATUS POST KNEE REPLACEMENT, UNSPECIFIED LATERALITY: Primary | ICD-10-CM

## 2022-01-24 PROCEDURE — 3288F PR FALLS RISK ASSESSMENT DOCUMENTED: ICD-10-PCS | Mod: CPTII,S$GLB,, | Performed by: ORTHOPAEDIC SURGERY

## 2022-01-24 PROCEDURE — 1160F RVW MEDS BY RX/DR IN RCRD: CPT | Mod: CPTII,S$GLB,, | Performed by: ORTHOPAEDIC SURGERY

## 2022-01-24 PROCEDURE — 1159F PR MEDICATION LIST DOCUMENTED IN MEDICAL RECORD: ICD-10-PCS | Mod: CPTII,S$GLB,, | Performed by: ORTHOPAEDIC SURGERY

## 2022-01-24 PROCEDURE — 3288F FALL RISK ASSESSMENT DOCD: CPT | Mod: CPTII,S$GLB,, | Performed by: ORTHOPAEDIC SURGERY

## 2022-01-24 PROCEDURE — 3008F BODY MASS INDEX DOCD: CPT | Mod: CPTII,S$GLB,, | Performed by: ORTHOPAEDIC SURGERY

## 2022-01-24 PROCEDURE — 99999 PR PBB SHADOW E&M-EST. PATIENT-LVL IV: ICD-10-PCS | Mod: PBBFAC,,, | Performed by: ORTHOPAEDIC SURGERY

## 2022-01-24 PROCEDURE — 99999 PR PBB SHADOW E&M-EST. PATIENT-LVL IV: CPT | Mod: PBBFAC,,, | Performed by: ORTHOPAEDIC SURGERY

## 2022-01-24 PROCEDURE — 1157F PR ADVANCE CARE PLAN OR EQUIV PRESENT IN MEDICAL RECORD: ICD-10-PCS | Mod: CPTII,S$GLB,, | Performed by: ORTHOPAEDIC SURGERY

## 2022-01-24 PROCEDURE — 1157F ADVNC CARE PLAN IN RCRD: CPT | Mod: CPTII,S$GLB,, | Performed by: ORTHOPAEDIC SURGERY

## 2022-01-24 PROCEDURE — 99024 POSTOP FOLLOW-UP VISIT: CPT | Mod: S$GLB,,, | Performed by: ORTHOPAEDIC SURGERY

## 2022-01-24 PROCEDURE — 1101F PR PT FALLS ASSESS DOC 0-1 FALLS W/OUT INJ PAST YR: ICD-10-PCS | Mod: CPTII,S$GLB,, | Performed by: ORTHOPAEDIC SURGERY

## 2022-01-24 PROCEDURE — 1160F PR REVIEW ALL MEDS BY PRESCRIBER/CLIN PHARMACIST DOCUMENTED: ICD-10-PCS | Mod: CPTII,S$GLB,, | Performed by: ORTHOPAEDIC SURGERY

## 2022-01-24 PROCEDURE — 99024 PR POST-OP FOLLOW-UP VISIT: ICD-10-PCS | Mod: S$GLB,,, | Performed by: ORTHOPAEDIC SURGERY

## 2022-01-24 PROCEDURE — 1101F PT FALLS ASSESS-DOCD LE1/YR: CPT | Mod: CPTII,S$GLB,, | Performed by: ORTHOPAEDIC SURGERY

## 2022-01-24 PROCEDURE — 1159F MED LIST DOCD IN RCRD: CPT | Mod: CPTII,S$GLB,, | Performed by: ORTHOPAEDIC SURGERY

## 2022-01-24 PROCEDURE — 1125F AMNT PAIN NOTED PAIN PRSNT: CPT | Mod: CPTII,S$GLB,, | Performed by: ORTHOPAEDIC SURGERY

## 2022-01-24 PROCEDURE — 3008F PR BODY MASS INDEX (BMI) DOCUMENTED: ICD-10-PCS | Mod: CPTII,S$GLB,, | Performed by: ORTHOPAEDIC SURGERY

## 2022-01-24 PROCEDURE — 1125F PR PAIN SEVERITY QUANTIFIED, PAIN PRESENT: ICD-10-PCS | Mod: CPTII,S$GLB,, | Performed by: ORTHOPAEDIC SURGERY

## 2022-01-24 RX ORDER — MELOXICAM 15 MG/1
15 TABLET ORAL DAILY
Qty: 30 TABLET | Refills: 1 | Status: SHIPPED | OUTPATIENT
Start: 2022-01-24 | End: 2022-03-28

## 2022-01-24 NOTE — PROGRESS NOTES
Riana Kay is in for 3 month follow up for a  Left TKA.  She is doing  well.  Mild pain in the knee.  She has resumed activities of daily living. She has completed PT.    Exam demonstrates  A well developed female in no distress.  Alert and oriented.  Mood and affect are appropriate.    Knee incision is well healed.  ROM is 0-125.  The patella tracks well and there is no instability. The extremity is neurovascularly intact. Tender medial retinaculum    Xrays demonstrate a well fixed and positioned prosthesis.    PROMIS-10 Questionnaire Scores 1/24/2022 10/4/2021 7/19/2021 1/11/2021 9/18/2020   Global Physical Health 10 18 15 12 18   Global Mental health Score 13 10 12 12 12     KOOS Jr. Questionnaire Score 1/24/2022 10/4/2021 7/19/2021 1/11/2021 9/18/2020   KOOS Jr Score 0 23 19 11 24     Oxford Knee Questionnaire Score 1/24/2022 10/4/2021 7/19/2021 1/11/2021 9/18/2020   Oxford Knee Score 45 22 16 32 23       Imp:Progressing    Riana Kay was given a prescription for Meloxicam.  The patient was given instructions on how to take the medication and side effects were discussed.      F/u in 6 weeks

## 2022-02-02 ENCOUNTER — OFFICE VISIT (OUTPATIENT)
Dept: INTERNAL MEDICINE | Facility: CLINIC | Age: 67
End: 2022-02-02
Payer: MEDICARE

## 2022-02-02 ENCOUNTER — TELEPHONE (OUTPATIENT)
Dept: INTERNAL MEDICINE | Facility: CLINIC | Age: 67
End: 2022-02-02

## 2022-02-02 ENCOUNTER — LAB VISIT (OUTPATIENT)
Dept: LAB | Facility: HOSPITAL | Age: 67
End: 2022-02-02
Attending: FAMILY MEDICINE
Payer: MEDICARE

## 2022-02-02 VITALS
DIASTOLIC BLOOD PRESSURE: 72 MMHG | BODY MASS INDEX: 28.72 KG/M2 | HEIGHT: 65 IN | OXYGEN SATURATION: 98 % | SYSTOLIC BLOOD PRESSURE: 130 MMHG | TEMPERATURE: 98 F | WEIGHT: 172.38 LBS | HEART RATE: 70 BPM

## 2022-02-02 DIAGNOSIS — R41.3 MEMORY IMPAIRMENT: Primary | ICD-10-CM

## 2022-02-02 DIAGNOSIS — Z79.4 TYPE 2 DIABETES MELLITUS WITH DIABETIC PERIPHERAL ANGIOPATHY WITHOUT GANGRENE, WITH LONG-TERM CURRENT USE OF INSULIN: ICD-10-CM

## 2022-02-02 DIAGNOSIS — R41.3 MEMORY IMPAIRMENT: ICD-10-CM

## 2022-02-02 DIAGNOSIS — J30.9 ALLERGIC RHINITIS, UNSPECIFIED SEASONALITY, UNSPECIFIED TRIGGER: ICD-10-CM

## 2022-02-02 DIAGNOSIS — E11.51 TYPE 2 DIABETES MELLITUS WITH DIABETIC PERIPHERAL ANGIOPATHY WITHOUT GANGRENE, WITH LONG-TERM CURRENT USE OF INSULIN: ICD-10-CM

## 2022-02-02 LAB
ALBUMIN SERPL BCP-MCNC: 3.8 G/DL (ref 3.5–5.2)
ALP SERPL-CCNC: 83 U/L (ref 55–135)
ALT SERPL W/O P-5'-P-CCNC: 19 U/L (ref 10–44)
ANION GAP SERPL CALC-SCNC: 5 MMOL/L (ref 8–16)
AST SERPL-CCNC: 20 U/L (ref 10–40)
BASOPHILS # BLD AUTO: 0.03 K/UL (ref 0–0.2)
BASOPHILS NFR BLD: 0.5 % (ref 0–1.9)
BILIRUB SERPL-MCNC: 0.5 MG/DL (ref 0.1–1)
BUN SERPL-MCNC: 18 MG/DL (ref 8–23)
CALCIUM SERPL-MCNC: 9.5 MG/DL (ref 8.7–10.5)
CHLORIDE SERPL-SCNC: 104 MMOL/L (ref 95–110)
CO2 SERPL-SCNC: 29 MMOL/L (ref 23–29)
CREAT SERPL-MCNC: 0.7 MG/DL (ref 0.5–1.4)
DIFFERENTIAL METHOD: ABNORMAL
EOSINOPHIL # BLD AUTO: 0.1 K/UL (ref 0–0.5)
EOSINOPHIL NFR BLD: 1.1 % (ref 0–8)
ERYTHROCYTE [DISTWIDTH] IN BLOOD BY AUTOMATED COUNT: 12.4 % (ref 11.5–14.5)
EST. GFR  (AFRICAN AMERICAN): >60 ML/MIN/1.73 M^2
EST. GFR  (NON AFRICAN AMERICAN): >60 ML/MIN/1.73 M^2
ESTIMATED AVG GLUCOSE: 131 MG/DL (ref 68–131)
GLUCOSE SERPL-MCNC: 71 MG/DL (ref 70–110)
HBA1C MFR BLD: 6.2 % (ref 4–5.6)
HCT VFR BLD AUTO: 36.9 % (ref 37–48.5)
HGB BLD-MCNC: 12.3 G/DL (ref 12–16)
IMM GRANULOCYTES # BLD AUTO: 0.02 K/UL (ref 0–0.04)
IMM GRANULOCYTES NFR BLD AUTO: 0.3 % (ref 0–0.5)
LYMPHOCYTES # BLD AUTO: 1.9 K/UL (ref 1–4.8)
LYMPHOCYTES NFR BLD: 29.4 % (ref 18–48)
MCH RBC QN AUTO: 31.1 PG (ref 27–31)
MCHC RBC AUTO-ENTMCNC: 33.3 G/DL (ref 32–36)
MCV RBC AUTO: 93 FL (ref 82–98)
MONOCYTES # BLD AUTO: 0.5 K/UL (ref 0.3–1)
MONOCYTES NFR BLD: 7.9 % (ref 4–15)
NEUTROPHILS # BLD AUTO: 3.9 K/UL (ref 1.8–7.7)
NEUTROPHILS NFR BLD: 60.8 % (ref 38–73)
NRBC BLD-RTO: 0 /100 WBC
PLATELET # BLD AUTO: 259 K/UL (ref 150–450)
PMV BLD AUTO: 9.8 FL (ref 9.2–12.9)
POTASSIUM SERPL-SCNC: 4.4 MMOL/L (ref 3.5–5.1)
PROT SERPL-MCNC: 7.2 G/DL (ref 6–8.4)
RBC # BLD AUTO: 3.96 M/UL (ref 4–5.4)
SODIUM SERPL-SCNC: 138 MMOL/L (ref 136–145)
T4 FREE SERPL-MCNC: 0.99 NG/DL (ref 0.71–1.51)
TSH SERPL DL<=0.005 MIU/L-ACNC: 1.17 UIU/ML (ref 0.4–4)
VIT B12 SERPL-MCNC: 656 PG/ML (ref 210–950)
WBC # BLD AUTO: 6.46 K/UL (ref 3.9–12.7)

## 2022-02-02 PROCEDURE — 99999 PR PBB SHADOW E&M-EST. PATIENT-LVL V: ICD-10-PCS | Mod: PBBFAC,,, | Performed by: FAMILY MEDICINE

## 2022-02-02 PROCEDURE — 3075F SYST BP GE 130 - 139MM HG: CPT | Mod: CPTII,S$GLB,, | Performed by: FAMILY MEDICINE

## 2022-02-02 PROCEDURE — 3288F FALL RISK ASSESSMENT DOCD: CPT | Mod: CPTII,S$GLB,, | Performed by: FAMILY MEDICINE

## 2022-02-02 PROCEDURE — 99214 PR OFFICE/OUTPT VISIT, EST, LEVL IV, 30-39 MIN: ICD-10-PCS | Mod: S$GLB,,, | Performed by: FAMILY MEDICINE

## 2022-02-02 PROCEDURE — 85025 COMPLETE CBC W/AUTO DIFF WBC: CPT | Performed by: FAMILY MEDICINE

## 2022-02-02 PROCEDURE — 99214 OFFICE O/P EST MOD 30 MIN: CPT | Mod: S$GLB,,, | Performed by: FAMILY MEDICINE

## 2022-02-02 PROCEDURE — 80053 COMPREHEN METABOLIC PANEL: CPT | Performed by: FAMILY MEDICINE

## 2022-02-02 PROCEDURE — 3008F BODY MASS INDEX DOCD: CPT | Mod: CPTII,S$GLB,, | Performed by: FAMILY MEDICINE

## 2022-02-02 PROCEDURE — 1157F PR ADVANCE CARE PLAN OR EQUIV PRESENT IN MEDICAL RECORD: ICD-10-PCS | Mod: CPTII,S$GLB,, | Performed by: FAMILY MEDICINE

## 2022-02-02 PROCEDURE — 99499 UNLISTED E&M SERVICE: CPT | Mod: S$GLB,,, | Performed by: FAMILY MEDICINE

## 2022-02-02 PROCEDURE — 1159F PR MEDICATION LIST DOCUMENTED IN MEDICAL RECORD: ICD-10-PCS | Mod: CPTII,S$GLB,, | Performed by: FAMILY MEDICINE

## 2022-02-02 PROCEDURE — 1126F AMNT PAIN NOTED NONE PRSNT: CPT | Mod: CPTII,S$GLB,, | Performed by: FAMILY MEDICINE

## 2022-02-02 PROCEDURE — 36415 COLL VENOUS BLD VENIPUNCTURE: CPT | Mod: PO | Performed by: FAMILY MEDICINE

## 2022-02-02 PROCEDURE — 3288F PR FALLS RISK ASSESSMENT DOCUMENTED: ICD-10-PCS | Mod: CPTII,S$GLB,, | Performed by: FAMILY MEDICINE

## 2022-02-02 PROCEDURE — 84439 ASSAY OF FREE THYROXINE: CPT | Performed by: FAMILY MEDICINE

## 2022-02-02 PROCEDURE — 99499 RISK ADDL DX/OHS AUDIT: ICD-10-PCS | Mod: S$GLB,,, | Performed by: FAMILY MEDICINE

## 2022-02-02 PROCEDURE — 3078F PR MOST RECENT DIASTOLIC BLOOD PRESSURE < 80 MM HG: ICD-10-PCS | Mod: CPTII,S$GLB,, | Performed by: FAMILY MEDICINE

## 2022-02-02 PROCEDURE — 1160F PR REVIEW ALL MEDS BY PRESCRIBER/CLIN PHARMACIST DOCUMENTED: ICD-10-PCS | Mod: CPTII,S$GLB,, | Performed by: FAMILY MEDICINE

## 2022-02-02 PROCEDURE — 1157F ADVNC CARE PLAN IN RCRD: CPT | Mod: CPTII,S$GLB,, | Performed by: FAMILY MEDICINE

## 2022-02-02 PROCEDURE — 99999 PR PBB SHADOW E&M-EST. PATIENT-LVL V: CPT | Mod: PBBFAC,,, | Performed by: FAMILY MEDICINE

## 2022-02-02 PROCEDURE — 1101F PR PT FALLS ASSESS DOC 0-1 FALLS W/OUT INJ PAST YR: ICD-10-PCS | Mod: CPTII,S$GLB,, | Performed by: FAMILY MEDICINE

## 2022-02-02 PROCEDURE — 3078F DIAST BP <80 MM HG: CPT | Mod: CPTII,S$GLB,, | Performed by: FAMILY MEDICINE

## 2022-02-02 PROCEDURE — 3075F PR MOST RECENT SYSTOLIC BLOOD PRESS GE 130-139MM HG: ICD-10-PCS | Mod: CPTII,S$GLB,, | Performed by: FAMILY MEDICINE

## 2022-02-02 PROCEDURE — 3008F PR BODY MASS INDEX (BMI) DOCUMENTED: ICD-10-PCS | Mod: CPTII,S$GLB,, | Performed by: FAMILY MEDICINE

## 2022-02-02 PROCEDURE — 1126F PR PAIN SEVERITY QUANTIFIED, NO PAIN PRESENT: ICD-10-PCS | Mod: CPTII,S$GLB,, | Performed by: FAMILY MEDICINE

## 2022-02-02 PROCEDURE — 82607 VITAMIN B-12: CPT | Performed by: FAMILY MEDICINE

## 2022-02-02 PROCEDURE — 84443 ASSAY THYROID STIM HORMONE: CPT | Performed by: FAMILY MEDICINE

## 2022-02-02 PROCEDURE — 83036 HEMOGLOBIN GLYCOSYLATED A1C: CPT | Performed by: FAMILY MEDICINE

## 2022-02-02 PROCEDURE — 1159F MED LIST DOCD IN RCRD: CPT | Mod: CPTII,S$GLB,, | Performed by: FAMILY MEDICINE

## 2022-02-02 PROCEDURE — 1160F RVW MEDS BY RX/DR IN RCRD: CPT | Mod: CPTII,S$GLB,, | Performed by: FAMILY MEDICINE

## 2022-02-02 PROCEDURE — 1101F PT FALLS ASSESS-DOCD LE1/YR: CPT | Mod: CPTII,S$GLB,, | Performed by: FAMILY MEDICINE

## 2022-02-02 RX ORDER — FLUTICASONE PROPIONATE 50 MCG
2 SPRAY, SUSPENSION (ML) NASAL DAILY
Qty: 16 G | Refills: 11 | Status: SHIPPED | OUTPATIENT
Start: 2022-02-02 | End: 2022-12-05

## 2022-02-02 RX ORDER — AZELASTINE 1 MG/ML
1 SPRAY, METERED NASAL 2 TIMES DAILY
Qty: 30 ML | Refills: 11 | Status: SHIPPED | OUTPATIENT
Start: 2022-02-02 | End: 2023-08-09 | Stop reason: SDUPTHER

## 2022-02-02 NOTE — PROGRESS NOTES
Subjective:       Patient ID: Riana Kay is a 66 y.o. female.    Chief Complaint: Follow-up, short term memory, Ear Fullness, and Nasal Congestion    HPI 66-year-old female with type 2 diabetes with peripheral angiopathy on insulin presents to clinic today accompanied by her  secondary to concerns of short term memory loss which has been worsening over the past year.  The  reports noting that his wife is frequently forgetful and forgets both locations and time.  He reports that frequently they will make plans to go places for instance the grocery store and wife will frequently question where your going for when they are going.  He also notes that she has had more difficulty finding words and explaining herself over the past year.  Secondarily, over the past week she has noted increased nasal congestion and right ear pressure.  Review of Systems   Constitutional: Negative for activity change, appetite change, chills, fatigue, fever and unexpected weight change.   HENT: Positive for nasal congestion, ear pain, postnasal drip and rhinorrhea. Negative for hearing loss, sinus pressure/congestion, sore throat, tinnitus and trouble swallowing.    Eyes: Negative for discharge, redness, itching and visual disturbance.   Respiratory: Negative for cough, chest tightness, shortness of breath and wheezing.    Cardiovascular: Negative for chest pain and palpitations.   Gastrointestinal: Negative for abdominal pain, blood in stool, constipation, diarrhea, nausea and vomiting.   Endocrine: Negative for polydipsia and polyuria.   Genitourinary: Negative for decreased urine volume, difficulty urinating, dysuria, frequency, hematuria, menstrual problem and urgency.   Musculoskeletal: Positive for arthralgias. Negative for back pain, joint swelling, myalgias, neck pain and neck stiffness.   Integumentary:  Negative for rash.   Neurological: Positive for memory loss. Negative for dizziness, weakness,  light-headedness and headaches.   Psychiatric/Behavioral: Positive for confusion and dysphoric mood.         Objective:      Physical Exam  Vitals and nursing note reviewed.   Constitutional:       General: She is not in acute distress.     Appearance: She is well-developed and well-nourished. She is not diaphoretic.   HENT:      Head: Normocephalic and atraumatic.      Right Ear: External ear normal. A middle ear effusion is present.      Left Ear: Hearing, tympanic membrane, ear canal and external ear normal.      Nose: Rhinorrhea present. Rhinorrhea is clear.      Right Turbinates: Swollen.      Mouth/Throat:      Mouth: Oropharynx is clear and moist.      Pharynx: No oropharyngeal exudate.   Eyes:      General: No scleral icterus.        Right eye: No discharge.         Left eye: No discharge.      Extraocular Movements: EOM normal.      Conjunctiva/sclera: Conjunctivae normal.      Pupils: Pupils are equal, round, and reactive to light.   Neck:      Thyroid: No thyromegaly.      Vascular: No JVD.      Trachea: No tracheal deviation.   Cardiovascular:      Rate and Rhythm: Normal rate and regular rhythm.      Pulses: Intact distal pulses.      Heart sounds: Normal heart sounds. No murmur heard.  No friction rub. No gallop.    Pulmonary:      Effort: Pulmonary effort is normal. No respiratory distress.      Breath sounds: Normal breath sounds. No stridor. No wheezing or rales.   Abdominal:      General: Bowel sounds are normal. There is no distension.      Palpations: Abdomen is soft. There is no mass.      Tenderness: There is no abdominal tenderness. There is no guarding or rebound.   Musculoskeletal:         General: No tenderness or edema. Normal range of motion.      Cervical back: Normal range of motion and neck supple.   Lymphadenopathy:      Cervical: No cervical adenopathy.   Skin:     General: Skin is warm and dry.      Coloration: Skin is not pale.      Findings: No erythema or rash.   Neurological:       Mental Status: She is alert and oriented to person, place, and time.   Psychiatric:         Mood and Affect: Mood and affect normal.         Behavior: Behavior normal.         Thought Content: Thought content normal.         Judgment: Judgment normal.         Assessment:       Problem List Items Addressed This Visit    None     Visit Diagnoses     Memory impairment    -  Primary    Relevant Orders    Ambulatory referral/consult to Neurology    CBC Auto Differential    Comprehensive Metabolic Panel    TSH    T4, Free    Hemoglobin A1C    Vitamin B12    Allergic rhinitis, unspecified seasonality, unspecified trigger        Relevant Medications    fluticasone propionate (FLONASE) 50 mcg/actuation nasal spray    azelastine (ASTELIN) 137 mcg (0.1 %) nasal spray    Type 2 diabetes mellitus with diabetic peripheral angiopathy without gangrene, with long-term current use of insulin              Plan:       Memory impairment  -     Ambulatory referral/consult to Neurology; Future; Expected date: 02/09/2022  -     CBC Auto Differential; Future; Expected date: 02/02/2022  -     Comprehensive Metabolic Panel; Future; Expected date: 02/02/2022  -     TSH; Future; Expected date: 02/02/2022  -     T4, Free; Future; Expected date: 02/02/2022  -     Hemoglobin A1C; Future; Expected date: 02/02/2022  -     Vitamin B12; Future; Expected date: 02/02/2022    Allergic rhinitis, unspecified seasonality, unspecified trigger  -     fluticasone propionate (FLONASE) 50 mcg/actuation nasal spray; 2 sprays (100 mcg total) by Each Nostril route once daily.  Dispense: 16 g; Refill: 11  -     azelastine (ASTELIN) 137 mcg (0.1 %) nasal spray; 1 spray (137 mcg total) by Nasal route 2 (two) times daily.  Dispense: 30 mL; Refill: 11    Type 2 diabetes mellitus with diabetic peripheral angiopathy without gangrene, with long-term current use of insulin   Continue insulin as prescribed.  Diabetes is stable.    Return to clinic as needed or in 2 months  for annual physical exam.

## 2022-02-03 ENCOUNTER — HOSPITAL ENCOUNTER (OUTPATIENT)
Dept: RADIOLOGY | Facility: HOSPITAL | Age: 67
Discharge: HOME | End: 2022-02-03
Attending: FAMILY MEDICINE
Payer: MEDICARE

## 2022-02-03 DIAGNOSIS — Z12.31 ENCOUNTER FOR SCREENING MAMMOGRAM FOR MALIGNANT NEOPLASM OF BREAST: ICD-10-CM

## 2022-02-03 PROCEDURE — 77063 MAMMO DIGITAL SCREENING BILAT WITH TOMO: ICD-10-PCS | Mod: 26,,, | Performed by: RADIOLOGY

## 2022-02-03 PROCEDURE — 77063 BREAST TOMOSYNTHESIS BI: CPT | Mod: 26,,, | Performed by: RADIOLOGY

## 2022-02-03 PROCEDURE — 77067 SCR MAMMO BI INCL CAD: CPT | Mod: 26,,, | Performed by: RADIOLOGY

## 2022-02-03 PROCEDURE — 77067 SCR MAMMO BI INCL CAD: CPT | Mod: TC,PO

## 2022-02-03 PROCEDURE — 77063 BREAST TOMOSYNTHESIS BI: CPT | Mod: TC,PO

## 2022-02-03 PROCEDURE — 77067 MAMMO DIGITAL SCREENING BILAT WITH TOMO: ICD-10-PCS | Mod: 26,,, | Performed by: RADIOLOGY

## 2022-02-09 ENCOUNTER — OFFICE VISIT (OUTPATIENT)
Dept: PODIATRY | Facility: CLINIC | Age: 67
End: 2022-02-09
Payer: MEDICARE

## 2022-02-09 VITALS
SYSTOLIC BLOOD PRESSURE: 149 MMHG | BODY MASS INDEX: 29.02 KG/M2 | HEART RATE: 70 BPM | HEIGHT: 65 IN | DIASTOLIC BLOOD PRESSURE: 78 MMHG | WEIGHT: 174.19 LBS | RESPIRATION RATE: 18 BRPM

## 2022-02-09 DIAGNOSIS — E11.69 TYPE 2 DIABETES MELLITUS WITH OTHER SPECIFIED COMPLICATION, WITH LONG-TERM CURRENT USE OF INSULIN: Primary | ICD-10-CM

## 2022-02-09 DIAGNOSIS — Z79.4 TYPE 2 DIABETES MELLITUS WITH OTHER SPECIFIED COMPLICATION, WITH LONG-TERM CURRENT USE OF INSULIN: Primary | ICD-10-CM

## 2022-02-09 DIAGNOSIS — M20.41 HAMMER TOES OF BOTH FEET: ICD-10-CM

## 2022-02-09 DIAGNOSIS — M20.42 HAMMER TOES OF BOTH FEET: ICD-10-CM

## 2022-02-09 PROCEDURE — 3078F PR MOST RECENT DIASTOLIC BLOOD PRESSURE < 80 MM HG: ICD-10-PCS | Mod: CPTII,S$GLB,, | Performed by: PODIATRIST

## 2022-02-09 PROCEDURE — 3077F SYST BP >= 140 MM HG: CPT | Mod: CPTII,S$GLB,, | Performed by: PODIATRIST

## 2022-02-09 PROCEDURE — 99213 OFFICE O/P EST LOW 20 MIN: CPT | Mod: S$GLB,,, | Performed by: PODIATRIST

## 2022-02-09 PROCEDURE — 1157F PR ADVANCE CARE PLAN OR EQUIV PRESENT IN MEDICAL RECORD: ICD-10-PCS | Mod: CPTII,S$GLB,, | Performed by: PODIATRIST

## 2022-02-09 PROCEDURE — 3044F HG A1C LEVEL LT 7.0%: CPT | Mod: CPTII,S$GLB,, | Performed by: PODIATRIST

## 2022-02-09 PROCEDURE — 3078F DIAST BP <80 MM HG: CPT | Mod: CPTII,S$GLB,, | Performed by: PODIATRIST

## 2022-02-09 PROCEDURE — 3008F PR BODY MASS INDEX (BMI) DOCUMENTED: ICD-10-PCS | Mod: CPTII,S$GLB,, | Performed by: PODIATRIST

## 2022-02-09 PROCEDURE — 99999 PR PBB SHADOW E&M-EST. PATIENT-LVL IV: CPT | Mod: PBBFAC,,, | Performed by: PODIATRIST

## 2022-02-09 PROCEDURE — 3077F PR MOST RECENT SYSTOLIC BLOOD PRESSURE >= 140 MM HG: ICD-10-PCS | Mod: CPTII,S$GLB,, | Performed by: PODIATRIST

## 2022-02-09 PROCEDURE — 99213 PR OFFICE/OUTPT VISIT, EST, LEVL III, 20-29 MIN: ICD-10-PCS | Mod: S$GLB,,, | Performed by: PODIATRIST

## 2022-02-09 PROCEDURE — 3008F BODY MASS INDEX DOCD: CPT | Mod: CPTII,S$GLB,, | Performed by: PODIATRIST

## 2022-02-09 PROCEDURE — 1157F ADVNC CARE PLAN IN RCRD: CPT | Mod: CPTII,S$GLB,, | Performed by: PODIATRIST

## 2022-02-09 PROCEDURE — 4010F ACE/ARB THERAPY RXD/TAKEN: CPT | Mod: CPTII,S$GLB,, | Performed by: PODIATRIST

## 2022-02-09 PROCEDURE — 4010F PR ACE/ARB THEARPY RXD/TAKEN: ICD-10-PCS | Mod: CPTII,S$GLB,, | Performed by: PODIATRIST

## 2022-02-09 PROCEDURE — 1126F PR PAIN SEVERITY QUANTIFIED, NO PAIN PRESENT: ICD-10-PCS | Mod: CPTII,S$GLB,, | Performed by: PODIATRIST

## 2022-02-09 PROCEDURE — 3044F PR MOST RECENT HEMOGLOBIN A1C LEVEL <7.0%: ICD-10-PCS | Mod: CPTII,S$GLB,, | Performed by: PODIATRIST

## 2022-02-09 PROCEDURE — 1126F AMNT PAIN NOTED NONE PRSNT: CPT | Mod: CPTII,S$GLB,, | Performed by: PODIATRIST

## 2022-02-09 PROCEDURE — 99999 PR PBB SHADOW E&M-EST. PATIENT-LVL IV: ICD-10-PCS | Mod: PBBFAC,,, | Performed by: PODIATRIST

## 2022-02-14 ENCOUNTER — OFFICE VISIT (OUTPATIENT)
Dept: UROLOGY | Facility: CLINIC | Age: 67
End: 2022-02-14
Payer: MEDICARE

## 2022-02-14 VITALS
HEART RATE: 61 BPM | WEIGHT: 171.75 LBS | DIASTOLIC BLOOD PRESSURE: 72 MMHG | HEIGHT: 65 IN | SYSTOLIC BLOOD PRESSURE: 160 MMHG | BODY MASS INDEX: 28.61 KG/M2

## 2022-02-14 DIAGNOSIS — R30.0 DYSURIA: ICD-10-CM

## 2022-02-14 DIAGNOSIS — R31.9 HEMATURIA, UNSPECIFIED TYPE: ICD-10-CM

## 2022-02-14 LAB
BILIRUB SERPL-MCNC: NORMAL MG/DL
BLOOD URINE, POC: NORMAL
CLARITY, POC UA: CLEAR
COLOR, POC UA: NORMAL
GLUCOSE UR QL STRIP: NORMAL
KETONES UR QL STRIP: NORMAL
LEUKOCYTE ESTERASE URINE, POC: NORMAL
NITRITE, POC UA: NORMAL
PH, POC UA: 7
POC RESIDUAL URINE VOLUME: 76 ML (ref 0–100)
PROTEIN, POC: NORMAL
SPECIFIC GRAVITY, POC UA: 1.02
UROBILINOGEN, POC UA: NORMAL

## 2022-02-14 PROCEDURE — 3078F PR MOST RECENT DIASTOLIC BLOOD PRESSURE < 80 MM HG: ICD-10-PCS | Mod: CPTII,S$GLB,, | Performed by: NURSE PRACTITIONER

## 2022-02-14 PROCEDURE — 1126F PR PAIN SEVERITY QUANTIFIED, NO PAIN PRESENT: ICD-10-PCS | Mod: CPTII,S$GLB,, | Performed by: NURSE PRACTITIONER

## 2022-02-14 PROCEDURE — 1101F PT FALLS ASSESS-DOCD LE1/YR: CPT | Mod: CPTII,S$GLB,, | Performed by: NURSE PRACTITIONER

## 2022-02-14 PROCEDURE — 1157F ADVNC CARE PLAN IN RCRD: CPT | Mod: CPTII,S$GLB,, | Performed by: NURSE PRACTITIONER

## 2022-02-14 PROCEDURE — 3288F PR FALLS RISK ASSESSMENT DOCUMENTED: ICD-10-PCS | Mod: CPTII,S$GLB,, | Performed by: NURSE PRACTITIONER

## 2022-02-14 PROCEDURE — 1160F RVW MEDS BY RX/DR IN RCRD: CPT | Mod: CPTII,S$GLB,, | Performed by: NURSE PRACTITIONER

## 2022-02-14 PROCEDURE — 1157F PR ADVANCE CARE PLAN OR EQUIV PRESENT IN MEDICAL RECORD: ICD-10-PCS | Mod: CPTII,S$GLB,, | Performed by: NURSE PRACTITIONER

## 2022-02-14 PROCEDURE — 3288F FALL RISK ASSESSMENT DOCD: CPT | Mod: CPTII,S$GLB,, | Performed by: NURSE PRACTITIONER

## 2022-02-14 PROCEDURE — 1159F PR MEDICATION LIST DOCUMENTED IN MEDICAL RECORD: ICD-10-PCS | Mod: CPTII,S$GLB,, | Performed by: NURSE PRACTITIONER

## 2022-02-14 PROCEDURE — 99999 PR PBB SHADOW E&M-EST. PATIENT-LVL V: ICD-10-PCS | Mod: PBBFAC,,, | Performed by: NURSE PRACTITIONER

## 2022-02-14 PROCEDURE — 3077F SYST BP >= 140 MM HG: CPT | Mod: CPTII,S$GLB,, | Performed by: NURSE PRACTITIONER

## 2022-02-14 PROCEDURE — 3044F HG A1C LEVEL LT 7.0%: CPT | Mod: CPTII,S$GLB,, | Performed by: NURSE PRACTITIONER

## 2022-02-14 PROCEDURE — 51798 US URINE CAPACITY MEASURE: CPT | Mod: S$GLB,,, | Performed by: NURSE PRACTITIONER

## 2022-02-14 PROCEDURE — 1159F MED LIST DOCD IN RCRD: CPT | Mod: CPTII,S$GLB,, | Performed by: NURSE PRACTITIONER

## 2022-02-14 PROCEDURE — 99999 PR PBB SHADOW E&M-EST. PATIENT-LVL V: CPT | Mod: PBBFAC,,, | Performed by: NURSE PRACTITIONER

## 2022-02-14 PROCEDURE — 3078F DIAST BP <80 MM HG: CPT | Mod: CPTII,S$GLB,, | Performed by: NURSE PRACTITIONER

## 2022-02-14 PROCEDURE — 99214 OFFICE O/P EST MOD 30 MIN: CPT | Mod: S$GLB,,, | Performed by: NURSE PRACTITIONER

## 2022-02-14 PROCEDURE — 1160F PR REVIEW ALL MEDS BY PRESCRIBER/CLIN PHARMACIST DOCUMENTED: ICD-10-PCS | Mod: CPTII,S$GLB,, | Performed by: NURSE PRACTITIONER

## 2022-02-14 PROCEDURE — 81002 POCT URINE DIPSTICK WITHOUT MICROSCOPE: ICD-10-PCS | Mod: S$GLB,,, | Performed by: NURSE PRACTITIONER

## 2022-02-14 PROCEDURE — 3077F PR MOST RECENT SYSTOLIC BLOOD PRESSURE >= 140 MM HG: ICD-10-PCS | Mod: CPTII,S$GLB,, | Performed by: NURSE PRACTITIONER

## 2022-02-14 PROCEDURE — 81002 URINALYSIS NONAUTO W/O SCOPE: CPT | Mod: S$GLB,,, | Performed by: NURSE PRACTITIONER

## 2022-02-14 PROCEDURE — 51798 POCT BLADDER SCAN: ICD-10-PCS | Mod: S$GLB,,, | Performed by: NURSE PRACTITIONER

## 2022-02-14 PROCEDURE — 1101F PR PT FALLS ASSESS DOC 0-1 FALLS W/OUT INJ PAST YR: ICD-10-PCS | Mod: CPTII,S$GLB,, | Performed by: NURSE PRACTITIONER

## 2022-02-14 PROCEDURE — 3008F BODY MASS INDEX DOCD: CPT | Mod: CPTII,S$GLB,, | Performed by: NURSE PRACTITIONER

## 2022-02-14 PROCEDURE — 3008F PR BODY MASS INDEX (BMI) DOCUMENTED: ICD-10-PCS | Mod: CPTII,S$GLB,, | Performed by: NURSE PRACTITIONER

## 2022-02-14 PROCEDURE — 1126F AMNT PAIN NOTED NONE PRSNT: CPT | Mod: CPTII,S$GLB,, | Performed by: NURSE PRACTITIONER

## 2022-02-14 PROCEDURE — 99214 PR OFFICE/OUTPT VISIT, EST, LEVL IV, 30-39 MIN: ICD-10-PCS | Mod: S$GLB,,, | Performed by: NURSE PRACTITIONER

## 2022-02-14 PROCEDURE — 3044F PR MOST RECENT HEMOGLOBIN A1C LEVEL <7.0%: ICD-10-PCS | Mod: CPTII,S$GLB,, | Performed by: NURSE PRACTITIONER

## 2022-02-14 NOTE — PROGRESS NOTES
CHIEF COMPLAINT:    Mrs. Kay is a 66 y.o. female presenting for lower abdominal pain.    .  PRESENTING ILLNESS:    Riana Kay is a 66 y.o. female with a PMH of htn, diabetes mellitus type 2, gerd, pauline who presents for lower abdominal pain.     Last seen in urology clinic by Dr. Stanford to undergo cystoscopy secondary hematuria 12/1/20.  Presents today due to persistent abdominal pain.  Evaluated by gastro 1/18/22 for same pain.  Pain is intermittent and improves with bowel movements.  The pain is not associated with urination.  Of note history of total hysterectomy.  Reviewed CT scan obtained in 12/2020 where source of pain not evident. Particularly no urological abnormalities noted.      REVIEW OF SYSTEMS:    Review of Systems   Constitutional: Negative for chills and fever.   Respiratory: Negative for shortness of breath.    Cardiovascular: Negative for chest pain.   Gastrointestinal: Positive for abdominal pain and constipation. Negative for diarrhea.   Genitourinary: Negative for dysuria, flank pain, frequency, hematuria and urgency.   Neurological: Negative for dizziness and weakness.        PATIENT HISTORY:    Past Medical History:   Diagnosis Date    Abdominal pain, right upper quadrant 2/4/2014    Anticoagulant long-term use     Anxiety     AR (allergic rhinitis)     Atrophic gastritis without mention of hemorrhage 2/13/2012     Dx updated per 2019 IMO Load    Chronic fatigue syndrome 6/10/2012    Diabetes mellitus     Dizziness     Fatty liver     GERD (gastroesophageal reflux disease)     Gross hematuria 12/1/2020    HTN (hypertension)     Hyperlipidemia     Leg swelling     Memory loss     Osteopenia     PONV (postoperative nausea and vomiting)     Primary osteoarthritis of right knee 10/6/2020    Primary osteoarthritis of right knee 10/6/2020    Right elbow pain 1/16/2019    S/P total hysterectomy     Screening for colon cancer 1/6/2021    Sleep apnea     Status post total  right knee replacement 10/6/2020 10/5/2020       Family History   Problem Relation Age of Onset    Colon cancer Father 83        colon cancer    Diabetes Maternal Grandmother     Diabetes Maternal Aunt     Esophageal cancer Neg Hx     Stomach cancer Neg Hx     Melanoma Neg Hx        Allergies:  Iodinated contrast media, Percocet [oxycodone-acetaminophen], Macrobid [nitrofurantoin monohyd/m-cryst], Metformin, Penicillins, Promethazine, Sulfa (sulfonamide antibiotics), and Sulfamethoxazole-trimethoprim    Medications:    Current Outpatient Medications:     azelastine (ASTELIN) 137 mcg (0.1 %) nasal spray, 1 spray (137 mcg total) by Nasal route 2 (two) times daily., Disp: 30 mL, Rfl: 11    blood sugar diagnostic Strp, To check BG 4 times daily, to use with insurance preferred meter-true metrix, Disp: 400 each, Rfl: 3    ciprofloxacin HCl (CIPRO) 500 MG tablet, Take 1 tablet (500 mg total) by mouth every 12 (twelve) hours., Disp: 14 tablet, Rfl: 0    cyclobenzaprine (FLEXERIL) 5 MG tablet, TAKE 1 TABLET BY MOUTH THREE TIMES A DAY AS NEEDED FOR MUSCLE SPASMS, Disp: 90 tablet, Rfl: 0    diclofenac sodium (VOLTAREN) 1 % Gel, APPLY 2 G TOPICALLY 2 (TWO) TIMES DAILY AS NEEDED (PAIN). DO NOT USE DIRECTLY ON INCISION, Disp: 100 g, Rfl: 0    dicyclomine (BENTYL) 10 MG capsule, Take 1 capsule (10 mg total) by mouth 3 (three) times daily. Take as needed for abdominal pain and cramping, Disp: 30 capsule, Rfl: 1    docusate sodium (COLACE) 100 MG capsule, Take 1 capsule (100 mg total) by mouth 2 (two) times daily as needed for Constipation., Disp: 60 capsule, Rfl: 0    ergocalciferol (ERGOCALCIFEROL) 50,000 unit Cap, TAKE 1 CAPSULE BY MOUTH ONE TIME PER WEEK, Disp: 12 capsule, Rfl: 0    fluticasone propionate (FLONASE) 50 mcg/actuation nasal spray, 2 sprays (100 mcg total) by Each Nostril route once daily., Disp: 16 g, Rfl: 11    HYDROcodone-acetaminophen (NORCO)  mg per tablet, Take 1 tablet by mouth every 4  "to 6 hours as needed for Pain., Disp: 50 tablet, Rfl: 0    insulin (LANTUS SOLOSTAR U-100 INSULIN) glargine 100 units/mL (3mL) SubQ pen, Inject 28-30  units at night., Disp: 1 Box, Rfl: 5    insulin lispro (HUMALOG KWIKPEN INSULIN) 100 unit/mL pen, INJECT 16 UNITS W/ MEALS PLUS SCALE 150-200+2, 201-250+4, 251-300+6, 301-350+8, >350+10. MAX DAILY 68 UNITS., Disp: 60 mL, Rfl: 3    lancets Misc, To check BG 4  times daily, to use with insurance preferred meter-true metrix, Disp: 400 each, Rfl: 3    latanoprost 0.005 % ophthalmic solution, Place 1 drop into both eyes nightly., Disp: , Rfl:     losartan (COZAAR) 25 MG tablet, Take 0.5 tablets (12.5 mg total) by mouth once daily., Disp: 45 tablet, Rfl: 3    meloxicam (MOBIC) 15 MG tablet, Take 1 tablet (15 mg total) by mouth once daily., Disp: 30 tablet, Rfl: 1    methocarbamoL (ROBAXIN) 750 MG Tab, Take 1 tablet (750 mg total) by mouth 3 (three) times daily as needed (muscle spasms)., Disp: 30 tablet, Rfl: 0    ondansetron (ZOFRAN-ODT) 8 MG TbDL, Dissolve 1 tablet (8 mg total) by mouth every 12 (twelve) hours as needed (nausea)., Disp: 20 tablet, Rfl: 0    pantoprazole (PROTONIX) 40 MG tablet, Take 1 tablet (40 mg total) by mouth once daily., Disp: 90 tablet, Rfl: 3    pen needle, diabetic (NOVOFINE 32) 32 gauge x 1/4" Ndle, Uses 4 times a day. 90 day via duramed e 11.65, Disp: 400 each, Rfl: 3    blood-glucose meter kit, To check BG 4 times daily, to use with insurance preferred meter-true metrix, Disp: 1 each, Rfl: 1    meclizine (ANTIVERT) 25 mg tablet, Take 1 tablet (25 mg total) by mouth 3 (three) times daily as needed for Dizziness., Disp: 30 tablet, Rfl: 1    metroNIDAZOLE (FLAGYL) 500 MG tablet, Take 1 tablet (500 mg total) by mouth 3 (three) times daily. (Patient not taking: No sig reported), Disp: 21 tablet, Rfl: 0    pregabalin (LYRICA) 75 MG capsule, Take 1 capsule (75 mg total) by mouth 2 (two) times daily. for 14 days, Disp: 28 capsule, Rfl: " 0    PHYSICAL EXAMINATION:    Physical Exam  Vitals and nursing note reviewed.   Constitutional:       Appearance: Normal appearance. She is well-developed.   HENT:      Head: Normocephalic and atraumatic.   Eyes:      Pupils: Pupils are equal, round, and reactive to light.   Pulmonary:      Effort: Pulmonary effort is normal.   Abdominal:       Musculoskeletal:         General: Normal range of motion.      Cervical back: Normal range of motion.   Skin:     General: Skin is warm and dry.   Neurological:      Mental Status: She is alert and oriented to person, place, and time.   Psychiatric:         Mood and Affect: Mood and affect normal.         Behavior: Behavior normal.           LABS:    U/a dipstick performed in office today: yellow, ph 7, 1.020, + leukocytes, trace bld      IMPRESSION:    Encounter Diagnoses   Name Primary?    Dysuria     Hematuria, unspecified type      CT abd/pelvis 12/10/2020:  Impression:     Diverticulosis without evidence of diverticulitis.  No acute finding to explain the patient's pain.     PLAN:    Problem List Items Addressed This Visit    None     Visit Diagnoses     Dysuria        Relevant Orders    POCT URINE DIPSTICK WITHOUT MICROSCOPE (Completed)    POCT Bladder Scan (Completed)    Hematuria, unspecified type              1. Lower abdominal pain   Reviewed CT scan   Does not seem to be from urological source   Recommend taking NSAIDs consistently for a couple of days and f/u with gi    2. Microscopic hematuria   Negative work up 2020    3. rtc if negative gi work up for further investigation    I spent 30 minutes with the patient. Over 50% of the visit was spent in counseling.      Gunjan Berry NP

## 2022-02-28 ENCOUNTER — PATIENT OUTREACH (OUTPATIENT)
Dept: ADMINISTRATIVE | Facility: OTHER | Age: 67
End: 2022-02-28
Payer: MEDICARE

## 2022-02-28 NOTE — PROGRESS NOTES
Health Maintenance Due   Topic Date Due    Aspirin/Antiplatelet Therapy  Never done    High Dose Statin  Never done    Shingles Vaccine (3 of 3) 07/01/2021    Pneumococcal Vaccines (Age 65+) (2 of 2 - PPSV23) 01/22/2022    Foot Exam  02/10/2022    Diabetes Urine Screening  04/09/2022     Updates were requested from care everywhere.  Chart was reviewed for overdue Proactive [facility] Encounters (BHARATH) topics (CRS, Breast Cancer Screening, Eye exam)  Health Maintenance has been updated.  LINKS immunization registry triggered.  Immunizations were reconciled.

## 2022-03-02 NOTE — PROGRESS NOTES
Subjective:      Patient ID: Riana Kay is a 66 y.o. female.    Chief Complaint: PCP, Diabetic Foot Exam, and Foot Problem (Burning and tingling of the feet and legs )    Riana is a 66 y.o. female who presents to the clinic upon referral from Dr. Lucretia boyce. provider found  for evaluation and treatment of diabetic feet. Riana has a past medical history of Abdominal pain, right upper quadrant (2/4/2014), Anticoagulant long-term use, Anxiety, AR (allergic rhinitis), Atrophic gastritis without mention of hemorrhage (2/13/2012), Chronic fatigue syndrome (6/10/2012), Diabetes mellitus, Dizziness, Fatty liver, GERD (gastroesophageal reflux disease), Gross hematuria (12/1/2020), HTN (hypertension), Hyperlipidemia, Leg swelling, Memory loss, Osteopenia, PONV (postoperative nausea and vomiting), Primary osteoarthritis of right knee (10/6/2020), Primary osteoarthritis of right knee (10/6/2020), Right elbow pain (1/16/2019), S/P total hysterectomy, Screening for colon cancer (1/6/2021), Sleep apnea, and Status post total right knee replacement 10/6/2020 (10/5/2020). Patient relates no major problem with feet. Only complaints today consist of yearly comprehensive diabetic  foot examination and toe deformities ( non painful )   .    PCP: Jose Rojas MD    Date Last Seen by PCP:   Chief Complaint   Patient presents with    PCP    Diabetic Foot Exam    Foot Problem     Burning and tingling of the feet and legs          Current shoe gear: Casual shoes    Hemoglobin A1C   Date Value Ref Range Status   02/02/2022 6.2 (H) 4.0 - 5.6 % Final     Comment:     ADA Screening Guidelines:  5.7-6.4%  Consistent with prediabetes  >or=6.5%  Consistent with diabetes    High levels of fetal hemoglobin interfere with the HbA1C  assay. Heterozygous hemoglobin variants (HbS, HgC, etc)do  not significantly interfere with this assay.   However, presence of multiple variants may affect accuracy.     10/14/2021 6.4 (H) 4.0 - 5.6 %  "Final     Comment:     ADA Screening Guidelines:  5.7-6.4%  Consistent with prediabetes  >or=6.5%  Consistent with diabetes    High levels of fetal hemoglobin interfere with the HbA1C  assay. Heterozygous hemoglobin variants (HbS, HgC, etc)do  not significantly interfere with this assay.   However, presence of multiple variants may affect accuracy.     04/09/2021 6.1 (H) 4.0 - 5.6 % Final     Comment:     ADA Screening Guidelines:  5.7-6.4%  Consistent with prediabetes  >or=6.5%  Consistent with diabetes    High levels of fetal hemoglobin interfere with the HbA1C  assay. Heterozygous hemoglobin variants (HbS, HgC, etc)do  not significantly interfere with this assay.   However, presence of multiple variants may affect accuracy.             Review of Systems   Constitutional: Negative for chills, decreased appetite and fever.   Cardiovascular: Negative for leg swelling.   Skin: Negative for dry skin and flushing.   Musculoskeletal: Positive for stiffness (toe b/l ). Negative for arthritis, joint pain, joint swelling and myalgias.   Gastrointestinal: Negative for nausea and vomiting.   Neurological: Negative for loss of balance, numbness and paresthesias.           Objective:       Vitals:    02/09/22 0847   BP: (!) 149/78   Pulse: 70   Resp: 18   Weight: 79 kg (174 lb 2.6 oz)   Height: 5' 5" (1.651 m)   PainSc: 0-No pain        Physical Exam  Vitals and nursing note reviewed.   Constitutional:       General: She is not in acute distress.     Appearance: She is well-developed. She is not toxic-appearing or diaphoretic.      Comments: alert and oriented x 3.    Cardiovascular:      Pulses:           Dorsalis pedis pulses are 2+ on the right side and 2+ on the left side.        Posterior tibial pulses are 2+ on the right side and 2+ on the left side.      Comments:  Capillary refill time is within normal limits. Digital hair present.   Pulmonary:      Effort: No respiratory distress.   Musculoskeletal:         General: " No deformity.      Right ankle: No tenderness. No lateral malleolus, medial malleolus, AITF ligament, CF ligament or posterior TF ligament tenderness.      Right Achilles Tendon: No defects. Graf's test negative.      Left ankle: No tenderness. No lateral malleolus, medial malleolus, AITF ligament, CF ligament or posterior TF ligament tenderness.      Left Achilles Tendon: No defects. Graf's test negative.      Right foot: No tenderness or bony tenderness.      Left foot: No tenderness or bony tenderness.      Comments: Adequate joint range of motion without pain, limitation, nor crepitation Bilateral feet and ankle joints. Muscle strength is 5/5 in all groups bilaterally.        Semi  reducible IPJ digital contracture 2-3 b/l      Feet:      Right foot:      Protective Sensation: 5 sites tested. 5 sites sensed.      Left foot:      Protective Sensation: 5 sites tested. 5 sites sensed.   Lymphadenopathy:      Comments: No lymphatic streaking     Skin:     General: Skin is warm and dry.      Coloration: Skin is not pale.      Findings: No rash.      Nails: There is no clubbing.      Comments: Skin is of normal turgor.   Normal temperature gradient.  Examination of the skin reveals no evidence of significant rashes, open lesions, suspicious appearing nevi or other concerning lesions.    Toenails 1-5 bilaterally are neatly trimmed; of normal color and thickness     Neurological:      Sensory: No sensory deficit.      Motor: No atrophy.      Comments: Light touch present     Psychiatric:         Attention and Perception: She is attentive.         Mood and Affect: Mood is not anxious. Affect is not inappropriate.         Speech: She is communicative. Speech is not slurred.         Behavior: Behavior is not combative.               Assessment:       Encounter Diagnoses   Name Primary?    Type 2 diabetes mellitus with other specified complication, with long-term current use of insulin Yes    Hammer toes of both  feet          Plan:       Riana was seen today for pcp, diabetic foot exam and foot problem.    Diagnoses and all orders for this visit:    Type 2 diabetes mellitus with other specified complication, with long-term current use of insulin  -     DIABETIC SHOES FOR HOME USE    Hammer toes of both feet  -     DIABETIC SHOES FOR HOME USE      I counseled the patient on her conditions, their implications and medical management.      - Shoe inspection. Diabetic Foot Education. Patient reminded of the importance of good nutrition and blood sugar control to help prevent podiatric complications of diabetes. Patient instructed on proper foot hygeine. We discussed wearing proper shoe gear, daily foot inspections, never walking without protective shoe gear, caution putting sharp instruments to feet     - Discussed DM foot care:  Wear comfortable, proper fitting shoes. Wash feet daily. Dry well. After drying, apply moisturizer to feet (no lotion to webspaces). Inspect feet daily for skin breaks, blisters, swelling, or redness. Wear cotton socks (preferably white)  Change socks every day. Do NOT walk barefoot. Do NOT use heating pads or warm/hot water soaks     - Discussed importance of daily moisturizer to the feet such as Gold bonds diabetic foot cream    - Patient is low risk for developing lower extremity issues secondary to diabetes. I recommend continued yearly diabetic foot examinations.     - Patients PCP can perform yearly foot checks . Currently  patient has no pedal manifestations of DM    - Patients with out pedal manifestations of DM, do not qualify for nail/callus trimming     - Discussed surgical and conservative management of HT  deformity. Conservatively we did discuss padding, and shoe modifications such as softer shoes with wide toe boxes. Surgically we briefly discussed pre and post operative expectations. The patient elects for conservative  management at this time     - Applied offload digital crest pad  to foot. Instructed patient on usage. Advised to always remove daily and ensure the band is NOT applied to tightly as this could cause skin irritation or wounds    - RTC in  PRN     .

## 2022-03-11 ENCOUNTER — OFFICE VISIT (OUTPATIENT)
Dept: URGENT CARE | Facility: CLINIC | Age: 67
End: 2022-03-11
Payer: MEDICARE

## 2022-03-11 VITALS
BODY MASS INDEX: 28.49 KG/M2 | SYSTOLIC BLOOD PRESSURE: 154 MMHG | OXYGEN SATURATION: 96 % | RESPIRATION RATE: 18 BRPM | HEIGHT: 65 IN | WEIGHT: 171 LBS | HEART RATE: 73 BPM | DIASTOLIC BLOOD PRESSURE: 72 MMHG | TEMPERATURE: 99 F

## 2022-03-11 DIAGNOSIS — J30.2 SEASONAL ALLERGIES: Primary | ICD-10-CM

## 2022-03-11 DIAGNOSIS — J01.90 ACUTE BACTERIAL SINUSITIS: ICD-10-CM

## 2022-03-11 DIAGNOSIS — B96.89 ACUTE BACTERIAL SINUSITIS: ICD-10-CM

## 2022-03-11 LAB
CTP QC/QA: YES
SARS-COV-2 RDRP RESP QL NAA+PROBE: NEGATIVE

## 2022-03-11 PROCEDURE — 1160F RVW MEDS BY RX/DR IN RCRD: CPT | Mod: CPTII,S$GLB,, | Performed by: STUDENT IN AN ORGANIZED HEALTH CARE EDUCATION/TRAINING PROGRAM

## 2022-03-11 PROCEDURE — 3077F PR MOST RECENT SYSTOLIC BLOOD PRESSURE >= 140 MM HG: ICD-10-PCS | Mod: CPTII,S$GLB,, | Performed by: STUDENT IN AN ORGANIZED HEALTH CARE EDUCATION/TRAINING PROGRAM

## 2022-03-11 PROCEDURE — 99214 OFFICE O/P EST MOD 30 MIN: CPT | Mod: S$GLB,CS,, | Performed by: STUDENT IN AN ORGANIZED HEALTH CARE EDUCATION/TRAINING PROGRAM

## 2022-03-11 PROCEDURE — 1160F PR REVIEW ALL MEDS BY PRESCRIBER/CLIN PHARMACIST DOCUMENTED: ICD-10-PCS | Mod: CPTII,S$GLB,, | Performed by: STUDENT IN AN ORGANIZED HEALTH CARE EDUCATION/TRAINING PROGRAM

## 2022-03-11 PROCEDURE — 3044F PR MOST RECENT HEMOGLOBIN A1C LEVEL <7.0%: ICD-10-PCS | Mod: CPTII,S$GLB,, | Performed by: STUDENT IN AN ORGANIZED HEALTH CARE EDUCATION/TRAINING PROGRAM

## 2022-03-11 PROCEDURE — 1157F PR ADVANCE CARE PLAN OR EQUIV PRESENT IN MEDICAL RECORD: ICD-10-PCS | Mod: CPTII,S$GLB,, | Performed by: STUDENT IN AN ORGANIZED HEALTH CARE EDUCATION/TRAINING PROGRAM

## 2022-03-11 PROCEDURE — 3008F BODY MASS INDEX DOCD: CPT | Mod: CPTII,S$GLB,, | Performed by: STUDENT IN AN ORGANIZED HEALTH CARE EDUCATION/TRAINING PROGRAM

## 2022-03-11 PROCEDURE — 3008F PR BODY MASS INDEX (BMI) DOCUMENTED: ICD-10-PCS | Mod: CPTII,S$GLB,, | Performed by: STUDENT IN AN ORGANIZED HEALTH CARE EDUCATION/TRAINING PROGRAM

## 2022-03-11 PROCEDURE — 1159F PR MEDICATION LIST DOCUMENTED IN MEDICAL RECORD: ICD-10-PCS | Mod: CPTII,S$GLB,, | Performed by: STUDENT IN AN ORGANIZED HEALTH CARE EDUCATION/TRAINING PROGRAM

## 2022-03-11 PROCEDURE — U0002 COVID-19 LAB TEST NON-CDC: HCPCS | Mod: QW,S$GLB,, | Performed by: STUDENT IN AN ORGANIZED HEALTH CARE EDUCATION/TRAINING PROGRAM

## 2022-03-11 PROCEDURE — 3077F SYST BP >= 140 MM HG: CPT | Mod: CPTII,S$GLB,, | Performed by: STUDENT IN AN ORGANIZED HEALTH CARE EDUCATION/TRAINING PROGRAM

## 2022-03-11 PROCEDURE — 1159F MED LIST DOCD IN RCRD: CPT | Mod: CPTII,S$GLB,, | Performed by: STUDENT IN AN ORGANIZED HEALTH CARE EDUCATION/TRAINING PROGRAM

## 2022-03-11 PROCEDURE — U0002: ICD-10-PCS | Mod: QW,S$GLB,, | Performed by: STUDENT IN AN ORGANIZED HEALTH CARE EDUCATION/TRAINING PROGRAM

## 2022-03-11 PROCEDURE — 3044F HG A1C LEVEL LT 7.0%: CPT | Mod: CPTII,S$GLB,, | Performed by: STUDENT IN AN ORGANIZED HEALTH CARE EDUCATION/TRAINING PROGRAM

## 2022-03-11 PROCEDURE — 3078F PR MOST RECENT DIASTOLIC BLOOD PRESSURE < 80 MM HG: ICD-10-PCS | Mod: CPTII,S$GLB,, | Performed by: STUDENT IN AN ORGANIZED HEALTH CARE EDUCATION/TRAINING PROGRAM

## 2022-03-11 PROCEDURE — 3078F DIAST BP <80 MM HG: CPT | Mod: CPTII,S$GLB,, | Performed by: STUDENT IN AN ORGANIZED HEALTH CARE EDUCATION/TRAINING PROGRAM

## 2022-03-11 PROCEDURE — 4010F PR ACE/ARB THEARPY RXD/TAKEN: ICD-10-PCS | Mod: CPTII,S$GLB,, | Performed by: STUDENT IN AN ORGANIZED HEALTH CARE EDUCATION/TRAINING PROGRAM

## 2022-03-11 PROCEDURE — 1157F ADVNC CARE PLAN IN RCRD: CPT | Mod: CPTII,S$GLB,, | Performed by: STUDENT IN AN ORGANIZED HEALTH CARE EDUCATION/TRAINING PROGRAM

## 2022-03-11 PROCEDURE — 99214 PR OFFICE/OUTPT VISIT, EST, LEVL IV, 30-39 MIN: ICD-10-PCS | Mod: S$GLB,CS,, | Performed by: STUDENT IN AN ORGANIZED HEALTH CARE EDUCATION/TRAINING PROGRAM

## 2022-03-11 PROCEDURE — 4010F ACE/ARB THERAPY RXD/TAKEN: CPT | Mod: CPTII,S$GLB,, | Performed by: STUDENT IN AN ORGANIZED HEALTH CARE EDUCATION/TRAINING PROGRAM

## 2022-03-11 RX ORDER — AZITHROMYCIN 250 MG/1
TABLET, FILM COATED ORAL
Qty: 6 TABLET | Refills: 0 | Status: SHIPPED | OUTPATIENT
Start: 2022-03-11 | End: 2022-03-16

## 2022-03-11 NOTE — PATIENT INSTRUCTIONS
-Begin daily antihistamine such as Claritin, Zyrtec, or Allegra.     -Add plain Mucinex to help with the congestion and sinus pressure.     -Continue with your azelastine and fluticasone twice daily for your allergies.     You have been prescribed a short course of an antibiotic due to concern for mild acute bacterial sinusitis.     Please follow up with your doctor if your symptoms persist.

## 2022-03-11 NOTE — PROGRESS NOTES
"Subjective:       Patient ID: Riana Kay is a 66 y.o. female.    Vitals:  height is 5' 5" (1.651 m) and weight is 77.6 kg (171 lb). Her temperature is 98.5 °F (36.9 °C). Her blood pressure is 154/72 (abnormal) and her pulse is 73. Her respiration is 18 and oxygen saturation is 96%.     Chief Complaint: Cough    Pt states that she is fully vaccinated w/ booster.     Onset of symptoms after gardening. Hx of seasonal allergies. Takes flonase and azelastine daily.     Cough  This is a new problem. The current episode started in the past 7 days (monday). The problem has been gradually worsening. The problem occurs constantly. The cough is productive of purulent sputum (yellow sputum). Associated symptoms include chills, headaches, nasal congestion and postnasal drip. Pertinent negatives include no chest pain, ear congestion, fever, rhinorrhea, sore throat or shortness of breath. Nothing aggravates the symptoms. Treatments tried: Delsym  The treatment provided no relief. Her past medical history is significant for bronchitis. There is no history of asthma or pneumonia.       Constitution: Positive for chills. Negative for fever.   HENT: Positive for postnasal drip. Negative for sore throat.    Cardiovascular: Negative for chest pain.   Respiratory: Positive for cough. Negative for shortness of breath.    Neurological: Positive for headaches.       Objective:      Physical Exam   Constitutional: She is oriented to person, place, and time. She does not appear ill. No distress.   HENT:   Head: Normocephalic.   Pulmonary/Chest: No respiratory distress.   Neurological: She is alert and oriented to person, place, and time. Coordination normal.   Psychiatric: Her behavior is normal. Mood normal.   Nursing note and vitals reviewed.        Assessment:       1. Seasonal allergies    2. Acute bacterial sinusitis        Results for orders placed or performed in visit on 03/11/22   POCT COVID-19 Rapid Screening   Result " Value Ref Range    POC Rapid COVID Negative Negative     Acceptable Yes      *Note: Due to a large number of results and/or encounters for the requested time period, some results have not been displayed. A complete set of results can be found in Results Review.       Plan:         Seasonal allergies  -     POCT COVID-19 Rapid Screening    Acute bacterial sinusitis  -     azithromycin (Z-MALICK) 250 MG tablet; Take 2 tablets by mouth on day 1; Take 1 tablet by mouth on days 2-5  Dispense: 6 tablet; Refill: 0    Diagnosis and plan discussed with the patient as well as the expected course and duration of her  symptoms.  Use prescription and/or OTC medications as directed. She was advised to follow up with her PCP. [place] [facility] [place] All questions and concerns were addressed prior to discharge. Patient verbalized understanding and was agreeable with the plan of care.

## 2022-03-13 ENCOUNTER — PES CALL (OUTPATIENT)
Dept: ADMINISTRATIVE | Facility: OTHER | Age: 67
End: 2022-03-13
Payer: MEDICARE

## 2022-03-22 ENCOUNTER — TELEPHONE (OUTPATIENT)
Dept: INTERNAL MEDICINE | Facility: CLINIC | Age: 67
End: 2022-03-22
Payer: MEDICARE

## 2022-04-07 ENCOUNTER — PATIENT MESSAGE (OUTPATIENT)
Dept: INTERNAL MEDICINE | Facility: CLINIC | Age: 67
End: 2022-04-07
Payer: MEDICARE

## 2022-04-07 ENCOUNTER — TELEPHONE (OUTPATIENT)
Dept: OTOLARYNGOLOGY | Facility: CLINIC | Age: 67
End: 2022-04-07
Payer: MEDICARE

## 2022-04-08 ENCOUNTER — PATIENT MESSAGE (OUTPATIENT)
Dept: INTERNAL MEDICINE | Facility: CLINIC | Age: 67
End: 2022-04-08
Payer: MEDICARE

## 2022-04-08 DIAGNOSIS — H81.10 BENIGN PAROXYSMAL POSITIONAL VERTIGO, UNSPECIFIED LATERALITY: ICD-10-CM

## 2022-04-08 RX ORDER — MECLIZINE HYDROCHLORIDE 25 MG/1
25 TABLET ORAL 3 TIMES DAILY PRN
Qty: 30 TABLET | Refills: 1 | Status: SHIPPED | OUTPATIENT
Start: 2022-04-08 | End: 2023-12-05

## 2022-04-08 NOTE — TELEPHONE ENCOUNTER
No new care gaps identified.  Powered by [email or web address] by [employer]. Reference number: [id number].   4/08/2022 1:28:14 PM CDT

## 2022-04-12 ENCOUNTER — LAB VISIT (OUTPATIENT)
Dept: LAB | Facility: HOSPITAL | Age: 67
End: 2022-04-12
Attending: NURSE PRACTITIONER
Payer: MEDICARE

## 2022-04-12 ENCOUNTER — OFFICE VISIT (OUTPATIENT)
Dept: INTERNAL MEDICINE | Facility: CLINIC | Age: 67
End: 2022-04-12
Payer: MEDICARE

## 2022-04-12 VITALS
SYSTOLIC BLOOD PRESSURE: 130 MMHG | HEIGHT: 65 IN | WEIGHT: 171.44 LBS | HEART RATE: 60 BPM | DIASTOLIC BLOOD PRESSURE: 74 MMHG | BODY MASS INDEX: 28.56 KG/M2

## 2022-04-12 DIAGNOSIS — E66.3 OVERWEIGHT (BMI 25.0-29.9): ICD-10-CM

## 2022-04-12 DIAGNOSIS — Z79.4 TYPE 2 DIABETES MELLITUS WITH OTHER SPECIFIED COMPLICATION, WITH LONG-TERM CURRENT USE OF INSULIN: ICD-10-CM

## 2022-04-12 DIAGNOSIS — K21.9 GASTROESOPHAGEAL REFLUX DISEASE, UNSPECIFIED WHETHER ESOPHAGITIS PRESENT: ICD-10-CM

## 2022-04-12 DIAGNOSIS — M85.80 OSTEOPENIA, UNSPECIFIED LOCATION: ICD-10-CM

## 2022-04-12 DIAGNOSIS — K76.0 FATTY LIVER: ICD-10-CM

## 2022-04-12 DIAGNOSIS — I70.0 AORTIC ATHEROSCLEROSIS: ICD-10-CM

## 2022-04-12 DIAGNOSIS — E11.69 TYPE 2 DIABETES MELLITUS WITH OTHER SPECIFIED COMPLICATION, WITH LONG-TERM CURRENT USE OF INSULIN: ICD-10-CM

## 2022-04-12 DIAGNOSIS — G47.33 OSA (OBSTRUCTIVE SLEEP APNEA): ICD-10-CM

## 2022-04-12 DIAGNOSIS — I10 PRIMARY HYPERTENSION: ICD-10-CM

## 2022-04-12 DIAGNOSIS — M17.12 PRIMARY OSTEOARTHRITIS OF LEFT KNEE: ICD-10-CM

## 2022-04-12 DIAGNOSIS — I25.10 ATHEROSCLEROSIS OF NATIVE CORONARY ARTERY OF NATIVE HEART WITHOUT ANGINA PECTORIS: ICD-10-CM

## 2022-04-12 DIAGNOSIS — E55.9 VITAMIN D DEFICIENCY: ICD-10-CM

## 2022-04-12 DIAGNOSIS — Z00.00 ENCOUNTER FOR PREVENTIVE HEALTH EXAMINATION: Primary | ICD-10-CM

## 2022-04-12 LAB
ESTIMATED AVG GLUCOSE: 137 MG/DL (ref 68–131)
HBA1C MFR BLD: 6.4 % (ref 4–5.6)

## 2022-04-12 PROCEDURE — 36415 COLL VENOUS BLD VENIPUNCTURE: CPT | Performed by: NURSE PRACTITIONER

## 2022-04-12 PROCEDURE — G0439 PR MEDICARE ANNUAL WELLNESS SUBSEQUENT VISIT: ICD-10-PCS | Mod: S$GLB,,, | Performed by: NURSE PRACTITIONER

## 2022-04-12 PROCEDURE — 4010F PR ACE/ARB THEARPY RXD/TAKEN: ICD-10-PCS | Mod: CPTII,S$GLB,, | Performed by: NURSE PRACTITIONER

## 2022-04-12 PROCEDURE — 83036 HEMOGLOBIN GLYCOSYLATED A1C: CPT | Performed by: NURSE PRACTITIONER

## 2022-04-12 PROCEDURE — 3078F DIAST BP <80 MM HG: CPT | Mod: CPTII,S$GLB,, | Performed by: NURSE PRACTITIONER

## 2022-04-12 PROCEDURE — 99999 PR PBB SHADOW E&M-EST. PATIENT-LVL IV: ICD-10-PCS | Mod: PBBFAC,,, | Performed by: NURSE PRACTITIONER

## 2022-04-12 PROCEDURE — 99999 PR PBB SHADOW E&M-EST. PATIENT-LVL IV: CPT | Mod: PBBFAC,,, | Performed by: NURSE PRACTITIONER

## 2022-04-12 PROCEDURE — 1159F MED LIST DOCD IN RCRD: CPT | Mod: CPTII,S$GLB,, | Performed by: NURSE PRACTITIONER

## 2022-04-12 PROCEDURE — 3288F PR FALLS RISK ASSESSMENT DOCUMENTED: ICD-10-PCS | Mod: CPTII,S$GLB,, | Performed by: NURSE PRACTITIONER

## 2022-04-12 PROCEDURE — 1160F PR REVIEW ALL MEDS BY PRESCRIBER/CLIN PHARMACIST DOCUMENTED: ICD-10-PCS | Mod: CPTII,S$GLB,, | Performed by: NURSE PRACTITIONER

## 2022-04-12 PROCEDURE — 1126F PR PAIN SEVERITY QUANTIFIED, NO PAIN PRESENT: ICD-10-PCS | Mod: CPTII,S$GLB,, | Performed by: NURSE PRACTITIONER

## 2022-04-12 PROCEDURE — 3078F PR MOST RECENT DIASTOLIC BLOOD PRESSURE < 80 MM HG: ICD-10-PCS | Mod: CPTII,S$GLB,, | Performed by: NURSE PRACTITIONER

## 2022-04-12 PROCEDURE — 3075F SYST BP GE 130 - 139MM HG: CPT | Mod: CPTII,S$GLB,, | Performed by: NURSE PRACTITIONER

## 2022-04-12 PROCEDURE — 3044F PR MOST RECENT HEMOGLOBIN A1C LEVEL <7.0%: ICD-10-PCS | Mod: CPTII,S$GLB,, | Performed by: NURSE PRACTITIONER

## 2022-04-12 PROCEDURE — 3008F BODY MASS INDEX DOCD: CPT | Mod: CPTII,S$GLB,, | Performed by: NURSE PRACTITIONER

## 2022-04-12 PROCEDURE — 1157F PR ADVANCE CARE PLAN OR EQUIV PRESENT IN MEDICAL RECORD: ICD-10-PCS | Mod: CPTII,S$GLB,, | Performed by: NURSE PRACTITIONER

## 2022-04-12 PROCEDURE — 1159F PR MEDICATION LIST DOCUMENTED IN MEDICAL RECORD: ICD-10-PCS | Mod: CPTII,S$GLB,, | Performed by: NURSE PRACTITIONER

## 2022-04-12 PROCEDURE — 3075F PR MOST RECENT SYSTOLIC BLOOD PRESS GE 130-139MM HG: ICD-10-PCS | Mod: CPTII,S$GLB,, | Performed by: NURSE PRACTITIONER

## 2022-04-12 PROCEDURE — 1100F PR PT FALLS ASSESS DOC 2+ FALLS/FALL W/INJURY/YR: ICD-10-PCS | Mod: CPTII,S$GLB,, | Performed by: NURSE PRACTITIONER

## 2022-04-12 PROCEDURE — 1160F RVW MEDS BY RX/DR IN RCRD: CPT | Mod: CPTII,S$GLB,, | Performed by: NURSE PRACTITIONER

## 2022-04-12 PROCEDURE — 1100F PTFALLS ASSESS-DOCD GE2>/YR: CPT | Mod: CPTII,S$GLB,, | Performed by: NURSE PRACTITIONER

## 2022-04-12 PROCEDURE — 3044F HG A1C LEVEL LT 7.0%: CPT | Mod: CPTII,S$GLB,, | Performed by: NURSE PRACTITIONER

## 2022-04-12 PROCEDURE — 1170F FXNL STATUS ASSESSED: CPT | Mod: CPTII,S$GLB,, | Performed by: NURSE PRACTITIONER

## 2022-04-12 PROCEDURE — 1157F ADVNC CARE PLAN IN RCRD: CPT | Mod: CPTII,S$GLB,, | Performed by: NURSE PRACTITIONER

## 2022-04-12 PROCEDURE — 1170F PR FUNCTIONAL STATUS ASSESSED: ICD-10-PCS | Mod: CPTII,S$GLB,, | Performed by: NURSE PRACTITIONER

## 2022-04-12 PROCEDURE — 3008F PR BODY MASS INDEX (BMI) DOCUMENTED: ICD-10-PCS | Mod: CPTII,S$GLB,, | Performed by: NURSE PRACTITIONER

## 2022-04-12 PROCEDURE — G0439 PPPS, SUBSEQ VISIT: HCPCS | Mod: S$GLB,,, | Performed by: NURSE PRACTITIONER

## 2022-04-12 PROCEDURE — 3288F FALL RISK ASSESSMENT DOCD: CPT | Mod: CPTII,S$GLB,, | Performed by: NURSE PRACTITIONER

## 2022-04-12 PROCEDURE — 4010F ACE/ARB THERAPY RXD/TAKEN: CPT | Mod: CPTII,S$GLB,, | Performed by: NURSE PRACTITIONER

## 2022-04-12 PROCEDURE — 1126F AMNT PAIN NOTED NONE PRSNT: CPT | Mod: CPTII,S$GLB,, | Performed by: NURSE PRACTITIONER

## 2022-04-12 NOTE — PROGRESS NOTES
"  Riana Kay presented for a  Medicare AWV and comprehensive Health Risk Assessment today. The following components were reviewed and updated:    · Medical history  · Family History  · Social history  · Allergies and Current Medications  · Health Risk Assessment  · Health Maintenance  · Care Team         ** See Completed Assessments for Annual Wellness Visit within the encounter summary.**         The following assessments were completed:  · Living Situation  · CAGE  · Depression Screening  · Timed Get Up and Go  · Whisper Test  · Cognitive Function Screening  · Nutrition Screening  · ADL Screening  · PAQ Screening        Vitals:    04/12/22 0818   BP: 130/74   BP Location: Left arm   Patient Position: Sitting   Pulse: 60   Weight: 77.7 kg (171 lb 6.5 oz)   Height: 5' 5" (1.651 m)     Body mass index is 28.52 kg/m².     Physical Exam  Constitutional:       General: She is not in acute distress.     Appearance: She is well-developed. She is not diaphoretic.   HENT:      Head: Normocephalic and atraumatic.   Eyes:      General: No scleral icterus.     Conjunctiva/sclera: Conjunctivae normal.   Cardiovascular:      Rate and Rhythm: Normal rate and regular rhythm.      Pulses: Normal pulses.      Heart sounds: Normal heart sounds.   Pulmonary:      Effort: Pulmonary effort is normal. No respiratory distress.      Breath sounds: Normal breath sounds.   Abdominal:      General: There is no distension.   Musculoskeletal:         General: Normal range of motion.      Cervical back: Normal range of motion and neck supple.   Skin:     General: Skin is warm and dry.   Neurological:      Mental Status: She is alert and oriented to person, place, and time.   Psychiatric:         Behavior: Behavior normal.           Diagnoses and health risks identified today and associated recommendations/orders:    1. Encounter for preventive health examination  Assessments completed  Preventive health recommendations reviewed  Pt did ok on " mini cog. She does have an appointment with neurology for memory problems    2. Type 2 diabetes mellitus with other specified complication, with long-term current use of insulin  Stable. Last A1C 6.2  Controlled with current medical therapy  Followed by PCP.     3. Atherosclerosis of native coronary artery of native heart without angina pectoris  Stable.   Followed by PCP.     4. Aortic atherosclerosis  Stable.   Seen on CT from -09/22/20  Followed by PCP.     5. Primary hypertension  Stable.   Controlled with current medical therapy  Followed by PCP.     6. Vitamin D deficiency  Stable.   Controlled with current medical therapy  Followed by PCP.     7. Gastroesophageal reflux disease, unspecified whether esophagitis present  Stable.   Controlled with current medical therapy  Followed by PCP.     8. Fatty liver  Stable.   Followed by PCP.     9. Osteopenia, unspecified location  Stable.   Controlled with current medical therapy  Followed by PCP.     10. ZAHIRA (obstructive sleep apnea)  Stable.   Uses cpap mahine  Followed by sleep medicine.     11. Primary osteoarthritis of left knee  Stable.   Hx of bilateral knee replacements  Followed by ortho     12. Overweight (BMI 25.0-29.9)  Stable.   Exercise encouraged  Followed by PCP.     Provided Riana with a 5-10 year written screening schedule and personal prevention plan. Recommendations were developed using the USPSTF age appropriate recommendations. Education, counseling, and referrals were provided as needed. After Visit Summary printed and given to patient which includes a list of additional screenings\tests needed.    Follow up in about 2 months (around 6/12/2022) for a routine visit with your PCP or sooner if needed.    Ragini Adrian NP

## 2022-04-12 NOTE — PROGRESS NOTES
I offered to discuss advanced care planning, including how to pick a person who would make decisions for you if you were unable to make them for yourself, called a health care power of , and what kind of decisions you might make such as use of life sustaining treatments such as ventilators and tube feeding when faced with a life limiting illness recorded on a living will that they will need to know. (How you want to be cared for as you near the end of your natural life)     X  Patient has advanced directives on file

## 2022-05-02 ENCOUNTER — TELEPHONE (OUTPATIENT)
Dept: SLEEP MEDICINE | Facility: CLINIC | Age: 67
End: 2022-05-02
Payer: MEDICARE

## 2022-05-04 ENCOUNTER — OFFICE VISIT (OUTPATIENT)
Dept: SLEEP MEDICINE | Facility: CLINIC | Age: 67
End: 2022-05-04
Payer: MEDICARE

## 2022-05-04 VITALS
WEIGHT: 173.81 LBS | HEIGHT: 65 IN | HEART RATE: 68 BPM | BODY MASS INDEX: 28.96 KG/M2 | SYSTOLIC BLOOD PRESSURE: 141 MMHG | DIASTOLIC BLOOD PRESSURE: 67 MMHG

## 2022-05-04 DIAGNOSIS — G47.33 OSA (OBSTRUCTIVE SLEEP APNEA): Primary | ICD-10-CM

## 2022-05-04 PROCEDURE — 3008F BODY MASS INDEX DOCD: CPT | Mod: CPTII,S$GLB,, | Performed by: PSYCHIATRY & NEUROLOGY

## 2022-05-04 PROCEDURE — 1157F PR ADVANCE CARE PLAN OR EQUIV PRESENT IN MEDICAL RECORD: ICD-10-PCS | Mod: CPTII,S$GLB,, | Performed by: PSYCHIATRY & NEUROLOGY

## 2022-05-04 PROCEDURE — 99214 OFFICE O/P EST MOD 30 MIN: CPT | Mod: S$GLB,,, | Performed by: PSYCHIATRY & NEUROLOGY

## 2022-05-04 PROCEDURE — 1101F PR PT FALLS ASSESS DOC 0-1 FALLS W/OUT INJ PAST YR: ICD-10-PCS | Mod: CPTII,S$GLB,, | Performed by: PSYCHIATRY & NEUROLOGY

## 2022-05-04 PROCEDURE — 3044F PR MOST RECENT HEMOGLOBIN A1C LEVEL <7.0%: ICD-10-PCS | Mod: CPTII,S$GLB,, | Performed by: PSYCHIATRY & NEUROLOGY

## 2022-05-04 PROCEDURE — 1157F ADVNC CARE PLAN IN RCRD: CPT | Mod: CPTII,S$GLB,, | Performed by: PSYCHIATRY & NEUROLOGY

## 2022-05-04 PROCEDURE — 1159F MED LIST DOCD IN RCRD: CPT | Mod: CPTII,S$GLB,, | Performed by: PSYCHIATRY & NEUROLOGY

## 2022-05-04 PROCEDURE — 3288F FALL RISK ASSESSMENT DOCD: CPT | Mod: CPTII,S$GLB,, | Performed by: PSYCHIATRY & NEUROLOGY

## 2022-05-04 PROCEDURE — 1101F PT FALLS ASSESS-DOCD LE1/YR: CPT | Mod: CPTII,S$GLB,, | Performed by: PSYCHIATRY & NEUROLOGY

## 2022-05-04 PROCEDURE — 3078F PR MOST RECENT DIASTOLIC BLOOD PRESSURE < 80 MM HG: ICD-10-PCS | Mod: CPTII,S$GLB,, | Performed by: PSYCHIATRY & NEUROLOGY

## 2022-05-04 PROCEDURE — 3044F HG A1C LEVEL LT 7.0%: CPT | Mod: CPTII,S$GLB,, | Performed by: PSYCHIATRY & NEUROLOGY

## 2022-05-04 PROCEDURE — 3077F PR MOST RECENT SYSTOLIC BLOOD PRESSURE >= 140 MM HG: ICD-10-PCS | Mod: CPTII,S$GLB,, | Performed by: PSYCHIATRY & NEUROLOGY

## 2022-05-04 PROCEDURE — 4010F PR ACE/ARB THEARPY RXD/TAKEN: ICD-10-PCS | Mod: CPTII,S$GLB,, | Performed by: PSYCHIATRY & NEUROLOGY

## 2022-05-04 PROCEDURE — 3077F SYST BP >= 140 MM HG: CPT | Mod: CPTII,S$GLB,, | Performed by: PSYCHIATRY & NEUROLOGY

## 2022-05-04 PROCEDURE — 1159F PR MEDICATION LIST DOCUMENTED IN MEDICAL RECORD: ICD-10-PCS | Mod: CPTII,S$GLB,, | Performed by: PSYCHIATRY & NEUROLOGY

## 2022-05-04 PROCEDURE — 3008F PR BODY MASS INDEX (BMI) DOCUMENTED: ICD-10-PCS | Mod: CPTII,S$GLB,, | Performed by: PSYCHIATRY & NEUROLOGY

## 2022-05-04 PROCEDURE — 3078F DIAST BP <80 MM HG: CPT | Mod: CPTII,S$GLB,, | Performed by: PSYCHIATRY & NEUROLOGY

## 2022-05-04 PROCEDURE — 3288F PR FALLS RISK ASSESSMENT DOCUMENTED: ICD-10-PCS | Mod: CPTII,S$GLB,, | Performed by: PSYCHIATRY & NEUROLOGY

## 2022-05-04 PROCEDURE — 4010F ACE/ARB THERAPY RXD/TAKEN: CPT | Mod: CPTII,S$GLB,, | Performed by: PSYCHIATRY & NEUROLOGY

## 2022-05-04 PROCEDURE — 1126F PR PAIN SEVERITY QUANTIFIED, NO PAIN PRESENT: ICD-10-PCS | Mod: CPTII,S$GLB,, | Performed by: PSYCHIATRY & NEUROLOGY

## 2022-05-04 PROCEDURE — 99214 PR OFFICE/OUTPT VISIT, EST, LEVL IV, 30-39 MIN: ICD-10-PCS | Mod: S$GLB,,, | Performed by: PSYCHIATRY & NEUROLOGY

## 2022-05-04 PROCEDURE — 1126F AMNT PAIN NOTED NONE PRSNT: CPT | Mod: CPTII,S$GLB,, | Performed by: PSYCHIATRY & NEUROLOGY

## 2022-05-04 NOTE — PROGRESS NOTES
The patient location is: home  The chief complaint leading to consultation is: sleep disorder  Visit type: audiovisual  Total time spent with patient: 30 min  Each patient to whom he or she provides medical services by telemedicine is:  (1) informed of the relationship between the physician and patient and the respective role of any other health care provider with respect to management of the patient; and (2) notified that he or she may decline to receive medical services by telemedicine and may withdraw from such care at any time.        EPWORTH SLEEPINESS SCALE TOTAL SCORE  8/6/2021 5/18/2021 4/9/2021   Total score 11 9 7       Riana Kay is a 67 y.o. female seen today for CPAP  follow up. Last seen on 8/9/2021    The patient reports improved sleep continuity and daytime sleepiness on PAP. ESS today is `11/24.  Denies break through snoring.  No dry mouth.   No aerophagia or air hunger. No significant mask leaks and discomfort.    Resmed 10 ochme  Likes her [place] mask  APAP 5 to 18; 90% was 11  AHI - 2  Hrs - 6.7  Day 30/30  Leak - 20 L/min          Bedtime: 10:30  Sleep latency: 30min  Nocturnal awakenings:1-2 to go to the bathroom Returns to sleep in right away  Wake up time: 7 AM    Medications pertinent to sleep disorders: Alek sometimes  DME: [facility] fiven in spring 2021  SLEEP STUDIES: : 5/8/21: Mild to moderate snoring was present. The overall AHI was 29.8 with an oxygen marbella of 70.0%. The supine AHI was 43.1 and the REM AHI was 40.9. The patient did not qualify for a split night study due to an insufficient number of events in the first half of the study. RDI (Respiratory Disturbance Index) was 43.     Medications pertinent to sleep  disorders taken currently: Flonase, Asteline        Occupation:retired  Bed partner: her   Exercise routine: exercise - knee replacement makes it hard  Caffeine: AM coffee  Alcohol: No  Smoking:No    EPWORTH SLEEPINESS SCALE TOTAL SCORE   "8/6/2021 5/18/2021 4/9/2021   Total score 11 9 7                        PHYSICAL EXAM:  BP (!) 141/67   Pulse 68   Ht 5' 5" (1.651 m)   Wt 78.8 kg (173 lb 12.8 oz)   BMI 28.92 kg/m²         ASSESSMENT:    1. ZAHIRA - severe. The patient symptomatically has excessive daytime sleepiness, excessive daytime fatigue, snoring and interrupted sleep with exam findings of "a crowded oral airway and elevated body mass index. The patient has medical co-morbidities of diabetes,  which can be worsened by ZAHIRA. This warrants treatment.      PLAN:    Continue  auto CPAP 5-18cm with the mask of a patient's choice was given to the patient.  Compliance was reinforced. She was reminded of Medicare criteria.     Will follw up in 3 months - will schedule the follow up.     Education: During our discussion today, we talked about the etiology of obstructive sleep apnea as well as the potential ramifications of untreated sleep apnea, which could include daytime sleepiness, hypertension, heart disease and/or stroke.      We discussed potential treatment options, which could include weight loss, body positioning, continuous positive airway pressure (CPAP), or referral for surgical consideration. The patient preferred CPAP option.       Regular replacement of CPAP mask, tubing and filter was recommended.    The patient was given open opportunity to ask questions and/or express concerns about treatment plan. Two point patient identifier confirmed.     Precautions: The patient was advised to abstain from driving should he feel sleepy or drowsy.    Follow up: me after 31 days  of PAP use.  31-minute visit. >50% spent counseling patient and coordination of care.  You are advised to abstain from driving should you feel sleepy or drowsy.                      "

## 2022-05-10 ENCOUNTER — OFFICE VISIT (OUTPATIENT)
Dept: UROLOGY | Facility: CLINIC | Age: 67
End: 2022-05-10
Payer: MEDICARE

## 2022-05-10 VITALS
BODY MASS INDEX: 28.06 KG/M2 | WEIGHT: 168.44 LBS | HEIGHT: 65 IN | HEART RATE: 64 BPM | SYSTOLIC BLOOD PRESSURE: 161 MMHG | DIASTOLIC BLOOD PRESSURE: 73 MMHG

## 2022-05-10 DIAGNOSIS — R31.0 GROSS HEMATURIA: Primary | ICD-10-CM

## 2022-05-10 LAB
BILIRUB SERPL-MCNC: NORMAL MG/DL
BILIRUB UR QL STRIP: NEGATIVE
BLOOD URINE, POC: NORMAL
CLARITY UR REFRACT.AUTO: CLEAR
CLARITY, POC UA: CLEAR
COLOR UR AUTO: ABNORMAL
COLOR, POC UA: NORMAL
GLUCOSE UR QL STRIP: NEGATIVE
GLUCOSE UR QL STRIP: NORMAL
HGB UR QL STRIP: NEGATIVE
KETONES UR QL STRIP: NEGATIVE
KETONES UR QL STRIP: NORMAL
LEUKOCYTE ESTERASE UR QL STRIP: ABNORMAL
LEUKOCYTE ESTERASE URINE, POC: NORMAL
MICROSCOPIC COMMENT: NORMAL
NITRITE UR QL STRIP: NEGATIVE
NITRITE, POC UA: NORMAL
PH UR STRIP: 6 [PH] (ref 5–8)
PH, POC UA: 5
PROT UR QL STRIP: NEGATIVE
PROTEIN, POC: NORMAL
RBC #/AREA URNS AUTO: 1 /HPF (ref 0–4)
SP GR UR STRIP: 1 (ref 1–1.03)
SPECIFIC GRAVITY, POC UA: 1
SQUAMOUS #/AREA URNS AUTO: 2 /HPF
URN SPEC COLLECT METH UR: ABNORMAL
UROBILINOGEN, POC UA: NORMAL
WBC #/AREA URNS AUTO: 4 /HPF (ref 0–5)

## 2022-05-10 PROCEDURE — 81002 POCT URINE DIPSTICK WITHOUT MICROSCOPE: ICD-10-PCS | Mod: S$GLB,,, | Performed by: NURSE PRACTITIONER

## 2022-05-10 PROCEDURE — 3044F HG A1C LEVEL LT 7.0%: CPT | Mod: CPTII,S$GLB,, | Performed by: NURSE PRACTITIONER

## 2022-05-10 PROCEDURE — 3078F PR MOST RECENT DIASTOLIC BLOOD PRESSURE < 80 MM HG: ICD-10-PCS | Mod: CPTII,S$GLB,, | Performed by: NURSE PRACTITIONER

## 2022-05-10 PROCEDURE — 1157F ADVNC CARE PLAN IN RCRD: CPT | Mod: CPTII,S$GLB,, | Performed by: NURSE PRACTITIONER

## 2022-05-10 PROCEDURE — 3077F SYST BP >= 140 MM HG: CPT | Mod: CPTII,S$GLB,, | Performed by: NURSE PRACTITIONER

## 2022-05-10 PROCEDURE — 99214 OFFICE O/P EST MOD 30 MIN: CPT | Mod: S$GLB,,, | Performed by: NURSE PRACTITIONER

## 2022-05-10 PROCEDURE — 88112 CYTOPATH CELL ENHANCE TECH: CPT | Mod: 26,,, | Performed by: PATHOLOGY

## 2022-05-10 PROCEDURE — 87563 M. GENITALIUM AMP PROBE: CPT | Performed by: NURSE PRACTITIONER

## 2022-05-10 PROCEDURE — 4010F PR ACE/ARB THEARPY RXD/TAKEN: ICD-10-PCS | Mod: CPTII,S$GLB,, | Performed by: NURSE PRACTITIONER

## 2022-05-10 PROCEDURE — 1101F PR PT FALLS ASSESS DOC 0-1 FALLS W/OUT INJ PAST YR: ICD-10-PCS | Mod: CPTII,S$GLB,, | Performed by: NURSE PRACTITIONER

## 2022-05-10 PROCEDURE — 3008F BODY MASS INDEX DOCD: CPT | Mod: CPTII,S$GLB,, | Performed by: NURSE PRACTITIONER

## 2022-05-10 PROCEDURE — 3078F DIAST BP <80 MM HG: CPT | Mod: CPTII,S$GLB,, | Performed by: NURSE PRACTITIONER

## 2022-05-10 PROCEDURE — 1126F AMNT PAIN NOTED NONE PRSNT: CPT | Mod: CPTII,S$GLB,, | Performed by: NURSE PRACTITIONER

## 2022-05-10 PROCEDURE — 1157F PR ADVANCE CARE PLAN OR EQUIV PRESENT IN MEDICAL RECORD: ICD-10-PCS | Mod: CPTII,S$GLB,, | Performed by: NURSE PRACTITIONER

## 2022-05-10 PROCEDURE — 1126F PR PAIN SEVERITY QUANTIFIED, NO PAIN PRESENT: ICD-10-PCS | Mod: CPTII,S$GLB,, | Performed by: NURSE PRACTITIONER

## 2022-05-10 PROCEDURE — 1159F PR MEDICATION LIST DOCUMENTED IN MEDICAL RECORD: ICD-10-PCS | Mod: CPTII,S$GLB,, | Performed by: NURSE PRACTITIONER

## 2022-05-10 PROCEDURE — 4010F ACE/ARB THERAPY RXD/TAKEN: CPT | Mod: CPTII,S$GLB,, | Performed by: NURSE PRACTITIONER

## 2022-05-10 PROCEDURE — 1160F PR REVIEW ALL MEDS BY PRESCRIBER/CLIN PHARMACIST DOCUMENTED: ICD-10-PCS | Mod: CPTII,S$GLB,, | Performed by: NURSE PRACTITIONER

## 2022-05-10 PROCEDURE — 3044F PR MOST RECENT HEMOGLOBIN A1C LEVEL <7.0%: ICD-10-PCS | Mod: CPTII,S$GLB,, | Performed by: NURSE PRACTITIONER

## 2022-05-10 PROCEDURE — 1101F PT FALLS ASSESS-DOCD LE1/YR: CPT | Mod: CPTII,S$GLB,, | Performed by: NURSE PRACTITIONER

## 2022-05-10 PROCEDURE — 88112 CYTOPATH CELL ENHANCE TECH: CPT | Performed by: PATHOLOGY

## 2022-05-10 PROCEDURE — 99214 PR OFFICE/OUTPT VISIT, EST, LEVL IV, 30-39 MIN: ICD-10-PCS | Mod: S$GLB,,, | Performed by: NURSE PRACTITIONER

## 2022-05-10 PROCEDURE — 3288F PR FALLS RISK ASSESSMENT DOCUMENTED: ICD-10-PCS | Mod: CPTII,S$GLB,, | Performed by: NURSE PRACTITIONER

## 2022-05-10 PROCEDURE — 3077F PR MOST RECENT SYSTOLIC BLOOD PRESSURE >= 140 MM HG: ICD-10-PCS | Mod: CPTII,S$GLB,, | Performed by: NURSE PRACTITIONER

## 2022-05-10 PROCEDURE — 3008F PR BODY MASS INDEX (BMI) DOCUMENTED: ICD-10-PCS | Mod: CPTII,S$GLB,, | Performed by: NURSE PRACTITIONER

## 2022-05-10 PROCEDURE — 87798 DETECT AGENT NOS DNA AMP: CPT | Performed by: NURSE PRACTITIONER

## 2022-05-10 PROCEDURE — 81002 URINALYSIS NONAUTO W/O SCOPE: CPT | Mod: S$GLB,,, | Performed by: NURSE PRACTITIONER

## 2022-05-10 PROCEDURE — 1159F MED LIST DOCD IN RCRD: CPT | Mod: CPTII,S$GLB,, | Performed by: NURSE PRACTITIONER

## 2022-05-10 PROCEDURE — 3288F FALL RISK ASSESSMENT DOCD: CPT | Mod: CPTII,S$GLB,, | Performed by: NURSE PRACTITIONER

## 2022-05-10 PROCEDURE — 81001 URINALYSIS AUTO W/SCOPE: CPT | Performed by: NURSE PRACTITIONER

## 2022-05-10 PROCEDURE — 88112 PR  CYTOPATH, CELL ENHANCE TECH: ICD-10-PCS | Mod: 26,,, | Performed by: PATHOLOGY

## 2022-05-10 PROCEDURE — 99999 PR PBB SHADOW E&M-EST. PATIENT-LVL IV: ICD-10-PCS | Mod: PBBFAC,,, | Performed by: NURSE PRACTITIONER

## 2022-05-10 PROCEDURE — 1160F RVW MEDS BY RX/DR IN RCRD: CPT | Mod: CPTII,S$GLB,, | Performed by: NURSE PRACTITIONER

## 2022-05-10 PROCEDURE — 87086 URINE CULTURE/COLONY COUNT: CPT | Performed by: NURSE PRACTITIONER

## 2022-05-10 PROCEDURE — 99999 PR PBB SHADOW E&M-EST. PATIENT-LVL IV: CPT | Mod: PBBFAC,,, | Performed by: NURSE PRACTITIONER

## 2022-05-10 NOTE — PROGRESS NOTES
CHIEF COMPLAINT:    Mrs. Kay is a 67 y.o. female presenting for   Chief Complaint   Patient presents with    Bladder Pain    Urinary Tract Infection     .  PRESENTING ILLNESS:    Riana Kay is a 67 y.o. female with a PMH of htn, diabetes type 2, fatty liver who presents for gross hematuria.     Reports having multiple infections, however no positive cultures on chart review.  States having symptoms have burning sensation and pain in the bladder.  Also reports having blood in urine intermittently with pain in lower back prior.  Last episode was in January 2022.  Of note had gross hematuria workup in 2020, which was negative.    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  Has no urinary complaints today.    *  present for entire eval and assessment    REVIEW OF SYSTEMS:    Review of Systems   Constitutional: Negative for chills and fever.   Respiratory: Negative for shortness of breath.    Cardiovascular: Negative for chest pain.   Gastrointestinal: Negative for constipation and diarrhea.   Genitourinary: Positive for hematuria. Negative for dysuria, flank pain, frequency and urgency.        Bladder discomfort   Musculoskeletal: Positive for back pain (lower).   Neurological: Negative for dizziness and weakness.        PATIENT HISTORY:    Past Medical History:   Diagnosis Date    Abdominal pain, right upper quadrant 02/04/2014    Anticoagulant long-term use     Anxiety     AR (allergic rhinitis)     Atrophic gastritis without mention of hemorrhage 02/13/2012     Dx updated per 2019 IMO Load    Chronic fatigue syndrome 06/10/2012    Diabetes mellitus     Dizziness     Fatty liver     GERD (gastroesophageal reflux disease)     Gross  hematuria 12/01/2020    HTN (hypertension)     Hyperlipidemia     Leg swelling     Memory loss     Osteopenia     PONV (postoperative nausea and vomiting)     Primary osteoarthritis of right knee 10/06/2020    Primary osteoarthritis of right knee 10/06/2020    Right elbow pain 01/16/2019    S/P total hysterectomy     Screening for colon cancer 01/06/2021    Sleep apnea     Status post total right knee replacement 10/6/2020 10/05/2020       Family History   Problem Relation Age of Onset    Cancer Father         colon    Colon cancer Father 83        colon cancer    Diabetes Maternal Aunt     Diabetes Maternal Grandmother     Esophageal cancer Neg Hx     Stomach cancer Neg Hx     Melanoma Neg Hx        Allergies:  Iodinated contrast media, Percocet [oxycodone-acetaminophen], Macrobid [nitrofurantoin monohyd/m-cryst], Metformin, Penicillins, Promethazine, Sulfa (sulfonamide antibiotics), and Sulfamethoxazole-trimethoprim    Medications:    Current Outpatient Medications:     azelastine (ASTELIN) 137 mcg (0.1 %) nasal spray, 1 spray (137 mcg total) by Nasal route 2 (two) times daily., Disp: 30 mL, Rfl: 11    blood sugar diagnostic Strp, To check BG 4 times daily, to use with insurance preferred meter-true metrix, Disp: 400 each, Rfl: 3    blood-glucose meter kit, To check BG 4 times daily, to use with insurance preferred meter-true metrix, Disp: 1 each, Rfl: 1    cyclobenzaprine (FLEXERIL) 5 MG tablet, TAKE 1 TABLET BY MOUTH THREE TIMES A DAY AS NEEDED FOR MUSCLE SPASMS, Disp: 90 tablet, Rfl: 0    diclofenac sodium (VOLTAREN) 1 % Gel, APPLY 2 G TOPICALLY 2 (TWO) TIMES DAILY AS NEEDED (PAIN). DO NOT USE DIRECTLY ON INCISION (Patient not taking: Reported on 4/12/2022), Disp: 100 g, Rfl: 0    ergocalciferol (ERGOCALCIFEROL) 50,000 unit Cap, TAKE 1 CAPSULE BY MOUTH ONE TIME PER WEEK, Disp: 12 capsule, Rfl: 0    fluticasone propionate (FLONASE) 50 mcg/actuation nasal spray, 2 sprays (100 mcg total)  "by Each Nostril route once daily., Disp: 16 g, Rfl: 11    insulin (LANTUS SOLOSTAR U-100 INSULIN) glargine 100 units/mL (3mL) SubQ pen, INJECT 38-40 UNITS SUBCUTANEOUSLY AT NIGHT., Disp: 30 each, Rfl: 4    insulin lispro (HUMALOG KWIKPEN INSULIN) 100 unit/mL pen, INJECT 16 UNITS W/ MEALS PLUS SCALE 150-200+2, 201-250+4, 251-300+6, 301-350+8, >350+10. MAX DAILY 68 UNITS., Disp: 60 each, Rfl: 3    lancets Misc, To check BG 4  times daily, to use with insurance preferred meter-true metrix, Disp: 400 each, Rfl: 3    latanoprost 0.005 % ophthalmic solution, Place 1 drop into both eyes nightly., Disp: , Rfl:     losartan (COZAAR) 25 MG tablet, Take 0.5 tablets (12.5 mg total) by mouth once daily., Disp: 45 tablet, Rfl: 3    meclizine (ANTIVERT) 25 mg tablet, Take 1 tablet (25 mg total) by mouth 3 (three) times daily as needed for Dizziness., Disp: 30 tablet, Rfl: 1    meloxicam (MOBIC) 15 MG tablet, TAKE 1 TABLET BY MOUTH EVERY DAY, Disp: 30 tablet, Rfl: 1    ondansetron (ZOFRAN-ODT) 8 MG TbDL, Dissolve 1 tablet (8 mg total) by mouth every 12 (twelve) hours as needed (nausea)., Disp: 20 tablet, Rfl: 0    pantoprazole (PROTONIX) 40 MG tablet, Take 1 tablet (40 mg total) by mouth once daily., Disp: 90 tablet, Rfl: 3    pen needle, diabetic (NOVOFINE 32) 32 gauge x 1/4" Ndle, Uses 4 times a day. 90 day via duramed e 11.65, Disp: 400 each, Rfl: 3    PHYSICAL EXAMINATION:    Physical Exam  Vitals and nursing note reviewed.   Constitutional:       Appearance: Normal appearance. She is well-developed.   HENT:      Head: Normocephalic and atraumatic.   Eyes:      Pupils: Pupils are equal, round, and reactive to light.   Pulmonary:      Effort: Pulmonary effort is normal.   Musculoskeletal:         General: Normal range of motion.      Cervical back: Normal range of motion.   Skin:     General: Skin is warm and dry.   Neurological:      Mental Status: She is alert and oriented to person, place, and time.   Psychiatric:   "       Behavior: Behavior normal.           LABS:    U/a dipstick performed in office today: yellow, ph 5, 1.000, negative    IMPRESSION:    Encounter Diagnoses   Name Primary?    Gross hematuria Yes     CT abd/pelvis without contrast 12/10/21:    Impression:     Diverticulosis without evidence of diverticulitis.  No acute finding to explain the patient's pain.       PLAN:    Problem List Items Addressed This Visit    None     Visit Diagnoses     Gross hematuria    -  Primary    Relevant Orders    US Retroperitoneal Complete (Kidney and    Cytology, Urine    Urine culture    Urinalysis    Mycoplasma genitalium Molecular Detection, PCR Urine    Ureaplasma PCR Urine    POCT URINE DIPSTICK WITHOUT MICROSCOPE (Completed)          1. Gross hematuria   Negative work up 2020   RBC increased, obtain renal ultrasound   Consider cystoscopy with retrogrades due to iodine allergy, if u/s unremarkable    2.  RTC based on imaging    Gunjan Berry NP        I spent 30 minutes with the patient. Over 50% of the visit was spent in counseling.

## 2022-05-11 LAB
BACTERIA UR CULT: NORMAL
FINAL PATHOLOGIC DIAGNOSIS: NORMAL
Lab: NORMAL

## 2022-05-12 LAB
M GENITALIUM DNA UR QL NAA+PROBE: NEGATIVE
SPECIMEN SOURCE: NORMAL

## 2022-05-13 ENCOUNTER — HOSPITAL ENCOUNTER (OUTPATIENT)
Dept: RADIOLOGY | Facility: HOSPITAL | Age: 67
Discharge: HOME | End: 2022-05-13
Attending: NURSE PRACTITIONER
Payer: MEDICARE

## 2022-05-13 DIAGNOSIS — R31.0 GROSS HEMATURIA: ICD-10-CM

## 2022-05-13 LAB
SPECIMEN SOURCE: NORMAL
U PARVUM DNA SPEC QL NAA+PROBE: NEGATIVE
U UREALYTICUM DNA SPEC QL NAA+PROBE: NEGATIVE

## 2022-05-13 PROCEDURE — 76770 US EXAM ABDO BACK WALL COMP: CPT | Mod: 26,,, | Performed by: INTERNAL MEDICINE

## 2022-05-13 PROCEDURE — 76770 US RETROPERITONEAL COMPLETE: ICD-10-PCS | Mod: 26,,, | Performed by: INTERNAL MEDICINE

## 2022-05-13 PROCEDURE — 76770 US EXAM ABDO BACK WALL COMP: CPT | Mod: TC

## 2022-05-17 ENCOUNTER — TELEPHONE (OUTPATIENT)
Dept: UROLOGY | Facility: CLINIC | Age: 67
End: 2022-05-17
Payer: MEDICARE

## 2022-05-17 NOTE — TELEPHONE ENCOUNTER
Called to inform Ms Ng about ultrasound results via .  A voicemail was left with call back number.

## 2022-05-18 ENCOUNTER — OFFICE VISIT (OUTPATIENT)
Dept: OTOLARYNGOLOGY | Facility: CLINIC | Age: 67
End: 2022-05-18
Payer: MEDICARE

## 2022-05-18 ENCOUNTER — CLINICAL SUPPORT (OUTPATIENT)
Dept: AUDIOLOGY | Facility: CLINIC | Age: 67
End: 2022-05-18
Payer: MEDICARE

## 2022-05-18 VITALS
HEART RATE: 64 BPM | DIASTOLIC BLOOD PRESSURE: 74 MMHG | WEIGHT: 172.38 LBS | SYSTOLIC BLOOD PRESSURE: 154 MMHG | BODY MASS INDEX: 28.69 KG/M2

## 2022-05-18 DIAGNOSIS — H93.8X1 EAR PRESSURE, RIGHT: ICD-10-CM

## 2022-05-18 DIAGNOSIS — H61.21 IMPACTED CERUMEN, RIGHT EAR: ICD-10-CM

## 2022-05-18 DIAGNOSIS — R42 VERTIGO: Primary | ICD-10-CM

## 2022-05-18 DIAGNOSIS — H90.A31 MIXED CONDUCTIVE AND SENSORINEURAL HEARING LOSS OF RIGHT EAR WITH RESTRICTED HEARING OF LEFT EAR: ICD-10-CM

## 2022-05-18 DIAGNOSIS — H91.91 DECREASED HEARING OF RIGHT EAR: ICD-10-CM

## 2022-05-18 DIAGNOSIS — H90.A22 SENSORINEURAL HEARING LOSS (SNHL) OF LEFT EAR WITH RESTRICTED HEARING OF RIGHT EAR: ICD-10-CM

## 2022-05-18 DIAGNOSIS — R42 DIZZINESS AND GIDDINESS: Primary | ICD-10-CM

## 2022-05-18 DIAGNOSIS — J34.2 NASAL SEPTAL DEVIATION: ICD-10-CM

## 2022-05-18 PROCEDURE — 3077F SYST BP >= 140 MM HG: CPT | Mod: CPTII,S$GLB,, | Performed by: OTOLARYNGOLOGY

## 2022-05-18 PROCEDURE — 3044F PR MOST RECENT HEMOGLOBIN A1C LEVEL <7.0%: ICD-10-PCS | Mod: CPTII,S$GLB,, | Performed by: OTOLARYNGOLOGY

## 2022-05-18 PROCEDURE — 92557 PR COMPREHENSIVE HEARING TEST: ICD-10-PCS | Mod: S$GLB,,, | Performed by: AUDIOLOGIST

## 2022-05-18 PROCEDURE — 1160F RVW MEDS BY RX/DR IN RCRD: CPT | Mod: CPTII,S$GLB,, | Performed by: OTOLARYNGOLOGY

## 2022-05-18 PROCEDURE — 99214 PR OFFICE/OUTPT VISIT, EST, LEVL IV, 30-39 MIN: ICD-10-PCS | Mod: 25,S$GLB,, | Performed by: OTOLARYNGOLOGY

## 2022-05-18 PROCEDURE — 4010F PR ACE/ARB THEARPY RXD/TAKEN: ICD-10-PCS | Mod: CPTII,S$GLB,, | Performed by: OTOLARYNGOLOGY

## 2022-05-18 PROCEDURE — 3078F DIAST BP <80 MM HG: CPT | Mod: CPTII,S$GLB,, | Performed by: OTOLARYNGOLOGY

## 2022-05-18 PROCEDURE — 1160F PR REVIEW ALL MEDS BY PRESCRIBER/CLIN PHARMACIST DOCUMENTED: ICD-10-PCS | Mod: CPTII,S$GLB,, | Performed by: OTOLARYNGOLOGY

## 2022-05-18 PROCEDURE — 92567 TYMPANOMETRY: CPT | Mod: S$GLB,,, | Performed by: AUDIOLOGIST

## 2022-05-18 PROCEDURE — 99999 PR PBB SHADOW E&M-EST. PATIENT-LVL III: ICD-10-PCS | Mod: PBBFAC,,, | Performed by: OTOLARYNGOLOGY

## 2022-05-18 PROCEDURE — 1159F MED LIST DOCD IN RCRD: CPT | Mod: CPTII,S$GLB,, | Performed by: OTOLARYNGOLOGY

## 2022-05-18 PROCEDURE — 69210 REMOVE IMPACTED EAR WAX UNI: CPT | Mod: S$GLB,,, | Performed by: OTOLARYNGOLOGY

## 2022-05-18 PROCEDURE — 3008F PR BODY MASS INDEX (BMI) DOCUMENTED: ICD-10-PCS | Mod: CPTII,S$GLB,, | Performed by: OTOLARYNGOLOGY

## 2022-05-18 PROCEDURE — 3044F HG A1C LEVEL LT 7.0%: CPT | Mod: CPTII,S$GLB,, | Performed by: OTOLARYNGOLOGY

## 2022-05-18 PROCEDURE — 99999 PR PBB SHADOW E&M-EST. PATIENT-LVL I: CPT | Mod: PBBFAC,,, | Performed by: AUDIOLOGIST

## 2022-05-18 PROCEDURE — 99999 PR PBB SHADOW E&M-EST. PATIENT-LVL III: CPT | Mod: PBBFAC,,, | Performed by: OTOLARYNGOLOGY

## 2022-05-18 PROCEDURE — 3078F PR MOST RECENT DIASTOLIC BLOOD PRESSURE < 80 MM HG: ICD-10-PCS | Mod: CPTII,S$GLB,, | Performed by: OTOLARYNGOLOGY

## 2022-05-18 PROCEDURE — 99214 OFFICE O/P EST MOD 30 MIN: CPT | Mod: 25,S$GLB,, | Performed by: OTOLARYNGOLOGY

## 2022-05-18 PROCEDURE — 1157F ADVNC CARE PLAN IN RCRD: CPT | Mod: CPTII,S$GLB,, | Performed by: OTOLARYNGOLOGY

## 2022-05-18 PROCEDURE — 99999 PR PBB SHADOW E&M-EST. PATIENT-LVL I: ICD-10-PCS | Mod: PBBFAC,,, | Performed by: AUDIOLOGIST

## 2022-05-18 PROCEDURE — 3077F PR MOST RECENT SYSTOLIC BLOOD PRESSURE >= 140 MM HG: ICD-10-PCS | Mod: CPTII,S$GLB,, | Performed by: OTOLARYNGOLOGY

## 2022-05-18 PROCEDURE — 92567 PR TYMPA2METRY: ICD-10-PCS | Mod: S$GLB,,, | Performed by: AUDIOLOGIST

## 2022-05-18 PROCEDURE — 92557 COMPREHENSIVE HEARING TEST: CPT | Mod: S$GLB,,, | Performed by: AUDIOLOGIST

## 2022-05-18 PROCEDURE — 69210 PR REMOVAL IMPACTED CERUMEN REQUIRING INSTRUMENTATION, UNILATERAL: ICD-10-PCS | Mod: S$GLB,,, | Performed by: OTOLARYNGOLOGY

## 2022-05-18 PROCEDURE — 1159F PR MEDICATION LIST DOCUMENTED IN MEDICAL RECORD: ICD-10-PCS | Mod: CPTII,S$GLB,, | Performed by: OTOLARYNGOLOGY

## 2022-05-18 PROCEDURE — 4010F ACE/ARB THERAPY RXD/TAKEN: CPT | Mod: CPTII,S$GLB,, | Performed by: OTOLARYNGOLOGY

## 2022-05-18 PROCEDURE — 1157F PR ADVANCE CARE PLAN OR EQUIV PRESENT IN MEDICAL RECORD: ICD-10-PCS | Mod: CPTII,S$GLB,, | Performed by: OTOLARYNGOLOGY

## 2022-05-18 PROCEDURE — 3008F BODY MASS INDEX DOCD: CPT | Mod: CPTII,S$GLB,, | Performed by: OTOLARYNGOLOGY

## 2022-05-18 NOTE — PATIENT INSTRUCTIONS
Recommend VNG.    Discussed nose anatomy and symptoms. Continue with use of Astelin & Fluticasone.    Once VNG results are obtained, follow up for additional recommendations.    Minimize meclizine usage.    For throat dryness, drink plenty water.    Mild hearing loss bilaterally - recommend another audiogram (hearing test) in six months to re-assess asymmetry.    Otherwise, follow up with our clinic for any ear, nose or throat problems ([phone]).

## 2022-05-18 NOTE — PROGRESS NOTES
Riana Kay, a 67 y.o. female, was seen today in the clinic for an audiologic evaluation.  Pt denied use of .  Patients main complaint was dizziness.  Mrs. Kay reported that she has been experiencing episodic dizziness.  Pt denied otalgia, aural fullness, and tinnitus.      Tympanometry revealed Type A in the right ear and Type A in the left ear. Audiogram results revealed borderline normal hearing in the right ear and normal hearing in the left ear.  Speech reception thresholds were noted at 20 dB in the right ear and 15 dB in the left ear.  Speech discrimination scores were 88% in the right ear and 92% in the left ear.    Recommendations:  1. Otologic evaluation  2. Annual audiogram  3. Hearing protection when in noise

## 2022-05-18 NOTE — PROGRESS NOTES
"68 y/o female presents for evaluation of dizziness "everyday". Patient reports symptoms are a movement/ spinning sensation that she notices most frequently when she is in bed and turns to the right. Sometimes occurs when turns to left. Symptoms also sometimes occur when she bends down and stands up. Typically last less than 5 minutes. Takes meclizine some when she is dizzy. Denies head trauma. Less trouble for past 2 weeks. When dizzy/vertigo - will feel pressure in right ear. Feels she cannot hear in right ear at times (comes and goes). Occasionally feels "pinch" discomfort in right ear. No persistent pain.      Nasal congestion fluctuates. Astelin and Flonase help. Right sides gets stopped up at times when she lies down.    Throat occasionally dry.       No ear surgery, ear trauma, head trauma, ear drainage, unilateral tinnitus, acute hearing loss associated with episodes of dizziness.  No known family hx of early hearing loss or ear problems.     services offered to be scheduled prior to appointment but patient and  said she understands English and  services deferred by pt.    PMHx, PSHx, Meds, Allergies, SocHx, FamHx reviewed in EPIC    Review of Systems     Constitutional: Positive for appetite change and fatigue.      HENT: Positive for hearing loss and sinus pressure.      Eyes:  Negative for change in eyesight, eye drainage, eye itching and photophobia.     Respiratory:  Positive for sleep apnea and snoring.      Cardiovascular:  Positive for foot swelling.     Gastrointestinal:  Positive for acid reflux and diarrhea.     Genitourinary: Negative for difficulty urinating, sexual problems and frequent urination.     Skin: Negative for rash.     Allergy: Positive for seasonal allergies.     Endocrine: Negative for cold intolerance and heat intolerance.      Neurological: Positive for headaches.     Hematologic: Negative for bruises/bleeds easily and swollen glands.      Psychiatric: " Positive for decreased concentration, depression, nervous/anxious and sleep disturbance.           PE: BP (!) 154/74   Pulse 64   Wt 78.2 kg (172 lb 6.4 oz)   BMI 28.69 kg/m²   Gen: female, WN, WD, NAD, cooperative and alert, good historian  Eyes: no spontaneous nystagmus, PERRL  Ears: no cerumen with translucent TMs bilaterally, normal bony landmarks  Nose: external wnl, septum undulations inferior and superior mildly to right, turbinates WNL, clear drainage, no visible polyps  OC/OP: tongue midline, teeth in good repair inferiorly, denture superiorly, MMM, tonsils symmetric, no erythema or exudate  Neck: no LAD or masses, no bruits  Face: no TTP over sinuses  Chest: equal chest excursion, no retrations  Skin: no visible concerning lesions of face, dry and intact  Lymph: no neck LAD  Neuro: CN II-VII, IX - XII intact, finger to nose testing brisk and intact, EOM intact, Romberg negative, no visible spontaneous nystagmus, no ataxia, Fukuda test negative, Santa latency with rotary nystagmus on right, c/o dizzy on left but no visible rotary nystagmus (and no latency to symptoms)  Psych: alert & oriented x 3, reasonable, normal affect    Procedure: Removal of cerumen impaction using microsurgical instrumentation.  After explaining the procedure and obtaining verbal assent, the patient was positioned in chair and right external auditory canal visualized with magnification. The obstructing cerumen was removed with microsurgical instrumentation (suction and forcep) to reveal normal and healthy external auditory canals.  Right ear cleared. The patient tolerated this procedure well without complication.    Audio      Audiogram reviewed with patient and . Mild hearing loss bilaterally (R>L).    Impression:   1. Vertigo     2. Impacted cerumen, right ear     3. Nasal septal deviation     4. Ear pressure, right     5. Decreased hearing of right ear      intermittent       Discussion and Plan:       Recommend  VNG.    Discussed nose anatomy and symptoms. Continue with use of Astelin & Fluticasone.    Once VNG results are obtained, will provide additional recommendations if needed.    Minimize meclizine usage.    For throat dryness, drink plenty water.        Parts or all of this note were created by voice recognition software; typographical errors in translating may be present.

## 2022-05-26 ENCOUNTER — OFFICE VISIT (OUTPATIENT)
Dept: NEUROLOGY | Facility: CLINIC | Age: 67
End: 2022-05-26
Payer: MEDICARE

## 2022-05-26 VITALS
HEIGHT: 65 IN | DIASTOLIC BLOOD PRESSURE: 71 MMHG | BODY MASS INDEX: 28.66 KG/M2 | WEIGHT: 172 LBS | SYSTOLIC BLOOD PRESSURE: 145 MMHG | HEART RATE: 73 BPM

## 2022-05-26 DIAGNOSIS — R41.3 MEMORY CHANGES: ICD-10-CM

## 2022-05-26 DIAGNOSIS — R41.3 OTHER AMNESIA: ICD-10-CM

## 2022-05-26 PROCEDURE — 3077F PR MOST RECENT SYSTOLIC BLOOD PRESSURE >= 140 MM HG: ICD-10-PCS | Mod: CPTII,S$GLB,, | Performed by: PSYCHIATRY & NEUROLOGY

## 2022-05-26 PROCEDURE — 3077F SYST BP >= 140 MM HG: CPT | Mod: CPTII,S$GLB,, | Performed by: PSYCHIATRY & NEUROLOGY

## 2022-05-26 PROCEDURE — 99204 OFFICE O/P NEW MOD 45 MIN: CPT | Mod: S$GLB,,, | Performed by: PSYCHIATRY & NEUROLOGY

## 2022-05-26 PROCEDURE — 3008F BODY MASS INDEX DOCD: CPT | Mod: CPTII,S$GLB,, | Performed by: PSYCHIATRY & NEUROLOGY

## 2022-05-26 PROCEDURE — 3078F DIAST BP <80 MM HG: CPT | Mod: CPTII,S$GLB,, | Performed by: PSYCHIATRY & NEUROLOGY

## 2022-05-26 PROCEDURE — 3008F PR BODY MASS INDEX (BMI) DOCUMENTED: ICD-10-PCS | Mod: CPTII,S$GLB,, | Performed by: PSYCHIATRY & NEUROLOGY

## 2022-05-26 PROCEDURE — 1159F MED LIST DOCD IN RCRD: CPT | Mod: CPTII,S$GLB,, | Performed by: PSYCHIATRY & NEUROLOGY

## 2022-05-26 PROCEDURE — 4010F ACE/ARB THERAPY RXD/TAKEN: CPT | Mod: CPTII,S$GLB,, | Performed by: PSYCHIATRY & NEUROLOGY

## 2022-05-26 PROCEDURE — 99999 PR PBB SHADOW E&M-EST. PATIENT-LVL V: CPT | Mod: PBBFAC,,, | Performed by: PSYCHIATRY & NEUROLOGY

## 2022-05-26 PROCEDURE — 1126F AMNT PAIN NOTED NONE PRSNT: CPT | Mod: CPTII,S$GLB,, | Performed by: PSYCHIATRY & NEUROLOGY

## 2022-05-26 PROCEDURE — 1126F PR PAIN SEVERITY QUANTIFIED, NO PAIN PRESENT: ICD-10-PCS | Mod: CPTII,S$GLB,, | Performed by: PSYCHIATRY & NEUROLOGY

## 2022-05-26 PROCEDURE — 3288F FALL RISK ASSESSMENT DOCD: CPT | Mod: CPTII,S$GLB,, | Performed by: PSYCHIATRY & NEUROLOGY

## 2022-05-26 PROCEDURE — 99999 PR PBB SHADOW E&M-EST. PATIENT-LVL V: ICD-10-PCS | Mod: PBBFAC,,, | Performed by: PSYCHIATRY & NEUROLOGY

## 2022-05-26 PROCEDURE — 1160F RVW MEDS BY RX/DR IN RCRD: CPT | Mod: CPTII,S$GLB,, | Performed by: PSYCHIATRY & NEUROLOGY

## 2022-05-26 PROCEDURE — 99204 PR OFFICE/OUTPT VISIT, NEW, LEVL IV, 45-59 MIN: ICD-10-PCS | Mod: S$GLB,,, | Performed by: PSYCHIATRY & NEUROLOGY

## 2022-05-26 PROCEDURE — 1159F PR MEDICATION LIST DOCUMENTED IN MEDICAL RECORD: ICD-10-PCS | Mod: CPTII,S$GLB,, | Performed by: PSYCHIATRY & NEUROLOGY

## 2022-05-26 PROCEDURE — 1101F PR PT FALLS ASSESS DOC 0-1 FALLS W/OUT INJ PAST YR: ICD-10-PCS | Mod: CPTII,S$GLB,, | Performed by: PSYCHIATRY & NEUROLOGY

## 2022-05-26 PROCEDURE — 1157F ADVNC CARE PLAN IN RCRD: CPT | Mod: CPTII,S$GLB,, | Performed by: PSYCHIATRY & NEUROLOGY

## 2022-05-26 PROCEDURE — 3044F HG A1C LEVEL LT 7.0%: CPT | Mod: CPTII,S$GLB,, | Performed by: PSYCHIATRY & NEUROLOGY

## 2022-05-26 PROCEDURE — 4010F PR ACE/ARB THEARPY RXD/TAKEN: ICD-10-PCS | Mod: CPTII,S$GLB,, | Performed by: PSYCHIATRY & NEUROLOGY

## 2022-05-26 PROCEDURE — 3044F PR MOST RECENT HEMOGLOBIN A1C LEVEL <7.0%: ICD-10-PCS | Mod: CPTII,S$GLB,, | Performed by: PSYCHIATRY & NEUROLOGY

## 2022-05-26 PROCEDURE — 1101F PT FALLS ASSESS-DOCD LE1/YR: CPT | Mod: CPTII,S$GLB,, | Performed by: PSYCHIATRY & NEUROLOGY

## 2022-05-26 PROCEDURE — 1157F PR ADVANCE CARE PLAN OR EQUIV PRESENT IN MEDICAL RECORD: ICD-10-PCS | Mod: CPTII,S$GLB,, | Performed by: PSYCHIATRY & NEUROLOGY

## 2022-05-26 PROCEDURE — 3288F PR FALLS RISK ASSESSMENT DOCUMENTED: ICD-10-PCS | Mod: CPTII,S$GLB,, | Performed by: PSYCHIATRY & NEUROLOGY

## 2022-05-26 PROCEDURE — 1160F PR REVIEW ALL MEDS BY PRESCRIBER/CLIN PHARMACIST DOCUMENTED: ICD-10-PCS | Mod: CPTII,S$GLB,, | Performed by: PSYCHIATRY & NEUROLOGY

## 2022-05-26 PROCEDURE — 3078F PR MOST RECENT DIASTOLIC BLOOD PRESSURE < 80 MM HG: ICD-10-PCS | Mod: CPTII,S$GLB,, | Performed by: PSYCHIATRY & NEUROLOGY

## 2022-05-26 NOTE — PROGRESS NOTES
[facility] NEUROLOGY  [facility], [place]    Date: 5/26/22  Patient Name: Riana Kay   MRN: [id number]   PCP: Jose Rojas  Referring Provider: Jose Rojas MD     *This visit was conducted with the assistance of professional translation services.*    Chief Complaint:  Changes in memory  Subjective:   Patient seen in consultation at the request of Jose Rojas MD for the evaluation of changes in memory. A copy of this note will be sent to the referring physician.      HPI:   Ms. Riana Kay is a 67 y.o. female presenting for evaluation of changes in memory.  We began by discussing what changes the patient has actually noticed.  She denies having any complaints regarding her memory and feels as if any occasional lapses are simply due to normal aging.  She is accompanied by her partner at the time of the encounter.  He expresses frustration over this point as he feels as if the patient's personality has slightly changed as she is more short tempered and impatient.  He also notes that she has issues with short-term memory every day.  He admits her long-term memory is quite intact.  They jointly deny any issues with hallucinations, delusions, paranoia.  Patient is sleeping well with no reported disturbances.  Uses CPAP and sleeps approximately 7 hours per night and only wakes to go use the restroom.    Reports all activities of daily living are completed independently except for driving.  She has given up driving due to prolonged knee pain related to knee replacements.  Denies significant mood disturbances but admits to occasional anxiety; uses her hobby of gardening to manage the anxiety.  Speaks to family members multiple times per day.  Admits to rarely leaving the house during the week.    PAST MEDICAL HISTORY:  Past Medical History:   Diagnosis Date    Abdominal pain, right upper quadrant 02/04/2014    Anticoagulant long-term use     Anxiety     AR  (allergic rhinitis)     Atrophic gastritis without mention of hemorrhage 02/13/2012     Dx updated per 2019 IMO Load    Chronic fatigue syndrome 06/10/2012    Diabetes mellitus     Dizziness     Fatty liver     GERD (gastroesophageal reflux disease)     Gross hematuria 12/01/2020    HTN (hypertension)     Hyperlipidemia     Leg swelling     Memory loss     Osteopenia     PONV (postoperative nausea and vomiting)     Primary osteoarthritis of right knee 10/06/2020    Primary osteoarthritis of right knee 10/06/2020    Right elbow pain 01/16/2019    S/P total hysterectomy     Screening for colon cancer 01/06/2021    Sleep apnea     Status post total right knee replacement 10/6/2020 10/05/2020       PAST SURGICAL HISTORY:  Past Surgical History:   Procedure Laterality Date    CHOLECYSTECTOMY      COLONOSCOPY N/A 1/17/2018    Procedure: COLONOSCOPY with Donnell;  Surgeon: Roland Jacobo MD;  [facility]  Service: Endoscopy;  Laterality: N/A;    COLONOSCOPY N/A 1/6/2021    Procedure: COLONOSCOPY;  Surgeon: Yong Rowland MD;  [facility] ([facility]);  Service: Endoscopy;  Laterality: N/A;  prep ins. emailed - [place] speaking,  [facility] - ERW  [facility] - ERW    CYSTOSCOPY N/A 12/1/2020    Procedure: CYSTOSCOPY;  Surgeon: Ines Stanford MD;  [facility];  Service: Urology;  Laterality: N/A;  45 minutes     ELBOW ARTHROPLASTY Right 1/16/2019    Procedure: ARTHROPLASTY, ELBOW right radial head arthroplasty revision;  Surgeon: Katja Hubbard MD;  Location: [facility] OR [address] [place] [address];  Service: Orthopedics;  Laterality: Right;  Anesthesia: General and Regional. Stretcher, hand pan 1 and pan 2, CALL RUCHI SANTAMARIA & Sol notified 1-14 LO    ELBOW SURGERY Right 7/16/15    ELBOW SURGERY      ESOPHAGOGASTRODUODENOSCOPY N/A 4/15/2019    Procedure: EGD (ESOPHAGOGASTRODUODENOSCOPY);  Surgeon: Buck Irwin MD;   Location: [facility] ENDO (4TH FLR);  Service: Endoscopy;  Laterality: N/A;  ray she    HYSTERECTOMY      KNEE ARTHROPLASTY Right 10/6/2020    Procedure: ARTHROPLASTY, KNEE-SAME DAY PROTOCOL;  Surgeon: Sabino Damon MD;  Location: [facility];  Service: Orthopedics;  Laterality: Right;    KNEE ARTHROSCOPY W/ DEBRIDEMENT  4/11    Left    RELEASE OF ULNAR NERVE AT CUBITAL TUNNEL Right 1/16/2019    Procedure: RELEASE, ULNAR TUNNEL right;  Surgeon: Katja Hubbard MD;  Location: [facility] 1ST FLR;  Service: Orthopedics;  Laterality: Right;  Anesthesia: General and Regional. Stretcher, hand pan 1 and pan 2, CALL ACCUMED, CLAIRX    RETROGRADE PYELOGRAPHY Bilateral 12/1/2020    Procedure: PYELOGRAM, RETROGRADE;  Surgeon: Ines Stanford MD;  Location: [facility] 1ST FLR;  Service: Urology;  Laterality: Bilateral;    ROTATOR CUFF REPAIR      TOTAL ABDOMINAL HYSTERECTOMY W/ BILATERAL SALPINGOOPHORECTOMY      TOTAL KNEE ARTHROPLASTY Left 10/19/2021    Procedure: ARTHROPLASTY, KNEE, TOTAL;  Surgeon: Sabino Damon MD;  Location: [facility];  Service: Orthopedics;  Laterality: Left;    UPPER GASTROINTESTINAL ENDOSCOPY         CURRENT MEDS:  Current Outpatient Medications   Medication Sig Dispense Refill    azelastine (ASTELIN) 137 mcg (0.1 %) nasal spray 1 spray (137 mcg total) by Nasal route 2 (two) times daily. 30 mL 11    blood sugar diagnostic Strp To check BG 4 times daily, to use with insurance preferred meter-true metrix 400 each 3    cyclobenzaprine (FLEXERIL) 5 MG tablet TAKE 1 TABLET BY MOUTH THREE TIMES A DAY AS NEEDED FOR MUSCLE SPASMS 90 tablet 0    diclofenac sodium (VOLTAREN) 1 % Gel APPLY 2 G TOPICALLY 2 (TWO) TIMES DAILY AS NEEDED (PAIN). DO NOT USE DIRECTLY ON INCISION 100 g 0    ergocalciferol (ERGOCALCIFEROL) 50,000 unit Cap TAKE 1 CAPSULE BY MOUTH ONE TIME PER WEEK 12 capsule 0    fluticasone propionate (FLONASE) 50 mcg/actuation nasal spray 2 sprays (100 mcg total) by Each Nostril route  "once daily. 16 g 11    insulin (LANTUS SOLOSTAR U-100 INSULIN) glargine 100 units/mL (3mL) SubQ pen INJECT 38-40 UNITS SUBCUTANEOUSLY AT NIGHT. 30 each 4    insulin lispro (HUMALOG KWIKPEN INSULIN) 100 unit/mL pen INJECT 16 UNITS W/ MEALS PLUS SCALE 150-200+2, 201-250+4, 251-300+6, 301-350+8, >350+10. MAX DAILY 68 UNITS. 60 each 3    lancets Misc To check BG 4  times daily, to use with insurance preferred meter-true metrix 400 each 3    latanoprost 0.005 % ophthalmic solution Place 1 drop into both eyes nightly.      losartan (COZAAR) 25 MG tablet Take 0.5 tablets (12.5 mg total) by mouth once daily. 45 tablet 3    meloxicam (MOBIC) 15 MG tablet TAKE 1 TABLET BY MOUTH EVERY DAY 30 tablet 1    ondansetron (ZOFRAN-ODT) 8 MG TbDL Dissolve 1 tablet (8 mg total) by mouth every 12 (twelve) hours as needed (nausea). 20 tablet 0    pantoprazole (PROTONIX) 40 MG tablet Take 1 tablet (40 mg total) by mouth once daily. 90 tablet 3    pen needle, diabetic (NOVOFINE 32) 32 gauge x 1/4" Ndle Uses 4 times a day. 90 day via Tunezy e 11.65 400 each 3    blood-glucose meter kit To check BG 4 times daily, to use with insurance preferred meter-true metrix 1 each 1    meclizine (ANTIVERT) 25 mg tablet Take 1 tablet (25 mg total) by mouth 3 (three) times daily as needed for Dizziness. 30 tablet 1     No current facility-administered medications for this visit.       ALLERGIES:  Review of patient's allergies indicates:   Allergen Reactions    Iodinated contrast media Swelling and Rash    Percocet [oxycodone-acetaminophen] Itching    Macrobid [nitrofurantoin monohyd/m-cryst] Rash    Metformin Rash    Penicillins Rash     Had ancef in 2020 with no adverse rxn     Promethazine Rash    Sulfa (sulfonamide antibiotics) Rash    Sulfamethoxazole-trimethoprim Rash       FAMILY HISTORY:  Family History   Problem Relation Age of Onset    Cancer Father         colon    Colon cancer Father 83        colon cancer    Diabetes " "Maternal Aunt     Diabetes Maternal Grandmother     Esophageal cancer Neg Hx     Stomach cancer Neg Hx     Melanoma Neg Hx        SOCIAL HISTORY:  Social History     Tobacco Use    Smoking status: Never Smoker    Smokeless tobacco: Never Used   Substance Use Topics    Alcohol use: No    Drug use: No       Review of Systems:  Gen: no fever, no chills, no generalized feeling of weakness   HEENT: no double vision, no blurred vision, no eye pain, no eye exudates.     Heart: no chest pain, no SOB    Lungs: no SOB, no cough    MSK: no weakness of legs, intact ROM    ABD: no abd pain, no N/V/D/C, no difficulty with defecation.    Extremities: No leg pain, no edema.       Objective:     Vitals:    05/26/22 1257   BP: (!) 145/71   Pulse: 73   Weight: 78 kg (172 lb)   Height: 5' 5" (1.651 m)     General: female in NAD, alert and awake, Aox3, well groomed. ?    ? ?    HEENT: Head is NC/AT EOMI, pupil size: 4 mm B/L, no nystagmus noted; hearing grossly intact b/l. Mucous membrane moist, uvula midline, no pharyngeal erythema, exudates or discharges.      Neck: Supple. no nuchal rigidity.      Cardiovascular: well perfused, no cyanosis        Respiratory: Symmetric chest rise noted       Musculoskeletal: Muscle tone noted to be adequate for patient age, muscle mass is WNL. No spontaneous movements or fasciculations noted during this examination.       Extremities: No pedal edema or calf tenderness. No cogwheel rigidity noted on B/L UE extremities.       Neurological Examination.    Mental status: AA&O x3; Affect/mood is euthymic/congruent; no aphasia noted during examination. Patient answers simple questions appropriately & follows simple commands; no dysarthria or expressive aphasia; no ion-neglect or extinction. Vocabulary/word finding: excellent.       Cranial Nerves: II-XII grossly intact.        Muscle Function: Tone WNL and Muscle bulk WNL.  5/5 throughout though distal lower extremity testing limited by knee " pain     Sensory: ?  Intact to light touch throughout      Reflexes:  2/4 throughout bilateral upper extremities, patient would not tolerate patella reflex testing, Achilles 1/4 bilateral     Gait: adequate casual gait with stride length and arm swing WNL.  No shuffling, stable without the use of cane or walker    Other:  Relevant laboratory studies completed within the last 6 months including TSH, B12, CBC, CMP reviewed at the time of today's encounter.    Assessment:   Riana Kay is a 67 y.o. female presenting for evaluation of changes in memory.  Frankly, the patient and her  pain to very different pictures of the patient's current cognitive state.  We will initiate baseline neurological imaging with CT head.  We will also place a referral for cognitive assessment from neuropsychology for more precise case clarity.    Plan:     Problem List Items Addressed This Visit        Neuro    Memory changes    Relevant Orders    Ambulatory referral/consult to Adult Neuropsychology      Other Visit Diagnoses     Other amnesia        Relevant Orders    CT Head Without Contrast        - encourage cognitively stimulating activities including regular socialization, reading, puzzles  - encourage regular physical activity for good vascular and brain health  - encouraged tight control blood pressure, glucose, cholesterol  - return to clinic 1 week after completing testing    I spent a total of 45 minutes on the day of the visit. This includes face to face time and non-face to face time preparing to see the patient (eg, review of tests), obtaining and/or reviewing separately obtained history, documenting clinical information in the electronic or other health record, independently interpreting results and communicating results to the patient/family/caregiver, or care coordinator.    A dictation device was used to produce this document. Use of such devices sometimes results in grammatical errors or replacement of  words that sound similarly.    Ede Garcia, DO

## 2022-05-31 DIAGNOSIS — Z96.659 STATUS POST KNEE REPLACEMENT, UNSPECIFIED LATERALITY: Primary | ICD-10-CM

## 2022-06-01 ENCOUNTER — PATIENT MESSAGE (OUTPATIENT)
Dept: ADMINISTRATIVE | Facility: HOSPITAL | Age: 67
End: 2022-06-01
Payer: MEDICARE

## 2022-06-01 ENCOUNTER — OFFICE VISIT (OUTPATIENT)
Dept: ORTHOPEDICS | Facility: CLINIC | Age: 67
End: 2022-06-01
Payer: MEDICARE

## 2022-06-01 ENCOUNTER — HOSPITAL ENCOUNTER (OUTPATIENT)
Dept: RADIOLOGY | Facility: HOSPITAL | Age: 67
Discharge: HOME | End: 2022-06-01
Attending: ORTHOPAEDIC SURGERY
Payer: MEDICARE

## 2022-06-01 VITALS — WEIGHT: 172.5 LBS | HEIGHT: 65 IN | BODY MASS INDEX: 28.74 KG/M2

## 2022-06-01 DIAGNOSIS — M17.12 PRIMARY OSTEOARTHRITIS OF LEFT KNEE: ICD-10-CM

## 2022-06-01 DIAGNOSIS — Z96.659 STATUS POST KNEE REPLACEMENT, UNSPECIFIED LATERALITY: ICD-10-CM

## 2022-06-01 DIAGNOSIS — Z96.652 STATUS POST TOTAL LEFT KNEE REPLACEMENT: Primary | ICD-10-CM

## 2022-06-01 PROCEDURE — 3008F BODY MASS INDEX DOCD: CPT | Mod: CPTII,S$GLB,, | Performed by: ORTHOPAEDIC SURGERY

## 2022-06-01 PROCEDURE — 99999 PR PBB SHADOW E&M-EST. PATIENT-LVL III: CPT | Mod: PBBFAC,,, | Performed by: ORTHOPAEDIC SURGERY

## 2022-06-01 PROCEDURE — 4010F ACE/ARB THERAPY RXD/TAKEN: CPT | Mod: CPTII,S$GLB,, | Performed by: ORTHOPAEDIC SURGERY

## 2022-06-01 PROCEDURE — 1157F ADVNC CARE PLAN IN RCRD: CPT | Mod: CPTII,S$GLB,, | Performed by: ORTHOPAEDIC SURGERY

## 2022-06-01 PROCEDURE — 1160F PR REVIEW ALL MEDS BY PRESCRIBER/CLIN PHARMACIST DOCUMENTED: ICD-10-PCS | Mod: CPTII,S$GLB,, | Performed by: ORTHOPAEDIC SURGERY

## 2022-06-01 PROCEDURE — 1101F PT FALLS ASSESS-DOCD LE1/YR: CPT | Mod: CPTII,S$GLB,, | Performed by: ORTHOPAEDIC SURGERY

## 2022-06-01 PROCEDURE — 3008F PR BODY MASS INDEX (BMI) DOCUMENTED: ICD-10-PCS | Mod: CPTII,S$GLB,, | Performed by: ORTHOPAEDIC SURGERY

## 2022-06-01 PROCEDURE — 73560 X-RAY EXAM OF KNEE 1 OR 2: CPT | Mod: 59,TC,RT

## 2022-06-01 PROCEDURE — 1126F PR PAIN SEVERITY QUANTIFIED, NO PAIN PRESENT: ICD-10-PCS | Mod: CPTII,S$GLB,, | Performed by: ORTHOPAEDIC SURGERY

## 2022-06-01 PROCEDURE — 1159F PR MEDICATION LIST DOCUMENTED IN MEDICAL RECORD: ICD-10-PCS | Mod: CPTII,S$GLB,, | Performed by: ORTHOPAEDIC SURGERY

## 2022-06-01 PROCEDURE — 3044F HG A1C LEVEL LT 7.0%: CPT | Mod: CPTII,S$GLB,, | Performed by: ORTHOPAEDIC SURGERY

## 2022-06-01 PROCEDURE — 73562 XR KNEE ORTHO LEFT: ICD-10-PCS | Mod: 26,LT,, | Performed by: RADIOLOGY

## 2022-06-01 PROCEDURE — 73560 XR KNEE ORTHO LEFT: ICD-10-PCS | Mod: 26,RT,, | Performed by: RADIOLOGY

## 2022-06-01 PROCEDURE — 1159F MED LIST DOCD IN RCRD: CPT | Mod: CPTII,S$GLB,, | Performed by: ORTHOPAEDIC SURGERY

## 2022-06-01 PROCEDURE — 99212 OFFICE O/P EST SF 10 MIN: CPT | Mod: S$GLB,,, | Performed by: ORTHOPAEDIC SURGERY

## 2022-06-01 PROCEDURE — 3044F PR MOST RECENT HEMOGLOBIN A1C LEVEL <7.0%: ICD-10-PCS | Mod: CPTII,S$GLB,, | Performed by: ORTHOPAEDIC SURGERY

## 2022-06-01 PROCEDURE — 1160F RVW MEDS BY RX/DR IN RCRD: CPT | Mod: CPTII,S$GLB,, | Performed by: ORTHOPAEDIC SURGERY

## 2022-06-01 PROCEDURE — 1101F PR PT FALLS ASSESS DOC 0-1 FALLS W/OUT INJ PAST YR: ICD-10-PCS | Mod: CPTII,S$GLB,, | Performed by: ORTHOPAEDIC SURGERY

## 2022-06-01 PROCEDURE — 73562 X-RAY EXAM OF KNEE 3: CPT | Mod: 26,LT,, | Performed by: RADIOLOGY

## 2022-06-01 PROCEDURE — 4010F PR ACE/ARB THEARPY RXD/TAKEN: ICD-10-PCS | Mod: CPTII,S$GLB,, | Performed by: ORTHOPAEDIC SURGERY

## 2022-06-01 PROCEDURE — 1126F AMNT PAIN NOTED NONE PRSNT: CPT | Mod: CPTII,S$GLB,, | Performed by: ORTHOPAEDIC SURGERY

## 2022-06-01 PROCEDURE — 3288F PR FALLS RISK ASSESSMENT DOCUMENTED: ICD-10-PCS | Mod: CPTII,S$GLB,, | Performed by: ORTHOPAEDIC SURGERY

## 2022-06-01 PROCEDURE — 73560 X-RAY EXAM OF KNEE 1 OR 2: CPT | Mod: 26,RT,, | Performed by: RADIOLOGY

## 2022-06-01 PROCEDURE — 1157F PR ADVANCE CARE PLAN OR EQUIV PRESENT IN MEDICAL RECORD: ICD-10-PCS | Mod: CPTII,S$GLB,, | Performed by: ORTHOPAEDIC SURGERY

## 2022-06-01 PROCEDURE — 3288F FALL RISK ASSESSMENT DOCD: CPT | Mod: CPTII,S$GLB,, | Performed by: ORTHOPAEDIC SURGERY

## 2022-06-01 PROCEDURE — 99212 PR OFFICE/OUTPT VISIT, EST, LEVL II, 10-19 MIN: ICD-10-PCS | Mod: S$GLB,,, | Performed by: ORTHOPAEDIC SURGERY

## 2022-06-01 PROCEDURE — 99999 PR PBB SHADOW E&M-EST. PATIENT-LVL III: ICD-10-PCS | Mod: PBBFAC,,, | Performed by: ORTHOPAEDIC SURGERY

## 2022-06-01 NOTE — PROGRESS NOTES
"Subjective:      Patient ID: Riana Kay is a 67 y.o. female.    Chief Complaint: Follow-up and Post-op Evaluation of the Left Knee    HPI  Riana Kay has left knee pain.  The pain has improved. She is doing well and is quite satisfied  Her history, medications and problem list were reviewed.    Review of Systems   Constitutional: Negative for chills, fever and night sweats.   HENT: Negative for hearing loss.    Eyes: Negative for blurred vision and double vision.   Cardiovascular: Negative for chest pain, claudication and leg swelling.   Respiratory: Negative for shortness of breath.    Endocrine: Negative for polydipsia, polyphagia and polyuria.   Hematologic/Lymphatic: Negative for adenopathy and bleeding problem. Does not bruise/bleed easily.   Skin: Negative for poor wound healing.   Gastrointestinal: Negative for diarrhea and heartburn.   Genitourinary: Negative for bladder incontinence.   Neurological: Negative for focal weakness, headaches, numbness, paresthesias and sensory change.   Psychiatric/Behavioral: The patient is not nervous/anxious.    Allergic/Immunologic: Negative for persistent infections.         Objective:      Body mass index is 28.71 kg/m².  Vitals:    06/01/22 0948   Weight: 78.3 kg (172 lb 8.2 oz)   Height: 5' 5" (1.651 m)           General    Constitutional: She is oriented to person, place, and time. She appears well-developed and well-nourished.   HENT:   Head: Normocephalic and atraumatic.   Eyes: EOM are normal.   Cardiovascular: Normal rate.    Pulmonary/Chest: Effort normal.   Neurological: She is alert and oriented to person, place, and time.   Psychiatric: She has a normal mood and affect. Her behavior is normal.     General Musculoskeletal Exam   Gait: normal       Right Knee Exam     Inspection   Erythema: absent  Scars: absent  Swelling: absent  Effusion: absent  Deformity: absent  Bruising: absent    Tenderness   The patient is experiencing no tenderness. "     Range of Motion   Extension: 0   Flexion: 130     Tests   Ligament Examination Lachman: normal (-1 to 2mm)   MCL - Valgus: normal (0 to 2mm)  LCL - Varus: normal  Patella   Passive Patellar Tilt: neutral    Other   Sensation: normal    Left Knee Exam     Inspection   Erythema: absent  Scars: absent  Swelling: absent  Effusion: absent  Deformity: absent  Bruising: absent    Tenderness   The patient is experiencing no tenderness.     Range of Motion   Extension: 0   Flexion: 130     Tests   Stability Lachman: normal (-1 to 2mm)   MCL - Valgus: normal (0 to 2mm)  LCL - Varus: normal (0 to 2mm)  Patella   Passive Patellar Tilt: neutral    Other   Sensation: normal    Muscle Strength   Right Lower Extremity   Hip Abduction: 5/5   Quadriceps:  5/5   Hamstrin/5   Left Lower Extremity   Hip Abduction: 5/5   Quadriceps:  5/5   Hamstrin/5     Reflexes     Left Side  Quadriceps:  2+    Right Side   Quadriceps:  2+    Vascular Exam     Right Pulses  Dorsalis Pedis:      2+          Left Pulses  Dorsalis Pedis:      2+          Edema  Right Lower Leg: absent  Left Lower Leg: absent    Radiographs taken today were reviewed by me.  There is a prosthetic replacement of the left knee(s).  The prosthesis is well positioned.  There is not evidence of bone loss, osteolysis, or loosening. There is not a fracture.                Assessment:       Encounter Diagnoses   Name Primary?    Status post total left knee replacement Yes    Primary osteoarthritis of left knee           Plan:       Riana was seen today for follow-up and post-op evaluation.    Diagnoses and all orders for this visit:    Status post total left knee replacement    Primary osteoarthritis of left knee      Doing well.  F/U in 3 months with xrays and PROMS both knees

## 2022-06-03 ENCOUNTER — HOSPITAL ENCOUNTER (OUTPATIENT)
Dept: RADIOLOGY | Facility: HOSPITAL | Age: 67
Discharge: HOME | End: 2022-06-03
Attending: PSYCHIATRY & NEUROLOGY
Payer: MEDICARE

## 2022-06-03 DIAGNOSIS — R41.3 OTHER AMNESIA: ICD-10-CM

## 2022-06-03 PROCEDURE — 70450 CT HEAD WITHOUT CONTRAST: ICD-10-PCS | Mod: 26,,, | Performed by: RADIOLOGY

## 2022-06-03 PROCEDURE — 70450 CT HEAD/BRAIN W/O DYE: CPT | Mod: 26,,, | Performed by: RADIOLOGY

## 2022-06-03 PROCEDURE — 70450 CT HEAD/BRAIN W/O DYE: CPT | Mod: TC

## 2022-06-15 DIAGNOSIS — E11.9 TYPE 2 DIABETES MELLITUS WITHOUT COMPLICATION: ICD-10-CM

## 2022-07-08 ENCOUNTER — TELEPHONE (OUTPATIENT)
Dept: AUDIOLOGY | Facility: CLINIC | Age: 67
End: 2022-07-08
Payer: MEDICARE

## 2022-07-08 DIAGNOSIS — R42 VERTIGO: Primary | ICD-10-CM

## 2022-07-11 ENCOUNTER — CLINICAL SUPPORT (OUTPATIENT)
Dept: AUDIOLOGY | Facility: CLINIC | Age: 67
End: 2022-07-11
Payer: MEDICARE

## 2022-07-11 DIAGNOSIS — R94.113 NONSPECIFIC ABNORMAL FINDING ON OCULOMOTOR STUDY: ICD-10-CM

## 2022-07-11 DIAGNOSIS — R42 VERTIGO: ICD-10-CM

## 2022-07-11 DIAGNOSIS — H81.90 VESTIBULAR DYSFUNCTION, UNSPECIFIED LATERALITY: Primary | ICD-10-CM

## 2022-07-11 PROCEDURE — 92537 CALORIC VSTBLR TEST W/REC: CPT | Mod: S$GLB,,, | Performed by: AUDIOLOGIST

## 2022-07-11 PROCEDURE — 92540 PR VESTIBULAR EVAL NYSTAG FOVL&PERPH STIM OSCIL TRACKING: ICD-10-PCS | Mod: S$GLB,,, | Performed by: AUDIOLOGIST

## 2022-07-11 PROCEDURE — 92540 BASIC VESTIBULAR EVALUATION: CPT | Mod: S$GLB,,, | Performed by: AUDIOLOGIST

## 2022-07-11 PROCEDURE — 92537 PR CALORIC VSTBLR TEST W/REC BITHERMAL: ICD-10-PCS | Mod: S$GLB,,, | Performed by: AUDIOLOGIST
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
## 2022-07-11 NOTE — PROGRESS NOTES
VNG EVALUATION    Referring physician:  Dr. Cook    Ms. Kay denied the use of an .    67 y.o. female complains of vertigo.  Symptoms are provoked by quick head turns and were recurring over several months.  Mrs. Kay reported that her symptoms have completely gone away since her visit with Dr. Cook on 22.  She denied any falls or head injuries.  She denied any loss of consciousness but reported occasional headaches.  Mrs. Kay is currently being seen by Neurology for memory loss; her  preferred to stay in the waiting area for testing.  He did not have any concerns for balance or dizziness.    Gaze nystagmus was absent.  Oculomotor function was abnormal: saccades, smooth pursuit, and OPK's.  Spontaneous nystagmus was absent.    The head-hanging left Hallpike revealed non-torsional, clinically significant up-beating nystagmus: 8 d/s.  The head-hanging right Hallpike was negative.  Static positional testing revealed clinically significant left beating (4 d/s) and up beating (10 d/s) nystagmus in the head left position.  Nystagmus fatigue with fixation.    Unilateral weakness: 11% (right)  Directional preponderance 11% (left beating)  RW: 24 d/s  LW: 37 d/s  RC: 29 d/s   d/s  Fixation suppression was normal.    Impression: Positional testing revealed nystagmus suggestive of a non-localizing peripheral vestibular abnormality.  Abnormal oculomotor testing is suggestive of a central oculomotor dysfunction.  Persistent presence of up-beating nystagmus is suggestive of a central vestibular abnormality.    Recommend:   1. A trial with Cawthorne exercises; a copy in Ecuadorean was given to and reviewed with the patient  2. Referral to Neurology for abnormal oculomotor testing and other findings suggestive of central pathology  3. Follow-up with Dr. Cook to review the results of today's evaluation

## 2022-07-12 ENCOUNTER — PATIENT MESSAGE (OUTPATIENT)
Dept: ADMINISTRATIVE | Facility: HOSPITAL | Age: 67
End: 2022-07-12
Payer: MEDICARE

## 2022-07-12 ENCOUNTER — TELEPHONE (OUTPATIENT)
Dept: OTOLARYNGOLOGY | Facility: CLINIC | Age: 67
End: 2022-07-12
Payer: MEDICARE

## 2022-07-12 ENCOUNTER — PATIENT MESSAGE (OUTPATIENT)
Dept: OTOLARYNGOLOGY | Facility: CLINIC | Age: 67
End: 2022-07-12
Payer: MEDICARE

## 2022-07-12 ENCOUNTER — OFFICE VISIT (OUTPATIENT)
Dept: NEUROLOGY | Facility: CLINIC | Age: 67
End: 2022-07-12
Payer: MEDICARE

## 2022-07-12 DIAGNOSIS — R41.3 MEMORY CHANGES: Primary | ICD-10-CM

## 2022-07-12 DIAGNOSIS — F41.9 ANXIETY: ICD-10-CM

## 2022-07-12 PROCEDURE — 96139 PSYCL/NRPSYC TST TECH EA: CPT | Mod: ,,, | Performed by: CLINICAL NEUROPSYCHOLOGIST

## 2022-07-12 PROCEDURE — 99212 OFFICE O/P EST SF 10 MIN: CPT | Mod: PBBFAC | Performed by: CLINICAL NEUROPSYCHOLOGIST

## 2022-07-12 PROCEDURE — 96133 PR NEUROPSYCHOLOGIC TEST EVAL SVCS, EA ADDTL HR: ICD-10-PCS | Mod: ,,, | Performed by: CLINICAL NEUROPSYCHOLOGIST

## 2022-07-12 PROCEDURE — 99499 UNLISTED E&M SERVICE: CPT | Mod: S$PBB,,, | Performed by: CLINICAL NEUROPSYCHOLOGIST

## 2022-07-12 PROCEDURE — 96132 NRPSYC TST EVAL PHYS/QHP 1ST: CPT | Mod: ,,, | Performed by: CLINICAL NEUROPSYCHOLOGIST

## 2022-07-12 PROCEDURE — 96138 PSYCL/NRPSYC TECH 1ST: CPT | Mod: ,,, | Performed by: CLINICAL NEUROPSYCHOLOGIST

## 2022-07-12 PROCEDURE — 99999 PR PBB SHADOW E&M-EST. PATIENT-LVL II: ICD-10-PCS | Mod: PBBFAC,,, | Performed by: CLINICAL NEUROPSYCHOLOGIST

## 2022-07-12 PROCEDURE — 96138 PR PSYCH/NEUROPSYCH TEST ADMIN/SCORING, BY TECH, 2+ TESTS, 1ST 30 MIN: ICD-10-PCS | Mod: ,,, | Performed by: CLINICAL NEUROPSYCHOLOGIST

## 2022-07-12 PROCEDURE — 96139 PR PSYCH/NEUROPSYCH TEST ADMIN/SCORING, BY TECH, 2+ TESTS, EA ADDTL 30 MIN: ICD-10-PCS | Mod: ,,, | Performed by: CLINICAL NEUROPSYCHOLOGIST

## 2022-07-12 PROCEDURE — 96132 PR NEUROPSYCHOLOGIC TEST EVAL SVCS, 1ST HR: ICD-10-PCS | Mod: ,,, | Performed by: CLINICAL NEUROPSYCHOLOGIST

## 2022-07-12 PROCEDURE — 99999 PR PBB SHADOW E&M-EST. PATIENT-LVL II: CPT | Mod: PBBFAC,,, | Performed by: CLINICAL NEUROPSYCHOLOGIST

## 2022-07-12 PROCEDURE — 96133 NRPSYC TST EVAL PHYS/QHP EA: CPT | Mod: ,,, | Performed by: CLINICAL NEUROPSYCHOLOGIST

## 2022-07-12 PROCEDURE — 99499 NO LOS: ICD-10-PCS | Mod: S$PBB,,, | Performed by: CLINICAL NEUROPSYCHOLOGIST

## 2022-07-12 NOTE — TELEPHONE ENCOUNTER
----- Message from Janie Ervin MA sent at 7/12/2022  1:10 PM CDT -----    ----- Message -----  From: Paz Cook MD  Sent: 7/12/2022   1:02 PM CDT  To: Janie Ervin MA    Please have this patient follow up to discuss her VNG findings when convenient in the next few weeks.  Thank you.  Paz Cook M.D.  ----- Message -----  From: Gracie Montgomery, CCC-A  Sent: 7/11/2022  11:10 AM CDT  To: Paz Cook MD    Hi Dr. Cook,    Please see Mrs. Kay's results below; let me know if you have any questions.    Prabha

## 2022-07-15 ENCOUNTER — PATIENT MESSAGE (OUTPATIENT)
Dept: NEUROLOGY | Facility: CLINIC | Age: 67
End: 2022-07-15
Payer: MEDICARE

## 2022-07-15 NOTE — PATIENT INSTRUCTIONS
COMPLIANCE WITH CPAP MACHINE  Untreated or partially treated sleep apnea can negatively impact your cognition, both acutely and chronically. It is important to maintain as close to 100% compliance as possible when using your machine.    MANAGING VASCULAR RISK FACTORS  A personal history of disorders that affect the cardiovascular system (e.g., hypertension, high cholesterol, diabetes, heart disease) can have a negative impact on brain functioning especially over many years. Therefore, it is very important for this patient to maintain good control over his/her risk factors. The following is recommended:  Take all medications as prescribed and follow-up with recommendations above.  Get regular physical exercise to the extent that it is possible. Family may need to structure this into their loved one's day or week and develop a transportation plan.  Eat a well-balanced diet and following the MIND diet (see handout) has been shown to be most brain protective.   Check your blood pressure, cholesterol levels, blood sugar, and others as appropriate.    PRACTICE GOOD COGNITIVE HYGIENE  Engage in regular exercise, which increases alertness and arousal and can improve attention and focus.  Consider lower impact exercises, such as yoga or light walking. Try to exercise for at least 150 minutes per week  Get a good night's sleep, as this can enhance alertness and cognition.  Eat healthy foods and balanced meals. It is notable that research indicates certain nutrients may aid in brain function, such as B vitamins (especially B6, B12, and folic acid), antioxidants (such as vitamins C and E, and beta carotene), and Omega-3 fatty acids. Here are some common tips for diet (Adopted from Kylah et al, [facility], 2018):  Eat primarily plant-based foods, such as fruits and vegetables, whole grains, legumes   (beans) and nuts.  Limit refined carbohydrates (white pasta, bread, rice).  Replace butter with healthy fats such as olive oil.  Use  herbs and spices instead of salt to flavor foods.  Limit red meat and processed meats to no more than a few times a month.  Avoid sugary sodas, bakery goods, and sweets.  Eat fish and poultry at least twice a week.  Keep your brain active. Find activities to stay mentally active, such as reading, games (cards, checkers), puzzles (crosswords, Sudoku, jig saw), crafts (models, woodworking), gardening, or participating in activities in the community.  Stay socially engaged. Continue staying active with your family and friends.    RESOURCE  Consider purchasing the book, [employer] Brain: 5 Science-Based Steps to Sharpen Your Memory and Reduce Your Risk of Alzheimer's by Dr. Perla Wang.    COGNITIVE TIPS AND STRATEGIES  The following tips and strategies are provided to help assist in daily activities:      Attention: Remember that inattention and lack of focus are major culprits to forgetting information so be sure and practice paying attention for adequate learning of information. If you rely on passive attention to remembering something (e.g., yeah, uh-huh approach), you'll find you cannot recall it later. I recommend the following to improve attention, which may aid in later recall:  1. Reduce distractions in the area as much as possible  2. Look at the person as they are speaking to you.   3. Paraphrase as they are speaking  4. Write down important pieces of information   5. Ask them to repeat if you zone out.  6. Have them simplify and reduce information that you need to attend to during conversation.  7. Have visual cues to remind you if you need to do something later.     Processing Speed:  1. Using multiple modalities (e.g., listening, writing notes, asking questions, recording) to learn new information is likely to allow additional time for processing, thus improving memory for the material.   2. Allowing sufficient time to complete tasks will reduce frustration and help to ensure completion.      Executive Functionin. Don't attempt to multi-task.  Separate tasks so that each can be completed one at a time  2. Consider using a calendar/day planner, as that may be effective to help you plan and stay on track.  Color-coding specific tasks by importance may add additional benefit to your planner  3. Break down large projects into smaller tasks and write down the steps to completing the task.  Taking notes while reading can help with recall.     Storing Information: Use the below strategies to help you further enhance how information is stored  1. Rehearse - Immediately after seeing/hearing something, try to recall it.  Wait a few minutes, then check again.  Gradually lengthen the intervals between rehearsals.  2. Repetition of learned material is critical to ensure storage of information to be learned. Self-test at home to ensure learning.  3. Write down important information to improve your attention and focus and to have something to look back on when you need to recall it.  4. Make sure the person doesn't rattle off, but presents in a clear, logical, and unhurried manner.      Recalling Information:  1. Jog your memory - Lose something?  Think back to when you last had it.  What did you do next?  And after that?  Mentally walk yourself through each activity that followed.  Prodding your memory this way may enable you to recall the location of the missing item.  2. Use a cue - Symbolic reminders (the proverbial string around the finger) are helpful.  So too are memos, timers, calendar notes, etc.--keep them in visible, appropriate place  3. Get organized - Have fixed locations for all important papers, key phone numbers, medications, keys, wallet, glasses, tools, etc.  4. Develop routines - Routines can anchor memories so they do not drift away.       Word Finding:   Not being able to find a word when you need it is a common and very annoying problem. It is not strictly a memory issue, but more a filing  and retrieval issue. You know the word or name you want, but it cannot be found in your brain file. It is like having all the files in your file cabinet emptied on the floor. Every piece of information is there but finding it can be a challenge.   One strategy that may help is working with a speech pathologist/therapist to learn techniques to decrease word finding problems. Some of those strategies/techniques include the following:  Use circumlocutions. Describe the object you're trying to name instead of naming it.  Recite you're A, B, Cs. Go through the alphabet to see if a letter triggers finding the word.  Picture it. Try to visualize the spelling or writing of the word.  Relax. The harder you try to force yourself to come up with the word, the more frustrated you get and the worse you function.   Write it down. In situations where using correct words is critical, such as communicating on the radio while flying, write down the key words so that they are in front of you if needed.  Use word association. For words or names you continually misfile and can't find, try to come up with a word association, something that reminds you of the word or name.  Write a script. Try scripting something important that you want to say. Either write it out or practice it beforehand, so that you get it right.  Play word games. Doing crossword puzzles and playing word finding games may help your brain become more efficient at filing and retrieving words.     BRAIN TRAINING APPS  · Brain"[employer]Q (https://www.brainSynchronized.iDubba/) - BrainHQ has more than two dozen brain-training exercises organized into six categories: Attention, Brain Speed, Memory, People Skills, Intelligence, and Navigation. It allows you to fit brain exercises into your busy life, and access brain training on most internet-connected devices. Plus, each exercise continuously adapts to your unique performance. So you train at the right level for you.     · Mind Games  (https://www.Finovera.com/) - Mind Games is a great collection of games based in part on principles derived from cognitive tasks to help you practice different mental skills. This includes a handful of free games. Additionally, there are a number of trial games included that can be played 3 times. All games include your score history and a graph of your progress. Using some principles of standardized testing, your scores are also converted to a standard scale so that you can see where you need work and excel. The training center does the work for you by picking the perfect mix of exercises to keep you engaged.     · Elevate (https://Plur[employer].com/) - Elevate was selected by [employer] as Nadia of the Year! Elevate is a brain training program designed to improve focus, speaking abilities, processing speed, memory, math skills, and more. Each person is provided with a personalized training program that adjusts over time to maximize results. Theoretically, the more you train with Elevate, the more you'll improve critical cognitive skills that are proven to boost productivity, earning power, and self-confidence. Users who train at least 3 times per week have reported dramatic gains and increased confidence. In-nadia purchases.     · Peak (https://www.peak.net/) - Reach Peak performance with over 40 unique games, each one developed by neuroscientists and game experts to challenge your cognitive skills and push you further. Use , the  for your brain, to find the right workout for you at the right time. Choose from 's best recommendations to push your skills to the max. Or take contextual workouts like Coffee Break if you're short on time.  will help you track your progress using in-depth insights and keep you going when you need it most. Play for free or upgrade to Pro and get the best brain training experience available. In-nadia purchases.     OTHER SUGGESTIONS  Games and Apps like Anna Lozabaiu;  Crossword Puzzles; Word Find; Memory Games; Logic Games; Solitaire; and Hidden Object Mysteries. Find some you like and play them. It will exercise many of the skills necessary to improve function.

## 2022-07-18 ENCOUNTER — OFFICE VISIT (OUTPATIENT)
Dept: NEUROLOGY | Facility: CLINIC | Age: 67
End: 2022-07-18
Payer: MEDICARE

## 2022-07-18 VITALS
DIASTOLIC BLOOD PRESSURE: 61 MMHG | WEIGHT: 172 LBS | SYSTOLIC BLOOD PRESSURE: 138 MMHG | HEART RATE: 60 BPM | BODY MASS INDEX: 28.66 KG/M2 | HEIGHT: 65 IN

## 2022-07-18 DIAGNOSIS — R41.89 SUBJECTIVE MEMORY COMPLAINTS: Primary | ICD-10-CM

## 2022-07-18 DIAGNOSIS — F41.9 ANXIETY: ICD-10-CM

## 2022-07-18 PROCEDURE — 3078F DIAST BP <80 MM HG: CPT | Mod: CPTII,S$GLB,, | Performed by: PSYCHIATRY & NEUROLOGY

## 2022-07-18 PROCEDURE — 3008F PR BODY MASS INDEX (BMI) DOCUMENTED: ICD-10-PCS | Mod: CPTII,S$GLB,, | Performed by: PSYCHIATRY & NEUROLOGY

## 2022-07-18 PROCEDURE — 3044F PR MOST RECENT HEMOGLOBIN A1C LEVEL <7.0%: ICD-10-PCS | Mod: CPTII,S$GLB,, | Performed by: PSYCHIATRY & NEUROLOGY

## 2022-07-18 PROCEDURE — 1126F PR PAIN SEVERITY QUANTIFIED, NO PAIN PRESENT: ICD-10-PCS | Mod: CPTII,S$GLB,, | Performed by: PSYCHIATRY & NEUROLOGY

## 2022-07-18 PROCEDURE — 3078F PR MOST RECENT DIASTOLIC BLOOD PRESSURE < 80 MM HG: ICD-10-PCS | Mod: CPTII,S$GLB,, | Performed by: PSYCHIATRY & NEUROLOGY

## 2022-07-18 PROCEDURE — 1101F PR PT FALLS ASSESS DOC 0-1 FALLS W/OUT INJ PAST YR: ICD-10-PCS | Mod: CPTII,S$GLB,, | Performed by: PSYCHIATRY & NEUROLOGY

## 2022-07-18 PROCEDURE — 1159F MED LIST DOCD IN RCRD: CPT | Mod: CPTII,S$GLB,, | Performed by: PSYCHIATRY & NEUROLOGY

## 2022-07-18 PROCEDURE — 3288F PR FALLS RISK ASSESSMENT DOCUMENTED: ICD-10-PCS | Mod: CPTII,S$GLB,, | Performed by: PSYCHIATRY & NEUROLOGY

## 2022-07-18 PROCEDURE — 1157F PR ADVANCE CARE PLAN OR EQUIV PRESENT IN MEDICAL RECORD: ICD-10-PCS | Mod: CPTII,S$GLB,, | Performed by: PSYCHIATRY & NEUROLOGY

## 2022-07-18 PROCEDURE — 3008F BODY MASS INDEX DOCD: CPT | Mod: CPTII,S$GLB,, | Performed by: PSYCHIATRY & NEUROLOGY

## 2022-07-18 PROCEDURE — 1160F PR REVIEW ALL MEDS BY PRESCRIBER/CLIN PHARMACIST DOCUMENTED: ICD-10-PCS | Mod: CPTII,S$GLB,, | Performed by: PSYCHIATRY & NEUROLOGY

## 2022-07-18 PROCEDURE — 4010F ACE/ARB THERAPY RXD/TAKEN: CPT | Mod: CPTII,S$GLB,, | Performed by: PSYCHIATRY & NEUROLOGY

## 2022-07-18 PROCEDURE — 3075F SYST BP GE 130 - 139MM HG: CPT | Mod: CPTII,S$GLB,, | Performed by: PSYCHIATRY & NEUROLOGY

## 2022-07-18 PROCEDURE — 99999 PR PBB SHADOW E&M-EST. PATIENT-LVL III: CPT | Mod: PBBFAC,,, | Performed by: PSYCHIATRY & NEUROLOGY

## 2022-07-18 PROCEDURE — 3044F HG A1C LEVEL LT 7.0%: CPT | Mod: CPTII,S$GLB,, | Performed by: PSYCHIATRY & NEUROLOGY

## 2022-07-18 PROCEDURE — 1160F RVW MEDS BY RX/DR IN RCRD: CPT | Mod: CPTII,S$GLB,, | Performed by: PSYCHIATRY & NEUROLOGY

## 2022-07-18 PROCEDURE — 1159F PR MEDICATION LIST DOCUMENTED IN MEDICAL RECORD: ICD-10-PCS | Mod: CPTII,S$GLB,, | Performed by: PSYCHIATRY & NEUROLOGY

## 2022-07-18 PROCEDURE — 3288F FALL RISK ASSESSMENT DOCD: CPT | Mod: CPTII,S$GLB,, | Performed by: PSYCHIATRY & NEUROLOGY

## 2022-07-18 PROCEDURE — 4010F PR ACE/ARB THEARPY RXD/TAKEN: ICD-10-PCS | Mod: CPTII,S$GLB,, | Performed by: PSYCHIATRY & NEUROLOGY

## 2022-07-18 PROCEDURE — 99999 PR PBB SHADOW E&M-EST. PATIENT-LVL III: ICD-10-PCS | Mod: PBBFAC,,, | Performed by: PSYCHIATRY & NEUROLOGY

## 2022-07-18 PROCEDURE — 1157F ADVNC CARE PLAN IN RCRD: CPT | Mod: CPTII,S$GLB,, | Performed by: PSYCHIATRY & NEUROLOGY

## 2022-07-18 PROCEDURE — 99214 PR OFFICE/OUTPT VISIT, EST, LEVL IV, 30-39 MIN: ICD-10-PCS | Mod: S$GLB,,, | Performed by: PSYCHIATRY & NEUROLOGY

## 2022-07-18 PROCEDURE — 1101F PT FALLS ASSESS-DOCD LE1/YR: CPT | Mod: CPTII,S$GLB,, | Performed by: PSYCHIATRY & NEUROLOGY

## 2022-07-18 PROCEDURE — 99214 OFFICE O/P EST MOD 30 MIN: CPT | Mod: S$GLB,,, | Performed by: PSYCHIATRY & NEUROLOGY

## 2022-07-18 PROCEDURE — 3075F PR MOST RECENT SYSTOLIC BLOOD PRESS GE 130-139MM HG: ICD-10-PCS | Mod: CPTII,S$GLB,, | Performed by: PSYCHIATRY & NEUROLOGY

## 2022-07-18 PROCEDURE — 1126F AMNT PAIN NOTED NONE PRSNT: CPT | Mod: CPTII,S$GLB,, | Performed by: PSYCHIATRY & NEUROLOGY

## 2022-07-18 NOTE — PROGRESS NOTES
[facility] NEUROLOGY  [facility], [place]    *This visit was conducted with the assistance of professional translation services, Noé [id number].*    Chief Complaint:  Follow up for Changes in memory  Subjective:     07/18/22:  Patient is once again accompanied by her  at the time of the encounter.  States that she has been stable since previous encounter.  No significant changes or health events since our last visit.  No new specific neurological or medical complaints.  CT completed and found to be free of abnormal patterns of atrophy or concerns as they relate to memory.  Neuropsychology testing found no concerning features for underlying neuro degenerative process.  They did highlight anxiety as a probable contributing factor in her current complaints.  They recommend further treatment of anxiety to improve subjective memory complaint.       ============================================  05/26/22 HPI:   Ms. Riana Kay is a 67 y.o. female presenting for evaluation of changes in memory.  We began by discussing what changes the patient has actually noticed.  She denies having any complaints regarding her memory and feels as if any occasional lapses are simply due to normal aging.  She is accompanied by her partner at the time of the encounter.  He expresses frustration over this point as he feels as if the patient's personality has slightly changed as she is more short tempered and impatient.  He also notes that she has issues with short-term memory every day.  He admits her long-term memory is quite intact.  They jointly deny any issues with hallucinations, delusions, paranoia.  Patient is sleeping well with no reported disturbances.  Uses CPAP and sleeps approximately 7 hours per night and only wakes to go use the restroom.  Reports all activities of daily living are completed independently except for driving.  She has given up driving due to prolonged knee pain related to  knee replacements.  Denies significant mood disturbances but admits to occasional anxiety; uses her hobby of gardening to manage the anxiety.  Speaks to family members multiple times per day.  Admits to rarely leaving the house during the week.  ============================================    CURRENT MEDS:  Current Outpatient Medications   Medication Sig Dispense Refill    azelastine (ASTELIN) 137 mcg (0.1 %) nasal spray 1 spray (137 mcg total) by Nasal route 2 (two) times daily. 30 mL 11    blood sugar diagnostic Strp To check BG 4 times daily, to use with insurance preferred meter-true metrix 400 each 3    cyclobenzaprine (FLEXERIL) 5 MG tablet TAKE 1 TABLET BY MOUTH THREE TIMES A DAY AS NEEDED FOR MUSCLE SPASMS 90 tablet 0    diclofenac sodium (VOLTAREN) 1 % Gel APPLY 2 G TOPICALLY 2 (TWO) TIMES DAILY AS NEEDED (PAIN). DO NOT USE DIRECTLY ON INCISION 100 g 0    ergocalciferol (ERGOCALCIFEROL) 50,000 unit Cap TAKE 1 CAPSULE BY MOUTH ONE TIME PER WEEK 12 capsule 0    fluticasone propionate (FLONASE) 50 mcg/actuation nasal spray 2 sprays (100 mcg total) by Each Nostril route once daily. 16 g 11    insulin (LANTUS SOLOSTAR U-100 INSULIN) glargine 100 units/mL (3mL) SubQ pen INJECT 38-40 UNITS SUBCUTANEOUSLY AT NIGHT. 30 each 4    insulin lispro (HUMALOG KWIKPEN INSULIN) 100 unit/mL pen INJECT 16 UNITS W/ MEALS PLUS SCALE 150-200+2, 201-250+4, 251-300+6, 301-350+8, >350+10. MAX DAILY 68 UNITS. 60 each 3    lancets Misc To check BG 4  times daily, to use with insurance preferred meter-true metrix 400 each 3    latanoprost 0.005 % ophthalmic solution Place 1 drop into both eyes nightly.      losartan (COZAAR) 25 MG tablet Take 0.5 tablets (12.5 mg total) by mouth once daily. 45 tablet 3    meloxicam (MOBIC) 15 MG tablet TAKE 1 TABLET BY MOUTH EVERY DAY 30 tablet 1    ondansetron (ZOFRAN-ODT) 8 MG TbDL Dissolve 1 tablet (8 mg total) by mouth every 12 (twelve) hours as needed (nausea). 20 tablet 0     "pantoprazole (PROTONIX) 40 MG tablet Take 1 tablet (40 mg total) by mouth once daily. 90 tablet 3    pen needle, diabetic (NOVOFINE 32) 32 gauge x 1/4" Ndle Uses 4 times a day. 90 day via duramed e 11.65 400 each 3    blood-glucose meter kit To check BG 4 times daily, to use with insurance preferred meter-true metrix 1 each 1    meclizine (ANTIVERT) 25 mg tablet Take 1 tablet (25 mg total) by mouth 3 (three) times daily as needed for Dizziness. 30 tablet 1     No current facility-administered medications for this visit.     ALLERGIES:  Review of patient's allergies indicates:   Allergen Reactions    Iodinated contrast media Swelling and Rash    Percocet [oxycodone-acetaminophen] Itching    Macrobid [nitrofurantoin monohyd/m-cryst] Rash    Metformin Rash    Penicillins Rash     Had ancef in 2020 with no adverse rxn     Promethazine Rash    Sulfa (sulfonamide antibiotics) Rash    Sulfamethoxazole-trimethoprim Rash        Objective:     Vitals:    07/18/22 0920   BP: 138/61   Pulse: 60   Weight: 78 kg (172 lb)   Height: 5' 5" (1.651 m)     General: female in NAD, alert and awake, Aox3, well groomed. ?       Neurological Examination.    Mental status: AA&O x3; Affect/mood is euthymic/congruent; no aphasia      Cranial Nerves: II-XII grossly intact.        Muscle Function:  Full and symmetric strength throughout     Gait: adequate casual gait with stride length and arm swing WNL.  No shuffling, stable without the use of cane or walker    Other:  Report from neuropsychology, 07/12/22 with Liliana Menendez, PhD, reviewed at length.    06/03/2022 CT head without contrast:  FINDINGS:  The ventricles are normal in size and configuration.  There is normal gray-white matter differentiation maintained.  There is no evidence for abnormal masses, mass effect, or midline shift.  No abnormal intra or extra-axial fluid collections are identified, specifically there is no evidence for intracranial hemorrhage.  There is " "opacification of much of the left sphenoid sinus consistent with mucosal thickening or retention cyst formation.  Mastoid air cells are clear.  Bony calvarium is intact.  Impression:  No acute intracranial abnormalities identified.  No evidence for intracranial hemorrhage.  Left sphenoid sinus mucosal disease."    Assessment:   Riana Kay is a 67 y.o. female presenting for follow-up regarding subjective memory complaints.  Results of head imaging and neuropsychology evaluation are not consistent with underlying neuro degenerative process.  In line with neuropsychology recommendation, we discussed the possibility of speaking with Psychiatry or Psychology regarding next steps in management for anxiety.  The patient endorsed this option and referral was ordered.    Plan:     Problem List Items Addressed This Visit        Neuro    Memory changes - Primary       Psychiatric    Anxiety    Relevant Orders    Ambulatory consult to Psychiatry        - encourage cognitively stimulating activities including regular socialization, reading, puzzles  - encourage regular physical activity for good vascular and brain health  - encouraged tight control blood pressure, glucose, cholesterol  - return to clinic in the future as needed    I spent a total of 30 minutes on the day of the visit. This includes face to face time and non-face to face time preparing to see the patient (eg, review of tests), obtaining and/or reviewing separately obtained history, documenting clinical information in the electronic or other health record, independently interpreting results and communicating results to the patient/family/caregiver, or care coordinator.    A dictation device was used to produce this document. Use of such devices sometimes results in grammatical errors or replacement of words that sound similarly.    Ede Garcia, DO      "

## 2022-07-19 ENCOUNTER — PATIENT MESSAGE (OUTPATIENT)
Dept: RESEARCH | Facility: CLINIC | Age: 67
End: 2022-07-19
Payer: MEDICARE

## 2022-07-20 ENCOUNTER — OFFICE VISIT (OUTPATIENT)
Dept: OTOLARYNGOLOGY | Facility: CLINIC | Age: 67
End: 2022-07-20
Payer: MEDICARE

## 2022-07-20 VITALS — DIASTOLIC BLOOD PRESSURE: 74 MMHG | HEART RATE: 69 BPM | SYSTOLIC BLOOD PRESSURE: 151 MMHG

## 2022-07-20 DIAGNOSIS — H55.09 VERTICAL NYSTAGMUS: Primary | ICD-10-CM

## 2022-07-20 DIAGNOSIS — R94.113 OCULOMOTOR FUNCTION STUDY ABNORMALITY: ICD-10-CM

## 2022-07-20 DIAGNOSIS — R42 DIZZINESS AND GIDDINESS: ICD-10-CM

## 2022-07-20 DIAGNOSIS — H81.90 PERIPHERAL VESTIBULOPATHY, UNSPECIFIED LATERALITY: ICD-10-CM

## 2022-07-20 DIAGNOSIS — Z01.818 PREPROCEDURAL EXAMINATION: ICD-10-CM

## 2022-07-20 PROCEDURE — 99213 OFFICE O/P EST LOW 20 MIN: CPT | Mod: S$GLB,,, | Performed by: OTOLARYNGOLOGY

## 2022-07-20 PROCEDURE — 1160F PR REVIEW ALL MEDS BY PRESCRIBER/CLIN PHARMACIST DOCUMENTED: ICD-10-PCS | Mod: CPTII,S$GLB,, | Performed by: OTOLARYNGOLOGY

## 2022-07-20 PROCEDURE — 3044F HG A1C LEVEL LT 7.0%: CPT | Mod: CPTII,S$GLB,, | Performed by: OTOLARYNGOLOGY

## 2022-07-20 PROCEDURE — 1126F PR PAIN SEVERITY QUANTIFIED, NO PAIN PRESENT: ICD-10-PCS | Mod: CPTII,S$GLB,, | Performed by: OTOLARYNGOLOGY

## 2022-07-20 PROCEDURE — 1159F MED LIST DOCD IN RCRD: CPT | Mod: CPTII,S$GLB,, | Performed by: OTOLARYNGOLOGY

## 2022-07-20 PROCEDURE — 1101F PT FALLS ASSESS-DOCD LE1/YR: CPT | Mod: CPTII,S$GLB,, | Performed by: OTOLARYNGOLOGY

## 2022-07-20 PROCEDURE — 1160F RVW MEDS BY RX/DR IN RCRD: CPT | Mod: CPTII,S$GLB,, | Performed by: OTOLARYNGOLOGY

## 2022-07-20 PROCEDURE — 4010F PR ACE/ARB THEARPY RXD/TAKEN: ICD-10-PCS | Mod: CPTII,S$GLB,, | Performed by: OTOLARYNGOLOGY

## 2022-07-20 PROCEDURE — 3078F DIAST BP <80 MM HG: CPT | Mod: CPTII,S$GLB,, | Performed by: OTOLARYNGOLOGY

## 2022-07-20 PROCEDURE — 1157F PR ADVANCE CARE PLAN OR EQUIV PRESENT IN MEDICAL RECORD: ICD-10-PCS | Mod: CPTII,S$GLB,, | Performed by: OTOLARYNGOLOGY

## 2022-07-20 PROCEDURE — 3077F PR MOST RECENT SYSTOLIC BLOOD PRESSURE >= 140 MM HG: ICD-10-PCS | Mod: CPTII,S$GLB,, | Performed by: OTOLARYNGOLOGY

## 2022-07-20 PROCEDURE — 1159F PR MEDICATION LIST DOCUMENTED IN MEDICAL RECORD: ICD-10-PCS | Mod: CPTII,S$GLB,, | Performed by: OTOLARYNGOLOGY

## 2022-07-20 PROCEDURE — 3077F SYST BP >= 140 MM HG: CPT | Mod: CPTII,S$GLB,, | Performed by: OTOLARYNGOLOGY

## 2022-07-20 PROCEDURE — 1101F PR PT FALLS ASSESS DOC 0-1 FALLS W/OUT INJ PAST YR: ICD-10-PCS | Mod: CPTII,S$GLB,, | Performed by: OTOLARYNGOLOGY

## 2022-07-20 PROCEDURE — 1157F ADVNC CARE PLAN IN RCRD: CPT | Mod: CPTII,S$GLB,, | Performed by: OTOLARYNGOLOGY

## 2022-07-20 PROCEDURE — 99999 PR PBB SHADOW E&M-EST. PATIENT-LVL III: CPT | Mod: PBBFAC,,, | Performed by: OTOLARYNGOLOGY

## 2022-07-20 PROCEDURE — 3078F PR MOST RECENT DIASTOLIC BLOOD PRESSURE < 80 MM HG: ICD-10-PCS | Mod: CPTII,S$GLB,, | Performed by: OTOLARYNGOLOGY

## 2022-07-20 PROCEDURE — 4010F ACE/ARB THERAPY RXD/TAKEN: CPT | Mod: CPTII,S$GLB,, | Performed by: OTOLARYNGOLOGY

## 2022-07-20 PROCEDURE — 99999 PR PBB SHADOW E&M-EST. PATIENT-LVL III: ICD-10-PCS | Mod: PBBFAC,,, | Performed by: OTOLARYNGOLOGY

## 2022-07-20 PROCEDURE — 3288F PR FALLS RISK ASSESSMENT DOCUMENTED: ICD-10-PCS | Mod: CPTII,S$GLB,, | Performed by: OTOLARYNGOLOGY

## 2022-07-20 PROCEDURE — 1126F AMNT PAIN NOTED NONE PRSNT: CPT | Mod: CPTII,S$GLB,, | Performed by: OTOLARYNGOLOGY

## 2022-07-20 PROCEDURE — 3288F FALL RISK ASSESSMENT DOCD: CPT | Mod: CPTII,S$GLB,, | Performed by: OTOLARYNGOLOGY

## 2022-07-20 PROCEDURE — 3044F PR MOST RECENT HEMOGLOBIN A1C LEVEL <7.0%: ICD-10-PCS | Mod: CPTII,S$GLB,, | Performed by: OTOLARYNGOLOGY

## 2022-07-20 PROCEDURE — 99213 PR OFFICE/OUTPT VISIT, EST, LEVL III, 20-29 MIN: ICD-10-PCS | Mod: S$GLB,,, | Performed by: OTOLARYNGOLOGY

## 2022-07-20 NOTE — PATIENT INSTRUCTIONS
Reviewed VNG results with patient.    Given central findings on VNG - MRI of brain ordered. Patient may call [phone] to schedule the imaging.    Discuss VNG findings with neurologist when she has her next appointment.     Thankfully her symptoms of vertigo have resolved. Discussed expectations.

## 2022-07-20 NOTE — PROGRESS NOTES
66 y/o female returns for discussion of her VNG results. Her spells of feeling movement with leftward head movement have resolved.  She saw her neurologist yesterday. Psychiatrist recommended but no appt available at [facility].    No other new problems.     PMHx, PSHx, Meds, Allergies, SocHx, FamHx reviewed in EPIC    Review of Systems     Constitutional: Positive for fatigue.  Negative for fever.      HENT: Positive for hearing loss.  Negative for ear discharge, ear pain, postnasal drip, runny nose, sinus infection, sinus pressure, stuffy nose, trouble swallowing and voice change.      Eyes:  Negative for change in eyesight, eye drainage, eye itching and photophobia.     Respiratory:  Positive for sleep apnea and snoring. Negative for cough, shortness of breath and wheezing.      Cardiovascular:  Negative for chest pain, foot swelling, irregular heartbeat and swollen veins.     Gastrointestinal:  Positive for acid reflux. Negative for abdominal pain and heartburn.     Genitourinary: Negative for difficulty urinating, sexual problems and frequent urination.     Musc: Positive for aching muscles. Negative for aching joints and back pain.     Skin: Negative for rash.     Allergy: Positive for seasonal allergies.     Endocrine: Negative for cold intolerance and heat intolerance.      Neurological: Negative for dizziness, headaches, light-headedness, seizures and tremors.      Hematologic: Negative for bruises/bleeds easily and swollen glands.      Psychiatric: Positive for decreased concentration, depression, nervous/anxious and sleep disturbance.       PE: BP (!) 151/74   Pulse 69    Gen: female, well nourished, well developed, NAD, cooperative, good historian, speaks Yi and english but does not want an , here with her   Ears:  EAC clear & TM translucent with normal bony landmarks bilaterally  Respiratory: Breathing comfortably without retractions  Skin: facial skin intact without visible lesions  "or flushing    Neuro:  facial movement symmetric, speech fluid, gait stable  Psych: alert & oriented x 3, reasonable, normal affect    VNG from 22 - findings:  "Gaze nystagmus was absent.  Oculomotor function was abnormal: saccades, smooth pursuit, and OPK's.  Spontaneous nystagmus was absent.     The head-hanging left Hallpike revealed non-torsional, clinically significant up-beating nystagmus: 8 d/s.  The head-hanging right Hallpike was negative.  Static positional testing revealed clinically significant left beating (4 d/s) and up beating (10 d/s) nystagmus in the head left position.  Nystagmus fatigue with fixation.     Unilateral weakness: 11% (right)  Directional preponderance 11% (left beating)  RW: 24 d/s  LW: 37 d/s  RC: 29 d/s   d/s  Fixation suppression was normal.     Impression: Positional testing revealed nystagmus suggestive of a non-localizing peripheral vestibular abnormality.  Abnormal oculomotor testing is suggestive of a central oculomotor dysfunction.  Persistent presence of up-beating nystagmus is suggestive of a central vestibular abnormality."       Impression:   1. Vertical nystagmus     2. Oculomotor function study abnormality     3. Peripheral vestibulopathy, unspecified laterality      compensated   4. Dizziness and giddiness         Discussion and Plan:       Reviewed VNG results with patient.    Given central findings on VNG - MRI of brain ordered.     Discuss VNG findings with neurologist when she has her next appointment (oculomotor testing dysfunction and vertical nystagmus.    Thankfully her symptoms of vertigo have resolved. Discussed expectations.    Provided couple names of community psychiatrists for them to see if they are on her insurance plan ([employer])        Parts or all of this note were created by voice recognition software; typographical errors in translating may be present.      "

## 2022-07-21 ENCOUNTER — TELEPHONE (OUTPATIENT)
Dept: INTERNAL MEDICINE | Facility: CLINIC | Age: 67
End: 2022-07-21
Payer: MEDICARE

## 2022-08-02 ENCOUNTER — PATIENT MESSAGE (OUTPATIENT)
Dept: NEUROLOGY | Facility: CLINIC | Age: 67
End: 2022-08-02
Payer: MEDICARE

## 2022-08-06 ENCOUNTER — HOSPITAL ENCOUNTER (OUTPATIENT)
Dept: RADIOLOGY | Facility: HOSPITAL | Age: 67
Discharge: HOME | End: 2022-08-06
Attending: OTOLARYNGOLOGY
Payer: MEDICARE

## 2022-08-06 DIAGNOSIS — M25.521 PAIN IN RIGHT ELBOW: ICD-10-CM

## 2022-08-06 DIAGNOSIS — G47.33 OSA (OBSTRUCTIVE SLEEP APNEA): ICD-10-CM

## 2022-08-06 DIAGNOSIS — R60.0 LOCALIZED EDEMA: ICD-10-CM

## 2022-08-06 DIAGNOSIS — R41.3 MEMORY CHANGES: ICD-10-CM

## 2022-08-06 DIAGNOSIS — E11.69 TYPE 2 DIABETES MELLITUS WITH OTHER SPECIFIED COMPLICATION: ICD-10-CM

## 2022-08-06 DIAGNOSIS — M62.838 MUSCLE SPASMS OF NECK: ICD-10-CM

## 2022-08-06 DIAGNOSIS — F41.9 ANXIETY: ICD-10-CM

## 2022-08-06 DIAGNOSIS — I25.10 CORONARY ATHEROSCLEROSIS: ICD-10-CM

## 2022-08-06 DIAGNOSIS — K21.9 GERD (GASTROESOPHAGEAL REFLUX DISEASE): ICD-10-CM

## 2022-08-06 DIAGNOSIS — E66.3 OVERWEIGHT (BMI 25.0-29.9): ICD-10-CM

## 2022-08-06 DIAGNOSIS — R52 PAIN: ICD-10-CM

## 2022-08-06 DIAGNOSIS — Z96.652 AFTERCARE FOLLOWING LEFT KNEE JOINT REPLACEMENT SURGERY: ICD-10-CM

## 2022-08-06 DIAGNOSIS — M17.12 PRIMARY OSTEOARTHRITIS OF LEFT KNEE: ICD-10-CM

## 2022-08-06 DIAGNOSIS — K76.0 FATTY LIVER: ICD-10-CM

## 2022-08-06 DIAGNOSIS — I70.0 AORTIC ATHEROSCLEROSIS: Primary | ICD-10-CM

## 2022-08-06 DIAGNOSIS — M54.12 RADICULOPATHY OF CERVICAL SPINE: ICD-10-CM

## 2022-08-06 DIAGNOSIS — Z47.1 AFTERCARE FOLLOWING LEFT KNEE JOINT REPLACEMENT SURGERY: ICD-10-CM

## 2022-08-06 DIAGNOSIS — R42 DIZZINESS: Primary | ICD-10-CM

## 2022-08-06 DIAGNOSIS — E55.9 VITAMIN D DEFICIENCY: ICD-10-CM

## 2022-08-06 DIAGNOSIS — R42 DIZZINESS AND GIDDINESS: ICD-10-CM

## 2022-08-06 DIAGNOSIS — I10 HTN (HYPERTENSION): ICD-10-CM

## 2022-08-06 DIAGNOSIS — M85.80 OSTEOPENIA: ICD-10-CM

## 2022-08-06 LAB
CREAT SERPL-MCNC: 0.8 MG/DL (ref 0.5–1.4)
EST. GFR  (NO RACE VARIABLE): >60 ML/MIN/1.73 M^2

## 2022-08-06 PROCEDURE — 25500020 PHARM REV CODE 255: Performed by: OTOLARYNGOLOGY

## 2022-08-06 PROCEDURE — 70553 MRI BRAIN STEM W/O & W/DYE: CPT | Mod: 26,,, | Performed by: RADIOLOGY

## 2022-08-06 PROCEDURE — 36415 COLL VENOUS BLD VENIPUNCTURE: CPT | Performed by: OTOLARYNGOLOGY

## 2022-08-06 PROCEDURE — 70553 MRI BRAIN W WO CONTRAST: ICD-10-PCS | Mod: 26,,, | Performed by: RADIOLOGY

## 2022-08-06 PROCEDURE — 82565 ASSAY OF CREATININE: CPT | Performed by: OTOLARYNGOLOGY

## 2022-08-06 PROCEDURE — A9585 GADOBUTROL INJECTION: HCPCS | Performed by: OTOLARYNGOLOGY

## 2022-08-06 PROCEDURE — 70553 MRI BRAIN STEM W/O & W/DYE: CPT | Mod: TC

## 2022-08-06 RX ORDER — GADOBUTROL 604.72 MG/ML
6.5 INJECTION INTRAVENOUS
Status: COMPLETED | OUTPATIENT
Start: 2022-08-06 | End: 2022-08-06

## 2022-08-06 RX ADMIN — GADOBUTROL 6.5 ML: 604.72 INJECTION INTRAVENOUS at 07:08

## 2022-08-11 ENCOUNTER — OFFICE VISIT (OUTPATIENT)
Dept: NEUROLOGY | Facility: CLINIC | Age: 67
End: 2022-08-11
Payer: MEDICARE

## 2022-08-11 DIAGNOSIS — R41.3 MEMORY CHANGES: Primary | ICD-10-CM

## 2022-08-11 DIAGNOSIS — F41.9 ANXIETY: ICD-10-CM

## 2022-08-11 PROCEDURE — 3044F HG A1C LEVEL LT 7.0%: CPT | Mod: CPTII,S$GLB,, | Performed by: CLINICAL NEUROPSYCHOLOGIST

## 2022-08-11 PROCEDURE — 4010F PR ACE/ARB THEARPY RXD/TAKEN: ICD-10-PCS | Mod: CPTII,S$GLB,, | Performed by: CLINICAL NEUROPSYCHOLOGIST

## 2022-08-11 PROCEDURE — 4010F ACE/ARB THERAPY RXD/TAKEN: CPT | Mod: CPTII,S$GLB,, | Performed by: CLINICAL NEUROPSYCHOLOGIST

## 2022-08-11 PROCEDURE — 1157F ADVNC CARE PLAN IN RCRD: CPT | Mod: CPTII,S$GLB,, | Performed by: CLINICAL NEUROPSYCHOLOGIST

## 2022-08-11 PROCEDURE — 99499 NO LOS: ICD-10-PCS | Mod: 95,S$GLB,, | Performed by: CLINICAL NEUROPSYCHOLOGIST

## 2022-08-11 PROCEDURE — 1157F PR ADVANCE CARE PLAN OR EQUIV PRESENT IN MEDICAL RECORD: ICD-10-PCS | Mod: CPTII,S$GLB,, | Performed by: CLINICAL NEUROPSYCHOLOGIST

## 2022-08-11 PROCEDURE — 99499 UNLISTED E&M SERVICE: CPT | Mod: 95,S$GLB,, | Performed by: CLINICAL NEUROPSYCHOLOGIST

## 2022-08-11 PROCEDURE — 3044F PR MOST RECENT HEMOGLOBIN A1C LEVEL <7.0%: ICD-10-PCS | Mod: CPTII,S$GLB,, | Performed by: CLINICAL NEUROPSYCHOLOGIST

## 2022-08-11 NOTE — PROGRESS NOTES
NEUROPSYCHOLOGICAL EVALUATION FEEDBACK    Riana Kay attended a feedback session today and was accompanied by her . We discussed the results of the neuropsychological evaluation and I gave time to discuss questions and concerns. For full evaluation details, please see the note from this provider dated 7/12/2022. A copy of the report was provided via Muzy but a second copy was printed today. 31 minutes    Problem List Items Addressed This Visit        Neuro    Memory changes - Primary       Psychiatric    Anxiety            Liliana Menendez, PhD  Licensed Clinical Neuropsychologist  [facility] - Department of Neurology

## 2022-08-14 ENCOUNTER — HOSPITAL ENCOUNTER (INPATIENT)
Facility: HOSPITAL | Age: 67
LOS: 4 days | Discharge: NURSING, REHAB OR LONG-TERM CARE | DRG: 483 | End: 2022-08-19
Attending: EMERGENCY MEDICINE | Admitting: INTERNAL MEDICINE
Payer: MEDICARE

## 2022-08-14 DIAGNOSIS — K21.9 GASTROESOPHAGEAL REFLUX DISEASE WITHOUT ESOPHAGITIS: ICD-10-CM

## 2022-08-14 DIAGNOSIS — S43.014A ANTERIOR DISLOCATION OF RIGHT SHOULDER, INITIAL ENCOUNTER: ICD-10-CM

## 2022-08-14 DIAGNOSIS — I10 PRIMARY HYPERTENSION: ICD-10-CM

## 2022-08-14 DIAGNOSIS — Z79.4 TYPE 2 DIABETES MELLITUS WITHOUT COMPLICATION, WITH LONG-TERM CURRENT USE OF INSULIN: ICD-10-CM

## 2022-08-14 DIAGNOSIS — G47.33 OSA (OBSTRUCTIVE SLEEP APNEA): ICD-10-CM

## 2022-08-14 DIAGNOSIS — K76.0 FATTY LIVER: ICD-10-CM

## 2022-08-14 DIAGNOSIS — S42.201D CLOSED FRACTURE OF PROXIMAL END OF RIGHT HUMERUS WITH ROUTINE HEALING, UNSPECIFIED FRACTURE MORPHOLOGY, SUBSEQUENT ENCOUNTER: ICD-10-CM

## 2022-08-14 DIAGNOSIS — S43.014D ANTERIOR DISLOCATION OF RIGHT SHOULDER, SUBSEQUENT ENCOUNTER: ICD-10-CM

## 2022-08-14 DIAGNOSIS — S42.201A CLOSED FRACTURE OF PROXIMAL END OF RIGHT HUMERUS, UNSPECIFIED FRACTURE MORPHOLOGY, INITIAL ENCOUNTER: ICD-10-CM
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
## 2022-08-14 DIAGNOSIS — M80.021D: ICD-10-CM

## 2022-08-14 DIAGNOSIS — S42.291A OTHER CLOSED DISPLACED FRACTURE OF PROXIMAL END OF RIGHT HUMERUS, INITIAL ENCOUNTER: ICD-10-CM

## 2022-08-14 DIAGNOSIS — I25.10 ATHEROSCLEROSIS OF NATIVE CORONARY ARTERY OF NATIVE HEART WITHOUT ANGINA PECTORIS: ICD-10-CM

## 2022-08-14 DIAGNOSIS — E11.9 TYPE 2 DIABETES MELLITUS WITHOUT COMPLICATION, WITH LONG-TERM CURRENT USE OF INSULIN: ICD-10-CM

## 2022-08-14 DIAGNOSIS — S43.004A SHOULDER DISLOCATION, RIGHT, INITIAL ENCOUNTER: ICD-10-CM

## 2022-08-14 DIAGNOSIS — S42.292A HUMERUS HEAD FRACTURE, LEFT, CLOSED, INITIAL ENCOUNTER: Primary | ICD-10-CM

## 2022-08-14 DIAGNOSIS — M62.838 MUSCLE SPASMS OF NECK: ICD-10-CM

## 2022-08-14 DIAGNOSIS — E55.9 VITAMIN D DEFICIENCY: ICD-10-CM

## 2022-08-14 DIAGNOSIS — R00.1 BRADYCARDIA: ICD-10-CM
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
## 2022-08-14 DIAGNOSIS — M85.80 OSTEOPENIA, UNSPECIFIED LOCATION: ICD-10-CM

## 2022-08-14 LAB
ALBUMIN SERPL BCP-MCNC: 3.8 G/DL (ref 3.5–5.2)
ALP SERPL-CCNC: 82 U/L (ref 55–135)
ALT SERPL W/O P-5'-P-CCNC: 26 U/L (ref 10–44)
ANION GAP SERPL CALC-SCNC: 12 MMOL/L (ref 8–16)
AST SERPL-CCNC: 26 U/L (ref 10–40)
BASOPHILS # BLD AUTO: 0.03 K/UL (ref 0–0.2)
BASOPHILS NFR BLD: 0.3 % (ref 0–1.9)
BILIRUB SERPL-MCNC: 0.5 MG/DL (ref 0.1–1)
BUN SERPL-MCNC: 15 MG/DL (ref 8–23)
CALCIUM SERPL-MCNC: 8.8 MG/DL (ref 8.7–10.5)
CHLORIDE SERPL-SCNC: 104 MMOL/L (ref 95–110)
CO2 SERPL-SCNC: 20 MMOL/L (ref 23–29)
CREAT SERPL-MCNC: 0.7 MG/DL (ref 0.5–1.4)
DIFFERENTIAL METHOD: ABNORMAL
EOSINOPHIL # BLD AUTO: 0 K/UL (ref 0–0.5)
EOSINOPHIL NFR BLD: 0.2 % (ref 0–8)
ERYTHROCYTE [DISTWIDTH] IN BLOOD BY AUTOMATED COUNT: 12.1 % (ref 11.5–14.5)
EST. GFR  (NO RACE VARIABLE): >60 ML/MIN/1.73 M^2
ESTIMATED AVG GLUCOSE: 137 MG/DL (ref 68–131)
GLUCOSE SERPL-MCNC: 203 MG/DL (ref 70–110)
HBA1C MFR BLD: 6.4 % (ref 4–5.6)
HCT VFR BLD AUTO: 34.9 % (ref 37–48.5)
HGB BLD-MCNC: 12 G/DL (ref 12–16)
IMM GRANULOCYTES # BLD AUTO: 0.04 K/UL (ref 0–0.04)
IMM GRANULOCYTES NFR BLD AUTO: 0.3 % (ref 0–0.5)
LYMPHOCYTES # BLD AUTO: 1.2 K/UL (ref 1–4.8)
LYMPHOCYTES NFR BLD: 10.5 % (ref 18–48)
MAGNESIUM SERPL-MCNC: 2 MG/DL (ref 1.6–2.6)
MCH RBC QN AUTO: 32 PG (ref 27–31)
MCHC RBC AUTO-ENTMCNC: 34.4 G/DL (ref 32–36)
MCV RBC AUTO: 93 FL (ref 82–98)
MONOCYTES # BLD AUTO: 0.4 K/UL (ref 0.3–1)
MONOCYTES NFR BLD: 3.3 % (ref 4–15)
NEUTROPHILS # BLD AUTO: 10 K/UL (ref 1.8–7.7)
NEUTROPHILS NFR BLD: 85.4 % (ref 38–73)
NRBC BLD-RTO: 0 /100 WBC
PHOSPHATE SERPL-MCNC: 3.2 MG/DL (ref 2.7–4.5)
PLATELET # BLD AUTO: 177 K/UL (ref 150–450)
PMV BLD AUTO: 9.5 FL (ref 9.2–12.9)
POCT GLUCOSE: 213 MG/DL (ref 70–110)
POTASSIUM SERPL-SCNC: 4 MMOL/L (ref 3.5–5.1)
PREALB SERPL-MCNC: 11 MG/DL (ref 20–43)
PROT SERPL-MCNC: 7.1 G/DL (ref 6–8.4)
RBC # BLD AUTO: 3.75 M/UL (ref 4–5.4)
SODIUM SERPL-SCNC: 136 MMOL/L (ref 136–145)
TRANSFERRIN SERPL-MCNC: 222 MG/DL (ref 200–375)
TROPONIN I SERPL DL<=0.01 NG/ML-MCNC: <0.006 NG/ML (ref 0–0.03)
WBC # BLD AUTO: 11.66 K/UL (ref 3.9–12.7)

## 2022-08-14 PROCEDURE — 84466 ASSAY OF TRANSFERRIN: CPT

## 2022-08-14 PROCEDURE — 99900035 HC TECH TIME PER 15 MIN (STAT)

## 2022-08-14 PROCEDURE — 84484 ASSAY OF TROPONIN QUANT: CPT

## 2022-08-14 PROCEDURE — C9399 UNCLASSIFIED DRUGS OR BIOLOG: HCPCS | Performed by: STUDENT IN AN ORGANIZED HEALTH CARE EDUCATION/TRAINING PROGRAM

## 2022-08-14 PROCEDURE — 93005 ELECTROCARDIOGRAM TRACING: CPT

## 2022-08-14 PROCEDURE — 63600175 PHARM REV CODE 636 W HCPCS: Performed by: STUDENT IN AN ORGANIZED HEALTH CARE EDUCATION/TRAINING PROGRAM

## 2022-08-14 PROCEDURE — 94660 CPAP INITIATION&MGMT: CPT

## 2022-08-14 PROCEDURE — 96365 THER/PROPH/DIAG IV INF INIT: CPT

## 2022-08-14 PROCEDURE — 99285 EMERGENCY DEPT VISIT HI MDM: CPT | Mod: 25

## 2022-08-14 PROCEDURE — G0378 HOSPITAL OBSERVATION PER HR: HCPCS

## 2022-08-14 PROCEDURE — 27000190 HC CPAP FULL FACE MASK W/VALVE

## 2022-08-14 PROCEDURE — 86803 HEPATITIS C AB TEST: CPT | Performed by: PHYSICIAN ASSISTANT

## 2022-08-14 PROCEDURE — 93010 EKG 12-LEAD: ICD-10-PCS | Mod: ,,, | Performed by: INTERNAL MEDICINE

## 2022-08-14 PROCEDURE — 25000003 PHARM REV CODE 250: Performed by: STUDENT IN AN ORGANIZED HEALTH CARE EDUCATION/TRAINING PROGRAM

## 2022-08-14 PROCEDURE — 63600175 PHARM REV CODE 636 W HCPCS

## 2022-08-14 PROCEDURE — 96375 TX/PRO/DX INJ NEW DRUG ADDON: CPT

## 2022-08-14 PROCEDURE — 80053 COMPREHEN METABOLIC PANEL: CPT

## 2022-08-14 PROCEDURE — 84100 ASSAY OF PHOSPHORUS: CPT

## 2022-08-14 PROCEDURE — 83735 ASSAY OF MAGNESIUM: CPT

## 2022-08-14 PROCEDURE — 99285 PR EMERGENCY DEPT VISIT,LEVEL V: ICD-10-PCS | Mod: ,,, | Performed by: EMERGENCY MEDICINE

## 2022-08-14 PROCEDURE — 85025 COMPLETE CBC W/AUTO DIFF WBC: CPT

## 2022-08-14 PROCEDURE — 84134 ASSAY OF PREALBUMIN: CPT

## 2022-08-14 PROCEDURE — 96372 THER/PROPH/DIAG INJ SC/IM: CPT | Performed by: STUDENT IN AN ORGANIZED HEALTH CARE EDUCATION/TRAINING PROGRAM

## 2022-08-14 PROCEDURE — 83036 HEMOGLOBIN GLYCOSYLATED A1C: CPT

## 2022-08-14 PROCEDURE — 99220 PR INITIAL OBSERVATION CARE,LEVL III: CPT | Mod: ,,, | Performed by: STUDENT IN AN ORGANIZED HEALTH CARE EDUCATION/TRAINING PROGRAM

## 2022-08-14 PROCEDURE — 99220 PR INITIAL OBSERVATION CARE,LEVL III: ICD-10-PCS | Mod: ,,, | Performed by: STUDENT IN AN ORGANIZED HEALTH CARE EDUCATION/TRAINING PROGRAM

## 2022-08-14 PROCEDURE — 99285 EMERGENCY DEPT VISIT HI MDM: CPT | Mod: ,,, | Performed by: EMERGENCY MEDICINE

## 2022-08-14 PROCEDURE — 93010 ELECTROCARDIOGRAM REPORT: CPT | Mod: ,,, | Performed by: INTERNAL MEDICINE

## 2022-08-14 RX ORDER — MORPHINE SULFATE 2 MG/ML
2 INJECTION, SOLUTION INTRAMUSCULAR; INTRAVENOUS EVERY 4 HOURS PRN
Status: DISCONTINUED | OUTPATIENT
Start: 2022-08-14 | End: 2022-08-16

## 2022-08-14 RX ORDER — ONDANSETRON 8 MG/1
8 TABLET, ORALLY DISINTEGRATING ORAL EVERY 8 HOURS PRN
Status: DISCONTINUED | OUTPATIENT
Start: 2022-08-14 | End: 2022-08-19 | Stop reason: HOSPADM

## 2022-08-14 RX ORDER — MORPHINE SULFATE 4 MG/ML
4 INJECTION, SOLUTION INTRAMUSCULAR; INTRAVENOUS EVERY 4 HOURS PRN
Status: DISCONTINUED | OUTPATIENT
Start: 2022-08-14 | End: 2022-08-14

## 2022-08-14 RX ORDER — TALC
6 POWDER (GRAM) TOPICAL NIGHTLY PRN
Status: DISCONTINUED | OUTPATIENT
Start: 2022-08-14 | End: 2022-08-19 | Stop reason: HOSPADM

## 2022-08-14 RX ORDER — CEFAZOLIN SODIUM 1 G/50ML
1 SOLUTION INTRAVENOUS ONCE
Status: COMPLETED | OUTPATIENT
Start: 2022-08-14 | End: 2022-08-14

## 2022-08-14 RX ORDER — PROCHLORPERAZINE EDISYLATE 5 MG/ML
5 INJECTION INTRAMUSCULAR; INTRAVENOUS EVERY 6 HOURS PRN
Status: DISCONTINUED | OUTPATIENT
Start: 2022-08-14 | End: 2022-08-19 | Stop reason: HOSPADM

## 2022-08-14 RX ORDER — DICLOFENAC SODIUM 10 MG/G
2 GEL TOPICAL 2 TIMES DAILY PRN
Status: DISCONTINUED | OUTPATIENT
Start: 2022-08-14 | End: 2022-08-16

## 2022-08-14 RX ORDER — OXYCODONE HYDROCHLORIDE 5 MG/1
5 TABLET ORAL EVERY 4 HOURS PRN
Status: DISCONTINUED | OUTPATIENT
Start: 2022-08-14 | End: 2022-08-16

## 2022-08-14 RX ORDER — POLYETHYLENE GLYCOL 3350 17 G/17G
17 POWDER, FOR SOLUTION ORAL DAILY
Status: DISCONTINUED | OUTPATIENT
Start: 2022-08-15 | End: 2022-08-19 | Stop reason: HOSPADM

## 2022-08-14 RX ORDER — INSULIN ASPART 100 [IU]/ML
0-5 INJECTION, SOLUTION INTRAVENOUS; SUBCUTANEOUS EVERY 6 HOURS PRN
Status: DISCONTINUED | OUTPATIENT
Start: 2022-08-14 | End: 2022-08-15

## 2022-08-14 RX ORDER — ACETAMINOPHEN 500 MG
1000 TABLET ORAL EVERY 8 HOURS
Status: DISCONTINUED | OUTPATIENT
Start: 2022-08-14 | End: 2022-08-16

## 2022-08-14 RX ORDER — MECLIZINE HYDROCHLORIDE 25 MG/1
25 TABLET ORAL 3 TIMES DAILY PRN
Status: DISCONTINUED | OUTPATIENT
Start: 2022-08-14 | End: 2022-08-19 | Stop reason: HOSPADM

## 2022-08-14 RX ORDER — DROPERIDOL 2.5 MG/ML
1.25 INJECTION, SOLUTION INTRAMUSCULAR; INTRAVENOUS ONCE
Status: DISCONTINUED | OUTPATIENT
Start: 2022-08-14 | End: 2022-08-14

## 2022-08-14 RX ORDER — KETOROLAC TROMETHAMINE 30 MG/ML
10 INJECTION, SOLUTION INTRAMUSCULAR; INTRAVENOUS
Status: COMPLETED | OUTPATIENT
Start: 2022-08-14 | End: 2022-08-14

## 2022-08-14 RX ORDER — ONDANSETRON 2 MG/ML
8 INJECTION INTRAMUSCULAR; INTRAVENOUS ONCE
Status: COMPLETED | OUTPATIENT
Start: 2022-08-14 | End: 2022-08-14

## 2022-08-14 RX ORDER — BISACODYL 10 MG
10 SUPPOSITORY, RECTAL RECTAL DAILY PRN
Status: DISCONTINUED | OUTPATIENT
Start: 2022-08-14 | End: 2022-08-19 | Stop reason: HOSPADM

## 2022-08-14 RX ORDER — PANTOPRAZOLE SODIUM 40 MG/1
40 TABLET, DELAYED RELEASE ORAL DAILY
Status: DISCONTINUED | OUTPATIENT
Start: 2022-08-15 | End: 2022-08-19 | Stop reason: HOSPADM

## 2022-08-14 RX ORDER — SODIUM CHLORIDE 0.9 % (FLUSH) 0.9 %
10 SYRINGE (ML) INJECTION
Status: DISCONTINUED | OUTPATIENT
Start: 2022-08-14 | End: 2022-08-19 | Stop reason: HOSPADM

## 2022-08-14 RX ORDER — CYCLOBENZAPRINE HCL 5 MG
5 TABLET ORAL 3 TIMES DAILY PRN
Status: DISCONTINUED | OUTPATIENT
Start: 2022-08-14 | End: 2022-08-19 | Stop reason: HOSPADM

## 2022-08-14 RX ORDER — AMOXICILLIN 250 MG
1 CAPSULE ORAL 2 TIMES DAILY
Status: DISCONTINUED | OUTPATIENT
Start: 2022-08-14 | End: 2022-08-19 | Stop reason: HOSPADM

## 2022-08-14 RX ORDER — BENZOYL PEROXIDE 10 G/100G
GEL TOPICAL DAILY
Status: DISCONTINUED | OUTPATIENT
Start: 2022-08-14 | End: 2022-08-15

## 2022-08-14 RX ORDER — LATANOPROST 50 UG/ML
1 SOLUTION/ DROPS OPHTHALMIC NIGHTLY
Status: DISCONTINUED | OUTPATIENT
Start: 2022-08-14 | End: 2022-08-19 | Stop reason: HOSPADM

## 2022-08-14 RX ORDER — GLUCAGON 1 MG
1 KIT INJECTION
Status: DISCONTINUED | OUTPATIENT
Start: 2022-08-14 | End: 2022-08-15

## 2022-08-14 RX ADMIN — MORPHINE SULFATE 4 MG: 4 INJECTION INTRAVENOUS at 06:08

## 2022-08-14 RX ADMIN — CYCLOBENZAPRINE HYDROCHLORIDE 5 MG: 5 TABLET, FILM COATED ORAL at 09:08

## 2022-08-14 RX ADMIN — LATANOPROST 1 DROP: 50 SOLUTION OPHTHALMIC at 09:08

## 2022-08-14 RX ADMIN — ONDANSETRON 8 MG: 8 TABLET, ORALLY DISINTEGRATING ORAL at 09:08

## 2022-08-14 RX ADMIN — KETOROLAC TROMETHAMINE 10 MG: 30 INJECTION, SOLUTION INTRAMUSCULAR; INTRAVENOUS at 01:08

## 2022-08-14 RX ADMIN — ONDANSETRON 8 MG: 2 INJECTION INTRAMUSCULAR; INTRAVENOUS at 08:08

## 2022-08-14 RX ADMIN — INSULIN DETEMIR 18 UNITS: 100 INJECTION, SOLUTION SUBCUTANEOUS at 09:08

## 2022-08-14 RX ADMIN — ACETAMINOPHEN 1000 MG: 500 TABLET ORAL at 06:08

## 2022-08-14 RX ADMIN — CEFAZOLIN SODIUM 1 G: 1 SOLUTION INTRAVENOUS at 03:08

## 2022-08-14 NOTE — HPI
Riana Kay is a 67 y.o. female with PMH significant for *** presenting with right arm pain after a fall. She reports that she fell  onto her right arm and had the immediate onset of pain and difficulty ranging the arm. Patient denies any head trauma or LOC. The patient denies prior hx of falls. Patient denies numbness, but reports pain from her right neck down to her distal humerus. Denies any other musculoskeletal pain or injuries. No known history of prior right shoulder injury or surgery. History of bilateral TKA performed by Dr. Damon and right radial head arthroplasty by Dr. Hubbard in 2019, reports a history of right shoulder surgery and with Dr. Jamison, though no information is found in the chart.  Walks w/out assisted devices at baseline. Doesn't take any anticoagulation at baseline. Last ate ***  They *** IV drug use with last use on ***.   They *** tobacco use.   They *** alcohol use.   They *** immunosuppressant medications.  They *** chemotherapy.  They *** radiation therapy.   She is right hand dom

## 2022-08-14 NOTE — ED PROVIDER NOTES
Encounter Date: 8/14/2022       History     Chief Complaint   Patient presents with    Fall     Right humerus deformity after tripping and falling. No head trauma.      67yof with a history of DMII and HTN presents s/p GLF. The pt's preferred language is Divehi and a virtual prefers she has for history and exam.  Patient says that she was leaving [employer] when she tripped and fell.  She said that there was no inciting incident.  She says that she fell into a parked car and hit her arm and her right knee.  She denies hitting her head.  She says that now she is having significant pain in her right knee and arm.  EMS said that on route the patient was giving 150 of fentanyl, after which she became sweaty, nauseous and vomited twice.  She was given 8 of Zofran and is still nauseous.  Patient says that she has a history of bilateral knee replacements.  She was able to walk after the fall without difficulty.  She denies any headache, chest pain or recent infections.        Review of patient's allergies indicates:   Allergen Reactions    Iodinated contrast media Swelling and Rash    Percocet [oxycodone-acetaminophen] Itching    Macrobid [nitrofurantoin monohyd/m-cryst] Rash    Metformin Rash    Penicillins Rash     Had ancef in 2020 with no adverse rxn     Promethazine Rash    Sulfa (sulfonamide antibiotics) Rash    Sulfamethoxazole-trimethoprim Rash     Past Medical History:   Diagnosis Date    Abdominal pain, right upper quadrant 02/04/2014    Anticoagulant long-term use     Anxiety     AR (allergic rhinitis)     Atrophic gastritis without mention of hemorrhage 02/13/2012     Dx updated per 2019 IMO Load    Chronic fatigue syndrome 06/10/2012    Diabetes mellitus     Dizziness     Fatty liver     GERD (gastroesophageal reflux disease)     Gross hematuria 12/01/2020    HTN (hypertension)     Hyperlipidemia     Leg swelling     Memory loss     Osteopenia     PONV (postoperative nausea and vomiting)      Primary osteoarthritis of right knee 10/06/2020    Primary osteoarthritis of right knee 10/06/2020    Right elbow pain 01/16/2019    S/P total hysterectomy     Screening for colon cancer 01/06/2021    Sleep apnea     Status post total right knee replacement 10/6/2020 10/05/2020     Past Surgical History:   Procedure Laterality Date    CHOLECYSTECTOMY      COLONOSCOPY N/A 1/17/2018    Procedure: COLONOSCOPY with Donnell;  Surgeon: Roland Jacobo MD;  Location: [facility] ENDO (4TH FLR);  Service: Endoscopy;  Laterality: N/A;    COLONOSCOPY N/A 1/6/2021    Procedure: COLONOSCOPY;  Surgeon: Yong Rowland MD;  Location: [facility] ENDO (4TH FLR);  Service: Endoscopy;  Laterality: N/A;  prep ins. emailed - Khmer speaking,  needed - ERW  COVID screening [facility] - ERW    CYSTOSCOPY N/A 12/1/2020    Procedure: CYSTOSCOPY;  Surgeon: Ines Stanford MD;  [facility];  Service: Urology;  Laterality: N/A;  45 minutes     ELBOW ARTHROPLASTY Right 1/16/2019    Procedure: ARTHROPLASTY, ELBOW right radial head arthroplasty revision;  Surgeon: Katja Hubbard MD;  Location: [facility] OR [address] [place] [address];  Service: Orthopedics;  Laterality: Right;  Anesthesia: General and Regional. Stretcher, hand pan 1 and pan 2, CALL ROSALIO, RUCHI [employer] notified 1-14 LO    ELBOW SURGERY Right 7/16/15    ELBOW SURGERY      ESOPHAGOGASTRODUODENOSCOPY N/A 4/15/2019    Procedure: EGD (ESOPHAGOGASTRODUODENOSCOPY);  Surgeon: Buck Irwin MD;  Location: [facility] (4TH FLR);  Service: Endoscopy;  Laterality: N/A;  amanda she    HYSTERECTOMY      KNEE ARTHROPLASTY Right 10/6/2020    Procedure: ARTHROPLASTY, KNEE-SAME DAY PROTOCOL;  Surgeon: Sabino Damon MD;  Location: [facility];  Service: Orthopedics;  Laterality: Right;    KNEE ARTHROSCOPY W/ DEBRIDEMENT  4/11    Left    RELEASE OF ULNAR NERVE AT CUBITAL TUNNEL Right 1/16/2019    Procedure: RELEASE, ULNAR TUNNEL right;  Surgeon:  Katja Hubbard MD;  Location: [facility] OR [address] [place] [address];  Service: Orthopedics;  Laterality: Right;  Anesthesia: General and Regional. Stretcher, hand pan 1 and pan 2, CALL ACCUMED, CLAIRX    RETROGRADE PYELOGRAPHY Bilateral 12/1/2020    Procedure: PYELOGRAM, RETROGRADE;  Surgeon: Ines Stanford MD;  Location: [facility] OR [address] [place] [address];  Service: Urology;  Laterality: Bilateral;    ROTATOR CUFF REPAIR      TOTAL ABDOMINAL HYSTERECTOMY W/ BILATERAL SALPINGOOPHORECTOMY      TOTAL KNEE ARTHROPLASTY Left 10/19/2021    Procedure: ARTHROPLASTY, KNEE, TOTAL;  Surgeon: Sabino Damon MD;  Location: [facility];  Service: Orthopedics;  Laterality: Left;    UPPER GASTROINTESTINAL ENDOSCOPY       Family History   Problem Relation Age of Onset    Cancer Father         colon    Colon cancer Father 83        colon cancer    Diabetes Maternal Aunt     Diabetes Maternal Grandmother     Esophageal cancer Neg Hx     Stomach cancer Neg Hx     Melanoma Neg Hx      Social History     Tobacco Use    Smoking status: Never Smoker    Smokeless tobacco: Never Used   Substance Use Topics    Alcohol use: No    Drug use: No     Review of Systems   Constitutional: Negative for chills and fever.   HENT: Negative for congestion and sore throat.    Eyes: Negative for redness and visual disturbance.   Respiratory: Negative for cough, chest tightness and shortness of breath.    Cardiovascular: Negative for chest pain and leg swelling.   Gastrointestinal: Positive for nausea and vomiting. Negative for abdominal pain and diarrhea.   Genitourinary: Negative for dysuria and pelvic pain.   Musculoskeletal: Negative for back pain and neck pain.   Skin: Negative for pallor and rash.   Neurological: Negative for dizziness, syncope, light-headedness, numbness and headaches.   Psychiatric/Behavioral: Negative for confusion and dysphoric mood.       Physical Exam     Initial Vitals [08/14/22 1107]   BP Pulse Resp Temp SpO2   122/60 60 19 97.8 °F (36.6  °C) 98 %      MAP       --         Physical Exam    Nursing note and vitals reviewed.  Constitutional: She appears well-developed and well-nourished. She is diaphoretic.   HENT:   Head: Normocephalic and atraumatic.   Eyes: Pupils are equal, round, and reactive to light.   Neck:   Normal range of motion.  Cardiovascular: Regular rhythm, normal heart sounds and intact distal pulses. Bradycardia present.    Pulmonary/Chest: Breath sounds normal. No respiratory distress. She has no rhonchi.   Abdominal: Abdomen is soft. Bowel sounds are normal. There is no abdominal tenderness. There is no guarding.   Musculoskeletal:      Right upper arm: Swelling, deformity and tenderness present.      Cervical back: Normal range of motion.     Neurological: She is alert and oriented to person, place, and time. She has normal strength. GCS score is 15. GCS eye subscore is 4. GCS verbal subscore is 5. GCS motor subscore is 6.   Skin: Skin is warm. Capillary refill takes less than 2 seconds. No rash noted. No erythema. No pallor.   Psychiatric: She has a normal mood and affect. Her behavior is normal. Judgment and thought content normal.         ED Course   Procedures  Labs Reviewed   COMPREHENSIVE METABOLIC PANEL - Abnormal; Notable for the following components:       Result Value    CO2 20 (*)     Glucose 203 (*)     All other components within normal limits    Narrative:     Release to patient->Immediate   CBC W/ AUTO DIFFERENTIAL - Abnormal; Notable for the following components:    RBC 3.75 (*)     Hematocrit 34.9 (*)     MCH 32.0 (*)     Gran # (ANC) 10.0 (*)     Gran % 85.4 (*)     Lymph % 10.5 (*)     Mono % 3.3 (*)     All other components within normal limits    Narrative:     Release to patient->Immediate   TROPONIN I    Narrative:     Release to patient->Immediate   HEPATITIS C ANTIBODY          Imaging Results          CT Shoulder Without Contrast Right (In process)                CT 3D RECON WITH INDEPENDENT WS (In  process)  Result time 08/14/22 15:40:44               X-Ray Knee 3 View Right (Final result)  Result time 08/14/22 13:16:30    Final result by Nilda Brothers MD (08/14/22 13:16:30)                 Impression:      As above.      Electronically signed by: Nilda Brothers MD  Date:    08/14/2022  Time:    13:16             Narrative:    EXAMINATION:  XR KNEE 3 VIEW RIGHT    CLINICAL HISTORY:  Unspecified fall, initial encounter    TECHNIQUE:  Three views of the right knee    COMPARISON:  06/01/2022    FINDINGS:  Postoperative changes of right total knee arthroplasty are seen in satisfactory alignment without evidence for hardware complication.  No joint effusion is seen.                               X-Ray Shoulder Trauma Right (Final result)  Result time 08/14/22 13:14:59    Final result by Nilda Brothers MD (08/14/22 13:14:59)                 Impression:      As above.      Electronically signed by: Nilda Brothers MD  Date:    08/14/2022  Time:    13:14             Narrative:    EXAMINATION:  XR SHOULDER TRAUMA 3 VIEW RIGHT    CLINICAL HISTORY:  Unspecified fall, initial encounter    TECHNIQUE:  Three or four views of the right shoulder were performed.    COMPARISON:  None    FINDINGS:  Comminuted fracture of the right humeral head and neck with anterior and inferior dislocation of the humerus in respect to the glenoid fossa.                               X-Ray Elbow Complete Right (Final result)  Result time 08/14/22 13:13:46    Final result by Nilda Brothers MD (08/14/22 13:13:46)                 Impression:      As above.      Electronically signed by: Nilda Brothers MD  Date:    08/14/2022  Time:    13:13             Narrative:    EXAMINATION:  XR ELBOW COMPLETE 3 VIEW RIGHT    CLINICAL HISTORY:  . Unspecified fall, initial encounter    TECHNIQUE:  AP, lateral, and oblique views of the right elbow were performed.    COMPARISON:  None    FINDINGS:  Postoperative changes of the right elbow identified with  a surgical pin in the lateral epicondyle with radial head arthroplasty.  There are moderate degenerative changes of the elbow joint.  No definite fracture or dislocation is seen.  No joint effusion.                               X-Ray Humerus 2 View Right (Final result)  Result time 08/14/22 13:12:52    Final result by Nilda Brothers MD (08/14/22 13:12:52)                 Impression:      As above.      Electronically signed by: Nilda Brothers MD  Date:    08/14/2022  Time:    13:12             Narrative:    EXAMINATION:  XR HUMERUS 2 VIEW RIGHT    CLINICAL HISTORY:  Unspecified fall, initial encounter    TECHNIQUE:  Two views of the right humerus    COMPARISON:  None.    FINDINGS:  There is a comminuted fracture of the right humeral head and neck with a separate fragment of the greater tuberosity.  The humeral head appears to be dislocated anteriorly in respect to the glenoid fossa.  Postoperative changes of the elbow were seen.                               CT Head Without Contrast (Final result)  Result time 08/14/22 12:36:37    Final result by Dar Willis MD (08/14/22 12:36:37)                 Impression:      No acute intracranial hemorrhage/injury.      Electronically signed by: Dar Willis  Date:    08/14/2022  Time:    12:36             Narrative:    EXAMINATION:  CT HEAD WITHOUT CONTRAST    CLINICAL HISTORY:  Head trauma, minor (Age >= 65y);    TECHNIQUE:  Low dose axial images were obtained through the head.  Coronal and sagittal reformations were also performed. Contrast was not administered.    COMPARISON:  CT 06/03/2022, MRI 08/06/2022    FINDINGS:  There is no evidence of intracranial hemorrhage or parenchymal contusion.  No hydrocephalus mass effect or acute territorial infarct.    The calvarium is intact.    Left sphenoid chronic mucosal thickening.                                 Medications   droperidoL injection 1.25 mg (0 mg Intravenous Hold 8/14/22 1315)   ketorolac injection 9.999  mg (9.999 mg Intravenous Given 8/14/22 1308)   ceFAZolin (ANCEF) 1 gram in dextrose 5 % 50 mL IVPB (premix) (0 g Intravenous Stopped 8/14/22 1543)     Medical Decision Making:   History:   Old Medical Records: I decided to obtain old medical records.  Old Records Summarized: records from clinic visits and records from previous admission(s).       <> Summary of Records: Records reviewed for past Medical history current medications  Initial Assessment:   67-year-old female the past medical history of a type 2 diabetes and hypertension presenting status post ground level fall.  Patient is currently diaphoretic and still endorses nausea.  There is also apparent deformity to the right upper arm.  Distal pulses are intact, the patient has full sensation and movement of wrist and digits, compartments are soft  There is no apparent deformity to the right shoulder but the patient is tender.  Patient is also endorsing right knee pain.  Patient's pain and nausea was treated.    Initial DDx: Proximal humerus fx, Knee fracture, ACS- low concern as EKG is normal    Low concern for ICH, but pt is lethargic, NCCT ordered  Clinical Tests:   Lab Tests: Reviewed  Radiological Study: Reviewed  Medical Tests: Reviewed  ED Management:  See ED course.    Dispo pending further imaging by ortho. Either IP medicine or Ortho admission.    Pt signed out to Dr Franks              Attending Attestation:   Physician Attestation Statement for Resident:  As the supervising MD   Physician Attestation Statement: I have personally seen and examined this patient.   I agree with the above history. -:   As the supervising MD I agree with the above PE.    As the supervising MD I agree with the above treatment, course, plan, and disposition.            Attending ED Notes:   STAFF ATTENDING PHYSICIAN NOTE:  I have individually/jointly evaluated Riana Kay and discussed their ED management with the resident physician. I have also reviewed their  notes, assessments, and procedures documented.  I was present during all critical portions of any procedure(s) performed on Riana Kay, who presents status post mechanical trip and fall with signs and symptoms of right upper extremity deformity likely fracture.  No neurovascular deficits, skin tears warranting repair.  X-rays ordered warranting orthopedic evaluation.  ____________________  Ryan Landers MD, [facility]  Emergency Medicine Staff        ED Course as of 08/14/22 1624   Sun Aug 14, 2022   1239 CT Head Without Contrast  No evidence of ICH [BP]   1239 EKG 12-lead  NSR, all intervals WNL, No STEMI [BP]   1320 X-Ray Shoulder Trauma Right  Dislocation [BP]   1320 X-Ray Humerus 2 View Right  Comminuted fracture of the humeral neck [BP]   1321 Ortho consulted [BP]   1409 Troponin I: <0.006 [BP]   1607 D/w ortho, will require admission and surgical management, as reduction impossible with comminuted fracture. [BP]      ED Course User Index  [BP] Blaine Bailey MD             Clinical Impression:   Final diagnoses:  [W19.XXXA] Fall, initial encounter (Primary)  [R00.1] Bradycardia  [W19.XXXA] Fall  [S42.292A] Humerus head fracture, left, closed, initial encounter  [S43.004A] Shoulder dislocation, right, initial encounter                 F/U:  Upon completion of our shift, transfer to incoming team to follow-up CT and orthopedic recommendations prior to ultimate disposition.  ____________________  Ryan Landers MD, [facility]  Emergency Medicine Staff    08/14/22 1609       Talib Landers MD  08/14/22 1624

## 2022-08-14 NOTE — ASSESSMENT & PLAN NOTE
Riana Samuel Rahul is a 67 y.o. female with a right proximal humerus fracture and anterior dislocation. Closed, NVI.    - NWB RUE, no ROM of RUE  - Sling at all times  - Admit for neuro checks q2-4 hours, page orthopedics on call if any change in exam whatsoever  - CT scan with comminution at humeral head, reduction unlikely   - Will discuss with staff  - Orthopedics will continue to follow

## 2022-08-14 NOTE — SUBJECTIVE & OBJECTIVE
Past Medical History:   Diagnosis Date    Abdominal pain, right upper quadrant 02/04/2014    Anticoagulant long-term use     Anxiety     AR (allergic rhinitis)     Atrophic gastritis without mention of hemorrhage 02/13/2012     Dx updated per 2019 IMO Load    Chronic fatigue syndrome 06/10/2012    Diabetes mellitus     Dizziness     Fatty liver     GERD (gastroesophageal reflux disease)     Gross hematuria 12/01/2020    HTN (hypertension)     Hyperlipidemia     Leg swelling     Memory loss     Osteopenia     PONV (postoperative nausea and vomiting)     Primary osteoarthritis of right knee 10/06/2020    Primary osteoarthritis of right knee 10/06/2020    Right elbow pain 01/16/2019    S/P total hysterectomy     Screening for colon cancer 01/06/2021    Sleep apnea     Status post total right knee replacement 10/6/2020 10/05/2020       Past Surgical History:   Procedure Laterality Date    CHOLECYSTECTOMY      COLONOSCOPY N/A 1/17/2018    Procedure: COLONOSCOPY with Donnell;  Surgeon: Roland Jacobo MD;  Location: [facility] ([address] [place] [address]);  Service: Endoscopy;  Laterality: N/A;    COLONOSCOPY N/A 1/6/2021    Procedure: COLONOSCOPY;  Surgeon: Yong Rowland MD;  Location: [facility] ([address] [place] [address]);  Service: Endoscopy;  Laterality: N/A;  prep ins. emailed - Greek speaking,  needed - ERW  [facility] - ERW    CYSTOSCOPY N/A 12/1/2020    Procedure: CYSTOSCOPY;  Surgeon: Ines Stanford MD;  Location: [facility] OR [address] [place] [address];  Service: Urology;  Laterality: N/A;  45 minutes     ELBOW ARTHROPLASTY Right 1/16/2019    Procedure: ARTHROPLASTY, ELBOW right radial head arthroplasty revision;  Surgeon: Katja Hubbard MD;  Location: [facility] OR [address] [place] [address];  Service: Orthopedics;  Laterality: Right;  Anesthesia: General and Regional. Stretcher, hand pan 1 and pan 2, CALL RUCHI SANTAMARIA & Sol notified 1-14 LO    ELBOW SURGERY Right 7/16/15    ELBOW SURGERY      ESOPHAGOGASTRODUODENOSCOPY N/A  4/15/2019    Procedure: EGD (ESOPHAGOGASTRODUODENOSCOPY);  Surgeon: Buck Irwin MD;  Location: [facility] (4TH FLR);  Service: Endoscopy;  Laterality: N/A;  ray she    HYSTERECTOMY      KNEE ARTHROPLASTY Right 10/6/2020    Procedure: ARTHROPLASTY, KNEE-SAME DAY PROTOCOL;  Surgeon: Sabino Damon MD;  Location: [facility] OR;  Service: Orthopedics;  Laterality: Right;    KNEE ARTHROSCOPY W/ DEBRIDEMENT  4/11    Left    RELEASE OF ULNAR NERVE AT CUBITAL TUNNEL Right 1/16/2019    Procedure: RELEASE, ULNAR TUNNEL right;  Surgeon: Katja Hubbard MD;  Location: [facility] OR 1ST FLR;  Service: Orthopedics;  Laterality: Right;  Anesthesia: General and Regional. Stretcher, hand pan 1 and pan 2, CALL ACCUMED, CLAIRX    RETROGRADE PYELOGRAPHY Bilateral 12/1/2020    Procedure: PYELOGRAM, RETROGRADE;  Surgeon: Ines Stanford MD;  Location: [facility] OR 1ST FLR;  Service: Urology;  Laterality: Bilateral;    ROTATOR CUFF REPAIR      TOTAL ABDOMINAL HYSTERECTOMY W/ BILATERAL SALPINGOOPHORECTOMY      TOTAL KNEE ARTHROPLASTY Left 10/19/2021    Procedure: ARTHROPLASTY, KNEE, TOTAL;  Surgeon: Sabino Damon MD;  Location: [facility] OR;  Service: Orthopedics;  Laterality: Left;    UPPER GASTROINTESTINAL ENDOSCOPY         Review of patient's allergies indicates:   Allergen Reactions    Iodinated contrast media Swelling and Rash    Percocet [oxycodone-acetaminophen] Itching    Macrobid [nitrofurantoin monohyd/m-cryst] Rash    Metformin Rash    Penicillins Rash     Had ancef in 2020 with no adverse rxn     Promethazine Rash    Sulfa (sulfonamide antibiotics) Rash    Sulfamethoxazole-trimethoprim Rash       Current Facility-Administered Medications   Medication    acetaminophen tablet 1,000 mg    bisacodyL suppository 10 mg    cyclobenzaprine tablet 5 mg    dextrose 10% bolus 125 mL    dextrose 10% bolus 250 mL    dextrose 50% injection 12.5 g    diclofenac sodium 1 % gel 2 g    glucagon (human recombinant) injection 1 mg    insulin  aspart U-100 pen 0-5 Units    insulin detemir U-100 pen 18 Units    latanoprost 0.005 % ophthalmic solution 1 drop    meclizine tablet 25 mg    melatonin tablet 6 mg    morphine injection 4 mg    ondansetron disintegrating tablet 8 mg    oxyCODONE immediate release tablet 5 mg    [START ON 8/15/2022] pantoprazole EC tablet 40 mg    [START ON 8/15/2022] polyethylene glycol packet 17 g    prochlorperazine injection Soln 5 mg    senna-docusate 8.6-50 mg per tablet 1 tablet    sodium chloride 0.9% flush 10 mL     Current Outpatient Medications   Medication Sig    azelastine (ASTELIN) 137 mcg (0.1 %) nasal spray 1 spray (137 mcg total) by Nasal route 2 (two) times daily.    blood sugar diagnostic Strp To check BG 4 times daily, to use with insurance preferred meter-true metrix    blood-glucose meter kit To check BG 4 times daily, to use with insurance preferred meter-true metrix    cyclobenzaprine (FLEXERIL) 5 MG tablet TAKE 1 TABLET BY MOUTH THREE TIMES A DAY AS NEEDED FOR MUSCLE SPASMS    diclofenac sodium (VOLTAREN) 1 % Gel APPLY 2 G TOPICALLY 2 (TWO) TIMES DAILY AS NEEDED (PAIN). DO NOT USE DIRECTLY ON INCISION    ergocalciferol (ERGOCALCIFEROL) 50,000 unit Cap TAKE 1 CAPSULE BY MOUTH ONE TIME PER WEEK    fluticasone propionate (FLONASE) 50 mcg/actuation nasal spray 2 sprays (100 mcg total) by Each Nostril route once daily.    insulin (LANTUS SOLOSTAR U-100 INSULIN) glargine 100 units/mL (3mL) SubQ pen INJECT 38-40 UNITS SUBCUTANEOUSLY AT NIGHT.    insulin lispro (HUMALOG KWIKPEN INSULIN) 100 unit/mL pen INJECT 16 UNITS W/ MEALS PLUS SCALE 150-200+2, 201-250+4, 251-300+6, 301-350+8, >350+10. MAX DAILY 68 UNITS.    lancets Misc To check BG 4  times daily, to use with insurance preferred meter-true metrix    latanoprost 0.005 % ophthalmic solution Place 1 drop into both eyes nightly.    losartan (COZAAR) 25 MG tablet Take 0.5 tablets (12.5 mg total) by mouth once daily.    meclizine (ANTIVERT) 25 mg tablet Take 1  "tablet (25 mg total) by mouth 3 (three) times daily as needed for Dizziness.    meloxicam (MOBIC) 15 MG tablet TAKE 1 TABLET BY MOUTH EVERY DAY    ondansetron (ZOFRAN-ODT) 8 MG TbDL Dissolve 1 tablet (8 mg total) by mouth every 12 (twelve) hours as needed (nausea).    pantoprazole (PROTONIX) 40 MG tablet Take 1 tablet (40 mg total) by mouth once daily.    pen needle, diabetic (NOVOFINE 32) 32 gauge x 1/4" Ndle Uses 4 times a day. 90 day via duramed e 11.65     Family History       Problem Relation (Age of Onset)    Cancer Father    Colon cancer Father (83)    Diabetes Maternal Aunt, Maternal Grandmother          Tobacco Use    Smoking status: Never Smoker    Smokeless tobacco: Never Used   Substance and Sexual Activity    Alcohol use: No    Drug use: No    Sexual activity: Yes     Partners: Male     Birth control/protection: Post-menopausal       Objective:     Vital Signs (Most Recent):  Temp: 97.8 °F (36.6 °C) (08/14/22 1107)  Pulse: (!) 58 (08/14/22 1707)  Resp: 20 (08/14/22 1759)  BP: (!) 169/69 (08/14/22 1707)  SpO2: 98 % (08/14/22 1707)   Vital Signs (24h Range):  Temp:  [97.8 °F (36.6 °C)] 97.8 °F (36.6 °C)  Pulse:  [55-64] 58  Resp:  [16-21] 20  SpO2:  [95 %-100 %] 98 %  BP: (122-169)/() 169/69           There is no height or weight on file to calculate BMI.    No intake or output data in the 24 hours ending 08/14/22 1818    Ortho/SPM Exam  Gen:  No acute distress, well-developed, well nourished.  CV:  Peripherally well-perfused.  Respiratory:  Normal respiratory effort. No accessory muscle use.   Head/Neck:  Normocephalic.  Atraumatic. Sclera anicteric. TM. Neck supple.  Neuro: CN 2-12 grossly intact. No FND. Awake. Alert. Oriented to person, place, time, and situation.      MSK:  Right Upper extremity  Inspection  - Skin intact throughout, no open wounds  - Edema to right shoulder  - No ecchymosis, erythema, or signs of cellulitis  Palpation  - TTP diffusely about the shoulder, most pronounced at " "anterior aspect  Range of motion  - AROM and PROM of the wrist and hand intact without pain, able to flex, extend, pronate, and supinate elbow with mild pain from shoulder, shoulder ROM diminished due to pain, but pt is able to contract deltoid at abduct arm to a small degree  Stability  - No evidence of joint dislocation or abnormal laxity  Neurovascular  - AIN/PIN/Radial/Median/Ulnar Nerves assessed in isolation without deficit  - Able to give thumbs up, make "OK" sign, cross IF/LF, abduct/adduct fingers, make fist  - SILT throughout  - Compartments soft  - Radial artery palpated  - Muscle tone normal    Left Upper extremity  Inspection  - Skin intact throughout, no open wounds  - No swelling  - No ecchymosis, erythema, or signs of cellulitis  Palpation  - NonTTP throughout, no palpable abnormality  Range of motion  - AROM and PROM of the shoulder, elbow, wrist, and hand intact without pain  Stability  - No evidence of joint dislocation or abnormal laxity  Neurovascular  - AIN/PIN/Radial/Median/Ulnar Nerves assessed in isolation without deficit  - Able to give thumbs up, make "OK" sign, cross IF/LF, abduct/adduct fingers, make fist  - SILT throughout  - Compartments soft  - Radial artery palpated  - Muscle tone normal      Left Lower Extremity  Inspection  - Skin intact throughout, no open wounds, well healed TKA incision  - No swelling  - No ecchymosis, erythema, or signs of cellulitis  Palpation  - NonTTP throughout, no palpable abnormality  Range of motion  - AROM and PROM of the hip, knee, ankle, and foot intact without pain  Stability  - No evidence of joint dislocation or abnormal laxity  Neurovascular  - TA/EHL/Gastroc/FHL assessed in isolation without deficit  - SILT throughout  - Compartments soft  - DP palpated   - Muscle tone normal    Right Lower Extremity  Inspection  - Small abrasion to anterior knee, does not go deep to the dermis, well healed TKA incision  - No swelling  - No ecchymosis, erythema, " or signs of cellulitis  Palpation  - NonTTP throughout, no palpable abnormality  Range of motion  - AROM and PROM of the hip, knee, ankle, and foot intact without pain  Stability  - No evidence of joint dislocation or abnormal laxity  Neurovascular  - TA/EHL/Gastroc/FHL assessed in isolation without deficit  - SILT throughout  - Compartments soft  - DP palpated   - Muscle tone normal        Significant Labs: CBC:   Recent Labs   Lab 08/14/22  1317   WBC 11.66   HGB 12.0   HCT 34.9*        CMP:   Recent Labs   Lab 08/14/22  1317      K 4.0      CO2 20*   *   BUN 15   CREATININE 0.7   CALCIUM 8.8   PROT 7.1   ALBUMIN 3.8   BILITOT 0.5   ALKPHOS 82   AST 26   ALT 26   ANIONGAP 12     All pertinent labs within the past 24 hours have been reviewed.    Significant Imaging: CT: I have reviewed all pertinent results/findings and my personal findings are:  right proximal humerus fracture with comminution at the humeral head and anterior dislocation of humeral head, greater tuberosity fracture identified  X-Ray: I have reviewed all pertinent results/findings and my personal findings are:  right proximal humerus fracture, clear fracture of greater tuberosity and suspected humeral neck fracture, inferior dislocation, better appreciated on CT scan

## 2022-08-14 NOTE — CONSULTS
[facility]  Orthopedics  Consult Note    Patient Name: Riana Kay  MRN: [id number]  Admission Date: 8/14/2022  Hospital Length of Stay: 0 days  Attending Provider: Jany Guaman MD  Primary Care Provider: Jose Rojas MD      Inpatient consult to Orthopedic Surgery  Consult performed by: Giorgio Sylvester MD  Consult ordered by: Blaine Bailey MD        Subjective:     Principal Problem:Closed fracture of right proximal humerus    Chief Complaint:   Chief Complaint   Patient presents with    Fall     Right humerus deformity after tripping and falling. No head trauma.         HPI: Riana Kay is a 67 y.o. female with PMH significant for osteopenia, GERD, ZAHIRA, HTN, DM II, and anxiety presenting with right arm pain after a fall. She reports that she fell  onto her right arm and had the immediate onset of pain and difficulty ranging the arm. Patient denies any head trauma or LOC. The patient denies prior hx of falls. Patient denies numbness, but reports pain from her right neck down to her distal humerus. Denies any other musculoskeletal pain or injuries. No known history of prior right shoulder injury or surgery. History of bilateral TKA performed by Dr. Damon and right radial head arthroplasty by Dr. Hubbard in 2019, reports a history of right shoulder surgery and with Dr. Jamison, though no information is found in the chart.  Walks w/out assisted devices at baseline. Doesn't take any anticoagulation at baseline.   She is right hand dominant.        Past Medical History:   Diagnosis Date    Abdominal pain, right upper quadrant 02/04/2014    Anticoagulant long-term use     Anxiety     AR (allergic rhinitis)     Atrophic gastritis without mention of hemorrhage 02/13/2012     Dx updated per 2019 IMO Load    Chronic fatigue syndrome 06/10/2012    Diabetes mellitus     Dizziness     Fatty liver     GERD (gastroesophageal reflux disease)     Gross hematuria  12/01/2020    HTN (hypertension)     Hyperlipidemia     Leg swelling     Memory loss     Osteopenia     PONV (postoperative nausea and vomiting)     Primary osteoarthritis of right knee 10/06/2020    Primary osteoarthritis of right knee 10/06/2020    Right elbow pain 01/16/2019    S/P total hysterectomy     Screening for colon cancer 01/06/2021    Sleep apnea     Status post total right knee replacement 10/6/2020 10/05/2020       Past Surgical History:   Procedure Laterality Date    CHOLECYSTECTOMY      COLONOSCOPY N/A 1/17/2018    Procedure: COLONOSCOPY with Donnell;  Surgeon: Roland Jacobo MD;  [facility]  Service: Endoscopy;  Laterality: N/A;    COLONOSCOPY N/A 1/6/2021    Procedure: COLONOSCOPY;  Surgeon: Yong Rowland MD;  [facility]  Service: Endoscopy;  Laterality: N/A;  prep ins. emailed - Slov[place] speaking,  needed - ER[facility]  COVID screening [facility] - [facility]    CYSTOSCOPY N/A 12/1/2020    Procedure: CYSTOSCOPY;  Surgeon: Ines Stanford MD;  Location: [address];  Service: Urology;  Laterality: N/A;  45 minutes     ELBOW ARTHROPLASTY Right 1/16/2019    Procedure: ARTHROPLASTY, ELBOW right radial head arthroplasty revision;  Surgeon: Katja Hubbard MD;  Location: [address];  Service: Orthopedics;  Laterality: Right;  Anesthesia: General and Regional. Stretcher, hand pan 1 and pan 2, CALL ROSALIO, RUCHI [employer] notified 1-14 LO    ELBOW SURGERY Right 7/16/15    ELBOW SURGERY      ESOPHAGOGASTRODUODENOSCOPY N/A 4/15/2019    Procedure: EGD (ESOPHAGOGASTRODUODENOSCOPY);  Surgeon: Buck Irwin MD;  [facility]  Service: Endoscopy;  Laterality: N/A;  ray she    HYSTERECTOMY      KNEE ARTHROPLASTY Right 10/6/2020    Procedure: ARTHROPLASTY, KNEE-SAME DAY PROTOCOL;  Surgeon: Sabino Damon MD;  [facility];  Service: Orthopedics;  Laterality: Right;    KNEE  ARTHROSCOPY W/ DEBRIDEMENT  4/11    Left    RELEASE OF ULNAR NERVE AT CUBITAL TUNNEL Right 1/16/2019    Procedure: RELEASE, ULNAR TUNNEL right;  Surgeon: Katja Hubbard MD;  Location: [facility] OR [address] [place] [address];  Service: Orthopedics;  Laterality: Right;  Anesthesia: General and Regional. Stretcher, hand pan 1 and pan 2, CALL ACCUMED, CLAIRX    RETROGRADE PYELOGRAPHY Bilateral 12/1/2020    Procedure: PYELOGRAM, RETROGRADE;  Surgeon: Ines Stanford MD;  [facility];  Service: Urology;  Laterality: Bilateral;    ROTATOR CUFF REPAIR      TOTAL ABDOMINAL HYSTERECTOMY W/ BILATERAL SALPINGOOPHORECTOMY      TOTAL KNEE ARTHROPLASTY Left 10/19/2021    Procedure: ARTHROPLASTY, KNEE, TOTAL;  Surgeon: Sabino Damon MD;  Location: [facility];  Service: Orthopedics;  Laterality: Left;    UPPER GASTROINTESTINAL ENDOSCOPY         Review of patient's allergies indicates:   Allergen Reactions    Iodinated contrast media Swelling and Rash    Percocet [oxycodone-acetaminophen] Itching    Macrobid [nitrofurantoin monohyd/m-cryst] Rash    Metformin Rash    Penicillins Rash     Had ancef in 2020 with no adverse rxn     Promethazine Rash    Sulfa (sulfonamide antibiotics) Rash    Sulfamethoxazole-trimethoprim Rash       Current Facility-Administered Medications   Medication    acetaminophen tablet 1,000 mg    bisacodyL suppository 10 mg    cyclobenzaprine tablet 5 mg    dextrose 10% bolus 125 mL    dextrose 10% bolus 250 mL    dextrose 50% injection 12.5 g    diclofenac sodium 1 % gel 2 g    glucagon (human recombinant) injection 1 mg    insulin aspart U-100 pen 0-5 Units    insulin detemir U-100 pen 18 Units    latanoprost 0.005 % ophthalmic solution 1 drop    meclizine tablet 25 mg    melatonin tablet 6 mg    morphine injection 4 mg    ondansetron disintegrating tablet 8 mg    oxyCODONE immediate release tablet 5 mg    [START ON 8/15/2022] pantoprazole EC tablet 40 mg    [START ON  8/15/2022] polyethylene glycol packet 17 g    prochlorperazine injection Soln 5 mg    senna-docusate 8.6-50 mg per tablet 1 tablet    sodium chloride 0.9% flush 10 mL     Current Outpatient Medications   Medication Sig    azelastine (ASTELIN) 137 mcg (0.1 %) nasal spray 1 spray (137 mcg total) by Nasal route 2 (two) times daily.    blood sugar diagnostic Strp To check BG 4 times daily, to use with insurance preferred meter-true metrix    blood-glucose meter kit To check BG 4 times daily, to use with insurance preferred meter-true metrix    cyclobenzaprine (FLEXERIL) 5 MG tablet TAKE 1 TABLET BY MOUTH THREE TIMES A DAY AS NEEDED FOR MUSCLE SPASMS    diclofenac sodium (VOLTAREN) 1 % Gel APPLY 2 G TOPICALLY 2 (TWO) TIMES DAILY AS NEEDED (PAIN). DO NOT USE DIRECTLY ON INCISION    ergocalciferol (ERGOCALCIFEROL) 50,000 unit Cap TAKE 1 CAPSULE BY MOUTH ONE TIME PER WEEK    fluticasone propionate (FLONASE) 50 mcg/actuation nasal spray 2 sprays (100 mcg total) by Each Nostril route once daily.    insulin (LANTUS SOLOSTAR U-100 INSULIN) glargine 100 units/mL (3mL) SubQ pen INJECT 38-40 UNITS SUBCUTANEOUSLY AT NIGHT.    insulin lispro (HUMALOG KWIKPEN INSULIN) 100 unit/mL pen INJECT 16 UNITS W/ MEALS PLUS SCALE 150-200+2, 201-250+4, 251-300+6, 301-350+8, >350+10. MAX DAILY 68 UNITS.    lancets Misc To check BG 4  times daily, to use with insurance preferred meter-true metrix    latanoprost 0.005 % ophthalmic solution Place 1 drop into both eyes nightly.    losartan (COZAAR) 25 MG tablet Take 0.5 tablets (12.5 mg total) by mouth once daily.    meclizine (ANTIVERT) 25 mg tablet Take 1 tablet (25 mg total) by mouth 3 (three) times daily as needed for Dizziness.    meloxicam (MOBIC) 15 MG tablet TAKE 1 TABLET BY MOUTH EVERY DAY    ondansetron (ZOFRAN-ODT) 8 MG TbDL Dissolve 1 tablet (8 mg total) by mouth every 12 (twelve) hours as needed (nausea).    pantoprazole (PROTONIX) 40 MG tablet Take 1 tablet (40 mg  "total) by mouth once daily.    pen needle, diabetic (NOVOFINE 32) 32 gauge x 1/4" Ndle Uses 4 times a day. 90 day via duramed e 11.65     Family History       Problem Relation (Age of Onset)    Cancer Father    Colon cancer Father (83)    Diabetes Maternal Aunt, Maternal Grandmother          Tobacco Use    Smoking status: Never Smoker    Smokeless tobacco: Never Used   Substance and Sexual Activity    Alcohol use: No    Drug use: No    Sexual activity: Yes     Partners: Male     Birth control/protection: Post-menopausal       Objective:     Vital Signs (Most Recent):  Temp: 97.8 °F (36.6 °C) (08/14/22 1107)  Pulse: (!) 58 (08/14/22 1707)  Resp: 20 (08/14/22 1759)  BP: (!) 169/69 (08/14/22 1707)  SpO2: 98 % (08/14/22 1707)   Vital Signs (24h Range):  Temp:  [97.8 °F (36.6 °C)] 97.8 °F (36.6 °C)  Pulse:  [55-64] 58  Resp:  [16-21] 20  SpO2:  [95 %-100 %] 98 %  BP: (122-169)/() 169/69           There is no height or weight on file to calculate BMI.    No intake or output data in the 24 hours ending 08/14/22 1818    Ortho/SPM Exam  Gen:  No acute distress, well-developed, well nourished.  CV:  Peripherally well-perfused.  Respiratory:  Normal respiratory effort. No accessory muscle use.   Head/Neck:  Normocephalic.  Atraumatic. Sclera anicteric. TM. Neck supple.  Neuro: CN 2-12 grossly intact. No FND. Awake. Alert. Oriented to person, place, time, and situation.      MSK:  Right Upper extremity  Inspection  - Skin intact throughout, no open wounds  - Edema to right shoulder  - No ecchymosis, erythema, or signs of cellulitis  Palpation  - TTP diffusely about the shoulder, most pronounced at anterior aspect  Range of motion  - AROM and PROM of the wrist and hand intact without pain, able to flex, extend, pronate, and supinate elbow with mild pain from shoulder, shoulder ROM diminished due to pain, but pt is able to contract deltoid at abduct arm to a small degree  Stability  - No evidence of joint dislocation " "or abnormal laxity  Neurovascular  - AIN/PIN/Radial/Median/Ulnar Nerves assessed in isolation without deficit  - Able to give thumbs up, make "OK" sign, cross IF/LF, abduct/adduct fingers, make fist  - SILT throughout  - Compartments soft  - Radial artery palpated  - Muscle tone normal    Left Upper extremity  Inspection  - Skin intact throughout, no open wounds  - No swelling  - No ecchymosis, erythema, or signs of cellulitis  Palpation  - NonTTP throughout, no palpable abnormality  Range of motion  - AROM and PROM of the shoulder, elbow, wrist, and hand intact without pain  Stability  - No evidence of joint dislocation or abnormal laxity  Neurovascular  - AIN/PIN/Radial/Median/Ulnar Nerves assessed in isolation without deficit  - Able to give thumbs up, make "OK" sign, cross IF/LF, abduct/adduct fingers, make fist  - SILT throughout  - Compartments soft  - Radial artery palpated  - Muscle tone normal      Left Lower Extremity  Inspection  - Skin intact throughout, no open wounds, well healed TKA incision  - No swelling  - No ecchymosis, erythema, or signs of cellulitis  Palpation  - NonTTP throughout, no palpable abnormality  Range of motion  - AROM and PROM of the hip, knee, ankle, and foot intact without pain  Stability  - No evidence of joint dislocation or abnormal laxity  Neurovascular  - TA/EHL/Gastroc/FHL assessed in isolation without deficit  - SILT throughout  - Compartments soft  - DP palpated   - Muscle tone normal    Right Lower Extremity  Inspection  - Small abrasion to anterior knee, does not go deep to the dermis, well healed TKA incision  - No swelling  - No ecchymosis, erythema, or signs of cellulitis  Palpation  - NonTTP throughout, no palpable abnormality  Range of motion  - AROM and PROM of the hip, knee, ankle, and foot intact without pain  Stability  - No evidence of joint dislocation or abnormal laxity  Neurovascular  - TA/EHL/Gastroc/FHL assessed in isolation without deficit  - SILT " throughout  - Compartments soft  - DP palpated   - Muscle tone normal        Significant Labs: CBC:   Recent Labs   Lab 08/14/22  1317   WBC 11.66   HGB 12.0   HCT 34.9*        CMP:   Recent Labs   Lab 08/14/22  1317      K 4.0      CO2 20*   *   BUN 15   CREATININE 0.7   CALCIUM 8.8   PROT 7.1   ALBUMIN 3.8   BILITOT 0.5   ALKPHOS 82   AST 26   ALT 26   ANIONGAP 12     All pertinent labs within the past 24 hours have been reviewed.    Significant Imaging: CT: I have reviewed all pertinent results/findings and my personal findings are:  right proximal humerus fracture with comminution at the humeral head and anterior dislocation of humeral head, greater tuberosity fracture identified  X-Ray: I have reviewed all pertinent results/findings and my personal findings are:  right proximal humerus fracture, clear fracture of greater tuberosity and suspected humeral neck fracture, inferior dislocation, better appreciated on CT scan    Results for orders placed or performed during the hospital encounter of 08/14/22 (from the past 2160 hour(s))   CT Shoulder Without Contrast Right    Impression    Comminuted fracture of humeral head and neck with impaction and anterior inferior dislocation of the humeral head.    Moderate volume lipohemarthrosis.    This report was flagged in Epic as abnormal.    Electronically signed by resident: Saad Daily  Date:    08/14/2022  Time:    16:04    Electronically signed by: Jose Vázquez MD  Date:    08/14/2022  Time:    16:33   CT 3D RECON WITH INDEPENDENT WS    Impression    Comminuted fracture of humeral head and neck with impaction and anterior inferior dislocation of the humeral head.    Moderate volume lipohemarthrosis.    This report was flagged in Epic as abnormal.    Electronically signed by resident: Saad Daily  Date:    08/14/2022  Time:    16:04    Electronically signed by: Jose Vázquez MD  Date:    08/14/2022  Time:    16:33   CT Head Without  Contrast    Impression    No acute intracranial hemorrhage/injury.      Electronically signed by: Dar Willis  Date:    08/14/2022  Time:    12:36     *Note: Due to a large number of results and/or encounters for the requested time period, some results have not been displayed. A complete set of results can be found in Results Review.         Assessment/Plan:     * Closed fracture of right proximal humerus  Riana Kay is a 67 y.o. female with a right proximal humerus fracture and anterior dislocation. Closed, NVI.    - NWB RUE, no ROM of RUE  - Sling at all times  - Admit for neuro checks q2-4 hours, page orthopedics on call if any change in exam whatsoever  - CT scan with comminution at humeral head, reduction unlikely   - Will discuss with staff  - Orthopedics will continue to follow        Giorgio Sylvester MD  Orthopedics  [facility]

## 2022-08-14 NOTE — H&P
[facility] - [facility] Dept  [facility] Medicine  History & Physical    Patient Name: Riana Kay  MRN: [id number]  Patient Class: OP- Observation  Admission Date: 8/14/2022  Attending Physician: Jany Guaman MD   Primary Care Provider: Jose Rojas MD         Patient information was obtained from patient, past medical records and ER records.     Subjective:     Principal Problem:Closed fracture of right proximal humerus    Chief Complaint:   Chief Complaint   Patient presents with    Fall     Right humerus deformity after tripping and falling. No head trauma.         HPI:   Riana Kay is a 67 y.o. female who has a past medical history of Anticoagulant long-term use, Anxiety, Atrophic gastritis without mention of hemorrhage, Chronic fatigue syndrome, Diabetes mellitus, Dizziness, Fatty liver, GERD, Gross hematuria, HTN, Hyperlipidemia, Leg swelling, Memory loss, Osteopenia, Primary osteoarthritis of right knee, Primary osteoarthritis of right knee, S/P total hysterectomy, Sleep apnea, and Status post total b/l knee replacements, presented to the ED with CC of Shoulder and Knee pain s/p mechanical fall. Patient was at [employer], was walking to her car and tripped and fell into a parked car. She did not hit her head, and furthermore CTH was negative for acute bleed. She was in quite a bit of pain, 150 mcg of fentanyl given en route. Patient became nauseous and vomited. CT shoulder showed comminuted fracture of humeral head and neck with impaction and anterior inferior dislocation of the humeral head, moderate volume lipohemarthrosis. Ortho was consulted in ED. Patient denies CP, AP, or SOB.         Past Medical History:   Diagnosis Date    Abdominal pain, right upper quadrant 02/04/2014    Anticoagulant long-term use     Anxiety     AR (allergic rhinitis)     Atrophic gastritis without mention of hemorrhage 02/13/2012     Dx updated per 2019 IMO Load    Chronic fatigue syndrome 06/10/2012     Diabetes mellitus     Dizziness     Fatty liver     GERD (gastroesophageal reflux disease)     Gross hematuria 12/01/2020    HTN (hypertension)     Hyperlipidemia     Leg swelling     Memory loss     Osteopenia     PONV (postoperative nausea and vomiting)     Primary osteoarthritis of right knee 10/06/2020    Primary osteoarthritis of right knee 10/06/2020    Right elbow pain 01/16/2019    S/P total hysterectomy     Screening for colon cancer 01/06/2021    Sleep apnea     Status post total right knee replacement 10/6/2020 10/05/2020       Past Surgical History:   Procedure Laterality Date    CHOLECYSTECTOMY      COLONOSCOPY N/A 1/17/2018    Procedure: COLONOSCOPY with Donnell;  Surgeon: Roland Jacobo MD;  Location: [facility] ENDO (4TH FLR);  Service: Endoscopy;  Laterality: N/A;    COLONOSCOPY N/A 1/6/2021    Procedure: COLONOSCOPY;  Surgeon: Yong Rowland MD;  Location: [facility] ENDO (4TH FLR);  Service: Endoscopy;  Laterality: N/A;  prep ins. emailed - [place] speaking,  needed - ERW  [facility] - ERW    CYSTOSCOPY N/A 12/1/2020    Procedure: CYSTOSCOPY;  Surgeon: Ines Stanford MD;  [facility];  Service: Urology;  Laterality: N/A;  45 minutes     ELBOW ARTHROPLASTY Right 1/16/2019    Procedure: ARTHROPLASTY, ELBOW right radial head arthroplasty revision;  Surgeon: Katja Hubbard MD;  Location: [facility] OR [address] [place] [address];  Service: Orthopedics;  Laterality: Right;  Anesthesia: General and Regional. Stretcher, hand pan 1 and pan 2, CALL RUCHI SANTAMARIA & Sol notified 1-14 LO    ELBOW SURGERY Right 7/16/15    ELBOW SURGERY      ESOPHAGOGASTRODUODENOSCOPY N/A 4/15/2019    Procedure: EGD (ESOPHAGOGASTRODUODENOSCOPY);  Surgeon: Buck Irwin MD;  Location: [facility] ENDO (4TH FLR);  Service: Endoscopy;  Laterality: N/A;  amanda she    HYSTERECTOMY      KNEE ARTHROPLASTY Right 10/6/2020    Procedure: ARTHROPLASTY, KNEE-SAME DAY  PROTOCOL;  Surgeon: Sabino Damon MD;  Location: [facility];  Service: Orthopedics;  Laterality: Right;    KNEE ARTHROSCOPY W/ DEBRIDEMENT  4/11    Left    RELEASE OF ULNAR NERVE AT CUBITAL TUNNEL Right 1/16/2019    Procedure: RELEASE, ULNAR TUNNEL right;  Surgeon: Katja Hubbard MD;  [facility];  Service: Orthopedics;  Laterality: Right;  Anesthesia: General and Regional. Stretcher, hand pan 1 and pan 2, CALL ACCUMED, CLAIRX    RETROGRADE PYELOGRAPHY Bilateral 12/1/2020    Procedure: PYELOGRAM, RETROGRADE;  Surgeon: Ines Stanford MD;  [facility];  Service: Urology;  Laterality: Bilateral;    ROTATOR CUFF REPAIR      TOTAL ABDOMINAL HYSTERECTOMY W/ BILATERAL SALPINGOOPHORECTOMY      TOTAL KNEE ARTHROPLASTY Left 10/19/2021    Procedure: ARTHROPLASTY, KNEE, TOTAL;  Surgeon: Sabino Damon MD;  Location: [facility];  Service: Orthopedics;  Laterality: Left;    UPPER GASTROINTESTINAL ENDOSCOPY         Review of patient's allergies indicates:   Allergen Reactions    Iodinated contrast media Swelling and Rash    Percocet [oxycodone-acetaminophen] Itching    Macrobid [nitrofurantoin monohyd/m-cryst] Rash    Metformin Rash    Penicillins Rash     Had ancef in 2020 with no adverse rxn     Promethazine Rash     Had compazine in 2021    Sulfa (sulfonamide antibiotics) Rash    Sulfamethoxazole-trimethoprim Rash       No current facility-administered medications on file prior to encounter.     Current Outpatient Medications on File Prior to Encounter   Medication Sig    azelastine (ASTELIN) 137 mcg (0.1 %) nasal spray 1 spray (137 mcg total) by Nasal route 2 (two) times daily.    blood sugar diagnostic Strp To check BG 4 times daily, to use with insurance preferred meter-true metrix    blood-glucose meter kit To check BG 4 times daily, to use with insurance preferred meter-true metrix    cyclobenzaprine (FLEXERIL) 5 MG tablet TAKE 1 TABLET BY MOUTH THREE TIMES A  "DAY AS NEEDED FOR MUSCLE SPASMS    diclofenac sodium (VOLTAREN) 1 % Gel APPLY 2 G TOPICALLY 2 (TWO) TIMES DAILY AS NEEDED (PAIN). DO NOT USE DIRECTLY ON INCISION    ergocalciferol (ERGOCALCIFEROL) 50,000 unit Cap TAKE 1 CAPSULE BY MOUTH ONE TIME PER WEEK    fluticasone propionate (FLONASE) 50 mcg/actuation nasal spray 2 sprays (100 mcg total) by Each Nostril route once daily.    insulin (LANTUS SOLOSTAR U-100 INSULIN) glargine 100 units/mL (3mL) SubQ pen INJECT 38-40 UNITS SUBCUTANEOUSLY AT NIGHT.    insulin lispro (HUMALOG KWIKPEN INSULIN) 100 unit/mL pen INJECT 16 UNITS W/ MEALS PLUS SCALE 150-200+2, 201-250+4, 251-300+6, 301-350+8, >350+10. MAX DAILY 68 UNITS.    lancets Misc To check BG 4  times daily, to use with insurance preferred meter-true metrix    latanoprost 0.005 % ophthalmic solution Place 1 drop into both eyes nightly.    losartan (COZAAR) 25 MG tablet Take 0.5 tablets (12.5 mg total) by mouth once daily.    meclizine (ANTIVERT) 25 mg tablet Take 1 tablet (25 mg total) by mouth 3 (three) times daily as needed for Dizziness.    meloxicam (MOBIC) 15 MG tablet TAKE 1 TABLET BY MOUTH EVERY DAY    ondansetron (ZOFRAN-ODT) 8 MG TbDL Dissolve 1 tablet (8 mg total) by mouth every 12 (twelve) hours as needed (nausea).    pantoprazole (PROTONIX) 40 MG tablet Take 1 tablet (40 mg total) by mouth once daily.    pen needle, diabetic (NOVOFINE 32) 32 gauge x 1/4" Ndle Uses 4 times a day. 90 day via duramed e 11.65     Family History       Problem Relation (Age of Onset)    Cancer Father    Colon cancer Father (83)    Diabetes Maternal Aunt, Maternal Grandmother          Tobacco Use    Smoking status: Never Smoker    Smokeless tobacco: Never Used   Substance and Sexual Activity    Alcohol use: No    Drug use: No    Sexual activity: Yes     Partners: Male     Birth control/protection: Post-menopausal     Review of Systems   Constitutional:  Positive for activity change. Negative for fatigue, fever " and unexpected weight change.   HENT:  Negative for sore throat and trouble swallowing.    Eyes:  Negative for photophobia and visual disturbance.   Respiratory:  Negative for chest tightness and shortness of breath.    Cardiovascular:  Negative for chest pain, palpitations and leg swelling.   Gastrointestinal:  Negative for abdominal distention, abdominal pain, blood in stool, constipation, diarrhea, nausea and vomiting.   Endocrine: Negative for polydipsia and polyuria.   Genitourinary:  Negative for difficulty urinating, dysuria and hematuria.   Musculoskeletal:  Positive for arthralgias and joint swelling.   Skin:  Negative for pallor and rash.   Allergic/Immunologic: Negative for immunocompromised state.   Neurological:  Negative for dizziness, seizures, syncope and facial asymmetry.   Psychiatric/Behavioral:  Negative for agitation, behavioral problems and confusion.    Objective:     Vital Signs (Most Recent):  Temp: 97.8 °F (36.6 °C) (08/14/22 1107)  Pulse: (!) 58 (08/14/22 1707)  Resp: 20 (08/14/22 1759)  BP: (!) 169/69 (08/14/22 1707)  SpO2: 98 % (08/14/22 1707)   Vital Signs (24h Range):  Temp:  [97.8 °F (36.6 °C)] 97.8 °F (36.6 °C)  Pulse:  [55-64] 58  Resp:  [16-21] 20  SpO2:  [95 %-100 %] 98 %  BP: (122-169)/() 169/69        There is no height or weight on file to calculate BMI.    Physical Exam  Vitals and nursing note reviewed.   Constitutional:       General: She is not in acute distress.     Appearance: She is well-developed and normal weight. She is not ill-appearing or diaphoretic.   HENT:      Head: Normocephalic and atraumatic.      Mouth/Throat:      Mouth: Mucous membranes are moist.      Pharynx: No oropharyngeal exudate.   Eyes:      General: No scleral icterus.  Neck:      Thyroid: No thyromegaly.   Cardiovascular:      Rate and Rhythm: Normal rate and regular rhythm.      Heart sounds: No murmur heard.    No gallop.   Pulmonary:      Effort: Pulmonary effort is normal.      Breath  sounds: Normal breath sounds. No stridor. No wheezing or rales.   Abdominal:      General: There is no distension.      Palpations: Abdomen is soft. There is no mass.      Tenderness: There is no abdominal tenderness. There is no guarding.   Musculoskeletal:         General: No swelling or deformity. Normal range of motion.      Cervical back: Normal range of motion and neck supple. No rigidity.   Lymphadenopathy:      Cervical: No cervical adenopathy.   Skin:     General: Skin is warm and dry.      Capillary Refill: Capillary refill takes less than 2 seconds.      Coloration: Skin is not jaundiced.      Findings: No bruising.   Neurological:      Mental Status: She is alert and oriented to person, place, and time. Mental status is at baseline.      Cranial Nerves: No cranial nerve deficit.      Motor: No weakness.   Psychiatric:         Mood and Affect: Mood normal.         Behavior: Behavior normal.             Recent Results (from the past 24 hour(s))   Comprehensive metabolic panel    Collection Time: 08/14/22  1:17 PM   Result Value Ref Range    Sodium 136 136 - 145 mmol/L    Potassium 4.0 3.5 - 5.1 mmol/L    Chloride 104 95 - 110 mmol/L    CO2 20 (L) 23 - 29 mmol/L    Glucose 203 (H) 70 - 110 mg/dL    BUN 15 8 - 23 mg/dL    Creatinine 0.7 0.5 - 1.4 mg/dL    Calcium 8.8 8.7 - 10.5 mg/dL    Total Protein 7.1 6.0 - 8.4 g/dL    Albumin 3.8 3.5 - 5.2 g/dL    Total Bilirubin 0.5 0.1 - 1.0 mg/dL    Alkaline Phosphatase 82 55 - 135 U/L    AST 26 10 - 40 U/L    ALT 26 10 - 44 U/L    Anion Gap 12 8 - 16 mmol/L    eGFR >60.0 >60 mL/min/1.73 m^2   Troponin I    Collection Time: 08/14/22  1:17 PM   Result Value Ref Range    Troponin I <0.006 0.000 - 0.026 ng/mL   CBC auto differential    Collection Time: 08/14/22  1:17 PM   Result Value Ref Range    WBC 11.66 3.90 - 12.70 K/uL    RBC 3.75 (L) 4.00 - 5.40 M/uL    Hemoglobin 12.0 12.0 - 16.0 g/dL    Hematocrit 34.9 (L) 37.0 - 48.5 %    MCV 93 82 - 98 fL    MCH 32.0 (H) 27.0  - 31.0 pg    MCHC 34.4 32.0 - 36.0 g/dL    RDW 12.1 11.5 - 14.5 %    Platelets 177 150 - 450 K/uL    MPV 9.5 9.2 - 12.9 fL    Immature Granulocytes 0.3 0.0 - 0.5 %    Gran # (ANC) 10.0 (H) 1.8 - 7.7 K/uL    Immature Grans (Abs) 0.04 0.00 - 0.04 K/uL    Lymph # 1.2 1.0 - 4.8 K/uL    Mono # 0.4 0.3 - 1.0 K/uL    Eos # 0.0 0.0 - 0.5 K/uL    Baso # 0.03 0.00 - 0.20 K/uL    nRBC 0 0 /100 WBC    Gran % 85.4 (H) 38.0 - 73.0 %    Lymph % 10.5 (L) 18.0 - 48.0 %    Mono % 3.3 (L) 4.0 - 15.0 %    Eosinophil % 0.2 0.0 - 8.0 %    Basophil % 0.3 0.0 - 1.9 %    Differential Method Automated    Magnesium    Collection Time: 08/14/22  1:17 PM   Result Value Ref Range    Magnesium 2.0 1.6 - 2.6 mg/dL   Phosphorus    Collection Time: 08/14/22  1:17 PM   Result Value Ref Range    Phosphorus 3.2 2.7 - 4.5 mg/dL       Microbiology Results (last 7 days)       ** No results found for the last 168 hours. **             Imaging Results               CT Shoulder Without Contrast Right (Final result)  Result time 08/14/22 16:33:13      Final result by Jose Vázquez MD (08/14/22 16:33:13)                   Impression:      Comminuted fracture of humeral head and neck with impaction and anterior inferior dislocation of the humeral head.    Moderate volume lipohemarthrosis.    This report was flagged in Epic as abnormal.    Electronically signed by resident: Saad Daily  Date:    08/14/2022  Time:    16:04    Electronically signed by: Jose Vázquez MD  Date:    08/14/2022  Time:    16:33               Narrative:    EXAMINATION:  CT SHOULDER WITHOUT CONTRAST RIGHT; CT 3D RECON WITH INDEPENDENT WS    CLINICAL HISTORY:  Shoulder trauma, instability or dislocation suspected, xray done;ortho;; Shoulder trauma, instability or dislocation suspected, xray done;ortho;3d;    TECHNIQUE:  Axial 0.625-mm images of the right shoulder obtained without intravenous contrast.  Data submitted for coronal and sagittal reformats.    3D reconstructed images  were acquired by post processing on an independent workstation with concurrent supervision and archived for permanent record.    COMPARISON:  Right shoulder radiograph 08/14/2022; chest radiograph 06/11/2021    FINDINGS:  Bony mineralization is normal.  Comminuted multi-fragment fracture of the humeral head and neck with moderate impaction.  Fracture at least partially involves the articular surface of the humeral head which is dislocated anteriorly and inferiorly with respect to the glenoid.    Acromioclavicular joint alignment is preserved without significant widening or hypertrophy.    Moderate volume joint effusion with fat fluid level suggesting lipohemarthrosis.  Generalized fat stranding infraclavicular and axillary region.  No organized fluid collections.  Muscle bulk is preserved.    Dependent atelectasis in the right lung base.  Dense calcification of the mitral annulus.  Multi-vessel coronary artery calcification.  Eccentric calcification in the right carotid bulb.                                        CT 3D RECON WITH INDEPENDENT WS (Final result)  Result time 08/14/22 16:33:13      Final result by Jose Vázquez MD (08/14/22 16:33:13)                   Impression:      Comminuted fracture of humeral head and neck with impaction and anterior inferior dislocation of the humeral head.    Moderate volume lipohemarthrosis.    This report was flagged in Epic as abnormal.    Electronically signed by resident: Saad Daily  Date:    08/14/2022  Time:    16:04    Electronically signed by: Jose Vázquez MD  Date:    08/14/2022  Time:    16:33               Narrative:    EXAMINATION:  CT SHOULDER WITHOUT CONTRAST RIGHT; CT 3D RECON WITH INDEPENDENT WS    CLINICAL HISTORY:  Shoulder trauma, instability or dislocation suspected, xray done;ortho;; Shoulder trauma, instability or dislocation suspected, xray done;ortho;3d;    TECHNIQUE:  Axial 0.625-mm images of the right shoulder obtained without intravenous  contrast.  Data submitted for coronal and sagittal reformats.    3D reconstructed images were acquired by post processing on an independent workstation with concurrent supervision and archived for permanent record.    COMPARISON:  Right shoulder radiograph 08/14/2022; chest radiograph 06/11/2021    FINDINGS:  Bony mineralization is normal.  Comminuted multi-fragment fracture of the humeral head and neck with moderate impaction.  Fracture at least partially involves the articular surface of the humeral head which is dislocated anteriorly and inferiorly with respect to the glenoid.    Acromioclavicular joint alignment is preserved without significant widening or hypertrophy.    Moderate volume joint effusion with fat fluid level suggesting lipohemarthrosis.  Generalized fat stranding infraclavicular and axillary region.  No organized fluid collections.  Muscle bulk is preserved.    Dependent atelectasis in the right lung base.  Dense calcification of the mitral annulus.  Multi-vessel coronary artery calcification.  Eccentric calcification in the right carotid bulb.                                       X-Ray Knee 3 View Right (Final result)  Result time 08/14/22 13:16:30      Final result by Nilda Brothers MD (08/14/22 13:16:30)                   Impression:      As above.      Electronically signed by: Nilda Brothers MD  Date:    08/14/2022  Time:    13:16               Narrative:    EXAMINATION:  XR KNEE 3 VIEW RIGHT    CLINICAL HISTORY:  Unspecified fall, initial encounter    TECHNIQUE:  Three views of the right knee    COMPARISON:  06/01/2022    FINDINGS:  Postoperative changes of right total knee arthroplasty are seen in satisfactory alignment without evidence for hardware complication.  No joint effusion is seen.                                       X-Ray Shoulder Trauma Right (Final result)  Result time 08/14/22 13:14:59      Final result by Nilda Brothers MD (08/14/22 13:14:59)                    Impression:      As above.      Electronically signed by: Nilda Brothers MD  Date:    08/14/2022  Time:    13:14               Narrative:    EXAMINATION:  XR SHOULDER TRAUMA 3 VIEW RIGHT    CLINICAL HISTORY:  Unspecified fall, initial encounter    TECHNIQUE:  Three or four views of the right shoulder were performed.    COMPARISON:  None    FINDINGS:  Comminuted fracture of the right humeral head and neck with anterior and inferior dislocation of the humerus in respect to the glenoid fossa.                                       X-Ray Elbow Complete Right (Final result)  Result time 08/14/22 13:13:46      Final result by Nilda Brothers MD (08/14/22 13:13:46)                   Impression:      As above.      Electronically signed by: Nilda Brothers MD  Date:    08/14/2022  Time:    13:13               Narrative:    EXAMINATION:  XR ELBOW COMPLETE 3 VIEW RIGHT    CLINICAL HISTORY:  . Unspecified fall, initial encounter    TECHNIQUE:  AP, lateral, and oblique views of the right elbow were performed.    COMPARISON:  None    FINDINGS:  Postoperative changes of the right elbow identified with a surgical pin in the lateral epicondyle with radial head arthroplasty.  There are moderate degenerative changes of the elbow joint.  No definite fracture or dislocation is seen.  No joint effusion.                                       X-Ray Humerus 2 View Right (Final result)  Result time 08/14/22 13:12:52      Final result by Nilda Brothers MD (08/14/22 13:12:52)                   Impression:      As above.      Electronically signed by: Nilda Brothers MD  Date:    08/14/2022  Time:    13:12               Narrative:    EXAMINATION:  XR HUMERUS 2 VIEW RIGHT    CLINICAL HISTORY:  Unspecified fall, initial encounter    TECHNIQUE:  Two views of the right humerus    COMPARISON:  None.    FINDINGS:  There is a comminuted fracture of the right humeral head and neck with a separate fragment of the greater tuberosity.  The humeral head  appears to be dislocated anteriorly in respect to the glenoid fossa.  Postoperative changes of the elbow were seen.                                       CT Head Without Contrast (Final result)  Result time 08/14/22 12:36:37      Final result by Dar Willis MD (08/14/22 12:36:37)                   Impression:      No acute intracranial hemorrhage/injury.      Electronically signed by: Dar Willis  Date:    08/14/2022  Time:    12:36               Narrative:    EXAMINATION:  CT HEAD WITHOUT CONTRAST    CLINICAL HISTORY:  Head trauma, minor (Age >= 65y);    TECHNIQUE:  Low dose axial images were obtained through the head.  Coronal and sagittal reformations were also performed. Contrast was not administered.    COMPARISON:  CT 06/03/2022, MRI 08/06/2022    FINDINGS:  There is no evidence of intracranial hemorrhage or parenchymal contusion.  No hydrocephalus mass effect or acute territorial infarct.    The calvarium is intact.    Left sphenoid chronic mucosal thickening.                                          Assessment/Plan:     * Closed fracture of right proximal humerus  · R Proximal Humerus#   · Anterior Dislocation   · Closed#, NVI.  · Ortho consulted  · Wear Sling at all times  · No weight bearing of RUE    Pain  · Tylenol scheduled  · Oral Oxy IR, for mod pain  · IV Morphine 4 mg, for severe pain      Type 2 diabetes mellitus with other specified complication  Patient's FSGs are controlled on current medication regimen.  Last A1c reviewed-   Lab Results   Component Value Date    HGBA1C 6.4 (H) 04/12/2022     Most recent fingerstick glucose reviewed- No results for input(s): POCTGLUCOSE in the last 24 hours.  Current correctional scale  Low  Maintain anti-hyperglycemic dose as follows-     · DM x over 20 years  years.    · Hold Oral hypoglycemics while patient is in the hospital.  · Currently takes:   · Lantus insulin 28-32  · Lispro Insulin with meals plus sliding scale- 12 u, am lunch & dinner  14  · Will start 18U long acting and SSI, npo at [place] for potential procedure    Muscle spasms of neck  Muscle relaxer PRN      HTN (hypertension)  · Relax BP goal with potential anesthesia/procedure       Coronary atherosclerosis  · Aspirin and Statin      ZAHIRA (obstructive sleep apnea)  CPAP nightly      GERD (gastroesophageal reflux disease)  · Cont home PPI      Osteopenia  · Had DEXA in 2020  · Vit D level ordered  · OTC vit D and Ca      Fatty liver  Noted      VTE Risk Mitigation (From admission, onward)         Ordered     Place sequential compression device  Until discontinued         08/14/22 1754     IP VTE LOW RISK PATIENT  Once         08/14/22 1754                   Wan Calderon MD  Department of Hospital Medicine   [facility] - Emergency Dept

## 2022-08-14 NOTE — HPI
Riana Kay is a 67 y.o. female who has a past medical history of Anticoagulant long-term use, Anxiety, Atrophic gastritis without mention of hemorrhage, Chronic fatigue syndrome, Diabetes mellitus, Dizziness, Fatty liver, GERD, Gross hematuria, HTN, Hyperlipidemia, Leg swelling, Memory loss, Osteopenia, Primary osteoarthritis of right knee, Primary osteoarthritis of right knee, S/P total hysterectomy, Sleep apnea, and Status post total b/l knee replacements, presented to the ED with CC of Shoulder and Knee pain s/p mechanical fall. Patient was at [employer], was walking to her car and tripped and fell into a parked car. She did not hit her head, and furthermore CTH was negative for acute bleed. She was in quite a bit of pain, 150 mcg of fentanyl given en route. Patient became nauseous and vomited. CT shoulder showed comminuted fracture of humeral head and neck with impaction and anterior inferior dislocation of the humeral head, moderate volume lipohemarthrosis. Ortho was consulted in ED. Patient denies CP, AP, or SOB.

## 2022-08-14 NOTE — ED TRIAGE NOTES
Pt present to ED via EMS after trip and fall coming out of [employer] this AM. Pt states she fell into a parked cark, hitting R arm and knee. Abrasions noted to R knee, obvious deformity noted to R arm. Received 150 fent and 8 zofran en route. Pt vomiting after zofran en route, no emesis after being roomed at this time.

## 2022-08-14 NOTE — ASSESSMENT & PLAN NOTE
· R Proximal Humerus#   · Anterior Dislocation   · Closed#, NVI.  · Ortho consulted  · Wear Sling at all times  · No weight bearing of RUE

## 2022-08-14 NOTE — PROVIDER PROGRESS NOTES - EMERGENCY DEPT.
Encounter Date: 8/14/2022    ED Physician Progress Notes        Physician Note:   This pt was signed out to me at 3 PM    Briefly: this is a 67 year old female with PMHx significant for DM who presents for evaluation s/p fall. Pt noted to have humeral fracture and shoulder dislocation. Pt signed out to me pending ortho recommendations. Ortho consulted, recommend admission to medicine as pt will need pain control/neuro checks until surgery can be performed. Pt remained stable during ED stay    Dispo: admitted.      
no

## 2022-08-14 NOTE — SUBJECTIVE & OBJECTIVE
Past Medical History:   Diagnosis Date    Abdominal pain, right upper quadrant 02/04/2014    Anticoagulant long-term use     Anxiety     AR (allergic rhinitis)     Atrophic gastritis without mention of hemorrhage 02/13/2012     Dx updated per 2019 IMO Load    Chronic fatigue syndrome 06/10/2012    Diabetes mellitus     Dizziness     Fatty liver     GERD (gastroesophageal reflux disease)     Gross hematuria 12/01/2020    HTN (hypertension)     Hyperlipidemia     Leg swelling     Memory loss     Osteopenia     PONV (postoperative nausea and vomiting)     Primary osteoarthritis of right knee 10/06/2020    Primary osteoarthritis of right knee 10/06/2020    Right elbow pain 01/16/2019    S/P total hysterectomy     Screening for colon cancer 01/06/2021    Sleep apnea     Status post total right knee replacement 10/6/2020 10/05/2020       Past Surgical History:   Procedure Laterality Date    CHOLECYSTECTOMY      COLONOSCOPY N/A 1/17/2018    Procedure: COLONOSCOPY with Donnell;  Surgeon: Roland Jacobo MD;  Location: [facility] ([address] [place] [address]);  Service: Endoscopy;  Laterality: N/A;    COLONOSCOPY N/A 1/6/2021    Procedure: COLONOSCOPY;  Surgeon: Yong Rowland MD;  Location: [facility] ([address] [place] [address]);  Service: Endoscopy;  Laterality: N/A;  prep ins. emailed - [place] speaking,  needed - ERW  [facility] - ERW    CYSTOSCOPY N/A 12/1/2020    Procedure: CYSTOSCOPY;  Surgeon: Ines Stanford MD;  Location: [facility] OR [address] [place] [address];  Service: Urology;  Laterality: N/A;  45 minutes     ELBOW ARTHROPLASTY Right 1/16/2019    Procedure: ARTHROPLASTY, ELBOW right radial head arthroplasty revision;  Surgeon: Katja Hubbard MD;  Location: [facility] OR [address] [place] [address];  Service: Orthopedics;  Laterality: Right;  Anesthesia: General and Regional. Stretcher, hand pan 1 and pan 2, CALL RUCHI SANTAMARIA & Sol notified 1-14 LO    ELBOW SURGERY Right 7/16/15    ELBOW SURGERY      ESOPHAGOGASTRODUODENOSCOPY N/A  4/15/2019    Procedure: EGD (ESOPHAGOGASTRODUODENOSCOPY);  Surgeon: Buck Irwin MD;  Location: [facility] ENDO (4TH FLR);  Service: Endoscopy;  Laterality: N/A;  ray she    HYSTERECTOMY      KNEE ARTHROPLASTY Right 10/6/2020    Procedure: ARTHROPLASTY, KNEE-SAME DAY PROTOCOL;  Surgeon: Sabino Damon MD;  Location: [facility] OR;  Service: Orthopedics;  Laterality: Right;    KNEE ARTHROSCOPY W/ DEBRIDEMENT  4/11    Left    RELEASE OF ULNAR NERVE AT CUBITAL TUNNEL Right 1/16/2019    Procedure: RELEASE, ULNAR TUNNEL right;  Surgeon: Katja Hubbard MD;  Location: [facility] OR 1ST FLR;  Service: Orthopedics;  Laterality: Right;  Anesthesia: General and Regional. Stretcher, hand pan 1 and pan 2, CALL ACCUMED, CLAIRX    RETROGRADE PYELOGRAPHY Bilateral 12/1/2020    Procedure: PYELOGRAM, RETROGRADE;  Surgeon: Ines Stanford MD;  Location: [facility] OR 1ST FLR;  Service: Urology;  Laterality: Bilateral;    ROTATOR CUFF REPAIR      TOTAL ABDOMINAL HYSTERECTOMY W/ BILATERAL SALPINGOOPHORECTOMY      TOTAL KNEE ARTHROPLASTY Left 10/19/2021    Procedure: ARTHROPLASTY, KNEE, TOTAL;  Surgeon: Sabino Damon MD;  Location: [facility] OR;  Service: Orthopedics;  Laterality: Left;    UPPER GASTROINTESTINAL ENDOSCOPY         Review of patient's allergies indicates:   Allergen Reactions    Iodinated contrast media Swelling and Rash    Percocet [oxycodone-acetaminophen] Itching    Macrobid [nitrofurantoin monohyd/m-cryst] Rash    Metformin Rash    Penicillins Rash     Had ancef in 2020 with no adverse rxn     Promethazine Rash     Had compazine in 2021    Sulfa (sulfonamide antibiotics) Rash    Sulfamethoxazole-trimethoprim Rash       No current facility-administered medications on file prior to encounter.     Current Outpatient Medications on File Prior to Encounter   Medication Sig    azelastine (ASTELIN) 137 mcg (0.1 %) nasal spray 1 spray (137 mcg total) by Nasal route 2 (two) times daily.    blood sugar diagnostic Strp To check BG  "4 times daily, to use with insurance preferred meter-true metrix    blood-glucose meter kit To check BG 4 times daily, to use with insurance preferred meter-true metrix    cyclobenzaprine (FLEXERIL) 5 MG tablet TAKE 1 TABLET BY MOUTH THREE TIMES A DAY AS NEEDED FOR MUSCLE SPASMS    diclofenac sodium (VOLTAREN) 1 % Gel APPLY 2 G TOPICALLY 2 (TWO) TIMES DAILY AS NEEDED (PAIN). DO NOT USE DIRECTLY ON INCISION    ergocalciferol (ERGOCALCIFEROL) 50,000 unit Cap TAKE 1 CAPSULE BY MOUTH ONE TIME PER WEEK    fluticasone propionate (FLONASE) 50 mcg/actuation nasal spray 2 sprays (100 mcg total) by Each Nostril route once daily.    insulin (LANTUS SOLOSTAR U-100 INSULIN) glargine 100 units/mL (3mL) SubQ pen INJECT 38-40 UNITS SUBCUTANEOUSLY AT NIGHT.    insulin lispro (HUMALOG KWIKPEN INSULIN) 100 unit/mL pen INJECT 16 UNITS W/ MEALS PLUS SCALE 150-200+2, 201-250+4, 251-300+6, 301-350+8, >350+10. MAX DAILY 68 UNITS.    lancets Misc To check BG 4  times daily, to use with insurance preferred meter-true metrix    latanoprost 0.005 % ophthalmic solution Place 1 drop into both eyes nightly.    losartan (COZAAR) 25 MG tablet Take 0.5 tablets (12.5 mg total) by mouth once daily.    meclizine (ANTIVERT) 25 mg tablet Take 1 tablet (25 mg total) by mouth 3 (three) times daily as needed for Dizziness.    meloxicam (MOBIC) 15 MG tablet TAKE 1 TABLET BY MOUTH EVERY DAY    ondansetron (ZOFRAN-ODT) 8 MG TbDL Dissolve 1 tablet (8 mg total) by mouth every 12 (twelve) hours as needed (nausea).    pantoprazole (PROTONIX) 40 MG tablet Take 1 tablet (40 mg total) by mouth once daily.    pen needle, diabetic (NOVOFINE 32) 32 gauge x 1/4" Ndle Uses 4 times a day. 90 day via duramed e 11.65     Family History       Problem Relation (Age of Onset)    Cancer Father    Colon cancer Father (83)    Diabetes Maternal Aunt, Maternal Grandmother          Tobacco Use    Smoking status: Never Smoker    Smokeless tobacco: Never Used   Substance and Sexual " Activity    Alcohol use: No    Drug use: No    Sexual activity: Yes     Partners: Male     Birth control/protection: Post-menopausal     Review of Systems   Constitutional:  Positive for activity change. Negative for fatigue, fever and unexpected weight change.   HENT:  Negative for sore throat and trouble swallowing.    Eyes:  Negative for photophobia and visual disturbance.   Respiratory:  Negative for chest tightness and shortness of breath.    Cardiovascular:  Negative for chest pain, palpitations and leg swelling.   Gastrointestinal:  Negative for abdominal distention, abdominal pain, blood in stool, constipation, diarrhea, nausea and vomiting.   Endocrine: Negative for polydipsia and polyuria.   Genitourinary:  Negative for difficulty urinating, dysuria and hematuria.   Musculoskeletal:  Positive for arthralgias and joint swelling.   Skin:  Negative for pallor and rash.   Allergic/Immunologic: Negative for immunocompromised state.   Neurological:  Negative for dizziness, seizures, syncope and facial asymmetry.   Psychiatric/Behavioral:  Negative for agitation, behavioral problems and confusion.    Objective:     Vital Signs (Most Recent):  Temp: 97.8 °F (36.6 °C) (08/14/22 1107)  Pulse: (!) 58 (08/14/22 1707)  Resp: 20 (08/14/22 1759)  BP: (!) 169/69 (08/14/22 1707)  SpO2: 98 % (08/14/22 1707)   Vital Signs (24h Range):  Temp:  [97.8 °F (36.6 °C)] 97.8 °F (36.6 °C)  Pulse:  [55-64] 58  Resp:  [16-21] 20  SpO2:  [95 %-100 %] 98 %  BP: (122-169)/() 169/69        There is no height or weight on file to calculate BMI.    Physical Exam  Vitals and nursing note reviewed.   Constitutional:       General: She is not in acute distress.     Appearance: She is well-developed and normal weight. She is not ill-appearing or diaphoretic.   HENT:      Head: Normocephalic and atraumatic.      Mouth/Throat:      Mouth: Mucous membranes are moist.      Pharynx: No oropharyngeal exudate.   Eyes:      General: No scleral  icterus.  Neck:      Thyroid: No thyromegaly.   Cardiovascular:      Rate and Rhythm: Normal rate and regular rhythm.      Heart sounds: No murmur heard.    No gallop.   Pulmonary:      Effort: Pulmonary effort is normal.      Breath sounds: Normal breath sounds. No stridor. No wheezing or rales.   Abdominal:      General: There is no distension.      Palpations: Abdomen is soft. There is no mass.      Tenderness: There is no abdominal tenderness. There is no guarding.   Musculoskeletal:         General: No swelling or deformity. Normal range of motion.      Cervical back: Normal range of motion and neck supple. No rigidity.   Lymphadenopathy:      Cervical: No cervical adenopathy.   Skin:     General: Skin is warm and dry.      Capillary Refill: Capillary refill takes less than 2 seconds.      Coloration: Skin is not jaundiced.      Findings: No bruising.   Neurological:      Mental Status: She is alert and oriented to person, place, and time. Mental status is at baseline.      Cranial Nerves: No cranial nerve deficit.      Motor: No weakness.   Psychiatric:         Mood and Affect: Mood normal.         Behavior: Behavior normal.             Recent Results (from the past 24 hour(s))   Comprehensive metabolic panel    Collection Time: 08/14/22  1:17 PM   Result Value Ref Range    Sodium 136 136 - 145 mmol/L    Potassium 4.0 3.5 - 5.1 mmol/L    Chloride 104 95 - 110 mmol/L    CO2 20 (L) 23 - 29 mmol/L    Glucose 203 (H) 70 - 110 mg/dL    BUN 15 8 - 23 mg/dL    Creatinine 0.7 0.5 - 1.4 mg/dL    Calcium 8.8 8.7 - 10.5 mg/dL    Total Protein 7.1 6.0 - 8.4 g/dL    Albumin 3.8 3.5 - 5.2 g/dL    Total Bilirubin 0.5 0.1 - 1.0 mg/dL    Alkaline Phosphatase 82 55 - 135 U/L    AST 26 10 - 40 U/L    ALT 26 10 - 44 U/L    Anion Gap 12 8 - 16 mmol/L    eGFR >60.0 >60 mL/min/1.73 m^2   Troponin I    Collection Time: 08/14/22  1:17 PM   Result Value Ref Range    Troponin I <0.006 0.000 - 0.026 ng/mL   CBC auto differential     Collection Time: 08/14/22  1:17 PM   Result Value Ref Range    WBC 11.66 3.90 - 12.70 K/uL    RBC 3.75 (L) 4.00 - 5.40 M/uL    Hemoglobin 12.0 12.0 - 16.0 g/dL    Hematocrit 34.9 (L) 37.0 - 48.5 %    MCV 93 82 - 98 fL    MCH 32.0 (H) 27.0 - 31.0 pg    MCHC 34.4 32.0 - 36.0 g/dL    RDW 12.1 11.5 - 14.5 %    Platelets 177 150 - 450 K/uL    MPV 9.5 9.2 - 12.9 fL    Immature Granulocytes 0.3 0.0 - 0.5 %    Gran # (ANC) 10.0 (H) 1.8 - 7.7 K/uL    Immature Grans (Abs) 0.04 0.00 - 0.04 K/uL    Lymph # 1.2 1.0 - 4.8 K/uL    Mono # 0.4 0.3 - 1.0 K/uL    Eos # 0.0 0.0 - 0.5 K/uL    Baso # 0.03 0.00 - 0.20 K/uL    nRBC 0 0 /100 WBC    Gran % 85.4 (H) 38.0 - 73.0 %    Lymph % 10.5 (L) 18.0 - 48.0 %    Mono % 3.3 (L) 4.0 - 15.0 %    Eosinophil % 0.2 0.0 - 8.0 %    Basophil % 0.3 0.0 - 1.9 %    Differential Method Automated    Magnesium    Collection Time: 08/14/22  1:17 PM   Result Value Ref Range    Magnesium 2.0 1.6 - 2.6 mg/dL   Phosphorus    Collection Time: 08/14/22  1:17 PM   Result Value Ref Range    Phosphorus 3.2 2.7 - 4.5 mg/dL       Microbiology Results (last 7 days)       ** No results found for the last 168 hours. **             Imaging Results               CT Shoulder Without Contrast Right (Final result)  Result time 08/14/22 16:33:13      Final result by Jose Vázquez MD (08/14/22 16:33:13)                   Impression:      Comminuted fracture of humeral head and neck with impaction and anterior inferior dislocation of the humeral head.    Moderate volume lipohemarthrosis.    This report was flagged in Epic as abnormal.    Electronically signed by resident: Saad Daily  Date:    08/14/2022  Time:    16:04    Electronically signed by: Jose Vázquez MD  Date:    08/14/2022  Time:    16:33               Narrative:    EXAMINATION:  CT SHOULDER WITHOUT CONTRAST RIGHT; CT 3D RECON WITH INDEPENDENT WS    CLINICAL HISTORY:  Shoulder trauma, instability or dislocation suspected, xray done;ortho;; Shoulder  trauma, instability or dislocation suspected, xray done;ortho;3d;    TECHNIQUE:  Axial 0.625-mm images of the right shoulder obtained without intravenous contrast.  Data submitted for coronal and sagittal reformats.    3D reconstructed images were acquired by post processing on an independent workstation with concurrent supervision and archived for permanent record.    COMPARISON:  Right shoulder radiograph 08/14/2022; chest radiograph 06/11/2021    FINDINGS:  Bony mineralization is normal.  Comminuted multi-fragment fracture of the humeral head and neck with moderate impaction.  Fracture at least partially involves the articular surface of the humeral head which is dislocated anteriorly and inferiorly with respect to the glenoid.    Acromioclavicular joint alignment is preserved without significant widening or hypertrophy.    Moderate volume joint effusion with fat fluid level suggesting lipohemarthrosis.  Generalized fat stranding infraclavicular and axillary region.  No organized fluid collections.  Muscle bulk is preserved.    Dependent atelectasis in the right lung base.  Dense calcification of the mitral annulus.  Multi-vessel coronary artery calcification.  Eccentric calcification in the right carotid bulb.                                        CT 3D RECON WITH INDEPENDENT WS (Final result)  Result time 08/14/22 16:33:13      Final result by Jose Vázquez MD (08/14/22 16:33:13)                   Impression:      Comminuted fracture of humeral head and neck with impaction and anterior inferior dislocation of the humeral head.    Moderate volume lipohemarthrosis.    This report was flagged in Epic as abnormal.    Electronically signed by resident: Saad Daily  Date:    08/14/2022  Time:    16:04    Electronically signed by: Jose Vázquez MD  Date:    08/14/2022  Time:    16:33               Narrative:    EXAMINATION:  CT SHOULDER WITHOUT CONTRAST RIGHT; CT 3D RECON WITH INDEPENDENT WS    CLINICAL  HISTORY:  Shoulder trauma, instability or dislocation suspected, xray done;ortho;; Shoulder trauma, instability or dislocation suspected, xray done;ortho;3d;    TECHNIQUE:  Axial 0.625-mm images of the right shoulder obtained without intravenous contrast.  Data submitted for coronal and sagittal reformats.    3D reconstructed images were acquired by post processing on an independent workstation with concurrent supervision and archived for permanent record.    COMPARISON:  Right shoulder radiograph 08/14/2022; chest radiograph 06/11/2021    FINDINGS:  Bony mineralization is normal.  Comminuted multi-fragment fracture of the humeral head and neck with moderate impaction.  Fracture at least partially involves the articular surface of the humeral head which is dislocated anteriorly and inferiorly with respect to the glenoid.    Acromioclavicular joint alignment is preserved without significant widening or hypertrophy.    Moderate volume joint effusion with fat fluid level suggesting lipohemarthrosis.  Generalized fat stranding infraclavicular and axillary region.  No organized fluid collections.  Muscle bulk is preserved.    Dependent atelectasis in the right lung base.  Dense calcification of the mitral annulus.  Multi-vessel coronary artery calcification.  Eccentric calcification in the right carotid bulb.                                       X-Ray Knee 3 View Right (Final result)  Result time 08/14/22 13:16:30      Final result by Nilda Brothers MD (08/14/22 13:16:30)                   Impression:      As above.      Electronically signed by: Nilda Brothers MD  Date:    08/14/2022  Time:    13:16               Narrative:    EXAMINATION:  XR KNEE 3 VIEW RIGHT    CLINICAL HISTORY:  Unspecified fall, initial encounter    TECHNIQUE:  Three views of the right knee    COMPARISON:  06/01/2022    FINDINGS:  Postoperative changes of right total knee arthroplasty are seen in satisfactory alignment without evidence for  hardware complication.  No joint effusion is seen.                                       X-Ray Shoulder Trauma Right (Final result)  Result time 08/14/22 13:14:59      Final result by Nilda Brothers MD (08/14/22 13:14:59)                   Impression:      As above.      Electronically signed by: Nilda Brothers MD  Date:    08/14/2022  Time:    13:14               Narrative:    EXAMINATION:  XR SHOULDER TRAUMA 3 VIEW RIGHT    CLINICAL HISTORY:  Unspecified fall, initial encounter    TECHNIQUE:  Three or four views of the right shoulder were performed.    COMPARISON:  None    FINDINGS:  Comminuted fracture of the right humeral head and neck with anterior and inferior dislocation of the humerus in respect to the glenoid fossa.                                       X-Ray Elbow Complete Right (Final result)  Result time 08/14/22 13:13:46      Final result by Nilda Brothers MD (08/14/22 13:13:46)                   Impression:      As above.      Electronically signed by: Nilda Brothers MD  Date:    08/14/2022  Time:    13:13               Narrative:    EXAMINATION:  XR ELBOW COMPLETE 3 VIEW RIGHT    CLINICAL HISTORY:  . Unspecified fall, initial encounter    TECHNIQUE:  AP, lateral, and oblique views of the right elbow were performed.    COMPARISON:  None    FINDINGS:  Postoperative changes of the right elbow identified with a surgical pin in the lateral epicondyle with radial head arthroplasty.  There are moderate degenerative changes of the elbow joint.  No definite fracture or dislocation is seen.  No joint effusion.                                       X-Ray Humerus 2 View Right (Final result)  Result time 08/14/22 13:12:52      Final result by Nilda Brothers MD (08/14/22 13:12:52)                   Impression:      As above.      Electronically signed by: Nilda Brothers MD  Date:    08/14/2022  Time:    13:12               Narrative:    EXAMINATION:  XR HUMERUS 2 VIEW RIGHT    CLINICAL  HISTORY:  Unspecified fall, initial encounter    TECHNIQUE:  Two views of the right humerus    COMPARISON:  None.    FINDINGS:  There is a comminuted fracture of the right humeral head and neck with a separate fragment of the greater tuberosity.  The humeral head appears to be dislocated anteriorly in respect to the glenoid fossa.  Postoperative changes of the elbow were seen.                                       CT Head Without Contrast (Final result)  Result time 08/14/22 12:36:37      Final result by Dar Willis MD (08/14/22 12:36:37)                   Impression:      No acute intracranial hemorrhage/injury.      Electronically signed by: Dar Willis  Date:    08/14/2022  Time:    12:36               Narrative:    EXAMINATION:  CT HEAD WITHOUT CONTRAST    CLINICAL HISTORY:  Head trauma, minor (Age >= 65y);    TECHNIQUE:  Low dose axial images were obtained through the head.  Coronal and sagittal reformations were also performed. Contrast was not administered.    COMPARISON:  CT 06/03/2022, MRI 08/06/2022    FINDINGS:  There is no evidence of intracranial hemorrhage or parenchymal contusion.  No hydrocephalus mass effect or acute territorial infarct.    The calvarium is intact.    Left sphenoid chronic mucosal thickening.

## 2022-08-15 ENCOUNTER — ANESTHESIA EVENT (OUTPATIENT)
Dept: SURGERY | Facility: HOSPITAL | Age: 67
DRG: 483 | End: 2022-08-15
Payer: MEDICARE

## 2022-08-15 PROBLEM — Z79.4 TYPE 2 DIABETES MELLITUS WITHOUT COMPLICATION, WITH LONG-TERM CURRENT USE OF INSULIN: Status: ACTIVE | Noted: 2017-12-14

## 2022-08-15 PROBLEM — S43.014A ANTERIOR DISLOCATION OF RIGHT SHOULDER: Status: ACTIVE | Noted: 2022-08-15

## 2022-08-15 PROBLEM — R52 PAIN: Status: RESOLVED | Noted: 2018-10-19 | Resolved: 2022-08-15

## 2022-08-15 PROBLEM — E11.9 TYPE 2 DIABETES MELLITUS WITHOUT COMPLICATION, WITH LONG-TERM CURRENT USE OF INSULIN: Status: ACTIVE | Noted: 2017-12-14

## 2022-08-15 LAB
25(OH)D3+25(OH)D2 SERPL-MCNC: 14 NG/ML (ref 30–96)
ALBUMIN SERPL BCP-MCNC: 3.4 G/DL (ref 3.5–5.2)
ALP SERPL-CCNC: 85 U/L (ref 55–135)
ALT SERPL W/O P-5'-P-CCNC: 24 U/L (ref 10–44)
ANION GAP SERPL CALC-SCNC: 9 MMOL/L (ref 8–16)
AST SERPL-CCNC: 26 U/L (ref 10–40)
BASOPHILS # BLD AUTO: 0.03 K/UL (ref 0–0.2)
BASOPHILS NFR BLD: 0.4 % (ref 0–1.9)
BILIRUB SERPL-MCNC: 0.7 MG/DL (ref 0.1–1)
BUN SERPL-MCNC: 17 MG/DL (ref 8–23)
CALCIUM SERPL-MCNC: 8.8 MG/DL (ref 8.7–10.5)
CHLORIDE SERPL-SCNC: 104 MMOL/L (ref 95–110)
CO2 SERPL-SCNC: 26 MMOL/L (ref 23–29)
CREAT SERPL-MCNC: 0.8 MG/DL (ref 0.5–1.4)
DIFFERENTIAL METHOD: ABNORMAL
EOSINOPHIL # BLD AUTO: 0.1 K/UL (ref 0–0.5)
EOSINOPHIL NFR BLD: 0.9 % (ref 0–8)
ERYTHROCYTE [DISTWIDTH] IN BLOOD BY AUTOMATED COUNT: 12.2 % (ref 11.5–14.5)
EST. GFR  (NO RACE VARIABLE): >60 ML/MIN/1.73 M^2
GLUCOSE SERPL-MCNC: 131 MG/DL (ref 70–110)
HCT VFR BLD AUTO: 32.1 % (ref 37–48.5)
HGB BLD-MCNC: 11.1 G/DL (ref 12–16)
IMM GRANULOCYTES # BLD AUTO: 0.03 K/UL (ref 0–0.04)
IMM GRANULOCYTES NFR BLD AUTO: 0.4 % (ref 0–0.5)
LYMPHOCYTES # BLD AUTO: 2 K/UL (ref 1–4.8)
LYMPHOCYTES NFR BLD: 24.4 % (ref 18–48)
MCH RBC QN AUTO: 32.4 PG (ref 27–31)
MCHC RBC AUTO-ENTMCNC: 34.6 G/DL (ref 32–36)
MCV RBC AUTO: 94 FL (ref 82–98)
MONOCYTES # BLD AUTO: 0.7 K/UL (ref 0.3–1)
MONOCYTES NFR BLD: 8.6 % (ref 4–15)
NEUTROPHILS # BLD AUTO: 5.2 K/UL (ref 1.8–7.7)
NEUTROPHILS NFR BLD: 65.3 % (ref 38–73)
NRBC BLD-RTO: 0 /100 WBC
PLATELET # BLD AUTO: 196 K/UL (ref 150–450)
PMV BLD AUTO: 9.7 FL (ref 9.2–12.9)
POCT GLUCOSE: 131 MG/DL (ref 70–110)
POCT GLUCOSE: 197 MG/DL (ref 70–110)
POCT GLUCOSE: 243 MG/DL (ref 70–110)
POTASSIUM SERPL-SCNC: 4.4 MMOL/L (ref 3.5–5.1)
PROT SERPL-MCNC: 6.5 G/DL (ref 6–8.4)
RBC # BLD AUTO: 3.43 M/UL (ref 4–5.4)
SODIUM SERPL-SCNC: 139 MMOL/L (ref 136–145)
WBC # BLD AUTO: 7.98 K/UL (ref 3.9–12.7)

## 2022-08-15 PROCEDURE — 82306 VITAMIN D 25 HYDROXY: CPT | Performed by: STUDENT IN AN ORGANIZED HEALTH CARE EDUCATION/TRAINING PROGRAM

## 2022-08-15 PROCEDURE — 99223 PR INITIAL HOSPITAL CARE,LEVL III: ICD-10-PCS | Mod: ,,, | Performed by: ORTHOPAEDIC SURGERY

## 2022-08-15 PROCEDURE — 11000001 HC ACUTE MED/SURG PRIVATE ROOM

## 2022-08-15 PROCEDURE — 85025 COMPLETE CBC W/AUTO DIFF WBC: CPT | Performed by: STUDENT IN AN ORGANIZED HEALTH CARE EDUCATION/TRAINING PROGRAM

## 2022-08-15 PROCEDURE — 63600175 PHARM REV CODE 636 W HCPCS: Performed by: INTERNAL MEDICINE

## 2022-08-15 PROCEDURE — 25000003 PHARM REV CODE 250: Performed by: INTERNAL MEDICINE

## 2022-08-15 PROCEDURE — 99233 PR SUBSEQUENT HOSPITAL CARE,LEVL III: ICD-10-PCS | Mod: ,,, | Performed by: INTERNAL MEDICINE

## 2022-08-15 PROCEDURE — 80053 COMPREHEN METABOLIC PANEL: CPT | Performed by: STUDENT IN AN ORGANIZED HEALTH CARE EDUCATION/TRAINING PROGRAM

## 2022-08-15 PROCEDURE — 36415 COLL VENOUS BLD VENIPUNCTURE: CPT | Performed by: STUDENT IN AN ORGANIZED HEALTH CARE EDUCATION/TRAINING PROGRAM

## 2022-08-15 PROCEDURE — 99223 1ST HOSP IP/OBS HIGH 75: CPT | Mod: ,,, | Performed by: ORTHOPAEDIC SURGERY

## 2022-08-15 PROCEDURE — 99233 SBSQ HOSP IP/OBS HIGH 50: CPT | Mod: ,,, | Performed by: INTERNAL MEDICINE

## 2022-08-15 PROCEDURE — 25000003 PHARM REV CODE 250: Performed by: STUDENT IN AN ORGANIZED HEALTH CARE EDUCATION/TRAINING PROGRAM

## 2022-08-15 PROCEDURE — C9399 UNCLASSIFIED DRUGS OR BIOLOG: HCPCS | Performed by: STUDENT IN AN ORGANIZED HEALTH CARE EDUCATION/TRAINING PROGRAM

## 2022-08-15 RX ORDER — SODIUM CHLORIDE 0.9 % (FLUSH) 0.9 %
10 SYRINGE (ML) INJECTION
Status: DISCONTINUED | OUTPATIENT
Start: 2022-08-15 | End: 2022-08-19 | Stop reason: HOSPADM

## 2022-08-15 RX ORDER — INSULIN ASPART 100 [IU]/ML
6 INJECTION, SOLUTION INTRAVENOUS; SUBCUTANEOUS
Status: DISCONTINUED | OUTPATIENT
Start: 2022-08-15 | End: 2022-08-17

## 2022-08-15 RX ORDER — IBUPROFEN 200 MG
16 TABLET ORAL
Status: DISCONTINUED | OUTPATIENT
Start: 2022-08-15 | End: 2022-08-19 | Stop reason: HOSPADM

## 2022-08-15 RX ORDER — INSULIN ASPART 100 [IU]/ML
0-5 INJECTION, SOLUTION INTRAVENOUS; SUBCUTANEOUS
Status: DISCONTINUED | OUTPATIENT
Start: 2022-08-15 | End: 2022-08-19 | Stop reason: HOSPADM

## 2022-08-15 RX ORDER — CALCIUM CARBONATE 200(500)MG
1000 TABLET,CHEWABLE ORAL DAILY
Status: DISCONTINUED | OUTPATIENT
Start: 2022-08-16 | End: 2022-08-19 | Stop reason: HOSPADM

## 2022-08-15 RX ORDER — IBUPROFEN 200 MG
24 TABLET ORAL
Status: DISCONTINUED | OUTPATIENT
Start: 2022-08-15 | End: 2022-08-19 | Stop reason: HOSPADM

## 2022-08-15 RX ORDER — GLUCAGON 1 MG
1 KIT INJECTION
Status: DISCONTINUED | OUTPATIENT
Start: 2022-08-15 | End: 2022-08-19 | Stop reason: HOSPADM

## 2022-08-15 RX ORDER — ERGOCALCIFEROL 1.25 MG/1
50000 CAPSULE ORAL
Status: DISCONTINUED | OUTPATIENT
Start: 2022-08-15 | End: 2022-08-19 | Stop reason: HOSPADM

## 2022-08-15 RX ADMIN — INSULIN DETEMIR 18 UNITS: 100 INJECTION, SOLUTION SUBCUTANEOUS at 09:08

## 2022-08-15 RX ADMIN — ACETAMINOPHEN 1000 MG: 500 TABLET ORAL at 09:08

## 2022-08-15 RX ADMIN — SENNOSIDES AND DOCUSATE SODIUM 1 TABLET: 50; 8.6 TABLET ORAL at 08:08

## 2022-08-15 RX ADMIN — PANTOPRAZOLE SODIUM 40 MG: 40 TABLET, DELAYED RELEASE ORAL at 08:08

## 2022-08-15 RX ADMIN — OXYCODONE 5 MG: 5 TABLET ORAL at 12:08

## 2022-08-15 RX ADMIN — ERGOCALCIFEROL 50000 UNITS: 1.25 CAPSULE ORAL at 05:08

## 2022-08-15 RX ADMIN — INSULIN ASPART 6 UNITS: 100 INJECTION, SOLUTION INTRAVENOUS; SUBCUTANEOUS at 05:08

## 2022-08-15 RX ADMIN — LATANOPROST 1 DROP: 50 SOLUTION OPHTHALMIC at 09:08

## 2022-08-15 RX ADMIN — ACETAMINOPHEN 1000 MG: 500 TABLET ORAL at 06:08

## 2022-08-15 RX ADMIN — SENNOSIDES AND DOCUSATE SODIUM 1 TABLET: 50; 8.6 TABLET ORAL at 09:08

## 2022-08-15 RX ADMIN — INSULIN ASPART 2 UNITS: 100 INJECTION, SOLUTION INTRAVENOUS; SUBCUTANEOUS at 05:08

## 2022-08-15 RX ADMIN — ACETAMINOPHEN 1000 MG: 500 TABLET ORAL at 01:08

## 2022-08-15 NOTE — ASSESSMENT & PLAN NOTE
· Patient's blood pressure is controlled here in the hospital over past 24 hours.   · Goal for blood pressure is SBP < 150 and DBP < 90 as patient > or = 60 years of age with no diabetes or advanced kidney disease based on JNC 8 guidelines.   · Continue current treatment regimen of Losartan 25 mg po daily.   · Plan is to monitor patient's blood pressure routinely while patient is hospitalized.

## 2022-08-15 NOTE — ASSESSMENT & PLAN NOTE
Vitamin D level noted to be low at 14 consistent with deficiency so will start replacement with Vitamin D 50,000 units po weekly to treat.

## 2022-08-15 NOTE — PROGRESS NOTES
[facility] Medicine  Progress Note    Patient Name: Riana Kay  MRN: [id number]  Patient Class: IP- Inpatient   Admission Date: 8/14/2022  Length of Stay: 0 days  Attending Physician: Jany Guaman MD  Primary Care Provider: Jose Rojas MD        Subjective:     Principal Problem:Closed fracture of right proximal humerus        HPI:  Riana Kay is a 67 y.o. female who has a past medical history of Anticoagulant long-term use, Anxiety, Atrophic gastritis without mention of hemorrhage, Chronic fatigue syndrome, Diabetes mellitus, Dizziness, Fatty liver, GERD, Gross hematuria, HTN, Hyperlipidemia, Leg swelling, Memory loss, Osteopenia, Primary osteoarthritis of right knee, Primary osteoarthritis of right knee, S/P total hysterectomy, Sleep apnea, and Status post total b/l knee replacements, presented to the ED with CC of Shoulder and Knee pain s/p mechanical fall. Patient was at [employer], was walking to her car and tripped and fell into a parked car. She did not hit her head, and furthermore CTH was negative for acute bleed. She was in quite a bit of pain, 150 mcg of fentanyl given en route. Patient became nauseous and vomited. CT shoulder showed comminuted fracture of humeral head and neck with impaction and anterior inferior dislocation of the humeral head, moderate volume lipohemarthrosis. Ortho was consulted in ED. Patient denies CP, AP, or SOB.         Overview/Hospital Course:  Orthopedics consulted in ED and evaluated patient and recommended nonweightbearing to right upper extremity for comminuted right proximal humerus fracture with anterior dislocation. Ortho recommending operative repair of fracture as will not heal correctly on its own and plan is for patient to go to OR on 8/16/2022 for right reverse total shoulder replacement.       Interval History: Spoke with patient and her  at bedside via  via IPad. Patient reports pain with any  movement to right shoulder but no pain as long as she does not move her right arm. Patient with some mild soreness to right knee where she scraped it when she fell but otherwise denies any pain anywhere else. Discussed with patient plan is for her to go to OR in the am. Patient is scheduled as first case for total right shoulder replacement by Ortho to repair comminuted right proximal humerus fracture fracture and anterior dislocation. Patient and  are relieved that surgery is being done. Patient and  asking about her diabetes and insulin. We discussed her home regimen and she reports she takes 24 units of Lantus insulin nightly and 12 units of Humalog insulin with breakfast and 14-16 units of Humalog insulin with lunch and dinner depending on her blood sugar readings. I explained to patient in general in hospital we reduce home dosing as patient's tend not to eat as much and do not want to cause low blood sugar and she stated she understands but wanted to be sure she was getting insulin. I also explained that nursing will be monitoring her blood sugars 4 times daily in hospital before meals and at bedtime so we can monitor her blood sugars and adjust insulin as needed.     Review of Systems   Constitutional:  Negative for fever.   Respiratory:  Negative for cough and shortness of breath.    Cardiovascular:  Negative for chest pain and leg swelling.   Gastrointestinal:  Negative for abdominal pain, nausea and vomiting.   Musculoskeletal:  Positive for arthralgias (Right shoulder and right knee).   Skin:  Positive for wound (Abrasion to anterior right knee).   Neurological:  Negative for dizziness and light-headedness.   Psychiatric/Behavioral:  Negative for agitation and confusion.    Objective:     Vital Signs (Most Recent):  Temp: 98.1 °F (36.7 °C) (08/15/22 1229)  Pulse: 63 (08/15/22 1229)  Resp: 18 (08/15/22 1237)  BP: 136/64 (08/15/22 1229)  SpO2: 93 % (08/15/22 1229) on room air   Vital Signs  (24h Range):  Temp:  [98.1 °F (36.7 °C)-98.4 °F (36.9 °C)] 98.1 °F (36.7 °C)  Pulse:  [58-64] 63  Resp:  [16-21] 18  SpO2:  [93 %-100 %] 93 %  BP: (136-169)/() 136/64        There is no height or weight on file to calculate BMI.  No intake or output data in the 24 hours ending 08/15/22 1332   Physical Exam  Vitals and nursing note reviewed.   Constitutional:       General: She is awake. She is not in acute distress.     Appearance: Normal appearance. She is well-developed and normal weight. She is not ill-appearing.   Eyes:      General: No scleral icterus.  Neck:      Vascular: No JVD.   Cardiovascular:      Rate and Rhythm: Normal rate and regular rhythm.      Heart sounds: Normal heart sounds. No murmur heard.    No friction rub. No gallop.   Pulmonary:      Effort: Pulmonary effort is normal. No respiratory distress.      Breath sounds: Normal breath sounds. No wheezing.   Abdominal:      General: Abdomen is flat. Bowel sounds are normal. There is no distension.      Palpations: Abdomen is soft.      Tenderness: There is no abdominal tenderness.   Musculoskeletal:      Right lower leg: No edema.      Left lower leg: No edema.   Skin:     Findings: Abrasion (Right anterior knee) present.   Neurological:      Mental Status: She is alert and oriented to person, place, and time.   Psychiatric:         Mood and Affect: Mood normal.         Behavior: Behavior normal. Behavior is cooperative.         Thought Content: Thought content normal.         Judgment: Judgment normal.       Significant Labs: A1C:   Recent Labs   Lab 04/12/22  0759 08/14/22  1317   HGBA1C 6.4* 6.4*     CBC:   Recent Labs   Lab 08/14/22  1317 08/15/22  0558   WBC 11.66 7.98   HGB 12.0 11.1*   HCT 34.9* 32.1*    196     CMP:   Recent Labs   Lab 08/14/22  1317 08/15/22  0558    139   K 4.0 4.4    104   CO2 20* 26   * 131*   BUN 15 17   CREATININE 0.7 0.8   CALCIUM 8.8 8.8   PROT 7.1 6.5   ALBUMIN 3.8 3.4*   BILITOT  0.5 0.7   ALKPHOS 82 85   AST 26 26   ALT 26 24   ANIONGAP 12 9     Magnesium:   Recent Labs   Lab 08/14/22  1317   MG 2.0     POCT Glucose:   Recent Labs   Lab 08/14/22  2140 08/15/22  0604   POCTGLUCOSE 213* 131*       Lab Results   Component Value Date    NWHEXHRZ25SP 14 (L) 08/15/2022       Significant Imaging: I have reviewed all pertinent imaging results/findings within the past 24 hours.      Assessment/Plan:      * Closed fracture of right proximal humerus  Anterior and inferior dislocation of right shoulder  · Patient placed in observation. Orthopedics consulted and noted comminuted fracture of right proximal humerus and anterior dislocation and note due to nature of fracture would not heal and thus needs surgical intervention.  · Orthopedics planning on taking patient to OR tomorrow, 8/16 for reverse total right shoulder replacement. patient to be NPO after midnight tonight.   · Patient to be non weight bearing to right upper arm at this time as per Ortho.   · Pain management with scheduled Tylenol 1000 mg po every 8 hours and Flexeril 5 mg po every 8 hours prn muscle spasms + Oxycodone IR 5 mg po prn breakthrough pain.   · Hold chemical DVT prophylaxis for now as patient going to OR in the am, 8/16 and will use NATE/SCDs for DVT prophylaxis for now.     Type 2 diabetes mellitus without complication, with long-term current use of insulin  Patient's FSGs are controlled on current medication regimen. Patient reports she takes Lantus insulin 28 units nightly at home and 12 unit of Humalog insulin with breakfast and 14-16 units of Humalog insulin with lunch and dinner.   Last A1c reviewed-   Lab Results   Component Value Date    HGBA1C 6.4 (H) 08/14/2022     Most recent fingerstick glucose reviewed-   Recent Labs   Lab 08/14/22  2140 08/15/22  0604   POCTGLUCOSE 213* 131*     Current correctional scale  Low  Maintain anti-hyperglycemic dose as follows-     · DM x over 20 years.  · Patient started on Levemir  insulin 18 units nightly in hospital as wish to lower dosing to avoid hypoglycemia and use Novolog insulin 6 units with meals.   · Monitor POCT glucose 4 times daily with meals and at bedtime.     Primary hypertension  · Patient's blood pressure is controlled here in the hospital over past 24 hours.   · Goal for blood pressure is SBP < 150 and DBP < 90 as patient > or = 60 years of age with no diabetes or advanced kidney disease based on JNC 8 guidelines.   · Continue current treatment regimen of Losartan 25 mg po daily.   · Plan is to monitor patient's blood pressure routinely while patient is hospitalized.     Coronary atherosclerosis  Chronic and controlled.     Fatty liver  Noted on previous imaging and stable. LFT's normal.    ZAHIRA (obstructive sleep apnea)  Chronic and controlled. CPAP nightly in hospital to treat.      Muscle spasms of neck  Patient on Flexeril prn to treat and will continue.     Gastroesophageal reflux disease without esophagitis  Chronic and controlled. Patient on Protonix 40 mg po daily.       Osteopenia  · Had DEXA in 2020  · Needs repeat DEXA as outpatient.  · Continue Vitamin D and Calcium replacement and referral for osteoporosis specific treatment to Orthopedic fragility clinic.       Vitamin D deficiency  Vitamin D level noted to be low at 14 consistent with deficiency so will start replacement with Vitamin D 50,000 units po weekly to treat.       VTE Risk Mitigation (From admission, onward)         Ordered     Place sequential compression device  Until discontinued         08/14/22 1754     IP VTE LOW RISK PATIENT  Once         08/14/22 1754                Discharge Planning   ELIESER: 8/17/2022     Code Status: Full Code   Is the patient medically ready for discharge?: No    Reason for patient still in hospital (select all that apply): Patient trending condition             Jany Guaman MD  Department of Hospital Medicine   [facility] - Surgery

## 2022-08-15 NOTE — ANESTHESIA PREPROCEDURE EVALUATION
[facility]  Anesthesia Pre-Operative Evaluation         Patient Name: Riana Kay  YOB: 1955  MRN: [id number]    SUBJECTIVE:     Pre-operative evaluation for Procedure(s) (LRB):  ARTHROPLASTY, SHOULDER, TOTAL, REVERSE, [place] (Right)     08/15/2022    Riana Kay is a 67 y.o. female w/ a significant PMHx of PONV, GERD, HTN, ZAHIRA, CAD, T2DM, Fatty Liver, Anxiety, HLD who was admitted after having a mechanical fall in her [employer] parking lot. CT shoulder showed comminuted fracture of the Right humeral head and neck with impaction and anterior inferior dislocation of the humeral head, moderate volume lipohemarthrosis.    Patient now presents for the above procedure(s).      LDA: None documented.       Peripheral IV - Single Lumen 08/14/22 22 G Anterior;Left Forearm (Active)   Site Assessment Clean;Dry;Intact;No warmth;No drainage 08/15/22 0845   Extremity Assessment Distal to IV No redness;No abnormal discoloration;No swelling;No warmth 08/15/22 0845   Line Status Saline locked 08/15/22 0845   Dressing Status Clean;Dry;Intact 08/15/22 0845   Dressing Intervention Integrity maintained 08/15/22 0845   Number of days: 1       Prev airway: None documented.    Drips: None documented.      Patient Active Problem List   Diagnosis    Anxiety    Fatty liver    Memory changes    Osteopenia    GERD (gastroesophageal reflux disease)    ZAHIRA (obstructive sleep apnea)    Coronary atherosclerosis    HTN (hypertension)    Muscle spasms of neck    Radiculopathy of cervical spine    Overweight (BMI 25.0-29.9)    Type 2 diabetes mellitus with other specified complication    Pain    Vitamin D deficiency    Pain in right elbow    Localized edema    Primary osteoarthritis of left knee    Aftercare following left knee joint replacement surgery    Aortic atherosclerosis    Closed fracture of right proximal humerus       Review of patient's allergies indicates:   Allergen  Reactions    Iodinated contrast media Swelling and Rash    Percocet [oxycodone-acetaminophen] Itching    Macrobid [nitrofurantoin monohyd/m-cryst] Rash    Metformin Rash    Penicillins Rash     Had ancef in 2020 with no adverse rxn     Promethazine Rash     Had compazine in 2021    Sulfa (sulfonamide antibiotics) Rash    Sulfamethoxazole-trimethoprim Rash       Current Inpatient Medications:   acetaminophen  1,000 mg Oral Q8H    benzoyl peroxide 10%   Topical (Top) Daily    insulin detemir U-100  18 Units Subcutaneous QHS    latanoprost  1 drop Both Eyes Nightly    pantoprazole  40 mg Oral Daily    polyethylene glycol  17 g Oral Daily    senna-docusate 8.6-50 mg  1 tablet Oral BID       No current facility-administered medications on file prior to encounter.     Current Outpatient Medications on File Prior to Encounter   Medication Sig Dispense Refill    azelastine (ASTELIN) 137 mcg (0.1 %) nasal spray 1 spray (137 mcg total) by Nasal route 2 (two) times daily. 30 mL 11    blood sugar diagnostic Strp To check BG 4 times daily, to use with insurance preferred meter-true metrix 400 each 3    blood-glucose meter kit To check BG 4 times daily, to use with insurance preferred meter-true metrix 1 each 1    cyclobenzaprine (FLEXERIL) 5 MG tablet TAKE 1 TABLET BY MOUTH THREE TIMES A DAY AS NEEDED FOR MUSCLE SPASMS 90 tablet 0    diclofenac sodium (VOLTAREN) 1 % Gel APPLY 2 G TOPICALLY 2 (TWO) TIMES DAILY AS NEEDED (PAIN). DO NOT USE DIRECTLY ON INCISION 100 g 0    ergocalciferol (ERGOCALCIFEROL) 50,000 unit Cap TAKE 1 CAPSULE BY MOUTH ONE TIME PER WEEK 12 capsule 0    fluticasone propionate (FLONASE) 50 mcg/actuation nasal spray 2 sprays (100 mcg total) by Each Nostril route once daily. 16 g 11    insulin (LANTUS SOLOSTAR U-100 INSULIN) glargine 100 units/mL (3mL) SubQ pen INJECT 38-40 UNITS SUBCUTANEOUSLY AT NIGHT. 30 each 4    insulin lispro (HUMALOG KWIKPEN INSULIN) 100 unit/mL pen INJECT 16 UNITS  "W/ MEALS PLUS SCALE 150-200+2, 201-250+4, 251-300+6, 301-350+8, >350+10. MAX DAILY 68 UNITS. 60 each 3    lancets Misc To check BG 4  times daily, to use with insurance preferred meter-true metrix 400 each 3    latanoprost 0.005 % ophthalmic solution Place 1 drop into both eyes nightly.      losartan (COZAAR) 25 MG tablet Take 0.5 tablets (12.5 mg total) by mouth once daily. 45 tablet 3    meclizine (ANTIVERT) 25 mg tablet Take 1 tablet (25 mg total) by mouth 3 (three) times daily as needed for Dizziness. 30 tablet 1    meloxicam (MOBIC) 15 MG tablet TAKE 1 TABLET BY MOUTH EVERY DAY 30 tablet 1    ondansetron (ZOFRAN-ODT) 8 MG TbDL Dissolve 1 tablet (8 mg total) by mouth every 12 (twelve) hours as needed (nausea). 20 tablet 0    pantoprazole (PROTONIX) 40 MG tablet Take 1 tablet (40 mg total) by mouth once daily. 90 tablet 3    pen needle, diabetic (NOVOFINE 32) 32 gauge x 1/4" Ndle Uses 4 times a day. 90 day via duramed e 11.65 400 each 3       Past Surgical History:   Procedure Laterality Date    CHOLECYSTECTOMY      COLONOSCOPY N/A 1/17/2018    Procedure: COLONOSCOPY with Donnell;  Surgeon: Roland Jacobo MD;  Location: [address]);  Service: Endoscopy;  Laterality: N/A;    COLONOSCOPY N/A 1/6/2021    Procedure: COLONOSCOPY;  Surgeon: Yong Rowland MD;  Location: [address]);  Service: Endoscopy;  Laterality: N/A;  prep ins. emailed - Green[place]ic speaking,  needed - [facility]  [facility]    CYSTOSCOPY N/A 12/1/2020    Procedure: CYSTOSCOPY;  Surgeon: Ines Stanford MD;  Location: [address];  Service: Urology;  Laterality: N/A;  45 minutes     ELBOW ARTHROPLASTY Right 1/16/2019    Procedure: ARTHROPLASTY, ELBOW right radial head arthroplasty revision;  Surgeon: Katja Hubbard MD;  Location: [facility] OR [address] [place] [address];  Service: Orthopedics;  Laterality: Right;  Anesthesia: General and Regional. Stretcher, hand pan 1 and pan 2, CALL ACCUMED, " RU[employer] notified 1-14 LO    ELBOW SURGERY Right 7/16/15    ELBOW SURGERY      ESOPHAGOGASTRODUODENOSCOPY N/A 4/15/2019    Procedure: EGD (ESOPHAGOGASTRODUODENOSCOPY);  Surgeon: Buck Irwin MD;  Location: [facility] FLR);  Service: Endoscopy;  Laterality: N/A;  ray she    HYSTERECTOMY      KNEE ARTHROPLASTY Right 10/6/2020    Procedure: ARTHROPLASTY, KNEE-SAME DAY PROTOCOL;  Surgeon: Sabino Damon MD;  Location: [facility];  Service: Orthopedics;  Laterality: Right;    KNEE ARTHROSCOPY W/ DEBRIDEMENT  4/11    Left    RELEASE OF ULNAR NERVE AT CUBITAL TUNNEL Right 1/16/2019    Procedure: RELEASE, ULNAR TUNNEL right;  Surgeon: Katja Hubbard MD;  Location: [facility] 1ST FLR;  Service: Orthopedics;  Laterality: Right;  Anesthesia: General and Regional. Stretcher, hand pan 1 and pan 2, CALL ACCUMED, CLAIRX    RETROGRADE PYELOGRAPHY Bilateral 12/1/2020    Procedure: PYELOGRAM, RETROGRADE;  Surgeon: Ines Stanford MD;  Location: [address];  Service: Urology;  Laterality: Bilateral;    ROTATOR CUFF REPAIR      TOTAL ABDOMINAL HYSTERECTOMY W/ BILATERAL SALPINGOOPHORECTOMY      TOTAL KNEE ARTHROPLASTY Left 10/19/2021    Procedure: ARTHROPLASTY, KNEE, TOTAL;  Surgeon: Sabino Damon MD;  Location: [facility];  Service: Orthopedics;  Laterality: Left;    UPPER GASTROINTESTINAL ENDOSCOPY         Social History     Socioeconomic History    Marital status:      Spouse name: Kemal    Number of children: 1   Occupational History    Occupation: Housekeeping     Comment: On disability   Tobacco Use    Smoking status: Never Smoker    Smokeless tobacco: Never Used   Substance and Sexual Activity    Alcohol use: No    Drug use: No    Sexual activity: Yes     Partners: Male     Birth control/protection: Post-menopausal   Other Topics Concern    Are you pregnant or think you may be? No    Breast-feeding No   Social History Narrative    She retired at [employer] in  housekeeping; , 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    No stairs     Social Determinants of Health     Financial Resource Strain: Low Risk     Difficulty of Paying Living Expenses: Not hard at all   Food Insecurity: No Food Insecurity    Worried About Running Out of Food in the Last Year: Never true    Ran Out of Food in the Last Year: Never true   Transportation Needs: No Transportation Needs    Lack of Transportation (Medical): No    Lack of Transportation (Non-Medical): No   Physical Activity: Sufficiently Active    Days of Exercise per Week: 5 days    Minutes of Exercise per Session: 60 min   Stress: Stress Concern Present    Feeling of Stress : To some extent   Social Connections: Unknown    Frequency of Communication with Friends and Family: More than three times a week    Frequency of Social Gatherings with Friends and Family: Twice a week    Active Member of Clubs or Organizations: Yes    Attends Club or Organization Meetings: More than 4 times per year    Marital Status:    Housing Stability: Low Risk     Unable to Pay for Housing in the Last Year: No    Number of Places Lived in the Last Year: 1    Unstable Housing in the Last Year: No       OBJECTIVE:     Vital Signs Range (Last 24H):  Temp:  [36.7 °C (98.1 °F)-36.9 °C (98.4 °F)]   Pulse:  [58-64]   Resp:  [16-21]   BP: (138-169)/()   SpO2:  [95 %-100 %]       Significant Labs:  Lab Results   Component Value Date    WBC 7.98 08/15/2022    HGB 11.1 (L) 08/15/2022    HCT 32.1 (L) 08/15/2022     08/15/2022    CHOL 164 04/09/2021    TRIG 179 (H) 04/09/2021    HDL 49 04/09/2021    ALT 24 08/15/2022    AST 26 08/15/2022     08/15/2022    K 4.4 08/15/2022     08/15/2022    CREATININE 0.8 08/15/2022    BUN 17 08/15/2022    CO2 26 08/15/2022    TSH 1.170 02/02/2022    INR 0.9 10/14/2021    HGBA1C 6.4 (H) 08/14/2022       Diagnostic Studies: No relevant studies.    EKG:   Results for orders placed or performed  during the hospital encounter of 08/14/22   EKG 12-lead    Collection Time: 08/14/22 11:31 AM    Narrative    Test Reason : R00.1,    Vent. Rate : 047 BPM     Atrial Rate : 047 BPM     P-R Int : 140 ms          QRS Dur : 098 ms      QT Int : 504 ms       P-R-T Axes : 041 007 070 degrees     QTc Int : 446 ms    Sinus bradycardia  Low septal forces  Abnormal ECG  When compared with ECG of 14-OCT-2021 10:41,  QRS duration has increased  Confirmed by Carl DAVILA MD ([id number]) on 8/15/2022 10:12:59 AM    Referred By: HANY   SELF           Confirmed By:Carl DAVILA MD       Exercise Stress Echo (8/28/2014):  CONCLUSIONS     1 - Normal left ventricular systolic function (EF 60-65%).     2 - Normal left ventricular diastolic function.     3 - Normal right ventricular systolic function .       [place]:  No results found. However, due to the size of the patient record, not all encounters were searched. Please check Results Review for a complete set of results.    ASSESSMENT/PLAN:         Pre-op Assessment    I have reviewed the Patient Summary Reports.     I have reviewed the Nursing Notes. I have reviewed the NPO Status.   I have reviewed the Medications.     Review of Systems  Anesthesia Hx:  No previous Anesthesia Hx of Anesthetic complications (PONV)  History of prior surgery of interest to airway management or planning: Denies Family Hx of Anesthesia complications.   Denies Personal Hx of Anesthesia complications.   Social:  Non-Smoker    Hematology/Oncology:     Oncology Normal    -- Anemia:   EENT/Dental:EENT/Dental Normal   Cardiovascular:   Exercise tolerance: good Hypertension CAD   Denies CABG/stent.   Denies CHF. no hyperlipidemia ECG has been reviewed.    Pulmonary:   Denies COPD.  Denies Asthma. Sleep Apnea    Renal/:  Renal/ Normal  Denies Chronic Renal Disease.     Hepatic/GI:   GERD Liver Disease,    Musculoskeletal:   Arthritis     Neurological:  Neurology Normal  Denies CVA.  Denies Headaches. Denies  Seizures.    Endocrine:   Diabetes  Denies Obesity / BMI > 30  Psych:  Psychiatric Normal  Denies Psychiatric History.          Physical Exam  General: Well nourished, Cooperative, Alert and Oriented    Airway:  Mallampati: III   Mouth Opening: Normal  TM Distance: Normal  Tongue: Normal  Neck ROM: Normal ROM    Dental:  Partial Dentures  Upper dentures      Anesthesia Plan  Type of Anesthesia, risks & benefits discussed:    Anesthesia Type: Gen ETT  Intra-op Monitoring Plan: Standard ASA Monitors  Post Op Pain Control Plan: multimodal analgesia and IV/PO Opioids PRN  Induction:  IV  Airway Plan: Direct and Video, Post-Induction  Informed Consent: Informed consent signed with the Patient and all parties understand the risks and agree with anesthesia plan.  All questions answered. Patient consented to blood products? Yes  ASA Score: 3  Day of Surgery Review of History & Physical: H&P Update referred to the surgeon/provider.    Ready For Surgery From Anesthesia Perspective.     .

## 2022-08-15 NOTE — SUBJECTIVE & OBJECTIVE
Principal Problem:Closed fracture of right proximal humerus    Principal Orthopedic Problem: same    Interval History: Pt seen and examined at bedside. NAEON, VSS, AF. Pain controlled. Denies fevers, chills, chest pain, SOB, N/V/D.       Review of patient's allergies indicates:   Allergen Reactions    Iodinated contrast media Swelling and Rash    Percocet [oxycodone-acetaminophen] Itching    Macrobid [nitrofurantoin monohyd/m-cryst] Rash    Metformin Rash    Penicillins Rash     Had ancef in 2020 with no adverse rxn     Promethazine Rash     Had compazine in 2021    Sulfa (sulfonamide antibiotics) Rash    Sulfamethoxazole-trimethoprim Rash       Current Facility-Administered Medications   Medication    acetaminophen tablet 1,000 mg    benzoyl peroxide 10% gel    bisacodyL suppository 10 mg    cyclobenzaprine tablet 5 mg    dextrose 10% bolus 125 mL    dextrose 10% bolus 125 mL    dextrose 10% bolus 250 mL    diclofenac sodium 1 % gel 2 g    glucagon (human recombinant) injection 1 mg    insulin aspart U-100 pen 0-5 Units    insulin detemir U-100 pen 18 Units    latanoprost 0.005 % ophthalmic solution 1 drop    meclizine tablet 25 mg    melatonin tablet 6 mg    morphine injection 2 mg    ondansetron disintegrating tablet 8 mg    oxyCODONE immediate release tablet 5 mg    pantoprazole EC tablet 40 mg    polyethylene glycol packet 17 g    prochlorperazine injection Soln 5 mg    senna-docusate 8.6-50 mg per tablet 1 tablet    sodium chloride 0.9% flush 10 mL     Objective:     Vital Signs (Most Recent):  Temp: 98.4 °F (36.9 °C) (08/15/22 0406)  Pulse: (!) 58 (08/15/22 0406)  Resp: 18 (08/15/22 0406)  BP: (!) 153/66 (08/15/22 0406)  SpO2: 97 % (08/15/22 0406)   Vital Signs (24h Range):  Temp:  [97.8 °F (36.6 °C)-98.4 °F (36.9 °C)] 98.4 °F (36.9 °C)  Pulse:  [55-64] 58  Resp:  [16-21] 18  SpO2:  [95 %-100 %] 97 %  BP: (122-169)/() 153/66           There is no height or weight on file to calculate BMI.    No intake  "or output data in the 24 hours ending 08/15/22 0532    Ortho/SPM Exam  MSK:  Right Upper extremity  Inspection  - Skin intact throughout, no open wounds  - Edema to right shoulder  - No ecchymosis, erythema, or signs of cellulitis  Palpation  - TTP diffusely about the shoulder, most pronounced at anterior aspect  Range of motion  - AROM and PROM of the wrist and hand intact without pain, able to flex, extend, pronate, and supinate elbow with mild pain from shoulder, shoulder ROM diminished due to pain, but pt is able to contract deltoid at abduct arm to a small degree  Stability  - No evidence of joint dislocation or abnormal laxity  Neurovascular  - AIN/PIN/Radial/Median/Ulnar Nerves assessed in isolation without deficit  - Able to give thumbs up, make "OK" sign, cross IF/LF, abduct/adduct fingers, make fist  - SILT throughout  - Compartments soft  - Radial artery palpated  - Muscle tone normal    Significant Labs: CBC:   Recent Labs   Lab 08/14/22  1317   WBC 11.66   HGB 12.0   HCT 34.9*        CMP:   Recent Labs   Lab 08/14/22  1317      K 4.0      CO2 20*   *   BUN 15   CREATININE 0.7   CALCIUM 8.8   PROT 7.1   ALBUMIN 3.8   BILITOT 0.5   ALKPHOS 82   AST 26   ALT 26   ANIONGAP 12     All pertinent labs within the past 24 hours have been reviewed.    Significant Imaging: I have reviewed all pertinent imaging results/findings.  "

## 2022-08-15 NOTE — SUBJECTIVE & OBJECTIVE
Interval History: Spoke with patient and her  at bedside via  via IPad. Patient reports pain with any movement to right shoulder but no pain as long as she does not move her right arm. Patient with some mild soreness to right knee where she scraped it when she fell but otherwise denies any pain anywhere else. Discussed with patient plan is for her to go to OR in the am. Patient is scheduled as first case for total right shoulder replacement by Ortho to repair comminuted right proximal humerus fracture fracture and anterior dislocation. Patient and  are relieved that surgery is being done. Patient and  asking about her diabetes and insulin. We discussed her home regimen and she reports she takes 24 units of Lantus insulin nightly and 12 units of Humalog insulin with breakfast and 14-16 units of Humalog insulin with lunch and dinner depending on her blood sugar readings. I explained to patient in general in hospital we reduce home dosing as patient's tend not to eat as much and do not want to cause low blood sugar and she stated she understands but wanted to be sure she was getting insulin. I also explained that nursing will be monitoring her blood sugars 4 times daily in hospital before meals and at bedtime so we can monitor her blood sugars and adjust insulin as needed.     Review of Systems   Constitutional:  Negative for fever.   Respiratory:  Negative for cough and shortness of breath.    Cardiovascular:  Negative for chest pain and leg swelling.   Gastrointestinal:  Negative for abdominal pain, nausea and vomiting.   Musculoskeletal:  Positive for arthralgias (Right shoulder and right knee).   Skin:  Positive for wound (Abrasion to anterior right knee).   Neurological:  Negative for dizziness and light-headedness.   Psychiatric/Behavioral:  Negative for agitation and confusion.    Objective:     Vital Signs (Most Recent):  Temp: 98.1 °F (36.7 °C) (08/15/22 1229)  Pulse:  63 (08/15/22 1229)  Resp: 18 (08/15/22 1237)  BP: 136/64 (08/15/22 1229)  SpO2: 93 % (08/15/22 1229) on room air   Vital Signs (24h Range):  Temp:  [98.1 °F (36.7 °C)-98.4 °F (36.9 °C)] 98.1 °F (36.7 °C)  Pulse:  [58-64] 63  Resp:  [16-21] 18  SpO2:  [93 %-100 %] 93 %  BP: (136-169)/() 136/64        There is no height or weight on file to calculate BMI.  No intake or output data in the 24 hours ending 08/15/22 1332   Physical Exam  Vitals and nursing note reviewed.   Constitutional:       General: She is awake. She is not in acute distress.     Appearance: Normal appearance. She is well-developed and normal weight. She is not ill-appearing.   Eyes:      General: No scleral icterus.  Neck:      Vascular: No JVD.   Cardiovascular:      Rate and Rhythm: Normal rate and regular rhythm.      Heart sounds: Normal heart sounds. No murmur heard.    No friction rub. No gallop.   Pulmonary:      Effort: Pulmonary effort is normal. No respiratory distress.      Breath sounds: Normal breath sounds. No wheezing.   Abdominal:      General: Abdomen is flat. Bowel sounds are normal. There is no distension.      Palpations: Abdomen is soft.      Tenderness: There is no abdominal tenderness.   Musculoskeletal:      Right lower leg: No edema.      Left lower leg: No edema.   Skin:     Findings: Abrasion (Right anterior knee) present.   Neurological:      Mental Status: She is alert and oriented to person, place, and time.   Psychiatric:         Mood and Affect: Mood normal.         Behavior: Behavior normal. Behavior is cooperative.         Thought Content: Thought content normal.         Judgment: Judgment normal.       Significant Labs: A1C:   Recent Labs   Lab 04/12/22  0759 08/14/22  1317   HGBA1C 6.4* 6.4*     CBC:   Recent Labs   Lab 08/14/22  1317 08/15/22  0558   WBC 11.66 7.98   HGB 12.0 11.1*   HCT 34.9* 32.1*    196     CMP:   Recent Labs   Lab 08/14/22  1317 08/15/22  0558    139   K 4.0 4.4    104    CO2 20* 26   * 131*   BUN 15 17   CREATININE 0.7 0.8   CALCIUM 8.8 8.8   PROT 7.1 6.5   ALBUMIN 3.8 3.4*   BILITOT 0.5 0.7   ALKPHOS 82 85   AST 26 26   ALT 26 24   ANIONGAP 12 9     Magnesium:   Recent Labs   Lab 08/14/22  1317   MG 2.0     POCT Glucose:   Recent Labs   Lab 08/14/22  2140 08/15/22  0604   POCTGLUCOSE 213* 131*       Lab Results   Component Value Date    [id number] 14 (L) 08/15/2022       Significant Imaging: I have reviewed all pertinent imaging results/findings within the past 24 hours.

## 2022-08-15 NOTE — ASSESSMENT & PLAN NOTE
Anterior and inferior dislocation of right shoulder  · Patient placed in observation. Orthopedics consulted and noted comminuted fracture of right proximal humerus and anterior dislocation and note due to nature of fracture would not heal and thus needs surgical intervention.  · Orthopedics planning on taking patient to OR tomorrow, 8/16 for reverse total right shoulder replacement. patient to be NPO after midnight tonight.   · Patient to be non weight bearing to right upper arm at this time as per Ortho.   · Pain management with scheduled Tylenol 1000 mg po every 8 hours and Flexeril 5 mg po every 8 hours prn muscle spasms + Oxycodone IR 5 mg po prn breakthrough pain.   · Hold chemical DVT prophylaxis for now as patient going to OR in the am, 8/16 and will use NATE/SCDs for DVT prophylaxis for now.

## 2022-08-15 NOTE — ASSESSMENT & PLAN NOTE
· Had DEXA in 2020  · Needs repeat DEXA as outpatient.  · Continue Vitamin D and Calcium replacement and referral for osteoporosis specific treatment to Orthopedic fragility clinic.

## 2022-08-15 NOTE — ASSESSMENT & PLAN NOTE
Patient's FSGs are controlled on current medication regimen. Patient reports she takes Lantus insulin 28 units nightly at home and 12 unit of Humalog insulin with breakfast and 14-16 units of Humalog insulin with lunch and dinner.   Last A1c reviewed-   Lab Results   Component Value Date    HGBA1C 6.4 (H) 08/14/2022     Most recent fingerstick glucose reviewed-   Recent Labs   Lab 08/14/22  2140 08/15/22  0604   POCTGLUCOSE 213* 131*     Current correctional scale  Low  Maintain anti-hyperglycemic dose as follows-     · DM x over 20 years.  · Patient started on Levemir insulin 18 units nightly in hospital as wish to lower dosing to avoid hypoglycemia and use Novolog insulin 6 units with meals.   · Monitor POCT glucose 4 times daily with meals and at bedtime.

## 2022-08-15 NOTE — HOSPITAL COURSE
Orthopedics consulted in ED and evaluated patient and recommended nonweightbearing to right upper extremity for comminuted right proximal humerus fracture with anterior dislocation. Ortho recommending operative repair of fracture as will not heal correctly on its own so patient taken to OR on 8/16/2022 for right reverse total shoulder replacement. Surgery done by Dr. Aamir Powell. Post-op, patient is non-weight bearing to right arm and no range of motion to right shoulder and in sling at all times. Perineural pain catheter placed by Anesthesia and managing in hospital to right shoulder to help with post-op pain control. Patient placed on Aspirin 81 mg po BID for DVT prophylaxis post-op for 6 weeks. Patient placed on scheduled Tylenol, Celebrex and Robaxin for pain management post-op.

## 2022-08-15 NOTE — PROGRESS NOTES
[facility]  Orthopedics  Progress Note    Patient Name: Riana Kay  MRN: [id number]  Admission Date: 8/14/2022  Hospital Length of Stay: 0 days  Attending Provider: Jany Guaman MD  Primary Care Provider: Jose Rojas MD  Follow-up For: Procedure(s) (LRB):  ARTHROPLASTY, SHOULDER, TOTAL, REVERSE, [place] (Right)    Post-Operative Day:    Subjective:     Principal Problem:Closed fracture of right proximal humerus    Principal Orthopedic Problem: same    Interval History: Pt seen and examined at bedside. NAEON, VSS, AF. Pain controlled. Denies fevers, chills, chest pain, SOB, N/V/D.       Review of patient's allergies indicates:   Allergen Reactions    Iodinated contrast media Swelling and Rash    Percocet [oxycodone-acetaminophen] Itching    Macrobid [nitrofurantoin monohyd/m-cryst] Rash    Metformin Rash    Penicillins Rash     Had ancef in 2020 with no adverse rxn     Promethazine Rash     Had compazine in 2021    Sulfa (sulfonamide antibiotics) Rash    Sulfamethoxazole-trimethoprim Rash       Current Facility-Administered Medications   Medication    acetaminophen tablet 1,000 mg    benzoyl peroxide 10% gel    bisacodyL suppository 10 mg    cyclobenzaprine tablet 5 mg    dextrose 10% bolus 125 mL    dextrose 10% bolus 125 mL    dextrose 10% bolus 250 mL    diclofenac sodium 1 % gel 2 g    glucagon (human recombinant) injection 1 mg    insulin aspart U-100 pen 0-5 Units    insulin detemir U-100 pen 18 Units    latanoprost 0.005 % ophthalmic solution 1 drop    meclizine tablet 25 mg    melatonin tablet 6 mg    morphine injection 2 mg    ondansetron disintegrating tablet 8 mg    oxyCODONE immediate release tablet 5 mg    pantoprazole EC tablet 40 mg    polyethylene glycol packet 17 g    prochlorperazine injection Soln 5 mg    senna-docusate 8.6-50 mg per tablet 1 tablet    sodium chloride 0.9% flush 10 mL     Objective:     Vital Signs (Most Recent):  Temp: 98.4  "°F (36.9 °C) (08/15/22 0406)  Pulse: (!) 58 (08/15/22 0406)  Resp: 18 (08/15/22 0406)  BP: (!) 153/66 (08/15/22 0406)  SpO2: 97 % (08/15/22 0406)   Vital Signs (24h Range):  Temp:  [97.8 °F (36.6 °C)-98.4 °F (36.9 °C)] 98.4 °F (36.9 °C)  Pulse:  [55-64] 58  Resp:  [16-21] 18  SpO2:  [95 %-100 %] 97 %  BP: (122-169)/() 153/66           There is no height or weight on file to calculate BMI.    No intake or output data in the 24 hours ending 08/15/22 0532    Ortho/SPM Exam  MSK:  Right Upper extremity  Inspection  - Skin intact throughout, no open wounds  - Edema to right shoulder  - No ecchymosis, erythema, or signs of cellulitis  Palpation  - TTP diffusely about the shoulder, most pronounced at anterior aspect  Range of motion  - AROM and PROM of the wrist and hand intact without pain, able to flex, extend, pronate, and supinate elbow with mild pain from shoulder, shoulder ROM diminished due to pain, but pt is able to contract deltoid at abduct arm to a small degree  Stability  - No evidence of joint dislocation or abnormal laxity  Neurovascular  - AIN/PIN/Radial/Median/Ulnar Nerves assessed in isolation without deficit  - Able to give thumbs up, make "OK" sign, cross IF/LF, abduct/adduct fingers, make fist  - SILT throughout  - Compartments soft  - Radial artery palpated  - Muscle tone normal    Significant Labs: CBC:   Recent Labs   Lab 08/14/22  1317   WBC 11.66   HGB 12.0   HCT 34.9*        CMP:   Recent Labs   Lab 08/14/22  1317      K 4.0      CO2 20*   *   BUN 15   CREATININE 0.7   CALCIUM 8.8   PROT 7.1   ALBUMIN 3.8   BILITOT 0.5   ALKPHOS 82   AST 26   ALT 26   ANIONGAP 12     All pertinent labs within the past 24 hours have been reviewed.    Significant Imaging: I have reviewed all pertinent imaging results/findings.    Assessment/Plan:     * Closed fracture of right proximal humerus  Riana Kay is a 67 y.o. female with a right proximal humerus fracture and " anterior dislocation. Closed, NVI.    - NWB RUE, no ROM of RUE  - Sling for comfort  - No NV changes since presentation, pain controlled  - Pain control: MM  - DVT PPx: per primary, ok to continue to OR  - Consented for rTSA  - NPO MN for OR tomorrow           Allen Ragsdale MD  Orthopedics  [facility] - Surgery

## 2022-08-15 NOTE — CARE UPDATE
"Orthopedics Progress Note  Pt resting in bed comfortably. Pain controlled. Consented for surgery. Please keep NPO at midnight and was with benzoyl peroxide solution of right shoulder.    Physical Exam:  MSK:  Right Upper extremity  Inspection  - Skin intact throughout, no open wounds  - Edema to right shoulder  - No ecchymosis, erythema, or signs of cellulitis  Palpation  - TTP diffusely about the shoulder, most pronounced at anterior aspect  Range of motion  - AROM and PROM of the wrist and hand intact without pain, able to flex, extend, pronate, and supinate elbow with mild pain from shoulder, shoulder ROM diminished due to pain, but pt is able to contract deltoid at abduct arm to a small degree  Stability  - No evidence of joint dislocation or abnormal laxity  Neurovascular  - AIN/PIN/Radial/Median/Ulnar Nerves assessed in isolation without deficit  - Able to give thumbs up, make "OK" sign, cross IF/LF, abduct/adduct fingers, make fist  - SILT throughout  - Compartments soft  - Radial artery palpated  - Muscle tone normal      Giorgio Sylvester MD PGY-2     "

## 2022-08-15 NOTE — ASSESSMENT & PLAN NOTE
Riana Samuel Kay is a 67 y.o. female with a right proximal humerus fracture and anterior dislocation. Closed, NVI.    - NWB RUE, no ROM of RUE  - Sling for comfort  - No NV changes since presentation, pain controlled  - Pain control: MM  - DVT PPx: per primary, ok to continue to OR  - Consented for rTSA  - NPO MN for OR tomorrow

## 2022-08-16 ENCOUNTER — ANESTHESIA (OUTPATIENT)
Dept: SURGERY | Facility: HOSPITAL | Age: 67
DRG: 483 | End: 2022-08-16
Payer: MEDICARE

## 2022-08-16 LAB
ALBUMIN SERPL BCP-MCNC: 3.5 G/DL (ref 3.5–5.2)
ALP SERPL-CCNC: 90 U/L (ref 55–135)
ALT SERPL W/O P-5'-P-CCNC: 23 U/L (ref 10–44)
ANION GAP SERPL CALC-SCNC: 8 MMOL/L (ref 8–16)
AST SERPL-CCNC: 22 U/L (ref 10–40)
BASOPHILS # BLD AUTO: 0.02 K/UL (ref 0–0.2)
BASOPHILS NFR BLD: 0.3 % (ref 0–1.9)
BILIRUB SERPL-MCNC: 0.7 MG/DL (ref 0.1–1)
BUN SERPL-MCNC: 13 MG/DL (ref 8–23)
CALCIUM SERPL-MCNC: 8.7 MG/DL (ref 8.7–10.5)
CHLORIDE SERPL-SCNC: 103 MMOL/L (ref 95–110)
CO2 SERPL-SCNC: 26 MMOL/L (ref 23–29)
CREAT SERPL-MCNC: 0.8 MG/DL (ref 0.5–1.4)
DIFFERENTIAL METHOD: ABNORMAL
EOSINOPHIL # BLD AUTO: 0 K/UL (ref 0–0.5)
EOSINOPHIL NFR BLD: 0.5 % (ref 0–8)
ERYTHROCYTE [DISTWIDTH] IN BLOOD BY AUTOMATED COUNT: 12.2 % (ref 11.5–14.5)
EST. GFR  (NO RACE VARIABLE): >60 ML/MIN/1.73 M^2
GLUCOSE SERPL-MCNC: 223 MG/DL (ref 70–110)
HCT VFR BLD AUTO: 32.5 % (ref 37–48.5)
HGB BLD-MCNC: 11.1 G/DL (ref 12–16)
IMM GRANULOCYTES # BLD AUTO: 0.01 K/UL (ref 0–0.04)
IMM GRANULOCYTES NFR BLD AUTO: 0.1 % (ref 0–0.5)
LYMPHOCYTES # BLD AUTO: 1.9 K/UL (ref 1–4.8)
LYMPHOCYTES NFR BLD: 23.7 % (ref 18–48)
MCH RBC QN AUTO: 31.6 PG (ref 27–31)
MCHC RBC AUTO-ENTMCNC: 34.2 G/DL (ref 32–36)
MCV RBC AUTO: 93 FL (ref 82–98)
MONOCYTES # BLD AUTO: 0.5 K/UL (ref 0.3–1)
MONOCYTES NFR BLD: 6.5 % (ref 4–15)
NEUTROPHILS # BLD AUTO: 5.5 K/UL (ref 1.8–7.7)
NEUTROPHILS NFR BLD: 68.9 % (ref 38–73)
NRBC BLD-RTO: 0 /100 WBC
PLATELET # BLD AUTO: 197 K/UL (ref 150–450)
PMV BLD AUTO: 9.9 FL (ref 9.2–12.9)
POCT GLUCOSE: 179 MG/DL (ref 70–110)
POCT GLUCOSE: 184 MG/DL (ref 70–110)
POCT GLUCOSE: 256 MG/DL (ref 70–110)
POCT GLUCOSE: 274 MG/DL (ref 70–110)
POTASSIUM SERPL-SCNC: 4.3 MMOL/L (ref 3.5–5.1)
PROT SERPL-MCNC: 6.6 G/DL (ref 6–8.4)
RBC # BLD AUTO: 3.51 M/UL (ref 4–5.4)
SODIUM SERPL-SCNC: 137 MMOL/L (ref 136–145)
WBC # BLD AUTO: 7.97 K/UL (ref 3.9–12.7)

## 2022-08-16 PROCEDURE — 99900035 HC TECH TIME PER 15 MIN (STAT)

## 2022-08-16 PROCEDURE — 63600175 PHARM REV CODE 636 W HCPCS: Performed by: STUDENT IN AN ORGANIZED HEALTH CARE EDUCATION/TRAINING PROGRAM

## 2022-08-16 PROCEDURE — 23430 REPAIR BICEPS TENDON: CPT | Mod: 59,51,RT, | Performed by: ORTHOPAEDIC SURGERY

## 2022-08-16 PROCEDURE — 85025 COMPLETE CBC W/AUTO DIFF WBC: CPT | Performed by: STUDENT IN AN ORGANIZED HEALTH CARE EDUCATION/TRAINING PROGRAM

## 2022-08-16 PROCEDURE — 64416 NJX AA&/STRD BRCH PL NFS IMG: CPT | Performed by: STUDENT IN AN ORGANIZED HEALTH CARE EDUCATION/TRAINING PROGRAM

## 2022-08-16 PROCEDURE — 94761 N-INVAS EAR/PLS OXIMETRY MLT: CPT

## 2022-08-16 PROCEDURE — 37000009 HC ANESTHESIA EA ADD 15 MINS: Performed by: ORTHOPAEDIC SURGERY

## 2022-08-16 PROCEDURE — 63600175 PHARM REV CODE 636 W HCPCS: Performed by: ANESTHESIOLOGY

## 2022-08-16 PROCEDURE — 76942 R INTERSCALENE CATHETER: ICD-10-PCS | Mod: 26,,, | Performed by: ANESTHESIOLOGY

## 2022-08-16 PROCEDURE — C9399 UNCLASSIFIED DRUGS OR BIOLOG: HCPCS | Performed by: STUDENT IN AN ORGANIZED HEALTH CARE EDUCATION/TRAINING PROGRAM

## 2022-08-16 PROCEDURE — C1769 GUIDE WIRE: HCPCS | Performed by: ORTHOPAEDIC SURGERY

## 2022-08-16 PROCEDURE — 25000003 PHARM REV CODE 250: Performed by: STUDENT IN AN ORGANIZED HEALTH CARE EDUCATION/TRAINING PROGRAM

## 2022-08-16 PROCEDURE — 64416 R INTERSCALENE CATHETER: ICD-10-PCS | Mod: 59,RT,, | Performed by: ANESTHESIOLOGY

## 2022-08-16 PROCEDURE — D9220A PRA ANESTHESIA: Mod: CRNA,,, | Performed by: NURSE ANESTHETIST, CERTIFIED REGISTERED

## 2022-08-16 PROCEDURE — 99232 SBSQ HOSP IP/OBS MODERATE 35: CPT | Mod: ,,, | Performed by: INTERNAL MEDICINE

## 2022-08-16 PROCEDURE — 23472 PR RECONSTR TOTAL SHOULDER IMPLANT: ICD-10-PCS | Mod: RT,,, | Performed by: ORTHOPAEDIC SURGERY

## 2022-08-16 PROCEDURE — 99232 PR SUBSEQUENT HOSPITAL CARE,LEVL II: ICD-10-PCS | Mod: ,,, | Performed by: INTERNAL MEDICINE

## 2022-08-16 PROCEDURE — 63600175 PHARM REV CODE 636 W HCPCS: Performed by: NURSE ANESTHETIST, CERTIFIED REGISTERED

## 2022-08-16 PROCEDURE — 37000008 HC ANESTHESIA 1ST 15 MINUTES: Performed by: ORTHOPAEDIC SURGERY

## 2022-08-16 PROCEDURE — 27000221 HC OXYGEN, UP TO 24 HOURS

## 2022-08-16 PROCEDURE — 36000710: Performed by: ORTHOPAEDIC SURGERY

## 2022-08-16 PROCEDURE — 88311 PR  DECALCIFY TISSUE: ICD-10-PCS | Mod: 26,,, | Performed by: PATHOLOGY

## 2022-08-16 PROCEDURE — 27201423 OPTIME MED/SURG SUP & DEVICES STERILE SUPPLY: Performed by: ORTHOPAEDIC SURGERY

## 2022-08-16 PROCEDURE — 36000711: Performed by: ORTHOPAEDIC SURGERY

## 2022-08-16 PROCEDURE — 76942 ECHO GUIDE FOR BIOPSY: CPT | Mod: 26,,, | Performed by: ANESTHESIOLOGY

## 2022-08-16 PROCEDURE — 80053 COMPREHEN METABOLIC PANEL: CPT | Performed by: STUDENT IN AN ORGANIZED HEALTH CARE EDUCATION/TRAINING PROGRAM

## 2022-08-16 PROCEDURE — D9220A PRA ANESTHESIA: ICD-10-PCS | Mod: CRNA,,, | Performed by: NURSE ANESTHETIST, CERTIFIED REGISTERED

## 2022-08-16 PROCEDURE — 88305 TISSUE EXAM BY PATHOLOGIST: CPT | Performed by: PATHOLOGY

## 2022-08-16 PROCEDURE — 94660 CPAP INITIATION&MGMT: CPT

## 2022-08-16 PROCEDURE — 88311 DECALCIFY TISSUE: CPT | Performed by: PATHOLOGY

## 2022-08-16 PROCEDURE — 23430 PR REPAIR BICEPS LONG TENDON: ICD-10-PCS | Mod: 59,51,RT, | Performed by: ORTHOPAEDIC SURGERY

## 2022-08-16 PROCEDURE — C1713 ANCHOR/SCREW BN/BN,TIS/BN: HCPCS | Performed by: ORTHOPAEDIC SURGERY

## 2022-08-16 PROCEDURE — 82962 GLUCOSE BLOOD TEST: CPT | Performed by: ORTHOPAEDIC SURGERY

## 2022-08-16 PROCEDURE — 71000033 HC RECOVERY, INTIAL HOUR: Performed by: ORTHOPAEDIC SURGERY

## 2022-08-16 PROCEDURE — 25000003 PHARM REV CODE 250: Performed by: NURSE ANESTHETIST, CERTIFIED REGISTERED

## 2022-08-16 PROCEDURE — 71000015 HC POSTOP RECOV 1ST HR: Performed by: ORTHOPAEDIC SURGERY

## 2022-08-16 PROCEDURE — 76942 ECHO GUIDE FOR BIOPSY: CPT | Performed by: STUDENT IN AN ORGANIZED HEALTH CARE EDUCATION/TRAINING PROGRAM

## 2022-08-16 PROCEDURE — D9220A PRA ANESTHESIA: ICD-10-PCS | Mod: ANES,,, | Performed by: ANESTHESIOLOGY

## 2022-08-16 PROCEDURE — 23472 RECONSTRUCT SHOULDER JOINT: CPT | Mod: RT,,, | Performed by: ORTHOPAEDIC SURGERY

## 2022-08-16 PROCEDURE — 64416 NJX AA&/STRD BRCH PL NFS IMG: CPT | Mod: 59,RT,, | Performed by: ANESTHESIOLOGY

## 2022-08-16 PROCEDURE — 88305 TISSUE EXAM BY PATHOLOGIST: ICD-10-PCS | Mod: 26,,, | Performed by: PATHOLOGY

## 2022-08-16 PROCEDURE — 36415 COLL VENOUS BLD VENIPUNCTURE: CPT | Performed by: STUDENT IN AN ORGANIZED HEALTH CARE EDUCATION/TRAINING PROGRAM

## 2022-08-16 PROCEDURE — 88305 TISSUE EXAM BY PATHOLOGIST: CPT | Mod: 26,,, | Performed by: PATHOLOGY

## 2022-08-16 PROCEDURE — 88311 DECALCIFY TISSUE: CPT | Mod: 26,,, | Performed by: PATHOLOGY

## 2022-08-16 PROCEDURE — 63600175 PHARM REV CODE 636 W HCPCS: Performed by: ORTHOPAEDIC SURGERY

## 2022-08-16 PROCEDURE — C1751 CATH, INF, PER/CENT/MIDLINE: HCPCS | Performed by: STUDENT IN AN ORGANIZED HEALTH CARE EDUCATION/TRAINING PROGRAM

## 2022-08-16 PROCEDURE — 11000001 HC ACUTE MED/SURG PRIVATE ROOM

## 2022-08-16 PROCEDURE — D9220A PRA ANESTHESIA: Mod: ANES,,, | Performed by: ANESTHESIOLOGY

## 2022-08-16 PROCEDURE — C1776 JOINT DEVICE (IMPLANTABLE): HCPCS | Performed by: ORTHOPAEDIC SURGERY

## 2022-08-16 PROCEDURE — 63600175 PHARM REV CODE 636 W HCPCS: Performed by: SURGERY

## 2022-08-16 DEVICE — COMPONENT GLENOSPHERE 32X6MM: Type: IMPLANTABLE DEVICE | Site: SHOULDER | Status: FUNCTIONAL

## 2022-08-16 DEVICE — SCREW BONE GLENOID 4.5X24MM: Type: IMPLANTABLE DEVICE | Site: SHOULDER | Status: FUNCTIONAL

## 2022-08-16 DEVICE — IMPLANTABLE DEVICE: Type: IMPLANTABLE DEVICE | Site: SHOULDER | Status: FUNCTIONAL

## 2022-08-16 DEVICE — SCREW PERIPH REUNION 4.5X16MM: Type: IMPLANTABLE DEVICE | Site: SHOULDER | Status: FUNCTIONAL

## 2022-08-16 DEVICE — INSERT REUNION HUM STD 32X8MM: Type: IMPLANTABLE DEVICE | Site: SHOULDER | Status: FUNCTIONAL

## 2022-08-16 DEVICE — CUP HUMERAL REUN RSA 32X4MM: Type: IMPLANTABLE DEVICE | Site: SHOULDER | Status: FUNCTIONAL

## 2022-08-16 DEVICE — SCREW BONE TSA 4.5X20MM: Type: IMPLANTABLE DEVICE | Site: SHOULDER | Status: FUNCTIONAL

## 2022-08-16 DEVICE — SCREW BONE CENTER 28X6.5MM: Type: IMPLANTABLE DEVICE | Site: SHOULDER | Status: FUNCTIONAL

## 2022-08-16 DEVICE — CEMENT BONE WHOLE BATCH: Type: IMPLANTABLE DEVICE | Site: SHOULDER | Status: FUNCTIONAL

## 2022-08-16 RX ORDER — VANCOMYCIN HCL IN 5 % DEXTROSE 1G/250ML
1000 PLASTIC BAG, INJECTION (ML) INTRAVENOUS
Status: COMPLETED | OUTPATIENT
Start: 2022-08-16 | End: 2022-08-16

## 2022-08-16 RX ORDER — CEFAZOLIN SODIUM/WATER 2 G/20 ML
2 SYRINGE (ML) INTRAVENOUS
Status: DISCONTINUED | OUTPATIENT
Start: 2022-08-16 | End: 2022-08-16 | Stop reason: HOSPADM

## 2022-08-16 RX ORDER — PHENYLEPHRINE HCL IN 0.9% NACL 1 MG/10 ML
SYRINGE (ML) INTRAVENOUS
Status: DISCONTINUED | OUTPATIENT
Start: 2022-08-16 | End: 2022-08-16

## 2022-08-16 RX ORDER — FENTANYL CITRATE 50 UG/ML
INJECTION, SOLUTION INTRAMUSCULAR; INTRAVENOUS
Status: DISCONTINUED | OUTPATIENT
Start: 2022-08-16 | End: 2022-08-16

## 2022-08-16 RX ORDER — OXYCODONE HYDROCHLORIDE 5 MG/1
5 TABLET ORAL EVERY 4 HOURS PRN
Status: DISCONTINUED | OUTPATIENT
Start: 2022-08-16 | End: 2022-08-19 | Stop reason: HOSPADM

## 2022-08-16 RX ORDER — SODIUM CHLORIDE 0.9 % (FLUSH) 0.9 %
10 SYRINGE (ML) INJECTION
Status: DISCONTINUED | OUTPATIENT
Start: 2022-08-16 | End: 2022-08-16 | Stop reason: HOSPADM

## 2022-08-16 RX ORDER — CELECOXIB 200 MG/1
200 CAPSULE ORAL DAILY
Status: DISCONTINUED | OUTPATIENT
Start: 2022-08-16 | End: 2022-08-19 | Stop reason: HOSPADM

## 2022-08-16 RX ORDER — MIDAZOLAM HYDROCHLORIDE 1 MG/ML
INJECTION, SOLUTION INTRAMUSCULAR; INTRAVENOUS
Status: DISCONTINUED | OUTPATIENT
Start: 2022-08-16 | End: 2022-08-16

## 2022-08-16 RX ORDER — ASPIRIN 81 MG/1
81 TABLET ORAL 2 TIMES DAILY
Status: DISCONTINUED | OUTPATIENT
Start: 2022-08-16 | End: 2022-08-19 | Stop reason: HOSPADM

## 2022-08-16 RX ORDER — CEFAZOLIN SODIUM 1 G/3ML
INJECTION, POWDER, FOR SOLUTION INTRAMUSCULAR; INTRAVENOUS
Status: DISCONTINUED | OUTPATIENT
Start: 2022-08-16 | End: 2022-08-16

## 2022-08-16 RX ORDER — TRANEXAMIC ACID 100 MG/ML
INJECTION, SOLUTION INTRAVENOUS
Status: DISCONTINUED | OUTPATIENT
Start: 2022-08-16 | End: 2022-08-16

## 2022-08-16 RX ORDER — ROPIVACAINE HYDROCHLORIDE 5 MG/ML
INJECTION, SOLUTION EPIDURAL; INFILTRATION; PERINEURAL
Status: COMPLETED | OUTPATIENT
Start: 2022-08-16 | End: 2022-08-16

## 2022-08-16 RX ORDER — METHOCARBAMOL 500 MG/1
500 TABLET, FILM COATED ORAL EVERY 6 HOURS
Status: DISCONTINUED | OUTPATIENT
Start: 2022-08-16 | End: 2022-08-19 | Stop reason: HOSPADM

## 2022-08-16 RX ORDER — MIDAZOLAM HYDROCHLORIDE 1 MG/ML
.5-4 INJECTION INTRAMUSCULAR; INTRAVENOUS
Status: DISCONTINUED | OUTPATIENT
Start: 2022-08-16 | End: 2022-08-16 | Stop reason: HOSPADM

## 2022-08-16 RX ORDER — ROCURONIUM BROMIDE 10 MG/ML
INJECTION, SOLUTION INTRAVENOUS
Status: DISCONTINUED | OUTPATIENT
Start: 2022-08-16 | End: 2022-08-16

## 2022-08-16 RX ORDER — ACETAMINOPHEN 500 MG
1000 TABLET ORAL EVERY 6 HOURS
Status: DISPENSED | OUTPATIENT
Start: 2022-08-16 | End: 2022-08-18

## 2022-08-16 RX ORDER — CLINDAMYCIN PHOSPHATE 900 MG/50ML
900 INJECTION, SOLUTION INTRAVENOUS
Status: COMPLETED | OUTPATIENT
Start: 2022-08-16 | End: 2022-08-17

## 2022-08-16 RX ORDER — PROPOFOL 10 MG/ML
VIAL (ML) INTRAVENOUS
Status: DISCONTINUED | OUTPATIENT
Start: 2022-08-16 | End: 2022-08-16

## 2022-08-16 RX ORDER — FENTANYL CITRATE 50 UG/ML
25-200 INJECTION, SOLUTION INTRAMUSCULAR; INTRAVENOUS
Status: DISCONTINUED | OUTPATIENT
Start: 2022-08-16 | End: 2022-08-16 | Stop reason: HOSPADM

## 2022-08-16 RX ORDER — ONDANSETRON 2 MG/ML
INJECTION INTRAMUSCULAR; INTRAVENOUS
Status: DISCONTINUED | OUTPATIENT
Start: 2022-08-16 | End: 2022-08-16

## 2022-08-16 RX ORDER — FENTANYL CITRATE 50 UG/ML
25 INJECTION, SOLUTION INTRAMUSCULAR; INTRAVENOUS EVERY 5 MIN PRN
Status: DISCONTINUED | OUTPATIENT
Start: 2022-08-16 | End: 2022-08-16 | Stop reason: HOSPADM

## 2022-08-16 RX ORDER — MUPIROCIN 20 MG/G
OINTMENT TOPICAL
Status: DISCONTINUED | OUTPATIENT
Start: 2022-08-16 | End: 2022-08-16 | Stop reason: HOSPADM

## 2022-08-16 RX ORDER — DEXAMETHASONE SODIUM PHOSPHATE 4 MG/ML
INJECTION, SOLUTION INTRA-ARTICULAR; INTRALESIONAL; INTRAMUSCULAR; INTRAVENOUS; SOFT TISSUE
Status: DISCONTINUED | OUTPATIENT
Start: 2022-08-16 | End: 2022-08-16

## 2022-08-16 RX ORDER — ROPIVACAINE HYDROCHLORIDE 2 MG/ML
0.1 INJECTION, SOLUTION EPIDURAL; INFILTRATION; PERINEURAL CONTINUOUS
Status: DISCONTINUED | OUTPATIENT
Start: 2022-08-16 | End: 2022-08-19

## 2022-08-16 RX ORDER — VANCOMYCIN HYDROCHLORIDE 1 G/20ML
INJECTION, POWDER, LYOPHILIZED, FOR SOLUTION INTRAVENOUS
Status: DISCONTINUED | OUTPATIENT
Start: 2022-08-16 | End: 2022-08-16 | Stop reason: HOSPADM

## 2022-08-16 RX ORDER — EPHEDRINE SULFATE 50 MG/ML
INJECTION, SOLUTION INTRAVENOUS
Status: DISCONTINUED | OUTPATIENT
Start: 2022-08-16 | End: 2022-08-16

## 2022-08-16 RX ADMIN — TRANEXAMIC ACID 1000 MG: 100 INJECTION, SOLUTION INTRAVENOUS at 01:08

## 2022-08-16 RX ADMIN — Medication 100 MCG: at 12:08

## 2022-08-16 RX ADMIN — ONDANSETRON 4 MG: 2 INJECTION INTRAMUSCULAR; INTRAVENOUS at 03:08

## 2022-08-16 RX ADMIN — ROCURONIUM BROMIDE 30 MG: 10 INJECTION INTRAVENOUS at 01:08

## 2022-08-16 RX ADMIN — FENTANYL CITRATE 50 MCG: 50 INJECTION INTRAMUSCULAR; INTRAVENOUS at 11:08

## 2022-08-16 RX ADMIN — ROCURONIUM BROMIDE 50 MG: 10 INJECTION INTRAVENOUS at 12:08

## 2022-08-16 RX ADMIN — SODIUM CHLORIDE: 0.9 INJECTION, SOLUTION INTRAVENOUS at 12:08

## 2022-08-16 RX ADMIN — ROCURONIUM BROMIDE 20 MG: 10 INJECTION INTRAVENOUS at 02:08

## 2022-08-16 RX ADMIN — ROPIVACAINE HYDROCHLORIDE 10 ML: 5 INJECTION, SOLUTION EPIDURAL; INFILTRATION; PERINEURAL at 12:08

## 2022-08-16 RX ADMIN — EPHEDRINE SULFATE 25 MG: 50 INJECTION INTRAVENOUS at 12:08

## 2022-08-16 RX ADMIN — ROPIVACAINE HYDROCHLORIDE 0.1 ML/HR: 2 INJECTION, SOLUTION EPIDURAL; INFILTRATION at 04:08

## 2022-08-16 RX ADMIN — SUGAMMADEX 200 MG: 100 INJECTION, SOLUTION INTRAVENOUS at 04:08

## 2022-08-16 RX ADMIN — PROCHLORPERAZINE EDISYLATE 5 MG: 5 INJECTION INTRAMUSCULAR; INTRAVENOUS at 03:08

## 2022-08-16 RX ADMIN — LATANOPROST 1 DROP: 50 SOLUTION OPHTHALMIC at 09:08

## 2022-08-16 RX ADMIN — SODIUM CHLORIDE, SODIUM GLUCONATE, SODIUM ACETATE, POTASSIUM CHLORIDE, MAGNESIUM CHLORIDE, SODIUM PHOSPHATE, DIBASIC, AND POTASSIUM PHOSPHATE: .53; .5; .37; .037; .03; .012; .00082 INJECTION, SOLUTION INTRAVENOUS at 03:08

## 2022-08-16 RX ADMIN — MIDAZOLAM 1 MG: 1 INJECTION INTRAMUSCULAR; INTRAVENOUS at 12:08

## 2022-08-16 RX ADMIN — CLINDAMYCIN IN 5 PERCENT DEXTROSE 900 MG: 18 INJECTION, SOLUTION INTRAVENOUS at 06:08

## 2022-08-16 RX ADMIN — DEXAMETHASONE SODIUM PHOSPHATE 4 MG: 4 INJECTION INTRA-ARTICULAR; INTRALESIONAL; INTRAMUSCULAR; INTRAVENOUS; SOFT TISSUE at 01:08

## 2022-08-16 RX ADMIN — FENTANYL CITRATE 100 MCG: 0.05 INJECTION, SOLUTION INTRAMUSCULAR; INTRAVENOUS at 12:08

## 2022-08-16 RX ADMIN — SENNOSIDES AND DOCUSATE SODIUM 1 TABLET: 50; 8.6 TABLET ORAL at 09:08

## 2022-08-16 RX ADMIN — ASPIRIN 81 MG: 81 TABLET, COATED ORAL at 09:08

## 2022-08-16 RX ADMIN — MUPIROCIN: 20 OINTMENT TOPICAL at 11:08

## 2022-08-16 RX ADMIN — SODIUM CHLORIDE 0.25 MCG/KG/MIN: 9 INJECTION, SOLUTION INTRAVENOUS at 01:08

## 2022-08-16 RX ADMIN — MIDAZOLAM 2 MG: 1 INJECTION INTRAMUSCULAR; INTRAVENOUS at 11:08

## 2022-08-16 RX ADMIN — ROCURONIUM BROMIDE 30 MG: 10 INJECTION INTRAVENOUS at 02:08

## 2022-08-16 RX ADMIN — CEFAZOLIN 2 G: 330 INJECTION, POWDER, FOR SOLUTION INTRAMUSCULAR; INTRAVENOUS at 01:08

## 2022-08-16 RX ADMIN — TRANEXAMIC ACID 1000 MG: 100 INJECTION, SOLUTION INTRAVENOUS at 03:08

## 2022-08-16 RX ADMIN — SODIUM CHLORIDE, SODIUM GLUCONATE, SODIUM ACETATE, POTASSIUM CHLORIDE, MAGNESIUM CHLORIDE, SODIUM PHOSPHATE, DIBASIC, AND POTASSIUM PHOSPHATE: .53; .5; .37; .037; .03; .012; .00082 INJECTION, SOLUTION INTRAVENOUS at 01:08

## 2022-08-16 RX ADMIN — INSULIN DETEMIR 18 UNITS: 100 INJECTION, SOLUTION SUBCUTANEOUS at 11:08

## 2022-08-16 RX ADMIN — VANCOMYCIN HYDROCHLORIDE 1000 MG: 1 INJECTION, POWDER, LYOPHILIZED, FOR SOLUTION INTRAVENOUS at 10:08

## 2022-08-16 RX ADMIN — PROPOFOL 140 MG: 10 INJECTION, EMULSION INTRAVENOUS at 12:08

## 2022-08-16 RX ADMIN — OXYCODONE 5 MG: 5 TABLET ORAL at 07:08

## 2022-08-16 NOTE — ASSESSMENT & PLAN NOTE
Patient's FSGs are controlled on current medication regimen. Patient reports she takes Lantus insulin 28 units nightly at home and 12 unit of Humalog insulin with breakfast and 14-16 units of Humalog insulin with lunch and dinner.   Last A1c reviewed-   Lab Results   Component Value Date    HGBA1C 6.4 (H) 08/14/2022     Most recent fingerstick glucose reviewed-   Recent Labs   Lab 08/15/22  2113 08/16/22  1113 08/16/22  1655   POCTGLUCOSE 197* 184* 256*     Current correctional scale  Low  Maintain anti-hyperglycemic dose as follows-     · DM x over 20 years.  · Patient started on Levemir insulin 18 units nightly in hospital as wish to lower dosing to avoid hypoglycemia and use Novolog insulin 6 units with meals.   · Adjust insulin dosing for goal blood sugars 140-180 in hospital.   · Monitor POCT glucose 4 times daily with meals and at bedtime.

## 2022-08-16 NOTE — ANESTHESIA PROCEDURE NOTES
Intubation    Date/Time: 8/16/2022 12:31 PM  Performed by: Janice Wynne CRNA  Authorized by: Cathie Castro MD     Intubation:     Induction:  Intravenous    Intubated:  Postinduction    Mask Ventilation:  Easy mask    Attempts:  1    Attempted By:  CRNA    Method of Intubation:  Video laryngoscopy    Blade:  Verde 3    Laryngeal View Grade: Grade I - full view of cords      Difficult Airway Encountered?: No      Complications:  None    Airway Device Size:  7.0    Style/Cuff Inflation:  Cuffed    Inflation Amount (mL):  6    Tube secured:  22    Secured at:  The lips    Placement Verified By:  Capnometry    Complicating Factors:  None    Findings Post-Intubation:  BS equal bilateral

## 2022-08-16 NOTE — ASSESSMENT & PLAN NOTE
Anterior and inferior dislocation of right shoulder  · Orthopedics consulted and noted comminuted fracture of right proximal humerus and anterior dislocation and note due to nature of fracture would not heal and thus needs surgical intervention.  · Orthopedics planning on taking patient to OR today, 8/16 for reverse total right shoulder replacement.   · Patient to be non weight bearing to right upper arm at this time as per Ortho.   · Continue pain management with scheduled Tylenol 1000 mg po every 8 hours and Flexeril 5 mg po every 8 hours prn muscle spasms + Oxycodone IR 5 mg po prn breakthrough pain.   · Hold chemical DVT prophylaxis for now as patient going to OR today and will use NATE/SCDs for DVT prophylaxis for now.

## 2022-08-16 NOTE — ASSESSMENT & PLAN NOTE
Vitamin D level noted to be low at 14 consistent with deficiency so will started on replacement with Vitamin D 50,000 units po weekly to treat and will need to continue on hospital discharge.

## 2022-08-16 NOTE — SUBJECTIVE & OBJECTIVE
Interval History: Spoke with patient at bedside using  via language line on Ipad. Patient reports her night was okay last night but every time she moved her right arm it hurt. Patient going to OR today for right shoulder replacement by Orthopedics to repair right humerus fracture with anterior/inferior dislocation. Patient states otherwise no real complaints except her shoulder.     Review of Systems   Constitutional:  Negative for fever.   Respiratory:  Negative for cough and shortness of breath.    Cardiovascular:  Negative for chest pain and leg swelling.   Gastrointestinal:  Negative for abdominal pain, nausea and vomiting.   Musculoskeletal:  Positive for arthralgias (Right shoulder and right knee).   Skin:  Positive for wound (Abrasion to anterior right knee).   Neurological:  Negative for dizziness and light-headedness.   Psychiatric/Behavioral:  Negative for agitation and confusion.    Objective:     Vital Signs (Most Recent):  Temp: 98.2 °F (36.8 °C) (08/16/22 1653)  Pulse: 73 (08/16/22 1208)  Resp: 16 (08/16/22 1208)  BP: 149/70 (08/16/22 1205)  SpO2: 96 % (08/16/22 1208) on room air   Vital Signs (24h Range):  Temp:  [98 °F (36.7 °C)-98.9 °F (37.2 °C)] 98.2 °F (36.8 °C)  Pulse:  [61-79] 73  Resp:  [16-20] 16  SpO2:  [95 %-99 %] 96 %  BP: (138-185)/(62-80) 149/70     Weight: 81.2 kg (179 lb)  Body mass index is 29.79 kg/m².    Intake/Output Summary (Last 24 hours) at 8/16/2022 1654  Last data filed at 8/16/2022 1609  Gross per 24 hour   Intake 2300 ml   Output 630 ml   Net 1670 ml      Physical Exam  Vitals and nursing note reviewed.   Constitutional:       General: She is awake. She is not in acute distress.     Appearance: Normal appearance. She is well-developed and normal weight. She is not ill-appearing.   Eyes:      General: No scleral icterus.  Neck:      Vascular: No JVD.   Cardiovascular:      Rate and Rhythm: Normal rate and regular rhythm.      Heart sounds: Normal heart sounds. No  murmur heard.    No friction rub. No gallop.   Pulmonary:      Effort: Pulmonary effort is normal. No respiratory distress.      Breath sounds: Normal breath sounds. No wheezing.   Abdominal:      General: Abdomen is flat. Bowel sounds are normal. There is no distension.      Palpations: Abdomen is soft.      Tenderness: There is no abdominal tenderness.   Musculoskeletal:      Right lower leg: No edema.      Left lower leg: No edema.   Skin:     Findings: Abrasion (Right anterior knee) present.   Neurological:      Mental Status: She is alert and oriented to person, place, and time.   Psychiatric:         Mood and Affect: Mood normal.         Behavior: Behavior normal. Behavior is cooperative.         Thought Content: Thought content normal.         Judgment: Judgment normal.       Significant Labs: CBC:   Recent Labs   Lab 08/15/22  0558 08/16/22  0310   WBC 7.98 7.97   HGB 11.1* 11.1*   HCT 32.1* 32.5*    197     CMP:   Recent Labs   Lab 08/15/22  0558 08/16/22  0310    137   K 4.4 4.3    103   CO2 26 26   * 223*   BUN 17 13   CREATININE 0.8 0.8   CALCIUM 8.8 8.7   PROT 6.5 6.6   ALBUMIN 3.4* 3.5   BILITOT 0.7 0.7   ALKPHOS 85 90   AST 26 22   ALT 24 23   ANIONGAP 9 8       Significant Imaging: I have reviewed all pertinent imaging results/findings within the past 24 hours.

## 2022-08-16 NOTE — PROGRESS NOTES
Ishan Poole - Surgery ([facility])  [facility] Medicine  Progress Note    Patient Name: Riana Kay  MRN: [id number]  Patient Class: IP- Inpatient   Admission Date: 8/14/2022  Length of Stay: 1 days  Attending Physician: Jany Guaman MD  Primary Care Provider: Jose Rojas MD        Subjective:     Principal Problem:Closed fracture of right proximal humerus        HPI:  Riana Kay is a 67 y.o. female who has a past medical history of Anticoagulant long-term use, Anxiety, Atrophic gastritis without mention of hemorrhage, Chronic fatigue syndrome, Diabetes mellitus, Dizziness, Fatty liver, GERD, Gross hematuria, HTN, Hyperlipidemia, Leg swelling, Memory loss, Osteopenia, Primary osteoarthritis of right knee, Primary osteoarthritis of right knee, S/P total hysterectomy, Sleep apnea, and Status post total b/l knee replacements, presented to the ED with CC of Shoulder and Knee pain s/p mechanical fall. Patient was at [employer], was walking to her car and tripped and fell into a parked car. She did not hit her head, and furthermore CTH was negative for acute bleed. She was in quite a bit of pain, 150 mcg of fentanyl given en route. Patient became nauseous and vomited. CT shoulder showed comminuted fracture of humeral head and neck with impaction and anterior inferior dislocation of the humeral head, moderate volume lipohemarthrosis. Ortho was consulted in ED. Patient denies CP, AP, or SOB.         Overview/Hospital Course:  Orthopedics consulted in ED and evaluated patient and recommended nonweightbearing to right upper extremity for comminuted right proximal humerus fracture with anterior dislocation. Ortho recommending operative repair of fracture as will not heal correctly on its own and plan is for patient to go to OR on 8/16/2022 for right reverse total shoulder replacement.       Interval History: Spoke with patient at bedside using  via language line on Ipad. Patient reports her night was  okay last night but every time she moved her right arm it hurt. Patient going to OR today for right shoulder replacement by Orthopedics to repair right humerus fracture with anterior/inferior dislocation. Patient states otherwise no real complaints except her shoulder.     Review of Systems   Constitutional:  Negative for fever.   Respiratory:  Negative for cough and shortness of breath.    Cardiovascular:  Negative for chest pain and leg swelling.   Gastrointestinal:  Negative for abdominal pain, nausea and vomiting.   Musculoskeletal:  Positive for arthralgias (Right shoulder and right knee).   Skin:  Positive for wound (Abrasion to anterior right knee).   Neurological:  Negative for dizziness and light-headedness.   Psychiatric/Behavioral:  Negative for agitation and confusion.    Objective:     Vital Signs (Most Recent):  Temp: 98.2 °F (36.8 °C) (08/16/22 1653)  Pulse: 73 (08/16/22 1208)  Resp: 16 (08/16/22 1208)  BP: 149/70 (08/16/22 1205)  SpO2: 96 % (08/16/22 1208) on room air   Vital Signs (24h Range):  Temp:  [98 °F (36.7 °C)-98.9 °F (37.2 °C)] 98.2 °F (36.8 °C)  Pulse:  [61-79] 73  Resp:  [16-20] 16  SpO2:  [95 %-99 %] 96 %  BP: (138-185)/(62-80) 149/70     Weight: 81.2 kg (179 lb)  Body mass index is 29.79 kg/m².    Intake/Output Summary (Last 24 hours) at 8/16/2022 1654  Last data filed at 8/16/2022 1609  Gross per 24 hour   Intake 2300 ml   Output 630 ml   Net 1670 ml      Physical Exam  Vitals and nursing note reviewed.   Constitutional:       General: She is awake. She is not in acute distress.     Appearance: Normal appearance. She is well-developed and normal weight. She is not ill-appearing.   Eyes:      General: No scleral icterus.  Neck:      Vascular: No JVD.   Cardiovascular:      Rate and Rhythm: Normal rate and regular rhythm.      Heart sounds: Normal heart sounds. No murmur heard.    No friction rub. No gallop.   Pulmonary:      Effort: Pulmonary effort is normal. No respiratory distress.       Breath sounds: Normal breath sounds. No wheezing.   Abdominal:      General: Abdomen is flat. Bowel sounds are normal. There is no distension.      Palpations: Abdomen is soft.      Tenderness: There is no abdominal tenderness.   Musculoskeletal:      Right lower leg: No edema.      Left lower leg: No edema.   Skin:     Findings: Abrasion (Right anterior knee) present.   Neurological:      Mental Status: She is alert and oriented to person, place, and time.   Psychiatric:         Mood and Affect: Mood normal.         Behavior: Behavior normal. Behavior is cooperative.         Thought Content: Thought content normal.         Judgment: Judgment normal.       Significant Labs: CBC:   Recent Labs   Lab 08/15/22  0558 08/16/22 0310   WBC 7.98 7.97   HGB 11.1* 11.1*   HCT 32.1* 32.5*    197     CMP:   Recent Labs   Lab 08/15/22  0558 08/16/22 0310    137   K 4.4 4.3    103   CO2 26 26   * 223*   BUN 17 13   CREATININE 0.8 0.8   CALCIUM 8.8 8.7   PROT 6.5 6.6   ALBUMIN 3.4* 3.5   BILITOT 0.7 0.7   ALKPHOS 85 90   AST 26 22   ALT 24 23   ANIONGAP 9 8       Significant Imaging: I have reviewed all pertinent imaging results/findings within the past 24 hours.      Assessment/Plan:      * Closed fracture of right proximal humerus  Anterior and inferior dislocation of right shoulder  · Orthopedics consulted and noted comminuted fracture of right proximal humerus and anterior dislocation and note due to nature of fracture would not heal and thus needs surgical intervention.  · Orthopedics planning on taking patient to OR today, 8/16 for reverse total right shoulder replacement.   · Patient to be non weight bearing to right upper arm at this time as per Ortho.   · Continue pain management with scheduled Tylenol 1000 mg po every 8 hours and Flexeril 5 mg po every 8 hours prn muscle spasms + Oxycodone IR 5 mg po prn breakthrough pain.   · Hold chemical DVT prophylaxis for now as patient going to OR  today and will use NATE/SCDs for DVT prophylaxis for now.     Type 2 diabetes mellitus without complication, with long-term current use of insulin  Patient's FSGs are controlled on current medication regimen. Patient reports she takes Lantus insulin 28 units nightly at home and 12 unit of Humalog insulin with breakfast and 14-16 units of Humalog insulin with lunch and dinner.   Last A1c reviewed-   Lab Results   Component Value Date    HGBA1C 6.4 (H) 08/14/2022     Most recent fingerstick glucose reviewed-   Recent Labs   Lab 08/15/22  2113 08/16/22  1113 08/16/22  1655   POCTGLUCOSE 197* 184* 256*     Current correctional scale  Low  Maintain anti-hyperglycemic dose as follows-     · DM x over 20 years.  · Patient started on Levemir insulin 18 units nightly in hospital as wish to lower dosing to avoid hypoglycemia and use Novolog insulin 6 units with meals.   · Adjust insulin dosing for goal blood sugars 140-180 in hospital.   · Monitor POCT glucose 4 times daily with meals and at bedtime.     Primary hypertension  · Patient's blood pressure is controlled here in the hospital over past 24 hours.   · Goal for blood pressure is SBP < 150 and DBP < 90 as patient > or = 60 years of age with no diabetes or advanced kidney disease based on JNC 8 guidelines.   · Continue current treatment regimen of Losartan 25 mg po daily.   · Plan is to monitor patient's blood pressure routinely while patient is hospitalized.     Atherosclerosis of native coronary artery of native heart without angina pectoris  Chronic and controlled. Patient on no active meds at home to treat.     Fatty liver  Noted on previous imaging and stable. LFT's normal.    ZAHIRA (obstructive sleep apnea)  Chronic and controlled. Patient on CPAP at home.       Muscle spasms of neck  Controlled. Patient on Flexeril prn to treat and will continue.     Gastroesophageal reflux disease without esophagitis  Chronic and controlled. Patient on Protonix 40 mg po daily.        Pathological fracture of right humerus due to osteoporosis with routine healing  · Had DEXA in 2020  · Needs repeat DEXA as outpatient.  · Continue Vitamin D and Calcium replacement and referral for osteoporosis specific treatment to Orthopedic fragility clinic.       Vitamin D deficiency  Vitamin D level noted to be low at 14 consistent with deficiency so will started on replacement with Vitamin D 50,000 units po weekly to treat and will need to continue on hospital discharge.       VTE Risk Mitigation (From admission, onward)         Ordered     Place sequential compression device  Until discontinued         08/14/22 1754     IP VTE LOW RISK PATIENT  Once         08/14/22 1754                Discharge Planning   ELIESER: 8/17/2022     Code Status: Full Code   Is the patient medically ready for discharge?: No    Reason for patient still in hospital (select all that apply): Patient trending condition         Jany Guaman MD  Department of Hospital Medicine   [facility] - Surgery ([facility])

## 2022-08-16 NOTE — SUBJECTIVE & OBJECTIVE
Principal Problem:Closed fracture of right proximal humerus    Principal Orthopedic Problem: Same    Interval History: Pt seen and examined at bedside. NAEON. Pain controlled. Sling in place.  VSS, AF. No other concerns stated.     Review of patient's allergies indicates:   Allergen Reactions    Iodinated contrast media Swelling and Rash    Percocet [oxycodone-acetaminophen] Itching    Macrobid [nitrofurantoin monohyd/m-cryst] Rash    Metformin Rash    Penicillins Rash     Had ancef in 2020 with no adverse rxn     Promethazine Rash     Had compazine in 2021    Sulfa (sulfonamide antibiotics) Rash    Sulfamethoxazole-trimethoprim Rash       Current Facility-Administered Medications   Medication    acetaminophen tablet 1,000 mg    bisacodyL suppository 10 mg    calcium carbonate 200 mg calcium (500 mg) chewable tablet 1,000 mg    cyclobenzaprine tablet 5 mg    dextrose 10% bolus 125 mL    dextrose 10% bolus 250 mL    diclofenac sodium 1 % gel 2 g    ergocalciferol capsule 50,000 Units    glucagon (human recombinant) injection 1 mg    glucose chewable tablet 16 g    glucose chewable tablet 24 g    insulin aspart U-100 pen 0-5 Units    insulin aspart U-100 pen 6 Units    insulin detemir U-100 pen 18 Units    latanoprost 0.005 % ophthalmic solution 1 drop    meclizine tablet 25 mg    melatonin tablet 6 mg    morphine injection 2 mg    ondansetron disintegrating tablet 8 mg    oxyCODONE immediate release tablet 5 mg    pantoprazole EC tablet 40 mg    polyethylene glycol packet 17 g    prochlorperazine injection Soln 5 mg    senna-docusate 8.6-50 mg per tablet 1 tablet    sodium chloride 0.9% flush 10 mL    sodium chloride 0.9% flush 10 mL     Objective:     Vital Signs (Most Recent):  Temp: 98 °F (36.7 °C) (08/15/22 2352)  Pulse: 65 (08/16/22 0435)  Resp: 20 (08/16/22 0435)  BP: (!) 148/66 (08/15/22 2352)  SpO2: 95 % (08/16/22 0435)   Vital Signs (24h Range):  Temp:  [98 °F (36.7 °C)-98.9 °F (37.2 °C)] 98 °F (36.7  °C)  Pulse:  [58-66] 65  Resp:  [16-20] 20  SpO2:  [91 %-97 %] 95 %  BP: (136-148)/(63-79) 148/66           There is no height or weight on file to calculate BMI.      Intake/Output Summary (Last 24 hours) at 8/16/2022 0635  Last data filed at 8/16/2022 0500  Gross per 24 hour   Intake 100 ml   Output --   Net 100 ml       Ortho/SPM Exam    Vitals: Afebrile.  Vital signs stable.  General: No acute distress.  Cardio: Regular rate.  Chest: No increased work of breathing.      Right Upper Extremity    -Skin intact, edema about the shoulder  -TTP about shoulder  -Compartments soft and compressible  -SILT M/R/U  -Motor intact Ain/PIN/U/M  -Brisk cap refill  -Warm well perfused extremities  -2+ Radial palpable      Significant Labs: BMP:   Recent Labs   Lab 08/14/22  1317 08/15/22  0558 08/16/22  0310   *   < > 223*      < > 137   K 4.0   < > 4.3      < > 103   CO2 20*   < > 26   BUN 15   < > 13   CREATININE 0.7   < > 0.8   CALCIUM 8.8   < > 8.7   MG 2.0  --   --     < > = values in this interval not displayed.     CBC:   Recent Labs   Lab 08/14/22  1317 08/15/22  0558 08/16/22  0310   WBC 11.66 7.98 7.97   HGB 12.0 11.1* 11.1*   HCT 34.9* 32.1* 32.5*    196 197     CMP:   Recent Labs   Lab 08/14/22  1317 08/15/22  0558 08/16/22  0310    139 137   K 4.0 4.4 4.3    104 103   CO2 20* 26 26   * 131* 223*   BUN 15 17 13   CREATININE 0.7 0.8 0.8   CALCIUM 8.8 8.8 8.7   PROT 7.1 6.5 6.6   ALBUMIN 3.8 3.4* 3.5   BILITOT 0.5 0.7 0.7   ALKPHOS 82 85 90   AST 26 26 22   ALT 26 24 23   ANIONGAP 12 9 8     All pertinent labs within the past 24 hours have been reviewed.    Significant Imaging: I have reviewed all pertinent imaging results/findings.

## 2022-08-16 NOTE — NURSING TRANSFER
Nursing Transfer Note      8/16/2022     Reason patient is being transferred: to room post surgical recovery    Transfer to 505    Transfer via bed    Transfer with O2 @ 2lpm    Transported by transport staff    Medicines sent: Ropivacaine infusing per perineural catheter via sapphire pump    Any special needs or follow-up needed: Shoulder immobilizer in place    Chart send with patient: Yes    Notified: Spouse    Patient reassessed at: 8/16/22 @ 1755    Upon arrival to floor: DEANDRE Pineda on POSS

## 2022-08-16 NOTE — ASSESSMENT & PLAN NOTE
Riana Samuel Kay is a 67 y.o. female with a right proximal humerus fracture and anterior dislocation. Closed, NVI.    - NWB RUE, no ROM of RUE  - Sling for comfort  - No NV changes since presentation, pain controlled  - Pain control: MM  - DVT PPx: per primary, ok to continue to OR  - Consented for rTSA  - NPO MN for OR today

## 2022-08-16 NOTE — ANESTHESIA PROCEDURE NOTES
R interscalene catheter    Patient location during procedure: pre-op   Block not for primary anesthetic.  Reason for block: at surgeon's request and post-op pain management   Post-op Pain Location: right shoulder pain   Start time: 8/16/2022 12:00 PM  Timeout: 8/16/2022 12:00 PM   End time: 8/16/2022 12:15 PM    Staffing  Authorizing Provider: Katja Irwin MD  Performing Provider: Julianna Mares MD    Preanesthetic Checklist  Completed: patient identified, IV checked, site marked, risks and benefits discussed, surgical consent, monitors and equipment checked, pre-op evaluation and timeout performed  Peripheral Block  Patient position: sitting  Prep: ChloraPrep and site prepped and draped  Patient monitoring: heart rate, cardiac monitor, continuous pulse ox, continuous capnometry and frequent blood pressure checks  Block type: interscalene  Laterality: right  Injection technique: continuous  Needle  Needle type: Tuohy   Needle gauge: 18 G  Needle length: 2 in  Needle localization: anatomical landmarks and ultrasound guidance  Catheter type: non-stimulating  Catheter size: 20 G  Test dose: lidocaine 1.5% with Epi 1-to-200,000 and negative   -ultrasound image captured on disc.  Assessment  Injection assessment: negative aspiration, negative parasthesia and local visualized surrounding nerve  Paresthesia pain: none  Heart rate change: no  Slow fractionated injection: yes  Pain Tolerance: no complaints and comfortable throughout block  Medications:    Medications: ropivacaine (NAROPIN) injection 0.5% - Perineural   10 mL - 8/16/2022 12:29:00 PM    Additional Notes  VSS.  [facility] RN monitoring vitals throughout procedure.  Patient tolerated procedure well.     20 cc of 0.25% ropiv administered

## 2022-08-16 NOTE — OP NOTE
OPERATIVE NOTE     DATE OF PROCEDURE:  08/16/2022     PREOPERATIVE DIAGNOSIS:           Right 4 part proximal humerus fracture dislocation, closed, displaced, initial encounter  Fall from standing     POSTOPERATIVE DIAGNOSIS:         Right 4 part proximal humerus fracture dislocation, closed, displaced, initial encounter  Right proximal biceps tendonopathy  Fall from standing     PROCEDURE:              Right reverse total shoulder arthroplasty for fracture  Right biceps tenodesis     SURGEON:       Aamir Powell MD     ASSISTANT:                 Isiah Ragsdale MD     ANESTHESIA:              General with regional block     EBL:                  250 mL     COMPLICATIONS:  None     IMPLANTS:       [place] reverse total shoulder (Reunion RFX)  28 mm glenoid base plate  6.5 mm center screw, 28 mm  4.5 mm locking screw, x4 (24, 16, 20, 24)  32 mm glenosphere, +6  Shoulder fracture stem, size 7  32 mm cup +4  32 mm poly +8  Simplex bone cement     Vancomycin powder, 1g     SPECIMENS:    Proximal humerus sent to pathology     INDICATIONS FOR PROCEDURE:  67F, PMH osteopenia, ZAHIRA, HTN, DM  Mult prior ortho surgeries:bilat TKA, R radial head replacement, R rotator cuff repair  Fall 8.14.22  Right proximal humerus fx/dislocation, 4 part  Admitted to the hospital for further evaluation treatment.    At the time my evaluation patient complained of isolated pain in her right shoulder 7/10, sharp, stabbing, worse with motion improved with sling in bed rest.    Lives at home with   Right-hand-dominant  Independent community ambulator  DM, HbA1c 6.4  No tobacco use  Denies hx of heart attack, stroke, blood clot, cancer         Counseled patient and family on diagnosis of Right proximal humerus fracture dislocation.  Discussed both non operative operative management options.  Non operative management in the setting of a significantly displaced fracture with dislocation would result in significantly poor shoulder  function.  Operative management the form open reduction internal fixation, in setting of severe osteoporotic bone, comminuted fracture, likely have high rate of complication/failure.  Discussed operative intervention the form of reverse total shoulder arthroplasty, which would potentially give her the best overall chance of long-term function, and recovery.       The risks, benefits, and alternatives to surgery were discussed with the patient and/or family.     Specific risks discussed included, but were not limited to:  Failure of tuberosities to heal, shoulder stiffness, dislocation, damage to nearby neurovascular structures, wound, hematoma, infection, need for revision surgery, malfunction of components, damage to nearby structures, including neurovascular structures leading to loss of function or loss of limb, bleeding, need for blood transfusion, pain, stiffness, scarring, numbness, tingling, weakness, compartment syndrome, malunion/nonunion, hardware failure, hardware prominence, infection, need for multiple staged procedures, prolonged antibiotics, iatrogenic fracture, heterotopic ossification, arthritis, a variety of medical complications including but not limited to heart attack, stroke, deep venous thrombosis, pulmonary embolism, prolonged hospitalization, prolonged intubation, and death.    Patient and/or family expressed an understanding and desires to proceed with surgery.   All questions were answered.  No guarantees were implied or stated.  Informed consent was obtained.         OPERATIVE PROCEDURE:  Patient in the preop hold area the correct site and side of surgery being the Right shoulder marked and verified.  Regional block placed by our anesthesia colleagues.  Patient brought back to the operative suite.  General anesthesia smoothly induced. Patient transferred to the operative table placed in beach chair position.  Head rest was secured.  Contralateral arm was placed into arm weaver.  All bony  prominences were fully padded. Pillows were placed behind the knees to prevent sliding.  Right upper extremity was pre prepped with chlorhexidine, peroxide, alcohol. Operative upper extremity then prepped and draped in normal sterile fashion.  Patient received preoperative antibiotics of ancef 2g, vancomycin 1g.       Time-out was performed verifying the correct patient, site/side of surgery, surgical consent, radiographs as applicable, preop antibiotics, necessary equipment, anticipated blood loss, length of procedure, postoperative disposition.     Planned the deltopectoral incision starting at the coracoid aiming towards the lateral aspect the biceps.  Skin was incised with scalpel.  Hemostasis was achieved as needed with electrocautery.  Subcutaneous tissue was divided.  Deltopectoral interval was developed with use of a Rosales.  Cephalic vein was retracted laterally. Subdeltoid space was entered. We were able to palpate the axillary nerve in the posterior lateral corner of the shoulder.  It was protected throughout the case.  We placed a deltoid retractor.  Medially we placed a retractor at the conjoined tendon as needed throughout the case.      Then split the rotator interval with scissor up to the base of the glenoid.  We removed further bursal tissue, poor quality tissue from the rotator cuff segments.      The biceps tendon was identified, there was significant tendinopathy and tendon frame.  We then performed a biceps tenodesis to the pectoralis major insertion with 1 Vicryl.  The biceps was then cut, and the remaining proximal segment was removed.    The tuberosities both anteriorly and posteriorly where identified, the fracture was significantly comminuted and her multiple loose pieces of bone.  The humeral head was noted to be dislocated anteriorly, and its orientation was flipped laterally.  This was removed with the use of Rosales.  There was minimal to no soft tissue attachments and minimal remaining  bone stock.  We were then able to remove other bony shards from the fracture zone.  We tagged the anterior cuff with 1 Vicryl suture x2.  We then tagged the posterior cuff with 1 Vicryl suture x2 to allow retraction during the case.     Next we turned our attention to preparation of the glenoid.  Glenoid retractors were placed posterior and anterior.  The labrum was excised. We marked the axis of the glenoid.  Drill guide was utilized and placed low in the glenoid in a central position. Central guide pin was inserted to appropriate bicortical depth. Reamer was used to clear all cartilage to healthy bleeding subchondral bone. Appropriate size base plate was selected and the appropriate size central screw was inserted with nice secure fit.  4 peripheral bicortical screws were then drilled and placed in locked fashion. The base plate was irrigated and dried.  The appropriate size glenoid sphere was impacted onto the Conrad taper for good secure fit.      Next we turned our attention to preparation of the humerus.  The arm was externally rotated and the humerus was presented into the field of view.  Reamers were utilized until appropriate canal fit was obtained.  The appropriate size broach was inserted.  The set screw was used to tighten the broach to the appropriate height and version based on anatomic landmarks.  Trial cup was placed.  Component was reduced. Good stability and motion was noted with the patient able to lift arm past head, to belly, to hip.  There is no undue shuck or looseness.  There was appropriate tension on the soft tissues. Shoulder was dislocated.  Trial components were removed.       Cement restrictor was placed. Canal was irrigated with pulse lavage saline. Cement was mixed on the back table.  Cement was pressurized into the canal.       Stem was inserted at appropriate version, 30° and at appropriate height, consistent with our prior trial placement.  Cement was allowed harden.  Excess cement  was removed.  Wound was irrigated with saline.      Trial cup was then replaced.  Shoulder was reduced and found to be with good stability and appropriate soft tissue tension as the above parameters.  Shoulder was dislocated.  Trial components removed. Wound was irrigated and dried.      The anterior and posterior cuff structures did not appear to have enough quality, and length to be repaired.  Both the anterior and posterior cuff were then excised.     Final humeral cup component was impacted upon the Conrad taper.      Shoulder was relocated.  Shoulder had appropriate soft tissue tension and good range of motion as the above parameters.     Wounds thoroughly irrigated pulse lavage saline.  Hemostasis was achieved as needed with electrocautery. 1g Vancomycin applied deep in wound.      Deltopectoral interval closed with 1 Vicryl.  Deep subcutaneous tissue closed with 1 Vicryl.  Subcutaneous tissue closed with 2 O Vicryl. Skin closed with 3 Monocryl. Dermabond/Prineo applied.  Aquacel applied.  Shoulder sling applied.     Prior to final closure all counts were confirmed to be correct. Patient tolerated the procedure well without any complication, was extubated, transferred PACU for further recovery.     At the conclusion of procedure the patient had palpable radial pulse, brisk cap refill, soft compressible compartments in her operative extremity.     POSTOPERATIVE PLAN:  67F, DM, HTN, prior R rotator cuff repair  Fall 8.14.22, R prox humerus 4 part fx/dislocation     8.16.22 - R rTSA      Antibiotics x 24 hr  ASA 81 mg b.i.d. x6 weeks for DVT prophylaxis     Ca, Vit D supplementation  Boost  PT  Fragility fx clinic referral     RUE  Nonweightbearing x 12 wks     PHASE 1: now until 2 wks postop  ROMAT and hand, wrist, elbow.  Sling, may remove for hygiene and ADLs     PHASE 2: 2-6 wks postop  ROMAT and hand, wrist, elbow.  Shoulder Pendulums okay  Shoulder passive and active assisted ROMAT   Sling, may remove for  hygiene ADLs and therapy     PHASE 3: 6-12 wks postop   D/c sling   ROMAT RUE  No resistance     PHASE 4: after 12 wks postop  ROMAT, WBAT        X-rays at subsequent followups:  Right shoulder     Postop follow-up (Andre) at 2 weeks, 6 weeks, 3 months, 6 months, 1 year     =====================  Aamir Powell MD  Orthopaedic Surgery

## 2022-08-16 NOTE — PROGRESS NOTES
[facility]  Orthopedics  Progress Note    Patient Name: Riana Kay  MRN: [id number]  Admission Date: 8/14/2022  Hospital Length of Stay: 1 days  Attending Provider: Jany Guaman MD  Primary Care Provider: Jose Rojas MD  Follow-up For: Procedure(s) (LRB):  ARTHROPLASTY, SHOULDER, TOTAL, REVERSE, [place] (Right)    Post-Operative Day:    Subjective:     Principal Problem:Closed fracture of right proximal humerus    Principal Orthopedic Problem: Same    Interval History: Pt seen and examined at bedside. NAEON. Pain controlled. Sling in place.  VSS, AF. No other concerns stated.     Review of patient's allergies indicates:   Allergen Reactions    Iodinated contrast media Swelling and Rash    Percocet [oxycodone-acetaminophen] Itching    Macrobid [nitrofurantoin monohyd/m-cryst] Rash    Metformin Rash    Penicillins Rash     Had ancef in 2020 with no adverse rxn     Promethazine Rash     Had compazine in 2021    Sulfa (sulfonamide antibiotics) Rash    Sulfamethoxazole-trimethoprim Rash       Current Facility-Administered Medications   Medication    acetaminophen tablet 1,000 mg    bisacodyL suppository 10 mg    calcium carbonate 200 mg calcium (500 mg) chewable tablet 1,000 mg    cyclobenzaprine tablet 5 mg    dextrose 10% bolus 125 mL    dextrose 10% bolus 250 mL    diclofenac sodium 1 % gel 2 g    ergocalciferol capsule 50,000 Units    glucagon (human recombinant) injection 1 mg    glucose chewable tablet 16 g    glucose chewable tablet 24 g    insulin aspart U-100 pen 0-5 Units    insulin aspart U-100 pen 6 Units    insulin detemir U-100 pen 18 Units    latanoprost 0.005 % ophthalmic solution 1 drop    meclizine tablet 25 mg    melatonin tablet 6 mg    morphine injection 2 mg    ondansetron disintegrating tablet 8 mg    oxyCODONE immediate release tablet 5 mg    pantoprazole EC tablet 40 mg    polyethylene glycol packet 17 g    prochlorperazine injection Soln  5 mg    senna-docusate 8.6-50 mg per tablet 1 tablet    sodium chloride 0.9% flush 10 mL    sodium chloride 0.9% flush 10 mL     Objective:     Vital Signs (Most Recent):  Temp: 98 °F (36.7 °C) (08/15/22 2352)  Pulse: 65 (08/16/22 0435)  Resp: 20 (08/16/22 0435)  BP: (!) 148/66 (08/15/22 2352)  SpO2: 95 % (08/16/22 0435)   Vital Signs (24h Range):  Temp:  [98 °F (36.7 °C)-98.9 °F (37.2 °C)] 98 °F (36.7 °C)  Pulse:  [58-66] 65  Resp:  [16-20] 20  SpO2:  [91 %-97 %] 95 %  BP: (136-148)/(63-79) 148/66           There is no height or weight on file to calculate BMI.      Intake/Output Summary (Last 24 hours) at 8/16/2022 0635  Last data filed at 8/16/2022 0500  Gross per 24 hour   Intake 100 ml   Output --   Net 100 ml       Ortho/SPM Exam    Vitals: Afebrile.  Vital signs stable.  General: No acute distress.  Cardio: Regular rate.  Chest: No increased work of breathing.      Right Upper Extremity    -Skin intact, edema about the shoulder  -TTP about shoulder  -Compartments soft and compressible  -SILT M/R/U  -Motor intact Ain/PIN/U/M  -Brisk cap refill  -Warm well perfused extremities  -2+ Radial palpable      Significant Labs: BMP:   Recent Labs   Lab 08/14/22  1317 08/15/22  0558 08/16/22  0310   *   < > 223*      < > 137   K 4.0   < > 4.3      < > 103   CO2 20*   < > 26   BUN 15   < > 13   CREATININE 0.7   < > 0.8   CALCIUM 8.8   < > 8.7   MG 2.0  --   --     < > = values in this interval not displayed.     CBC:   Recent Labs   Lab 08/14/22  1317 08/15/22  0558 08/16/22  0310   WBC 11.66 7.98 7.97   HGB 12.0 11.1* 11.1*   HCT 34.9* 32.1* 32.5*    196 197     CMP:   Recent Labs   Lab 08/14/22  1317 08/15/22  0558 08/16/22  0310    139 137   K 4.0 4.4 4.3    104 103   CO2 20* 26 26   * 131* 223*   BUN 15 17 13   CREATININE 0.7 0.8 0.8   CALCIUM 8.8 8.8 8.7   PROT 7.1 6.5 6.6   ALBUMIN 3.8 3.4* 3.5   BILITOT 0.5 0.7 0.7   ALKPHOS 82 85 90   AST 26 26 22   ALT 26 24 23    ANIONGAP 12 9 8     All pertinent labs within the past 24 hours have been reviewed.    Significant Imaging: I have reviewed all pertinent imaging results/findings.    Assessment/Plan:     * Closed fracture of right proximal humerus  Riana Kay is a 67 y.o. female with a right proximal humerus fracture and anterior dislocation. Closed, NVI.    - NWB RUE, no ROM of RUE  - Sling for comfort  - No NV changes since presentation, pain controlled  - Pain control: MM  - DVT PPx: per primary, ok to continue to OR  - Consented for rTSA  - NPO MN for OR today          Allen Ragsdale MD  Orthopedics  [facility] - Surgery

## 2022-08-16 NOTE — TRANSFER OF CARE
Anesthesia Transfer of Care Note    Patient: Riana Kay    Procedure(s) Performed: Procedure(s) (LRB):  ARTHROPLASTY, SHOULDER, TOTAL, REVERSE, [place] (Right)    Patient location: PACU    Anesthesia Type: general    Transport from OR: Transported from OR on 6-10 L/min O2 by face mask with adequate spontaneous ventilation    Post pain: adequate analgesia    Post assessment: no apparent anesthetic complications    Post vital signs: stable    Level of consciousness: awake    Nausea/Vomiting: no nausea/vomiting    Transfer of care protocol was followed      Last vitals:   Visit Vitals  BP (!) 152/67 (BP Location: Left arm, Patient Position: Lying)   Pulse 83   Temp 36.8 °C (98.2 °F) (Temporal)   Resp 18   Wt 81.2 kg (179 lb)   SpO2 97%   Breastfeeding No   BMI 29.79 kg/m²

## 2022-08-17 PROBLEM — S42.201D CLOSED FRACTURE OF PROXIMAL END OF RIGHT HUMERUS WITH ROUTINE HEALING: Status: ACTIVE | Noted: 2022-08-14

## 2022-08-17 LAB
ALBUMIN SERPL BCP-MCNC: 3.1 G/DL (ref 3.5–5.2)
ALP SERPL-CCNC: 83 U/L (ref 55–135)
ALT SERPL W/O P-5'-P-CCNC: 20 U/L (ref 10–44)
ANION GAP SERPL CALC-SCNC: 7 MMOL/L (ref 8–16)
AST SERPL-CCNC: 27 U/L (ref 10–40)
BASOPHILS # BLD AUTO: 0.01 K/UL (ref 0–0.2)
BASOPHILS NFR BLD: 0.1 % (ref 0–1.9)
BILIRUB SERPL-MCNC: 0.9 MG/DL (ref 0.1–1)
BUN SERPL-MCNC: 9 MG/DL (ref 8–23)
CALCIUM SERPL-MCNC: 8.4 MG/DL (ref 8.7–10.5)
CHLORIDE SERPL-SCNC: 103 MMOL/L (ref 95–110)
CO2 SERPL-SCNC: 26 MMOL/L (ref 23–29)
CREAT SERPL-MCNC: 0.7 MG/DL (ref 0.5–1.4)
DIFFERENTIAL METHOD: ABNORMAL
EOSINOPHIL # BLD AUTO: 0 K/UL (ref 0–0.5)
EOSINOPHIL NFR BLD: 0.2 % (ref 0–8)
ERYTHROCYTE [DISTWIDTH] IN BLOOD BY AUTOMATED COUNT: 12.2 % (ref 11.5–14.5)
EST. GFR  (NO RACE VARIABLE): >60 ML/MIN/1.73 M^2
GLUCOSE SERPL-MCNC: 246 MG/DL (ref 70–110)
HCT VFR BLD AUTO: 27.8 % (ref 37–48.5)
HGB BLD-MCNC: 9.9 G/DL (ref 12–16)
IMM GRANULOCYTES # BLD AUTO: 0.05 K/UL (ref 0–0.04)
IMM GRANULOCYTES NFR BLD AUTO: 0.6 % (ref 0–0.5)
LYMPHOCYTES # BLD AUTO: 1.4 K/UL (ref 1–4.8)
LYMPHOCYTES NFR BLD: 16.4 % (ref 18–48)
MCH RBC QN AUTO: 31.8 PG (ref 27–31)
MCHC RBC AUTO-ENTMCNC: 35.6 G/DL (ref 32–36)
MCV RBC AUTO: 89 FL (ref 82–98)
MONOCYTES # BLD AUTO: 0.7 K/UL (ref 0.3–1)
MONOCYTES NFR BLD: 8.5 % (ref 4–15)
NEUTROPHILS # BLD AUTO: 6.5 K/UL (ref 1.8–7.7)
NEUTROPHILS NFR BLD: 74.2 % (ref 38–73)
NRBC BLD-RTO: 0 /100 WBC
PLATELET # BLD AUTO: 190 K/UL (ref 150–450)
PMV BLD AUTO: 9.7 FL (ref 9.2–12.9)
POCT GLUCOSE: 221 MG/DL (ref 70–110)
POCT GLUCOSE: 231 MG/DL (ref 70–110)
POCT GLUCOSE: 242 MG/DL (ref 70–110)
POCT GLUCOSE: 270 MG/DL (ref 70–110)
POTASSIUM SERPL-SCNC: 4.2 MMOL/L (ref 3.5–5.1)
PROT SERPL-MCNC: 6.3 G/DL (ref 6–8.4)
RBC # BLD AUTO: 3.11 M/UL (ref 4–5.4)
SODIUM SERPL-SCNC: 136 MMOL/L (ref 136–145)
WBC # BLD AUTO: 8.74 K/UL (ref 3.9–12.7)

## 2022-08-17 PROCEDURE — 97161 PT EVAL LOW COMPLEX 20 MIN: CPT

## 2022-08-17 PROCEDURE — 63600175 PHARM REV CODE 636 W HCPCS: Performed by: INTERNAL MEDICINE

## 2022-08-17 PROCEDURE — 99900035 HC TECH TIME PER 15 MIN (STAT)

## 2022-08-17 PROCEDURE — 25000003 PHARM REV CODE 250: Performed by: STUDENT IN AN ORGANIZED HEALTH CARE EDUCATION/TRAINING PROGRAM

## 2022-08-17 PROCEDURE — 25000003 PHARM REV CODE 250: Performed by: INTERNAL MEDICINE

## 2022-08-17 PROCEDURE — 99233 PR SUBSEQUENT HOSPITAL CARE,LEVL III: ICD-10-PCS | Mod: ,,, | Performed by: INTERNAL MEDICINE

## 2022-08-17 PROCEDURE — 80053 COMPREHEN METABOLIC PANEL: CPT | Performed by: STUDENT IN AN ORGANIZED HEALTH CARE EDUCATION/TRAINING PROGRAM

## 2022-08-17 PROCEDURE — 94761 N-INVAS EAR/PLS OXIMETRY MLT: CPT

## 2022-08-17 PROCEDURE — 94799 UNLISTED PULMONARY SVC/PX: CPT

## 2022-08-17 PROCEDURE — 36415 COLL VENOUS BLD VENIPUNCTURE: CPT | Performed by: STUDENT IN AN ORGANIZED HEALTH CARE EDUCATION/TRAINING PROGRAM

## 2022-08-17 PROCEDURE — 97530 THERAPEUTIC ACTIVITIES: CPT

## 2022-08-17 PROCEDURE — 97165 OT EVAL LOW COMPLEX 30 MIN: CPT

## 2022-08-17 PROCEDURE — 11000001 HC ACUTE MED/SURG PRIVATE ROOM

## 2022-08-17 PROCEDURE — 99233 SBSQ HOSP IP/OBS HIGH 50: CPT | Mod: ,,, | Performed by: INTERNAL MEDICINE

## 2022-08-17 PROCEDURE — 85025 COMPLETE CBC W/AUTO DIFF WBC: CPT | Performed by: STUDENT IN AN ORGANIZED HEALTH CARE EDUCATION/TRAINING PROGRAM

## 2022-08-17 PROCEDURE — 27000221 HC OXYGEN, UP TO 24 HOURS

## 2022-08-17 PROCEDURE — 97535 SELF CARE MNGMENT TRAINING: CPT

## 2022-08-17 PROCEDURE — 97116 GAIT TRAINING THERAPY: CPT

## 2022-08-17 RX ORDER — INSULIN ASPART 100 [IU]/ML
8 INJECTION, SOLUTION INTRAVENOUS; SUBCUTANEOUS
Status: DISCONTINUED | OUTPATIENT
Start: 2022-08-17 | End: 2022-08-18

## 2022-08-17 RX ADMIN — METHOCARBAMOL 500 MG: 500 TABLET ORAL at 04:08

## 2022-08-17 RX ADMIN — POLYETHYLENE GLYCOL 3350 17 G: 17 POWDER, FOR SOLUTION ORAL at 09:08

## 2022-08-17 RX ADMIN — SENNOSIDES AND DOCUSATE SODIUM 1 TABLET: 50; 8.6 TABLET ORAL at 09:08

## 2022-08-17 RX ADMIN — OXYCODONE 5 MG: 5 TABLET ORAL at 12:08

## 2022-08-17 RX ADMIN — ACETAMINOPHEN 1000 MG: 500 TABLET ORAL at 04:08

## 2022-08-17 RX ADMIN — INSULIN ASPART 6 UNITS: 100 INJECTION, SOLUTION INTRAVENOUS; SUBCUTANEOUS at 09:08

## 2022-08-17 RX ADMIN — INSULIN ASPART 2 UNITS: 100 INJECTION, SOLUTION INTRAVENOUS; SUBCUTANEOUS at 12:08

## 2022-08-17 RX ADMIN — INSULIN ASPART 8 UNITS: 100 INJECTION, SOLUTION INTRAVENOUS; SUBCUTANEOUS at 04:08

## 2022-08-17 RX ADMIN — METHOCARBAMOL 500 MG: 500 TABLET ORAL at 05:08

## 2022-08-17 RX ADMIN — ASPIRIN 81 MG: 81 TABLET, COATED ORAL at 09:08

## 2022-08-17 RX ADMIN — PANTOPRAZOLE SODIUM 40 MG: 40 TABLET, DELAYED RELEASE ORAL at 09:08

## 2022-08-17 RX ADMIN — LATANOPROST 1 DROP: 50 SOLUTION OPHTHALMIC at 09:08

## 2022-08-17 RX ADMIN — INSULIN ASPART 2 UNITS: 100 INJECTION, SOLUTION INTRAVENOUS; SUBCUTANEOUS at 04:08

## 2022-08-17 RX ADMIN — INSULIN ASPART 1 UNITS: 100 INJECTION, SOLUTION INTRAVENOUS; SUBCUTANEOUS at 09:08

## 2022-08-17 RX ADMIN — CLINDAMYCIN IN 5 PERCENT DEXTROSE 900 MG: 18 INJECTION, SOLUTION INTRAVENOUS at 03:08

## 2022-08-17 RX ADMIN — CELECOXIB 200 MG: 200 CAPSULE ORAL at 09:08

## 2022-08-17 RX ADMIN — INSULIN ASPART 6 UNITS: 100 INJECTION, SOLUTION INTRAVENOUS; SUBCUTANEOUS at 12:08

## 2022-08-17 RX ADMIN — CLINDAMYCIN IN 5 PERCENT DEXTROSE 900 MG: 18 INJECTION, SOLUTION INTRAVENOUS at 09:08

## 2022-08-17 RX ADMIN — OXYCODONE 5 MG: 5 TABLET ORAL at 09:08

## 2022-08-17 RX ADMIN — OXYCODONE 5 MG: 5 TABLET ORAL at 03:08

## 2022-08-17 RX ADMIN — ACETAMINOPHEN 1000 MG: 500 TABLET ORAL at 05:08

## 2022-08-17 RX ADMIN — METHOCARBAMOL 500 MG: 500 TABLET ORAL at 12:08

## 2022-08-17 RX ADMIN — ACETAMINOPHEN 1000 MG: 500 TABLET ORAL at 12:08

## 2022-08-17 RX ADMIN — CALCIUM CARBONATE (ANTACID) CHEW TAB 500 MG 1000 MG: 500 CHEW TAB at 09:08

## 2022-08-17 NOTE — ASSESSMENT & PLAN NOTE
Anterior and inferior dislocation of right shoulder  · Orthopedics consulted and noted comminuted fracture of right proximal humerus and anterior dislocation and note due to nature of fracture would not heal and thus needs surgical intervention.  · Orthopedics took patient to OR on 8/16 for reverse total right shoulder replacement by Dr. Aamir Powell.  · Patient to be non weight bearing to right upper arm at this time for next 12 weeks as per Ortho.   · Continue pain management with scheduled Tylenol 1000 mg po every 8 hours + Ceklebrex 200 mg po daily + Robaxin 500 mg po 4 times daily and Flexeril 5 mg po every 8 hours prn muscle spasms + Oxycodone IR 5 mg po prn breakthrough pain.   · Perineural pain catheter placed to right shoulder by Anesthesia post-op with continuous Ropivacaine infusion to help with post-op pain management and Anesthesia Pain Service is managing.   · Post-op patient to be on Aspirin 81 mg po BID for DVT prophylaxis for next 6 weeks.   · Post-op plan for her rigth shoulder as per Ortho is:     RUE  Nonweightbearing x 12 wks     PHASE 1: now until 2 wks postop  ROMAT and hand, wrist, elbow.  Sling, may remove for hygiene and ADLs     PHASE 2: 2-6 wks postop  ROMAT and hand, wrist, elbow.  Shoulder Pendulums okay  Shoulder passive and active assisted ROMAT   Sling, may remove for hygiene ADLs and therapy     PHASE 3: 6-12 wks postop   D/c sling   ROMAT RUE  No resistance     PHASE 4: after 12 wks postop  ROMAT, WBAT

## 2022-08-17 NOTE — PT/OT/SLP EVAL
Physical Therapy Evaluation and Co-Treatment    Patient Name:  Riana Kay   MRN:  [id number]    Recommendations:     Discharge Recommendations:  nursing facility, skilled   Discharge Equipment Recommendations: bedside commode, cane, quad   Barriers to discharge: None    Assessment:     Riana Kay is a 67 y.o. female admitted with a medical diagnosis of Closed fracture of right proximal humerus.  She presents with the following impairments/functional limitations:  weakness, impaired balance, impaired endurance, impaired self care skills, impaired functional mobility, gait instability, decreased upper extremity function, orthopedic precautions . Pt utilized pain pump prior to performing bed mobility with SBA and verbal cueing for sequencing. Pt experienced episode of dizziness upon sitting EOB, educated on deep breathing and ankle pumps which resolved symptoms. Pt initially wearing O2, but removed following O2 saturation reading 97%. RN administered medication EOB. Pt performed 3 sit <> stand with minimum assistance x 2 persons and L hand held-assistance > CGA as session progressed with episodes of dizziness that resolved quickly. Pt performed toilet transfer with minimum assistance w/ L hand-held assist with pt able to assist with hygiene and adjust adult brief with LUE. Pt ambulated 10 steps x 2 from bed > chair > BSC with L hand-held assist and minimum assistance. Gait presented with decreased viktor, decreased step length, narrow REENA. Verbal cueing required for step sequencing, upright posture, and floor directed gaze. Quality of gait improved throughout session and patient used pain pump following therapeutic activties. Pt is functioning below baseline and would benefit from skilled nursing to address mobility and functional deficits.       Rehab Prognosis: Good; patient would benefit from acute skilled PT services to address these deficits and reach maximum level of function.    Recent Surgery:  "Procedure(s) (LRB):  ARTHROPLASTY, SHOULDER, TOTAL, REVERSE, [place] (Right) 1 Day Post-Op    Plan:     During this hospitalization, patient to be seen 4 x/week to address the identified rehab impairments via gait training, therapeutic activities, therapeutic exercises, neuromuscular re-education and progress toward the following goals:    · Plan of Care Expires:  09/17/22    Subjective     Chief Complaint: Decreased functional mobility  Patient/Family Comments/goals: "My shoulder is sore"  Pain/Comfort:  Pain Rating 1:  (not rated)  Location - Side 1: Right  Location 1: arm  Pain Addressed 1: Pre-medicate for activity, Reposition, Distraction  Pain Rating Post-Intervention 1:  (not rated)    Patients cultural, spiritual, Confucianist conflicts given the current situation: no    Living Environment:  Pt lives in single story home with spouse and sister with no steps to enter. Pt has a shower/tub combo and does not drive.  Prior to admission, patients level of function was Independent.  Equipment used at home: none.  DME owned (not currently used): none.  Upon discharge, patient will have assistance from spouse and sister.    Objective:     Communicated with RN prior to session.  Patient found supine with SCD, peripheral IV, PureWick, oxygen, PCA  upon PT entry to room.    General Precautions: Standard, fall   Orthopedic Precautions:RUE non weight bearing   Braces: Sling and swathe  Respiratory Status: Nasal cannula, flow 3 L/min    Exams:  · Cognitive Exam:  Patient is oriented to Person, Place, Time and Situation  · Gross Motor Coordination:  WFL  · RUE ROM: WFL wrist supination/pronation/flexion/extension and elbow flexion/extension. Shoulder ROM not assessed 2/2 WB status.  · RUE Strength: Not assessed 2/2 WB status  · LUE ROM: WFL  · LUE Strength: WFL  · RLE ROM: WFL  · RLE Strength: WFL  · LLE ROM: WFL  · LLE Strength: WFL    Functional Mobility:  · Bed Mobility:     · Rolling Left:  stand by assistance with " verbal cueing for sequencing  · Scooting: stand by assistance for anterior scooting with verbal cueing for sequencing  · Supine to Sit: stand by assistance with verbal cueing for sequencing   · Transfers:     · Sit to Stand:   #1  minimum assistance x 2 persons with L hand-held assist and verbal cueing for anterior weight shift and upright posturing    #2 minimum assistance with L hand-held assist with verbal cueing for hand placement and upright posturing   #3 CGA with verbal cueing for upright posturing  · Stand to Sit:   #1  minimum assistance x 2 persons with L hand-held assist and verbal cueing for hand placement    #2 minimum assistance with L hand-held assist with verbal cueing for hand placement    #3 CGA with verbal cueing for hand placement  · Toilet Transfer: minimum assistance with L hand-held assist  using  Step Transfer with verbal cueing for sequencing. Pt able to assist with hygiene and adjust adult brief with LUE.  · Gait: Pt ambulated 10 steps x 2 from bed > chair > BSC with L hand-held assist and minimum assistance. Gait presented with decreased viktor, decreased step length, narrow REENA. Verbal cueing required for step sequencing, upright posture, and floor directed gaze. Quality of gait improved throughout session.    Therapeutic Activities and Exercises:  Pt performed static sitting balance with supervision EOB while RN administered medication. Pt performed lateral stepping 4x R/L and 4x forward/back with CGA using RW. Pt performed static sitting balance while performing toileting activities on BSC. Pt performed static standing balance while personal hygiene performed after toileting.     Pt educated on role of PT and POC.     AM-PAC 6 CLICK MOBILITY  Total Score:17     Patient left up in chair with all lines intact, call button in reach and RN notified.    GOALS:   Multidisciplinary Problems     Physical Therapy Goals        Problem: Physical Therapy    Goal Priority Disciplines Outcome Goal  Variances Interventions   Physical Therapy Goal     PT, PT/OT Ongoing, Progressing     Description: Goals to be met by: 2022     Patient will increase functional independence with mobility by performin. Supine to sit with Modified [place]  2. Sit to supine with Modified [place]  3. Sit to stand transfer with Modified [place] using Quad Cane.  4. Bed to chair transfer with Modified [place] using Quad Cane.  5. Gait  x 50 feet with Stand-by Assistance using Quad Cane.                      History:     Past Medical History:   Diagnosis Date    Abdominal pain, right upper quadrant 2014    Anticoagulant long-term use     Anxiety     AR (allergic rhinitis)     Atrophic gastritis without mention of hemorrhage 2012     Dx updated per 2019 IMO Load    Chronic fatigue syndrome 06/10/2012    Diabetes mellitus     Dizziness     Fatty liver     GERD (gastroesophageal reflux disease)     Gross hematuria 2020    HTN (hypertension)     Hyperlipidemia     Leg swelling     Memory loss     Osteopenia     PONV (postoperative nausea and vomiting)     Primary osteoarthritis of right knee 10/06/2020    Primary osteoarthritis of right knee 10/06/2020    Right elbow pain 2019    S/P total hysterectomy     Screening for colon cancer 2021    Sleep apnea     Status post total right knee replacement 10/6/2020 10/05/2020       Past Surgical History:   Procedure Laterality Date    CHOLECYSTECTOMY      COLONOSCOPY N/A 2018    Procedure: COLONOSCOPY with Donnell;  Surgeon: Roland Jacobo MD;  Location: [address]);  Service: Endoscopy;  Laterality: N/A;    COLONOSCOPY N/A 2021    Procedure: COLONOSCOPY;  Surgeon: Yong Rowland MD;  Location: [address]);  Service: Endoscopy;  Laterality: N/A;  prep ins. emailed - Tajik speaking,  needed - [facility]  COVID [facility] - [facility]    CYSTOSCOPY N/A 2020     Procedure: CYSTOSCOPY;  Surgeon: Ines Stanford MD;  Location: [facility] OR 1ST FLR;  Service: Urology;  Laterality: N/A;  45 minutes     ELBOW ARTHROPLASTY Right 1/16/2019    Procedure: ARTHROPLASTY, ELBOW right radial head arthroplasty revision;  Surgeon: Katja Hubbard MD;  Location: [facility] OR 1ST FLR;  Service: Orthopedics;  Laterality: Right;  Anesthesia: General and Regional. Stretcher, hand pan 1 and pan 2, CALL ACCUMED, CLAIRX  [employer] notified 1-14 LO    ELBOW SURGERY Right 7/16/15    ELBOW SURGERY      ESOPHAGOGASTRODUODENOSCOPY N/A 4/15/2019    Procedure: EGD (ESOPHAGOGASTRODUODENOSCOPY);  Surgeon: Buck Irwin MD;  Location: [facility] (4TH FLR);  Service: Endoscopy;  Laterality: N/A;  ray she    HYSTERECTOMY      KNEE ARTHROPLASTY Right 10/6/2020    Procedure: ARTHROPLASTY, KNEE-SAME DAY PROTOCOL;  Surgeon: Sabino Damon MD;  Location: [facility];  Service: Orthopedics;  Laterality: Right;    KNEE ARTHROSCOPY W/ DEBRIDEMENT  4/11    Left    RELEASE OF ULNAR NERVE AT CUBITAL TUNNEL Right 1/16/2019    Procedure: RELEASE, ULNAR TUNNEL right;  Surgeon: Katja Hubbard MD;  Location: [facility] OR 1ST FLR;  Service: Orthopedics;  Laterality: Right;  Anesthesia: General and Regional. Stretcher, hand pan 1 and pan 2, CALL ACCUMED, CLAIRX    RETROGRADE PYELOGRAPHY Bilateral 12/1/2020    Procedure: PYELOGRAM, RETROGRADE;  Surgeon: Ines Stanford MD;  Location: [facility] OR 1ST FLR;  Service: Urology;  Laterality: Bilateral;    REVERSE TOTAL SHOULDER ARTHROPLASTY Right 8/16/2022    Procedure: ARTHROPLASTY, SHOULDER, TOTAL, REVERSE, [place];  Surgeon: Aamir Powell MD;  Location: [facility] OR 2ND FLR;  Service: Orthopedics;  Laterality: Right;  [place] Can't go until after 12    ROTATOR CUFF REPAIR      TOTAL ABDOMINAL HYSTERECTOMY W/ BILATERAL SALPINGOOPHORECTOMY      TOTAL KNEE ARTHROPLASTY Left 10/19/2021    Procedure: ARTHROPLASTY, KNEE, TOTAL;  Surgeon: Sabino GONZALEZ  MD Nelson;  Location: [facility];  Service: Orthopedics;  Laterality: Left;    UPPER GASTROINTESTINAL ENDOSCOPY         Time Tracking:     PT Received On: 08/17/22  PT Start Time: 0922     PT Stop Time: 1008  PT Total Time (min): 46 min     Billable Minutes: Evaluation 8, Gait Training 23 and Therapeutic Activity 15      08/17/2022

## 2022-08-17 NOTE — PROGRESS NOTES
[facility]  Progress Note    Patient Name: Riana Kay  MRN: [id number]  Patient Class: IP- Inpatient   Admission Date: 8/14/2022  Length of Stay: 2 days  Attending Physician: Jany Guaman MD  Primary Care Provider: Jose Rojas MD        Subjective:     Principal Problem:Closed fracture of right proximal humerus        HPI:  Riana Kay is a 67 y.o. female who has a past medical history of Anticoagulant long-term use, Anxiety, Atrophic gastritis without mention of hemorrhage, Chronic fatigue syndrome, Diabetes mellitus, Dizziness, Fatty liver, GERD, Gross hematuria, HTN, Hyperlipidemia, Leg swelling, Memory loss, Osteopenia, Primary osteoarthritis of right knee, Primary osteoarthritis of right knee, S/P total hysterectomy, Sleep apnea, and Status post total b/l knee replacements, presented to the ED with CC of Shoulder and Knee pain s/p mechanical fall. Patient was at [employer], was walking to her car and tripped and fell into a parked car. She did not hit her head, and furthermore CTH was negative for acute bleed. She was in quite a bit of pain, 150 mcg of fentanyl given en route. Patient became nauseous and vomited. CT shoulder showed comminuted fracture of humeral head and neck with impaction and anterior inferior dislocation of the humeral head, moderate volume lipohemarthrosis. Ortho was consulted in ED. Patient denies CP, AP, or SOB.         Overview/Hospital Course:  Orthopedics consulted in ED and evaluated patient and recommended nonweightbearing to right upper extremity for comminuted right proximal humerus fracture with anterior dislocation. Ortho recommending operative repair of fracture as will not heal correctly on its own so patient taken to OR on 8/16/2022 for right reverse total shoulder replacement. Surgery done by Dr. Aamir Powell. Post-op, patient is non-weight bearing to right arm and no range of motion to right shoulder and in sling at  all times. Perineural pain catheter placed by Anesthesia and managing in hospital to right shoulder to help with post-op pain control. Patient placed on Aspirin 81 mg po BID for DVT prophylaxis post-op for 6 weeks. Patient placed on scheduled Tylenol, Celebrex and Robaxin for pain management post-op.       Interval History: Patient taken to OR on 8/16/2022 for right reverse total shoulder replacement. Surgery done by Dr. Aamir Powell. Post-op, patient is non-weight bearing to right arm and no range of motion to right shoulder and in sling at all times. Perineural pain catheter placed by Anesthesia and managing in hospital to right shoulder to help with post-op pain control. Patient placed on Aspirin 81 mg po BID for DVT prophylaxis post-op for 6 weeks. Patient placed on scheduled Tylenol, Celebrex and Robaxin for pain management post-op. I used Ipad with video language line to assist in communicating with patient and she reports pain in right shoulder improved after her shoulder but still hurting especially when she moves it but better than yesterday. Prelim recs by therapy are for SNF but could progress to HH PT/OT over next 1-2 days. SW talked to family and got choices and referrals sent and anticipating medically ready for discharge for 8/19.     Review of Systems   Constitutional:  Negative for fever.   Respiratory:  Negative for cough and shortness of breath.    Cardiovascular:  Negative for chest pain and leg swelling.   Gastrointestinal:  Negative for abdominal pain, nausea and vomiting.   Musculoskeletal:  Positive for arthralgias (Right shoulder and right knee).   Skin:  Positive for wound (Abrasion to anterior right knee).   Neurological:  Negative for dizziness and light-headedness.   Psychiatric/Behavioral:  Negative for agitation and confusion.    Objective:     Vital Signs (Most Recent):  Temp: 98.7 °F (37.1 °C) (08/17/22 1206)  Pulse: 70 (08/17/22 1206)  Resp: 20 (08/17/22 1213)  BP: 154/70  (08/17/22 1206)  SpO2: 96 % (08/17/22 1206) on room air   Vital Signs (24h Range):  Temp:  [97.4 °F (36.3 °C)-98.7 °F (37.1 °C)] 98.7 °F (37.1 °C)  Pulse:  [70-89] 70  Resp:  [15-21] 20  SpO2:  [92 %-100 %] 96 %  BP: (128-177)/(61-77) 154/70     Weight: 81.2 kg (179 lb)  Body mass index is 29.79 kg/m².    Intake/Output Summary (Last 24 hours) at 8/17/2022 1429  Last data filed at 8/16/2022 1715  Gross per 24 hour   Intake 800 ml   Output 455 ml   Net 345 ml      Physical Exam  Vitals and nursing note reviewed.   Constitutional:       General: She is awake. She is not in acute distress.     Appearance: Normal appearance. She is well-developed and normal weight. She is not ill-appearing.   Eyes:      General: No scleral icterus.  Neck:      Vascular: No JVD.   Cardiovascular:      Rate and Rhythm: Normal rate and regular rhythm.      Heart sounds: Normal heart sounds. No murmur heard.    No friction rub. No gallop.   Pulmonary:      Effort: Pulmonary effort is normal. No respiratory distress.      Breath sounds: Normal breath sounds. No wheezing.   Abdominal:      General: Abdomen is flat. Bowel sounds are normal. There is no distension.      Palpations: Abdomen is soft.      Tenderness: There is no abdominal tenderness.   Musculoskeletal:      Right lower leg: No edema.      Left lower leg: No edema.   Skin:     Findings: Abrasion (Right anterior knee) present.   Neurological:      Mental Status: She is alert and oriented to person, place, and time.   Psychiatric:         Mood and Affect: Mood normal.         Behavior: Behavior normal. Behavior is cooperative.         Thought Content: Thought content normal.         Judgment: Judgment normal.       Significant Labs: CBC:   Recent Labs   Lab 08/16/22 0310 08/17/22  0746   WBC 7.97 8.74   HGB 11.1* 9.9*   HCT 32.5* 27.8*    190     CMP:   Recent Labs   Lab 08/16/22 0310 08/17/22  0746    136   K 4.3 4.2    103   CO2 26 26   * 246*   BUN 13 9    CREATININE 0.8 0.7   CALCIUM 8.7 8.4*   PROT 6.6 6.3   ALBUMIN 3.5 3.1*   BILITOT 0.7 0.9   ALKPHOS 90 83   AST 22 27   ALT 23 20   ANIONGAP 8 7*     Blood Sugars (AccuCheck):    Recent Labs     08/16/22  0722 08/16/22  1113 08/16/22  1655 08/16/22  2312 08/17/22  0615 08/17/22  1157   POCTGLUCOSE 179* 184* 256* 274* 221* 242*       Significant Imaging: I have reviewed all pertinent imaging results/findings within the past 24 hours.      Assessment/Plan:      * Closed fracture of proximal end of right humerus with routine healing s/p total shoulder replacement on 8/16/2022  Anterior and inferior dislocation of right shoulder  · Orthopedics consulted and noted comminuted fracture of right proximal humerus and anterior dislocation and note due to nature of fracture would not heal and thus needs surgical intervention.  · Orthopedics took patient to OR on 8/16 for reverse total right shoulder replacement by Dr. Aamir Powell.  · Patient to be non weight bearing to right upper arm at this time for next 12 weeks as per Ortho.   · Continue pain management with scheduled Tylenol 1000 mg po every 8 hours + Celebrex 200 mg po daily + Robaxin 500 mg po 4 times daily and Flexeril 5 mg po every 8 hours prn muscle spasms + Oxycodone IR 5 mg po prn breakthrough pain.   · Perineural pain catheter placed to right shoulder by Anesthesia post-op with continuous Ropivacaine infusion to help with post-op pain management and Anesthesia Pain Service is managing.   · Post-op patient to be on Aspirin 81 mg po BID for DVT prophylaxis for next 6 weeks.   · Post-op plan for her rigth shoulder as per Ortho is:     RUE  Nonweightbearing x 12 wks     PHASE 1: now until 2 wks postop  ROMAT and hand, wrist, elbow.  Sling, may remove for hygiene and ADLs     PHASE 2: 2-6 wks postop  ROMAT and hand, wrist, elbow.  Shoulder Pendulums okay  Shoulder passive and active assisted ROMAT   Sling, may remove for hygiene ADLs and therapy     PHASE 3:  6-12 wks postop   D/c sling   ROMAT RUE  No resistance     PHASE 4: after 12 wks postop  ROMAT, WBAT    Type 2 diabetes mellitus without complication, with long-term current use of insulin  Patient's FSGs are not controlled on current medication regimen. Patient reports she takes Lantus insulin 28 units nightly at home and 12 unit of Humalog insulin with breakfast and 14-16 units of Humalog insulin with lunch and dinner.   Last A1c reviewed-   Lab Results   Component Value Date    HGBA1C 6.4 (H) 08/14/2022     Most recent fingerstick glucose reviewed-   Recent Labs   Lab 08/16/22  1655 08/16/22  2312 08/17/22  0615 08/17/22  1157   POCTGLUCOSE 256* 274* 221* 242*     Current correctional scale  Low  Maintain anti-hyperglycemic dose as follows-     · DM x over 20 years.  · Plan to cincrease Levemir insulin from 18 to 20 units nightly in hospital today, 8/17 as well as increasing Novolog insulin from 6 to 8 units with meals.   · Adjust insulin dosing for goal blood sugars 140-180 in hospital.   · Monitor POCT glucose 4 times daily with meals and at bedtime.     Primary hypertension  · Patient's blood pressure is controlled here in the hospital over past 24 hours.   · Goal for blood pressure is SBP < 150 and DBP < 90 as patient > or = 60 years of age with no diabetes or advanced kidney disease based on JNC 8 guidelines.   · Continue current treatment regimen of Losartan 25 mg po daily.   · Plan is to monitor patient's blood pressure routinely while patient is hospitalized.     Atherosclerosis of native coronary artery of native heart without angina pectoris  Chronic and controlled. Patient on no active meds at home to treat.     Fatty liver  Noted on previous imaging and stable. LFT's normal.    ZAHIRA (obstructive sleep apnea)  Chronic and controlled. Patient on CPAP at home.       Muscle spasms of neck  Controlled. Patient on Flexeril prn to treat and will continue.     Gastroesophageal reflux disease without  esophagitis  Chronic and controlled. Patient on Protonix 40 mg po daily.       Pathological fracture of right humerus due to osteoporosis with routine healing  · Had DEXA in 2020  · Needs repeat DEXA as outpatient.  · Continue Vitamin D and Calcium replacement and referral for osteoporosis specific treatment to Orthopedic fragility clinic.       Vitamin D deficiency  Vitamin D level noted to be low at 14 consistent with deficiency so will started on replacement with Vitamin D 50,000 units po weekly to treat and will need to continue on hospital discharge.     VTE Risk Mitigation (From admission, onward)         Ordered     Place sequential compression device  Until discontinued         08/14/22 1754     IP VTE LOW RISK PATIENT  Once         08/14/22 1754                Discharge Planning   ELIESER: 8/18/2022     Code Status: Full Code   Is the patient medically ready for discharge?: No    Reason for patient still in hospital (select all that apply): Patient trending condition  Discharge Plan A: Skilled Nursing Facility but therapy could p[rogress to  PT/OT. Likely to be medically ready for hospital discharge for 8/19.        Jany Guaman MD  Department of Hospital Medicine   [facility] - Surgery

## 2022-08-17 NOTE — ANESTHESIA POST-OP PAIN MANAGEMENT
Acute Pain Service Progress Note    Riana Kay is a 67 y.o., female, [id number].    Surgery:  ARTHROPLASTY, SHOULDER, TOTAL, REVERSE, [place] (Right Shoulder)    Post Op Day #: 1    Catheter type: perineural  interscalene    Infusion type: Ropivacaine 0.2%  7cc q3h intermittent bolus with 5cc q30min patient demand bolus    Problem List:    Active Hospital Problems    Diagnosis  POA    *Closed fracture of right proximal humerus [S42.201A]  Yes    Anterior dislocation of right shoulder [S43.014A]  Yes    Vitamin D deficiency [E55.9]  Yes    Type 2 diabetes mellitus without complication, with long-term current use of insulin [E11.9, Z79.4]  Not Applicable    Muscle spasms of neck [M62.838]  Yes    Primary hypertension [I10]  Yes    ZAHIRA (obstructive sleep apnea) [G47.33]  Yes    Fatty liver [K76.0]  Yes    Gastroesophageal reflux disease without esophagitis [K21.9]  Yes    Pathological fracture of right humerus due to osteoporosis with routine healing [M80.021D]  Not Applicable    Atherosclerosis of native coronary artery of native heart without angina pectoris [I25.10]  Yes      Resolved Hospital Problems    Diagnosis Date Resolved POA    Pain [R52] 08/15/2022 Yes       Subjective:     General appearance of alert, oriented, no complaints   Pain with rest: 1    Numbers   Pain with movement: 3    Numbers   Side Effects    1. Pruritis No    2. Nausea No    3. Motor Blockade No, 0=Ability to raise lower extremities off bed    4. Sedation No, 1=awake and alert    Objective:     Catheter site clean, dry, intact        Vitals   Vitals:    08/17/22 0909   BP:    Pulse: 73   Resp: 18   Temp:         Labs    Admission on 08/14/2022   Component Date Value Ref Range Status    Sodium 08/14/2022 136  136 - 145 mmol/L Final    Potassium 08/14/2022 4.0  3.5 - 5.1 mmol/L Final    Chloride 08/14/2022 104  95 - 110 mmol/L Final    CO2 08/14/2022 20 (A) 23 - 29 mmol/L Final    Glucose 08/14/2022 203 (A) 70 - 110  mg/dL Final    BUN 08/14/2022 15  8 - 23 mg/dL Final    Creatinine 08/14/2022 0.7  0.5 - 1.4 mg/dL Final    Calcium 08/14/2022 8.8  8.7 - 10.5 mg/dL Final    Total Protein 08/14/2022 7.1  6.0 - 8.4 g/dL Final    Albumin 08/14/2022 3.8  3.5 - 5.2 g/dL Final    Total Bilirubin 08/14/2022 0.5  0.1 - 1.0 mg/dL Final    Alkaline Phosphatase 08/14/2022 82  55 - 135 U/L Final    AST 08/14/2022 26  10 - 40 U/L Final    ALT 08/14/2022 26  10 - 44 U/L Final    Anion Gap 08/14/2022 12  8 - 16 mmol/L Final    eGFR 08/14/2022 >60.0  >60 mL/min/1.73 m^2 Final    Troponin I 08/14/2022 <0.006  0.000 - 0.026 ng/mL Final    WBC 08/14/2022 11.66  3.90 - 12.70 K/uL Final    RBC 08/14/2022 3.75 (A) 4.00 - 5.40 M/uL Final    Hemoglobin 08/14/2022 12.0  12.0 - 16.0 g/dL Final    Hematocrit 08/14/2022 34.9 (A) 37.0 - 48.5 % Final    MCV 08/14/2022 93  82 - 98 fL Final    MCH 08/14/2022 32.0 (A) 27.0 - 31.0 pg Final    MCHC 08/14/2022 34.4  32.0 - 36.0 g/dL Final    RDW 08/14/2022 12.1  11.5 - 14.5 % Final    Platelets 08/14/2022 177  150 - 450 K/uL Final    MPV 08/14/2022 9.5  9.2 - 12.9 fL Final    Immature Granulocytes 08/14/2022 0.3  0.0 - 0.5 % Final    Gran # (ANC) 08/14/2022 10.0 (A) 1.8 - 7.7 K/uL Final    Immature Grans (Abs) 08/14/2022 0.04  0.00 - 0.04 K/uL Final    Lymph # 08/14/2022 1.2  1.0 - 4.8 K/uL Final    Mono # 08/14/2022 0.4  0.3 - 1.0 K/uL Final    Eos # 08/14/2022 0.0  0.0 - 0.5 K/uL Final    Baso # 08/14/2022 0.03  0.00 - 0.20 K/uL Final    nRBC 08/14/2022 0  0 /100 WBC Final    Gran % 08/14/2022 85.4 (A) 38.0 - 73.0 % Final    Lymph % 08/14/2022 10.5 (A) 18.0 - 48.0 % Final    Mono % 08/14/2022 3.3 (A) 4.0 - 15.0 % Final    Eosinophil % 08/14/2022 0.2  0.0 - 8.0 % Final    Basophil % 08/14/2022 0.3  0.0 - 1.9 % Final    Differential Method 08/14/2022 Automated   Final    Prealbumin 08/14/2022 11 (A) 20 - 43 mg/dL Final    Transferrin 08/14/2022 222  200 - 375 mg/dL Final     Magnesium 08/14/2022 2.0  1.6 - 2.6 mg/dL Final    Phosphorus 08/14/2022 3.2  2.7 - 4.5 mg/dL Final    Hemoglobin A1C 08/14/2022 6.4 (A) 4.0 - 5.6 % Final    Estimated Avg Glucose 08/14/2022 137 (A) 68 - 131 mg/dL Final    POCT Glucose 08/14/2022 213 (A) 70 - 110 mg/dL Final    WBC 08/15/2022 7.98  3.90 - 12.70 K/uL Final    RBC 08/15/2022 3.43 (A) 4.00 - 5.40 M/uL Final    Hemoglobin 08/15/2022 11.1 (A) 12.0 - 16.0 g/dL Final    Hematocrit 08/15/2022 32.1 (A) 37.0 - 48.5 % Final    MCV 08/15/2022 94  82 - 98 fL Final    MCH 08/15/2022 32.4 (A) 27.0 - 31.0 pg Final    MCHC 08/15/2022 34.6  32.0 - 36.0 g/dL Final    RDW 08/15/2022 12.2  11.5 - 14.5 % Final    Platelets 08/15/2022 196  150 - 450 K/uL Final    MPV 08/15/2022 9.7  9.2 - 12.9 fL Final    Immature Granulocytes 08/15/2022 0.4  0.0 - 0.5 % Final    Gran # (ANC) 08/15/2022 5.2  1.8 - 7.7 K/uL Final    Immature Grans (Abs) 08/15/2022 0.03  0.00 - 0.04 K/uL Final    Lymph # 08/15/2022 2.0  1.0 - 4.8 K/uL Final    Mono # 08/15/2022 0.7  0.3 - 1.0 K/uL Final    Eos # 08/15/2022 0.1  0.0 - 0.5 K/uL Final    Baso # 08/15/2022 0.03  0.00 - 0.20 K/uL Final    nRBC 08/15/2022 0  0 /100 WBC Final    Gran % 08/15/2022 65.3  38.0 - 73.0 % Final    Lymph % 08/15/2022 24.4  18.0 - 48.0 % Final    Mono % 08/15/2022 8.6  4.0 - 15.0 % Final    Eosinophil % 08/15/2022 0.9  0.0 - 8.0 % Final    Basophil % 08/15/2022 0.4  0.0 - 1.9 % Final    Differential Method 08/15/2022 Automated   Final    Sodium 08/15/2022 139  136 - 145 mmol/L Final    Potassium 08/15/2022 4.4  3.5 - 5.1 mmol/L Final    Chloride 08/15/2022 104  95 - 110 mmol/L Final    CO2 08/15/2022 26  23 - 29 mmol/L Final    Glucose 08/15/2022 131 (A) 70 - 110 mg/dL Final    BUN 08/15/2022 17  8 - 23 mg/dL Final    Creatinine 08/15/2022 0.8  0.5 - 1.4 mg/dL Final    Calcium 08/15/2022 8.8  8.7 - 10.5 mg/dL Final    Total Protein 08/15/2022 6.5  6.0 - 8.4 g/dL Final    Albumin  08/15/2022 3.4 (A) 3.5 - 5.2 g/dL Final    Total Bilirubin 08/15/2022 0.7  0.1 - 1.0 mg/dL Final    Alkaline Phosphatase 08/15/2022 85  55 - 135 U/L Final    AST 08/15/2022 26  10 - 40 U/L Final    ALT 08/15/2022 24  10 - 44 U/L Final    Anion Gap 08/15/2022 9  8 - 16 mmol/L Final    eGFR 08/15/2022 >60.0  >60 mL/min/1.73 m^2 Final    Vit D, 25-Hydroxy 08/15/2022 14 (A) 30 - 96 ng/mL Final    POCT Glucose 08/15/2022 131 (A) 70 - 110 mg/dL Final    POCT Glucose 08/15/2022 243 (A) 70 - 110 mg/dL Final    POCT Glucose 08/15/2022 197 (A) 70 - 110 mg/dL Final    WBC 08/16/2022 7.97  3.90 - 12.70 K/uL Final    RBC 08/16/2022 3.51 (A) 4.00 - 5.40 M/uL Final    Hemoglobin 08/16/2022 11.1 (A) 12.0 - 16.0 g/dL Final    Hematocrit 08/16/2022 32.5 (A) 37.0 - 48.5 % Final    MCV 08/16/2022 93  82 - 98 fL Final    MCH 08/16/2022 31.6 (A) 27.0 - 31.0 pg Final    MCHC 08/16/2022 34.2  32.0 - 36.0 g/dL Final    RDW 08/16/2022 12.2  11.5 - 14.5 % Final    Platelets 08/16/2022 197  150 - 450 K/uL Final    MPV 08/16/2022 9.9  9.2 - 12.9 fL Final    Immature Granulocytes 08/16/2022 0.1  0.0 - 0.5 % Final    Gran # (ANC) 08/16/2022 5.5  1.8 - 7.7 K/uL Final    Immature Grans (Abs) 08/16/2022 0.01  0.00 - 0.04 K/uL Final    Lymph # 08/16/2022 1.9  1.0 - 4.8 K/uL Final    Mono # 08/16/2022 0.5  0.3 - 1.0 K/uL Final    Eos # 08/16/2022 0.0  0.0 - 0.5 K/uL Final    Baso # 08/16/2022 0.02  0.00 - 0.20 K/uL Final    nRBC 08/16/2022 0  0 /100 WBC Final    Gran % 08/16/2022 68.9  38.0 - 73.0 % Final    Lymph % 08/16/2022 23.7  18.0 - 48.0 % Final    Mono % 08/16/2022 6.5  4.0 - 15.0 % Final    Eosinophil % 08/16/2022 0.5  0.0 - 8.0 % Final    Basophil % 08/16/2022 0.3  0.0 - 1.9 % Final    Differential Method 08/16/2022 Automated   Final    Sodium 08/16/2022 137  136 - 145 mmol/L Final    Potassium 08/16/2022 4.3  3.5 - 5.1 mmol/L Final    Chloride 08/16/2022 103  95 - 110 mmol/L Final    CO2 08/16/2022 26   23 - 29 mmol/L Final    Glucose 08/16/2022 223 (A) 70 - 110 mg/dL Final    BUN 08/16/2022 13  8 - 23 mg/dL Final    Creatinine 08/16/2022 0.8  0.5 - 1.4 mg/dL Final    Calcium 08/16/2022 8.7  8.7 - 10.5 mg/dL Final    Total Protein 08/16/2022 6.6  6.0 - 8.4 g/dL Final    Albumin 08/16/2022 3.5  3.5 - 5.2 g/dL Final    Total Bilirubin 08/16/2022 0.7  0.1 - 1.0 mg/dL Final    Alkaline Phosphatase 08/16/2022 90  55 - 135 U/L Final    AST 08/16/2022 22  10 - 40 U/L Final    ALT 08/16/2022 23  10 - 44 U/L Final    Anion Gap 08/16/2022 8  8 - 16 mmol/L Final    eGFR 08/16/2022 >60.0  >60 mL/min/1.73 m^2 Final    POCT Glucose 08/16/2022 184 (A) 70 - 110 mg/dL Final    POCT Glucose 08/16/2022 256 (A) 70 - 110 mg/dL Final    POCT Glucose 08/16/2022 179 (A) 70 - 110 mg/dL Final    POCT Glucose 08/16/2022 274 (A) 70 - 110 mg/dL Final    WBC 08/17/2022 8.74  3.90 - 12.70 K/uL Final    RBC 08/17/2022 3.11 (A) 4.00 - 5.40 M/uL Final    Hemoglobin 08/17/2022 9.9 (A) 12.0 - 16.0 g/dL Final    Hematocrit 08/17/2022 27.8 (A) 37.0 - 48.5 % Final    MCV 08/17/2022 89  82 - 98 fL Final    MCH 08/17/2022 31.8 (A) 27.0 - 31.0 pg Final    MCHC 08/17/2022 35.6  32.0 - 36.0 g/dL Final    RDW 08/17/2022 12.2  11.5 - 14.5 % Final    Platelets 08/17/2022 190  150 - 450 K/uL Final    MPV 08/17/2022 9.7  9.2 - 12.9 fL Final    Immature Granulocytes 08/17/2022 0.6 (A) 0.0 - 0.5 % Final    Gran # (ANC) 08/17/2022 6.5  1.8 - 7.7 K/uL Final    Immature Grans (Abs) 08/17/2022 0.05 (A) 0.00 - 0.04 K/uL Final    Lymph # 08/17/2022 1.4  1.0 - 4.8 K/uL Final    Mono # 08/17/2022 0.7  0.3 - 1.0 K/uL Final    Eos # 08/17/2022 0.0  0.0 - 0.5 K/uL Final    Baso # 08/17/2022 0.01  0.00 - 0.20 K/uL Final    nRBC 08/17/2022 0  0 /100 WBC Final    Gran % 08/17/2022 74.2 (A) 38.0 - 73.0 % Final    Lymph % 08/17/2022 16.4 (A) 18.0 - 48.0 % Final    Mono % 08/17/2022 8.5  4.0 - 15.0 % Final    Eosinophil % 08/17/2022 0.2  0.0 -  8.0 % Final    Basophil % 08/17/2022 0.1  0.0 - 1.9 % Final    Differential Method 08/17/2022 Automated   Final    Sodium 08/17/2022 136  136 - 145 mmol/L Final    Potassium 08/17/2022 4.2  3.5 - 5.1 mmol/L Final    Chloride 08/17/2022 103  95 - 110 mmol/L Final    CO2 08/17/2022 26  23 - 29 mmol/L Final    Glucose 08/17/2022 246 (A) 70 - 110 mg/dL Final    BUN 08/17/2022 9  8 - 23 mg/dL Final    Creatinine 08/17/2022 0.7  0.5 - 1.4 mg/dL Final    Calcium 08/17/2022 8.4 (A) 8.7 - 10.5 mg/dL Final    Total Protein 08/17/2022 6.3  6.0 - 8.4 g/dL Final    Albumin 08/17/2022 3.1 (A) 3.5 - 5.2 g/dL Final    Total Bilirubin 08/17/2022 0.9  0.1 - 1.0 mg/dL Final    Alkaline Phosphatase 08/17/2022 83  55 - 135 U/L Final    AST 08/17/2022 27  10 - 40 U/L Final    ALT 08/17/2022 20  10 - 44 U/L Final    Anion Gap 08/17/2022 7 (A) 8 - 16 mmol/L Final    eGFR 08/17/2022 >60.0  >60 mL/min/1.73 m^2 Final    POCT Glucose 08/17/2022 221 (A) 70 - 110 mg/dL Final        Meds   Current Facility-Administered Medications   Medication Dose Route Frequency Provider Last Rate Last Admin    acetaminophen tablet 1,000 mg  1,000 mg Oral Q6H Rosy Forbes MD   1,000 mg at 08/17/22 0516    aspirin EC tablet 81 mg  81 mg Oral BID Allen Ragsdale MD   81 mg at 08/17/22 0931    bisacodyL suppository 10 mg  10 mg Rectal Daily PRN Wan Calderon MD        calcium carbonate 200 mg calcium (500 mg) chewable tablet 1,000 mg  1,000 mg Oral Daily Jany Guaman MD   1,000 mg at 08/17/22 0926    celecoxib capsule 200 mg  200 mg Oral Daily Rosy Forbes MD   200 mg at 08/17/22 0927    clindamycin in D5W 900 mg/50 mL IVPB 900 mg  900 mg Intravenous Q8H Allen Ragsdale MD 50 mL/hr at 08/17/22 0931 900 mg at 08/17/22 0931    cyclobenzaprine tablet 5 mg  5 mg Oral TID PRN Wan Calderon MD   5 mg at 08/14/22 2141    dextrose 10% bolus 125 mL  12.5 g Intravenous PRN Jany Guaman MD        dextrose 10% bolus 250 mL  25 g  Intravenous PRN Jany Guaman MD        ergocalciferol capsule 50,000 Units  50,000 Units Oral Q7 Days Jany Guaman MD   50,000 Units at 08/15/22 1702    glucagon (human recombinant) injection 1 mg  1 mg Intramuscular PRN Jany Guaman MD        glucose chewable tablet 16 g  16 g Oral PRN Jany Guaman MD        glucose chewable tablet 24 g  24 g Oral PRN Jany Guaman MD        insulin aspart U-100 pen 0-5 Units  0-5 Units Subcutaneous QID (AC + HS) PRN Jany Guaman MD   2 Units at 08/15/22 1701    insulin aspart U-100 pen 6 Units  6 Units Subcutaneous TIDWM Jany Guaman MD   6 Units at 08/17/22 0933    insulin detemir U-100 pen 18 Units  18 Units Subcutaneous QHS Wan Calderon MD   18 Units at 08/16/22 2313    latanoprost 0.005 % ophthalmic solution 1 drop  1 drop Both Eyes Nightly Wan Calderon MD   1 drop at 08/16/22 2131    meclizine tablet 25 mg  25 mg Oral TID PRN Wan Calderon MD        melatonin tablet 6 mg  6 mg Oral Nightly PRN Jr. Per Franks MD        methocarbamoL tablet 500 mg  500 mg Oral Q6H Rosy Forbes MD   500 mg at 08/17/22 0516    ondansetron disintegrating tablet 8 mg  8 mg Oral Q8H PRN Wan Calderon MD   8 mg at 08/14/22 2141    oxyCODONE immediate release tablet 5 mg  5 mg Oral Q4H PRN Rosy Forbes MD   5 mg at 08/17/22 0358    pantoprazole EC tablet 40 mg  40 mg Oral Daily Wan Calderon MD   40 mg at 08/17/22 0926    polyethylene glycol packet 17 g  17 g Oral Daily Wan Calderon MD   17 g at 08/17/22 0928    prochlorperazine injection Soln 5 mg  5 mg Intravenous Q6H PRN Wan Calderon MD   5 mg at 08/16/22 1559    ROPIvacaine (PF) 2 mg/ml (0.2%) solution  0.1 mL/hr Perineural Continuous Allen Esteban MD 0.1 mL/hr at 08/16/22 1656 0.1 mL/hr at 08/16/22 1656    senna-docusate 8.6-50 mg per tablet 1 tablet  1 tablet Oral BID Wan Calderon MD   1 tablet at 08/17/22 0928    sodium chloride 0.9% flush 10 mL  10 mL Intravenous PRTOMMY Aguillon  Per Franks MD        sodium chloride 0.9% flush 10 mL  10 mL Intravenous PRN Allen Ragsdale MD            Aspirin 81 BID    Assessment:     Pain control adequate. Patient appears well and comfortable. Has been refusing her multimodals. Advised taking her scheduled medications to help optimize her pain control. Also instructed to use patient hand bolus for interscalene PNC.      Plan:    - Continue R interscalene PNC at 7cc q3h intermittent bolus with 5cc q30min patient demand bolus  - Continue scheduled multimodals   - tylenol 1g q6h   - celebrex 200mg qd   - robaxin 500mg q6h  - Continue PRN oxycodone 5mg q4h for moderate and severe pain      Will continue to follow. Please call anesthesia/APS with any questions or concerns regarding pain control.      Rosy Forbes MD (PGY-4)  Anesthesiology

## 2022-08-17 NOTE — PLAN OF CARE
Problem: Physical Therapy  Goal: Physical Therapy Goal  Description: Goals to be met by: 2022     Patient will increase functional independence with mobility by performin. Supine to sit with Modified [place]  2. Sit to supine with Modified [place]  3. Sit to stand transfer with Modified [place] using LRAD  4. Bed to chair transfer with Modified [place] using LRAD  5. Gait  x 50 feet with Stand-by Assistance using LRAD.     Outcome: Ongoing, Progressing     Pt educated and appropriate goals established. POC updated to reflect assistive device.

## 2022-08-17 NOTE — SUBJECTIVE & OBJECTIVE
Principal Problem:Closed fracture of right proximal humerus    Principal Orthopedic Problem: Same    Interval History: Pt seen and examined at bedside. MARTHA. Pain controlled. Will work with PT today for elbow ROM/ sling donning/doffing.  VSS, AF, hypertensive. No other concerns stated.     Review of patient's allergies indicates:   Allergen Reactions    Iodinated contrast media Swelling and Rash    Percocet [oxycodone-acetaminophen] Itching    Macrobid [nitrofurantoin monohyd/m-cryst] Rash    Metformin Rash    Penicillins Rash     Had ancef in 2020 with no adverse rxn     Promethazine Rash     Had compazine in 2021    Sulfa (sulfonamide antibiotics) Rash    Sulfamethoxazole-trimethoprim Rash       Current Facility-Administered Medications   Medication    acetaminophen tablet 1,000 mg    aspirin EC tablet 81 mg    bisacodyL suppository 10 mg    calcium carbonate 200 mg calcium (500 mg) chewable tablet 1,000 mg    celecoxib capsule 200 mg    clindamycin in D5W 900 mg/50 mL IVPB 900 mg    cyclobenzaprine tablet 5 mg    dextrose 10% bolus 125 mL    dextrose 10% bolus 250 mL    ergocalciferol capsule 50,000 Units    glucagon (human recombinant) injection 1 mg    glucose chewable tablet 16 g    glucose chewable tablet 24 g    insulin aspart U-100 pen 0-5 Units    insulin aspart U-100 pen 6 Units    insulin detemir U-100 pen 18 Units    latanoprost 0.005 % ophthalmic solution 1 drop    meclizine tablet 25 mg    melatonin tablet 6 mg    methocarbamoL tablet 500 mg    ondansetron disintegrating tablet 8 mg    oxyCODONE immediate release tablet 5 mg    pantoprazole EC tablet 40 mg    polyethylene glycol packet 17 g    prochlorperazine injection Soln 5 mg    ROPIvacaine (PF) 2 mg/ml (0.2%) solution    senna-docusate 8.6-50 mg per tablet 1 tablet    sodium chloride 0.9% flush 10 mL    sodium chloride 0.9% flush 10 mL     Objective:     Vital Signs (Most Recent):  Temp: 97.7 °F (36.5 °C) (08/17/22 0427)  Pulse: 74 (08/17/22  0427)  Resp: 16 (08/17/22 0427)  BP: (!) 174/75 (08/17/22 0427)  SpO2: 97 % (08/17/22 0427)   Vital Signs (24h Range):  Temp:  [97.4 °F (36.3 °C)-98.2 °F (36.8 °C)] 97.7 °F (36.5 °C)  Pulse:  [68-89] 74  Resp:  [15-21] 16  SpO2:  [92 %-100 %] 97 %  BP: (128-185)/(61-80) 174/75     Weight: 81.2 kg (179 lb)     Body mass index is 29.79 kg/m².      Intake/Output Summary (Last 24 hours) at 8/17/2022 0631  Last data filed at 8/16/2022 1715  Gross per 24 hour   Intake 2200 ml   Output 855 ml   Net 1345 ml       Ortho/SPM Exam    Vitals: Afebrile.  Vital signs stable.  General: No acute distress.  Cardio: Regular rate.  Chest: No increased work of breathing.      Right Upper Extremity    -Dressing c/d/i  -ATTP   -Compartments soft and compressible  -SILT M/R/U/Ax  -Motor intact Ain/PIN/U/M  -Brisk cap refill  -Warm well perfused extremities  -2+ Radial palpable     Significant Labs: BMP:   Recent Labs   Lab 08/16/22  0310   *      K 4.3      CO2 26   BUN 13   CREATININE 0.8   CALCIUM 8.7     CBC:   Recent Labs   Lab 08/16/22 0310   WBC 7.97   HGB 11.1*   HCT 32.5*        CMP:   Recent Labs   Lab 08/16/22 0310      K 4.3      CO2 26   *   BUN 13   CREATININE 0.8   CALCIUM 8.7   PROT 6.6   ALBUMIN 3.5   BILITOT 0.7   ALKPHOS 90   AST 22   ALT 23   ANIONGAP 8     All pertinent labs within the past 24 hours have been reviewed.    Significant Imaging: I have reviewed all pertinent imaging results/findings.

## 2022-08-17 NOTE — ANESTHESIA POSTPROCEDURE EVALUATION
Anesthesia Post Evaluation    Patient: Riana Kay    Procedure(s) Performed: Procedure(s) (LRB):  ARTHROPLASTY, SHOULDER, TOTAL, REVERSE, [place] (Right)    Final Anesthesia Type: general      Patient location during evaluation: PACU  Patient participation: Yes- Able to Participate  Level of consciousness: awake and alert  Post-procedure vital signs: reviewed and stable  Pain management: adequate  Airway patency: patent    PONV status at discharge: No PONV  Anesthetic complications: no      Cardiovascular status: stable  Respiratory status: unassisted and spontaneous ventilation  Hydration status: euvolemic  Follow-up not needed.          Vitals Value Taken Time   /64 08/17/22 0751   Temp 36.3 °C (97.4 °F) 08/17/22 0751   Pulse 73 08/17/22 0909   Resp 18 08/17/22 0909   SpO2 97 % 08/17/22 0909         Event Time   Out of Recovery 08/16/2022 17:15:00         Pain/Jeffery Score: Pain Rating Prior to Med Admin: 7 (8/17/2022  9:27 AM)  Jeffery Score: 9 (8/16/2022  5:15 PM)

## 2022-08-17 NOTE — SUBJECTIVE & OBJECTIVE
Interval History: Patient taken to OR on 8/16/2022 for right reverse total shoulder replacement. Surgery done by Dr. Aamir Powell. Post-op, patient is non-weight bearing to right arm and no range of motion to right shoulder and in sling at all times. Perineural pain catheter placed by Anesthesia and managing in hospital to right shoulder to help with post-op pain control. Patient placed on Aspirin 81 mg po BID for DVT prophylaxis post-op for 6 weeks. Patient placed on scheduled Tylenol, Celebrex and Robaxin for pain management post-op.     Review of Systems   Constitutional:  Negative for fever.   Respiratory:  Negative for cough and shortness of breath.    Cardiovascular:  Negative for chest pain and leg swelling.   Gastrointestinal:  Negative for abdominal pain, nausea and vomiting.   Musculoskeletal:  Positive for arthralgias (Right shoulder and right knee).   Skin:  Positive for wound (Abrasion to anterior right knee).   Neurological:  Negative for dizziness and light-headedness.   Psychiatric/Behavioral:  Negative for agitation and confusion.    Objective:     Vital Signs (Most Recent):  Temp: 98.7 °F (37.1 °C) (08/17/22 1206)  Pulse: 70 (08/17/22 1206)  Resp: 20 (08/17/22 1213)  BP: 154/70 (08/17/22 1206)  SpO2: 96 % (08/17/22 1206) on room air   Vital Signs (24h Range):  Temp:  [97.4 °F (36.3 °C)-98.7 °F (37.1 °C)] 98.7 °F (37.1 °C)  Pulse:  [70-89] 70  Resp:  [15-21] 20  SpO2:  [92 %-100 %] 96 %  BP: (128-177)/(61-77) 154/70     Weight: 81.2 kg (179 lb)  Body mass index is 29.79 kg/m².    Intake/Output Summary (Last 24 hours) at 8/17/2022 1429  Last data filed at 8/16/2022 1715  Gross per 24 hour   Intake 800 ml   Output 455 ml   Net 345 ml      Physical Exam  Vitals and nursing note reviewed.   Constitutional:       General: She is awake. She is not in acute distress.     Appearance: Normal appearance. She is well-developed and normal weight. She is not ill-appearing.   Eyes:      General: No  scleral icterus.  Neck:      Vascular: No JVD.   Cardiovascular:      Rate and Rhythm: Normal rate and regular rhythm.      Heart sounds: Normal heart sounds. No murmur heard.    No friction rub. No gallop.   Pulmonary:      Effort: Pulmonary effort is normal. No respiratory distress.      Breath sounds: Normal breath sounds. No wheezing.   Abdominal:      General: Abdomen is flat. Bowel sounds are normal. There is no distension.      Palpations: Abdomen is soft.      Tenderness: There is no abdominal tenderness.   Musculoskeletal:      Right lower leg: No edema.      Left lower leg: No edema.   Skin:     Findings: Abrasion (Right anterior knee) present.   Neurological:      Mental Status: She is alert and oriented to person, place, and time.   Psychiatric:         Mood and Affect: Mood normal.         Behavior: Behavior normal. Behavior is cooperative.         Thought Content: Thought content normal.         Judgment: Judgment normal.       Significant Labs: CBC:   Recent Labs   Lab 08/16/22  0310 08/17/22  0746   WBC 7.97 8.74   HGB 11.1* 9.9*   HCT 32.5* 27.8*    190     CMP:   Recent Labs   Lab 08/16/22  0310 08/17/22  0746    136   K 4.3 4.2    103   CO2 26 26   * 246*   BUN 13 9   CREATININE 0.8 0.7   CALCIUM 8.7 8.4*   PROT 6.6 6.3   ALBUMIN 3.5 3.1*   BILITOT 0.7 0.9   ALKPHOS 90 83   AST 22 27   ALT 23 20   ANIONGAP 8 7*     Blood Sugars (AccuCheck):    Recent Labs     08/16/22  0722 08/16/22  1113 08/16/22  1655 08/16/22  2312 08/17/22  0615 08/17/22  1157   POCTGLUCOSE 179* 184* 256* 274* 221* 242*       Significant Imaging: I have reviewed all pertinent imaging results/findings within the past 24 hours.

## 2022-08-17 NOTE — ASSESSMENT & PLAN NOTE
Riana Kay is a 67 y.o. female with a right proximal humerus fracture and anterior dislocation. Closed, NVI.    Now s/p R RTSA 8/16      - Weight bearing status: NWB, no shoudler ROM, sling at all times except hygiene   - Okay for elbow and wrist ROM  - Pain control: MM  - Antibiotics: Clinda x 24hrs  - DVT Prophylaxis: ASA BID x 6 weeks , SCD's at all times when not ambulating.  - PT/OT for sling donning and doffing  - Lines/Drains: PNC  - Dispo: PT/OT and pain control

## 2022-08-17 NOTE — PLAN OF CARE
[facility] - [facility]  Initial Discharge Assessment       Primary Care Provider: Jose Rojas MD    Admission Diagnosis: Bradycardia [R00.1]  Fall [W19.XXXA]  Fall, initial encounter [W19.XXXA]  Shoulder dislocation, right, initial encounter [S43.004A]  Humerus head fracture, left, closed, initial encounter [S42.292A]    Admission Date: 8/14/2022  Expected Discharge Date: 8/18/2022    Discharge Barriers Identified: None    Payor: [employer] MEDICARE / Plan: [employer] 65 / Product Type: Medicare Advantage /     Extended Emergency Contact Information  Primary Emergency Contact: RahulKemal  Address: [address] [address] [place]  Mobile Phone: [phone]  Relation: Spouse  Secondary Emergency Contact: William Kay  Address: [address] [place] [address] [place]  Mobile Phone: [phone]  Relation: Son    Discharge Plan A: Skilled Nursing Facility  Discharge Plan B: Home Health, Home with family      [id number] [place] - [address]  [address]  [place] [address]  Phone: [phone] Fax: [phone]    [address] [address]  Phone: [phone] Fax: [phone]      Initial Assessment (most recent)     Adult Discharge Assessment - 08/17/22 1416        Discharge Assessment    Assessment Type Discharge Planning Assessment     Confirmed/corrected address, phone number and insurance Yes     Confirmed Demographics Correct on Facesheet     Source of Information patient     Lives With spouse     Do you expect to return to your current living situation? Yes   Following SNF placement    Do you have help at home or someone to help you manage your care at home? Yes     Prior to hospitilization cognitive status: Alert/Oriented     Current cognitive status: Alert/Oriented     Walking or Climbing Stairs Difficulty none      Dressing/Bathing Difficulty none     Do you have any problems with: Errands/Grocery     Home Layout Able to live on 1st floor     Equipment Currently Used at Home none     Readmission within 30 days? No     Patient currently being followed by outpatient case management? No     Do you currently have service(s) that help you manage your care at home? No     Do you have prescription coverage? Yes     Coverage [employer]     How do you get to doctors appointments? family or friend will provide;other (see comments)   Spouse    Are you on dialysis? No     Do you take coumadin? No     Discharge Plan A Skilled Nursing Facility     Discharge Plan B Home Health;Home with family     DME Needed Upon Discharge  other (see comments)   TBD    Discharge Plan discussed with: Spouse/sig other;Patient;Sibling     Discharge Barriers Identified None                  SW completed discharge planning assessment with the patient and pt's spouse (Kemal) at bedside. SW verified demographic information listed on the pt.'s Face sheet. Patient currently lives in a single story home with 1 step to enter. Pt declines using any DME prior to admit. Patient and pt's spouse agreeable to SNF placement.      Christina Martinez LMSW  Case Management   [facility]   Ext. [phone]

## 2022-08-17 NOTE — PLAN OF CARE
Problem: Occupational Therapy  Goal: Occupational Therapy Goal  Description: Goals to be met by: 9/14/2022     Patient will increase functional independence with ADLs by performing:    UE Dressing, including sling, with Minimal Assistance.  LE Dressing with Minimal Assistance.  Grooming while seated with Set-up Assistance.  Toileting from toilet/bedside commode with Minimal Assistance for hygiene and clothing management.   Toilet transfer to toilet/bedside commode with Stand-by Assistance.  Pt will tolerate standing for ~8 minutes with SBA and no LOB to facilitate engagement in functional activities of choice.    Outcome: Ongoing, Progressing   Goals set.

## 2022-08-17 NOTE — ASSESSMENT & PLAN NOTE
Patient's FSGs are not controlled on current medication regimen. Patient reports she takes Lantus insulin 28 units nightly at home and 12 unit of Humalog insulin with breakfast and 14-16 units of Humalog insulin with lunch and dinner.   Last A1c reviewed-   Lab Results   Component Value Date    HGBA1C 6.4 (H) 08/14/2022     Most recent fingerstick glucose reviewed-   Recent Labs   Lab 08/16/22  1655 08/16/22  2312 08/17/22  0615 08/17/22  1157   POCTGLUCOSE 256* 274* 221* 242*     Current correctional scale  Low  Maintain anti-hyperglycemic dose as follows-     · DM x over 20 years.  · Plan to cincrease Levemir insulin from 18 to 20 units nightly in hospital today, 8/17 as well as increasing Novolog insulin from 6 to 8 units with meals.   · Adjust insulin dosing for goal blood sugars 140-180 in hospital.   · Monitor POCT glucose 4 times daily with meals and at bedtime.

## 2022-08-17 NOTE — PT/OT/SLP EVAL
Occupational Therapy   Co-Evaluation & Co-Treatment  Name: Riana Kay  MRN: [id number]  Admitting Diagnosis:  Closed fracture of right proximal humerus  Recent Surgery: Procedure(s) (LRB):  ARTHROPLASTY, SHOULDER, TOTAL, REVERSE, [place] (Right) 1 Day Post-Op    Recommendations:     Discharge Recommendations: nursing facility, skilled  Discharge Equipment Recommendations:  bedside commode, cane, quad  Barriers to discharge:   (increased assistance required)    Assessment:     Riana Kay is a 67 y.o. female with a medical diagnosis of Closed fracture of right proximal humerus. Pt met with HOB elevated and agreeable to participate in PT/OT eval with  present through Ipad. She tolerated session fairly well and required Min-Max (A) for ADLs and functional mobility/transfers. Pt remains limited in self-care, functional mobility, and ADLs and is currently not performing tasks at PLOF. Once medically stable, OT recommending SNF  in order to maximize independence with functional activities, reduce caregiver burden, and facilitate safe discharge. Performance deficits affecting function: weakness, impaired endurance, impaired self care skills, impaired functional mobility, gait instability, impaired balance, pain, decreased safety awareness, decreased upper extremity function, decreased coordination, decreased ROM, impaired coordination, impaired fine motor, edema, impaired skin, orthopedic precautions.       PCA present with pt requesting pain medication beginning of session while seated EOB. PT provided education on administering medication with pt utilizing L UE to push button.     Rehab Prognosis: Good; patient would benefit from acute skilled OT services to address these deficits and reach maximum level of function.       Plan:     Patient to be seen 4 x/week to address the above listed problems via self-care/home management, therapeutic activities, therapeutic exercises, neuromuscular  re-education  · Plan of Care Expires: 09/16/22  · Plan of Care Reviewed with: patient, sibling    Subjective     Chief Complaint: pain  Patient/Family Comments/goals: to go home    Occupational Profile:  Living Environment: Pt lives with her  and sister in a [facility] with threshold. She has a walk-in shower with shower chair.  Previous level of function: Pt reports being (I) with all ADLs and functional mobility. She does not use any AD for ambulation. She does not work and (+) drives  Equipment Used at Home:  none  Assistance upon Discharge:  (works full time) and sister     Pain/Comfort:  · Pain Rating 1:  (pt did not rate-with mobility)  · Location - Orientation 1: upper  · Location 1: arm  · Pain Addressed 1: Pre-medicate for activity, Reposition, Distraction, Cessation of Activity  · Pain Rating Post-Intervention 1:  (pt did not rate)    Patients cultural, spiritual, Restoration conflicts given the current situation: no    Objective:   Co-treatment performed due to patient's multiple deficits requiring two skilled therapists to appropriately and safely assess patient's strength and endurance while facilitating functional tasks in addition to accommodating for patient's activity tolerance.   Communicated with: RN prior to session.  Patient found HOB elevated with SCD, peripheral IV, PureWick, oxygen, PCA upon OT entry to room.    Additional staff present: MARRY Alba and NEERU Ernandez    General Precautions: Standard, fall   Orthopedic Precautions:RUE non weight bearing (no ROM R UE (elbow-hand allowed))   Braces: Sling and swathe  Respiratory Status: Nasal cannula, flow 2 L/min    Pt's O2 monitored sitting EOB beginning of session at 97% with SpO2 removed    Occupational Performance:    Bed Mobility:    · Patient completed Scooting on EOB with stand by assistance to bring hips anteriorly and feet flat on floor  · Cueing for initiation   · Supine to Sit with stand by assistance toward the left  · HOB  elevated    Functional Mobility/Transfers:  · Patient completed Sit <> Stand Transfer with contact guard assistance and minimum assistance  with  no assistive device and hand-held assist L UE  · x4 trials   · Min (A) x2 from EOB, and Min progressing to CGA from BSC and bedside chair  · Cues for hand placement with L UE, upright posture, and hip extension  · Toilet Transfer BSC<>bedside chair using Step Transfer technique with minimum assistance with  no AD and hand-held assist L UE  · x2 trials from each direction  · Cues for sequencing, increased step length/width, and upright posture    Activities of Daily Living:  · Upper Body Dressing: maximal assistance to don/doff gown anteriorly and adjust velcro strapes on sling in order to ensure proper fit and stabilization of R UE  · Pt (A) with threading L UE through sleeve  · Lower Body Dressing: maximal assistance required to doff soiled underwear, thread B LEs through new underwear, and manipulate above R hip in standing   · with pt able to (A) manipulating above L hip  · Toileting: maximal assistance to cleanse anteriorly and manipulating clothing above/below hips with pt able to (A) with pulling up over L hip in standing  · She attempted to cleanse anteriorly while seated on BSC but required (A) for thoroughness    Cognitive/Visual Perceptual:  Cognitive/Psychosocial Skills:     -       Oriented to: Person, Place, Time and Situation   -       Follows Commands/attention:Follows one-step commands  -       Communication: clear/fluent  -       Memory: No Deficits noted  -       Safety awareness/insight to disability: impaired   -       Mood/Affect/Coping skills/emotional control: Cooperative    Physical Exam:  Balance:    -       fair/fair+ sitting balance as demonstrated during performance of self-care tasks; poor/poor+ standing balance with no AD and HHA L UE  Postural examination/scapula alignment:    -       Rounded shoulders  -       Forward head  Edema:  Moderate  R UE  Sensation:    -       Intact  light/touch B UEs  Dominant hand:    -       Right  Upper Extremity Range of Motion:     -       Right Upper Extremity: NT at shoulder; decreased elbow flex/ext, pronation/supination; WFL wrist-hand  -       Left Upper Extremity: WFL  Upper Extremity Strength:    -       Right Upper Extremity: NT  -       Left Upper Extremity: WFL   Strength:    -       Right Upper Extremity: Decreased  strength R UE  -       Left Upper Extremity: WFL  Fine Motor Coordination:    -       Impaired  Right hand thumb/finger opposition skills slowed and difficulty with precision observed d/t edema  Gross motor coordination:   WFL L UE    AMPAC 6 Click ADL:  AMPAC Total Score: 14    Treatment & Education:  -Education on WB'ng precautions and ROM limitations  -Education on pain management system  Provided education regarding role of OT, POC, & discharge recommendations with pt & sister verbalizing understanding.  Pt had no further questions & when asked whether there were any concerns pt reported none.    Education:    Patient left up in chair with all lines intact, call button in reach, RN notified and sister present    GOALS:   Multidisciplinary Problems     Occupational Therapy Goals        Problem: Occupational Therapy    Goal Priority Disciplines Outcome Interventions   Occupational Therapy Goal     OT, PT/OT Ongoing, Progressing    Description: Goals to be met by: 9/14/2022     Patient will increase functional independence with ADLs by performing:    UE Dressing, including sling, with Minimal Assistance.  LE Dressing with Minimal Assistance.  Grooming while seated with Set-up Assistance.  Toileting from toilet/bedside commode with Minimal Assistance for hygiene and clothing management.   Toilet transfer to toilet/bedside commode with Stand-by Assistance.  Pt will tolerate standing for ~8 minutes with SBA and no LOB to facilitate engagement in functional activities of choice.                      History:     Past Medical History:   Diagnosis Date    Abdominal pain, right upper quadrant 02/04/2014    Anticoagulant long-term use     Anxiety     AR (allergic rhinitis)     Atrophic gastritis without mention of hemorrhage 02/13/2012     Dx updated per 2019 IMO Load    Chronic fatigue syndrome 06/10/2012    Diabetes mellitus     Dizziness     Fatty liver     GERD (gastroesophageal reflux disease)     Gross hematuria 12/01/2020    HTN (hypertension)     Hyperlipidemia     Leg swelling     Memory loss     Osteopenia     PONV (postoperative nausea and vomiting)     Primary osteoarthritis of right knee 10/06/2020    Primary osteoarthritis of right knee 10/06/2020    Right elbow pain 01/16/2019    S/P total hysterectomy     Screening for colon cancer 01/06/2021    Sleep apnea     Status post total right knee replacement 10/6/2020 10/05/2020       Past Surgical History:   Procedure Laterality Date    CHOLECYSTECTOMY      COLONOSCOPY N/A 1/17/2018    Procedure: COLONOSCOPY with Donnell;  Surgeon: Roland Jacobo MD;  Location: [facility] ([address] [place] [address]);  Service: Endoscopy;  Laterality: N/A;    COLONOSCOPY N/A 1/6/2021    Procedure: COLONOSCOPY;  Surgeon: Yong Rowland MD;  Location: [facility] ([address] [place] [address]);  Service: Endoscopy;  Laterality: N/A;  prep ins. emailed - [place] speaking,  needed - ERW  [facility] - ERW    CYSTOSCOPY N/A 12/1/2020    Procedure: CYSTOSCOPY;  Surgeon: Ines Stanford MD;  Location: [facility] OR [address] [place] [address];  Service: Urology;  Laterality: N/A;  45 minutes     ELBOW ARTHROPLASTY Right 1/16/2019    Procedure: ARTHROPLASTY, ELBOW right radial head arthroplasty revision;  Surgeon: Katja Hubbard MD;  Location: [facility] OR [address] [place] [address];  Service: Orthopedics;  Laterality: Right;  Anesthesia: General and Regional. Stretcher, hand pan 1 and pan 2, CALL RUCHI SANTAMARIA & Sol notified 1-14 LO    ELBOW SURGERY Right 7/16/15     ELBOW SURGERY      ESOPHAGOGASTRODUODENOSCOPY N/A 4/15/2019    Procedure: EGD (ESOPHAGOGASTRODUODENOSCOPY);  Surgeon: Buck Irwin MD;  Location: [facility] (4TH FLR);  Service: Endoscopy;  Laterality: N/A;  ray she    HYSTERECTOMY      KNEE ARTHROPLASTY Right 10/6/2020    Procedure: ARTHROPLASTY, KNEE-SAME DAY PROTOCOL;  Surgeon: Sabino Damon MD;  Location: [facility] OR;  Service: Orthopedics;  Laterality: Right;    KNEE ARTHROSCOPY W/ DEBRIDEMENT  4/11    Left    RELEASE OF ULNAR NERVE AT CUBITAL TUNNEL Right 1/16/2019    Procedure: RELEASE, ULNAR TUNNEL right;  Surgeon: Katja Hubbard MD;  Location: [facility] OR 1ST FLR;  Service: Orthopedics;  Laterality: Right;  Anesthesia: General and Regional. Stretcher, hand pan 1 and pan 2, CALL ACCUMED, CLAIRX    RETROGRADE PYELOGRAPHY Bilateral 12/1/2020    Procedure: PYELOGRAM, RETROGRADE;  Surgeon: Ines Stanford MD;  Location: [facility] OR 1ST FLR;  Service: Urology;  Laterality: Bilateral;    REVERSE TOTAL SHOULDER ARTHROPLASTY Right 8/16/2022    Procedure: ARTHROPLASTY, SHOULDER, TOTAL, REVERSE, [place];  Surgeon: Aamir Powell MD;  Location: [facility] OR 2ND FLR;  Service: Orthopedics;  Laterality: Right;  [place] Can't go until after 12    ROTATOR CUFF REPAIR      TOTAL ABDOMINAL HYSTERECTOMY W/ BILATERAL SALPINGOOPHORECTOMY      TOTAL KNEE ARTHROPLASTY Left 10/19/2021    Procedure: ARTHROPLASTY, KNEE, TOTAL;  Surgeon: Sabino Damon MD;  Location: [facility];  Service: Orthopedics;  Laterality: Left;    UPPER GASTROINTESTINAL ENDOSCOPY         Time Tracking:     OT Date of Treatment: 08/17/22  OT Start Time: 0922  OT Stop Time: 1008  OT Total Time (min): 46 min    Billable Minutes:Evaluation 8  Self Care/Home Management 28  Therapeutic Activity 10    8/17/2022

## 2022-08-17 NOTE — PROGRESS NOTES
[facility] - [facility]  Orthopedics  Progress Note    Patient Name: Riana Kay  MRN: [id number]  Admission Date: 8/14/2022  Hospital Length of Stay: 2 days  Attending Provider: Jany Guaman MD  Primary Care Provider: Jose Rojas MD  Follow-up For: Procedure(s) (LRB):  ARTHROPLASTY, SHOULDER, TOTAL, REVERSE, [place] (Right)    Post-Operative Day: 1 Day Post-Op  Subjective:     Principal Problem:Closed fracture of right proximal humerus    Principal Orthopedic Problem: Same    Interval History: Pt seen and examined at bedside. NAEON. Pain controlled. Will work with PT today for elbow ROM/ sling donning/doffing.  VSS, AF, hypertensive. No other concerns stated.     Review of patient's allergies indicates:   Allergen Reactions    Iodinated contrast media Swelling and Rash    Percocet [oxycodone-acetaminophen] Itching    Macrobid [nitrofurantoin monohyd/m-cryst] Rash    Metformin Rash    Penicillins Rash     Had ancef in 2020 with no adverse rxn     Promethazine Rash     Had compazine in 2021    Sulfa (sulfonamide antibiotics) Rash    Sulfamethoxazole-trimethoprim Rash       Current Facility-Administered Medications   Medication    acetaminophen tablet 1,000 mg    aspirin EC tablet 81 mg    bisacodyL suppository 10 mg    calcium carbonate 200 mg calcium (500 mg) chewable tablet 1,000 mg    celecoxib capsule 200 mg    clindamycin in D5W 900 mg/50 mL IVPB 900 mg    cyclobenzaprine tablet 5 mg    dextrose 10% bolus 125 mL    dextrose 10% bolus 250 mL    ergocalciferol capsule 50,000 Units    glucagon (human recombinant) injection 1 mg    glucose chewable tablet 16 g    glucose chewable tablet 24 g    insulin aspart U-100 pen 0-5 Units    insulin aspart U-100 pen 6 Units    insulin detemir U-100 pen 18 Units    latanoprost 0.005 % ophthalmic solution 1 drop    meclizine tablet 25 mg    melatonin tablet 6 mg    methocarbamoL tablet 500 mg    ondansetron disintegrating tablet 8 mg     oxyCODONE immediate release tablet 5 mg    pantoprazole EC tablet 40 mg    polyethylene glycol packet 17 g    prochlorperazine injection Soln 5 mg    ROPIvacaine (PF) 2 mg/ml (0.2%) solution    senna-docusate 8.6-50 mg per tablet 1 tablet    sodium chloride 0.9% flush 10 mL    sodium chloride 0.9% flush 10 mL     Objective:     Vital Signs (Most Recent):  Temp: 97.7 °F (36.5 °C) (08/17/22 0427)  Pulse: 74 (08/17/22 0427)  Resp: 16 (08/17/22 0427)  BP: (!) 174/75 (08/17/22 0427)  SpO2: 97 % (08/17/22 0427)   Vital Signs (24h Range):  Temp:  [97.4 °F (36.3 °C)-98.2 °F (36.8 °C)] 97.7 °F (36.5 °C)  Pulse:  [68-89] 74  Resp:  [15-21] 16  SpO2:  [92 %-100 %] 97 %  BP: (128-185)/(61-80) 174/75     Weight: 81.2 kg (179 lb)     Body mass index is 29.79 kg/m².      Intake/Output Summary (Last 24 hours) at 8/17/2022 0631  Last data filed at 8/16/2022 1715  Gross per 24 hour   Intake 2200 ml   Output 855 ml   Net 1345 ml       Ortho/SPM Exam    Vitals: Afebrile.  Vital signs stable.  General: No acute distress.  Cardio: Regular rate.  Chest: No increased work of breathing.      Right Upper Extremity    -Dressing c/d/i  -ATTP   -Compartments soft and compressible  -SILT M/R/U/Ax  -Motor intact Ain/PIN/U/M  -Brisk cap refill  -Warm well perfused extremities  -2+ Radial palpable     Significant Labs: BMP:   Recent Labs   Lab 08/16/22 0310   *      K 4.3      CO2 26   BUN 13   CREATININE 0.8   CALCIUM 8.7     CBC:   Recent Labs   Lab 08/16/22 0310   WBC 7.97   HGB 11.1*   HCT 32.5*        CMP:   Recent Labs   Lab 08/16/22 0310      K 4.3      CO2 26   *   BUN 13   CREATININE 0.8   CALCIUM 8.7   PROT 6.6   ALBUMIN 3.5   BILITOT 0.7   ALKPHOS 90   AST 22   ALT 23   ANIONGAP 8     All pertinent labs within the past 24 hours have been reviewed.    Significant Imaging: I have reviewed all pertinent imaging results/findings.    Assessment/Plan:     * Closed fracture of right  proximal humerus  Riana Kay is a 67 y.o. female with a right proximal humerus fracture and anterior dislocation. Closed, NVI.    Now s/p R RTSA 8/16      - Weight bearing status: NWB, no shoudler ROM, sling at all times except hygiene   - Okay for elbow and wrist ROM  - Pain control: MM  - Antibiotics: Clinda x 24hrs  - DVT Prophylaxis: ASA BID x 6 weeks , SCD's at all times when not ambulating.  - PT/OT for sling donning and doffing  - Lines/Drains: PNC  - Dispo: PT/OT and pain control            Allen Ragsdale MD  Orthopedics  [facility] - Surgery

## 2022-08-18 LAB
ALBUMIN SERPL BCP-MCNC: 2.9 G/DL (ref 3.5–5.2)
ALP SERPL-CCNC: 75 U/L (ref 55–135)
ALT SERPL W/O P-5'-P-CCNC: 18 U/L (ref 10–44)
ANION GAP SERPL CALC-SCNC: 8 MMOL/L (ref 8–16)
AST SERPL-CCNC: 27 U/L (ref 10–40)
BASOPHILS # BLD AUTO: 0.02 K/UL (ref 0–0.2)
BASOPHILS NFR BLD: 0.3 % (ref 0–1.9)
BILIRUB SERPL-MCNC: 0.7 MG/DL (ref 0.1–1)
BUN SERPL-MCNC: 15 MG/DL (ref 8–23)
CALCIUM SERPL-MCNC: 8.3 MG/DL (ref 8.7–10.5)
CHLORIDE SERPL-SCNC: 105 MMOL/L (ref 95–110)
CO2 SERPL-SCNC: 27 MMOL/L (ref 23–29)
CREAT SERPL-MCNC: 0.7 MG/DL (ref 0.5–1.4)
DIFFERENTIAL METHOD: ABNORMAL
EOSINOPHIL # BLD AUTO: 0.1 K/UL (ref 0–0.5)
EOSINOPHIL NFR BLD: 0.6 % (ref 0–8)
ERYTHROCYTE [DISTWIDTH] IN BLOOD BY AUTOMATED COUNT: 12.4 % (ref 11.5–14.5)
EST. GFR  (NO RACE VARIABLE): >60 ML/MIN/1.73 M^2
GLUCOSE SERPL-MCNC: 234 MG/DL (ref 70–110)
HCT VFR BLD AUTO: 28.1 % (ref 37–48.5)
HGB BLD-MCNC: 9.5 G/DL (ref 12–16)
IMM GRANULOCYTES # BLD AUTO: 0.03 K/UL (ref 0–0.04)
IMM GRANULOCYTES NFR BLD AUTO: 0.4 % (ref 0–0.5)
LYMPHOCYTES # BLD AUTO: 1.8 K/UL (ref 1–4.8)
LYMPHOCYTES NFR BLD: 23.7 % (ref 18–48)
MCH RBC QN AUTO: 30.8 PG (ref 27–31)
MCHC RBC AUTO-ENTMCNC: 33.8 G/DL (ref 32–36)
MCV RBC AUTO: 91 FL (ref 82–98)
MONOCYTES # BLD AUTO: 0.5 K/UL (ref 0.3–1)
MONOCYTES NFR BLD: 6.5 % (ref 4–15)
NEUTROPHILS # BLD AUTO: 5.3 K/UL (ref 1.8–7.7)
NEUTROPHILS NFR BLD: 68.5 % (ref 38–73)
NRBC BLD-RTO: 0 /100 WBC
PLATELET # BLD AUTO: 188 K/UL (ref 150–450)
PMV BLD AUTO: 9.5 FL (ref 9.2–12.9)
POCT GLUCOSE: 186 MG/DL (ref 70–110)
POCT GLUCOSE: 225 MG/DL (ref 70–110)
POCT GLUCOSE: 245 MG/DL (ref 70–110)
POCT GLUCOSE: 270 MG/DL (ref 70–110)
POCT GLUCOSE: 278 MG/DL (ref 70–110)
POCT GLUCOSE: 313 MG/DL (ref 70–110)
POTASSIUM SERPL-SCNC: 4 MMOL/L (ref 3.5–5.1)
PROT SERPL-MCNC: 6 G/DL (ref 6–8.4)
RBC # BLD AUTO: 3.08 M/UL (ref 4–5.4)
SODIUM SERPL-SCNC: 140 MMOL/L (ref 136–145)
WBC # BLD AUTO: 7.75 K/UL (ref 3.9–12.7)

## 2022-08-18 PROCEDURE — 99233 SBSQ HOSP IP/OBS HIGH 50: CPT | Mod: ,,, | Performed by: INTERNAL MEDICINE

## 2022-08-18 PROCEDURE — 85025 COMPLETE CBC W/AUTO DIFF WBC: CPT | Performed by: STUDENT IN AN ORGANIZED HEALTH CARE EDUCATION/TRAINING PROGRAM

## 2022-08-18 PROCEDURE — 63600175 PHARM REV CODE 636 W HCPCS: Performed by: SURGERY

## 2022-08-18 PROCEDURE — 25000003 PHARM REV CODE 250: Performed by: STUDENT IN AN ORGANIZED HEALTH CARE EDUCATION/TRAINING PROGRAM

## 2022-08-18 PROCEDURE — 25000003 PHARM REV CODE 250: Performed by: INTERNAL MEDICINE

## 2022-08-18 PROCEDURE — 99233 PR SUBSEQUENT HOSPITAL CARE,LEVL III: ICD-10-PCS | Mod: ,,, | Performed by: INTERNAL MEDICINE

## 2022-08-18 PROCEDURE — 36415 COLL VENOUS BLD VENIPUNCTURE: CPT | Performed by: STUDENT IN AN ORGANIZED HEALTH CARE EDUCATION/TRAINING PROGRAM

## 2022-08-18 PROCEDURE — 97530 THERAPEUTIC ACTIVITIES: CPT

## 2022-08-18 PROCEDURE — 94761 N-INVAS EAR/PLS OXIMETRY MLT: CPT

## 2022-08-18 PROCEDURE — 99900035 HC TECH TIME PER 15 MIN (STAT)

## 2022-08-18 PROCEDURE — 80053 COMPREHEN METABOLIC PANEL: CPT | Performed by: STUDENT IN AN ORGANIZED HEALTH CARE EDUCATION/TRAINING PROGRAM

## 2022-08-18 PROCEDURE — 97535 SELF CARE MNGMENT TRAINING: CPT

## 2022-08-18 PROCEDURE — 11000001 HC ACUTE MED/SURG PRIVATE ROOM

## 2022-08-18 RX ORDER — ACETAMINOPHEN 500 MG
1000 TABLET ORAL EVERY 8 HOURS
Status: DISCONTINUED | OUTPATIENT
Start: 2022-08-18 | End: 2022-08-19 | Stop reason: HOSPADM

## 2022-08-18 RX ORDER — INSULIN ASPART 100 [IU]/ML
12 INJECTION, SOLUTION INTRAVENOUS; SUBCUTANEOUS
Status: DISCONTINUED | OUTPATIENT
Start: 2022-08-19 | End: 2022-08-19 | Stop reason: HOSPADM

## 2022-08-18 RX ADMIN — SENNOSIDES AND DOCUSATE SODIUM 1 TABLET: 50; 8.6 TABLET ORAL at 08:08

## 2022-08-18 RX ADMIN — METHOCARBAMOL 500 MG: 500 TABLET ORAL at 12:08

## 2022-08-18 RX ADMIN — ASPIRIN 81 MG: 81 TABLET, COATED ORAL at 08:08

## 2022-08-18 RX ADMIN — METHOCARBAMOL 500 MG: 500 TABLET ORAL at 05:08

## 2022-08-18 RX ADMIN — CELECOXIB 200 MG: 200 CAPSULE ORAL at 08:08

## 2022-08-18 RX ADMIN — ACETAMINOPHEN 1000 MG: 500 TABLET ORAL at 10:08

## 2022-08-18 RX ADMIN — INSULIN ASPART 8 UNITS: 100 INJECTION, SOLUTION INTRAVENOUS; SUBCUTANEOUS at 04:08

## 2022-08-18 RX ADMIN — METHOCARBAMOL 500 MG: 500 TABLET ORAL at 07:08

## 2022-08-18 RX ADMIN — LOSARTAN POTASSIUM 12.5 MG: 25 TABLET, FILM COATED ORAL at 05:08

## 2022-08-18 RX ADMIN — ACETAMINOPHEN 1000 MG: 500 TABLET ORAL at 07:08

## 2022-08-18 RX ADMIN — METHOCARBAMOL 500 MG: 500 TABLET ORAL at 11:08

## 2022-08-18 RX ADMIN — INSULIN ASPART 3 UNITS: 100 INJECTION, SOLUTION INTRAVENOUS; SUBCUTANEOUS at 08:08

## 2022-08-18 RX ADMIN — INSULIN ASPART 8 UNITS: 100 INJECTION, SOLUTION INTRAVENOUS; SUBCUTANEOUS at 08:08

## 2022-08-18 RX ADMIN — CALCIUM CARBONATE (ANTACID) CHEW TAB 500 MG 1000 MG: 500 CHEW TAB at 08:08

## 2022-08-18 RX ADMIN — PANTOPRAZOLE SODIUM 40 MG: 40 TABLET, DELAYED RELEASE ORAL at 08:08

## 2022-08-18 RX ADMIN — INSULIN ASPART 2 UNITS: 100 INJECTION, SOLUTION INTRAVENOUS; SUBCUTANEOUS at 04:08

## 2022-08-18 RX ADMIN — LATANOPROST 1 DROP: 50 SOLUTION OPHTHALMIC at 08:08

## 2022-08-18 RX ADMIN — ACETAMINOPHEN 1000 MG: 500 TABLET ORAL at 12:08

## 2022-08-18 RX ADMIN — INSULIN ASPART 2 UNITS: 100 INJECTION, SOLUTION INTRAVENOUS; SUBCUTANEOUS at 10:08

## 2022-08-18 RX ADMIN — ROPIVACAINE HYDROCHLORIDE 0.1 ML/HR: 2 INJECTION, SOLUTION EPIDURAL; INFILTRATION at 11:08

## 2022-08-18 RX ADMIN — INSULIN ASPART 8 UNITS: 100 INJECTION, SOLUTION INTRAVENOUS; SUBCUTANEOUS at 11:08

## 2022-08-18 NOTE — PT/OT/SLP PROGRESS
Occupational Therapy   Treatment    Name: Riana Kay  MRN: [id number]  Admitting Diagnosis:  Closed fracture of proximal end of right humerus with routine healing  2 Days Post-Op    Recommendations:     Discharge Recommendations: nursing facility, skilled  Discharge Equipment Recommendations:  bedside commode, cane, quad  Barriers to discharge:  Decreased caregiver support    Assessment:     Riana Kay is a 67 y.o. female with a medical diagnosis of Closed fracture of proximal end of right humerus with routine healing.  She presents with R rTSA.  Educated pt and family on donning/doffing sling and ADL modifications.  Pt's  declined to practice donning/doffing sling d/t not being ready to try at this time but states he will be ready tomorrow.  Pt was able to form a composite fist. Performance deficits affecting function are weakness, impaired endurance, impaired self care skills, impaired functional mobility, impaired balance, decreased upper extremity function, decreased ROM, impaired coordination, impaired fine motor, orthopedic precautions.     Rehab Prognosis:  Good; patient would benefit from acute skilled OT services to address these deficits and reach maximum level of function.       Plan:     Patient to be seen 4 x/week to address the above listed problems via self-care/home management, therapeutic activities, therapeutic exercises  · Plan of Care Expires: 09/16/22  · Plan of Care Reviewed with: patient    Subjective     Pain/Comfort:  · Pain Rating 1: 0/10    Objective:     Communicated with: RN prior to session.  Patient found supine with perineural catheter, SCD, PureWick upon OT entry to room.    General Precautions: Standard, fall   Orthopedic Precautions:RUE non weight bearing (No AROM at shoulder)   Braces: Shoulder abduction brace  Respiratory Status: Room air     Occupational Performance:     Bed Mobility:    · Patient completed Supine to Sit with minimum assistance      Functional Mobility/Transfers:  · Patient completed Sit <> Stand Transfer with minimum assistance  with  hand-held assist   · Patient completed Bed <> Chair Transfer using Stand Pivot technique with minimum assistance with hand-held assist  · Functional Mobility: Pt was able to take 2-3 steps c min A    Activities of Daily Living:  · Upper Body Dressing: maximal assistance to don/doff shoulder abduction brace and hospital gown while sitting EOB.      [facility] 6 Click ADL: 13    Patient left up in chair with all lines intact, call button in reach and RN notifiedEducation:      GOALS:   Multidisciplinary Problems     Occupational Therapy Goals        Problem: Occupational Therapy    Goal Priority Disciplines Outcome Interventions   Occupational Therapy Goal     OT, PT/OT Ongoing, Progressing    Description: Goals to be met by: 9/14/2022     Patient will increase functional independence with ADLs by performing:    UE Dressing, including sling, with Minimal Assistance.  LE Dressing with Minimal Assistance.  Grooming while seated with Set-up Assistance.  Toileting from toilet/bedside commode with Minimal Assistance for hygiene and clothing management.   Toilet transfer to toilet/bedside commode with Stand-by Assistance.  Pt will tolerate standing for ~8 minutes with SBA and no LOB to facilitate engagement in functional activities of choice.                     Time Tracking:     OT Date of Treatment: 08/18/22  OT Start Time: 1306  OT Stop Time: 1329  OT Total Time (min): 23 min    Billable Minutes:Self Care/Home Management 12  Therapeutic Activity 11               8/18/2022

## 2022-08-18 NOTE — PROGRESS NOTES
[facility]  Orthopedics  Progress Note    Patient Name: Riana Kay  MRN: [id number]  Admission Date: 8/14/2022  Hospital Length of Stay: 3 days  Attending Provider: Kendra Sanchez MD  Primary Care Provider: Jose Rojas MD  Follow-up For: Procedure(s) (LRB):  ARTHROPLASTY, SHOULDER, TOTAL, REVERSE, [place] (Right)    Post-Operative Day: 2 Days Post-Op  Subjective:     Principal Problem:Closed fracture of proximal end of right humerus with routine healing    Principal Orthopedic Problem: Same    Interval History: Pt seen and examined at bedside. NAEON. Pain controlled. Will work with PT today for elbow ROM/ sling donning/doffing.  VSS, AF, hypertensive. No other concerns stated.     Review of patient's allergies indicates:   Allergen Reactions    Iodinated contrast media Swelling and Rash    Percocet [oxycodone-acetaminophen] Itching    Macrobid [nitrofurantoin monohyd/m-cryst] Rash    Metformin Rash    Penicillins Rash     Had ancef in 2020 with no adverse rxn     Promethazine Rash     Had compazine in 2021    Sulfa (sulfonamide antibiotics) Rash    Sulfamethoxazole-trimethoprim Rash       Current Facility-Administered Medications   Medication    acetaminophen tablet 1,000 mg    acetaminophen tablet 1,000 mg    aspirin EC tablet 81 mg    bisacodyL suppository 10 mg    calcium carbonate 200 mg calcium (500 mg) chewable tablet 1,000 mg    celecoxib capsule 200 mg    cyclobenzaprine tablet 5 mg    dextrose 10% bolus 125 mL    dextrose 10% bolus 250 mL    ergocalciferol capsule 50,000 Units    glucagon (human recombinant) injection 1 mg    glucose chewable tablet 16 g    glucose chewable tablet 24 g    insulin aspart U-100 pen 0-5 Units    insulin aspart U-100 pen 8 Units    insulin detemir U-100 pen 20 Units    latanoprost 0.005 % ophthalmic solution 1 drop    meclizine tablet 25 mg    melatonin tablet 6 mg    methocarbamoL tablet 500 mg    ondansetron disintegrating  tablet 8 mg    oxyCODONE immediate release tablet 5 mg    pantoprazole EC tablet 40 mg    polyethylene glycol packet 17 g    prochlorperazine injection Soln 5 mg    ROPIvacaine (PF) 2 mg/ml (0.2%) solution    senna-docusate 8.6-50 mg per tablet 1 tablet    sodium chloride 0.9% flush 10 mL    sodium chloride 0.9% flush 10 mL     Objective:     Vital Signs (Most Recent):  Temp: 97.6 °F (36.4 °C) (08/18/22 0458)  Pulse: 75 (08/18/22 0458)  Resp: 18 (08/18/22 0458)  BP: (!) 175/74 (08/18/22 0458)  SpO2: (!) 93 % (08/18/22 0458)   Vital Signs (24h Range):  Temp:  [97 °F (36.1 °C)-98.8 °F (37.1 °C)] 97.6 °F (36.4 °C)  Pulse:  [70-83] 75  Resp:  [16-20] 18  SpO2:  [92 %-97 %] 93 %  BP: (145-186)/(64-79) 175/74     Weight: 81.2 kg (179 lb)     Body mass index is 29.79 kg/m².      Intake/Output Summary (Last 24 hours) at 8/18/2022 0642  Last data filed at 8/17/2022 1800  Gross per 24 hour   Intake --   Output 600 ml   Net -600 ml         Ortho/SPM Exam    Vitals: Afebrile.  Vital signs stable.  General: No acute distress.  Cardio: Regular rate.  Chest: No increased work of breathing.      Right Upper Extremity    -Dressing c/d/i  -ATTP   -Compartments soft and compressible  -SILT M/R/U/Ax  -Motor intact Ain/PIN/U/M  -Brisk cap refill  -Warm well perfused extremities  -2+ Radial palpable     Significant Labs: BMP:   Recent Labs   Lab 08/18/22  0450   *      K 4.0      CO2 27   BUN 15   CREATININE 0.7   CALCIUM 8.3*       CBC:   Recent Labs   Lab 08/17/22  0746 08/18/22 0450   WBC 8.74 7.75   HGB 9.9* 9.5*   HCT 27.8* 28.1*    188       CMP:   Recent Labs   Lab 08/17/22  0746 08/18/22  0450    140   K 4.2 4.0    105   CO2 26 27   * 234*   BUN 9 15   CREATININE 0.7 0.7   CALCIUM 8.4* 8.3*   PROT 6.3 6.0   ALBUMIN 3.1* 2.9*   BILITOT 0.9 0.7   ALKPHOS 83 75   AST 27 27   ALT 20 18   ANIONGAP 7* 8       All pertinent labs within the past 24 hours have been  reviewed.    Significant Imaging: I have reviewed all pertinent imaging results/findings.    Assessment/Plan:     * Closed fracture of proximal end of right humerus with routine healing s/p total shoulder replacement on 8/16/2022  Riana Kay is a 67 y.o. female with a right proximal humerus fracture and anterior dislocation. Closed, NVI.    Now s/p R RTSA 8/16      - Weight bearing status: NWB, no shoudler ROM, sling at all times except hygiene   - Okay for elbow and wrist ROM  - Pain control: MM  - Antibiotics: Clinda x 24hrs  - DVT Prophylaxis: ASA BID x 6 weeks , SCD's at all times when not ambulating.  - Tight BG control  - PT/OT for sling donning and doffing  - Lines/Drains: PNC  - Dispo: PT/OT and pain control            Allen Ragsdale MD  Orthopedics  [facility]

## 2022-08-18 NOTE — PROGRESS NOTES
[facility] Medicine  Progress Note    Patient Name: Riana Kay  MRN: [id number]  Patient Class: IP- Inpatient   Admission Date: 8/14/2022  Length of Stay: 3 days  Attending Physician: Kendra Sanchez MD  Primary Care Provider: Jose Rojas MD        Subjective:     Principal Problem:Closed fracture of proximal end of right humerus with routine healing        HPI:  Riana Kay is a 67 y.o. female who has a past medical history of Anticoagulant long-term use, Anxiety, Atrophic gastritis without mention of hemorrhage, Chronic fatigue syndrome, Diabetes mellitus, Dizziness, Fatty liver, GERD, Gross hematuria, HTN, Hyperlipidemia, Leg swelling, Memory loss, Osteopenia, Primary osteoarthritis of right knee, Primary osteoarthritis of right knee, S/P total hysterectomy, Sleep apnea, and Status post total b/l knee replacements, presented to the ED with CC of Shoulder and Knee pain s/p mechanical fall. Patient was at [employer], was walking to her car and tripped and fell into a parked car. She did not hit her head, and furthermore CTH was negative for acute bleed. She was in quite a bit of pain, 150 mcg of fentanyl given en route. Patient became nauseous and vomited. CT shoulder showed comminuted fracture of humeral head and neck with impaction and anterior inferior dislocation of the humeral head, moderate volume lipohemarthrosis. Ortho was consulted in ED. Patient denies CP, AP, or SOB.         Overview/Hospital Course:  Orthopedics consulted in ED and evaluated patient and recommended nonweightbearing to right upper extremity for comminuted right proximal humerus fracture with anterior dislocation. Ortho recommending operative repair of fracture as will not heal correctly on its own so patient taken to OR on 8/16/2022 for right reverse total shoulder replacement. Surgery done by Dr. Aamir Powell. Post-op, patient is non-weight bearing to right arm and no range of motion to  right shoulder and in sling at all times. Perineural pain catheter placed by Anesthesia and managing in hospital to right shoulder to help with post-op pain control. Patient placed on Aspirin 81 mg po BID for DVT prophylaxis post-op for 6 weeks. Patient placed on scheduled Tylenol, Celebrex and Robaxin for pain management post-op.       Interval History:   '  Pain improved with perineural catheter. Eating all of meals. No Cp, SOB, lightheadedness. History taken with  [id number]    Objective:       NAD, AO3  NC, AT  RRR  CTAB  SNTND+BS  No edema   PERRL    Vitals, labs and radiographs from past 24h reviewed and personally interpreted.           Assessment/Plan:      * Closed fracture of proximal end of right humerus with routine healing s/p total shoulder replacement on 8/16/2022  Anterior and inferior dislocation of right shoulder  · Orthopedics consulted and noted comminuted fracture of right proximal humerus and anterior dislocation and note due to nature of fracture would not heal and thus needs surgical intervention.  · Orthopedics took patient to OR on 8/16 for reverse total right shoulder replacement by Dr. Aamir Powell.  · Patient to be non weight bearing to right upper arm at this time for next 12 weeks as per Ortho.   · Continue pain management with scheduled Tylenol 1000 mg po every 8 hours + Celebrex 200 mg po daily + Robaxin 500 mg po 4 times daily and Flexeril 5 mg po every 8 hours prn muscle spasms + Oxycodone IR 5 mg po prn breakthrough pain.   · Perineural pain catheter placed to right shoulder by Anesthesia post-op with continuous Ropivacaine infusion to help with post-op pain management and Anesthesia Pain Service is managing.   · Post-op patient to be on Aspirin 81 mg po BID for DVT prophylaxis for next 6 weeks.   · Post-op plan for her rigth shoulder as per Ortho is:     RUE  Nonweightbearing x 12 wks     PHASE 1: now until 2 wks postop  ROMAT and hand, wrist, elbow.  Sling, may  remove for hygiene and ADLs     PHASE 2: 2-6 wks postop  ROMAT and hand, wrist, elbow.  Shoulder Pendulums okay  Shoulder passive and active assisted ROMAT   Sling, may remove for hygiene ADLs and therapy     PHASE 3: 6-12 wks postop   D/c sling   ROMAT RUE  No resistance     PHASE 4: after 12 wks postop  ROMAT, WBAT    Type 2 diabetes mellitus without complication, with long-term current use of insulin  Patient's FSGs are not controlled on current medication regimen. Patient reports she takes Lantus insulin 28 units nightly at home and 12 unit of Humalog insulin with breakfast and 14-16 units of Humalog insulin with lunch and dinner.   Last A1c reviewed-   Lab Results   Component Value Date    HGBA1C 6.4 (H) 08/14/2022     Most recent fingerstick glucose reviewed-   Recent Labs   Lab 08/16/22  1655 08/16/22  2312 08/17/22  0615 08/17/22  1157   POCTGLUCOSE 256* 274* 221* 242*     Current correctional scale  Low  Maintain anti-hyperglycemic dose as follows-     · DM x over 20 years.  · Plan to cincrease Levemir insulin from 18 to 20 units nightly in hospital today, 8/17 as well as increasing Novolog insulin from 6 to 8 units with meals.   · Adjust insulin dosing for goal blood sugars 140-180 in hospital.   · Monitor POCT glucose 4 times daily with meals and at bedtime.   · 8/18: continuing to advance regimen.     Primary hypertension  · Patient's blood pressure is controlled here in the hospital over past 24 hours.   · Goal for blood pressure is SBP < 150 and DBP < 90 as patient > or = 60 years of age with no diabetes or advanced kidney disease based on JNC 8 guidelines.   · Continue home treatment regimen of Losartan 12.5 mg po daily to be resumed on 8/18. Likely will need to be advanced.   · Plan is to monitor patient's blood pressure routinely while patient is hospitalized.     Atherosclerosis of native coronary artery of native heart without angina pectoris  Chronic and controlled. Patient on no active meds at  home to treat.     Fatty liver  Noted on previous imaging and stable. LFT's normal.    ZAHIRA (obstructive sleep apnea)  Chronic and controlled. Patient on CPAP at home.       Muscle spasms of neck  Controlled. Patient on Flexeril prn to treat and will continue.     Gastroesophageal reflux disease without esophagitis  Chronic and controlled. Patient on Protonix 40 mg po daily.       Pathological fracture of right humerus due to osteoporosis with routine healing  · Had DEXA in 2020  · Needs repeat DEXA as outpatient.  · Continue Vitamin D and Calcium replacement and referral for osteoporosis specific treatment to Orthopedic fragility clinic.       Vitamin D deficiency  Vitamin D level noted to be low at 14 consistent with deficiency so will started on replacement with Vitamin D 50,000 units po weekly to treat and will need to continue on hospital discharge.     VTE Risk Mitigation (From admission, onward)         Ordered     Place sequential compression device  Until discontinued         08/14/22 1754     IP VTE LOW RISK PATIENT  Once         08/14/22 1754                Discharge Planning   ELIESER: 8/19/2022     Code Status: Full Code   Is the patient medically ready for discharge?: No    Reason for patient still in hospital (select all that apply): Patient trending condition  Discharge Plan A: Skilled Nursing Facility but therapy could p[rogress to  PT/OT. Likely to be medically ready for hospital discharge for 8/19.        Kendra Sanchez MD  Department of Hospital Medicine   [facility] - Surgery

## 2022-08-18 NOTE — RESPIRATORY THERAPY
RAPID RESPONSE RESPIRATORY THERAPY PROACTIVE NOTE           Time of visit: 1127     Code Status: Full Code   : 1955  Bed: 505/505 A:   MRN: [id number]  Time spent at the bedside: < 15 min    SITUATION    Evaluated patient for: O2 status    BACKGROUND    Why is the patient in the hospital?: Closed fracture of proximal end of right humerus with routine healing    Patient has a past medical history of Abdominal pain, right upper quadrant, Anticoagulant long-term use, Anxiety, AR (allergic rhinitis), Atrophic gastritis without mention of hemorrhage, Chronic fatigue syndrome, Diabetes mellitus, Dizziness, Fatty liver, GERD (gastroesophageal reflux disease), Gross hematuria, HTN (hypertension), Hyperlipidemia, Leg swelling, Memory loss, Osteopenia, PONV (postoperative nausea and vomiting), Primary osteoarthritis of right knee, Primary osteoarthritis of right knee, Right elbow pain, S/P total hysterectomy, Screening for colon cancer, Sleep apnea, and 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    24 Hours Vitals Range:  Temp:  [97 °F (36.1 °C)-98.8 °F (37.1 °C)]   Pulse:  [70-83]   Resp:  [16-20]   BP: (148-186)/(67-79)   SpO2:  [92 %-96 %]     Labs:    Recent Labs     22  0310 22  0746 22  0450    136 140   K 4.3 4.2 4.0    103 105   CO2 26 26 27   CREATININE 0.8 0.7 0.7   * 246* 234*        No results for input(s): PH, PCO2, PO2, HCO3, POCSATURATED, BE in the last 72 hours.    ASSESSMENT/INTERVENTIONS    Upon arrival in room pt taken off of oxygen and doing well on room air    Last VS   Temp: 97.9 °F (36.6 °C) (1136)  Pulse: 77 (1136)  Resp: 18 (1136)  BP: 178/77 (1136)  SpO2: 92 % (1136)    Level of Consciousness: Level of Consciousness (AVPU): alert  Respiratory Effort: Respiratory Effort: Normal, Unlabored Expansion/Accessory Muscle Usage: Expansion/Accessory Muscles/Retractions: expansion symmetric, no retractions, no use of accessory  muscles  All Lung Field Breath Sounds: All Lung Fields Breath Sounds: clear  O2 Device/Concentration: room air  NIPPV: No Surgical airway: No  ETCO2 monitored: ETCO2 (mmHg): 29 mmHg  Ambu at bedside: Ambu bag with the patient?: Yes, Adult Ambu    Active Orders   Respiratory Care    Incentive spirometry     Frequency: Daily     Number of Occurrences: Until Specified    Oxygen Continuous     Frequency: Continuous     Number of Occurrences: Until Specified     Order Comments: Discontinue when Sp02 is greater than or equal to 95% of equal to Preop Sp02       Order Questions:      Device type: Low flow      Device: Simple Face Mask      Titrate O2 per Oxygen Titration Protocol: Yes      Notify MD of: Inability to achieve desired SpO2    Pulse Oximetry Continuous     Frequency: Continuous     Number of Occurrences: Until Specified       RECOMMENDATIONS    We recommend: RRT Recs: Continue POC per primary team.    FOLLOW-UP    Please call back the Rapid Response RT, Kathie Allen RRT at x [phone] for any questions or concerns.

## 2022-08-18 NOTE — ANESTHESIA POST-OP PAIN MANAGEMENT
Acute Pain Service Progress Note    Riana Kay is a 67 y.o., female, [id number].       Surgery:  ARTHROPLASTY, SHOULDER, TOTAL, REVERSE, [place] (Right Shoulder)     Post Op Day #: 2     Catheter type: perineural  interscalene     Infusion type: Ropivacaine 0.2%  7cc q3h intermittent bolus + 5cc q30min patient demand bolus    Problem List:    Active Hospital Problems    Diagnosis  POA    *Closed fracture of proximal end of right humerus with routine healing s/p total shoulder replacement on 8/16/2022 [S42.201D]  Not Applicable    Anterior dislocation of right shoulder [S43.014A]  Yes    Vitamin D deficiency [E55.9]  Yes    Type 2 diabetes mellitus without complication, with long-term current use of insulin [E11.9, Z79.4]  Not Applicable    Muscle spasms of neck [M62.838]  Yes    Primary hypertension [I10]  Yes    ZAHIRA (obstructive sleep apnea) [G47.33]  Yes    Fatty liver [K76.0]  Yes    Gastroesophageal reflux disease without esophagitis [K21.9]  Yes    Pathological fracture of right humerus due to osteoporosis with routine healing [M80.021D]  Not Applicable    Atherosclerosis of native coronary artery of native heart without angina pectoris [I25.10]  Yes      Resolved Hospital Problems    Diagnosis Date Resolved POA    Pain [R52] 08/15/2022 Yes       Subjective:     General appearance of alert, oriented, no complaints   Pain with rest: 2    Numbers   Pain with movement: 4    Numbers   Side Effects    1. Pruritis No    2. Nausea No    3. Motor Blockade No, 0=Ability to raise lower extremities off bed    4. Sedation No, 1=awake and alert    Objective     Catheter site clean, dry, intact         Vitals   Vitals:    08/18/22 0738   BP:    Pulse: 74   Resp: 16   Temp: 36.5 °C (97.7 °F)        Labs    No results displayed because visit has over 200 results.           Meds   Current Facility-Administered Medications   Medication Dose Route Frequency Provider Last Rate Last Admin    acetaminophen tablet  1,000 mg  1,000 mg Oral Q6H Rosy Forbes MD   1,000 mg at 08/18/22 0718    acetaminophen tablet 1,000 mg  1,000 mg Oral Q8H Rosy Forbes MD        aspirin EC tablet 81 mg  81 mg Oral BID Allen Ragsdale MD   81 mg at 08/17/22 2126    bisacodyL suppository 10 mg  10 mg Rectal Daily PRN Wan Calderon MD        calcium carbonate 200 mg calcium (500 mg) chewable tablet 1,000 mg  1,000 mg Oral Daily Jany Guaman MD   1,000 mg at 08/17/22 0926    celecoxib capsule 200 mg  200 mg Oral Daily Rosy Forbes MD   200 mg at 08/17/22 0927    cyclobenzaprine tablet 5 mg  5 mg Oral TID PRN Wan Calderon MD   5 mg at 08/14/22 2141    dextrose 10% bolus 125 mL  12.5 g Intravenous PRN Jany Guaman MD        dextrose 10% bolus 250 mL  25 g Intravenous PRN Jany Guaman MD        ergocalciferol capsule 50,000 Units  50,000 Units Oral Q7 Days Jany Guaman MD   50,000 Units at 08/15/22 1702    glucagon (human recombinant) injection 1 mg  1 mg Intramuscular PRN Jany Guaman MD        glucose chewable tablet 16 g  16 g Oral PRN Jany Guaman MD        glucose chewable tablet 24 g  24 g Oral PRN Jany Guaman MD        insulin aspart U-100 pen 0-5 Units  0-5 Units Subcutaneous QID (AC + HS) PRN Jany Guaman MD   1 Units at 08/17/22 2127    insulin aspart U-100 pen 8 Units  8 Units Subcutaneous TIDWM Jany Guaman MD   8 Units at 08/17/22 1621    insulin detemir U-100 pen 20 Units  20 Units Subcutaneous QHS Jany Guaman MD   20 Units at 08/17/22 2127    latanoprost 0.005 % ophthalmic solution 1 drop  1 drop Both Eyes Nightly Wan Calderon MD   1 drop at 08/17/22 2127    meclizine tablet 25 mg  25 mg Oral TID PRN Wan Calderon MD        melatonin tablet 6 mg  6 mg Oral Nightly PRN Jr. Per Franks MD        methocarbamoL tablet 500 mg  500 mg Oral Q6H Rosy Forbes MD   500 mg at 08/18/22 0718    ondansetron disintegrating tablet 8 mg  8 mg Oral Q8H PRN Wan Calderon,  MD   8 mg at 08/14/22 2141    oxyCODONE immediate release tablet 5 mg  5 mg Oral Q4H PRN Rosy Forbes MD   5 mg at 08/17/22 2128    pantoprazole EC tablet 40 mg  40 mg Oral Daily Wan Calderon MD   40 mg at 08/17/22 0926    polyethylene glycol packet 17 g  17 g Oral Daily Wan Calderon MD   17 g at 08/17/22 0928    prochlorperazine injection Soln 5 mg  5 mg Intravenous Q6H PRN Wan Calderon MD   5 mg at 08/16/22 1559    ROPIvacaine (PF) 2 mg/ml (0.2%) solution  0.1 mL/hr Perineural Continuous Allen Esteban MD 0.1 mL/hr at 08/16/22 1656 0.1 mL/hr at 08/16/22 1656    senna-docusate 8.6-50 mg per tablet 1 tablet  1 tablet Oral BID Wan Calderon MD   1 tablet at 08/17/22 2126    sodium chloride 0.9% flush 10 mL  10 mL Intravenous PRN Jr. Per Franks MD        sodium chloride 0.9% flush 10 mL  10 mL Intravenous PRN Allen Ragsdale MD            Aspirin 81 BID    Assessment:    Pain control adequate. Patient appears well and comfortable. Has been taking all of her scheduled multimodals. Using PNC demand bolus appropriately. Required 3 x oxycodone 5mg in last 24 hrs.        Plan:     - Continue R interscalene PNC at 7cc q3h intermittent bolus with 5cc q30min patient demand bolus  - Working with PT/OT, possible discharge tomorrow to SNF vs .   - Can likely transition to NIMBUS pump on discharge depending on dispo plan  - Continue scheduled multimodals              - tylenol 1g q6h              - celebrex 200mg qd              - robaxin 500mg q6h  - Continue PRN oxycodone 5mg q4h for moderate and severe pain        Will continue to follow. Please call anesthesia/APS with any questions or concerns regarding pain control.        Rosy Forbes MD (PGY-4)  Anesthesiology

## 2022-08-18 NOTE — ASSESSMENT & PLAN NOTE
Riana Kay is a 67 y.o. female with a right proximal humerus fracture and anterior dislocation. Closed, NVI.    Now s/p R RTSA 8/16      - Weight bearing status: NWB, no shoudler ROM, sling at all times except hygiene   - Okay for elbow and wrist ROM  - Pain control: MM  - Antibiotics: Clinda x 24hrs  - DVT Prophylaxis: ASA BID x 6 weeks , SCD's at all times when not ambulating.  - Tight BG control  - PT/OT for sling donning and doffing  - Lines/Drains: PNC  - Dispo: PT/OT and pain control

## 2022-08-18 NOTE — PLAN OF CARE
Patient has been accepted to [facility] and [facility]'s. SW contacted the patient's spouse Kemal via phone call to discuss acceptances, selected [facility]. SW met with the patient at bedside (using ), pt selected [facility] also. PRISCILLA informed facility. PRISCILLA sent requesting to "[employer] via hard fax for admit date 08/19. PRISCILLA will follow.      Christina Martinez LMSW  Case Management   [facility]   Ext. [phone]

## 2022-08-18 NOTE — SUBJECTIVE & OBJECTIVE
Principal Problem:Closed fracture of proximal end of right humerus with routine healing    Principal Orthopedic Problem: Same    Interval History: Pt seen and examined at bedside. ERINON. Pain controlled. Will work with PT today for elbow ROM/ sling donning/doffing.  VSS, AF, hypertensive. No other concerns stated.     Review of patient's allergies indicates:   Allergen Reactions    Iodinated contrast media Swelling and Rash    Percocet [oxycodone-acetaminophen] Itching    Macrobid [nitrofurantoin monohyd/m-cryst] Rash    Metformin Rash    Penicillins Rash     Had ancef in 2020 with no adverse rxn     Promethazine Rash     Had compazine in 2021    Sulfa (sulfonamide antibiotics) Rash    Sulfamethoxazole-trimethoprim Rash       Current Facility-Administered Medications   Medication    acetaminophen tablet 1,000 mg    acetaminophen tablet 1,000 mg    aspirin EC tablet 81 mg    bisacodyL suppository 10 mg    calcium carbonate 200 mg calcium (500 mg) chewable tablet 1,000 mg    celecoxib capsule 200 mg    cyclobenzaprine tablet 5 mg    dextrose 10% bolus 125 mL    dextrose 10% bolus 250 mL    ergocalciferol capsule 50,000 Units    glucagon (human recombinant) injection 1 mg    glucose chewable tablet 16 g    glucose chewable tablet 24 g    insulin aspart U-100 pen 0-5 Units    insulin aspart U-100 pen 8 Units    insulin detemir U-100 pen 20 Units    latanoprost 0.005 % ophthalmic solution 1 drop    meclizine tablet 25 mg    melatonin tablet 6 mg    methocarbamoL tablet 500 mg    ondansetron disintegrating tablet 8 mg    oxyCODONE immediate release tablet 5 mg    pantoprazole EC tablet 40 mg    polyethylene glycol packet 17 g    prochlorperazine injection Soln 5 mg    ROPIvacaine (PF) 2 mg/ml (0.2%) solution    senna-docusate 8.6-50 mg per tablet 1 tablet    sodium chloride 0.9% flush 10 mL    sodium chloride 0.9% flush 10 mL     Objective:     Vital Signs (Most Recent):  Temp: 97.6 °F (36.4 °C) (08/18/22 0458)  Pulse: 75  (08/18/22 0458)  Resp: 18 (08/18/22 0458)  BP: (!) 175/74 (08/18/22 0458)  SpO2: (!) 93 % (08/18/22 0458)   Vital Signs (24h Range):  Temp:  [97 °F (36.1 °C)-98.8 °F (37.1 °C)] 97.6 °F (36.4 °C)  Pulse:  [70-83] 75  Resp:  [16-20] 18  SpO2:  [92 %-97 %] 93 %  BP: (145-186)/(64-79) 175/74     Weight: 81.2 kg (179 lb)     Body mass index is 29.79 kg/m².      Intake/Output Summary (Last 24 hours) at 8/18/2022 0642  Last data filed at 8/17/2022 1800  Gross per 24 hour   Intake --   Output 600 ml   Net -600 ml         Ortho/SPM Exam    Vitals: Afebrile.  Vital signs stable.  General: No acute distress.  Cardio: Regular rate.  Chest: No increased work of breathing.      Right Upper Extremity    -Dressing c/d/i  -ATTP   -Compartments soft and compressible  -SILT M/R/U/Ax  -Motor intact Ain/PIN/U/M  -Brisk cap refill  -Warm well perfused extremities  -2+ Radial palpable     Significant Labs: BMP:   Recent Labs   Lab 08/18/22 0450   *      K 4.0      CO2 27   BUN 15   CREATININE 0.7   CALCIUM 8.3*       CBC:   Recent Labs   Lab 08/17/22  0746 08/18/22 0450   WBC 8.74 7.75   HGB 9.9* 9.5*   HCT 27.8* 28.1*    188       CMP:   Recent Labs   Lab 08/17/22  0746 08/18/22 0450    140   K 4.2 4.0    105   CO2 26 27   * 234*   BUN 9 15   CREATININE 0.7 0.7   CALCIUM 8.4* 8.3*   PROT 6.3 6.0   ALBUMIN 3.1* 2.9*   BILITOT 0.9 0.7   ALKPHOS 83 75   AST 27 27   ALT 20 18   ANIONGAP 7* 8       All pertinent labs within the past 24 hours have been reviewed.    Significant Imaging: I have reviewed all pertinent imaging results/findings.

## 2022-08-19 VITALS
SYSTOLIC BLOOD PRESSURE: 164 MMHG | BODY MASS INDEX: 29.79 KG/M2 | TEMPERATURE: 98 F | OXYGEN SATURATION: 96 % | WEIGHT: 179 LBS | RESPIRATION RATE: 17 BRPM | DIASTOLIC BLOOD PRESSURE: 74 MMHG | HEART RATE: 68 BPM

## 2022-08-19 LAB
ALBUMIN SERPL BCP-MCNC: 3 G/DL (ref 3.5–5.2)
ALP SERPL-CCNC: 76 U/L (ref 55–135)
ALT SERPL W/O P-5'-P-CCNC: 21 U/L (ref 10–44)
ANION GAP SERPL CALC-SCNC: 7 MMOL/L (ref 8–16)
AST SERPL-CCNC: 26 U/L (ref 10–40)
BASOPHILS # BLD AUTO: 0.02 K/UL (ref 0–0.2)
BASOPHILS NFR BLD: 0.3 % (ref 0–1.9)
BILIRUB SERPL-MCNC: 0.8 MG/DL (ref 0.1–1)
BUN SERPL-MCNC: 12 MG/DL (ref 8–23)
CALCIUM SERPL-MCNC: 8.8 MG/DL (ref 8.7–10.5)
CHLORIDE SERPL-SCNC: 102 MMOL/L (ref 95–110)
CO2 SERPL-SCNC: 26 MMOL/L (ref 23–29)
CREAT SERPL-MCNC: 0.6 MG/DL (ref 0.5–1.4)
DIFFERENTIAL METHOD: ABNORMAL
EOSINOPHIL # BLD AUTO: 0.1 K/UL (ref 0–0.5)
EOSINOPHIL NFR BLD: 1.6 % (ref 0–8)
ERYTHROCYTE [DISTWIDTH] IN BLOOD BY AUTOMATED COUNT: 12.4 % (ref 11.5–14.5)
EST. GFR  (NO RACE VARIABLE): >60 ML/MIN/1.73 M^2
GLUCOSE SERPL-MCNC: 228 MG/DL (ref 70–110)
HCT VFR BLD AUTO: 28.4 % (ref 37–48.5)
HGB BLD-MCNC: 10 G/DL (ref 12–16)
IMM GRANULOCYTES # BLD AUTO: 0.03 K/UL (ref 0–0.04)
IMM GRANULOCYTES NFR BLD AUTO: 0.4 % (ref 0–0.5)
LYMPHOCYTES # BLD AUTO: 1.6 K/UL (ref 1–4.8)
LYMPHOCYTES NFR BLD: 24.2 % (ref 18–48)
MAGNESIUM SERPL-MCNC: 1.9 MG/DL (ref 1.6–2.6)
MCH RBC QN AUTO: 32.3 PG (ref 27–31)
MCHC RBC AUTO-ENTMCNC: 35.2 G/DL (ref 32–36)
MCV RBC AUTO: 92 FL (ref 82–98)
MONOCYTES # BLD AUTO: 0.5 K/UL (ref 0.3–1)
MONOCYTES NFR BLD: 7 % (ref 4–15)
NEUTROPHILS # BLD AUTO: 4.5 K/UL (ref 1.8–7.7)
NEUTROPHILS NFR BLD: 66.5 % (ref 38–73)
NRBC BLD-RTO: 0 /100 WBC
PLATELET # BLD AUTO: 215 K/UL (ref 150–450)
PMV BLD AUTO: 9.4 FL (ref 9.2–12.9)
POCT GLUCOSE: 195 MG/DL (ref 70–110)
POTASSIUM SERPL-SCNC: 3.7 MMOL/L (ref 3.5–5.1)
PROT SERPL-MCNC: 6.3 G/DL (ref 6–8.4)
RBC # BLD AUTO: 3.1 M/UL (ref 4–5.4)
SARS-COV-2 RNA RESP QL NAA+PROBE: NOT DETECTED
SODIUM SERPL-SCNC: 135 MMOL/L (ref 136–145)
WBC # BLD AUTO: 6.73 K/UL (ref 3.9–12.7)

## 2022-08-19 PROCEDURE — 80053 COMPREHEN METABOLIC PANEL: CPT | Performed by: STUDENT IN AN ORGANIZED HEALTH CARE EDUCATION/TRAINING PROGRAM

## 2022-08-19 PROCEDURE — 25000003 PHARM REV CODE 250: Performed by: STUDENT IN AN ORGANIZED HEALTH CARE EDUCATION/TRAINING PROGRAM

## 2022-08-19 PROCEDURE — 36415 COLL VENOUS BLD VENIPUNCTURE: CPT | Performed by: STUDENT IN AN ORGANIZED HEALTH CARE EDUCATION/TRAINING PROGRAM

## 2022-08-19 PROCEDURE — U0003 INFECTIOUS AGENT DETECTION BY NUCLEIC ACID (DNA OR RNA); SEVERE ACUTE RESPIRATORY SYNDROME CORONAVIRUS 2 (SARS-COV-2) (CORONAVIRUS DISEASE [COVID-19]), AMPLIFIED PROBE TECHNIQUE, MAKING USE OF HIGH THROUGHPUT TECHNOLOGIES AS DESCRIBED BY CMS-2020-01-R: HCPCS | Performed by: INTERNAL MEDICINE

## 2022-08-19 PROCEDURE — 25000003 PHARM REV CODE 250: Performed by: INTERNAL MEDICINE

## 2022-08-19 PROCEDURE — 99239 PR HOSPITAL DISCHARGE DAY,>30 MIN: ICD-10-PCS | Mod: ,,, | Performed by: INTERNAL MEDICINE

## 2022-08-19 PROCEDURE — 85025 COMPLETE CBC W/AUTO DIFF WBC: CPT | Performed by: STUDENT IN AN ORGANIZED HEALTH CARE EDUCATION/TRAINING PROGRAM

## 2022-08-19 PROCEDURE — 83735 ASSAY OF MAGNESIUM: CPT | Performed by: INTERNAL MEDICINE

## 2022-08-19 PROCEDURE — 86580 TB INTRADERMAL TEST: CPT | Performed by: INTERNAL MEDICINE

## 2022-08-19 PROCEDURE — 97116 GAIT TRAINING THERAPY: CPT

## 2022-08-19 PROCEDURE — 99239 HOSP IP/OBS DSCHRG MGMT >30: CPT | Mod: ,,, | Performed by: INTERNAL MEDICINE

## 2022-08-19 PROCEDURE — 30200315 PPD INTRADERMAL TEST REV CODE 302: Performed by: INTERNAL MEDICINE

## 2022-08-19 PROCEDURE — U0005 INFEC AGEN DETEC AMPLI PROBE: HCPCS | Performed by: INTERNAL MEDICINE

## 2022-08-19 RX ORDER — ACETAMINOPHEN 500 MG
1000 TABLET ORAL EVERY 8 HOURS
Refills: 0
Start: 2022-08-19 | End: 2022-08-29 | Stop reason: SDUPTHER

## 2022-08-19 RX ORDER — AMOXICILLIN 250 MG
1 CAPSULE ORAL 2 TIMES DAILY
Start: 2022-08-19 | End: 2022-12-13

## 2022-08-19 RX ORDER — LOSARTAN POTASSIUM 25 MG/1
25 TABLET ORAL DAILY
Status: DISCONTINUED | OUTPATIENT
Start: 2022-08-20 | End: 2022-08-19 | Stop reason: HOSPADM

## 2022-08-19 RX ORDER — LOSARTAN POTASSIUM 25 MG/1
25 TABLET ORAL DAILY
Qty: 90 TABLET | Refills: 3
Start: 2022-08-20 | End: 2022-08-31

## 2022-08-19 RX ORDER — OXYCODONE HYDROCHLORIDE 5 MG/1
5 TABLET ORAL EVERY 6 HOURS PRN
Qty: 10 TABLET | Refills: 0 | Status: SHIPPED | OUTPATIENT
Start: 2022-08-19 | End: 2022-08-24

## 2022-08-19 RX ORDER — CELECOXIB 200 MG/1
200 CAPSULE ORAL DAILY
Qty: 14 CAPSULE | Refills: 0 | Status: SHIPPED | OUTPATIENT
Start: 2022-08-20 | End: 2022-08-24 | Stop reason: SDUPTHER

## 2022-08-19 RX ORDER — INSULIN GLARGINE 100 [IU]/ML
INJECTION, SOLUTION SUBCUTANEOUS
Qty: 30 EACH | Refills: 4
Start: 2022-08-19 | End: 2022-10-17 | Stop reason: SDUPTHER

## 2022-08-19 RX ORDER — OXYCODONE HYDROCHLORIDE 5 MG/1
5 TABLET ORAL EVERY 6 HOURS PRN
Qty: 10 TABLET | Refills: 0 | Status: SHIPPED | OUTPATIENT
Start: 2022-08-19 | End: 2022-08-19 | Stop reason: SDUPTHER

## 2022-08-19 RX ORDER — INSULIN LISPRO 100 [IU]/ML
INJECTION, SOLUTION INTRAVENOUS; SUBCUTANEOUS
Qty: 60 EACH | Refills: 3
Start: 2022-08-19 | End: 2022-11-19 | Stop reason: SDUPTHER

## 2022-08-19 RX ORDER — ASPIRIN 81 MG/1
81 TABLET ORAL 2 TIMES DAILY
Refills: 0
Start: 2022-08-19 | End: 2022-08-19 | Stop reason: SDUPTHER

## 2022-08-19 RX ORDER — ASPIRIN 81 MG/1
81 TABLET ORAL 2 TIMES DAILY
Refills: 0
Start: 2022-08-19 | End: 2022-09-28 | Stop reason: ALTCHOICE

## 2022-08-19 RX ORDER — ROPIVACAINE HYDROCHLORIDE 2 MG/ML
INJECTION, SOLUTION EPIDURAL; INFILTRATION; PERINEURAL CONTINUOUS
Status: DISCONTINUED | OUTPATIENT
Start: 2022-08-19 | End: 2022-08-19

## 2022-08-19 RX ORDER — METHOCARBAMOL 500 MG/1
500 TABLET, FILM COATED ORAL EVERY 6 HOURS
Qty: 28 TABLET | Refills: 0
Start: 2022-08-19 | End: 2022-08-24 | Stop reason: SDUPTHER

## 2022-08-19 RX ORDER — ERGOCALCIFEROL 1.25 MG/1
CAPSULE ORAL
Qty: 12 CAPSULE | Refills: 0
Start: 2022-08-19 | End: 2023-02-09

## 2022-08-19 RX ADMIN — CELECOXIB 200 MG: 200 CAPSULE ORAL at 08:08

## 2022-08-19 RX ADMIN — CALCIUM CARBONATE (ANTACID) CHEW TAB 500 MG 1000 MG: 500 CHEW TAB at 08:08

## 2022-08-19 RX ADMIN — PANTOPRAZOLE SODIUM 40 MG: 40 TABLET, DELAYED RELEASE ORAL at 08:08

## 2022-08-19 RX ADMIN — LOSARTAN POTASSIUM 12.5 MG: 25 TABLET, FILM COATED ORAL at 08:08

## 2022-08-19 RX ADMIN — METHOCARBAMOL 500 MG: 500 TABLET ORAL at 11:08

## 2022-08-19 RX ADMIN — METHOCARBAMOL 500 MG: 500 TABLET ORAL at 05:08

## 2022-08-19 RX ADMIN — INSULIN ASPART 2 UNITS: 100 INJECTION, SOLUTION INTRAVENOUS; SUBCUTANEOUS at 07:08

## 2022-08-19 RX ADMIN — ASPIRIN 81 MG: 81 TABLET, COATED ORAL at 08:08

## 2022-08-19 RX ADMIN — INSULIN ASPART 12 UNITS: 100 INJECTION, SOLUTION INTRAVENOUS; SUBCUTANEOUS at 07:08

## 2022-08-19 RX ADMIN — ACETAMINOPHEN 1000 MG: 500 TABLET ORAL at 02:08

## 2022-08-19 RX ADMIN — LOSARTAN POTASSIUM 12.5 MG: 25 TABLET, FILM COATED ORAL at 11:08

## 2022-08-19 RX ADMIN — ACETAMINOPHEN 1000 MG: 500 TABLET ORAL at 05:08

## 2022-08-19 RX ADMIN — SENNOSIDES AND DOCUSATE SODIUM 1 TABLET: 50; 8.6 TABLET ORAL at 08:08

## 2022-08-19 RX ADMIN — TUBERCULIN PURIFIED PROTEIN DERIVATIVE 5 UNITS: 5 INJECTION, SOLUTION INTRADERMAL at 11:08

## 2022-08-19 RX ADMIN — INSULIN ASPART 12 UNITS: 100 INJECTION, SOLUTION INTRAVENOUS; SUBCUTANEOUS at 11:08

## 2022-08-19 NOTE — NURSING
Report given to patient's nurse DEANDRE Robledo at [facility]. Lines removed. Immobilizer sling remains in place. Awaiting transportation

## 2022-08-19 NOTE — CARE UPDATE
Notified by SW that patient will be discharged to a SNF that is unable to manage the NIMBUS pump. Hand bolused 10cc of 0.25% bupivacaine via interscalene PNC. Catheter subsequently removed and discontinued. Catheter tip confirmed intact. Patient tolerated well. Educated regarding continued pain management. Understanding verbalized.      Rosy Forbes MD (PGY-4)  Anesthesiology

## 2022-08-19 NOTE — PLAN OF CARE
[facility]     Department of Hospital Medicine     [address] [place] [address]     [phone] after hours  [phone] fax       NURSING HOME ORDERS    08/19/2022    Admit to Nursing Home:    Skilled Bed                                                 Diagnoses:  Active Hospital Problems    Diagnosis  POA    *Closed fracture of proximal end of right humerus with routine healing s/p total shoulder replacement on 8/16/2022 [S42.201D]  Not Applicable    Anterior dislocation of right shoulder [S43.014A]  Yes    Vitamin D deficiency [E55.9]  Yes    Type 2 diabetes mellitus without complication, with long-term current use of insulin [E11.9, Z79.4]  Not Applicable    Muscle spasms of neck [M62.838]  Yes    Primary hypertension [I10]  Yes    ZAHIRA (obstructive sleep apnea) [G47.33]  Yes    Fatty liver [K76.0]  Yes    Gastroesophageal reflux disease without esophagitis [K21.9]  Yes    Pathological fracture of right humerus due to osteoporosis with routine healing [M80.021D]  Not Applicable    Atherosclerosis of native coronary artery of native heart without angina pectoris [I25.10]  Yes      Resolved Hospital Problems    Diagnosis Date Resolved POA    Pain [R52] 08/15/2022 Yes       Patient is homebound due to:  Closed fracture of proximal end of right humerus with routine healing    Allergies:  Review of patient's allergies indicates:   Allergen Reactions    Iodinated contrast media Swelling and Rash    Percocet [oxycodone-acetaminophen] Itching    Macrobid [nitrofurantoin monohyd/m-cryst] Rash    Metformin Rash    Penicillins Rash     Had ancef in 2020 with no adverse rxn     Promethazine Rash     Had compazine in 2021    Sulfa (sulfonamide antibiotics) Rash    Sulfamethoxazole-trimethoprim Rash       Vitals:       Every shift     Diet: Diabetic      Acitivities:       - Weight bearing status: NWB right shoulder, no shoudler ROM, sling at all  times except hygiene   - Okay for elbow and wrist ROM      LABS:  CBC, CMP, Mg, Phos twice weekly for two weeks      ]  Nursing Precautions: ]          - Fall precautions per nursing home protocol   ]    CONSULTS:  ]   Physical Therapy to evaluate and treat     Occupational Therapy to evaluate and treat    Room air      DIABETES CARE:       Check blood sugar:      ]    Fingerstick blood sugar AC and HS   Fingerstick blood sugar every 6 hours if unable to eat      Report CBG < 60 or > 400 to physician.                                          Insulin Sliding Scale          Glucose  Novolog Insulin Subcutaneous        0 - 60   Orange juice or glucose tablet, hold insulin      No insulin   201-250  2 units   251-300  4 units   301-350  6 units   351-400  8 units   >400   10 units then call physician      Medications: Discontinue all previous medication orders, if any. See new list below.       Medication List      START taking these medications    acetaminophen 500 MG tablet  Commonly known as: TYLENOL  Take 2 tablets (1,000 mg total) by mouth every 8 (eight) hours.     aspirin 81 MG EC tablet  Commonly known as: ECOTRIN  Take 1 tablet (81 mg total) by mouth 2 (two) times daily.     celecoxib 200 MG capsule  Commonly known as: CeleBREX  Take 1 capsule (200 mg total) by mouth once daily. for 14 days  Start taking on: August 20, 2022     methocarbamoL 500 MG Tab  Commonly known as: ROBAXIN  Take 1 tablet (500 mg total) by mouth every 6 (six) hours. for 7 days     oxyCODONE 5 MG immediate release tablet  Commonly known as: ROXICODONE  Take 1 tablet (5 mg total) by mouth every 6 (six) hours as needed (moderate-severe pain).     senna-docusate 8.6-50 mg 8.6-50 mg per tablet  Commonly known as: PERICOLACE  Take 1 tablet by mouth 2 (two) times daily.        CHANGE how you take these medications    insulin glargine 100 units/mL SubQ pen  Commonly known as: LANTUS SOLOSTAR U-100 INSULIN  INJECT 25 UNITS SUBCUTANEOUSLY AT  "NIGHT.  What changed: additional instructions     insulin lispro 100 unit/mL pen  Commonly known as: HumaLOG KwikPen Insulin  12 units with meals. Hold if NPO or preprandial glucose <100    Prior to admission regimen: INJECT 16 UNITS W/ MEALS PLUS SCALE 150-200+2, 201-250+4, 251-300+6, 301-350+8, >350+10. MAX DAILY 68 UNITS.  What changed: additional instructions     losartan 25 MG tablet  Commonly known as: COZAAR  Take 1 tablet (25 mg total) by mouth once daily.  Start taking on: August 20, 2022  What changed: how much to take        CONTINUE taking these medications    azelastine 137 mcg (0.1 %) nasal spray  Commonly known as: ASTELIN  1 spray (137 mcg total) by Nasal route 2 (two) times daily.     blood sugar diagnostic Strp  To check BG 4 times daily, to use with insurance preferred meter-true metrix     blood-glucose meter kit  To check BG 4 times daily, to use with insurance preferred meter-true metrix     cyclobenzaprine 5 MG tablet  Commonly known as: FLEXERIL  TAKE 1 TABLET BY MOUTH THREE TIMES A DAY AS NEEDED FOR MUSCLE SPASMS     ergocalciferol 50,000 unit Cap  Commonly known as: ERGOCALCIFEROL  TAKE 1 CAPSULE BY MOUTH ONE TIME PER WEEK     fluticasone propionate 50 mcg/actuation nasal spray  Commonly known as: FLONASE  2 sprays (100 mcg total) by Each Nostril route once daily.     lancets Misc  To check BG 4  times daily, to use with insurance preferred meter-true metrix     latanoprost 0.005 % ophthalmic solution  Place 1 drop into both eyes nightly.     meclizine 25 mg tablet  Commonly known as: ANTIVERT  Take 1 tablet (25 mg total) by mouth 3 (three) times daily as needed for Dizziness.     ondansetron 8 MG Tbdl  Commonly known as: ZOFRAN-ODT  Dissolve 1 tablet (8 mg total) by mouth every 12 (twelve) hours as needed (nausea).     pantoprazole 40 MG tablet  Commonly known as: PROTONIX  Take 1 tablet (40 mg total) by mouth once daily.     pen needle, diabetic 32 gauge x 1/4" Ndle  Commonly known as: " NOVOFINE 32  Uses 4 times a day. 90 day via duramed e 11.65        STOP taking these medications    diclofenac sodium 1 % Gel  Commonly known as: VOLTAREN     meloxicam 15 MG tablet  Commonly known as: MOBIC                  _________________________________  Kendra Sanchez MD  08/19/2022

## 2022-08-19 NOTE — ANESTHESIA POST-OP PAIN MANAGEMENT
Acute Pain Service Progress Note    Riana Kay is a 67 y.o., female, [id number].    Surgery:  ARTHROPLASTY, SHOULDER, TOTAL, REVERSE, [place] (Right Shoulder)     Post Op Day #: 3     Catheter type: perineural  interscalene     Infusion type: Ropivacaine 0.2%  7cc q3h intermittent bolus + 5cc q30min patient demand bolus       Problem List:    Active Hospital Problems    Diagnosis  POA    *Closed fracture of proximal end of right humerus with routine healing s/p total shoulder replacement on 8/16/2022 [S42.201D]  Not Applicable    Anterior dislocation of right shoulder [S43.014A]  Yes    Vitamin D deficiency [E55.9]  Yes    Type 2 diabetes mellitus without complication, with long-term current use of insulin [E11.9, Z79.4]  Not Applicable    Muscle spasms of neck [M62.838]  Yes    Primary hypertension [I10]  Yes    ZAHIRA (obstructive sleep apnea) [G47.33]  Yes    Fatty liver [K76.0]  Yes    Gastroesophageal reflux disease without esophagitis [K21.9]  Yes    Pathological fracture of right humerus due to osteoporosis with routine healing [M80.021D]  Not Applicable    Atherosclerosis of native coronary artery of native heart without angina pectoris [I25.10]  Yes      Resolved Hospital Problems    Diagnosis Date Resolved POA    Pain [R52] 08/15/2022 Yes       Subjective:     General appearance of alert, oriented, no complaints   Pain with rest: 1    Numbers   Pain with movement: 3    Numbers   Side Effects    1. Pruritis No    2. Nausea No    3. Motor Blockade No, 0=Ability to raise lower extremities off bed    4. Sedation No, S=sleep, easy to arouse    Objective:     Catheter site clean, dry, intact        Vitals   Vitals:    08/19/22 0739   BP: (!) 165/74   Pulse: (!) 57   Resp: 17   Temp: 36.3 °C (97.4 °F)        Labs    No results displayed because visit has over 200 results.           Meds   Current Facility-Administered Medications   Medication Dose Route Frequency Provider Last Rate Last Admin     acetaminophen tablet 1,000 mg  1,000 mg Oral Q8H Rosy Forbes MD   1,000 mg at 08/19/22 0552    aspirin EC tablet 81 mg  81 mg Oral BID Allen Ragsdale MD   81 mg at 08/18/22 2047    bisacodyL suppository 10 mg  10 mg Rectal Daily PRN Wan Calderon MD        calcium carbonate 200 mg calcium (500 mg) chewable tablet 1,000 mg  1,000 mg Oral Daily Jany Guaman MD   1,000 mg at 08/18/22 0812    celecoxib capsule 200 mg  200 mg Oral Daily Rosy Forbes MD   200 mg at 08/18/22 0812    cyclobenzaprine tablet 5 mg  5 mg Oral TID PRN Wan Calderon MD   5 mg at 08/14/22 2141    dextrose 10% bolus 125 mL  12.5 g Intravenous PRN Jany Guaman MD        dextrose 10% bolus 250 mL  25 g Intravenous PRN Jany Guaman MD        ergocalciferol capsule 50,000 Units  50,000 Units Oral Q7 Days Jany Guaman MD   50,000 Units at 08/15/22 1702    glucagon (human recombinant) injection 1 mg  1 mg Intramuscular PRN Jany Guaman MD        glucose chewable tablet 16 g  16 g Oral PRN Jany Guaman MD        glucose chewable tablet 24 g  24 g Oral PRN Jany Guaman MD        insulin aspart U-100 pen 0-5 Units  0-5 Units Subcutaneous QID (AC + HS) PRN Jany Guaman MD   2 Units at 08/19/22 0744    insulin aspart U-100 pen 12 Units  12 Units Subcutaneous TIDWM Kendra Sanchez MD   12 Units at 08/19/22 0743    insulin detemir U-100 pen 25 Units  25 Units Subcutaneous QHS Kendra Sanchez MD   25 Units at 08/18/22 2054    latanoprost 0.005 % ophthalmic solution 1 drop  1 drop Both Eyes Nightly Wan Calderon MD   1 drop at 08/18/22 2046    losartan split tablet 12.5 mg  12.5 mg Oral Daily Kendra Sanchez MD   12.5 mg at 08/18/22 1740    meclizine tablet 25 mg  25 mg Oral TID PRN Wan Calderon MD        melatonin tablet 6 mg  6 mg Oral Nightly PRN Jr. Per Franks MD        methocarbamoL tablet 500 mg  500 mg Oral Q6H Rosy Forbes MD   500 mg at 08/19/22 0592    ondansetron  disintegrating tablet 8 mg  8 mg Oral Q8H PRN Wan Calderon MD   8 mg at 08/14/22 2141    oxyCODONE immediate release tablet 5 mg  5 mg Oral Q4H PRN Rosy Forbes MD   5 mg at 08/17/22 2128    pantoprazole EC tablet 40 mg  40 mg Oral Daily Wan Calderon MD   40 mg at 08/18/22 0812    polyethylene glycol packet 17 g  17 g Oral Daily Wna Calderon MD   17 g at 08/17/22 0928    prochlorperazine injection Soln 5 mg  5 mg Intravenous Q6H PRN Wan Calderon MD   5 mg at 08/16/22 1559    ROPIvacaine (PF) 2 mg/ml (0.2%) solution  0.1 mL/hr Perineural Continuous Allen Esteban MD 0.1 mL/hr at 08/18/22 2322 0.1 mL/hr at 08/18/22 2322    ropivacaine 0.2% Nimbus PainPRO Pump infusion 500 ML   Perineural Continuous Rosy Forbes MD        senna-docusate 8.6-50 mg per tablet 1 tablet  1 tablet Oral BID Wan Calderon MD   1 tablet at 08/18/22 2057    sodium chloride 0.9% flush 10 mL  10 mL Intravenous PRN Jr. Per Franks MD        sodium chloride 0.9% flush 10 mL  10 mL Intravenous PRN Allen Ragsdale MD            ASA 81mg BID    Assessment:    Pain control adequate. Patient appears well and comfortable. Has been taking all of her scheduled multimodals. Using PNC demand bolus appropriately. Required 3 x oxycodone 5mg in last 24 hrs.      Plan:    - Plan for possible discharge today SNF vs     - Transition R interscalene PNC to NIMBUS pump at 7cc q3h intermittent bolus with 5cc q30min patient demand bolus   - Will provide NIMBUS instructions at time of discharge    - Continue scheduled multimodals   - tylenol 1g q6h   - celebrex 200mg qd   - robaxin 500mg q6h    - Can discharge with PRN oxycodone 5mg q6h for moderate and severe pain        Rosy Forbes MD (PGY-4)  Anesthesiology

## 2022-08-19 NOTE — DISCHARGE SUMMARY
[facility]  Discharge Summary      Patient Name: Riana Kay  MRN: [id number]  Admission Date: 8/14/2022  Hospital Length of Stay: 4 days  Discharge Date and Time:  08/19/2022 9:37 AM  Attending Physician: Kendra Sanchez MD   Discharging Provider: Kendra Sanchez MD  Primary Care Provider: Jose Rojas MD        HPI:       Riana Kay is a 67 y.o. female who has a past medical history of Anticoagulant long-term use, Anxiety, Atrophic gastritis without mention of hemorrhage, Chronic fatigue syndrome, Diabetes mellitus, Dizziness, Fatty liver, GERD, Gross hematuria, HTN, Hyperlipidemia, Leg swelling, Memory loss, Osteopenia, Primary osteoarthritis of right knee, Primary osteoarthritis of right knee, S/P total hysterectomy, Sleep apnea, and Status post total b/l knee replacements, presented to the ED with CC of Shoulder and Knee pain s/p mechanical fall. Patient was at [employer], was walking to her car and tripped and fell into a parked car. She did not hit her head, and furthermore CTH was negative for acute bleed. She was in quite a bit of pain, 150 mcg of fentanyl given en route. Patient became nauseous and vomited. CT shoulder showed comminuted fracture of humeral head and neck with impaction and anterior inferior dislocation of the humeral head, moderate volume lipohemarthrosis. Ortho was consulted in ED. Patient denies CP, AP, or SOB.             Procedure(s) (LRB):  ARTHROPLASTY, SHOULDER, TOTAL, REVERSE, [place] (Right)      Hospital Course: *    Patient was admitted following a mechanical fall after which she was found to have a right proximal humeral fracture with anterior dislocation.    Orthopedics consulted in ED and evaluated patient and recommended nonweightbearing to right upper extremity for comminuted right proximal humerus fracture with anterior dislocation. Ortho recommending operative repair of fracture as will not heal correctly on its own so patient  taken to OR on 8/16/2022 for right reverse total shoulder replacement. Surgery done by Dr. Aamir Powell. Post-op, patient is non-weight bearing to right arm and no range of motion to right shoulder and in sling at all times. Perineural pain catheter placed by Anesthesia and managing in hospital to right shoulder to help with post-op pain control which will be discontinued at discharge. Patient placed on Aspirin 81 mg po BID for DVT prophylaxis post-op for 6 weeks. Patient placed on scheduled Tylenol, Celebrex and Robaxin for pain management post-op.     Patient successfully able to work with physical therapy; she will be discharged to skilled nursing facility for rehabilitation.       * Closed fracture of proximal end of right humerus with routine healing s/p total shoulder replacement on 8/16/2022  Anterior and inferior dislocation of right shoulder  · Orthopedics consulted and noted comminuted fracture of right proximal humerus and anterior dislocation and note due to nature of fracture would not heal and thus needs surgical intervention.  · Orthopedics took patient to OR on 8/16 for reverse total right shoulder replacement by Dr. Aamir Powell.  · Patient to be non weight bearing to right upper arm at this time for next 12 weeks as per Ortho.   · Continue pain management with scheduled Tylenol 1000 mg po every 8 hours + Celebrex 200 mg po daily + Robaxin 500 mg po 4 times daily and Flexeril 5 mg po every 8 hours prn muscle spasms + Oxycodone IR 5 mg po prn breakthrough pain.   · Perineural pain catheter placed to right shoulder by Anesthesia post-op with continuous Ropivacaine infusion to help with post-op pain management and Anesthesia Pain Service is managing.   · Post-op patient to be on Aspirin 81 mg po BID for DVT prophylaxis for next 6 weeks.   · Post-op plan for her rigth shoulder as per Ortho is:     RUE  Nonweightbearing x 12 wks     PHASE 1: now until 2 wks  postop  ROMAT and hand, wrist, elbow.  Sling, may remove for hygiene and ADLs     PHASE 2: 2-6 wks postop  ROMAT and hand, wrist, elbow.  Shoulder Pendulums okay  Shoulder passive and active assisted ROMAT   Sling, may remove for hygiene ADLs and therapy     PHASE 3: 6-12 wks postop   D/c sling   ROMAT RUE  No resistance     PHASE 4: after 12 wks postop  ROMAT, WBAT     Type 2 diabetes mellitus without complication, with long-term current use of insulin  Patient's FSGs are not controlled on current medication regimen. Patient reports she takes Lantus insulin 28 units nightly at home and 12 unit of Humalog insulin with breakfast and 14-16 units of Humalog insulin with lunch and dinner.   Last A1c reviewed-         Lab Results   Component Value Date     HGBA1C 6.4 (H) 08/14/2022      Most recent fingerstick glucose reviewed-          Recent Labs   Lab 08/16/22  1655 08/16/22  2312 08/17/22  0615 08/17/22  1157   POCTGLUCOSE 256* 274* 221* 242*      Current correctional scale  Low  Maintain anti-hyperglycemic dose as follows-      · DM x over 20 years.  · Plan to cincrease Levemir insulin from 18 to 20 units nightly in hospital today, 8/17 as well as increasing Novolog insulin from 6 to 8 units with meals.   · Adjust insulin dosing for goal blood sugars 140-180 in hospital.   · Monitor POCT glucose 4 times daily with meals and at bedtime.   · 8/18: continuing to advance regimen.   · -Will discharge on regimen as below. Advance as required to home regimen.      Primary hypertension  Increasing home losartan to 25 daily from 12.5 daily. Advance as required    Atherosclerosis of native coronary artery of native heart without angina pectoris  Chronic and controlled. Patient on no active meds at home to treat.      Fatty liver  Noted on previous imaging and stable. LFT's normal.     ZAHIRA (obstructive sleep apnea)  Chronic and controlled. Patient on CPAP at home.         Muscle spasms of neck  Controlled. Patient on Flexeril  prn to treat and will continue.      Gastroesophageal reflux disease without esophagitis  Chronic and controlled. Patient on Protonix 40 mg po daily.         Pathological fracture of right humerus due to osteoporosis with routine healing  · Had DEXA in 2020  · Needs repeat DEXA as outpatient.  · Continue Vitamin D and Calcium replacement and referral for osteoporosis specific treatment to Orthopedic fragility clinic.         Vitamin D deficiency  Vitamin D level noted to be low at 14 consistent with deficiency so will started on replacement with Vitamin D 50,000 units po weekly to treat and will need to continue on hospital discharge.           Consults:   Consults (From admission, onward)        Status Ordering Provider     Inpatient consult to Orthopedic Surgery  Once        Provider:  (Not yet assigned)    Completed KEVIN WORKMAN          Final Active Diagnoses:    Diagnosis Date Noted POA    PRINCIPAL PROBLEM:  Closed fracture of proximal end of right humerus with routine healing s/p total shoulder replacement on 8/16/2022 [S42.201D] 08/14/2022 Not Applicable    Anterior dislocation of right shoulder [S43.014A] 08/15/2022 Yes    Vitamin D deficiency [E55.9] 05/06/2019 Yes    Type 2 diabetes mellitus without complication, with long-term current use of insulin [E11.9, Z79.4] 12/14/2017 Not Applicable    Muscle spasms of neck [M62.838] 11/11/2014 Yes    Primary hypertension [I10] 08/06/2014 Yes    ZAHIRA (obstructive sleep apnea) [G47.33]  Yes    Fatty liver [K76.0]  Yes    Gastroesophageal reflux disease without esophagitis [K21.9]  Yes    Pathological fracture of right humerus due to osteoporosis with routine healing [M80.021D]  Not Applicable    Atherosclerosis of native coronary artery of native heart without angina pectoris [I25.10] 06/10/2012 Yes      Problems Resolved During this Admission:    Diagnosis Date Noted Date Resolved POA    Pain [R52] 10/19/2018 08/15/2022 Yes      Discharged Condition:  stable    Disposition: Skilled Nursing Facility    Follow Up:    Patient Instructions:      Ambulatory referral/consult to Orthopedics   Standing Status: Future   Referral Priority: Routine Referral Type: Consultation   Requested Specialty: Orthopedic Surgery     Notify your health care provider if you experience any of the following:  temperature >100.4     Notify your health care provider if you experience any of the following:  persistent nausea and vomiting or diarrhea     Notify your health care provider if you experience any of the following:  severe uncontrolled pain     Notify your health care provider if you experience any of the following:  difficulty breathing or increased cough     Notify your health care provider if you experience any of the following:  severe persistent headache     Notify your health care provider if you experience any of the following:  worsening rash     Notify your health care provider if you experience any of the following:  persistent dizziness, light-headedness, or visual disturbances     Notify your health care provider if you experience any of the following:  increased confusion or weakness     Notify your health care provider if you experience any of the following:     Medications:  Reconciled Home Medications:      Medication List      START taking these medications    acetaminophen 500 MG tablet  Commonly known as: TYLENOL  Take 2 tablets (1,000 mg total) by mouth every 8 (eight) hours.     aspirin 81 MG EC tablet  Commonly known as: ECOTRIN  Take 1 tablet (81 mg total) by mouth 2 (two) times daily.     celecoxib 200 MG capsule  Commonly known as: CeleBREX  Take 1 capsule (200 mg total) by mouth once daily. for 14 days  Start taking on: August 20, 2022     methocarbamoL 500 MG Tab  Commonly known as: ROBAXIN  Take 1 tablet (500 mg total) by mouth every 6 (six) hours. for 7 days     oxyCODONE 5 MG immediate release tablet  Commonly known as: ROXICODONE  Take 1 tablet (5 mg  total) by mouth every 6 (six) hours as needed (moderate-severe pain).     senna-docusate 8.6-50 mg 8.6-50 mg per tablet  Commonly known as: PERICOLACE  Take 1 tablet by mouth 2 (two) times daily.        CHANGE how you take these medications    insulin glargine 100 units/mL SubQ pen  Commonly known as: LANTUS SOLOSTAR U-100 INSULIN  INJECT 25 UNITS SUBCUTANEOUSLY AT NIGHT.  What changed: additional instructions     insulin lispro 100 unit/mL pen  Commonly known as: HumaLOG KwikPen Insulin  12 units with meals. Hold if NPO or preprandial glucose <100    Prior to admission regimen: INJECT 16 UNITS W/ MEALS PLUS SCALE 150-200+2, 201-250+4, 251-300+6, 301-350+8, >350+10. MAX DAILY 68 UNITS.  What changed: additional instructions     losartan 25 MG tablet  Commonly known as: COZAAR  Take 1 tablet (25 mg total) by mouth once daily.  Start taking on: August 20, 2022  What changed: how much to take        CONTINUE taking these medications    azelastine 137 mcg (0.1 %) nasal spray  Commonly known as: ASTELIN  1 spray (137 mcg total) by Nasal route 2 (two) times daily.     blood sugar diagnostic Strp  To check BG 4 times daily, to use with insurance preferred meter-true metrix     blood-glucose meter kit  To check BG 4 times daily, to use with insurance preferred meter-true metrix     cyclobenzaprine 5 MG tablet  Commonly known as: FLEXERIL  TAKE 1 TABLET BY MOUTH THREE TIMES A DAY AS NEEDED FOR MUSCLE SPASMS     ergocalciferol 50,000 unit Cap  Commonly known as: ERGOCALCIFEROL  TAKE 1 CAPSULE BY MOUTH ONE TIME PER WEEK     fluticasone propionate 50 mcg/actuation nasal spray  Commonly known as: FLONASE  2 sprays (100 mcg total) by Each Nostril route once daily.     lancets Misc  To check BG 4  times daily, to use with insurance preferred meter-true metrix     latanoprost 0.005 % ophthalmic solution  Place 1 drop into both eyes nightly.     meclizine 25 mg tablet  Commonly known as: ANTIVERT  Take 1 tablet (25 mg total) by  "mouth 3 (three) times daily as needed for Dizziness.     ondansetron 8 MG Tbdl  Commonly known as: ZOFRAN-ODT  Dissolve 1 tablet (8 mg total) by mouth every 12 (twelve) hours as needed (nausea).     pantoprazole 40 MG tablet  Commonly known as: PROTONIX  Take 1 tablet (40 mg total) by mouth once daily.     pen needle, diabetic 32 gauge x 1/4" Ndle  Commonly known as: NOVOFINE 32  Uses 4 times a day. 90 day via [employer] e 11.65        STOP taking these medications    diclofenac sodium 1 % Gel  Commonly known as: VOLTAREN     meloxicam 15 MG tablet  Commonly known as: MOBIC                Pending Diagnostic Studies:     Procedure Component Value Units Date/Time    Hepatitis C Antibody [[id number]] Collected: 08/14/22 1317    Order Status: Sent Lab Status: In process Updated: 08/14/22 2040    Specimen: Blood     Protime-INR [[id number]] Collected: 08/14/22 1908    Order Status: Sent Lab Status: In process Updated: 08/14/22 1930    Specimen: Blood     Specimen to Pathology, Surgery Orthopedics [[id number]] Collected: 08/16/22 1603    Order Status: Sent Lab Status: In process Updated: 08/17/22 0153    Specimen: Tissue     X-Ray Chest AP Portable [[id number]] Resulted: 08/19/22 0937    Order Status: Sent Lab Status: In process Updated: 08/19/22 0936        Indwelling Lines/Drains at time of discharge:   Lines/Drains/Airways     Epidural Line  Duration                Perineural Analgesia/Anesthesia Assessment (using dermatomes) 08/16/22 1200 2 days                Time spent on the discharge of patient: 35 minutes         Kendra Sanchez MD  Department of Hospital Medicine  [facility] - Surgery    "

## 2022-08-19 NOTE — PT/OT/SLP PROGRESS
Physical Therapy Treatment    Patient Name:  Riana Kay   MRN:  [id number]    Recommendations:     Discharge Recommendations:  nursing facility, skilled   Discharge Equipment Recommendations: bedside commode, cane, quad   Barriers to discharge: requires increased assistance    Assessment:     Riana Kay is a 67 y.o. female admitted with a medical diagnosis of Closed fracture of proximal end of right humerus with routine healing.  She presents with the following impairments/functional limitations:  weakness, impaired endurance, impaired functional mobility, gait instability, decreased upper extremity function, pain, impaired balance. Patient anticipating discharge to SNF today. She transfers w/ CGA, ambulates in room w/ IV pole 20 feet and w/ QC and CGA 40 feet..    Rehab Prognosis: Good; patient would benefit from acute skilled PT services to address these deficits and reach maximum level of function.    Recent Surgery: Procedure(s) (LRB):  ARTHROPLASTY, SHOULDER, TOTAL, REVERSE, [place] (Right) 3 Days Post-Op    Plan:     During this hospitalization, patient to be seen 4 x/week to address the identified rehab impairments via gait training, therapeutic activities, therapeutic exercises and progress toward the following goals:    · Plan of Care Expires:  09/17/22    Subjective     Chief Complaint: ready to get up  Patient/Family Comments/goals: go to SNF and progress to home  Pain/Comfort:  · Pain Rating 1: 4/10  · Location - Side 1: Right  · Location 1: arm  · Pain Addressed 1: Pre-medicate for activity, Reposition, Distraction  · Pain Rating Post-Intervention 1: 4/10      Objective:     Communicated with nurse prior to session.  Patient found HOB elevated with perineural catheter upon PT entry to room. Spouse present    General Precautions: Standard, fall   Orthopedic Precautions:RUE non weight bearing   Braces: Shoulder abduction brace  Respiratory Status: Room air     Functional Mobility:  · Bed  Mobility:     · Supine to Sit: stand by assistance  · Transfers:     · Sit to Stand:  stand by assistance with no AD and from bed  · Bed to Chair: contact guard assistance with  no AD  using  Step Transfer  · Gait: 20 feet w/ CGA and use of IV pole on LUE and w/ abd brace and NWB RUE. after seated rest break, ambulates w/ use of QC w/ CGA x 40 feet, IV pole in tow. Slow pace, no LOB      AM-PAC 6 CLICK MOBILITY  Turning over in bed (including adjusting bedclothes, sheets and blankets)?: 3  Sitting down on and standing up from a chair with arms (e.g., wheelchair, bedside commode, etc.): 3  Moving from lying on back to sitting on the side of the bed?: 3  Moving to and from a bed to a chair (including a wheelchair)?: 3  Need to walk in hospital room?: 3  Climbing 3-5 steps with a railing?: 2  Basic Mobility Total Score: 17       Therapeutic Activities and Exercises:   Patient educated and performs AP, QS, GS - demonstrates understanding of exercises.  Patient and spouse educated on PT POC, gait and trf tech.    Patient left up in chair with all lines intact, call button in reach and spouse present..    GOALS:   Multidisciplinary Problems     Physical Therapy Goals        Problem: Physical Therapy    Goal Priority Disciplines Outcome Goal Variances Interventions   Physical Therapy Goal     PT, PT/OT Ongoing, Progressing     Description: Goals to be met by: 2022     Patient will increase functional independence with mobility by performin. Supine to sit with Modified Bacon  2. Sit to supine with Modified Bacon  3. Sit to stand transfer with Modified Bacon using LRAD.  4. Bed to chair transfer with Modified Bacon using LRAD.  5. Gait  x 50 feet with Stand-by Assistance using LRAD.                      Time Tracking:     PT Received On: 22  PT Start Time: 1023     PT Stop Time: 1041  PT Total Time (min): 18 min     Billable Minutes: Gait Training 18    Treatment Type:  Treatment  PT/PTA: PT     PTA Visit Number: 0     08/19/2022

## 2022-08-19 NOTE — PLAN OF CARE
Patient Accepted to [facility], pending Authorization, PPS, COVID-19 results, and Chest X ray. SW to follow.    10:13 AM  PRISCILLA called PHN, Auth approved #[id number]. PRISCILLA will continue to reach out to [facility] admission staff, currently unavailable.    10:55 AM  Spoke with [facility]. Family to sign paperwork at 1:30 pm    Christina Martinez LMSW  Case Management   [facility]   Ext. [phone]

## 2022-08-19 NOTE — PLAN OF CARE
Pt is Aox4, VSS. Pain assessed and managed with PRN medication. Blood glucose monitored.  PNC  in place and infusing. Pt is progressing towards goals. SCD's in place with frequent checks for skin breakdown. Fall precautions in place, no reported falls. IV site CDI. Elimination schedule in place. Ambulates with 1x assist. Safety precautions in place bed in lowest position,wheels locked, call light within reach,  id band and allergy band on, and clutter free environment.

## 2022-08-19 NOTE — PLAN OF CARE
08/19/22 1446   Post-Acute Status   Post-Acute Authorization Placement   Post-Acute Placement Status Set-up Complete/Auth obtained     Patient's set-up has been completed. PRISCILLA scheduled d/c transportation to [facility] through [facility]. Patient is scheduled to be picked up at 3:30 pm. PRISCILLA provided patient's nurse with report number #[phone]; ask for the nurse for room #302 A. Requested  time does not guarantee arrival time.        PRISCILLA informed patient at bedside using a  line and informed pt's spouse via phone call.       Christina Martinez LMSW  Case Management   [facility]   Ext. [phone]       Addended by: NIKOLAI MÉNDEZ on: 9/19/2017 08:41 AM     Modules accepted: Orders

## 2022-08-22 ENCOUNTER — TELEPHONE (OUTPATIENT)
Dept: ORTHOPEDICS | Facility: CLINIC | Age: 67
End: 2022-08-22
Payer: MEDICARE

## 2022-08-22 NOTE — TELEPHONE ENCOUNTER
I received a message from the patient's  requesting the phone call.  Call the patient back.  Patient answer the phone she stated her  was not home.  She has a difficulty speaking English primary language is Emirati.  I advised the patient I will call back tomorrow to speak with the  in she said okay.  A notice that she does not have a postop appointment this will be set for her.

## 2022-08-24 DIAGNOSIS — Z96.611 STATUS POST REVERSE TOTAL REPLACEMENT OF RIGHT SHOULDER: Primary | ICD-10-CM

## 2022-08-24 LAB
FINAL PATHOLOGIC DIAGNOSIS: NORMAL
Lab: NORMAL

## 2022-08-24 RX ORDER — METHOCARBAMOL 500 MG/1
500 TABLET, FILM COATED ORAL EVERY 6 HOURS
Qty: 28 TABLET | Refills: 0
Start: 2022-08-24 | End: 2022-08-31

## 2022-08-24 RX ORDER — CELECOXIB 200 MG/1
200 CAPSULE ORAL DAILY
Qty: 14 CAPSULE | Refills: 0 | Status: SHIPPED | OUTPATIENT
Start: 2022-08-24 | End: 2022-09-07

## 2022-08-24 RX ORDER — OXYCODONE HYDROCHLORIDE 5 MG/1
5 TABLET ORAL EVERY 8 HOURS PRN
Qty: 21 TABLET | Refills: 0 | Status: SHIPPED | OUTPATIENT
Start: 2022-08-24 | End: 2022-12-05

## 2022-08-24 NOTE — TELEPHONE ENCOUNTER
I spoke with patient with an assistant of a .  She states she is out of her pain medication and is requesting a refill.  She has been using Tylenol and Celebrex which has given her good relief she is out of the oxycodone she had no problem with taking this medication.  She is also having some muscle spasms and currently out of Robaxin.  She is requesting a refill refills for these medications were granted in sent to her pharmacist.

## 2022-08-25 ENCOUNTER — PATIENT OUTREACH (OUTPATIENT)
Dept: ADMINISTRATIVE | Facility: HOSPITAL | Age: 67
End: 2022-08-25
Payer: MEDICARE

## 2022-08-25 ENCOUNTER — PATIENT MESSAGE (OUTPATIENT)
Dept: ADMINISTRATIVE | Facility: HOSPITAL | Age: 67
End: 2022-08-25
Payer: MEDICARE

## 2022-08-25 LAB
POCT GLUCOSE: 222 MG/DL (ref 70–110)
POCT GLUCOSE: 273 MG/DL (ref 70–110)

## 2022-08-29 ENCOUNTER — OFFICE VISIT (OUTPATIENT)
Dept: ORTHOPEDICS | Facility: CLINIC | Age: 67
End: 2022-08-29
Payer: MEDICARE

## 2022-08-29 ENCOUNTER — HOSPITAL ENCOUNTER (OUTPATIENT)
Dept: RADIOLOGY | Facility: HOSPITAL | Age: 67
Discharge: HOME | End: 2022-08-29
Attending: NURSE PRACTITIONER
Payer: MEDICARE

## 2022-08-29 VITALS — HEIGHT: 65 IN | WEIGHT: 179 LBS | BODY MASS INDEX: 29.82 KG/M2

## 2022-08-29 DIAGNOSIS — S42.291D OTHER CLOSED DISPLACED FRACTURE OF PROXIMAL END OF RIGHT HUMERUS WITH ROUTINE HEALING, SUBSEQUENT ENCOUNTER: Primary | ICD-10-CM

## 2022-08-29 DIAGNOSIS — S42.201D CLOSED FRACTURE OF PROXIMAL END OF RIGHT HUMERUS WITH ROUTINE HEALING, UNSPECIFIED FRACTURE MORPHOLOGY, SUBSEQUENT ENCOUNTER: ICD-10-CM

## 2022-08-29 DIAGNOSIS — Z98.890 POST-OPERATIVE STATE: ICD-10-CM

## 2022-08-29 DIAGNOSIS — M25.511 ACUTE PAIN OF RIGHT SHOULDER: ICD-10-CM

## 2022-08-29 PROCEDURE — 1160F PR REVIEW ALL MEDS BY PRESCRIBER/CLIN PHARMACIST DOCUMENTED: ICD-10-PCS | Mod: CPTII,S$GLB,, | Performed by: NURSE PRACTITIONER

## 2022-08-29 PROCEDURE — 3044F PR MOST RECENT HEMOGLOBIN A1C LEVEL <7.0%: ICD-10-PCS | Mod: CPTII,S$GLB,, | Performed by: NURSE PRACTITIONER

## 2022-08-29 PROCEDURE — 1159F MED LIST DOCD IN RCRD: CPT | Mod: CPTII,S$GLB,, | Performed by: NURSE PRACTITIONER

## 2022-08-29 PROCEDURE — 99999 PR PBB SHADOW E&M-EST. PATIENT-LVL IV: CPT | Mod: PBBFAC,,, | Performed by: NURSE PRACTITIONER

## 2022-08-29 PROCEDURE — 3288F FALL RISK ASSESSMENT DOCD: CPT | Mod: CPTII,S$GLB,, | Performed by: NURSE PRACTITIONER

## 2022-08-29 PROCEDURE — 99024 PR POST-OP FOLLOW-UP VISIT: ICD-10-PCS | Mod: S$GLB,,, | Performed by: NURSE PRACTITIONER

## 2022-08-29 PROCEDURE — 3044F HG A1C LEVEL LT 7.0%: CPT | Mod: CPTII,S$GLB,, | Performed by: NURSE PRACTITIONER

## 2022-08-29 PROCEDURE — 1125F PR PAIN SEVERITY QUANTIFIED, PAIN PRESENT: ICD-10-PCS | Mod: CPTII,S$GLB,, | Performed by: NURSE PRACTITIONER

## 2022-08-29 PROCEDURE — 1101F PR PT FALLS ASSESS DOC 0-1 FALLS W/OUT INJ PAST YR: ICD-10-PCS | Mod: CPTII,S$GLB,, | Performed by: NURSE PRACTITIONER

## 2022-08-29 PROCEDURE — 1125F AMNT PAIN NOTED PAIN PRSNT: CPT | Mod: CPTII,S$GLB,, | Performed by: NURSE PRACTITIONER

## 2022-08-29 PROCEDURE — 73030 XR SHOULDER COMPLETE 2 OR MORE VIEWS RIGHT: ICD-10-PCS | Mod: 26,RT,, | Performed by: RADIOLOGY

## 2022-08-29 PROCEDURE — 1157F PR ADVANCE CARE PLAN OR EQUIV PRESENT IN MEDICAL RECORD: ICD-10-PCS | Mod: CPTII,S$GLB,, | Performed by: NURSE PRACTITIONER

## 2022-08-29 PROCEDURE — 4010F PR ACE/ARB THEARPY RXD/TAKEN: ICD-10-PCS | Mod: CPTII,S$GLB,, | Performed by: NURSE PRACTITIONER

## 2022-08-29 PROCEDURE — 3008F BODY MASS INDEX DOCD: CPT | Mod: CPTII,S$GLB,, | Performed by: NURSE PRACTITIONER

## 2022-08-29 PROCEDURE — 99024 POSTOP FOLLOW-UP VISIT: CPT | Mod: S$GLB,,, | Performed by: NURSE PRACTITIONER

## 2022-08-29 PROCEDURE — 4010F ACE/ARB THERAPY RXD/TAKEN: CPT | Mod: CPTII,S$GLB,, | Performed by: NURSE PRACTITIONER

## 2022-08-29 PROCEDURE — 73030 X-RAY EXAM OF SHOULDER: CPT | Mod: 26,RT,, | Performed by: RADIOLOGY

## 2022-08-29 PROCEDURE — 99999 PR PBB SHADOW E&M-EST. PATIENT-LVL IV: ICD-10-PCS | Mod: PBBFAC,,, | Performed by: NURSE PRACTITIONER

## 2022-08-29 PROCEDURE — 3288F PR FALLS RISK ASSESSMENT DOCUMENTED: ICD-10-PCS | Mod: CPTII,S$GLB,, | Performed by: NURSE PRACTITIONER

## 2022-08-29 PROCEDURE — 1159F PR MEDICATION LIST DOCUMENTED IN MEDICAL RECORD: ICD-10-PCS | Mod: CPTII,S$GLB,, | Performed by: NURSE PRACTITIONER

## 2022-08-29 PROCEDURE — 1101F PT FALLS ASSESS-DOCD LE1/YR: CPT | Mod: CPTII,S$GLB,, | Performed by: NURSE PRACTITIONER

## 2022-08-29 PROCEDURE — 1157F ADVNC CARE PLAN IN RCRD: CPT | Mod: CPTII,S$GLB,, | Performed by: NURSE PRACTITIONER

## 2022-08-29 PROCEDURE — 3008F PR BODY MASS INDEX (BMI) DOCUMENTED: ICD-10-PCS | Mod: CPTII,S$GLB,, | Performed by: NURSE PRACTITIONER

## 2022-08-29 PROCEDURE — 1160F RVW MEDS BY RX/DR IN RCRD: CPT | Mod: CPTII,S$GLB,, | Performed by: NURSE PRACTITIONER

## 2022-08-29 PROCEDURE — 73030 X-RAY EXAM OF SHOULDER: CPT | Mod: TC,RT

## 2022-08-29 RX ORDER — ACETAMINOPHEN 500 MG
1000 TABLET ORAL EVERY 8 HOURS PRN
Qty: 90 TABLET | Refills: 0 | Status: SHIPPED | OUTPATIENT
Start: 2022-08-29

## 2022-08-29 NOTE — PROGRESS NOTES
Ms. Kay is here today for a post-operative visit after undergoing right reverse rTSA and right biceps tenodesis by Dr. Powell on 8/16/2022.    Interval History:  She reports that she is doing ok.  Pain is tolerable with Tylenol and ice packs.  She is not taking pain medication.  She has been working on ROM activities with her hand, fingers and wrist.  She denies fever, chills, and sweats since the time of the surgery.     Physical exam:  Dressing taken down.  Incision is clean, dry and intact. No signs of infection seen.   Skin was closed with Dermabond and Stratifix ends were clipped.  She has tactile stimulation to their lower FA.  She has normal ROM of all fingers and wrist.  Elbow ROM is  degrees.  Radial pulse is 2+ and cap refill is < 3 secs.       RADS: Right shoulder x-ray was obtained and personally reviewed by me, findings show humerus hardware is in proper position, no evidence of hardware failure.  No new fractures seen.  Radiologist notes a 1.4 cm of air in the joint space.    Assessment:  Post-op visit (2 weeks)    Plan:    ICD-10-CM ICD-9-CM   1. Other closed displaced fracture of proximal end of right humerus with routine healing, subsequent encounter  S42.291D V54.11   2. Post-operative state  Z98.890 V45.89     Current care, treatment plan, precautions, activity level/ modifications, limitations, rehabilitation exercises and proposed future treatment were discussed with the patient. We discussed the need to monitor for changes in symptoms and condition and report them to the physician.  Discussed importance of compliance with all appointments and follow up examinations.     WOUND CARE ORDERS  -Riana was advised to keep the incision clean and dry for the next 24 hours after which she may wash the area with antibacterial soap in the shower.   -She not submerge until the incision is completely healed  -Patient was advised to monitor wound closely and multiple times daily for any problems.  Call clinic immediately or report to ED for immediate medical attention for any complications including reopening of wound, drainage, purulence, redness, streaking, odor, pain out of proportion, fever, chills, etc.   - If there are signs of infection, please call Ortho Clinic [phone] for further instructions and to make appt to be seen.     Dr. Powell messaged about x-ray findings.      PHYSICAL THERAPY:   - Continue therapy orders sent.  I went over the shoulder rehab protocol as indicated below.    - Weight bearing - NWB to RUE x 12 weeks.  - Range of motion per Shoulder rehab as stated below:    PHASE 1: now until 2 wks postop - continue working on.  ROMAT and hand, wrist, elbow.  Sling, may remove for hygiene and ADLs     PHASE 2: 2-6 wks postop - May start on 9-1-22  ROMAT and hand, wrist, elbow.  Shoulder Pendulums okay  Shoulder passive and active assisted ROMAT   Sling, may remove for hygiene ADLs and therapy     PHASE 3: 6-12 wks postop   D/c sling   ROMAT RUE  No resistance     PHASE 4: after 12 wks postop  ROMAT, WBAT     PAIN MEDICATION:   - Pain medication: refill was needed, will refill Tylenol 1000 mg tid PRN.  - Pain medication refill policy provided to patient for review, yes.    - Patient was informed a multi-modal approach is used to treat their pain.     DVT PROPHYLAXIS:   - ASA 81 mg bid x 6 weeks     FOLLOW UP:   - Patient will follow up in the clinic in 4 weeks.  - X-ray of her right shoulder is not needed.    - Future Appointments:   Future Appointments   Date Time Provider Department Center   8/31/2022  7:30 AM Jose Rojas MD [facility]   10/17/2022  9:45 AM Sabino Damon MD [facility]           If there are any questions prior to scheduled follow up, the patient was instructed to contact the office

## 2022-08-30 ENCOUNTER — PATIENT MESSAGE (OUTPATIENT)
Dept: ORTHOPEDICS | Facility: CLINIC | Age: 67
End: 2022-08-30
Payer: MEDICARE

## 2022-08-31 ENCOUNTER — TELEPHONE (OUTPATIENT)
Dept: ORTHOPEDICS | Facility: CLINIC | Age: 67
End: 2022-08-31
Payer: MEDICARE

## 2022-08-31 ENCOUNTER — OFFICE VISIT (OUTPATIENT)
Dept: INTERNAL MEDICINE | Facility: CLINIC | Age: 67
End: 2022-08-31
Payer: MEDICARE

## 2022-08-31 VITALS
SYSTOLIC BLOOD PRESSURE: 152 MMHG | OXYGEN SATURATION: 96 % | BODY MASS INDEX: 27.7 KG/M2 | HEIGHT: 65 IN | DIASTOLIC BLOOD PRESSURE: 70 MMHG | WEIGHT: 166.25 LBS | TEMPERATURE: 98 F | HEART RATE: 76 BPM | RESPIRATION RATE: 16 BRPM

## 2022-08-31 DIAGNOSIS — G47.33 OSA (OBSTRUCTIVE SLEEP APNEA): ICD-10-CM

## 2022-08-31 DIAGNOSIS — I70.0 AORTIC ATHEROSCLEROSIS: ICD-10-CM

## 2022-08-31 DIAGNOSIS — Z79.4 TYPE 2 DIABETES MELLITUS WITHOUT COMPLICATION, WITH LONG-TERM CURRENT USE OF INSULIN: ICD-10-CM

## 2022-08-31 DIAGNOSIS — I25.10 ATHEROSCLEROSIS OF NATIVE CORONARY ARTERY OF NATIVE HEART WITHOUT ANGINA PECTORIS: ICD-10-CM

## 2022-08-31 DIAGNOSIS — E11.59 HYPERTENSION ASSOCIATED WITH TYPE 2 DIABETES MELLITUS: ICD-10-CM

## 2022-08-31 DIAGNOSIS — K21.9 GASTROESOPHAGEAL REFLUX DISEASE WITHOUT ESOPHAGITIS: ICD-10-CM

## 2022-08-31 DIAGNOSIS — E11.9 TYPE 2 DIABETES MELLITUS WITHOUT COMPLICATION, WITH LONG-TERM CURRENT USE OF INSULIN: ICD-10-CM

## 2022-08-31 DIAGNOSIS — E55.9 VITAMIN D DEFICIENCY: ICD-10-CM

## 2022-08-31 DIAGNOSIS — F41.9 ANXIETY: ICD-10-CM

## 2022-08-31 DIAGNOSIS — I15.2 HYPERTENSION ASSOCIATED WITH TYPE 2 DIABETES MELLITUS: ICD-10-CM

## 2022-08-31 DIAGNOSIS — Z00.00 PREVENTATIVE HEALTH CARE: Primary | ICD-10-CM

## 2022-08-31 PROCEDURE — 1100F PTFALLS ASSESS-DOCD GE2>/YR: CPT | Mod: CPTII,S$GLB,, | Performed by: FAMILY MEDICINE

## 2022-08-31 PROCEDURE — 4010F ACE/ARB THERAPY RXD/TAKEN: CPT | Mod: CPTII,S$GLB,, | Performed by: FAMILY MEDICINE

## 2022-08-31 PROCEDURE — 99397 PR PREVENTIVE VISIT,EST,65 & OVER: ICD-10-PCS | Mod: GZ,S$GLB,, | Performed by: FAMILY MEDICINE

## 2022-08-31 PROCEDURE — 3078F PR MOST RECENT DIASTOLIC BLOOD PRESSURE < 80 MM HG: ICD-10-PCS | Mod: CPTII,S$GLB,, | Performed by: FAMILY MEDICINE

## 2022-08-31 PROCEDURE — 3044F HG A1C LEVEL LT 7.0%: CPT | Mod: CPTII,S$GLB,, | Performed by: FAMILY MEDICINE

## 2022-08-31 PROCEDURE — 1157F PR ADVANCE CARE PLAN OR EQUIV PRESENT IN MEDICAL RECORD: ICD-10-PCS | Mod: CPTII,S$GLB,, | Performed by: FAMILY MEDICINE

## 2022-08-31 PROCEDURE — 99999 PR PBB SHADOW E&M-EST. PATIENT-LVL V: ICD-10-PCS | Mod: PBBFAC,,, | Performed by: FAMILY MEDICINE

## 2022-08-31 PROCEDURE — 1125F AMNT PAIN NOTED PAIN PRSNT: CPT | Mod: CPTII,S$GLB,, | Performed by: FAMILY MEDICINE

## 2022-08-31 PROCEDURE — 1157F ADVNC CARE PLAN IN RCRD: CPT | Mod: CPTII,S$GLB,, | Performed by: FAMILY MEDICINE

## 2022-08-31 PROCEDURE — 3008F PR BODY MASS INDEX (BMI) DOCUMENTED: ICD-10-PCS | Mod: CPTII,S$GLB,, | Performed by: FAMILY MEDICINE

## 2022-08-31 PROCEDURE — 1160F RVW MEDS BY RX/DR IN RCRD: CPT | Mod: CPTII,S$GLB,, | Performed by: FAMILY MEDICINE

## 2022-08-31 PROCEDURE — 99397 PER PM REEVAL EST PAT 65+ YR: CPT | Mod: GZ,S$GLB,, | Performed by: FAMILY MEDICINE

## 2022-08-31 PROCEDURE — 3078F DIAST BP <80 MM HG: CPT | Mod: CPTII,S$GLB,, | Performed by: FAMILY MEDICINE

## 2022-08-31 PROCEDURE — 3288F FALL RISK ASSESSMENT DOCD: CPT | Mod: CPTII,S$GLB,, | Performed by: FAMILY MEDICINE

## 2022-08-31 PROCEDURE — 4010F PR ACE/ARB THEARPY RXD/TAKEN: ICD-10-PCS | Mod: CPTII,S$GLB,, | Performed by: FAMILY MEDICINE

## 2022-08-31 PROCEDURE — 3077F SYST BP >= 140 MM HG: CPT | Mod: CPTII,S$GLB,, | Performed by: FAMILY MEDICINE

## 2022-08-31 PROCEDURE — 99999 PR PBB SHADOW E&M-EST. PATIENT-LVL V: CPT | Mod: PBBFAC,,, | Performed by: FAMILY MEDICINE

## 2022-08-31 PROCEDURE — 3288F PR FALLS RISK ASSESSMENT DOCUMENTED: ICD-10-PCS | Mod: CPTII,S$GLB,, | Performed by: FAMILY MEDICINE

## 2022-08-31 PROCEDURE — 3077F PR MOST RECENT SYSTOLIC BLOOD PRESSURE >= 140 MM HG: ICD-10-PCS | Mod: CPTII,S$GLB,, | Performed by: FAMILY MEDICINE

## 2022-08-31 PROCEDURE — 1159F MED LIST DOCD IN RCRD: CPT | Mod: CPTII,S$GLB,, | Performed by: FAMILY MEDICINE

## 2022-08-31 PROCEDURE — 1159F PR MEDICATION LIST DOCUMENTED IN MEDICAL RECORD: ICD-10-PCS | Mod: CPTII,S$GLB,, | Performed by: FAMILY MEDICINE

## 2022-08-31 PROCEDURE — 1100F PR PT FALLS ASSESS DOC 2+ FALLS/FALL W/INJURY/YR: ICD-10-PCS | Mod: CPTII,S$GLB,, | Performed by: FAMILY MEDICINE

## 2022-08-31 PROCEDURE — 1160F PR REVIEW ALL MEDS BY PRESCRIBER/CLIN PHARMACIST DOCUMENTED: ICD-10-PCS | Mod: CPTII,S$GLB,, | Performed by: FAMILY MEDICINE

## 2022-08-31 PROCEDURE — 3008F BODY MASS INDEX DOCD: CPT | Mod: CPTII,S$GLB,, | Performed by: FAMILY MEDICINE

## 2022-08-31 PROCEDURE — 3044F PR MOST RECENT HEMOGLOBIN A1C LEVEL <7.0%: ICD-10-PCS | Mod: CPTII,S$GLB,, | Performed by: FAMILY MEDICINE

## 2022-08-31 PROCEDURE — 1125F PR PAIN SEVERITY QUANTIFIED, PAIN PRESENT: ICD-10-PCS | Mod: CPTII,S$GLB,, | Performed by: FAMILY MEDICINE

## 2022-08-31 RX ORDER — PANTOPRAZOLE SODIUM 40 MG/1
40 TABLET, DELAYED RELEASE ORAL DAILY
Qty: 90 TABLET | Refills: 3 | Status: SHIPPED | OUTPATIENT
Start: 2022-08-31 | End: 2023-08-09 | Stop reason: SDUPTHER

## 2022-08-31 RX ORDER — BUPROPION HYDROCHLORIDE 150 MG/1
150 TABLET ORAL DAILY
Qty: 90 TABLET | Refills: 3 | Status: SHIPPED | OUTPATIENT
Start: 2022-08-31 | End: 2022-10-12

## 2022-08-31 RX ORDER — LOSARTAN POTASSIUM 50 MG/1
50 TABLET ORAL DAILY
Qty: 90 TABLET | Refills: 3 | Status: SHIPPED | OUTPATIENT
Start: 2022-08-31 | End: 2023-05-28

## 2022-08-31 NOTE — TELEPHONE ENCOUNTER
Called and rescheduled pt's appt with Mendez to 09/28/2022 @ 10:30 am. Pt's  was satisfied with new appt date/time.

## 2022-08-31 NOTE — PROGRESS NOTES
Subjective:       Patient ID: Riana Kay is a 67 y.o. female.    Chief Complaint: Follow-up, Medication Problem, and Hypertension  67-year-old female presents to clinic today accompanied by her  for annual physical exam.  She continues to be followed by Endocrinology for treatment of type 2 diabetes which remains well controlled on Lantus and Humalog.  She continues to be treated for hypertension but recently has noted elevated blood pressure readings; therefore, I have recommended increasing losartan to 50 mg daily.  Atherosclerosis remains stable.  She continues to use pantoprazole 40 mg daily with adequate control of GERD.  She reports a past surgical history of left knee arthroscopic surgery, hysterectomy, rotator cuff repair, right elbow surgery, right knee replacement, cholecystectomy, and most recently right shoulder surgery secondary to humeral head fracture.  She reports a family history of her father passing away from colon cancer at 83.  She is up-to-date with all screening exams and vaccinations.  Finally, she has been evaluated by Neurology and neuropsychiatry secondary to memory impairment.  Testing has been noted to be normal except for signs of anxiety.  I have recommended treatment with Wellbutrin which the patient was previously prescribed in 2018 with positive results.  Hypertension  This is a recurrent problem. The current episode started more than 1 month ago. The problem has been waxing and waning since onset. The problem is controlled. Associated symptoms include anxiety, headaches, malaise/fatigue, neck pain and sweats. Pertinent negatives include no blurred vision, chest pain, orthopnea, palpitations, peripheral edema, PND or shortness of breath. There are no associated agents to hypertension. Risk factors for coronary artery disease include diabetes mellitus, obesity and stress. Past treatments include lifestyle changes. The current treatment provides mild improvement.  Compliance problems include exercise.    Review of Systems   Constitutional:  Positive for malaise/fatigue. Negative for appetite change, chills, fatigue and fever.   HENT:  Negative for nasal congestion, ear pain, hearing loss, postnasal drip, rhinorrhea, sinus pressure/congestion, sore throat and tinnitus.    Eyes:  Negative for blurred vision, redness, itching and visual disturbance.   Respiratory:  Negative for cough, chest tightness and shortness of breath.    Cardiovascular:  Negative for chest pain, palpitations, orthopnea and PND.   Gastrointestinal:  Negative for abdominal pain, constipation, diarrhea, nausea and vomiting.   Genitourinary:  Negative for decreased urine volume, difficulty urinating, dysuria, frequency, hematuria and urgency.   Musculoskeletal:  Positive for neck pain. Negative for back pain, myalgias and neck stiffness.   Integumentary:  Negative for rash.   Neurological:  Positive for headaches. Negative for dizziness and light-headedness.   Psychiatric/Behavioral: Negative.         Objective:      Physical Exam  Vitals and nursing note reviewed.   Constitutional:       General: She is not in acute distress.     Appearance: She is well-developed. She is not diaphoretic.   HENT:      Head: Normocephalic and atraumatic.      Right Ear: External ear normal.      Left Ear: External ear normal.      Nose: Nose normal.      Mouth/Throat:      Pharynx: No oropharyngeal exudate.   Eyes:      General: No scleral icterus.        Right eye: No discharge.         Left eye: No discharge.      Conjunctiva/sclera: Conjunctivae normal.      Pupils: Pupils are equal, round, and reactive to light.   Neck:      Thyroid: No thyromegaly.      Vascular: No JVD.      Trachea: No tracheal deviation.   Cardiovascular:      Rate and Rhythm: Normal rate and regular rhythm.      Pulses:           Dorsalis pedis pulses are 2+ on the right side and 2+ on the left side.        Posterior tibial pulses are 2+ on the right  side and 2+ on the left side.      Heart sounds: Normal heart sounds. No murmur heard.    No friction rub. No gallop.   Pulmonary:      Effort: Pulmonary effort is normal. No respiratory distress.      Breath sounds: Normal breath sounds. No stridor. No wheezing or rales.   Abdominal:      General: Bowel sounds are normal. There is no distension.      Palpations: Abdomen is soft. There is no mass.      Tenderness: There is no abdominal tenderness. There is no guarding or rebound.   Musculoskeletal:         General: No tenderness. Normal range of motion.      Cervical back: Normal range of motion and neck supple.      Right foot: Normal range of motion. No deformity, bunion, Charcot foot, foot drop or prominent metatarsal heads.      Left foot: Normal range of motion. No deformity, bunion, Charcot foot, foot drop or prominent metatarsal heads.   Feet:      Right foot:      Protective Sensation: 10 sites tested.  10 sites sensed.      Skin integrity: Skin integrity normal.      Toenail Condition: Right toenails are normal.      Left foot:      Protective Sensation: 10 sites tested.  10 sites sensed.      Skin integrity: Skin integrity normal.      Toenail Condition: Left toenails are normal.   Lymphadenopathy:      Cervical: No cervical adenopathy.   Skin:     General: Skin is warm and dry.      Coloration: Skin is not pale.      Findings: No erythema or rash.   Neurological:      Mental Status: She is alert and oriented to person, place, and time.   Psychiatric:         Behavior: Behavior normal.         Thought Content: Thought content normal.         Judgment: Judgment normal.       Assessment:       Problem List Items Addressed This Visit       Anxiety    Aortic atherosclerosis    Atherosclerosis of native coronary artery of native heart without angina pectoris    Gastroesophageal reflux disease without esophagitis    Hypertension associated with type 2 diabetes mellitus    ZAHIRA (obstructive sleep apnea)    Type 2  diabetes mellitus without complication, with long-term current use of insulin    Vitamin D deficiency     Other Visit Diagnoses       Preventative health care    -  Primary              Plan:         1. CBC, CMP, UA, TSH, free T4, fasting lipids, hemoglobin A1c, and urine microalbumin to creatinine ratio.  2. Continue Lantus and Humalog as prescribed and continue follow-up with endocrinology as scheduled.  Type 2 diabetes is well controlled.  3. Recommend increasing losartan to 50 mg daily.  Encourage daily blood pressure monitoring.  4. Atherosclerosis remains stable.  5. Recommend starting over-the-counter vitamin-D 5000 units daily for treatment of vitamin-D deficiency.    6. Continue pantoprazole 40 mg daily.  GERD is stable.  7. Start Wellbutrin 150 mg daily for treatment of anxiety.    8. Continue CPAP nightly.  Sleep apnea is stable.    9. Return to clinic as needed or in 1 month for hypertension and anxiety follow-up.

## 2022-09-01 ENCOUNTER — LAB VISIT (OUTPATIENT)
Dept: LAB | Facility: HOSPITAL | Age: 67
End: 2022-09-01
Attending: FAMILY MEDICINE
Payer: MEDICARE

## 2022-09-01 DIAGNOSIS — Z79.4 TYPE 2 DIABETES MELLITUS WITHOUT COMPLICATION, WITH LONG-TERM CURRENT USE OF INSULIN: ICD-10-CM

## 2022-09-01 DIAGNOSIS — E11.9 TYPE 2 DIABETES MELLITUS WITHOUT COMPLICATION, WITH LONG-TERM CURRENT USE OF INSULIN: ICD-10-CM

## 2022-09-01 DIAGNOSIS — I25.10 ATHEROSCLEROSIS OF NATIVE CORONARY ARTERY OF NATIVE HEART WITHOUT ANGINA PECTORIS: ICD-10-CM

## 2022-09-01 DIAGNOSIS — F41.9 ANXIETY: ICD-10-CM

## 2022-09-01 DIAGNOSIS — Z00.00 PREVENTATIVE HEALTH CARE: ICD-10-CM

## 2022-09-01 DIAGNOSIS — I15.2 HYPERTENSION ASSOCIATED WITH TYPE 2 DIABETES MELLITUS: ICD-10-CM

## 2022-09-01 DIAGNOSIS — E11.59 HYPERTENSION ASSOCIATED WITH TYPE 2 DIABETES MELLITUS: ICD-10-CM

## 2022-09-01 DIAGNOSIS — G47.33 OSA (OBSTRUCTIVE SLEEP APNEA): ICD-10-CM

## 2022-09-01 DIAGNOSIS — E55.9 VITAMIN D DEFICIENCY: ICD-10-CM

## 2022-09-01 DIAGNOSIS — I70.0 AORTIC ATHEROSCLEROSIS: ICD-10-CM

## 2022-09-01 DIAGNOSIS — K21.9 GASTROESOPHAGEAL REFLUX DISEASE WITHOUT ESOPHAGITIS: ICD-10-CM

## 2022-09-01 LAB
ALBUMIN SERPL BCP-MCNC: 3.9 G/DL (ref 3.5–5.2)
ALP SERPL-CCNC: 88 U/L (ref 55–135)
ALT SERPL W/O P-5'-P-CCNC: 24 U/L (ref 10–44)
ANION GAP SERPL CALC-SCNC: 10 MMOL/L (ref 8–16)
AST SERPL-CCNC: 24 U/L (ref 10–40)
BASOPHILS # BLD AUTO: 0.04 K/UL (ref 0–0.2)
BASOPHILS NFR BLD: 0.7 % (ref 0–1.9)
BILIRUB SERPL-MCNC: 0.6 MG/DL (ref 0.1–1)
BUN SERPL-MCNC: 14 MG/DL (ref 8–23)
CALCIUM SERPL-MCNC: 9.4 MG/DL (ref 8.7–10.5)
CHLORIDE SERPL-SCNC: 105 MMOL/L (ref 95–110)
CHOLEST SERPL-MCNC: 170 MG/DL (ref 120–199)
CHOLEST/HDLC SERPL: 3.5 {RATIO} (ref 2–5)
CO2 SERPL-SCNC: 23 MMOL/L (ref 23–29)
CREAT SERPL-MCNC: 0.7 MG/DL (ref 0.5–1.4)
DIFFERENTIAL METHOD: ABNORMAL
EOSINOPHIL # BLD AUTO: 0.3 K/UL (ref 0–0.5)
EOSINOPHIL NFR BLD: 4.4 % (ref 0–8)
ERYTHROCYTE [DISTWIDTH] IN BLOOD BY AUTOMATED COUNT: 13.6 % (ref 11.5–14.5)
EST. GFR  (NO RACE VARIABLE): >60 ML/MIN/1.73 M^2
ESTIMATED AVG GLUCOSE: 131 MG/DL (ref 68–131)
GLUCOSE SERPL-MCNC: 168 MG/DL (ref 70–110)
HBA1C MFR BLD: 6.2 % (ref 4–5.6)
HCT VFR BLD AUTO: 36.6 % (ref 37–48.5)
HDLC SERPL-MCNC: 48 MG/DL (ref 40–75)
HDLC SERPL: 28.2 % (ref 20–50)
HGB BLD-MCNC: 12.3 G/DL (ref 12–16)
IMM GRANULOCYTES # BLD AUTO: 0.01 K/UL (ref 0–0.04)
IMM GRANULOCYTES NFR BLD AUTO: 0.2 % (ref 0–0.5)
LDLC SERPL CALC-MCNC: 77.4 MG/DL (ref 63–159)
LYMPHOCYTES # BLD AUTO: 1.9 K/UL (ref 1–4.8)
LYMPHOCYTES NFR BLD: 31.8 % (ref 18–48)
MCH RBC QN AUTO: 32.5 PG (ref 27–31)
MCHC RBC AUTO-ENTMCNC: 33.6 G/DL (ref 32–36)
MCV RBC AUTO: 97 FL (ref 82–98)
MONOCYTES # BLD AUTO: 0.3 K/UL (ref 0.3–1)
MONOCYTES NFR BLD: 5.4 % (ref 4–15)
NEUTROPHILS # BLD AUTO: 3.4 K/UL (ref 1.8–7.7)
NEUTROPHILS NFR BLD: 57.5 % (ref 38–73)
NONHDLC SERPL-MCNC: 122 MG/DL
NRBC BLD-RTO: 0 /100 WBC
PLATELET # BLD AUTO: 290 K/UL (ref 150–450)
PMV BLD AUTO: 9.5 FL (ref 9.2–12.9)
POTASSIUM SERPL-SCNC: 4.6 MMOL/L (ref 3.5–5.1)
PROT SERPL-MCNC: 7.4 G/DL (ref 6–8.4)
RBC # BLD AUTO: 3.79 M/UL (ref 4–5.4)
SODIUM SERPL-SCNC: 138 MMOL/L (ref 136–145)
T4 FREE SERPL-MCNC: 1.08 NG/DL (ref 0.71–1.51)
TRIGL SERPL-MCNC: 223 MG/DL (ref 30–150)
TSH SERPL DL<=0.005 MIU/L-ACNC: 1.21 UIU/ML (ref 0.4–4)
WBC # BLD AUTO: 5.92 K/UL (ref 3.9–12.7)

## 2022-09-01 PROCEDURE — 84443 ASSAY THYROID STIM HORMONE: CPT | Performed by: FAMILY MEDICINE

## 2022-09-01 PROCEDURE — 80053 COMPREHEN METABOLIC PANEL: CPT | Performed by: FAMILY MEDICINE

## 2022-09-01 PROCEDURE — 80061 LIPID PANEL: CPT | Performed by: FAMILY MEDICINE

## 2022-09-01 PROCEDURE — 36415 COLL VENOUS BLD VENIPUNCTURE: CPT | Mod: PO | Performed by: FAMILY MEDICINE

## 2022-09-01 PROCEDURE — 83036 HEMOGLOBIN GLYCOSYLATED A1C: CPT | Performed by: FAMILY MEDICINE

## 2022-09-01 PROCEDURE — 85025 COMPLETE CBC W/AUTO DIFF WBC: CPT | Performed by: FAMILY MEDICINE

## 2022-09-01 PROCEDURE — 84439 ASSAY OF FREE THYROXINE: CPT | Performed by: FAMILY MEDICINE

## 2022-09-06 ENCOUNTER — CLINICAL SUPPORT (OUTPATIENT)
Dept: REHABILITATION | Facility: HOSPITAL | Age: 67
End: 2022-09-06
Payer: MEDICARE

## 2022-09-06 DIAGNOSIS — S42.291D OTHER CLOSED DISPLACED FRACTURE OF PROXIMAL END OF RIGHT HUMERUS WITH ROUTINE HEALING, SUBSEQUENT ENCOUNTER: ICD-10-CM

## 2022-09-06 DIAGNOSIS — M25.511 ACUTE PAIN OF RIGHT SHOULDER: Primary | ICD-10-CM

## 2022-09-06 PROCEDURE — 97161 PT EVAL LOW COMPLEX 20 MIN: CPT

## 2022-09-06 PROCEDURE — 97110 THERAPEUTIC EXERCISES: CPT

## 2022-09-06 NOTE — PLAN OF CARE
[facility]  Physical Therapy Initial Evaluation    Name: Riana Kay  Clinic Number: [id number]    Therapy Diagnosis:   Encounter Diagnoses   Name Primary?    Other closed displaced fracture of proximal end of right humerus with routine healing, subsequent encounter     Acute pain of right shoulder Yes     Physician: Mendez Tavarez, NP    Physician Orders: PT Eval and Treat   Medical Diagnosis: s/p R reverse total shoulder  Date of Surgery: 8/16/22  Evaluation Date: 9/6/2022  Authorization Period Expiration: 10/4/22  Plan of Care Certification Period: 2/21/23  Visit # / Visits authorized: 1/ 1    Time In: 1005 am  Time Out: 1100 am  Total Billable Time: 55 minutes    Precautions: Standard and s/p reverse shoulder  RUE  Nonweightbearing x 12 wks     PHASE 1: now until 2 wks postop  ROMAT and hand, wrist, elbow.  Sling, may remove for hygiene and ADLs     PHASE 2: 2-6 wks postop  ROMAT and hand, wrist, elbow.  Shoulder Pendulums okay  Shoulder passive and active assisted ROMAT   Sling, may remove for hygiene ADLs and therapy     PHASE 3: 6-12 wks postop   D/c sling   ROMAT RUE  No resistance     PHASE 4: after 12 wks postop  ROMAT, WBAT     Subjective   Date of onset: 8/14/22  History of current condition - Riana reports: she fell over her car trying to go inside [employer]. She is unsure how the fall happened. Pt had the ambulance bring her to the ER and had a reverse total shoulder two days later. Pt is now 3 weeks status post surgery starting PT to return to functional activities per protocol.        Past Medical History:   Diagnosis Date    Abdominal pain, right upper quadrant 02/04/2014    Anticoagulant long-term use     Anxiety     AR (allergic rhinitis)     Atrophic gastritis without mention of hemorrhage 02/13/2012     Dx updated per 2019 IMO Load    Chronic fatigue syndrome 06/10/2012    Diabetes mellitus     Dizziness     Fatty liver     GERD (gastroesophageal reflux  disease)     Gross hematuria 12/01/2020    HTN (hypertension)     Hyperlipidemia     Leg swelling     Memory loss     Osteopenia     PONV (postoperative nausea and vomiting)     Primary osteoarthritis of right knee 10/06/2020    Primary osteoarthritis of right knee 10/06/2020    Right elbow pain 01/16/2019    S/P total hysterectomy     Screening for colon cancer 01/06/2021    Sleep apnea     Status post total right knee replacement 10/6/2020 10/05/2020     Riana Kay  has a past surgical history that includes Cholecystectomy; Knee arthroscopy w/ debridement (4/11); Total abdominal hysterectomy w/ bilateral salpingoophorectomy; Rotator cuff repair; Elbow surgery (Right, 7/16/15); Elbow surgery; Hysterectomy; Colonoscopy (N/A, 1/17/2018); Elbow Arthroplasty (Right, 1/16/2019); Release of ulnar nerve at cubital tunnel (Right, 1/16/2019); Upper gastrointestinal endoscopy; Esophagogastroduodenoscopy (N/A, 4/15/2019); Knee Arthroplasty (Right, 10/6/2020); Cystoscopy (N/A, 12/1/2020); Retrograde pyelography (Bilateral, 12/1/2020); Colonoscopy (N/A, 1/6/2021); Total knee arthroplasty (Left, 10/19/2021); and Reverse total shoulder arthroplasty (Right, 8/16/2022).    Riana has a current medication list which includes the following prescription(s): acetaminophen, aspirin, azelastine, blood sugar diagnostic, blood-glucose meter, bupropion, celecoxib, ergocalciferol, fluticasone propionate, insulin, insulin lispro, lancets, latanoprost, losartan, meclizine, ondansetron, oxycodone, pantoprazole, pen needle, diabetic, senna-docusate 8.6-50 mg, and [DISCONTINUED] diclofenac sodium.    Review of patient's allergies indicates:   Allergen Reactions    Iodinated contrast media Swelling and Rash    Percocet [oxycodone-acetaminophen] Itching    Macrobid [nitrofurantoin monohyd/m-cryst] Rash    Metformin Rash    Penicillins Rash     Had ancef in 2020 with no adverse rxn     Promethazine Rash     Had compazine in 2021    Sulfa  (sulfonamide antibiotics) Rash    Sulfamethoxazole-trimethoprim Rash        Imaging, MRI studies    Prior Therapy: yes  Social History: She lives with their spouse  Occupation: retired  Prior Level of Function: Independent  Current Level of Function: limited with resistance and WB per protocol    Pain:  Current 4/10, worst 10/10, best 4/10   Location: right shoulder   Description: Burning  Aggravating Factors: Night Time and Morning  Easing Factors: pain medication, ice, and rest    Pts goals: return to prior activities    Objective     Observation: pt appears stated age, NAD  Posture: right shoulder elevated        Passive Range of Motion: Measured in degrees      Shoulder Left Right   Flexion 160 NT   Abduction 170 ~30 deg   ER at 0 NT 0   ER at 90 80 NT   IR 80 50 deg @ 0 deg abd       Upper Extremity Strength      Shoulder Left   Shoulder flexion: 4/5   Shoulder Abduction: 4/5   Shoulder ER 5/5   Shoulder IR 5/5     R elbow:  Flex: 140 deg  Ext: -40 deg    Special Tests: not indicated post operative status      Palpation Elbow:  TTP secondary to previous surgery R    Palpatior:  TTP around incision site        TREATMENT   Treatment Time In: 1035  Treatment Time Out: 1045  Total Treatment time separate from Evaluation time:10    Riana received therapeutic exercises to develop endurance, ROM, and posture for 10 minutes including:  Scapular retractions 10 sec x10  Pendulums fwd/back and side to side x1 min ea  HEP review and education       Home Exercises and Patient Education Provided    Education provided re: HEP to Go    Written Home Exercises Provided: Yes.  Exercises were reviewed and Riana was able to demonstrate them prior to the end of the session.   Pt received a written copy of exercises to perform at home. Riana demonstrated good  understanding of the education provided.     See media section for exercises given.   Assessment   Riana is a 67 y.o. female referred to outpatient Physical  Therapy with a medical diagnosis of s/p R reverse total shoulder.  Pt presents with decreased R shoulder ROM, strength, postural deficits and pain secondary to recent surgery and limiting all ADLs.     Pt prognosis is Good.   Pt will benefit from skilled outpatient Physical Therapy to address the deficits stated above and in the chart below, provide pt/family education, and to maximize pt's level of independence.     Plan of care discussed with patient: Yes  Pt's spiritual, cultural and educational needs considered and patient is agreeable to the plan of care and goals as stated below:     Anticipated Barriers for therapy: none    Medical Necessity is demonstrated by the following  History  Co-morbidities and personal factors that may impact the plan of care Co-morbidities:   HTN    Personal Factors:   no deficits     low   Examination  Body Structures and Functions, activity limitations and participation restrictions that may impact the plan of care Body Regions:   upper extremities    Body Systems:    ROM  strength  motor control    Participation Restrictions:   Limited per protocol    Activity limitations:   Mobility  walking    Self care  no deficits    Domestic Life  no deficits    Interactions/Relationships  no deficits    Life Areas  no deficits    Community and Social Life  no deficits         low   Clinical Presentation stable and uncomplicated low   Decision Making/ Complexity Score: low     Goals:  Short Term Goals: (6 weeks)   - Pt will increase ROM to 110 deg R shoulder flex and abd, 40 deg IR/ER at 45 deg abd  - Decrease Pain to 2/10 as worst with all PT interventions  - Pt to self correct posture and gait with minimal cues  - Pt independent with HEP with progressions.     Long Term Goals (24 Weeks):  - Pt will increase ROM to 170 deg R shoulder flex and abd, 80 deg ER and IR  - Pt will increase strength to 5/5 RUE in all planes  - Decrease Pain to 0/10 with ADLs overhead  - Pt to return to 80%  PLOF      Plan   Certification Period/Plan of care expiration: 9/6/2022 to 2/21/23.    Outpatient Physical Therapy 2 times weekly for 24 weeks to include the following interventions: Manual Therapy, Moist Heat/ Ice, Neuromuscular Re-ed, Patient Education, Therapeutic Activities, and Therapeutic Exercise.     Bernadette Palacios, PT, DPT

## 2022-09-06 NOTE — ADDENDUM NOTE
Addendum  created 09/06/22 0959 by Katja Irwin MD    Attestation recorded in Intraprocedure, Intraprocedure Attestations filed

## 2022-09-13 ENCOUNTER — CLINICAL SUPPORT (OUTPATIENT)
Dept: REHABILITATION | Facility: HOSPITAL | Age: 67
End: 2022-09-13
Payer: MEDICARE

## 2022-09-13 DIAGNOSIS — M25.511 ACUTE PAIN OF RIGHT SHOULDER: Primary | ICD-10-CM

## 2022-09-13 PROCEDURE — 97140 MANUAL THERAPY 1/> REGIONS: CPT

## 2022-09-13 PROCEDURE — 97110 THERAPEUTIC EXERCISES: CPT

## 2022-09-13 NOTE — PROGRESS NOTES
Physical Therapy Daily Treatment Note     Name: Riana Baker   Clinic Number: [id number]    Therapy Diagnosis:   Encounter Diagnosis   Name Primary?    Acute pain of right shoulder Yes     Physician: No ref. provider found    Visit Date: 9/13/2022    Physician Orders: PT Eval and Treat   Medical Diagnosis: s/p R reverse total shoulder  Date of Surgery: 8/16/22  Evaluation Date: 9/6/2022  Authorization Period Expiration: 10/4/22  Plan of Care Certification Period: 2/21/23  Visit # / Visits authorized: 2/ 1     Time In: 800 am  Time Out: 900 am  Total Billable Time: 40 minutes     Precautions: Standard and s/p reverse shoulder  RUE  Nonweightbearing x 12 wks     PHASE 1: now until 2 wks postop  ROMAT and hand, wrist, elbow.  Sling, may remove for hygiene and ADLs     PHASE 2: 2-6 wks postop  ROMAT and hand, wrist, elbow.  Shoulder Pendulums okay  Shoulder passive and active assisted ROMAT   Sling, may remove for hygiene ADLs and therapy     PHASE 3: 6-12 wks postop   D/c sling   ROMAT RUE  No resistance     PHASE 4: after 12 wks postop  ROMAT, WBAT  Subjective      Pt reports:she has been doing her HEP    she was compliant with home exercise program given last session.   Response to previous treatment:NA  Functional change: NA    Pain: not quantified this visit  Location: right shoulder      Objective     Riana received therapeutic exercises to develop strength, endurance, ROM, and posture for 30 minutes including:  Arm prop with MHP x10 min  Wrist 4 ways x30 ea  Pendulums x2 min (fwd/back and side to side)  Scapular retraction leaning over edge of table 2x10 with 5 sec hold    Riana received the following manual therapy techniques: Joint mobilizations and Manual traction were applied to the: R shoulder for 10 minutes, including:  PROM with light distraction per protocol  Grade I/II GH joint mobilizations       Riana received hot pack for 10 minutes to increase circulation and  "promote tissue healing.    Riana received cold pack for 10 minutes to to decrease circulation, pain, and swelling.  Home Exercises Provided and Patient Education Provided     Education provided:   - arm prop 5x daily for 10 in ea  - elbow ext stretch 2x daily    Written Home Exercises Provided: Patient instructed to cont prior HEP.  Exercises were reviewed and Riana was able to demonstrate them prior to the end of the session.  Riana demonstrated good  understanding of the education provided.     See EMR under Patient Instructions for exercises provided {Blank single:11928::"9/13/2022","prior visit"    Assessment     Pt still guarding secondary to pain and muscular tension. She is showing slightly better coordination with pendulums, but needed several cues for scapular retractions without UT compensations.   Riana is progressing well towards her goals.   Pt prognosis is Good.     Pt will continue to benefit from skilled outpatient physical therapy to address the deficits listed in the problem list box on initial evaluation, provide pt/family education and to maximize pt's level of independence in the home and community environment.     Pt's spiritual, cultural and educational needs considered and pt agreeable to plan of care and goals.    Anticipated barriers to physical therapy: none    Goals:   Short Term Goals: (6 weeks)   - Pt will increase ROM to 110 deg R shoulder flex and abd, 40 deg IR/ER at 45 deg abd (Progressing, not met)  - Decrease Pain to 2/10 as worst with all PT interventions (Progressing, not met)  - Pt to self correct posture and gait with minimal cues (Progressing, not met)  - Pt independent with HEP with progressions. (Progressing, not met)     Long Term Goals (24 Weeks):  - Pt will increase ROM to 170 deg R shoulder flex and abd, 80 deg ER and IR (Progressing, not met)  - Pt will increase strength to 5/5 RUE in all planes (Progressing, not met)  - Decrease Pain to 0/10 with ADLs " overhead (Progressing, not met)  - Pt to return to 80% PLOF (Progressing, not met)       Plan     Continue POC per pt tolerance progressing PROM per protocol.     Bernadette Palacios, PT

## 2022-09-22 ENCOUNTER — CLINICAL SUPPORT (OUTPATIENT)
Dept: REHABILITATION | Facility: HOSPITAL | Age: 67
End: 2022-09-22
Payer: MEDICARE

## 2022-09-22 DIAGNOSIS — M25.511 ACUTE PAIN OF RIGHT SHOULDER: Primary | ICD-10-CM

## 2022-09-22 PROCEDURE — 97140 MANUAL THERAPY 1/> REGIONS: CPT | Mod: CQ

## 2022-09-22 PROCEDURE — 97110 THERAPEUTIC EXERCISES: CPT | Mod: CQ

## 2022-09-22 NOTE — PROGRESS NOTES
Physical Therapy Daily Treatment Note     Name: Riana Baker   Clinic Number: [id number]    Therapy Diagnosis:   Encounter Diagnosis   Name Primary?    Acute pain of right shoulder Yes     Physician: Mendez Tavarez NP    Visit Date: 9/22/2022    Physician Orders: PT Eval and Treat   Medical Diagnosis: s/p R reverse total shoulder  Date of Surgery: 8/16/22  Evaluation Date: 9/6/2022  Authorization Period Expiration: 10/4/22  Plan of Care Certification Period: 2/21/23  Visit # / Visits authorized: 3/ 1     Time In: 0900 am  Time Out: 10:05  am  Total Billable Time: 30 minutes     Precautions: Standard and s/p reverse shoulder  RUE  Nonweightbearing x 12 wks     PHASE 1: now until 2 wks postop  ROMAT and hand, wrist, elbow.  Sling, may remove for hygiene and ADLs     PHASE 2: 2-6 wks postop  ROMAT and hand, wrist, elbow.  Shoulder Pendulums okay  Shoulder passive and active assisted ROMAT   Sling, may remove for hygiene ADLs and therapy     PHASE 3: 6-12 wks postop   D/c sling   ROMAT RUE  No resistance     PHASE 4: after 12 wks postop  ROMAT, WBAT  Subjective      Pt reports:No pain in sling.    she was compliant with home exercise program given last session.   Response to previous treatment:NA  Functional change: NA    Pain: not quantified this visit  Location: right shoulder      Objective   PROM:  Shoulder FL:   Shoulder ABD: above 90  ER @ 20 degrees: 30    Riana received therapeutic exercises to develop strength, endurance, ROM, and posture for 40 minutes including:  Arm prop with MHP x10 min  Wrist 4 ways x30 ea-NP  Pendulums x2 min (fwd/back and side to side)  Scapular retraction leaning over edge of table 2x10 with 5 sec hold  Table slides FL/scaption x 3min  Bicep curl 30x    Riana received the following manual therapy techniques: Joint mobilizations and Manual traction were applied to the: R shoulder for 15 minutes, including:  PROM with light distraction per  "protocol  Grade I/II GH joint mobilizations       Riana received hot pack for 10 minutes to increase circulation and promote tissue healing. W/ shoulder prop    Riana received cold pack for 10 minutes to to decrease circulation, pain, and swelling.  Home Exercises Provided and Patient Education Provided     Education provided:   - arm prop 5x daily for 10 in ea  - elbow ext stretch 2x daily    Written Home Exercises Provided: Patient instructed to cont prior HEP.  Exercises were reviewed and Riana was able to demonstrate them prior to the end of the session.  Riana demonstrated good  understanding of the education provided.     See EMR under Patient Instructions for exercises provided {Blank single:05589::"9/22/2022","prior visit"    Assessment     Pt guards during PROM but improves over time. Started off w/ PROM ABD and FL to 90 but improved w/ gentle GH joint mobs and stretching. Added table slides to work on shoulder ROM.   Riana is progressing well towards her goals.   Pt prognosis is Good.     Pt will continue to benefit from skilled outpatient physical therapy to address the deficits listed in the problem list box on initial evaluation, provide pt/family education and to maximize pt's level of independence in the home and community environment.     Pt's spiritual, cultural and educational needs considered and pt agreeable to plan of care and goals.    Anticipated barriers to physical therapy: none    Goals:   Short Term Goals: (6 weeks)   - Pt will increase ROM to 110 deg R shoulder flex and abd, 40 deg IR/ER at 45 deg abd (Progressing, not met)  - Decrease Pain to 2/10 as worst with all PT interventions (Progressing, not met)  - Pt to self correct posture and gait with minimal cues (Progressing, not met)  - Pt independent with HEP with progressions. (Progressing, not met)     Long Term Goals (24 Weeks):  - Pt will increase ROM to 170 deg R shoulder flex and abd, 80 deg ER and IR (Progressing, " not met)  - Pt will increase strength to 5/5 RUE in all planes (Progressing, not met)  - Decrease Pain to 0/10 with ADLs overhead (Progressing, not met)  - Pt to return to 80% PLOF (Progressing, not met)       Plan     Continue POC per pt tolerance progressing PROM per protocol.     Cristal Marie, PTA

## 2022-09-27 ENCOUNTER — CLINICAL SUPPORT (OUTPATIENT)
Dept: REHABILITATION | Facility: HOSPITAL | Age: 67
End: 2022-09-27
Payer: MEDICARE

## 2022-09-27 DIAGNOSIS — M25.511 ACUTE PAIN OF RIGHT SHOULDER: Primary | ICD-10-CM

## 2022-09-27 PROCEDURE — 97140 MANUAL THERAPY 1/> REGIONS: CPT

## 2022-09-27 PROCEDURE — 97110 THERAPEUTIC EXERCISES: CPT

## 2022-09-27 NOTE — PROGRESS NOTES
Physical Therapy Daily Treatment Note     Name: Riana Kay  Clinic Number: [id number]    Therapy Diagnosis:   Encounter Diagnosis   Name Primary?    Acute pain of right shoulder Yes     Physician: Mendez Tavarez NP    Visit Date: 9/27/2022    Physician Orders: PT Eval and Treat   Medical Diagnosis: s/p R reverse total shoulder  Date of Surgery: 8/16/22  Evaluation Date: 9/6/2022  Authorization Period Expiration: 10/4/22  Plan of Care Certification Period: 2/21/23  Visit # / Visits authorized: 3/ 11     Time In: 0800 am  Time Out: 900 am  Total Billable Time: 50 minutes     Precautions: Standard and s/p reverse shoulder  RUE  Nonweightbearing x 12 wks     PHASE 1: now until 2 wks postop  ROMAT and hand, wrist, elbow.  Sling, may remove for hygiene and ADLs     PHASE 2: 2-6 wks postop  ROMAT and hand, wrist, elbow.  Shoulder Pendulums okay  Shoulder passive and active assisted ROMAT   Sling, may remove for hygiene ADLs and therapy     PHASE 3: 6-12 wks postop   D/c sling   ROMAT RUE  No resistance     PHASE 4: after 12 wks postop  ROMAT, WBAT  Subjective      Pt reports:she has a follow up with MD tomorrow for 6 week samina, pt educated to wean from sling.    she was compliant with home exercise program given last session.   Response to previous treatment:NA  Functional change: NA    Pain: not quantified this visit  Location: right shoulder      Objective   PROM:9/27/22  Shoulder FL: 100  Shoulder ABD: above 100  ER @ 30 degrees: 30    Riana received therapeutic exercises to develop strength, endurance, ROM, and posture for 40 minutes including:  UBE 3' within available ROM  Arm prop with MHP x10 min  Pendulums x2 min (fwd/back and side to side)  AAROM dowel ER and flexion 2x10 with 5 sec hold  Scapular retraction leaning over edge of table 2x10 with 5 sec hold-np  Table slides FL/scaption x 3min  Bicep curl 30x-np    Riana received the following manual therapy techniques:  "Joint mobilizations and Manual traction were applied to the: R shoulder for 10 minutes, including:  PROM with light distraction per protocol  Grade I/II GH joint mobilizations       Riana received hot pack for 10 minutes to increase circulation and promote tissue healing. W/ shoulder prop    Riana received cold pack for 10 minutes to to decrease circulation, pain, and swelling.  Home Exercises Provided and Patient Education Provided     Education provided:   - arm prop 5x daily for 10 in ea  - elbow ext stretch 2x daily    Written Home Exercises Provided: Patient instructed to cont prior HEP.  Exercises were reviewed and Riana was able to demonstrate them prior to the end of the session.  Riana demonstrated good  understanding of the education provided.     See EMR under Patient Instructions for exercises provided {Blank single:13781::"9/27/2022","prior visit"    Assessment     Upon reassessment, pt showing good PROM into flexion and scaption and gradually improving abduction. She remains guarded with ER, but tolerated AAROM interventions fairly well. Plan to progress as tolerated per protocol.   Riana is progressing well towards her goals.   Pt prognosis is Good.     Pt will continue to benefit from skilled outpatient physical therapy to address the deficits listed in the problem list box on initial evaluation, provide pt/family education and to maximize pt's level of independence in the home and community environment.     Pt's spiritual, cultural and educational needs considered and pt agreeable to plan of care and goals.    Anticipated barriers to physical therapy: none    Goals:   Short Term Goals: (6 weeks)   - Pt will increase ROM to 110 deg R shoulder flex and abd, 40 deg IR/ER at 45 deg abd (Progressing, not met)  - Decrease Pain to 2/10 as worst with all PT interventions (Progressing, not met)  - Pt to self correct posture and gait with minimal cues (Progressing, not met)  - Pt independent " with HEP with progressions. (Progressing, not met)     Long Term Goals (24 Weeks):  - Pt will increase ROM to 170 deg R shoulder flex and abd, 80 deg ER and IR (Progressing, not met)  - Pt will increase strength to 5/5 RUE in all planes (Progressing, not met)  - Decrease Pain to 0/10 with ADLs overhead (Progressing, not met)  - Pt to return to 80% PLOF (Progressing, not met)       Plan     Continue POC per pt tolerance progressing PROM per protocol.     Bernadette Palacios, PT

## 2022-09-28 ENCOUNTER — OFFICE VISIT (OUTPATIENT)
Dept: ORTHOPEDICS | Facility: CLINIC | Age: 67
End: 2022-09-28
Payer: MEDICARE

## 2022-09-28 ENCOUNTER — HOSPITAL ENCOUNTER (OUTPATIENT)
Dept: RADIOLOGY | Facility: HOSPITAL | Age: 67
Discharge: HOME | End: 2022-09-28
Attending: NURSE PRACTITIONER
Payer: MEDICARE

## 2022-09-28 VITALS — HEIGHT: 65 IN | WEIGHT: 166.25 LBS | BODY MASS INDEX: 27.7 KG/M2

## 2022-09-28 DIAGNOSIS — Z98.890 POST-OPERATIVE STATE: ICD-10-CM

## 2022-09-28 DIAGNOSIS — M25.511 ACUTE PAIN OF RIGHT SHOULDER: Primary | ICD-10-CM

## 2022-09-28 DIAGNOSIS — M25.511 ACUTE PAIN OF RIGHT SHOULDER: ICD-10-CM

## 2022-09-28 DIAGNOSIS — S42.291D OTHER CLOSED DISPLACED FRACTURE OF PROXIMAL END OF RIGHT HUMERUS WITH ROUTINE HEALING, SUBSEQUENT ENCOUNTER: Primary | ICD-10-CM

## 2022-09-28 PROCEDURE — 3066F NEPHROPATHY DOC TX: CPT | Mod: CPTII,S$GLB,, | Performed by: NURSE PRACTITIONER

## 2022-09-28 PROCEDURE — 1125F PR PAIN SEVERITY QUANTIFIED, PAIN PRESENT: ICD-10-PCS | Mod: CPTII,S$GLB,, | Performed by: NURSE PRACTITIONER

## 2022-09-28 PROCEDURE — 3288F FALL RISK ASSESSMENT DOCD: CPT | Mod: CPTII,S$GLB,, | Performed by: NURSE PRACTITIONER

## 2022-09-28 PROCEDURE — 73030 X-RAY EXAM OF SHOULDER: CPT | Mod: 26,RT,, | Performed by: RADIOLOGY

## 2022-09-28 PROCEDURE — 3044F PR MOST RECENT HEMOGLOBIN A1C LEVEL <7.0%: ICD-10-PCS | Mod: CPTII,S$GLB,, | Performed by: NURSE PRACTITIONER

## 2022-09-28 PROCEDURE — 73030 X-RAY EXAM OF SHOULDER: CPT | Mod: TC,RT

## 2022-09-28 PROCEDURE — 3288F PR FALLS RISK ASSESSMENT DOCUMENTED: ICD-10-PCS | Mod: CPTII,S$GLB,, | Performed by: NURSE PRACTITIONER

## 2022-09-28 PROCEDURE — 3008F PR BODY MASS INDEX (BMI) DOCUMENTED: ICD-10-PCS | Mod: CPTII,S$GLB,, | Performed by: NURSE PRACTITIONER

## 2022-09-28 PROCEDURE — 1157F PR ADVANCE CARE PLAN OR EQUIV PRESENT IN MEDICAL RECORD: ICD-10-PCS | Mod: CPTII,S$GLB,, | Performed by: NURSE PRACTITIONER

## 2022-09-28 PROCEDURE — 99999 PR PBB SHADOW E&M-EST. PATIENT-LVL III: ICD-10-PCS | Mod: PBBFAC,,, | Performed by: NURSE PRACTITIONER

## 2022-09-28 PROCEDURE — 1159F PR MEDICATION LIST DOCUMENTED IN MEDICAL RECORD: ICD-10-PCS | Mod: CPTII,S$GLB,, | Performed by: NURSE PRACTITIONER

## 2022-09-28 PROCEDURE — 1160F RVW MEDS BY RX/DR IN RCRD: CPT | Mod: CPTII,S$GLB,, | Performed by: NURSE PRACTITIONER

## 2022-09-28 PROCEDURE — 1159F MED LIST DOCD IN RCRD: CPT | Mod: CPTII,S$GLB,, | Performed by: NURSE PRACTITIONER

## 2022-09-28 PROCEDURE — 3008F BODY MASS INDEX DOCD: CPT | Mod: CPTII,S$GLB,, | Performed by: NURSE PRACTITIONER

## 2022-09-28 PROCEDURE — 1101F PR PT FALLS ASSESS DOC 0-1 FALLS W/OUT INJ PAST YR: ICD-10-PCS | Mod: CPTII,S$GLB,, | Performed by: NURSE PRACTITIONER

## 2022-09-28 PROCEDURE — 4010F ACE/ARB THERAPY RXD/TAKEN: CPT | Mod: CPTII,S$GLB,, | Performed by: NURSE PRACTITIONER

## 2022-09-28 PROCEDURE — 73030 XR SHOULDER COMPLETE 2 OR MORE VIEWS RIGHT: ICD-10-PCS | Mod: 26,RT,, | Performed by: RADIOLOGY

## 2022-09-28 PROCEDURE — 4010F PR ACE/ARB THEARPY RXD/TAKEN: ICD-10-PCS | Mod: CPTII,S$GLB,, | Performed by: NURSE PRACTITIONER

## 2022-09-28 PROCEDURE — 1101F PT FALLS ASSESS-DOCD LE1/YR: CPT | Mod: CPTII,S$GLB,, | Performed by: NURSE PRACTITIONER

## 2022-09-28 PROCEDURE — 3066F PR DOCUMENTATION OF TREATMENT FOR NEPHROPATHY: ICD-10-PCS | Mod: CPTII,S$GLB,, | Performed by: NURSE PRACTITIONER

## 2022-09-28 PROCEDURE — 1125F AMNT PAIN NOTED PAIN PRSNT: CPT | Mod: CPTII,S$GLB,, | Performed by: NURSE PRACTITIONER

## 2022-09-28 PROCEDURE — 99999 PR PBB SHADOW E&M-EST. PATIENT-LVL III: CPT | Mod: PBBFAC,,, | Performed by: NURSE PRACTITIONER

## 2022-09-28 PROCEDURE — 99024 POSTOP FOLLOW-UP VISIT: CPT | Mod: S$GLB,,, | Performed by: NURSE PRACTITIONER

## 2022-09-28 PROCEDURE — 3061F PR NEG MICROALBUMINURIA RESULT DOCUMENTED/REVIEW: ICD-10-PCS | Mod: CPTII,S$GLB,, | Performed by: NURSE PRACTITIONER

## 2022-09-28 PROCEDURE — 3044F HG A1C LEVEL LT 7.0%: CPT | Mod: CPTII,S$GLB,, | Performed by: NURSE PRACTITIONER

## 2022-09-28 PROCEDURE — 3061F NEG MICROALBUMINURIA REV: CPT | Mod: CPTII,S$GLB,, | Performed by: NURSE PRACTITIONER

## 2022-09-28 PROCEDURE — 99024 PR POST-OP FOLLOW-UP VISIT: ICD-10-PCS | Mod: S$GLB,,, | Performed by: NURSE PRACTITIONER

## 2022-09-28 PROCEDURE — 1157F ADVNC CARE PLAN IN RCRD: CPT | Mod: CPTII,S$GLB,, | Performed by: NURSE PRACTITIONER

## 2022-09-28 PROCEDURE — 1160F PR REVIEW ALL MEDS BY PRESCRIBER/CLIN PHARMACIST DOCUMENTED: ICD-10-PCS | Mod: CPTII,S$GLB,, | Performed by: NURSE PRACTITIONER

## 2022-09-28 NOTE — PROGRESS NOTES
Ms. Kay is here today for a post-operative visit after undergoing right reverse rTSA and right biceps tenodesis by Dr. Powell on 8/16/2022.    Interval History:  She reports that she is doing ok.  Pain is tolerable with Tylenol and ice packs.  She is not taking pain medication.  She has been working on ROM activities with her hand, fingers and wrist.  She denies fever, chills, and sweats since the time of the surgery.     Physical exam:  Incision is open to air and healing, no signs of infection seen.   She has tactile stimulation to their lower FA.  She has normal ROM of all fingers and wrist.  Elbow ROM is  degrees.  Radial pulse is 2+ and cap refill is < 3 secs.  ROM at the shoulder is 0-30 degrees.    RADS: Right shoulder x-ray was obtained and personally reviewed by me, findings show humerus hardware is in proper position, no evidence of hardware failure.  No new fractures seen.  Air seen on previous x-ray has resolved.      Assessment:  Post-op visit (6 weeks)    Plan:    ICD-10-CM ICD-9-CM   1. Other closed displaced fracture of proximal end of right humerus with routine healing, subsequent encounter  S42.291D V54.11   2. Post-operative state  Z98.890 V45.89       Current care, treatment plan, precautions, activity level/ modifications, limitations, rehabilitation exercises and proposed future treatment were discussed with the patient. We discussed the need to monitor for changes in symptoms and condition and report them to the physician.  Discussed importance of compliance with all appointments and follow up examinations.     PHYSICAL THERAPY:   - Continue therapy patient may progress to phase 3.    - Weight bearing - NWB to RUE x 12 weeks.  - Range of motion per Shoulder rehab as stated below:    PHASE 1: now until 2 wks postop - completed but continue to do.  ROMAT and hand, wrist, elbow.  Sling, may remove for hygiene and ADLs     PHASE 2: 2-6 wks postop - Transitioning from on 9/28/22  ROMAT and  hand, wrist, elbow.  Shoulder Pendulums okay  Shoulder passive and active assisted ROMAT   Sling, may remove for hygiene ADLs and therapy     PHASE 3: 6-12 wks postop - Transition to on 9/28/22  D/c sling   ROMAT RUE  No resistance     PHASE 4: after 12 wks postop  ROMAT, WBAT     PAIN MEDICATION:   - Pain medication: refill was not needed, continue Tylenol 1000 mg tid PRN.  - Pain medication refill policy provided to patient for review, yes.    - Patient was informed a multi-modal approach is used to treat their pain.     DVT PROPHYLAXIS:   - ASA 81 mg bid x 6 weeks - completed.     FOLLOW UP:   - Patient will follow up in the clinic in 6 weeks.  - X-ray of her right shoulder is needed.    - Future Appointments:   Future Appointments   Date Time Provider Department Center   9/30/2022  7:00 AM Cristal Marie PTA [facility] OP [facility] [place]   10/4/2022  8:00 AM Bernadette Palacios, PT [facility] OP [facility] [place]   10/6/2022  8:00 AM Cristal Marie PTA [facility] OP [facility] [place]   10/12/2022  7:30 AM Jose Rojas MD [facility] IM [place]   10/17/2022  9:45 AM Sabino Damon MD [facility] ORTHO [address]   11/9/2022 10:30 AM Mendez Tavarez NP [facility] ORTHO [address]   11/30/2022  7:00 AM Paz Cook MD [facility] ENT [address]           If there are any questions prior to scheduled follow up, the patient was instructed to contact the office

## 2022-09-30 ENCOUNTER — CLINICAL SUPPORT (OUTPATIENT)
Dept: REHABILITATION | Facility: HOSPITAL | Age: 67
End: 2022-09-30
Payer: MEDICARE

## 2022-09-30 DIAGNOSIS — M25.511 ACUTE PAIN OF RIGHT SHOULDER: Primary | ICD-10-CM

## 2022-09-30 PROCEDURE — 97110 THERAPEUTIC EXERCISES: CPT | Mod: CQ

## 2022-09-30 PROCEDURE — 97140 MANUAL THERAPY 1/> REGIONS: CPT | Mod: CQ

## 2022-09-30 NOTE — PROGRESS NOTES
Physical Therapy Daily Treatment Note     Name: Riana Baker   Clinic Number: [id number]    Therapy Diagnosis:   Encounter Diagnosis   Name Primary?    Acute pain of right shoulder Yes     Physician: Mendez Tavarez NP    Visit Date: 9/30/2022    Physician Orders: PT Eval and Treat   Medical Diagnosis: s/p R reverse total shoulder  Date of Surgery: 8/16/22  Evaluation Date: 9/6/2022  Authorization Period Expiration: 10/4/22  Plan of Care Certification Period: 2/21/23  Visit # / Visits authorized: 4/ 11     Time In: 0700 am  Time Out: 0800 am  Total Billable Time: 30 minutes     Precautions: Standard and s/p reverse shoulder  RUE  Nonweightbearing x 12 wks     PHASE 1: now until 2 wks postop  ROMAT and hand, wrist, elbow.  Sling, may remove for hygiene and ADLs     PHASE 2: 2-6 wks postop  ROMAT and hand, wrist, elbow.  Shoulder Pendulums okay  Shoulder passive and active assisted ROMAT   Sling, may remove for hygiene ADLs and therapy     PHASE 3: 6-12 wks postop   D/c sling   ROMAT RUE  No resistance     PHASE 4: after 12 wks postop  ROMAT, WBAT  Subjective      Pt reports: no pain this morning. Not wearing sling   she was compliant with home exercise program given last session.   Response to previous treatment:NA  Functional change: NA    Pain: not quantified this visit  Location: right shoulder      Objective   PROM:9/27/22  Shoulder FL: 100  Shoulder ABD: above 100  ER @ 30 degrees: 30    Riana received therapeutic exercises to develop strength, endurance, ROM, and posture for 40 minutes including:  UBE 4' within available ROM  Arm prop with MHP x10 min  Pendulums x2 min (fwd/back and side to side)  AAROM dowel ER and flexion 2x10 with 5 sec hold( no FL today)  Scapular retraction leaning over edge of table 2x10 with 5 sec hold-np  Pulley FL/abd x 4 min ea.  Wand chest press 30x     Riana received the following manual therapy techniques: Joint mobilizations and Manual  "traction were applied to the: R shoulder for 10 minutes, including:  PROM with light distraction per protocol  Grade I/II GH joint mobilizations       Riana received hot pack for 10 minutes to increase circulation and promote tissue healing. W/ shoulder prop    Riana received cold pack for 00 minutes to to decrease circulation, pain, and swelling.  Home Exercises Provided and Patient Education Provided     Education provided:   - arm prop 5x daily for 10 in ea  - elbow ext stretch 2x daily    Written Home Exercises Provided: Patient instructed to cont prior HEP.  Exercises were reviewed and Riana was able to demonstrate them prior to the end of the session.  Riana demonstrated good  understanding of the education provided.     See EMR under Patient Instructions for exercises provided {Blank single:57553::"9/30/2022","prior visit"    Assessment     Pt is not out of sling but hold arm in a guarded position. Ed pt that movement will help symptoms decrease and reminded her of out of sling restrictions. Unable to perform wand FL 2* pain down R bicep. Shoulder FL PROM appears to be greater than previous measurement.   Riana is progressing well towards her goals.   Pt prognosis is Good.     Pt will continue to benefit from skilled outpatient physical therapy to address the deficits listed in the problem list box on initial evaluation, provide pt/family education and to maximize pt's level of independence in the home and community environment.     Pt's spiritual, cultural and educational needs considered and pt agreeable to plan of care and goals.    Anticipated barriers to physical therapy: none    Goals:   Short Term Goals: (6 weeks)   - Pt will increase ROM to 110 deg R shoulder flex and abd, 40 deg IR/ER at 45 deg abd (Progressing, not met)  - Decrease Pain to 2/10 as worst with all PT interventions (Progressing, not met)  - Pt to self correct posture and gait with minimal cues (Progressing, not met)  - " Pt independent with HEP with progressions. (Progressing, not met)     Long Term Goals (24 Weeks):  - Pt will increase ROM to 170 deg R shoulder flex and abd, 80 deg ER and IR (Progressing, not met)  - Pt will increase strength to 5/5 RUE in all planes (Progressing, not met)  - Decrease Pain to 0/10 with ADLs overhead (Progressing, not met)  - Pt to return to 80% PLOF (Progressing, not met)       Plan     Continue POC per pt tolerance progressing PROM per protocol.     Cristal Marie, PTA

## 2022-10-06 ENCOUNTER — CLINICAL SUPPORT (OUTPATIENT)
Dept: REHABILITATION | Facility: HOSPITAL | Age: 67
End: 2022-10-06
Payer: MEDICARE

## 2022-10-06 DIAGNOSIS — M25.511 ACUTE PAIN OF RIGHT SHOULDER: Primary | ICD-10-CM

## 2022-10-06 PROCEDURE — 97110 THERAPEUTIC EXERCISES: CPT | Mod: CQ

## 2022-10-06 PROCEDURE — 97140 MANUAL THERAPY 1/> REGIONS: CPT | Mod: CQ

## 2022-10-06 NOTE — PROGRESS NOTES
Physical Therapy Daily Treatment Note     Name: Riana Baker   Clinic Number: [id number]    Therapy Diagnosis:   Encounter Diagnosis   Name Primary?    Acute pain of right shoulder Yes     Physician: Mendez Tavarez NP    Visit Date: 10/6/2022    Physician Orders: PT Eval and Treat   Medical Diagnosis: s/p R reverse total shoulder  Date of Surgery: 8/16/22  Evaluation Date: 9/6/2022  Authorization Period Expiration: 10/4/22  Plan of Care Certification Period: 2/21/23  Visit # / Visits authorized: 5/ 11     Time In: 0800 am  Time Out: 0856 am  Total Billable Time: 56 minutes     Precautions: Standard and s/p reverse shoulder  RUE  Nonweightbearing x 12 wks     PHASE 1: now until 2 wks postop  ROMAT and hand, wrist, elbow.  Sling, may remove for hygiene and ADLs     PHASE 2: 2-6 wks postop  ROMAT and hand, wrist, elbow.  Shoulder Pendulums okay  Shoulder passive and active assisted ROMAT   Sling, may remove for hygiene ADLs and therapy     PHASE 3: 6-12 wks postop   D/c sling   ROMAT RUE  No resistance     PHASE 4: after 12 wks postop  ROMAT, WBAT  Subjective      Pt reports: shoulder is doing better   she was compliant with home exercise program given last session.   Response to previous treatment:NA  Functional change: NA    Pain: not quantified this visit  Location: right shoulder      Objective   PROM:9/27/22  Shoulder FL: 100  Shoulder ABD: above 100  ER @ 30 degrees: 30    Riana received therapeutic exercises to develop strength, endurance, ROM, and posture for 46 minutes including:  UBE 4' within available ROM  Arm prop with MHP x10 min  Pendulums x2 min (fwd/back and side to side)-NP  AAROM dowel ER 3x10  AAROM dowel chest press into FL x 3'  Scapular retraction leaning over edge of table 2x10 with 5 sec hold-pam  Pulley FL/abd x 4 min eaDipak Mayers received the following manual therapy techniques: Joint mobilizations and Manual traction were applied to the: R shoulder  "for 10 minutes, including:  PROM with light distraction per protocol  Grade I/II GH joint mobilizations       Raina received hot pack for 10 minutes to increase circulation and promote tissue healing. W/ shoulder prop    Riana received cold pack for 00 minutes to to decrease circulation, pain, and swelling.  Home Exercises Provided and Patient Education Provided     Education provided:   - arm prop 5x daily for 10 in ea  - elbow ext stretch 2x daily    Written Home Exercises Provided: Patient instructed to cont prior HEP.  Exercises were reviewed and Riana was able to demonstrate them prior to the end of the session.  Riana demonstrated good  understanding of the education provided.     See EMR under Patient Instructions for exercises provided {Blank single:81543::"10/6/2022","prior visit"    Assessment     Pt is maintaining ROM but is guarded during PROM and is progressing slowly.  Pt was able to perform AAROM FL w/ wand compared to last session 2* to pain. Cont to progress ROM as dharmesh.   Riana is progressing well towards her goals.   Pt prognosis is Good.     Pt will continue to benefit from skilled outpatient physical therapy to address the deficits listed in the problem list box on initial evaluation, provide pt/family education and to maximize pt's level of independence in the home and community environment.     Pt's spiritual, cultural and educational needs considered and pt agreeable to plan of care and goals.    Anticipated barriers to physical therapy: none    Goals:   Short Term Goals: (6 weeks)   - Pt will increase ROM to 110 deg R shoulder flex and abd, 40 deg IR/ER at 45 deg abd (Progressing, not met)  - Decrease Pain to 2/10 as worst with all PT interventions (Progressing, not met)  - Pt to self correct posture and gait with minimal cues (Progressing, not met)  - Pt independent with HEP with progressions. (Progressing, not met)     Long Term Goals (24 Weeks):  - Pt will increase ROM to " 170 deg R shoulder flex and abd, 80 deg ER and IR (Progressing, not met)  - Pt will increase strength to 5/5 RUE in all planes (Progressing, not met)  - Decrease Pain to 0/10 with ADLs overhead (Progressing, not met)  - Pt to return to 80% PLOF (Progressing, not met)       Plan     Continue POC per pt tolerance progressing PROM per protocol.     Cristal Marie, PTA

## 2022-10-11 ENCOUNTER — CLINICAL SUPPORT (OUTPATIENT)
Dept: REHABILITATION | Facility: HOSPITAL | Age: 67
End: 2022-10-11
Payer: MEDICARE

## 2022-10-11 DIAGNOSIS — M25.511 ACUTE PAIN OF RIGHT SHOULDER: Primary | ICD-10-CM

## 2022-10-11 PROCEDURE — 97110 THERAPEUTIC EXERCISES: CPT | Mod: CQ

## 2022-10-11 PROCEDURE — 97140 MANUAL THERAPY 1/> REGIONS: CPT | Mod: CQ

## 2022-10-11 NOTE — PROGRESS NOTES
Physical Therapy Daily Treatment Note     Name: Riana Baker   Clinic Number: [id number]    Therapy Diagnosis:   Encounter Diagnosis   Name Primary?    Acute pain of right shoulder Yes     Physician: Mendez Tavarez NP    Visit Date: 10/11/2022    Physician Orders: PT Eval and Treat   Medical Diagnosis: s/p R reverse total shoulder  Date of Surgery: 8/16/22  Evaluation Date: 9/6/2022  Authorization Period Expiration: 10/4/22  Plan of Care Certification Period: 2/21/23  Visit # / Visits authorized: 6/ 11     Time In: 0800 am  Time Out: 0854 am  Total Billable Time: 54 minutes     Precautions: Standard and s/p reverse shoulder  RUE  Nonweightbearing x 12 wks     PHASE 1: now until 2 wks postop  ROMAT and hand, wrist, elbow.  Sling, may remove for hygiene and ADLs     PHASE 2: 2-6 wks postop  ROMAT and hand, wrist, elbow.  Shoulder Pendulums okay  Shoulder passive and active assisted ROMAT   Sling, may remove for hygiene ADLs and therapy     PHASE 3: 6-12 wks postop   D/c sling   ROMAT RUE  No resistance     PHASE 4: after 12 wks postop  ROMAT, WBAT  Subjective      Pt reports: shoulder is doing better   she was compliant with home exercise program given last session.   Response to previous treatment:NA  Functional change: NA    Pain: not quantified this visit  Location: right shoulder      Objective   PROM:9/27/22  Shoulder FL: 100  Shoulder ABD: above 100  ER @ 30 degrees: 30    Riana received therapeutic exercises to develop strength, endurance, ROM, and posture for 44 minutes including:  UBE 4' within available ROM  Arm prop with MHP x10 min-NP  AAROM dowel ER 3x10-NP  AAROM dowel chest press into FL 3x10  Scapular retraction leaning over edge of table 2x10 with 5 sec hold-  Pulley FL/abd x 4 min ea.  Landmine 3x10  Prone row 3x10  Bent over table shoulder ext to neutral 2x10  Bicep curl 3x10    Riana received the following manual therapy techniques: Joint mobilizations and  "Manual traction were applied to the: R shoulder for 10 minutes, including:  PROM with light distraction per protocol  Grade I/II GH joint mobilizations -NP  SL scap mobs    Riana received hot pack for 00 minutes to increase circulation and promote tissue healing. W/ shoulder prop    Riana received cold pack for 00 minutes to to decrease circulation, pain, and swelling.  Home Exercises Provided and Patient Education Provided     Education provided:   - arm prop 5x daily for 10 in ea  - elbow ext stretch 2x daily    Written Home Exercises Provided: Patient instructed to cont prior HEP.  Exercises were reviewed and Riana was able to demonstrate them prior to the end of the session.  Riana demonstrated good  understanding of the education provided.     See EMR under Patient Instructions for exercises provided {Blank single:23779::"10/11/2022","prior visit"    Assessment     Challenged w/ scapular motor control w/ rows and ext. Pt got dizzy laying on stomach for rows. Will return to performing them bent over EOT. PROM is improving w/ pt still being guarded. PROM FL pain improved after performing SL scap mobs.     Riana is progressing well towards her goals.   Pt prognosis is Good.     Pt will continue to benefit from skilled outpatient physical therapy to address the deficits listed in the problem list box on initial evaluation, provide pt/family education and to maximize pt's level of independence in the home and community environment.     Pt's spiritual, cultural and educational needs considered and pt agreeable to plan of care and goals.    Anticipated barriers to physical therapy: none    Goals:   Short Term Goals: (6 weeks)   - Pt will increase ROM to 110 deg R shoulder flex and abd, 40 deg IR/ER at 45 deg abd (Progressing, not met)  - Decrease Pain to 2/10 as worst with all PT interventions (Progressing, not met)  - Pt to self correct posture and gait with minimal cues (Progressing, not met)  - Pt " independent with HEP with progressions. (Progressing, not met)     Long Term Goals (24 Weeks):  - Pt will increase ROM to 170 deg R shoulder flex and abd, 80 deg ER and IR (Progressing, not met)  - Pt will increase strength to 5/5 RUE in all planes (Progressing, not met)  - Decrease Pain to 0/10 with ADLs overhead (Progressing, not met)  - Pt to return to 80% PLOF (Progressing, not met)       Plan     Continue POC per pt tolerance progressing PROM per protocol.     Cristal Marie, PTA

## 2022-10-12 ENCOUNTER — PATIENT MESSAGE (OUTPATIENT)
Dept: ORTHOPEDICS | Facility: CLINIC | Age: 67
End: 2022-10-12
Payer: MEDICARE

## 2022-10-12 ENCOUNTER — OFFICE VISIT (OUTPATIENT)
Dept: INTERNAL MEDICINE | Facility: CLINIC | Age: 67
End: 2022-10-12
Payer: MEDICARE

## 2022-10-12 VITALS
HEART RATE: 67 BPM | BODY MASS INDEX: 28.02 KG/M2 | SYSTOLIC BLOOD PRESSURE: 130 MMHG | WEIGHT: 168.19 LBS | DIASTOLIC BLOOD PRESSURE: 70 MMHG | OXYGEN SATURATION: 98 % | TEMPERATURE: 98 F | RESPIRATION RATE: 14 BRPM | HEIGHT: 65 IN

## 2022-10-12 DIAGNOSIS — Z23 NEED FOR PROPHYLACTIC VACCINATION AND INOCULATION AGAINST INFLUENZA: ICD-10-CM

## 2022-10-12 DIAGNOSIS — I15.2 HYPERTENSION ASSOCIATED WITH TYPE 2 DIABETES MELLITUS: Primary | ICD-10-CM

## 2022-10-12 DIAGNOSIS — S42.291D OTHER CLOSED DISPLACED FRACTURE OF PROXIMAL END OF RIGHT HUMERUS WITH ROUTINE HEALING, SUBSEQUENT ENCOUNTER: Primary | ICD-10-CM

## 2022-10-12 DIAGNOSIS — E11.59 HYPERTENSION ASSOCIATED WITH TYPE 2 DIABETES MELLITUS: Primary | ICD-10-CM

## 2022-10-12 DIAGNOSIS — F41.9 ANXIETY: ICD-10-CM

## 2022-10-12 PROCEDURE — 3008F PR BODY MASS INDEX (BMI) DOCUMENTED: ICD-10-PCS | Mod: CPTII,S$GLB,, | Performed by: FAMILY MEDICINE

## 2022-10-12 PROCEDURE — 4010F PR ACE/ARB THEARPY RXD/TAKEN: ICD-10-PCS | Mod: CPTII,S$GLB,, | Performed by: FAMILY MEDICINE

## 2022-10-12 PROCEDURE — 1160F PR REVIEW ALL MEDS BY PRESCRIBER/CLIN PHARMACIST DOCUMENTED: ICD-10-PCS | Mod: CPTII,S$GLB,, | Performed by: FAMILY MEDICINE

## 2022-10-12 PROCEDURE — 90694 FLU VACCINE - QUADRIVALENT - ADJUVANTED: ICD-10-PCS | Mod: S$GLB,,, | Performed by: FAMILY MEDICINE

## 2022-10-12 PROCEDURE — 4010F ACE/ARB THERAPY RXD/TAKEN: CPT | Mod: CPTII,S$GLB,, | Performed by: FAMILY MEDICINE

## 2022-10-12 PROCEDURE — 3288F FALL RISK ASSESSMENT DOCD: CPT | Mod: CPTII,S$GLB,, | Performed by: FAMILY MEDICINE

## 2022-10-12 PROCEDURE — 3066F PR DOCUMENTATION OF TREATMENT FOR NEPHROPATHY: ICD-10-PCS | Mod: CPTII,S$GLB,, | Performed by: FAMILY MEDICINE

## 2022-10-12 PROCEDURE — G0008 ADMIN INFLUENZA VIRUS VAC: HCPCS | Mod: S$GLB,,, | Performed by: FAMILY MEDICINE

## 2022-10-12 PROCEDURE — 3061F NEG MICROALBUMINURIA REV: CPT | Mod: CPTII,S$GLB,, | Performed by: FAMILY MEDICINE

## 2022-10-12 PROCEDURE — 1126F AMNT PAIN NOTED NONE PRSNT: CPT | Mod: CPTII,S$GLB,, | Performed by: FAMILY MEDICINE

## 2022-10-12 PROCEDURE — 3288F PR FALLS RISK ASSESSMENT DOCUMENTED: ICD-10-PCS | Mod: CPTII,S$GLB,, | Performed by: FAMILY MEDICINE

## 2022-10-12 PROCEDURE — G0008 FLU VACCINE - QUADRIVALENT - ADJUVANTED: ICD-10-PCS | Mod: S$GLB,,, | Performed by: FAMILY MEDICINE

## 2022-10-12 PROCEDURE — 3044F PR MOST RECENT HEMOGLOBIN A1C LEVEL <7.0%: ICD-10-PCS | Mod: CPTII,S$GLB,, | Performed by: FAMILY MEDICINE

## 2022-10-12 PROCEDURE — 99214 OFFICE O/P EST MOD 30 MIN: CPT | Mod: S$GLB,,, | Performed by: FAMILY MEDICINE

## 2022-10-12 PROCEDURE — 3061F PR NEG MICROALBUMINURIA RESULT DOCUMENTED/REVIEW: ICD-10-PCS | Mod: CPTII,S$GLB,, | Performed by: FAMILY MEDICINE

## 2022-10-12 PROCEDURE — 1157F PR ADVANCE CARE PLAN OR EQUIV PRESENT IN MEDICAL RECORD: ICD-10-PCS | Mod: CPTII,S$GLB,, | Performed by: FAMILY MEDICINE

## 2022-10-12 PROCEDURE — 1157F ADVNC CARE PLAN IN RCRD: CPT | Mod: CPTII,S$GLB,, | Performed by: FAMILY MEDICINE

## 2022-10-12 PROCEDURE — 3078F PR MOST RECENT DIASTOLIC BLOOD PRESSURE < 80 MM HG: ICD-10-PCS | Mod: CPTII,S$GLB,, | Performed by: FAMILY MEDICINE

## 2022-10-12 PROCEDURE — 3078F DIAST BP <80 MM HG: CPT | Mod: CPTII,S$GLB,, | Performed by: FAMILY MEDICINE

## 2022-10-12 PROCEDURE — 1101F PR PT FALLS ASSESS DOC 0-1 FALLS W/OUT INJ PAST YR: ICD-10-PCS | Mod: CPTII,S$GLB,, | Performed by: FAMILY MEDICINE

## 2022-10-12 PROCEDURE — 99999 PR PBB SHADOW E&M-EST. PATIENT-LVL V: ICD-10-PCS | Mod: PBBFAC,,, | Performed by: FAMILY MEDICINE

## 2022-10-12 PROCEDURE — 1159F MED LIST DOCD IN RCRD: CPT | Mod: CPTII,S$GLB,, | Performed by: FAMILY MEDICINE

## 2022-10-12 PROCEDURE — 99999 PR PBB SHADOW E&M-EST. PATIENT-LVL V: CPT | Mod: PBBFAC,,, | Performed by: FAMILY MEDICINE

## 2022-10-12 PROCEDURE — 1126F PR PAIN SEVERITY QUANTIFIED, NO PAIN PRESENT: ICD-10-PCS | Mod: CPTII,S$GLB,, | Performed by: FAMILY MEDICINE

## 2022-10-12 PROCEDURE — 99214 PR OFFICE/OUTPT VISIT, EST, LEVL IV, 30-39 MIN: ICD-10-PCS | Mod: S$GLB,,, | Performed by: FAMILY MEDICINE

## 2022-10-12 PROCEDURE — 3008F BODY MASS INDEX DOCD: CPT | Mod: CPTII,S$GLB,, | Performed by: FAMILY MEDICINE

## 2022-10-12 PROCEDURE — 3075F SYST BP GE 130 - 139MM HG: CPT | Mod: CPTII,S$GLB,, | Performed by: FAMILY MEDICINE

## 2022-10-12 PROCEDURE — 3044F HG A1C LEVEL LT 7.0%: CPT | Mod: CPTII,S$GLB,, | Performed by: FAMILY MEDICINE

## 2022-10-12 PROCEDURE — 1159F PR MEDICATION LIST DOCUMENTED IN MEDICAL RECORD: ICD-10-PCS | Mod: CPTII,S$GLB,, | Performed by: FAMILY MEDICINE

## 2022-10-12 PROCEDURE — 1101F PT FALLS ASSESS-DOCD LE1/YR: CPT | Mod: CPTII,S$GLB,, | Performed by: FAMILY MEDICINE

## 2022-10-12 PROCEDURE — 3066F NEPHROPATHY DOC TX: CPT | Mod: CPTII,S$GLB,, | Performed by: FAMILY MEDICINE

## 2022-10-12 PROCEDURE — 1160F RVW MEDS BY RX/DR IN RCRD: CPT | Mod: CPTII,S$GLB,, | Performed by: FAMILY MEDICINE

## 2022-10-12 PROCEDURE — 3075F PR MOST RECENT SYSTOLIC BLOOD PRESS GE 130-139MM HG: ICD-10-PCS | Mod: CPTII,S$GLB,, | Performed by: FAMILY MEDICINE

## 2022-10-12 PROCEDURE — 90694 VACC AIIV4 NO PRSRV 0.5ML IM: CPT | Mod: S$GLB,,, | Performed by: FAMILY MEDICINE

## 2022-10-12 RX ORDER — DULOXETIN HYDROCHLORIDE 30 MG/1
30 CAPSULE, DELAYED RELEASE ORAL DAILY
Qty: 30 CAPSULE | Refills: 11 | Status: SHIPPED | OUTPATIENT
Start: 2022-10-12 | End: 2023-04-12

## 2022-10-12 RX ORDER — CELECOXIB 200 MG/1
200 CAPSULE ORAL DAILY
Qty: 14 CAPSULE | Refills: 0 | Status: SHIPPED | OUTPATIENT
Start: 2022-10-12 | End: 2022-10-26

## 2022-10-12 NOTE — PROGRESS NOTES
Subjective:       Patient ID: Riana Kay is a 67 y.o. female.    Chief Complaint: Follow-up  67-year-old female presents to clinic today accompanied by her  for hypertension follow-up and anxiety follow-up.  She was last seen 1 month ago at which time blood pressure was elevated at 152/70.  At that time she was taking losartan 25 mg daily and it was recommended that the dose be increased to 50 mg daily.  She returns today and hypertension is well controlled.  Secondarily, she had been evaluated by Neurology and neuropsychiatry secondary to symptoms of memory impairment and was noted that symptoms were secondary to anxiety.  She had previously been treated for anxiety with Wellbutrin in 2018; therefore, Wellbutrin was represcribed.  The patient reports that she took 2 doses and experienced jitteriness; therefore, she stop the medication.  The  reports that they spoke to a family friend who is a physician and he recommended possibly starting duloxetine as an alternative.  I am in agreement with this recommendation and have discussed the benefits and risks with the patient and her .  Follow-up  Pertinent negatives include no abdominal pain, chest pain, chills, congestion, coughing, fatigue, fever, headaches, myalgias, nausea, neck pain, rash, sore throat or vomiting.   Review of Systems   Constitutional:  Negative for appetite change, chills, fatigue and fever.   HENT:  Negative for nasal congestion, ear pain, hearing loss, postnasal drip, rhinorrhea, sinus pressure/congestion, sore throat and tinnitus.    Eyes:  Negative for redness, itching and visual disturbance.   Respiratory:  Negative for cough, chest tightness and shortness of breath.    Cardiovascular:  Negative for chest pain and palpitations.   Gastrointestinal:  Negative for abdominal pain, constipation, diarrhea, nausea and vomiting.   Genitourinary:  Negative for decreased urine volume, difficulty urinating, dysuria,  frequency, hematuria and urgency.   Musculoskeletal:  Negative for back pain, myalgias, neck pain and neck stiffness.   Integumentary:  Negative for rash.   Neurological:  Negative for dizziness, light-headedness and headaches.   Psychiatric/Behavioral: Negative.         Objective:      Physical Exam  Vitals and nursing note reviewed.   Constitutional:       General: She is not in acute distress.     Appearance: She is well-developed. She is not diaphoretic.   HENT:      Head: Normocephalic and atraumatic.      Right Ear: External ear normal.      Left Ear: External ear normal.      Nose: Nose normal.      Mouth/Throat:      Pharynx: No oropharyngeal exudate.   Eyes:      General: No scleral icterus.        Right eye: No discharge.         Left eye: No discharge.      Conjunctiva/sclera: Conjunctivae normal.      Pupils: Pupils are equal, round, and reactive to light.   Neck:      Thyroid: No thyromegaly.      Vascular: No JVD.      Trachea: No tracheal deviation.   Cardiovascular:      Rate and Rhythm: Normal rate and regular rhythm.      Heart sounds: Normal heart sounds. No murmur heard.    No friction rub. No gallop.   Pulmonary:      Effort: Pulmonary effort is normal. No respiratory distress.      Breath sounds: Normal breath sounds. No stridor. No wheezing or rales.   Abdominal:      General: Bowel sounds are normal. There is no distension.      Palpations: Abdomen is soft. There is no mass.      Tenderness: There is no abdominal tenderness. There is no guarding or rebound.   Musculoskeletal:         General: No tenderness. Normal range of motion.      Cervical back: Normal range of motion and neck supple.   Lymphadenopathy:      Cervical: No cervical adenopathy.   Skin:     General: Skin is warm and dry.      Coloration: Skin is not pale.      Findings: No erythema or rash.   Neurological:      Mental Status: She is alert and oriented to person, place, and time.   Psychiatric:         Behavior: Behavior  normal.         Thought Content: Thought content normal.         Judgment: Judgment normal.       Assessment:       Problem List Items Addressed This Visit       Anxiety    Relevant Medications    DULoxetine (CYMBALTA) 30 MG capsule    Hypertension associated with type 2 diabetes mellitus - Primary     Other Visit Diagnoses       Need for prophylactic vaccination and inoculation against influenza                  Plan:         1. Continue losartan 50 mg daily.  Hypertension is well controlled.  2. Discontinue Wellbutrin and start duloxetine 30 mg daily.  3. Flu vaccine given.    4. Return to clinic as needed or in 6 weeks for anxiety follow-up.

## 2022-10-13 ENCOUNTER — CLINICAL SUPPORT (OUTPATIENT)
Dept: REHABILITATION | Facility: HOSPITAL | Age: 67
End: 2022-10-13
Payer: MEDICARE

## 2022-10-13 DIAGNOSIS — R52 PAIN: Primary | ICD-10-CM

## 2022-10-13 DIAGNOSIS — M25.511 ACUTE PAIN OF RIGHT SHOULDER: Primary | ICD-10-CM

## 2022-10-13 PROCEDURE — 97110 THERAPEUTIC EXERCISES: CPT | Mod: CQ

## 2022-10-13 PROCEDURE — 97140 MANUAL THERAPY 1/> REGIONS: CPT | Mod: CQ

## 2022-10-13 NOTE — PROGRESS NOTES
Physical Therapy Daily Treatment Note     Name: Riana Kay  Clinic Number: [id number]    Therapy Diagnosis:   Encounter Diagnosis   Name Primary?    Acute pain of right shoulder Yes     Physician: Mendez Tavarez NP    Visit Date: 10/13/2022    Physician Orders: PT Eval and Treat   Medical Diagnosis: s/p R reverse total shoulder  Date of Surgery: 8/16/22  Evaluation Date: 9/6/2022  Authorization Period Expiration: 10/4/22  Plan of Care Certification Period: 2/21/23  Visit # / Visits authorized: 7/ 11     Time In: 0800 am  Time Out: 0900 am  Total Billable Time: 23  minutes     Precautions: Standard and s/p reverse shoulder  RUE  Nonweightbearing x 12 wks     PHASE 1: now until 2 wks postop  ROMAT and hand, wrist, elbow.  Sling, may remove for hygiene and ADLs     PHASE 2: 2-6 wks postop  ROMAT and hand, wrist, elbow.  Shoulder Pendulums okay  Shoulder passive and active assisted ROMAT   Sling, may remove for hygiene ADLs and therapy     PHASE 3: 6-12 wks postop   D/c sling   ROMAT RUE  No resistance     PHASE 4: after 12 wks postop  ROMAT, WBAT  Subjective      Pt reports: no new complaints   she was compliant with home exercise program given last session.   Response to previous treatment:NA  Functional change: NA    Pain: not quantified this visit  Location: right shoulder      Objective   PROM:9/27/22  Shoulder FL: 100  Shoulder ABD: above 100  ER @ 30 degrees: 30    Riana received therapeutic exercises to develop strength, endurance, ROM, and posture for 50 minutes including:  UBE 4' within available ROM  AAROM dowel chest press into FL 3x10  Scapular retraction leaning over edge of table 2x10 with 5 sec hold-  Pulley FL/abd x 4 min ea.  Landmine 3x10  Bent over table shoulder ext to neutral 2x10  Bicep curl 3x10    Riana received the following manual therapy techniques: Joint mobilizations and Manual traction were applied to the: R shoulder for 10 minutes,  "including:  PROM with light distraction per protocol  Grade I/II GH joint mobilizations       Riana received hot pack for 00 minutes to increase circulation and promote tissue healing. W/ shoulder prop    Riana received cold pack for 00 minutes to to decrease circulation, pain, and swelling.  Home Exercises Provided and Patient Education Provided     Education provided:   - arm prop 5x daily for 10 in ea  - elbow ext stretch 2x daily    Written Home Exercises Provided: Patient instructed to cont prior HEP.  Exercises were reviewed and Riana was able to demonstrate them prior to the end of the session.  Riana demonstrated good  understanding of the education provided.     See EMR under Patient Instructions for exercises provided {Blank single:67334::"10/13/2022","prior visit"    Assessment     Decreased guarding during PROM w/ improved ROM. Pt is nearing normal limits w/PROM as expected for RTSA. Cont to show weakness w/ shoulder AROM and scapular strength/ motor control.    Riana is progressing well towards her goals.   Pt prognosis is Good.     Pt will continue to benefit from skilled outpatient physical therapy to address the deficits listed in the problem list box on initial evaluation, provide pt/family education and to maximize pt's level of independence in the home and community environment.     Pt's spiritual, cultural and educational needs considered and pt agreeable to plan of care and goals.    Anticipated barriers to physical therapy: none    Goals:   Short Term Goals: (6 weeks)   - Pt will increase ROM to 110 deg R shoulder flex and abd, 40 deg IR/ER at 45 deg abd (Progressing, not met)  - Decrease Pain to 2/10 as worst with all PT interventions (Progressing, not met)  - Pt to self correct posture and gait with minimal cues (Progressing, not met)  - Pt independent with HEP with progressions. (Progressing, not met)     Long Term Goals (24 Weeks):  - Pt will increase ROM to 170 deg R " shoulder flex and abd, 80 deg ER and IR (Progressing, not met)  - Pt will increase strength to 5/5 RUE in all planes (Progressing, not met)  - Decrease Pain to 0/10 with ADLs overhead (Progressing, not met)  - Pt to return to 80% PLOF (Progressing, not met)       Plan     Continue POC per pt tolerance progressing PROM per protocol.     Cristal Marie, PTA

## 2022-10-16 ENCOUNTER — PATIENT MESSAGE (OUTPATIENT)
Dept: INTERNAL MEDICINE | Facility: CLINIC | Age: 67
End: 2022-10-16
Payer: MEDICARE

## 2022-10-17 ENCOUNTER — HOSPITAL ENCOUNTER (OUTPATIENT)
Dept: RADIOLOGY | Facility: HOSPITAL | Age: 67
Discharge: HOME | End: 2022-10-17
Attending: ORTHOPAEDIC SURGERY
Payer: MEDICARE

## 2022-10-17 ENCOUNTER — PATIENT MESSAGE (OUTPATIENT)
Dept: ADMINISTRATIVE | Facility: OTHER | Age: 67
End: 2022-10-17
Payer: MEDICARE

## 2022-10-17 ENCOUNTER — OFFICE VISIT (OUTPATIENT)
Dept: ORTHOPEDICS | Facility: CLINIC | Age: 67
End: 2022-10-17
Payer: MEDICARE

## 2022-10-17 VITALS — HEIGHT: 65 IN | BODY MASS INDEX: 27.75 KG/M2 | WEIGHT: 166.56 LBS

## 2022-10-17 DIAGNOSIS — M17.10 ARTHRITIS OF KNEE: Primary | ICD-10-CM

## 2022-10-17 DIAGNOSIS — R52 PAIN: ICD-10-CM

## 2022-10-17 PROCEDURE — 1100F PR PT FALLS ASSESS DOC 2+ FALLS/FALL W/INJURY/YR: ICD-10-PCS | Mod: CPTII,S$GLB,, | Performed by: ORTHOPAEDIC SURGERY

## 2022-10-17 PROCEDURE — 1126F AMNT PAIN NOTED NONE PRSNT: CPT | Mod: CPTII,S$GLB,, | Performed by: ORTHOPAEDIC SURGERY

## 2022-10-17 PROCEDURE — 3061F PR NEG MICROALBUMINURIA RESULT DOCUMENTED/REVIEW: ICD-10-PCS | Mod: CPTII,S$GLB,, | Performed by: ORTHOPAEDIC SURGERY

## 2022-10-17 PROCEDURE — 1157F PR ADVANCE CARE PLAN OR EQUIV PRESENT IN MEDICAL RECORD: ICD-10-PCS | Mod: CPTII,S$GLB,, | Performed by: ORTHOPAEDIC SURGERY

## 2022-10-17 PROCEDURE — 3044F PR MOST RECENT HEMOGLOBIN A1C LEVEL <7.0%: ICD-10-PCS | Mod: CPTII,S$GLB,, | Performed by: ORTHOPAEDIC SURGERY

## 2022-10-17 PROCEDURE — 1159F PR MEDICATION LIST DOCUMENTED IN MEDICAL RECORD: ICD-10-PCS | Mod: CPTII,S$GLB,, | Performed by: ORTHOPAEDIC SURGERY

## 2022-10-17 PROCEDURE — 3288F PR FALLS RISK ASSESSMENT DOCUMENTED: ICD-10-PCS | Mod: CPTII,S$GLB,, | Performed by: ORTHOPAEDIC SURGERY

## 2022-10-17 PROCEDURE — 3288F FALL RISK ASSESSMENT DOCD: CPT | Mod: CPTII,S$GLB,, | Performed by: ORTHOPAEDIC SURGERY

## 2022-10-17 PROCEDURE — 4010F PR ACE/ARB THEARPY RXD/TAKEN: ICD-10-PCS | Mod: CPTII,S$GLB,, | Performed by: ORTHOPAEDIC SURGERY

## 2022-10-17 PROCEDURE — 4010F ACE/ARB THERAPY RXD/TAKEN: CPT | Mod: CPTII,S$GLB,, | Performed by: ORTHOPAEDIC SURGERY

## 2022-10-17 PROCEDURE — 1160F RVW MEDS BY RX/DR IN RCRD: CPT | Mod: CPTII,S$GLB,, | Performed by: ORTHOPAEDIC SURGERY

## 2022-10-17 PROCEDURE — 3066F PR DOCUMENTATION OF TREATMENT FOR NEPHROPATHY: ICD-10-PCS | Mod: CPTII,S$GLB,, | Performed by: ORTHOPAEDIC SURGERY

## 2022-10-17 PROCEDURE — 73562 X-RAY EXAM OF KNEE 3: CPT | Mod: 26,LT,, | Performed by: RADIOLOGY

## 2022-10-17 PROCEDURE — 73560 X-RAY EXAM OF KNEE 1 OR 2: CPT | Mod: 26,RT,, | Performed by: RADIOLOGY

## 2022-10-17 PROCEDURE — 1159F MED LIST DOCD IN RCRD: CPT | Mod: CPTII,S$GLB,, | Performed by: ORTHOPAEDIC SURGERY

## 2022-10-17 PROCEDURE — 3061F NEG MICROALBUMINURIA REV: CPT | Mod: CPTII,S$GLB,, | Performed by: ORTHOPAEDIC SURGERY

## 2022-10-17 PROCEDURE — 99999 PR PBB SHADOW E&M-EST. PATIENT-LVL III: CPT | Mod: PBBFAC,,, | Performed by: ORTHOPAEDIC SURGERY

## 2022-10-17 PROCEDURE — 3044F HG A1C LEVEL LT 7.0%: CPT | Mod: CPTII,S$GLB,, | Performed by: ORTHOPAEDIC SURGERY

## 2022-10-17 PROCEDURE — 73560 XR KNEE ORTHO LEFT: ICD-10-PCS | Mod: 26,RT,, | Performed by: RADIOLOGY

## 2022-10-17 PROCEDURE — 1157F ADVNC CARE PLAN IN RCRD: CPT | Mod: CPTII,S$GLB,, | Performed by: ORTHOPAEDIC SURGERY

## 2022-10-17 PROCEDURE — 73562 XR KNEE ORTHO LEFT: ICD-10-PCS | Mod: 26,LT,, | Performed by: RADIOLOGY

## 2022-10-17 PROCEDURE — 73560 X-RAY EXAM OF KNEE 1 OR 2: CPT | Mod: TC,59,RT

## 2022-10-17 PROCEDURE — 1160F PR REVIEW ALL MEDS BY PRESCRIBER/CLIN PHARMACIST DOCUMENTED: ICD-10-PCS | Mod: CPTII,S$GLB,, | Performed by: ORTHOPAEDIC SURGERY

## 2022-10-17 PROCEDURE — 99212 OFFICE O/P EST SF 10 MIN: CPT | Mod: 24,S$GLB,, | Performed by: ORTHOPAEDIC SURGERY

## 2022-10-17 PROCEDURE — 1126F PR PAIN SEVERITY QUANTIFIED, NO PAIN PRESENT: ICD-10-PCS | Mod: CPTII,S$GLB,, | Performed by: ORTHOPAEDIC SURGERY

## 2022-10-17 PROCEDURE — 1100F PTFALLS ASSESS-DOCD GE2>/YR: CPT | Mod: CPTII,S$GLB,, | Performed by: ORTHOPAEDIC SURGERY

## 2022-10-17 PROCEDURE — 3066F NEPHROPATHY DOC TX: CPT | Mod: CPTII,S$GLB,, | Performed by: ORTHOPAEDIC SURGERY

## 2022-10-17 PROCEDURE — 99999 PR PBB SHADOW E&M-EST. PATIENT-LVL III: ICD-10-PCS | Mod: PBBFAC,,, | Performed by: ORTHOPAEDIC SURGERY

## 2022-10-17 PROCEDURE — 99212 PR OFFICE/OUTPT VISIT, EST, LEVL II, 10-19 MIN: ICD-10-PCS | Mod: 24,S$GLB,, | Performed by: ORTHOPAEDIC SURGERY

## 2022-10-17 RX ORDER — INSULIN GLARGINE 100 [IU]/ML
INJECTION, SOLUTION SUBCUTANEOUS
Qty: 30 EACH | Refills: 4 | Status: SHIPPED | OUTPATIENT
Start: 2022-10-17 | End: 2023-04-13

## 2022-10-17 NOTE — PROGRESS NOTES
Riana Kay is in for one yesr follow up for a  left TKA.  She is doing  well.  Mild pain in the knee.  Shee has resumed activities of daily living. She has some stiffness when she first stans  Exam demonstrates  A well developed female in no distress.  Alert and oriented.  Mood and affect are appropriate.    Knee incision is well healed.  ROM is 0-125.  The patella tracks well and there is no instability. The extremity is neurovascularly intact.    Xrays demonstrate a well fixed and positioned prosthesis.    PROMIS-10 Questionnaire Scores 10/17/2022 1/24/2022 10/4/2021 7/19/2021 1/11/2021 9/18/2020   Global Physical Health 16 10 18 15 12 18   Global Mental health Score 16 13 10 12 12 12       KOOS Jr. Questionnaire Score 10/17/2022 1/24/2022 10/4/2021 7/19/2021 1/11/2021 9/18/2020   KOOS Jr Score 3 0 23 19 11 24       Oxford Knee Questionnaire Score 10/17/2022 1/24/2022 10/4/2021 7/19/2021 1/11/2021 9/18/2020   Oxford Knee Score 32 45 22 16 32 23       Knee Society and Function Score 1/24/2022 10/4/2021 1/11/2021 9/21/2020   FINDINGS - KNEE SOCIETY SCORE 95 44 94 39   FINDINGS - KNEE SOCIETY FUNCTION SCORE 100 45 80 45       No flowsheet data found.    Imp:Doing relatively well    F/u in one year with xrays and PROMS.  Sooner if needed

## 2022-10-18 ENCOUNTER — CLINICAL SUPPORT (OUTPATIENT)
Dept: REHABILITATION | Facility: HOSPITAL | Age: 67
End: 2022-10-18
Payer: MEDICARE

## 2022-10-18 DIAGNOSIS — M25.511 ACUTE PAIN OF RIGHT SHOULDER: Primary | ICD-10-CM

## 2022-10-18 PROCEDURE — 97140 MANUAL THERAPY 1/> REGIONS: CPT | Mod: CQ

## 2022-10-18 PROCEDURE — 97110 THERAPEUTIC EXERCISES: CPT | Mod: CQ

## 2022-10-18 NOTE — PROGRESS NOTES
Physical Therapy Daily Treatment Note     Name: Riana Bakre   Clinic Number: [id number]    Therapy Diagnosis:   Encounter Diagnosis   Name Primary?    Acute pain of right shoulder Yes     Physician: Mendez Tavarez NP    Visit Date: 10/18/2022    Physician Orders: PT Eval and Treat   Medical Diagnosis: s/p R reverse total shoulder  Date of Surgery: 8/16/22  Evaluation Date: 9/6/2022  Authorization Period Expiration: 10/4/22  Plan of Care Certification Period: 2/21/23  Visit # / Visits authorized: 7/ 11     Time In: 0800 am  Time Out: 0855 am  Total Billable Time: 25  minutes     Precautions: Standard and s/p reverse shoulder  RUE  Nonweightbearing x 12 wks     PHASE 1: now until 2 wks postop  ROMAT and hand, wrist, elbow.  Sling, may remove for hygiene and ADLs     PHASE 2: 2-6 wks postop  ROMAT and hand, wrist, elbow.  Shoulder Pendulums okay  Shoulder passive and active assisted ROMAT   Sling, may remove for hygiene ADLs and therapy     PHASE 3: 6-12 wks postop   D/c sling   ROMAT RUE  No resistance     PHASE 4: after 12 wks postop  ROMAT, WBAT  Subjective      Pt reports: shoulder is doing ok   she was compliant with home exercise program given last session.   Response to previous treatment:NA  Functional change: NA    Pain: not quantified this visit  Location: right shoulder      Objective   PROM:9/27/22  Shoulder FL: 100  Shoulder ABD: above 100  ER @ 30 degrees: 30    Riana received therapeutic exercises to develop strength, endurance, ROM, and posture for 45 minutes including:  UBE 4' within available ROM  AROM supine FL into OH  AROM sidelying abd   Rows OTB 3x10  Shoulder ext OTB 3x10  Pulley FL/abd x 4 min ea.  Landmine 3x10  Bicep curl 3x10    Riana received the following manual therapy techniques: Joint mobilizations and Manual traction were applied to the: R shoulder for 10 minutes, including:  PROM with light distraction per protocol  Grade I/II GH joint  "mobilizations       Riana received hot pack for 00 minutes to increase circulation and promote tissue healing. W/ shoulder prop    Riana received cold pack for 00 minutes to to decrease circulation, pain, and swelling.  Home Exercises Provided and Patient Education Provided     Education provided:   - arm prop 5x daily for 10 in ea  - elbow ext stretch 2x daily    Written Home Exercises Provided: Patient instructed to cont prior HEP.  Exercises were reviewed and Riana was able to demonstrate them prior to the end of the session.  Riana demonstrated good  understanding of the education provided.     See EMR under Patient Instructions for exercises provided {Blank single:21609::"10/18/2022","prior visit"    Assessment     FL, ABD, and ER is nearing normal limits as expected for RTKA. Able to passively touch head. Poor deltoid strength which was progress today in gravity eliminated position to improve AROM strength. Pt to start working on landmines at home. Unable to flex R UE ins tanding. Progressed periscapular strengthening w/ good dharmesh.     Riana is progressing well towards her goals.   Pt prognosis is Good.     Pt will continue to benefit from skilled outpatient physical therapy to address the deficits listed in the problem list box on initial evaluation, provide pt/family education and to maximize pt's level of independence in the home and community environment.     Pt's spiritual, cultural and educational needs considered and pt agreeable to plan of care and goals.    Anticipated barriers to physical therapy: none    Goals:   Short Term Goals: (6 weeks)   - Pt will increase ROM to 110 deg R shoulder flex and abd, 40 deg IR/ER at 45 deg abd (Progressing, not met)  - Decrease Pain to 2/10 as worst with all PT interventions (Progressing, not met)  - Pt to self correct posture and gait with minimal cues (Progressing, not met)  - Pt independent with HEP with progressions. (Progressing, not met)     Long " Term Goals (24 Weeks):  - Pt will increase ROM to 170 deg R shoulder flex and abd, 80 deg ER and IR (Progressing, not met)  - Pt will increase strength to 5/5 RUE in all planes (Progressing, not met)  - Decrease Pain to 0/10 with ADLs overhead (Progressing, not met)  - Pt to return to 80% PLOF (Progressing, not met)       Plan     Continue POC per pt tolerance progressing PROM per protocol.     Cristal Marie, PTA

## 2022-10-20 ENCOUNTER — CLINICAL SUPPORT (OUTPATIENT)
Dept: REHABILITATION | Facility: HOSPITAL | Age: 67
End: 2022-10-20
Payer: MEDICARE

## 2022-10-20 DIAGNOSIS — M25.511 ACUTE PAIN OF RIGHT SHOULDER: Primary | ICD-10-CM

## 2022-10-20 PROCEDURE — 97140 MANUAL THERAPY 1/> REGIONS: CPT

## 2022-10-20 PROCEDURE — 97110 THERAPEUTIC EXERCISES: CPT

## 2022-10-20 NOTE — PROGRESS NOTES
Physical Therapy Daily Treatment Note     Name: Riana Baker   Clinic Number: [id number]    Therapy Diagnosis:   Encounter Diagnosis   Name Primary?    Acute pain of right shoulder Yes     Physician: Mendez Tavarez NP    Visit Date: 10/20/2022    Physician Orders: PT Eval and Treat   Medical Diagnosis: s/p R reverse total shoulder  Date of Surgery: 8/16/22  Evaluation Date: 9/6/2022  Authorization Period Expiration: 10/4/22  Plan of Care Certification Period: 2/21/23  Visit # / Visits authorized: 9/ 11     Time In: 0800 am  Time Out: 0855 am   Total Billable Time: 55  minutes     Precautions: Standard and s/p reverse shoulder  RUE  Nonweightbearing x 12 wks     PHASE 1: now until 2 wks postop  ROMAT and hand, wrist, elbow.  Sling, may remove for hygiene and ADLs     PHASE 2: 2-6 wks postop  ROMAT and hand, wrist, elbow.  Shoulder Pendulums okay  Shoulder passive and active assisted ROMAT   Sling, may remove for hygiene ADLs and therapy     PHASE 3: 6-12 wks postop   D/c sling   ROMAT RUE  No resistance     PHASE 4: after 12 wks postop  ROMAT, WBAT  Subjective      Pt reports: no new complaints and continues to report compliance with HEP.    she was compliant with home exercise program given last session.   Response to previous treatment:NA  Functional change: NA    Pain: not quantified this visit  Location: right shoulder      Objective   PROM:9/27/22  Shoulder FL: 100  Shoulder ABD: above 100  ER @ 30 degrees: 30    Riana received therapeutic exercises to develop strength, endurance, ROM, and posture for 45 minutes including:  UBE 4'/4' within available ROM  AROM supine FL into OH with dowel 3x10  Rows OTB 3x10   Shoulder ext OTB 3x10  Pulley FL/abd x 4 min ea.  SL shoulder ER, flex 2x10  Landmine 3x10  Bicep curl 3x10    Riana received the following manual therapy techniques: Joint mobilizations and Manual traction were applied to the: R shoulder for 10 minutes,  "including:  PROM with light distraction per protocol  Grade I/II GH joint mobilizations       Riana received hot pack for 00 minutes to increase circulation and promote tissue healing. W/ shoulder prop    Riana received cold pack for 00 minutes to to decrease circulation, pain, and swelling.  Home Exercises Provided and Patient Education Provided     Education provided:   - arm prop 5x daily for 10 in ea  - elbow ext stretch 2x daily    Written Home Exercises Provided: Patient instructed to cont prior HEP.  Exercises were reviewed and Riana was able to demonstrate them prior to the end of the session.  Riana demonstrated good  understanding of the education provided.     See EMR under Patient Instructions for exercises provided {Blank single:32639::"10/20/2022","prior visit"    Assessment     Pt still presents with increased muscle guarding especially with upper trap. She is making slight improvements with ROM post manual therapy. Pt is limited with sidelying interventions, but plan to continue progressing them as tolerated.     Riana is progressing well towards her goals.   Pt prognosis is Good.     Pt will continue to benefit from skilled outpatient physical therapy to address the deficits listed in the problem list box on initial evaluation, provide pt/family education and to maximize pt's level of independence in the home and community environment.     Pt's spiritual, cultural and educational needs considered and pt agreeable to plan of care and goals.    Anticipated barriers to physical therapy: none    Goals:   Short Term Goals: (6 weeks)   - Pt will increase ROM to 110 deg R shoulder flex and abd, 40 deg IR/ER at 45 deg abd (Progressing, not met)  - Decrease Pain to 2/10 as worst with all PT interventions (Progressing, not met)  - Pt to self correct posture and gait with minimal cues (Progressing, not met)  - Pt independent with HEP with progressions. (Progressing, not met)     Long Term Goals " (24 Weeks):  - Pt will increase ROM to 170 deg R shoulder flex and abd, 80 deg ER and IR (Progressing, not met)  - Pt will increase strength to 5/5 RUE in all planes (Progressing, not met)  - Decrease Pain to 0/10 with ADLs overhead (Progressing, not met)  - Pt to return to 80% PLOF (Progressing, not met)       Plan     Continue POC per pt tolerance progressing PROM per protocol.     Bernadette Palacios, PT

## 2022-10-25 ENCOUNTER — CLINICAL SUPPORT (OUTPATIENT)
Dept: REHABILITATION | Facility: HOSPITAL | Age: 67
End: 2022-10-25
Payer: MEDICARE

## 2022-10-25 DIAGNOSIS — M25.511 ACUTE PAIN OF RIGHT SHOULDER: Primary | ICD-10-CM

## 2022-10-25 PROCEDURE — 97110 THERAPEUTIC EXERCISES: CPT | Mod: CQ

## 2022-10-25 PROCEDURE — 97140 MANUAL THERAPY 1/> REGIONS: CPT | Mod: CQ

## 2022-10-25 NOTE — PROGRESS NOTES
Physical Therapy Daily Treatment Note     Name: Riana Baker   Clinic Number: [id number]    Therapy Diagnosis:   Encounter Diagnosis   Name Primary?    Acute pain of right shoulder Yes     Physician: Mendez Tavarez NP    Visit Date: 10/25/2022    Physician Orders: PT Eval and Treat   Medical Diagnosis: s/p R reverse total shoulder  Date of Surgery: 8/16/22  Evaluation Date: 9/6/2022  Authorization Period Expiration: 10/4/22  Plan of Care Certification Period: 2/21/23  Visit # / Visits authorized: 10/ 11     Time In: 0800 am  Time Out: 0900 am   Total Billable Time: 30 minutes     Precautions: Standard and s/p reverse shoulder  RUE  Nonweightbearing x 12 wks     PHASE 1: now until 2 wks postop  ROMAT and hand, wrist, elbow.  Sling, may remove for hygiene and ADLs     PHASE 2: 2-6 wks postop  ROMAT and hand, wrist, elbow.  Shoulder Pendulums okay  Shoulder passive and active assisted ROMAT   Sling, may remove for hygiene ADLs and therapy     PHASE 3: 6-12 wks postop   D/c sling   ROMAT RUE  No resistance     PHASE 4: after 12 wks postop  ROMAT, WBAT  Subjective      Pt reports: trying to use arm at home.    she was compliant with home exercise program given last session.   Response to previous treatment:NA  Functional change: NA    Pain: not quantified this visit  Location: right shoulder      Objective   PROM:9/27/22  Shoulder FL: 100  Shoulder ABD: above 100  ER @ 30 degrees: 30    Riana received therapeutic exercises to develop strength, endurance, ROM, and posture for 50 minutes including:  UBE 4'/4' within available ROM  Rows OTB 3x10   Shoulder ext OTB 3x10  Pulley FL/abd x 4 min ea.  Landmine 3x10  Bicep curl 3x10  Supine wand #3 AAROM FL 30x  Supine chest press #3 30x   SL shoulder ABD 3x5  Supine AROM shoulder FL 50 degrees to over head 3x5    Riana received the following manual therapy techniques: Joint mobilizations and Manual traction were applied to the: R  "shoulder for 10 minutes, including:  PROM with light distraction per protocol  Grade I/II GH joint mobilizations       Riana received hot pack for 00 minutes to increase circulation and promote tissue healing. W/ shoulder prop    Riana received cold pack for 00 minutes to to decrease circulation, pain, and swelling.  Home Exercises Provided and Patient Education Provided     Education provided:   - arm prop 5x daily for 10 in ea  - elbow ext stretch 2x daily    Written Home Exercises Provided: Patient instructed to cont prior HEP.  Exercises were reviewed and Riana was able to demonstrate them prior to the end of the session.  Riana demonstrated good  understanding of the education provided.     See EMR under Patient Instructions for exercises provided {Blank single:71789::"10/25/2022","prior visit"    Assessment     Unable to actively FL shoulder in seated position. Can actively FL w/  gravity eliminated starting at around 50 degrees of FL to OH. PROM is WNFL at this stage and is where ROM is expected for this surgery. Cont to work on deltoid strengthening w/ AA/AROM exercise for functional use of R UE.    Riana is progressing well towards her goals.   Pt prognosis is Good.     Pt will continue to benefit from skilled outpatient physical therapy to address the deficits listed in the problem list box on initial evaluation, provide pt/family education and to maximize pt's level of independence in the home and community environment.     Pt's spiritual, cultural and educational needs considered and pt agreeable to plan of care and goals.    Anticipated barriers to physical therapy: none    Goals:   Short Term Goals: (6 weeks)   - Pt will increase ROM to 110 deg R shoulder flex and abd, 40 deg IR/ER at 45 deg abd (Progressing, not met)  - Decrease Pain to 2/10 as worst with all PT interventions (Progressing, not met)  - Pt to self correct posture and gait with minimal cues (Progressing, not met)  - Pt " independent with HEP with progressions. (Progressing, not met)     Long Term Goals (24 Weeks):  - Pt will increase ROM to 170 deg R shoulder flex and abd, 80 deg ER and IR (Progressing, not met)  - Pt will increase strength to 5/5 RUE in all planes (Progressing, not met)  - Decrease Pain to 0/10 with ADLs overhead (Progressing, not met)  - Pt to return to 80% PLOF (Progressing, not met)       Plan     Continue POC per pt tolerance progressing PROM per protocol.     Cristal Marie, PTA

## 2022-10-27 ENCOUNTER — CLINICAL SUPPORT (OUTPATIENT)
Dept: REHABILITATION | Facility: HOSPITAL | Age: 67
End: 2022-10-27
Payer: MEDICARE

## 2022-10-27 DIAGNOSIS — M25.511 ACUTE PAIN OF RIGHT SHOULDER: Primary | ICD-10-CM

## 2022-10-27 PROCEDURE — 97110 THERAPEUTIC EXERCISES: CPT | Mod: CQ

## 2022-10-27 PROCEDURE — 97140 MANUAL THERAPY 1/> REGIONS: CPT | Mod: CQ

## 2022-10-27 NOTE — PROGRESS NOTES
Physical Therapy Daily Treatment Note     Name: Riana Baker   Clinic Number: [id number]    Therapy Diagnosis:   Encounter Diagnosis   Name Primary?    Acute pain of right shoulder Yes     Physician: Mendez Tavarez NP    Visit Date: 10/27/2022    Physician Orders: PT Eval and Treat   Medical Diagnosis: s/p R reverse total shoulder  Date of Surgery: 8/16/22  Evaluation Date: 9/6/2022  Authorization Period Expiration: 10/4/22  Plan of Care Certification Period: 2/21/23  Visit # / Visits authorized: 11/ 11     Time In: 10:00 am  Time Out: 11:00 am   Total Billable Time: 30 minutes     Precautions: Standard and s/p reverse shoulder  RUE  Nonweightbearing x 12 wks     PHASE 1: now until 2 wks postop  ROMAT and hand, wrist, elbow.  Sling, may remove for hygiene and ADLs     PHASE 2: 2-6 wks postop  ROMAT and hand, wrist, elbow.  Shoulder Pendulums okay  Shoulder passive and active assisted ROMAT   Sling, may remove for hygiene ADLs and therapy     PHASE 3: 6-12 wks postop   D/c sling   ROMAT RUE  No resistance     PHASE 4: after 12 wks postop  ROMAT, WBAT  Subjective      Pt reports: stiff today    she was compliant with home exercise program given last session.   Response to previous treatment:NA  Functional change: NA    Pain: not quantified this visit  Location: right shoulder      Objective   PROM:9/27/22  Shoulder FL: 100  Shoulder ABD: above 100  ER @ 30 degrees: 30    Riana received therapeutic exercises to develop strength, endurance, ROM, and posture for 50 minutes including:  UBE 4'/4' within available ROM lvl3  Rows OTB 3x10   Shoulder ext OTB 3x10  Pulley FL/abd x 4 min ea.  Landmine 3x10  Bicep curl #1 3x10  Supine shoulder FL 6x4  Supine chest press #3 wand 30x   SL shoulder ABD 5x5    Riana received the following manual therapy techniques: Joint mobilizations and Manual traction were applied to the: R shoulder for 10 minutes, including:  PROM with light distraction  "per protocol  Grade I/II GH joint mobilizations       Riana received hot pack for 00 minutes to increase circulation and promote tissue healing. W/ shoulder prop    Riana received cold pack for 00 minutes to to decrease circulation, pain, and swelling.  Home Exercises Provided and Patient Education Provided     Education provided:   - arm prop 5x daily for 10 in ea  - elbow ext stretch 2x daily    Written Home Exercises Provided: Patient instructed to cont prior HEP.  Exercises were reviewed and Riana was able to demonstrate them prior to the end of the session.  Riana demonstrated good  understanding of the education provided.     See EMR under Patient Instructions for exercises provided {Blank single:90451::"10/27/2022","prior visit"    Assessment     Started session a little stiff but was able to gain back ROM w/ manual stretching. Able to actively FL shoulder w/ gravity limited. Attempted chest press w/ w/ #1 DBs but unable to perform 2* strength.     Riana is progressing well towards her goals.   Pt prognosis is Good.     Pt will continue to benefit from skilled outpatient physical therapy to address the deficits listed in the problem list box on initial evaluation, provide pt/family education and to maximize pt's level of independence in the home and community environment.     Pt's spiritual, cultural and educational needs considered and pt agreeable to plan of care and goals.    Anticipated barriers to physical therapy: none    Goals:   Short Term Goals: (6 weeks)   - Pt will increase ROM to 110 deg R shoulder flex and abd, 40 deg IR/ER at 45 deg abd (Progressing, not met)  - Decrease Pain to 2/10 as worst with all PT interventions (Progressing, not met)  - Pt to self correct posture and gait with minimal cues (Progressing, not met)  - Pt independent with HEP with progressions. (Progressing, not met)     Long Term Goals (24 Weeks):  - Pt will increase ROM to 170 deg R shoulder flex and abd, 80 " deg ER and IR (Progressing, not met)  - Pt will increase strength to 5/5 RUE in all planes (Progressing, not met)  - Decrease Pain to 0/10 with ADLs overhead (Progressing, not met)  - Pt to return to 80% PLOF (Progressing, not met)       Plan     Continue POC per pt tolerance progressing PROM per protocol.     Cristal Marie, PTA

## 2022-11-01 ENCOUNTER — CLINICAL SUPPORT (OUTPATIENT)
Dept: REHABILITATION | Facility: HOSPITAL | Age: 67
End: 2022-11-01
Payer: MEDICARE

## 2022-11-01 DIAGNOSIS — M25.511 ACUTE PAIN OF RIGHT SHOULDER: Primary | ICD-10-CM

## 2022-11-01 PROCEDURE — 97110 THERAPEUTIC EXERCISES: CPT

## 2022-11-01 PROCEDURE — 97140 MANUAL THERAPY 1/> REGIONS: CPT

## 2022-11-01 NOTE — PROGRESS NOTES
Physical Therapy Daily Treatment Note     Name: Riana Baker   Clinic Number: [id number]    Therapy Diagnosis:   Encounter Diagnosis   Name Primary?    Acute pain of right shoulder Yes     Physician: No ref. provider found    Visit Date: 11/1/2022    Physician Orders: PT Eval and Treat   Medical Diagnosis: s/p R reverse total shoulder  Date of Surgery: 8/16/22  Evaluation Date: 9/6/2022  Authorization Period Expiration: 10/4/22  Plan of Care Certification Period: 2/21/23  Visit # / Visits authorized: 11/ 11     Time In: 8:00 am  Time Out: 9:00 am   Total Billable Time: 30 minutes     Precautions: Standard and s/p reverse shoulder  RUE  Nonweightbearing x 12 wks     PHASE 1: now until 2 wks postop  ROMAT and hand, wrist, elbow.  Sling, may remove for hygiene and ADLs     PHASE 2: 2-6 wks postop  ROMAT and hand, wrist, elbow.  Shoulder Pendulums okay  Shoulder passive and active assisted ROMAT   Sling, may remove for hygiene ADLs and therapy     PHASE 3: 6-12 wks postop   D/c sling   ROMAT RUE  No resistance     PHASE 4: after 12 wks postop  ROMAT, WBAT  Subjective      Pt reports: she continues to do her arm raises daily.   she was compliant with home exercise program given last session.   Response to previous treatment:NA  Functional change: NA    Pain: not quantified this visit  Location: right shoulder      Objective   PROM:9/27/22  Shoulder FL: 100  Shoulder ABD: above 100  ER @ 30 degrees: 30    Riana received therapeutic exercises to develop strength, endurance, ROM, and posture for 50 minutes including:  UBE 4'/4' within available ROM lvl3  Rows GTB 3x10   Shoulder ext GTB 3x10  Pulley FL/abd x 4 min ea.  Landmine 3x10-np  Bicep curl #1 3x10-np  Standing dowel flex and abd 3x10/fatigue ea  Ball on wall roll ups 2x10 with 5 sec hold (left hand over right forAA)  Wall slides (left hand over right for AA)    Riana received the following manual therapy techniques: Joint  "mobilizations and Manual traction were applied to the: R shoulder for 10 minutes, including:  PROM with light distraction per protocol  Grade I/II GH joint mobilizations       Riana received hot pack for 00 minutes to increase circulation and promote tissue healing. W/ shoulder prop    Riana received cold pack for 00 minutes to to decrease circulation, pain, and swelling.  Home Exercises Provided and Patient Education Provided     Education provided:   - arm prop 5x daily for 10 in ea  - elbow ext stretch 2x daily    Written Home Exercises Provided: Patient instructed to cont prior HEP.  Exercises were reviewed and Riana was able to demonstrate them prior to the end of the session.  Riana demonstrated good  understanding of the education provided.     See EMR under Patient Instructions for exercises provided {Blank single:50583::"11/1/2022","prior visit"    Assessment     Pt continues to demonstrate good passive ROM as noted during manual therapy. She does require AA of LUE for RUE ROM interventions or else too many compensations are seen. Plan to continue progressing scapular and shoulder strengthening to tolerance.     Riana is progressing well towards her goals.   Pt prognosis is Good.     Pt will continue to benefit from skilled outpatient physical therapy to address the deficits listed in the problem list box on initial evaluation, provide pt/family education and to maximize pt's level of independence in the home and community environment.     Pt's spiritual, cultural and educational needs considered and pt agreeable to plan of care and goals.    Anticipated barriers to physical therapy: none    Goals:   Short Term Goals: (6 weeks)   - Pt will increase ROM to 110 deg R shoulder flex and abd, 40 deg IR/ER at 45 deg abd (Progressing, not met)  - Decrease Pain to 2/10 as worst with all PT interventions (Progressing, not met)  - Pt to self correct posture and gait with minimal cues (Progressing, not " met)  - Pt independent with HEP with progressions. (Progressing, not met)     Long Term Goals (24 Weeks):  - Pt will increase ROM to 170 deg R shoulder flex and abd, 80 deg ER and IR (Progressing, not met)  - Pt will increase strength to 5/5 RUE in all planes (Progressing, not met)  - Decrease Pain to 0/10 with ADLs overhead (Progressing, not met)  - Pt to return to 80% PLOF (Progressing, not met)       Plan     Continue POC per pt tolerance progressing PROM per protocol.     Bernadette Palacios, PT

## 2022-11-03 ENCOUNTER — CLINICAL SUPPORT (OUTPATIENT)
Dept: REHABILITATION | Facility: HOSPITAL | Age: 67
End: 2022-11-03
Payer: MEDICARE

## 2022-11-03 DIAGNOSIS — M25.511 ACUTE PAIN OF RIGHT SHOULDER: Primary | ICD-10-CM

## 2022-11-03 PROCEDURE — 97140 MANUAL THERAPY 1/> REGIONS: CPT | Mod: CQ

## 2022-11-03 PROCEDURE — 97110 THERAPEUTIC EXERCISES: CPT | Mod: CQ

## 2022-11-03 NOTE — PROGRESS NOTES
Physical Therapy Daily Treatment Note     Name: Riana Baker   Clinic Number: [id number]    Therapy Diagnosis:   Encounter Diagnosis   Name Primary?    Acute pain of right shoulder Yes     Physician: Mendez Tavarez NP    Visit Date: 11/3/2022    Physician Orders: PT Eval and Treat   Medical Diagnosis: s/p R reverse total shoulder  Date of Surgery: 8/16/22  Evaluation Date: 9/6/2022  Authorization Period Expiration: 10/4/22  Plan of Care Certification Period: 2/21/23  Visit # / Visits authorized: 12/ 11     Time In: 8:00 am  Time Out: 0855 am   Total Billable Time: 30 minutes     Precautions: Standard and s/p reverse shoulder  RUE  Nonweightbearing x 12 wks     PHASE 1: now until 2 wks postop  ROMAT and hand, wrist, elbow.  Sling, may remove for hygiene and ADLs     PHASE 2: 2-6 wks postop  ROMAT and hand, wrist, elbow.  Shoulder Pendulums okay  Shoulder passive and active assisted ROMAT   Sling, may remove for hygiene ADLs and therapy     PHASE 3: 6-12 wks postop   D/c sling   ROMAT RUE  No resistance     PHASE 4: after 12 wks postop  ROMAT, WBAT  Subjective      Pt reports: no new complaints   she was compliant with home exercise program given last session.   Response to previous treatment:NA  Functional change: NA    Pain: not quantified this visit  Location: right shoulder      Objective   PROM:9/27/22  Shoulder FL: 100  Shoulder ABD: above 100  ER @ 30 degrees: 30    Riana received therapeutic exercises to develop strength, endurance, ROM, and posture for 45 minutes including:  UBE 4'/4' within available ROM lvl3  Rows GTB 3x10   Shoulder ext GTB 3x10  Pulley FL/abd x 4 min ea.  Supine AROM FL 3x10  Landmine 3x10  Standing dowel flex and abd 3x10/fatigue ea  Ball on wall roll ups 2x10 with 5 sec hold (left hand over right forAA)  Wall slides (left hand over right for AA)-np        Riana received the following manual therapy techniques: Joint mobilizations and Manual  "traction were applied to the: R shoulder for 10 minutes, including:  PROM with light distraction per protocol  Grade I/II GH joint mobilizations       Riana received hot pack for 00 minutes to increase circulation and promote tissue healing. W/ shoulder prop    Riana received cold pack for 00 minutes to to decrease circulation, pain, and swelling.  Home Exercises Provided and Patient Education Provided     Education provided:   - arm prop 5x daily for 10 in ea  - elbow ext stretch 2x daily    Written Home Exercises Provided: Patient instructed to cont prior HEP.  Exercises were reviewed and Riana was able to demonstrate them prior to the end of the session.  Riana demonstrated good  understanding of the education provided.     See EMR under Patient Instructions for exercises provided {Blank single:18608::"11/3/2022","prior visit"    Assessment     Cont to make progress w/ active shoulder strength. Can passively touch top of head.    Riana is progressing well towards her goals.   Pt prognosis is Good.     Pt will continue to benefit from skilled outpatient physical therapy to address the deficits listed in the problem list box on initial evaluation, provide pt/family education and to maximize pt's level of independence in the home and community environment.     Pt's spiritual, cultural and educational needs considered and pt agreeable to plan of care and goals.    Anticipated barriers to physical therapy: none    Goals:   Short Term Goals: (6 weeks)   - Pt will increase ROM to 110 deg R shoulder flex and abd, 40 deg IR/ER at 45 deg abd (Progressing, not met)  - Decrease Pain to 2/10 as worst with all PT interventions (Progressing, not met)  - Pt to self correct posture and gait with minimal cues (Progressing, not met)  - Pt independent with HEP with progressions. (Progressing, not met)     Long Term Goals (24 Weeks):  - Pt will increase ROM to 170 deg R shoulder flex and abd, 80 deg ER and IR " (Progressing, not met)  - Pt will increase strength to 5/5 RUE in all planes (Progressing, not met)  - Decrease Pain to 0/10 with ADLs overhead (Progressing, not met)  - Pt to return to 80% PLOF (Progressing, not met)       Plan     Continue POC per pt tolerance progressing PROM per protocol.     Cristal Marie, PTA

## 2022-11-08 ENCOUNTER — CLINICAL SUPPORT (OUTPATIENT)
Dept: REHABILITATION | Facility: HOSPITAL | Age: 67
End: 2022-11-08
Payer: MEDICARE

## 2022-11-08 DIAGNOSIS — M25.511 ACUTE PAIN OF RIGHT SHOULDER: Primary | ICD-10-CM

## 2022-11-08 PROCEDURE — 97140 MANUAL THERAPY 1/> REGIONS: CPT

## 2022-11-08 PROCEDURE — 97110 THERAPEUTIC EXERCISES: CPT

## 2022-11-08 NOTE — PROGRESS NOTES
Physical Therapy Daily Treatment Note     Name: Riana Baker   Clinic Number: [id number]    Therapy Diagnosis:   Encounter Diagnosis   Name Primary?    Acute pain of right shoulder Yes     Physician: No ref. provider found    Visit Date: 11/8/2022    Physician Orders: PT Eval and Treat   Medical Diagnosis: s/p R reverse total shoulder  Date of Surgery: 8/16/22  Evaluation Date: 9/6/2022  Authorization Period Expiration: 10/4/22  Plan of Care Certification Period: 2/21/23  Visit # / Visits authorized: 1/1 *pending     Time In: 8:00 am  Time Out: 0900 am   Total Billable Time: 30 minutes     Precautions: Standard and s/p reverse shoulder  RUE  Nonweightbearing x 12 wks     PHASE 1: now until 2 wks postop  ROMAT and hand, wrist, elbow.  Sling, may remove for hygiene and ADLs     PHASE 2: 2-6 wks postop  ROMAT and hand, wrist, elbow.  Shoulder Pendulums okay  Shoulder passive and active assisted ROMAT   Sling, may remove for hygiene ADLs and therapy     PHASE 3: 6-12 wks postop   D/c sling   ROMAT RUE  No resistance     PHASE 4: after 12 wks postop  ROMAT, WBAT  Subjective      Pt reports: continues to report compliance with HEP.    she was compliant with home exercise program given last session.   Response to previous treatment:NA  Functional change: NA    Pain: not quantified this visit  Location: right shoulder      Objective   PROM:9/27/22  Shoulder FL: 100  Shoulder ABD: above 100  ER @ 30 degrees: 30    Riana received therapeutic exercises to develop strength, endurance, ROM, and posture for 50 minutes including:  UBE 4'/4' within available ROM lvl3  Rows GTB 3x10   Shoulder ext GTB 3x10  Pulley FL/abd x 4 min ea.  Supine AROM FL 3x10  Landmine 3x10-np  Standing dowel flex and abd 3x10/fatigue ea  Ball on wall roll ups 2x10 with 5 sec hold (left hand over right forAA)  Wall slides (left hand over right for AA)-        Riana received the following manual therapy techniques: Joint  "mobilizations and Manual traction were applied to the: R shoulder for 10 minutes, including:  PROM with light distraction per protocol  Grade I/II GH joint mobilizations       Riana received hot pack for 00 minutes to increase circulation and promote tissue healing. W/ shoulder prop    Riana received cold pack for 00 minutes to to decrease circulation, pain, and swelling.  Home Exercises Provided and Patient Education Provided     Education provided:   - arm prop 5x daily for 10 in ea  - elbow ext stretch 2x daily    Written Home Exercises Provided: Patient instructed to cont prior HEP.  Exercises were reviewed and Riana was able to demonstrate them prior to the end of the session.  Riana demonstrated good  understanding of the education provided.     See EMR under Patient Instructions for exercises provided {Blank single:21759::"11/8/2022","prior visit"    Assessment     Pt showing slight improvements with AAROM in standing, but still needs cues to avoid compensations. Plan to continue progressing as tolerated.     Riana is progressing well towards her goals.   Pt prognosis is Good.     Pt will continue to benefit from skilled outpatient physical therapy to address the deficits listed in the problem list box on initial evaluation, provide pt/family education and to maximize pt's level of independence in the home and community environment.     Pt's spiritual, cultural and educational needs considered and pt agreeable to plan of care and goals.    Anticipated barriers to physical therapy: none    Goals:   Short Term Goals: (6 weeks)   - Pt will increase ROM to 110 deg R shoulder flex and abd, 40 deg IR/ER at 45 deg abd (Progressing, not met)  - Decrease Pain to 2/10 as worst with all PT interventions (Progressing, not met)  - Pt to self correct posture and gait with minimal cues (Progressing, not met)  - Pt independent with HEP with progressions. (Progressing, not met)     Long Term Goals (24 " Weeks):  - Pt will increase ROM to 170 deg R shoulder flex and abd, 80 deg ER and IR (Progressing, not met)  - Pt will increase strength to 5/5 RUE in all planes (Progressing, not met)  - Decrease Pain to 0/10 with ADLs overhead (Progressing, not met)  - Pt to return to 80% PLOF (Progressing, not met)       Plan     Continue POC per pt tolerance progressing PROM per protocol.     Bernadette Palacios, PT

## 2022-11-09 ENCOUNTER — HOSPITAL ENCOUNTER (OUTPATIENT)
Dept: RADIOLOGY | Facility: HOSPITAL | Age: 67
Discharge: HOME | End: 2022-11-09
Attending: NURSE PRACTITIONER
Payer: MEDICARE

## 2022-11-09 ENCOUNTER — OFFICE VISIT (OUTPATIENT)
Dept: ORTHOPEDICS | Facility: CLINIC | Age: 67
End: 2022-11-09
Payer: MEDICARE

## 2022-11-09 VITALS — WEIGHT: 166.44 LBS | HEIGHT: 65 IN | BODY MASS INDEX: 27.73 KG/M2

## 2022-11-09 DIAGNOSIS — S42.291D OTHER CLOSED DISPLACED FRACTURE OF PROXIMAL END OF RIGHT HUMERUS WITH ROUTINE HEALING, SUBSEQUENT ENCOUNTER: Primary | ICD-10-CM

## 2022-11-09 DIAGNOSIS — R52 PAIN: Primary | ICD-10-CM

## 2022-11-09 DIAGNOSIS — R52 PAIN: ICD-10-CM

## 2022-11-09 DIAGNOSIS — Z98.890 POST-OPERATIVE STATE: ICD-10-CM

## 2022-11-09 PROCEDURE — 1160F RVW MEDS BY RX/DR IN RCRD: CPT | Mod: CPTII,S$GLB,, | Performed by: NURSE PRACTITIONER

## 2022-11-09 PROCEDURE — 3061F NEG MICROALBUMINURIA REV: CPT | Mod: CPTII,S$GLB,, | Performed by: NURSE PRACTITIONER

## 2022-11-09 PROCEDURE — 99024 PR POST-OP FOLLOW-UP VISIT: ICD-10-PCS | Mod: S$GLB,,, | Performed by: NURSE PRACTITIONER

## 2022-11-09 PROCEDURE — 1157F ADVNC CARE PLAN IN RCRD: CPT | Mod: CPTII,S$GLB,, | Performed by: NURSE PRACTITIONER

## 2022-11-09 PROCEDURE — 3288F FALL RISK ASSESSMENT DOCD: CPT | Mod: CPTII,S$GLB,, | Performed by: NURSE PRACTITIONER

## 2022-11-09 PROCEDURE — 1157F PR ADVANCE CARE PLAN OR EQUIV PRESENT IN MEDICAL RECORD: ICD-10-PCS | Mod: CPTII,S$GLB,, | Performed by: NURSE PRACTITIONER

## 2022-11-09 PROCEDURE — 3044F HG A1C LEVEL LT 7.0%: CPT | Mod: CPTII,S$GLB,, | Performed by: NURSE PRACTITIONER

## 2022-11-09 PROCEDURE — 3066F NEPHROPATHY DOC TX: CPT | Mod: CPTII,S$GLB,, | Performed by: NURSE PRACTITIONER

## 2022-11-09 PROCEDURE — 1160F PR REVIEW ALL MEDS BY PRESCRIBER/CLIN PHARMACIST DOCUMENTED: ICD-10-PCS | Mod: CPTII,S$GLB,, | Performed by: NURSE PRACTITIONER

## 2022-11-09 PROCEDURE — 1101F PR PT FALLS ASSESS DOC 0-1 FALLS W/OUT INJ PAST YR: ICD-10-PCS | Mod: CPTII,S$GLB,, | Performed by: NURSE PRACTITIONER

## 2022-11-09 PROCEDURE — 3066F PR DOCUMENTATION OF TREATMENT FOR NEPHROPATHY: ICD-10-PCS | Mod: CPTII,S$GLB,, | Performed by: NURSE PRACTITIONER

## 2022-11-09 PROCEDURE — 99999 PR PBB SHADOW E&M-EST. PATIENT-LVL III: CPT | Mod: PBBFAC,,, | Performed by: NURSE PRACTITIONER

## 2022-11-09 PROCEDURE — 73030 X-RAY EXAM OF SHOULDER: CPT | Mod: 26,RT,, | Performed by: RADIOLOGY

## 2022-11-09 PROCEDURE — 1126F AMNT PAIN NOTED NONE PRSNT: CPT | Mod: CPTII,S$GLB,, | Performed by: NURSE PRACTITIONER

## 2022-11-09 PROCEDURE — 1101F PT FALLS ASSESS-DOCD LE1/YR: CPT | Mod: CPTII,S$GLB,, | Performed by: NURSE PRACTITIONER

## 2022-11-09 PROCEDURE — 99024 POSTOP FOLLOW-UP VISIT: CPT | Mod: S$GLB,,, | Performed by: NURSE PRACTITIONER

## 2022-11-09 PROCEDURE — 4010F ACE/ARB THERAPY RXD/TAKEN: CPT | Mod: CPTII,S$GLB,, | Performed by: NURSE PRACTITIONER

## 2022-11-09 PROCEDURE — 3044F PR MOST RECENT HEMOGLOBIN A1C LEVEL <7.0%: ICD-10-PCS | Mod: CPTII,S$GLB,, | Performed by: NURSE PRACTITIONER

## 2022-11-09 PROCEDURE — 3061F PR NEG MICROALBUMINURIA RESULT DOCUMENTED/REVIEW: ICD-10-PCS | Mod: CPTII,S$GLB,, | Performed by: NURSE PRACTITIONER

## 2022-11-09 PROCEDURE — 4010F PR ACE/ARB THEARPY RXD/TAKEN: ICD-10-PCS | Mod: CPTII,S$GLB,, | Performed by: NURSE PRACTITIONER

## 2022-11-09 PROCEDURE — 73030 XR SHOULDER COMPLETE 2 OR MORE VIEWS RIGHT: ICD-10-PCS | Mod: 26,RT,, | Performed by: RADIOLOGY

## 2022-11-09 PROCEDURE — 3008F PR BODY MASS INDEX (BMI) DOCUMENTED: ICD-10-PCS | Mod: CPTII,S$GLB,, | Performed by: NURSE PRACTITIONER

## 2022-11-09 PROCEDURE — 1159F MED LIST DOCD IN RCRD: CPT | Mod: CPTII,S$GLB,, | Performed by: NURSE PRACTITIONER

## 2022-11-09 PROCEDURE — 1159F PR MEDICATION LIST DOCUMENTED IN MEDICAL RECORD: ICD-10-PCS | Mod: CPTII,S$GLB,, | Performed by: NURSE PRACTITIONER

## 2022-11-09 PROCEDURE — 3008F BODY MASS INDEX DOCD: CPT | Mod: CPTII,S$GLB,, | Performed by: NURSE PRACTITIONER

## 2022-11-09 PROCEDURE — 99999 PR PBB SHADOW E&M-EST. PATIENT-LVL III: ICD-10-PCS | Mod: PBBFAC,,, | Performed by: NURSE PRACTITIONER

## 2022-11-09 PROCEDURE — 1126F PR PAIN SEVERITY QUANTIFIED, NO PAIN PRESENT: ICD-10-PCS | Mod: CPTII,S$GLB,, | Performed by: NURSE PRACTITIONER

## 2022-11-09 PROCEDURE — 3288F PR FALLS RISK ASSESSMENT DOCUMENTED: ICD-10-PCS | Mod: CPTII,S$GLB,, | Performed by: NURSE PRACTITIONER

## 2022-11-09 PROCEDURE — 73030 X-RAY EXAM OF SHOULDER: CPT | Mod: TC,RT

## 2022-11-09 NOTE — PROGRESS NOTES
Ms. Kay is here today for a post-operative visit after undergoing right reverse rTSA and right biceps tenodesis by Dr. Powell on 8/16/2022.    Interval History:  She reports that she is doing ok.  Pain is tolerable with Tylenol and ice packs.  She is not taking pain medication.  She has been working on ROM activities with her hand, fingers and wrist. She reports she is going to therapy and finds the therapy is helping.   She denies fever, chills, and sweats since the time of the surgery.     Physical exam:  Incision is open to air and healed, no signs of infection seen.   She has tactile stimulation to their lower FA.  She has normal ROM of all fingers and wrist.  Elbow ROM is 0-180 degrees.  Radial pulse is 2+ and cap refill is < 3 secs.  ROM at the shoulder is 0-90 degrees.    RADS: Right shoulder x-ray was obtained and personally reviewed by me, findings show humerus hardware is in proper position, no evidence of hardware failure.  No new fractures seen.  Air seen on previous x-ray has resolved.      Assessment:  Post-op visit (12 weeks)    Plan:    ICD-10-CM ICD-9-CM   1. Other closed displaced fracture of proximal end of right humerus with routine healing, subsequent encounter  S42.291D V54.11   2. Post-operative state  Z98.890 V45.89       Current care, treatment plan, precautions, activity level/ modifications, limitations, rehabilitation exercises and proposed future treatment were discussed with the patient. We discussed the need to monitor for changes in symptoms and condition and report them to the physician.  Discussed importance of compliance with all appointments and follow up examinations.     PHYSICAL THERAPY:   - Continue therapy patient may progress to phase 4.    - Weight bearing - Weight bear as tolerated and ROMAT at this point forward.  - Range of motion per Shoulder rehab as stated below:    PHASE 1: now until 2 wks postop - completed but continue to do.  ROMAT and hand, wrist, elbow.  Sling,  may remove for hygiene and ADLs     PHASE 2: 2-6 wks postop - done  ROMAT and hand, wrist, elbow.  Shoulder Pendulums okay  Shoulder passive and active assisted ROMAT   Sling, may remove for hygiene ADLs and therapy     PHASE 3: 6-12 wks postop - Done  D/c sling   ROMAT RUE  No resistance     PHASE 4: after 12 wks postop - Transition to 11/9/22  ROMAT, WBAT     PAIN MEDICATION:   - Pain medication: refill was not needed, continue Tylenol 1000 mg tid PRN.  - Pain medication refill policy provided to patient for review, yes.    - Patient was informed a multi-modal approach is used to treat their pain.      FOLLOW UP:   - Patient will follow up in the clinic in 12 weeks.  - X-ray of her right shoulder is needed.    - Future Appointments:   Future Appointments   Date Time Provider Department Center   11/11/2022  8:00 AM Cristal Marie, PTA [facility] [place]   11/14/2022  8:00 AM Cristal Marie, PTA [facility] [place]   11/16/2022  8:00 AM Cristal Marie, PTA [facility] [place]   11/22/2022  8:00 AM Bernadette Palacios, PT [facility] [place]   11/29/2022  8:00 AM Cristal Marie, PTA [facility] [place]   12/1/2022  8:00 AM Cristal Marie, PTA [facility] [place]   12/5/2022  8:30 AM Jose Rojas MD [facility] [place]   1/11/2023  8:30 AM PATRICK Lopez [facility] AUDIO [address]   1/11/2023  9:00 AM Paz Cook MD [facility] ENT [address]   2/9/2023  9:30 AM Mendez Tavarez NP [facility] [address]THO [address]   2/10/2023  8:10 AM Tiara Carl MD [facility] DERM [address]           If there are any questions prior to scheduled follow up, the patient was instructed to contact the office

## 2022-11-11 ENCOUNTER — CLINICAL SUPPORT (OUTPATIENT)
Dept: REHABILITATION | Facility: HOSPITAL | Age: 67
End: 2022-11-11
Payer: MEDICARE

## 2022-11-11 DIAGNOSIS — M25.511 ACUTE PAIN OF RIGHT SHOULDER: Primary | ICD-10-CM

## 2022-11-11 PROCEDURE — 97110 THERAPEUTIC EXERCISES: CPT | Mod: CQ

## 2022-11-11 NOTE — PROGRESS NOTES
Physical Therapy Daily Treatment Note     Name: Riana Kay  Clinic Number: [id number]    Therapy Diagnosis:   Encounter Diagnosis   Name Primary?    Acute pain of right shoulder Yes     Physician: No ref. provider found    Visit Date: 11/11/2022    Physician Orders: PT Eval and Treat   Medical Diagnosis: s/p R reverse total shoulder  Date of Surgery: 8/16/22  Evaluation Date: 9/6/2022  Authorization Period Expiration: 10/4/22  Plan of Care Certification Period: 2/21/23  Visit # / Visits authorized: 1/1 *pending     Time In: 8:00 am  Time Out: 0900 am   Total Billable Time: 30 minutes     Precautions: Standard and s/p reverse shoulder  RUE  Nonweightbearing x 12 wks     PHASE 1: now until 2 wks postop  ROMAT and hand, wrist, elbow.  Sling, may remove for hygiene and ADLs     PHASE 2: 2-6 wks postop  ROMAT and hand, wrist, elbow.  Shoulder Pendulums okay  Shoulder passive and active assisted ROMAT   Sling, may remove for hygiene ADLs and therapy     PHASE 3: 6-12 wks postop   D/c sling   ROMAT RUE  No resistance     PHASE 4: after 12 wks postop  ROMAT, WBAT  Subjective      Pt reports: no new complaints    she was compliant with home exercise program given last session.   Response to previous treatment:NA  Functional change: NA    Pain: not quantified this visit  Location: right shoulder      Objective   PROM:9/27/22  Shoulder FL: 100  Shoulder ABD: above 100  ER @ 30 degrees: 30    Riana received therapeutic exercises to develop strength, endurance, ROM, and posture for 60 minutes including:  Shoulder ROM check  UBE 4'/4' within available ROM lvl3  Rows GTB 3x10   Shoulder ext GTB 3x10  Pulley FL/abd x 4 min ea.  Supine AROM FL 4x8 #1  Sideling ABD #1 4x8  Standing dowel flex and abd 3x10/fatigue ea  Wall slides unilateral 5x4        Riana received the following manual therapy techniques: Joint mobilizations and Manual traction were applied to the: R shoulder for 00 minutes,  "including:  PROM with light distraction per protocol  Grade I/II GH joint mobilizations       Riana received hot pack for 00 minutes to increase circulation and promote tissue healing. W/ shoulder prop    Riana received cold pack for 00 minutes to to decrease circulation, pain, and swelling.  Home Exercises Provided and Patient Education Provided     Education provided:   - arm prop 5x daily for 10 in ea  - elbow ext stretch 2x daily    Written Home Exercises Provided: Patient instructed to cont prior HEP.  Exercises were reviewed and Riana was able to demonstrate them prior to the end of the session.  Riana demonstrated good  understanding of the education provided.     See EMR under Patient Instructions for exercises provided {Blank single:09767::"11/11/2022","prior visit"    Assessment     Able to elevate shoulder to shoulder height. Able to perform wall slide w/o assist of L UE. Able to perform FL in supine against resistance today. Shoulder strength is improving.     Riana is progressing well towards her goals.   Pt prognosis is Good.     Pt will continue to benefit from skilled outpatient physical therapy to address the deficits listed in the problem list box on initial evaluation, provide pt/family education and to maximize pt's level of independence in the home and community environment.     Pt's spiritual, cultural and educational needs considered and pt agreeable to plan of care and goals.    Anticipated barriers to physical therapy: none    Goals:   Short Term Goals: (6 weeks)   - Pt will increase ROM to 110 deg R shoulder flex and abd, 40 deg IR/ER at 45 deg abd (Progressing, not met)  - Decrease Pain to 2/10 as worst with all PT interventions (Progressing, not met)  - Pt to self correct posture and gait with minimal cues (Progressing, not met)  - Pt independent with HEP with progressions. (Progressing, not met)     Long Term Goals (24 Weeks):  - Pt will increase ROM to 170 deg R shoulder " flex and abd, 80 deg ER and IR (Progressing, not met)  - Pt will increase strength to 5/5 RUE in all planes (Progressing, not met)  - Decrease Pain to 0/10 with ADLs overhead (Progressing, not met)  - Pt to return to 80% PLOF (Progressing, not met)       Plan     Continue POC per pt tolerance progressing PROM per protocol.     Cristal Marie, PTA

## 2022-11-14 ENCOUNTER — CLINICAL SUPPORT (OUTPATIENT)
Dept: REHABILITATION | Facility: HOSPITAL | Age: 67
End: 2022-11-14
Payer: MEDICARE

## 2022-11-14 DIAGNOSIS — M25.511 ACUTE PAIN OF RIGHT SHOULDER: Primary | ICD-10-CM

## 2022-11-14 PROCEDURE — 97110 THERAPEUTIC EXERCISES: CPT

## 2022-11-14 NOTE — PROGRESS NOTES
Physical Therapy Daily Treatment Note     Name: Riana Baker   Clinic Number: [id number]    Therapy Diagnosis:   Encounter Diagnosis   Name Primary?    Acute pain of right shoulder Yes       Physician: No ref. provider found    Visit Date: 11/14/2022    Physician Orders: PT Eval and Treat   Medical Diagnosis: s/p R reverse total shoulder  Date of Surgery: 8/16/22  Evaluation Date: 9/6/2022  Authorization Period Expiration: 10/4/22  Plan of Care Certification Period: 2/21/23  Visit # / Visits authorized: 16/pending     Time In: 0800 am  Time Out: 0855 am   Total Billable Time: 55 minutes (4 TE)     Precautions: Standard and s/p reverse shoulder  RUE  Nonweightbearing x 12 wks     PHASE 1: now until 2 wks postop  ROMAT and hand, wrist, elbow.  Sling, may remove for hygiene and ADLs     PHASE 2: 2-6 wks postop  ROMAT and hand, wrist, elbow.  Shoulder Pendulums okay  Shoulder passive and active assisted ROMAT   Sling, may remove for hygiene ADLs and therapy     PHASE 3: 6-12 wks postop   D/c sling   ROMAT RUE  No resistance     PHASE 4: after 12 wks postop  ROMAT, WBAT  Subjective      Pt reports: she has some increased pain and stiffness yesterday but she thinks this is due to the weather change   she was compliant with home exercise program given last session.   Response to previous treatment:NA  Functional change: NA    Pain: not quantified this visit  Location: right shoulder      Objective     12 weeks and 6 days s/p r-TSA    PROM: 11/14/22  Shoulder FL: 115  Shoulder ABD: above 100  ER @ 30 degrees: 45    Riana received therapeutic exercises to develop strength, endurance, ROM, and posture for 55 minutes including:  Shoulder ROM check  UBE 4'/4' within available ROM lvl3  Rows GTB 3x12  Shoulder ext GTB 4 x 10  Pulley FL/abd x 4 min ea.  Supine AROM FL 30x  #2  Forward bows; 3 min   Sideling ABD #1 4x8  SL flex; 2 x 10  Landmine; 2 x 10  Standing dowel flex and abd 3x10/fatigue ea-  "NP  Wall slides unilateral 4 x 8       Riana received the following manual therapy techniques: Joint mobilizations and Manual traction were applied to the: R shoulder for 00 minutes, including:  PROM with light distraction per protocol  Grade I/II GH joint mobilizations       Riana received hot pack for 00 minutes to increase circulation and promote tissue healing. W/ shoulder prop    Riana received cold pack for 00 minutes to to decrease circulation, pain, and swelling.    Home Exercises Provided and Patient Education Provided     Education provided:   - arm prop 5x daily for 10 in ea  - elbow ext stretch 2x daily    Written Home Exercises Provided: Patient instructed to cont prior HEP.  Exercises were reviewed and Riana was able to demonstrate them prior to the end of the session.  Riana demonstrated good  understanding of the education provided.     See EMR under Patient Instructions for exercises provided {Blank single:75937::"11/14/2022","prior visit"    Assessment     Riana presents with good carry over of passive range of motion and good progression of active range as well. She demonstrates significant shrug sign in FE indicating poor upward rotation of her scapula. She will benefit added emphasis on serratus anterior and low trap to improve this and avoid continued compensation.     Riana is progressing well towards her goals.   Pt prognosis is Good.     Pt will continue to benefit from skilled outpatient physical therapy to address the deficits listed in the problem list box on initial evaluation, provide pt/family education and to maximize pt's level of independence in the home and community environment.     Pt's spiritual, cultural and educational needs considered and pt agreeable to plan of care and goals.    Anticipated barriers to physical therapy: none    Goals:   Short Term Goals: (6 weeks)   - Pt will increase ROM to 110 deg R shoulder flex and abd, 40 deg IR/ER at 45 deg abd " (Progressing, not met)  - Decrease Pain to 2/10 as worst with all PT interventions (Progressing, not met)  - Pt to self correct posture and gait with minimal cues (Progressing, not met)  - Pt independent with HEP with progressions. (Progressing, not met)     Long Term Goals (24 Weeks):  - Pt will increase ROM to 170 deg R shoulder flex and abd, 80 deg ER and IR (Progressing, not met)  - Pt will increase strength to 5/5 RUE in all planes (Progressing, not met)  - Decrease Pain to 0/10 with ADLs overhead (Progressing, not met)  - Pt to return to 80% PLOF (Progressing, not met)       Plan     Continue POC per pt tolerance progressing PROM per protocol.     Lakia Gutierrez, PT, DPT

## 2022-11-16 ENCOUNTER — CLINICAL SUPPORT (OUTPATIENT)
Dept: REHABILITATION | Facility: HOSPITAL | Age: 67
End: 2022-11-16
Payer: MEDICARE

## 2022-11-16 DIAGNOSIS — M25.511 ACUTE PAIN OF RIGHT SHOULDER: Primary | ICD-10-CM

## 2022-11-16 PROCEDURE — 97110 THERAPEUTIC EXERCISES: CPT | Mod: CQ

## 2022-11-16 NOTE — PROGRESS NOTES
Physical Therapy Daily Treatment Note     Name: Riana Baker   Clinic Number: [id number]    Therapy Diagnosis:   Encounter Diagnosis   Name Primary?    Acute pain of right shoulder Yes         Physician: No ref. provider found    Visit Date: 11/16/2022    Physician Orders: PT Eval and Treat   Medical Diagnosis: s/p R reverse total shoulder  Date of Surgery: 8/16/22  Evaluation Date: 9/6/2022  Authorization Period Expiration: 10/4/22  Plan of Care Certification Period: 2/21/23  Visit # / Visits authorized: 17/pending     Time In: 0800 am  Time Out: 0900am   Total Billable Time: 23 minutes      Precautions: Standard and s/p reverse shoulder  RUE  Nonweightbearing x 12 wks     PHASE 1: now until 2 wks postop  ROMAT and hand, wrist, elbow.  Sling, may remove for hygiene and ADLs     PHASE 2: 2-6 wks postop  ROMAT and hand, wrist, elbow.  Shoulder Pendulums okay  Shoulder passive and active assisted ROMAT   Sling, may remove for hygiene ADLs and therapy     PHASE 3: 6-12 wks postop   D/c sling   ROMAT RUE  No resistance     PHASE 4: after 12 wks postop  ROMAT, WBAT  Subjective      Pt reports: no new complaints   she was compliant with home exercise program given last session.   Response to previous treatment:NA  Functional change: NA    Pain: not quantified this visit  Location: right shoulder      Objective     13 weeks and 1days s/p r-TSA    PROM: 11/14/22  Shoulder FL: 115  Shoulder ABD: above 100  ER @ 30 degrees: 45    Riana received therapeutic exercises to develop strength, endurance, ROM, and posture for 60 minutes including:  Shoulder ROM check  UBE 4'/4' within available ROM lvl3  Rows GTB 3x12  Shoulder ext GTB 4 x 10  Pulley FL/abd x 4 min ea.-NP  Table elevated 1 rung AROM FL 30x  #0  Forward bows; 3 min   Sideling ABD #1 4x8  Wall slides #0 5x5  Landmine; 3 x 10  Standing dowel flex and abd 3x10/fatigue ea- NP  EOT shoulder taps 30x  EOT SA press 30x      Riana received  "the following manual therapy techniques: Joint mobilizations and Manual traction were applied to the: R shoulder for 00 minutes, including:  PROM with light distraction per protocol  Grade I/II GH joint mobilizations       Riana received hot pack for 00 minutes to increase circulation and promote tissue healing. W/ shoulder prop    Riana received cold pack for 00 minutes to to decrease circulation, pain, and swelling.    Home Exercises Provided and Patient Education Provided     Education provided:   - arm prop 5x daily for 10 in ea  - elbow ext stretch 2x daily    Written Home Exercises Provided: Patient instructed to cont prior HEP.  Exercises were reviewed and Riana was able to demonstrate them prior to the end of the session.  Riana demonstrated good  understanding of the education provided.     See EMR under Patient Instructions for exercises provided {Blank single:32943::"11/16/2022","prior visit"    Assessment     Cont to show limitations in deltoid strength w/ gravity resisted exercises/ AROM movements. Added UE CKC WB exercise today w/ good dharmesh.     Riana is progressing well towards her goals.   Pt prognosis is Good.     Pt will continue to benefit from skilled outpatient physical therapy to address the deficits listed in the problem list box on initial evaluation, provide pt/family education and to maximize pt's level of independence in the home and community environment.     Pt's spiritual, cultural and educational needs considered and pt agreeable to plan of care and goals.    Anticipated barriers to physical therapy: none    Goals:   Short Term Goals: (6 weeks)   - Pt will increase ROM to 110 deg R shoulder flex and abd, 40 deg IR/ER at 45 deg abd (Progressing, not met)  - Decrease Pain to 2/10 as worst with all PT interventions (Progressing, not met)  - Pt to self correct posture and gait with minimal cues (Progressing, not met)  - Pt independent with HEP with progressions. (Progressing, " not met)     Long Term Goals (24 Weeks):  - Pt will increase ROM to 170 deg R shoulder flex and abd, 80 deg ER and IR (Progressing, not met)  - Pt will increase strength to 5/5 RUE in all planes (Progressing, not met)  - Decrease Pain to 0/10 with ADLs overhead (Progressing, not met)  - Pt to return to 80% PLOF (Progressing, not met)       Plan     Continue POC per pt tolerance progressing PROM per protocol.     Cristal Marie, PTA,

## 2022-11-19 ENCOUNTER — PATIENT MESSAGE (OUTPATIENT)
Dept: INTERNAL MEDICINE | Facility: CLINIC | Age: 67
End: 2022-11-19
Payer: MEDICARE

## 2022-11-19 NOTE — TELEPHONE ENCOUNTER
No new care gaps identified.  [facility] Embedded Care Gaps. Reference number: [id number]. 11/19/2022   10:36:17 AM CST

## 2022-11-21 ENCOUNTER — TELEPHONE (OUTPATIENT)
Dept: INTERNAL MEDICINE | Facility: CLINIC | Age: 67
End: 2022-11-21
Payer: MEDICARE

## 2022-11-21 RX ORDER — INSULIN LISPRO 100 [IU]/ML
INJECTION, SOLUTION INTRAVENOUS; SUBCUTANEOUS
Qty: 30 ML | Refills: 0 | Status: SHIPPED | OUTPATIENT
Start: 2022-11-21 | End: 2023-01-17

## 2022-11-21 NOTE — TELEPHONE ENCOUNTER
LM for pt stating appt w/ MsRadha is set for 12/21/22 @ 0900 and prepared an appointment letter for mailing.

## 2022-11-29 ENCOUNTER — CLINICAL SUPPORT (OUTPATIENT)
Dept: REHABILITATION | Facility: HOSPITAL | Age: 67
End: 2022-11-29
Payer: MEDICARE

## 2022-11-29 DIAGNOSIS — M25.511 ACUTE PAIN OF RIGHT SHOULDER: Primary | ICD-10-CM

## 2022-11-29 PROCEDURE — 97110 THERAPEUTIC EXERCISES: CPT | Mod: CQ

## 2022-11-29 NOTE — PROGRESS NOTES
Physical Therapy Daily Treatment Note     Name: Riana Kay  Clinic Number: [id number]    Therapy Diagnosis:   Encounter Diagnosis   Name Primary?    Acute pain of right shoulder Yes         Physician: Mendez Tavarez NP    Visit Date: 11/29/2022    Physician Orders: PT Eval and Treat   Medical Diagnosis: s/p R reverse total shoulder  Date of Surgery: 8/16/22  Evaluation Date: 9/6/2022  Authorization Period Expiration: 10/4/22  Plan of Care Certification Period: 2/21/23  Visit # / Visits authorized: 17/23     Time In: 0800 am  Time Out: 0900 am   Total Billable Time: 30 minutes      Precautions: Standard and s/p reverse shoulder  RUE  Nonweightbearing x 12 wks     PHASE 1: now until 2 wks postop  ROMAT and hand, wrist, elbow.  Sling, may remove for hygiene and ADLs     PHASE 2: 2-6 wks postop  ROMAT and hand, wrist, elbow.  Shoulder Pendulums okay  Shoulder passive and active assisted ROMAT   Sling, may remove for hygiene ADLs and therapy     PHASE 3: 6-12 wks postop   D/c sling   ROMAT RUE  No resistance     PHASE 4: after 12 wks postop  ROMAT, WBAT  Subjective      Pt reports: upper arm around bicep has been hurting since last session   she was compliant with home exercise program given last session.   Response to previous treatment:NA  Functional change: NA    Pain: not quantified this visit  Location: right shoulder      Objective     15weeks and 0days s/p r-TSA    PROM: 11/14/22  Shoulder FL: 115  Shoulder ABD: above 100  ER @ 30 degrees: 45    Riana received therapeutic exercises to develop strength, endurance, ROM, and posture for 60 minutes including:  Shoulder ROM check  UBE 4'/4' within available ROM lvl3  Rows GTB 3x12  Shoulder ext GTB 4 x 10  Pulley FL/abd x 4 min ea  Supine AROM FL 30x  #0  Forward bows; 3 min   Sideling ABD #0 4x8  Wall slides #0 5x5  Landmine; 3 x 10-NP  EOT shoulder weight shifts -NP  EOT SA press 30x-NP      Riana received the following  "manual therapy techniques: Joint mobilizations and Manual traction were applied to the: R shoulder for 00 minutes, including:  PROM with light distraction per protocol  Grade I/II GH joint mobilizations       Riana received hot pack for 00 minutes to increase circulation and promote tissue healing. W/ shoulder prop    Riana received cold pack for 00 minutes to to decrease circulation, pain, and swelling.    Home Exercises Provided and Patient Education Provided     Education provided:   -resume HEP    Written Home Exercises Provided: Patient instructed to cont prior HEP.  Exercises were reviewed and Riana was able to demonstrate them prior to the end of the session.  Riana demonstrated good  understanding of the education provided.     See EMR under Patient Instructions for exercises provided {Blank single:82288::"11/29/2022","prior visit"    Assessment     Riana was a little stiff PROM R shoulder but then felt better after manual stretching. Session modified 2* to increase in symptoms previous session. TTP below humeral head around arm . Pt does not remember any exercise being painful last session and no PALLAVI outside of clinic. Add back in WB exercise next session if symptoms have improved. Was able to elevate R UE w/ decreased symptoms upon departure. Encouraged pt to resume HEP at home.     Riana is progressing well towards her goals.   Pt prognosis is Good.     Pt will continue to benefit from skilled outpatient physical therapy to address the deficits listed in the problem list box on initial evaluation, provide pt/family education and to maximize pt's level of independence in the home and community environment.     Pt's spiritual, cultural and educational needs considered and pt agreeable to plan of care and goals.    Anticipated barriers to physical therapy: none    Goals:   Short Term Goals: (6 weeks)   - Pt will increase ROM to 110 deg R shoulder flex and abd, 40 deg IR/ER at 45 deg abd " (Progressing, not met)  - Decrease Pain to 2/10 as worst with all PT interventions (Progressing, not met)  - Pt to self correct posture and gait with minimal cues (Progressing, not met)  - Pt independent with HEP with progressions. (Progressing, not met)     Long Term Goals (24 Weeks):  - Pt will increase ROM to 170 deg R shoulder flex and abd, 80 deg ER and IR (Progressing, not met)  - Pt will increase strength to 5/5 RUE in all planes (Progressing, not met)  - Decrease Pain to 0/10 with ADLs overhead (Progressing, not met)  - Pt to return to 80% PLOF (Progressing, not met)       Plan     Continue POC per pt tolerance progressing PROM per protocol.     Cristal Marie, PTA,

## 2022-12-01 ENCOUNTER — CLINICAL SUPPORT (OUTPATIENT)
Dept: REHABILITATION | Facility: HOSPITAL | Age: 67
End: 2022-12-01
Payer: MEDICARE

## 2022-12-01 DIAGNOSIS — M25.511 ACUTE PAIN OF RIGHT SHOULDER: Primary | ICD-10-CM

## 2022-12-01 PROCEDURE — 97110 THERAPEUTIC EXERCISES: CPT | Mod: CQ

## 2022-12-01 NOTE — TELEPHONE ENCOUNTER
----- Message from Aida Smith sent at 9/24/2020  2:05 PM CDT -----  Contact: [phone]  Patient is returning a phone call.  Who left a message for the patient: Corina  Does patient know what this is regarding:  z?  Comments:        
----- Message from Amena Roger DO sent at 9/23/2020  3:32 PM CDT -----  Please contact pt; she needs a repeat urine prior to her upcoming surgery  ----- Message -----  From: Josy Griffith RN  Sent: 9/23/2020   3:23 PM CDT  To: DO Dr. Acacia Veronica,   Mrs. Kay is scheduled for a right knee arthroplasty on 10/6/20. She was seen in the ED yesterday for abdominal pain and UTI. She was prescribed cipro BID x 3 days. She's scheduled to be seen for a Preop clearance visit in the Preop Ctr on 10/2. Can her UTI be follow-up'ed on before her Preop visit in case add'l abx are needed? Ortho surgeons typically require a negative UA prior to surgery.     Thank you,   Josy Griffith RN  [facility] [place]        
Just culture; order entered  
LMOR for pt to call back.  
Orders linked to apt.   
Repeat urine scheduled. Need ua, culture, or both?  
77M with a PMH T2DM and recently diagnosed CHF? who initially presented to clinic today to establish care but due to anginal symptoms was referred to the ED. Daughter is at bedside and able to translate. Patient states he has been having chest pain with exertion that has been worsening for the past couple of months that has been getting worse over the last few weeks. The chest pain is substernal, worse with exertion, better with rest. The pain starts after 2 blocks associated with shortness of breath, palpitations, dizziness, and gets better after a few minutes of relaxation. Denies orthopnea, PND, JOHANSEN and LE edema. He was evaluated in [facility] 2 months ago and had an echo and x-ray. Patient was started on Bumex and ASA. TTE report is in Urdu but LVEF ~50% with mild diastolic dysfunction and moderate aortic insufficiency. He has never had a stress test or angiogram. His symptoms continued to worsen so [place] the [place] went to ED about 5 days ago had initial chest pain work up with troponin 21 and EKG non-ischemic and he was discharged with follow up with Cardiology.    seen by cardio in ED

## 2022-12-01 NOTE — PROGRESS NOTES
"                          Physical Therapy Daily Treatment Note     Name: Riana Kay  Clinic Number: [id number]    Therapy Diagnosis:   Encounter Diagnosis   Name Primary?    Acute pain of right shoulder Yes         Physician: Mendez Tavarez NP    Visit Date: 12/1/2022    Physician Orders: PT Eval and Treat   Medical Diagnosis: s/p R reverse total shoulder  Date of Surgery: 8/16/22  Evaluation Date: 9/6/2022  Authorization Period Expiration: 10/4/22  Plan of Care Certification Period: 2/21/23  Visit # / Visits authorized: 18/23     Time In: 0800 am  Time Out: 0857 am   Total Billable Time: 57 minutes      Precautions: Standard and s/p reverse shoulder  RUE  Nonweightbearing x 12 wks     PHASE 1: now until 2 wks postop  ROMAT and hand, wrist, elbow.  Sling, may remove for hygiene and ADLs     PHASE 2: 2-6 wks postop  ROMAT and hand, wrist, elbow.  Shoulder Pendulums okay  Shoulder passive and active assisted ROMAT   Sling, may remove for hygiene ADLs and therapy     PHASE 3: 6-12 wks postop   D/c sling   ROMAT RUE  No resistance     PHASE 4: after 12 wks postop  ROMAT, WBAT  Subjective      Pt reports: R UE feeling better   she was compliant with home exercise program given last session.   Response to previous treatment:NA  Functional change: NA    Pain: not quantified this visit  Location: right shoulder      Objective     15weeks and 0days s/p r-TSA    PROM: 11/14/22  Shoulder FL: 115  Shoulder ABD: above 100  ER @ 30 degrees: 45    Riana received therapeutic exercises to develop strength, endurance, ROM, and posture for 60 minutes including:  Shoulder PROM   Open books next session  UBE 4'/4' within available ROM lvl3  Prone Ts level 2 iso holds 6 x 5" hold  Prone Rows 3x10  Supine SA press #2   Supine AROM FL 30x  #1  Sideling ABD #0 4x8   Landmine; 3 x 10  EOT shoulder weight shifts  30x        Riana received the following manual therapy techniques: Joint mobilizations and Manual traction were " "applied to the: R shoulder for 00 minutes, including:  PROM with light distraction per protocol  Grade I/II GH joint mobilizations       Riana received hot pack for 00 minutes to increase circulation and promote tissue healing. W/ shoulder prop    Riana received cold pack for 00 minutes to to decrease circulation, pain, and swelling.    Home Exercises Provided and Patient Education Provided     Education provided:   -resume HEP    Written Home Exercises Provided: Patient instructed to cont prior HEP.  Exercises were reviewed and Riana was able to demonstrate them prior to the end of the session.  Riana demonstrated good  understanding of the education provided.     See EMR under Patient Instructions for exercises provided {Blank single:57218::"12/1/2022","prior visit"    Assessment     Riana feels better since previous session. PROM is back to normal ranges. Pt shows mid trap and LT weakness w/ excessive use of UT. Needed assist w/ mid trap actiaviton in prone. Would benefit to cont scapular strengthening to assist w/ shoulder elevation. Deltoid remains weak but is making slow progress.     Riana is progressing well towards her goals.   Pt prognosis is Good.     Pt will continue to benefit from skilled outpatient physical therapy to address the deficits listed in the problem list box on initial evaluation, provide pt/family education and to maximize pt's level of independence in the home and community environment.     Pt's spiritual, cultural and educational needs considered and pt agreeable to plan of care and goals.    Anticipated barriers to physical therapy: none    Goals:   Short Term Goals: (6 weeks)   - Pt will increase ROM to 110 deg R shoulder flex and abd, 40 deg IR/ER at 45 deg abd (Progressing, not met)  - Decrease Pain to 2/10 as worst with all PT interventions (Progressing, not met)  - Pt to self correct posture and gait with minimal cues (Progressing, not met)  - Pt independent with " HEP with progressions. (Progressing, not met)     Long Term Goals (24 Weeks):  - Pt will increase ROM to 170 deg R shoulder flex and abd, 80 deg ER and IR (Progressing, not met)  - Pt will increase strength to 5/5 RUE in all planes (Progressing, not met)  - Decrease Pain to 0/10 with ADLs overhead (Progressing, not met)  - Pt to return to 80% PLOF (Progressing, not met)       Plan     Continue POC per pt tolerance progressing PROM per protocol.     Cristal Marie, PTA,

## 2022-12-05 ENCOUNTER — OFFICE VISIT (OUTPATIENT)
Dept: INTERNAL MEDICINE | Facility: CLINIC | Age: 67
End: 2022-12-05
Payer: MEDICARE

## 2022-12-05 ENCOUNTER — LAB VISIT (OUTPATIENT)
Dept: LAB | Facility: HOSPITAL | Age: 67
End: 2022-12-05
Attending: FAMILY MEDICINE
Payer: MEDICARE

## 2022-12-05 VITALS
SYSTOLIC BLOOD PRESSURE: 120 MMHG | WEIGHT: 166.88 LBS | DIASTOLIC BLOOD PRESSURE: 60 MMHG | TEMPERATURE: 98 F | HEIGHT: 65 IN | HEART RATE: 78 BPM | BODY MASS INDEX: 27.81 KG/M2 | OXYGEN SATURATION: 97 %

## 2022-12-05 DIAGNOSIS — E78.1 HYPERTRIGLYCERIDEMIA: ICD-10-CM

## 2022-12-05 DIAGNOSIS — F41.9 ANXIETY: Primary | ICD-10-CM

## 2022-12-05 DIAGNOSIS — Z79.4 TYPE 2 DIABETES MELLITUS WITHOUT COMPLICATION, WITH LONG-TERM CURRENT USE OF INSULIN: ICD-10-CM

## 2022-12-05 DIAGNOSIS — E11.9 TYPE 2 DIABETES MELLITUS WITHOUT COMPLICATION, WITH LONG-TERM CURRENT USE OF INSULIN: ICD-10-CM

## 2022-12-05 DIAGNOSIS — D64.9 ANEMIA, UNSPECIFIED TYPE: ICD-10-CM

## 2022-12-05 LAB
ALBUMIN SERPL BCP-MCNC: 4.1 G/DL (ref 3.5–5.2)
ALP SERPL-CCNC: 94 U/L (ref 55–135)
ALT SERPL W/O P-5'-P-CCNC: 25 U/L (ref 10–44)
ANION GAP SERPL CALC-SCNC: 9 MMOL/L (ref 8–16)
AST SERPL-CCNC: 19 U/L (ref 10–40)
BASOPHILS # BLD AUTO: 0.02 K/UL (ref 0–0.2)
BASOPHILS NFR BLD: 0.3 % (ref 0–1.9)
BILIRUB SERPL-MCNC: 0.6 MG/DL (ref 0.1–1)
BUN SERPL-MCNC: 18 MG/DL (ref 8–23)
CALCIUM SERPL-MCNC: 9.7 MG/DL (ref 8.7–10.5)
CHLORIDE SERPL-SCNC: 104 MMOL/L (ref 95–110)
CHOLEST SERPL-MCNC: 194 MG/DL (ref 120–199)
CHOLEST/HDLC SERPL: 3.9 {RATIO} (ref 2–5)
CO2 SERPL-SCNC: 26 MMOL/L (ref 23–29)
CREAT SERPL-MCNC: 0.8 MG/DL (ref 0.5–1.4)
DIFFERENTIAL METHOD: ABNORMAL
EOSINOPHIL # BLD AUTO: 0.1 K/UL (ref 0–0.5)
EOSINOPHIL NFR BLD: 0.8 % (ref 0–8)
ERYTHROCYTE [DISTWIDTH] IN BLOOD BY AUTOMATED COUNT: 12.7 % (ref 11.5–14.5)
EST. GFR  (NO RACE VARIABLE): >60 ML/MIN/1.73 M^2
ESTIMATED AVG GLUCOSE: 151 MG/DL (ref 68–131)
GLUCOSE SERPL-MCNC: 153 MG/DL (ref 70–110)
HBA1C MFR BLD: 6.9 % (ref 4–5.6)
HCT VFR BLD AUTO: 42.8 % (ref 37–48.5)
HDLC SERPL-MCNC: 50 MG/DL (ref 40–75)
HDLC SERPL: 25.8 % (ref 20–50)
HGB BLD-MCNC: 14 G/DL (ref 12–16)
IMM GRANULOCYTES # BLD AUTO: 0.02 K/UL (ref 0–0.04)
IMM GRANULOCYTES NFR BLD AUTO: 0.3 % (ref 0–0.5)
LDLC SERPL CALC-MCNC: 115.4 MG/DL (ref 63–159)
LYMPHOCYTES # BLD AUTO: 2 K/UL (ref 1–4.8)
LYMPHOCYTES NFR BLD: 25.7 % (ref 18–48)
MCH RBC QN AUTO: 31.6 PG (ref 27–31)
MCHC RBC AUTO-ENTMCNC: 32.7 G/DL (ref 32–36)
MCV RBC AUTO: 97 FL (ref 82–98)
MONOCYTES # BLD AUTO: 0.5 K/UL (ref 0.3–1)
MONOCYTES NFR BLD: 6.2 % (ref 4–15)
NEUTROPHILS # BLD AUTO: 5.1 K/UL (ref 1.8–7.7)
NEUTROPHILS NFR BLD: 66.7 % (ref 38–73)
NONHDLC SERPL-MCNC: 144 MG/DL
NRBC BLD-RTO: 0 /100 WBC
PLATELET # BLD AUTO: 270 K/UL (ref 150–450)
PMV BLD AUTO: 9.7 FL (ref 9.2–12.9)
POTASSIUM SERPL-SCNC: 4.9 MMOL/L (ref 3.5–5.1)
PROT SERPL-MCNC: 7.6 G/DL (ref 6–8.4)
RBC # BLD AUTO: 4.43 M/UL (ref 4–5.4)
SODIUM SERPL-SCNC: 139 MMOL/L (ref 136–145)
TRIGL SERPL-MCNC: 143 MG/DL (ref 30–150)
WBC # BLD AUTO: 7.6 K/UL (ref 3.9–12.7)

## 2022-12-05 PROCEDURE — 3078F DIAST BP <80 MM HG: CPT | Mod: CPTII,S$GLB,, | Performed by: FAMILY MEDICINE

## 2022-12-05 PROCEDURE — 80061 LIPID PANEL: CPT | Performed by: FAMILY MEDICINE

## 2022-12-05 PROCEDURE — 1100F PTFALLS ASSESS-DOCD GE2>/YR: CPT | Mod: CPTII,S$GLB,, | Performed by: FAMILY MEDICINE

## 2022-12-05 PROCEDURE — 4010F PR ACE/ARB THEARPY RXD/TAKEN: ICD-10-PCS | Mod: CPTII,S$GLB,, | Performed by: FAMILY MEDICINE

## 2022-12-05 PROCEDURE — 99213 OFFICE O/P EST LOW 20 MIN: CPT | Mod: S$GLB,,, | Performed by: FAMILY MEDICINE

## 2022-12-05 PROCEDURE — 3044F HG A1C LEVEL LT 7.0%: CPT | Mod: CPTII,S$GLB,, | Performed by: FAMILY MEDICINE

## 2022-12-05 PROCEDURE — 1126F AMNT PAIN NOTED NONE PRSNT: CPT | Mod: CPTII,S$GLB,, | Performed by: FAMILY MEDICINE

## 2022-12-05 PROCEDURE — 3008F BODY MASS INDEX DOCD: CPT | Mod: CPTII,S$GLB,, | Performed by: FAMILY MEDICINE

## 2022-12-05 PROCEDURE — 1157F PR ADVANCE CARE PLAN OR EQUIV PRESENT IN MEDICAL RECORD: ICD-10-PCS | Mod: CPTII,S$GLB,, | Performed by: FAMILY MEDICINE

## 2022-12-05 PROCEDURE — 3008F PR BODY MASS INDEX (BMI) DOCUMENTED: ICD-10-PCS | Mod: CPTII,S$GLB,, | Performed by: FAMILY MEDICINE

## 2022-12-05 PROCEDURE — 3044F PR MOST RECENT HEMOGLOBIN A1C LEVEL <7.0%: ICD-10-PCS | Mod: CPTII,S$GLB,, | Performed by: FAMILY MEDICINE

## 2022-12-05 PROCEDURE — 1160F PR REVIEW ALL MEDS BY PRESCRIBER/CLIN PHARMACIST DOCUMENTED: ICD-10-PCS | Mod: CPTII,S$GLB,, | Performed by: FAMILY MEDICINE

## 2022-12-05 PROCEDURE — 3061F NEG MICROALBUMINURIA REV: CPT | Mod: CPTII,S$GLB,, | Performed by: FAMILY MEDICINE

## 2022-12-05 PROCEDURE — 99213 PR OFFICE/OUTPT VISIT, EST, LEVL III, 20-29 MIN: ICD-10-PCS | Mod: S$GLB,,, | Performed by: FAMILY MEDICINE

## 2022-12-05 PROCEDURE — 1157F ADVNC CARE PLAN IN RCRD: CPT | Mod: CPTII,S$GLB,, | Performed by: FAMILY MEDICINE

## 2022-12-05 PROCEDURE — 3078F PR MOST RECENT DIASTOLIC BLOOD PRESSURE < 80 MM HG: ICD-10-PCS | Mod: CPTII,S$GLB,, | Performed by: FAMILY MEDICINE

## 2022-12-05 PROCEDURE — 80053 COMPREHEN METABOLIC PANEL: CPT | Performed by: FAMILY MEDICINE

## 2022-12-05 PROCEDURE — 3074F PR MOST RECENT SYSTOLIC BLOOD PRESSURE < 130 MM HG: ICD-10-PCS | Mod: CPTII,S$GLB,, | Performed by: FAMILY MEDICINE

## 2022-12-05 PROCEDURE — 1126F PR PAIN SEVERITY QUANTIFIED, NO PAIN PRESENT: ICD-10-PCS | Mod: CPTII,S$GLB,, | Performed by: FAMILY MEDICINE

## 2022-12-05 PROCEDURE — 1159F PR MEDICATION LIST DOCUMENTED IN MEDICAL RECORD: ICD-10-PCS | Mod: CPTII,S$GLB,, | Performed by: FAMILY MEDICINE

## 2022-12-05 PROCEDURE — 1100F PR PT FALLS ASSESS DOC 2+ FALLS/FALL W/INJURY/YR: ICD-10-PCS | Mod: CPTII,S$GLB,, | Performed by: FAMILY MEDICINE

## 2022-12-05 PROCEDURE — 83036 HEMOGLOBIN GLYCOSYLATED A1C: CPT | Performed by: FAMILY MEDICINE

## 2022-12-05 PROCEDURE — 3061F PR NEG MICROALBUMINURIA RESULT DOCUMENTED/REVIEW: ICD-10-PCS | Mod: CPTII,S$GLB,, | Performed by: FAMILY MEDICINE

## 2022-12-05 PROCEDURE — 99999 PR PBB SHADOW E&M-EST. PATIENT-LVL V: CPT | Mod: PBBFAC,,, | Performed by: FAMILY MEDICINE

## 2022-12-05 PROCEDURE — 36415 COLL VENOUS BLD VENIPUNCTURE: CPT | Mod: PO | Performed by: FAMILY MEDICINE

## 2022-12-05 PROCEDURE — 1160F RVW MEDS BY RX/DR IN RCRD: CPT | Mod: CPTII,S$GLB,, | Performed by: FAMILY MEDICINE

## 2022-12-05 PROCEDURE — 99999 PR PBB SHADOW E&M-EST. PATIENT-LVL V: ICD-10-PCS | Mod: PBBFAC,,, | Performed by: FAMILY MEDICINE

## 2022-12-05 PROCEDURE — 3074F SYST BP LT 130 MM HG: CPT | Mod: CPTII,S$GLB,, | Performed by: FAMILY MEDICINE

## 2022-12-05 PROCEDURE — 85025 COMPLETE CBC W/AUTO DIFF WBC: CPT | Performed by: FAMILY MEDICINE

## 2022-12-05 PROCEDURE — 3066F NEPHROPATHY DOC TX: CPT | Mod: CPTII,S$GLB,, | Performed by: FAMILY MEDICINE

## 2022-12-05 PROCEDURE — 3288F FALL RISK ASSESSMENT DOCD: CPT | Mod: CPTII,S$GLB,, | Performed by: FAMILY MEDICINE

## 2022-12-05 PROCEDURE — 3288F PR FALLS RISK ASSESSMENT DOCUMENTED: ICD-10-PCS | Mod: CPTII,S$GLB,, | Performed by: FAMILY MEDICINE

## 2022-12-05 PROCEDURE — 4010F ACE/ARB THERAPY RXD/TAKEN: CPT | Mod: CPTII,S$GLB,, | Performed by: FAMILY MEDICINE

## 2022-12-05 PROCEDURE — 1159F MED LIST DOCD IN RCRD: CPT | Mod: CPTII,S$GLB,, | Performed by: FAMILY MEDICINE

## 2022-12-05 PROCEDURE — 3066F PR DOCUMENTATION OF TREATMENT FOR NEPHROPATHY: ICD-10-PCS | Mod: CPTII,S$GLB,, | Performed by: FAMILY MEDICINE

## 2022-12-05 NOTE — PROGRESS NOTES
Subjective:       Patient ID: Riana Kay is a 67 y.o. female.    Chief Complaint: Anxiety  67-year-old female with insulin-dependent type 2 diabetes, anemia, and hypertriglyceridemia presents to clinic today accompanied by her  for anxiety follow-up.  She has been seeing Neurology and neuro Psychiatry secondary to memory impairment which was noted to be secondary to anxiety.  She was previously treated in 2018 for anxiety with Wellbutrin but upon restarting the medication she developed jitteriness; therefore, Wellbutrin was discontinued.  After speaking to a family friend they were interested in starting duloxetine for further treatment.  Since starting duloxetine the patient does note improvement of her anxiety but has become concerned as she has started noticing higher glucose levels.  She notes that her glucose level this morning was 168.   Anxiety  Patient reports no chest pain, dizziness, nausea, palpitations or shortness of breath.       Review of Systems   Constitutional:  Negative for appetite change, chills, fatigue and fever.   HENT:  Negative for nasal congestion, ear pain, hearing loss, postnasal drip, rhinorrhea, sinus pressure/congestion, sore throat and tinnitus.    Eyes:  Negative for redness, itching and visual disturbance.   Respiratory:  Negative for cough, chest tightness and shortness of breath.    Cardiovascular:  Negative for chest pain and palpitations.   Gastrointestinal:  Negative for abdominal pain, constipation, diarrhea, nausea and vomiting.   Genitourinary:  Negative for decreased urine volume, difficulty urinating, dysuria, frequency, hematuria and urgency.   Musculoskeletal:  Negative for back pain, myalgias, neck pain and neck stiffness.   Integumentary:  Negative for rash.   Neurological:  Negative for dizziness, light-headedness and headaches.   Psychiatric/Behavioral: Negative.         Objective:      Physical Exam  Vitals and nursing note reviewed.    Constitutional:       General: She is not in acute distress.     Appearance: She is well-developed. She is not diaphoretic.   HENT:      Head: Normocephalic and atraumatic.      Right Ear: External ear normal.      Left Ear: External ear normal.      Nose: Nose normal.      Mouth/Throat:      Pharynx: No oropharyngeal exudate.   Eyes:      General: No scleral icterus.        Right eye: No discharge.         Left eye: No discharge.      Conjunctiva/sclera: Conjunctivae normal.      Pupils: Pupils are equal, round, and reactive to light.   Neck:      Thyroid: No thyromegaly.      Vascular: No JVD.      Trachea: No tracheal deviation.   Cardiovascular:      Rate and Rhythm: Normal rate and regular rhythm.      Heart sounds: Normal heart sounds. No murmur heard.    No friction rub. No gallop.   Pulmonary:      Effort: Pulmonary effort is normal. No respiratory distress.      Breath sounds: Normal breath sounds. No stridor. No wheezing or rales.   Abdominal:      General: Bowel sounds are normal. There is no distension.      Palpations: Abdomen is soft. There is no mass.      Tenderness: There is no abdominal tenderness. There is no guarding or rebound.   Musculoskeletal:         General: No tenderness. Normal range of motion.      Cervical back: Normal range of motion and neck supple.   Lymphadenopathy:      Cervical: No cervical adenopathy.   Skin:     General: Skin is warm and dry.      Coloration: Skin is not pale.      Findings: No erythema or rash.   Neurological:      Mental Status: She is alert and oriented to person, place, and time.   Psychiatric:         Behavior: Behavior normal.         Thought Content: Thought content normal.         Judgment: Judgment normal.       Assessment:       Problem List Items Addressed This Visit       Anxiety - Primary    Type 2 diabetes mellitus without complication, with long-term current use of insulin    Relevant Orders    Comprehensive Metabolic Panel    Lipid Panel     Hemoglobin A1C     Other Visit Diagnoses       Anemia, unspecified type        Relevant Orders    CBC Auto Differential    Hypertriglyceridemia        Relevant Orders    Lipid Panel              Plan:         1. Continue duloxetine 30 mg daily.  Anxiety is stable.    2. Repeat CMP and A1c now for further evaluation of diabetes.    3. Repeat CMP now for further evaluation of anemia.  Continue ferrous sulfate 325 mg daily.    4. Repeat fasting cholesterol level now.    5. Return to clinic as needed or in 8 months for annual physical exam.

## 2022-12-06 ENCOUNTER — PATIENT MESSAGE (OUTPATIENT)
Dept: REHABILITATION | Facility: HOSPITAL | Age: 67
End: 2022-12-06

## 2022-12-06 ENCOUNTER — PATIENT MESSAGE (OUTPATIENT)
Dept: INTERNAL MEDICINE | Facility: CLINIC | Age: 67
End: 2022-12-06
Payer: MEDICARE

## 2022-12-09 ENCOUNTER — CLINICAL SUPPORT (OUTPATIENT)
Dept: REHABILITATION | Facility: HOSPITAL | Age: 67
End: 2022-12-09
Payer: MEDICARE

## 2022-12-09 DIAGNOSIS — M25.511 ACUTE PAIN OF RIGHT SHOULDER: Primary | ICD-10-CM

## 2022-12-09 PROCEDURE — 97110 THERAPEUTIC EXERCISES: CPT

## 2022-12-09 NOTE — PROGRESS NOTES
CC: This 67 y.o.  female presents for management of type 2 dm along with the current chronic medical conditions including:  Patient Active Problem List   Diagnosis    Anxiety    Fatty liver    Memory changes    Pathological fracture of right humerus due to osteoporosis with routine healing    Gastroesophageal reflux disease without esophagitis    Hypertension associated with type 2 diabetes mellitus    ZAHIRA (obstructive sleep apnea)    Atherosclerosis of native coronary artery of native heart without angina pectoris    Muscle spasms of neck    Radiculopathy of cervical spine    Overweight (BMI 25.0-29.9)    Type 2 diabetes mellitus without complication, with long-term current use of insulin    Vitamin D deficiency    Acute pain of right shoulder    Pain in right elbow    Localized edema    Primary osteoarthritis of left knee    Aftercare following left knee joint replacement surgery    Aortic atherosclerosis    Closed fracture of proximal end of right humerus with routine healing s/p total shoulder replacement on 8/16/2022    Anterior dislocation of right shoulder      Status of these conditions is pending review.    H/o fatty liver, HTN, overweight, osteopenia, coronary atherosclerosis, gout, arthritic pain (L) knee, ankle       HPI: Diagnosed with T2DM x 20 years ago.   Started insulin in 2006.   Seen by Dr. Morse in the past- c peptide- revealed low level -hair   Last seen by me in 2021  Accompanied by     Memory loss issues at times.   Concerns today with higher readings (BG).  Remains on MDI  In past on victoza  and januvia- edema  Lab Results   Component Value Date    HGBA1C 6.9 (H) 12/05/2022     On MDI injections 4x a day   Testing 4 x a day  Patient is willing and able to use the device  Demonstrated an understanding of the technology and is motivated to use CGM  Patient expected to adhere to a comprehensive diabetes treatment plan and patient has adequate medical supervision  Patient  experiences multiple impaired awareness of hypoglycemia (hypoglycemia unawareness)  See media for logs  Lowest 90, 95 mg/dl  Over the past 30 days, elevations  True metrix  Lowest 67 mg/dl   191 mg/dl 30 day, 187 mg/dl 7 day, 199 mg/dl 14 day   252, 197,  126, 136, 166, 236, 338, 158, 190, 128   Has h/o hypoglycemia 39 mg/dl    (R)  Knee replacement sx 10/6/2020  Injury to right shoulder, surgery in august 2022     Lab Results   Component Value Date    HGBA1C 6.9 (H) 12/05/2022     Of note, , retired, fall in 2015, injured (R) elbow, still has pain, can't lift heavier than 10 lbs.     Off metformin related to kidneys.    CURRENT DM MEDS:   lantus 25 units qhs, novolog 14 units  with mod dose correction scale, starting at 150    Social Hx/personal Hx: , work-housekeeping, 1 son (28 y/o)    Riana Baker Rahul forgot glucometer/logs today    DIET/ MEAL PATTERN: 3 meals a day  Snacks 1-2     EXERCISE: no formal; does yardwork     STANDARDS OF CARE:     Diabetes Management Status    Statin: Not taking  ACE/ARB: Taking    Screening or Prevention Patient's value Goal Complete/Controlled?   HgA1C Testing and Control   Lab Results   Component Value Date    HGBA1C 6.9 (H) 12/05/2022      Annually/Less than 8% Yes   Lipid profile : 12/05/2022 Annually Yes   LDL control Lab Results   Component Value Date    LDLCALC 115.4 12/05/2022    Annually/Less than 100 mg/dl  Yes   Nephropathy screening Lab Results   Component Value Date    LABMICR 9.0 09/01/2022     Lab Results   Component Value Date    PROTEINUA Negative 09/01/2022    Annually Yes   Blood pressure BP Readings from Last 1 Encounters:   12/05/22 120/60    Less than 140/90 Yes   Dilated retinal exam : 06/10/2022 Annually Yes   Foot exam   : 08/31/2022 Annually No       ROS:   GEN: +chronic fatigue or weakness, no changes w/ appetite, wt fluctuations (loss 10#) in the past 4 months    CV:  Denies chest pain, palpitations, edema or cyanosis, denies  syncope  SKIN: Skin is intact and heals well, no rashes, pruritis, easy bruising, no hair changes, no intolerance to heat/cold.  RESP: No SOB, cough, FISCHER  FEN/GI: Nml bowel movements, normal appetite, +GERD  RENAL: No urinary complaints, no dysuria/hematuia/oliguria   ENDO: no heat/cold intolerance  ID: No skin breakdown, fevers, chills  PSYCH: Denies drug/ETOH abuse, no hx. of eating disorders or depression +anxiety  MS/NEURO: Denies numbness/ tingling in BLE; +bilateral knee and ankle joint pain-(R) knee replacement 10/2020, (R) elbow pain -fall in 2015, surgery, (R) shoulder 8/2022        Lab Results   Component Value Date    TSH 1.212 09/01/2022       Chemistry        Component Value Date/Time     12/05/2022 0854    K 4.9 12/05/2022 0854     12/05/2022 0854    CO2 26 12/05/2022 0854    BUN 18 12/05/2022 0854    CREATININE 0.8 12/05/2022 0854     (H) 12/05/2022 0854        Component Value Date/Time    CALCIUM 9.7 12/05/2022 0854    ALKPHOS 94 12/05/2022 0854    AST 19 12/05/2022 0854    ALT 25 12/05/2022 0854    BILITOT 0.6 12/05/2022 0854          Lab Results   Component Value Date    LDLCALC 115.4 12/05/2022       PE:  GEN: Patient WNWD, WF, NAD, AAOx3, Friendly, talkative, well groomed  HEENT: EOMI, PERRLA, no lid lag, Clear oropharynx  NECK: trachea midline  CV: Regular rate and rhythm, no edema  RESP: No increase work of breathing, No cough, wheeze.  ABD: no tenderness EXT: No C/C/Edema, No skin rash/breakdown  NEURO: CN 2-12 intact, nml gait   FEET: No pressure areas, blisters, ulcers, calluses. Footware appropriate.        ASSESSMENT and PLAN:  Riana was seen today for diabetes mellitus.    Diagnoses and associated orders for this visit:  1. Type 2 diabetes mellitus without complication, with long-term current use of insulin  Hemoglobin A1C      2. Overweight (BMI 25.0-29.9)        3. Hypertension associated with type 2 diabetes mellitus        4. Fatty liver        5. ZAHIRA  (obstructive sleep apnea)        6. Encounter for screening mammogram for malignant neoplasm of breast  Mammo Digital Screening Bilat      7. Acute pain of right shoulder        Follow up in about 4 months (around 4/13/2023).     Continue doses for lantus and humalog, add ozempic 0.5 mg weekly, a1c goal less than 7% with BG staying under 180 mg/dl, send rx baqsimi, brochure given on carb counting, dexcom g6   Glp1a will help   Continue med(s)  F/u with hepatology   On cpap   Reviewed health maintenance  F/u with ortho prn

## 2022-12-09 NOTE — PROGRESS NOTES
"                          Physical Therapy Daily Treatment Note     Name: Riana Baker   Clinic Number: [id number]    Therapy Diagnosis:   Encounter Diagnosis   Name Primary?    Acute pain of right shoulder Yes         Physician: Mendez Tavarez NP    Visit Date: 12/9/2022    Physician Orders: PT Eval and Treat   Medical Diagnosis: s/p R reverse total shoulder  Date of Surgery: 8/16/22  Evaluation Date: 9/6/2022  Authorization Period Expiration: 10/4/22  Plan of Care Certification Period: 2/21/23  Visit # / Visits authorized: 19/23     Time In: 0800 am  Time Out: 0855 am   Total Billable Time: 30 minutes      Precautions: Standard and s/p reverse shoulder  RUE  Nonweightbearing x 12 wks     PHASE 1: now until 2 wks postop  ROMAT and hand, wrist, elbow.  Sling, may remove for hygiene and ADLs     PHASE 2: 2-6 wks postop  ROMAT and hand, wrist, elbow.  Shoulder Pendulums okay  Shoulder passive and active assisted ROMAT   Sling, may remove for hygiene ADLs and therapy     PHASE 3: 6-12 wks postop   D/c sling   ROMAT RUE  No resistance     PHASE 4: after 12 wks postop  ROMAT, WBAT  Subjective      Pt reports:she was sick earlier this week so had to cancel.   she was compliant with home exercise program given last session.   Response to previous treatment:NA  Functional change: NA    Pain: not quantified this visit  Location: right shoulder      Objective     PROM R shoulder: 12/9/22  Shoulder FL: 150 deg  Shoulder ABD: above 180  ER @ 90 degrees: 90  IR: 80 deg    Riana received therapeutic exercises to develop strength, endurance, ROM, and posture for 55 minutes including:  Shoulder PROM   Open books next session  UBE 4'/4' within available ROM lvl3  Prone Ts level 2 iso holds 6 x 5" hold-np  Prone Rows 3x10  Supine SA press #2   Supine AROM FL 30x  #1-np  Sideling ER, flex, ABD #0-1 x20-30  Landmine; 3 x 10-np  Wall slides with towel HHA x30  Ball on wall x20 with 5 sec hold  Standing shoulder flex and abd " "with dowel x30  EOT shoulder weight shifts  30x-np        Riana received the following manual therapy techniques: Joint mobilizations and Manual traction were applied to the: R shoulder for 00 minutes, including:  PROM with light distraction per protocol  Grade I/II GH joint mobilizations       Riana received hot pack for 00 minutes to increase circulation and promote tissue healing. W/ shoulder prop    Riana received cold pack for 00 minutes to to decrease circulation, pain, and swelling.    Home Exercises Provided and Patient Education Provided     Education provided:   -resume HEP    Written Home Exercises Provided: Patient instructed to cont prior HEP.  Exercises were reviewed and Riana was able to demonstrate them prior to the end of the session.  Riana demonstrated good  understanding of the education provided.     See EMR under Patient Instructions for exercises provided {Makenna single:13540::"12/9/2022","prior visit"    Assessment     Upon reassessment, pt has met all ROM, except flexion and still presents with understandable weakness for this stage of rehab. She continues to show good compliance with HEP and would benefit from continued AROM and strengthening.     Riana is progressing well towards her goals.   Pt prognosis is Good.     Pt will continue to benefit from skilled outpatient physical therapy to address the deficits listed in the problem list box on initial evaluation, provide pt/family education and to maximize pt's level of independence in the home and community environment.     Pt's spiritual, cultural and educational needs considered and pt agreeable to plan of care and goals.    Anticipated barriers to physical therapy: none    Goals:   Short Term Goals: (6 weeks)   - Pt will increase ROM to 110 deg R shoulder flex and abd, 40 deg IR/ER at 45 deg abd ( met)  - Decrease Pain to 2/10 as worst with all PT interventions (Progressing, not met)  - Pt to self correct posture and " gait with minimal cues (met)  - Pt independent with HEP with progressions. ( met)     Long Term Goals (24 Weeks):  - Pt will increase ROM to 170 deg R shoulder flex and abd, 80 deg ER and IR (Progressing, not met)  - Pt will increase strength to 5/5 RUE in all planes (Progressing, not met)  - Decrease Pain to 0/10 with ADLs overhead (Progressing, not met)  - Pt to return to 80% PLOF (Progressing, not met)       Plan     Continue POC per pt tolerance progressing AROM per protocol.     Bernadette Palacios, PT,

## 2022-12-13 ENCOUNTER — OFFICE VISIT (OUTPATIENT)
Dept: INTERNAL MEDICINE | Facility: CLINIC | Age: 67
End: 2022-12-13
Payer: MEDICARE

## 2022-12-13 ENCOUNTER — CLINICAL SUPPORT (OUTPATIENT)
Dept: REHABILITATION | Facility: HOSPITAL | Age: 67
End: 2022-12-13
Attending: NURSE PRACTITIONER
Payer: MEDICARE

## 2022-12-13 VITALS
DIASTOLIC BLOOD PRESSURE: 76 MMHG | OXYGEN SATURATION: 98 % | HEART RATE: 73 BPM | HEIGHT: 65 IN | SYSTOLIC BLOOD PRESSURE: 138 MMHG | BODY MASS INDEX: 28.25 KG/M2 | WEIGHT: 169.56 LBS

## 2022-12-13 DIAGNOSIS — G47.33 OSA (OBSTRUCTIVE SLEEP APNEA): ICD-10-CM

## 2022-12-13 DIAGNOSIS — E11.9 TYPE 2 DIABETES MELLITUS WITHOUT COMPLICATION, WITH LONG-TERM CURRENT USE OF INSULIN: Primary | ICD-10-CM

## 2022-12-13 DIAGNOSIS — I15.2 HYPERTENSION ASSOCIATED WITH TYPE 2 DIABETES MELLITUS: ICD-10-CM

## 2022-12-13 DIAGNOSIS — K76.0 FATTY LIVER: ICD-10-CM

## 2022-12-13 DIAGNOSIS — E66.3 OVERWEIGHT (BMI 25.0-29.9): ICD-10-CM

## 2022-12-13 DIAGNOSIS — M25.511 ACUTE PAIN OF RIGHT SHOULDER: Primary | ICD-10-CM

## 2022-12-13 DIAGNOSIS — Z12.31 ENCOUNTER FOR SCREENING MAMMOGRAM FOR MALIGNANT NEOPLASM OF BREAST: ICD-10-CM

## 2022-12-13 DIAGNOSIS — Z79.4 TYPE 2 DIABETES MELLITUS WITHOUT COMPLICATION, WITH LONG-TERM CURRENT USE OF INSULIN: Primary | ICD-10-CM

## 2022-12-13 DIAGNOSIS — E11.59 HYPERTENSION ASSOCIATED WITH TYPE 2 DIABETES MELLITUS: ICD-10-CM

## 2022-12-13 DIAGNOSIS — M25.511 ACUTE PAIN OF RIGHT SHOULDER: ICD-10-CM

## 2022-12-13 PROCEDURE — 3288F PR FALLS RISK ASSESSMENT DOCUMENTED: ICD-10-PCS | Mod: CPTII,S$GLB,, | Performed by: NURSE PRACTITIONER

## 2022-12-13 PROCEDURE — 3078F DIAST BP <80 MM HG: CPT | Mod: CPTII,S$GLB,, | Performed by: NURSE PRACTITIONER

## 2022-12-13 PROCEDURE — 3044F PR MOST RECENT HEMOGLOBIN A1C LEVEL <7.0%: ICD-10-PCS | Mod: CPTII,S$GLB,, | Performed by: NURSE PRACTITIONER

## 2022-12-13 PROCEDURE — 1126F AMNT PAIN NOTED NONE PRSNT: CPT | Mod: CPTII,S$GLB,, | Performed by: NURSE PRACTITIONER

## 2022-12-13 PROCEDURE — 3075F PR MOST RECENT SYSTOLIC BLOOD PRESS GE 130-139MM HG: ICD-10-PCS | Mod: CPTII,S$GLB,, | Performed by: NURSE PRACTITIONER

## 2022-12-13 PROCEDURE — 3061F PR NEG MICROALBUMINURIA RESULT DOCUMENTED/REVIEW: ICD-10-PCS | Mod: CPTII,S$GLB,, | Performed by: NURSE PRACTITIONER

## 2022-12-13 PROCEDURE — 3066F PR DOCUMENTATION OF TREATMENT FOR NEPHROPATHY: ICD-10-PCS | Mod: CPTII,S$GLB,, | Performed by: NURSE PRACTITIONER

## 2022-12-13 PROCEDURE — 3008F BODY MASS INDEX DOCD: CPT | Mod: CPTII,S$GLB,, | Performed by: NURSE PRACTITIONER

## 2022-12-13 PROCEDURE — 3008F PR BODY MASS INDEX (BMI) DOCUMENTED: ICD-10-PCS | Mod: CPTII,S$GLB,, | Performed by: NURSE PRACTITIONER

## 2022-12-13 PROCEDURE — 99214 OFFICE O/P EST MOD 30 MIN: CPT | Mod: S$GLB,,, | Performed by: NURSE PRACTITIONER

## 2022-12-13 PROCEDURE — 3061F NEG MICROALBUMINURIA REV: CPT | Mod: CPTII,S$GLB,, | Performed by: NURSE PRACTITIONER

## 2022-12-13 PROCEDURE — 3066F NEPHROPATHY DOC TX: CPT | Mod: CPTII,S$GLB,, | Performed by: NURSE PRACTITIONER

## 2022-12-13 PROCEDURE — 1126F PR PAIN SEVERITY QUANTIFIED, NO PAIN PRESENT: ICD-10-PCS | Mod: CPTII,S$GLB,, | Performed by: NURSE PRACTITIONER

## 2022-12-13 PROCEDURE — 3044F HG A1C LEVEL LT 7.0%: CPT | Mod: CPTII,S$GLB,, | Performed by: NURSE PRACTITIONER

## 2022-12-13 PROCEDURE — 4010F PR ACE/ARB THEARPY RXD/TAKEN: ICD-10-PCS | Mod: CPTII,S$GLB,, | Performed by: NURSE PRACTITIONER

## 2022-12-13 PROCEDURE — 4010F ACE/ARB THERAPY RXD/TAKEN: CPT | Mod: CPTII,S$GLB,, | Performed by: NURSE PRACTITIONER

## 2022-12-13 PROCEDURE — 1159F PR MEDICATION LIST DOCUMENTED IN MEDICAL RECORD: ICD-10-PCS | Mod: CPTII,S$GLB,, | Performed by: NURSE PRACTITIONER

## 2022-12-13 PROCEDURE — 1157F PR ADVANCE CARE PLAN OR EQUIV PRESENT IN MEDICAL RECORD: ICD-10-PCS | Mod: CPTII,S$GLB,, | Performed by: NURSE PRACTITIONER

## 2022-12-13 PROCEDURE — 99999 PR PBB SHADOW E&M-EST. PATIENT-LVL V: CPT | Mod: PBBFAC,,, | Performed by: NURSE PRACTITIONER

## 2022-12-13 PROCEDURE — 1100F PTFALLS ASSESS-DOCD GE2>/YR: CPT | Mod: CPTII,S$GLB,, | Performed by: NURSE PRACTITIONER

## 2022-12-13 PROCEDURE — 3288F FALL RISK ASSESSMENT DOCD: CPT | Mod: CPTII,S$GLB,, | Performed by: NURSE PRACTITIONER

## 2022-12-13 PROCEDURE — 99999 PR PBB SHADOW E&M-EST. PATIENT-LVL V: ICD-10-PCS | Mod: PBBFAC,,, | Performed by: NURSE PRACTITIONER

## 2022-12-13 PROCEDURE — 3078F PR MOST RECENT DIASTOLIC BLOOD PRESSURE < 80 MM HG: ICD-10-PCS | Mod: CPTII,S$GLB,, | Performed by: NURSE PRACTITIONER

## 2022-12-13 PROCEDURE — 99499 RISK ADDL DX/OHS AUDIT: ICD-10-PCS | Mod: S$GLB,,, | Performed by: NURSE PRACTITIONER

## 2022-12-13 PROCEDURE — 1100F PR PT FALLS ASSESS DOC 2+ FALLS/FALL W/INJURY/YR: ICD-10-PCS | Mod: CPTII,S$GLB,, | Performed by: NURSE PRACTITIONER

## 2022-12-13 PROCEDURE — 1159F MED LIST DOCD IN RCRD: CPT | Mod: CPTII,S$GLB,, | Performed by: NURSE PRACTITIONER

## 2022-12-13 PROCEDURE — 99214 PR OFFICE/OUTPT VISIT, EST, LEVL IV, 30-39 MIN: ICD-10-PCS | Mod: S$GLB,,, | Performed by: NURSE PRACTITIONER

## 2022-12-13 PROCEDURE — 3075F SYST BP GE 130 - 139MM HG: CPT | Mod: CPTII,S$GLB,, | Performed by: NURSE PRACTITIONER

## 2022-12-13 PROCEDURE — 1157F ADVNC CARE PLAN IN RCRD: CPT | Mod: CPTII,S$GLB,, | Performed by: NURSE PRACTITIONER

## 2022-12-13 PROCEDURE — 1160F RVW MEDS BY RX/DR IN RCRD: CPT | Mod: CPTII,S$GLB,, | Performed by: NURSE PRACTITIONER

## 2022-12-13 PROCEDURE — 99499 UNLISTED E&M SERVICE: CPT | Mod: S$GLB,,, | Performed by: NURSE PRACTITIONER

## 2022-12-13 PROCEDURE — 1160F PR REVIEW ALL MEDS BY PRESCRIBER/CLIN PHARMACIST DOCUMENTED: ICD-10-PCS | Mod: CPTII,S$GLB,, | Performed by: NURSE PRACTITIONER

## 2022-12-13 RX ORDER — SEMAGLUTIDE 1.34 MG/ML
0.5 INJECTION, SOLUTION SUBCUTANEOUS
Qty: 1 PEN | Refills: 5 | Status: SHIPPED | OUTPATIENT
Start: 2022-12-13 | End: 2023-04-13

## 2022-12-13 RX ORDER — INSULIN PUMP SYRINGE, 3 ML
EACH MISCELLANEOUS
Qty: 1 EACH | Refills: 0 | Status: SHIPPED | OUTPATIENT
Start: 2022-12-13 | End: 2023-01-31 | Stop reason: SDUPTHER

## 2022-12-13 RX ORDER — LANCETS
EACH MISCELLANEOUS
Qty: 100 EACH | Refills: 11 | Status: SHIPPED | OUTPATIENT
Start: 2022-12-13 | End: 2023-02-13 | Stop reason: SDUPTHER

## 2022-12-13 RX ORDER — GLUCAGON 3 MG/1
POWDER NASAL
Qty: 1 EACH | Refills: 1 | Status: SHIPPED | OUTPATIENT
Start: 2022-12-13 | End: 2023-04-13 | Stop reason: SDUPTHER

## 2022-12-13 NOTE — PATIENT INSTRUCTIONS
Follow up in 4 months w/Irielle  A1c in 4 months   Mammogram 2022/2023    Lantus 25 units at night   Humalog 14-14-14 units w/ scale 150-200+2, etc  Add new: ozempic 0.5 mg weekly    new meter  Consider dexcom g6  Lab Results   Component Value Date    HGBA1C 6.9 (H) 12/05/2022       Goal  no higher than 180    [email or web address]     Educator will call to set up training  Office will have to send chart notes and rx for dexcom supplies monthly   Change every 10 days (sensor)    Baqsimi- emergency kit for severe lows < 50, spray rx (glucagon)

## 2022-12-13 NOTE — PROGRESS NOTES
"                          Physical Therapy Daily Treatment Note     Name: Riana Baker   Clinic Number: [id number]    Therapy Diagnosis:   Encounter Diagnosis   Name Primary?    Acute pain of right shoulder Yes         Physician: Mendez Tavarez NP    Visit Date: 12/13/2022    Physician Orders: PT Eval and Treat   Medical Diagnosis: s/p R reverse total shoulder  Date of Surgery: 8/16/22  Evaluation Date: 9/6/2022  Authorization Period Expiration: 10/4/22  Plan of Care Certification Period: 2/21/23  Visit # / Visits authorized: 20/23     Time In: 0800 am  Time Out: 0900 am   Total Billable Time: 60 minutes      Precautions: Standard and s/p reverse shoulder  RUE  Nonweightbearing x 12 wks     PHASE 1: now until 2 wks postop  ROMAT and hand, wrist, elbow.  Sling, may remove for hygiene and ADLs     PHASE 2: 2-6 wks postop  ROMAT and hand, wrist, elbow.  Shoulder Pendulums okay  Shoulder passive and active assisted ROMAT   Sling, may remove for hygiene ADLs and therapy     PHASE 3: 6-12 wks postop   D/c sling   ROMAT RUE  No resistance     PHASE 4: after 12 wks postop  ROMAT, WBAT  Subjective      Pt reports:Getting better   she was compliant with home exercise program given last session.   Response to previous treatment:NA  Functional change: NA    Pain: not quantified this visit  Location: right shoulder      Objective     PROM R shoulder: 12/9/22  Shoulder FL: 150 deg  Shoulder ABD: above 180  ER @ 90 degrees: 90  IR: 80 deg    Riana received therapeutic exercises to develop strength, endurance, ROM, and posture for 60 minutes including:  Shoulder PROM   Open books next session  UBE 4'/4' within available ROM lvl3  Prone Ts level 2 iso holds 6 x 5" hold-np  Prone Rows 4x6 #1  Supine SA press #2   Standing FL w/ BTB assist 2x15  Sideling ER, flex, ABD #0-1 x20-30  Wall slides with towel HHA x30-NP  Ball on wall x20 with 5 sec hold  Standing shoulder flex and abd with dowel x30  EOT shoulder weight shifts  " "30x        Riana received the following manual therapy techniques: Joint mobilizations and Manual traction were applied to the: R shoulder for 00 minutes, including:  PROM with light distraction per protocol  Grade I/II GH joint mobilizations       Riana received hot pack for 00 minutes to increase circulation and promote tissue healing. W/ shoulder prop    Riana received cold pack for 00 minutes to to decrease circulation, pain, and swelling.    Home Exercises Provided and Patient Education Provided     Education provided:   -resume HEP    Written Home Exercises Provided: Patient instructed to cont prior HEP.  Exercises were reviewed and Riana was able to demonstrate them prior to the end of the session.  Riana demonstrated good  understanding of the education provided.     See EMR under Patient Instructions for exercises provided {Blank single:40195::"12/13/2022","prior visit"    Assessment   R shoulder AROM FL is nearing shoulder height. Cont to show overall shoulder/scpaular weakness but does show slight gains in sessions. Added T/S mobility exercise to assist w/ FL ROM.      Riana is progressing well towards her goals.   Pt prognosis is Good.     Pt will continue to benefit from skilled outpatient physical therapy to address the deficits listed in the problem list box on initial evaluation, provide pt/family education and to maximize pt's level of independence in the home and community environment.     Pt's spiritual, cultural and educational needs considered and pt agreeable to plan of care and goals.    Anticipated barriers to physical therapy: none    Goals:   Short Term Goals: (6 weeks)   - Pt will increase ROM to 110 deg R shoulder flex and abd, 40 deg IR/ER at 45 deg abd ( met)  - Decrease Pain to 2/10 as worst with all PT interventions (Progressing, not met)  - Pt to self correct posture and gait with minimal cues (met)  - Pt independent with HEP with progressions. ( met)     Long Term " Goals (24 Weeks):  - Pt will increase ROM to 170 deg R shoulder flex and abd, 80 deg ER and IR (Progressing, not met)  - Pt will increase strength to 5/5 RUE in all planes (Progressing, not met)  - Decrease Pain to 0/10 with ADLs overhead (Progressing, not met)  - Pt to return to 80% PLOF (Progressing, not met)       Plan     Continue POC per pt tolerance progressing AROM per protocol.     Cristal Marie PTA,

## 2022-12-14 ENCOUNTER — TELEPHONE (OUTPATIENT)
Dept: DIABETES | Facility: CLINIC | Age: 67
End: 2022-12-14
Payer: MEDICARE

## 2022-12-14 ENCOUNTER — PATIENT MESSAGE (OUTPATIENT)
Dept: INTERNAL MEDICINE | Facility: CLINIC | Age: 67
End: 2022-12-14
Payer: MEDICARE

## 2022-12-15 ENCOUNTER — CLINICAL SUPPORT (OUTPATIENT)
Dept: REHABILITATION | Facility: HOSPITAL | Age: 67
End: 2022-12-15
Payer: MEDICARE

## 2022-12-15 DIAGNOSIS — M25.511 ACUTE PAIN OF RIGHT SHOULDER: Primary | ICD-10-CM

## 2022-12-15 PROCEDURE — 97110 THERAPEUTIC EXERCISES: CPT

## 2022-12-15 NOTE — PROGRESS NOTES
"                          Physical Therapy Daily Treatment Note     Name: Riana Kay  Clinic Number: [id number]    Therapy Diagnosis:   Encounter Diagnosis   Name Primary?    Acute pain of right shoulder Yes         Physician: Mendez Tavarez NP    Visit Date: 12/15/2022    Physician Orders: PT Eval and Treat   Medical Diagnosis: s/p R reverse total shoulder  Date of Surgery: 8/16/22  Evaluation Date: 9/6/2022  Authorization Period Expiration: 10/4/22  Plan of Care Certification Period: 2/21/23  Visit # / Visits authorized: 21/23     Time In: 0800 am  Time Out: 0855 am   Total Billable Time: 55 minutes      Precautions: Standard and s/p reverse shoulder  RUE  Nonweightbearing x 12 wks     PHASE 1: now until 2 wks postop  ROMAT and hand, wrist, elbow.  Sling, may remove for hygiene and ADLs     PHASE 2: 2-6 wks postop  ROMAT and hand, wrist, elbow.  Shoulder Pendulums okay  Shoulder passive and active assisted ROMAT   Sling, may remove for hygiene ADLs and therapy     PHASE 3: 6-12 wks postop   D/c sling   ROMAT RUE  No resistance     PHASE 4: after 12 wks postop  ROMAT, WBAT  Subjective      Pt reports:she is still working hard on lifting her arm at home.    she was compliant with home exercise program given last session.   Response to previous treatment:NA  Functional change: NA    Pain: not quantified this visit  Location: right shoulder      Objective     PROM R shoulder: 12/9/22  Shoulder FL: 150 deg  Shoulder ABD: above 180  ER @ 90 degrees: 90  IR: 80 deg    Riana received therapeutic exercises to develop strength, endurance, ROM, and posture for 55 minutes including:  Shoulder PROM   Open books next session  UBE 5'/5' within available ROM lvl3  Prone Ts level 2 iso holds 6 x 5" hold-np  Prone Rows 4x6 #1-np  Supine SA press #2 -np  Supine flexion 1# elevated 1 notch 2x 20   Sideling flex, ABD #0-1 x20-30  Wall slides with towel HHA x30-  Ball on wall x30 with 5 sec hold  Standing shoulder flex " "and abd with dowel x30  EOT shoulder weight shifts  30x-np        Riana received the following manual therapy techniques: Joint mobilizations and Manual traction were applied to the: R shoulder for 00 minutes, including:  PROM with light distraction per protocol  Grade I/II GH joint mobilizations       Riana received hot pack for 00 minutes to increase circulation and promote tissue healing. W/ shoulder prop    Riana received cold pack for 00 minutes to to decrease circulation, pain, and swelling.    Home Exercises Provided and Patient Education Provided     Education provided:   -resume HEP    Written Home Exercises Provided: Patient instructed to cont prior HEP.  Exercises were reviewed and Riana was able to demonstrate them prior to the end of the session.  Riana demonstrated good  understanding of the education provided.     See EMR under Patient Instructions for exercises provided {Blank single:57668::"12/15/2022","prior visit"    Assessment   R tolerated supine flexion, slightly elevated well and plan to progress elevation as tolerated. Pt otherwise demonstrating more tolerance to AROM, weaning from AAROM.     Riana is progressing well towards her goals.   Pt prognosis is Good.     Pt will continue to benefit from skilled outpatient physical therapy to address the deficits listed in the problem list box on initial evaluation, provide pt/family education and to maximize pt's level of independence in the home and community environment.     Pt's spiritual, cultural and educational needs considered and pt agreeable to plan of care and goals.    Anticipated barriers to physical therapy: none    Goals:   Short Term Goals: (6 weeks)   - Pt will increase ROM to 110 deg R shoulder flex and abd, 40 deg IR/ER at 45 deg abd ( met)  - Decrease Pain to 2/10 as worst with all PT interventions (Progressing, not met)  - Pt to self correct posture and gait with minimal cues (met)  - Pt independent with HEP " with progressions. ( met)     Long Term Goals (24 Weeks):  - Pt will increase ROM to 170 deg R shoulder flex and abd, 80 deg ER and IR (Progressing, not met)  - Pt will increase strength to 5/5 RUE in all planes (Progressing, not met)  - Decrease Pain to 0/10 with ADLs overhead (Progressing, not met)  - Pt to return to 80% PLOF (Progressing, not met)       Plan     Continue POC per pt tolerance progressing AROM per protocol.     Bernadette Palacios, PT,

## 2022-12-19 ENCOUNTER — TELEPHONE (OUTPATIENT)
Dept: DIABETES | Facility: CLINIC | Age: 67
End: 2022-12-19
Payer: MEDICARE

## 2022-12-19 ENCOUNTER — PES CALL (OUTPATIENT)
Dept: ADMINISTRATIVE | Facility: CLINIC | Age: 67
End: 2022-12-19
Payer: MEDICARE

## 2022-12-20 ENCOUNTER — CLINICAL SUPPORT (OUTPATIENT)
Dept: REHABILITATION | Facility: HOSPITAL | Age: 67
End: 2022-12-20
Payer: MEDICARE

## 2022-12-20 DIAGNOSIS — M25.511 ACUTE PAIN OF RIGHT SHOULDER: Primary | ICD-10-CM

## 2022-12-20 PROCEDURE — 97110 THERAPEUTIC EXERCISES: CPT | Mod: CQ

## 2022-12-20 NOTE — PROGRESS NOTES
Physical Therapy Daily Treatment Note     Name: Riana Baker   Clinic Number: [id number]    Therapy Diagnosis:   Encounter Diagnosis   Name Primary?    Acute pain of right shoulder Yes         Physician: Mendez Tavarez NP    Visit Date: 12/20/2022    Physician Orders: PT Eval and Treat   Medical Diagnosis: s/p R reverse total shoulder  Date of Surgery: 8/16/22  Evaluation Date: 9/6/2022  Authorization Period Expiration: 10/4/22  Plan of Care Certification Period: 2/21/23  Visit # / Visits authorized: 22/23     Time In: 0800 am  Time Out: 0900 am   Total Billable Time: 60 minutes      Precautions: Standard and s/p reverse shoulder  RUE  Nonweightbearing x 12 wks     PHASE 1: now until 2 wks postop  ROMAT and hand, wrist, elbow.  Sling, may remove for hygiene and ADLs     PHASE 2: 2-6 wks postop  ROMAT and hand, wrist, elbow.  Shoulder Pendulums okay  Shoulder passive and active assisted ROMAT   Sling, may remove for hygiene ADLs and therapy     PHASE 3: 6-12 wks postop   D/c sling   ROMAT RUE  No resistance     PHASE 4: after 12 wks postop  ROMAT, WBAT  Subjective      Pt reports:shoulder is getting better    she was compliant with home exercise program given last session.   Response to previous treatment:NA  Functional change: NA    Pain: not quantified this visit  Location: right shoulder      Objective     PROM R shoulder: 12/9/22  Shoulder FL: 150 deg  Shoulder ABD: above 180  ER @ 90 degrees: 90  IR: 80 deg    Riana received therapeutic exercises to develop strength, endurance, ROM, and posture for 60 minutes including:  Shoulder PROM   Open books 20x  UBE 5'/5' within available ROM lvl3  Landmine #2 3x12  Supine flexion 1# elevated 1 notch 2x 20   Sidelying ABD 4x8 #1  EOT elevated shoulder FL 2 notch 5x5  Wall slides with towel HHA 6x6  Ball on wall x30 with 5 sec hold-NP  Standing shoulder flex and abd with dowel x30-NP  EOT shoulder weight shifts  20x        Riana  "received the following manual therapy techniques: Joint mobilizations and Manual traction were applied to the: R shoulder for 00 minutes, including:  PROM with light distraction per protocol  Grade I/II GH joint mobilizations       Riana received hot pack for 00 minutes to increase circulation and promote tissue healing. W/ shoulder prop    Riana received cold pack for 00 minutes to to decrease circulation, pain, and swelling.    Home Exercises Provided and Patient Education Provided     Education provided:   -resume HEP    Written Home Exercises Provided: Patient instructed to cont prior HEP.  Exercises were reviewed and Riana was able to demonstrate them prior to the end of the session.  Riana demonstrated good  understanding of the education provided.     See EMR under Patient Instructions for exercises provided {Blank single:85454::"12/20/2022","prior visit"    Assessment   Pt is able to lift arm to touch head w/ some compensation. Can touch opposite shoulder as well as back pocket. Attempted clocks on wall w/o resistance but unable to keep R UE on wall 2* strength. Cont to focus on AROM strength     Riana is progressing well towards her goals.   Pt prognosis is Good.     Pt will continue to benefit from skilled outpatient physical therapy to address the deficits listed in the problem list box on initial evaluation, provide pt/family education and to maximize pt's level of independence in the home and community environment.     Pt's spiritual, cultural and educational needs considered and pt agreeable to plan of care and goals.    Anticipated barriers to physical therapy: none    Goals:   Short Term Goals: (6 weeks)   - Pt will increase ROM to 110 deg R shoulder flex and abd, 40 deg IR/ER at 45 deg abd ( met)  - Decrease Pain to 2/10 as worst with all PT interventions (Progressing, not met)  - Pt to self correct posture and gait with minimal cues (met)  - Pt independent with HEP with progressions. " ( met)     Long Term Goals (24 Weeks):  - Pt will increase ROM to 170 deg R shoulder flex and abd, 80 deg ER and IR (Progressing, not met)  - Pt will increase strength to 5/5 RUE in all planes (Progressing, not met)  - Decrease Pain to 0/10 with ADLs overhead (Progressing, not met)  - Pt to return to 80% PLOF (Progressing, not met)       Plan     Continue POC per pt tolerance progressing AROM per protocol.     Cristal Marie PTA,

## 2022-12-22 ENCOUNTER — CLINICAL SUPPORT (OUTPATIENT)
Dept: REHABILITATION | Facility: HOSPITAL | Age: 67
End: 2022-12-22
Payer: MEDICARE

## 2022-12-22 DIAGNOSIS — M25.511 ACUTE PAIN OF RIGHT SHOULDER: Primary | ICD-10-CM

## 2022-12-22 PROCEDURE — 97110 THERAPEUTIC EXERCISES: CPT | Mod: CQ

## 2022-12-22 NOTE — PROGRESS NOTES
Physical Therapy Daily Treatment Note     Name: Riana Baker   Clinic Number: [id number]    Therapy Diagnosis:   Encounter Diagnosis   Name Primary?    Acute pain of right shoulder Yes         Physician: Mendez Tavarez NP    Visit Date: 12/22/2022    Physician Orders: PT Eval and Treat   Medical Diagnosis: s/p R reverse total shoulder  Date of Surgery: 8/16/22  Evaluation Date: 9/6/2022  Authorization Period Expiration: 10/4/22  Plan of Care Certification Period: 2/21/23  Visit # / Visits authorized: 23/23     Time In: 0905 am  Time Out: 1000 am   Total Billable Time: 55 minutes      Precautions: Standard and s/p reverse shoulder  RUE  Nonweightbearing x 12 wks     PHASE 1: now until 2 wks postop  ROMAT and hand, wrist, elbow.  Sling, may remove for hygiene and ADLs     PHASE 2: 2-6 wks postop  ROMAT and hand, wrist, elbow.  Shoulder Pendulums okay  Shoulder passive and active assisted ROMAT   Sling, may remove for hygiene ADLs and therapy     PHASE 3: 6-12 wks postop   D/c sling   ROMAT RUE  No resistance     PHASE 4: after 12 wks postop  ROMAT, WBAT  Subjective      Pt reports:shoulder doing good   she was compliant with home exercise program given last session.   Response to previous treatment:NA  Functional change: NA    Pain: not quantified this visit  Location: right shoulder      Objective     PROM R shoulder: 12/9/22  Shoulder FL: 150 deg  Shoulder ABD: above 180  ER @ 90 degrees: 90  IR: 80 deg    Riana received therapeutic exercises to develop strength, endurance, ROM, and posture for 55 minutes including:  Shoulder PROM   Open books 20x-NP  UBE 5'/5' within available ROM lvl3  Landmine #2 3x12  Supine flexion 1# elevated 1 notch 2x 20   Sidelying ABD 4x8 #1  R UE Wall slides with towel 4x7  Ball on wall x30 with 5 sec hold-NP  Standing scaption 3x8  EOT shoulder shoulder taps 30x  Supine chest press w/ #3 wand 5x6    Riana received the following manual therapy  "techniques: Joint mobilizations and Manual traction were applied to the: R shoulder for 00 minutes, including:  PROM with light distraction per protocol  Grade I/II GH joint mobilizations       Riana received hot pack for 00 minutes to increase circulation and promote tissue healing. W/ shoulder prop    Riana received cold pack for 00 minutes to to decrease circulation, pain, and swelling.    Home Exercises Provided and Patient Education Provided     Education provided:   -resume HEP    Written Home Exercises Provided: Patient instructed to cont prior HEP.  Exercises were reviewed and Riana was able to demonstrate them prior to the end of the session.  Riana demonstrated good  understanding of the education provided.     See EMR under Patient Instructions for exercises provided {Blank single:45769::"12/22/2022","prior visit"    Assessment   Improved strength with shoulder flexion today in 1/2 supine and on wall. Did not need to use HHA w/ towel on wall today. Attempted SA press into FL w/ TB but unable to press against resistance showing pec and anterior delt weakness. Added supine chest press to work on weakness.     Riana is progressing well towards her goals.   Pt prognosis is Good.     Pt will continue to benefit from skilled outpatient physical therapy to address the deficits listed in the problem list box on initial evaluation, provide pt/family education and to maximize pt's level of independence in the home and community environment.     Pt's spiritual, cultural and educational needs considered and pt agreeable to plan of care and goals.    Anticipated barriers to physical therapy: none    Goals:   Short Term Goals: (6 weeks)   - Pt will increase ROM to 110 deg R shoulder flex and abd, 40 deg IR/ER at 45 deg abd ( met)  - Decrease Pain to 2/10 as worst with all PT interventions (Progressing, not met)  - Pt to self correct posture and gait with minimal cues (met)  - Pt independent with HEP with " progressions. ( met)     Long Term Goals (24 Weeks):  - Pt will increase ROM to 170 deg R shoulder flex and abd, 80 deg ER and IR (Progressing, not met)  - Pt will increase strength to 5/5 RUE in all planes (Progressing, not met)  - Decrease Pain to 0/10 with ADLs overhead (Progressing, not met)  - Pt to return to 80% PLOF (Progressing, not met)       Plan     Continue POC per pt tolerance progressing AROM per protocol.     Cristal Marie PTA,

## 2022-12-27 ENCOUNTER — CLINICAL SUPPORT (OUTPATIENT)
Dept: REHABILITATION | Facility: HOSPITAL | Age: 67
End: 2022-12-27
Payer: MEDICARE

## 2022-12-27 DIAGNOSIS — M25.511 ACUTE PAIN OF RIGHT SHOULDER: Primary | ICD-10-CM

## 2022-12-27 PROCEDURE — 97110 THERAPEUTIC EXERCISES: CPT

## 2022-12-27 NOTE — PROGRESS NOTES
Physical Therapy Daily Treatment Note     Name: Riana Baker   Clinic Number: [id number]    Therapy Diagnosis:   Encounter Diagnosis   Name Primary?    Acute pain of right shoulder Yes         Physician: Mendez Tavarez NP    Visit Date: 12/27/2022    Physician Orders: PT Eval and Treat   Medical Diagnosis: s/p R reverse total shoulder  Date of Surgery: 8/16/22  Evaluation Date: 9/6/2022  Authorization Period Expiration: 10/4/22  Plan of Care Certification Period: 2/21/23  Visit # / Visits authorized: 1/20     Time In: 0800 am  Time Out: 855 am   Total Billable Time: 55 minutes      Precautions: Standard and s/p reverse shoulder RUE    Subjective      Pt reports:no new issues with RUE.    she was compliant with home exercise program given last session.   Response to previous treatment: good  Functional change: NA    Pain: not quantified this visit  Location: right shoulder      Objective     PROM R shoulder: 12/9/22  Shoulder FL: 150 deg  Shoulder ABD: above 180  ER @ 90 degrees: 90  IR: 80 deg    Riana received therapeutic exercises to develop strength, endurance, ROM, and posture for 55 minutes including:  Shoulder PROM   Open books 20x-NP  UBE 5'/5' within available ROM lvl 3.5  Landmine #2 3x12-np  Supine flexion 1# elevated 1 notch 2x 20 -np  Sidelying ABD and flex 4x8 #1  R UE Wall slides with towel 4x7  Ball on wall x30 with 5 sec hold-NP  Standing scaption 3x8  EOT shoulder shoulder taps 30x  Supine SA w/ #3 wand 3x10  Prayer stretch 3x 1 min    Riana received the following manual therapy techniques: Joint mobilizations and Manual traction were applied to the: R shoulder for 00 minutes, including:  PROM with light distraction per protocol  Grade I/II GH joint mobilizations     Home Exercises Provided and Patient Education Provided     Education provided:   -resume HEP    Written Home Exercises Provided: Patient instructed to cont prior HEP.  Exercises were reviewed  "and Riana was able to demonstrate them prior to the end of the session.  Riana demonstrated good  understanding of the education provided.     See EMR under Patient Instructions for exercises provided {Makenna watts:59478::"12/27/2022","prior visit"    Assessment   Pt demonstrated improved shoulder flexion passively this session and tolerated strength progressions well. Pt continues to show good compliance with HEP and plan to progress with scapular and shoulder strengthening as tolerated.     Riana is progressing well towards her goals.   Pt prognosis is Good.     Pt will continue to benefit from skilled outpatient physical therapy to address the deficits listed in the problem list box on initial evaluation, provide pt/family education and to maximize pt's level of independence in the home and community environment.     Pt's spiritual, cultural and educational needs considered and pt agreeable to plan of care and goals.    Anticipated barriers to physical therapy: none    Goals:   Short Term Goals: (6 weeks)   - Pt will increase ROM to 110 deg R shoulder flex and abd, 40 deg IR/ER at 45 deg abd ( met)  - Decrease Pain to 2/10 as worst with all PT interventions (Progressing, not met)  - Pt to self correct posture and gait with minimal cues (met)  - Pt independent with HEP with progressions. ( met)     Long Term Goals (24 Weeks):  - Pt will increase ROM to 170 deg R shoulder flex and abd, 80 deg ER and IR (Progressing, not met)  - Pt will increase strength to 5/5 RUE in all planes (Progressing, not met)  - Decrease Pain to 0/10 with ADLs overhead (Progressing, not met)  - Pt to return to 80% PLOF (Progressing, not met)       Plan     Continue POC per pt tolerance progressing AROM per protocol.     Bernadette Palacios, PT,                      "

## 2022-12-29 ENCOUNTER — CLINICAL SUPPORT (OUTPATIENT)
Dept: REHABILITATION | Facility: HOSPITAL | Age: 67
End: 2022-12-29
Payer: MEDICARE

## 2022-12-29 DIAGNOSIS — M25.511 ACUTE PAIN OF RIGHT SHOULDER: Primary | ICD-10-CM

## 2022-12-29 PROCEDURE — 97110 THERAPEUTIC EXERCISES: CPT

## 2022-12-29 NOTE — PROGRESS NOTES
Physical Therapy Daily Treatment Note     Name: Riana Baker   Clinic Number: [id number]    Therapy Diagnosis:   Encounter Diagnosis   Name Primary?    Acute pain of right shoulder Yes         Physician: Mendez Tavarez NP    Visit Date: 12/29/2022    Physician Orders: PT Eval and Treat   Medical Diagnosis: s/p R reverse total shoulder  Date of Surgery: 8/16/22  Evaluation Date: 9/6/2022  Authorization Period Expiration: 1/30/23  Plan of Care Certification Period: 2/21/23  Visit # / Visits authorized: 25/35     Time In: 0800 am  Time Out: 855 am  Total Billable Time: 55 minutes      Precautions: Standard and s/p reverse shoulder RUE    Subjective      Pt reports: she is doing okay today.    she was compliant with home exercise program given last session.   Response to previous treatment: good  Functional change: NA    Pain: not quantified this visit  Location: right shoulder      Objective     PROM R shoulder: 12/9/22  Shoulder FL: 150 deg  Shoulder ABD: above 180  ER @ 90 degrees: 90  IR: 80 deg    Riana received therapeutic exercises to develop strength, endurance, ROM, and posture for 55 minutes including:  Shoulder PROM   Open books 20x-NP  UBE 5'/5' within available ROM lvl 3.5  Landmine #2 3x12-  Supine flexion 1# elevated 1 notch 4x6   Sidelying ABD 4x8 #1  R UE Wall slides with towel 4x7  Ball on wall x30 with 5 sec hold-NP  Standing scaption and flex 1# 3x8  EOT shoulder shoulder taps 30x  Supine SA w/ #3 wand 3x10-np  Prayer stretch 3x 1 min  4 way ball on wall x30 ea    Riana received the following manual therapy techniques: Joint mobilizations and Manual traction were applied to the: R shoulder for 00 minutes, including:  PROM with light distraction per protocol  Grade I/II GH joint mobilizations     Home Exercises Provided and Patient Education Provided     Education provided:   -resume HEP    Written Home Exercises Provided: Patient instructed to cont prior  "HEP.  Exercises were reviewed and Riana was able to demonstrate them prior to the end of the session.  Riana demonstrated good  understanding of the education provided.     See EMR under Patient Instructions for exercises provided {Makenna single:99941::"12/29/2022","prior visit"    Assessment   Pt requiring less cuing for RUE upward rotation and tolerating OH interventions well.  Pt continues to show good compliance with HEP and plan to progress with scapular and shoulder strengthening as tolerated.     Riana is progressing well towards her goals.   Pt prognosis is Good.     Pt will continue to benefit from skilled outpatient physical therapy to address the deficits listed in the problem list box on initial evaluation, provide pt/family education and to maximize pt's level of independence in the home and community environment.     Pt's spiritual, cultural and educational needs considered and pt agreeable to plan of care and goals.    Anticipated barriers to physical therapy: none    Goals:   Short Term Goals: (6 weeks)   - Pt will increase ROM to 110 deg R shoulder flex and abd, 40 deg IR/ER at 45 deg abd ( met)  - Decrease Pain to 2/10 as worst with all PT interventions (Progressing, not met)  - Pt to self correct posture and gait with minimal cues (met)  - Pt independent with HEP with progressions. ( met)     Long Term Goals (24 Weeks):  - Pt will increase ROM to 170 deg R shoulder flex and abd, 80 deg ER and IR (Progressing, not met)  - Pt will increase strength to 5/5 RUE in all planes (Progressing, not met)  - Decrease Pain to 0/10 with ADLs overhead (Progressing, not met)  - Pt to return to 80% PLOF (Progressing, not met)       Plan     Continue POC per pt tolerance progressing AROM per protocol.     Bernadette Palacios, PT,                        "

## 2022-12-30 ENCOUNTER — TELEPHONE (OUTPATIENT)
Dept: DIABETES | Facility: CLINIC | Age: 67
End: 2022-12-30
Payer: MEDICARE

## 2023-01-03 ENCOUNTER — CLINICAL SUPPORT (OUTPATIENT)
Dept: REHABILITATION | Facility: HOSPITAL | Age: 68
End: 2023-01-03
Payer: MEDICARE

## 2023-01-03 ENCOUNTER — PATIENT MESSAGE (OUTPATIENT)
Dept: DIABETES | Facility: CLINIC | Age: 68
End: 2023-01-03
Payer: MEDICARE

## 2023-01-03 ENCOUNTER — TELEPHONE (OUTPATIENT)
Dept: DIABETES | Facility: CLINIC | Age: 68
End: 2023-01-03
Payer: MEDICARE

## 2023-01-03 DIAGNOSIS — M25.511 ACUTE PAIN OF RIGHT SHOULDER: Primary | ICD-10-CM

## 2023-01-03 PROCEDURE — 97110 THERAPEUTIC EXERCISES: CPT | Mod: CQ

## 2023-01-03 NOTE — PROGRESS NOTES
Physical Therapy Daily Treatment Note     Name: Riana Baker   Clinic Number: [id number]    Therapy Diagnosis:   Encounter Diagnosis   Name Primary?    Acute pain of right shoulder Yes         Physician: Mendez Tavarez NP    Visit Date: 1/3/2023    Physician Orders: PT Eval and Treat   Medical Diagnosis: s/p R reverse total shoulder  Date of Surgery: 8/16/22  Evaluation Date: 9/6/2022  Authorization Period Expiration: 1/30/23  Plan of Care Certification Period: 2/21/23  Visit # / Visits authorized: 26/35     Time In: 0757 am  Time Out: 0848 am  Total Billable Time: 50 minutes      Precautions: Standard and s/p reverse shoulder RUE    Subjective      Pt reports: shoulder feeling much better   she was compliant with home exercise program given last session.   Response to previous treatment: good  Functional change: NA    Pain: not quantified this visit  Location: right shoulder      Objective     PROM R shoulder: 12/9/22  Shoulder FL: 150 deg  Shoulder ABD: above 180  ER @ 90 degrees: 90  IR: 80 deg    Riana received therapeutic exercises to develop strength, endurance, ROM, and posture for 50 minutes including:  Shoulder PROM   UBE 5'/5' within available ROM lvl 4  Landmine #2 3x15  Supine flexion 1# elevated 1 notch 4x6   Sidelying ABD 4x8 #1  R UE Wall slides with towel 4x7  Standing scaption and flex 1# 3x8  EOT shoulder shoulder taps 30x  Prayer stretch 3x 1 min  4 way ball on wall x30 ea    Riana received the following manual therapy techniques: Joint mobilizations and Manual traction were applied to the: R shoulder for 00 minutes, including:  PROM with light distraction per protocol  Grade I/II GH joint mobilizations     Home Exercises Provided and Patient Education Provided     Education provided:   -resume HEP    Written Home Exercises Provided: Patient instructed to cont prior HEP.  Exercises were reviewed and Riana was able to demonstrate them prior to the end of  "the session.  Riana demonstrated good  understanding of the education provided.     See EMR under Patient Instructions for exercises provided {Blank single:39301::"1/3/2023","prior visit"    Assessment   Pt cont to fatigue easy w/ shoulder and scapular strengthening but is showing improvements in overall strength. Progressing well moving towards over head strengthening.     Riana is progressing well towards her goals.   Pt prognosis is Good.     Pt will continue to benefit from skilled outpatient physical therapy to address the deficits listed in the problem list box on initial evaluation, provide pt/family education and to maximize pt's level of independence in the home and community environment.     Pt's spiritual, cultural and educational needs considered and pt agreeable to plan of care and goals.    Anticipated barriers to physical therapy: none    Goals:   Short Term Goals: (6 weeks)   - Pt will increase ROM to 110 deg R shoulder flex and abd, 40 deg IR/ER at 45 deg abd ( met)  - Decrease Pain to 2/10 as worst with all PT interventions (Progressing, not met)  - Pt to self correct posture and gait with minimal cues (met)  - Pt independent with HEP with progressions. ( met)     Long Term Goals (24 Weeks):  - Pt will increase ROM to 170 deg R shoulder flex and abd, 80 deg ER and IR (Progressing, not met)  - Pt will increase strength to 5/5 RUE in all planes (Progressing, not met)  - Decrease Pain to 0/10 with ADLs overhead (Progressing, not met)  - Pt to return to 80% PLOF (Progressing, not met)       Plan     Continue POC per pt tolerance progressing AROM per protocol.     Cristal Marie, PTA,                          "

## 2023-01-06 ENCOUNTER — CLINICAL SUPPORT (OUTPATIENT)
Dept: REHABILITATION | Facility: HOSPITAL | Age: 68
End: 2023-01-06
Payer: MEDICARE

## 2023-01-06 DIAGNOSIS — M25.511 ACUTE PAIN OF RIGHT SHOULDER: Primary | ICD-10-CM

## 2023-01-06 PROCEDURE — 97110 THERAPEUTIC EXERCISES: CPT

## 2023-01-06 NOTE — PROGRESS NOTES
Physical Therapy Daily Treatment Note     Name: Riana Baker   Clinic Number: [id number]    Therapy Diagnosis:   Encounter Diagnosis   Name Primary?    Acute pain of right shoulder Yes         Physician: Mendez Tavarez NP    Visit Date: 1/6/2023    Physician Orders: PT Eval and Treat   Medical Diagnosis: s/p R reverse total shoulder  Date of Surgery: 8/16/22  Evaluation Date: 9/6/2022  Authorization Period Expiration: 1/30/23  Plan of Care Certification Period: 2/21/23  Visit # / Visits authorized: 29/35     Time In: 800 am  Time Out: 900 am  Total Billable Time: 30 minutes      Precautions: Standard and s/p reverse shoulder RUE    Subjective      Pt reports: no new issues.    she was compliant with home exercise program given last session.   Response to previous treatment: good  Functional change: NA    Pain: not quantified this visit  Location: right shoulder      Objective     PROM R shoulder: 1/9/23  Shoulder FL: 160 deg  Shoulder ABD: above 180  ER @ 90 degrees: 90  IR: 80 deg    Riana received therapeutic exercises to develop strength, endurance, ROM, and posture for 60 minutes including:  Shoulder PROM   UBE 5'/5' within available ROM lvl 4  Landmine #2 3x15  Supine flexion 1# elevated 1 notch 4x6   Sidelying ABD 4x8 #1  R UE Wall slides with towel 3x10  Standing scaption and flex 1# 3x8  EOT shoulder shoulder taps 30x-np  Prayer stretch 3x 1 min  4 way ball on wall x30 ea    Riana received the following manual therapy techniques: Joint mobilizations and Manual traction were applied to the: R shoulder for 00 minutes, including:  PROM with light distraction per protocol  Grade I/II GH joint mobilizations     Home Exercises Provided and Patient Education Provided     Education provided:   -resume HEP    Written Home Exercises Provided: Patient instructed to cont prior HEP.  Exercises were reviewed and Riana was able to demonstrate them prior to the end of the  "session.  Riana demonstrated good  understanding of the education provided.     See EMR under Patient Instructions for exercises provided {Makenna single:42057::"1/6/2023","prior visit"    Assessment   Pt demonstrating improved R shoulder flexion upon reassessment. Plan to continue with functional strengthening to achieve remaining goals.     Riana is progressing well towards her goals.   Pt prognosis is Good.     Pt will continue to benefit from skilled outpatient physical therapy to address the deficits listed in the problem list box on initial evaluation, provide pt/family education and to maximize pt's level of independence in the home and community environment.     Pt's spiritual, cultural and educational needs considered and pt agreeable to plan of care and goals.    Anticipated barriers to physical therapy: none    Goals:   Short Term Goals: (6 weeks)   - Pt will increase ROM to 110 deg R shoulder flex and abd, 40 deg IR/ER at 45 deg abd ( met)  - Decrease Pain to 2/10 as worst with all PT interventions (Progressing, not met)  - Pt to self correct posture and gait with minimal cues (met)  - Pt independent with HEP with progressions. ( met)     Long Term Goals (24 Weeks):  - Pt will increase ROM to 170 deg R shoulder flex and abd, 80 deg ER and IR (Progressing, not met)  - Pt will increase strength to 5/5 RUE in all planes (Progressing, not met)  - Decrease Pain to 0/10 with ADLs overhead (Progressing, not met)  - Pt to return to 80% PLOF (Progressing, not met)       Plan     Continue POC per pt tolerance progressing AROM per protocol.     Bernadette Palacios, PT,                            "

## 2023-01-10 ENCOUNTER — CLINICAL SUPPORT (OUTPATIENT)
Dept: REHABILITATION | Facility: HOSPITAL | Age: 68
End: 2023-01-10
Payer: MEDICARE

## 2023-01-10 DIAGNOSIS — M25.511 ACUTE PAIN OF RIGHT SHOULDER: Primary | ICD-10-CM

## 2023-01-10 PROCEDURE — 97110 THERAPEUTIC EXERCISES: CPT | Mod: CQ

## 2023-01-10 NOTE — PROGRESS NOTES
"                          Physical Therapy Daily Treatment Note     Name: Riana Baker   Clinic Number: [id number]    Therapy Diagnosis:   Encounter Diagnosis   Name Primary?    Acute pain of right shoulder Yes         Physician: Mendez Tavarez NP    Visit Date: 1/10/2023    Physician Orders: PT Eval and Treat   Medical Diagnosis: s/p R reverse total shoulder  Date of Surgery: 8/16/22  Evaluation Date: 9/6/2022  Authorization Period Expiration: 1/30/23  Plan of Care Certification Period: 2/21/23  Visit # / Visits authorized: 3/35 (30 tot)     Time In: 800 am  Time Out: 0900 am  Total Billable Time: 30 minutes      Precautions: Standard and s/p reverse shoulder RUE    Subjective      Pt reports: shoulder is doing good    she was compliant with home exercise program given last session.   Response to previous treatment: good  Functional change: NA    Pain: not quantified this visit  Location: right shoulder      Objective     PROM R shoulder: 1/9/23  Shoulder FL: 160 deg  Shoulder ABD: above 180  ER @ 90 degrees: 90  IR: 80 deg    Riana received therapeutic exercises to develop strength, endurance, ROM, and posture for 60 minutes including:  Shoulder PROM   UBE 5'/5' within available ROM lvl 4  Landmine #2 3x15  Elevated 1 notch flexion 1#  3x fatigue  Sidelying ABD 3x fatigue #1  R UE Wall 3 way slides with towel 6x3  Standing scaption and flex 1# 5x5  EOT shoulder shoulder taps 30x  EOT elevated FL sit back 10x10"  4 way ball on wall x30 ea    Riana received the following manual therapy techniques: Joint mobilizations and Manual traction were applied to the: R shoulder for 00 minutes, including:  PROM with light distraction per protocol  Grade I/II GH joint mobilizations     Home Exercises Provided and Patient Education Provided     Education provided:   -resume HEP    Written Home Exercises Provided: Patient instructed to cont prior HEP.  Exercises were reviewed and Riana was able to demonstrate " "them prior to the end of the session.  Riana demonstrated good  understanding of the education provided.     See EMR under Patient Instructions for exercises provided {Makenna single:00292::"1/10/2023","prior visit"    Assessment   Significant shoulder fatigue w/ wall slides adding diagonals. Pt is able to FL shoulder around 90 degrees. Cont to focus on delt and periscap strengthening.     Riana is progressing well towards her goals.   Pt prognosis is Good.     Pt will continue to benefit from skilled outpatient physical therapy to address the deficits listed in the problem list box on initial evaluation, provide pt/family education and to maximize pt's level of independence in the home and community environment.     Pt's spiritual, cultural and educational needs considered and pt agreeable to plan of care and goals.    Anticipated barriers to physical therapy: none    Goals:   Short Term Goals: (6 weeks)   - Pt will increase ROM to 110 deg R shoulder flex and abd, 40 deg IR/ER at 45 deg abd ( met)  - Decrease Pain to 2/10 as worst with all PT interventions (Progressing, not met)  - Pt to self correct posture and gait with minimal cues (met)  - Pt independent with HEP with progressions. ( met)     Long Term Goals (24 Weeks):  - Pt will increase ROM to 170 deg R shoulder flex and abd, 80 deg ER and IR (Progressing, not met)  - Pt will increase strength to 5/5 RUE in all planes (Progressing, not met)  - Decrease Pain to 0/10 with ADLs overhead (Progressing, not met)  - Pt to return to 80% PLOF (Progressing, not met)       Plan     Continue POC per pt tolerance progressing AROM per protocol.     Cristal Marie, PTA,                              "

## 2023-01-11 ENCOUNTER — OFFICE VISIT (OUTPATIENT)
Dept: OTOLARYNGOLOGY | Facility: CLINIC | Age: 68
End: 2023-01-11
Payer: MEDICARE

## 2023-01-11 ENCOUNTER — CLINICAL SUPPORT (OUTPATIENT)
Dept: AUDIOLOGY | Facility: CLINIC | Age: 68
End: 2023-01-11
Payer: MEDICARE

## 2023-01-11 VITALS — HEART RATE: 91 BPM | SYSTOLIC BLOOD PRESSURE: 126 MMHG | DIASTOLIC BLOOD PRESSURE: 71 MMHG

## 2023-01-11 DIAGNOSIS — R42 DIZZINESS AND GIDDINESS: Primary | ICD-10-CM

## 2023-01-11 DIAGNOSIS — H90.41 SENSORINEURAL HEARING LOSS (SNHL) OF RIGHT EAR WITH UNRESTRICTED HEARING OF LEFT EAR: Primary | ICD-10-CM

## 2023-01-11 PROCEDURE — 1101F PR PT FALLS ASSESS DOC 0-1 FALLS W/OUT INJ PAST YR: ICD-10-PCS | Mod: CPTII,S$GLB,, | Performed by: OTOLARYNGOLOGY

## 2023-01-11 PROCEDURE — 1159F MED LIST DOCD IN RCRD: CPT | Mod: CPTII,S$GLB,, | Performed by: OTOLARYNGOLOGY

## 2023-01-11 PROCEDURE — 1160F RVW MEDS BY RX/DR IN RCRD: CPT | Mod: CPTII,S$GLB,, | Performed by: OTOLARYNGOLOGY

## 2023-01-11 PROCEDURE — 3288F PR FALLS RISK ASSESSMENT DOCUMENTED: ICD-10-PCS | Mod: CPTII,S$GLB,, | Performed by: OTOLARYNGOLOGY

## 2023-01-11 PROCEDURE — 3078F DIAST BP <80 MM HG: CPT | Mod: CPTII,S$GLB,, | Performed by: OTOLARYNGOLOGY

## 2023-01-11 PROCEDURE — 1160F PR REVIEW ALL MEDS BY PRESCRIBER/CLIN PHARMACIST DOCUMENTED: ICD-10-PCS | Mod: CPTII,S$GLB,, | Performed by: OTOLARYNGOLOGY

## 2023-01-11 PROCEDURE — 99999 PR PBB SHADOW E&M-EST. PATIENT-LVL III: CPT | Mod: PBBFAC,,, | Performed by: OTOLARYNGOLOGY

## 2023-01-11 PROCEDURE — 1101F PT FALLS ASSESS-DOCD LE1/YR: CPT | Mod: CPTII,S$GLB,, | Performed by: OTOLARYNGOLOGY

## 2023-01-11 PROCEDURE — 99999 PR PBB SHADOW E&M-EST. PATIENT-LVL I: ICD-10-PCS | Mod: PBBFAC,,, | Performed by: AUDIOLOGIST

## 2023-01-11 PROCEDURE — 92557 PR COMPREHENSIVE HEARING TEST: ICD-10-PCS | Mod: S$GLB,,, | Performed by: AUDIOLOGIST

## 2023-01-11 PROCEDURE — 1157F PR ADVANCE CARE PLAN OR EQUIV PRESENT IN MEDICAL RECORD: ICD-10-PCS | Mod: CPTII,S$GLB,, | Performed by: OTOLARYNGOLOGY

## 2023-01-11 PROCEDURE — 92557 COMPREHENSIVE HEARING TEST: CPT | Mod: S$GLB,,, | Performed by: AUDIOLOGIST

## 2023-01-11 PROCEDURE — 1157F ADVNC CARE PLAN IN RCRD: CPT | Mod: CPTII,S$GLB,, | Performed by: OTOLARYNGOLOGY

## 2023-01-11 PROCEDURE — 3074F SYST BP LT 130 MM HG: CPT | Mod: CPTII,S$GLB,, | Performed by: OTOLARYNGOLOGY

## 2023-01-11 PROCEDURE — 99213 PR OFFICE/OUTPT VISIT, EST, LEVL III, 20-29 MIN: ICD-10-PCS | Mod: S$GLB,,, | Performed by: OTOLARYNGOLOGY

## 2023-01-11 PROCEDURE — 1126F PR PAIN SEVERITY QUANTIFIED, NO PAIN PRESENT: ICD-10-PCS | Mod: CPTII,S$GLB,, | Performed by: OTOLARYNGOLOGY

## 2023-01-11 PROCEDURE — 3078F PR MOST RECENT DIASTOLIC BLOOD PRESSURE < 80 MM HG: ICD-10-PCS | Mod: CPTII,S$GLB,, | Performed by: OTOLARYNGOLOGY

## 2023-01-11 PROCEDURE — 99999 PR PBB SHADOW E&M-EST. PATIENT-LVL III: ICD-10-PCS | Mod: PBBFAC,,, | Performed by: OTOLARYNGOLOGY

## 2023-01-11 PROCEDURE — 92567 TYMPANOMETRY: CPT | Mod: S$GLB,,, | Performed by: AUDIOLOGIST

## 2023-01-11 PROCEDURE — 99999 PR PBB SHADOW E&M-EST. PATIENT-LVL I: CPT | Mod: PBBFAC,,, | Performed by: AUDIOLOGIST

## 2023-01-11 PROCEDURE — 99213 OFFICE O/P EST LOW 20 MIN: CPT | Mod: S$GLB,,, | Performed by: OTOLARYNGOLOGY

## 2023-01-11 PROCEDURE — 1126F AMNT PAIN NOTED NONE PRSNT: CPT | Mod: CPTII,S$GLB,, | Performed by: OTOLARYNGOLOGY

## 2023-01-11 PROCEDURE — 1159F PR MEDICATION LIST DOCUMENTED IN MEDICAL RECORD: ICD-10-PCS | Mod: CPTII,S$GLB,, | Performed by: OTOLARYNGOLOGY

## 2023-01-11 PROCEDURE — 3288F FALL RISK ASSESSMENT DOCD: CPT | Mod: CPTII,S$GLB,, | Performed by: OTOLARYNGOLOGY

## 2023-01-11 PROCEDURE — 92567 PR TYMPA2METRY: ICD-10-PCS | Mod: S$GLB,,, | Performed by: AUDIOLOGIST

## 2023-01-11 PROCEDURE — 3074F PR MOST RECENT SYSTOLIC BLOOD PRESSURE < 130 MM HG: ICD-10-PCS | Mod: CPTII,S$GLB,, | Performed by: OTOLARYNGOLOGY

## 2023-01-11 RX ORDER — AMOXICILLIN 500 MG/1
CAPSULE ORAL
COMMUNITY
Start: 2022-08-30 | End: 2023-03-16

## 2023-01-11 NOTE — PATIENT INSTRUCTIONS
Ear care discussed.    Hearing test slightly better and no worsening asymmetry. Normal to borderline hearing levels.  Follow up for audiogram in 1-2 years or sooner for any changes or concerns.    Otherwise, follow up with our clinic for any ear, nose or throat problems ([phone]).

## 2023-01-11 NOTE — PROGRESS NOTES
66 y/o female presents for follow up of her hearing and ears. Since her last visit in Spring of 2022 had a fall a couple months ago and injured R shoulder requiring surgery. Still in R shoulder therapy. Denies any spinning or dizziness currently. Has had trouble in the past. No ear complaints.     PMHx, PSHx, Meds, Allergies, SocHx, FamHx reviewed in EPIC    Review of Systems     Constitutional: Negative for appetite change, chills, fatigue, fever and unexpected weight loss.      HENT: Negative for ear discharge, ear infection, ear pain, facial swelling, hearing loss, mouth sores, nosebleeds, postnasal drip, ringing in the ears, runny nose, sinus infection, sinus pressure, sore throat, stuffy nose, tonsil infection, dental problems, trouble swallowing and voice change.      Eyes:  Negative for change in eyesight, eye drainage, eye itching and photophobia.     Respiratory:  Positive for sleep apnea and snoring. Negative for cough, shortness of breath and wheezing.      Cardiovascular:  Negative for chest pain, foot swelling, irregular heartbeat and swollen veins.     Gastrointestinal:  Negative for abdominal pain, acid reflux, constipation, diarrhea, heartburn and vomiting.     Genitourinary: Negative for difficulty urinating, sexual problems and frequent urination.     Musc: Positive for aching muscles. Negative for aching joints and back pain.     Skin: Negative for rash.     Allergy: Negative for food allergies and seasonal allergies.     Endocrine: Negative for cold intolerance and heat intolerance.      Neurological: Positive for dizziness. Negative for headaches.      Hematologic: Negative for bruises/bleeds easily and swollen glands.      Psychiatric: Positive for decreased concentration and nervous/anxious. Negative for depression.          PE: /71   Pulse 91    Gen: female, well nourished, well developed, NAD, cooperative, good historian  Ears:  EAC with few flakes of cerumen & TM translucent with  normal bony landmarks bilaterally  Nose: external nose wnl, nasal septum rightward deviation, inferior turbinates wnl; few 1mm spots of dry mucus on turb and septum, pink mucosa, no visible purulence or polyps  OC/OP:  MMM, tongue protrudes midline, palate raises symmetrically, tonsils symmetric, dental plate superiorly  Neck: supple, no TTP, no LAD or masses  Face:  No TTP, no erythema or flushing  Respiratory: Breathing comfortably without retractions  Skin: facial skin intact without visible lesions or flushing  Lymph: no neck lympadenopathy  Neuro:  facial movement symmetric, speech fluid, gait stable, tongue protrudes midline  Psych: alert & oriented x 3, reasonable, normal affect      Audio from today reviewed with patient & her  and compared to 5/2022 audio. Overall no worsening and some slight improvement. No worsening of asymmetry of hearing levels. R with borderline to mild HL in mid frequencies. SRT 15 dB B & WRS 92% R & 88% L. Tymps Type A.        Impression:   1. Sensorineural hearing loss (SNHL) of right ear with unrestricted hearing of left ear      borderline           Discussion and Plan:       Ear care discussed.    Hearing test slightly better and no worsening asymmetry. Normal to borderline hearing levels.  Follow up for audiogram in 1-2 years or sooner for any changes or concerns.    Otherwise, follow up with our clinic for any ear, nose or throat problems ([phone]).     Parts or all of this note were created by voice recognition software; typographical errors in translating may be present.

## 2023-01-11 NOTE — PROGRESS NOTES
Riana Kay, a 67 y.o. female, was seen today in the clinic for an audiologic evaluation.  Pt reported ongoing dizziness.  She had a fall resulting from the dizziness in fall of 2022 causing a shoulder injury.  Pt denied otalgia, aural fullness, and tinnitus.    Tympanometry revealed Type A in the right ear and Type A in the left ear. Audiogram results revealed essentialy normal hearing in the right ear and essentially normal hearing in the left ear.  Speech reception thresholds were noted at 15 dB in the right ear and 15 dB in the left ear.  Speech discrimination scores were 92% in the right ear and 88% in the left ear.    Recommendations:  Otologic evaluation  Annual audiogram  Hearing protection when in noise  Hearing aid consultation

## 2023-01-12 ENCOUNTER — CLINICAL SUPPORT (OUTPATIENT)
Dept: REHABILITATION | Facility: HOSPITAL | Age: 68
End: 2023-01-12
Payer: MEDICARE

## 2023-01-12 DIAGNOSIS — M25.511 ACUTE PAIN OF RIGHT SHOULDER: Primary | ICD-10-CM

## 2023-01-12 PROCEDURE — 97110 THERAPEUTIC EXERCISES: CPT

## 2023-01-12 NOTE — PROGRESS NOTES
Physical Therapy Daily Treatment Note     Name: Riana Baker   Clinic Number: [id number]    Therapy Diagnosis:   Encounter Diagnosis   Name Primary?    Acute pain of right shoulder Yes         Physician: Mendez Tavarez NP    Visit Date: 1/12/2023    Physician Orders: PT Eval and Treat   Medical Diagnosis: s/p R reverse total shoulder  Date of Surgery: 8/16/22  Evaluation Date: 9/6/2022  Authorization Period Expiration: 1/30/23  Plan of Care Certification Period: 2/21/23  Visit # / Visits authorized: 4/35 (30 tot)     Time In: 800 am  Time Out: 0855 am  Total Billable Time: 30 minutes      Precautions: Standard and s/p reverse shoulder RUE    Subjective      Pt reports: shoulder is doing good and feels she is close to being done with PT.    she was compliant with home exercise program given last session.   Response to previous treatment: good  Functional change: NA    Pain: not quantified this visit  Location: right shoulder      Objective     PROM R shoulder: 1/9/23  Shoulder FL: 160 deg  Shoulder ABD: above 180  ER @ 90 degrees: 90  IR: 80 deg    MMT: 1/12/23  RUE: 3+/5 flex, abd  3/5 ER  4/5 IR    Riana received therapeutic exercises to develop strength, endurance, ROM, and posture for 55 minutes including:  Shoulder PROM   UBE 5'/5' within available ROM lvl 4-np  Landmine #2 3x10  Elevated 1 notch flexion 2#  3x fatigue  Sidelying ABD 3x fatigue #2  R UE Wall 3 way slides with towel 6x3  Standing scaption and flex 2# 5x5  EOT shoulder shoulder taps 30x  EOT elevated FL sit back 3x 1 min  4 way ball on wall x30 ea    Riana received the following manual therapy techniques: Joint mobilizations and Manual traction were applied to the: R shoulder for 00 minutes, including:  PROM with light distraction per protocol  Grade I/II GH joint mobilizations     Home Exercises Provided and Patient Education Provided     Education provided:   -resume HEP    Written Home Exercises  "Provided: Patient instructed to cont prior HEP.  Exercises were reviewed and Riana was able to demonstrate them prior to the end of the session.  Riana demonstrated good  understanding of the education provided.     See EMR under Patient Instructions for exercises provided {Makenna watts:41412::"1/12/2023","prior visit"    Assessment   Upon reassessment, pt still limited with strength in RUE and has maintained PROM. Plan to continue tx for 1 more month and if still plateaued with discotninue PT.     Riana is progressing well towards her goals.   Pt prognosis is Good.     Pt will continue to benefit from skilled outpatient physical therapy to address the deficits listed in the problem list box on initial evaluation, provide pt/family education and to maximize pt's level of independence in the home and community environment.     Pt's spiritual, cultural and educational needs considered and pt agreeable to plan of care and goals.    Anticipated barriers to physical therapy: none    Goals:   Short Term Goals: (6 weeks)   - Pt will increase ROM to 110 deg R shoulder flex and abd, 40 deg IR/ER at 45 deg abd ( met)  - Decrease Pain to 2/10 as worst with all PT interventions (Progressing, not met)  - Pt to self correct posture and gait with minimal cues (met)  - Pt independent with HEP with progressions. ( met)     Long Term Goals (24 Weeks):  - Pt will increase ROM to 170 deg R shoulder flex and abd, 80 deg ER and IR (Progressing, not met)  - Pt will increase strength to 5/5 RUE in all planes, except ER (Progressing, not met)  - Decrease Pain to 0/10 with ADLs overhead (Progressing, not met)  - Pt to return to 80% PLOF (Progressing, not met)       Plan     Continue POC per pt tolerance progressing AROM per protocol.     Bernadette Palacios, PT,                                "

## 2023-01-17 ENCOUNTER — CLINICAL SUPPORT (OUTPATIENT)
Dept: REHABILITATION | Facility: HOSPITAL | Age: 68
End: 2023-01-17
Payer: MEDICARE

## 2023-01-17 DIAGNOSIS — M25.511 ACUTE PAIN OF RIGHT SHOULDER: Primary | ICD-10-CM

## 2023-01-17 PROCEDURE — 97110 THERAPEUTIC EXERCISES: CPT | Mod: CQ

## 2023-01-17 RX ORDER — INSULIN LISPRO 100 [IU]/ML
INJECTION, SOLUTION INTRAVENOUS; SUBCUTANEOUS
Qty: 75 ML | Refills: 3 | Status: SHIPPED | OUTPATIENT
Start: 2023-01-17 | End: 2023-04-13

## 2023-01-17 NOTE — PROGRESS NOTES
Physical Therapy Daily Treatment Note     Name: Riana Baker   Clinic Number: [id number]    Therapy Diagnosis:   Encounter Diagnosis   Name Primary?    Acute pain of right shoulder Yes         Physician: Mendez Tavarez NP    Visit Date: 1/17/2023    Physician Orders: PT Eval and Treat   Medical Diagnosis: s/p R reverse total shoulder  Date of Surgery: 8/16/22  Evaluation Date: 9/6/2022  Authorization Period Expiration: 1/30/23  Plan of Care Certification Period: 2/21/23  Visit # / Visits authorized: 5/35 (31 tot)     Time In: 8:00 am  Time Out: 8:54 am  Total Billable Time: 23 minutes      Precautions: Standard and s/p reverse shoulder RUE    Subjective      Pt reports: getting stronger   she was compliant with home exercise program given last session.   Response to previous treatment: good  Functional change: NA    Pain: not quantified this visit  Location: right shoulder      Objective     PROM R shoulder: 1/9/23  Shoulder FL: 160 deg  Shoulder ABD: above 180  ER @ 90 degrees: 90  IR: 80 deg    MMT: 1/12/23  RUE: 3+/5 flex, abd  3/5 ER  4/5 IR    Riana received therapeutic exercises to develop strength, endurance, ROM, and posture for 54 minutes including:  Shoulder PROM   UBE 5'/5' within available ROM lvl 4  Bicep curl #2 3x10  Landmine #2 3x10  Elevated 1 notch flexion 2#  3x fatigue  Sidelying ABD 1 notch elevated 3x fatigue #1  R UE Wall 3 way slides with towel 6x3  Standing scaption and flex 2# 5x5  EOT shoulder shoulder taps 30x  EOT elevated FL sit back 3x 1 min  4 way ball on wall x30 ea      Riana received the following manual therapy techniques: Joint mobilizations and Manual traction were applied to the: R shoulder for 00 minutes, including:  PROM with light distraction per protocol  Grade I/II GH joint mobilizations     Home Exercises Provided and Patient Education Provided     Education provided:   -resume HEP    Written Home Exercises Provided: Patient  "instructed to cont prior HEP.  Exercises were reviewed and Riana was able to demonstrate them prior to the end of the session.  Riana demonstrated good  understanding of the education provided.     See EMR under Patient Instructions for exercises provided {Makenna single:55140::"1/17/2023","prior visit"    Assessment   Shoulder  fatigue through out session. Able to elevate shoulder to 90 degrees w/o having to swing it up.    Riana is progressing well towards her goals.   Pt prognosis is Good.     Pt will continue to benefit from skilled outpatient physical therapy to address the deficits listed in the problem list box on initial evaluation, provide pt/family education and to maximize pt's level of independence in the home and community environment.     Pt's spiritual, cultural and educational needs considered and pt agreeable to plan of care and goals.    Anticipated barriers to physical therapy: none    Goals:   Short Term Goals: (6 weeks)   - Pt will increase ROM to 110 deg R shoulder flex and abd, 40 deg IR/ER at 45 deg abd ( met)  - Decrease Pain to 2/10 as worst with all PT interventions (Progressing, not met)  - Pt to self correct posture and gait with minimal cues (met)  - Pt independent with HEP with progressions. ( met)     Long Term Goals (24 Weeks):  - Pt will increase ROM to 170 deg R shoulder flex and abd, 80 deg ER and IR (Progressing, not met)  - Pt will increase strength to 5/5 RUE in all planes, except ER (Progressing, not met)  - Decrease Pain to 0/10 with ADLs overhead (Progressing, not met)  - Pt to return to 80% PLOF (Progressing, not met)       Plan     Continue POC per pt tolerance progressing AROM per protocol.     Cristal Marie, PTA,                                  "

## 2023-01-19 ENCOUNTER — CLINICAL SUPPORT (OUTPATIENT)
Dept: REHABILITATION | Facility: HOSPITAL | Age: 68
End: 2023-01-19
Payer: MEDICARE

## 2023-01-19 DIAGNOSIS — M25.511 ACUTE PAIN OF RIGHT SHOULDER: Primary | ICD-10-CM

## 2023-01-19 PROCEDURE — 97110 THERAPEUTIC EXERCISES: CPT | Mod: CQ

## 2023-01-19 NOTE — PROGRESS NOTES
Physical Therapy Daily Treatment Note     Name: Riana Baker   Clinic Number: [id number]    Therapy Diagnosis:   Encounter Diagnosis   Name Primary?    Acute pain of right shoulder Yes         Physician: Mendez Tavarez NP    Visit Date: 1/19/2023    Physician Orders: PT Eval and Treat   Medical Diagnosis: s/p R reverse total shoulder  Date of Surgery: 8/16/22  Evaluation Date: 9/6/2022  Authorization Period Expiration: 1/30/23  Plan of Care Certification Period: 2/21/23  Visit # / Visits authorized: 6/35 (31 tot)     Time In: 0800 am  Time Out: 0850 am  Total Billable Time:30 minutes      Precautions: Standard and s/p reverse shoulder RUE    Subjective      Pt reports: doing good   she was compliant with home exercise program given last session.   Response to previous treatment: good  Functional change: NA    Pain: not quantified this visit  Location: right shoulder      Objective     PROM R shoulder: 1/9/23  Shoulder FL: 160 deg  Shoulder ABD: above 180  ER @ 90 degrees: 90  IR: 80 deg    MMT: 1/12/23  RUE: 3+/5 flex, abd  3/5 ER  4/5 IR    Riana received therapeutic exercises to develop strength, endurance, ROM, and posture for 50 minutes including:  Shoulder PROM   UBE 5'/5' within available ROM lvl 4  Bicep curl #2 3x10  Landmine #2 3x10  Elevated 1 notch flexion 2#  3x fatigue  Sidelying ABD 1 notch elevated 3x fatigue #1  R UE Wall 3 way slides with towel 4x4  Standing scaption and flex 2# 5x5  EOT shoulder shoulder taps 30x  EOT elevated FL sit back 3x 1 min  4 way ball on wall x30 ea      Riana received the following manual therapy techniques: Joint mobilizations and Manual traction were applied to the: R shoulder for 00 minutes, including:  PROM with light distraction per protocol  Grade I/II GH joint mobilizations     Home Exercises Provided and Patient Education Provided     Education provided:   -resume HEP    Written Home Exercises Provided: Patient instructed  "to cont prior HEP.  Exercises were reviewed and Riana was able to demonstrate them prior to the end of the session.  Riana demonstrated good  understanding of the education provided.     See EMR under Patient Instructions for exercises provided {Blank single:06887::"1/19/2023","prior visit"    Assessment   Cont to struggle w/ shoulder strength past 90 degrees. Reminders through out session to keep elbow extended and to not compensate w/ bicep.     Riana is progressing well towards her goals.   Pt prognosis is Good.     Pt will continue to benefit from skilled outpatient physical therapy to address the deficits listed in the problem list box on initial evaluation, provide pt/family education and to maximize pt's level of independence in the home and community environment.     Pt's spiritual, cultural and educational needs considered and pt agreeable to plan of care and goals.    Anticipated barriers to physical therapy: none    Goals:   Short Term Goals: (6 weeks)   - Pt will increase ROM to 110 deg R shoulder flex and abd, 40 deg IR/ER at 45 deg abd ( met)  - Decrease Pain to 2/10 as worst with all PT interventions (Progressing, not met)  - Pt to self correct posture and gait with minimal cues (met)  - Pt independent with HEP with progressions. ( met)     Long Term Goals (24 Weeks):  - Pt will increase ROM to 170 deg R shoulder flex and abd, 80 deg ER and IR (Progressing, not met)  - Pt will increase strength to 5/5 RUE in all planes, except ER (Progressing, not met)  - Decrease Pain to 0/10 with ADLs overhead (Progressing, not met)  - Pt to return to 80% PLOF (Progressing, not met)       Plan     Continue POC per pt tolerance progressing AROM per protocol.     Cristal Marie, PTA,                                    "

## 2023-01-24 ENCOUNTER — CLINICAL SUPPORT (OUTPATIENT)
Dept: REHABILITATION | Facility: HOSPITAL | Age: 68
End: 2023-01-24
Payer: MEDICARE

## 2023-01-24 DIAGNOSIS — M25.511 ACUTE PAIN OF RIGHT SHOULDER: Primary | ICD-10-CM

## 2023-01-24 PROCEDURE — 97110 THERAPEUTIC EXERCISES: CPT | Mod: CQ

## 2023-01-24 NOTE — PROGRESS NOTES
Physical Therapy Daily Treatment Note     Name: Riana Baker   Clinic Number: [id number]    Therapy Diagnosis:   Encounter Diagnosis   Name Primary?    Acute pain of right shoulder Yes         Physician: Mendez Tavarez NP    Visit Date: 1/24/2023    Physician Orders: PT Eval and Treat   Medical Diagnosis: s/p R reverse total shoulder  Date of Surgery: 8/16/22  Evaluation Date: 9/6/2022  Authorization Period Expiration: 1/30/23  Plan of Care Certification Period: 2/21/23  Visit # / Visits authorized: 7/35 (34 tot)     Time In: 0800 am  Time Out: 0856 am  Total Billable Time:56 minutes      Precautions: Standard and s/p reverse shoulder RUE    Subjective      Pt reports: Able to perform more house hold chores. Unable to lift pots w/ food in it and cannot make bed.    she was compliant with home exercise program given last session.   Response to previous treatment: good  Functional change: NA    Pain: not quantified this visit  Location: right shoulder      Objective     PROM R shoulder: 1/9/23  Shoulder FL: 160 deg  Shoulder ABD: above 180  ER @ 90 degrees: 90  IR: 80 deg    MMT: 1/12/23  RUE: 3+/5 flex, abd  3/5 ER  4/5 IR    Riana received therapeutic exercises to develop strength, endurance, ROM, and posture for 56 minutes including:  Shoulder PROM   UBE 5'/5' within available ROM lvl 4  Bicep curl #2 3x10  Landmine #4 4x10  Box carry #4 4 laps  R UE Wall 3 way slides with towel 5x5  Standing abduction and flex 1# 5x5  Wall push up 5x8  EOT elevated FL sit back 3x 1 min  4 way ball on wall x30 ea green ball  Farmer carry #8 3 laps    Riana received the following manual therapy techniques: Joint mobilizations and Manual traction were applied to the: R shoulder for 00 minutes, including:  PROM with light distraction per protocol  Grade I/II GH joint mobilizations     Home Exercises Provided and Patient Education Provided     Education provided:   -resume HEP    Written Home  "Exercises Provided: Patient instructed to cont prior HEP.  Exercises were reviewed and Riana was able to demonstrate them prior to the end of the session.  Riana demonstrated good  understanding of the education provided.     See EMR under Patient Instructions for exercises provided {Makenna single:65778::"1/24/2023","prior visit"    Assessment   Progressed pt with functional strengthening today. Having difficulty with some house hold tasks 2* strength. Will reassess next session for future sessions.    Riana is progressing well towards her goals.   Pt prognosis is Good.     Pt will continue to benefit from skilled outpatient physical therapy to address the deficits listed in the problem list box on initial evaluation, provide pt/family education and to maximize pt's level of independence in the home and community environment.     Pt's spiritual, cultural and educational needs considered and pt agreeable to plan of care and goals.    Anticipated barriers to physical therapy: none    Goals:   Short Term Goals: (6 weeks)   - Pt will increase ROM to 110 deg R shoulder flex and abd, 40 deg IR/ER at 45 deg abd ( met)  - Decrease Pain to 2/10 as worst with all PT interventions (Progressing, not met)  - Pt to self correct posture and gait with minimal cues (met)  - Pt independent with HEP with progressions. ( met)     Long Term Goals (24 Weeks):  - Pt will increase ROM to 170 deg R shoulder flex and abd, 80 deg ER and IR (Progressing, not met)  - Pt will increase strength to 5/5 RUE in all planes, except ER (Progressing, not met)  - Decrease Pain to 0/10 with ADLs overhead (Progressing, not met)  - Pt to return to 80% PLOF (Progressing, not met)       Plan     Continue POC per pt tolerance progressing AROM per protocol.     Cristal Marie, PTA,                                      "

## 2023-01-26 ENCOUNTER — CLINICAL SUPPORT (OUTPATIENT)
Dept: REHABILITATION | Facility: HOSPITAL | Age: 68
End: 2023-01-26
Payer: MEDICARE

## 2023-01-26 DIAGNOSIS — M25.511 ACUTE PAIN OF RIGHT SHOULDER: Primary | ICD-10-CM

## 2023-01-26 PROCEDURE — 97110 THERAPEUTIC EXERCISES: CPT

## 2023-01-26 NOTE — PROGRESS NOTES
Physical Therapy Daily Treatment Note     Name: Riana Baker   Clinic Number: [id number]    Therapy Diagnosis:   Encounter Diagnosis   Name Primary?    Acute pain of right shoulder Yes         Physician: Mendez Tavarez NP    Visit Date: 1/26/2023    Physician Orders: PT Eval and Treat   Medical Diagnosis: s/p R reverse total shoulder  Date of Surgery: 8/16/22  Evaluation Date: 9/6/2022  Authorization Period Expiration: 1/30/23  Plan of Care Certification Period: 2/21/23  Visit # / Visits authorized: 8/35 (34 tot)     Time In: 0900 am  Time Out: 955 am  Total Billable Time: 30 minutes      Precautions: Standard and s/p reverse shoulder RUE    Subjective      Pt reports: having more anterior shoulder pain.    she was compliant with home exercise program given last session.   Response to previous treatment: good  Functional change: NA    Pain: not quantified this visit  Location: right shoulder      Objective     PROM R shoulder: 1/9/23  Shoulder FL: 160 deg  Shoulder ABD: above 180  ER @ 90 degrees: 90  IR: 80 deg    MMT: 1/12/23  RUE: 3+/5 flex, abd  3/5 ER  4/5 IR    Riana received therapeutic exercises to develop strength, endurance, ROM, and posture for 55 minutes including:  Shoulder PROM -np  UBE 5'/5' within available ROM lvl 4  Bicep curl #2 3x10-np  Landmine #3 4x10  Box carry #4 4 laps-np  R UE Wall 3 way slides with towel 5x5  Standing abduction and flex 2# 5x5  Wall push up 5x8-np  EOT elevated FL sit back 2x 20 2#  4 way ball on wall x30 ea green ball  Farmer carry #8 3 laps-np    Riana received the following manual therapy techniques: Joint mobilizations and Manual traction were applied to the: R shoulder for 00 minutes, including:  PROM with light distraction per protocol  Grade I/II GH joint mobilizations     Home Exercises Provided and Patient Education Provided     Education provided:   -resume HEP    Written Home Exercises Provided: Patient instructed to  "cont prior HEP.  Exercises were reviewed and Riana was able to demonstrate them prior to the end of the session.  Riana demonstrated good  understanding of the education provided.     See EMR under Patient Instructions for exercises provided {Blank single:20487::"1/26/2023","prior visit"    Assessment   Pt was unable to do push up secondary to increasing anterior R shoulder pain. She continues to tolerate more functional based strengthening to improve ADLs. Plan to progress as tolerated.     Riana is progressing well towards her goals.   Pt prognosis is Good.     Pt will continue to benefit from skilled outpatient physical therapy to address the deficits listed in the problem list box on initial evaluation, provide pt/family education and to maximize pt's level of independence in the home and community environment.     Pt's spiritual, cultural and educational needs considered and pt agreeable to plan of care and goals.    Anticipated barriers to physical therapy: none    Goals:   Short Term Goals: (6 weeks)   - Pt will increase ROM to 110 deg R shoulder flex and abd, 40 deg IR/ER at 45 deg abd ( met)  - Decrease Pain to 2/10 as worst with all PT interventions (Progressing, not met)  - Pt to self correct posture and gait with minimal cues (met)  - Pt independent with HEP with progressions. ( met)     Long Term Goals (24 Weeks):  - Pt will increase ROM to 170 deg R shoulder flex and abd, 80 deg ER and IR (Progressing, not met)  - Pt will increase strength to 5/5 RUE in all planes, except ER (Progressing, not met)  - Decrease Pain to 0/10 with ADLs overhead (Progressing, not met)  - Pt to return to 80% PLOF (Progressing, not met)       Plan     Continue POC per pt tolerance progressing AROM per protocol.     Bernadette Palacios, PT,                                        "

## 2023-01-31 DIAGNOSIS — E11.9 TYPE 2 DIABETES MELLITUS WITHOUT COMPLICATION, WITH LONG-TERM CURRENT USE OF INSULIN: ICD-10-CM

## 2023-01-31 DIAGNOSIS — Z79.4 TYPE 2 DIABETES MELLITUS WITHOUT COMPLICATION, WITH LONG-TERM CURRENT USE OF INSULIN: ICD-10-CM

## 2023-01-31 RX ORDER — INSULIN PUMP SYRINGE, 3 ML
EACH MISCELLANEOUS
Qty: 1 EACH | Refills: 0 | Status: SHIPPED | OUTPATIENT
Start: 2023-01-31 | End: 2023-02-13 | Stop reason: SDUPTHER

## 2023-02-02 ENCOUNTER — CLINICAL SUPPORT (OUTPATIENT)
Dept: REHABILITATION | Facility: HOSPITAL | Age: 68
End: 2023-02-02
Payer: MEDICARE

## 2023-02-02 DIAGNOSIS — M25.511 ACUTE PAIN OF RIGHT SHOULDER: Primary | ICD-10-CM

## 2023-02-02 PROCEDURE — 97112 NEUROMUSCULAR REEDUCATION: CPT | Mod: CQ

## 2023-02-02 PROCEDURE — 97530 THERAPEUTIC ACTIVITIES: CPT | Mod: CQ

## 2023-02-02 PROCEDURE — 97110 THERAPEUTIC EXERCISES: CPT | Mod: CQ

## 2023-02-02 NOTE — PROGRESS NOTES
Physical Therapy Daily Treatment Note     Name: Riana Baker   Clinic Number: [id number]    Therapy Diagnosis:   Encounter Diagnosis   Name Primary?    Acute pain of right shoulder Yes         Physician: Mendez Tavarez NP    Visit Date: 2/2/2023    Physician Orders: PT Eval and Treat   Medical Diagnosis: s/p R reverse total shoulder  Date of Surgery: 8/16/22  Evaluation Date: 9/6/2022  Authorization Period Expiration: 1/30/23  Plan of Care Certification Period: 2/21/23  Visit # / Visits authorized: 9/35 (34 tot)     Time In: 0800 am  Time Out: 0856  am  Total Billable Time: 56 minutes      Precautions: Standard and s/p reverse shoulder RUE    Subjective      Pt reports: Had to miss last session 2* being sick.  Getting stronger   she was compliant with home exercise program given last session.   Response to previous treatment: good  Functional change: NA    Pain: not quantified this visit  Location: right shoulder      Objective     PROM R shoulder: 1/9/23  Shoulder FL: 160 deg  Shoulder ABD: above 180  ER @ 90 degrees: 90  IR: 80 deg    MMT: 1/12/23  RUE: 3+/5 flex, abd  3/5 ER  4/5 IR    Riana received therapeutic exercises to develop strength, endurance, ROM, and posture for 18 minutes including:  UBE 5'/5' within available ROM lvl 4  Bicep curl #2 3x10  Standing abduction and flex 2# 3x    Neuromuscular re-education: 25  4 way ball on wall x30 ea green ball  Landmine #3 3x10  R UE Wall 3 way slides with towel 5x5  CC Row #10 3x10    Therapeutic activity: 15  Wall push up 5x8  Box carry #4 4 laps      Riana received the following manual therapy techniques: Joint mobilizations and Manual traction were applied to the: R shoulder for 00 minutes, including:  PROM with light distraction per protocol  Grade I/II GH joint mobilizations     Home Exercises Provided and Patient Education Provided     Education provided:   -resume HEP    Written Home Exercises Provided: Patient  "instructed to cont prior HEP.  Exercises were reviewed and Riana was able to demonstrate them prior to the end of the session.  Riana demonstrated good  understanding of the education provided.     See EMR under Patient Instructions for exercises provided {Makenna watts:09102::"2/2/2023","prior visit"    Assessment   At start of session pt was pt was anne marie to lift R UE to shoulder height w/o having to swing arm showing improvements in strength and motor control. R referred pain w/ wall slides which improved w/ scapular activation and stability. Cont to need to work on posterior strength to improve shoulder elevation.     Riana is progressing well towards her goals.   Pt prognosis is Good.     Pt will continue to benefit from skilled outpatient physical therapy to address the deficits listed in the problem list box on initial evaluation, provide pt/family education and to maximize pt's level of independence in the home and community environment.     Pt's spiritual, cultural and educational needs considered and pt agreeable to plan of care and goals.    Anticipated barriers to physical therapy: none    Goals:   Short Term Goals: (6 weeks)   - Pt will increase ROM to 110 deg R shoulder flex and abd, 40 deg IR/ER at 45 deg abd ( met)  - Decrease Pain to 2/10 as worst with all PT interventions (Progressing, not met)  - Pt to self correct posture and gait with minimal cues (met)  - Pt independent with HEP with progressions. ( met)     Long Term Goals (24 Weeks):  - Pt will increase ROM to 170 deg R shoulder flex and abd, 80 deg ER and IR (Progressing, not met)  - Pt will increase strength to 5/5 RUE in all planes, except ER (Progressing, not met)  - Decrease Pain to 0/10 with ADLs overhead (Progressing, not met)  - Pt to return to 80% PLOF (Progressing, not met)       Plan     Continue POC per pt tolerance progressing AROM per protocol.     Cristal Marie, PTA,                                          "

## 2023-02-07 ENCOUNTER — CLINICAL SUPPORT (OUTPATIENT)
Dept: REHABILITATION | Facility: HOSPITAL | Age: 68
End: 2023-02-07
Payer: MEDICARE

## 2023-02-07 DIAGNOSIS — M25.511 ACUTE PAIN OF RIGHT SHOULDER: Primary | ICD-10-CM

## 2023-02-07 PROCEDURE — 97112 NEUROMUSCULAR REEDUCATION: CPT

## 2023-02-07 PROCEDURE — 97110 THERAPEUTIC EXERCISES: CPT

## 2023-02-07 PROCEDURE — 97530 THERAPEUTIC ACTIVITIES: CPT

## 2023-02-07 NOTE — PROGRESS NOTES
Physical Therapy Daily Treatment Note     Name: Riana Baker   Clinic Number: [id number]    Therapy Diagnosis:   Encounter Diagnosis   Name Primary?    Acute pain of right shoulder Yes         Physician: Mendez Tavarez NP    Visit Date: 2/7/2023    Physician Orders: PT Eval and Treat   Medical Diagnosis: s/p R reverse total shoulder  Date of Surgery: 8/16/22  Evaluation Date: 9/6/2022  Authorization Period Expiration: 1/30/23  Plan of Care Certification Period: 2/21/23  Visit # / Visits authorized: 9/35 (34 tot)     Time In: 0800 am  Time Out: 0900  am  Total Billable Time: 60 minutes      Precautions: Standard and s/p reverse shoulder RUE    Subjective      Pt reports: no new issues, continues to work hard at home.    she was compliant with home exercise program given last session.   Response to previous treatment: good  Functional change: NA    Pain: not quantified this visit  Location: right shoulder      Objective     PROM R shoulder: 1/9/23  Shoulder FL: 160 deg  Shoulder ABD: above 180  ER @ 90 degrees: 90  IR: 80 deg    MMT: 1/12/23  RUE: 3+/5 flex, abd  3/5 ER  4/5 IR    Riana received therapeutic exercises to develop strength, endurance, ROM, and posture for 20 minutes including:  UBE 5'/5' within available ROM lvl 4  Bicep curl #2 3x10-np  Standing abduction and flex 2# 3x    Neuromuscular re-education: 25  4 way ball on wall x30 ea green ball  Landmine #3 3x10  R UE Wall 3 way slides with towel x30 with arm lifts   CC Row #3 B 3x10    Therapeutic activity: 15  Wall push up 5x8  Box carry #5 4 laps      Riana received the following manual therapy techniques: Joint mobilizations and Manual traction were applied to the: R shoulder for 00 minutes, including:  PROM with light distraction per protocol  Grade I/II GH joint mobilizations     Home Exercises Provided and Patient Education Provided     Education provided:   -resume HEP    Written Home Exercises Provided:  "Patient instructed to cont prior HEP.  Exercises were reviewed and Riana was able to demonstrate them prior to the end of the session.  Riana demonstrated good  understanding of the education provided.     See EMR under Patient Instructions for exercises provided {Makenna watts:11704::"2/7/2023","prior visit"    Assessment   Pt showed good fatigue this weekend to gain appropriate strengthening. She required less cues this session.  Continue to progress posterior strength to improve shoulder elevation.     Riana is progressing well towards her goals.   Pt prognosis is Good.     Pt will continue to benefit from skilled outpatient physical therapy to address the deficits listed in the problem list box on initial evaluation, provide pt/family education and to maximize pt's level of independence in the home and community environment.     Pt's spiritual, cultural and educational needs considered and pt agreeable to plan of care and goals.    Anticipated barriers to physical therapy: none    Goals:   Short Term Goals: (6 weeks)   - Pt will increase ROM to 110 deg R shoulder flex and abd, 40 deg IR/ER at 45 deg abd ( met)  - Decrease Pain to 2/10 as worst with all PT interventions (Progressing, not met)  - Pt to self correct posture and gait with minimal cues (met)  - Pt independent with HEP with progressions. ( met)     Long Term Goals (24 Weeks):  - Pt will increase ROM to 170 deg R shoulder flex and abd, 80 deg ER and IR (Progressing, not met)  - Pt will increase strength to 5/5 RUE in all planes, except ER (Progressing, not met)  - Decrease Pain to 0/10 with ADLs overhead (Progressing, not met)  - Pt to return to 80% PLOF (Progressing, not met)       Plan     Continue POC per pt tolerance progressing AROM per protocol.     Bernadette Palacios, PT, DPT                                            "

## 2023-02-09 ENCOUNTER — OFFICE VISIT (OUTPATIENT)
Dept: ORTHOPEDICS | Facility: CLINIC | Age: 68
End: 2023-02-09
Payer: MEDICARE

## 2023-02-09 ENCOUNTER — HOSPITAL ENCOUNTER (OUTPATIENT)
Dept: RADIOLOGY | Facility: HOSPITAL | Age: 68
Discharge: HOME | End: 2023-02-09
Attending: NURSE PRACTITIONER
Payer: MEDICARE

## 2023-02-09 VITALS — HEIGHT: 65 IN | BODY MASS INDEX: 28.25 KG/M2 | WEIGHT: 169.56 LBS

## 2023-02-09 DIAGNOSIS — M25.511 ACUTE PAIN OF RIGHT SHOULDER: ICD-10-CM

## 2023-02-09 DIAGNOSIS — S42.291D OTHER CLOSED DISPLACED FRACTURE OF PROXIMAL END OF RIGHT HUMERUS WITH ROUTINE HEALING, SUBSEQUENT ENCOUNTER: Primary | ICD-10-CM

## 2023-02-09 DIAGNOSIS — M25.511 ACUTE PAIN OF RIGHT SHOULDER: Primary | ICD-10-CM

## 2023-02-09 PROCEDURE — 1157F ADVNC CARE PLAN IN RCRD: CPT | Mod: CPTII,S$GLB,, | Performed by: NURSE PRACTITIONER

## 2023-02-09 PROCEDURE — 99213 PR OFFICE/OUTPT VISIT, EST, LEVL III, 20-29 MIN: ICD-10-PCS | Mod: S$GLB,,, | Performed by: NURSE PRACTITIONER

## 2023-02-09 PROCEDURE — 4010F ACE/ARB THERAPY RXD/TAKEN: CPT | Mod: CPTII,S$GLB,, | Performed by: NURSE PRACTITIONER

## 2023-02-09 PROCEDURE — 1159F MED LIST DOCD IN RCRD: CPT | Mod: CPTII,S$GLB,, | Performed by: NURSE PRACTITIONER

## 2023-02-09 PROCEDURE — 1159F PR MEDICATION LIST DOCUMENTED IN MEDICAL RECORD: ICD-10-PCS | Mod: CPTII,S$GLB,, | Performed by: NURSE PRACTITIONER

## 2023-02-09 PROCEDURE — 1126F AMNT PAIN NOTED NONE PRSNT: CPT | Mod: CPTII,S$GLB,, | Performed by: NURSE PRACTITIONER

## 2023-02-09 PROCEDURE — 73030 XR SHOULDER COMPLETE 2 OR MORE VIEWS RIGHT: ICD-10-PCS | Mod: 26,RT,, | Performed by: RADIOLOGY

## 2023-02-09 PROCEDURE — 1126F PR PAIN SEVERITY QUANTIFIED, NO PAIN PRESENT: ICD-10-PCS | Mod: CPTII,S$GLB,, | Performed by: NURSE PRACTITIONER

## 2023-02-09 PROCEDURE — 3288F FALL RISK ASSESSMENT DOCD: CPT | Mod: CPTII,S$GLB,, | Performed by: NURSE PRACTITIONER

## 2023-02-09 PROCEDURE — 1101F PR PT FALLS ASSESS DOC 0-1 FALLS W/OUT INJ PAST YR: ICD-10-PCS | Mod: CPTII,S$GLB,, | Performed by: NURSE PRACTITIONER

## 2023-02-09 PROCEDURE — 1160F RVW MEDS BY RX/DR IN RCRD: CPT | Mod: CPTII,S$GLB,, | Performed by: NURSE PRACTITIONER

## 2023-02-09 PROCEDURE — 3288F PR FALLS RISK ASSESSMENT DOCUMENTED: ICD-10-PCS | Mod: CPTII,S$GLB,, | Performed by: NURSE PRACTITIONER

## 2023-02-09 PROCEDURE — 4010F PR ACE/ARB THEARPY RXD/TAKEN: ICD-10-PCS | Mod: CPTII,S$GLB,, | Performed by: NURSE PRACTITIONER

## 2023-02-09 PROCEDURE — 3008F BODY MASS INDEX DOCD: CPT | Mod: CPTII,S$GLB,, | Performed by: NURSE PRACTITIONER

## 2023-02-09 PROCEDURE — 1160F PR REVIEW ALL MEDS BY PRESCRIBER/CLIN PHARMACIST DOCUMENTED: ICD-10-PCS | Mod: CPTII,S$GLB,, | Performed by: NURSE PRACTITIONER

## 2023-02-09 PROCEDURE — 3008F PR BODY MASS INDEX (BMI) DOCUMENTED: ICD-10-PCS | Mod: CPTII,S$GLB,, | Performed by: NURSE PRACTITIONER

## 2023-02-09 PROCEDURE — 73030 X-RAY EXAM OF SHOULDER: CPT | Mod: 26,RT,, | Performed by: RADIOLOGY

## 2023-02-09 PROCEDURE — 99999 PR PBB SHADOW E&M-EST. PATIENT-LVL III: ICD-10-PCS | Mod: PBBFAC,,, | Performed by: NURSE PRACTITIONER

## 2023-02-09 PROCEDURE — 99999 PR PBB SHADOW E&M-EST. PATIENT-LVL III: CPT | Mod: PBBFAC,,, | Performed by: NURSE PRACTITIONER

## 2023-02-09 PROCEDURE — 1157F PR ADVANCE CARE PLAN OR EQUIV PRESENT IN MEDICAL RECORD: ICD-10-PCS | Mod: CPTII,S$GLB,, | Performed by: NURSE PRACTITIONER

## 2023-02-09 PROCEDURE — 1101F PT FALLS ASSESS-DOCD LE1/YR: CPT | Mod: CPTII,S$GLB,, | Performed by: NURSE PRACTITIONER

## 2023-02-09 PROCEDURE — 73030 X-RAY EXAM OF SHOULDER: CPT | Mod: TC,RT

## 2023-02-09 PROCEDURE — 99213 OFFICE O/P EST LOW 20 MIN: CPT | Mod: S$GLB,,, | Performed by: NURSE PRACTITIONER

## 2023-02-09 NOTE — PROGRESS NOTES
Ms. Kay is here today for a follow up visit after undergoing right reverse rTSA and right biceps tenodesis by Dr. Powell on 8/16/2022.    Interval History:  She reports that she is doing well.  Pain is minimal and rare, if she does have pain she uses Tylenol.  She is going to PT, has 3 more visits.  Hoping to be discharged soon.   She is not taking pain medication.   She denies fever, chills, and sweats since the time of the surgery.     Physical exam:  Incision is open to air and healed, no signs of infection seen.   She has tactile stimulation to their lower FA.  She has normal ROM of all fingers and wrist.  Elbow ROM is 0-180 degrees.  Radial pulse is 2+ and cap refill is < 3 secs.  ROM at the shoulder is 0-160 degrees.    RADS: Right shoulder x-ray was obtained and personally reviewed by me, findings show humerus hardware is in proper position, no evidence of hardware failure.  No new fractures seen.        Assessment:  Follow up visit (6 months)    Plan:    ICD-10-CM ICD-9-CM   1. Other closed displaced fracture of proximal end of right humerus with routine healing, subsequent encounter  S42.291D V54.11     Current care, treatment plan, precautions, activity level/ modifications, limitations, rehabilitation exercises and proposed future treatment were discussed with the patient. We discussed the need to monitor for changes in symptoms and condition and report them to the physician.  Discussed importance of compliance with all appointments and follow up examinations.     PHYSICAL THERAPY:   - Progressing well with therapy, has 3 more visit.  Advised can discuss discharge planning with therapy.      - Weight bearing as tolerated and ROMAT.  - Range of motion per Shoulder rehab as stated below:    PHASE 1: now until 2 wks postop - completed.  ROMAT and hand, wrist, elbow.  Sling, may remove for hygiene and ADLs     PHASE 2: 2-6 wks postop - done  ROMAT and hand, wrist, elbow.  Shoulder Pendulums okay  Shoulder  passive and active assisted ROMAT   Sling, may remove for hygiene ADLs and therapy     PHASE 3: 6-12 wks postop - Done  D/c sling   ROMAT RUE  No resistance     PHASE 4: after 12 wks postop - Done  ROMAT, WBAT     PAIN MEDICATION:   - Pain medication: refill was not needed, continue Tylenol OTC PRN.  - Pain medication refill policy provided to patient for review, yes.    - Patient was informed a multi-modal approach is used to treat their pain.      FOLLOW UP:   - Patient will follow up in the clinic in 6 months.  - X-ray of her right shoulder is needed.    - Future Appointments:   Future Appointments   Date Time Provider Department Center   2/10/2023  8:10 AM Tiara Carl MD [facility] DERM [place]   2/14/2023  8:00 AM Cristal Marie PTA [facility] OP [facility] [place]   2/16/2023  8:00 AM Cristal Marie PTA [facility] OP [facility] [place]   2/24/2023  8:00 AM Bernadette Palacios, PT [facility] OP [facility] [place]   4/6/2023  8:30 AM LAB, [place] [facility] [place]   4/13/2023  9:00 AM [facility]  [facility]           If there are any questions prior to scheduled follow up, the patient was instructed to contact the office

## 2023-02-10 ENCOUNTER — OFFICE VISIT (OUTPATIENT)
Dept: DERMATOLOGY | Facility: CLINIC | Age: 68
End: 2023-02-10
Payer: MEDICARE

## 2023-02-10 VITALS — WEIGHT: 169 LBS | BODY MASS INDEX: 28.12 KG/M2

## 2023-02-10 DIAGNOSIS — L21.9 SEBORRHEIC DERMATITIS: Primary | ICD-10-CM

## 2023-02-10 DIAGNOSIS — D22.9 NEVUS OF MULTIPLE SITES: ICD-10-CM

## 2023-02-10 PROCEDURE — 1159F MED LIST DOCD IN RCRD: CPT | Mod: CPTII,S$GLB,, | Performed by: DERMATOLOGY

## 2023-02-10 PROCEDURE — 1126F AMNT PAIN NOTED NONE PRSNT: CPT | Mod: CPTII,S$GLB,, | Performed by: DERMATOLOGY

## 2023-02-10 PROCEDURE — 1157F ADVNC CARE PLAN IN RCRD: CPT | Mod: CPTII,S$GLB,, | Performed by: DERMATOLOGY

## 2023-02-10 PROCEDURE — 1160F PR REVIEW ALL MEDS BY PRESCRIBER/CLIN PHARMACIST DOCUMENTED: ICD-10-PCS | Mod: CPTII,S$GLB,, | Performed by: DERMATOLOGY

## 2023-02-10 PROCEDURE — 3008F PR BODY MASS INDEX (BMI) DOCUMENTED: ICD-10-PCS | Mod: CPTII,S$GLB,, | Performed by: DERMATOLOGY

## 2023-02-10 PROCEDURE — 3008F BODY MASS INDEX DOCD: CPT | Mod: CPTII,S$GLB,, | Performed by: DERMATOLOGY

## 2023-02-10 PROCEDURE — 1159F PR MEDICATION LIST DOCUMENTED IN MEDICAL RECORD: ICD-10-PCS | Mod: CPTII,S$GLB,, | Performed by: DERMATOLOGY

## 2023-02-10 PROCEDURE — 99203 PR OFFICE/OUTPT VISIT, NEW, LEVL III, 30-44 MIN: ICD-10-PCS | Mod: S$GLB,,, | Performed by: DERMATOLOGY

## 2023-02-10 PROCEDURE — 1157F PR ADVANCE CARE PLAN OR EQUIV PRESENT IN MEDICAL RECORD: ICD-10-PCS | Mod: CPTII,S$GLB,, | Performed by: DERMATOLOGY

## 2023-02-10 PROCEDURE — 1126F PR PAIN SEVERITY QUANTIFIED, NO PAIN PRESENT: ICD-10-PCS | Mod: CPTII,S$GLB,, | Performed by: DERMATOLOGY

## 2023-02-10 PROCEDURE — 99999 PR PBB SHADOW E&M-EST. PATIENT-LVL III: ICD-10-PCS | Mod: PBBFAC,,, | Performed by: DERMATOLOGY

## 2023-02-10 PROCEDURE — 1160F RVW MEDS BY RX/DR IN RCRD: CPT | Mod: CPTII,S$GLB,, | Performed by: DERMATOLOGY

## 2023-02-10 PROCEDURE — 99203 OFFICE O/P NEW LOW 30 MIN: CPT | Mod: S$GLB,,, | Performed by: DERMATOLOGY

## 2023-02-10 PROCEDURE — 4010F ACE/ARB THERAPY RXD/TAKEN: CPT | Mod: CPTII,S$GLB,, | Performed by: DERMATOLOGY

## 2023-02-10 PROCEDURE — 4010F PR ACE/ARB THEARPY RXD/TAKEN: ICD-10-PCS | Mod: CPTII,S$GLB,, | Performed by: DERMATOLOGY

## 2023-02-10 PROCEDURE — 99999 PR PBB SHADOW E&M-EST. PATIENT-LVL III: CPT | Mod: PBBFAC,,, | Performed by: DERMATOLOGY

## 2023-02-10 RX ORDER — KETOCONAZOLE 20 MG/ML
SHAMPOO, SUSPENSION TOPICAL
Qty: 120 ML | Refills: 6 | Status: SHIPPED | OUTPATIENT
Start: 2023-02-10

## 2023-02-10 NOTE — PROGRESS NOTES
Subjective:       Patient ID:  Riana Kay is a 67 y.o. female who presents for   Chief Complaint   Patient presents with    Mole     Back     Lesion on left upper back for over a year, would like checked not painful does not bleed.  Also was seen 3 years ago for seborrheic dermatitis needs refill for her ketoconizole.     Mole - Initial  Affected locations: back  Signs / symptoms: asymptomatic  Relieving factors/Treatments tried: nothing    Review of Systems   Constitutional:  Negative for fever, chills, weight loss, weight gain, fatigue, night sweats and malaise.   Skin:  Negative for daily sunscreen use, activity-related sunscreen use and wears hat.   Hematologic/Lymphatic: Bruises/bleeds easily.      Objective:    Physical Exam   Constitutional: She appears well-developed and well-nourished. No distress.   Neurological: She is alert and oriented to person, place, and time. She is not disoriented.   Psychiatric: She has a normal mood and affect.   Skin:   Areas Examined (abnormalities noted in diagram):   Scalp / Hair Palpated and Inspected  Head / Face Inspection Performed  Neck Inspection Performed  Chest / Axilla Inspection Performed  Abdomen Inspection Performed  Back Inspection Performed  RUE Inspected  LUE Inspection Performed                 Diagram Legend     Erythematous scaling macule/papule c/w actinic keratosis       Vascular papule c/w angioma      Pigmented verrucoid papule/plaque c/w seborrheic keratosis      Yellow umbilicated papule c/w sebaceous hyperplasia      Irregularly shaped tan macule c/w lentigo     1-2 mm smooth white papules consistent with Milia      Movable subcutaneous cyst with punctum c/w epidermal inclusion cyst      Subcutaneous movable cyst c/w pilar cyst      Firm pink to brown papule c/w dermatofibroma      Pedunculated fleshy papule(s) c/w skin tag(s)      Evenly pigmented macule c/w junctional nevus     Mildly variegated pigmented, slightly irregular-bordered  "macule c/w mildly atypical nevus      Flesh colored to evenly pigmented papule c/w intradermal nevus       Pink pearly papule/plaque c/w basal cell carcinoma      Erythematous hyperkeratotic cursted plaque c/w SCC      Surgical scar with no sign of skin cancer recurrence      Open and closed comedones      Inflammatory papules and pustules      Verrucoid papule consistent consistent with wart     Erythematous eczematous patches and plaques     Dystrophic onycholytic nail with subungual debris c/w onychomycosis     Umbilicated papule    Erythematous-base heme-crusted tan verrucoid plaque consistent with inflamed seborrheic keratosis     Erythematous Silvery Scaling Plaque c/w Psoriasis     See annotation      Assessment / Plan:        Seborrheic dermatitis  -     ketoconazole (NIZORAL) 2 % shampoo; Apply topically every 7 days.  Dispense: 120 mL; Refill: 6    Nevus of multiple sites  The "ABCD" rules to observe pigmented lesions were reviewed.  Brochure provided               Follow up in about 1 year (around 2/10/2024).  "

## 2023-02-12 ENCOUNTER — PATIENT MESSAGE (OUTPATIENT)
Dept: INTERNAL MEDICINE | Facility: CLINIC | Age: 68
End: 2023-02-12
Payer: MEDICARE

## 2023-02-13 DIAGNOSIS — E11.9 TYPE 2 DIABETES MELLITUS WITHOUT COMPLICATION, WITH LONG-TERM CURRENT USE OF INSULIN: ICD-10-CM

## 2023-02-13 DIAGNOSIS — Z79.4 TYPE 2 DIABETES MELLITUS WITHOUT COMPLICATION, WITH LONG-TERM CURRENT USE OF INSULIN: ICD-10-CM

## 2023-02-13 RX ORDER — INSULIN PUMP SYRINGE, 3 ML
EACH MISCELLANEOUS
Qty: 1 EACH | Refills: 0 | Status: SHIPPED | OUTPATIENT
Start: 2023-02-13 | End: 2024-02-13

## 2023-02-13 RX ORDER — LANCETS
EACH MISCELLANEOUS
Qty: 100 EACH | Refills: 11 | Status: SHIPPED | OUTPATIENT
Start: 2023-02-13

## 2023-02-13 NOTE — TELEPHONE ENCOUNTER
Spoke to pharmacy. They confirmed the Rx was received and the supplies have been received and are available for pt

## 2023-02-13 NOTE — TELEPHONE ENCOUNTER
Spoke with [facility] regarding glucose meter,  staff reports meter not covered by patient insurance, pharmacy also stated no alternative offered

## 2023-02-14 ENCOUNTER — CLINICAL SUPPORT (OUTPATIENT)
Dept: REHABILITATION | Facility: HOSPITAL | Age: 68
End: 2023-02-14
Payer: MEDICARE

## 2023-02-14 DIAGNOSIS — M25.511 ACUTE PAIN OF RIGHT SHOULDER: Primary | ICD-10-CM

## 2023-02-14 PROCEDURE — 97110 THERAPEUTIC EXERCISES: CPT | Mod: CQ

## 2023-02-14 PROCEDURE — 97112 NEUROMUSCULAR REEDUCATION: CPT | Mod: CQ

## 2023-02-14 PROCEDURE — 97530 THERAPEUTIC ACTIVITIES: CPT | Mod: CQ

## 2023-02-14 NOTE — PROGRESS NOTES
Physical Therapy Daily Treatment Note     Name: Riana Baker   Clinic Number: [id number]    Therapy Diagnosis:   Encounter Diagnosis   Name Primary?    Acute pain of right shoulder Yes         Physician: Mendez Tavarez NP    Visit Date: 2/14/2023    Physician Orders: PT Eval and Treat   Medical Diagnosis: s/p R reverse total shoulder  Date of Surgery: 8/16/22  Evaluation Date: 9/6/2022  Authorization Period Expiration: 1/30/23  Plan of Care Certification Period: 2/21/23  Visit # / Visits authorized: 10/35 (35 tot)     Time In: 0810 am  Time Out: 0900 am  Total Billable Time: 50 minutes      Precautions: Standard and s/p reverse shoulder RUE    Subjective      Pt reports: Went to see MD and said I look good.Can we end therapy soon?   she was compliant with home exercise program given last session.   Response to previous treatment: good  Functional change: NA    Pain: not quantified this visit  Location: right shoulder      Objective     PROM R shoulder: 1/9/23  Shoulder FL: 160 deg  Shoulder ABD: above 180  ER @ 90 degrees: 90  IR: 80 deg    MMT: 1/12/23  RUE: 3+/5 flex, abd  3/5 ER  4/5 IR    Riana received therapeutic exercises to develop strength, endurance, ROM, and posture for 15 minutes including:  UBE 5'/5' within available ROM lvl 4  Bicep curl #2 3x10-np  Standing abduction #1 and flex 2# 3x    Neuromuscular re-education: 20  4 way ball on wall x30 ea green ball  Landmine #3 3x10  R UE Wall 3 way slides with towel x30 with arm lifts   CC Row #3 B 3x10    Therapeutic activity: 15  Wall push up 5x8  Bumper wt carry #10 3 laps      Riana received the following manual therapy techniques: Joint mobilizations and Manual traction were applied to the: R shoulder for 00 minutes, including:  PROM with light distraction per protocol  Grade I/II GH joint mobilizations     Home Exercises Provided and Patient Education Provided     Education provided:   -resume HEP    Written Home  "Exercises Provided: Patient instructed to cont prior HEP.  Exercises were reviewed and Riana was able to demonstrate them prior to the end of the session.  Riana demonstrated good  understanding of the education provided.     See EMR under Patient Instructions for exercises provided {Makenna single:79645::"2/14/2023","prior visit"    Assessment   Pt showed good fatigue this weekend to gain appropriate strengthening. She required less cues this session.  Continue to progress posterior strength to improve shoulder elevation.     Riana is progressing well towards her goals.   Pt prognosis is Good.     Pt will continue to benefit from skilled outpatient physical therapy to address the deficits listed in the problem list box on initial evaluation, provide pt/family education and to maximize pt's level of independence in the home and community environment.     Pt's spiritual, cultural and educational needs considered and pt agreeable to plan of care and goals.    Anticipated barriers to physical therapy: none    Goals:   Short Term Goals: (6 weeks)   - Pt will increase ROM to 110 deg R shoulder flex and abd, 40 deg IR/ER at 45 deg abd ( met)  - Decrease Pain to 2/10 as worst with all PT interventions (Progressing, not met)  - Pt to self correct posture and gait with minimal cues (met)  - Pt independent with HEP with progressions. ( met)     Long Term Goals (24 Weeks):  - Pt will increase ROM to 170 deg R shoulder flex and abd, 80 deg ER and IR (Progressing, not met)  - Pt will increase strength to 5/5 RUE in all planes, except ER (Progressing, not met)  - Decrease Pain to 0/10 with ADLs overhead (Progressing, not met)  - Pt to return to 80% PLOF (Progressing, not met)       Plan     Continue POC per pt tolerance progressing AROM per protocol.     Cristal Marie, PTA, DPT                                                                    Physical Therapy Daily Treatment Note     Name: Riana Baker [facility] " Number: [id number]    Therapy Diagnosis:   Encounter Diagnosis   Name Primary?    Acute pain of right shoulder Yes         Physician: Mendez Tavarez NP    Visit Date: 2/14/2023    Physician Orders: PT Eval and Treat   Medical Diagnosis: s/p R reverse total shoulder  Date of Surgery: 8/16/22  Evaluation Date: 9/6/2022  Authorization Period Expiration: 1/30/23  Plan of Care Certification Period: 2/21/23  Visit # / Visits authorized: 9/35 (34 tot)     Time In: 0800 am  Time Out: 0900  am  Total Billable Time: 60 minutes      Precautions: Standard and s/p reverse shoulder RUE    Subjective      Pt reports: no new issues, continues to work hard at home.    she was compliant with home exercise program given last session.   Response to previous treatment: good  Functional change: NA    Pain: not quantified this visit  Location: right shoulder      Objective     PROM R shoulder: 1/9/23  Shoulder FL: 160 deg  Shoulder ABD: above 180  ER @ 90 degrees: 90  IR: 80 deg    MMT: 1/12/23  RUE: 3+/5 flex, abd  3/5 ER  4/5 IR    Riana received therapeutic exercises to develop strength, endurance, ROM, and posture for 20 minutes including:  UBE 5'/5' within available ROM lvl 4  Bicep curl #2 3x10-np  Standing abduction and flex 2# 3x    Neuromuscular re-education: 25  4 way ball on wall x30 ea green ball  Landmine #3 3x10  R UE Wall 3 way slides with towel x30 with arm lifts   CC Row #3 B 3x10    Therapeutic activity: 15  Wall push up 5x8  Box carry #5 4 laps      Riana received the following manual therapy techniques: Joint mobilizations and Manual traction were applied to the: R shoulder for 00 minutes, including:  PROM with light distraction per protocol  Grade I/II GH joint mobilizations     Home Exercises Provided and Patient Education Provided     Education provided:   -resume HEP    Written Home Exercises Provided: Patient instructed to cont prior HEP.  Exercises were reviewed and Riana was able to demonstrate them  "prior to the end of the session.  Riana demonstrated good  understanding of the education provided.     See EMR under Patient Instructions for exercises provided {Blank single:51449::"2/14/2023","prior visit"    Assessment   Still unable to flex UE past shoulder height. Pt is wanting to stop therapy soon. Pt would benefit from cont therapy to work on remaining deficits but is compliant w/ exercises at home. Pt will be reassessed in 2 visits and may stop therapy for 1 month then return to see if she has made any progress.     Riana is progressing well towards her goals.   Pt prognosis is Good.     Pt will continue to benefit from skilled outpatient physical therapy to address the deficits listed in the problem list box on initial evaluation, provide pt/family education and to maximize pt's level of independence in the home and community environment.     Pt's spiritual, cultural and educational needs considered and pt agreeable to plan of care and goals.    Anticipated barriers to physical therapy: none    Goals:   Short Term Goals: (6 weeks)   - Pt will increase ROM to 110 deg R shoulder flex and abd, 40 deg IR/ER at 45 deg abd ( met)  - Decrease Pain to 2/10 as worst with all PT interventions (Progressing, not met)  - Pt to self correct posture and gait with minimal cues (met)  - Pt independent with HEP with progressions. ( met)     Long Term Goals (24 Weeks):  - Pt will increase ROM to 170 deg R shoulder flex and abd, 80 deg ER and IR (Progressing, not met)  - Pt will increase strength to 5/5 RUE in all planes, except ER (Progressing, not met)  - Decrease Pain to 0/10 with ADLs overhead (Progressing, not met)  - Pt to return to 80% PLOF (Progressing, not met)       Plan     Continue POC per pt tolerance progressing AROM per protocol.     Cristal Marie, PTA, DPT                                                                    Physical Therapy Daily Treatment Note     Name: Riana Samuel [facility] " Number: [id number]    Therapy Diagnosis:   Encounter Diagnosis   Name Primary?    Acute pain of right shoulder Yes         Physician: Mendez Tavarez NP    Visit Date: 2/14/2023    Physician Orders: PT Eval and Treat   Medical Diagnosis: s/p R reverse total shoulder  Date of Surgery: 8/16/22  Evaluation Date: 9/6/2022  Authorization Period Expiration: 1/30/23  Plan of Care Certification Period: 2/21/23  Visit # / Visits authorized: 9/35 (34 tot)     Time In: 0800 am  Time Out: 0900  am  Total Billable Time: 60 minutes      Precautions: Standard and s/p reverse shoulder RUE    Subjective      Pt reports: no new issues, continues to work hard at home.    she was compliant with home exercise program given last session.   Response to previous treatment: good  Functional change: NA    Pain: not quantified this visit  Location: right shoulder      Objective     PROM R shoulder: 1/9/23  Shoulder FL: 160 deg  Shoulder ABD: above 180  ER @ 90 degrees: 90  IR: 80 deg    MMT: 1/12/23  RUE: 3+/5 flex, abd  3/5 ER  4/5 IR    Riana received therapeutic exercises to develop strength, endurance, ROM, and posture for 20 minutes including:  UBE 5'/5' within available ROM lvl 4  Bicep curl #2 3x10-np  Standing abduction and flex 2# 3x    Neuromuscular re-education: 25  4 way ball on wall x30 ea green ball  Landmine #3 3x10  R UE Wall 3 way slides with towel x30 with arm lifts   CC Row #3 B 3x10    Therapeutic activity: 15  Wall push up 5x8  Box carry #5 4 laps      Riana received the following manual therapy techniques: Joint mobilizations and Manual traction were applied to the: R shoulder for 00 minutes, including:  PROM with light distraction per protocol  Grade I/II GH joint mobilizations     Home Exercises Provided and Patient Education Provided     Education provided:   -resume HEP    Written Home Exercises Provided: Patient instructed to cont prior HEP.  Exercises were reviewed and Riana was able to demonstrate them  "prior to the end of the session.  Riana demonstrated good  understanding of the education provided.     See EMR under Patient Instructions for exercises provided {Makenna single:81052::"2/14/2023","prior visit"    Assessment   Pt showed good fatigue this weekend to gain appropriate strengthening. She required less cues this session.  Continue to progress posterior strength to improve shoulder elevation.     Riana is progressing well towards her goals.   Pt prognosis is Good.     Pt will continue to benefit from skilled outpatient physical therapy to address the deficits listed in the problem list box on initial evaluation, provide pt/family education and to maximize pt's level of independence in the home and community environment.     Pt's spiritual, cultural and educational needs considered and pt agreeable to plan of care and goals.    Anticipated barriers to physical therapy: none    Goals:   Short Term Goals: (6 weeks)   - Pt will increase ROM to 110 deg R shoulder flex and abd, 40 deg IR/ER at 45 deg abd ( met)  - Decrease Pain to 2/10 as worst with all PT interventions (Progressing, not met)  - Pt to self correct posture and gait with minimal cues (met)  - Pt independent with HEP with progressions. ( met)     Long Term Goals (24 Weeks):  - Pt will increase ROM to 170 deg R shoulder flex and abd, 80 deg ER and IR (Progressing, not met)  - Pt will increase strength to 5/5 RUE in all planes, except ER (Progressing, not met)  - Decrease Pain to 0/10 with ADLs overhead (Progressing, not met)  - Pt to return to 80% PLOF (Progressing, not met)       Plan     Continue POC per pt tolerance progressing AROM per protocol.     Cristal Marie, PTA, DPT                                              "

## 2023-02-17 ENCOUNTER — PATIENT MESSAGE (OUTPATIENT)
Dept: INTERNAL MEDICINE | Facility: CLINIC | Age: 68
End: 2023-02-17
Payer: MEDICARE

## 2023-02-17 RX ORDER — ONDANSETRON 8 MG/1
8 TABLET, ORALLY DISINTEGRATING ORAL EVERY 12 HOURS PRN
Qty: 20 TABLET | Refills: 0 | Status: SHIPPED | OUTPATIENT
Start: 2023-02-17 | End: 2023-03-16

## 2023-02-17 NOTE — TELEPHONE ENCOUNTER
No new care gaps identified.  [facility] Embedded Care Gaps. Reference number: [id number]. 2/17/2023   7:34:46 AM CST

## 2023-02-22 ENCOUNTER — TELEPHONE (OUTPATIENT)
Dept: INTERNAL MEDICINE | Facility: CLINIC | Age: 68
End: 2023-02-22
Payer: MEDICARE

## 2023-02-22 ENCOUNTER — PATIENT MESSAGE (OUTPATIENT)
Dept: REHABILITATION | Facility: HOSPITAL | Age: 68
End: 2023-02-22
Payer: MEDICARE

## 2023-02-22 NOTE — TELEPHONE ENCOUNTER
----- Message from Kasey Barr sent at 2/22/2023  2:21 PM CST -----  Contact: [phone] Patient  [email or web address]     Patient is calling for Medical Advice regarding: emesis, nausea, dizziness    How long has patient had these symptoms: 3-4 days    Pharmacy name and phone#:   [id number] - [place] - [address]  [address] [address]  Phone: [phone] Fax: [phone]        Would like response via [employer]:  Call Back Please. Pt would like to be seen ASAP. If cant be seen please call something in.     Comments:

## 2023-02-22 NOTE — TELEPHONE ENCOUNTER
I called pt and she states she has been having nausea, vomiting, diarrhea, and dizziness. She said this started about 4 days ago. I told her she needed to be evaluated to be sure she isn't dehydrated and that she should go to UC so they could eval and treat her, but in the mean time to stay well hydrated and eat a bland diet  She VU

## 2023-02-23 ENCOUNTER — OFFICE VISIT (OUTPATIENT)
Dept: URGENT CARE | Facility: CLINIC | Age: 68
End: 2023-02-23
Payer: MEDICARE

## 2023-02-23 VITALS
OXYGEN SATURATION: 97 % | DIASTOLIC BLOOD PRESSURE: 71 MMHG | BODY MASS INDEX: 26.66 KG/M2 | HEART RATE: 99 BPM | SYSTOLIC BLOOD PRESSURE: 111 MMHG | RESPIRATION RATE: 16 BRPM | TEMPERATURE: 98 F | HEIGHT: 65 IN | WEIGHT: 160 LBS

## 2023-02-23 DIAGNOSIS — R53.83 FATIGUE, UNSPECIFIED TYPE: ICD-10-CM

## 2023-02-23 DIAGNOSIS — R11.2 NAUSEA AND VOMITING, UNSPECIFIED VOMITING TYPE: ICD-10-CM

## 2023-02-23 DIAGNOSIS — K59.00 CONSTIPATION, UNSPECIFIED CONSTIPATION TYPE: ICD-10-CM

## 2023-02-23 DIAGNOSIS — U07.1 COVID-19: Primary | ICD-10-CM

## 2023-02-23 DIAGNOSIS — R10.84 GENERALIZED ABDOMINAL PAIN: ICD-10-CM

## 2023-02-23 LAB
BILIRUB UR QL STRIP: NEGATIVE
CTP QC/QA: YES
GLUCOSE UR QL STRIP: NEGATIVE
KETONES UR QL STRIP: NEGATIVE
LEUKOCYTE ESTERASE UR QL STRIP: NEGATIVE
PH, POC UA: 5 (ref 5–8)
POC BLOOD, URINE: NEGATIVE
POC NITRATES, URINE: NEGATIVE
PROT UR QL STRIP: NEGATIVE
SARS-COV-2 AG RESP QL IA.RAPID: POSITIVE
SP GR UR STRIP: 1.02 (ref 1–1.03)
UROBILINOGEN UR STRIP-ACNC: NORMAL (ref 0.1–1.1)

## 2023-02-23 PROCEDURE — 1160F RVW MEDS BY RX/DR IN RCRD: CPT | Mod: CPTII,S$GLB,,

## 2023-02-23 PROCEDURE — 3008F BODY MASS INDEX DOCD: CPT | Mod: CPTII,S$GLB,,

## 2023-02-23 PROCEDURE — 1159F PR MEDICATION LIST DOCUMENTED IN MEDICAL RECORD: ICD-10-PCS | Mod: CPTII,S$GLB,,

## 2023-02-23 PROCEDURE — 3074F SYST BP LT 130 MM HG: CPT | Mod: CPTII,S$GLB,,

## 2023-02-23 PROCEDURE — 3078F PR MOST RECENT DIASTOLIC BLOOD PRESSURE < 80 MM HG: ICD-10-PCS | Mod: CPTII,S$GLB,,

## 2023-02-23 PROCEDURE — 1125F AMNT PAIN NOTED PAIN PRSNT: CPT | Mod: CPTII,S$GLB,,

## 2023-02-23 PROCEDURE — 81003 URINALYSIS AUTO W/O SCOPE: CPT | Mod: QW,S$GLB,,

## 2023-02-23 PROCEDURE — 99213 PR OFFICE/OUTPT VISIT, EST, LEVL III, 20-29 MIN: ICD-10-PCS | Mod: S$GLB,,,

## 2023-02-23 PROCEDURE — 3078F DIAST BP <80 MM HG: CPT | Mod: CPTII,S$GLB,,

## 2023-02-23 PROCEDURE — 4010F ACE/ARB THERAPY RXD/TAKEN: CPT | Mod: CPTII,S$GLB,,

## 2023-02-23 PROCEDURE — 3074F PR MOST RECENT SYSTOLIC BLOOD PRESSURE < 130 MM HG: ICD-10-PCS | Mod: CPTII,S$GLB,,

## 2023-02-23 PROCEDURE — 74018 XR KUB: ICD-10-PCS | Mod: FY,S$GLB,, | Performed by: RADIOLOGY

## 2023-02-23 PROCEDURE — 74018 RADEX ABDOMEN 1 VIEW: CPT | Mod: FY,S$GLB,, | Performed by: RADIOLOGY

## 2023-02-23 PROCEDURE — 1159F MED LIST DOCD IN RCRD: CPT | Mod: CPTII,S$GLB,,

## 2023-02-23 PROCEDURE — 4010F PR ACE/ARB THEARPY RXD/TAKEN: ICD-10-PCS | Mod: CPTII,S$GLB,,

## 2023-02-23 PROCEDURE — 1125F PR PAIN SEVERITY QUANTIFIED, PAIN PRESENT: ICD-10-PCS | Mod: CPTII,S$GLB,,

## 2023-02-23 PROCEDURE — 3008F PR BODY MASS INDEX (BMI) DOCUMENTED: ICD-10-PCS | Mod: CPTII,S$GLB,,

## 2023-02-23 PROCEDURE — 1160F PR REVIEW ALL MEDS BY PRESCRIBER/CLIN PHARMACIST DOCUMENTED: ICD-10-PCS | Mod: CPTII,S$GLB,,

## 2023-02-23 PROCEDURE — 87811 SARS CORONAVIRUS 2 ANTIGEN POCT, MANUAL READ: ICD-10-PCS | Mod: QW,S$GLB,,

## 2023-02-23 PROCEDURE — 99213 OFFICE O/P EST LOW 20 MIN: CPT | Mod: S$GLB,,,

## 2023-02-23 PROCEDURE — 1157F ADVNC CARE PLAN IN RCRD: CPT | Mod: CPTII,S$GLB,,

## 2023-02-23 PROCEDURE — 87811 SARS-COV-2 COVID19 W/OPTIC: CPT | Mod: QW,S$GLB,,

## 2023-02-23 PROCEDURE — 81003 POCT URINALYSIS, DIPSTICK, AUTOMATED, W/O SCOPE: ICD-10-PCS | Mod: QW,S$GLB,,

## 2023-02-23 PROCEDURE — 1157F PR ADVANCE CARE PLAN OR EQUIV PRESENT IN MEDICAL RECORD: ICD-10-PCS | Mod: CPTII,S$GLB,,

## 2023-02-23 RX ORDER — POLYETHYLENE GLYCOL 3350 17 G/17G
17 POWDER, FOR SOLUTION ORAL 2 TIMES DAILY PRN
Qty: 4 PACKET | Refills: 0 | Status: SHIPPED | OUTPATIENT
Start: 2023-02-23 | End: 2023-06-12 | Stop reason: ALTCHOICE

## 2023-02-23 RX ORDER — BENZONATATE 200 MG/1
200 CAPSULE ORAL 3 TIMES DAILY PRN
Qty: 21 CAPSULE | Refills: 0 | Status: SHIPPED | OUTPATIENT
Start: 2023-02-23 | End: 2023-03-02

## 2023-02-23 RX ORDER — MELOXICAM 15 MG/1
15 TABLET ORAL
COMMUNITY
Start: 2023-01-05 | End: 2023-04-27

## 2023-02-23 RX ORDER — ONDANSETRON 4 MG/1
4 TABLET, ORALLY DISINTEGRATING ORAL EVERY 8 HOURS PRN
Qty: 10 TABLET | Refills: 0 | Status: SHIPPED | OUTPATIENT
Start: 2023-02-23 | End: 2023-03-16

## 2023-02-23 RX ORDER — ONDANSETRON 8 MG/1
8 TABLET, ORALLY DISINTEGRATING ORAL
Status: COMPLETED | OUTPATIENT
Start: 2023-02-23 | End: 2023-02-23

## 2023-02-23 RX ADMIN — ONDANSETRON 8 MG: 8 TABLET, ORALLY DISINTEGRATING ORAL at 08:02

## 2023-02-23 NOTE — PATIENT INSTRUCTIONS
You have tested positive for COVID-19 today.      ISOLATION  If you tested positive and do not have symptoms, you must isolate for 5 days starting on the day of the positive test. I    If you tested positive and have symptoms, you must isolate for 5 days starting on the day of the first symptoms,  not the day of the positive test.     This is the most important part, both the CDC and the [facility] emphasize that you do not test out of isolation.     After 5 days, if your symptoms have improved and you have not had fever on day 5, you can return to the community on day 6- NO TESTING REQUIRED!      In fact, we do not retest if you were positive in the last 90 days.    After your 5 days of isolation are completed, the CDC recommends strict mask use for the first 5 days that you come out of isolation. PLEASE FOLLOW CDC GUIDELINES REGARDING TRAVEL AFTER COVID INFECTION.    Return to Urgent Care or go to ER if symptoms worsen or fail to improve.  Follow up with PCP as recommended for further management.     Your symptoms should begin to improve by Day 5 of the infection-- if symptoms worsen, you develop a new fever, shortness of breath, worsening shortness of breath with activity, chest pain, worsening cough, you must return to Urgent Care or go to the ER.    PLEASE READ YOUR DISCHARGE INSTRUCTIONS ENTIRELY AS IT CONTAINS IMPORTANT INFORMATION.      Please drink plenty of fluids.    Please get plenty of rest.    Please return here or go to the Emergency Department for any concerns or worsening of condition.      Tylenol or ibuprofen can also be used as directed for pain and fever unless you have an allergy to them or medical condition such as stomach ulcers, kidney or liver disease or blood thinners etc for which you should not be taking these type of medications.   YOU CAN ALTERNATE TYLENOL AND IBUPROFEN EVERY 3-4 HOURS. Take 1000mg (2 pills) of Extra Strength Acetaminophen (Tylenol) every 6 hours and 600mg (3 pills) of  Ibuprofen (Motrin/Advil) every 6 hours, alternating the two so that every 3 hours you take one or the other. Start with the Tylenol, then 3 hours later take the Ibuprofen, then 3 hours later take the Tylenol again, and so on.         For your allergy symptoms and/or runny nose, sinus/ear pressure, congestion:        - You can take over-the-counter claritin, zyrtec, allegra, or xyzal as directed. These are antihistamines that can help with runny nose, nasal congestion, sneezing, and helps to dry up post-nasal drip, which usually causes sore throat and cough.               - If you do NOT have high blood pressure, you may use a decongestant form (D)  of this medication (ie. Claritin- D, zyrtec-D, allegra-D) or if you do not take the D form, you can take sudafed (pseudoephedrine) over the counter, which is a decongestant. Do NOT take two decongestant (D) medications at the same time (such as mucinex-D and claritin-D or plain sudafed and claritin D). Dextromethorphan (DM) is a cough suppressant over the counter (ie. mucinex DM, robitussin, delsym; dayquil/nyquil has DM as well.)    -If you DO have high blood pressure, AVOID DECONGESTANTS (I.e., Phenylephrine and Pseudoephedrine). You may take Coricidin HBP for sinus congestion and Delsym for cough.        - You can take plain Mucinex (guaifenesin) 1200 mg twice a day to help loosen mucous.       Use over the counter flonase or nasocort: one spray each nostril twice daily OR two sprays each nostril once daily until nares dry out, unless you have Glaucoma.   If you find this dries your nose out or your nose bleeds, try using over the counter nasal saline a few minutes prior to using the flonase to moisten the lining of your nose and throughout the day as needed.   Flonase/nasal spray use directions:  1) Use once per day.  2) Blow nose first.  3) Close one nostril and spray flonase up the other nostril while inhaling gently.   4) If you inhale to aggressively, you will  have a bitter taste in the back of your mouth.       You can try breathe right strips at night to help you breathe.  A cool mist humidifier in bedroom may help with cough and relieve stuffy nose.     Sinus rinses DO NOT USE TAP WATER, if you must, water must be a rolling boil for 1 minute, let it cool, then use.  May use distilled water, or over the counter nasal saline rinses.  Vics vapor rub in shower to help open nasal passages.  May use nasal gel to keep passages moisturized.  May use Nasal saline sprays during the day for added relief of congestion.   For those who go to the gym, please do not use the sauna or steam room now to clear sinuses.      Sore throat recommendations: Warm fluids, warm salt water gargles, throat lozenges, tea, honey, soup, rest, hydration.      Cough     Honey with mo to soothe your throat    Robitussin or Delsyum for cough suppressant for dry cough.    Mucinex DM or products containing Guaifenesin or Dextromethorphan for expectorant (wet cough).    Take prescription cough meds (pills) as prescribed; take prescription cough syrup at night as needed for cough.  Do not take both the prescribed cough pills and syrup at the same time or within 6 hours of each other.  Do not take the cough syrup with any other sedative medication as it can can cause drowsiness. Do not operate any heavy machinery, drink or drive while taking the cough syrup.    Try taking half a dose first of the cough syrup to see how it affects you.       Please follow up with your primary care doctor or specialist in the next 48-72hrs as needed and if no improvement    If you  smoke, please stop smoking.      Please return or see your primary care doctor if you develop new or worsening symptoms.     Please arrange follow up with your primary medical clinic as soon as possible. You must understand that you've received an Urgent Care treatment only and that you may be released before all of your medical problems are  known or treated. You, the patient, will arrange for follow up as instructed. If your symptoms worsen or fail to improve you should go to the Emergency Room.    Abdominal Pain   If your condition worsens or fails to improve we recommend that you receive another evaluation at the ER immediately or contact your PCP to discuss your concerns or return here. You must understand that you've received an urgent care treatment only and that you may be released before all your medical problems are known or treated. You the patient will arrange for followup care as instructed.     PLEASE RETURN HERE OR GO TO THE EMERGENCY DEPARTMENT FOR ANY CONCERNS OR WORSENING OF YOUR CONDITION (I.E., BLOOD IN VOMIT OR STOOL, WORSENING ABDOMINAL PAIN, FEVER, WEAKNESS, PASSING OUT, INABILITY TO PASS GAS OR STOOL)    Constipation    PLENTY OF FLUIDS    Try an over the counter laxative like miralax and glycerin suppositories.   High fiber meals  Try an over the counter fleets enema    Please see your primary care doctor if symptoms do not improve        Nausea & Vomiting    If you prescribed an anti-nausea medication, please take it as prescribed when needed. OTC anti-nausea Imitrol is available over the counter.   Increase fluids and rest is important   Start off with liquid diet and progress as tolerated (see below)  Water and clear liquids are important so you do not get dehydrated. Drink a small amount at a time.  Do not force yourself to eat, especially if you have cramps, vomiting, or diarrhea. When you finally decide to start eating, do not eat large amounts at a time, even if you are hungry.  If you eat, avoid fatty, greasy, spicy, or fried foods.      Watch for any increase pain, fever, localized pain to right lower abdomen or continued vomiting or diarrhea.       You must understand that you have received an Urgent Care treatment only and that you may be released before all of your medical problems are known or treated.  WE CANNOT  RULE OUT ALL POSSIBLE CAUSES OF YOUR SYMPTOMS IN THE URGENT CARE SETTING PLEASE GO TO THE ER IF YOU FEELS YOUR CONDITION IS WORSENING OR YOU WOULD LIKE EMERGENT EVALUATION.

## 2023-02-23 NOTE — PROGRESS NOTES
"Subjective:       Patient ID: Riana Kay is a 67 y.o. female.    Vitals:  height is 5' 5" (1.651 m) and weight is 72.6 kg (160 lb). Her temperature is 97.8 °F (36.6 °C). Her blood pressure is 111/71 and her pulse is 99. Her respiration is 16 and oxygen saturation is 97%.     Chief Complaint: Cough    This is a 67 y.o. female who presents today with a chief complaint of cough, feeling feverish, headache, fatigue x 2 days but going on and off for weeks.  No known sick contacts. Has been taking Tylenol for headaches.      Pt started taking ozempic 4 weeks ago and is not sure if that is causing her symptoms.  She started having n/v weeks ago but 2 days ago started with generalized abdominal pain.No previous abdominal surgeries. Denies diarrhea. LBM was 5 days ago. Nothing tried for relief.      Cough  This is a new problem. The current episode started 1 to 4 weeks ago. The problem has been unchanged. The cough is Productive of sputum. Associated symptoms include headaches. Associated symptoms comments: Runny nose . Treatments tried: tylenol. There is no history of asthma or COPD.     Constitution: Positive for fatigue.   Respiratory:  Positive for cough.    Gastrointestinal:  Positive for abdominal pain, nausea, vomiting and constipation.   Neurological:  Positive for headaches.     Objective:      Vitals:    02/23/23 0818   BP: 111/71   Pulse: 99   Resp: 16   Temp: 97.8 °F (36.6 °C)       Physical Exam   Constitutional: She is oriented to person, place, and time. She appears well-developed. She is cooperative.  Non-toxic appearance. She does not appear ill. No distress.   HENT:   Head: Normocephalic and atraumatic.   Ears:   Right Ear: Hearing, tympanic membrane, external ear and ear canal normal. impacted cerumen  Left Ear: Hearing, tympanic membrane, external ear and ear canal normal. impacted cerumen  Nose: Rhinorrhea present. No mucosal edema or nasal deformity. No epistaxis. Right sinus exhibits no " maxillary sinus tenderness and no frontal sinus tenderness. Left sinus exhibits no maxillary sinus tenderness and no frontal sinus tenderness.   Mouth/Throat: Uvula is midline, oropharynx is clear and moist and mucous membranes are normal. Mucous membranes are moist. No trismus in the jaw. Normal dentition. No uvula swelling. No oropharyngeal exudate, posterior oropharyngeal edema or posterior oropharyngeal erythema.   Eyes: Conjunctivae and lids are normal. Pupils are equal, round, and reactive to light. Right eye exhibits no discharge. Left eye exhibits no discharge. No scleral icterus.   Neck: Trachea normal and phonation normal. Neck supple. No edema present. No erythema present. No neck rigidity present.   Cardiovascular: Normal rate, regular rhythm, normal heart sounds and normal pulses.   Pulmonary/Chest: Effort normal and breath sounds normal. No respiratory distress. She has no decreased breath sounds. She has no wheezes. She has no rhonchi. She has no rales.   Abdominal: Normal appearance and bowel sounds are normal. She exhibits no distension. Soft. There is generalized abdominal tenderness. There is no rebound, no guarding, no tenderness at McBurney's point, no left CVA tenderness, negative Rovsing's sign and no right CVA tenderness.   Musculoskeletal: Normal range of motion.         General: No deformity. Normal range of motion.   Lymphadenopathy:     She has no cervical adenopathy.   Neurological: She is alert and oriented to person, place, and time. She exhibits normal muscle tone. Coordination normal.   Skin: Skin is warm, dry, intact, not diaphoretic and not pale.   Psychiatric: Her speech is normal and behavior is normal. Judgment and thought content normal.   Nursing note and vitals reviewed.      Assessment:       1. COVID-19    2. Fatigue, unspecified type    3. Generalized abdominal pain    4. Nausea and vomiting, unspecified vomiting type    5. Constipation, unspecified constipation type           Results for orders placed or performed in visit on 02/23/23   SARS Coronavirus 2 Antigen, POCT Manual Read   Result Value Ref Range    SARS Coronavirus 2 Antigen Positive (A) Negative     Acceptable Yes    POCT Urinalysis, Dipstick, Automated, W/O Scope   Result Value Ref Range    POC Blood, Urine Negative Negative    POC Bilirubin, Urine Negative Negative    POC Urobilinogen, Urine Normal 0.1 - 1.1    POC Ketones, Urine Negative Negative    POC Protein, Urine Negative Negative    POC Nitrates, Urine Negative Negative    POC Glucose, Urine Negative Negative    pH, UA 5.0 5 - 8    POC Specific Gravity, Urine 1.025 1.003 - 1.029    POC Leukocytes, Urine Negative Negative     *Note: Due to a large number of results and/or encounters for the requested time period, some results have not been displayed. A complete set of results can be found in Results Review.       Plan:         COVID-19  -     benzonatate (TESSALON) 200 MG capsule; Take 1 capsule (200 mg total) by mouth 3 (three) times daily as needed for Cough.  Dispense: 21 capsule; Refill: 0    Fatigue, unspecified type  -     SARS Coronavirus 2 Antigen, POCT Manual Read    Generalized abdominal pain  -     POCT Urinalysis, Dipstick, Automated, W/O Scope  -     X-Ray KUB; Future; Expected date: 02/23/2023    Nausea and vomiting, unspecified vomiting type  -     ondansetron disintegrating tablet 8 mg  -     ondansetron (ZOFRAN-ODT) 4 MG TbDL; Take 1 tablet (4 mg total) by mouth every 8 (eight) hours as needed (nausea).  Dispense: 10 tablet; Refill: 0    Constipation, unspecified constipation type  -     X-Ray KUB; Future; Expected date: 02/23/2023  -     polyethylene glycol (GLYCOLAX) 17 gram PwPk; Take 17 g by mouth 2 (two) times daily as needed (constipation).  Dispense: 4 packet; Refill: 0           Medical Decision Making:   History:   Old Medical Records: I decided to obtain old medical records.  Independently Interpreted Test(s):   I have  ordered and independently interpreted X-rays - see summary below.       <> Summary of X-Ray Reading(s): Moderate amount of stool is present which is expected given her constipation symptoms  Clinical Tests:   Radiological Study: Ordered and Reviewed  Urgent Care Management:  Zofran 8 - PO challenge completed  KUB  UA  POCT COVID  Due to patient's unknown timeline and fatty liver disease we will treat COVID with OTC medications.   Patient Instructions   You have tested positive for COVID-19 today.      ISOLATION  If you tested positive and do not have symptoms, you must isolate for 5 days starting on the day of the positive test. I    If you tested positive and have symptoms, you must isolate for 5 days starting on the day of the first symptoms,  not the day of the positive test.     This is the most important part, both the CDC and the [facility] emphasize that you do not test out of isolation.     After 5 days, if your symptoms have improved and you have not had fever on day 5, you can return to the community on day 6- NO TESTING REQUIRED!      In fact, we do not retest if you were positive in the last 90 days.    After your 5 days of isolation are completed, the CDC recommends strict mask use for the first 5 days that you come out of isolation. PLEASE FOLLOW CDC GUIDELINES REGARDING TRAVEL AFTER COVID INFECTION.    Return to Urgent Care or go to ER if symptoms worsen or fail to improve.  Follow up with PCP as recommended for further management.     Your symptoms should begin to improve by Day 5 of the infection-- if symptoms worsen, you develop a new fever, shortness of breath, worsening shortness of breath with activity, chest pain, worsening cough, you must return to Urgent Care or go to the ER.    PLEASE READ YOUR DISCHARGE INSTRUCTIONS ENTIRELY AS IT CONTAINS IMPORTANT INFORMATION.      Please drink plenty of fluids.    Please get plenty of rest.    Please return here or go to the Emergency Department for any  concerns or worsening of condition.      Tylenol or ibuprofen can also be used as directed for pain and fever unless you have an allergy to them or medical condition such as stomach ulcers, kidney or liver disease or blood thinners etc for which you should not be taking these type of medications.   YOU CAN ALTERNATE TYLENOL AND IBUPROFEN EVERY 3-4 HOURS. Take 1000mg (2 pills) of Extra Strength Acetaminophen (Tylenol) every 6 hours and 600mg (3 pills) of Ibuprofen (Motrin/Advil) every 6 hours, alternating the two so that every 3 hours you take one or the other. Start with the Tylenol, then 3 hours later take the Ibuprofen, then 3 hours later take the Tylenol again, and so on.         For your allergy symptoms and/or runny nose, sinus/ear pressure, congestion:        - You can take over-the-counter claritin, zyrtec, allegra, or xyzal as directed. These are antihistamines that can help with runny nose, nasal congestion, sneezing, and helps to dry up post-nasal drip, which usually causes sore throat and cough.               - If you do NOT have high blood pressure, you may use a decongestant form (D)  of this medication (ie. Claritin- D, zyrtec-D, allegra-D) or if you do not take the D form, you can take sudafed (pseudoephedrine) over the counter, which is a decongestant. Do NOT take two decongestant (D) medications at the same time (such as mucinex-D and claritin-D or plain sudafed and claritin D). Dextromethorphan (DM) is a cough suppressant over the counter (ie. mucinex DM, robitussin, delsym; dayquil/nyquil has DM as well.)    -If you DO have high blood pressure, AVOID DECONGESTANTS (I.e., Phenylephrine and Pseudoephedrine). You may take Coricidin HBP for sinus congestion and Delsym for cough.        - You can take plain Mucinex (guaifenesin) 1200 mg twice a day to help loosen mucous.       Use over the counter flonase or nasocort: one spray each nostril twice daily OR two sprays each nostril once daily until nares  dry out, unless you have Glaucoma.   If you find this dries your nose out or your nose bleeds, try using over the counter nasal saline a few minutes prior to using the flonase to moisten the lining of your nose and throughout the day as needed.   Flonase/nasal spray use directions:  1) Use once per day.  2) Blow nose first.  3) Close one nostril and spray flonase up the other nostril while inhaling gently.   4) If you inhale to aggressively, you will have a bitter taste in the back of your mouth.       You can try breathe right strips at night to help you breathe.  A cool mist humidifier in bedroom may help with cough and relieve stuffy nose.     Sinus rinses DO NOT USE TAP WATER, if you must, water must be a rolling boil for 1 minute, let it cool, then use.  May use distilled water, or over the counter nasal saline rinses.  Vics vapor rub in shower to help open nasal passages.  May use nasal gel to keep passages moisturized.  May use Nasal saline sprays during the day for added relief of congestion.   For those who go to the gym, please do not use the sauna or steam room now to clear sinuses.      Sore throat recommendations: Warm fluids, warm salt water gargles, throat lozenges, tea, honey, soup, rest, hydration.      Cough     Honey with mo to soothe your throat    Robitussin or Delsyum for cough suppressant for dry cough.    Mucinex DM or products containing Guaifenesin or Dextromethorphan for expectorant (wet cough).    Take prescription cough meds (pills) as prescribed; take prescription cough syrup at night as needed for cough.  Do not take both the prescribed cough pills and syrup at the same time or within 6 hours of each other.  Do not take the cough syrup with any other sedative medication as it can can cause drowsiness. Do not operate any heavy machinery, drink or drive while taking the cough syrup.    Try taking half a dose first of the cough syrup to see how it affects you.       Please follow up  with your primary care doctor or specialist in the next 48-72hrs as needed and if no improvement    If you  smoke, please stop smoking.      Please return or see your primary care doctor if you develop new or worsening symptoms.     Please arrange follow up with your primary medical clinic as soon as possible. You must understand that you've received an Urgent Care treatment only and that you may be released before all of your medical problems are known or treated. You, the patient, will arrange for follow up as instructed. If your symptoms worsen or fail to improve you should go to the Emergency Room.    Abdominal Pain   If your condition worsens or fails to improve we recommend that you receive another evaluation at the ER immediately or contact your PCP to discuss your concerns or return here. You must understand that you've received an urgent care treatment only and that you may be released before all your medical problems are known or treated. You the patient will arrange for followup care as instructed.     PLEASE RETURN HERE OR GO TO THE EMERGENCY DEPARTMENT FOR ANY CONCERNS OR WORSENING OF YOUR CONDITION (I.E., BLOOD IN VOMIT OR STOOL, WORSENING ABDOMINAL PAIN, FEVER, WEAKNESS, PASSING OUT, INABILITY TO PASS GAS OR STOOL)    Constipation    PLENTY OF FLUIDS    Try an over the counter laxative like miralax and glycerin suppositories.   High fiber meals  Try an over the counter fleets enema    Please see your primary care doctor if symptoms do not improve        Nausea & Vomiting    If you prescribed an anti-nausea medication, please take it as prescribed when needed. OTC anti-nausea Imitrol is available over the counter.   Increase fluids and rest is important   Start off with liquid diet and progress as tolerated (see below)  Water and clear liquids are important so you do not get dehydrated. Drink a small amount at a time.  Do not force yourself to eat, especially if you have cramps, vomiting, or diarrhea.  When you finally decide to start eating, do not eat large amounts at a time, even if you are hungry.  If you eat, avoid fatty, greasy, spicy, or fried foods.      Watch for any increase pain, fever, localized pain to right lower abdomen or continued vomiting or diarrhea.       You must understand that you have received an Urgent Care treatment only and that you may be released before all of your medical problems are known or treated.  WE CANNOT RULE OUT ALL POSSIBLE CAUSES OF YOUR SYMPTOMS IN THE URGENT CARE SETTING PLEASE GO TO THE ER IF YOU FEELS YOUR CONDITION IS WORSENING OR YOU WOULD LIKE EMERGENT EVALUATION.

## 2023-02-24 ENCOUNTER — PATIENT MESSAGE (OUTPATIENT)
Dept: INTERNAL MEDICINE | Facility: CLINIC | Age: 68
End: 2023-02-24
Payer: MEDICARE

## 2023-03-08 ENCOUNTER — PATIENT MESSAGE (OUTPATIENT)
Dept: REHABILITATION | Facility: HOSPITAL | Age: 68
End: 2023-03-08
Payer: MEDICARE

## 2023-03-16 ENCOUNTER — OFFICE VISIT (OUTPATIENT)
Dept: INTERNAL MEDICINE | Facility: CLINIC | Age: 68
End: 2023-03-16
Payer: MEDICARE

## 2023-03-16 VITALS
RESPIRATION RATE: 18 BRPM | SYSTOLIC BLOOD PRESSURE: 144 MMHG | TEMPERATURE: 97 F | DIASTOLIC BLOOD PRESSURE: 76 MMHG | HEART RATE: 80 BPM | BODY MASS INDEX: 27.1 KG/M2 | WEIGHT: 162.69 LBS | HEIGHT: 65 IN | OXYGEN SATURATION: 98 %

## 2023-03-16 DIAGNOSIS — E11.59 HYPERTENSION ASSOCIATED WITH TYPE 2 DIABETES MELLITUS: ICD-10-CM

## 2023-03-16 DIAGNOSIS — I70.0 AORTIC ATHEROSCLEROSIS: ICD-10-CM

## 2023-03-16 DIAGNOSIS — I15.2 HYPERTENSION ASSOCIATED WITH TYPE 2 DIABETES MELLITUS: ICD-10-CM

## 2023-03-16 DIAGNOSIS — K59.00 CONSTIPATION, UNSPECIFIED CONSTIPATION TYPE: Primary | ICD-10-CM

## 2023-03-16 DIAGNOSIS — R11.2 NAUSEA AND VOMITING, UNSPECIFIED VOMITING TYPE: ICD-10-CM

## 2023-03-16 PROCEDURE — 3077F PR MOST RECENT SYSTOLIC BLOOD PRESSURE >= 140 MM HG: ICD-10-PCS | Mod: CPTII,S$GLB,, | Performed by: FAMILY MEDICINE

## 2023-03-16 PROCEDURE — 3078F DIAST BP <80 MM HG: CPT | Mod: CPTII,S$GLB,, | Performed by: FAMILY MEDICINE

## 2023-03-16 PROCEDURE — 1160F PR REVIEW ALL MEDS BY PRESCRIBER/CLIN PHARMACIST DOCUMENTED: ICD-10-PCS | Mod: CPTII,S$GLB,, | Performed by: FAMILY MEDICINE

## 2023-03-16 PROCEDURE — 4010F PR ACE/ARB THEARPY RXD/TAKEN: ICD-10-PCS | Mod: CPTII,S$GLB,, | Performed by: FAMILY MEDICINE

## 2023-03-16 PROCEDURE — 1126F AMNT PAIN NOTED NONE PRSNT: CPT | Mod: CPTII,S$GLB,, | Performed by: FAMILY MEDICINE

## 2023-03-16 PROCEDURE — 99999 PR PBB SHADOW E&M-EST. PATIENT-LVL V: ICD-10-PCS | Mod: PBBFAC,,, | Performed by: FAMILY MEDICINE

## 2023-03-16 PROCEDURE — 99214 PR OFFICE/OUTPT VISIT, EST, LEVL IV, 30-39 MIN: ICD-10-PCS | Mod: S$GLB,,, | Performed by: FAMILY MEDICINE

## 2023-03-16 PROCEDURE — 4010F ACE/ARB THERAPY RXD/TAKEN: CPT | Mod: CPTII,S$GLB,, | Performed by: FAMILY MEDICINE

## 2023-03-16 PROCEDURE — 1157F ADVNC CARE PLAN IN RCRD: CPT | Mod: CPTII,S$GLB,, | Performed by: FAMILY MEDICINE

## 2023-03-16 PROCEDURE — 1159F MED LIST DOCD IN RCRD: CPT | Mod: CPTII,S$GLB,, | Performed by: FAMILY MEDICINE

## 2023-03-16 PROCEDURE — 3077F SYST BP >= 140 MM HG: CPT | Mod: CPTII,S$GLB,, | Performed by: FAMILY MEDICINE

## 2023-03-16 PROCEDURE — 1157F PR ADVANCE CARE PLAN OR EQUIV PRESENT IN MEDICAL RECORD: ICD-10-PCS | Mod: CPTII,S$GLB,, | Performed by: FAMILY MEDICINE

## 2023-03-16 PROCEDURE — 3078F PR MOST RECENT DIASTOLIC BLOOD PRESSURE < 80 MM HG: ICD-10-PCS | Mod: CPTII,S$GLB,, | Performed by: FAMILY MEDICINE

## 2023-03-16 PROCEDURE — 99214 OFFICE O/P EST MOD 30 MIN: CPT | Mod: S$GLB,,, | Performed by: FAMILY MEDICINE

## 2023-03-16 PROCEDURE — 1160F RVW MEDS BY RX/DR IN RCRD: CPT | Mod: CPTII,S$GLB,, | Performed by: FAMILY MEDICINE

## 2023-03-16 PROCEDURE — 3008F PR BODY MASS INDEX (BMI) DOCUMENTED: ICD-10-PCS | Mod: CPTII,S$GLB,, | Performed by: FAMILY MEDICINE

## 2023-03-16 PROCEDURE — 3008F BODY MASS INDEX DOCD: CPT | Mod: CPTII,S$GLB,, | Performed by: FAMILY MEDICINE

## 2023-03-16 PROCEDURE — 1126F PR PAIN SEVERITY QUANTIFIED, NO PAIN PRESENT: ICD-10-PCS | Mod: CPTII,S$GLB,, | Performed by: FAMILY MEDICINE

## 2023-03-16 PROCEDURE — 1159F PR MEDICATION LIST DOCUMENTED IN MEDICAL RECORD: ICD-10-PCS | Mod: CPTII,S$GLB,, | Performed by: FAMILY MEDICINE

## 2023-03-16 PROCEDURE — 99999 PR PBB SHADOW E&M-EST. PATIENT-LVL V: CPT | Mod: PBBFAC,,, | Performed by: FAMILY MEDICINE

## 2023-03-16 RX ORDER — ONDANSETRON 8 MG/1
8 TABLET, ORALLY DISINTEGRATING ORAL 3 TIMES DAILY PRN
Qty: 30 TABLET | Refills: 3 | Status: SHIPPED | OUTPATIENT
Start: 2023-03-16

## 2023-03-16 RX ORDER — CYCLOBENZAPRINE HCL 5 MG
5 TABLET ORAL
COMMUNITY
Start: 2023-01-05 | End: 2023-08-17

## 2023-03-16 RX ORDER — BUPROPION HYDROCHLORIDE 150 MG/1
150 TABLET ORAL EVERY MORNING
COMMUNITY
Start: 2023-01-14 | End: 2023-08-09 | Stop reason: SDUPTHER

## 2023-03-16 NOTE — PROGRESS NOTES
Subjective:       Patient ID: Riana Kay is a 67 y.o. female.    Chief Complaint: Nausea, Fatigue, and Constipation  67-year-old female with hypertension, diabetes, and aortic atherosclerosis presents to clinic today accompanied by her  secondary to a complaint of fatigue, decreased appetite, nausea, vomiting, and constipation for the past week.  She reports that approximately 4 weeks ago she had COVID and is still recovering from symptoms.  Her upper respiratory symptoms have fully resolved but she continues to note fatigue, decreased appetite, and constipation.  She reports that she has not been able to tolerate solid food but is continuing to hydrate with lemonade.  She notes severe constipation but was able to have a bowel movement yesterday after taking MiraLax.  Secondarily, she was started on Ozempic approximately 1 month ago and is now up to 0.5 mg weekly.  Nausea  Associated symptoms include arthralgias, fatigue, headaches, nausea, vomiting and weakness. Pertinent negatives include no abdominal pain, chest pain, chills, congestion, coughing, fever, joint swelling, myalgias, neck pain, rash or sore throat.   Fatigue  Associated symptoms include arthralgias, fatigue, headaches, nausea, vomiting and weakness. Pertinent negatives include no abdominal pain, chest pain, chills, congestion, coughing, fever, joint swelling, myalgias, neck pain, rash or sore throat.   Constipation  Associated symptoms include nausea and vomiting. Pertinent negatives include no abdominal pain, back pain, diarrhea, difficulty urinating or fever.   Review of Systems   Constitutional:  Positive for activity change, fatigue and unexpected weight change. Negative for appetite change, chills and fever.   HENT:  Negative for nasal congestion, ear pain, hearing loss, postnasal drip, rhinorrhea, sinus pressure/congestion, sore throat, tinnitus and trouble swallowing.    Eyes:  Negative for discharge, redness, itching and  visual disturbance.   Respiratory:  Negative for cough, chest tightness, shortness of breath and wheezing.    Cardiovascular:  Negative for chest pain and palpitations.   Gastrointestinal:  Positive for constipation, nausea and vomiting. Negative for abdominal pain, blood in stool and diarrhea.   Endocrine: Negative for polydipsia and polyuria.   Genitourinary:  Negative for decreased urine volume, difficulty urinating, dysuria, frequency, hematuria, menstrual problem and urgency.   Musculoskeletal:  Positive for arthralgias. Negative for back pain, joint swelling, myalgias, neck pain and neck stiffness.   Integumentary:  Negative for rash.   Neurological:  Positive for weakness and headaches. Negative for dizziness and light-headedness.   Psychiatric/Behavioral:  Positive for confusion and dysphoric mood.        Objective:      Physical Exam  Vitals and nursing note reviewed.   Constitutional:       General: She is not in acute distress.     Appearance: She is well-developed. She is not diaphoretic.   HENT:      Head: Normocephalic and atraumatic.      Right Ear: External ear normal.      Left Ear: External ear normal.      Nose: Nose normal.      Mouth/Throat:      Pharynx: No oropharyngeal exudate.   Eyes:      General: No scleral icterus.        Right eye: No discharge.         Left eye: No discharge.      Conjunctiva/sclera: Conjunctivae normal.      Pupils: Pupils are equal, round, and reactive to light.   Neck:      Thyroid: No thyromegaly.      Vascular: No JVD.      Trachea: No tracheal deviation.   Cardiovascular:      Rate and Rhythm: Normal rate and regular rhythm.      Heart sounds: Normal heart sounds. No murmur heard.    No friction rub. No gallop.   Pulmonary:      Effort: Pulmonary effort is normal. No respiratory distress.      Breath sounds: Normal breath sounds. No stridor. No wheezing or rales.   Abdominal:      General: Bowel sounds are normal. There is no distension.      Palpations: Abdomen  is soft. There is no mass.      Tenderness: There is no abdominal tenderness. There is no guarding or rebound.   Musculoskeletal:         General: No tenderness. Normal range of motion.      Cervical back: Normal range of motion and neck supple.   Lymphadenopathy:      Cervical: No cervical adenopathy.   Skin:     General: Skin is warm and dry.      Coloration: Skin is not pale.      Findings: No erythema or rash.   Neurological:      Mental Status: She is alert and oriented to person, place, and time.   Psychiatric:         Behavior: Behavior normal.         Thought Content: Thought content normal.         Judgment: Judgment normal.       Assessment:       Problem List Items Addressed This Visit       Aortic atherosclerosis    Hypertension associated with type 2 diabetes mellitus     Other Visit Diagnoses       Constipation, unspecified constipation type    -  Primary    Nausea and vomiting, unspecified vomiting type        Relevant Medications    ondansetron (ZOFRAN-ODT) 8 MG TbDL            Plan:         1. The patient's physical exam is negative.  I feel that her symptoms are likely secondary to the recent titration of dose of Ozempic.  I have encouraged the patient to increase her hydration with at least 64 oz of water daily plus addition of electrolytes.  I have also recommended use of MiraLax 1 capful daily.  2. Zofran 8 mg ODT t.i.d. p.r.n. nausea or vomiting.    3. Continue losartan 50 mg daily.  Hypertension is well controlled.  4. Aortic atherosclerosis remained stable.    5. Return to clinic as needed if symptoms persist or worsen.

## 2023-03-21 ENCOUNTER — CLINICAL SUPPORT (OUTPATIENT)
Dept: REHABILITATION | Facility: HOSPITAL | Age: 68
End: 2023-03-21
Payer: MEDICARE

## 2023-03-21 DIAGNOSIS — M25.511 ACUTE PAIN OF RIGHT SHOULDER: Primary | ICD-10-CM

## 2023-03-21 PROCEDURE — 97112 NEUROMUSCULAR REEDUCATION: CPT

## 2023-03-21 PROCEDURE — 97110 THERAPEUTIC EXERCISES: CPT

## 2023-03-21 PROCEDURE — 97530 THERAPEUTIC ACTIVITIES: CPT

## 2023-03-21 NOTE — PROGRESS NOTES
Physical Therapy Daily Treatment Note     Name: Riana Baker   Clinic Number: [id number]    Therapy Diagnosis:   Encounter Diagnosis   Name Primary?    Acute pain of right shoulder Yes       Physician: No ref. provider found    Visit Date: 3/21/2023    Physician Orders: PT Eval and Treat   Medical Diagnosis: s/p R reverse total shoulder  Date of Surgery: 8/16/22  Evaluation Date: 9/6/2022  Authorization Period Expiration: 1/30/23  Plan of Care Certification Period: 4/18/23  Visit # / Visits authorized: 10/35 (34 tot)     Time In: 0805 am  Time Out: 0845  am   Total Billable Time: 40 minutes      Precautions: Standard and s/p reverse shoulder RUE    Subjective      Pt reports: she has been sick twice over the last month and had to miss PT.     she was compliant with home exercise program given last session.   Response to previous treatment: good  Functional change: NA    Pain: 0/10  Location: right shoulder      Objective     PROM R shoulder: 3/21/23  Shoulder FL: 170 deg  Shoulder ABD: above 180  ER @ 90 degrees: 90  IR: 80 deg    MMT:   RUE: 3+/5 flex, abd  3/5 ER  4/5 IR    Riana received therapeutic exercises to develop strength, endurance, ROM, and posture for 15 minutes including:  UBE 5'/5' within available ROM lvl 4  Bicep curl #2 3x10-np  Standing abduction and flex 2# 3x    Neuromuscular re-education: 10  4 way ball on wall x30 ea red ball  Landmine #3 3x10-np  R UE Wall slides with towel x30  CC Row #3 B 3x10-np    Therapeutic activity: 15  Table push up 3x10  Box carry #10 4 laps      Riana received the following manual therapy techniques: Joint mobilizations and Manual traction were applied to the: R shoulder for 00 minutes, including:  PROM with light distraction per protocol  Grade I/II GH joint mobilizations     Home Exercises Provided and Patient Education Provided     Education provided:   -resume HEP    Written Home Exercises Provided: Patient instructed to  "cont prior HEP.  Exercises were reviewed and Riana was able to demonstrate them prior to the end of the session.  Riana demonstrated good  understanding of the education provided.     See EMR under Patient Instructions for exercises provided {Blank single:69989::"3/21/2023","prior visit"    Assessment   Upon reassessment, pt showing good PROM, but still remains weak with RUE strength likely secondary to sickness causing absence from PT over the last month. Pt treatment limited today secondary to fatigue from still getting over being sick. Plan to treat 4 more weeks to progress strengthening and then reassess.      Riana is progressing well towards her goals.   Pt prognosis is Good.     Pt will continue to benefit from skilled outpatient physical therapy to address the deficits listed in the problem list box on initial evaluation, provide pt/family education and to maximize pt's level of independence in the home and community environment.     Pt's spiritual, cultural and educational needs considered and pt agreeable to plan of care and goals.    Anticipated barriers to physical therapy: none    Goals:   Short Term Goals: (6 weeks)   - Pt will increase ROM to 110 deg R shoulder flex and abd, 40 deg IR/ER at 45 deg abd ( met)  - Decrease Pain to 2/10 as worst with all PT interventions (Progressing, not met)  - Pt to self correct posture and gait with minimal cues (met)  - Pt independent with HEP with progressions. ( met)     Long Term Goals (4 more Weeks): 4/18/23  - Pt will increase ROM to 170 deg R shoulder flex and abd, 80 deg ER and IR (met)  - Pt will increase strength to 5/5 RUE in all planes, except ER (Progressing, not met)  - Decrease Pain to 0/10 with ADLs overhead (Progressing, not met)  - Pt to return to 80% PLOF (Progressing, not met)       Plan     Continue POC per pt tolerance progressing strengthening as tolerated.     Bernadette Palacios, PT, DPT                                                "

## 2023-03-27 ENCOUNTER — CLINICAL SUPPORT (OUTPATIENT)
Dept: REHABILITATION | Facility: HOSPITAL | Age: 68
End: 2023-03-27
Payer: MEDICARE

## 2023-03-27 DIAGNOSIS — M25.511 ACUTE PAIN OF RIGHT SHOULDER: Primary | ICD-10-CM

## 2023-03-27 PROCEDURE — 97112 NEUROMUSCULAR REEDUCATION: CPT | Mod: CQ

## 2023-03-27 PROCEDURE — 97530 THERAPEUTIC ACTIVITIES: CPT | Mod: CQ

## 2023-03-27 NOTE — PROGRESS NOTES
Physical Therapy Daily Treatment Note     Name: Riana Baker   Clinic Number: [id number]    Therapy Diagnosis:   Encounter Diagnosis   Name Primary?    Acute pain of right shoulder Yes       Physician: No ref. provider found    Visit Date: 3/27/2023    Physician Orders: PT Eval and Treat   Medical Diagnosis: s/p R reverse total shoulder  Date of Surgery: 8/16/22  Evaluation Date: 9/6/2022  Authorization Period Expiration: 1/30/23  Plan of Care Certification Period: 4/18/23  Visit # / Visits authorized: 11/35 (35 tot)     Time In: 0800 am  Time Out: 0853 am   Total Billable Time: 30 minutes      Precautions: Standard and s/p reverse shoulder RUE    Subjective      Pt reports: Doing more around the house. Cooking and sweeping.   she was compliant with home exercise program given last session.   Response to previous treatment: good  Functional change: NA    Pain: 0/10  Location: right shoulder      Objective     PROM R shoulder: 3/21/23  Shoulder FL: 170 deg  Shoulder ABD: above 180  ER @ 90 degrees: 90  IR: 80 deg    MMT:   RUE: 3+/5 flex, abd  3/5 ER  4/5 IR    Riana received therapeutic exercises to develop strength, endurance, ROM, and posture for 18 minutes including:  UBE 5'/5' within available ROM lvl 4  Bicep curl #3 3x10  Standing abduction and flex 2# 3x    Neuromuscular re-education: 20  4 way ball on wall x30 ea red ball  R UE Wall slides with towel x30  Row #10 CC 3x10    Therapeutic activity: 15  Table push up 3x10  Box carry #10 4 laps      Riana received the following manual therapy techniques: Joint mobilizations and Manual traction were applied to the: R shoulder for 00 minutes, including:  PROM with light distraction per protocol  Grade I/II GH joint mobilizations     Home Exercises Provided and Patient Education Provided     Education provided:   -resume HEP    Written Home Exercises Provided: Patient instructed to cont prior HEP.  Exercises were reviewed and  "Riana was able to demonstrate them prior to the end of the session.  Riana demonstrated good  understanding of the education provided.     See EMR under Patient Instructions for exercises provided {Makenna watts:54490::"3/27/2023","prior visit"    Assessment   Improved mechanics  with wall slides showing gained shoulder strength. #3 bicep curl was challenging 2* weakness. Shoulder fatigue w/ ball on wall. Needs to cont to work on strength and endurance of deltoid muscle to improved OH functional movement.    Riana is progressing well towards her goals.   Pt prognosis is Good.     Pt will continue to benefit from skilled outpatient physical therapy to address the deficits listed in the problem list box on initial evaluation, provide pt/family education and to maximize pt's level of independence in the home and community environment.     Pt's spiritual, cultural and educational needs considered and pt agreeable to plan of care and goals.    Anticipated barriers to physical therapy: none    Goals:   Short Term Goals: (6 weeks)   - Pt will increase ROM to 110 deg R shoulder flex and abd, 40 deg IR/ER at 45 deg abd ( met)  - Decrease Pain to 2/10 as worst with all PT interventions (Progressing, not met)  - Pt to self correct posture and gait with minimal cues (met)  - Pt independent with HEP with progressions. ( met)     Long Term Goals (4 more Weeks): 4/18/23  - Pt will increase ROM to 170 deg R shoulder flex and abd, 80 deg ER and IR (met)  - Pt will increase strength to 5/5 RUE in all planes, except ER (Progressing, not met)  - Decrease Pain to 0/10 with ADLs overhead (Progressing, not met)  - Pt to return to 80% PLOF (Progressing, not met)       Plan     Continue POC per pt tolerance progressing strengthening as tolerated.     Cristal Marie, PTA, DPT                                                  "

## 2023-03-29 PROBLEM — M25.511 ACUTE PAIN OF RIGHT SHOULDER: Status: RESOLVED | Noted: 2019-07-31 | Resolved: 2023-03-29

## 2023-03-29 PROBLEM — M25.521 PAIN IN RIGHT ELBOW: Status: RESOLVED | Noted: 2020-03-03 | Resolved: 2023-03-29

## 2023-03-29 PROBLEM — Z47.1 AFTERCARE FOLLOWING LEFT KNEE JOINT REPLACEMENT SURGERY: Status: RESOLVED | Noted: 2021-10-21 | Resolved: 2023-03-29

## 2023-03-29 PROBLEM — Z96.652 AFTERCARE FOLLOWING LEFT KNEE JOINT REPLACEMENT SURGERY: Status: RESOLVED | Noted: 2021-10-21 | Resolved: 2023-03-29

## 2023-03-30 ENCOUNTER — PATIENT MESSAGE (OUTPATIENT)
Dept: INTERNAL MEDICINE | Facility: CLINIC | Age: 68
End: 2023-03-30

## 2023-03-30 ENCOUNTER — TELEPHONE (OUTPATIENT)
Dept: INTERNAL MEDICINE | Facility: CLINIC | Age: 68
End: 2023-03-30
Payer: MEDICARE

## 2023-03-30 ENCOUNTER — OFFICE VISIT (OUTPATIENT)
Dept: INTERNAL MEDICINE | Facility: CLINIC | Age: 68
End: 2023-03-30
Payer: MEDICARE

## 2023-03-30 VITALS
HEART RATE: 77 BPM | RESPIRATION RATE: 13 BRPM | DIASTOLIC BLOOD PRESSURE: 68 MMHG | WEIGHT: 160.81 LBS | SYSTOLIC BLOOD PRESSURE: 132 MMHG | HEIGHT: 65 IN | BODY MASS INDEX: 26.79 KG/M2

## 2023-03-30 DIAGNOSIS — E11.59 HYPERTENSION ASSOCIATED WITH TYPE 2 DIABETES MELLITUS: ICD-10-CM

## 2023-03-30 DIAGNOSIS — E66.3 OVERWEIGHT (BMI 25.0-29.9): ICD-10-CM

## 2023-03-30 DIAGNOSIS — I70.0 AORTIC ATHEROSCLEROSIS: ICD-10-CM

## 2023-03-30 DIAGNOSIS — Z79.4 TYPE 2 DIABETES MELLITUS WITHOUT COMPLICATION, WITH LONG-TERM CURRENT USE OF INSULIN: ICD-10-CM

## 2023-03-30 DIAGNOSIS — E11.9 TYPE 2 DIABETES MELLITUS WITHOUT COMPLICATION, WITH LONG-TERM CURRENT USE OF INSULIN: ICD-10-CM

## 2023-03-30 DIAGNOSIS — M85.80 OSTEOPENIA, UNSPECIFIED LOCATION: ICD-10-CM

## 2023-03-30 DIAGNOSIS — I77.9 CAROTID ARTERY DISEASE, UNSPECIFIED LATERALITY, UNSPECIFIED TYPE: ICD-10-CM

## 2023-03-30 DIAGNOSIS — I15.2 HYPERTENSION ASSOCIATED WITH TYPE 2 DIABETES MELLITUS: ICD-10-CM

## 2023-03-30 DIAGNOSIS — Z00.00 ENCOUNTER FOR PREVENTIVE HEALTH EXAMINATION: Primary | ICD-10-CM

## 2023-03-30 DIAGNOSIS — R41.3 MEMORY CHANGES: ICD-10-CM

## 2023-03-30 DIAGNOSIS — R26.9 ABNORMALITY OF GAIT AND MOBILITY: ICD-10-CM

## 2023-03-30 DIAGNOSIS — I25.10 ATHEROSCLEROSIS OF NATIVE CORONARY ARTERY OF NATIVE HEART WITHOUT ANGINA PECTORIS: ICD-10-CM

## 2023-03-30 DIAGNOSIS — G47.33 OSA (OBSTRUCTIVE SLEEP APNEA): ICD-10-CM

## 2023-03-30 DIAGNOSIS — M85.89 OTHER SPECIFIED DISORDERS OF BONE DENSITY AND STRUCTURE, MULTIPLE SITES: ICD-10-CM

## 2023-03-30 PROCEDURE — 1170F PR FUNCTIONAL STATUS ASSESSED: ICD-10-PCS | Mod: CPTII,S$GLB,, | Performed by: INTERNAL MEDICINE

## 2023-03-30 PROCEDURE — 1170F FXNL STATUS ASSESSED: CPT | Mod: CPTII,S$GLB,, | Performed by: INTERNAL MEDICINE

## 2023-03-30 PROCEDURE — 3075F SYST BP GE 130 - 139MM HG: CPT | Mod: CPTII,S$GLB,, | Performed by: INTERNAL MEDICINE

## 2023-03-30 PROCEDURE — 1100F PTFALLS ASSESS-DOCD GE2>/YR: CPT | Mod: CPTII,S$GLB,, | Performed by: INTERNAL MEDICINE

## 2023-03-30 PROCEDURE — 1126F AMNT PAIN NOTED NONE PRSNT: CPT | Mod: CPTII,S$GLB,, | Performed by: INTERNAL MEDICINE

## 2023-03-30 PROCEDURE — 3078F PR MOST RECENT DIASTOLIC BLOOD PRESSURE < 80 MM HG: ICD-10-PCS | Mod: CPTII,S$GLB,, | Performed by: INTERNAL MEDICINE

## 2023-03-30 PROCEDURE — 3075F PR MOST RECENT SYSTOLIC BLOOD PRESS GE 130-139MM HG: ICD-10-PCS | Mod: CPTII,S$GLB,, | Performed by: INTERNAL MEDICINE

## 2023-03-30 PROCEDURE — 1157F ADVNC CARE PLAN IN RCRD: CPT | Mod: CPTII,S$GLB,, | Performed by: INTERNAL MEDICINE

## 2023-03-30 PROCEDURE — 3008F PR BODY MASS INDEX (BMI) DOCUMENTED: ICD-10-PCS | Mod: CPTII,S$GLB,, | Performed by: INTERNAL MEDICINE

## 2023-03-30 PROCEDURE — 1160F PR REVIEW ALL MEDS BY PRESCRIBER/CLIN PHARMACIST DOCUMENTED: ICD-10-PCS | Mod: CPTII,S$GLB,, | Performed by: INTERNAL MEDICINE

## 2023-03-30 PROCEDURE — 4010F PR ACE/ARB THEARPY RXD/TAKEN: ICD-10-PCS | Mod: CPTII,S$GLB,, | Performed by: INTERNAL MEDICINE

## 2023-03-30 PROCEDURE — 1159F PR MEDICATION LIST DOCUMENTED IN MEDICAL RECORD: ICD-10-PCS | Mod: CPTII,S$GLB,, | Performed by: INTERNAL MEDICINE

## 2023-03-30 PROCEDURE — 1160F RVW MEDS BY RX/DR IN RCRD: CPT | Mod: CPTII,S$GLB,, | Performed by: INTERNAL MEDICINE

## 2023-03-30 PROCEDURE — 99999 PR PBB SHADOW E&M-EST. PATIENT-LVL V: CPT | Mod: PBBFAC,,,

## 2023-03-30 PROCEDURE — 99999 PR PBB SHADOW E&M-EST. PATIENT-LVL V: ICD-10-PCS | Mod: PBBFAC,,,

## 2023-03-30 PROCEDURE — 1100F PR PT FALLS ASSESS DOC 2+ FALLS/FALL W/INJURY/YR: ICD-10-PCS | Mod: CPTII,S$GLB,, | Performed by: INTERNAL MEDICINE

## 2023-03-30 PROCEDURE — 1157F PR ADVANCE CARE PLAN OR EQUIV PRESENT IN MEDICAL RECORD: ICD-10-PCS | Mod: CPTII,S$GLB,, | Performed by: INTERNAL MEDICINE

## 2023-03-30 PROCEDURE — 3078F DIAST BP <80 MM HG: CPT | Mod: CPTII,S$GLB,, | Performed by: INTERNAL MEDICINE

## 2023-03-30 PROCEDURE — 3288F FALL RISK ASSESSMENT DOCD: CPT | Mod: CPTII,S$GLB,, | Performed by: INTERNAL MEDICINE

## 2023-03-30 PROCEDURE — G0439 PR MEDICARE ANNUAL WELLNESS SUBSEQUENT VISIT: ICD-10-PCS | Mod: S$GLB,,, | Performed by: INTERNAL MEDICINE

## 2023-03-30 PROCEDURE — 3288F PR FALLS RISK ASSESSMENT DOCUMENTED: ICD-10-PCS | Mod: CPTII,S$GLB,, | Performed by: INTERNAL MEDICINE

## 2023-03-30 PROCEDURE — 3008F BODY MASS INDEX DOCD: CPT | Mod: CPTII,S$GLB,, | Performed by: INTERNAL MEDICINE

## 2023-03-30 PROCEDURE — 4010F ACE/ARB THERAPY RXD/TAKEN: CPT | Mod: CPTII,S$GLB,, | Performed by: INTERNAL MEDICINE

## 2023-03-30 PROCEDURE — 1159F MED LIST DOCD IN RCRD: CPT | Mod: CPTII,S$GLB,, | Performed by: INTERNAL MEDICINE

## 2023-03-30 PROCEDURE — 1126F PR PAIN SEVERITY QUANTIFIED, NO PAIN PRESENT: ICD-10-PCS | Mod: CPTII,S$GLB,, | Performed by: INTERNAL MEDICINE

## 2023-03-30 PROCEDURE — G0439 PPPS, SUBSEQ VISIT: HCPCS | Mod: S$GLB,,, | Performed by: INTERNAL MEDICINE

## 2023-03-30 RX ORDER — INSULIN LISPRO 100 [IU]/ML
INJECTION, SOLUTION INTRAVENOUS; SUBCUTANEOUS
COMMUNITY
End: 2023-04-13

## 2023-03-30 NOTE — PATIENT INSTRUCTIONS
Counseling and Referral of Other Preventative  (Italic type indicates deductible and co-insurance are waived)    Patient Name: Riana Kay  Today's Date: 3/30/2023    Health Maintenance         Date Due Completion Date    DEXA Scan Ordered   2/1/2021    Mammogram Orders in epic   2/3/2022    High Dose Statin 04/04/2023 (Originally 4/5/1976) ---    Aspirin/Antiplatelet Therapy 04/04/2023 (Originally 4/5/1973) ---    COVID-19 Vaccine (3 - Booster for Den series) 04/18/2023 (Originally 1/25/2022) 11/30/2021    Hemoglobin A1c 06/05/2023 12/5/2022    Foot Exam 08/31/2023 8/31/2022    Diabetes Urine Screening 09/01/2023 9/1/2022    Lipid Panel 12/05/2023 12/5/2022    Eye Exam 01/05/2024 1/5/2023    Colorectal Cancer Screening 01/06/2026 1/6/2021        TETANUS VACCINE 03/21/2031 3/21/2021      ABnormal nutrition screen- follow up with PCP    States balance problem- has had fall last year- follow up with PCP                  Orders Placed This Encounter   Procedures    DXA Bone Density Axial Skeleton 1 or more sites     The following information is provided to all patients.  This information is to help you find resources for any of the problems found today that may be affecting your health:                Living healthy guide: www.[facility].[place].gov      Understanding Diabetes: www.diabetes.org      Eating healthy: www.cdc.gov/healthyweight      CDC home safety checklist: www.cdc.gov/steadi/patient.html      Agency on Aging: www.goea.[place].gov      Alcoholics anonymous (AA): www.aa.org      Physical Activity: www.aneesh.nih.gov/hh8xhfg      Tobacco use: www.quitwithusla.org

## 2023-03-30 NOTE — PROGRESS NOTES
"Riana Kay presented for a  Medicare AWV and comprehensive Health Risk Assessment today. The following components were reviewed and updated:    Medical history  Family History  Social history  Allergies and Current Medications  Health Risk Assessment  Health Maintenance  Care Team     ** See Completed Assessments for Annual Wellness Visit within the encounter summary.**       The following assessments were completed:  Living Situation  CAGE  Depression Screening  Timed Get Up and Go  Whisper Test  Cognitive Function Screening  Nutrition Screening- abnormal 11 ( normal 14)  s/p covid 2 wks ago - still with no taste & dec appetite- wt loss -also on ozempic.  ADL Screening  PAQ Screening        Vitals:    03/30/23 0757   BP: 132/68   BP Location: Left arm   Patient Position: Sitting   Pulse: 77   Resp: 13   Weight: 73 kg (160 lb 13.2 oz)   Height: 5' 5" (1.651 m)     Body mass index is 26.76 kg/m².  Physical Exam  Constitutional:       Comments: Younger in appearance than age   HENT:      Right Ear: There is no impacted cerumen.      Left Ear: There is no impacted cerumen.   Eyes:      General: No scleral icterus.  Cardiovascular:      Rate and Rhythm: Normal rate and regular rhythm.   Pulmonary:      Effort: Pulmonary effort is normal.      Breath sounds: Normal breath sounds.   Abdominal:      Palpations: Abdomen is soft.      Comments: overweight   Musculoskeletal:         General: No swelling. Normal range of motion.   Skin:     General: Skin is warm and dry.   Neurological:      Mental Status: She is alert and oriented to person, place, and time.   Psychiatric:         Mood and Affect: Mood normal.         Behavior: Behavior normal.         Thought Content: Thought content normal.         Judgment: Judgment normal.          Medication review & Opioid screening perform during rooming section. No prescribed opioid medications noted.  Review for substance use disorder screening performed during rooming section. No " substance abuse noted on screening.      Diagnoses and health risks identified today and associated recommendations/orders:    1. Hypertension associated with type 2 diabetes mellitus  Stable followed by PCP    2. ZAHIRA (obstructive sleep apnea)-uses cpap  Stable followed by PCP, sleep med    3. Atherosclerosis of native coronary artery of native heart without angina pectoris  Stable followed by PCP    4. Type 2 diabetes mellitus without complication, with long-term current use of insulin  Stable followed by endocrinology      5. Aortic atherosclerosis  Stable followed by PCP      6. Overweight (BMI 25.0-29.9)  Chronic -improving-. Followed by PCP.   Centers for Disease Control and Prevention (CDC)  weight recommendations for current BMI & ideal BMI range discussed with patient.  Recommended  gradual weight loss,  mediterranean & diabetic diet , structured regular exercise every day.       7. Carotid artery disease, unspecified laterality, unspecified type-noted on ct dated 8/14/2022  Stable followed by PCP    8. Osteopenia, unspecified location  - DXA Bone Density Axial Skeleton 1 or more sites; Future    9. Other specified disorders of bone density and structure, multiple sites  - DXA Bone Density Axial Skeleton 1 or more sites; Future    10. Abnormality of gait and mobility  Stable followed by PCP    11. Encounter for preventive health examination  Here for Health Risk Assessment/Annual Wellness Visit.  Health maintenance reviewed and updated. Follow up in one year.        12. Memory changes  Stable followed by PCP      Provided Riana with a 5-10 year written screening schedule and personal prevention plan. Recommendations were developed using the USPSTF age appropriate recommendations. Education, counseling, and referrals were provided as needed. After Visit Summary printed and given to patient which includes a list of additional screenings\tests needed. Problem list reviewed & up[dated w patient & .  Questions on CAD referred back to PCP & carotid artery calcification on ct scan. Encouraged mediterranean diet & reevaluation with PCP for further CV risk factor stratification & modification. ABnormal nutrition screen-  abnormal 11 ( normal 14)  s/p covid 2 wks ago - still with no taste & dec appetite- wt loss -also on ozempic.F/U with PCP & endocrinology appt next wk. FBS yesterday 104. ZAHIRA- uses CPAP. Fall prevention .States balance problem- has had fall last year- follow up with PCP. Here w . Mammogram & DXA due- orders in epic- F/U with PCP on results after they are done. Daughter is a physician & coming in over w/e- patient gave verbal permission for me to speak with her. I requested her to put daughter's name on her epic record due to confidentiality policy. Verbalize understanding.      Anna Ward, OMEGA I offered to discuss advanced care planning, including how to pick a person who would make decisions for you if you were unable to make them for yourself, called a health care power of , and what kind of decisions you might make such as use of life sustaining treatments such as ventilators and tube feeding when faced with a life limiting illness recorded on a living will that they will need to know. (How you want to be cared for as you near the end of your natural life)     X  Patient has advanced directives on file, which we reviewed, and they do not wish to make changes.

## 2023-03-30 NOTE — TELEPHONE ENCOUNTER
Pt requesting I speak with daughter who is a MD- coming in this W/E- I recommended her to add her on the list of persons in epic.

## 2023-03-31 ENCOUNTER — PATIENT MESSAGE (OUTPATIENT)
Dept: INTERNAL MEDICINE | Facility: CLINIC | Age: 68
End: 2023-03-31
Payer: MEDICARE

## 2023-03-31 ENCOUNTER — TELEPHONE (OUTPATIENT)
Dept: INTERNAL MEDICINE | Facility: CLINIC | Age: 68
End: 2023-03-31
Payer: MEDICARE

## 2023-03-31 DIAGNOSIS — Z79.4 TYPE 2 DIABETES MELLITUS WITHOUT COMPLICATION, WITH LONG-TERM CURRENT USE OF INSULIN: Primary | ICD-10-CM

## 2023-03-31 DIAGNOSIS — E11.9 TYPE 2 DIABETES MELLITUS WITHOUT COMPLICATION, WITH LONG-TERM CURRENT USE OF INSULIN: Primary | ICD-10-CM

## 2023-03-31 NOTE — TELEPHONE ENCOUNTER
----- Message from RADHA Newman, RADHIKAP sent at 3/31/2023  9:17 AM CDT -----  Regarding: labs  Cbc, cmp - placed  Thanks  Miguel

## 2023-04-04 ENCOUNTER — PATIENT MESSAGE (OUTPATIENT)
Dept: INTERNAL MEDICINE | Facility: CLINIC | Age: 68
End: 2023-04-04
Payer: MEDICARE

## 2023-04-04 DIAGNOSIS — R11.10 VOMITING, UNSPECIFIED VOMITING TYPE, UNSPECIFIED WHETHER NAUSEA PRESENT: ICD-10-CM

## 2023-04-04 DIAGNOSIS — R10.9 ABDOMINAL PAIN, UNSPECIFIED ABDOMINAL LOCATION: Primary | ICD-10-CM

## 2023-04-04 NOTE — TELEPHONE ENCOUNTER
Message sent to referral coordinator to schedule  GI apt    Pt aware she will be called to schedule

## 2023-04-05 ENCOUNTER — CLINICAL SUPPORT (OUTPATIENT)
Dept: REHABILITATION | Facility: HOSPITAL | Age: 68
End: 2023-04-05
Payer: MEDICARE

## 2023-04-05 DIAGNOSIS — M25.511 ACUTE PAIN OF RIGHT SHOULDER: Primary | ICD-10-CM

## 2023-04-05 PROCEDURE — 97530 THERAPEUTIC ACTIVITIES: CPT | Mod: CQ

## 2023-04-05 PROCEDURE — 97112 NEUROMUSCULAR REEDUCATION: CPT | Mod: CQ

## 2023-04-05 NOTE — PROGRESS NOTES
Physical Therapy Daily Treatment Note     Name: Riana Baker   Clinic Number: [id number]    Therapy Diagnosis:   Encounter Diagnosis   Name Primary?    Acute pain of right shoulder Yes         Physician: Mendez Tavarez NP    Visit Date: 4/5/2023    Physician Orders: PT Eval and Treat   Medical Diagnosis: s/p R reverse total shoulder  Date of Surgery: 8/16/22  Evaluation Date: 9/6/2022  Authorization Period Expiration: 1/30/23  Plan of Care Certification Period: 4/18/23  Visit # / Visits authorized: 11/35 (35 tot)     Time In: 0815 am  Time Out: 0900 am   Total Billable Time: 45 minutes      Precautions: Standard and s/p reverse shoulder RUE    Subjective      Pt reports: no new complaints   she was compliant with home exercise program given last session.   Response to previous treatment: good  Functional change: NA    Pain: 0/10  Location: right shoulder      Objective     PROM R shoulder: 3/21/23  Shoulder FL: 170 deg  Shoulder ABD: above 180  ER @ 90 degrees: 90  IR: 80 deg    MMT:   RUE: 3+/5 flex, abd  3/5 ER  4/5 IR    Riana received therapeutic exercises to develop strength, endurance, ROM, and posture for 20 minutes including:  UBE 3'/3' within available ROM lvl 4  Bicep curl #3 3x10  Standing abduction and flex 2# 3x  Ball up wall    Neuromuscular re-education: 15  4 way ball on wall x30 ea red ball (above shoulder height)  R UE Wall slides with towel x30 #3  Row #10 CC 3x10    Therapeutic activity: 10  Table push up 3x10  Bumper plate  #13 4 laps      Riana received the following manual therapy techniques: Joint mobilizations and Manual traction were applied to the: R shoulder for 00 minutes, including:  PROM with light distraction per protocol  Grade I/II GH joint mobilizations     Home Exercises Provided and Patient Education Provided     Education provided:   -resume HEP    Written Home Exercises Provided: Patient instructed to cont prior HEP.  Exercises were  "reviewed and Riana was able to demonstrate them prior to the end of the session.  Riana demonstrated good  understanding of the education provided.     See EMR under Patient Instructions for exercises provided {Blank single:94350::"4/5/2023","prior visit"    Assessment   Riana is showing slight progression in strength in today's session. Does not have to swing R UE to flex to shoulder height. Cont to progress as tolerated.     Riana is progressing well towards her goals.   Pt prognosis is Good.     Pt will continue to benefit from skilled outpatient physical therapy to address the deficits listed in the problem list box on initial evaluation, provide pt/family education and to maximize pt's level of independence in the home and community environment.     Pt's spiritual, cultural and educational needs considered and pt agreeable to plan of care and goals.    Anticipated barriers to physical therapy: none    Goals:   Short Term Goals: (6 weeks)   - Pt will increase ROM to 110 deg R shoulder flex and abd, 40 deg IR/ER at 45 deg abd ( met)  - Decrease Pain to 2/10 as worst with all PT interventions (Progressing, not met)  - Pt to self correct posture and gait with minimal cues (met)  - Pt independent with HEP with progressions. ( met)     Long Term Goals (4 more Weeks): 4/18/23  - Pt will increase ROM to 170 deg R shoulder flex and abd, 80 deg ER and IR (met)  - Pt will increase strength to 5/5 RUE in all planes, except ER (Progressing, not met)  - Decrease Pain to 0/10 with ADLs overhead (Progressing, not met)  - Pt to return to 80% PLOF (Progressing, not met)       Plan     Continue POC per pt tolerance progressing strengthening as tolerated.     Cristal Marie, PTA,                                                     "

## 2023-04-06 ENCOUNTER — LAB VISIT (OUTPATIENT)
Dept: LAB | Facility: HOSPITAL | Age: 68
End: 2023-04-06
Payer: MEDICARE

## 2023-04-06 DIAGNOSIS — E11.9 TYPE 2 DIABETES MELLITUS WITHOUT COMPLICATION, WITH LONG-TERM CURRENT USE OF INSULIN: ICD-10-CM

## 2023-04-06 DIAGNOSIS — Z79.4 TYPE 2 DIABETES MELLITUS WITHOUT COMPLICATION, WITH LONG-TERM CURRENT USE OF INSULIN: ICD-10-CM

## 2023-04-06 LAB
ALBUMIN SERPL BCP-MCNC: 4 G/DL (ref 3.5–5.2)
ALP SERPL-CCNC: 101 U/L (ref 55–135)
ALT SERPL W/O P-5'-P-CCNC: 32 U/L (ref 10–44)
ANION GAP SERPL CALC-SCNC: 12 MMOL/L (ref 8–16)
AST SERPL-CCNC: 23 U/L (ref 10–40)
BASOPHILS # BLD AUTO: 0.03 K/UL (ref 0–0.2)
BASOPHILS NFR BLD: 0.4 % (ref 0–1.9)
BILIRUB SERPL-MCNC: 0.5 MG/DL (ref 0.1–1)
BUN SERPL-MCNC: 13 MG/DL (ref 8–23)
CALCIUM SERPL-MCNC: 10 MG/DL (ref 8.7–10.5)
CHLORIDE SERPL-SCNC: 105 MMOL/L (ref 95–110)
CO2 SERPL-SCNC: 25 MMOL/L (ref 23–29)
CREAT SERPL-MCNC: 0.8 MG/DL (ref 0.5–1.4)
DIFFERENTIAL METHOD: ABNORMAL
EOSINOPHIL # BLD AUTO: 0.1 K/UL (ref 0–0.5)
EOSINOPHIL NFR BLD: 0.8 % (ref 0–8)
ERYTHROCYTE [DISTWIDTH] IN BLOOD BY AUTOMATED COUNT: 12.3 % (ref 11.5–14.5)
EST. GFR  (NO RACE VARIABLE): >60 ML/MIN/1.73 M^2
ESTIMATED AVG GLUCOSE: 126 MG/DL (ref 68–131)
GLUCOSE SERPL-MCNC: 100 MG/DL (ref 70–110)
HBA1C MFR BLD: 6 % (ref 4–5.6)
HCT VFR BLD AUTO: 40.8 % (ref 37–48.5)
HGB BLD-MCNC: 13.7 G/DL (ref 12–16)
IMM GRANULOCYTES # BLD AUTO: 0.01 K/UL (ref 0–0.04)
IMM GRANULOCYTES NFR BLD AUTO: 0.1 % (ref 0–0.5)
LYMPHOCYTES # BLD AUTO: 2.1 K/UL (ref 1–4.8)
LYMPHOCYTES NFR BLD: 29.2 % (ref 18–48)
MCH RBC QN AUTO: 32.2 PG (ref 27–31)
MCHC RBC AUTO-ENTMCNC: 33.6 G/DL (ref 32–36)
MCV RBC AUTO: 96 FL (ref 82–98)
MONOCYTES # BLD AUTO: 0.4 K/UL (ref 0.3–1)
MONOCYTES NFR BLD: 5.3 % (ref 4–15)
NEUTROPHILS # BLD AUTO: 4.6 K/UL (ref 1.8–7.7)
NEUTROPHILS NFR BLD: 64.2 % (ref 38–73)
NRBC BLD-RTO: 0 /100 WBC
PLATELET # BLD AUTO: 288 K/UL (ref 150–450)
PMV BLD AUTO: 9.6 FL (ref 9.2–12.9)
POTASSIUM SERPL-SCNC: 5 MMOL/L (ref 3.5–5.1)
PROT SERPL-MCNC: 7.3 G/DL (ref 6–8.4)
RBC # BLD AUTO: 4.26 M/UL (ref 4–5.4)
SODIUM SERPL-SCNC: 142 MMOL/L (ref 136–145)
WBC # BLD AUTO: 7.18 K/UL (ref 3.9–12.7)

## 2023-04-06 PROCEDURE — 80053 COMPREHEN METABOLIC PANEL: CPT | Performed by: NURSE PRACTITIONER

## 2023-04-06 PROCEDURE — 85025 COMPLETE CBC W/AUTO DIFF WBC: CPT | Performed by: NURSE PRACTITIONER

## 2023-04-06 PROCEDURE — 83036 HEMOGLOBIN GLYCOSYLATED A1C: CPT | Performed by: NURSE PRACTITIONER

## 2023-04-06 PROCEDURE — 36415 COLL VENOUS BLD VENIPUNCTURE: CPT | Mod: PO | Performed by: NURSE PRACTITIONER

## 2023-04-10 ENCOUNTER — PATIENT MESSAGE (OUTPATIENT)
Dept: ORTHOPEDICS | Facility: CLINIC | Age: 68
End: 2023-04-10
Payer: MEDICARE

## 2023-04-11 DIAGNOSIS — M17.12 PRIMARY OSTEOARTHRITIS OF LEFT KNEE: Primary | ICD-10-CM

## 2023-04-12 ENCOUNTER — OFFICE VISIT (OUTPATIENT)
Dept: GASTROENTEROLOGY | Facility: CLINIC | Age: 68
End: 2023-04-12
Payer: MEDICARE

## 2023-04-12 VITALS — WEIGHT: 163.38 LBS | HEIGHT: 65 IN | BODY MASS INDEX: 27.22 KG/M2

## 2023-04-12 DIAGNOSIS — K59.00 CONSTIPATION, UNSPECIFIED CONSTIPATION TYPE: Primary | ICD-10-CM

## 2023-04-12 DIAGNOSIS — R10.11 RIGHT UPPER QUADRANT ABDOMINAL PAIN: ICD-10-CM

## 2023-04-12 DIAGNOSIS — R11.2 NAUSEA AND VOMITING, UNSPECIFIED VOMITING TYPE: ICD-10-CM

## 2023-04-12 DIAGNOSIS — K21.9 GASTROESOPHAGEAL REFLUX DISEASE, UNSPECIFIED WHETHER ESOPHAGITIS PRESENT: ICD-10-CM

## 2023-04-12 PROCEDURE — 1159F MED LIST DOCD IN RCRD: CPT | Mod: CPTII,S$GLB,, | Performed by: NURSE PRACTITIONER

## 2023-04-12 PROCEDURE — 99999 PR PBB SHADOW E&M-EST. PATIENT-LVL V: CPT | Mod: PBBFAC,,, | Performed by: NURSE PRACTITIONER

## 2023-04-12 PROCEDURE — 3008F PR BODY MASS INDEX (BMI) DOCUMENTED: ICD-10-PCS | Mod: CPTII,S$GLB,, | Performed by: NURSE PRACTITIONER

## 2023-04-12 PROCEDURE — 3288F FALL RISK ASSESSMENT DOCD: CPT | Mod: CPTII,S$GLB,, | Performed by: NURSE PRACTITIONER

## 2023-04-12 PROCEDURE — 1126F AMNT PAIN NOTED NONE PRSNT: CPT | Mod: CPTII,S$GLB,, | Performed by: NURSE PRACTITIONER

## 2023-04-12 PROCEDURE — 4010F ACE/ARB THERAPY RXD/TAKEN: CPT | Mod: CPTII,S$GLB,, | Performed by: NURSE PRACTITIONER

## 2023-04-12 PROCEDURE — 3044F PR MOST RECENT HEMOGLOBIN A1C LEVEL <7.0%: ICD-10-PCS | Mod: CPTII,S$GLB,, | Performed by: NURSE PRACTITIONER

## 2023-04-12 PROCEDURE — 3044F HG A1C LEVEL LT 7.0%: CPT | Mod: CPTII,S$GLB,, | Performed by: NURSE PRACTITIONER

## 2023-04-12 PROCEDURE — 1157F PR ADVANCE CARE PLAN OR EQUIV PRESENT IN MEDICAL RECORD: ICD-10-PCS | Mod: CPTII,S$GLB,, | Performed by: NURSE PRACTITIONER

## 2023-04-12 PROCEDURE — 4010F PR ACE/ARB THEARPY RXD/TAKEN: ICD-10-PCS | Mod: CPTII,S$GLB,, | Performed by: NURSE PRACTITIONER

## 2023-04-12 PROCEDURE — 99214 PR OFFICE/OUTPT VISIT, EST, LEVL IV, 30-39 MIN: ICD-10-PCS | Mod: S$GLB,,, | Performed by: NURSE PRACTITIONER

## 2023-04-12 PROCEDURE — 99999 PR PBB SHADOW E&M-EST. PATIENT-LVL V: ICD-10-PCS | Mod: PBBFAC,,, | Performed by: NURSE PRACTITIONER

## 2023-04-12 PROCEDURE — 3288F PR FALLS RISK ASSESSMENT DOCUMENTED: ICD-10-PCS | Mod: CPTII,S$GLB,, | Performed by: NURSE PRACTITIONER

## 2023-04-12 PROCEDURE — 1101F PT FALLS ASSESS-DOCD LE1/YR: CPT | Mod: CPTII,S$GLB,, | Performed by: NURSE PRACTITIONER

## 2023-04-12 PROCEDURE — 3008F BODY MASS INDEX DOCD: CPT | Mod: CPTII,S$GLB,, | Performed by: NURSE PRACTITIONER

## 2023-04-12 PROCEDURE — 1101F PR PT FALLS ASSESS DOC 0-1 FALLS W/OUT INJ PAST YR: ICD-10-PCS | Mod: CPTII,S$GLB,, | Performed by: NURSE PRACTITIONER

## 2023-04-12 PROCEDURE — 99214 OFFICE O/P EST MOD 30 MIN: CPT | Mod: S$GLB,,, | Performed by: NURSE PRACTITIONER

## 2023-04-12 PROCEDURE — 1126F PR PAIN SEVERITY QUANTIFIED, NO PAIN PRESENT: ICD-10-PCS | Mod: CPTII,S$GLB,, | Performed by: NURSE PRACTITIONER

## 2023-04-12 PROCEDURE — 1159F PR MEDICATION LIST DOCUMENTED IN MEDICAL RECORD: ICD-10-PCS | Mod: CPTII,S$GLB,, | Performed by: NURSE PRACTITIONER

## 2023-04-12 PROCEDURE — 1157F ADVNC CARE PLAN IN RCRD: CPT | Mod: CPTII,S$GLB,, | Performed by: NURSE PRACTITIONER

## 2023-04-12 NOTE — PROGRESS NOTES
GASTROENTEROLOGY CLINIC NOTE    Chief Complaint: The primary encounter diagnosis was Constipation, unspecified constipation type. Diagnoses of Right upper quadrant abdominal pain, Nausea and vomiting, unspecified vomiting type, and Gastroesophageal reflux disease, unspecified whether esophagitis present were also pertinent to this visit.  Referring provider/PCP: Jose Rojas MD    HPI:  Riana Kay is a 68 y.o. 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  She was previously followed at [facility] but is here today to establish care for abdominal pain.  This is a new problem that began about three months ago.  She reports experiencing episode of diarrhea that lasted a few days and was followed by nausea and abdominal cramping.  The cramping is predominately located in her lower abdomen and described as a burning pain.  It occurs daily and radiates to her lower back.  Pain is slightly improved following a bowel movement.  Additionally, she reports a h/o reflux that is controlled with Protonix 40mg daily.  Occasionally she will experience breakthrough reflux with certain foods.  Denies vomiting, constipation, diarrhea, nocturnal symptoms, melena, or hematochezia.  She has recently sought care with internal medicine d/t abdominal pain and for bladder infection.  She is scheduled to complete her antibiotics for  the bladder infection today.  She was also prescribed Bentyl for the abdominal pain but patient reports she did not take the Bentyl.      Interval Note 4/12/2023  Ms. Riana Baker who is known to me presents to clinic for RUQ abdominal pain, constipation, nausea, and vomiting.  Symptoms began four months ago.  Prior to onset of symptoms, she began Ozempic.   Nausea and vomiting accompanied by decreased appetite and water brash.   Vomiting occurs shortly after eating.  Feels like food not digesting. Reports dysphagia with meat. Points to middle of chest as location of where she feels food sticking.   Vomitus: water, bile, recent meal, No coffee ground emesis or hematemesis  New Medications: Ozempic   NSAID use: No  Abdominal Pain/Distension/Tenderness: RUQ abdominal pain described as cramping. Occurs daily. Worse with eating; improves with bowel movements.   Bowel Movements: Every two days; soft consistency; some incomplete emptying and straining. Taking Miralax daily  Heartburn: No  Early Satiety: Yes    Treatments: Protonix, Zofran, Miralax      Prior Upper Endoscopy: 4/2019  Findings: The Z-line was regular and was found 35 cm from the incisors. No endoscopic abnormality was evident in the esophagus to explain the patient's complaint of dysphagia. It was decided, however, to proceed with dilation at the gastroesophageal junction. A TTS dilator was passed through the scope. Dilation with an 18-19-20 mm pyloric balloon dilator was performed. The dilation site was examined following endoscope reinsertion and showed no change. Estimated blood loss: none. The entire examined stomach was normal. Biopsies were taken with a cold forceps for Helicobacter pylori testing. Estimated blood loss was minimal. The examined duodenum was normal.     Impression:  - No endoscopic esophageal abnormality to explain patient's dysphagia; esophagus empirically dilated up to 20 mm at the GE junction.                         - Stomach  biopsied to r/o H.pylori.     Recommendation:  - Discharge patient to home.                         - Resume previous diet.                         - Continue present medications.                         - Await pathology results.   Pathology:  STOMACH, BIOPSY:  Antral mucosa with features consistent with chemical/reactive gastropathy and mild chronic inactive gastritis. No intestinal metaplasia or dysplasia.  An immunohistochemical stain for Helicobacter pylori is pending and will be included in a supplemental report.  An immunohistochemical stain for Helicobacter pylori is negative.    Prior Colonoscopy: 1/2021  Findings: The perianal and digital rectal examinations were normal. A 4 mm polyp was found in the transverse colon. The polyp was semi-pedunculated. The polyp was removed with a cold snare.  Resection and retrieval were complete. Estimated blood loss was minimal. Many small and large-mouthed diverticula were found in the entire colon. Non-bleeding internal hemorrhoids were found. The exam was otherwise without abnormality on direct and retroflexion views.     Impression:   - One 4 mm polyp in the transverse colon, removed with a cold snare. Resected and retrieved.                         - Diverticulosis in the entire examined colon.                         - Non-bleeding internal hemorrhoids.                         - The examination was otherwise normal on direct and retroflexion views.     Recommendation:       - Discharge patient to home.                         - High fiber diet.                         - Continue present medications.                         - Await pathology results.                         - Repeat colonoscopy in 5 years for surveillance.                         - Return to primary care physician PRN.     Pathology:  COLON, TRANSVERSE, BIOPSY:  - Tubular adenoma    Family h/o Colon Cancer: Father   Family h/o Crohn's Disease or Ulcerative Colitis: No  Abdominal Surgeries:  Cholecystectomy, Hysterectomy    Anticoagulation or Antiplatelet: No      Review of Systems   Constitutional:  Negative for weight loss.   HENT:  Negative for sore throat.    Eyes:  Negative for blurred vision.   Respiratory:  Negative for cough.    Cardiovascular:  Negative for chest pain.   Gastrointestinal:  Positive for abdominal pain (RUQ), constipation, nausea and vomiting. Negative for blood in stool, diarrhea, heartburn and melena.   Genitourinary:  Negative for dysuria.   Musculoskeletal:  Negative for myalgias.   Skin:  Negative for rash.   Neurological:  Negative for headaches.   Endo/Heme/Allergies:  Negative for environmental allergies.   Psychiatric/Behavioral:  Negative for suicidal ideas. The patient is not nervous/anxious.      Past Medical History: has a past medical history of Abdominal pain, right upper quadrant, Anticoagulant long-term use, Anxiety, AR (allergic rhinitis), Atrophic gastritis without mention of hemorrhage, Chronic fatigue syndrome, Diabetes mellitus, Dizziness, Fatty liver, GERD (gastroesophageal reflux disease), Gross hematuria, HTN (hypertension), Hyperlipidemia, Leg swelling, Memory loss, Osteopenia, PONV (postoperative nausea and vomiting), Primary osteoarthritis of right knee, Primary osteoarthritis of right knee, Right elbow pain, S/P total hysterectomy, Screening for colon cancer, Sleep apnea, and 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    Past Surgical History: has a past surgical history that includes Cholecystectomy; Knee arthroscopy w/ debridement (4/11); Total abdominal hysterectomy w/ bilateral salpingoophorectomy; Rotator cuff repair; Elbow surgery (Right, 7/16/15); Elbow surgery; Hysterectomy; Colonoscopy (N/A, 1/17/2018); Elbow Arthroplasty (Right, 1/16/2019); Release of ulnar nerve at cubital tunnel (Right, 1/16/2019); Upper gastrointestinal endoscopy; Esophagogastroduodenoscopy (N/A, 4/15/2019); Knee Arthroplasty (Right, 10/6/2020); Cystoscopy (N/A,  12/1/2020); Retrograde pyelography (Bilateral, 12/1/2020); Colonoscopy (N/A, 1/6/2021); Total knee arthroplasty (Left, 10/19/2021); and Reverse total shoulder arthroplasty (Right, 8/16/2022).    Family History:family history includes Cancer in her father; Colon cancer (age of onset: 83) in her father; Diabetes in her maternal aunt and maternal grandmother.    Allergies:   Review of patient's allergies indicates:   Allergen Reactions    Iodinated contrast media Swelling and Rash    Percocet [oxycodone-acetaminophen] Itching    Macrobid [nitrofurantoin monohyd/m-cryst] Rash    Metformin Rash    Penicillins Rash     Had ancef in 2020 with no adverse rxn     Promethazine Rash     Had compazine in 2021    Sulfa (sulfonamide antibiotics) Rash    Sulfamethoxazole-trimethoprim Rash       Social History: reports that she has never smoked. She has never been exposed to tobacco smoke. She has never used smokeless tobacco. She reports that she does not drink alcohol and does not use drugs.    Home medications:   Current Outpatient Medications on File Prior to Visit   Medication Sig Dispense Refill    acetaminophen (TYLENOL) 500 MG tablet Take 2 tablets (1,000 mg total) by mouth every 8 (eight) hours as needed for Pain. 90 tablet 0    blood sugar diagnostic Strp To check BG 3 times daily, to use with insurance preferred meter, e 11.65 100 each 11    blood-glucose meter kit To check BG 3 times daily, to use with insurance preferred meter, e 11.65 1 each 0    buPROPion (WELLBUTRIN XL) 150 MG TB24 tablet Take 150 mg by mouth every morning.      glucagon (BAQSIMI) 3 mg/actuation Spry Give one puff via nostril. Hold device between fingers and thumb, do not push plunger yet, insert tip gently into one nostril until finger(s) touch the outside of the nose, then push plunger firmly all the way in . Dose is complete when the green line disappears. 1 each 1    ketoconazole (NIZORAL) 2 % shampoo Apply topically every 7 days. 120 mL 6     "lancets Misc To check BG 3 times daily, to use with insurance preferred meter, e 11.65 100 each 11    latanoprost 0.005 % ophthalmic solution Place 1 drop into both eyes nightly.      losartan (COZAAR) 50 MG tablet Take 1 tablet (50 mg total) by mouth once daily. 90 tablet 3    ondansetron (ZOFRAN-ODT) 8 MG TbDL Take 1 tablet (8 mg total) by mouth 3 (three) times daily as needed (Nausea). 30 tablet 3    pantoprazole (PROTONIX) 40 MG tablet Take 1 tablet (40 mg total) by mouth once daily. 90 tablet 3    pen needle, diabetic (NOVOFINE 32) 32 gauge x 1/4" Ndle Uses 4 times a day. 90 day via duramed e 11.65 400 each 3    polyethylene glycol (GLYCOLAX) 17 gram PwPk Take 17 g by mouth 2 (two) times daily as needed (constipation). 4 packet 0    azelastine (ASTELIN) 137 mcg (0.1 %) nasal spray 1 spray (137 mcg total) by Nasal route 2 (two) times daily. 30 mL 11    cyclobenzaprine (FLEXERIL) 5 MG tablet Take 5 mg by mouth.      DULoxetine (CYMBALTA) 30 MG capsule Take 1 capsule (30 mg total) by mouth once daily. (Patient not taking: Reported on 3/16/2023) 30 capsule 11    insulin (LANTUS SOLOSTAR U-100 INSULIN) glargine 100 units/mL SubQ pen INJECT 25 UNITS SUBCUTANEOUSLY AT NIGHT. 30 each 4    insulin lispro 100 unit/mL injection Inject into the skin 3 (three) times daily before meals.      insulin lispro 100 unit/mL pen INJECT 14 UNITS WITH MEALS, PLUS SLIDING SCALE, MAX DAILY 68 UNITS 75 mL 3    meclizine (ANTIVERT) 25 mg tablet Take 1 tablet (25 mg total) by mouth 3 (three) times daily as needed for Dizziness. 30 tablet 1    meloxicam (MOBIC) 15 MG tablet Take 15 mg by mouth.      semaglutide (OZEMPIC) 0.25 mg or 0.5 mg(2 mg/1.5 mL) pen injector Inject 0.5 mg into the skin every 7 days. (Patient not taking: Reported on 4/12/2023) 1 pen 5    [DISCONTINUED] diclofenac sodium (VOLTAREN) 1 % Gel APPLY 2 G TOPICALLY 2 (TWO) TIMES DAILY AS NEEDED (PAIN). DO NOT USE DIRECTLY ON INCISION 100 g 0     No current " "facility-administered medications on file prior to visit.       Vital signs:  Ht 5' 5" (1.651 m)   Wt 74.1 kg (163 lb 5.8 oz)   BMI 27.18 kg/m²     Physical Exam  Vitals reviewed.   Constitutional:       General: She is not in acute distress.     Appearance: Normal appearance. She is not ill-appearing.   HENT:      Head: Normocephalic.   Cardiovascular:      Rate and Rhythm: Normal rate and regular rhythm.      Heart sounds: Normal heart sounds. No murmur heard.  Pulmonary:      Effort: Pulmonary effort is normal. No respiratory distress.      Breath sounds: Normal breath sounds.   Chest:      Chest wall: No tenderness.   Abdominal:      General: Bowel sounds are normal. There is no distension.      Palpations: Abdomen is soft.      Tenderness: There is abdominal tenderness in the right lower quadrant and left lower quadrant. Negative signs include Stephens's sign.      Hernia: No hernia is present.      Comments: Mild tenderness in lower quadrant   Skin:     General: Skin is warm.   Neurological:      Mental Status: She is alert and oriented to person, place, and time.   Psychiatric:         Mood and Affect: Mood normal.         Behavior: Behavior normal.       Routine labs:  Lab Results   Component Value Date    WBC 7.18 04/06/2023    HGB 13.7 04/06/2023    HCT 40.8 04/06/2023    MCV 96 04/06/2023     04/06/2023     Lab Results   Component Value Date    INR 0.9 10/14/2021     Lab Results   Component Value Date    IRON 103 01/25/2018    FERRITIN 215 01/25/2018    TIBC 289 01/25/2018    FESATURATED 36 01/25/2018     Lab Results   Component Value Date     04/06/2023    K 5.0 04/06/2023     04/06/2023    CO2 25 04/06/2023    BUN 13 04/06/2023    CREATININE 0.8 04/06/2023     Lab Results   Component Value Date    ALBUMIN 4.0 04/06/2023    ALT 32 04/06/2023    AST 23 04/06/2023    ALKPHOS 101 04/06/2023    BILITOT 0.5 04/06/2023     No results found for: GLUCOSE  Lab Results   Component Value Date    " TSH 1.212 09/01/2022     Lab Results   Component Value Date    CALCIUM 10.0 04/06/2023    PHOS 3.2 08/14/2022       Imaging:  X-Ray KUB  Narrative: EXAMINATION:  XR KUB    CLINICAL HISTORY:  Unspecified abdominal pain    TECHNIQUE:  Single AP supine view of the abdomen (KUB) was performed    COMPARISON:  Abdominal radiograph 11/24/2021.    FINDINGS:  No definite dilated gas-filled loops of small or large bowel appreciated.  Large volume colonic stool.  Air appears to be present in distal colon and rectum.    Postsurgical sequela in the right upper quadrant the abdomen may reflect prior cholecystectomy.    No definite acute abnormality the visualized lower chest.    Osseous and soft tissue structures without definite acute abnormality.  Calcifications in the left breast soft tissues again appreciated.  Impression: Nonspecific, nonobstructive bowel gas pattern.    Large volume colonic stool.    Electronically signed by: Rudy Batista  Date:    02/23/2023  Time:    09:32    EXAMINATION:  CT ABDOMEN PELVIS WITHOUT CONTRAST 12/10/2021     CLINICAL HISTORY:  LLQ abdominal pain, r/o diverticulitis; Diverticulitis of intestine, part unspecified, without perforation or abscess without bleeding     TECHNIQUE:  Low dose axial images, sagittal and coronal reformations were obtained from the lung bases to the pubic symphysis.  900 mL barium was given for oral contrast     COMPARISON:  09/22/2020     FINDINGS:  This examination is limited due to lack of intravenous contrast.     Lower chest: Unremarkable.     Liver: Normal contour.     Gallbladder and bile ducts: The gallbladder is surgically absent.  No intra or extrahepatic biliary ductal dilatation.     Pancreas: Normal contour.     Spleen: Normal contour.     Adrenals: Normal contour.     Kidneys: Normal contour.     Lymph nodes: No abdominal or pelvic lymphadenopathy.     Bowel and mesentery: Extensive diverticulosis is present without evidence of diverticulitis.  The small  bowel and large bowel are normal caliber.     Abdominal aorta: Unremarkable.     Inferior vena cava: Unremarkable.     Free fluid or free air: None.     Pelvis: Unremarkable.     Urinary bladder: Unremarkable.     Body wall: Unremarkable.     Bones: Unremarkable.     Impression:     Diverticulosis without evidence of diverticulitis.  No acute finding to explain the patient's pain.        Electronically signed by: Yolanda Driscoll  Date:                                            12/10/2021  Time:                                           09:35    I have reviewed prior labs, imaging, and notes.      Assessment:  1. Constipation, unspecified constipation type    2. Right upper quadrant abdominal pain    3. Nausea and vomiting, unspecified vomiting type    4. Gastroesophageal reflux disease, unspecified whether esophagitis present      Nausea and vomiting accompanied by decreased appetite. Began shortly after starting Ozempic. Suspect Ozempic is causing many of symptoms.   RUQ pain improves with bowel movement. Taking Mirialax daily.   Protonix controlling reflux.     Plan:  Orders Placed This Encounter    Case Request Endoscopy: EGD (ESOPHAGOGASTRODUODENOSCOPY)     EGD   Patient to discuss stopping Ozempic with endocrinology   Increase Miralax to twice a day  Continue pantoprazole once a day. Take 30 minutes before first meal of the day.   Continue Zofran as needed.     Consider NMS in future        Plan of care discussed with patient who is in agreement and verbalized understanding.     I have explained the planned procedures to the patient.The risks, benefits and alternatives of the procedure were also explained in detail. Patient verbalized understanding, all questions were answered. The patient agrees to proceed as planned    Follow Up: As Needed Pending Workup          Coleen Felix, RADHA,FNP-BC  [facility]

## 2023-04-12 NOTE — H&P (VIEW-ONLY)
GASTROENTEROLOGY CLINIC NOTE    Chief Complaint: The primary encounter diagnosis was Constipation, unspecified constipation type. Diagnoses of Right upper quadrant abdominal pain, Nausea and vomiting, unspecified vomiting type, and Gastroesophageal reflux disease, unspecified whether esophagitis present were also pertinent to this visit.  Referring provider/PCP: Jose Rojas MD    HPI:  Riana Kay is a 68 y.o. 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  She was previously followed at [facility] but is here today to establish care for abdominal pain.  This is a new problem that began about three months ago.  She reports experiencing episode of diarrhea that lasted a few days and was followed by nausea and abdominal cramping.  The cramping is predominately located in her lower abdomen and described as a burning pain.  It occurs daily and radiates to her lower back.  Pain is slightly improved following a bowel movement.  Additionally, she reports a h/o reflux that is controlled with Protonix 40mg daily.  Occasionally she will experience breakthrough reflux with certain foods.  Denies vomiting, constipation, diarrhea, nocturnal symptoms, melena, or hematochezia.  She has recently sought care with internal medicine d/t abdominal pain and for bladder infection.  She is scheduled to complete her antibiotics for  the bladder infection today.  She was also prescribed Bentyl for the abdominal pain but patient reports she did not take the Bentyl.      Interval Note 4/12/2023  Ms. Riana Baker who is known to me presents to clinic for RUQ abdominal pain, constipation, nausea, and vomiting.  Symptoms began four months ago.  Prior to onset of symptoms, she began Ozempic.   Nausea and vomiting accompanied by decreased appetite and water brash.   Vomiting occurs shortly after eating.  Feels like food not digesting. Reports dysphagia with meat. Points to middle of chest as location of where she feels food sticking.   Vomitus: water, bile, recent meal, No coffee ground emesis or hematemesis  New Medications: Ozempic   NSAID use: No  Abdominal Pain/Distension/Tenderness: RUQ abdominal pain described as cramping. Occurs daily. Worse with eating; improves with bowel movements.   Bowel Movements: Every two days; soft consistency; some incomplete emptying and straining. Taking Miralax daily  Heartburn: No  Early Satiety: Yes    Treatments: Protonix, Zofran, Miralax      Prior Upper Endoscopy: 4/2019  Findings: The Z-line was regular and was found 35 cm from the incisors. No endoscopic abnormality was evident in the esophagus to explain the patient's complaint of dysphagia. It was decided, however, to proceed with dilation at the gastroesophageal junction. A TTS dilator was passed through the scope. Dilation with an 18-19-20 mm pyloric balloon dilator was performed. The dilation site was examined following endoscope reinsertion and showed no change. Estimated blood loss: none. The entire examined stomach was normal. Biopsies were taken with a cold forceps for Helicobacter pylori testing. Estimated blood loss was minimal. The examined duodenum was normal.     Impression:  - No endoscopic esophageal abnormality to explain patient's dysphagia; esophagus empirically dilated up to 20 mm at the GE junction.                         - Stomach  biopsied to r/o H.pylori.     Recommendation:  - Discharge patient to home.                         - Resume previous diet.                         - Continue present medications.                         - Await pathology results.   Pathology:  STOMACH, BIOPSY:  Antral mucosa with features consistent with chemical/reactive gastropathy and mild chronic inactive gastritis. No intestinal metaplasia or dysplasia.  An immunohistochemical stain for Helicobacter pylori is pending and will be included in a supplemental report.  An immunohistochemical stain for Helicobacter pylori is negative.    Prior Colonoscopy: 1/2021  Findings: The perianal and digital rectal examinations were normal. A 4 mm polyp was found in the transverse colon. The polyp was semi-pedunculated. The polyp was removed with a cold snare.  Resection and retrieval were complete. Estimated blood loss was minimal. Many small and large-mouthed diverticula were found in the entire colon. Non-bleeding internal hemorrhoids were found. The exam was otherwise without abnormality on direct and retroflexion views.     Impression:   - One 4 mm polyp in the transverse colon, removed with a cold snare. Resected and retrieved.                         - Diverticulosis in the entire examined colon.                         - Non-bleeding internal hemorrhoids.                         - The examination was otherwise normal on direct and retroflexion views.     Recommendation:       - Discharge patient to home.                         - High fiber diet.                         - Continue present medications.                         - Await pathology results.                         - Repeat colonoscopy in 5 years for surveillance.                         - Return to primary care physician PRN.     Pathology:  COLON, TRANSVERSE, BIOPSY:  - Tubular adenoma    Family h/o Colon Cancer: Father   Family h/o Crohn's Disease or Ulcerative Colitis: No  Abdominal Surgeries:  Cholecystectomy, Hysterectomy    Anticoagulation or Antiplatelet: No      Review of Systems   Constitutional:  Negative for weight loss.   HENT:  Negative for sore throat.    Eyes:  Negative for blurred vision.   Respiratory:  Negative for cough.    Cardiovascular:  Negative for chest pain.   Gastrointestinal:  Positive for abdominal pain (RUQ), constipation, nausea and vomiting. Negative for blood in stool, diarrhea, heartburn and melena.   Genitourinary:  Negative for dysuria.   Musculoskeletal:  Negative for myalgias.   Skin:  Negative for rash.   Neurological:  Negative for headaches.   Endo/Heme/Allergies:  Negative for environmental allergies.   Psychiatric/Behavioral:  Negative for suicidal ideas. The patient is not nervous/anxious.      Past Medical History: has a past medical history of Abdominal pain, right upper quadrant, Anticoagulant long-term use, Anxiety, AR (allergic rhinitis), Atrophic gastritis without mention of hemorrhage, Chronic fatigue syndrome, Diabetes mellitus, Dizziness, Fatty liver, GERD (gastroesophageal reflux disease), Gross hematuria, HTN (hypertension), Hyperlipidemia, Leg swelling, Memory loss, Osteopenia, PONV (postoperative nausea and vomiting), Primary osteoarthritis of right knee, Primary osteoarthritis of right knee, Right elbow pain, S/P total hysterectomy, Screening for colon cancer, Sleep apnea, and 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    Past Surgical History: has a past surgical history that includes Cholecystectomy; Knee arthroscopy w/ debridement (4/11); Total abdominal hysterectomy w/ bilateral salpingoophorectomy; Rotator cuff repair; Elbow surgery (Right, 7/16/15); Elbow surgery; Hysterectomy; Colonoscopy (N/A, 1/17/2018); Elbow Arthroplasty (Right, 1/16/2019); Release of ulnar nerve at cubital tunnel (Right, 1/16/2019); Upper gastrointestinal endoscopy; Esophagogastroduodenoscopy (N/A, 4/15/2019); Knee Arthroplasty (Right, 10/6/2020); Cystoscopy (N/A,  12/1/2020); Retrograde pyelography (Bilateral, 12/1/2020); Colonoscopy (N/A, 1/6/2021); Total knee arthroplasty (Left, 10/19/2021); and Reverse total shoulder arthroplasty (Right, 8/16/2022).    Family History:family history includes Cancer in her father; Colon cancer (age of onset: 83) in her father; Diabetes in her maternal aunt and maternal grandmother.    Allergies:   Review of patient's allergies indicates:   Allergen Reactions    Iodinated contrast media Swelling and Rash    Percocet [oxycodone-acetaminophen] Itching    Macrobid [nitrofurantoin monohyd/m-cryst] Rash    Metformin Rash    Penicillins Rash     Had ancef in 2020 with no adverse rxn     Promethazine Rash     Had compazine in 2021    Sulfa (sulfonamide antibiotics) Rash    Sulfamethoxazole-trimethoprim Rash       Social History: reports that she has never smoked. She has never been exposed to tobacco smoke. She has never used smokeless tobacco. She reports that she does not drink alcohol and does not use drugs.    Home medications:   Current Outpatient Medications on File Prior to Visit   Medication Sig Dispense Refill    acetaminophen (TYLENOL) 500 MG tablet Take 2 tablets (1,000 mg total) by mouth every 8 (eight) hours as needed for Pain. 90 tablet 0    blood sugar diagnostic Strp To check BG 3 times daily, to use with insurance preferred meter, e 11.65 100 each 11    blood-glucose meter kit To check BG 3 times daily, to use with insurance preferred meter, e 11.65 1 each 0    buPROPion (WELLBUTRIN XL) 150 MG TB24 tablet Take 150 mg by mouth every morning.      glucagon (BAQSIMI) 3 mg/actuation Spry Give one puff via nostril. Hold device between fingers and thumb, do not push plunger yet, insert tip gently into one nostril until finger(s) touch the outside of the nose, then push plunger firmly all the way in . Dose is complete when the green line disappears. 1 each 1    ketoconazole (NIZORAL) 2 % shampoo Apply topically every 7 days. 120 mL 6     "lancets Misc To check BG 3 times daily, to use with insurance preferred meter, e 11.65 100 each 11    latanoprost 0.005 % ophthalmic solution Place 1 drop into both eyes nightly.      losartan (COZAAR) 50 MG tablet Take 1 tablet (50 mg total) by mouth once daily. 90 tablet 3    ondansetron (ZOFRAN-ODT) 8 MG TbDL Take 1 tablet (8 mg total) by mouth 3 (three) times daily as needed (Nausea). 30 tablet 3    pantoprazole (PROTONIX) 40 MG tablet Take 1 tablet (40 mg total) by mouth once daily. 90 tablet 3    pen needle, diabetic (NOVOFINE 32) 32 gauge x 1/4" Ndle Uses 4 times a day. 90 day via duramed e 11.65 400 each 3    polyethylene glycol (GLYCOLAX) 17 gram PwPk Take 17 g by mouth 2 (two) times daily as needed (constipation). 4 packet 0    azelastine (ASTELIN) 137 mcg (0.1 %) nasal spray 1 spray (137 mcg total) by Nasal route 2 (two) times daily. 30 mL 11    cyclobenzaprine (FLEXERIL) 5 MG tablet Take 5 mg by mouth.      DULoxetine (CYMBALTA) 30 MG capsule Take 1 capsule (30 mg total) by mouth once daily. (Patient not taking: Reported on 3/16/2023) 30 capsule 11    insulin (LANTUS SOLOSTAR U-100 INSULIN) glargine 100 units/mL SubQ pen INJECT 25 UNITS SUBCUTANEOUSLY AT NIGHT. 30 each 4    insulin lispro 100 unit/mL injection Inject into the skin 3 (three) times daily before meals.      insulin lispro 100 unit/mL pen INJECT 14 UNITS WITH MEALS, PLUS SLIDING SCALE, MAX DAILY 68 UNITS 75 mL 3    meclizine (ANTIVERT) 25 mg tablet Take 1 tablet (25 mg total) by mouth 3 (three) times daily as needed for Dizziness. 30 tablet 1    meloxicam (MOBIC) 15 MG tablet Take 15 mg by mouth.      semaglutide (OZEMPIC) 0.25 mg or 0.5 mg(2 mg/1.5 mL) pen injector Inject 0.5 mg into the skin every 7 days. (Patient not taking: Reported on 4/12/2023) 1 pen 5    [DISCONTINUED] diclofenac sodium (VOLTAREN) 1 % Gel APPLY 2 G TOPICALLY 2 (TWO) TIMES DAILY AS NEEDED (PAIN). DO NOT USE DIRECTLY ON INCISION 100 g 0     No current " "facility-administered medications on file prior to visit.       Vital signs:  Ht 5' 5" (1.651 m)   Wt 74.1 kg (163 lb 5.8 oz)   BMI 27.18 kg/m²     Physical Exam  Vitals reviewed.   Constitutional:       General: She is not in acute distress.     Appearance: Normal appearance. She is not ill-appearing.   HENT:      Head: Normocephalic.   Cardiovascular:      Rate and Rhythm: Normal rate and regular rhythm.      Heart sounds: Normal heart sounds. No murmur heard.  Pulmonary:      Effort: Pulmonary effort is normal. No respiratory distress.      Breath sounds: Normal breath sounds.   Chest:      Chest wall: No tenderness.   Abdominal:      General: Bowel sounds are normal. There is no distension.      Palpations: Abdomen is soft.      Tenderness: There is abdominal tenderness in the right lower quadrant and left lower quadrant. Negative signs include Stephens's sign.      Hernia: No hernia is present.      Comments: Mild tenderness in lower quadrant   Skin:     General: Skin is warm.   Neurological:      Mental Status: She is alert and oriented to person, place, and time.   Psychiatric:         Mood and Affect: Mood normal.         Behavior: Behavior normal.       Routine labs:  Lab Results   Component Value Date    WBC 7.18 04/06/2023    HGB 13.7 04/06/2023    HCT 40.8 04/06/2023    MCV 96 04/06/2023     04/06/2023     Lab Results   Component Value Date    INR 0.9 10/14/2021     Lab Results   Component Value Date    IRON 103 01/25/2018    FERRITIN 215 01/25/2018    TIBC 289 01/25/2018    FESATURATED 36 01/25/2018     Lab Results   Component Value Date     04/06/2023    K 5.0 04/06/2023     04/06/2023    CO2 25 04/06/2023    BUN 13 04/06/2023    CREATININE 0.8 04/06/2023     Lab Results   Component Value Date    ALBUMIN 4.0 04/06/2023    ALT 32 04/06/2023    AST 23 04/06/2023    ALKPHOS 101 04/06/2023    BILITOT 0.5 04/06/2023     No results found for: GLUCOSE  Lab Results   Component Value Date    " TSH 1.212 09/01/2022     Lab Results   Component Value Date    CALCIUM 10.0 04/06/2023    PHOS 3.2 08/14/2022       Imaging:  X-Ray KUB  Narrative: EXAMINATION:  XR KUB    CLINICAL HISTORY:  Unspecified abdominal pain    TECHNIQUE:  Single AP supine view of the abdomen (KUB) was performed    COMPARISON:  Abdominal radiograph 11/24/2021.    FINDINGS:  No definite dilated gas-filled loops of small or large bowel appreciated.  Large volume colonic stool.  Air appears to be present in distal colon and rectum.    Postsurgical sequela in the right upper quadrant the abdomen may reflect prior cholecystectomy.    No definite acute abnormality the visualized lower chest.    Osseous and soft tissue structures without definite acute abnormality.  Calcifications in the left breast soft tissues again appreciated.  Impression: Nonspecific, nonobstructive bowel gas pattern.    Large volume colonic stool.    Electronically signed by: Rudy Batista  Date:    02/23/2023  Time:    09:32    EXAMINATION:  CT ABDOMEN PELVIS WITHOUT CONTRAST 12/10/2021     CLINICAL HISTORY:  LLQ abdominal pain, r/o diverticulitis; Diverticulitis of intestine, part unspecified, without perforation or abscess without bleeding     TECHNIQUE:  Low dose axial images, sagittal and coronal reformations were obtained from the lung bases to the pubic symphysis.  900 mL barium was given for oral contrast     COMPARISON:  09/22/2020     FINDINGS:  This examination is limited due to lack of intravenous contrast.     Lower chest: Unremarkable.     Liver: Normal contour.     Gallbladder and bile ducts: The gallbladder is surgically absent.  No intra or extrahepatic biliary ductal dilatation.     Pancreas: Normal contour.     Spleen: Normal contour.     Adrenals: Normal contour.     Kidneys: Normal contour.     Lymph nodes: No abdominal or pelvic lymphadenopathy.     Bowel and mesentery: Extensive diverticulosis is present without evidence of diverticulitis.  The small  bowel and large bowel are normal caliber.     Abdominal aorta: Unremarkable.     Inferior vena cava: Unremarkable.     Free fluid or free air: None.     Pelvis: Unremarkable.     Urinary bladder: Unremarkable.     Body wall: Unremarkable.     Bones: Unremarkable.     Impression:     Diverticulosis without evidence of diverticulitis.  No acute finding to explain the patient's pain.        Electronically signed by: Yolanda Driscoll  Date:                                            12/10/2021  Time:                                           09:35    I have reviewed prior labs, imaging, and notes.      Assessment:  1. Constipation, unspecified constipation type    2. Right upper quadrant abdominal pain    3. Nausea and vomiting, unspecified vomiting type    4. Gastroesophageal reflux disease, unspecified whether esophagitis present      Nausea and vomiting accompanied by decreased appetite. Began shortly after starting Ozempic. Suspect Ozempic is causing many of symptoms.   RUQ pain improves with bowel movement. Taking Mirialax daily.   Protonix controlling reflux.     Plan:  Orders Placed This Encounter    Case Request Endoscopy: EGD (ESOPHAGOGASTRODUODENOSCOPY)     EGD   Patient to discuss stopping Ozempic with endocrinology   Increase Miralax to twice a day  Continue pantoprazole once a day. Take 30 minutes before first meal of the day.   Continue Zofran as needed.     Consider NMS in future        Plan of care discussed with patient who is in agreement and verbalized understanding.     I have explained the planned procedures to the patient.The risks, benefits and alternatives of the procedure were also explained in detail. Patient verbalized understanding, all questions were answered. The patient agrees to proceed as planned    Follow Up: As Needed Pending Workup          Coleen Felix, RADHA,FNP-BC  [facility]/[place]

## 2023-04-12 NOTE — PATIENT INSTRUCTIONS
Ask Endocrinologist about stopping Ozempic due to side effects    Increase Miralax to twice a day  Continue pantoprazole once a day. Take 30 minutes before first meal of the day.   Continue Zofran as needed.       EGD Instructions    [facility] [address]  Clinic Office [phone]  Endoscopy Lab [phone]    You are scheduled for an EGD with Dr. Reyes    on  4/21/23   at [facility] in [place].    Check in at the Hospital -1st floor, Information desk.   Call [phone] to reschedule.    You cannot have anything to eat or drink after Midnight. You can brush your teeth with a sip of water.     An adult friend/family member must come with you to drive you home.  You cannot drive, take a taxi, Uber/Lyft or bus to leave the Endoscopy Center alone.  If you do not have someone to drive you home, your test will be cancelled.     Please follow the directions of your doctor if you take any pills that thin your blood. If you take these meds: Aggrenox, Brilinta, Effient, Eliquis, Lovenox, Plavix, Pletal, Pradaxa, Ticilid, Xarelto or Coumadin, let the doctor's office know.    DON'T: On the morning of the test do not take insulin or pills for diabetes.     DO: On the morning of the test, do take any pills for blood pressure, heart, anti-rejection and or seizures with a small sip of water. Bring any inhalers with you.    Leave all valuables and jewelry at home. You will be at the hospital for 2-4 hours.    Call the Endoscopy department at [phone] with any questions about your procedure.    Thank you for choosing [facility].

## 2023-04-12 NOTE — PROGRESS NOTES
CC: This 68 y.o.  female presents for management of type 2 dm along with the current chronic medical conditions including:  Patient Active Problem List   Diagnosis    Anxiety    Fatty liver    Memory changes    Pathological fracture of right humerus due to osteoporosis with routine healing    Gastroesophageal reflux disease without esophagitis    Hypertension associated with type 2 diabetes mellitus    ZAHIRA (obstructive sleep apnea)    Atherosclerosis of native coronary artery of native heart without angina pectoris    Muscle spasms of neck    Radiculopathy of cervical spine    Overweight (BMI 25.0-29.9)    Type 2 diabetes mellitus without complication, with long-term current use of insulin    Vitamin D deficiency    Primary osteoarthritis of left knee    Aortic atherosclerosis    Closed fracture of proximal end of right humerus with routine healing s/p total shoulder replacement on 8/16/2022    Anterior dislocation of right shoulder    Carotid arterial disease on ct dated  8/14/2022    Encounter for preventive health examination      Status of these conditions is pending review.    H/o fatty liver, HTN, overweight, osteopenia, coronary atherosclerosis, gout, arthritic pain (L) knee, ankle       HPI: Diagnosed with T2DM x 20 years ago.   Started insulin in 2006.   Seen by Dr. Morse in the past- c peptide- revealed low level -hair   Last seen by me in 2022  Accompanied by     Memory loss issues at times.    Gi f/u this week  Pending scope   Not sure if it is ozempic  +covid19 2/2023   Having nausea, vomiting at times     Lowest 70, 85 mg/dl   Deductible issues with ozempic, $45, $122  Remains on MDI  In past on victoza  and januvia- edema  Ozempic --- gi issues?    Lab Results   Component Value Date    HGBA1C 6.0 (H) 04/06/2023     On MDI injections 4x a day   Testing 4 x a day  Patient is willing and able to use the device  Demonstrated an understanding of the technology and is motivated to use  CGM  Patient expected to adhere to a comprehensive diabetes treatment plan and patient has adequate medical supervision  Patient experiences multiple impaired awareness of hypoglycemia (hypoglycemia unawareness)  See media for logs  Lowest 90, 95 mg/dl  Over the past 30 days, elevations  True metrix  Has h/o hypoglycemia 39 mg/dl    (R)  Knee replacement sx 10/6/2020  Injury to right shoulder, surgery in august 2022     Of note, , retired, fall in 2015, injured (R) elbow, still has pain, can't lift heavier than 10 lbs.     Off metformin related to kidneys.    CURRENT DM MEDS:   lantus 20- 25 units qhs, novolog 10 -12 units  with mod dose correction scale, starting at 150  At night 110 mg/dl     Social Hx/personal Hx: , work-housekeeping, 1 son (26 y/o)    Riana Kay forgot glucometer/logs today    DIET/ MEAL PATTERN: 3 meals a day  Snacks 1-2     EXERCISE: no formal; does yardwork     STANDARDS OF CARE:     Diabetes Management Status    Statin: Not taking  ACE/ARB: Taking    Screening or Prevention Patient's value Goal Complete/Controlled?   HgA1C Testing and Control   Lab Results   Component Value Date    HGBA1C 6.0 (H) 04/06/2023      Annually/Less than 8% Yes   Lipid profile : 12/05/2022 Annually Yes   LDL control Lab Results   Component Value Date    LDLCALC 115.4 12/05/2022    Annually/Less than 100 mg/dl  Yes   Nephropathy screening Lab Results   Component Value Date    LABMICR 9.0 09/01/2022     Lab Results   Component Value Date    PROTEINUA Negative 09/01/2022    Annually Yes   Blood pressure BP Readings from Last 1 Encounters:   03/30/23 132/68    Less than 140/90 Yes   Dilated retinal exam : 01/05/2023 Annually Yes   Foot exam   : 08/31/2022 Annually No       ROS:   GEN: +chronic fatigue or weakness, no changes w/ appetite, wt fluctuations (loss 9#)   CV:  Denies chest pain, palpitations, edema or cyanosis, denies syncope  SKIN: Skin is intact and heals well, no rashes, pruritis,  easy bruising, no hair changes, no intolerance to heat/cold.  RESP: No SOB, cough, FISCHER  FEN/GI: Nml bowel movements, normal appetite, +GERD, +n/v  RENAL: No urinary complaints, no dysuria/hematuia/oliguria   ENDO: no heat/cold intolerance  ID: No skin breakdown, fevers, chills  PSYCH: Denies drug/ETOH abuse, no hx. of eating disorders or depression +anxiety  MS/NEURO: Denies numbness/ tingling in BLE; +bilateral knee and ankle joint pain-(R) knee replacement 10/2020, (R) elbow pain -fall in 2015, surgery, (R) shoulder 8/2022        Lab Results   Component Value Date    TSH 1.212 09/01/2022       Chemistry        Component Value Date/Time     04/06/2023 0806    K 5.0 04/06/2023 0806     04/06/2023 0806    CO2 25 04/06/2023 0806    BUN 13 04/06/2023 0806    CREATININE 0.8 04/06/2023 0806     04/06/2023 0806        Component Value Date/Time    CALCIUM 10.0 04/06/2023 0806    ALKPHOS 101 04/06/2023 0806    AST 23 04/06/2023 0806    ALT 32 04/06/2023 0806    BILITOT 0.5 04/06/2023 0806          Lab Results   Component Value Date    LDLCALC 115.4 12/05/2022       PE:  GEN: Patient WNWD, WF, NAD, AAOx3, Friendly, talkative, well groomed  HEENT: EOMI, PERRLA, no lid lag, Clear oropharynx  NECK: trachea midline  CV: Regular rate and rhythm, no edema  RESP: No increase work of breathing, No cough, wheeze.  ABD: no tenderness EXT: No C/C/Edema, No skin rash/breakdown  NEURO: CN 2-12 intact, nml gait   FEET: No pressure areas, blisters, ulcers, calluses. Footware appropriate.      ASSESSMENT and PLAN:  Riana was seen today for diabetes mellitus.    Diagnoses and associated orders for this visit:  1. Type 2 diabetes mellitus without complication, with long-term current use of insulin  Hemoglobin A1C      2. ZAHIRA (obstructive sleep apnea)        3. Overweight (BMI 25.0-29.9)        4. Hypertension associated with type 2 diabetes mellitus        5. Fatty liver        6. Atherosclerosis of native coronary  artery of native heart without angina pectoris        7. Anxiety        8. Encounter for screening mammogram for malignant neoplasm of breast  Mammo Digital Screening Bilat w/ Arturo        1-8. Follow up in 4 months   A1c prior   A1c goal less than 7%  Cut back lantus to 18 units at night  Novolog 8-8-8 units before meals scale use 180-230+2, etc.  Stop ozempic   Uses cpap   F/u with pcp   Mammogram 2023   Body mass index is 31.39 kg/m².  Losing weight   F/u with cards, gi , hepatology   Bp controlled    poor plus

## 2023-04-13 ENCOUNTER — OFFICE VISIT (OUTPATIENT)
Dept: INTERNAL MEDICINE | Facility: CLINIC | Age: 68
End: 2023-04-13
Payer: MEDICARE

## 2023-04-13 VITALS
OXYGEN SATURATION: 98 % | HEIGHT: 60 IN | HEART RATE: 74 BPM | SYSTOLIC BLOOD PRESSURE: 138 MMHG | BODY MASS INDEX: 31.55 KG/M2 | DIASTOLIC BLOOD PRESSURE: 76 MMHG | WEIGHT: 160.69 LBS

## 2023-04-13 DIAGNOSIS — Z79.4 TYPE 2 DIABETES MELLITUS WITHOUT COMPLICATION, WITH LONG-TERM CURRENT USE OF INSULIN: Primary | ICD-10-CM

## 2023-04-13 DIAGNOSIS — I25.10 ATHEROSCLEROSIS OF NATIVE CORONARY ARTERY OF NATIVE HEART WITHOUT ANGINA PECTORIS: ICD-10-CM

## 2023-04-13 DIAGNOSIS — E11.59 HYPERTENSION ASSOCIATED WITH TYPE 2 DIABETES MELLITUS: ICD-10-CM

## 2023-04-13 DIAGNOSIS — Z12.31 ENCOUNTER FOR SCREENING MAMMOGRAM FOR MALIGNANT NEOPLASM OF BREAST: ICD-10-CM

## 2023-04-13 DIAGNOSIS — E66.3 OVERWEIGHT (BMI 25.0-29.9): ICD-10-CM

## 2023-04-13 DIAGNOSIS — G47.33 OSA (OBSTRUCTIVE SLEEP APNEA): ICD-10-CM

## 2023-04-13 DIAGNOSIS — I15.2 HYPERTENSION ASSOCIATED WITH TYPE 2 DIABETES MELLITUS: ICD-10-CM

## 2023-04-13 DIAGNOSIS — K76.0 FATTY LIVER: ICD-10-CM

## 2023-04-13 DIAGNOSIS — E11.9 TYPE 2 DIABETES MELLITUS WITHOUT COMPLICATION, WITH LONG-TERM CURRENT USE OF INSULIN: Primary | ICD-10-CM

## 2023-04-13 DIAGNOSIS — F41.9 ANXIETY: ICD-10-CM

## 2023-04-13 PROCEDURE — 99214 OFFICE O/P EST MOD 30 MIN: CPT | Mod: S$GLB,,, | Performed by: NURSE PRACTITIONER

## 2023-04-13 PROCEDURE — 1159F PR MEDICATION LIST DOCUMENTED IN MEDICAL RECORD: ICD-10-PCS | Mod: CPTII,S$GLB,, | Performed by: NURSE PRACTITIONER

## 2023-04-13 PROCEDURE — 99499 UNLISTED E&M SERVICE: CPT | Mod: S$GLB,,, | Performed by: NURSE PRACTITIONER

## 2023-04-13 PROCEDURE — 1157F ADVNC CARE PLAN IN RCRD: CPT | Mod: CPTII,S$GLB,, | Performed by: NURSE PRACTITIONER

## 2023-04-13 PROCEDURE — 1160F PR REVIEW ALL MEDS BY PRESCRIBER/CLIN PHARMACIST DOCUMENTED: ICD-10-PCS | Mod: CPTII,S$GLB,, | Performed by: NURSE PRACTITIONER

## 2023-04-13 PROCEDURE — 3008F PR BODY MASS INDEX (BMI) DOCUMENTED: ICD-10-PCS | Mod: CPTII,S$GLB,, | Performed by: NURSE PRACTITIONER

## 2023-04-13 PROCEDURE — 3008F BODY MASS INDEX DOCD: CPT | Mod: CPTII,S$GLB,, | Performed by: NURSE PRACTITIONER

## 2023-04-13 PROCEDURE — 4010F ACE/ARB THERAPY RXD/TAKEN: CPT | Mod: CPTII,S$GLB,, | Performed by: NURSE PRACTITIONER

## 2023-04-13 PROCEDURE — 4010F PR ACE/ARB THEARPY RXD/TAKEN: ICD-10-PCS | Mod: CPTII,S$GLB,, | Performed by: NURSE PRACTITIONER

## 2023-04-13 PROCEDURE — 3044F HG A1C LEVEL LT 7.0%: CPT | Mod: CPTII,S$GLB,, | Performed by: NURSE PRACTITIONER

## 2023-04-13 PROCEDURE — 1126F PR PAIN SEVERITY QUANTIFIED, NO PAIN PRESENT: ICD-10-PCS | Mod: CPTII,S$GLB,, | Performed by: NURSE PRACTITIONER

## 2023-04-13 PROCEDURE — 99214 PR OFFICE/OUTPT VISIT, EST, LEVL IV, 30-39 MIN: ICD-10-PCS | Mod: S$GLB,,, | Performed by: NURSE PRACTITIONER

## 2023-04-13 PROCEDURE — 99999 PR PBB SHADOW E&M-EST. PATIENT-LVL IV: CPT | Mod: PBBFAC,,, | Performed by: NURSE PRACTITIONER

## 2023-04-13 PROCEDURE — 3078F PR MOST RECENT DIASTOLIC BLOOD PRESSURE < 80 MM HG: ICD-10-PCS | Mod: CPTII,S$GLB,, | Performed by: NURSE PRACTITIONER

## 2023-04-13 PROCEDURE — 3075F PR MOST RECENT SYSTOLIC BLOOD PRESS GE 130-139MM HG: ICD-10-PCS | Mod: CPTII,S$GLB,, | Performed by: NURSE PRACTITIONER

## 2023-04-13 PROCEDURE — 3288F FALL RISK ASSESSMENT DOCD: CPT | Mod: CPTII,S$GLB,, | Performed by: NURSE PRACTITIONER

## 2023-04-13 PROCEDURE — 99499 RISK ADDL DX/OHS AUDIT: ICD-10-PCS | Mod: S$GLB,,, | Performed by: NURSE PRACTITIONER

## 2023-04-13 PROCEDURE — 3288F PR FALLS RISK ASSESSMENT DOCUMENTED: ICD-10-PCS | Mod: CPTII,S$GLB,, | Performed by: NURSE PRACTITIONER

## 2023-04-13 PROCEDURE — 1159F MED LIST DOCD IN RCRD: CPT | Mod: CPTII,S$GLB,, | Performed by: NURSE PRACTITIONER

## 2023-04-13 PROCEDURE — 1126F AMNT PAIN NOTED NONE PRSNT: CPT | Mod: CPTII,S$GLB,, | Performed by: NURSE PRACTITIONER

## 2023-04-13 PROCEDURE — 1157F PR ADVANCE CARE PLAN OR EQUIV PRESENT IN MEDICAL RECORD: ICD-10-PCS | Mod: CPTII,S$GLB,, | Performed by: NURSE PRACTITIONER

## 2023-04-13 PROCEDURE — 1100F PTFALLS ASSESS-DOCD GE2>/YR: CPT | Mod: CPTII,S$GLB,, | Performed by: NURSE PRACTITIONER

## 2023-04-13 PROCEDURE — 3078F DIAST BP <80 MM HG: CPT | Mod: CPTII,S$GLB,, | Performed by: NURSE PRACTITIONER

## 2023-04-13 PROCEDURE — 1100F PR PT FALLS ASSESS DOC 2+ FALLS/FALL W/INJURY/YR: ICD-10-PCS | Mod: CPTII,S$GLB,, | Performed by: NURSE PRACTITIONER

## 2023-04-13 PROCEDURE — 99999 PR PBB SHADOW E&M-EST. PATIENT-LVL IV: ICD-10-PCS | Mod: PBBFAC,,, | Performed by: NURSE PRACTITIONER

## 2023-04-13 PROCEDURE — 3075F SYST BP GE 130 - 139MM HG: CPT | Mod: CPTII,S$GLB,, | Performed by: NURSE PRACTITIONER

## 2023-04-13 PROCEDURE — 1160F RVW MEDS BY RX/DR IN RCRD: CPT | Mod: CPTII,S$GLB,, | Performed by: NURSE PRACTITIONER

## 2023-04-13 PROCEDURE — 3044F PR MOST RECENT HEMOGLOBIN A1C LEVEL <7.0%: ICD-10-PCS | Mod: CPTII,S$GLB,, | Performed by: NURSE PRACTITIONER

## 2023-04-13 RX ORDER — INSULIN LISPRO 100 [IU]/ML
INJECTION, SOLUTION INTRAVENOUS; SUBCUTANEOUS
Qty: 60 ML | Refills: 3
Start: 2023-04-13 | End: 2023-08-22

## 2023-04-13 RX ORDER — INSULIN GLARGINE 100 [IU]/ML
INJECTION, SOLUTION SUBCUTANEOUS
Qty: 30 EACH | Refills: 4
Start: 2023-04-13 | End: 2023-08-22

## 2023-04-13 RX ORDER — SEMAGLUTIDE 1.34 MG/ML
0.25 INJECTION, SOLUTION SUBCUTANEOUS
Qty: 1 EACH | Refills: 5
Start: 2023-04-13 | End: 2023-07-27

## 2023-04-13 RX ORDER — GLUCAGON 3 MG/1
POWDER NASAL
Qty: 1 EACH | Refills: 1
Start: 2023-04-13

## 2023-04-13 NOTE — PATIENT INSTRUCTIONS
Follow up in 4 months w/Irielle   A1c prior -august 2023     Lantus 18 units at night   Novolog 8-8-8 units before meals, scale 180-230+2, etc.  Stop ozempic     Lab Results   Component Value Date    HGBA1C 6.0 (H) 04/06/2023     Goal  no higher than 180/200    [email or web address]  Eat fit brett  [employer]  [email or web address]

## 2023-04-13 NOTE — Clinical Note
A1c looks good, stopping ozempic w/ gi issues, cutting back mdi doses some- loss weight 9# since I last saw her

## 2023-04-14 ENCOUNTER — CLINICAL SUPPORT (OUTPATIENT)
Dept: REHABILITATION | Facility: HOSPITAL | Age: 68
End: 2023-04-14
Payer: MEDICARE

## 2023-04-14 ENCOUNTER — PATIENT MESSAGE (OUTPATIENT)
Dept: REHABILITATION | Facility: HOSPITAL | Age: 68
End: 2023-04-14

## 2023-04-14 DIAGNOSIS — M17.12 PRIMARY OSTEOARTHRITIS OF LEFT KNEE: ICD-10-CM

## 2023-04-14 PROCEDURE — 97112 NEUROMUSCULAR REEDUCATION: CPT | Mod: CQ

## 2023-04-14 PROCEDURE — 97530 THERAPEUTIC ACTIVITIES: CPT | Mod: CQ

## 2023-04-14 NOTE — PROGRESS NOTES
Physical Therapy Daily Treatment Note     Name: Riana Baker   Clinic Number: [id number]    Therapy Diagnosis:   Encounter Diagnosis   Name Primary?    Primary osteoarthritis of left knee          Physician: Mendez Tavarez NP    Visit Date: 4/14/2023    Physician Orders: PT Eval and Treat   Medical Diagnosis: s/p R reverse total shoulder  Date of Surgery: 8/16/22  Evaluation Date: 9/6/2022  Authorization Period Expiration: 1/30/23  Plan of Care Certification Period: 4/18/23  Visit # / Visits authorized: 14/35 (36 tot)     Time In: 0900 am  Time Out: 0951 am   Total Billable Time: 23 minutes      Precautions: Standard and s/p reverse shoulder RUE    Subjective      Pt reports: no new complaints   she was compliant with home exercise program given last session.   Response to previous treatment: good  Functional change: NA    Pain: 0/10  Location: right shoulder      Objective     PROM R shoulder: 3/21/23  Shoulder FL: 170 deg  Shoulder ABD: above 180  ER @ 90 degrees: 90  IR: 80 deg    MMT:   RUE: 3+/5 flex, abd  3/5 ER  4/5 IR    Riana received therapeutic exercises to develop strength, endurance, ROM, and posture for 21 minutes including:  UBE 3'/3' within available ROM lvl 4  Bicep curl #3 3x10  Standing abduction and flex 2# 3x  Ball up wall 30x    Neuromuscular re-education: 20  4 way ball on wall x30 ea red ball (above shoulder height)  R UE Wall slides with towel x30 #3  Row #10 CC 3x10    Therapeutic activity: 10  Table push up 3x10  DB carry  #15 3 laps      Riana received the following manual therapy techniques: Joint mobilizations and Manual traction were applied to the: R shoulder for 00 minutes, including:  PROM with light distraction per protocol  Grade I/II GH joint mobilizations     Home Exercises Provided and Patient Education Provided     Education provided:   -resume HEP    Written Home Exercises Provided: Patient instructed to cont prior HEP.  Exercises  "were reviewed and Riana was able to demonstrate them prior to the end of the session.  Riana demonstrated good  understanding of the education provided.     See EMR under Patient Instructions for exercises provided {Makenna watts:[id number]::"4/14/2023","prior visit"    Assessment   Maintaining good strength to FL R UE to shoulder height. Fatigues easy  throughout session. Cont to progress as tolerated.    Riana is progressing well towards her goals.   Pt prognosis is Good.     Pt will continue to benefit from skilled outpatient physical therapy to address the deficits listed in the problem list box on initial evaluation, provide pt/family education and to maximize pt's level of independence in the home and community environment.     Pt's spiritual, cultural and educational needs considered and pt agreeable to plan of care and goals.    Anticipated barriers to physical therapy: none    Goals:   Short Term Goals: (6 weeks)   - Pt will increase ROM to 110 deg R shoulder flex and abd, 40 deg IR/ER at 45 deg abd ( met)  - Decrease Pain to 2/10 as worst with all PT interventions (Progressing, not met)  - Pt to self correct posture and gait with minimal cues (met)  - Pt independent with HEP with progressions. ( met)     Long Term Goals (4 more Weeks): 4/18/23  - Pt will increase ROM to 170 deg R shoulder flex and abd, 80 deg ER and IR (met)  - Pt will increase strength to 5/5 RUE in all planes, except ER (Progressing, not met)  - Decrease Pain to 0/10 with ADLs overhead (Progressing, not met)  - Pt to return to 80% PLOF (Progressing, not met)       Plan     Continue POC per pt tolerance progressing strengthening as tolerated.     Cristal Marie, PTA,                                                       "

## 2023-04-18 ENCOUNTER — CLINICAL SUPPORT (OUTPATIENT)
Dept: REHABILITATION | Facility: HOSPITAL | Age: 68
End: 2023-04-18
Payer: MEDICARE

## 2023-04-18 DIAGNOSIS — M25.511 CHRONIC RIGHT SHOULDER PAIN: Primary | ICD-10-CM

## 2023-04-18 DIAGNOSIS — G89.29 CHRONIC RIGHT SHOULDER PAIN: Primary | ICD-10-CM

## 2023-04-18 PROCEDURE — 97112 NEUROMUSCULAR REEDUCATION: CPT

## 2023-04-18 PROCEDURE — 97530 THERAPEUTIC ACTIVITIES: CPT

## 2023-04-18 NOTE — PROGRESS NOTES
Physical Therapy Daily Treatment Note     Name: Riana Baker   Clinic Number: [id number]    Therapy Diagnosis:   Encounter Diagnosis   Name Primary?    Chronic right shoulder pain Yes         Physician: Mendez Tavarez NP    Visit Date: 4/18/2023    Physician Orders: PT Eval and Treat   Medical Diagnosis: s/p R reverse total shoulder  Date of Surgery: 8/16/22  Evaluation Date: 9/6/2022  Authorization Period Expiration: 1/30/23  Plan of Care Certification Period: 4/18/23  Visit # / Visits authorized: 14/35 (36 tot)     Time In: 0800 am  Time Out: 0900 am   Total Billable Time: 30  minutes      Precautions: Standard and s/p reverse shoulder RUE    Subjective      Pt reports: no new complaints and continues to work hard at home.    she was compliant with home exercise program given last session.   Response to previous treatment: good  Functional change: NA    Pain: 0/10  Location: right shoulder      Objective     PROM R shoulder: 3/21/23  Shoulder FL: 170 deg  Shoulder ABD: above 180  ER @ 90 degrees: 90  IR: 80 deg    MMT:   RUE: 3+/5 flex, abd  3/5 ER  4/5 IR    Riana received therapeutic exercises to develop strength, endurance, ROM, and posture for 30  minutes including:  UBE 4'/4' within available ROM lvl 4  Bicep curl #3 3x10-np  Standing abduction and flex 3# 3x  Ball up wall 30x    Neuromuscular re-education: 20  4 way ball on wall x30 ea red ball (above shoulder height)  R UE Wall slides with towel x30 #3  Row #10 CC 3x10    Therapeutic activity: 10  Table push up 3x10  DB carry  #10 3 laps      Riana received the following manual therapy techniques: Joint mobilizations and Manual traction were applied to the: R shoulder for 00 minutes, including:  PROM with light distraction per protocol  Grade I/II GH joint mobilizations     Home Exercises Provided and Patient Education Provided     Education provided:   -resume HEP    Written Home Exercises Provided: Patient  "instructed to cont prior HEP.  Exercises were reviewed and Riana was able to demonstrate them prior to the end of the session.  Riana demonstrated good  understanding of the education provided.     See EMR under Patient Instructions for exercises provided {Makenna single:[id number]::"4/18/2023","prior visit"    Assessment   Pt requesting heavier weights to improve remaining strength goals. Continues to show good PROM, so no manual therapy needed. Plan to start 1x a week next week.     Riana is progressing well towards her goals.   Pt prognosis is Good.     Pt will continue to benefit from skilled outpatient physical therapy to address the deficits listed in the problem list box on initial evaluation, provide pt/family education and to maximize pt's level of independence in the home and community environment.     Pt's spiritual, cultural and educational needs considered and pt agreeable to plan of care and goals.    Anticipated barriers to physical therapy: none    Goals:   Short Term Goals: (6 weeks)   - Pt will increase ROM to 110 deg R shoulder flex and abd, 40 deg IR/ER at 45 deg abd ( met)  - Decrease Pain to 2/10 as worst with all PT interventions (Progressing, not met)  - Pt to self correct posture and gait with minimal cues (met)  - Pt independent with HEP with progressions. ( met)     Long Term Goals (4 more Weeks): 4/18/23  - Pt will increase ROM to 170 deg R shoulder flex and abd, 80 deg ER and IR (met)  - Pt will increase strength to 5/5 RUE in all planes, except ER (Progressing, not met)  - Decrease Pain to 0/10 with ADLs overhead (Progressing, not met)  - Pt to return to 80% PLOF (Progressing, not met)       Plan     Continue POC per pt tolerance progressing strengthening as tolerated.     Bernadette Palacios, PT,                                                         "

## 2023-04-19 ENCOUNTER — PATIENT MESSAGE (OUTPATIENT)
Dept: REHABILITATION | Facility: HOSPITAL | Age: 68
End: 2023-04-19
Payer: MEDICARE

## 2023-04-19 ENCOUNTER — TELEPHONE (OUTPATIENT)
Dept: ENDOSCOPY | Facility: HOSPITAL | Age: 68
End: 2023-04-19
Payer: MEDICARE

## 2023-04-19 NOTE — TELEPHONE ENCOUNTER
Spoke with patient's spouse about arrival time @ 0800.     NPO status reviewed: Patient must have nothing to eat after midnight.      Medications: Do not take Insulin or oral diabetic medications the day of the procedure.  Take as prescribed: heart, seizure and blood pressure medication in the morning with a sip of water (less than an ounce).  Take any breathing medications and bring inhalers to hospital with you Leave all valuables and jewelry at home.     Wear comfortable clothes to procedure to change into hospital gown You cannot drive for 24 hours after your procedure because you will receive sedation for your procedure to make you comfortable.  A ride must be provided at discharge.

## 2023-04-20 ENCOUNTER — CLINICAL SUPPORT (OUTPATIENT)
Dept: REHABILITATION | Facility: HOSPITAL | Age: 68
End: 2023-04-20
Payer: MEDICARE

## 2023-04-20 DIAGNOSIS — M25.511 CHRONIC RIGHT SHOULDER PAIN: Primary | ICD-10-CM

## 2023-04-20 DIAGNOSIS — G89.29 CHRONIC RIGHT SHOULDER PAIN: Primary | ICD-10-CM

## 2023-04-20 PROCEDURE — 97112 NEUROMUSCULAR REEDUCATION: CPT | Mod: CQ

## 2023-04-20 PROCEDURE — 97110 THERAPEUTIC EXERCISES: CPT | Mod: CQ

## 2023-04-20 PROCEDURE — 97530 THERAPEUTIC ACTIVITIES: CPT | Mod: CQ

## 2023-04-20 NOTE — PROGRESS NOTES
Physical Therapy Daily Treatment Note     Name: Riana Baker   Clinic Number: [id number]    Therapy Diagnosis:   Encounter Diagnosis   Name Primary?    Chronic right shoulder pain Yes         Physician: Mendez Tavarez NP    Visit Date: 4/20/2023    Physician Orders: PT Eval and Treat   Medical Diagnosis: s/p R reverse total shoulder  Date of Surgery: 8/16/22  Evaluation Date: 9/6/2022  Authorization Period Expiration: 1/30/23  Plan of Care Certification Period: 4/18/23  Visit # / Visits authorized: 16/35 (38 tot)     Time In: 0800 am  Time Out: 0900 am   Total Billable Time: 60 minutes      Precautions: Standard and s/p reverse shoulder RUE    Subjective      Pt reports: no new complaints   she was compliant with home exercise program given last session.   Response to previous treatment: good  Functional change: NA    Pain: 0/10  Location: right shoulder      Objective     PROM R shoulder: 3/21/23  Shoulder FL: 170 deg  Shoulder ABD: above 180  ER @ 90 degrees: 90  IR: 80 deg    MMT:   RUE: 3+/5 flex, abd  3/5 ER  4/5 IR    Riana received therapeutic exercises to develop strength, endurance, ROM, and posture for 30  minutes including:  UBE 4'/4' within available ROM lvl 4  Bicep curl #3 3x10-np  Standing abduction and flex 3# 3x  Ball up wall 30x    Neuromuscular re-education: 20  4 way ball on wall x30 ea red ball (above shoulder height)  R UE Wall slides with towel 5x6 #3  Row #10 CC 3x10    Therapeutic activity: 10  R chest press with CC # 3 5x5  DB carry  #13 3 laps      Riana received the following manual therapy techniques: Joint mobilizations and Manual traction were applied to the: R shoulder for 00 minutes, including:  PROM with light distraction per protocol  Grade I/II GH joint mobilizations     Home Exercises Provided and Patient Education Provided     Education provided:   -resume HEP    Written Home Exercises Provided: Patient instructed to cont prior  "HEP.  Exercises were reviewed and Riana was able to demonstrate them prior to the end of the session.  Riana demonstrated good  understanding of the education provided.     See EMR under Patient Instructions for exercises provided {Blank single:33319::"4/20/2023","prior visit"    Assessment   Able to actively elevate shoulder past 90 degrees today. ADLs improving at home. Frequency for PT going down to 1x/wk per PT.    Riana is progressing well towards her goals.   Pt prognosis is Good.     Pt will continue to benefit from skilled outpatient physical therapy to address the deficits listed in the problem list box on initial evaluation, provide pt/family education and to maximize pt's level of independence in the home and community environment.     Pt's spiritual, cultural and educational needs considered and pt agreeable to plan of care and goals.    Anticipated barriers to physical therapy: none    Goals:   Short Term Goals: (6 weeks)   - Pt will increase ROM to 110 deg R shoulder flex and abd, 40 deg IR/ER at 45 deg abd ( met)  - Decrease Pain to 2/10 as worst with all PT interventions (Progressing, not met)  - Pt to self correct posture and gait with minimal cues (met)  - Pt independent with HEP with progressions. ( met)     Long Term Goals (4 more Weeks): 4/18/23  - Pt will increase ROM to 170 deg R shoulder flex and abd, 80 deg ER and IR (met)  - Pt will increase strength to 5/5 RUE in all planes, except ER (Progressing, not met)  - Decrease Pain to 0/10 with ADLs overhead (Progressing, not met)  - Pt to return to 80% PLOF (Progressing, not met)       Plan     Continue POC per pt tolerance progressing strengthening as tolerated.     Cristal Marie, PTA,                                                           "

## 2023-04-21 ENCOUNTER — ANESTHESIA EVENT (OUTPATIENT)
Dept: ENDOSCOPY | Facility: HOSPITAL | Age: 68
End: 2023-04-21
Payer: MEDICARE

## 2023-04-21 ENCOUNTER — ANESTHESIA (OUTPATIENT)
Dept: ENDOSCOPY | Facility: HOSPITAL | Age: 68
End: 2023-04-21
Payer: MEDICARE

## 2023-04-21 ENCOUNTER — HOSPITAL ENCOUNTER (OUTPATIENT)
Facility: HOSPITAL | Age: 68
Discharge: HOME | End: 2023-04-21
Attending: INTERNAL MEDICINE | Admitting: INTERNAL MEDICINE
Payer: MEDICARE

## 2023-04-21 VITALS
OXYGEN SATURATION: 10 % | SYSTOLIC BLOOD PRESSURE: 137 MMHG | HEIGHT: 65 IN | HEART RATE: 87 BPM | BODY MASS INDEX: 26.49 KG/M2 | TEMPERATURE: 98 F | WEIGHT: 159 LBS | RESPIRATION RATE: 20 BRPM | DIASTOLIC BLOOD PRESSURE: 69 MMHG

## 2023-04-21 DIAGNOSIS — R10.9 ABDOMINAL PAIN: ICD-10-CM

## 2023-04-21 LAB — POCT GLUCOSE: 125 MG/DL (ref 70–110)

## 2023-04-21 PROCEDURE — 43239 EGD BIOPSY SINGLE/MULTIPLE: CPT | Performed by: INTERNAL MEDICINE

## 2023-04-21 PROCEDURE — 37000008 HC ANESTHESIA 1ST 15 MINUTES: Performed by: INTERNAL MEDICINE

## 2023-04-21 PROCEDURE — 43239 PR EGD, FLEX, W/BIOPSY, SGL/MULTI: ICD-10-PCS | Mod: ,,, | Performed by: INTERNAL MEDICINE

## 2023-04-21 PROCEDURE — 37000009 HC ANESTHESIA EA ADD 15 MINS: Performed by: INTERNAL MEDICINE

## 2023-04-21 PROCEDURE — 88305 TISSUE EXAM BY PATHOLOGIST: CPT | Performed by: PATHOLOGY

## 2023-04-21 PROCEDURE — 88305 TISSUE EXAM BY PATHOLOGIST: ICD-10-PCS | Mod: 26,,, | Performed by: PATHOLOGY

## 2023-04-21 PROCEDURE — 25000003 PHARM REV CODE 250: Performed by: INTERNAL MEDICINE

## 2023-04-21 PROCEDURE — D9220A PRA ANESTHESIA: ICD-10-PCS | Mod: CRNA,,, | Performed by: NURSE ANESTHETIST, CERTIFIED REGISTERED

## 2023-04-21 PROCEDURE — 43239 EGD BIOPSY SINGLE/MULTIPLE: CPT | Mod: ,,, | Performed by: INTERNAL MEDICINE

## 2023-04-21 PROCEDURE — D9220A PRA ANESTHESIA: Mod: ANES,,, | Performed by: ANESTHESIOLOGY

## 2023-04-21 PROCEDURE — 63600175 PHARM REV CODE 636 W HCPCS: Performed by: NURSE ANESTHETIST, CERTIFIED REGISTERED

## 2023-04-21 PROCEDURE — D9220A PRA ANESTHESIA: ICD-10-PCS | Mod: ANES,,, | Performed by: ANESTHESIOLOGY

## 2023-04-21 PROCEDURE — 25000003 PHARM REV CODE 250: Performed by: NURSE ANESTHETIST, CERTIFIED REGISTERED

## 2023-04-21 PROCEDURE — D9220A PRA ANESTHESIA: Mod: CRNA,,, | Performed by: NURSE ANESTHETIST, CERTIFIED REGISTERED

## 2023-04-21 PROCEDURE — 27201012 HC FORCEPS, HOT/COLD, DISP: Performed by: INTERNAL MEDICINE

## 2023-04-21 PROCEDURE — 88305 TISSUE EXAM BY PATHOLOGIST: CPT | Mod: 26,,, | Performed by: PATHOLOGY

## 2023-04-21 RX ORDER — SODIUM CHLORIDE 0.9 % (FLUSH) 0.9 %
10 SYRINGE (ML) INJECTION
Status: DISCONTINUED | OUTPATIENT
Start: 2023-04-21 | End: 2023-04-21 | Stop reason: HOSPADM

## 2023-04-21 RX ORDER — LIDOCAINE HYDROCHLORIDE 20 MG/ML
INJECTION, SOLUTION EPIDURAL; INFILTRATION; INTRACAUDAL; PERINEURAL
Status: DISCONTINUED | OUTPATIENT
Start: 2023-04-21 | End: 2023-04-21

## 2023-04-21 RX ORDER — PROPOFOL 10 MG/ML
VIAL (ML) INTRAVENOUS
Status: DISCONTINUED | OUTPATIENT
Start: 2023-04-21 | End: 2023-04-21

## 2023-04-21 RX ORDER — SODIUM CHLORIDE 9 MG/ML
INJECTION, SOLUTION INTRAVENOUS CONTINUOUS
Status: DISCONTINUED | OUTPATIENT
Start: 2023-04-21 | End: 2023-04-21 | Stop reason: HOSPADM

## 2023-04-21 RX ORDER — PROPOFOL 10 MG/ML
VIAL (ML) INTRAVENOUS CONTINUOUS PRN
Status: DISCONTINUED | OUTPATIENT
Start: 2023-04-21 | End: 2023-04-21

## 2023-04-21 RX ADMIN — LIDOCAINE HYDROCHLORIDE 50 MG: 20 INJECTION, SOLUTION EPIDURAL; INFILTRATION; INTRACAUDAL; PERINEURAL at 09:04

## 2023-04-21 RX ADMIN — GLYCOPYRROLATE 0.2 MG: 0.2 INJECTION, SOLUTION INTRAMUSCULAR; INTRAVITREAL at 09:04

## 2023-04-21 RX ADMIN — PROPOFOL 70 MG: 10 INJECTION, EMULSION INTRAVENOUS at 09:04

## 2023-04-21 RX ADMIN — PROPOFOL 150 MCG/KG/MIN: 10 INJECTION, EMULSION INTRAVENOUS at 09:04

## 2023-04-21 RX ADMIN — SODIUM CHLORIDE: 0.9 INJECTION, SOLUTION INTRAVENOUS at 08:04

## 2023-04-21 RX ADMIN — TOPICAL ANESTHETIC 1 EACH: 200 SPRAY DENTAL; PERIODONTAL at 09:04

## 2023-04-21 NOTE — ANESTHESIA PREPROCEDURE EVALUATION
04/21/2023       Riana Kay is a 68 y.o., female here for EGD      Past Medical History:   Diagnosis Date    Abdominal pain, right upper quadrant 02/04/2014    Anticoagulant long-term use     Anxiety     AR (allergic rhinitis)     Atrophic gastritis without mention of hemorrhage 02/13/2012     Dx updated per 2019 IMO Load    Chronic fatigue syndrome 06/10/2012    Diabetes mellitus     Dizziness     Fatty liver     GERD (gastroesophageal reflux disease)     Gross hematuria 12/01/2020    HTN (hypertension)     Hyperlipidemia     Leg swelling     Memory loss     Osteopenia     PONV (postoperative nausea and vomiting)     Primary osteoarthritis of right knee 10/06/2020    Primary osteoarthritis of right knee 10/06/2020    Right elbow pain 01/16/2019    S/P total hysterectomy     Screening for colon cancer 01/06/2021    Sleep apnea     Status post total right knee replacement 10/6/2020 10/05/2020       Past Surgical History:   Procedure Laterality Date    CHOLECYSTECTOMY      COLONOSCOPY N/A 1/17/2018    Procedure: COLONOSCOPY with Donnell;  Surgeon: Roland Jacobo MD;  Location: [facility] ([address] [place] [address]);  Service: Endoscopy;  Laterality: N/A;    COLONOSCOPY N/A 1/6/2021    Procedure: COLONOSCOPY;  Surgeon: Yong Rowland MD;  Location: [facility] ([address] [place] [address]);  Service: Endoscopy;  Laterality: N/A;  prep ins. emailed - Venezuel[place] speaking,  needed - ERW  [facility] - ER    CYSTOSCOPY N/A 12/1/2020    Procedure: CYSTOSCOPY;  Surgeon: Ines Stanford MD;  Location: [facility] OR [address] [place] [address];  Service: Urology;  Laterality: N/A;  45 minutes     ELBOW ARTHROPLASTY Right 1/16/2019    Procedure: ARTHROPLASTY, ELBOW right radial head arthroplasty revision;  Surgeon: Katja Hubbard MD;  Location: [facility] OR [address] [place] [address];  Service: Orthopedics;  Laterality:  Right;  Anesthesia: General and Regional. Stretcher, hand pan 1 and pan 2, CALL ACCUMED, CLAIRX  [employer] notified 1-14 LO    ELBOW SURGERY Right 7/16/15    ELBOW SURGERY      ESOPHAGOGASTRODUODENOSCOPY N/A 4/15/2019    Procedure: EGD (ESOPHAGOGASTRODUODENOSCOPY);  Surgeon: Buck Irwin MD;  Location: [facility] (4TH FLR);  Service: Endoscopy;  Laterality: N/A;  ray she    HYSTERECTOMY      KNEE ARTHROPLASTY Right 10/6/2020    Procedure: ARTHROPLASTY, KNEE-SAME DAY PROTOCOL;  Surgeon: Sabino Damon MD;  Location: [facility];  Service: Orthopedics;  Laterality: Right;    KNEE ARTHROSCOPY W/ DEBRIDEMENT  4/11    Left    RELEASE OF ULNAR NERVE AT CUBITAL TUNNEL Right 1/16/2019    Procedure: RELEASE, ULNAR TUNNEL right;  Surgeon: Katja Hubbard MD;  Location: [facility] 1ST FLR;  Service: Orthopedics;  Laterality: Right;  Anesthesia: General and Regional. Stretcher, hand pan 1 and pan 2, CALL ACCUMED, CLAIRX    RETROGRADE PYELOGRAPHY Bilateral 12/1/2020    Procedure: PYELOGRAM, RETROGRADE;  Surgeon: Ines Stanford MD;  Location: [facility] OR 1ST FLR;  Service: Urology;  Laterality: Bilateral;    REVERSE TOTAL SHOULDER ARTHROPLASTY Right 8/16/2022    Procedure: ARTHROPLASTY, SHOULDER, TOTAL, REVERSE, [place];  Surgeon: Aamir Powell MD;  Location: [facility] OR 2ND FLR;  Service: Orthopedics;  Laterality: Right;  [place] Can't go until after 12    ROTATOR CUFF REPAIR      TOTAL ABDOMINAL HYSTERECTOMY W/ BILATERAL SALPINGOOPHORECTOMY      TOTAL KNEE ARTHROPLASTY Left 10/19/2021    Procedure: ARTHROPLASTY, KNEE, TOTAL;  Surgeon: Sabino Damon MD;  Location: [facility];  Service: Orthopedics;  Laterality: Left;    UPPER GASTROINTESTINAL ENDOSCOPY         Family History   Problem Relation Age of Onset    Cancer Father         colon    Colon cancer Father 83        colon cancer    Diabetes Maternal Aunt     Diabetes Maternal Grandmother     Esophageal cancer Neg Hx     Stomach  cancer Neg Hx     Melanoma Neg Hx        Pre-op Assessment    I have reviewed the Patient Summary Reports.    I have reviewed the Nursing Notes.    I have reviewed the Medications.     Review of Systems  Anesthesia Hx:  PONV, unsure what medication helps History of prior surgery of interest to airway management or planning: Previous anesthesia: General Cystoscopy 12/01/2020 with general anesthesia.  Procedure performed at an [facility]. Denies Family Hx of Anesthesia complications.  Personal Hx of Anesthesia complications, Post-Operative Nausea/Vomiting, with every anesthetic, treatment not known   Social:  Non-Smoker, No Alcohol Use    Hematology/Oncology:  Hematology Normal   Oncology Normal     EENT/Dental:EENT/Dental Normal   Cardiovascular:   Exercise tolerance: good Hypertension, well controlled Denies MI. CAD     Denies Angina.    Pulmonary:   Denies COPD.  Denies Asthma.  Denies Shortness of breath. Sleep Apnea    Renal/:  Renal/ Normal     Hepatic/GI:   Bowel Prep. GERD   Fatty liver   Musculoskeletal:   Arthritis   Spine Disorders: cervical    Neurological:  Neurology Normal    Endocrine:   Diabetes, type 2, using insulin    Dermatological:  Skin Normal    Psych:  Psychiatric Normal           Physical Exam  General:  Obesity      Airway/Jaw/Neck:  Airway Findings:      Eyes/Ears/Nose:  EYES/EARS/NOSE FINDINGS: Normal   Dental:  DENTAL FINDINGS: Normal   Chest/Lungs:  Chest/Lungs Clear    Heart/Vascular:  Heart Findings: Normal Heart murmur: negative Vascular Findings: Normal    Abdomen:  Abdomen Findings: Normal    Musculoskeletal:  Musculoskeletal Findings: Normal   Skin:  Skin Findings: Normal    Mental Status:  Mental Status Findings: Normal        Anesthesia Plan  Type of Anesthesia, risks & benefits discussed:  Anesthesia Type:  general    Patient's Preference: General  Plan Factors:          Intra-op Monitoring Plan: standard ASA monitors  Intra-op Monitoring Plan Comments:   Post Op Pain  Control Plan: multimodal analgesia  Post Op Pain Control Plan Comments:     Induction:   IV  Beta Blocker:  Patient is not currently on a Beta-Blocker (No further documentation required).       Informed Consent: Informed consent signed with the Patient and all parties understand the risks and agree with anesthesia plan.  All questions answered.  Anesthesia consent signed with patient.  ASA Score: 2     Day of Surgery Review of History & Physical: I have interviewed and examined the patient. I have reviewed the patient's H&P dated:  There are no significant changes.            Ready For Surgery From Anesthesia Perspective.           Physical Exam  General: Obesity          Anesthesia Plan  Type of Anesthesia, risks & benefits discussed:    Anesthesia Type: general  Intra-op Monitoring Plan: standard ASA monitors  Post Op Pain Control Plan: multimodal analgesia  Induction:  IV  Informed Consent: Informed consent signed with the Patient and all parties understand the risks and agree with anesthesia plan.  All questions answered.   ASA Score: 2  Day of Surgery Review of History & Physical: I have interviewed and examined the patient. I have reviewed the patient's H&P dated:     Ready For Surgery From Anesthesia Perspective.       .

## 2023-04-21 NOTE — ANESTHESIA POSTPROCEDURE EVALUATION
Anesthesia Post Evaluation    Patient: Riana Kay    Procedure(s) Performed: Procedure(s) (LRB):  EGD (ESOPHAGOGASTRODUODENOSCOPY) (N/A)    Final Anesthesia Type: general      Patient location during evaluation: GI PACU  Patient participation: Yes- Able to Participate  Level of consciousness: awake and alert  Post-procedure vital signs: reviewed and stable  Pain management: adequate  Airway patency: patent    PONV status at discharge: No PONV  Anesthetic complications: no      Cardiovascular status: blood pressure returned to baseline  Respiratory status: unassisted  Hydration status: euvolemic            Vitals Value Taken Time   /57 04/21/23 0926   Temp 36.7 04/21/23 0935   Pulse 76 04/21/23 0926   Resp 18 04/21/23 0926   SpO2 97 % 04/21/23 0926         No case tracking events are documented in the log.      Pain/Jeffery Score: Jeffery Score: 9 (4/21/2023  9:27 AM)

## 2023-04-21 NOTE — PROVATION PATIENT INSTRUCTIONS
Discharge Summary/Instructions after an Endoscopic Procedure  Patient Name: Riana Kay  Patient MRN: [id number]  Patient YOB: 1955 Friday, April 21, 2023  Aamir Reyes MD  Dear patient,  As a result of recent federal legislation (The Federal Cures Act), you may   receive lab or pathology results from your procedure in your [facility]   account before your physician is able to contact you. Your physician or   their representative will relay the results to you with their   recommendations at their soonest availability.  Thank you,  Your health is very important to us during the Covid Crisis. Following your   procedure today, you will receive a daily text for 2 weeks asking about   signs or symptoms of Covid 19.  Please respond to this text when you   receive it so we can follow up and keep you as safe as possible.   RESTRICTIONS:  During your procedure today, you received medications for sedation.  These   medications may affect your judgment, balance and coordination.  Therefore,   for 24 hours, you have the following restrictions:   - DO NOT drive a car, operate machinery, make legal/financial decisions,   sign important papers or drink alcohol.    ACTIVITY:  Today: no heavy lifting, straining or running due to procedural   sedation/anesthesia.  The following day: return to full activity including work.  DIET:  Eat and drink normally unless instructed otherwise.     TREATMENT FOR COMMON SIDE EFFECTS:  - Mild abdominal pain, nausea, belching, bloating or excessive gas:  rest,   eat lightly and use a heating pad.  - Sore Throat: treat with throat lozenges and/or gargle with warm salt   water.  - Because air was used during the procedure, expelling large amounts of air   from your rectum or belching is normal.  - If a bowel prep was taken, you may not have a bowel movement for 1-3 days.    This is normal.  SYMPTOMS TO WATCH FOR AND REPORT TO YOUR PHYSICIAN:  1. Abdominal pain or bloating, other  than gas cramps.  2. Chest pain.  3. Back pain.  4. Signs of infection such as: chills or fever occurring within 24 hours   after the procedure.  5. Rectal bleeding, which would show as bright red, maroon, or black stools.   (A tablespoon of blood from the rectum is not serious, especially if   hemorrhoids are present.)  6. Vomiting.  7. Weakness or dizziness.  GO DIRECTLY TO THE NEAREST EMERGENCY ROOM IF YOU HAVE ANY OF THE FOLLOWING:      Difficulty breathing              Chills and/or fever over 101 F   Persistent vomiting and/or vomiting blood   Severe abdominal pain   Severe chest pain   Black, tarry stools   Bleeding- more than one tablespoon   Any other symptom or condition that you feel may need urgent attention  Your doctor recommends these additional instructions:  If any biopsies were taken, your doctors clinic will contact you in 1 to 2   weeks with any results.  - Discharge patient to home.   - Resume previous diet.   - Continue present medications.   - Await pathology results.  For questions, problems or results please call your physician - Aamir Reyes MD.  EMERGENCY PHONE NUMBER: [phone],  LAB RESULTS: [phone]  IF A COMPLICATION OR EMERGENCY SITUATION ARISES AND YOU ARE UNABLE TO REACH   YOUR PHYSICIAN - GO DIRECTLY TO THE EMERGENCY ROOM.  Aamir Reyes MD  4/21/2023 9:33:02 AM  This report has been verified and signed electronically.  Dear patient,  As a result of recent federal legislation (The Federal Cures Act), you may   receive lab or pathology results from your procedure in your [facility]   account before your physician is able to contact you. Your physician or   their representative will relay the results to you with their   recommendations at their soonest availability.  Thank you,  PROVATION

## 2023-04-21 NOTE — TRANSFER OF CARE
"Anesthesia Transfer of Care Note    Patient: Riana Kay    Procedure(s) Performed: Procedure(s) (LRB):  EGD (ESOPHAGOGASTRODUODENOSCOPY) (N/A)    Patient location: GI    Anesthesia Type: general    Transport from OR: Transported from OR on room air with adequate spontaneous ventilation    Post pain: adequate analgesia    Post assessment: no apparent anesthetic complications    Post vital signs: stable    Level of consciousness: awake    Nausea/Vomiting: no nausea/vomiting    Complications: none    Transfer of care protocol was followed      Last vitals:   Visit Vitals  BP (!) 178/74 (BP Location: Left arm, Patient Position: Lying)   Pulse 65   Temp 36.7 °C (98.1 °F) (Temporal)   Resp 18   Ht 5' 5" (1.651 m)   Wt 72.1 kg (159 lb)   SpO2 99%   Breastfeeding No   BMI 26.46 kg/m²     "

## 2023-04-27 ENCOUNTER — CLINICAL SUPPORT (OUTPATIENT)
Dept: REHABILITATION | Facility: HOSPITAL | Age: 68
End: 2023-04-27
Payer: MEDICARE

## 2023-04-27 ENCOUNTER — PATIENT OUTREACH (OUTPATIENT)
Dept: ADMINISTRATIVE | Facility: HOSPITAL | Age: 68
End: 2023-04-27
Payer: MEDICARE

## 2023-04-27 ENCOUNTER — PATIENT MESSAGE (OUTPATIENT)
Dept: ORTHOPEDICS | Facility: CLINIC | Age: 68
End: 2023-04-27
Payer: MEDICARE

## 2023-04-27 DIAGNOSIS — G89.29 CHRONIC RIGHT SHOULDER PAIN: Primary | ICD-10-CM

## 2023-04-27 DIAGNOSIS — M25.511 CHRONIC RIGHT SHOULDER PAIN: Primary | ICD-10-CM

## 2023-04-27 LAB
FINAL PATHOLOGIC DIAGNOSIS: NORMAL
GROSS: NORMAL
Lab: NORMAL

## 2023-04-27 PROCEDURE — 97110 THERAPEUTIC EXERCISES: CPT

## 2023-04-27 PROCEDURE — 97112 NEUROMUSCULAR REEDUCATION: CPT

## 2023-04-27 RX ORDER — MELOXICAM 15 MG/1
15 TABLET ORAL DAILY
Qty: 30 TABLET | Refills: 2 | Status: SHIPPED | OUTPATIENT
Start: 2023-04-27 | End: 2023-07-19

## 2023-04-27 NOTE — PROGRESS NOTES
Health Maintenance Due   Topic Date Due    Aspirin/Antiplatelet Therapy  Never done    High Dose Statin  Never done    COVID-19 Vaccine (3 - Booster for Den series) 01/25/2022    Mammogram  02/03/2023     Chart reviewed.   Immunizations: Reconciled  Orders placed: N/A  Upcoming appts to satisfy BHARATH topics: Dexa scan 5/16/2023

## 2023-04-27 NOTE — PROGRESS NOTES
Physical Therapy Daily Treatment Note     Name: Riana Baker   Clinic Number: [id number]    Therapy Diagnosis:   Encounter Diagnosis   Name Primary?    Chronic right shoulder pain Yes         Physician: Mendez Tavarez NP    Visit Date: 4/27/2023    Physician Orders: PT Eval and Treat   Medical Diagnosis: s/p R reverse total shoulder  Date of Surgery: 8/16/22  Evaluation Date: 9/6/2022  Authorization Period Expiration: 1/30/23  Plan of Care Certification Period: 4/18/23  Visit # / Visits authorized: 17/35 (38 tot)     Time In: 0800 am  Time Out: 0900 am  Total Billable Time: 30 minutes      Precautions: Standard and s/p reverse shoulder RUE    Subjective      Pt reports: no new complaints and continues to work on OH.    she was compliant with home exercise program given last session.   Response to previous treatment: good  Functional change: NA    Pain: 0/10  Location: right shoulder      Objective     PROM R shoulder: 3/21/23  Shoulder FL: 170 deg  Shoulder ABD: above 180  ER @ 90 degrees: 90  IR: 80 deg    MMT:   RUE: 3+/5 flex, abd  3/5 ER  4/5 IR    Riana received therapeutic exercises to develop strength, endurance, ROM, and posture for 30  minutes including:  UBE 4'/4' within available ROM lvl 4  Bicep curl #3 3x10  Standing abduction and flex 3# 3x  Ball up wall 30x  Prayer stretch 3x 1 min    Neuromuscular re-education: 25  4 way ball on wall x30 ea red ball (above shoulder height)  R UE Wall slides with towel 5x6 #3-np  Row #10 CC 3x10-np  IR/ER GTB 2x10 R  Wall clocks gtb 2x10 B    Therapeutic activity:5  R chest press with CC # 3 5x5-np  DB carry  #10 x4  laps      Riana received the following manual therapy techniques: Joint mobilizations and Manual traction were applied to the: R shoulder for 00 minutes, including:  PROM with light distraction per protocol  Grade I/II GH joint mobilizations     Home Exercises Provided and Patient Education Provided  "    Education provided:   -resume HEP    Written Home Exercises Provided: Patient instructed to cont prior HEP.  Exercises were reviewed and Riana was able to demonstrate them prior to the end of the session.  Riana demonstrated good  understanding of the education provided.     See EMR under Patient Instructions for exercises provided {Makenna single:99261::"4/27/2023","prior visit"    Assessment   Pt had a harder time with ER today showing more compensations with trunk rotation. Pt making fair progress toward remaining goals.     Riana is progressing well towards her goals.   Pt prognosis is Good.     Pt will continue to benefit from skilled outpatient physical therapy to address the deficits listed in the problem list box on initial evaluation, provide pt/family education and to maximize pt's level of independence in the home and community environment.     Pt's spiritual, cultural and educational needs considered and pt agreeable to plan of care and goals.    Anticipated barriers to physical therapy: none    Goals:   Short Term Goals: (6 weeks)   - Pt will increase ROM to 110 deg R shoulder flex and abd, 40 deg IR/ER at 45 deg abd ( met)  - Decrease Pain to 2/10 as worst with all PT interventions (Progressing, not met)  - Pt to self correct posture and gait with minimal cues (met)  - Pt independent with HEP with progressions. ( met)     Long Term Goals (4 more Weeks): 4/18/23  - Pt will increase ROM to 170 deg R shoulder flex and abd, 80 deg ER and IR (met)  - Pt will increase strength to 5/5 RUE in all planes, except ER (Progressing, not met)  - Decrease Pain to 0/10 with ADLs overhead (Progressing, not met)  - Pt to return to 80% PLOF (Progressing, not met)       Plan     Continue POC per pt tolerance progressing strengthening as tolerated.     Bernadette Palacios, PT,                                                             "

## 2023-04-28 NOTE — Clinical Note
A1c looks good Concerns 30 day avg -higher readings, sending new meter, meter 3 years old  Dexcom g6 discussed -María Elena, reach out-will help pt Brow Lift Text: A midfrontal incision was made medially to the defect to allow access to the tissues just superior to the left eyebrow. Following careful dissection inferiorly in a supraperiosteal plane to the level of the left eyebrow, several 3-0 monocryl sutures were used to resuspend the eyebrow orbicularis oculi muscular unit to the superior frontal bone periosteum. This resulted in an appropriate reapproximation of static eyebrow symmetry and correction of the left brow ptosis.

## 2023-05-02 NOTE — PROGRESS NOTES
Pathologic findings for recent EGD reviewed.  No evidence of H pylori infection identified.  Some mild inflammation was noted.  Continue current medications.  Follow up as needed with Coleen

## 2023-05-04 ENCOUNTER — CLINICAL SUPPORT (OUTPATIENT)
Dept: REHABILITATION | Facility: HOSPITAL | Age: 68
End: 2023-05-04
Payer: MEDICARE

## 2023-05-04 DIAGNOSIS — G89.29 CHRONIC RIGHT SHOULDER PAIN: Primary | ICD-10-CM

## 2023-05-04 DIAGNOSIS — M25.511 CHRONIC RIGHT SHOULDER PAIN: Primary | ICD-10-CM

## 2023-05-04 PROCEDURE — 97112 NEUROMUSCULAR REEDUCATION: CPT

## 2023-05-04 PROCEDURE — 97110 THERAPEUTIC EXERCISES: CPT

## 2023-05-04 NOTE — PROGRESS NOTES
Physical Therapy Daily Treatment Note     Name: Riana Baker   Clinic Number: [id number]    Therapy Diagnosis:   Encounter Diagnosis   Name Primary?    Chronic right shoulder pain Yes         Physician: Mendez Tavarez NP    Visit Date: 5/4/2023    Physician Orders: PT Eval and Treat   Medical Diagnosis: s/p R reverse total shoulder  Date of Surgery: 8/16/22  Evaluation Date: 9/6/2022  Authorization Period Expiration: 1/30/23  Plan of Care Certification Period: 5/31/23  Visit # / Visits authorized: 18/35 (38 tot)     Time In: 0900 am  Time Out: 1000 am  Total Billable Time: 30 minutes      Precautions: Standard and s/p reverse shoulder RUE    Subjective      Pt reports: still would like more PT to improve strengthening.    she was compliant with home exercise program given last session.   Response to previous treatment: good  Functional change: NA    Pain: 0/10  Location: right shoulder      Objective     PROM R shoulder: 3/21/23  Shoulder FL: 170 deg  Shoulder ABD: above 180  ER @ 90 degrees: 90  IR: 80 deg    MMT:   RUE: 3+/5 flex, abd  3/5 ER  4/5 IR    Riana received therapeutic exercises to develop strength, endurance, ROM, and posture for 30  minutes including:  UBE 4'/4' within available ROM lvl 4  Bicep curl #3 3x10-np  Standing abduction and flex 3# 3x  Ball up wall 30x  Prayer stretch 3x 1 min    Neuromuscular re-education: 30  4 way ball on wall x30 ea red ball (in flex and abd)  R UE Wall slides with towel 5x6   Row #10 CC 3x10-np  IR/ER step outs GTB 2x10 R with 5 sec hold  Wall clocks gtb 2x10 B-np    Therapeutic activity:00  R chest press with CC # 3 5x5-np  DB carry  #10 x4  laps-np      Riana received the following manual therapy techniques: Joint mobilizations and Manual traction were applied to the: R shoulder for 00 minutes, including:  PROM with light distraction per protocol  Grade I/II GH joint mobilizations     Home Exercises Provided and Patient  "Education Provided     Education provided:   -resume HEP    Written Home Exercises Provided: Patient instructed to cont prior HEP.  Exercises were reviewed and Riana was able to demonstrate them prior to the end of the session.  Riana demonstrated good  understanding of the education provided.     See EMR under Patient Instructions for exercises provided {aMkenna single:00667::"5/4/2023","prior visit"    Assessment   Pt still showing compensatory patterns and noted anterior pain with some relief with pressure point applied.  Pt making fair progress toward remaining strength goals.     Riana is progressing well towards her goals.   Pt prognosis is Good.     Pt will continue to benefit from skilled outpatient physical therapy to address the deficits listed in the problem list box on initial evaluation, provide pt/family education and to maximize pt's level of independence in the home and community environment.     Pt's spiritual, cultural and educational needs considered and pt agreeable to plan of care and goals.    Anticipated barriers to physical therapy: none    Goals:   Short Term Goals: (6 weeks)   - Pt will increase ROM to 110 deg R shoulder flex and abd, 40 deg IR/ER at 45 deg abd ( met)  - Decrease Pain to 2/10 as worst with all PT interventions (Progressing, not met)  - Pt to self correct posture and gait with minimal cues (met)  - Pt independent with HEP with progressions. ( met)     Long Term Goals (4 more Weeks): 4/18/23  - Pt will increase ROM to 170 deg R shoulder flex and abd, 80 deg ER and IR (met)  - Pt will increase strength to 5/5 RUE in all planes, except ER (Progressing, not met)  - Decrease Pain to 0/10 with ADLs overhead (Progressing, not met)  - Pt to return to 80% PLOF (Progressing, not met)       Plan     Continue POC per pt tolerance progressing strengthening as tolerated.     Bernadette Palacios, PT,                                                               "

## 2023-05-08 ENCOUNTER — CLINICAL SUPPORT (OUTPATIENT)
Dept: REHABILITATION | Facility: HOSPITAL | Age: 68
End: 2023-05-08
Payer: MEDICARE

## 2023-05-08 DIAGNOSIS — G89.29 CHRONIC RIGHT SHOULDER PAIN: Primary | ICD-10-CM

## 2023-05-08 DIAGNOSIS — M25.511 CHRONIC RIGHT SHOULDER PAIN: Primary | ICD-10-CM

## 2023-05-08 PROCEDURE — 97110 THERAPEUTIC EXERCISES: CPT | Mod: CQ

## 2023-05-08 PROCEDURE — 97112 NEUROMUSCULAR REEDUCATION: CPT | Mod: CQ

## 2023-05-08 NOTE — PROGRESS NOTES
Physical Therapy Daily Treatment Note     Name: Riana Kay  Clinic Number: [id number]    Therapy Diagnosis:   Encounter Diagnosis   Name Primary?    Chronic right shoulder pain Yes         Physician: Mendez Tavarez NP    Visit Date: 5/8/2023    Physician Orders: PT Eval and Treat   Medical Diagnosis: s/p R reverse total shoulder  Date of Surgery: 8/16/22  Evaluation Date: 9/6/2022  Authorization Period Expiration: 1/30/23  Plan of Care Certification Period: 5/31/23  Visit # / Visits authorized: 19/35 (39 tot)     Time In: 0805 am  Time Out: 0900 am  Total Billable Time: 55 minutes      Precautions: Standard and s/p reverse shoulder RUE    Subjective      Pt reports: feeling good   she was compliant with home exercise program given last session.   Response to previous treatment: good  Functional change: NA    Pain: 0/10  Location: right shoulder      Objective     PROM R shoulder: 3/21/23  Shoulder FL: 170 deg  Shoulder ABD: above 180  ER @ 90 degrees: 90  IR: 80 deg    MMT:   RUE: 3+/5 flex, abd  3/5 ER  4/5 IR    Riana received therapeutic exercises to develop strength, endurance, ROM, and posture for 30  minutes including:  UBE 4'/4' within available ROM lvl 4  Bicep curl #3 3x10-np  Standing abduction and flex 3# 3x  Ball up wall 30x  Prayer stretch 3x 1 min-NP  R chest press with CC # 3 4x6    Neuromuscular re-education: 30  4 way ball on wall x30 ea red ball (in flex yellow and abd red)  R UE Wall slides with towel 5x6 -NP  Row #10 CC 3x10  IR GTB/ER OTB step outs 2x10 R with 5 sec hold  Wall clocks Ytb 2x10 B    Therapeutic activity:00    DB carry  #10 x4  laps-np      Riana received the following manual therapy techniques: Joint mobilizations and Manual traction were applied to the: R shoulder for 00 minutes, including:  PROM with light distraction per protocol  Grade I/II GH joint mobilizations     Home Exercises Provided and Patient Education Provided  "    Education provided:   -resume HEP    Written Home Exercises Provided: Patient instructed to cont prior HEP.  Exercises were reviewed and Riana was able to demonstrate them prior to the end of the session.  Riana demonstrated good  understanding of the education provided.     See EMR under Patient Instructions for exercises provided {Makenna single:64214::"5/8/2023","prior visit"    Assessment   Cont to compensate with FL exercises. Noted soreness anterior shoulder during session. Had to decreased band resistance for ER walkouts 2* not able to prevent R UE from moving.      Riana is progressing well towards her goals.   Pt prognosis is Good.     Pt will continue to benefit from skilled outpatient physical therapy to address the deficits listed in the problem list box on initial evaluation, provide pt/family education and to maximize pt's level of independence in the home and community environment.     Pt's spiritual, cultural and educational needs considered and pt agreeable to plan of care and goals.    Anticipated barriers to physical therapy: none    Goals:   Short Term Goals: (6 weeks)   - Pt will increase ROM to 110 deg R shoulder flex and abd, 40 deg IR/ER at 45 deg abd ( met)  - Decrease Pain to 2/10 as worst with all PT interventions (Progressing, not met)  - Pt to self correct posture and gait with minimal cues (met)  - Pt independent with HEP with progressions. ( met)     Long Term Goals (4 more Weeks): 4/18/23  - Pt will increase ROM to 170 deg R shoulder flex and abd, 80 deg ER and IR (met)  - Pt will increase strength to 5/5 RUE in all planes, except ER (Progressing, not met)  - Decrease Pain to 0/10 with ADLs overhead (Progressing, not met)  - Pt to return to 80% PLOF (Progressing, not met)       Plan     Continue POC per pt tolerance progressing strengthening as tolerated.     Cristal Marie, PTA,                                                                 "

## 2023-05-09 ENCOUNTER — OFFICE VISIT (OUTPATIENT)
Dept: INTERNAL MEDICINE | Facility: CLINIC | Age: 68
End: 2023-05-09
Payer: MEDICARE

## 2023-05-09 VITALS
RESPIRATION RATE: 16 BRPM | WEIGHT: 163.13 LBS | HEART RATE: 73 BPM | OXYGEN SATURATION: 98 % | DIASTOLIC BLOOD PRESSURE: 62 MMHG | SYSTOLIC BLOOD PRESSURE: 134 MMHG | HEIGHT: 65 IN | TEMPERATURE: 98 F | BODY MASS INDEX: 27.18 KG/M2

## 2023-05-09 DIAGNOSIS — Z09 FOLLOW-UP EXAM: Primary | ICD-10-CM

## 2023-05-09 DIAGNOSIS — K21.9 GASTROESOPHAGEAL REFLUX DISEASE WITHOUT ESOPHAGITIS: ICD-10-CM

## 2023-05-09 DIAGNOSIS — E55.9 VITAMIN D DEFICIENCY: ICD-10-CM

## 2023-05-09 DIAGNOSIS — Z79.4 TYPE 2 DIABETES MELLITUS WITHOUT COMPLICATION, WITH LONG-TERM CURRENT USE OF INSULIN: ICD-10-CM

## 2023-05-09 DIAGNOSIS — R41.3 MEMORY CHANGES: ICD-10-CM

## 2023-05-09 DIAGNOSIS — K76.0 FATTY LIVER: ICD-10-CM

## 2023-05-09 DIAGNOSIS — M80.021D: ICD-10-CM

## 2023-05-09 DIAGNOSIS — E11.59 HYPERTENSION ASSOCIATED WITH TYPE 2 DIABETES MELLITUS: ICD-10-CM

## 2023-05-09 DIAGNOSIS — I77.9 CAROTID ARTERY DISEASE, UNSPECIFIED LATERALITY, UNSPECIFIED TYPE: ICD-10-CM

## 2023-05-09 DIAGNOSIS — I25.10 ATHEROSCLEROSIS OF NATIVE CORONARY ARTERY OF NATIVE HEART WITHOUT ANGINA PECTORIS: ICD-10-CM

## 2023-05-09 DIAGNOSIS — K44.9 HIATAL HERNIA: ICD-10-CM

## 2023-05-09 DIAGNOSIS — Z00.00 PREVENTATIVE HEALTH CARE: Primary | ICD-10-CM

## 2023-05-09 DIAGNOSIS — I70.0 AORTIC ATHEROSCLEROSIS: ICD-10-CM

## 2023-05-09 DIAGNOSIS — F41.9 ANXIETY: ICD-10-CM

## 2023-05-09 DIAGNOSIS — I15.2 HYPERTENSION ASSOCIATED WITH TYPE 2 DIABETES MELLITUS: ICD-10-CM

## 2023-05-09 DIAGNOSIS — E66.3 OVERWEIGHT (BMI 25.0-29.9): ICD-10-CM

## 2023-05-09 DIAGNOSIS — E11.9 TYPE 2 DIABETES MELLITUS WITHOUT COMPLICATION, WITH LONG-TERM CURRENT USE OF INSULIN: ICD-10-CM

## 2023-05-09 PROCEDURE — 1157F ADVNC CARE PLAN IN RCRD: CPT | Mod: CPTII,S$GLB,, | Performed by: FAMILY MEDICINE

## 2023-05-09 PROCEDURE — 3008F BODY MASS INDEX DOCD: CPT | Mod: CPTII,S$GLB,, | Performed by: FAMILY MEDICINE

## 2023-05-09 PROCEDURE — 1126F AMNT PAIN NOTED NONE PRSNT: CPT | Mod: CPTII,S$GLB,, | Performed by: FAMILY MEDICINE

## 2023-05-09 PROCEDURE — 3078F PR MOST RECENT DIASTOLIC BLOOD PRESSURE < 80 MM HG: ICD-10-PCS | Mod: CPTII,S$GLB,, | Performed by: FAMILY MEDICINE

## 2023-05-09 PROCEDURE — 99214 PR OFFICE/OUTPT VISIT, EST, LEVL IV, 30-39 MIN: ICD-10-PCS | Mod: S$GLB,,, | Performed by: FAMILY MEDICINE

## 2023-05-09 PROCEDURE — 1100F PTFALLS ASSESS-DOCD GE2>/YR: CPT | Mod: CPTII,S$GLB,, | Performed by: FAMILY MEDICINE

## 2023-05-09 PROCEDURE — 1160F PR REVIEW ALL MEDS BY PRESCRIBER/CLIN PHARMACIST DOCUMENTED: ICD-10-PCS | Mod: CPTII,S$GLB,, | Performed by: FAMILY MEDICINE

## 2023-05-09 PROCEDURE — 1157F PR ADVANCE CARE PLAN OR EQUIV PRESENT IN MEDICAL RECORD: ICD-10-PCS | Mod: CPTII,S$GLB,, | Performed by: FAMILY MEDICINE

## 2023-05-09 PROCEDURE — 1100F PR PT FALLS ASSESS DOC 2+ FALLS/FALL W/INJURY/YR: ICD-10-PCS | Mod: CPTII,S$GLB,, | Performed by: FAMILY MEDICINE

## 2023-05-09 PROCEDURE — 3288F FALL RISK ASSESSMENT DOCD: CPT | Mod: CPTII,S$GLB,, | Performed by: FAMILY MEDICINE

## 2023-05-09 PROCEDURE — 3008F PR BODY MASS INDEX (BMI) DOCUMENTED: ICD-10-PCS | Mod: CPTII,S$GLB,, | Performed by: FAMILY MEDICINE

## 2023-05-09 PROCEDURE — 99214 OFFICE O/P EST MOD 30 MIN: CPT | Mod: S$GLB,,, | Performed by: FAMILY MEDICINE

## 2023-05-09 PROCEDURE — 1160F RVW MEDS BY RX/DR IN RCRD: CPT | Mod: CPTII,S$GLB,, | Performed by: FAMILY MEDICINE

## 2023-05-09 PROCEDURE — 4010F PR ACE/ARB THEARPY RXD/TAKEN: ICD-10-PCS | Mod: CPTII,S$GLB,, | Performed by: FAMILY MEDICINE

## 2023-05-09 PROCEDURE — 3075F SYST BP GE 130 - 139MM HG: CPT | Mod: CPTII,S$GLB,, | Performed by: FAMILY MEDICINE

## 2023-05-09 PROCEDURE — 99999 PR PBB SHADOW E&M-EST. PATIENT-LVL V: CPT | Mod: PBBFAC,,, | Performed by: FAMILY MEDICINE

## 2023-05-09 PROCEDURE — 3078F DIAST BP <80 MM HG: CPT | Mod: CPTII,S$GLB,, | Performed by: FAMILY MEDICINE

## 2023-05-09 PROCEDURE — 1159F MED LIST DOCD IN RCRD: CPT | Mod: CPTII,S$GLB,, | Performed by: FAMILY MEDICINE

## 2023-05-09 PROCEDURE — 4010F ACE/ARB THERAPY RXD/TAKEN: CPT | Mod: CPTII,S$GLB,, | Performed by: FAMILY MEDICINE

## 2023-05-09 PROCEDURE — 99999 PR PBB SHADOW E&M-EST. PATIENT-LVL V: ICD-10-PCS | Mod: PBBFAC,,, | Performed by: FAMILY MEDICINE

## 2023-05-09 PROCEDURE — 3044F HG A1C LEVEL LT 7.0%: CPT | Mod: CPTII,S$GLB,, | Performed by: FAMILY MEDICINE

## 2023-05-09 PROCEDURE — 3044F PR MOST RECENT HEMOGLOBIN A1C LEVEL <7.0%: ICD-10-PCS | Mod: CPTII,S$GLB,, | Performed by: FAMILY MEDICINE

## 2023-05-09 PROCEDURE — 3075F PR MOST RECENT SYSTOLIC BLOOD PRESS GE 130-139MM HG: ICD-10-PCS | Mod: CPTII,S$GLB,, | Performed by: FAMILY MEDICINE

## 2023-05-09 PROCEDURE — 1159F PR MEDICATION LIST DOCUMENTED IN MEDICAL RECORD: ICD-10-PCS | Mod: CPTII,S$GLB,, | Performed by: FAMILY MEDICINE

## 2023-05-09 PROCEDURE — 1126F PR PAIN SEVERITY QUANTIFIED, NO PAIN PRESENT: ICD-10-PCS | Mod: CPTII,S$GLB,, | Performed by: FAMILY MEDICINE

## 2023-05-09 PROCEDURE — 3288F PR FALLS RISK ASSESSMENT DOCUMENTED: ICD-10-PCS | Mod: CPTII,S$GLB,, | Performed by: FAMILY MEDICINE

## 2023-05-09 RX ORDER — ROSUVASTATIN CALCIUM 10 MG/1
10 TABLET, COATED ORAL DAILY
Qty: 90 TABLET | Refills: 3 | Status: SHIPPED | OUTPATIENT
Start: 2023-05-09 | End: 2023-08-09

## 2023-05-09 RX ORDER — LANCETS 30 GAUGE
EACH MISCELLANEOUS
COMMUNITY
Start: 2023-02-13

## 2023-05-09 NOTE — PROGRESS NOTES
Subjective     Patient ID: Riana Kay is a 68 y.o. female.    Chief Complaint: Follow-up  68-year-old female with atherosclerosis, hypertension, and type 2 diabetes presents to clinic today accompanied by her  for follow-up.  The patient was last seen in March for follow-up from COVID.  At that time she continued to note abdominal pain, nausea, and constipation.  She was scheduled with GI to have an endoscopy which revealed a small hiatal hernia and mild erythema to the mucosa.  She was started on pantoprazole for treatment and reports improvement of symptoms since starting pantoprazole.  Secondarily, she continues to follow-up with [facility] for continued treatment of type 2 diabetes which is well controlled with current A1c of 6.0.  Secondary to her atherosclerosis and increased cardiovascular risk secondary to hypertension and diabetes I have recommended initiation of Crestor for further treatment.  Follow-up  Associated symptoms include arthralgias (shoulder) and weakness. Pertinent negatives include no abdominal pain, chest pain, chills, congestion, coughing, fatigue, fever, headaches, joint swelling, myalgias, nausea, neck pain, rash, sore throat or vomiting.   Review of Systems   Constitutional:  Negative for activity change, appetite change, chills, fatigue, fever and unexpected weight change.   HENT:  Negative for nasal congestion, ear pain, hearing loss, postnasal drip, rhinorrhea, sinus pressure/congestion, sore throat, tinnitus and trouble swallowing.    Eyes:  Negative for discharge, redness, itching and visual disturbance.   Respiratory:  Negative for cough, chest tightness, shortness of breath and wheezing.    Cardiovascular:  Negative for chest pain and palpitations.   Gastrointestinal:  Negative for abdominal pain, blood in stool, constipation, diarrhea, nausea and vomiting.   Endocrine: Negative for polydipsia and polyuria.   Genitourinary:  Negative for decreased urine  volume, difficulty urinating, dysuria, frequency, hematuria, menstrual problem and urgency.   Musculoskeletal:  Positive for arthralgias (shoulder). Negative for back pain, joint swelling, myalgias, neck pain and neck stiffness.   Integumentary:  Negative for rash.   Neurological:  Positive for weakness. Negative for dizziness, light-headedness and headaches.   Psychiatric/Behavioral: Negative.  Negative for confusion and dysphoric mood.         Objective     Physical Exam  Vitals and nursing note reviewed.   Constitutional:       General: She is not in acute distress.     Appearance: She is well-developed. She is not diaphoretic.   HENT:      Head: Normocephalic and atraumatic.      Right Ear: External ear normal.      Left Ear: External ear normal.      Nose: Nose normal.      Mouth/Throat:      Pharynx: No oropharyngeal exudate.   Eyes:      General: No scleral icterus.        Right eye: No discharge.         Left eye: No discharge.      Conjunctiva/sclera: Conjunctivae normal.      Pupils: Pupils are equal, round, and reactive to light.   Neck:      Thyroid: No thyromegaly.      Vascular: No JVD.      Trachea: No tracheal deviation.   Cardiovascular:      Rate and Rhythm: Normal rate and regular rhythm.      Heart sounds: Normal heart sounds. No murmur heard.    No friction rub. No gallop.   Pulmonary:      Effort: Pulmonary effort is normal. No respiratory distress.      Breath sounds: Normal breath sounds. No stridor. No wheezing or rales.   Abdominal:      General: Bowel sounds are normal. There is no distension.      Palpations: Abdomen is soft. There is no mass.      Tenderness: There is no abdominal tenderness. There is no guarding or rebound.   Musculoskeletal:         General: No tenderness. Normal range of motion.      Cervical back: Normal range of motion and neck supple.   Lymphadenopathy:      Cervical: No cervical adenopathy.   Skin:     General: Skin is warm and dry.      Coloration: Skin is not  pale.      Findings: No erythema or rash.   Neurological:      Mental Status: She is alert and oriented to person, place, and time.   Psychiatric:         Behavior: Behavior normal.         Thought Content: Thought content normal.         Judgment: Judgment normal.          Assessment and Plan     Problem List Items Addressed This Visit       Atherosclerosis of native coronary artery of native heart without angina pectoris    Relevant Medications    rosuvastatin (CRESTOR) 10 MG tablet    Gastroesophageal reflux disease without esophagitis    Hiatal hernia    Hypertension associated with type 2 diabetes mellitus    Type 2 diabetes mellitus without complication, with long-term current use of insulin     Other Visit Diagnoses       Follow-up exam    -  Primary            1. Continue pantoprazole 40 mg daily.  GERD and hiatal hernia remained stable on treatment.  2. Recommend starting Crestor 10 mg daily and over-the-counter Co Q10 for treatment of atherosclerosis and cardiac risk reduction.    3. Continue losartan 50 mg daily.  Hypertension is well controlled.    4. Continue Ozempic, Lantus, and insulin sliding scale as prescribed.  Type 2 diabetes is well controlled with A1c of 6.0.    5. Return to clinic as needed or in 3 months for follow-up.

## 2023-05-16 ENCOUNTER — APPOINTMENT (OUTPATIENT)
Dept: RADIOLOGY | Facility: CLINIC | Age: 68
End: 2023-05-16
Attending: NURSE PRACTITIONER
Payer: MEDICARE

## 2023-05-16 DIAGNOSIS — M85.89 OTHER SPECIFIED DISORDERS OF BONE DENSITY AND STRUCTURE, MULTIPLE SITES: ICD-10-CM

## 2023-05-16 DIAGNOSIS — M85.80 OSTEOPENIA, UNSPECIFIED LOCATION: ICD-10-CM

## 2023-05-16 PROCEDURE — 77080 DXA BONE DENSITY AXIAL: CPT | Mod: 26,,, | Performed by: INTERNAL MEDICINE

## 2023-05-16 PROCEDURE — 77080 DXA BONE DENSITY AXIAL: CPT | Mod: TC,PO

## 2023-05-16 PROCEDURE — 77080 DXA BONE DENSITY AXIAL SKELETON 1 OR MORE SITES: ICD-10-PCS | Mod: 26,,, | Performed by: INTERNAL MEDICINE

## 2023-05-18 ENCOUNTER — CLINICAL SUPPORT (OUTPATIENT)
Dept: REHABILITATION | Facility: HOSPITAL | Age: 68
End: 2023-05-18
Payer: MEDICARE

## 2023-05-18 DIAGNOSIS — M25.511 CHRONIC RIGHT SHOULDER PAIN: Primary | ICD-10-CM

## 2023-05-18 DIAGNOSIS — G89.29 CHRONIC RIGHT SHOULDER PAIN: Primary | ICD-10-CM

## 2023-05-18 PROCEDURE — 97110 THERAPEUTIC EXERCISES: CPT | Mod: CQ

## 2023-05-18 PROCEDURE — 97112 NEUROMUSCULAR REEDUCATION: CPT | Mod: CQ

## 2023-05-18 NOTE — PROGRESS NOTES
Physical Therapy Daily Treatment Note     Name: Riana Baker   Clinic Number: [id number]    Therapy Diagnosis:   No diagnosis found.        Physician: Mendez Tavarez NP    Visit Date: 5/18/2023    Physician Orders: PT Eval and Treat   Medical Diagnosis: s/p R reverse total shoulder  Date of Surgery: 8/16/22  Evaluation Date: 9/6/2022  Authorization Period Expiration: 1/30/23  Plan of Care Certification Period: 5/31/23  Visit # / Visits authorized: 20/35 (39 tot)     Time In: 0800 am  Time Out: 0845 am  Total Billable Time: 45 minutes      Precautions: Standard and s/p reverse shoulder RUE    Subjective      Pt reports: no new complaints   she was compliant with home exercise program given last session.   Response to previous treatment: good  Functional change: NA    Pain: 0/10  Location: right shoulder      Objective     PROM R shoulder: 3/21/23  Shoulder FL: 170 deg  Shoulder ABD: above 180  ER @ 90 degrees: 90  IR: 80 deg    MMT:   RUE: 3+/5 flex, abd  3/5 ER  4/5 IR    Riana received therapeutic exercises to develop strength, endurance, ROM, and posture for 30  minutes including:  UBE 4'/4' within available ROM lvl 4  Bicep curl #3 3x10-np  Standing abduction and flex 3# 3x  Ball up wall 30x  Prayer stretch 3x 1 min-NP  R chest press with CC # 3 4x6    Neuromuscular re-education: 30  4 way ball on wall x30 ea red ball (in flex yellow and abd red)  R UE Wall slides with towel 5x6 -NP  Row #10 CC 3x10  IR GTB/ER OTB step outs 2x10 R with 5 sec hold  Wall clocks Ytb 2x10 B    Therapeutic activity:00    DB carry  #10 x4  laps-np      Riana received the following manual therapy techniques: Joint mobilizations and Manual traction were applied to the: R shoulder for 00 minutes, including:  PROM with light distraction per protocol  Grade I/II GH joint mobilizations     Home Exercises Provided and Patient Education Provided     Education provided:   -resume HEP    Written Home  "Exercises Provided: Patient instructed to cont prior HEP.  Exercises were reviewed and Riana was able to demonstrate them prior to the end of the session.  Riana demonstrated good  understanding of the education provided.     See EMR under Patient Instructions for exercises provided {Makenna single:11856::"5/18/2023","prior visit"    Assessment   Able to elevate R UE to shoulder height without swinging arm up. Fatigues easy with all interventions with load. Cont to show strength gains with skilled physical therapy.    Riana is progressing well towards her goals.   Pt prognosis is Good.     Pt will continue to benefit from skilled outpatient physical therapy to address the deficits listed in the problem list box on initial evaluation, provide pt/family education and to maximize pt's level of independence in the home and community environment.     Pt's spiritual, cultural and educational needs considered and pt agreeable to plan of care and goals.    Anticipated barriers to physical therapy: none    Goals:   Short Term Goals: (6 weeks)   - Pt will increase ROM to 110 deg R shoulder flex and abd, 40 deg IR/ER at 45 deg abd ( met)  - Decrease Pain to 2/10 as worst with all PT interventions (Progressing, not met)  - Pt to self correct posture and gait with minimal cues (met)  - Pt independent with HEP with progressions. ( met)     Long Term Goals (4 more Weeks): 4/18/23  - Pt will increase ROM to 170 deg R shoulder flex and abd, 80 deg ER and IR (met)  - Pt will increase strength to 5/5 RUE in all planes, except ER (Progressing, not met)  - Decrease Pain to 0/10 with ADLs overhead (Progressing, not met)  - Pt to return to 80% PLOF (Progressing, not met)       Plan     Continue POC per pt tolerance progressing strengthening as tolerated.     Cristal Marie, PTA,                                                                   "

## 2023-05-23 ENCOUNTER — CLINICAL SUPPORT (OUTPATIENT)
Dept: REHABILITATION | Facility: HOSPITAL | Age: 68
End: 2023-05-23
Payer: MEDICARE

## 2023-05-23 DIAGNOSIS — M25.511 CHRONIC RIGHT SHOULDER PAIN: Primary | ICD-10-CM

## 2023-05-23 DIAGNOSIS — G89.29 CHRONIC RIGHT SHOULDER PAIN: Primary | ICD-10-CM

## 2023-05-23 PROCEDURE — 97112 NEUROMUSCULAR REEDUCATION: CPT

## 2023-05-23 PROCEDURE — 97110 THERAPEUTIC EXERCISES: CPT

## 2023-05-23 NOTE — PROGRESS NOTES
Physical Therapy Daily Treatment Note     Name: Riana Baker   Clinic Number: [id number]    Therapy Diagnosis:   Encounter Diagnosis   Name Primary?    Chronic right shoulder pain Yes           Physician: Mendez Tavarez NP    Visit Date: 5/23/2023    Physician Orders: PT Eval and Treat   Medical Diagnosis: s/p R reverse total shoulder  Date of Surgery: 8/16/22  Evaluation Date: 9/6/2022  Authorization Period Expiration: 1/30/23  Plan of Care Certification Period: 5/31/23  Visit # / Visits authorized: 21/35 (39 tot)     Time In: 0900 am  Time Out: 950 am  Total Billable Time: 50 minutes      Precautions: Standard and s/p reverse shoulder RUE    Subjective      Pt reports: feeling fine today.    she was compliant with home exercise program given last session.   Response to previous treatment: good  Functional change: NA    Pain: 0/10  Location: right shoulder      Objective     PROM R shoulder: 3/21/23  Shoulder FL: 170 deg  Shoulder ABD: above 180  ER @ 90 degrees: 90  IR: 80 deg    MMT:   RUE: 3+/5 flex, abd  3/5 ER  4/5 IR    Riana received therapeutic exercises to develop strength, endurance, ROM, and posture for 30  minutes including:  *reassess next session  UBE 4'/4' within available ROM lvl 4  Bicep curl #3 3x10-np  Standing abduction and flex 2# 3x  Ball up wall 30x-np  Prayer stretch 3x 1 min-NP  R chest press with CC # 3 4x6-np  DB carry  #10 x4  laps    Neuromuscular re-education: 20  4 way ball on wall x30 ea red ball (in flex yellow and abd red)  R UE Wall slides with towel 5x6 3#  Row btb 3x10  IR GTB/ER GTB step outs 2x10 R with 5 sec hold      Therapeutic activity:00          Riana received the following manual therapy techniques: Joint mobilizations and Manual traction were applied to the: R shoulder for 00 minutes, including:  PROM with light distraction per protocol  Grade I/II GH joint mobilizations     Home Exercises Provided and Patient Education  "Provided     Education provided:   -resume HEP    Written Home Exercises Provided: Patient instructed to cont prior HEP.  Exercises were reviewed and Riana was able to demonstrate them prior to the end of the session.  Riana demonstrated good  understanding of the education provided.     See EMR under Patient Instructions for exercises provided {Makenna single:72777::"5/23/2023","prior visit"    Assessment   Pt continues to tolerate functional exercises well with only some "pinch" noted with 3# standing interventions, but improved with 2#. Plan to continue progressing as tolerated and will reassess next session.     Riana is progressing well towards her goals.   Pt prognosis is Good.     Pt will continue to benefit from skilled outpatient physical therapy to address the deficits listed in the problem list box on initial evaluation, provide pt/family education and to maximize pt's level of independence in the home and community environment.     Pt's spiritual, cultural and educational needs considered and pt agreeable to plan of care and goals.    Anticipated barriers to physical therapy: none    Goals:   Short Term Goals: (6 weeks)   - Pt will increase ROM to 110 deg R shoulder flex and abd, 40 deg IR/ER at 45 deg abd ( met)  - Decrease Pain to 2/10 as worst with all PT interventions (Progressing, not met)  - Pt to self correct posture and gait with minimal cues (met)  - Pt independent with HEP with progressions. ( met)     Long Term Goals (4 more Weeks): 4/18/23  - Pt will increase ROM to 170 deg R shoulder flex and abd, 80 deg ER and IR (met)  - Pt will increase strength to 5/5 RUE in all planes, except ER (Progressing, not met)  - Decrease Pain to 0/10 with ADLs overhead (Progressing, not met)  - Pt to return to 80% PLOF (Progressing, not met)       Plan     Continue POC per pt tolerance progressing strengthening as tolerated.     Bernadette Palacios, PT, "

## 2023-05-27 DIAGNOSIS — E11.59 HYPERTENSION ASSOCIATED WITH TYPE 2 DIABETES MELLITUS: ICD-10-CM

## 2023-05-27 DIAGNOSIS — I15.2 HYPERTENSION ASSOCIATED WITH TYPE 2 DIABETES MELLITUS: ICD-10-CM

## 2023-05-27 NOTE — TELEPHONE ENCOUNTER
No care due was identified.  [facility] Embedded Care Due Messages. Reference number: [id number].   5/27/2023 7:58:42 AM CDT

## 2023-05-28 RX ORDER — LOSARTAN POTASSIUM 50 MG/1
TABLET ORAL
Qty: 90 TABLET | Refills: 3 | Status: SHIPPED | OUTPATIENT
Start: 2023-05-28

## 2023-05-28 NOTE — TELEPHONE ENCOUNTER
Refill Decision Note   Riana Rahul  is requesting a refill authorization.  Brief Assessment and Rationale for Refill:  Approve     Medication Therapy Plan:       Medication Reconciliation Completed: No   Comments:     No Care Gaps recommended.     Note composed:3:30 AM 05/28/2023

## 2023-05-29 ENCOUNTER — OFFICE VISIT (OUTPATIENT)
Dept: URGENT CARE | Facility: CLINIC | Age: 68
End: 2023-05-29
Payer: MEDICARE

## 2023-05-29 ENCOUNTER — PATIENT MESSAGE (OUTPATIENT)
Dept: REHABILITATION | Facility: HOSPITAL | Age: 68
End: 2023-05-29
Payer: MEDICARE

## 2023-05-29 VITALS
HEART RATE: 76 BPM | DIASTOLIC BLOOD PRESSURE: 75 MMHG | BODY MASS INDEX: 27.16 KG/M2 | TEMPERATURE: 98 F | WEIGHT: 163 LBS | OXYGEN SATURATION: 96 % | HEIGHT: 65 IN | SYSTOLIC BLOOD PRESSURE: 132 MMHG | RESPIRATION RATE: 18 BRPM

## 2023-05-29 DIAGNOSIS — H10.9 BACTERIAL CONJUNCTIVITIS OF LEFT EYE: Primary | ICD-10-CM

## 2023-05-29 PROCEDURE — 99213 PR OFFICE/OUTPT VISIT, EST, LEVL III, 20-29 MIN: ICD-10-PCS | Mod: S$GLB,,,

## 2023-05-29 PROCEDURE — 99213 OFFICE O/P EST LOW 20 MIN: CPT | Mod: S$GLB,,,

## 2023-05-29 RX ORDER — CIPROFLOXACIN HYDROCHLORIDE 3 MG/ML
1 SOLUTION/ DROPS OPHTHALMIC EVERY 4 HOURS
Qty: 2.1 ML | Refills: 0 | Status: SHIPPED | OUTPATIENT
Start: 2023-05-29 | End: 2023-06-05

## 2023-05-29 NOTE — PATIENT INSTRUCTIONS
- You must understand that you have received an Urgent Care treatment only and that you may be released before all of your medical problems are known or treated.   - You, the patient, will arrange for follow up care as instructed.   - If your condition worsens or fails to improve we recommend that you receive another evaluation at the ER immediately or contact your PCP to discuss your concerns or return here.   - Follow up with your PCP or specialty clinic as directed in the next 1-2 weeks if not improved or as needed.  You can call [phone] to schedule an appointment with the appropriate provider.    If your symptoms do not improve or worsen, go to the emergency room immediately.

## 2023-05-29 NOTE — PROGRESS NOTES
"Subjective:      Patient ID: Riana Kay is a 68 y.o. female.    Vitals:  height is 5' 5" (1.651 m) and weight is 73.9 kg (163 lb). Her temperature is 97.9 °F (36.6 °C). Her blood pressure is 132/75 and her pulse is 76. Her respiration is 18 and oxygen saturation is 96%.     Chief Complaint: Conjunctivitis    Pt complains x3 days of left eye swelling redness, discharge clear yellow, gland swelling right side neck pain.  She states that she has been waking up with her left eye crusted shut.  She denies any sick contacts.  Patient complain of mild right ear pain.  She denies taking anything for symptoms.  She denies any fevers, body aches or chills.  She denies any nasal congestion, sore throat or cough.  She denies photophobia.  She denies any foreign body sensations.  She denies contact lens use.    Conjunctivitis  This is a new problem. The current episode started in the past 7 days. The problem occurs constantly. The problem has been unchanged. Associated symptoms include swollen glands. Pertinent negatives include no abdominal pain, anorexia, arthralgias, change in bowel habit, chest pain, chills, congestion, coughing, diaphoresis, fatigue, fever, headaches, joint swelling, myalgias, nausea, neck pain, numbness, rash, sore throat, urinary symptoms, vertigo, visual change, vomiting or weakness.     Constitution: Negative for chills, sweating, fatigue and fever.   HENT:  Negative for congestion and sore throat.    Neck: Negative for neck pain.   Cardiovascular:  Negative for chest pain.   Eyes:  Positive for eye discharge, eye itching, eye redness, blurred vision and eyelid swelling. Negative for eye trauma, foreign body in eye, eye pain, photophobia, vision loss and double vision.   Respiratory:  Negative for cough.    Gastrointestinal:  Negative for abdominal pain, nausea and vomiting.   Musculoskeletal:  Negative for joint pain, joint swelling and muscle ache.   Skin:  Negative for rash.   Neurological: "  Negative for history of vertigo, headaches and numbness.    Objective:     Physical Exam   Constitutional: She is oriented to person, place, and time. She appears well-developed.      Comments:Patient sits comfortably in exam chair. Answers questions in complete sentences. Does not show any signs of distress or discoloration.        HENT:   Head: Normocephalic and atraumatic.   Ears:   Right Ear: Hearing, tympanic membrane, external ear and ear canal normal. impacted cerumen  Left Ear: Hearing, tympanic membrane, external ear and ear canal normal. impacted cerumen  Nose: Nose normal. No mucosal edema, rhinorrhea or congestion. Right sinus exhibits no maxillary sinus tenderness and no frontal sinus tenderness. Left sinus exhibits no maxillary sinus tenderness and no frontal sinus tenderness.   Mouth/Throat: Oropharynx is clear and moist. No oropharyngeal exudate or posterior oropharyngeal erythema.   Eyes: EOM and lids are normal. Pupils are equal, round, and reactive to light. Lids are everted and swept, no foreign bodies found. Right eye exhibits no chemosis, no discharge, no exudate and no hordeolum. No foreign body present in the right eye. Left eye exhibits discharge and exudate. Left eye exhibits no chemosis and no hordeolum. No foreign body present in the left eye. Right conjunctiva is not injected. Right conjunctiva has no hemorrhage. Left conjunctiva is injected. Left conjunctiva has no hemorrhage. No scleral icterus. Right eye exhibits normal extraocular motion and no nystagmus. Left eye exhibits normal extraocular motion and no nystagmus. Extraocular movement intact vision grossly intact gaze aligned appropriately periorbital hyperpigmentation     Comments: Left eye conjunctival redness noted on exam.  PERRLA.  There is exudate noted to the left eye.  Mild swelling to the left upper and lower eyelids.  The skin is warm dry and intact around the eye.   Neck: Trachea normal and phonation normal. Neck  supple.   Musculoskeletal: Normal range of motion.         General: Normal range of motion.   Lymphadenopathy:        Head (right side): No submental, no submandibular, no tonsillar, no preauricular, no posterior auricular and no occipital adenopathy present.        Head (left side): Preauricular adenopathy present. No submental, no submandibular, no tonsillar, no posterior auricular and no occipital adenopathy present.     She has no cervical adenopathy.        Right cervical: No superficial cervical, no deep cervical and no posterior cervical adenopathy present.       Left cervical: No superficial cervical, no deep cervical and no posterior cervical adenopathy present.   Neurological: She is alert and oriented to person, place, and time.   Skin: Skin is warm, dry and intact.   Psychiatric: Her speech is normal and behavior is normal. Judgment and thought content normal.   Nursing note and vitals reviewed.  Vision Screening    Right eye Left eye Both eyes   Without correction      With correction 20/25 20/30 20/25       Assessment:     1. Bacterial conjunctivitis of left eye        Plan:     Will treat for bacterial versus allergic conjunctivitis.  Bacterial conjunctivitis of left eye  -     ciprofloxacin HCl (CILOXAN) 0.3 % ophthalmic solution; Place 1 drop into the left eye every 4 (four) hours. for 7 days  Dispense: 2.1 mL; Refill: 0                  Patient Instructions     - You must understand that you have received an Urgent Care treatment only and that you may be released before all of your medical problems are known or treated.   - You, the patient, will arrange for follow up care as instructed.   - If your condition worsens or fails to improve we recommend that you receive another evaluation at the ER immediately or contact your PCP to discuss your concerns or return here.   - Follow up with your PCP or specialty clinic as directed in the next 1-2 weeks if not improved or as needed.  You can call [phone]  [phone] to schedule an appointment with the appropriate provider.    If your symptoms do not improve or worsen, go to the emergency room immediately.

## 2023-06-07 ENCOUNTER — CLINICAL SUPPORT (OUTPATIENT)
Dept: REHABILITATION | Facility: HOSPITAL | Age: 68
End: 2023-06-07
Payer: MEDICARE

## 2023-06-07 DIAGNOSIS — M25.511 CHRONIC RIGHT SHOULDER PAIN: Primary | ICD-10-CM

## 2023-06-07 DIAGNOSIS — G89.29 CHRONIC RIGHT SHOULDER PAIN: Primary | ICD-10-CM

## 2023-06-07 PROCEDURE — 97110 THERAPEUTIC EXERCISES: CPT

## 2023-06-07 PROCEDURE — 97112 NEUROMUSCULAR REEDUCATION: CPT

## 2023-06-07 NOTE — PROGRESS NOTES
Physical Therapy Daily Treatment Note     Name: Riana Baker   Clinic Number: [id number]    Therapy Diagnosis:   Encounter Diagnosis   Name Primary?    Chronic right shoulder pain Yes           Physician: Mendez Tavarez NP    Visit Date: 6/7/2023    Physician Orders: PT Eval and Treat   Medical Diagnosis: s/p R reverse total shoulder  Date of Surgery: 8/16/22  Evaluation Date: 9/6/2022  Authorization Period Expiration: 1/30/23  Plan of Care Certification Period: 5/31/23  Visit # / Visits authorized: 22/35 (39 tot)     Time In: 0900 am  Time Out: 935 am   Total Billable Time: 30 minutes      Precautions: Standard and s/p reverse shoulder RUE    Subjective      Pt reports: feeling fine today and feels 80% PLOF.    she was compliant with home exercise program given last session.   Response to previous treatment: good  Functional change: NA    Pain: 0/10  Location: right shoulder      Objective     PROM R shoulder: 6/7/23  Shoulder FL: 170 deg  Shoulder ABD: above 180  ER @ 90 degrees: 90  IR: 80 deg    MMT:   RUE:   4/5 flex, abd  5/5 IR    Riana received therapeutic exercises to develop strength, endurance, ROM, and posture for 25  minutes including:  UBE 4'/4' within available ROM lvl 4  Bicep curl #3 3x10-np  Standing abduction and flex 3# 3xfatigue  Ball up wall 30x-np  Prayer stretch 3x 1 min-NP  R chest press with CC # 3 4x6-np  DB carry  #15 x4  laps  PT reassessment, education and HEP review    Neuromuscular re-education: 10  4 way ball on wall x30 ea red ball (in flex yellow and abd red)  R UE Wall slides with towel 5x6 3#-np  Row btb 3x10-np  90/90 step outs 2x10 R with 5 sec hold otb       Home Exercises Provided and Patient Education Provided     Education provided:   -resume HEP    Written Home Exercises Provided: Patient instructed to cont prior HEP.  Exercises were reviewed and Riana was able to demonstrate them prior to the end of the session.  Riana  "demonstrated good  understanding of the education provided.     See EMR under Patient Instructions for exercises provided {Blank single:72813::"6/7/2023","prior visit"    Assessment   Upon reassessment, pt has continued to maintain PROM and AROM goals. She also has improved deltoid strength for return to ADLs. Pt demonstrates good understanding of HEP and pt encouraged to call/email with any additional questions regarding care.     Pt's spiritual, cultural and educational needs considered and pt agreeable to plan of care and goals.    Anticipated barriers to physical therapy: none    Goals:   Short Term Goals: (6 weeks)   - Pt will increase ROM to 110 deg R shoulder flex and abd, 40 deg IR/ER at 45 deg abd ( met)  - Decrease Pain to 2/10 as worst with all PT interventions (met)  - Pt to self correct posture and gait with minimal cues (met)  - Pt independent with HEP with progressions. ( met)     Long Term Goals (4 more Weeks):   - Pt will increase ROM to 170 deg R shoulder flex and abd, 80 deg ER and IR (met)  - Pt will increase strength to /5 RUE in all planes, except ER ( met)  - Decrease Pain to 0/10 with ADLs overhead ( met)  - Pt to return to 80% PLOF (met)       Plan     Discontinue PT.     Bernadette Palacios, PT,                                                                                               Physical Therapy Daily Treatment Note     Name: Riana Baker   Clinic Number: [id number]    Therapy Diagnosis:   Encounter Diagnosis   Name Primary?    Chronic right shoulder pain Yes           Physician: Mendez Tavarez NP    Visit Date: 6/7/2023    Physician Orders: PT Eval and Treat   Medical Diagnosis: s/p R reverse total shoulder  Date of Surgery: 8/16/22  Evaluation Date: 9/6/2022  Authorization Period Expiration: 1/30/23  Plan of Care Certification Period: 5/31/23  Visit # / Visits authorized: 21/35 (39 tot)     Time In: 0900 am  Time Out: 950 am  Total Billable Time: 50 minutes    " "  Precautions: Standard and s/p reverse shoulder RUE    Subjective      Pt reports: feeling fine today.    she was compliant with home exercise program given last session.   Response to previous treatment: good  Functional change: NA    Pain: 0/10  Location: right shoulder      Objective     PROM R shoulder: 3/21/23  Shoulder FL: 170 deg  Shoulder ABD: above 180  ER @ 90 degrees: 90  IR: 80 deg    MMT:   RUE: 3+/5 flex, abd  3/5 ER  4/5 IR    Riana received therapeutic exercises to develop strength, endurance, ROM, and posture for 30  minutes including:  *reassess next session  UBE 4'/4' within available ROM lvl 4  Bicep curl #3 3x10-np  Standing abduction and flex 2# 3x  Ball up wall 30x-np  Prayer stretch 3x 1 min-NP  R chest press with CC # 3 4x6-np  DB carry  #10 x4  laps    Neuromuscular re-education: 20  4 way ball on wall x30 ea red ball (in flex yellow and abd red)  R UE Wall slides with towel 5x6 3#  Row btb 3x10  IR GTB/ER GTB step outs 2x10 R with 5 sec hold      Therapeutic activity:00          Riana received the following manual therapy techniques: Joint mobilizations and Manual traction were applied to the: R shoulder for 00 minutes, including:  PROM with light distraction per protocol  Grade I/II GH joint mobilizations     Home Exercises Provided and Patient Education Provided     Education provided:   -resume HEP    Written Home Exercises Provided: Patient instructed to cont prior HEP.  Exercises were reviewed and Riana was able to demonstrate them prior to the end of the session.  Riana demonstrated good  understanding of the education provided.     See EMR under Patient Instructions for exercises provided {Blank single:29109::"6/7/2023","prior visit"    Assessment   Pt continues to tolerate functional exercises well with only some "pinch" noted with 3# standing interventions, but improved with 2#. Plan to continue progressing as tolerated and will reassess next session.     Riana is " progressing well towards her goals.   Pt prognosis is Good.     Pt will continue to benefit from skilled outpatient physical therapy to address the deficits listed in the problem list box on initial evaluation, provide pt/family education and to maximize pt's level of independence in the home and community environment.     Pt's spiritual, cultural and educational needs considered and pt agreeable to plan of care and goals.    Anticipated barriers to physical therapy: none    Goals:   Short Term Goals: (6 weeks)   - Pt will increase ROM to 110 deg R shoulder flex and abd, 40 deg IR/ER at 45 deg abd ( met)  - Decrease Pain to 2/10 as worst with all PT interventions (Progressing, not met)  - Pt to self correct posture and gait with minimal cues (met)  - Pt independent with HEP with progressions. ( met)     Long Term Goals (4 more Weeks): 4/18/23  - Pt will increase ROM to 170 deg R shoulder flex and abd, 80 deg ER and IR (met)  - Pt will increase strength to 5/5 RUE in all planes, except ER (Progressing, not met)  - Decrease Pain to 0/10 with ADLs overhead (Progressing, not met)  - Pt to return to 80% PLOF (Progressing, not met)       Plan     Continue POC per pt tolerance progressing strengthening as tolerated.     Bernadette Palacios, PT,

## 2023-06-09 ENCOUNTER — PATIENT MESSAGE (OUTPATIENT)
Dept: RESEARCH | Facility: HOSPITAL | Age: 68
End: 2023-06-09
Payer: MEDICARE

## 2023-06-12 ENCOUNTER — OFFICE VISIT (OUTPATIENT)
Dept: GASTROENTEROLOGY | Facility: CLINIC | Age: 68
End: 2023-06-12
Payer: MEDICARE

## 2023-06-12 VITALS — BODY MASS INDEX: 27.07 KG/M2 | WEIGHT: 162.5 LBS | HEIGHT: 65 IN

## 2023-06-12 DIAGNOSIS — R11.2 NAUSEA AND VOMITING, UNSPECIFIED VOMITING TYPE: ICD-10-CM

## 2023-06-12 DIAGNOSIS — R10.9 ABDOMINAL PAIN, UNSPECIFIED ABDOMINAL LOCATION: ICD-10-CM

## 2023-06-12 DIAGNOSIS — K59.04 CHRONIC IDIOPATHIC CONSTIPATION: Primary | ICD-10-CM

## 2023-06-12 PROCEDURE — 1159F MED LIST DOCD IN RCRD: CPT | Mod: CPTII,S$GLB,, | Performed by: NURSE PRACTITIONER

## 2023-06-12 PROCEDURE — 99214 PR OFFICE/OUTPT VISIT, EST, LEVL IV, 30-39 MIN: ICD-10-PCS | Mod: S$GLB,,, | Performed by: NURSE PRACTITIONER

## 2023-06-12 PROCEDURE — 4010F PR ACE/ARB THEARPY RXD/TAKEN: ICD-10-PCS | Mod: CPTII,S$GLB,, | Performed by: NURSE PRACTITIONER

## 2023-06-12 PROCEDURE — 1126F AMNT PAIN NOTED NONE PRSNT: CPT | Mod: CPTII,S$GLB,, | Performed by: NURSE PRACTITIONER

## 2023-06-12 PROCEDURE — 3044F PR MOST RECENT HEMOGLOBIN A1C LEVEL <7.0%: ICD-10-PCS | Mod: CPTII,S$GLB,, | Performed by: NURSE PRACTITIONER

## 2023-06-12 PROCEDURE — 3008F BODY MASS INDEX DOCD: CPT | Mod: CPTII,S$GLB,, | Performed by: NURSE PRACTITIONER

## 2023-06-12 PROCEDURE — 1126F PR PAIN SEVERITY QUANTIFIED, NO PAIN PRESENT: ICD-10-PCS | Mod: CPTII,S$GLB,, | Performed by: NURSE PRACTITIONER

## 2023-06-12 PROCEDURE — 99214 OFFICE O/P EST MOD 30 MIN: CPT | Mod: S$GLB,,, | Performed by: NURSE PRACTITIONER

## 2023-06-12 PROCEDURE — 3008F PR BODY MASS INDEX (BMI) DOCUMENTED: ICD-10-PCS | Mod: CPTII,S$GLB,, | Performed by: NURSE PRACTITIONER

## 2023-06-12 PROCEDURE — 99999 PR PBB SHADOW E&M-EST. PATIENT-LVL IV: ICD-10-PCS | Mod: PBBFAC,,, | Performed by: NURSE PRACTITIONER

## 2023-06-12 PROCEDURE — 1157F PR ADVANCE CARE PLAN OR EQUIV PRESENT IN MEDICAL RECORD: ICD-10-PCS | Mod: CPTII,S$GLB,, | Performed by: NURSE PRACTITIONER

## 2023-06-12 PROCEDURE — 4010F ACE/ARB THERAPY RXD/TAKEN: CPT | Mod: CPTII,S$GLB,, | Performed by: NURSE PRACTITIONER

## 2023-06-12 PROCEDURE — 1159F PR MEDICATION LIST DOCUMENTED IN MEDICAL RECORD: ICD-10-PCS | Mod: CPTII,S$GLB,, | Performed by: NURSE PRACTITIONER

## 2023-06-12 PROCEDURE — 1157F ADVNC CARE PLAN IN RCRD: CPT | Mod: CPTII,S$GLB,, | Performed by: NURSE PRACTITIONER

## 2023-06-12 PROCEDURE — 3044F HG A1C LEVEL LT 7.0%: CPT | Mod: CPTII,S$GLB,, | Performed by: NURSE PRACTITIONER

## 2023-06-12 PROCEDURE — 99999 PR PBB SHADOW E&M-EST. PATIENT-LVL IV: CPT | Mod: PBBFAC,,, | Performed by: NURSE PRACTITIONER

## 2023-06-12 NOTE — PROGRESS NOTES
GASTROENTEROLOGY CLINIC NOTE    Chief Complaint: The primary encounter diagnosis was Chronic idiopathic constipation. Diagnoses of Nausea and vomiting, unspecified vomiting type and Abdominal pain, unspecified abdominal location were also pertinent to this visit.  Referring provider/PCP: oJse Rojas MD    HPI:  Riana Kay is a 68 y.o. 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  She was previously followed at [facility] but is here today to establish care for abdominal pain.  This is a new problem that began about three months ago.  She reports experiencing episode of diarrhea that lasted a few days and was followed by nausea and abdominal cramping.  The cramping is predominately located in her lower abdomen and described as a burning pain.  It occurs daily and radiates to her lower back.  Pain is slightly improved following a bowel movement.  Additionally, she reports a h/o reflux that is controlled with Protonix 40mg daily.  Occasionally she will experience breakthrough reflux with certain foods.  Denies vomiting, constipation, diarrhea, nocturnal symptoms, melena, or hematochezia.  She has recently sought care with internal medicine d/t abdominal pain and for bladder infection.  She is scheduled to complete her antibiotics for the bladder infection today.  She was also prescribed Bentyl for the abdominal  pain but patient reports she did not take the Bentyl.      Interval Note 4/12/2023  Ms. Riana Baker who is known to me presents to clinic for RUQ abdominal pain, constipation, nausea, and vomiting.  Symptoms began four months ago.  Prior to onset of symptoms, she began Ozempic.   Nausea and vomiting accompanied by decreased appetite and water brash.   Vomiting occurs shortly after eating.  Feels like food not digesting. Reports dysphagia with meat. Points to middle of chest as location of where she feels food sticking.   Vomitus: water, bile, recent meal, No coffee ground emesis or hematemesis  New Medications: Ozempic   NSAID use: No  Abdominal Pain/Distension/Tenderness: RUQ abdominal pain described as cramping. Occurs daily. Worse with eating; improves with bowel movements.   Bowel Movements: Every two days; soft consistency; some incomplete emptying and straining. Taking Miralax daily  Heartburn: No  Early Satiety: Yes    Interval Note 6/12/2023  Ms. Riana Kay who is known to me presents for follow up for nausea, vomiting, an RUQ abdominal pain. She is accompanied by her . At last appointment, it was recommended she stop Ozempic, increase Miralax to BID, and schedule EGD.   EGD completed and 3 cm hiatal hernia noted. Ozempic dose decreased and she has not noted improvement in symptoms.   Currently taking Miralax BID and continues with bowel movements every two days.   Continues to have RUQ abdominal pain that improves following bowel movements.   She now reports problems with swallowing meat but upon further questioning, it is more problems digesting meat.  She is not having dysphagia, coughing, or choking with eating. When asked to point to where food is sticking she points to RUQ and states feels as if food not digesting.   About two hours after eating, will have upper abdominal pain followed by vomiting. Vomitus us undigested food.   Also reports early satiety and occasional pyrosis with acidic  foods.     Treatments: Protonix, Zofran, Miralax    Prior Upper Endoscopy: 4/2023 with Dr. Reyes  Impression:            - Normal esophagus.                          - 3 cm hiatal hernia.                          - Erythematous mucosa in the antrum. Biopsied.                          - Normal examined duodenum.   Recommendation:        - Discharge patient to home.                          - Resume previous diet.                          - Continue present medications.                          - Await pathology results    4/2019  Findings: The Z-line was regular and was found 35 cm from the incisors. No endoscopic abnormality was evident in the esophagus to explain the patient's complaint of dysphagia. It was decided, however, to proceed with dilation at the gastroesophageal junction. A TTS dilator was passed through the scope. Dilation with an 18-19-20 mm pyloric balloon dilator was performed. The dilation site was examined following endoscope reinsertion and showed no change. Estimated blood loss: none. The entire examined stomach was normal. Biopsies were taken with a cold forceps for Helicobacter pylori testing. Estimated blood loss was minimal. The examined duodenum was normal.     Impression:  - No endoscopic esophageal abnormality to explain patient's dysphagia; esophagus empirically dilated up to 20 mm at the GE junction.                         - Stomach biopsied to r/o H.pylori.     Recommendation:  - Discharge patient to home.                         - Resume previous diet.                         - Continue present medications.                         - Await pathology results.   Pathology:  STOMACH, BIOPSY:  Antral mucosa with features consistent with chemical/reactive gastropathy and mild chronic inactive gastritis. No intestinal metaplasia or dysplasia.  An immunohistochemical stain for Helicobacter pylori is pending and will be included in a supplemental report.  An immunohistochemical stain for  Helicobacter pylori is negative.    Prior Colonoscopy: 1/2021  Findings: The perianal and digital rectal examinations were normal. A 4 mm polyp was found in the transverse colon. The polyp was semi-pedunculated. The polyp was removed with a cold snare.  Resection and retrieval were complete. Estimated blood loss was minimal. Many small and large-mouthed diverticula were found in the entire colon. Non-bleeding internal hemorrhoids were found. The exam was otherwise without abnormality on direct and retroflexion views.     Impression:   - One 4 mm polyp in the transverse colon, removed with a cold snare. Resected and retrieved.                         - Diverticulosis in the entire examined colon.                         - Non-bleeding internal hemorrhoids.                         - The examination was otherwise normal on direct and retroflexion views.     Recommendation:       - Discharge patient to home.                         - High fiber diet.                         - Continue present medications.                         - Await pathology results.                         - Repeat colonoscopy in 5 years for surveillance.                         - Return to primary care physician PRN.     Pathology:  COLON, TRANSVERSE, BIOPSY:  - Tubular adenoma    Family h/o Colon Cancer: Father   Family h/o Crohn's Disease or Ulcerative Colitis: No  Abdominal Surgeries: Cholecystectomy, Hysterectomy    Anticoagulation or Antiplatelet: No      Review of Systems   Constitutional:  Negative for weight loss.   HENT:  Negative for sore throat.    Eyes:  Negative for blurred vision.   Respiratory:  Negative for cough.    Cardiovascular:  Negative for chest pain.   Gastrointestinal:  Positive for abdominal pain (RUQ), constipation, nausea and vomiting. Negative for blood in stool, diarrhea, heartburn and melena.   Genitourinary:  Negative for dysuria.   Musculoskeletal:  Negative for myalgias.   Skin:  Negative for rash.    Neurological:  Negative for headaches.   Endo/Heme/Allergies:  Negative for environmental allergies.   Psychiatric/Behavioral:  Negative for suicidal ideas. The patient is not nervous/anxious.      Past Medical History: has a past medical history of Abdominal pain, right upper quadrant, Anticoagulant long-term use, Anxiety, AR (allergic rhinitis), Atrophic gastritis without mention of hemorrhage, Chronic fatigue syndrome, Diabetes mellitus, Dizziness, Fatty liver, GERD (gastroesophageal reflux disease), Gross hematuria, HTN (hypertension), Hyperlipidemia, Leg swelling, Memory loss, Osteopenia, PONV (postoperative nausea and vomiting), Primary osteoarthritis of right knee, Primary osteoarthritis of right knee, Right elbow pain, S/P total hysterectomy, Screening for colon cancer, Sleep apnea, and 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    Past Surgical History: has a past surgical history that includes Cholecystectomy; Knee arthroscopy w/ debridement (4/11); Total abdominal hysterectomy w/ bilateral salpingoophorectomy; Rotator cuff repair; Elbow surgery (Right, 7/16/15); Elbow surgery; Hysterectomy; Colonoscopy (N/A, 1/17/2018); Elbow Arthroplasty (Right, 1/16/2019); Release of ulnar nerve at cubital tunnel (Right, 1/16/2019); Upper gastrointestinal endoscopy; Esophagogastroduodenoscopy (N/A, 4/15/2019); Knee Arthroplasty (Right, 10/6/2020); Cystoscopy (N/A, 12/1/2020); Retrograde pyelography (Bilateral, 12/1/2020); Colonoscopy (N/A, 1/6/2021); Total knee arthroplasty (Left, 10/19/2021); Reverse total shoulder arthroplasty (Right, 8/16/2022); and Esophagogastroduodenoscopy (N/A, 4/21/2023).    Family History:family history includes Cancer in her father; Colon cancer (age of onset: 83) in her father; Diabetes in her maternal aunt and maternal grandmother.    Allergies:   Review of patient's allergies indicates:   Allergen Reactions    Iodinated contrast media Swelling and Rash    Percocet  [oxycodone-acetaminophen] Itching    Macrobid [nitrofurantoin monohyd/m-cryst] Rash    Metformin Rash    Penicillins Rash     Had ancef in 2020 with no adverse rxn     Promethazine Rash     Had compazine in 2021    Sulfa (sulfonamide antibiotics) Rash    Sulfamethoxazole-trimethoprim Rash       Social History: reports that she has never smoked. She has never been exposed to tobacco smoke. She has never used smokeless tobacco. She reports that she does not drink alcohol and does not use drugs.    Home medications:   Current Outpatient Medications on File Prior to Visit   Medication Sig Dispense Refill    acetaminophen (TYLENOL) 500 MG tablet Take 2 tablets (1,000 mg total) by mouth every 8 (eight) hours as needed for Pain. 90 tablet 0    blood sugar diagnostic Strp To check BG 3 times daily, to use with insurance preferred meter, e 11.65 100 each 11    blood-glucose meter kit To check BG 3 times daily, to use with insurance preferred meter, e 11.65 1 each 0    buPROPion (WELLBUTRIN XL) 150 MG TB24 tablet Take 150 mg by mouth every morning.      cyclobenzaprine (FLEXERIL) 5 MG tablet Take 5 mg by mouth.      glucagon (BAQSIMI) 3 mg/actuation Spry Give one puff via nostril. Hold device between fingers and thumb, do not push plunger yet, insert tip gently into one nostril until finger(s) touch the outside of the nose, then push plunger firmly all the way in . Dose is complete when the green line disappears. 1 each 1    insulin (LANTUS SOLOSTAR U-100 INSULIN) glargine 100 units/mL SubQ pen [id number] UNITS SUBCUTANEOUSLY AT NIGHT. 30 each 4    insulin lispro 100 unit/mL pen INJECT 8 UNITS WITH MEALS, PLUS SLIDING SCALE, MAX DAILY 54 UNITS 60 mL 3    ketoconazole (NIZORAL) 2 % shampoo Apply topically every 7 days. 120 mL 6    lancets Misc To check BG 3 times daily, to use with insurance preferred meter, e 11.65 100 each 11    latanoprost 0.005 % ophthalmic solution Place 1 drop into both eyes nightly.      losartan (COZAAR)  "50 MG tablet TAKE 1 TABLET BY MOUTH EVERY DAY 90 tablet 3    ondansetron (ZOFRAN-ODT) 8 MG TbDL Take 1 tablet (8 mg total) by mouth 3 (three) times daily as needed (Nausea). 30 tablet 3    ONETOUCH DELICA LANCETS 33 gauge Misc TO CHECK BLOOD GLUCOSE 3 TIMES DAILY      ONETOUCH VERIO FLEX METER Misc TO CHECK BLOOD GLUCOSE 3 TIMES DAILY, TO USE WITH INSURANCE PREFERRED METER, E 11.65      pantoprazole (PROTONIX) 40 MG tablet Take 1 tablet (40 mg total) by mouth once daily. 90 tablet 3    pen needle, diabetic (NOVOFINE 32) 32 gauge x 1/4" Ndle Uses 4 times a day. 90 day via duramed e 11.65 400 each 3    rosuvastatin (CRESTOR) 10 MG tablet Take 1 tablet (10 mg total) by mouth once daily. 90 tablet 3    semaglutide (OZEMPIC) 0.25 mg or 0.5 mg(2 mg/1.5 mL) pen injector Inject 0.25 mg into the skin every 7 days. 1 each 5    [DISCONTINUED] polyethylene glycol (GLYCOLAX) 17 gram PwPk Take 17 g by mouth 2 (two) times daily as needed (constipation). 4 packet 0    azelastine (ASTELIN) 137 mcg (0.1 %) nasal spray 1 spray (137 mcg total) by Nasal route 2 (two) times daily. 30 mL 11    meclizine (ANTIVERT) 25 mg tablet Take 1 tablet (25 mg total) by mouth 3 (three) times daily as needed for Dizziness. 30 tablet 1    [DISCONTINUED] diclofenac sodium (VOLTAREN) 1 % Gel APPLY 2 G TOPICALLY 2 (TWO) TIMES DAILY AS NEEDED (PAIN). DO NOT USE DIRECTLY ON INCISION 100 g 0     No current facility-administered medications on file prior to visit.       Vital signs:  Ht 5' 5" (1.651 m)   Wt 73.7 kg (162 lb 7.7 oz)   BMI 27.04 kg/m²     Physical Exam  Vitals reviewed.   Constitutional:       General: She is not in acute distress.     Appearance: Normal appearance. She is not ill-appearing.   HENT:      Head: Normocephalic.   Cardiovascular:      Rate and Rhythm: Normal rate and regular rhythm.      Heart sounds: Normal heart sounds. No murmur heard.  Pulmonary:      Effort: Pulmonary effort is normal. No respiratory distress.      Breath " sounds: Normal breath sounds.   Chest:      Chest wall: No tenderness.   Abdominal:      General: Bowel sounds are normal. There is no distension.      Palpations: Abdomen is soft.      Tenderness: There is abdominal tenderness in the right lower quadrant and left lower quadrant. Negative signs include Stephens's sign.      Hernia: No hernia is present.      Comments: Mild tenderness in lower quadrant   Skin:     General: Skin is warm.   Neurological:      Mental Status: She is alert and oriented to person, place, and time.   Psychiatric:         Mood and Affect: Mood normal.         Behavior: Behavior normal.       Routine labs:  Lab Results   Component Value Date    WBC 7.18 04/06/2023    HGB 13.7 04/06/2023    HCT 40.8 04/06/2023    MCV 96 04/06/2023     04/06/2023     Lab Results   Component Value Date    INR 0.9 10/14/2021     Lab Results   Component Value Date    IRON 103 01/25/2018    FERRITIN 215 01/25/2018    TIBC 289 01/25/2018    FESATURATED 36 01/25/2018     Lab Results   Component Value Date     04/06/2023    K 5.0 04/06/2023     04/06/2023    CO2 25 04/06/2023    BUN 13 04/06/2023    CREATININE 0.8 04/06/2023     Lab Results   Component Value Date    ALBUMIN 4.0 04/06/2023    ALT 32 04/06/2023    AST 23 04/06/2023    ALKPHOS 101 04/06/2023    BILITOT 0.5 04/06/2023     No results found for: GLUCOSE  Lab Results   Component Value Date    TSH 1.212 09/01/2022     Lab Results   Component Value Date    CALCIUM 10.0 04/06/2023    PHOS 3.2 08/14/2022       Imaging:    EXAMINATION:  CT ABDOMEN PELVIS WITHOUT CONTRAST 12/10/2021     CLINICAL HISTORY:  LLQ abdominal pain, r/o diverticulitis; Diverticulitis of intestine, part unspecified, without perforation or abscess without bleeding     TECHNIQUE:  Low dose axial images, sagittal and coronal reformations were obtained from the lung bases to the pubic symphysis.  900 mL barium was given for oral contrast     COMPARISON:  09/22/2020      FINDINGS:  This examination is limited due to lack of intravenous contrast.     Lower chest: Unremarkable.     Liver: Normal contour.     Gallbladder and bile ducts: The gallbladder is surgically absent.  No intra or extrahepatic biliary ductal dilatation.     Pancreas: Normal contour.     Spleen: Normal contour.     Adrenals: Normal contour.     Kidneys: Normal contour.     Lymph nodes: No abdominal or pelvic lymphadenopathy.     Bowel and mesentery: Extensive diverticulosis is present without evidence of diverticulitis.  The small bowel and large bowel are normal caliber.     Abdominal aorta: Unremarkable.     Inferior vena cava: Unremarkable.     Free fluid or free air: None.     Pelvis: Unremarkable.     Urinary bladder: Unremarkable.     Body wall: Unremarkable.     Bones: Unremarkable.     Impression:     Diverticulosis without evidence of diverticulitis.  No acute finding to explain the patient's pain.        Electronically signed by: Yolanda Driscoll  Date:                                            12/10/2021  Time:                                           09:35    I have reviewed prior labs, imaging, and notes.      Assessment:  1. Chronic idiopathic constipation    2. Nausea and vomiting, unspecified vomiting type    3. Abdominal pain, unspecified abdominal location        Nausea and vomiting accompanied by decreased appetite. Began shortly after starting Ozempic. Suspect Ozempic is causing many of symptoms.   Ozempic dose decreased and continues with symptoms. Vomitus consists of old food; likely delayed motility.   EGD unrevealing.   RUQ pain improves with bowel movement. Taking Mirialax BID  Protonix controlling reflux.     Plan:  Orders Placed This Encounter    NM Gastric Emptying    linaCLOtide (LINZESS) 72 mcg Cap capsule     NMS to r/o gastroparesis  Continue to suspect Ozempic is playing a role in symptoms. I recommend stopping if feasible.   Continue pantoprazole once a day. Take 30 minutes  before first meal of the day.   Continue Zofran as needed.   Start Linzess 72mcg daily.       Plan of care discussed with patient who is in agreement and verbalized understanding.     I have explained the planned procedures to the patient.The risks, benefits and alternatives of the procedure were also explained in detail. Patient verbalized understanding, all questions were answered. The patient agrees to proceed as planned    Follow Up: As Needed Pending Workup          Coleen Felix, RADHA,FNP-BC  [facility]/[place]

## 2023-06-12 NOTE — PATIENT INSTRUCTIONS
Gastric emptying study  Continue pantoprazole once a day  Start Linzess for constipation. Take once a day before breakfast.   Stop Miralax.

## 2023-06-15 ENCOUNTER — HOSPITAL ENCOUNTER (OUTPATIENT)
Dept: RADIOLOGY | Facility: HOSPITAL | Age: 68
Discharge: HOME | End: 2023-06-15
Attending: NURSE PRACTITIONER
Payer: MEDICARE

## 2023-06-15 DIAGNOSIS — R11.2 NAUSEA AND VOMITING, UNSPECIFIED VOMITING TYPE: ICD-10-CM

## 2023-06-15 DIAGNOSIS — K59.04 CHRONIC IDIOPATHIC CONSTIPATION: ICD-10-CM

## 2023-06-15 DIAGNOSIS — R10.9 ABDOMINAL PAIN, UNSPECIFIED ABDOMINAL LOCATION: ICD-10-CM

## 2023-06-15 PROCEDURE — 78264 GASTRIC EMPTYING IMG STUDY: CPT | Mod: TC

## 2023-06-15 PROCEDURE — 78264 NM GASTRIC EMPTYING: ICD-10-PCS | Mod: 26,,, | Performed by: RADIOLOGY

## 2023-06-15 PROCEDURE — 78264 GASTRIC EMPTYING IMG STUDY: CPT | Mod: 26,,, | Performed by: RADIOLOGY

## 2023-06-16 ENCOUNTER — PATIENT MESSAGE (OUTPATIENT)
Dept: NEPHROLOGY | Facility: CLINIC | Age: 68
End: 2023-06-16
Payer: MEDICARE

## 2023-06-16 ENCOUNTER — TELEPHONE (OUTPATIENT)
Dept: GASTROENTEROLOGY | Facility: CLINIC | Age: 68
End: 2023-06-16
Payer: MEDICARE

## 2023-06-16 NOTE — TELEPHONE ENCOUNTER
----- Message from Coleen Allen NP sent at 6/15/2023  4:26 PM CDT -----  Please let patient know gastric emptying study is delayed. This means the stomach is not emptying as fast as it should. I spoke with the NP who manages her diabetes. Stop the Ozempic for now. This may be causing the symptoms. Continue Pantoprazole and Zofran as prescribed. Start the Linzess for constipation. I am going to refer her to the dietician. Please schedule follow up with Dr. Reyes and let me know when so I can let him know. Thanks.

## 2023-06-17 ENCOUNTER — PATIENT MESSAGE (OUTPATIENT)
Dept: ENDOSCOPY | Facility: HOSPITAL | Age: 68
End: 2023-06-17
Payer: MEDICARE

## 2023-06-17 ENCOUNTER — TELEPHONE (OUTPATIENT)
Dept: ENDOSCOPY | Facility: HOSPITAL | Age: 68
End: 2023-06-17
Payer: MEDICARE

## 2023-06-28 ENCOUNTER — NUTRITION (OUTPATIENT)
Dept: GASTROENTEROLOGY | Facility: CLINIC | Age: 68
End: 2023-06-28
Payer: MEDICARE

## 2023-06-28 DIAGNOSIS — Z71.9 ENCOUNTER FOR HEALTH EDUCATION: Primary | ICD-10-CM

## 2023-06-28 NOTE — PROGRESS NOTES
"Referring Provider: Coleen Allen NP  Reason for Nutrition Referral: Gastroparesis Nutrition Therapy    Patient Information: Riana Baker Ng 68 y.o.-year-old Other female   Nutrition-related concerns: Pt presents for Gastroparesis with delayed gastric emptying. Pt reports chronic constipation, nausea, vomiting, abdominal pain  Pt has T2DM and recently stopped taking Ozempic   *Pt is accompanied by  at appointment  *Pt eats a more traditional Chinese diet          A = Nutrition Assessment  Anthropometric Data Estimated body mass index is 27.04 kg/m² as calculated from the following:    Height as of 6/12/23: 5' 5" (1.651 m).    Weight as of 6/12/23: 73.7 kg (162 lb 7.7 oz).    Last 3 Weights:   Wt Readings from Last 3 Encounters:   06/12/23 73.7 kg (162 lb 7.7 oz)   05/29/23 73.9 kg (163 lb)   05/09/23 74 kg (163 lb 2.3 oz)     Relevant Wt hx: Weight has remained stable   Biochemical Data Labs:   Lab Results   Component Value Date    DRTQAXRF88PA 14 (L) 08/15/2022    TNOMXRZW60 656 02/02/2022     Lab Results   Component Value Date    HGB 13.7 04/06/2023    HCT 40.8 04/06/2023     Lab Results   Component Value Date    ALBUMIN 4.0 04/06/2023     Hemoglobin A1C   Date Value Ref Range Status   04/06/2023 6.0 (H) 4.0 - 5.6 % Final     Comment:     ADA Screening Guidelines:  5.7-6.4%  Consistent with prediabetes  >or=6.5%  Consistent with diabetes    High levels of fetal hemoglobin interfere with the HbA1C  assay. Heterozygous hemoglobin variants (HbS, HgC, etc)do  not significantly interfere with this assay.   However, presence of multiple variants may affect accuracy.     12/05/2022 6.9 (H) 4.0 - 5.6 % Final     Comment:     ADA Screening Guidelines:  5.7-6.4%  Consistent with prediabetes  >or=6.5%  Consistent with diabetes    High levels of fetal hemoglobin interfere with the HbA1C  assay. Heterozygous hemoglobin variants (HbS, HgC, etc)do  not significantly interfere with this assay.   However, presence of " multiple variants may affect accuracy.        Other Data:                Food Allergies/intolerances: Avoids peanut butter; Pt tolerates dairy (drinks whole milk, eats cheese)    Dx: Delayed Gastric Emptying with Gastroparesis     D = Nutrition Diagnosis   Patient Assessment: Patient is at increased nutrition risk due to dx of gastroparesis with delayed gastric emptying and need for diet education to manage symptoms and ensure adequate nutrition.    Patient knowledge of healthy eating and exercise patterns was assessed to be adequate. Session was spent educating patient on minimizing fat and fiber intake, eating small, more frequent meals and snacks, preparing/cooking foods to a soft consistency and chewing well. Emphasis was placed on meal planning and quick, easy methods of food preparation (ex. using a crockpot, using canned vegetables, canned meats) to ensure adherence.     Reviewed strategies for choosing and consuming healthy, well-balanced meals and snacks regularly that are within the recommended low-fiber, low-fat guidelines for the Gastroparesis diet.  Advised patient of potential need to add nutrition supplement beverages 1-2x daily if solid food intake is reduced/not tolerated.    Patient verbalized understanding.  Provided RD contact information for concerns or questions. Expect adequate compliance with dietary recommendations at this time.     Primary Problem: Altered GI function  Etiology: Related to a decrease in GI motility and delayed gastric emptying   Signs/symptoms: As evidenced by pt report of early satiety, nausea and vomiting   Education Materials provided:   1. Academy of [employer] Dietetics/ NCM: Gastroparesis Nutrition Therapy       I = Nutrition Intervention  Recommendation #1 Eat smaller, more frequent meals and snacks (4-6 times per day); Separate liquids from food consumption and drink between or after meals.   Recommendation #2 Avoid high-fat, fried, greasy foods; liquid fats may  be better tolerated. Choose lean meats, poultry, and fish and cook to a tender, soft consistency. Avoid fatty, greasy meats and fried meats, poultry and fish. Choose low-fat dairy products. See Foods Not Recommended list on handout for foods that are high in fat.   Recommendation #3 Avoid high-fiber, uncooked/raw vegetables, fruits, grain products, beans, and whole nuts. Remove the skins, seeds, hulls of all fruits and vegetables and consume only at a softened consistency. See list Foods Recommended list on handout for low-fiber foods and preparation methods.   Recommendation #4 Avoid foods that increase acid reflux including acidic, spicy, fried and greasy foods. Avoid mint, caffeine, chocolate. Do not drink carbonated beverages and avoid alcohol and smoking.   Recommendation #5 Add a nutrition-supplement drink 1-2x/daily between meals to ensure meeting calorie and vitamin/mineral needs until appetite/tolerance for solid foods improves      M = Nutrition Monitoring   Indicator 1. Diet recall    Indicator 2. Weight/BMI      E= Nutrition Evaluation   Goal 1. Diet recall shows good compliance with recommendations reviewed during session    Goal 2. Weight shows trend towards goal of maintaining body weight without unintentional weight loss     Consultation Time: 45 Minutes  Follow Up: Patient provided with Dietitian contact number and advised to call or make future appointment if further consultation is needed/desired.    Communication with provider via Epic  Signature: Gypsy Kaplan, MPH, RDN, LDN

## 2023-07-03 PROBLEM — Z00.00 ENCOUNTER FOR PREVENTIVE HEALTH EXAMINATION: Chronic | Status: RESOLVED | Noted: 2023-03-30 | Resolved: 2023-07-03

## 2023-07-07 ENCOUNTER — TELEPHONE (OUTPATIENT)
Dept: ORTHOPEDICS | Facility: CLINIC | Age: 68
End: 2023-07-07
Payer: MEDICARE

## 2023-07-07 NOTE — TELEPHONE ENCOUNTER
Called and informed pt his appt with Mendez on 08/04 @ 9:30 am has been cancelled due to Mendez's absence. Your new appt is scheduled 08/17 @ 9:30 am. Pt was satisfied with new appt date/time.

## 2023-07-19 RX ORDER — MELOXICAM 15 MG/1
TABLET ORAL
Qty: 30 TABLET | Refills: 2 | Status: SHIPPED | OUTPATIENT
Start: 2023-07-19 | End: 2023-08-22

## 2023-07-24 ENCOUNTER — PATIENT MESSAGE (OUTPATIENT)
Dept: INTERNAL MEDICINE | Facility: CLINIC | Age: 68
End: 2023-07-24
Payer: MEDICARE

## 2023-07-24 DIAGNOSIS — R41.89 COGNITIVE IMPAIRMENT: Primary | ICD-10-CM

## 2023-07-25 ENCOUNTER — PATIENT MESSAGE (OUTPATIENT)
Dept: INTERNAL MEDICINE | Facility: CLINIC | Age: 68
End: 2023-07-25
Payer: MEDICARE

## 2023-07-26 ENCOUNTER — PATIENT MESSAGE (OUTPATIENT)
Dept: INTERNAL MEDICINE | Facility: CLINIC | Age: 68
End: 2023-07-26
Payer: MEDICARE

## 2023-07-26 NOTE — TELEPHONE ENCOUNTER
Spoke to pt. Pt stated she has been having b/g of 250-300 for approx 2 wks. Pt also stated she has been having headaches.   Reached pt at [phone]

## 2023-07-27 ENCOUNTER — LAB VISIT (OUTPATIENT)
Dept: LAB | Facility: HOSPITAL | Age: 68
End: 2023-07-27
Attending: FAMILY MEDICINE
Payer: MEDICARE

## 2023-07-27 ENCOUNTER — OFFICE VISIT (OUTPATIENT)
Dept: FAMILY MEDICINE | Facility: CLINIC | Age: 68
End: 2023-07-27
Attending: FAMILY MEDICINE
Payer: MEDICARE

## 2023-07-27 ENCOUNTER — PATIENT MESSAGE (OUTPATIENT)
Dept: RESEARCH | Facility: HOSPITAL | Age: 68
End: 2023-07-27
Payer: MEDICARE

## 2023-07-27 VITALS
SYSTOLIC BLOOD PRESSURE: 134 MMHG | HEIGHT: 65 IN | BODY MASS INDEX: 27.18 KG/M2 | DIASTOLIC BLOOD PRESSURE: 68 MMHG | HEART RATE: 76 BPM | OXYGEN SATURATION: 96 % | WEIGHT: 163.13 LBS

## 2023-07-27 DIAGNOSIS — F41.9 ANXIETY: ICD-10-CM

## 2023-07-27 DIAGNOSIS — E11.9 TYPE 2 DIABETES MELLITUS WITHOUT COMPLICATION, WITH LONG-TERM CURRENT USE OF INSULIN: Primary | ICD-10-CM

## 2023-07-27 DIAGNOSIS — R73.9 HYPERGLYCEMIA: ICD-10-CM

## 2023-07-27 DIAGNOSIS — K76.0 FATTY LIVER: ICD-10-CM

## 2023-07-27 DIAGNOSIS — E11.9 TYPE 2 DIABETES MELLITUS WITHOUT COMPLICATION, WITH LONG-TERM CURRENT USE OF INSULIN: ICD-10-CM

## 2023-07-27 DIAGNOSIS — M80.021D: ICD-10-CM

## 2023-07-27 DIAGNOSIS — K21.9 GASTROESOPHAGEAL REFLUX DISEASE WITHOUT ESOPHAGITIS: ICD-10-CM

## 2023-07-27 DIAGNOSIS — I70.0 AORTIC ATHEROSCLEROSIS: ICD-10-CM

## 2023-07-27 DIAGNOSIS — R41.3 MEMORY CHANGES: ICD-10-CM

## 2023-07-27 DIAGNOSIS — E66.3 OVERWEIGHT (BMI 25.0-29.9): ICD-10-CM

## 2023-07-27 DIAGNOSIS — Z79.4 TYPE 2 DIABETES MELLITUS WITHOUT COMPLICATION, WITH LONG-TERM CURRENT USE OF INSULIN: Primary | ICD-10-CM

## 2023-07-27 DIAGNOSIS — Z79.4 TYPE 2 DIABETES MELLITUS WITHOUT COMPLICATION, WITH LONG-TERM CURRENT USE OF INSULIN: ICD-10-CM

## 2023-07-27 DIAGNOSIS — E11.59 HYPERTENSION ASSOCIATED WITH TYPE 2 DIABETES MELLITUS: ICD-10-CM

## 2023-07-27 DIAGNOSIS — I77.9 CAROTID ARTERY DISEASE, UNSPECIFIED LATERALITY, UNSPECIFIED TYPE: ICD-10-CM

## 2023-07-27 DIAGNOSIS — E55.9 VITAMIN D DEFICIENCY: ICD-10-CM

## 2023-07-27 DIAGNOSIS — Z00.00 PREVENTATIVE HEALTH CARE: ICD-10-CM

## 2023-07-27 DIAGNOSIS — I15.2 HYPERTENSION ASSOCIATED WITH TYPE 2 DIABETES MELLITUS: ICD-10-CM

## 2023-07-27 DIAGNOSIS — I25.10 ATHEROSCLEROSIS OF NATIVE CORONARY ARTERY OF NATIVE HEART WITHOUT ANGINA PECTORIS: ICD-10-CM

## 2023-07-27 LAB
25(OH)D3+25(OH)D2 SERPL-MCNC: 38 NG/ML (ref 30–96)
ALBUMIN SERPL BCP-MCNC: 4 G/DL (ref 3.5–5.2)
ALBUMIN/CREAT UR: 13.4 UG/MG (ref 0–30)
ALP SERPL-CCNC: 103 U/L (ref 55–135)
ALT SERPL W/O P-5'-P-CCNC: 20 U/L (ref 10–44)
ANION GAP SERPL CALC-SCNC: 10 MMOL/L (ref 8–16)
AST SERPL-CCNC: 18 U/L (ref 10–40)
BACTERIA #/AREA URNS HPF: ABNORMAL /HPF
BASOPHILS # BLD AUTO: 0.02 K/UL (ref 0–0.2)
BASOPHILS NFR BLD: 0.3 % (ref 0–1.9)
BILIRUB SERPL-MCNC: 0.5 MG/DL (ref 0.1–1)
BILIRUB UR QL STRIP: NEGATIVE
BUN SERPL-MCNC: 14 MG/DL (ref 8–23)
CALCIUM SERPL-MCNC: 9.4 MG/DL (ref 8.7–10.5)
CHLORIDE SERPL-SCNC: 102 MMOL/L (ref 95–110)
CHOLEST SERPL-MCNC: 128 MG/DL (ref 120–199)
CHOLEST/HDLC SERPL: 2.6 {RATIO} (ref 2–5)
CLARITY UR: CLEAR
CO2 SERPL-SCNC: 27 MMOL/L (ref 23–29)
COLOR UR: YELLOW
CREAT SERPL-MCNC: 0.9 MG/DL (ref 0.5–1.4)
CREAT UR-MCNC: 179 MG/DL (ref 15–325)
DIFFERENTIAL METHOD: ABNORMAL
EOSINOPHIL # BLD AUTO: 0.1 K/UL (ref 0–0.5)
EOSINOPHIL NFR BLD: 1.3 % (ref 0–8)
ERYTHROCYTE [DISTWIDTH] IN BLOOD BY AUTOMATED COUNT: 11.9 % (ref 11.5–14.5)
EST. GFR  (NO RACE VARIABLE): >60 ML/MIN/1.73 M^2
ESTIMATED AVG GLUCOSE: 166 MG/DL (ref 68–131)
GLUCOSE SERPL-MCNC: 184 MG/DL (ref 70–110)
GLUCOSE UR QL STRIP: ABNORMAL
HBA1C MFR BLD: 7.4 % (ref 4–5.6)
HCT VFR BLD AUTO: 36.3 % (ref 37–48.5)
HDLC SERPL-MCNC: 49 MG/DL (ref 40–75)
HDLC SERPL: 38.3 % (ref 20–50)
HGB BLD-MCNC: 12.9 G/DL (ref 12–16)
HGB UR QL STRIP: NEGATIVE
HYALINE CASTS #/AREA URNS LPF: 4 /LPF
IMM GRANULOCYTES # BLD AUTO: 0.01 K/UL (ref 0–0.04)
IMM GRANULOCYTES NFR BLD AUTO: 0.1 % (ref 0–0.5)
KETONES UR QL STRIP: NEGATIVE
LDLC SERPL CALC-MCNC: 49.2 MG/DL (ref 63–159)
LEUKOCYTE ESTERASE UR QL STRIP: ABNORMAL
LYMPHOCYTES # BLD AUTO: 1.8 K/UL (ref 1–4.8)
LYMPHOCYTES NFR BLD: 26.3 % (ref 18–48)
MCH RBC QN AUTO: 32.1 PG (ref 27–31)
MCHC RBC AUTO-ENTMCNC: 35.5 G/DL (ref 32–36)
MCV RBC AUTO: 90 FL (ref 82–98)
MICROALBUMIN UR DL<=1MG/L-MCNC: 24 UG/ML
MICROSCOPIC COMMENT: ABNORMAL
MONOCYTES # BLD AUTO: 0.5 K/UL (ref 0.3–1)
MONOCYTES NFR BLD: 6.9 % (ref 4–15)
NEUTROPHILS # BLD AUTO: 4.4 K/UL (ref 1.8–7.7)
NEUTROPHILS NFR BLD: 65.1 % (ref 38–73)
NITRITE UR QL STRIP: NEGATIVE
NONHDLC SERPL-MCNC: 79 MG/DL
NRBC BLD-RTO: 0 /100 WBC
PH UR STRIP: 6 [PH] (ref 5–8)
PLATELET # BLD AUTO: 223 K/UL (ref 150–450)
PMV BLD AUTO: 9.4 FL (ref 9.2–12.9)
POTASSIUM SERPL-SCNC: 4.7 MMOL/L (ref 3.5–5.1)
PROT SERPL-MCNC: 7.6 G/DL (ref 6–8.4)
PROT UR QL STRIP: ABNORMAL
RBC # BLD AUTO: 4.02 M/UL (ref 4–5.4)
RBC #/AREA URNS HPF: 3 /HPF (ref 0–4)
SODIUM SERPL-SCNC: 139 MMOL/L (ref 136–145)
SP GR UR STRIP: 1.02 (ref 1–1.03)
SQUAMOUS #/AREA URNS HPF: 1 /HPF
T4 FREE SERPL-MCNC: 0.95 NG/DL (ref 0.71–1.51)
TRIGL SERPL-MCNC: 149 MG/DL (ref 30–150)
TSH SERPL DL<=0.005 MIU/L-ACNC: 1.31 UIU/ML (ref 0.4–4)
URN SPEC COLLECT METH UR: ABNORMAL
UROBILINOGEN UR STRIP-ACNC: ABNORMAL EU/DL
WBC # BLD AUTO: 6.78 K/UL (ref 3.9–12.7)
WBC #/AREA URNS HPF: 5 /HPF (ref 0–5)

## 2023-07-27 PROCEDURE — 85025 COMPLETE CBC W/AUTO DIFF WBC: CPT | Performed by: FAMILY MEDICINE

## 2023-07-27 PROCEDURE — 3008F PR BODY MASS INDEX (BMI) DOCUMENTED: ICD-10-PCS | Mod: CPTII,S$GLB,, | Performed by: FAMILY MEDICINE

## 2023-07-27 PROCEDURE — 1157F ADVNC CARE PLAN IN RCRD: CPT | Mod: CPTII,S$GLB,, | Performed by: FAMILY MEDICINE

## 2023-07-27 PROCEDURE — 3078F PR MOST RECENT DIASTOLIC BLOOD PRESSURE < 80 MM HG: ICD-10-PCS | Mod: CPTII,S$GLB,, | Performed by: FAMILY MEDICINE

## 2023-07-27 PROCEDURE — 3288F PR FALLS RISK ASSESSMENT DOCUMENTED: ICD-10-PCS | Mod: CPTII,S$GLB,, | Performed by: FAMILY MEDICINE

## 2023-07-27 PROCEDURE — 1101F PT FALLS ASSESS-DOCD LE1/YR: CPT | Mod: CPTII,S$GLB,, | Performed by: FAMILY MEDICINE

## 2023-07-27 PROCEDURE — 3044F HG A1C LEVEL LT 7.0%: CPT | Mod: CPTII,S$GLB,, | Performed by: FAMILY MEDICINE

## 2023-07-27 PROCEDURE — 1159F PR MEDICATION LIST DOCUMENTED IN MEDICAL RECORD: ICD-10-PCS | Mod: CPTII,S$GLB,, | Performed by: FAMILY MEDICINE

## 2023-07-27 PROCEDURE — 1160F RVW MEDS BY RX/DR IN RCRD: CPT | Mod: CPTII,S$GLB,, | Performed by: FAMILY MEDICINE

## 2023-07-27 PROCEDURE — 3075F PR MOST RECENT SYSTOLIC BLOOD PRESS GE 130-139MM HG: ICD-10-PCS | Mod: CPTII,S$GLB,, | Performed by: FAMILY MEDICINE

## 2023-07-27 PROCEDURE — 80053 COMPREHEN METABOLIC PANEL: CPT | Performed by: FAMILY MEDICINE

## 2023-07-27 PROCEDURE — 99214 PR OFFICE/OUTPT VISIT, EST, LEVL IV, 30-39 MIN: ICD-10-PCS | Mod: S$GLB,,, | Performed by: FAMILY MEDICINE

## 2023-07-27 PROCEDURE — 1159F MED LIST DOCD IN RCRD: CPT | Mod: CPTII,S$GLB,, | Performed by: FAMILY MEDICINE

## 2023-07-27 PROCEDURE — 3075F SYST BP GE 130 - 139MM HG: CPT | Mod: CPTII,S$GLB,, | Performed by: FAMILY MEDICINE

## 2023-07-27 PROCEDURE — 36415 COLL VENOUS BLD VENIPUNCTURE: CPT | Performed by: FAMILY MEDICINE

## 2023-07-27 PROCEDURE — 99999 PR PBB SHADOW E&M-EST. PATIENT-LVL V: CPT | Mod: PBBFAC,,, | Performed by: FAMILY MEDICINE

## 2023-07-27 PROCEDURE — 80061 LIPID PANEL: CPT | Performed by: FAMILY MEDICINE

## 2023-07-27 PROCEDURE — 1126F AMNT PAIN NOTED NONE PRSNT: CPT | Mod: CPTII,S$GLB,, | Performed by: FAMILY MEDICINE

## 2023-07-27 PROCEDURE — 1160F PR REVIEW ALL MEDS BY PRESCRIBER/CLIN PHARMACIST DOCUMENTED: ICD-10-PCS | Mod: CPTII,S$GLB,, | Performed by: FAMILY MEDICINE

## 2023-07-27 PROCEDURE — 1157F PR ADVANCE CARE PLAN OR EQUIV PRESENT IN MEDICAL RECORD: ICD-10-PCS | Mod: CPTII,S$GLB,, | Performed by: FAMILY MEDICINE

## 2023-07-27 PROCEDURE — 4010F ACE/ARB THERAPY RXD/TAKEN: CPT | Mod: CPTII,S$GLB,, | Performed by: FAMILY MEDICINE

## 2023-07-27 PROCEDURE — 3078F DIAST BP <80 MM HG: CPT | Mod: CPTII,S$GLB,, | Performed by: FAMILY MEDICINE

## 2023-07-27 PROCEDURE — 81000 URINALYSIS NONAUTO W/SCOPE: CPT | Performed by: FAMILY MEDICINE

## 2023-07-27 PROCEDURE — 1101F PR PT FALLS ASSESS DOC 0-1 FALLS W/OUT INJ PAST YR: ICD-10-PCS | Mod: CPTII,S$GLB,, | Performed by: FAMILY MEDICINE

## 2023-07-27 PROCEDURE — 1126F PR PAIN SEVERITY QUANTIFIED, NO PAIN PRESENT: ICD-10-PCS | Mod: CPTII,S$GLB,, | Performed by: FAMILY MEDICINE

## 2023-07-27 PROCEDURE — 99999 PR PBB SHADOW E&M-EST. PATIENT-LVL V: ICD-10-PCS | Mod: PBBFAC,,, | Performed by: FAMILY MEDICINE

## 2023-07-27 PROCEDURE — 83036 HEMOGLOBIN GLYCOSYLATED A1C: CPT | Performed by: FAMILY MEDICINE

## 2023-07-27 PROCEDURE — 84443 ASSAY THYROID STIM HORMONE: CPT | Performed by: FAMILY MEDICINE

## 2023-07-27 PROCEDURE — 3008F BODY MASS INDEX DOCD: CPT | Mod: CPTII,S$GLB,, | Performed by: FAMILY MEDICINE

## 2023-07-27 PROCEDURE — 82306 VITAMIN D 25 HYDROXY: CPT | Performed by: FAMILY MEDICINE

## 2023-07-27 PROCEDURE — 82570 ASSAY OF URINE CREATININE: CPT | Performed by: FAMILY MEDICINE

## 2023-07-27 PROCEDURE — 4010F PR ACE/ARB THEARPY RXD/TAKEN: ICD-10-PCS | Mod: CPTII,S$GLB,, | Performed by: FAMILY MEDICINE

## 2023-07-27 PROCEDURE — 3044F PR MOST RECENT HEMOGLOBIN A1C LEVEL <7.0%: ICD-10-PCS | Mod: CPTII,S$GLB,, | Performed by: FAMILY MEDICINE

## 2023-07-27 PROCEDURE — 84439 ASSAY OF FREE THYROXINE: CPT | Performed by: FAMILY MEDICINE

## 2023-07-27 PROCEDURE — 3288F FALL RISK ASSESSMENT DOCD: CPT | Mod: CPTII,S$GLB,, | Performed by: FAMILY MEDICINE

## 2023-07-27 PROCEDURE — 99214 OFFICE O/P EST MOD 30 MIN: CPT | Mod: S$GLB,,, | Performed by: FAMILY MEDICINE

## 2023-07-27 NOTE — PROGRESS NOTES
Subjective     Patient ID: Riana Kay is a 68 y.o. female.    Chief Complaint: Elevated Blood Sugars    68 yr old pleasant female with DM II, HTN, ZAHIRA, and other co morbidities presents today as new patient and evaluation of diabetes. Her sugars are fluctuating from 100-300. She is not fully compliant with diet. She is scheduled for labs in a week butw ant to do today. She has PCP Dr Rojas. No symptoms but worried about high sugars. No dysuria or other symptoms. Details as follows -        Diabetes  She presents for her follow-up diabetic visit. She has type 2 diabetes mellitus. Her disease course has been worsening. Pertinent negatives for hypoglycemia include no confusion, dizziness, headaches, hunger, mood changes, nervousness/anxiousness, pallor, seizures, sleepiness, speech difficulty or tremors. Associated symptoms include polyphagia. Pertinent negatives for diabetes include no blurred vision, no chest pain, no foot paresthesias, no foot ulcerations, no polydipsia, no polyuria, no visual change and no weight loss. Pertinent negatives for hypoglycemia complications include no blackouts, no nocturnal hypoglycemia and no required assistance. Pertinent negatives for diabetic complications include no autonomic neuropathy, heart disease, impotence, nephropathy or retinopathy. Risk factors for coronary artery disease include diabetes mellitus and post-menopausal. Current diabetic treatment includes insulin injections. She is compliant with treatment all of the time. Her weight is stable. She is following a diabetic diet. Meal planning includes avoidance of concentrated sweets. She rarely participates in exercise. There is no change in her home blood glucose trend. An ACE inhibitor/angiotensin II receptor blocker is being taken. She does not see a podiatrist.Eye exam is current.   Review of Systems   Constitutional: Negative.  Negative for activity change, diaphoresis, unexpected weight change and weight  loss.   HENT: Negative.  Negative for nasal congestion, ear discharge, hearing loss, rhinorrhea, sore throat and voice change.    Eyes: Negative.  Negative for blurred vision, pain, discharge and visual disturbance.   Respiratory: Negative.  Negative for chest tightness, shortness of breath and wheezing.    Cardiovascular: Negative.  Negative for chest pain.   Gastrointestinal: Negative.  Negative for abdominal distention, anal bleeding, constipation and nausea.   Endocrine: Positive for polyphagia. Negative for cold intolerance, polydipsia and polyuria.   Genitourinary: Negative.  Negative for decreased urine volume, difficulty urinating, dysuria, frequency, impotence, menstrual problem and vaginal pain.   Musculoskeletal: Negative.  Negative for arthralgias, gait problem and myalgias.   Integumentary:  Negative for color change, pallor and wound. Negative.   Allergic/Immunologic: Negative.  Negative for environmental allergies and immunocompromised state.   Neurological: Negative.  Negative for dizziness, tremors, seizures, speech difficulty and headaches.   Hematological: Negative.  Negative for adenopathy. Does not bruise/bleed easily.   Psychiatric/Behavioral: Negative.  Negative for agitation, confusion, decreased concentration, hallucinations, self-injury and suicidal ideas. The patient is not nervous/anxious.           Past Medical History:   Diagnosis Date    Abdominal pain, right upper quadrant 02/04/2014    Anticoagulant long-term use     Anxiety     AR (allergic rhinitis)     Atrophic gastritis without mention of hemorrhage 02/13/2012     Dx updated per 2019 IMO Load    Chronic fatigue syndrome 06/10/2012    Diabetes mellitus     Dizziness     Fatty liver     GERD (gastroesophageal reflux disease)     Gross hematuria 12/01/2020    HTN (hypertension)     Hyperlipidemia     Leg swelling     Memory loss     Osteopenia     PONV (postoperative nausea and vomiting)     Primary osteoarthritis of right knee  10/06/2020    Primary osteoarthritis of right knee 10/06/2020    Right elbow pain 01/16/2019    S/P total hysterectomy     Screening for colon cancer 01/06/2021    Sleep apnea     Status post total right knee replacement 10/6/2020 10/05/2020       Past Surgical History:   Procedure Laterality Date    CHOLECYSTECTOMY      COLONOSCOPY N/A 1/17/2018    Procedure: COLONOSCOPY with Donnell;  Surgeon: Roland Jacobo MD;  [facility]);  Service: Endoscopy;  Laterality: N/A;    COLONOSCOPY N/A 1/6/2021    Procedure: COLONOSCOPY;  Surgeon: Yong Rowland MD;  Location: [facility] (4TH FLR);  Service: Endoscopy;  Laterality: N/A;  prep ins. emailed - Mont[place] speaking,  needed - ERW  COVID screening [facility] - ERW    CYSTOSCOPY N/A 12/1/2020    Procedure: CYSTOSCOPY;  Surgeon: Ines Stanford MD;  [facility];  Service: Urology;  Laterality: N/A;  45 minutes     ELBOW ARTHROPLASTY Right 1/16/2019    Procedure: ARTHROPLASTY, ELBOW right radial head arthroplasty revision;  Surgeon: Katja Hubbard MD;  [address]  Service: Orthopedics;  Laterality: Right;  Anesthesia: General and Regional. Stretcher, hand pan 1 and pan 2, CALL RUCHI SANTAMARIA & Sol notified 1-14 LO    ELBOW SURGERY Right 7/16/15    ELBOW SURGERY      ESOPHAGOGASTRODUODENOSCOPY N/A 4/15/2019    Procedure: EGD (ESOPHAGOGASTRODUODENOSCOPY);  Surgeon: Buck Irwin MD;  Location: [facility] (4TH FLR);  Service: Endoscopy;  Laterality: N/A;  ray she    ESOPHAGOGASTRODUODENOSCOPY N/A 4/21/2023    Procedure: EGD (ESOPHAGOGASTRODUODENOSCOPY);  Surgeon: Aamir Reyes MD;  Location: [facility];  Service: Endoscopy;  Laterality: N/A;    HYSTERECTOMY      KNEE ARTHROPLASTY Right 10/6/2020    Procedure: ARTHROPLASTY, KNEE-SAME DAY PROTOCOL;  Surgeon: Sabino Damon MD;  Location: [facility];  Service: Orthopedics;  Laterality: Right;    KNEE ARTHROSCOPY W/  DEBRIDEMENT  4/11    Left    RELEASE OF ULNAR NERVE AT CUBITAL TUNNEL Right 1/16/2019    Procedure: RELEASE, ULNAR TUNNEL right;  Surgeon: Katja Hubbard MD;  Location: [facility] OR [address] [place] [address];  Service: Orthopedics;  Laterality: Right;  Anesthesia: General and Regional. Stretcher, hand pan 1 and pan 2, CALL ACCUMED, CLAIRX    RETROGRADE PYELOGRAPHY Bilateral 12/1/2020    Procedure: PYELOGRAM, RETROGRADE;  Surgeon: Ines Stanford MD;  [facility];  Service: Urology;  Laterality: Bilateral;    REVERSE TOTAL SHOULDER ARTHROPLASTY Right 8/16/2022    Procedure: ARTHROPLASTY, SHOULDER, TOTAL, REVERSE, [place];  Surgeon: Aamir Powell MD;  Location: [facility] OR 2ND FLR;  Service: Orthopedics;  Laterality: Right;  [place] Can't go until after 12    ROTATOR CUFF REPAIR      TOTAL ABDOMINAL HYSTERECTOMY W/ BILATERAL SALPINGOOPHORECTOMY      TOTAL KNEE ARTHROPLASTY Left 10/19/2021    Procedure: ARTHROPLASTY, KNEE, TOTAL;  Surgeon: Sabino Damon MD;  [facility];  Service: Orthopedics;  Laterality: Left;    UPPER GASTROINTESTINAL ENDOSCOPY         Family History   Problem Relation Age of Onset    Cancer Father         colon    Colon cancer Father 83        colon cancer    Diabetes Maternal Aunt     Diabetes Maternal Grandmother     Esophageal cancer Neg Hx     Stomach cancer Neg Hx     Melanoma Neg Hx        Social History     Socioeconomic History    Marital status:      Spouse name: Kemal    Number of children: 1   Occupational History    Occupation: Housekeeping     Comment: On disability   Tobacco Use    Smoking status: Never     Passive exposure: Never    Smokeless tobacco: Never   Substance and Sexual Activity    Alcohol use: No    Drug use: No    Sexual activity: Yes     Partners: Male     Birth control/protection: Post-menopausal   Other Topics Concern    Are you pregnant or think you may be? No    Breast-feeding No   Social History Narrative    She retired at [employer] in  housekeeping; , 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    No stairs     Social Determinants of Health     Financial Resource Strain: Low Risk     Difficulty of Paying Living Expenses: Not hard at all   Food Insecurity: No Food Insecurity    Worried About Running Out of Food in the Last Year: Never true    Ran Out of Food in the Last Year: Never true   Transportation Needs: No Transportation Needs    Lack of Transportation (Medical): No    Lack of Transportation (Non-Medical): No   Physical Activity: Sufficiently Active    Days of Exercise per Week: 5 days    Minutes of Exercise per Session: 40 min   Stress: Stress Concern Present    Feeling of Stress : To some extent   Social Connections: Unknown    Frequency of Communication with Friends and Family: More than three times a week    Frequency of Social Gatherings with Friends and Family: Twice a week    Active Member of Clubs or Organizations: Yes    Attends Club or Organization Meetings: More than 4 times per year    Marital Status:    Housing Stability: Low Risk     Unable to Pay for Housing in the Last Year: No    Number of Places Lived in the Last Year: 1    Unstable Housing in the Last Year: No       Current Outpatient Medications   Medication Sig Dispense Refill    acetaminophen (TYLENOL) 500 MG tablet Take 2 tablets (1,000 mg total) by mouth every 8 (eight) hours as needed for Pain. 90 tablet 0    blood sugar diagnostic Strp To check BG 3 times daily, to use with insurance preferred meter, e 11.65 100 each 11    blood-glucose meter kit To check BG 3 times daily, to use with insurance preferred meter, e 11.65 1 each 0    buPROPion (WELLBUTRIN XL) 150 MG TB24 tablet Take 150 mg by mouth every morning.      cyclobenzaprine (FLEXERIL) 5 MG tablet Take 5 mg by mouth.      glucagon (BAQSIMI) 3 mg/actuation Spry Give one puff via nostril. Hold device between fingers and thumb, do not push plunger yet, insert tip gently into one nostril until finger(s)  "touch the outside of the nose, then push plunger firmly all the way in . Dose is complete when the green line disappears. 1 each 1    insulin (LANTUS SOLOSTAR U-100 INSULIN) glargine 100 units/mL SubQ pen [id number] UNITS SUBCUTANEOUSLY AT NIGHT. 30 each 4    insulin lispro 100 unit/mL pen INJECT 8 UNITS WITH MEALS, PLUS SLIDING SCALE, MAX DAILY 54 UNITS 60 mL 3    ketoconazole (NIZORAL) 2 % shampoo Apply topically every 7 days. 120 mL 6    lancets Misc To check BG 3 times daily, to use with insurance preferred meter, e 11.65 100 each 11    latanoprost 0.005 % ophthalmic solution Place 1 drop into both eyes nightly.      losartan (COZAAR) 50 MG tablet TAKE 1 TABLET BY MOUTH EVERY DAY 90 tablet 3    meloxicam (MOBIC) 15 MG tablet TAKE 1 TABLET BY MOUTH EVERY DAY 30 tablet 2    ondansetron (ZOFRAN-ODT) 8 MG TbDL Take 1 tablet (8 mg total) by mouth 3 (three) times daily as needed (Nausea). 30 tablet 3    ONETOUCH DELICA LANCETS 33 gauge Misc TO CHECK BLOOD GLUCOSE 3 TIMES DAILY      ONETOUCH VERIO FLEX METER Misc TO CHECK BLOOD GLUCOSE 3 TIMES DAILY, TO USE WITH INSURANCE PREFERRED METER, E 11.65      pantoprazole (PROTONIX) 40 MG tablet Take 1 tablet (40 mg total) by mouth once daily. 90 tablet 3    pen needle, diabetic (NOVOFINE 32) 32 gauge x 1/4" Ndle Uses 4 times a day. 90 day via duramed e 11.65 400 each 3    rosuvastatin (CRESTOR) 10 MG tablet Take 1 tablet (10 mg total) by mouth once daily. 90 tablet 3    azelastine (ASTELIN) 137 mcg (0.1 %) nasal spray 1 spray (137 mcg total) by Nasal route 2 (two) times daily. 30 mL 11    meclizine (ANTIVERT) 25 mg tablet Take 1 tablet (25 mg total) by mouth 3 (three) times daily as needed for Dizziness. 30 tablet 1     No current facility-administered medications for this visit.       Review of patient's allergies indicates:   Allergen Reactions    Iodinated contrast media Swelling and Rash    Percocet [oxycodone-acetaminophen] Itching    Macrobid [nitrofurantoin " monohyd/m-cryst] Rash    Metformin Rash    Penicillins Rash     Had ancef in 2020 with no adverse rxn     Promethazine Rash     Had compazine in 2021    Sulfa (sulfonamide antibiotics) Rash    Sulfamethoxazole-trimethoprim Rash       Objective   Vitals:    07/27/23 0910   BP: 134/68   Pulse: 76       Physical Exam  Constitutional:       General: She is not in acute distress.     Appearance: She is well-developed. She is not diaphoretic.   HENT:      Head: Normocephalic and atraumatic.      Right Ear: External ear normal.      Left Ear: External ear normal.      Nose: Nose normal.      Mouth/Throat:      Pharynx: No oropharyngeal exudate.   Eyes:      General: No scleral icterus.        Right eye: No discharge.         Left eye: No discharge.      Conjunctiva/sclera: Conjunctivae normal.      Pupils: Pupils are equal, round, and reactive to light.   Neck:      Thyroid: No thyromegaly.      Vascular: No JVD.      Trachea: No tracheal deviation.   Cardiovascular:      Rate and Rhythm: Normal rate and regular rhythm.      Heart sounds: Normal heart sounds. No murmur heard.    No friction rub. No gallop.   Pulmonary:      Effort: Pulmonary effort is normal.      Breath sounds: Normal breath sounds. No stridor. No wheezing or rales.   Chest:      Chest wall: No tenderness.   Abdominal:      General: Bowel sounds are normal. There is no distension.      Palpations: Abdomen is soft. There is no mass.      Tenderness: There is no abdominal tenderness. There is no guarding or rebound.      Hernia: No hernia is present.   Musculoskeletal:         General: No tenderness. Normal range of motion.      Cervical back: Normal range of motion and neck supple.   Lymphadenopathy:      Cervical: No cervical adenopathy.   Skin:     General: Skin is warm and dry.      Coloration: Skin is not pale.      Findings: No erythema or rash.   Neurological:      Mental Status: She is alert and oriented to person, place, and time.      Cranial  Nerves: No cranial nerve deficit.      Motor: No abnormal muscle tone.      Coordination: Coordination normal.      Deep Tendon Reflexes: Reflexes are normal and symmetric. Reflexes normal.   Psychiatric:         Behavior: Behavior normal.         Thought Content: Thought content normal.         Judgment: Judgment normal.          Assessment and Plan     1. Type 2 diabetes mellitus without complication, with long-term current use of insulin    2. Hyperglycemia        Riana was seen today for elevated blood sugars.    Diagnoses and all orders for this visit:    Type 2 diabetes mellitus without complication, with long-term current use of insulin    Hyperglycemia      DM II/hyperglycemia  -labs and urine  -diet compliance and avoid sugar  -ER/endocrine precautions given    Spent adequate time in obtaining history and explaining differentials    30 minutes spent during this visit of which greater than 50% devoted to face-face counseling and coordination of care regarding diagnosis and management plan           Rtc 2 weeks w PCP and prn with me

## 2023-08-09 ENCOUNTER — OFFICE VISIT (OUTPATIENT)
Dept: INTERNAL MEDICINE | Facility: CLINIC | Age: 68
End: 2023-08-09
Payer: MEDICARE

## 2023-08-09 ENCOUNTER — PATIENT MESSAGE (OUTPATIENT)
Dept: INTERNAL MEDICINE | Facility: CLINIC | Age: 68
End: 2023-08-09

## 2023-08-09 VITALS
HEART RATE: 75 BPM | HEIGHT: 65 IN | WEIGHT: 160.94 LBS | RESPIRATION RATE: 16 BRPM | OXYGEN SATURATION: 98 % | TEMPERATURE: 98 F | DIASTOLIC BLOOD PRESSURE: 66 MMHG | SYSTOLIC BLOOD PRESSURE: 132 MMHG | BODY MASS INDEX: 26.81 KG/M2

## 2023-08-09 DIAGNOSIS — E55.9 VITAMIN D DEFICIENCY: ICD-10-CM

## 2023-08-09 DIAGNOSIS — Z79.4 TYPE 2 DIABETES MELLITUS WITHOUT COMPLICATION, WITH LONG-TERM CURRENT USE OF INSULIN: ICD-10-CM

## 2023-08-09 DIAGNOSIS — E11.59 HYPERTENSION ASSOCIATED WITH TYPE 2 DIABETES MELLITUS: ICD-10-CM

## 2023-08-09 DIAGNOSIS — I15.2 HYPERTENSION ASSOCIATED WITH TYPE 2 DIABETES MELLITUS: ICD-10-CM

## 2023-08-09 DIAGNOSIS — K21.9 GASTROESOPHAGEAL REFLUX DISEASE WITHOUT ESOPHAGITIS: ICD-10-CM

## 2023-08-09 DIAGNOSIS — E11.9 TYPE 2 DIABETES MELLITUS WITHOUT COMPLICATION, WITH LONG-TERM CURRENT USE OF INSULIN: ICD-10-CM

## 2023-08-09 DIAGNOSIS — I25.10 ATHEROSCLEROSIS OF NATIVE CORONARY ARTERY OF NATIVE HEART WITHOUT ANGINA PECTORIS: Primary | ICD-10-CM

## 2023-08-09 DIAGNOSIS — I77.9 CAROTID ARTERY DISEASE, UNSPECIFIED LATERALITY, UNSPECIFIED TYPE: ICD-10-CM

## 2023-08-09 DIAGNOSIS — Z09 FOLLOW-UP EXAM, 3-6 MONTHS SINCE PREVIOUS EXAM: Primary | ICD-10-CM

## 2023-08-09 DIAGNOSIS — J30.9 ALLERGIC RHINITIS, UNSPECIFIED SEASONALITY, UNSPECIFIED TRIGGER: ICD-10-CM

## 2023-08-09 DIAGNOSIS — G47.33 OSA (OBSTRUCTIVE SLEEP APNEA): ICD-10-CM

## 2023-08-09 DIAGNOSIS — I25.10 ATHEROSCLEROSIS OF NATIVE CORONARY ARTERY OF NATIVE HEART WITHOUT ANGINA PECTORIS: ICD-10-CM

## 2023-08-09 DIAGNOSIS — F41.9 ANXIETY: ICD-10-CM

## 2023-08-09 PROCEDURE — 3066F PR DOCUMENTATION OF TREATMENT FOR NEPHROPATHY: ICD-10-PCS | Mod: CPTII,S$GLB,, | Performed by: FAMILY MEDICINE

## 2023-08-09 PROCEDURE — 3075F SYST BP GE 130 - 139MM HG: CPT | Mod: CPTII,S$GLB,, | Performed by: FAMILY MEDICINE

## 2023-08-09 PROCEDURE — 3051F PR MOST RECENT HEMOGLOBIN A1C LEVEL 7.0 - < 8.0%: ICD-10-PCS | Mod: CPTII,S$GLB,, | Performed by: FAMILY MEDICINE

## 2023-08-09 PROCEDURE — 3288F PR FALLS RISK ASSESSMENT DOCUMENTED: ICD-10-PCS | Mod: CPTII,S$GLB,, | Performed by: FAMILY MEDICINE

## 2023-08-09 PROCEDURE — 3061F PR NEG MICROALBUMINURIA RESULT DOCUMENTED/REVIEW: ICD-10-PCS | Mod: CPTII,S$GLB,, | Performed by: FAMILY MEDICINE

## 2023-08-09 PROCEDURE — 4010F ACE/ARB THERAPY RXD/TAKEN: CPT | Mod: CPTII,S$GLB,, | Performed by: FAMILY MEDICINE

## 2023-08-09 PROCEDURE — 1157F ADVNC CARE PLAN IN RCRD: CPT | Mod: CPTII,S$GLB,, | Performed by: FAMILY MEDICINE

## 2023-08-09 PROCEDURE — 3061F NEG MICROALBUMINURIA REV: CPT | Mod: CPTII,S$GLB,, | Performed by: FAMILY MEDICINE

## 2023-08-09 PROCEDURE — 3008F PR BODY MASS INDEX (BMI) DOCUMENTED: ICD-10-PCS | Mod: CPTII,S$GLB,, | Performed by: FAMILY MEDICINE

## 2023-08-09 PROCEDURE — 1101F PT FALLS ASSESS-DOCD LE1/YR: CPT | Mod: CPTII,S$GLB,, | Performed by: FAMILY MEDICINE

## 2023-08-09 PROCEDURE — 1159F PR MEDICATION LIST DOCUMENTED IN MEDICAL RECORD: ICD-10-PCS | Mod: CPTII,S$GLB,, | Performed by: FAMILY MEDICINE

## 2023-08-09 PROCEDURE — 99999 PR PBB SHADOW E&M-EST. PATIENT-LVL V: CPT | Mod: PBBFAC,,, | Performed by: FAMILY MEDICINE

## 2023-08-09 PROCEDURE — 1160F RVW MEDS BY RX/DR IN RCRD: CPT | Mod: CPTII,S$GLB,, | Performed by: FAMILY MEDICINE

## 2023-08-09 PROCEDURE — 3066F NEPHROPATHY DOC TX: CPT | Mod: CPTII,S$GLB,, | Performed by: FAMILY MEDICINE

## 2023-08-09 PROCEDURE — 1101F PR PT FALLS ASSESS DOC 0-1 FALLS W/OUT INJ PAST YR: ICD-10-PCS | Mod: CPTII,S$GLB,, | Performed by: FAMILY MEDICINE

## 2023-08-09 PROCEDURE — 99999 PR PBB SHADOW E&M-EST. PATIENT-LVL V: ICD-10-PCS | Mod: PBBFAC,,, | Performed by: FAMILY MEDICINE

## 2023-08-09 PROCEDURE — 3288F FALL RISK ASSESSMENT DOCD: CPT | Mod: CPTII,S$GLB,, | Performed by: FAMILY MEDICINE

## 2023-08-09 PROCEDURE — 3008F BODY MASS INDEX DOCD: CPT | Mod: CPTII,S$GLB,, | Performed by: FAMILY MEDICINE

## 2023-08-09 PROCEDURE — 99215 PR OFFICE/OUTPT VISIT, EST, LEVL V, 40-54 MIN: ICD-10-PCS | Mod: S$GLB,,, | Performed by: FAMILY MEDICINE

## 2023-08-09 PROCEDURE — 1160F PR REVIEW ALL MEDS BY PRESCRIBER/CLIN PHARMACIST DOCUMENTED: ICD-10-PCS | Mod: CPTII,S$GLB,, | Performed by: FAMILY MEDICINE

## 2023-08-09 PROCEDURE — 1126F PR PAIN SEVERITY QUANTIFIED, NO PAIN PRESENT: ICD-10-PCS | Mod: CPTII,S$GLB,, | Performed by: FAMILY MEDICINE

## 2023-08-09 PROCEDURE — 1159F MED LIST DOCD IN RCRD: CPT | Mod: CPTII,S$GLB,, | Performed by: FAMILY MEDICINE

## 2023-08-09 PROCEDURE — 99215 OFFICE O/P EST HI 40 MIN: CPT | Mod: S$GLB,,, | Performed by: FAMILY MEDICINE

## 2023-08-09 PROCEDURE — 3075F PR MOST RECENT SYSTOLIC BLOOD PRESS GE 130-139MM HG: ICD-10-PCS | Mod: CPTII,S$GLB,, | Performed by: FAMILY MEDICINE

## 2023-08-09 PROCEDURE — 3051F HG A1C>EQUAL 7.0%<8.0%: CPT | Mod: CPTII,S$GLB,, | Performed by: FAMILY MEDICINE

## 2023-08-09 PROCEDURE — 3078F DIAST BP <80 MM HG: CPT | Mod: CPTII,S$GLB,, | Performed by: FAMILY MEDICINE

## 2023-08-09 PROCEDURE — 3078F PR MOST RECENT DIASTOLIC BLOOD PRESSURE < 80 MM HG: ICD-10-PCS | Mod: CPTII,S$GLB,, | Performed by: FAMILY MEDICINE

## 2023-08-09 PROCEDURE — 1126F AMNT PAIN NOTED NONE PRSNT: CPT | Mod: CPTII,S$GLB,, | Performed by: FAMILY MEDICINE

## 2023-08-09 PROCEDURE — 4010F PR ACE/ARB THEARPY RXD/TAKEN: ICD-10-PCS | Mod: CPTII,S$GLB,, | Performed by: FAMILY MEDICINE

## 2023-08-09 PROCEDURE — 1157F PR ADVANCE CARE PLAN OR EQUIV PRESENT IN MEDICAL RECORD: ICD-10-PCS | Mod: CPTII,S$GLB,, | Performed by: FAMILY MEDICINE

## 2023-08-09 RX ORDER — AZELASTINE 1 MG/ML
1 SPRAY, METERED NASAL 2 TIMES DAILY
Qty: 30 ML | Refills: 11 | Status: SHIPPED | OUTPATIENT
Start: 2023-08-09 | End: 2024-08-08

## 2023-08-09 RX ORDER — BUPROPION HYDROCHLORIDE 300 MG/1
300 TABLET ORAL EVERY MORNING
Qty: 90 TABLET | Refills: 3 | Status: SHIPPED | OUTPATIENT
Start: 2023-08-09

## 2023-08-09 RX ORDER — ATORVASTATIN CALCIUM 20 MG/1
20 TABLET, FILM COATED ORAL DAILY
Qty: 90 TABLET | Refills: 3 | Status: SHIPPED | OUTPATIENT
Start: 2023-08-09 | End: 2024-08-08

## 2023-08-09 RX ORDER — PANTOPRAZOLE SODIUM 40 MG/1
40 TABLET, DELAYED RELEASE ORAL DAILY
Qty: 90 TABLET | Refills: 3 | Status: SHIPPED | OUTPATIENT
Start: 2023-08-09

## 2023-08-09 NOTE — PROGRESS NOTES
Subjective     Patient ID: Riana Kay is a 68 y.o. female.    Chief Complaint: Annual Exam (Discuss Test Results)    HPI 68-year-old  female with type 2 diabetes on insulin, hypertension, aortic atherosclerosis, carotid artery disease, anxiety, vitamin-D deficiency, sleep apnea, allergic rhinitis, and GERD presents to clinic today accompanied by her  for follow-up of her general medical conditions.  She continues to be followed by Endocrinology for treatment of type 2 diabetes which was previously well controlled on Lantus, Humalog, and Ozempic.  Upon further review Ozempic was discontinued and recently the patient has begun noticing elevated glucose levels as high as 300s.  Recent A1c has increased to 7.4.  Upon further discussion the patient reports increased nausea and constipation when taking Ozempic.  At this time I have recommended that she increase Lantus by 1 unit daily until fasting glucose levels are consistently below 120.  She is scheduled for follow-up with her endocrinologist in approximately 2 weeks.  She continues to be treated for hypertension which is well controlled on losartan 50 mg daily.  She has been started on Crestor 10 mg daily secondary to atherosclerosis and increased coronary risk.  She continues to use pantoprazole 40 mg daily secondary to GERD with adequate control.  She uses CPAP nightly secondary to sleep apnea but continues to note occasional difficulty sleeping secondary to anxiety.  She has been started on Wellbutrin 150 mg daily and I have recommended that she increase the dose to 300 mg daily for further treatment.  She reports a past surgical history of left knee arthroscopic surgery, hysterectomy, rotator cuff repair, right elbow surgery, right knee replacement, cholecystectomy, and most recently right shoulder surgery secondary to humeral head fracture.  She reports a family history of her father passing away from colon cancer at 83.  She is  up-to-date with all screening exams and vaccinations.  Review of Systems   Constitutional:  Positive for fatigue. Negative for appetite change, chills and fever.   HENT:  Negative for nasal congestion, ear pain, hearing loss, postnasal drip, rhinorrhea, sinus pressure/congestion, sore throat and tinnitus.    Eyes:  Negative for redness, itching and visual disturbance.   Respiratory:  Negative for cough, chest tightness and shortness of breath.    Cardiovascular:  Negative for chest pain and palpitations.   Gastrointestinal:  Positive for constipation. Negative for abdominal pain, diarrhea, nausea and vomiting.   Genitourinary:  Negative for decreased urine volume, difficulty urinating, dysuria, frequency, hematuria and urgency.   Musculoskeletal:  Positive for arthralgias. Negative for back pain, myalgias, neck pain and neck stiffness.   Integumentary:  Negative for rash.   Neurological:  Negative for dizziness, light-headedness and headaches.   Psychiatric/Behavioral: Negative.            Objective     Physical Exam  Vitals and nursing note reviewed.   Constitutional:       General: She is not in acute distress.     Appearance: She is well-developed. She is not diaphoretic.   HENT:      Head: Normocephalic and atraumatic.      Right Ear: External ear normal.      Left Ear: External ear normal.      Nose: Nose normal.      Mouth/Throat:      Pharynx: No oropharyngeal exudate.   Eyes:      General: No scleral icterus.        Right eye: No discharge.         Left eye: No discharge.      Conjunctiva/sclera: Conjunctivae normal.      Pupils: Pupils are equal, round, and reactive to light.   Neck:      Thyroid: No thyromegaly.      Vascular: No JVD.      Trachea: No tracheal deviation.   Cardiovascular:      Rate and Rhythm: Normal rate and regular rhythm.      Pulses:           Dorsalis pedis pulses are 2+ on the right side and 2+ on the left side.        Posterior tibial pulses are 2+ on the right side and 2+ on the  left side.      Heart sounds: Normal heart sounds. No murmur heard.     No friction rub. No gallop.   Pulmonary:      Effort: Pulmonary effort is normal. No respiratory distress.      Breath sounds: Normal breath sounds. No stridor. No wheezing or rales.   Abdominal:      General: Bowel sounds are normal. There is no distension.      Palpations: Abdomen is soft. There is no mass.      Tenderness: There is no abdominal tenderness. There is no guarding or rebound.   Musculoskeletal:         General: No tenderness. Normal range of motion.      Cervical back: Normal range of motion and neck supple.      Right foot: Normal range of motion. No deformity, bunion, Charcot foot, foot drop or prominent metatarsal heads.      Left foot: Normal range of motion. No deformity, bunion, Charcot foot, foot drop or prominent metatarsal heads.   Feet:      Right foot:      Protective Sensation: 10 sites tested.  10 sites sensed.      Skin integrity: Skin integrity normal.      Toenail Condition: Right toenails are normal.      Left foot:      Protective Sensation: 10 sites tested.  10 sites sensed.      Skin integrity: Skin integrity normal.      Toenail Condition: Left toenails are normal.   Lymphadenopathy:      Cervical: No cervical adenopathy.   Skin:     General: Skin is warm and dry.      Coloration: Skin is not pale.      Findings: No erythema or rash.   Neurological:      Mental Status: She is alert and oriented to person, place, and time.   Psychiatric:         Behavior: Behavior normal.         Thought Content: Thought content normal.         Judgment: Judgment normal.            Assessment and Plan     1. Follow-up exam, 3-6 months since previous exam    2. Type 2 diabetes mellitus without complication, with long-term current use of insulin    3. Hypertension associated with type 2 diabetes mellitus    4. Atherosclerosis of native coronary artery of native heart without angina pectoris    5. Carotid artery disease,  unspecified laterality, unspecified type  Overview:  [phone]  [phone] 8/14/2022    Saad Daily MD  [phone] 8/14/2022      Narrative & Impression  EXAMINATION:  CT SHOULDER WITHOUT CONTRAST RIGHT; CT 3D RECON WITH INDEPENDENT WS     CLINICAL HISTORY:  Shoulder trauma, instability or dislocation suspected, xray done;ortho;; Shoulder trauma, instability or dislocation suspected, xray done;ortho;3d;     TECHNIQUE:  Axial 0.625-mm images of the right shoulder obtained without intravenous contrast.  Data submitted for coronal and sagittal reformats.     3D reconstructed images were acquired by post processing on an independent workstation with concurrent supervision and archived for permanent record.     COMPARISON:  Right shoulder radiograph 08/14/2022; chest radiograph 06/11/2021     FINDINGS:  Bony mineralization is normal.  Comminuted multi-fragment fracture of the humeral head and neck with moderate impaction.  Fracture at least partially involves the articular surface of the humeral head which is dislocated anteriorly and inferiorly with respect to the glenoid.     Acromioclavicular joint alignment is preserved without significant widening or hypertrophy.     Moderate volume joint effusion with fat fluid level suggesting lipohemarthrosis.  Generalized fat stranding infraclavicular and axillary region.  No organized fluid collections.  Muscle bulk is preserved.     Dependent atelectasis in the right lung base.  Dense calcification of the mitral annulus.  Multi-vessel coronary artery calcification.  Eccentric calcification in the right carotid bulb.     Impression:     Comminuted fracture of humeral head and neck with impaction and anterior inferior dislocation of the humeral head.     Moderate volume lipohemarthrosis.     This report was flagged in Epic as abnormal.     Electronically signed by resident: Saad Daily  Date:                                             08/14/2022  Time:                                           16:04     Electronically signed by: Jose Vázquez MD  Date:                                            08/14/2022 8/14/2022      6. Anxiety  -     buPROPion (WELLBUTRIN XL) 300 MG 24 hr tablet; Take 1 tablet (300 mg total) by mouth every morning.  Dispense: 90 tablet; Refill: 3    7. Vitamin D deficiency    8. ZAHIRA (obstructive sleep apnea)    9. Allergic rhinitis, unspecified seasonality, unspecified trigger  -     azelastine (ASTELIN) 137 mcg (0.1 %) nasal spray; 1 spray (137 mcg total) by Nasal route 2 (two) times daily.  Dispense: 30 mL; Refill: 11    10. Gastroesophageal reflux disease without esophagitis  -     pantoprazole (PROTONIX) 40 MG tablet; Take 1 tablet (40 mg total) by mouth once daily.  Dispense: 90 tablet; Refill: 3        1. Labs have been reviewed and are overall within normal limits except for an elevated hemoglobin A1c.    2. I recommend increasing Lantus by 1 unit nightly until fasting glucose levels are below 120.  Continue Humalog as prescribed.  Follow-up with endocrinology as scheduled.  Current A1c has increased to 7.4.    3. Continue losartan 50 mg daily.  Hypertension is well controlled.  4. Secondary to atherosclerosis and increase cardiac risk patient has been started on Crestor 10 mg daily for treatment.  Lipid panel has improved.    5. Carotid artery disease remained stable.    6. Increase Wellbutrin to 300 mg daily.  Patient continues to report anxiety and difficulty sleeping.  7. Continue over-the-counter vitamin-D 2000 units daily.  Vitamin-D deficiency is well controlled.  8. Continue use of CPAP nightly.  Sleep apnea is stable.    9. Continue use of Astelin nasal spray as needed.  Allergic rhinitis is stable.    10. Continue pantoprazole 40 mg daily.  GERD is stable.    11. Return to clinic as needed or in 1 year for follow-up.    42 minutes have been spent on this clinic visit.         Follow up in about 1 year  (around 8/9/2024), or if symptoms worsen or fail to improve, for Annual exam.

## 2023-08-15 ENCOUNTER — IMMUNIZATION (OUTPATIENT)
Dept: INTERNAL MEDICINE | Facility: CLINIC | Age: 68
End: 2023-08-15
Payer: MEDICARE

## 2023-08-15 DIAGNOSIS — Z23 NEED FOR VACCINATION: Primary | ICD-10-CM

## 2023-08-15 PROCEDURE — 91312 COVID-19, MRNA, LNP-S, BIVALENT BOOSTER, PF, 30 MCG/0.3 ML DOSE: ICD-10-PCS | Mod: S$GLB,,, | Performed by: INTERNAL MEDICINE

## 2023-08-15 PROCEDURE — 0124A COVID-19, MRNA, LNP-S, BIVALENT BOOSTER, PF, 30 MCG/0.3 ML DOSE: CPT | Mod: S$GLB,,, | Performed by: INTERNAL MEDICINE

## 2023-08-15 PROCEDURE — 0124A COVID-19, MRNA, LNP-S, BIVALENT BOOSTER, PF, 30 MCG/0.3 ML DOSE: ICD-10-PCS | Mod: S$GLB,,, | Performed by: INTERNAL MEDICINE

## 2023-08-15 PROCEDURE — 91312 COVID-19, MRNA, LNP-S, BIVALENT BOOSTER, PF, 30 MCG/0.3 ML DOSE: CPT | Mod: S$GLB,,, | Performed by: INTERNAL MEDICINE

## 2023-08-16 ENCOUNTER — LAB VISIT (OUTPATIENT)
Dept: LAB | Facility: HOSPITAL | Age: 68
End: 2023-08-16
Payer: MEDICARE

## 2023-08-16 DIAGNOSIS — E11.9 TYPE 2 DIABETES MELLITUS WITHOUT COMPLICATION, WITH LONG-TERM CURRENT USE OF INSULIN: ICD-10-CM

## 2023-08-16 DIAGNOSIS — Z79.4 TYPE 2 DIABETES MELLITUS WITHOUT COMPLICATION, WITH LONG-TERM CURRENT USE OF INSULIN: ICD-10-CM

## 2023-08-16 LAB
ESTIMATED AVG GLUCOSE: 174 MG/DL (ref 68–131)
HBA1C MFR BLD: 7.7 % (ref 4–5.6)

## 2023-08-16 PROCEDURE — 36415 COLL VENOUS BLD VENIPUNCTURE: CPT | Mod: PO | Performed by: NURSE PRACTITIONER

## 2023-08-16 PROCEDURE — 83036 HEMOGLOBIN GLYCOSYLATED A1C: CPT | Performed by: NURSE PRACTITIONER

## 2023-08-17 ENCOUNTER — HOSPITAL ENCOUNTER (OUTPATIENT)
Dept: RADIOLOGY | Facility: HOSPITAL | Age: 68
Discharge: HOME | End: 2023-08-17
Attending: NURSE PRACTITIONER
Payer: MEDICARE

## 2023-08-17 ENCOUNTER — OFFICE VISIT (OUTPATIENT)
Dept: ORTHOPEDICS | Facility: CLINIC | Age: 68
End: 2023-08-17
Payer: MEDICARE

## 2023-08-17 ENCOUNTER — PATIENT MESSAGE (OUTPATIENT)
Dept: INTERNAL MEDICINE | Facility: CLINIC | Age: 68
End: 2023-08-17
Payer: MEDICARE

## 2023-08-17 VITALS — BODY MASS INDEX: 26.81 KG/M2 | WEIGHT: 160.94 LBS | HEIGHT: 65 IN

## 2023-08-17 DIAGNOSIS — M25.511 ACUTE PAIN OF RIGHT SHOULDER: Primary | ICD-10-CM

## 2023-08-17 DIAGNOSIS — M25.511 ACUTE PAIN OF RIGHT SHOULDER: ICD-10-CM

## 2023-08-17 DIAGNOSIS — S42.291D OTHER CLOSED DISPLACED FRACTURE OF PROXIMAL END OF RIGHT HUMERUS WITH ROUTINE HEALING, SUBSEQUENT ENCOUNTER: Primary | ICD-10-CM

## 2023-08-17 PROCEDURE — 3051F HG A1C>EQUAL 7.0%<8.0%: CPT | Mod: CPTII,S$GLB,, | Performed by: NURSE PRACTITIONER

## 2023-08-17 PROCEDURE — 1159F PR MEDICATION LIST DOCUMENTED IN MEDICAL RECORD: ICD-10-PCS | Mod: CPTII,S$GLB,, | Performed by: NURSE PRACTITIONER

## 2023-08-17 PROCEDURE — 1126F PR PAIN SEVERITY QUANTIFIED, NO PAIN PRESENT: ICD-10-PCS | Mod: CPTII,S$GLB,, | Performed by: NURSE PRACTITIONER

## 2023-08-17 PROCEDURE — 1159F MED LIST DOCD IN RCRD: CPT | Mod: CPTII,S$GLB,, | Performed by: NURSE PRACTITIONER

## 2023-08-17 PROCEDURE — 4010F ACE/ARB THERAPY RXD/TAKEN: CPT | Mod: CPTII,S$GLB,, | Performed by: NURSE PRACTITIONER

## 2023-08-17 PROCEDURE — 1101F PR PT FALLS ASSESS DOC 0-1 FALLS W/OUT INJ PAST YR: ICD-10-PCS | Mod: CPTII,S$GLB,, | Performed by: NURSE PRACTITIONER

## 2023-08-17 PROCEDURE — 3066F PR DOCUMENTATION OF TREATMENT FOR NEPHROPATHY: ICD-10-PCS | Mod: CPTII,S$GLB,, | Performed by: NURSE PRACTITIONER

## 2023-08-17 PROCEDURE — 1157F ADVNC CARE PLAN IN RCRD: CPT | Mod: CPTII,S$GLB,, | Performed by: NURSE PRACTITIONER

## 2023-08-17 PROCEDURE — 4010F PR ACE/ARB THEARPY RXD/TAKEN: ICD-10-PCS | Mod: CPTII,S$GLB,, | Performed by: NURSE PRACTITIONER

## 2023-08-17 PROCEDURE — 3288F FALL RISK ASSESSMENT DOCD: CPT | Mod: CPTII,S$GLB,, | Performed by: NURSE PRACTITIONER

## 2023-08-17 PROCEDURE — 1157F PR ADVANCE CARE PLAN OR EQUIV PRESENT IN MEDICAL RECORD: ICD-10-PCS | Mod: CPTII,S$GLB,, | Performed by: NURSE PRACTITIONER

## 2023-08-17 PROCEDURE — 73030 X-RAY EXAM OF SHOULDER: CPT | Mod: TC,RT

## 2023-08-17 PROCEDURE — 99999 PR PBB SHADOW E&M-EST. PATIENT-LVL III: ICD-10-PCS | Mod: PBBFAC,,, | Performed by: NURSE PRACTITIONER

## 2023-08-17 PROCEDURE — 3288F PR FALLS RISK ASSESSMENT DOCUMENTED: ICD-10-PCS | Mod: CPTII,S$GLB,, | Performed by: NURSE PRACTITIONER

## 2023-08-17 PROCEDURE — 73030 XR SHOULDER COMPLETE 2 OR MORE VIEWS RIGHT: ICD-10-PCS | Mod: 26,RT,, | Performed by: RADIOLOGY

## 2023-08-17 PROCEDURE — 1160F RVW MEDS BY RX/DR IN RCRD: CPT | Mod: CPTII,S$GLB,, | Performed by: NURSE PRACTITIONER

## 2023-08-17 PROCEDURE — 73030 X-RAY EXAM OF SHOULDER: CPT | Mod: 26,RT,, | Performed by: RADIOLOGY

## 2023-08-17 PROCEDURE — 1101F PT FALLS ASSESS-DOCD LE1/YR: CPT | Mod: CPTII,S$GLB,, | Performed by: NURSE PRACTITIONER

## 2023-08-17 PROCEDURE — 1126F AMNT PAIN NOTED NONE PRSNT: CPT | Mod: CPTII,S$GLB,, | Performed by: NURSE PRACTITIONER

## 2023-08-17 PROCEDURE — 3008F BODY MASS INDEX DOCD: CPT | Mod: CPTII,S$GLB,, | Performed by: NURSE PRACTITIONER

## 2023-08-17 PROCEDURE — 99213 OFFICE O/P EST LOW 20 MIN: CPT | Mod: S$GLB,,, | Performed by: NURSE PRACTITIONER

## 2023-08-17 PROCEDURE — 3061F NEG MICROALBUMINURIA REV: CPT | Mod: CPTII,S$GLB,, | Performed by: NURSE PRACTITIONER

## 2023-08-17 PROCEDURE — 3051F PR MOST RECENT HEMOGLOBIN A1C LEVEL 7.0 - < 8.0%: ICD-10-PCS | Mod: CPTII,S$GLB,, | Performed by: NURSE PRACTITIONER

## 2023-08-17 PROCEDURE — 3066F NEPHROPATHY DOC TX: CPT | Mod: CPTII,S$GLB,, | Performed by: NURSE PRACTITIONER

## 2023-08-17 PROCEDURE — 3061F PR NEG MICROALBUMINURIA RESULT DOCUMENTED/REVIEW: ICD-10-PCS | Mod: CPTII,S$GLB,, | Performed by: NURSE PRACTITIONER

## 2023-08-17 PROCEDURE — 3008F PR BODY MASS INDEX (BMI) DOCUMENTED: ICD-10-PCS | Mod: CPTII,S$GLB,, | Performed by: NURSE PRACTITIONER

## 2023-08-17 PROCEDURE — 99213 PR OFFICE/OUTPT VISIT, EST, LEVL III, 20-29 MIN: ICD-10-PCS | Mod: S$GLB,,, | Performed by: NURSE PRACTITIONER

## 2023-08-17 PROCEDURE — 1160F PR REVIEW ALL MEDS BY PRESCRIBER/CLIN PHARMACIST DOCUMENTED: ICD-10-PCS | Mod: CPTII,S$GLB,, | Performed by: NURSE PRACTITIONER

## 2023-08-17 PROCEDURE — 99999 PR PBB SHADOW E&M-EST. PATIENT-LVL III: CPT | Mod: PBBFAC,,, | Performed by: NURSE PRACTITIONER

## 2023-08-17 NOTE — PROGRESS NOTES
Ms. Kay is here today for a follow up visit after undergoing right reverse rTSA and right biceps tenodesis by Dr. Powell on 4/21/2023.    Interval History:  She reports that she is doing well.  She reports no pain.  She feels she is back to her baseline.  She is no longer in therapy.  She is here for her annual checkup.  She denies any numbness or tingling sensation to her lower arm.     Physical exam:  Incision is open to air and healed, no signs of infection seen.   She has tactile stimulation to their lower FA.  She has normal ROM of all fingers and wrist.  Elbow ROM is 0-180 degrees.  Radial pulse is 2+ and cap refill is < 3 secs.  ROM at the shoulder is 0-170 degrees.    RADS: Right shoulder x-ray was obtained and personally reviewed by me, findings show humerus hardware is in proper position, no evidence of hardware failure.  No new fractures seen.        Assessment:  Follow up visit (12 months)    Plan:    ICD-10-CM ICD-9-CM   1. Other closed displaced fracture of proximal end of right humerus with routine healing, subsequent encounter  S42.291D V54.11       Current care, treatment plan, precautions, activity level/ modifications, limitations, rehabilitation exercises and proposed future treatment were discussed with the patient. We discussed the need to monitor for changes in symptoms and condition and report them to the physician.  Discussed importance of compliance with all appointments and follow up examinations.      68-year-old female who presents for annual follow-up regarding a right proximal total shoulder replacement.  She has done fairly well over the past year.  She is back to her baseline.  X-rays reveal that her hardware remains in place with no evidence of fractures.    At this point I will discharge the patient.      FOLLOW UP:   - Patient will follow up in the clinic p.r.n..  - X-ray of her right shoulder is needed.    - Future Appointments:   Future Appointments   Date Time Provider  [facility]   8/22/2023  8:30 AM Miguel Mccormick APRN, RADHIKAP [facility]   11/1/2023  9:45 AM Sabino Damon MD [facility] ORTHO [facility]   11/2/2023  9:00 AM Bren Leos MD [facility]   11/3/2023  8:30 AM Jose Rojas MD [facility]           If there are any questions prior to scheduled follow up, the patient was instructed to contact the office

## 2023-08-21 NOTE — PROGRESS NOTES
CC: This 68 y.o.  female presents for management of type 2 dm along with the current chronic medical conditions including:  Patient Active Problem List   Diagnosis    Anxiety    Fatty liver    Memory changes    Pathological fracture of right humerus due to osteoporosis with routine healing    Gastroesophageal reflux disease without esophagitis    Hypertension associated with type 2 diabetes mellitus    ZAHIRA (obstructive sleep apnea)    Atherosclerosis of native coronary artery of native heart without angina pectoris    Muscle spasms of neck    Radiculopathy of cervical spine    Overweight (BMI 25.0-29.9)    Type 2 diabetes mellitus without complication, with long-term current use of insulin    Vitamin D deficiency    Chronic right shoulder pain    Primary osteoarthritis of left knee    Aortic atherosclerosis    Closed fracture of proximal end of right humerus with routine healing s/p total shoulder replacement on 8/16/2022    Anterior dislocation of right shoulder    Carotid arterial disease on ct dated  8/14/2022    Hiatal hernia      Status of these conditions is pending review.    H/o fatty liver, HTN, overweight, osteopenia, coronary atherosclerosis, gout, arthritic pain (L) knee, ankle       HPI: Diagnosed with T2DM x > 20 years ago.   Started insulin in 2006.   Seen by Dr. Morse in the past- c peptide- revealed low level -hair   Watch trends with hair in near future.   Last seen by me in spring 2023.   Accompanied by .  Memory loss issues at times.    Needs f/u with GI  C/o constipation.   Stopped ozempic in 2023  A1c has gone up over the past 6 months.   Went up with basal per pcp.  Not interested in cgm   No c/o n/v   +covid 2/2023.   Remains on MDI  In past on victoza  and januvia- edema    BG 87-200s mg/dl   Eats dinner aroud 5-530p  A1c trending up  Lab Results   Component Value Date    HGBA1C 7.7 (H) 08/16/2023     On MDI injections 4x a day   Testing 4 x a day  Patient is willing and  able to use the device  Demonstrated an understanding of the technology and is motivated to use CGM  Patient expected to adhere to a comprehensive diabetes treatment plan and patient has adequate medical supervision  Patient experiences multiple impaired awareness of hypoglycemia (hypoglycemia unawareness)    True metrix  Has h/o hypoglycemia 39 mg/dl    (R)  Knee replacement sx 10/6/2020  Injury to right shoulder, surgery in august 2022     Of note, , retired, fall in 2015, injured (R) elbow    Off metformin related to kidneys.    CURRENT DM MEDS:   lantus 20 units qhs, novolog  8-8-8 units  with mod dose correction scale, starting at 180    Social Hx/personal Hx: , work-housekeeping, 1 son (26 y/o)    Riana Baker Rahul forgot glucometer/logs today    DIET/ MEAL PATTERN: 3 meals a day  Snacks 1-2     EXERCISE: no formal; does yardwork     STANDARDS OF CARE:     Diabetes Management Status    Statin: Not taking  ACE/ARB: Taking    Screening or Prevention Patient's value Goal Complete/Controlled?   HgA1C Testing and Control   Lab Results   Component Value Date    HGBA1C 7.7 (H) 08/16/2023      Annually/Less than 8% Yes   Lipid profile : 07/27/2023 Annually Yes   LDL control Lab Results   Component Value Date    LDLCALC 49.2 (L) 07/27/2023    Annually/Less than 100 mg/dl  Yes   Nephropathy screening Lab Results   Component Value Date    LABMICR 24.0 07/27/2023     Lab Results   Component Value Date    PROTEINUA Trace (A) 07/27/2023    Annually Yes   Blood pressure BP Readings from Last 1 Encounters:   08/09/23 132/66    Less than 140/90 Yes   Dilated retinal exam : 01/05/2023 Annually Yes   Foot exam   : 08/09/2023 Annually No       ROS:   GEN: +chronic fatigue or weakness, no changes w/ appetite, wt stable  CV:  Denies chest pain, palpitations, edema or cyanosis, denies syncope  SKIN: Skin is intact and heals well, no rashes, pruritis, easy bruising, no hair changes, no intolerance to  heat/cold.  RESP: No SOB, cough, FISCHER  FEN/GI: Nml bowel movements, normal appetite, +GERD, +n/v  RENAL: No urinary complaints, no dysuria/hematuia/oliguria   ENDO: no heat/cold intolerance  ID: No skin breakdown, fevers, chills  PSYCH: Denies drug/ETOH abuse, no hx. of eating disorders or depression +anxiety  MS/NEURO: Denies numbness/ tingling in BLE; +bilateral knee and ankle joint pain-(R) knee replacement 10/2020, (R) elbow pain -fall in 2015, surgery, (R) shoulder 8/2022 -weakness       Lab Results   Component Value Date    TSH 1.312 07/27/2023       Chemistry        Component Value Date/Time     07/27/2023 0940    K 4.7 07/27/2023 0940     07/27/2023 0940    CO2 27 07/27/2023 0940    BUN 14 07/27/2023 0940    CREATININE 0.9 07/27/2023 0940     (H) 07/27/2023 0940        Component Value Date/Time    CALCIUM 9.4 07/27/2023 0940    ALKPHOS 103 07/27/2023 0940    AST 18 07/27/2023 0940    ALT 20 07/27/2023 0940    BILITOT 0.5 07/27/2023 0940          Lab Results   Component Value Date    LDLCALC 49.2 (L) 07/27/2023       PE:  GEN: Patient WNWD, WF, NAD, AAOx3, Friendly, talkative, well groomed  HEENT: EOMI, PERRLA, no lid lag, Clear oropharynx  NECK: trachea midline  CV: Regular rate and rhythm, no edema  RESP: No increase work of breathing, No cough, wheeze.  ABD: no tenderness EXT: No C/C/Edema, No skin rash/breakdown  NEURO: CN 2-12 intact, nml gait   FEET: No pressure areas, blisters, ulcers, calluses. Footware appropriate.      ASSESSMENT and PLAN:  Riana was seen today for diabetes mellitus.    Diagnoses and associated orders for this visit:  1. Type 2 diabetes mellitus without complication, with long-term current use of insulin  Hemoglobin A1C      2. Overweight (BMI 25.0-29.9)        3. ZAHIRA (obstructive sleep apnea)        4. Hypertension associated with type 2 diabetes mellitus        5. Carotid artery disease, unspecified laterality, unspecified type        6. Encounter for  screening mammogram for malignant neoplasm of breast  Mammo Digital Screening Bilat      7. Constipation, unspecified constipation type  Ambulatory referral/consult to Gastroenterology        1-7. Follow up in 3-4 months w/ me   A1c prior -3-4 months  Mammogram 2023  GI referral  Add mounjaro 2.5 mg weekly   Discussed trends  Increase mdi lispro 10-10-10 units ac w/ scale 180-230+2, etc.  Increase lantus to 24 units at night   F/u with cards  Bp controlled  Body mass index is 27.4 kg/m².  Wt stable   Discussed dietary habits, may need to add more fiber

## 2023-08-22 ENCOUNTER — OFFICE VISIT (OUTPATIENT)
Dept: INTERNAL MEDICINE | Facility: CLINIC | Age: 68
End: 2023-08-22
Payer: MEDICARE

## 2023-08-22 VITALS
HEART RATE: 74 BPM | SYSTOLIC BLOOD PRESSURE: 132 MMHG | WEIGHT: 164.69 LBS | OXYGEN SATURATION: 98 % | DIASTOLIC BLOOD PRESSURE: 66 MMHG | BODY MASS INDEX: 27.44 KG/M2 | HEIGHT: 65 IN

## 2023-08-22 DIAGNOSIS — E66.3 OVERWEIGHT (BMI 25.0-29.9): ICD-10-CM

## 2023-08-22 DIAGNOSIS — I15.2 HYPERTENSION ASSOCIATED WITH TYPE 2 DIABETES MELLITUS: ICD-10-CM

## 2023-08-22 DIAGNOSIS — Z12.31 ENCOUNTER FOR SCREENING MAMMOGRAM FOR MALIGNANT NEOPLASM OF BREAST: ICD-10-CM

## 2023-08-22 DIAGNOSIS — K59.00 CONSTIPATION, UNSPECIFIED CONSTIPATION TYPE: ICD-10-CM

## 2023-08-22 DIAGNOSIS — I77.9 CAROTID ARTERY DISEASE, UNSPECIFIED LATERALITY, UNSPECIFIED TYPE: ICD-10-CM

## 2023-08-22 DIAGNOSIS — G47.33 OSA (OBSTRUCTIVE SLEEP APNEA): ICD-10-CM

## 2023-08-22 DIAGNOSIS — E11.59 HYPERTENSION ASSOCIATED WITH TYPE 2 DIABETES MELLITUS: ICD-10-CM

## 2023-08-22 DIAGNOSIS — E11.9 TYPE 2 DIABETES MELLITUS WITHOUT COMPLICATION, WITH LONG-TERM CURRENT USE OF INSULIN: Primary | ICD-10-CM

## 2023-08-22 DIAGNOSIS — Z79.4 TYPE 2 DIABETES MELLITUS WITHOUT COMPLICATION, WITH LONG-TERM CURRENT USE OF INSULIN: Primary | ICD-10-CM

## 2023-08-22 PROCEDURE — 1160F PR REVIEW ALL MEDS BY PRESCRIBER/CLIN PHARMACIST DOCUMENTED: ICD-10-PCS | Mod: CPTII,S$GLB,, | Performed by: NURSE PRACTITIONER

## 2023-08-22 PROCEDURE — 3078F DIAST BP <80 MM HG: CPT | Mod: CPTII,S$GLB,, | Performed by: NURSE PRACTITIONER

## 2023-08-22 PROCEDURE — 1101F PR PT FALLS ASSESS DOC 0-1 FALLS W/OUT INJ PAST YR: ICD-10-PCS | Mod: CPTII,S$GLB,, | Performed by: NURSE PRACTITIONER

## 2023-08-22 PROCEDURE — 99214 PR OFFICE/OUTPT VISIT, EST, LEVL IV, 30-39 MIN: ICD-10-PCS | Mod: S$GLB,,, | Performed by: NURSE PRACTITIONER

## 2023-08-22 PROCEDURE — 1157F PR ADVANCE CARE PLAN OR EQUIV PRESENT IN MEDICAL RECORD: ICD-10-PCS | Mod: CPTII,S$GLB,, | Performed by: NURSE PRACTITIONER

## 2023-08-22 PROCEDURE — 3288F FALL RISK ASSESSMENT DOCD: CPT | Mod: CPTII,S$GLB,, | Performed by: NURSE PRACTITIONER

## 2023-08-22 PROCEDURE — 99999 PR PBB SHADOW E&M-EST. PATIENT-LVL V: ICD-10-PCS | Mod: PBBFAC,,, | Performed by: NURSE PRACTITIONER

## 2023-08-22 PROCEDURE — 1157F ADVNC CARE PLAN IN RCRD: CPT | Mod: CPTII,S$GLB,, | Performed by: NURSE PRACTITIONER

## 2023-08-22 PROCEDURE — 3066F PR DOCUMENTATION OF TREATMENT FOR NEPHROPATHY: ICD-10-PCS | Mod: CPTII,S$GLB,, | Performed by: NURSE PRACTITIONER

## 2023-08-22 PROCEDURE — 1126F PR PAIN SEVERITY QUANTIFIED, NO PAIN PRESENT: ICD-10-PCS | Mod: CPTII,S$GLB,, | Performed by: NURSE PRACTITIONER

## 2023-08-22 PROCEDURE — 99999 PR PBB SHADOW E&M-EST. PATIENT-LVL V: CPT | Mod: PBBFAC,,, | Performed by: NURSE PRACTITIONER

## 2023-08-22 PROCEDURE — 1159F PR MEDICATION LIST DOCUMENTED IN MEDICAL RECORD: ICD-10-PCS | Mod: CPTII,S$GLB,, | Performed by: NURSE PRACTITIONER

## 2023-08-22 PROCEDURE — 3051F HG A1C>EQUAL 7.0%<8.0%: CPT | Mod: CPTII,S$GLB,, | Performed by: NURSE PRACTITIONER

## 2023-08-22 PROCEDURE — 3078F PR MOST RECENT DIASTOLIC BLOOD PRESSURE < 80 MM HG: ICD-10-PCS | Mod: CPTII,S$GLB,, | Performed by: NURSE PRACTITIONER

## 2023-08-22 PROCEDURE — 3061F PR NEG MICROALBUMINURIA RESULT DOCUMENTED/REVIEW: ICD-10-PCS | Mod: CPTII,S$GLB,, | Performed by: NURSE PRACTITIONER

## 2023-08-22 PROCEDURE — 4010F PR ACE/ARB THEARPY RXD/TAKEN: ICD-10-PCS | Mod: CPTII,S$GLB,, | Performed by: NURSE PRACTITIONER

## 2023-08-22 PROCEDURE — 3075F SYST BP GE 130 - 139MM HG: CPT | Mod: CPTII,S$GLB,, | Performed by: NURSE PRACTITIONER

## 2023-08-22 PROCEDURE — 3061F NEG MICROALBUMINURIA REV: CPT | Mod: CPTII,S$GLB,, | Performed by: NURSE PRACTITIONER

## 2023-08-22 PROCEDURE — 3288F PR FALLS RISK ASSESSMENT DOCUMENTED: ICD-10-PCS | Mod: CPTII,S$GLB,, | Performed by: NURSE PRACTITIONER

## 2023-08-22 PROCEDURE — 3066F NEPHROPATHY DOC TX: CPT | Mod: CPTII,S$GLB,, | Performed by: NURSE PRACTITIONER

## 2023-08-22 PROCEDURE — 99214 OFFICE O/P EST MOD 30 MIN: CPT | Mod: S$GLB,,, | Performed by: NURSE PRACTITIONER

## 2023-08-22 PROCEDURE — 1101F PT FALLS ASSESS-DOCD LE1/YR: CPT | Mod: CPTII,S$GLB,, | Performed by: NURSE PRACTITIONER

## 2023-08-22 PROCEDURE — 1126F AMNT PAIN NOTED NONE PRSNT: CPT | Mod: CPTII,S$GLB,, | Performed by: NURSE PRACTITIONER

## 2023-08-22 PROCEDURE — 3051F PR MOST RECENT HEMOGLOBIN A1C LEVEL 7.0 - < 8.0%: ICD-10-PCS | Mod: CPTII,S$GLB,, | Performed by: NURSE PRACTITIONER

## 2023-08-22 PROCEDURE — 3008F BODY MASS INDEX DOCD: CPT | Mod: CPTII,S$GLB,, | Performed by: NURSE PRACTITIONER

## 2023-08-22 PROCEDURE — 4010F ACE/ARB THERAPY RXD/TAKEN: CPT | Mod: CPTII,S$GLB,, | Performed by: NURSE PRACTITIONER

## 2023-08-22 PROCEDURE — 1159F MED LIST DOCD IN RCRD: CPT | Mod: CPTII,S$GLB,, | Performed by: NURSE PRACTITIONER

## 2023-08-22 PROCEDURE — 3075F PR MOST RECENT SYSTOLIC BLOOD PRESS GE 130-139MM HG: ICD-10-PCS | Mod: CPTII,S$GLB,, | Performed by: NURSE PRACTITIONER

## 2023-08-22 PROCEDURE — 3008F PR BODY MASS INDEX (BMI) DOCUMENTED: ICD-10-PCS | Mod: CPTII,S$GLB,, | Performed by: NURSE PRACTITIONER

## 2023-08-22 PROCEDURE — 1160F RVW MEDS BY RX/DR IN RCRD: CPT | Mod: CPTII,S$GLB,, | Performed by: NURSE PRACTITIONER

## 2023-08-22 RX ORDER — INSULIN LISPRO 100 [IU]/ML
INJECTION, SOLUTION INTRAVENOUS; SUBCUTANEOUS
Qty: 60 ML | Refills: 3 | Status: SHIPPED | OUTPATIENT
Start: 2023-08-22 | End: 2024-01-08

## 2023-08-22 RX ORDER — INSULIN GLARGINE 100 [IU]/ML
INJECTION, SOLUTION SUBCUTANEOUS
Qty: 30 ML | Refills: 3 | Status: SHIPPED | OUTPATIENT
Start: 2023-08-22 | End: 2023-11-08 | Stop reason: SDUPTHER

## 2023-08-22 RX ORDER — TIRZEPATIDE 2.5 MG/.5ML
2.5 INJECTION, SOLUTION SUBCUTANEOUS
Qty: 4 PEN | Refills: 5 | Status: SHIPPED | OUTPATIENT
Start: 2023-08-22 | End: 2023-08-25

## 2023-08-22 NOTE — PATIENT INSTRUCTIONS
Follow up in 3-4 months w/Irielle   A1c prior -3 months   GI referral - constipation   Mammogram 2023     Lantus 24 units at night   Lispro 10-10-10 units  Scale use 180-230+2 ,etc.   Mounjaro 2.5 mg weekly    [email or web address]  Eat fit brett  Myfitnesspal brett  [email or web address]      Consider dexcom g7 duramed supplier or retail pharmacy     Goal  no higher than 180   Lab Results   Component Value Date    HGBA1C 7.7 (H) 08/16/2023     Goal less than 7%

## 2023-08-23 NOTE — TELEPHONE ENCOUNTER
Notified pt's  of need to bring pharmacy benefit cared to pharmacy so pt can get the Lantus.  Next time pt has appt bring the ins card to scan into the system.

## 2023-08-24 ENCOUNTER — PATIENT MESSAGE (OUTPATIENT)
Dept: INTERNAL MEDICINE | Facility: CLINIC | Age: 68
End: 2023-08-24
Payer: MEDICARE

## 2023-08-25 RX ORDER — ORAL SEMAGLUTIDE 3 MG/1
TABLET ORAL
Qty: 30 TABLET | Refills: 4 | Status: SHIPPED | OUTPATIENT
Start: 2023-08-25 | End: 2023-10-20

## 2023-08-29 ENCOUNTER — TELEPHONE (OUTPATIENT)
Dept: GASTROENTEROLOGY | Facility: CLINIC | Age: 68
End: 2023-08-29
Payer: MEDICARE

## 2023-08-29 ENCOUNTER — PATIENT MESSAGE (OUTPATIENT)
Dept: INTERNAL MEDICINE | Facility: CLINIC | Age: 68
End: 2023-08-29
Payer: MEDICARE

## 2023-08-29 NOTE — TELEPHONE ENCOUNTER
Left VM lettting the patient know she will need to follow up with Dr. Reyes. Portal message sent.

## 2023-09-05 ENCOUNTER — OFFICE VISIT (OUTPATIENT)
Dept: GASTROENTEROLOGY | Facility: CLINIC | Age: 68
End: 2023-09-05
Payer: MEDICARE

## 2023-09-05 VITALS — BODY MASS INDEX: 27.04 KG/M2 | WEIGHT: 162.5 LBS

## 2023-09-05 DIAGNOSIS — K59.00 CONSTIPATION, UNSPECIFIED CONSTIPATION TYPE: ICD-10-CM

## 2023-09-05 DIAGNOSIS — K58.1 IRRITABLE BOWEL SYNDROME WITH CONSTIPATION: Primary | ICD-10-CM

## 2023-09-05 PROCEDURE — 3051F HG A1C>EQUAL 7.0%<8.0%: CPT | Mod: CPTII,S$GLB,, | Performed by: INTERNAL MEDICINE

## 2023-09-05 PROCEDURE — 1157F ADVNC CARE PLAN IN RCRD: CPT | Mod: CPTII,S$GLB,, | Performed by: INTERNAL MEDICINE

## 2023-09-05 PROCEDURE — 3066F NEPHROPATHY DOC TX: CPT | Mod: CPTII,S$GLB,, | Performed by: INTERNAL MEDICINE

## 2023-09-05 PROCEDURE — 1101F PR PT FALLS ASSESS DOC 0-1 FALLS W/OUT INJ PAST YR: ICD-10-PCS | Mod: CPTII,S$GLB,, | Performed by: INTERNAL MEDICINE

## 2023-09-05 PROCEDURE — 1126F PR PAIN SEVERITY QUANTIFIED, NO PAIN PRESENT: ICD-10-PCS | Mod: CPTII,S$GLB,, | Performed by: INTERNAL MEDICINE

## 2023-09-05 PROCEDURE — 1157F PR ADVANCE CARE PLAN OR EQUIV PRESENT IN MEDICAL RECORD: ICD-10-PCS | Mod: CPTII,S$GLB,, | Performed by: INTERNAL MEDICINE

## 2023-09-05 PROCEDURE — 99214 OFFICE O/P EST MOD 30 MIN: CPT | Mod: S$GLB,,, | Performed by: INTERNAL MEDICINE

## 2023-09-05 PROCEDURE — 4010F PR ACE/ARB THEARPY RXD/TAKEN: ICD-10-PCS | Mod: CPTII,S$GLB,, | Performed by: INTERNAL MEDICINE

## 2023-09-05 PROCEDURE — 1126F AMNT PAIN NOTED NONE PRSNT: CPT | Mod: CPTII,S$GLB,, | Performed by: INTERNAL MEDICINE

## 2023-09-05 PROCEDURE — 3051F PR MOST RECENT HEMOGLOBIN A1C LEVEL 7.0 - < 8.0%: ICD-10-PCS | Mod: CPTII,S$GLB,, | Performed by: INTERNAL MEDICINE

## 2023-09-05 PROCEDURE — 3288F PR FALLS RISK ASSESSMENT DOCUMENTED: ICD-10-PCS | Mod: CPTII,S$GLB,, | Performed by: INTERNAL MEDICINE

## 2023-09-05 PROCEDURE — 3008F BODY MASS INDEX DOCD: CPT | Mod: CPTII,S$GLB,, | Performed by: INTERNAL MEDICINE

## 2023-09-05 PROCEDURE — 1159F PR MEDICATION LIST DOCUMENTED IN MEDICAL RECORD: ICD-10-PCS | Mod: CPTII,S$GLB,, | Performed by: INTERNAL MEDICINE

## 2023-09-05 PROCEDURE — 1101F PT FALLS ASSESS-DOCD LE1/YR: CPT | Mod: CPTII,S$GLB,, | Performed by: INTERNAL MEDICINE

## 2023-09-05 PROCEDURE — 3008F PR BODY MASS INDEX (BMI) DOCUMENTED: ICD-10-PCS | Mod: CPTII,S$GLB,, | Performed by: INTERNAL MEDICINE

## 2023-09-05 PROCEDURE — 4010F ACE/ARB THERAPY RXD/TAKEN: CPT | Mod: CPTII,S$GLB,, | Performed by: INTERNAL MEDICINE

## 2023-09-05 PROCEDURE — 99214 PR OFFICE/OUTPT VISIT, EST, LEVL IV, 30-39 MIN: ICD-10-PCS | Mod: S$GLB,,, | Performed by: INTERNAL MEDICINE

## 2023-09-05 PROCEDURE — 99999 PR PBB SHADOW E&M-EST. PATIENT-LVL III: CPT | Mod: PBBFAC,,, | Performed by: INTERNAL MEDICINE

## 2023-09-05 PROCEDURE — 3288F FALL RISK ASSESSMENT DOCD: CPT | Mod: CPTII,S$GLB,, | Performed by: INTERNAL MEDICINE

## 2023-09-05 PROCEDURE — 99999 PR PBB SHADOW E&M-EST. PATIENT-LVL III: ICD-10-PCS | Mod: PBBFAC,,, | Performed by: INTERNAL MEDICINE

## 2023-09-05 PROCEDURE — 3061F PR NEG MICROALBUMINURIA RESULT DOCUMENTED/REVIEW: ICD-10-PCS | Mod: CPTII,S$GLB,, | Performed by: INTERNAL MEDICINE

## 2023-09-05 PROCEDURE — 3066F PR DOCUMENTATION OF TREATMENT FOR NEPHROPATHY: ICD-10-PCS | Mod: CPTII,S$GLB,, | Performed by: INTERNAL MEDICINE

## 2023-09-05 PROCEDURE — 3061F NEG MICROALBUMINURIA REV: CPT | Mod: CPTII,S$GLB,, | Performed by: INTERNAL MEDICINE

## 2023-09-05 PROCEDURE — 1159F MED LIST DOCD IN RCRD: CPT | Mod: CPTII,S$GLB,, | Performed by: INTERNAL MEDICINE

## 2023-09-05 RX ORDER — LUBIPROSTONE 8 UG/1
8 CAPSULE ORAL 2 TIMES DAILY WITH MEALS
Qty: 60 CAPSULE | Refills: 5 | Status: SHIPPED | OUTPATIENT
Start: 2023-09-05 | End: 2023-09-18 | Stop reason: SDUPTHER

## 2023-09-05 NOTE — PROGRESS NOTES
Subjective:       Patient ID: Riana Kay is a 68 y.o. female.    Chief Complaint: Constipation    Patient here today to follow-up with aforementioned complaints.  She was previously seen by OMEGA Allen in June with complaints of constipation and nausea/vomiting.  As symptoms seemed to begin after starting Ozempic her complaints were partially attributed to medication side effect, particularly after negative EGD.  She was sent for gastric emptying study which was performed later that month.  Exam did show delay in gastric emptying.  She was also started on low-dose Linzess.  Since last clinical encounter patient reports stopping Ozempic.  Today she denies significant nausea/vomiting (?improvement) but she does report ongoing constipation.  She was unable to take Linzess because it was too expensive.  She is tried MiraLax, Benefiber, and Dulcolax suppositories none of which worked.  She is currently having a bowel movement every week, sometimes longer.    Review of Systems   Gastrointestinal:  Positive for constipation.       The following portions of the patient's history were reviewed and updated as appropriate: allergies, current medications, past family history, past medical history, past social history, past surgical history and problem list.    Objective:      Physical Exam  Constitutional:       Appearance: She is well-developed.   HENT:      Head: Normocephalic and atraumatic.   Eyes:      Conjunctiva/sclera: Conjunctivae normal.   Pulmonary:      Effort: Pulmonary effort is normal. No respiratory distress.   Musculoskeletal:      Cervical back: Normal range of motion.   Neurological:      Mental Status: She is alert and oriented to person, place, and time.   Psychiatric:         Behavior: Behavior normal.         Thought Content: Thought content normal.         Judgment: Judgment normal.           Pertinent labs and imaging studies reviewed    Assessment:       1. Irritable bowel syndrome with  constipation        Plan:       Discussed with .  Apparently she was in the doughnut hole and coverage isn't great.  Called outpatient pharmacy here today [facility].  Apparently [facility] is little cheaper, approximately $70 per month so we will try that    30 minutes of total time spent on the encounter, which includes face to face time and non-face to face time preparing to see the patient (eg, review of tests), Obtaining and/or reviewing separately obtained history, Documenting clinical information in the electronic or other health record, Independently interpreting results (not separately reported) and communicating results to the patient/family/caregiver, or Care coordination (not separately reported).     (Portions of this note were dictated using voice recognition software and may contain dictation related errors in spelling/grammar/syntax not found on text review)

## 2023-09-10 ENCOUNTER — PATIENT MESSAGE (OUTPATIENT)
Dept: INTERNAL MEDICINE | Facility: CLINIC | Age: 68
End: 2023-09-10
Payer: MEDICARE

## 2023-09-13 ENCOUNTER — PATIENT MESSAGE (OUTPATIENT)
Dept: INTERNAL MEDICINE | Facility: CLINIC | Age: 68
End: 2023-09-13
Payer: MEDICARE

## 2023-09-14 ENCOUNTER — PATIENT MESSAGE (OUTPATIENT)
Dept: GASTROENTEROLOGY | Facility: CLINIC | Age: 68
End: 2023-09-14
Payer: MEDICARE

## 2023-09-14 ENCOUNTER — PATIENT MESSAGE (OUTPATIENT)
Dept: INTERNAL MEDICINE | Facility: CLINIC | Age: 68
End: 2023-09-14
Payer: MEDICARE

## 2023-09-16 ENCOUNTER — PATIENT MESSAGE (OUTPATIENT)
Dept: GASTROENTEROLOGY | Facility: CLINIC | Age: 68
End: 2023-09-16
Payer: MEDICARE

## 2023-09-18 RX ORDER — LUBIPROSTONE 8 UG/1
16 CAPSULE ORAL 2 TIMES DAILY WITH MEALS
Qty: 120 CAPSULE | Refills: 5 | Status: SHIPPED | OUTPATIENT
Start: 2023-09-18 | End: 2023-10-03

## 2023-10-01 DIAGNOSIS — F41.9 ANXIETY: ICD-10-CM

## 2023-10-01 NOTE — TELEPHONE ENCOUNTER
No care due was identified.  [facility] Embedded Care Due Messages. Reference number: [id number].   10/01/2023 9:14:54 AM CDT

## 2023-10-02 ENCOUNTER — PATIENT MESSAGE (OUTPATIENT)
Dept: GASTROENTEROLOGY | Facility: CLINIC | Age: 68
End: 2023-10-02
Payer: MEDICARE

## 2023-10-02 RX ORDER — DULOXETIN HYDROCHLORIDE 30 MG/1
30 CAPSULE, DELAYED RELEASE ORAL
Qty: 90 CAPSULE | Refills: 3 | OUTPATIENT
Start: 2023-10-02

## 2023-10-02 NOTE — TELEPHONE ENCOUNTER
Refill Decision Note   Riana Kay  is requesting a refill authorization.  Brief Assessment and Rationale for Refill:  Quick Discontinue     Medication Therapy Plan:  The original prescription was discontinued on 4/12/2023 by Miguel Mccormick APRN, CESAR.      Comments:     Note composed:2:46 AM 10/02/2023

## 2023-10-03 RX ORDER — LUBIPROSTONE 24 UG/1
24 CAPSULE ORAL 2 TIMES DAILY WITH MEALS
Qty: 60 CAPSULE | Refills: 5 | Status: SHIPPED | OUTPATIENT
Start: 2023-10-03 | End: 2024-03-05

## 2023-10-05 ENCOUNTER — PATIENT MESSAGE (OUTPATIENT)
Dept: INTERNAL MEDICINE | Facility: CLINIC | Age: 68
End: 2023-10-05
Payer: MEDICARE

## 2023-10-05 RX ORDER — DULOXETIN HYDROCHLORIDE 30 MG/1
30 CAPSULE, DELAYED RELEASE ORAL DAILY
Qty: 90 CAPSULE | Refills: 0 | Status: SHIPPED | OUTPATIENT
Start: 2023-10-05 | End: 2023-10-20

## 2023-10-05 RX ORDER — DULOXETIN HYDROCHLORIDE 30 MG/1
30 CAPSULE, DELAYED RELEASE ORAL
COMMUNITY
Start: 2023-06-19 | End: 2023-10-05 | Stop reason: SDUPTHER

## 2023-10-05 NOTE — TELEPHONE ENCOUNTER
Refill Routing Note   Medication(s) are not appropriate for processing by [facility] for the following reason(s):      No active prescription written by provider:     [facility] action(s):  Defer Care Due:  None identified   Medication Therapy Plan:         Appointments  past 12m or future 3m with PCP    Date Provider   Last Visit   8/9/2023 Jose Roajs MD   Next Visit   11/3/2023 Jose Rojas MD   ED visits in past 90 days: 0        Note composed:1:48 PM 10/05/2023

## 2023-10-05 NOTE — TELEPHONE ENCOUNTER
No care due was identified.  [facility] Embedded Care Due Messages. Reference number: [id number].   10/05/2023 10:59:35 AM CDT

## 2023-10-11 ENCOUNTER — TELEPHONE (OUTPATIENT)
Dept: SPORTS MEDICINE | Facility: CLINIC | Age: 68
End: 2023-10-11
Payer: MEDICARE

## 2023-10-13 ENCOUNTER — PATIENT MESSAGE (OUTPATIENT)
Dept: INTERNAL MEDICINE | Facility: CLINIC | Age: 68
End: 2023-10-13
Payer: MEDICARE

## 2023-10-20 ENCOUNTER — OFFICE VISIT (OUTPATIENT)
Dept: SPORTS MEDICINE | Facility: CLINIC | Age: 68
End: 2023-10-20
Payer: MEDICARE

## 2023-10-20 ENCOUNTER — OFFICE VISIT (OUTPATIENT)
Dept: INTERNAL MEDICINE | Facility: CLINIC | Age: 68
End: 2023-10-20
Payer: MEDICARE

## 2023-10-20 ENCOUNTER — HOSPITAL ENCOUNTER (OUTPATIENT)
Dept: RADIOLOGY | Facility: HOSPITAL | Age: 68
Discharge: HOME | End: 2023-10-20
Attending: ORTHOPAEDIC SURGERY
Payer: MEDICARE

## 2023-10-20 VITALS
SYSTOLIC BLOOD PRESSURE: 133 MMHG | DIASTOLIC BLOOD PRESSURE: 72 MMHG | BODY MASS INDEX: 27.73 KG/M2 | HEART RATE: 76 BPM | WEIGHT: 166.44 LBS | HEIGHT: 65 IN

## 2023-10-20 VITALS
WEIGHT: 165.38 LBS | SYSTOLIC BLOOD PRESSURE: 112 MMHG | HEART RATE: 68 BPM | BODY MASS INDEX: 27.56 KG/M2 | TEMPERATURE: 98 F | HEIGHT: 65 IN | RESPIRATION RATE: 16 BRPM | DIASTOLIC BLOOD PRESSURE: 60 MMHG

## 2023-10-20 DIAGNOSIS — M25.512 LEFT SHOULDER PAIN, UNSPECIFIED CHRONICITY: Primary | ICD-10-CM

## 2023-10-20 DIAGNOSIS — G89.29 CHRONIC LEFT SHOULDER PAIN: ICD-10-CM

## 2023-10-20 DIAGNOSIS — M25.512 LEFT SHOULDER PAIN, UNSPECIFIED CHRONICITY: ICD-10-CM

## 2023-10-20 DIAGNOSIS — Z23 NEED FOR PROPHYLACTIC VACCINATION AND INOCULATION AGAINST INFLUENZA: ICD-10-CM

## 2023-10-20 DIAGNOSIS — K59.09 CHRONIC CONSTIPATION: Primary | ICD-10-CM

## 2023-10-20 DIAGNOSIS — K31.84 GASTROPARESIS DUE TO DM: ICD-10-CM

## 2023-10-20 DIAGNOSIS — E11.43 GASTROPARESIS DUE TO DM: ICD-10-CM

## 2023-10-20 DIAGNOSIS — M67.912 DYSFUNCTION OF LEFT ROTATOR CUFF: ICD-10-CM

## 2023-10-20 DIAGNOSIS — M25.512 CHRONIC LEFT SHOULDER PAIN: ICD-10-CM

## 2023-10-20 PROCEDURE — 3075F PR MOST RECENT SYSTOLIC BLOOD PRESS GE 130-139MM HG: ICD-10-PCS | Mod: CPTII,S$GLB,, | Performed by: ORTHOPAEDIC SURGERY

## 2023-10-20 PROCEDURE — 99999 PR PBB SHADOW E&M-EST. PATIENT-LVL IV: ICD-10-PCS | Mod: PBBFAC,,, | Performed by: ORTHOPAEDIC SURGERY

## 2023-10-20 PROCEDURE — 1157F PR ADVANCE CARE PLAN OR EQUIV PRESENT IN MEDICAL RECORD: ICD-10-PCS | Mod: CPTII,S$GLB,, | Performed by: FAMILY MEDICINE

## 2023-10-20 PROCEDURE — 3051F PR MOST RECENT HEMOGLOBIN A1C LEVEL 7.0 - < 8.0%: ICD-10-PCS | Mod: CPTII,S$GLB,, | Performed by: ORTHOPAEDIC SURGERY

## 2023-10-20 PROCEDURE — 3078F PR MOST RECENT DIASTOLIC BLOOD PRESSURE < 80 MM HG: ICD-10-PCS | Mod: CPTII,S$GLB,, | Performed by: FAMILY MEDICINE

## 2023-10-20 PROCEDURE — 3074F SYST BP LT 130 MM HG: CPT | Mod: CPTII,S$GLB,, | Performed by: FAMILY MEDICINE

## 2023-10-20 PROCEDURE — 90694 FLU VACCINE - QUADRIVALENT - ADJUVANTED: ICD-10-PCS | Mod: S$GLB,,, | Performed by: FAMILY MEDICINE

## 2023-10-20 PROCEDURE — 3066F PR DOCUMENTATION OF TREATMENT FOR NEPHROPATHY: ICD-10-PCS | Mod: CPTII,S$GLB,, | Performed by: FAMILY MEDICINE

## 2023-10-20 PROCEDURE — 1157F ADVNC CARE PLAN IN RCRD: CPT | Mod: CPTII,S$GLB,, | Performed by: FAMILY MEDICINE

## 2023-10-20 PROCEDURE — 4010F ACE/ARB THERAPY RXD/TAKEN: CPT | Mod: CPTII,S$GLB,, | Performed by: ORTHOPAEDIC SURGERY

## 2023-10-20 PROCEDURE — 1100F PTFALLS ASSESS-DOCD GE2>/YR: CPT | Mod: CPTII,S$GLB,, | Performed by: ORTHOPAEDIC SURGERY

## 2023-10-20 PROCEDURE — 3008F BODY MASS INDEX DOCD: CPT | Mod: CPTII,S$GLB,, | Performed by: FAMILY MEDICINE

## 2023-10-20 PROCEDURE — 73030 X-RAY EXAM OF SHOULDER: CPT | Mod: TC,LT

## 2023-10-20 PROCEDURE — 3288F PR FALLS RISK ASSESSMENT DOCUMENTED: ICD-10-PCS | Mod: CPTII,S$GLB,, | Performed by: ORTHOPAEDIC SURGERY

## 2023-10-20 PROCEDURE — 3051F HG A1C>EQUAL 7.0%<8.0%: CPT | Mod: CPTII,S$GLB,, | Performed by: FAMILY MEDICINE

## 2023-10-20 PROCEDURE — 4010F PR ACE/ARB THEARPY RXD/TAKEN: ICD-10-PCS | Mod: CPTII,S$GLB,, | Performed by: FAMILY MEDICINE

## 2023-10-20 PROCEDURE — 4010F PR ACE/ARB THEARPY RXD/TAKEN: ICD-10-PCS | Mod: CPTII,S$GLB,, | Performed by: ORTHOPAEDIC SURGERY

## 2023-10-20 PROCEDURE — 3051F HG A1C>EQUAL 7.0%<8.0%: CPT | Mod: CPTII,S$GLB,, | Performed by: ORTHOPAEDIC SURGERY

## 2023-10-20 PROCEDURE — 3288F PR FALLS RISK ASSESSMENT DOCUMENTED: ICD-10-PCS | Mod: CPTII,S$GLB,, | Performed by: FAMILY MEDICINE

## 2023-10-20 PROCEDURE — 99214 PR OFFICE/OUTPT VISIT, EST, LEVL IV, 30-39 MIN: ICD-10-PCS | Mod: 25,S$GLB,, | Performed by: FAMILY MEDICINE

## 2023-10-20 PROCEDURE — G0008 FLU VACCINE - QUADRIVALENT - ADJUVANTED: ICD-10-PCS | Mod: S$GLB,,, | Performed by: FAMILY MEDICINE

## 2023-10-20 PROCEDURE — 3066F PR DOCUMENTATION OF TREATMENT FOR NEPHROPATHY: ICD-10-PCS | Mod: CPTII,S$GLB,, | Performed by: ORTHOPAEDIC SURGERY

## 2023-10-20 PROCEDURE — 3066F NEPHROPATHY DOC TX: CPT | Mod: CPTII,S$GLB,, | Performed by: ORTHOPAEDIC SURGERY

## 2023-10-20 PROCEDURE — 73030 XR SHOULDER COMPLETE 2 OR MORE VIEWS LEFT: ICD-10-PCS | Mod: 26,LT,, | Performed by: INTERNAL MEDICINE

## 2023-10-20 PROCEDURE — 99203 OFFICE O/P NEW LOW 30 MIN: CPT | Mod: S$GLB,,, | Performed by: ORTHOPAEDIC SURGERY

## 2023-10-20 PROCEDURE — G0008 ADMIN INFLUENZA VIRUS VAC: HCPCS | Mod: S$GLB,,, | Performed by: FAMILY MEDICINE

## 2023-10-20 PROCEDURE — 3061F NEG MICROALBUMINURIA REV: CPT | Mod: CPTII,S$GLB,, | Performed by: FAMILY MEDICINE

## 2023-10-20 PROCEDURE — 1159F PR MEDICATION LIST DOCUMENTED IN MEDICAL RECORD: ICD-10-PCS | Mod: CPTII,S$GLB,, | Performed by: FAMILY MEDICINE

## 2023-10-20 PROCEDURE — 90694 VACC AIIV4 NO PRSRV 0.5ML IM: CPT | Mod: S$GLB,,, | Performed by: FAMILY MEDICINE

## 2023-10-20 PROCEDURE — 1157F ADVNC CARE PLAN IN RCRD: CPT | Mod: CPTII,S$GLB,, | Performed by: ORTHOPAEDIC SURGERY

## 2023-10-20 PROCEDURE — 99999 PR PBB SHADOW E&M-EST. PATIENT-LVL V: ICD-10-PCS | Mod: PBBFAC,,, | Performed by: FAMILY MEDICINE

## 2023-10-20 PROCEDURE — 3078F DIAST BP <80 MM HG: CPT | Mod: CPTII,S$GLB,, | Performed by: ORTHOPAEDIC SURGERY

## 2023-10-20 PROCEDURE — 99999 PR PBB SHADOW E&M-EST. PATIENT-LVL IV: CPT | Mod: PBBFAC,,, | Performed by: ORTHOPAEDIC SURGERY

## 2023-10-20 PROCEDURE — 3066F NEPHROPATHY DOC TX: CPT | Mod: CPTII,S$GLB,, | Performed by: FAMILY MEDICINE

## 2023-10-20 PROCEDURE — 1159F MED LIST DOCD IN RCRD: CPT | Mod: CPTII,S$GLB,, | Performed by: FAMILY MEDICINE

## 2023-10-20 PROCEDURE — 3078F PR MOST RECENT DIASTOLIC BLOOD PRESSURE < 80 MM HG: ICD-10-PCS | Mod: CPTII,S$GLB,, | Performed by: ORTHOPAEDIC SURGERY

## 2023-10-20 PROCEDURE — 99999 PR PBB SHADOW E&M-EST. PATIENT-LVL V: CPT | Mod: PBBFAC,,, | Performed by: FAMILY MEDICINE

## 2023-10-20 PROCEDURE — 3288F FALL RISK ASSESSMENT DOCD: CPT | Mod: CPTII,S$GLB,, | Performed by: FAMILY MEDICINE

## 2023-10-20 PROCEDURE — 3051F PR MOST RECENT HEMOGLOBIN A1C LEVEL 7.0 - < 8.0%: ICD-10-PCS | Mod: CPTII,S$GLB,, | Performed by: FAMILY MEDICINE

## 2023-10-20 PROCEDURE — 1101F PR PT FALLS ASSESS DOC 0-1 FALLS W/OUT INJ PAST YR: ICD-10-PCS | Mod: CPTII,S$GLB,, | Performed by: FAMILY MEDICINE

## 2023-10-20 PROCEDURE — 1157F PR ADVANCE CARE PLAN OR EQUIV PRESENT IN MEDICAL RECORD: ICD-10-PCS | Mod: CPTII,S$GLB,, | Performed by: ORTHOPAEDIC SURGERY

## 2023-10-20 PROCEDURE — 4010F ACE/ARB THERAPY RXD/TAKEN: CPT | Mod: CPTII,S$GLB,, | Performed by: FAMILY MEDICINE

## 2023-10-20 PROCEDURE — 3008F PR BODY MASS INDEX (BMI) DOCUMENTED: ICD-10-PCS | Mod: CPTII,S$GLB,, | Performed by: FAMILY MEDICINE

## 2023-10-20 PROCEDURE — 3008F PR BODY MASS INDEX (BMI) DOCUMENTED: ICD-10-PCS | Mod: CPTII,S$GLB,, | Performed by: ORTHOPAEDIC SURGERY

## 2023-10-20 PROCEDURE — 3061F PR NEG MICROALBUMINURIA RESULT DOCUMENTED/REVIEW: ICD-10-PCS | Mod: CPTII,S$GLB,, | Performed by: ORTHOPAEDIC SURGERY

## 2023-10-20 PROCEDURE — 73030 X-RAY EXAM OF SHOULDER: CPT | Mod: 26,LT,, | Performed by: INTERNAL MEDICINE

## 2023-10-20 PROCEDURE — 1125F AMNT PAIN NOTED PAIN PRSNT: CPT | Mod: CPTII,S$GLB,, | Performed by: FAMILY MEDICINE

## 2023-10-20 PROCEDURE — 3075F SYST BP GE 130 - 139MM HG: CPT | Mod: CPTII,S$GLB,, | Performed by: ORTHOPAEDIC SURGERY

## 2023-10-20 PROCEDURE — 1125F PR PAIN SEVERITY QUANTIFIED, PAIN PRESENT: ICD-10-PCS | Mod: CPTII,S$GLB,, | Performed by: FAMILY MEDICINE

## 2023-10-20 PROCEDURE — 3288F FALL RISK ASSESSMENT DOCD: CPT | Mod: CPTII,S$GLB,, | Performed by: ORTHOPAEDIC SURGERY

## 2023-10-20 PROCEDURE — 1100F PR PT FALLS ASSESS DOC 2+ FALLS/FALL W/INJURY/YR: ICD-10-PCS | Mod: CPTII,S$GLB,, | Performed by: ORTHOPAEDIC SURGERY

## 2023-10-20 PROCEDURE — 1160F RVW MEDS BY RX/DR IN RCRD: CPT | Mod: CPTII,S$GLB,, | Performed by: FAMILY MEDICINE

## 2023-10-20 PROCEDURE — 1101F PT FALLS ASSESS-DOCD LE1/YR: CPT | Mod: CPTII,S$GLB,, | Performed by: FAMILY MEDICINE

## 2023-10-20 PROCEDURE — 99203 PR OFFICE/OUTPT VISIT, NEW, LEVL III, 30-44 MIN: ICD-10-PCS | Mod: S$GLB,,, | Performed by: ORTHOPAEDIC SURGERY

## 2023-10-20 PROCEDURE — 1125F PR PAIN SEVERITY QUANTIFIED, PAIN PRESENT: ICD-10-PCS | Mod: CPTII,S$GLB,, | Performed by: ORTHOPAEDIC SURGERY

## 2023-10-20 PROCEDURE — 3061F PR NEG MICROALBUMINURIA RESULT DOCUMENTED/REVIEW: ICD-10-PCS | Mod: CPTII,S$GLB,, | Performed by: FAMILY MEDICINE

## 2023-10-20 PROCEDURE — 99214 OFFICE O/P EST MOD 30 MIN: CPT | Mod: 25,S$GLB,, | Performed by: FAMILY MEDICINE

## 2023-10-20 PROCEDURE — 3008F BODY MASS INDEX DOCD: CPT | Mod: CPTII,S$GLB,, | Performed by: ORTHOPAEDIC SURGERY

## 2023-10-20 PROCEDURE — 3061F NEG MICROALBUMINURIA REV: CPT | Mod: CPTII,S$GLB,, | Performed by: ORTHOPAEDIC SURGERY

## 2023-10-20 PROCEDURE — 1160F PR REVIEW ALL MEDS BY PRESCRIBER/CLIN PHARMACIST DOCUMENTED: ICD-10-PCS | Mod: CPTII,S$GLB,, | Performed by: FAMILY MEDICINE

## 2023-10-20 PROCEDURE — 3074F PR MOST RECENT SYSTOLIC BLOOD PRESSURE < 130 MM HG: ICD-10-PCS | Mod: CPTII,S$GLB,, | Performed by: FAMILY MEDICINE

## 2023-10-20 PROCEDURE — 3078F DIAST BP <80 MM HG: CPT | Mod: CPTII,S$GLB,, | Performed by: FAMILY MEDICINE

## 2023-10-20 PROCEDURE — 1125F AMNT PAIN NOTED PAIN PRSNT: CPT | Mod: CPTII,S$GLB,, | Performed by: ORTHOPAEDIC SURGERY

## 2023-10-20 NOTE — PROGRESS NOTES
CC: LEFT shoulder pain     68 y.o. Female with a multiyear history of left shoulder atraumatic pain.  Patient retired. She was seen for this in the past and told likely rotator cuff injury and sent for PT  She states that the pain is severe and not responding to any conservative care.      She reports that the pain and weakness is worse with overhead activity. It also bothers her at night.    Is affecting ADLs.  Pain is 7/10 at it's worst.      Past Medical History:   Diagnosis Date    Abdominal pain, right upper quadrant 02/04/2014    Anticoagulant long-term use     Anxiety     AR (allergic rhinitis)     Atrophic gastritis without mention of hemorrhage 02/13/2012     Dx updated per 2019 IMO Load    Chronic fatigue syndrome 06/10/2012    Diabetes mellitus     Dizziness     Fatty liver     GERD (gastroesophageal reflux disease)     Gross hematuria 12/01/2020    HTN (hypertension)     Hyperlipidemia     Leg swelling     Memory loss     Osteopenia     PONV (postoperative nausea and vomiting)     Primary osteoarthritis of right knee 10/06/2020    Primary osteoarthritis of right knee 10/06/2020    Right elbow pain 01/16/2019    S/P total hysterectomy     Screening for colon cancer 01/06/2021    Sleep apnea     Status post total right knee replacement 10/6/2020 10/05/2020       Past Surgical History:   Procedure Laterality Date    CHOLECYSTECTOMY      COLONOSCOPY N/A 01/17/2018    Procedure: COLONOSCOPY with Donnell;  Surgeon: Roland Jacobo MD;  Location: [address]);  Service: Endoscopy;  Laterality: N/A;    COLONOSCOPY N/A 01/06/2021    Procedure: COLONOSCOPY;  Surgeon: Yong Rowland MD;  Location: [address]);  Service: Endoscopy;  Laterality: N/A;  prep ins. emailed - [place] speaking,  needed - ERW  [facility] - ERW    CYSTOSCOPY N/A 12/01/2020    Procedure: CYSTOSCOPY;  Surgeon: Ines Stanford MD;  Location: [address];  Service: Urology;   Laterality: N/A;  45 minutes     ELBOW ARTHROPLASTY Right 01/16/2019    Procedure: ARTHROPLASTY, ELBOW right radial head arthroplasty revision;  Surgeon: Katja Hubbard MD;  Location: [facility] OR 1ST FLR;  Service: Orthopedics;  Laterality: Right;  Anesthesia: General and Regional. Stretcher, hand pan 1 and pan 2, CALL ACCUMEDFELYIRZEFERINO [employer] notified 1-14 LO    ELBOW SURGERY Right 07/16/2015    ELBOW SURGERY      ESOPHAGOGASTRODUODENOSCOPY N/A 04/15/2019    Procedure: EGD (ESOPHAGOGASTRODUODENOSCOPY);  Surgeon: Buck Irwin MD;  Location: [facility] (4TH FLR);  Service: Endoscopy;  Laterality: N/A;  ray she    ESOPHAGOGASTRODUODENOSCOPY N/A 04/21/2023    Procedure: EGD (ESOPHAGOGASTRODUODENOSCOPY);  Surgeon: Aamir Reyes MD;  Location[facility];  Service: Endoscopy;  Laterality: N/A;    HYSTERECTOMY      KNEE ARTHROPLASTY Right 10/06/2020    Procedure: ARTHROPLASTY, KNEE-SAME DAY PROTOCOL;  Surgeon: Sabino Damon MD;  Location: [facility];  Service: Orthopedics;  Laterality: Right;    KNEE ARTHROSCOPY W/ DEBRIDEMENT  04/2011    Left    RELEASE OF ULNAR NERVE AT CUBITAL TUNNEL Right 01/16/2019    Procedure: RELEASE, ULNAR TUNNEL right;  Surgeon: Katja Hubbard MD;  Location: [facility] OR 1ST FLR;  Service: Orthopedics;  Laterality: Right;  Anesthesia: General and Regional. Stretcher, hand pan 1 and pan 2, CALL CIRO SANTAMARIAX    RETROGRADE PYELOGRAPHY Bilateral 12/01/2020    Procedure: PYELOGRAM, RETROGRADE;  Surgeon: Ines Stanford MD;  Location: [facility] OR 1ST FLR;  Service: Urology;  Laterality: Bilateral;    REVERSE TOTAL SHOULDER ARTHROPLASTY Right 08/16/2022    Procedure: ARTHROPLASTY, SHOULDER, TOTAL, REVERSE, [place];  Surgeon: Aamir Powell MD;  Location: [facility] 2ND FLR;  Service: Orthopedics;  Laterality: Right;  [place] Can't go until after 12    ROTATOR CUFF REPAIR      SHOULDER SURGERY      TOTAL ABDOMINAL HYSTERECTOMY W/ BILATERAL SALPINGOOPHORECTOMY       TOTAL KNEE ARTHROPLASTY Left 10/19/2021    Procedure: ARTHROPLASTY, KNEE, TOTAL;  Surgeon: Sabino Damon MD;  Location: [facility];  Service: Orthopedics;  Laterality: Left;    UPPER GASTROINTESTINAL ENDOSCOPY         Family History   Problem Relation Age of Onset    Cancer Father         colon    Colon cancer Father 83        colon cancer    Diabetes Maternal Aunt     Diabetes Maternal Grandmother     Esophageal cancer Neg Hx     Stomach cancer Neg Hx     Melanoma Neg Hx          Current Outpatient Medications:     acetaminophen (TYLENOL) 500 MG tablet, Take 2 tablets (1,000 mg total) by mouth every 8 (eight) hours as needed for Pain., Disp: 90 tablet, Rfl: 0    atorvastatin (LIPITOR) 20 MG tablet, Take 1 tablet (20 mg total) by mouth once daily., Disp: 90 tablet, Rfl: 3    azelastine (ASTELIN) 137 mcg (0.1 %) nasal spray, 1 spray (137 mcg total) by Nasal route 2 (two) times daily., Disp: 30 mL, Rfl: 11    blood sugar diagnostic Strp, To check BG 3 times daily, to use with insurance preferred meter, e 11.65, Disp: 100 each, Rfl: 11    blood-glucose meter kit, To check BG 3 times daily, to use with insurance preferred meter, e 11.65, Disp: 1 each, Rfl: 0    buPROPion (WELLBUTRIN XL) 300 MG 24 hr tablet, Take 1 tablet (300 mg total) by mouth every morning., Disp: 90 tablet, Rfl: 3    glucagon (BAQSIMI) 3 mg/actuation Spry, Give one puff via nostril. Hold device between fingers and thumb, do not push plunger yet, insert tip gently into one nostril until finger(s) touch the outside of the nose, then push plunger firmly all the way in . Dose is complete when the green line disappears., Disp: 1 each, Rfl: 1    insulin (LANTUS SOLOSTAR U-100 INSULIN) glargine 100 units/mL SubQ pen, INJECT 24 UNITS SUBCUTANEOUSLY AT NIGHT., Disp: 30 mL, Rfl: 3    insulin lispro 100 unit/mL pen, INJECT 10 UNITS WITH MEALS, PLUS SLIDING SCALE 180-230+2, 231-280+4, 281-330+6, 331-380+8, >380+10, MAX DAILY 60 UNITS, Disp: 60 mL, Rfl: 3    " ketoconazole (NIZORAL) 2 % shampoo, Apply topically every 7 days., Disp: 120 mL, Rfl: 6    lancets Misc, To check BG 3 times daily, to use with insurance preferred meter, e 11.65, Disp: 100 each, Rfl: 11    latanoprost 0.005 % ophthalmic solution, Place 1 drop into both eyes nightly., Disp: , Rfl:     losartan (COZAAR) 50 MG tablet, TAKE 1 TABLET BY MOUTH EVERY DAY, Disp: 90 tablet, Rfl: 3    lubiprostone (AMITIZA) 24 MCG Cap, Take 1 capsule (24 mcg total) by mouth 2 (two) times daily with meals., Disp: 60 capsule, Rfl: 5    ondansetron (ZOFRAN-ODT) 8 MG TbDL, Take 1 tablet (8 mg total) by mouth 3 (three) times daily as needed (Nausea)., Disp: 30 tablet, Rfl: 3    ONETOUCH DELICA LANCETS 33 gauge Misc, TO CHECK BLOOD GLUCOSE 3 TIMES DAILY, Disp: , Rfl:     ONETOUCH VERIO FLEX METER Misc, TO CHECK BLOOD GLUCOSE 3 TIMES DAILY, TO USE WITH INSURANCE PREFERRED METER, E 11.65, Disp: , Rfl:     pantoprazole (PROTONIX) 40 MG tablet, Take 1 tablet (40 mg total) by mouth once daily., Disp: 90 tablet, Rfl: 3    pen needle, diabetic (NOVOFINE 32) 32 gauge x 1/4" Ndle, Uses 4 times a day. 90 day via duramed e 11.65, Disp: 400 each, Rfl: 3    DULoxetine (CYMBALTA) 30 MG capsule, Take 1 capsule (30 mg total) by mouth once daily. (Patient not taking: Reported on 10/20/2023), Disp: 90 capsule, Rfl: 0    meclizine (ANTIVERT) 25 mg tablet, Take 1 tablet (25 mg total) by mouth 3 (three) times daily as needed for Dizziness., Disp: 30 tablet, Rfl: 1    semaglutide (RYBELSUS) 3 mg tablet, Take on empty stomach with 4-6 oz of water, take pill daily, Disp: 30 tablet, Rfl: 4    Review of patient's allergies indicates:   Allergen Reactions    Iodinated contrast media Swelling and Rash    Percocet [oxycodone-acetaminophen] Itching    Macrobid [nitrofurantoin monohyd/m-cryst] Rash    Metformin Rash    Penicillins Rash     Had ancef in 2020 with no adverse rxn     Promethazine Rash     Had compazine in 2021    Sulfa (sulfonamide antibiotics) " "Rash    Sulfamethoxazole-trimethoprim Rash          REVIEW OF SYSTEMS:  Constitution: Negative. Negative for chills, fever and night sweats.   HENT: Negative for congestion and headaches.    Eyes: Negative for blurred vision, left vision loss and right vision loss.   Cardiovascular: Negative for chest pain and syncope.   Respiratory: Negative for cough and shortness of breath.    Endocrine: Negative for polydipsia, polyphagia and polyuria.   Hematologic/Lymphatic: Negative for bleeding problem. Does not bruise/bleed easily.   Skin: Negative for dry skin, itching and rash.   Musculoskeletal: Negative for falls.  Positive for left shoulder pain and muscle weakness.   Gastrointestinal: Negative for abdominal pain and bowel incontinence.   Genitourinary: Negative for bladder incontinence and nocturia.   Neurological: Negative for disturbances in coordination, loss of balance and seizures.   Psychiatric/Behavioral: Negative for depression. The patient does not have insomnia.    Allergic/Immunologic: Negative for hives and persistent infections.      PHYSICAL EXAMINATION:  Vitals:  /72   Pulse 76   Ht 5' 5" (1.651 m)   Wt 75.5 kg (166 lb 7.2 oz)   BMI 27.70 kg/m²    General: The patient is alert and oriented x 3.  Mood is pleasant.  Observation of ears, eyes and nose reveal no gross abnormalities.  No labored breathing observed.  Gait is coordinated. Patient can toe walk and heel walk without difficulty.      LEFT Shoulder / Upper Extremity Exam    OBSERVATION:     Swelling  none  Deformity  none   Discoloration  none   Scapular winging none   Scars   none  Atrophy  none    TENDERNESS / CREPITUS (T/C):          T/C      T/C   Clavicle   -/-  SUPRAspinatus    -/-     AC Jt.    -/-  INFRAspinatus  -/-    SC Jt.    -/-  Deltoid    -/-      G. Tuberosity  -/-  LH BICEP groove  -/-   Acromion:  -/-  Midline Neck   -/-     Scapular Spine -/-  Trapezium   -/-   SMA Scapula  -/-  GH jt. line - " post  -/-     Scapulothoracic  -/-         ROM: (* = with pain)  Right shoulder   Left shoulder        AROM (PROM)   AROM (PROM)   FE    170° (175°)     170° (175°)     ER at 0°    60°  (65°)    60°  (65°)   ER at 90° ABD  90°  (90°)    90°  (90°)   IR at 90°  ABD   NA  (40°)     NA  (40°)      IR (spine level)   T10     T10    STRENGTH: (* = with pain) Right shoulder   Left shoulder    SCAPTION   4/5    4/5    IR    5/5    5/5   ER    5/5    5/5   BICEPS   5/5    5/5   Deltoid    5/5    5/5     SIGNS:  Painful side       NEER   +    OPARISHS  neg    JASSO   +    SPEEDS  Pos     DROP ARM   +   BELLY PRESS neg   Superior escape none    LIFT-OFF  neg   X-Body ADD    neg    MOVING VALGUS neg        STABILITY TESTING    Right shoulder   Left shoulder    Translation     Anterior  up face     up face    Posterior  up face    up face    Sulcus   < 10mm    < 10 mm     Signs   Apprehension   neg      neg       Relocation   no change     no change      Jerk test  neg     neg    EXTREMITY NEURO-VASCULAR EXAM:    Sensation grossly intact to light touch all dermatomal regions.    DTR 2+ Biceps, Triceps, BR and Negative Davids sign   Grossly intact motor function at Elbow, Wrist and Hand   Distal pulses radial and ulnar 2+, brisk cap refill, symmetric.      NECK:  Painless FROM and spinous processes non-tender. Negative Spurlings sign.      OTHER FINDINGS:  - scapular dyskinesia    XRAYS:  Xrays including AP, Outlet and Axillary Lateral of Left shoulder are ordered / images reviewed by me:   No fracture dislocation or other pathology   Acromion type 1   Proximal migration of humeral head: Mild   GH arthritis: None          ASSESSMENT:   Left shoulder pain, possible:  1. Left shoulder pain, unspecified chronicity    2. Dysfunction of left rotator cuff        PLAN:      1. MRI to rule out rotator cuff tear  2. Follow up in clinic to discuss MRI results    All questions were answered, patient will contact us for  questions or concerns in the interim.

## 2023-10-20 NOTE — PROGRESS NOTES
Subjective     Patient ID: Riana Kay is a 68 y.o. female.    Chief Complaint: Abdominal Pain (Gastro  gave her amtiza. ) and Constipation (Last good dm was day before yesterday.  Uses a rx twice daily gastro  gave her.   )  68-year-old female presents to clinic today accompanied by her  secondary to a continue complaint of chronic constipation and abdominal pain.  She has been followed by GI secondary to these complaints.  She has previously had a CT scan of the abdomen performed in 2021 which was within normal limits except for diverticulosis.  Colonoscopy was performed in January of 2021 which revealed diverticulosis and 1 4 mm colon polyp.  Finally, a gastric emptying study was done on Nesha 15, 2023 which revealed delayed gastric emptying.  It was recommended that the patient start Linzess; however, secondary to cost she was started on Amitiza instead.  She reports that despite taking Amitiza she has seen no change in her constipation and continues to report frequent abdominal pain.  Abdominal Pain  Associated symptoms include constipation. Pertinent negatives include no diarrhea, dysuria, fever, frequency, headaches, hematuria, myalgias, nausea or vomiting.   Constipation  Associated symptoms include abdominal pain. Pertinent negatives include no back pain, diarrhea, difficulty urinating, fever, nausea or vomiting.     Review of Systems   Constitutional:  Negative for appetite change, chills, fatigue and fever.   HENT:  Negative for nasal congestion, ear pain, hearing loss, postnasal drip, rhinorrhea, sinus pressure/congestion, sore throat and tinnitus.    Eyes:  Negative for redness, itching and visual disturbance.   Respiratory:  Negative for cough, chest tightness and shortness of breath.    Cardiovascular:  Negative for chest pain and palpitations.   Gastrointestinal:  Positive for abdominal pain and constipation. Negative for diarrhea, nausea and vomiting.   Genitourinary:  Negative  for decreased urine volume, difficulty urinating, dysuria, frequency, hematuria and urgency.   Musculoskeletal:  Negative for back pain, myalgias, neck pain and neck stiffness.   Integumentary:  Negative for rash.   Neurological:  Negative for dizziness, light-headedness and headaches.   Psychiatric/Behavioral: Negative.            Objective     Physical Exam  Vitals and nursing note reviewed.   Constitutional:       General: She is not in acute distress.     Appearance: She is well-developed. She is not diaphoretic.   HENT:      Head: Normocephalic and atraumatic.      Right Ear: External ear normal.      Left Ear: External ear normal.      Nose: Nose normal.      Mouth/Throat:      Pharynx: No oropharyngeal exudate.   Eyes:      General: No scleral icterus.        Right eye: No discharge.         Left eye: No discharge.      Conjunctiva/sclera: Conjunctivae normal.      Pupils: Pupils are equal, round, and reactive to light.   Neck:      Thyroid: No thyromegaly.      Vascular: No JVD.      Trachea: No tracheal deviation.   Cardiovascular:      Rate and Rhythm: Normal rate and regular rhythm.      Heart sounds: Normal heart sounds. No murmur heard.     No friction rub. No gallop.   Pulmonary:      Effort: Pulmonary effort is normal. No respiratory distress.      Breath sounds: Normal breath sounds. No stridor. No wheezing or rales.   Abdominal:      General: Bowel sounds are normal. There is no distension.      Palpations: Abdomen is soft. There is no mass.      Tenderness: There is no abdominal tenderness. There is no guarding or rebound.   Musculoskeletal:         General: No tenderness. Normal range of motion.      Cervical back: Normal range of motion and neck supple.   Lymphadenopathy:      Cervical: No cervical adenopathy.   Skin:     General: Skin is warm and dry.      Coloration: Skin is not pale.      Findings: No erythema or rash.   Neurological:      Mental Status: She is alert and oriented to person,  place, and time.   Psychiatric:         Behavior: Behavior normal.         Thought Content: Thought content normal.         Judgment: Judgment normal.            Assessment and Plan     1. Chronic constipation    2. Gastroparesis due to DM    3. Need for prophylactic vaccination and inoculation against influenza    Other orders  -     Influenza - Quadrivalent (Adjuvanted)        1. Recommend continued use of Amitiza as prescribed.    2. Encourage increased hydration and recommend starting MiraLax 1 capful daily.    3. Encourage follow-up with GI for further evaluation of chronic constipation with delayed gastric emptying.  4. Flu vaccine given.  5. Return to clinic as needed or as previously scheduled for follow-up.               Follow up if symptoms worsen or fail to improve.

## 2023-10-30 ENCOUNTER — TELEPHONE (OUTPATIENT)
Dept: NEUROLOGY | Facility: CLINIC | Age: 68
End: 2023-10-30
Payer: MEDICARE

## 2023-10-31 ENCOUNTER — HOSPITAL ENCOUNTER (OUTPATIENT)
Dept: RADIOLOGY | Facility: HOSPITAL | Age: 68
Discharge: HOME | End: 2023-10-31
Attending: STUDENT IN AN ORGANIZED HEALTH CARE EDUCATION/TRAINING PROGRAM
Payer: MEDICARE

## 2023-10-31 DIAGNOSIS — G89.29 CHRONIC LEFT SHOULDER PAIN: ICD-10-CM

## 2023-10-31 DIAGNOSIS — M25.512 CHRONIC LEFT SHOULDER PAIN: ICD-10-CM

## 2023-10-31 DIAGNOSIS — Z96.652 STATUS POST TOTAL LEFT KNEE REPLACEMENT: Primary | ICD-10-CM

## 2023-10-31 PROCEDURE — 73221 MRI JOINT UPR EXTREM W/O DYE: CPT | Mod: TC,LT

## 2023-10-31 PROCEDURE — 73221 MRI SHOULDER WITHOUT CONTRAST LEFT: ICD-10-PCS | Mod: 26,LT,, | Performed by: RADIOLOGY

## 2023-10-31 PROCEDURE — 73221 MRI JOINT UPR EXTREM W/O DYE: CPT | Mod: 26,LT,, | Performed by: RADIOLOGY

## 2023-11-01 ENCOUNTER — TELEPHONE (OUTPATIENT)
Dept: NEUROLOGY | Facility: CLINIC | Age: 68
End: 2023-11-01
Payer: MEDICARE

## 2023-11-01 ENCOUNTER — PATIENT MESSAGE (OUTPATIENT)
Dept: ADMINISTRATIVE | Facility: OTHER | Age: 68
End: 2023-11-01
Payer: MEDICARE

## 2023-11-01 ENCOUNTER — OFFICE VISIT (OUTPATIENT)
Dept: ORTHOPEDICS | Facility: CLINIC | Age: 68
End: 2023-11-01
Payer: MEDICARE

## 2023-11-01 ENCOUNTER — HOSPITAL ENCOUNTER (OUTPATIENT)
Dept: RADIOLOGY | Facility: HOSPITAL | Age: 68
Discharge: HOME | End: 2023-11-01
Attending: ORTHOPAEDIC SURGERY
Payer: MEDICARE

## 2023-11-01 VITALS — WEIGHT: 168 LBS | BODY MASS INDEX: 27.99 KG/M2 | HEIGHT: 65 IN

## 2023-11-01 DIAGNOSIS — Z96.652 STATUS POST TOTAL LEFT KNEE REPLACEMENT: ICD-10-CM

## 2023-11-01 DIAGNOSIS — G89.29 CHRONIC PAIN OF LEFT KNEE: Primary | ICD-10-CM

## 2023-11-01 DIAGNOSIS — M25.562 CHRONIC PAIN OF LEFT KNEE: Primary | ICD-10-CM

## 2023-11-01 PROCEDURE — 1157F ADVNC CARE PLAN IN RCRD: CPT | Mod: CPTII,S$GLB,, | Performed by: ORTHOPAEDIC SURGERY

## 2023-11-01 PROCEDURE — 99213 OFFICE O/P EST LOW 20 MIN: CPT | Mod: S$GLB,,, | Performed by: ORTHOPAEDIC SURGERY

## 2023-11-01 PROCEDURE — 3061F NEG MICROALBUMINURIA REV: CPT | Mod: CPTII,S$GLB,, | Performed by: ORTHOPAEDIC SURGERY

## 2023-11-01 PROCEDURE — 3066F PR DOCUMENTATION OF TREATMENT FOR NEPHROPATHY: ICD-10-PCS | Mod: CPTII,S$GLB,, | Performed by: ORTHOPAEDIC SURGERY

## 2023-11-01 PROCEDURE — 3288F FALL RISK ASSESSMENT DOCD: CPT | Mod: CPTII,S$GLB,, | Performed by: ORTHOPAEDIC SURGERY

## 2023-11-01 PROCEDURE — 73562 XR KNEE ORTHO LEFT: ICD-10-PCS | Mod: 26,LT,, | Performed by: RADIOLOGY

## 2023-11-01 PROCEDURE — 3066F NEPHROPATHY DOC TX: CPT | Mod: CPTII,S$GLB,, | Performed by: ORTHOPAEDIC SURGERY

## 2023-11-01 PROCEDURE — 1125F PR PAIN SEVERITY QUANTIFIED, PAIN PRESENT: ICD-10-PCS | Mod: CPTII,S$GLB,, | Performed by: ORTHOPAEDIC SURGERY

## 2023-11-01 PROCEDURE — 1159F PR MEDICATION LIST DOCUMENTED IN MEDICAL RECORD: ICD-10-PCS | Mod: CPTII,S$GLB,, | Performed by: ORTHOPAEDIC SURGERY

## 2023-11-01 PROCEDURE — 3051F HG A1C>EQUAL 7.0%<8.0%: CPT | Mod: CPTII,S$GLB,, | Performed by: ORTHOPAEDIC SURGERY

## 2023-11-01 PROCEDURE — 99999 PR PBB SHADOW E&M-EST. PATIENT-LVL III: ICD-10-PCS | Mod: PBBFAC,,, | Performed by: ORTHOPAEDIC SURGERY

## 2023-11-01 PROCEDURE — 1157F PR ADVANCE CARE PLAN OR EQUIV PRESENT IN MEDICAL RECORD: ICD-10-PCS | Mod: CPTII,S$GLB,, | Performed by: ORTHOPAEDIC SURGERY

## 2023-11-01 PROCEDURE — 3008F BODY MASS INDEX DOCD: CPT | Mod: CPTII,S$GLB,, | Performed by: ORTHOPAEDIC SURGERY

## 2023-11-01 PROCEDURE — 99213 PR OFFICE/OUTPT VISIT, EST, LEVL III, 20-29 MIN: ICD-10-PCS | Mod: S$GLB,,, | Performed by: ORTHOPAEDIC SURGERY

## 2023-11-01 PROCEDURE — 99999 PR PBB SHADOW E&M-EST. PATIENT-LVL III: CPT | Mod: PBBFAC,,, | Performed by: ORTHOPAEDIC SURGERY

## 2023-11-01 PROCEDURE — 3008F PR BODY MASS INDEX (BMI) DOCUMENTED: ICD-10-PCS | Mod: CPTII,S$GLB,, | Performed by: ORTHOPAEDIC SURGERY

## 2023-11-01 PROCEDURE — 73560 X-RAY EXAM OF KNEE 1 OR 2: CPT | Mod: 26,RT,, | Performed by: RADIOLOGY

## 2023-11-01 PROCEDURE — 3061F PR NEG MICROALBUMINURIA RESULT DOCUMENTED/REVIEW: ICD-10-PCS | Mod: CPTII,S$GLB,, | Performed by: ORTHOPAEDIC SURGERY

## 2023-11-01 PROCEDURE — 4010F ACE/ARB THERAPY RXD/TAKEN: CPT | Mod: CPTII,S$GLB,, | Performed by: ORTHOPAEDIC SURGERY

## 2023-11-01 PROCEDURE — 3288F PR FALLS RISK ASSESSMENT DOCUMENTED: ICD-10-PCS | Mod: CPTII,S$GLB,, | Performed by: ORTHOPAEDIC SURGERY

## 2023-11-01 PROCEDURE — 73562 X-RAY EXAM OF KNEE 3: CPT | Mod: 26,LT,, | Performed by: RADIOLOGY

## 2023-11-01 PROCEDURE — 73560 X-RAY EXAM OF KNEE 1 OR 2: CPT | Mod: TC,59,RT

## 2023-11-01 PROCEDURE — 73562 X-RAY EXAM OF KNEE 3: CPT | Mod: TC,LT

## 2023-11-01 PROCEDURE — 1159F MED LIST DOCD IN RCRD: CPT | Mod: CPTII,S$GLB,, | Performed by: ORTHOPAEDIC SURGERY

## 2023-11-01 PROCEDURE — 1101F PT FALLS ASSESS-DOCD LE1/YR: CPT | Mod: CPTII,S$GLB,, | Performed by: ORTHOPAEDIC SURGERY

## 2023-11-01 PROCEDURE — 1101F PR PT FALLS ASSESS DOC 0-1 FALLS W/OUT INJ PAST YR: ICD-10-PCS | Mod: CPTII,S$GLB,, | Performed by: ORTHOPAEDIC SURGERY

## 2023-11-01 PROCEDURE — 4010F PR ACE/ARB THEARPY RXD/TAKEN: ICD-10-PCS | Mod: CPTII,S$GLB,, | Performed by: ORTHOPAEDIC SURGERY

## 2023-11-01 PROCEDURE — 3051F PR MOST RECENT HEMOGLOBIN A1C LEVEL 7.0 - < 8.0%: ICD-10-PCS | Mod: CPTII,S$GLB,, | Performed by: ORTHOPAEDIC SURGERY

## 2023-11-01 PROCEDURE — 1125F AMNT PAIN NOTED PAIN PRSNT: CPT | Mod: CPTII,S$GLB,, | Performed by: ORTHOPAEDIC SURGERY

## 2023-11-01 PROCEDURE — 73560 XR KNEE ORTHO LEFT: ICD-10-PCS | Mod: 26,RT,, | Performed by: RADIOLOGY

## 2023-11-01 NOTE — PROGRESS NOTES
"Subjective:      Patient ID: Riana Kay is a 68 y.o. female.    Chief Complaint: Pain of the Left Knee    HPI  Riana Kay has left knee pain and weakness for several months.  The pain is mild.  It is medially based.  It is worse with activity.  She also feels that she has some weakness which she notices more when she is walking.  Her history, medications and problem list were reviewed.    Review of Systems   Constitutional: Negative for chills, fever and night sweats.   HENT:  Negative for hearing loss.    Eyes:  Negative for blurred vision and double vision.   Cardiovascular:  Negative for chest pain, claudication and leg swelling.   Respiratory:  Negative for shortness of breath.    Endocrine: Negative for polydipsia, polyphagia and polyuria.   Hematologic/Lymphatic: Negative for adenopathy and bleeding problem. Does not bruise/bleed easily.   Skin:  Negative for poor wound healing.   Musculoskeletal:  Positive for joint pain.   Gastrointestinal:  Negative for diarrhea and heartburn.   Genitourinary:  Negative for bladder incontinence.   Neurological:  Negative for focal weakness, headaches, numbness, paresthesias and sensory change.   Psychiatric/Behavioral:  The patient is not nervous/anxious.    Allergic/Immunologic: Negative for persistent infections.         Objective:      Body mass index is 27.96 kg/m².  Vitals:    11/01/23 0835   Weight: 76.2 kg (167 lb 15.9 oz)   Height: 5' 5" (1.651 m)           General    Constitutional: She is oriented to person, place, and time. She appears well-developed and well-nourished.   HENT:   Head: Normocephalic and atraumatic.   Eyes: EOM are normal.   Cardiovascular:  Normal rate.            Pulmonary/Chest: Effort normal.   Neurological: She is alert and oriented to person, place, and time.   Psychiatric: She has a normal mood and affect. Her behavior is normal.     General Musculoskeletal Exam   Gait: abnormal       Right Knee Exam     Inspection "   Erythema: absent  Scars: present  Swelling: absent  Effusion: absent  Deformity: absent  Bruising: absent    Tenderness   The patient is experiencing no tenderness.     Range of Motion   Extension:  0   Flexion:  130     Tests   Ligament Examination   Lachman: normal (-1 to 2mm)   MCL - Valgus: normal (0 to 2mm)  LCL - Varus: normal  Patella   Passive Patellar Tilt: neutral    Other   Sensation: normal    Left Knee Exam     Inspection   Erythema: absent  Scars: present  Swelling: absent  Effusion: absent  Deformity: absent  Bruising: absent    Tenderness   The patient tender to palpation of the medial retinaculum and lateral retinaculum.    Range of Motion   Extension:  0   Flexion:  130     Tests   Stability   Lachman: normal (-1 to 2mm)   MCL - Valgus: normal (0 to 2mm)  LCL - Varus: normal (0 to 2mm)  Patella   Passive Patellar Tilt: neutral    Other   Sensation: normal    Muscle Strength   Right Lower Extremity   Hip Abduction: 5/5   Quadriceps:  5/5   Hamstrin/5   Left Lower Extremity   Hip Abduction: 5/5   Quadriceps:  5/5   Hamstrin/5     Vascular Exam     Right Pulses  Dorsalis Pedis:      2+          Left Pulses  Dorsalis Pedis:      2+          Edema  Right Lower Leg: absent  Left Lower Leg: absent      Radiographs taken today were reviewed by me.  There is a prosthetic replacement of the bilateral knee(s).  The prosthesis is not well positioned.  There is not evidence of bone loss, osteolysis, or loosening. There is not a fracture.            Assessment:       Encounter Diagnosis   Name Primary?    Chronic pain of left knee Yes          Plan:       Riana was seen today for pain.    Diagnoses and all orders for this visit:    Chronic pain of left knee        Options were discussed.  We will pursue a course of physical therapy.  F/U in 6 weeks.

## 2023-11-01 NOTE — TELEPHONE ENCOUNTER
----- Message from Dori Aponte sent at 11/1/2023  2:28 PM CDT -----  Regarding: PT'S  IS RETURNING A CALL FROM STAFF REGARDING RESCHEDULING APPT  Contact: maryann Sommer   [phone]

## 2023-11-02 ENCOUNTER — TELEPHONE (OUTPATIENT)
Dept: NEUROLOGY | Facility: CLINIC | Age: 68
End: 2023-11-02
Payer: MEDICARE

## 2023-11-02 NOTE — TELEPHONE ENCOUNTER
Incoming call recv'd from Delma Daley who had pt's  on other line. Pt was contacted and told her appt needed to be rescheduled yet she did not get r/s date/time and today's appt remains scheduled in [facility]. Pt will not present for appt today as instructed NOT to; pt would like to r/s ASAP. Pt's  has attempted to return call more than once; pt did not cancel late rather awaiting further direction.

## 2023-11-07 ENCOUNTER — TELEPHONE (OUTPATIENT)
Dept: NEUROLOGY | Facility: CLINIC | Age: 68
End: 2023-11-07
Payer: MEDICARE

## 2023-11-07 ENCOUNTER — PATIENT MESSAGE (OUTPATIENT)
Dept: NEUROLOGY | Facility: CLINIC | Age: 68
End: 2023-11-07
Payer: MEDICARE

## 2023-11-07 ENCOUNTER — HOSPITAL ENCOUNTER (OUTPATIENT)
Dept: RADIOLOGY | Facility: HOSPITAL | Age: 68
Discharge: HOME | End: 2023-11-07
Attending: NURSE PRACTITIONER
Payer: MEDICARE

## 2023-11-07 VITALS — WEIGHT: 167 LBS | BODY MASS INDEX: 27.82 KG/M2 | HEIGHT: 65 IN

## 2023-11-07 DIAGNOSIS — Z12.31 ENCOUNTER FOR SCREENING MAMMOGRAM FOR MALIGNANT NEOPLASM OF BREAST: ICD-10-CM

## 2023-11-07 PROCEDURE — 77067 SCR MAMMO BI INCL CAD: CPT | Mod: TC

## 2023-11-07 PROCEDURE — 77067 SCR MAMMO BI INCL CAD: CPT | Mod: 26,,, | Performed by: RADIOLOGY

## 2023-11-07 PROCEDURE — 77067 MAMMO DIGITAL SCREENING BILAT WITH TOMO: ICD-10-PCS | Mod: 26,,, | Performed by: RADIOLOGY

## 2023-11-07 PROCEDURE — 77063 BREAST TOMOSYNTHESIS BI: CPT | Mod: 26,,, | Performed by: RADIOLOGY

## 2023-11-07 PROCEDURE — 77063 MAMMO DIGITAL SCREENING BILAT WITH TOMO: ICD-10-PCS | Mod: 26,,, | Performed by: RADIOLOGY

## 2023-11-08 ENCOUNTER — OFFICE VISIT (OUTPATIENT)
Dept: PODIATRY | Facility: CLINIC | Age: 68
End: 2023-11-08
Payer: MEDICARE

## 2023-11-08 ENCOUNTER — PATIENT MESSAGE (OUTPATIENT)
Dept: INTERNAL MEDICINE | Facility: CLINIC | Age: 68
End: 2023-11-08
Payer: MEDICARE

## 2023-11-08 VITALS
HEART RATE: 62 BPM | WEIGHT: 167 LBS | DIASTOLIC BLOOD PRESSURE: 70 MMHG | BODY MASS INDEX: 27.82 KG/M2 | HEIGHT: 65 IN | SYSTOLIC BLOOD PRESSURE: 141 MMHG | RESPIRATION RATE: 18 BRPM

## 2023-11-08 DIAGNOSIS — L60.0 INGROWN NAIL: ICD-10-CM

## 2023-11-08 DIAGNOSIS — Z79.4 TYPE 2 DIABETES MELLITUS WITH OTHER SPECIFIED COMPLICATION, WITH LONG-TERM CURRENT USE OF INSULIN: Primary | ICD-10-CM

## 2023-11-08 DIAGNOSIS — E11.69 TYPE 2 DIABETES MELLITUS WITH OTHER SPECIFIED COMPLICATION, WITH LONG-TERM CURRENT USE OF INSULIN: Primary | ICD-10-CM

## 2023-11-08 DIAGNOSIS — M79.674 GREAT TOE PAIN, RIGHT: ICD-10-CM

## 2023-11-08 PROCEDURE — 3061F PR NEG MICROALBUMINURIA RESULT DOCUMENTED/REVIEW: ICD-10-PCS | Mod: CPTII,S$GLB,, | Performed by: PODIATRIST

## 2023-11-08 PROCEDURE — 3078F DIAST BP <80 MM HG: CPT | Mod: CPTII,S$GLB,, | Performed by: PODIATRIST

## 2023-11-08 PROCEDURE — 11750 NAIL REMOVAL: ICD-10-PCS | Mod: T5,S$GLB,, | Performed by: PODIATRIST

## 2023-11-08 PROCEDURE — 3066F PR DOCUMENTATION OF TREATMENT FOR NEPHROPATHY: ICD-10-PCS | Mod: CPTII,S$GLB,, | Performed by: PODIATRIST

## 2023-11-08 PROCEDURE — 4010F PR ACE/ARB THEARPY RXD/TAKEN: ICD-10-PCS | Mod: CPTII,S$GLB,, | Performed by: PODIATRIST

## 2023-11-08 PROCEDURE — 3066F NEPHROPATHY DOC TX: CPT | Mod: CPTII,S$GLB,, | Performed by: PODIATRIST

## 2023-11-08 PROCEDURE — 3051F PR MOST RECENT HEMOGLOBIN A1C LEVEL 7.0 - < 8.0%: ICD-10-PCS | Mod: CPTII,S$GLB,, | Performed by: PODIATRIST

## 2023-11-08 PROCEDURE — 3008F BODY MASS INDEX DOCD: CPT | Mod: CPTII,S$GLB,, | Performed by: PODIATRIST

## 2023-11-08 PROCEDURE — 99214 PR OFFICE/OUTPT VISIT, EST, LEVL IV, 30-39 MIN: ICD-10-PCS | Mod: 25,S$GLB,, | Performed by: PODIATRIST

## 2023-11-08 PROCEDURE — 3008F PR BODY MASS INDEX (BMI) DOCUMENTED: ICD-10-PCS | Mod: CPTII,S$GLB,, | Performed by: PODIATRIST

## 2023-11-08 PROCEDURE — 4010F ACE/ARB THERAPY RXD/TAKEN: CPT | Mod: CPTII,S$GLB,, | Performed by: PODIATRIST

## 2023-11-08 PROCEDURE — 99999 PR PBB SHADOW E&M-EST. PATIENT-LVL IV: CPT | Mod: PBBFAC,,, | Performed by: PODIATRIST

## 2023-11-08 PROCEDURE — 1159F MED LIST DOCD IN RCRD: CPT | Mod: CPTII,S$GLB,, | Performed by: PODIATRIST

## 2023-11-08 PROCEDURE — 3077F SYST BP >= 140 MM HG: CPT | Mod: CPTII,S$GLB,, | Performed by: PODIATRIST

## 2023-11-08 PROCEDURE — 3078F PR MOST RECENT DIASTOLIC BLOOD PRESSURE < 80 MM HG: ICD-10-PCS | Mod: CPTII,S$GLB,, | Performed by: PODIATRIST

## 2023-11-08 PROCEDURE — 1157F PR ADVANCE CARE PLAN OR EQUIV PRESENT IN MEDICAL RECORD: ICD-10-PCS | Mod: CPTII,S$GLB,, | Performed by: PODIATRIST

## 2023-11-08 PROCEDURE — 99214 OFFICE O/P EST MOD 30 MIN: CPT | Mod: 25,S$GLB,, | Performed by: PODIATRIST

## 2023-11-08 PROCEDURE — 3061F NEG MICROALBUMINURIA REV: CPT | Mod: CPTII,S$GLB,, | Performed by: PODIATRIST

## 2023-11-08 PROCEDURE — 1126F AMNT PAIN NOTED NONE PRSNT: CPT | Mod: CPTII,S$GLB,, | Performed by: PODIATRIST

## 2023-11-08 PROCEDURE — 1157F ADVNC CARE PLAN IN RCRD: CPT | Mod: CPTII,S$GLB,, | Performed by: PODIATRIST

## 2023-11-08 PROCEDURE — 3077F PR MOST RECENT SYSTOLIC BLOOD PRESSURE >= 140 MM HG: ICD-10-PCS | Mod: CPTII,S$GLB,, | Performed by: PODIATRIST

## 2023-11-08 PROCEDURE — 99999 PR PBB SHADOW E&M-EST. PATIENT-LVL IV: ICD-10-PCS | Mod: PBBFAC,,, | Performed by: PODIATRIST

## 2023-11-08 PROCEDURE — 1126F PR PAIN SEVERITY QUANTIFIED, NO PAIN PRESENT: ICD-10-PCS | Mod: CPTII,S$GLB,, | Performed by: PODIATRIST

## 2023-11-08 PROCEDURE — 1159F PR MEDICATION LIST DOCUMENTED IN MEDICAL RECORD: ICD-10-PCS | Mod: CPTII,S$GLB,, | Performed by: PODIATRIST

## 2023-11-08 PROCEDURE — 3051F HG A1C>EQUAL 7.0%<8.0%: CPT | Mod: CPTII,S$GLB,, | Performed by: PODIATRIST

## 2023-11-08 PROCEDURE — 11750 EXCISION NAIL&NAIL MATRIX: CPT | Mod: T5,S$GLB,, | Performed by: PODIATRIST

## 2023-11-08 RX ORDER — INSULIN GLARGINE 100 [IU]/ML
INJECTION, SOLUTION SUBCUTANEOUS
Qty: 30 ML | Refills: 3 | Status: SHIPPED | OUTPATIENT
Start: 2023-11-08

## 2023-11-08 NOTE — PROGRESS NOTES
Subjective:     Patient ID: Riana Kay is a 68 y.o. female.    Chief Complaint: PCP (Jose Rojas MD  10/20/23/Family Medicine/), Diabetic Foot Exam, and Ingrown Toenail (Rt great toenail pain )    Riana is a 68 y.o. female who presents to the clinic upon referral from Dr. Lucretia boyce. provider found  for evaluation and treatment of diabetic feet. Riana has a past medical history of Abdominal pain, right upper quadrant (02/04/2014), Anticoagulant long-term use, Anxiety, AR (allergic rhinitis), Atrophic gastritis without mention of hemorrhage (02/13/2012), Chronic fatigue syndrome (06/10/2012), Diabetes mellitus, Dizziness, Fatty liver, GERD (gastroesophageal reflux disease), Gross hematuria (12/01/2020), HTN (hypertension), Hyperlipidemia, Leg swelling, Memory loss, Osteopenia, PONV (postoperative nausea and vomiting), Primary osteoarthritis of right knee (10/06/2020), Primary osteoarthritis of right knee (10/06/2020), Right elbow pain (01/16/2019), S/P total hysterectomy, Screening for colon cancer (01/06/2021), Sleep apnea, and Status post total right knee replacement 10/6/2020 (10/05/2020). Patient relates no major problem with feet. Only complaints today consist of yearly comprehensive diabetic  foot examination and R big toenail pain .    PCP: Jose Rojas MD    Date Last Seen by PCP:   Chief Complaint   Patient presents with    PCP     Jose Rjoas MD  10/20/23  Family Medicine      Diabetic Foot Exam    Ingrown Toenail     Rt great toenail pain          Current shoe gear: Casual shoes    Hemoglobin A1C   Date Value Ref Range Status   08/16/2023 7.7 (H) 4.0 - 5.6 % Final     Comment:     ADA Screening Guidelines:  5.7-6.4%  Consistent with prediabetes  >or=6.5%  Consistent with diabetes    High levels of fetal hemoglobin interfere with the HbA1C  assay. Heterozygous hemoglobin variants (HbS, HgC, etc)do  not significantly interfere with this assay.   However, presence of  multiple variants may affect accuracy.     07/27/2023 7.4 (H) 4.0 - 5.6 % Final     Comment:     ADA Screening Guidelines:  5.7-6.4%  Consistent with prediabetes  >or=6.5%  Consistent with diabetes    High levels of fetal hemoglobin interfere with the HbA1C  assay. Heterozygous hemoglobin variants (HbS, HgC, etc)do  not significantly interfere with this assay.   However, presence of multiple variants may affect accuracy.     04/06/2023 6.0 (H) 4.0 - 5.6 % Final     Comment:     ADA Screening Guidelines:  5.7-6.4%  Consistent with prediabetes  >or=6.5%  Consistent with diabetes    High levels of fetal hemoglobin interfere with the HbA1C  assay. Heterozygous hemoglobin variants (HbS, HgC, etc)do  not significantly interfere with this assay.   However, presence of multiple variants may affect accuracy.           Review of Systems   Constitutional: Negative for chills, decreased appetite and fever.   Cardiovascular:  Negative for leg swelling.   Skin:  Positive for nail changes.   Musculoskeletal:  Negative for arthritis, joint pain, joint swelling and myalgias.   Gastrointestinal:  Negative for nausea and vomiting.   Neurological:  Negative for loss of balance, numbness and paresthesias.          Patient Active Problem List   Diagnosis    Anxiety    Fatty liver    Memory changes    Pathological fracture of right humerus due to osteoporosis with routine healing    Gastroesophageal reflux disease without esophagitis    Hypertension associated with type 2 diabetes mellitus    ZAHIRA (obstructive sleep apnea)    Atherosclerosis of native coronary artery of native heart without angina pectoris    Muscle spasms of neck    Radiculopathy of cervical spine    Overweight (BMI 25.0-29.9)    Type 2 diabetes mellitus without complication, with long-term current use of insulin    Vitamin D deficiency    Chronic right shoulder pain    Primary osteoarthritis of left knee    Aortic atherosclerosis    Closed fracture of proximal end of  right humerus with routine healing s/p total shoulder replacement on 8/16/2022    Anterior dislocation of right shoulder    Carotid arterial disease on ct dated  8/14/2022    Hiatal hernia    Gastroparesis due to DM    Chronic constipation       Current Outpatient Medications on File Prior to Visit   Medication Sig Dispense Refill    acetaminophen (TYLENOL) 500 MG tablet Take 2 tablets (1,000 mg total) by mouth every 8 (eight) hours as needed for Pain. 90 tablet 0    atorvastatin (LIPITOR) 20 MG tablet Take 1 tablet (20 mg total) by mouth once daily. 90 tablet 3    azelastine (ASTELIN) 137 mcg (0.1 %) nasal spray 1 spray (137 mcg total) by Nasal route 2 (two) times daily. 30 mL 11    blood sugar diagnostic Strp To check BG 3 times daily, to use with insurance preferred meter, e 11.65 100 each 11    blood-glucose meter kit To check BG 3 times daily, to use with insurance preferred meter, e 11.65 1 each 0    buPROPion (WELLBUTRIN XL) 300 MG 24 hr tablet Take 1 tablet (300 mg total) by mouth every morning. 90 tablet 3    glucagon (BAQSIMI) 3 mg/actuation Spry Give one puff via nostril. Hold device between fingers and thumb, do not push plunger yet, insert tip gently into one nostril until finger(s) touch the outside of the nose, then push plunger firmly all the way in . Dose is complete when the green line disappears. 1 each 1    insulin (LANTUS SOLOSTAR U-100 INSULIN) glargine 100 units/mL SubQ pen INJECT 24 UNITS SUBCUTANEOUSLY AT NIGHT. 30 mL 3    insulin lispro 100 unit/mL pen INJECT 10 UNITS WITH MEALS, PLUS SLIDING SCALE 180-230+2, 231-280+4, 281-330+6, 331-380+8, >380+10, MAX DAILY 60 UNITS 60 mL 3    ketoconazole (NIZORAL) 2 % shampoo Apply topically every 7 days. 120 mL 6    lancets Misc To check BG 3 times daily, to use with insurance preferred meter, e 11.65 100 each 11    latanoprost 0.005 % ophthalmic solution Place 1 drop into both eyes nightly.      losartan (COZAAR) 50 MG tablet TAKE 1 TABLET BY MOUTH  "EVERY DAY 90 tablet 3    lubiprostone (AMITIZA) 24 MCG Cap Take 1 capsule (24 mcg total) by mouth 2 (two) times daily with meals. 60 capsule 5    ondansetron (ZOFRAN-ODT) 8 MG TbDL Take 1 tablet (8 mg total) by mouth 3 (three) times daily as needed (Nausea). 30 tablet 3    ONETOUCH DELICA LANCETS 33 gauge Misc TO CHECK BLOOD GLUCOSE 3 TIMES DAILY      ONETOUCH VERIO FLEX METER Misc TO CHECK BLOOD GLUCOSE 3 TIMES DAILY, TO USE WITH INSURANCE PREFERRED METER, E 11.65      pantoprazole (PROTONIX) 40 MG tablet Take 1 tablet (40 mg total) by mouth once daily. 90 tablet 3    pen needle, diabetic (NOVOFINE 32) 32 gauge x 1/4" Ndle Uses 4 times a day. 90 day via duramed e 11.65 400 each 3    meclizine (ANTIVERT) 25 mg tablet Take 1 tablet (25 mg total) by mouth 3 (three) times daily as needed for Dizziness. 30 tablet 1    [DISCONTINUED] diclofenac sodium (VOLTAREN) 1 % Gel APPLY 2 G TOPICALLY 2 (TWO) TIMES DAILY AS NEEDED (PAIN). DO NOT USE DIRECTLY ON INCISION 100 g 0     No current facility-administered medications on file prior to visit.       Review of patient's allergies indicates:   Allergen Reactions    Iodinated contrast media Swelling and Rash    Percocet [oxycodone-acetaminophen] Itching    Macrobid [nitrofurantoin monohyd/m-cryst] Rash    Metformin Rash    Penicillins Rash     Had ancef in 2020 with no adverse rxn     Promethazine Rash     Had compazine in 2021    Sulfa (sulfonamide antibiotics) Rash    Sulfamethoxazole-trimethoprim Rash       Past Surgical History:   Procedure Laterality Date    CHOLECYSTECTOMY      COLONOSCOPY N/A 01/17/2018    Procedure: COLONOSCOPY with Donnell;  Surgeon: Roland Jacobo MD;  Location: [facility] ([address] [place] [address]);  Service: Endoscopy;  Laterality: N/A;    COLONOSCOPY N/A 01/06/2021    Procedure: COLONOSCOPY;  Surgeon: Yong Rowland MD;  [facility]  Service: Endoscopy;  Laterality: N/A;  prep ins. emailed - Khmer speaking,  needed - " ERW  COVID [facility] - ERW    CYSTOSCOPY N/A 12/01/2020    Procedure: CYSTOSCOPY;  Surgeon: Ines Stanford MD;  Location: [facility] OR 1ST FLR;  Service: Urology;  Laterality: N/A;  45 minutes     ELBOW ARTHROPLASTY Right 01/16/2019    Procedure: ARTHROPLASTY, ELBOW right radial head arthroplasty revision;  Surgeon: Katja Hubbard MD;  Location: [facility] OR 1ST FLR;  Service: Orthopedics;  Laterality: Right;  Anesthesia: General and Regional. Stretcher, hand pan 1 and pan 2, CALL ACCUMED, CLAIRX  [employer] notified 1-14 LO    ELBOW SURGERY Right 07/16/2015    ELBOW SURGERY      ESOPHAGOGASTRODUODENOSCOPY N/A 04/15/2019    Procedure: EGD (ESOPHAGOGASTRODUODENOSCOPY);  Surgeon: Buck Irwin MD;  Location: [facility] (4TH FLR);  Service: Endoscopy;  Laterality: N/A;  ray she    ESOPHAGOGASTRODUODENOSCOPY N/A 04/21/2023    Procedure: EGD (ESOPHAGOGASTRODUODENOSCOPY);  Surgeon: Aamir Reyes MD;  Location: [facility];  Service: Endoscopy;  Laterality: N/A;    HYSTERECTOMY      KNEE ARTHROPLASTY Right 10/06/2020    Procedure: ARTHROPLASTY, KNEE-SAME DAY PROTOCOL;  Surgeon: Sabino Damon MD;  Location: [facility];  Service: Orthopedics;  Laterality: Right;    KNEE ARTHROSCOPY W/ DEBRIDEMENT  04/2011    Left    RELEASE OF ULNAR NERVE AT CUBITAL TUNNEL Right 01/16/2019    Procedure: RELEASE, ULNAR TUNNEL right;  Surgeon: Katja Hubbard MD;  Location: [facility] OR 1ST FLR;  Service: Orthopedics;  Laterality: Right;  Anesthesia: General and Regional. Stretcher, hand pan 1 and pan 2, CALL ACCUMED, CLAIRX    RETROGRADE PYELOGRAPHY Bilateral 12/01/2020    Procedure: PYELOGRAM, RETROGRADE;  Surgeon: Ines Stanford MD;  Location: [facility] OR 1ST FLR;  Service: Urology;  Laterality: Bilateral;    REVERSE TOTAL SHOULDER ARTHROPLASTY Right 08/16/2022    Procedure: ARTHROPLASTY, SHOULDER, TOTAL, REVERSE, [place];  Surgeon: Aamir Powell MD;  Location: [facility] OR 2ND FLR;  Service:  Orthopedics;  Laterality: Right;  [place] Can't go until after 12    ROTATOR CUFF REPAIR      SHOULDER SURGERY      TOTAL ABDOMINAL HYSTERECTOMY W/ BILATERAL SALPINGOOPHORECTOMY      TOTAL KNEE ARTHROPLASTY Left 10/19/2021    Procedure: ARTHROPLASTY, KNEE, TOTAL;  Surgeon: Sabino Damon MD;  Location: [facility];  Service: Orthopedics;  Laterality: Left;    UPPER GASTROINTESTINAL ENDOSCOPY         Family History   Problem Relation Age of Onset    Cancer Father         colon    Colon cancer Father 83        colon cancer    Diabetes Maternal Aunt     Diabetes Maternal Grandmother     Esophageal cancer Neg Hx     Stomach cancer Neg Hx     Melanoma Neg Hx        Social History     Socioeconomic History    Marital status:      Spouse name: Kemal    Number of children: 1   Occupational History    Occupation: Housekeeping     Comment: On disability   Tobacco Use    Smoking status: Never     Passive exposure: Never    Smokeless tobacco: Never   Substance and Sexual Activity    Alcohol use: No    Drug use: No    Sexual activity: Yes     Partners: Male     Birth control/protection: Post-menopausal   Other Topics Concern    Are you pregnant or think you may be? No    Breast-feeding No   Social History Narrative    She retired at [employer] in [employer]; , 1 kid (21yo).Nonsmoker, social etoh.    No stairs     Social Determinants of Health     Financial Resource Strain: Low Risk  (11/6/2023)    Overall Financial Resource Strain (CARDIA)     Difficulty of Paying Living Expenses: Not hard at all   Food Insecurity: No Food Insecurity (11/6/2023)    Hunger Vital Sign     Worried About Running Out of Food in the Last Year: Never true     Ran Out of Food in the Last Year: Never true   Transportation Needs: No Transportation Needs (11/6/2023)    PRAPARE - Transportation     Lack of Transportation (Medical): No     Lack of Transportation (Non-Medical): No   Physical Activity: Sufficiently Active (11/6/2023)     "Exercise Vital Sign     Days of Exercise per Week: 5 days     Minutes of Exercise per Session: 60 min   Stress: Stress Concern Present (11/6/2023)    [employer] - Occupational Stress Questionnaire     Feeling of Stress : To some extent   Social Connections: Unknown (11/6/2023)    Social Connection and Isolation Panel [NHANES]     Frequency of Communication with Friends and Family: More than three times a week     Frequency of Social Gatherings with Friends and Family: Three times a week     Active Member of Clubs or Organizations: Yes     Attends Club or Organization Meetings: More than 4 times per year     Marital Status:    Housing Stability: Low Risk  (11/6/2023)    Housing Stability Vital Sign     Unable to Pay for Housing in the Last Year: No     Number of Places Lived in the Last Year: 1     Unstable Housing in the Last Year: No           Objective:     Vitals:    11/08/23 0839   BP: (!) 141/70   Pulse: 62   Resp: 18   Weight: 75.8 kg (167 lb)   Height: 5' 5" (1.651 m)   PainSc: 0-No pain        Physical Exam  Vitals and nursing note reviewed.   Constitutional:       General: She is not in acute distress.     Appearance: She is well-developed. She is not toxic-appearing or diaphoretic.      Comments: alert and oriented x 3.    Cardiovascular:      Pulses:           Dorsalis pedis pulses are 2+ on the right side and 2+ on the left side.        Posterior tibial pulses are 2+ on the right side and 2+ on the left side.      Comments:  Capillary refill time is within normal limits. Digital hair present.   Pulmonary:      Effort: No respiratory distress.   Musculoskeletal:         General: No deformity.      Right ankle: No tenderness. No lateral malleolus, medial malleolus, AITF ligament, CF ligament or posterior TF ligament tenderness.      Right Achilles Tendon: No defects. Graf's test negative.      Left ankle: No tenderness. No lateral malleolus, medial malleolus, AITF " ligament, CF ligament or posterior TF ligament tenderness.      Left Achilles Tendon: No defects. Graf's test negative.      Right foot: No tenderness or bony tenderness.      Left foot: No tenderness or bony tenderness.      Comments: Adequate joint range of motion without pain, limitation, nor crepitation Bilateral feet and ankle joints. Muscle strength is 5/5 in all groups bilaterally.           Feet:      Right foot:      Protective Sensation: 5 sites tested.  5 sites sensed.      Skin integrity: Skin integrity normal.      Left foot:      Protective Sensation: 5 sites tested.  5 sites sensed.      Skin integrity: Skin integrity normal.   Lymphadenopathy:      Comments: No lymphatic streaking     Skin:     General: Skin is warm and dry.      Coloration: Skin is not jaundiced or pale.      Findings: No rash.      Nails: There is no clubbing.      Comments: Skin is of normal turgor.   Normal temperature gradient.  Examination of the skin reveals no evidence of significant rashes, open lesions, suspicious appearing nevi or other concerning lesions.       Toenails x9 bilaterally are neatly trimmed; of normal color and thickness    Medial and lateral  hallux nail margin of right  foot with ingrown nail plate. Surrounding erythema and minimal edema is noted there is no granuloma formation noted. no malodor    Neurological:      Sensory: No sensory deficit.      Motor: No atrophy.      Comments: Light touch present     Psychiatric:         Attention and Perception: She is attentive.         Mood and Affect: Mood is not anxious. Affect is not inappropriate.         Speech: She is communicative. Speech is not slurred.         Behavior: Behavior is not combative.           Assessment:      Encounter Diagnoses   Name Primary?    Type 2 diabetes mellitus with other specified complication, with long-term current use of insulin Yes    Ingrown nail     Great toe pain, right      Plan:     Riana was seen today for pcp,  diabetic foot exam and ingrown toenail.    Diagnoses and all orders for this visit:    Type 2 diabetes mellitus with other specified complication, with long-term current use of insulin    Ingrown nail    Great toe pain, right      I counseled the patient on her conditions, their implications and medical management.    DM Foot check   - Shoe inspection. Diabetic Foot Education. Patient reminded of the importance of good nutrition and blood sugar control to help prevent podiatric complications of diabetes. Patient instructed on proper foot hygeine. We discussed wearing proper shoe gear, daily foot inspections, never walking without protective shoe gear, caution putting sharp instruments to feet     - Discussed DM foot care:  Wear comfortable, proper fitting shoes. Wash feet daily. Dry well. After drying, apply moisturizer to feet (no lotion to webspaces). Inspect feet daily for skin breaks, blisters, swelling, or redness. Wear cotton socks (preferably white)  Change socks every day. Do NOT walk barefoot. Do NOT use heating pads or warm/hot water soaks     - Discussed importance of daily moisturizer to the feet such as Cerave,Sween, Or Cetaphil    - Patient is low risk for developing lower extremity issues secondary to diabetes.     - Reviewed current medication(s), and lab(s) specific to foot ailment(s) with patient in detail. All questions answered     - Independent review of patients previous clinical notes    - I recommend continued yearly diabetic foot examinations by Myself or PCP    - Currently  patient has NO pedal manifestations of DM    - Patients with out pedal manifestations of DM, do not qualify for Diabetic Shoes/Inserts nor  nail/callus trimming     - RTC in 1 yr or  PRN     2. IGTN     Nail Removal    Date/Time: 11/8/2023 8:45 AM    Performed by: Laura Raygoza DPM  Authorized by: Laura Raygoza DPM    Consent Done?:  Yes (Written)  Time out: Immediately prior to the procedure a time  out was called    Location:     Location:  Right foot  Anesthesia:     Anesthesia:  Digital block    Local anesthetic:  Lidocaine 2% without epinephrine  Procedure Details:     Preparation:  Skin prepped with Betadine    Side:  Bilateral    Wedge excision of skin of nail fold: No      Nail bed sutured?: No      Nail matrix removed:  Partial    Removal method:  Phenol and alcohol    Removed nail replaced and anchored: No      Dressing applied:  4x4, antibiotic ointment and gauze roll    Patient tolerance:  Patient tolerated the procedure well with no immediate complications        .

## 2023-11-13 ENCOUNTER — OFFICE VISIT (OUTPATIENT)
Dept: URGENT CARE | Facility: CLINIC | Age: 68
End: 2023-11-13
Payer: MEDICARE

## 2023-11-13 VITALS
WEIGHT: 167 LBS | TEMPERATURE: 98 F | DIASTOLIC BLOOD PRESSURE: 75 MMHG | SYSTOLIC BLOOD PRESSURE: 132 MMHG | RESPIRATION RATE: 16 BRPM | HEART RATE: 78 BPM | BODY MASS INDEX: 27.82 KG/M2 | HEIGHT: 65 IN | OXYGEN SATURATION: 96 %

## 2023-11-13 DIAGNOSIS — J06.9 VIRAL URI WITH COUGH: ICD-10-CM

## 2023-11-13 DIAGNOSIS — R05.9 COUGH, UNSPECIFIED TYPE: Primary | ICD-10-CM

## 2023-11-13 LAB
CTP QC/QA: YES
CTP QC/QA: YES
POC MOLECULAR INFLUENZA A AGN: NEGATIVE
POC MOLECULAR INFLUENZA B AGN: NEGATIVE
SARS-COV-2 AG RESP QL IA.RAPID: NEGATIVE

## 2023-11-13 PROCEDURE — 99214 PR OFFICE/OUTPT VISIT, EST, LEVL IV, 30-39 MIN: ICD-10-PCS | Mod: S$GLB,,,

## 2023-11-13 PROCEDURE — 87811 SARS-COV-2 COVID19 W/OPTIC: CPT | Mod: QW,S$GLB,,

## 2023-11-13 PROCEDURE — 87502 INFLUENZA DNA AMP PROBE: CPT | Mod: QW,S$GLB,,

## 2023-11-13 PROCEDURE — 87811 SARS CORONAVIRUS 2 ANTIGEN POCT, MANUAL READ: ICD-10-PCS | Mod: QW,S$GLB,,

## 2023-11-13 PROCEDURE — 87502 POCT INFLUENZA A/B MOLECULAR: ICD-10-PCS | Mod: QW,S$GLB,,

## 2023-11-13 PROCEDURE — 99214 OFFICE O/P EST MOD 30 MIN: CPT | Mod: S$GLB,,,

## 2023-11-13 RX ORDER — BENZONATATE 200 MG/1
200 CAPSULE ORAL 3 TIMES DAILY PRN
Qty: 30 CAPSULE | Refills: 0 | Status: SHIPPED | OUTPATIENT
Start: 2023-11-13 | End: 2023-11-23

## 2023-11-13 NOTE — PATIENT INSTRUCTIONS
Continue taking Tylenol as needed for fever and pain.  Take Tessalon Pearls up to 3 times daily, as needed, for 10 days.  Use Flonase for nasal congestion, twice a day, two pumps in each nostril.  Use over the counter cough syrup nightly.   Add Mucinex if more decongestant is needed.   Monitor symptoms for any worsening.   If symptoms do not improve in 7-10 days, please follow up with primary care or urgent care.   Please drink plenty of fluids.  Please get plenty of rest.  Please return here or go to the Emergency Department for any concerns or worsening of condition.  If you were prescribed antibiotics, please take them to completion.  If you were given wait & see antibiotics, please wait 5-7 days before taking them, and only take them if your symptoms have worsened or not improved.  If you do begin taking the antibiotics, please take them to completion.  If you were prescribed a narcotic medication, do not drive or operate heavy equipment or machinery while taking these medications.  If you were given a steroid shot in the clinic and have also been given a prescription for a steroid such as Prednisone or a Medrol Dose Pack, please begin taking them tomorrow.  If you do not have Hypertension or any history of palpitations, it is ok to take over the counter Sudafed or Mucinex D or Allegra-D or Claritin-D or Zyrtec-D.  If you do take one of the above, it is ok to combine that with plain over the counter Mucinex or Allegra or Claritin or Zyrtec.  If for example you are taking Zyrtec -D, you can combine that with Mucinex, but not Mucinex-D.  If you are taking Mucinex-D, you can combine that with plain Allegra or Claritin or Zyrtec.   If you do have Hypertension or palpitations, it is safe to take Coricidin HBP for relief of sinus symptoms.  If not allergic, please take over the counter Tylenol (Acetaminophen) and/or Motrin (Ibuprofen) as directed for control of pain and/or fever.  Please follow up with your primary  care doctor or specialist as needed.    If you  smoke, please stop smoking.

## 2023-11-16 ENCOUNTER — TELEPHONE (OUTPATIENT)
Dept: PODIATRY | Facility: CLINIC | Age: 68
End: 2023-11-16
Payer: MEDICARE

## 2023-11-16 ENCOUNTER — PATIENT MESSAGE (OUTPATIENT)
Dept: PODIATRY | Facility: CLINIC | Age: 68
End: 2023-11-16
Payer: MEDICARE

## 2023-11-16 NOTE — TELEPHONE ENCOUNTER
Left voice message for patient appointment needs to be rescheduled doctor is on hospital call. Please give office a call at [phone].

## 2023-11-28 ENCOUNTER — LAB VISIT (OUTPATIENT)
Dept: LAB | Facility: HOSPITAL | Age: 68
End: 2023-11-28
Payer: MEDICARE

## 2023-11-28 DIAGNOSIS — Z79.4 TYPE 2 DIABETES MELLITUS WITHOUT COMPLICATION, WITH LONG-TERM CURRENT USE OF INSULIN: ICD-10-CM

## 2023-11-28 DIAGNOSIS — E11.9 TYPE 2 DIABETES MELLITUS WITHOUT COMPLICATION, WITH LONG-TERM CURRENT USE OF INSULIN: ICD-10-CM

## 2023-11-28 LAB
ESTIMATED AVG GLUCOSE: 192 MG/DL (ref 68–131)
HBA1C MFR BLD: 8.3 % (ref 4–5.6)

## 2023-11-28 PROCEDURE — 36415 COLL VENOUS BLD VENIPUNCTURE: CPT | Mod: PO | Performed by: NURSE PRACTITIONER

## 2023-11-28 PROCEDURE — 83036 HEMOGLOBIN GLYCOSYLATED A1C: CPT | Performed by: NURSE PRACTITIONER

## 2023-11-30 ENCOUNTER — PATIENT MESSAGE (OUTPATIENT)
Dept: INTERNAL MEDICINE | Facility: CLINIC | Age: 68
End: 2023-11-30
Payer: MEDICARE

## 2023-12-01 ENCOUNTER — CLINICAL SUPPORT (OUTPATIENT)
Dept: REHABILITATION | Facility: HOSPITAL | Age: 68
End: 2023-12-01
Attending: ORTHOPAEDIC SURGERY
Payer: MEDICARE

## 2023-12-01 DIAGNOSIS — R29.898 DECREASED STRENGTH OF LOWER EXTREMITY: Primary | ICD-10-CM

## 2023-12-01 DIAGNOSIS — G89.29 CHRONIC PAIN OF LEFT KNEE: ICD-10-CM

## 2023-12-01 DIAGNOSIS — M25.562 CHRONIC PAIN OF LEFT KNEE: ICD-10-CM

## 2023-12-01 DIAGNOSIS — Z74.09 IMPAIRED FUNCTIONAL MOBILITY, BALANCE, GAIT, AND ENDURANCE: ICD-10-CM

## 2023-12-01 PROCEDURE — 97161 PT EVAL LOW COMPLEX 20 MIN: CPT

## 2023-12-01 PROCEDURE — 97140 MANUAL THERAPY 1/> REGIONS: CPT

## 2023-12-01 PROCEDURE — 97112 NEUROMUSCULAR REEDUCATION: CPT

## 2023-12-04 NOTE — PROGRESS NOTES
CC: This 68 y.o.  female presents for management of type 2 dm along with the current chronic medical conditions including:  Patient Active Problem List   Diagnosis    Anxiety    Fatty liver    Memory changes    Pathological fracture of right humerus due to osteoporosis with routine healing    Gastroesophageal reflux disease without esophagitis    Hypertension associated with type 2 diabetes mellitus    ZAHIRA (obstructive sleep apnea)    Atherosclerosis of native coronary artery of native heart without angina pectoris    Muscle spasms of neck    Radiculopathy of cervical spine    Overweight (BMI 25.0-29.9)    Type 2 diabetes mellitus without complication, with long-term current use of insulin    Vitamin D deficiency    Chronic right shoulder pain    Primary osteoarthritis of left knee    Aortic atherosclerosis    Closed fracture of proximal end of right humerus with routine healing s/p total shoulder replacement on 8/16/2022    Anterior dislocation of right shoulder    Carotid arterial disease on ct dated  8/14/2022    Hiatal hernia    Gastroparesis due to DM    Chronic constipation      Status of these conditions is pending review.    H/o fatty liver, HTN, overweight, osteopenia, coronary atherosclerosis, gout, arthritic pain (L) knee, ankle     HPI: Diagnosed with T2DM x > 20 years ago.   Started insulin in 2006.   Seen by Dr. Morse in the past- c peptide- revealed low level -hair   Watch trends with hair in near future.   Last seen by me in spring 2023.   Accompanied by .  Memory loss issues at times.  F/uy with GI, had issues with ozempic, 2023.     Podiatry- 11/8/2023  Out of gym > 4 weeks    Dietary habits:  Toasts  Soup, veggies  Occ snacks-chips, cookies    Not interested in cgm     Recently treated for URI    Remains on MDI  In past on victoza  and januvia- edema    Highest 400 mg/dl  Lowest 73 mg/dl  Eats dinner aroud 5-530p  A1c trending up  Lab Results   Component Value Date    HGBA1C 8.3  (H) 11/28/2023     On MDI injections 4x a day   Testing 4 x a day  Patient is willing and able to use the device  Demonstrated an understanding of the technology and is motivated to use CGM  Patient expected to adhere to a comprehensive diabetes treatment plan and patient has adequate medical supervision  Patient experiences multiple impaired awareness of hypoglycemia (hypoglycemia unawareness)    True metrix  Has h/o hypoglycemia 39 mg/dl    (R)  Knee replacement sx 10/6/2020  Injury to right shoulder, surgery in august 2022     Of note, , retired, fall in 2015, injured (R) elbow  Off metformin related to kidneys.    CURRENT DM MEDS:   lantus 36 units qhs, lispro 10-10-10 units  with mod dose correction scale, starting at 180    Social Hx/personal Hx: , work-housekeeping, 1 son (26 y/o)    Riana Kay forgot glucometer/logs today    DIET/ MEAL PATTERN: see above , great toenail (sx)     EXERCISE: no formal; does yardwork     STANDARDS OF CARE:     Diabetes Management Status    Statin: Not taking  ACE/ARB: Taking    Screening or Prevention Patient's value Goal Complete/Controlled?   HgA1C Testing and Control   Lab Results   Component Value Date    HGBA1C 8.3 (H) 11/28/2023      Annually/Less than 8% Yes   Lipid profile : 07/27/2023 Annually Yes   LDL control Lab Results   Component Value Date    LDLCALC 49.2 (L) 07/27/2023    Annually/Less than 100 mg/dl  Yes   Nephropathy screening Lab Results   Component Value Date    LABMICR 24.0 07/27/2023     Lab Results   Component Value Date    PROTEINUA Trace (A) 07/27/2023    Annually Yes   Blood pressure BP Readings from Last 1 Encounters:   12/05/23 134/76    Less than 140/90 Yes   Dilated retinal exam : 07/06/2023 Annually Yes   Foot exam   : 11/08/2023 Annually No       ROS:   GEN: +chronic fatigue or weakness, no changes w/ appetite, wt stable  CV:  Denies chest pain, palpitations, edema or cyanosis, denies syncope  SKIN: Skin is intact and heals  well, no rashes, pruritis, easy bruising, no hair changes, no intolerance to heat/cold.  RESP: No SOB, cough, FISCHER  FEN/GI: Nml bowel movements, normal appetite, +GERD, +n/v  RENAL: No urinary complaints, no dysuria/hematuia/oliguria   ENDO: no heat/cold intolerance  ID: No skin breakdown, fevers, chills  PSYCH: Denies drug/ETOH abuse, no hx. of eating disorders or depression +anxiety  MS/NEURO: Denies numbness/ tingling in BLE; +bilateral knee and ankle joint pain-(R) knee replacement 10/2020, (R) elbow pain -fall in 2015, surgery, (R) shoulder 8/2022 -weakness       Lab Results   Component Value Date    TSH 1.312 07/27/2023       Chemistry        Component Value Date/Time     07/27/2023 0940    K 4.7 07/27/2023 0940     07/27/2023 0940    CO2 27 07/27/2023 0940    BUN 14 07/27/2023 0940    CREATININE 0.9 07/27/2023 0940     (H) 07/27/2023 0940        Component Value Date/Time    CALCIUM 9.4 07/27/2023 0940    ALKPHOS 103 07/27/2023 0940    AST 18 07/27/2023 0940    ALT 20 07/27/2023 0940    BILITOT 0.5 07/27/2023 0940          Lab Results   Component Value Date    LDLCALC 49.2 (L) 07/27/2023       PE:  GEN: Patient WNWD, WF, NAD, AAOx3, Friendly, talkative, well groomed  HEENT: EOMI, PERRLA, no lid lag, Clear oropharynx  NECK: trachea midline  CV: Regular rate and rhythm, no edema  RESP: No increase work of breathing, No cough, wheeze.  ABD: no tenderness EXT: No C/C/Edema, No skin rash/breakdown  NEURO: CN 2-12 intact, nml gait   FEET: No pressure areas, blisters, ulcers, calluses. Footware appropriate.      ASSESSMENT and PLAN:  Riana was seen today for diabetes mellitus.    Diagnoses and associated orders for this visit:  1. Type 2 diabetes mellitus without complication, with long-term current use of insulin  Hemoglobin A1C      2. Overweight (BMI 25.0-29.9)        3. ZAHIRA (obstructive sleep apnea)        4. Hypertension associated with type 2 diabetes mellitus        5. Gastroparesis due to  DM        6. Fatty liver        7. Anxiety        8. Atherosclerosis of native coronary artery of native heart without angina pectoris          1-8. Follow up in 3 months w/ me   A1c prior - 3 months   A1c goal less than 7.5%   Add trulicity 0.75 mg weekly   Increase lispro to 12-12-12 untis   Continue basal 36 units at ngt   Bp controlled  Seen by gi   Doing better w/out ozempic   F/u with cards   Discussed activity and dietary habits

## 2023-12-05 ENCOUNTER — PATIENT MESSAGE (OUTPATIENT)
Dept: INTERNAL MEDICINE | Facility: CLINIC | Age: 68
End: 2023-12-05

## 2023-12-05 ENCOUNTER — OFFICE VISIT (OUTPATIENT)
Dept: INTERNAL MEDICINE | Facility: CLINIC | Age: 68
End: 2023-12-05
Payer: MEDICARE

## 2023-12-05 VITALS
WEIGHT: 168.44 LBS | OXYGEN SATURATION: 96 % | SYSTOLIC BLOOD PRESSURE: 134 MMHG | HEIGHT: 65 IN | HEART RATE: 80 BPM | BODY MASS INDEX: 28.06 KG/M2 | DIASTOLIC BLOOD PRESSURE: 76 MMHG

## 2023-12-05 DIAGNOSIS — I25.10 ATHEROSCLEROSIS OF NATIVE CORONARY ARTERY OF NATIVE HEART WITHOUT ANGINA PECTORIS: ICD-10-CM

## 2023-12-05 DIAGNOSIS — E66.3 OVERWEIGHT (BMI 25.0-29.9): ICD-10-CM

## 2023-12-05 DIAGNOSIS — E11.9 TYPE 2 DIABETES MELLITUS WITHOUT COMPLICATION, WITH LONG-TERM CURRENT USE OF INSULIN: Primary | ICD-10-CM

## 2023-12-05 DIAGNOSIS — I15.2 HYPERTENSION ASSOCIATED WITH TYPE 2 DIABETES MELLITUS: ICD-10-CM

## 2023-12-05 DIAGNOSIS — E11.59 HYPERTENSION ASSOCIATED WITH TYPE 2 DIABETES MELLITUS: ICD-10-CM

## 2023-12-05 DIAGNOSIS — K76.0 FATTY LIVER: ICD-10-CM

## 2023-12-05 DIAGNOSIS — F41.9 ANXIETY: ICD-10-CM

## 2023-12-05 DIAGNOSIS — K31.84 GASTROPARESIS DUE TO DM: ICD-10-CM

## 2023-12-05 DIAGNOSIS — E11.43 GASTROPARESIS DUE TO DM: ICD-10-CM

## 2023-12-05 DIAGNOSIS — G47.33 OSA (OBSTRUCTIVE SLEEP APNEA): ICD-10-CM

## 2023-12-05 DIAGNOSIS — Z79.4 TYPE 2 DIABETES MELLITUS WITHOUT COMPLICATION, WITH LONG-TERM CURRENT USE OF INSULIN: Primary | ICD-10-CM

## 2023-12-05 PROCEDURE — 1160F PR REVIEW ALL MEDS BY PRESCRIBER/CLIN PHARMACIST DOCUMENTED: ICD-10-PCS | Mod: CPTII,S$GLB,, | Performed by: NURSE PRACTITIONER

## 2023-12-05 PROCEDURE — 1101F PR PT FALLS ASSESS DOC 0-1 FALLS W/OUT INJ PAST YR: ICD-10-PCS | Mod: CPTII,S$GLB,, | Performed by: NURSE PRACTITIONER

## 2023-12-05 PROCEDURE — 1157F PR ADVANCE CARE PLAN OR EQUIV PRESENT IN MEDICAL RECORD: ICD-10-PCS | Mod: CPTII,S$GLB,, | Performed by: NURSE PRACTITIONER

## 2023-12-05 PROCEDURE — 1157F ADVNC CARE PLAN IN RCRD: CPT | Mod: CPTII,S$GLB,, | Performed by: NURSE PRACTITIONER

## 2023-12-05 PROCEDURE — 3066F NEPHROPATHY DOC TX: CPT | Mod: CPTII,S$GLB,, | Performed by: NURSE PRACTITIONER

## 2023-12-05 PROCEDURE — 1126F AMNT PAIN NOTED NONE PRSNT: CPT | Mod: CPTII,S$GLB,, | Performed by: NURSE PRACTITIONER

## 2023-12-05 PROCEDURE — 3061F NEG MICROALBUMINURIA REV: CPT | Mod: CPTII,S$GLB,, | Performed by: NURSE PRACTITIONER

## 2023-12-05 PROCEDURE — 3008F BODY MASS INDEX DOCD: CPT | Mod: CPTII,S$GLB,, | Performed by: NURSE PRACTITIONER

## 2023-12-05 PROCEDURE — 3078F DIAST BP <80 MM HG: CPT | Mod: CPTII,S$GLB,, | Performed by: NURSE PRACTITIONER

## 2023-12-05 PROCEDURE — 3288F PR FALLS RISK ASSESSMENT DOCUMENTED: ICD-10-PCS | Mod: CPTII,S$GLB,, | Performed by: NURSE PRACTITIONER

## 2023-12-05 PROCEDURE — 99999 PR PBB SHADOW E&M-EST. PATIENT-LVL V: CPT | Mod: PBBFAC,,, | Performed by: NURSE PRACTITIONER

## 2023-12-05 PROCEDURE — 1101F PT FALLS ASSESS-DOCD LE1/YR: CPT | Mod: CPTII,S$GLB,, | Performed by: NURSE PRACTITIONER

## 2023-12-05 PROCEDURE — 3052F HG A1C>EQUAL 8.0%<EQUAL 9.0%: CPT | Mod: CPTII,S$GLB,, | Performed by: NURSE PRACTITIONER

## 2023-12-05 PROCEDURE — 1159F PR MEDICATION LIST DOCUMENTED IN MEDICAL RECORD: ICD-10-PCS | Mod: CPTII,S$GLB,, | Performed by: NURSE PRACTITIONER

## 2023-12-05 PROCEDURE — 1159F MED LIST DOCD IN RCRD: CPT | Mod: CPTII,S$GLB,, | Performed by: NURSE PRACTITIONER

## 2023-12-05 PROCEDURE — 3078F PR MOST RECENT DIASTOLIC BLOOD PRESSURE < 80 MM HG: ICD-10-PCS | Mod: CPTII,S$GLB,, | Performed by: NURSE PRACTITIONER

## 2023-12-05 PROCEDURE — 1160F RVW MEDS BY RX/DR IN RCRD: CPT | Mod: CPTII,S$GLB,, | Performed by: NURSE PRACTITIONER

## 2023-12-05 PROCEDURE — 99214 PR OFFICE/OUTPT VISIT, EST, LEVL IV, 30-39 MIN: ICD-10-PCS | Mod: S$GLB,,, | Performed by: NURSE PRACTITIONER

## 2023-12-05 PROCEDURE — 99999 PR PBB SHADOW E&M-EST. PATIENT-LVL V: ICD-10-PCS | Mod: PBBFAC,,, | Performed by: NURSE PRACTITIONER

## 2023-12-05 PROCEDURE — 3052F PR MOST RECENT HEMOGLOBIN A1C LEVEL 8.0 - < 9.0%: ICD-10-PCS | Mod: CPTII,S$GLB,, | Performed by: NURSE PRACTITIONER

## 2023-12-05 PROCEDURE — 3008F PR BODY MASS INDEX (BMI) DOCUMENTED: ICD-10-PCS | Mod: CPTII,S$GLB,, | Performed by: NURSE PRACTITIONER

## 2023-12-05 PROCEDURE — 3061F PR NEG MICROALBUMINURIA RESULT DOCUMENTED/REVIEW: ICD-10-PCS | Mod: CPTII,S$GLB,, | Performed by: NURSE PRACTITIONER

## 2023-12-05 PROCEDURE — 4010F PR ACE/ARB THEARPY RXD/TAKEN: ICD-10-PCS | Mod: CPTII,S$GLB,, | Performed by: NURSE PRACTITIONER

## 2023-12-05 PROCEDURE — 3066F PR DOCUMENTATION OF TREATMENT FOR NEPHROPATHY: ICD-10-PCS | Mod: CPTII,S$GLB,, | Performed by: NURSE PRACTITIONER

## 2023-12-05 PROCEDURE — 4010F ACE/ARB THERAPY RXD/TAKEN: CPT | Mod: CPTII,S$GLB,, | Performed by: NURSE PRACTITIONER

## 2023-12-05 PROCEDURE — 3288F FALL RISK ASSESSMENT DOCD: CPT | Mod: CPTII,S$GLB,, | Performed by: NURSE PRACTITIONER

## 2023-12-05 PROCEDURE — 1126F PR PAIN SEVERITY QUANTIFIED, NO PAIN PRESENT: ICD-10-PCS | Mod: CPTII,S$GLB,, | Performed by: NURSE PRACTITIONER

## 2023-12-05 PROCEDURE — 99214 OFFICE O/P EST MOD 30 MIN: CPT | Mod: S$GLB,,, | Performed by: NURSE PRACTITIONER

## 2023-12-05 PROCEDURE — 3075F PR MOST RECENT SYSTOLIC BLOOD PRESS GE 130-139MM HG: ICD-10-PCS | Mod: CPTII,S$GLB,, | Performed by: NURSE PRACTITIONER

## 2023-12-05 PROCEDURE — 3075F SYST BP GE 130 - 139MM HG: CPT | Mod: CPTII,S$GLB,, | Performed by: NURSE PRACTITIONER

## 2023-12-05 RX ORDER — DULAGLUTIDE 0.75 MG/.5ML
INJECTION, SOLUTION SUBCUTANEOUS
Qty: 4 PEN | Refills: 2 | Status: SHIPPED | OUTPATIENT
Start: 2023-12-05 | End: 2024-03-30

## 2023-12-05 NOTE — Clinical Note
URI, not as active with toe issue-affected bg , adding trulicity Increased meal insulin  Seeing her back in 3 months

## 2023-12-05 NOTE — PATIENT INSTRUCTIONS
Follow up in 3 months w/Irielle   A1c prior -3 months     Trulicity 0.75 mg weekly  Lispro 12-12-12 units   Scale use 180-230+2, 231-280+4, etc.  Lantus 36 units at night    Lab Results   Component Value Date    HGBA1C 8.3 (H) 11/28/2023     Goal less than 7.5%    Goal  no higher than 180/200     [email or web address]  Eat fit brett  Rest[employer] brett  [email or web address]    mySugr brett

## 2023-12-06 ENCOUNTER — CLINICAL SUPPORT (OUTPATIENT)
Dept: REHABILITATION | Facility: HOSPITAL | Age: 68
End: 2023-12-06
Payer: MEDICARE

## 2023-12-06 DIAGNOSIS — Z74.09 IMPAIRED FUNCTIONAL MOBILITY, BALANCE, GAIT, AND ENDURANCE: Primary | ICD-10-CM

## 2023-12-06 PROBLEM — R29.898 DECREASED STRENGTH OF LOWER EXTREMITY: Status: ACTIVE | Noted: 2018-10-19

## 2023-12-06 PROCEDURE — 97112 NEUROMUSCULAR REEDUCATION: CPT | Mod: CQ

## 2023-12-06 NOTE — PROGRESS NOTES
[facility]   Physical Therapy Treatment Note     Name: Riana Baker   Clinic Number: [id number]    Therapy Diagnosis:   Encounter Diagnosis   Name Primary?    Impaired functional mobility, balance, gait, and endurance Yes     Physician: Sabino Damon MD    Visit Date: 12/6/2023    Physician Orders: PT Eval and Treat left knee  Medical Diagnosis from Referral: M25.562 (ICD-10-CM) - Pain in left knee  Evaluation Date: 12/1/2023  Authorization Period Expiration: 12/27/2023  Plan of Care Expiration: 1/31/2024  Progress Note Due: 10th visit  Date of Surgery: NA  Visit # / Visits authorized: 1 / 24 + eval  FOTO: 1/3     PTA Visit #: 1 / 5     Time In: 0900  Time Out: 1000  Total Treatment Time: 60 minutes  Total Billable Time: 30 minutes (2 NMR)    Precautions: Standard    SUBJECTIVE     Patient reports: doing ok today, no new complaints.  She was compliant with home exercise program.  Response to previous treatment: appropriate muscle response  Functional change: ongoing    Pain: 0/10, currently  Location: Left knee    Patient goals: get well    OBJECTIVE     Objective Measures updated at progress report unless specified.     Treatment     Riana received the treatments listed below:      manual therapy techniques: Joint mobilizations were applied to the: Left knee for 00 minutes, including: not performed    neuromuscular re-education activities to improve: Balance, Coordination, Kinesthetic, Sense, Proprioception, and motor control for 50 minutes. The following activities were included:  Patient education:   - Activity modification  - Plan of care  - Home exercise program  - Functional Anatomy    Quad sets with towel roll; 5 second hold, 20 reps Bilateral  Short arc quads with 1/2 foam; 5 second hold, 20 reps Bilateral  Short arc quads with medium bolster; 5 second hold, 20 reps Bilateral  Long arc quads at EOM; 5 second hold, 20 reps Bilateral  Standing TKE with Green TB; add  next visit  SLR; 3 x 10 reps Bilateral  SL Hip Abduction; 3 x 10 reps Bilateral  SL Clamshells; 3 x 10 reps Bilateral    therapeutic activities to improve functional performance for 10 minutes, including:  Nustep for 10 minutes or 1,000K steps for endogenous release of opioids to improve tolerance to ADL's and functional tasks    Patient Education and Home Exercises     Home Exercises Provided and Patient Education Provided     Education provided:   - See above    Written Home Exercises Provided: Patient instructed to cont prior HEP. Exercises were reviewed and Riana was able to demonstrate them prior to the end of the session.  Riana demonstrated good  understanding of the education provided. See EMR under Patient Instructions for exercises provided during therapy sessions    ASSESSMENT     Presents for first follow up after initial evaluation. She demonstrates good tolerance to exercises performed noting appropriate muscle response throughout. Fair quad activation observed at this time with difficulty achieving terminal knee extension. She fatigued easily with hip biased exercises. Will continue per POC towards treatment goals.  Riana Is progressing well towards her goals.   Pt prognosis is Good.     Pt will continue to benefit from skilled outpatient physical therapy to address the deficits listed in the problem list box on initial evaluation, provide pt/family education and to maximize pt's level of independence in the home and community environment.     Pt's spiritual, cultural and educational needs considered and pt agreeable to plan of care and goals.     Anticipated barriers to physical therapy: Chronicity of symptoms, previous surgical history    Goals:   Short Term Goals: 4 weeks   1. Patient will reduce maximal pain rating to < 3/10 pain to facilitate ability to sleep through the night and recover from PT interventions.  2. Patient will be able to ambulate for at least 10 minutes with < 3/10 pain  to improve walking tolerance.   3. Patient will improve single limb balance to at least 10 seconds to reduce risk for falls.       Long Term Goals: 8-10 weeks   1. Patient will be able to complete the TUG in < 10 seconds with the least restrictive assistive device to decrease fall risk.   2. Patient will demonstrate > 4/5 lower quarter strength to facilitate transfers from sit to stand from various surfaces without restriction.  3. Patient will improve 30 second sit to stand to at least 10x for improvement with transfer tasks.    4. Patient will improve FOTO intake score to at least 61 indicating a clinically significant change in function.     PLAN     Plan of Care Certification: 12/1/2023 to 1/31/2024.     Outpatient Physical Therapy 2 times weekly for 8-10 weeks to include the following interventions: Gait Training, Manual Therapy, Moist Heat/ Ice, Neuromuscular Re-ed, Patient Education, Self Care, Therapeutic Activities, and Therapeutic Exercise.     PT/PTA met face to face to discuss patient's treatment plan and progress towards established goals. Patient will be seen by physical therapist every sixth visit and minimally once per month.    Wilma Perez PTA

## 2023-12-06 NOTE — PLAN OF CARE
[facility]   Physical Therapy Initial Evaluation      Name: Riana Kay  Clinic Number: [id number]    Therapy Diagnosis:   Encounter Diagnoses   Name Primary?    Chronic pain of left knee     Impaired functional mobility, balance, gait, and endurance     Decreased strength of lower extremity Yes        Physician: Sabino Damon MD    Physician Orders: PT Eval and Treat left knee  Medical Diagnosis from Referral: M25.562 (ICD-10-CM) - Pain in left knee  Evaluation Date: 12/1/2023  Authorization Period Expiration: 12/27/2023  Plan of Care Expiration: 1/31/2024  Progress Note Due: 10th visit  Date of Surgery: NA  Visit # / Visits authorized: 1/ 1   FOTO: 1/ 3    Precautions: Standard     Time In: 8:01 AM  Time Out: 8:57 AM  Total Billable Time: 56 minutes    Subjective     Date of onset: Left knee total knee arthroplasty 10/2021 but recent pain in left knee from fall in August of 2022.    History of current condition - Riana reports to the clinic with complaints of chronic left knee pain. She reports a fall in August of 2022 where she fell in the parking garage at her [employer]. She sustained a shoulder and knee injury but the main focus was on her shoulder as it required surgery. She recently was discharged from therapy involving her shoulder and is now trying to focus on her knee pain that has been around. Last MD appointment addressed complaints and recommended a bout of physical therapy and to return to him in 6 weeks. She reports that her pain is mainly located on the front/inside of her knee and has the greatest difficulty with transfers, bending, lifting, and walking tasks.     Falls: August 2022    Imaging: See imaging section    Prior Therapy: Yes - left knee and shoulder  Social History: lives with their spouse  Occupation: Retired  Prior Level of Function: Independent with activities of daily living/instrumental activities of daily living   Current Level of Function:  Difficulty with transfers, walking, bending and lifting tasks     Pain:  Current 5/10, worst 6/10, best 0/10   Location: left knee    Description: Aching, Dull, and Tight  Aggravating Factors: Bending, Lifting, and Getting out of bed/chair, walking  Easing Factors: rest and straighten the leg out    Patients goals: Get well     Medical History:   Past Medical History:   Diagnosis Date    Abdominal pain, right upper quadrant 02/04/2014    Anticoagulant long-term use     Anxiety     AR (allergic rhinitis)     Atrophic gastritis without mention of hemorrhage 02/13/2012     Dx updated per 2019 IMO Load    Chronic fatigue syndrome 06/10/2012    Diabetes mellitus     Dizziness     Fatty liver     GERD (gastroesophageal reflux disease)     Gross hematuria 12/01/2020    HTN (hypertension)     Hyperlipidemia     Leg swelling     Memory loss     Osteopenia     PONV (postoperative nausea and vomiting)     Primary osteoarthritis of right knee 10/06/2020    Primary osteoarthritis of right knee 10/06/2020    Right elbow pain 01/16/2019    S/P total hysterectomy     Screening for colon cancer 01/06/2021    Sleep apnea     Status post total right knee replacement 10/6/2020 10/05/2020       Surgical History:   Riana Samuel Kay  has a past surgical history that includes Cholecystectomy; Knee arthroscopy w/ debridement (04/2011); Total abdominal hysterectomy w/ bilateral salpingoophorectomy; Rotator cuff repair; Elbow surgery (Right, 07/16/2015); Elbow surgery; Hysterectomy; Colonoscopy (N/A, 01/17/2018); Elbow Arthroplasty (Right, 01/16/2019); Release of ulnar nerve at cubital tunnel (Right, 01/16/2019); Upper gastrointestinal endoscopy; Esophagogastroduodenoscopy (N/A, 04/15/2019); Knee Arthroplasty (Right, 10/06/2020); Cystoscopy (N/A, 12/01/2020); Retrograde pyelography (Bilateral, 12/01/2020); Colonoscopy (N/A, 01/06/2021); Total knee arthroplasty (Left, 10/19/2021); Reverse total shoulder arthroplasty (Right, 08/16/2022);  Esophagogastroduodenoscopy (N/A, 04/21/2023); and Shoulder surgery.    Medications:   Riana has a current medication list which includes the following prescription(s): acetaminophen, atorvastatin, azelastine, blood sugar diagnostic, blood-glucose meter, bupropion, trulicity, baqsimi, insulin, insulin lispro, ketoconazole, lancets, latanoprost, losartan, lubiprostone, meclizine, ondansetron, onetouch delica lancets, onetouch verio flex meter, pantoprazole, pen needle, diabetic, and [DISCONTINUED] diclofenac sodium.    Allergies:   Review of patient's allergies indicates:   Allergen Reactions    Iodinated contrast media Swelling and Rash    Percocet [oxycodone-acetaminophen] Itching    Macrobid [nitrofurantoin monohyd/m-cryst] Rash    Metformin Rash    Penicillins Rash     Had ancef in 2020 with no adverse rxn     Promethazine Rash     Had compazine in 2021    Sulfa (sulfonamide antibiotics) Rash    Sulfamethoxazole-trimethoprim Rash        Objective      Observation: Patient ambulates to the clinic independently with slight antalgic gait pattern with decreased stance time and stride length     Postural examination: Forward head and rounded shoulders    Palpation: Tenderness noted at medial joint line and inferior patella      Strength: Lower Extremity Strength  Right LE   Left LE     Hip Ext 3+/5 Hip Ext 3+/5    Hip Flexion 3+/5 Hip Flexion 3+/5   Hip ER 3+/5 Hip ER 3+/5   Hip IR 3+/5 Hip IR 3+/5   Hip Abduction 3+/5  Hip Abduction 3+/5   Hip Adduction (seated) 5/5 Hip Adduction (seated) 5/5   Knee Extension 4/5 Knee Extension 4/5   Knee Flexion 4/5 Knee Flexion 4/5   Ankle Dorsiflexion 5/5 Ankle Dorsiflexion 5/5   Ankle Plantarflexion 5/5 Ankle Plantarflexion 5/5      Range of Motion: No range of motion deficits noted    Balance Assessment:    Evaluation   Single Limb Stance R LE 6 seconds  (<10 sec = HIGH FALL RISK)   Single Limb Stance L LE 4 seconds  (<10 sec = HIGH FALL RISK)      Endurance Assessment:     Evaluation   Timed Up and Go 15 seconds   30 sec STS  5x with bilateral upper extremity assist          Table: Population Norms for TUG    Age  Average TUG    60 - 69 years  8.1 seconds    70 - 79 years  9.2 seconds    80 - 99 years  11.3 seconds     Special Tests:    Right Left   Valgus Stress Test Neg Neg   Varus Stress test Neg Neg   Lachman's test Neg Neg   Posterior Lachman Neg Neg   Chi's Test Neg Neg       Intake Outcome Measure for FOTO Knee Survey    Therapist reviewed FOTO scores for Riana Kay on 12/1/2023.   FOTO report - see Media section or FOTO account episode details.    Intake Score: 50         Treatment     Total Treatment time (time-based codes) separate from Evaluation: 31 minutes     Riana received the treatments listed below:      therapeutic exercises: to develop strength, endurance, ROM, flexibility, posture, and core stabilization for 00 minutes including:     manual therapy techniques: Joint mobilizations and Manual traction were applied to the: tibiofemoral joint for 8 minutes, including:   Grade I-IV Patella Mobilizations in all planes  Grade I-III Tibiofemoral Joint Distraction    neuromuscular re-education activities: to improve Balance, Coordination, Kinesthetic, Sense, Proprioception, Posture, and Motor Control for 23 minutes, including:   Education on activity modification, plan of care, home exercise program, and functional anatomy  Straight leg raise x 20 reps  Side lying hip abduction 2 x 10 bilateral  Side lying clamshells GTB 2 x 10 bilateral    therapeutic activities: to improve functional performance for 00 minutes, including:       Patient Education and Home Exercises     Education provided:   - Activity modification  - Plan of care  - Home exercise program  - Functional Anatomy    Written Home Exercises Provided: yes. Exercises were reviewed and Riana was able to demonstrate them prior to the end of the session.  Riana demonstrated good  understanding  of the education provided. See EMR under Patient Instructions for exercises provided during therapy sessions.    Assessment     Riana is a 68 y.o. female referred to outpatient Physical Therapy with a medical diagnosis of M25.562 (ICD-10-CM) - Pain in left knee. Patient presents with decreased range of motion, gross lower extremity strength, and poor quadriceps and proximal hip motor control. Patient is functionally limited with transfers, walking, bending, and stair tasks. Patient prognosis is Good. Patient will benefit from skilled outpatient Physical Therapy to address the deficits stated above and in the chart below, provide patient /family education, and to maximize patientt's level of independence.     Plan of care discussed with patient: Yes  Patient's spiritual, cultural and educational needs considered and patient is agreeable to the plan of care and goals as stated below:     Anticipated Barriers for therapy: Chronicity of symptoms, previous surgical history    Medical Necessity is demonstrated by the following  History  Co-morbidities and personal factors that may impact the plan of care [] LOW: no personal factors / co-morbidities  [] MODERATE: 1-2 personal factors / co-morbidities  [x] HIGH: 3+ personal factors / co-morbidities    Moderate / High Support Documentation:   Co-morbidities affecting plan of care: see past medical history    Personal Factors:   lifestyle     Examination  Body Structures and Functions, activity limitations and participation restrictions that may impact the plan of care [] LOW: addressing 1-2 elements  [] MODERATE: 3+ elements  [x] HIGH: 4+ elements (please support below)    Moderate / High Support Documentation: range of motion, strength, motor control, balance, mobility      Clinical Presentation [x] LOW: stable  [] MODERATE: Evolving  [] HIGH: Unstable     Decision Making/ Complexity Score: low       Goals:  Short Term Goals: 4 weeks   1. Patient will reduce maximal pain  rating to < 3/10 pain to facilitate ability to sleep through the night and recover from PT interventions.  2. Patient will be able to ambulate for at least 10 minutes with < 3/10 pain to improve walking tolerance.   3. Patient will improve single limb balance to at least 10 seconds to reduce risk for falls.      Long Term Goals: 8-10 weeks   1. Patient will be able to complete the TUG in < 10 seconds with the least restrictive assistive device to decrease fall risk.   2. Patient will demonstrate > 4/5 lower quarter strength to facilitate transfers from sit to stand from various surfaces without restriction.  3. Patient will improve 30 second sit to stand to at least 10x for improvement with transfer tasks.    4. Patient will improve FOTO intake score to at least 61 indicating a clinically significant change in function.     Plan     Plan of care Certification: 12/1/2023 to 1/31/2024.    Outpatient Physical Therapy 2 times weekly for 8-10 weeks to include the following interventions: Gait Training, Manual Therapy, Moist Heat/ Ice, Neuromuscular Re-ed, Patient Education, Self Care, Therapeutic Activities, and Therapeutic Exercise.     Debra Luna, PT, DPT        Physician's Signature: _________________________________________ Date: ________________

## 2023-12-13 ENCOUNTER — CLINICAL SUPPORT (OUTPATIENT)
Dept: REHABILITATION | Facility: HOSPITAL | Age: 68
End: 2023-12-13
Payer: MEDICARE

## 2023-12-13 DIAGNOSIS — Z74.09 IMPAIRED FUNCTIONAL MOBILITY, BALANCE, GAIT, AND ENDURANCE: Primary | ICD-10-CM

## 2023-12-13 PROCEDURE — 97530 THERAPEUTIC ACTIVITIES: CPT | Mod: CQ

## 2023-12-13 PROCEDURE — 97112 NEUROMUSCULAR REEDUCATION: CPT | Mod: CQ

## 2023-12-13 NOTE — PROGRESS NOTES
[facility]   Physical Therapy Treatment Note     Name: Riana Baker   Clinic Number: [id number]    Therapy Diagnosis:   Encounter Diagnosis   Name Primary?    Impaired functional mobility, balance, gait, and endurance Yes     Physician: Sabino Damon MD    Visit Date: 12/13/2023    Physician Orders: PT Eval and Treat left knee  Medical Diagnosis from Referral: M25.562 (ICD-10-CM) - Pain in left knee  Evaluation Date: 12/1/2023  Authorization Period Expiration: 12/27/2023  Plan of Care Expiration: 1/31/2024  Progress Note Due: 10th visit  Date of Surgery: NA  Visit # / Visits authorized: 2 / 24 + eval  FOTO: 1/3     PTA Visit #: 2 / 5     Time In: 0900  Time Out: 1000  Total Treatment Time: 60 minutes  Total Billable Time: 60 minutes (3 NMR, 1 TA)    Precautions: Standard    SUBJECTIVE     Patient reports: doing ok today.   She was compliant with home exercise program.  Response to previous treatment: appropriate muscle response  Functional change: ongoing    Pain: 0/10, currently  Location: Left knee    Patient goals: get well    OBJECTIVE     Objective Measures updated at progress report unless specified.     Treatment     Riana received the treatments listed below:      neuromuscular re-education activities to improve: Balance, Coordination, Kinesthetic, Sense, Proprioception, and motor control for 50 minutes. The following activities were included:  Patient education:   - Activity modification  - Plan of care  - Home exercise program  - Functional Anatomy    Quad sets with towel roll; 5 second hold, 20 reps Bilateral  Short arc quads with medium bolster; 5 second hold, 30 reps Bilateral  Long arc quads at EOM; 5 second hold, 30 reps Bilateral  SLR; 3 x 10 reps Bilateral    Not performed due to time; resume next visit:  Short arc quads with 1/2 foam; 5 second hold, 20 reps Bilateral  Standing TKE with Green TB; add next visit  SL Hip Abduction; 3 x 10 reps Bilateral  SL  Hazel; 3 x 10 reps Bilateral    therapeutic activities to improve functional performance for 10 minutes, including:  Nustep for 10 minutes or 1,000K steps for endogenous release of opioids to improve tolerance to ADL's and functional tasks    Patient Education and Home Exercises     Home Exercises Provided and Patient Education Provided     Education provided:   - See above    Written Home Exercises Provided: Patient instructed to cont prior HEP. Exercises were reviewed and Riana was able to demonstrate them prior to the end of the session.  Riana demonstrated good  understanding of the education provided. See EMR under Patient Instructions for exercises provided during therapy sessions    ASSESSMENT     Fatigues easily with quad based strengthening. Visible extensor lag with straight leg raise activity. Encouraged compliance with HEP as there is poor carryover with exercises. Will continue per POC towards treatment goals.  Riana Is progressing well towards her goals.   Pt prognosis is Good.     Pt will continue to benefit from skilled outpatient physical therapy to address the deficits listed in the problem list box on initial evaluation, provide pt/family education and to maximize pt's level of independence in the home and community environment.     Pt's spiritual, cultural and educational needs considered and pt agreeable to plan of care and goals.     Anticipated barriers to physical therapy: Chronicity of symptoms, previous surgical history    Goals:   Short Term Goals: 4 weeks   1. Patient will reduce maximal pain rating to < 3/10 pain to facilitate ability to sleep through the night and recover from PT interventions.  2. Patient will be able to ambulate for at least 10 minutes with < 3/10 pain to improve walking tolerance.   3. Patient will improve single limb balance to at least 10 seconds to reduce risk for falls.       Long Term Goals: 8-10 weeks   1. Patient will be able to complete the TUG  in < 10 seconds with the least restrictive assistive device to decrease fall risk.   2. Patient will demonstrate > 4/5 lower quarter strength to facilitate transfers from sit to stand from various surfaces without restriction.  3. Patient will improve 30 second sit to stand to at least 10x for improvement with transfer tasks.    4. Patient will improve FOTO intake score to at least 61 indicating a clinically significant change in function.     PLAN     Plan of Care Certification: 12/1/2023 to 1/31/2024.     Outpatient Physical Therapy 2 times weekly for 8-10 weeks to include the following interventions: Gait Training, Manual Therapy, Moist Heat/ Ice, Neuromuscular Re-ed, Patient Education, Self Care, Therapeutic Activities, and Therapeutic Exercise.     PT/PTA met face to face to discuss patient's treatment plan and progress towards established goals. Patient will be seen by physical therapist every sixth visit and minimally once per month.    Wilma Perez PTA

## 2023-12-15 ENCOUNTER — CLINICAL SUPPORT (OUTPATIENT)
Dept: REHABILITATION | Facility: HOSPITAL | Age: 68
End: 2023-12-15
Payer: MEDICARE

## 2023-12-15 DIAGNOSIS — Z74.09 IMPAIRED FUNCTIONAL MOBILITY, BALANCE, GAIT, AND ENDURANCE: Primary | ICD-10-CM

## 2023-12-15 PROCEDURE — 97112 NEUROMUSCULAR REEDUCATION: CPT | Mod: CQ

## 2023-12-15 PROCEDURE — 97530 THERAPEUTIC ACTIVITIES: CPT | Mod: CQ

## 2023-12-15 NOTE — PROGRESS NOTES
[facility]   Physical Therapy Treatment Note     Name: Riana Baker   Clinic Number: [id number]    Therapy Diagnosis:   Encounter Diagnosis   Name Primary?    Impaired functional mobility, balance, gait, and endurance Yes     Physician: Sabino Damon MD    Visit Date: 12/15/2023    Physician Orders: PT Eval and Treat left knee  Medical Diagnosis from Referral: M25.562 (ICD-10-CM) - Pain in left knee  Evaluation Date: 12/1/2023  Authorization Period Expiration: 12/27/2023  Plan of Care Expiration: 1/31/2024  Progress Note Due: 10th visit  Date of Surgery: NA  Visit # / Visits authorized: 3 / 24 + eval  FOTO: 2/3     PTA Visit #: 3 / 5     Time In: 0800  Time Out: 0857  Total Treatment Time: 57 minutes  Total Billable Time: 57 minutes (3 TA, 1 NMR)    Precautions: Standard    SUBJECTIVE     Patient reports: doing ok today, no new complaints.   She was compliant with home exercise program. States she is completing 2x/week; encouraged 1x/day moving forward.  Response to previous treatment: appropriate muscle response  Functional change: ongoing    Pain: 0/10, currently  Location: Left knee    Patient goals: get well    OBJECTIVE     Objective Measures updated at progress report unless specified.     Treatment     Riana received the treatments listed below:      neuromuscular re-education activities to improve: Balance, Coordination, Kinesthetic, Sense, Proprioception, and motor control for 17 minutes. The following activities were included:  Patient education:   - Activity modification  - Plan of care  - Home exercise program  - Functional Anatomy    SLR; 3 x 10 reps Bilateral  Standing TKE with Green TB; 10 second hold, 2 x 15 reps    Not performed:  Quad sets with towel roll; 5 second hold, 20 reps Bilateral  Short arc quads with medium bolster; 5 second hold, 30 reps Bilateral  Long arc quads at EOM; 5 second hold, 30 reps Bilateral  Short arc quads with 1/2 foam; 5 second  hold, 20 reps Bilateral  SL Hip Abduction; 3 x 10 reps Bilateral  SL Clamshells; 3 x 10 reps Bilateral    therapeutic activities to improve functional performance for 40 minutes, including:  Nustep/Recumbent Bike for 10 minutes or 1,000K steps for endogenous release of opioids to improve tolerance to ADL's and functional tasks  Shuttle DL Press; 50 lbs (2 black cords), 3 x 10 reps with ball between knees  Shuttle SL Press; 25 lbs (1 black cord), 2 x 10 reps Bilateral   Lateral walks at 1/2 wall (16 feet); 4 laps with Yellow PB band  Sit to stands in std chair + airex; 2 x 10 reps    Patient Education and Home Exercises     Home Exercises Provided and Patient Education Provided     Education provided:   - See above    Written Home Exercises Provided: Patient instructed to cont prior HEP. Exercises were reviewed and Riana was able to demonstrate them prior to the end of the session.  Riana demonstrated good  understanding of the education provided. See EMR under Patient Instructions for exercises provided during therapy sessions    ASSESSMENT     Progressed to low load closed chain exercises today demonstrating adequate muscle fatigue throughout. Noted Left knee discomfort with sit to stands, this improved with addition of airex for additional height. FOTO measured at 36% limitation demonstrating improvement in functional movements and ADL's. Encouraged compliance with HEP and completing once a day as patient continues with poor carryover of exercises and easy fatigue during open chain hip strengthening exercises; she verbalized understanding. Will continue per POC towards treatment goals.  Riana Is progressing well towards her goals.   Pt prognosis is Good.     Pt will continue to benefit from skilled outpatient physical therapy to address the deficits listed in the problem list box on initial evaluation, provide pt/family education and to maximize pt's level of independence in the home and community  environment.     Pt's spiritual, cultural and educational needs considered and pt agreeable to plan of care and goals.     Anticipated barriers to physical therapy: Chronicity of symptoms, previous surgical history    Goals:   Short Term Goals: 4 weeks   1. Patient will reduce maximal pain rating to < 3/10 pain to facilitate ability to sleep through the night and recover from PT interventions.  2. Patient will be able to ambulate for at least 10 minutes with < 3/10 pain to improve walking tolerance.   3. Patient will improve single limb balance to at least 10 seconds to reduce risk for falls.       Long Term Goals: 8-10 weeks   1. Patient will be able to complete the TUG in < 10 seconds with the least restrictive assistive device to decrease fall risk.   2. Patient will demonstrate > 4/5 lower quarter strength to facilitate transfers from sit to stand from various surfaces without restriction.  3. Patient will improve 30 second sit to stand to at least 10x for improvement with transfer tasks.    4. Patient will improve FOTO intake score to at least 61 indicating a clinically significant change in function.     PLAN     Plan of Care Certification: 12/1/2023 to 1/31/2024.     Outpatient Physical Therapy 2 times weekly for 8-10 weeks to include the following interventions: Gait Training, Manual Therapy, Moist Heat/ Ice, Neuromuscular Re-ed, Patient Education, Self Care, Therapeutic Activities, and Therapeutic Exercise.     PT/PTA met face to face to discuss patient's treatment plan and progress towards established goals. Patient will be seen by physical therapist every sixth visit and minimally once per month.    Wilma Perez PTA

## 2023-12-19 ENCOUNTER — CLINICAL SUPPORT (OUTPATIENT)
Dept: REHABILITATION | Facility: HOSPITAL | Age: 68
End: 2023-12-19
Payer: MEDICARE

## 2023-12-19 DIAGNOSIS — Z74.09 IMPAIRED FUNCTIONAL MOBILITY, BALANCE, GAIT, AND ENDURANCE: Primary | ICD-10-CM

## 2023-12-19 PROCEDURE — 97530 THERAPEUTIC ACTIVITIES: CPT | Mod: CQ

## 2023-12-19 NOTE — PROGRESS NOTES
[facility]   Physical Therapy Treatment Note     Name: Riana Baker   Clinic Number: [id number]    Therapy Diagnosis:   Encounter Diagnosis   Name Primary?    Impaired functional mobility, balance, gait, and endurance Yes     Physician: Sabino Damon MD    Visit Date: 12/19/2023    Physician Orders: PT Eval and Treat left knee  Medical Diagnosis from Referral: M25.562 (ICD-10-CM) - Pain in left knee  Evaluation Date: 12/1/2023  Authorization Period Expiration: 12/27/2023  Plan of Care Expiration: 1/31/2024  Progress Note Due: 10th visit  Date of Surgery: NA  Visit # / Visits authorized: 4 / 24 + eval  FOTO: 2/3     PTA Visit #: 4 / 5     Time In: 0900  Time Out: 1000  Total Treatment Time: 60 minutes  Total Billable Time: 30 minutes (2 TA)    Precautions: Standard    SUBJECTIVE     Patient reports: doing ok today. No new complaints. Has been doing her home exercises 1x a day.  She was compliant with home exercise program. States she is completing 2x/week; encouraged 1x/day moving forward.  Response to previous treatment: appropriate muscle response  Functional change: ongoing    Pain: 0/10, currently  Location: Left knee    Patient goals: get well    OBJECTIVE     Objective Measures updated at progress report unless specified.     Treatment     Riana received the treatments listed below:      neuromuscular re-education activities to improve: Balance, Coordination, Kinesthetic, Sense, Proprioception, and motor control for 15 minutes. The following activities were included:  Patient education:   - Activity modification  - Plan of care  - Home exercise program  - Functional Anatomy    SLR; 3 x 10 reps Bilateral   Standing TKE with Green TB; 10 second hold, 2 x 15 reps  Standing hip abduction; Yellow PB band, 3 x 10 reps each LE    Not performed:  Quad sets with towel roll; 5 second hold, 20 reps Bilateral  Short arc quads with medium bolster; 5 second hold, 30 reps  Bilateral  Long arc quads at EOM; 5 second hold, 30 reps Bilateral  Short arc quads with 1/2 foam; 5 second hold, 20 reps Bilateral  SL Hip Abduction; 3 x 10 reps Bilateral  SL Clamshells; 3 x 10 reps Bilateral    therapeutic activities to improve functional performance for 45 minutes, including:  Nustep/Recumbent Bike for 10 minutes, Level 3 or 1,000K steps for endogenous release of opioids to improve tolerance to ADL's and functional tasks  Shuttle DL Press; 50 lbs (2 black cords), 3 x 10 reps with ball between knees  Shuttle SL Press; 25 lbs (1 black cord), 2 x 10 reps Bilateral   Lateral walks at 1/2 wall (16 feet); 6 laps with Yellow PB band  Sit to stands in std chair + airex; 3 x 10 reps  Matrix HS curl; 15 lbs, 2 x 8 reps with emphasis on slow controlled descent    Patient Education and Home Exercises     Home Exercises Provided and Patient Education Provided     Education provided:   - See above    Written Home Exercises Provided: Patient instructed to cont prior HEP. Exercises were reviewed and Riana was able to demonstrate them prior to the end of the session.  Riana demonstrated good  understanding of the education provided. See EMR under Patient Instructions for exercises provided during therapy sessions    ASSESSMENT     Added Matrix HS curls today with good tolerance and emphasis on eccentric control. She reported adequate fatigue post session throughout bilateral LE's and hip/glute musculature. Improved tolerance to SLR activity demonstrating good carryover with HEP and compliance. Will continue per POC towards treatment goals with emphasis on functional strengthening.   Riana Is progressing well towards her goals.   Pt prognosis is Good.     Pt will continue to benefit from skilled outpatient physical therapy to address the deficits listed in the problem list box on initial evaluation, provide pt/family education and to maximize pt's level of independence in the home and community  environment.     Pt's spiritual, cultural and educational needs considered and pt agreeable to plan of care and goals.     Anticipated barriers to physical therapy: Chronicity of symptoms, previous surgical history    Goals:   Short Term Goals: 4 weeks   1. Patient will reduce maximal pain rating to < 3/10 pain to facilitate ability to sleep through the night and recover from PT interventions.  2. Patient will be able to ambulate for at least 10 minutes with < 3/10 pain to improve walking tolerance.   3. Patient will improve single limb balance to at least 10 seconds to reduce risk for falls.       Long Term Goals: 8-10 weeks   1. Patient will be able to complete the TUG in < 10 seconds with the least restrictive assistive device to decrease fall risk.   2. Patient will demonstrate > 4/5 lower quarter strength to facilitate transfers from sit to stand from various surfaces without restriction.  3. Patient will improve 30 second sit to stand to at least 10x for improvement with transfer tasks.    4. Patient will improve FOTO intake score to at least 61 indicating a clinically significant change in function.     PLAN     Plan of Care Certification: 12/1/2023 to 1/31/2024.     Outpatient Physical Therapy 2 times weekly for 8-10 weeks to include the following interventions: Gait Training, Manual Therapy, Moist Heat/ Ice, Neuromuscular Re-ed, Patient Education, Self Care, Therapeutic Activities, and Therapeutic Exercise.     PT/PTA met face to face to discuss patient's treatment plan and progress towards established goals. Patient will be seen by physical therapist every sixth visit and minimally once per month.    Wilma Perez PTA

## 2023-12-20 ENCOUNTER — OFFICE VISIT (OUTPATIENT)
Dept: SLEEP MEDICINE | Facility: CLINIC | Age: 68
End: 2023-12-20
Attending: PSYCHIATRY & NEUROLOGY
Payer: MEDICARE

## 2023-12-20 VITALS
SYSTOLIC BLOOD PRESSURE: 132 MMHG | HEART RATE: 69 BPM | BODY MASS INDEX: 29.59 KG/M2 | DIASTOLIC BLOOD PRESSURE: 68 MMHG | HEIGHT: 63 IN | WEIGHT: 167 LBS

## 2023-12-20 DIAGNOSIS — G47.33 OSA (OBSTRUCTIVE SLEEP APNEA): Primary | ICD-10-CM

## 2023-12-20 PROCEDURE — 3288F FALL RISK ASSESSMENT DOCD: CPT | Mod: CPTII,S$GLB,, | Performed by: PSYCHIATRY & NEUROLOGY

## 2023-12-20 PROCEDURE — 4010F ACE/ARB THERAPY RXD/TAKEN: CPT | Mod: CPTII,S$GLB,, | Performed by: PSYCHIATRY & NEUROLOGY

## 2023-12-20 PROCEDURE — 1157F ADVNC CARE PLAN IN RCRD: CPT | Mod: CPTII,S$GLB,, | Performed by: PSYCHIATRY & NEUROLOGY

## 2023-12-20 PROCEDURE — 3075F SYST BP GE 130 - 139MM HG: CPT | Mod: CPTII,S$GLB,, | Performed by: PSYCHIATRY & NEUROLOGY

## 2023-12-20 PROCEDURE — 1101F PR PT FALLS ASSESS DOC 0-1 FALLS W/OUT INJ PAST YR: ICD-10-PCS | Mod: CPTII,S$GLB,, | Performed by: PSYCHIATRY & NEUROLOGY

## 2023-12-20 PROCEDURE — 4010F PR ACE/ARB THEARPY RXD/TAKEN: ICD-10-PCS | Mod: CPTII,S$GLB,, | Performed by: PSYCHIATRY & NEUROLOGY

## 2023-12-20 PROCEDURE — 3008F PR BODY MASS INDEX (BMI) DOCUMENTED: ICD-10-PCS | Mod: CPTII,S$GLB,, | Performed by: PSYCHIATRY & NEUROLOGY

## 2023-12-20 PROCEDURE — 1126F PR PAIN SEVERITY QUANTIFIED, NO PAIN PRESENT: ICD-10-PCS | Mod: CPTII,S$GLB,, | Performed by: PSYCHIATRY & NEUROLOGY

## 2023-12-20 PROCEDURE — 99214 PR OFFICE/OUTPT VISIT, EST, LEVL IV, 30-39 MIN: ICD-10-PCS | Mod: S$GLB,,, | Performed by: PSYCHIATRY & NEUROLOGY

## 2023-12-20 PROCEDURE — 3288F PR FALLS RISK ASSESSMENT DOCUMENTED: ICD-10-PCS | Mod: CPTII,S$GLB,, | Performed by: PSYCHIATRY & NEUROLOGY

## 2023-12-20 PROCEDURE — 3052F PR MOST RECENT HEMOGLOBIN A1C LEVEL 8.0 - < 9.0%: ICD-10-PCS | Mod: CPTII,S$GLB,, | Performed by: PSYCHIATRY & NEUROLOGY

## 2023-12-20 PROCEDURE — 3061F PR NEG MICROALBUMINURIA RESULT DOCUMENTED/REVIEW: ICD-10-PCS | Mod: CPTII,S$GLB,, | Performed by: PSYCHIATRY & NEUROLOGY

## 2023-12-20 PROCEDURE — 1101F PT FALLS ASSESS-DOCD LE1/YR: CPT | Mod: CPTII,S$GLB,, | Performed by: PSYCHIATRY & NEUROLOGY

## 2023-12-20 PROCEDURE — 99214 OFFICE O/P EST MOD 30 MIN: CPT | Mod: S$GLB,,, | Performed by: PSYCHIATRY & NEUROLOGY

## 2023-12-20 PROCEDURE — 3075F PR MOST RECENT SYSTOLIC BLOOD PRESS GE 130-139MM HG: ICD-10-PCS | Mod: CPTII,S$GLB,, | Performed by: PSYCHIATRY & NEUROLOGY

## 2023-12-20 PROCEDURE — 3008F BODY MASS INDEX DOCD: CPT | Mod: CPTII,S$GLB,, | Performed by: PSYCHIATRY & NEUROLOGY

## 2023-12-20 PROCEDURE — 1126F AMNT PAIN NOTED NONE PRSNT: CPT | Mod: CPTII,S$GLB,, | Performed by: PSYCHIATRY & NEUROLOGY

## 2023-12-20 PROCEDURE — 3061F NEG MICROALBUMINURIA REV: CPT | Mod: CPTII,S$GLB,, | Performed by: PSYCHIATRY & NEUROLOGY

## 2023-12-20 PROCEDURE — 1159F PR MEDICATION LIST DOCUMENTED IN MEDICAL RECORD: ICD-10-PCS | Mod: CPTII,S$GLB,, | Performed by: PSYCHIATRY & NEUROLOGY

## 2023-12-20 PROCEDURE — 1157F PR ADVANCE CARE PLAN OR EQUIV PRESENT IN MEDICAL RECORD: ICD-10-PCS | Mod: CPTII,S$GLB,, | Performed by: PSYCHIATRY & NEUROLOGY

## 2023-12-20 PROCEDURE — 3066F PR DOCUMENTATION OF TREATMENT FOR NEPHROPATHY: ICD-10-PCS | Mod: CPTII,S$GLB,, | Performed by: PSYCHIATRY & NEUROLOGY

## 2023-12-20 PROCEDURE — 99999 PR PBB SHADOW E&M-EST. PATIENT-LVL IV: ICD-10-PCS | Mod: PBBFAC,,, | Performed by: PSYCHIATRY & NEUROLOGY

## 2023-12-20 PROCEDURE — 1159F MED LIST DOCD IN RCRD: CPT | Mod: CPTII,S$GLB,, | Performed by: PSYCHIATRY & NEUROLOGY

## 2023-12-20 PROCEDURE — 3052F HG A1C>EQUAL 8.0%<EQUAL 9.0%: CPT | Mod: CPTII,S$GLB,, | Performed by: PSYCHIATRY & NEUROLOGY

## 2023-12-20 PROCEDURE — 3078F DIAST BP <80 MM HG: CPT | Mod: CPTII,S$GLB,, | Performed by: PSYCHIATRY & NEUROLOGY

## 2023-12-20 PROCEDURE — 99999 PR PBB SHADOW E&M-EST. PATIENT-LVL IV: CPT | Mod: PBBFAC,,, | Performed by: PSYCHIATRY & NEUROLOGY

## 2023-12-20 PROCEDURE — 3066F NEPHROPATHY DOC TX: CPT | Mod: CPTII,S$GLB,, | Performed by: PSYCHIATRY & NEUROLOGY

## 2023-12-20 PROCEDURE — 3078F PR MOST RECENT DIASTOLIC BLOOD PRESSURE < 80 MM HG: ICD-10-PCS | Mod: CPTII,S$GLB,, | Performed by: PSYCHIATRY & NEUROLOGY

## 2023-12-20 NOTE — PROGRESS NOTES
2023     9:20 PM 2021     7:32 AM 2021    10:55 AM 2021     3:37 PM   EPWORTH SLEEPINESS SCALE TOTAL SCORE    Total score 13 11 9 7       Riana Baker Rahul is a 68 y.o. female seen today for CPAP  follow up. Last seen on 2022.    Recently URI - resuming CPAP now; No congesion.  Nod oronasal dryness. No breatk through Snoring. Higher AHI today,.     The patient reports improved sleep continuity and daytime sleepiness on PAP. ESS today is `.  Denies break through snoring.  No dry mouth. No aerophagia or air hunger. No significant mask leaks and discomfort.    Resmed 10 ochme  Likes her Dreamwear  mask    Riana Kay 2023 - 2023 Patient ID: [id number] : 1955 Age: 68 years Total [address] [facility], [address] Compliance Report Compliance Payor Standard Usage 2023 - 2023 Usage days  days (30%) >= 4 hours 8 days (27%) < 4 hours 1 days (3%) Usage hours 57 hours 53 minutes Average usage (total days) 1 hours 56 minutes Average usage (days used) 6 hours 26 minutes Median usage (days used) 7 hours 7 minutes Total used hours (value since last reset - 2023) 4,268 hours AirSense 10 AutoSet Serial number [id number] Mode AutoSet Min Pressure 5 cmH2O Max Pressure 18 cmH2O EPR Fulltime EPR level 3 Response Standard Therapy Pressure - cmH2O Median: 9.8 95th percentile: 11.7 Maximum: 12.9 Leaks - L/min Median: 0.7 95th percentile: 14.7 Maximum: 26.8 Events per hour AI: 4.6 HI: 0.3 AHI: 4.9 Apnea Index Central: 0.1 Obstructive: 4.4 Unknown: 0.0 RERA Index 0.2 Cheyne-Gonzales respiration (average duration per night) 0 minutes (0%          Bedtime: 10:30  Sleep latency: 30min  Nocturnal awakenings:1-2 to go to the bathroom Returns to sleep in right away  Wake up time: 7 AM    Medications pertinent to sleep disorders: Alek sometimes  DME: [facility] 10 fiven in spring 2021  SLEEP STUDIES: : 21: Mild to moderate  "snoring was present. The overall AHI was 29.8 with an oxygen marbella of 70.0%. The supine AHI was 43.1 and the REM AHI was 40.9. The patient did not qualify for a split night study due to an insufficient number of events in the first half of the study. RDI (Respiratory Disturbance Index) was 43.     Medications pertinent to sleep  disorders taken currently: Flonase, Asteline        Occupation:retired  Bed partner: her   Exercise routine: exercise - knee replacement makes it hard  Caffeine: AM coffee  Alcohol: No  Smoking:No        12/17/2023     9:20 PM 8/6/2021     7:32 AM 5/18/2021    10:55 AM 4/9/2021     3:37 PM   EPWORTH SLEEPINESS SCALE TOTAL SCORE    Total score 13 11 9 7                        PHYSICAL EXAM:  /68 (BP Location: Left arm, Patient Position: Sitting)   Pulse 69   Ht 5' 3" (1.6 m)   Wt 75.8 kg (167 lb)   BMI 29.58 kg/m²         ASSESSMENT:    1. ZAHIRA - severe. The patient symptomatically has excessive daytime sleepiness, excessive daytime fatigue, snoring and interrupted sleep with exam findings of "a crowded oral airway and elevated body mass index. The patient has medical co-morbidities of diabetes,  which can be worsened by ZAHIRA. This warrants treatment.      PLAN:    Continue  auto CPAP 5-18cm increase to 6-18 - higher residual AHI compared to last visit  Compliance was reinforced. She was reminded of Medicare criteria.     Will follw up in 3 months - will schedule the follow up.     Education: During our discussion today, we talked about the etiology of obstructive sleep apnea as well as the potential ramifications of untreated sleep apnea, which could include daytime sleepiness, hypertension, heart disease and/or stroke.      We discussed potential treatment options, which could include weight loss, body positioning, continuous positive airway pressure (CPAP), or referral for surgical consideration. The patient preferred CPAP option.       Regular replacement of CPAP mask, " tubing and filter was recommended.    The patient was given open opportunity to ask questions and/or express concerns about treatment plan. Two point patient identifier confirmed.     Precautions: The patient was advised to abstain from driving should he feel sleepy or drowsy.    Follow up: me after 31 days  of PAP use.  31-minute visit. >50% spent counseling patient and coordination of care.  You are advised to abstain from driving should you feel sleepy or drowsy.

## 2023-12-29 ENCOUNTER — DOCUMENTATION ONLY (OUTPATIENT)
Dept: REHABILITATION | Facility: HOSPITAL | Age: 68
End: 2023-12-29
Payer: MEDICARE

## 2023-12-29 NOTE — PROGRESS NOTES
Physical Therapy: No show/Cancellation of Visit  Date: 12/29/2023    Patient cancelled today's PT appointment. Reason for cancellation: condition improved per MyChart cancellation.     Initial Evaluation: 12/1/2023  Plan of Care Explanation: 1/31/2024  Cancel: 4  No show: 0    Therapist: Wilma Perez PTA

## 2024-01-01 ENCOUNTER — PATIENT MESSAGE (OUTPATIENT)
Dept: SPORTS MEDICINE | Facility: CLINIC | Age: 69
End: 2024-01-01
Payer: MEDICARE

## 2024-01-06 ENCOUNTER — PATIENT MESSAGE (OUTPATIENT)
Dept: INTERNAL MEDICINE | Facility: CLINIC | Age: 69
End: 2024-01-06
Payer: MEDICARE

## 2024-01-08 RX ORDER — INSULIN LISPRO 100 [IU]/ML
INJECTION, SOLUTION INTRAVENOUS; SUBCUTANEOUS
Qty: 30 ML | Refills: 6 | Status: SHIPPED | OUTPATIENT
Start: 2024-01-08

## 2024-01-11 ENCOUNTER — OFFICE VISIT (OUTPATIENT)
Dept: SPORTS MEDICINE | Facility: CLINIC | Age: 69
End: 2024-01-11
Payer: MEDICARE

## 2024-01-11 ENCOUNTER — OFFICE VISIT (OUTPATIENT)
Dept: INTERNAL MEDICINE | Facility: CLINIC | Age: 69
End: 2024-01-11
Payer: MEDICARE

## 2024-01-11 VITALS
HEART RATE: 89 BPM | RESPIRATION RATE: 17 BRPM | OXYGEN SATURATION: 96 % | DIASTOLIC BLOOD PRESSURE: 70 MMHG | TEMPERATURE: 98 F | BODY MASS INDEX: 29.38 KG/M2 | SYSTOLIC BLOOD PRESSURE: 120 MMHG | HEIGHT: 63 IN | WEIGHT: 165.81 LBS

## 2024-01-11 VITALS
HEART RATE: 73 BPM | BODY MASS INDEX: 29.38 KG/M2 | HEIGHT: 63 IN | WEIGHT: 165.81 LBS | DIASTOLIC BLOOD PRESSURE: 70 MMHG | SYSTOLIC BLOOD PRESSURE: 147 MMHG

## 2024-01-11 DIAGNOSIS — E11.43 GASTROPARESIS DUE TO DM: ICD-10-CM

## 2024-01-11 DIAGNOSIS — I77.9 CAROTID ARTERY DISEASE, UNSPECIFIED LATERALITY, UNSPECIFIED TYPE: ICD-10-CM

## 2024-01-11 DIAGNOSIS — M67.912 DYSFUNCTION OF LEFT ROTATOR CUFF: ICD-10-CM

## 2024-01-11 DIAGNOSIS — R11.2 NAUSEA AND VOMITING, UNSPECIFIED VOMITING TYPE: ICD-10-CM

## 2024-01-11 DIAGNOSIS — M25.512 CHRONIC LEFT SHOULDER PAIN: Primary | ICD-10-CM

## 2024-01-11 DIAGNOSIS — K59.00 CONSTIPATION, UNSPECIFIED CONSTIPATION TYPE: ICD-10-CM

## 2024-01-11 DIAGNOSIS — G89.29 CHRONIC LEFT SHOULDER PAIN: Primary | ICD-10-CM

## 2024-01-11 DIAGNOSIS — K31.84 GASTROPARESIS DUE TO DM: ICD-10-CM

## 2024-01-11 DIAGNOSIS — F19.939 WITHDRAWAL FROM OTHER PSYCHOACTIVE SUBSTANCE: Primary | ICD-10-CM

## 2024-01-11 PROCEDURE — 99214 OFFICE O/P EST MOD 30 MIN: CPT | Mod: S$GLB,,, | Performed by: FAMILY MEDICINE

## 2024-01-11 PROCEDURE — 99999 PR PBB SHADOW E&M-EST. PATIENT-LVL III: CPT | Mod: PBBFAC,,, | Performed by: ORTHOPAEDIC SURGERY

## 2024-01-11 PROCEDURE — 99999 PR PBB SHADOW E&M-EST. PATIENT-LVL V: CPT | Mod: PBBFAC,,, | Performed by: FAMILY MEDICINE

## 2024-01-11 PROCEDURE — 99214 OFFICE O/P EST MOD 30 MIN: CPT | Mod: S$GLB,,, | Performed by: ORTHOPAEDIC SURGERY

## 2024-01-11 RX ORDER — DULOXETIN HYDROCHLORIDE 30 MG/1
30 CAPSULE, DELAYED RELEASE ORAL DAILY
Qty: 90 CAPSULE | Refills: 3 | Status: SHIPPED | OUTPATIENT
Start: 2024-01-11

## 2024-01-11 RX ORDER — ROSUVASTATIN CALCIUM 10 MG/1
10 TABLET, COATED ORAL
COMMUNITY
Start: 2023-11-21

## 2024-01-11 RX ORDER — MELOXICAM 15 MG/1
15 TABLET ORAL
COMMUNITY
Start: 2024-01-02

## 2024-01-11 RX ORDER — ONDANSETRON 8 MG/1
8 TABLET, ORALLY DISINTEGRATING ORAL 3 TIMES DAILY PRN
Qty: 30 TABLET | Refills: 0 | Status: SHIPPED | OUTPATIENT
Start: 2024-01-11

## 2024-01-11 NOTE — PROGRESS NOTES
Subjective     Patient ID: Riana Kay is a 68 y.o. female.    Chief Complaint: Nausea, Emesis, Abdominal Pain, Constipation, and Fatigue  68-year-old female with gastroparesis secondary to diabetes and coronary artery disease presents to clinic today accompanied by her  secondary to a complaint of abdominal pain, nausea, constipation, and vomiting with associated fatigue, chills, and lightheadedness for the past 4 days.  She reports that approximately 4 days ago she took her last pill of Cymbalta and has not had any refills.  She reports that she has had a decreased appetite but is still attempting to remain hydrated.  She reports 2 episodes of vomiting per day and has experienced constipation.  She does report having a bowel movement 2 days ago and 1 this morning; however, both were extremely hard.  Nausea  Associated symptoms include abdominal pain, chills, fatigue, nausea and vomiting. Pertinent negatives include no chest pain, congestion, coughing, fever, headaches, myalgias, neck pain, rash or sore throat.   Emesis   Associated symptoms include abdominal pain and chills. Pertinent negatives include no chest pain, coughing, decreased urine volume, diarrhea, dizziness, fever, headaches or myalgias.   Abdominal Pain  Associated symptoms include constipation, nausea and vomiting. Pertinent negatives include no diarrhea, dysuria, fever, frequency, headaches, hematuria or myalgias.   Constipation  Associated symptoms include abdominal pain, nausea and vomiting. Pertinent negatives include no back pain, diarrhea, difficulty urinating or fever.   Fatigue  Associated symptoms include abdominal pain, chills, fatigue, nausea and vomiting. Pertinent negatives include no chest pain, congestion, coughing, fever, headaches, myalgias, neck pain, rash or sore throat.     Review of Systems   Constitutional:  Positive for chills and fatigue. Negative for appetite change and fever.   HENT:  Negative for nasal  congestion, ear pain, hearing loss, postnasal drip, rhinorrhea, sinus pressure/congestion, sore throat and tinnitus.    Eyes:  Negative for redness, itching and visual disturbance.   Respiratory:  Negative for cough, chest tightness and shortness of breath.    Cardiovascular:  Negative for chest pain and palpitations.   Gastrointestinal:  Positive for abdominal pain, constipation, nausea and vomiting. Negative for diarrhea.   Genitourinary:  Negative for decreased urine volume, difficulty urinating, dysuria, frequency, hematuria and urgency.   Musculoskeletal:  Negative for back pain, myalgias, neck pain and neck stiffness.   Integumentary:  Negative for rash.   Neurological:  Positive for light-headedness. Negative for dizziness and headaches.   Psychiatric/Behavioral: Negative.            Objective     Physical Exam  Vitals and nursing note reviewed.   Constitutional:       General: She is not in acute distress.     Appearance: She is well-developed. She is not diaphoretic.   HENT:      Head: Normocephalic and atraumatic.      Right Ear: External ear normal.      Left Ear: External ear normal.      Nose: Nose normal.      Mouth/Throat:      Pharynx: No oropharyngeal exudate.   Eyes:      General: No scleral icterus.        Right eye: No discharge.         Left eye: No discharge.      Conjunctiva/sclera: Conjunctivae normal.      Pupils: Pupils are equal, round, and reactive to light.   Neck:      Thyroid: No thyromegaly.      Vascular: No JVD.      Trachea: No tracheal deviation.   Cardiovascular:      Rate and Rhythm: Normal rate and regular rhythm.      Heart sounds: Normal heart sounds. No murmur heard.     No friction rub. No gallop.   Pulmonary:      Effort: Pulmonary effort is normal. No respiratory distress.      Breath sounds: Normal breath sounds. No stridor. No wheezing or rales.   Abdominal:      General: Bowel sounds are normal. There is no distension.      Palpations: Abdomen is soft. There is no  mass.      Tenderness: There is no abdominal tenderness. There is no guarding or rebound.   Musculoskeletal:         General: No tenderness. Normal range of motion.      Cervical back: Normal range of motion and neck supple.   Lymphadenopathy:      Cervical: No cervical adenopathy.   Skin:     General: Skin is warm and dry.      Coloration: Skin is not pale.      Findings: No erythema or rash.   Neurological:      Mental Status: She is alert and oriented to person, place, and time.   Psychiatric:         Behavior: Behavior normal.         Thought Content: Thought content normal.         Judgment: Judgment normal.            Assessment and Plan     1. Withdrawal from other psychoactive substance    2. Constipation, unspecified constipation type    3. Nausea and vomiting, unspecified vomiting type  -     ondansetron (ZOFRAN-ODT) 8 MG TbDL; Take 1 tablet (8 mg total) by mouth 3 (three) times daily as needed (nausea/vomiting).  Dispense: 30 tablet; Refill: 0    4. Gastroparesis due to DM    5. Carotid artery disease, unspecified laterality, unspecified type  Overview:  [phone]  [phone] 8/14/2022    Saad Daily MD  [phone]  [phone] 8/14/2022      Narrative & Impression  EXAMINATION:  CT SHOULDER WITHOUT CONTRAST RIGHT; CT 3D RECON WITH INDEPENDENT WS     CLINICAL HISTORY:  Shoulder trauma, instability or dislocation suspected, xray done;ortho;; Shoulder trauma, instability or dislocation suspected, xray done;ortho;3d;     TECHNIQUE:  Axial 0.625-mm images of the right shoulder obtained without intravenous contrast.  Data submitted for coronal and sagittal reformats.     3D reconstructed images were acquired by post processing on an independent workstation with concurrent supervision and archived for permanent record.     COMPARISON:  Right shoulder radiograph 08/14/2022; chest radiograph 06/11/2021     FINDINGS:  Bony mineralization is normal.  Comminuted multi-fragment fracture of the  humeral head and neck with moderate impaction.  Fracture at least partially involves the articular surface of the humeral head which is dislocated anteriorly and inferiorly with respect to the glenoid.     Acromioclavicular joint alignment is preserved without significant widening or hypertrophy.     Moderate volume joint effusion with fat fluid level suggesting lipohemarthrosis.  Generalized fat stranding infraclavicular and axillary region.  No organized fluid collections.  Muscle bulk is preserved.     Dependent atelectasis in the right lung base.  Dense calcification of the mitral annulus.  Multi-vessel coronary artery calcification.  Eccentric calcification in the right carotid bulb.     Impression:     Comminuted fracture of humeral head and neck with impaction and anterior inferior dislocation of the humeral head.     Moderate volume lipohemarthrosis.     This report was flagged in Epic as abnormal.     Electronically signed by resident: Saad Daily  Date:                                            08/14/2022  Time:                                           16:04     Electronically signed by: Jose Vázquez MD  Date:                                            08/14/2022 8/14/2022      Other orders  -     DULoxetine (CYMBALTA) 30 MG capsule; Take 1 capsule (30 mg total) by mouth once daily.  Dispense: 90 capsule; Refill: 3        1. The patient's physical exam is negative.  I feel that her symptoms are secondary to withdrawal from Cymbalta and have refilled Cymbalta.  2. Zofran has been provided to use as needed for nausea or vomiting.    3. I have encouraged the patient to start MiraLax 1 capful in 8 oz of water daily and to remain well hydrated with at least 64 oz of water daily.  4. Continue follow-up with diabetic specialist as scheduled and GI for continued evaluation of gastroparesis.  5. Continue Lipitor 20 mg daily.  Carotid artery disease is stable.  6. Return to clinic as needed if symptoms  persist or worsen.               Follow up if symptoms worsen or fail to improve.

## 2024-01-11 NOTE — PROGRESS NOTES
CC: LEFT shoulder pain  Riana Kay is a 68 y.o. female, who presents today for follow up exam and MRI review. She reports her shoulder pain has not improved and continues to affect her daily life. She reports pain with overhead activities as well as night pain. She states the pain radiates down her arm but not past her elbow. She has a history of right rTSA performed in 2022 for a fractured proximal humerus, she reports that during the rehab process of the right shoulder she did some of the exercises on the left which helped improve her symptoms at that time.     Initial History:   68 y.o. Female with a multiyear history of left shoulder atraumatic pain.  Patient retired. She was seen for this in the past and told likely rotator cuff injury and sent for PT  She states that the pain is severe and not responding to any conservative care.      She reports that the pain and weakness is worse with overhead activity. It also bothers her at night.    Is affecting ADLs.  Pain is 7/10 at it's worst.      Past Medical History:   Diagnosis Date    Abdominal pain, right upper quadrant 02/04/2014    Anticoagulant long-term use     Anxiety     AR (allergic rhinitis)     Atrophic gastritis without mention of hemorrhage 02/13/2012     Dx updated per 2019 IMO Load    Chronic fatigue syndrome 06/10/2012    Diabetes mellitus     Dizziness     Fatty liver     GERD (gastroesophageal reflux disease)     Gross hematuria 12/01/2020    HTN (hypertension)     Hyperlipidemia     Leg swelling     Memory loss     Osteopenia     PONV (postoperative nausea and vomiting)     Primary osteoarthritis of right knee 10/06/2020    Primary osteoarthritis of right knee 10/06/2020    Right elbow pain 01/16/2019    S/P total hysterectomy     Screening for colon cancer 01/06/2021    Sleep apnea     Status post total right knee replacement 10/6/2020 10/05/2020       Past Surgical History:   Procedure Laterality Date    CHOLECYSTECTOMY       COLONOSCOPY N/A 01/17/2018    Procedure: COLONOSCOPY with Donnell;  Surgeon: Roland Jacobo MD;  Location: [facility] ENDO (4TH FLR);  Service: Endoscopy;  Laterality: N/A;    COLONOSCOPY N/A 01/06/2021    Procedure: COLONOSCOPY;  Surgeon: Yong Rowland MD;  Location: [facility] ENDO (4TH FLR);  Service: Endoscopy;  Laterality: N/A;  prep ins. emailed - Portuguese speaking,  needed - ERW  COVID screening [facility] - ERW    CYSTOSCOPY N/A 12/01/2020    Procedure: CYSTOSCOPY;  Surgeon: Ines Stanford MD;  Location: [facility] OR 1ST FLR;  Service: Urology;  Laterality: N/A;  45 minutes     ELBOW ARTHROPLASTY Right 01/16/2019    Procedure: ARTHROPLASTY, ELBOW right radial head arthroplasty revision;  Surgeon: Katja Hubbard MD;  Location: [address];  Service: Orthopedics;  Laterality: Right;  Anesthesia: General and Regional. Stretcher, hand pan 1 and pan 2, CALL RUCHI SANTAMARIA & Sol notified 1-14 LO    ELBOW SURGERY Right 07/16/2015    ELBOW SURGERY      ESOPHAGOGASTRODUODENOSCOPY N/A 04/15/2019    Procedure: EGD (ESOPHAGOGASTRODUODENOSCOPY);  Surgeon: Buck Irwin MD;  Location: [facility] (4TH FLR);  Service: Endoscopy;  Laterality: N/A;  ray she    ESOPHAGOGASTRODUODENOSCOPY N/A 04/21/2023    Procedure: EGD (ESOPHAGOGASTRODUODENOSCOPY);  Surgeon: Aamir Reyes MD;  Location: [facility];  Service: Endoscopy;  Laterality: N/A;    HYSTERECTOMY      KNEE ARTHROPLASTY Right 10/06/2020    Procedure: ARTHROPLASTY, KNEE-SAME DAY PROTOCOL;  Surgeon: Sabino Damon MD;  Location: [facility];  Service: Orthopedics;  Laterality: Right;    KNEE ARTHROSCOPY W/ DEBRIDEMENT  04/2011    Left    RELEASE OF ULNAR NERVE AT CUBITAL TUNNEL Right 01/16/2019    Procedure: RELEASE, ULNAR TUNNEL right;  Surgeon: Katja Hubbard MD;  Location: [facility] OR [address] [place] [address];  Service: Orthopedics;  Laterality: Right;  Anesthesia: General and Regional. Stretcher, hand pan 1 and pan 2, CALL  ACCUMED, CLAIRX    RETROGRADE PYELOGRAPHY Bilateral 12/01/2020    Procedure: PYELOGRAM, RETROGRADE;  Surgeon: Ines Stanford MD;  Location: [facility] OR 1ST FLR;  Service: Urology;  Laterality: Bilateral;    REVERSE TOTAL SHOULDER ARTHROPLASTY Right 08/16/2022    Procedure: ARTHROPLASTY, SHOULDER, TOTAL, REVERSE, [place];  Surgeon: Aamir Powell MD;  Location: [facility] OR 2ND FLR;  Service: Orthopedics;  Laterality: Right;  [place] Can't go until after 12    ROTATOR CUFF REPAIR      SHOULDER SURGERY      TOTAL ABDOMINAL HYSTERECTOMY W/ BILATERAL SALPINGOOPHORECTOMY      TOTAL KNEE ARTHROPLASTY Left 10/19/2021    Procedure: ARTHROPLASTY, KNEE, TOTAL;  Surgeon: Sabino Damon MD;  Location: [facility];  Service: Orthopedics;  Laterality: Left;    UPPER GASTROINTESTINAL ENDOSCOPY         Family History   Problem Relation Age of Onset    Cancer Father         colon    Colon cancer Father 83        colon cancer    Diabetes Maternal Aunt     Diabetes Maternal Grandmother     Esophageal cancer Neg Hx     Stomach cancer Neg Hx     Melanoma Neg Hx          Current Outpatient Medications:     acetaminophen (TYLENOL) 500 MG tablet, Take 2 tablets (1,000 mg total) by mouth every 8 (eight) hours as needed for Pain., Disp: 90 tablet, Rfl: 0    atorvastatin (LIPITOR) 20 MG tablet, Take 1 tablet (20 mg total) by mouth once daily., Disp: 90 tablet, Rfl: 3    azelastine (ASTELIN) 137 mcg (0.1 %) nasal spray, 1 spray (137 mcg total) by Nasal route 2 (two) times daily., Disp: 30 mL, Rfl: 11    blood sugar diagnostic Strp, To check BG 3 times daily, to use with insurance preferred meter, e 11.65, Disp: 100 each, Rfl: 11    blood-glucose meter kit, To check BG 3 times daily, to use with insurance preferred meter, e 11.65, Disp: 1 each, Rfl: 0    buPROPion (WELLBUTRIN XL) 300 MG 24 hr tablet, Take 1 tablet (300 mg total) by mouth every morning., Disp: 90 tablet, Rfl: 3    dulaglutide (TRULICITY) 0.75 mg/0.5 mL pen injector, Inject  0.75 mg weekly, Disp: 4 pen , Rfl: 2    DULoxetine (CYMBALTA) 30 MG capsule, Take 1 capsule (30 mg total) by mouth once daily., Disp: 90 capsule, Rfl: 3    glucagon (BAQSIMI) 3 mg/actuation Spry, Give one puff via nostril. Hold device between fingers and thumb, do not push plunger yet, insert tip gently into one nostril until finger(s) touch the outside of the nose, then push plunger firmly all the way in . Dose is complete when the green line disappears., Disp: 1 each, Rfl: 1    HUMALOG KWIKPEN INSULIN 100 unit/mL pen, Inject 10-12 units before meals plus scale 150-200+2, 201-250+4, 251-300+6, 301-350+8, >350+10. Max daily 66 units., Disp: 30 mL, Rfl: 6    insulin (LANTUS SOLOSTAR U-100 INSULIN) glargine 100 units/mL SubQ pen, INJECT 24 -36 UNITS SUBCUTANEOUSLY AT NIGHT. Max daily 36 units, Disp: 30 mL, Rfl: 3    ketoconazole (NIZORAL) 2 % shampoo, Apply topically every 7 days., Disp: 120 mL, Rfl: 6    lancets Mis, To check BG 3 times daily, to use with insurance preferred meter, e 11.65, Disp: 100 each, Rfl: 11    latanoprost 0.005 % ophthalmic solution, Place 1 drop into both eyes nightly., Disp: , Rfl:     losartan (COZAAR) 50 MG tablet, TAKE 1 TABLET BY MOUTH EVERY DAY, Disp: 90 tablet, Rfl: 3    lubiprostone (AMITIZA) 24 MCG Cap, Take 1 capsule (24 mcg total) by mouth 2 (two) times daily with meals., Disp: 60 capsule, Rfl: 5    meloxicam (MOBIC) 15 MG tablet, Take 15 mg by mouth., Disp: , Rfl:     ondansetron (ZOFRAN-ODT) 8 MG TbDL, Take 1 tablet (8 mg total) by mouth 3 (three) times daily as needed (Nausea)., Disp: 30 tablet, Rfl: 3    ondansetron (ZOFRAN-ODT) 8 MG TbDL, Take 1 tablet (8 mg total) by mouth 3 (three) times daily as needed (nausea/vomiting)., Disp: 30 tablet, Rfl: 0    ONETOUCH DELICA LANCETS 33 gauge Misc, TO CHECK BLOOD GLUCOSE 3 TIMES DAILY, Disp: , Rfl:     ONETOUCH VERIO FLEX METER [facility], TO CHECK BLOOD GLUCOSE 3 TIMES DAILY, TO USE WITH INSURANCE PREFERRED METER, E 11.65, Disp: , Rfl:      "pantoprazole (PROTONIX) 40 MG tablet, Take 1 tablet (40 mg total) by mouth once daily., Disp: 90 tablet, Rfl: 3    pen needle, diabetic (NOVOFINE 32) 32 gauge x 1/4" Ndle, Uses 4 times a day. 90 day via duramed e 11.65, Disp: 400 each, Rfl: 3    rosuvastatin (CRESTOR) 10 MG tablet, Take 10 mg by mouth., Disp: , Rfl:     meclizine (ANTIVERT) 25 mg tablet, Take 1 tablet (25 mg total) by mouth 3 (three) times daily as needed for Dizziness., Disp: 30 tablet, Rfl: 1    Review of patient's allergies indicates:   Allergen Reactions    Iodinated contrast media Swelling and Rash    Percocet [oxycodone-acetaminophen] Itching    Macrobid [nitrofurantoin monohyd/m-cryst] Rash    Metformin Rash    Penicillins Rash     Had ancef in 2020 with no adverse rxn     Promethazine Rash     Had compazine in 2021    Sulfa (sulfonamide antibiotics) Rash    Sulfamethoxazole-trimethoprim Rash          REVIEW OF SYSTEMS:  Constitution: Negative. Negative for chills, fever and night sweats.   HENT: Negative for congestion and headaches.    Eyes: Negative for blurred vision, left vision loss and right vision loss.   Cardiovascular: Negative for chest pain and syncope.   Respiratory: Negative for cough and shortness of breath.    Endocrine: Negative for polydipsia, polyphagia and polyuria.   Hematologic/Lymphatic: Negative for bleeding problem. Does not bruise/bleed easily.   Skin: Negative for dry skin, itching and rash.   Musculoskeletal: Negative for falls.  Positive for left shoulder pain and muscle weakness.   Gastrointestinal: Negative for abdominal pain and bowel incontinence.   Genitourinary: Negative for bladder incontinence and nocturia.   Neurological: Negative for disturbances in coordination, loss of balance and seizures.   Psychiatric/Behavioral: Negative for depression. The patient does not have insomnia.    Allergic/Immunologic: Negative for hives and persistent infections.      PHYSICAL EXAMINATION:  Vitals:  BP (!) 147/70   " "Pulse 73   Ht 5' 3" (1.6 m)   Wt 75.2 kg (165 lb 12.6 oz)   BMI 29.37 kg/m²    General: The patient is alert and oriented x 3.  Mood is pleasant.  Observation of ears, eyes and nose reveal no gross abnormalities.  No labored breathing observed.  Gait is coordinated. Patient can toe walk and heel walk without difficulty.      LEFT Shoulder / Upper Extremity Exam    OBSERVATION:     Swelling  none  Deformity  none   Discoloration  none   Scapular winging none   Scars   none  Atrophy  none    TENDERNESS / CREPITUS (T/C):          T/C      T/C   Clavicle   -/-  SUPRAspinatus    +/-     AC Jt.    -/-  INFRAspinatus  -/-    SC Jt.    -/-  Deltoid    -/-      G. Tuberosity  -/-  LH BICEP groove  +/-   Acromion:  -/-  Midline Neck   -/-     Scapular Spine -/-  Trapezium   -/-   SMA Scapula  -/-  GH jt. line - post  -/-     Scapulothoracic  -/-         ROM: (* = with pain)  Right shoulder   Left shoulder        AROM (PROM)   AROM (PROM)   FE    100° (160°)     130° (165°)     ER at 0°    0°  (0°)    30°  (45°)   ER at 90° ABD  90°  (90°)    90°  (90°)   IR at 90°  ABD   NA  (40°)     NA  (40°)      IR (spine level)   Sacrum   T8    STRENGTH: (* = with pain) Right shoulder   Left shoulder    SCAPTION   4/5*    4/5*    IR    5/5*    5/5*   ER    5/5*    5/5*   BICEPS   5/5    5/5   Deltoid    5/5    5/5     SIGNS:  Painful side       NEER   +    OPARISHS  neg    JASSO   +    SPEEDS  Pos     DROP ARM   +   BELLY PRESS neg   Superior escape none    LIFT-OFF  neg   X-Body ADD    neg    MOVING VALGUS neg        STABILITY TESTING    Right shoulder   Left shoulder    Translation     Anterior  up face     up face    Posterior  up face    up face    Sulcus   < 10mm    < 10 mm     Signs   Apprehension   neg      neg       Relocation   no change     no change      Jerk test  neg     neg    EXTREMITY NEURO-VASCULAR EXAM:    Sensation grossly intact to light touch all dermatomal regions.    DTR 2+ Biceps, Triceps, BR and " Negative Davids sign   Grossly intact motor function at Elbow, Wrist and Hand   Distal pulses radial and ulnar 2+, brisk cap refill, symmetric.      NECK:  Painless FROM and spinous processes non-tender. Negative Spurlings sign.      OTHER FINDINGS:  - scapular dyskinesia    XRAYS:  Xrays including AP, Outlet and Axillary Lateral of Left shoulder are ordered / images reviewed by me:   No fracture dislocation or other pathology   Acromion type 1   Proximal migration of humeral head: Mild   GH arthritis: None    MRI Report   EXAMINATION:  MRI SHOULDER WITHOUT CONTRAST LEFT     CLINICAL HISTORY:  Shoulder pain, rotator cuff disorder suspected, xray done;  Pain in left shoulder     TECHNIQUE:  MRI left shoulder performed without contrast per routine protocol.     COMPARISON:  Left shoulder radiograph 10/20/2023     FINDINGS:  Rotator cuff: Full-thickness tear of the anterior insertional supraspinatus tendon with approximately 1 x 1 cm defect with background supraspinatus tendinosis.  Infraspinatus, teres minor, and subscapularis tendons are intact.  Mild volume loss of the supraspinatus muscle, otherwise muscle bulk is maintained.     Labrum: Circumferential fraying.     Biceps: Long head biceps tendon intact with tendinosis.     Bone: There is no fracture. Bone marrow signal is unremarkable.     Acromioclavicular joint: Mild arthrosis.     Cartilage: Generalized chondral thinning without focal defect.     Miscellaneous: Small joint effusion.  Subacromial subdeltoid bursal fluid/bursitis.     Impression:     1. Full-thickness tear of the anterior insertional supraspinatus tendon.  2. Small joint effusion and subacromial/subdeltoid bursitis.     Electronically signed by resident: Alli Gabriel  Date:                                            10/31/2023  Time:                                           08:22        ASSESSMENT:   Left shoulder pain, possible: Small Anterior Full Thickness Supraspinatus Tear  1. Chronic  left shoulder pain    2. Dysfunction of left rotator cuff          PLAN:      1. PT  2. Follow up in 2 months    All questions were answered, patient will contact us for questions or concerns in the interim.         [General Appearance - Alert] : alert [General Appearance - In No Acute Distress] : in no acute distress [General Appearance - Well Nourished] : well nourished [General Appearance - Well Developed] : well developed [General Appearance - Well-Appearing] : healthy appearing [Neck Appearance] : the appearance of the neck was normal [Neck Cervical Mass (___cm)] : no neck mass was observed [Thyroid Diffuse Enlargement] : the thyroid was not enlarged [Bowel Sounds] : normal bowel sounds [Abdomen Soft] : soft [Abdomen Tenderness] : non-tender [] : no hepato-splenomegaly [Abdomen Mass (___ Cm)] : no abdominal mass palpated [Abdomen Hernia] : no hernia was discovered [FreeTextEntry1] : deferred

## 2024-01-11 NOTE — PATIENT INSTRUCTIONS
Take one capful of MiraLax daily with 8 ounces of water to help with constipation.  Make sure you are drinking at least 64 ounces of water daily.

## 2024-01-19 ENCOUNTER — TELEPHONE (OUTPATIENT)
Dept: SPORTS MEDICINE | Facility: CLINIC | Age: 69
End: 2024-01-19
Payer: MEDICARE

## 2024-01-19 ENCOUNTER — PATIENT MESSAGE (OUTPATIENT)
Dept: SPORTS MEDICINE | Facility: CLINIC | Age: 69
End: 2024-01-19
Payer: MEDICARE

## 2024-01-19 DIAGNOSIS — M25.512 CHRONIC LEFT SHOULDER PAIN: Primary | ICD-10-CM

## 2024-01-19 DIAGNOSIS — G89.29 CHRONIC LEFT SHOULDER PAIN: Primary | ICD-10-CM

## 2024-02-08 ENCOUNTER — OFFICE VISIT (OUTPATIENT)
Dept: ORTHOPEDICS | Facility: CLINIC | Age: 69
End: 2024-02-08
Payer: MEDICARE

## 2024-02-08 ENCOUNTER — HOSPITAL ENCOUNTER (OUTPATIENT)
Dept: RADIOLOGY | Facility: HOSPITAL | Age: 69
Discharge: HOME | End: 2024-02-08
Attending: NURSE PRACTITIONER
Payer: MEDICARE

## 2024-02-08 VITALS — WEIGHT: 165.81 LBS | HEIGHT: 63 IN | BODY MASS INDEX: 29.38 KG/M2

## 2024-02-08 DIAGNOSIS — M25.511 ACUTE PAIN OF RIGHT SHOULDER: ICD-10-CM

## 2024-02-08 DIAGNOSIS — S42.201D CLOSED FRACTURE OF PROXIMAL END OF RIGHT HUMERUS WITH ROUTINE HEALING, UNSPECIFIED FRACTURE MORPHOLOGY, SUBSEQUENT ENCOUNTER: Primary | ICD-10-CM

## 2024-02-08 DIAGNOSIS — M25.511 ACUTE PAIN OF RIGHT SHOULDER: Primary | ICD-10-CM

## 2024-02-08 PROCEDURE — 99999 PR PBB SHADOW E&M-EST. PATIENT-LVL IV: CPT | Mod: PBBFAC,,, | Performed by: NURSE PRACTITIONER

## 2024-02-08 PROCEDURE — 99214 OFFICE O/P EST MOD 30 MIN: CPT | Mod: S$GLB,,, | Performed by: NURSE PRACTITIONER

## 2024-02-08 PROCEDURE — 73030 X-RAY EXAM OF SHOULDER: CPT | Mod: TC,RT

## 2024-02-08 PROCEDURE — 73030 X-RAY EXAM OF SHOULDER: CPT | Mod: 26,RT,, | Performed by: RADIOLOGY

## 2024-02-08 RX ORDER — METHOCARBAMOL 500 MG/1
500 TABLET, FILM COATED ORAL 4 TIMES DAILY
Qty: 40 TABLET | Refills: 0 | Status: SHIPPED | OUTPATIENT
Start: 2024-02-08 | End: 2024-02-18

## 2024-02-08 NOTE — PROGRESS NOTES
Ms. Kay is here today for a follow up visit after undergoing right reverse rTSA and right biceps tenodesis by Dr. Powell on 8/16/22.    Interval History:  She reports that she is doing ok.  She reports intermittent pain to her right shoulder.  Associates the pain with change in weather.  Denies falls/injuries since last seen in the clinic.  She does endorse some dull achy pain on the right side of her neck.  Has no known neck/spine issues she is aware of.  Denies fever or chills.  She denies any numbness or tingling sensation to her lower arm.  She has been using Mobic given to her by Dr. Damon that she reports has helped.    Physical exam:  Incision is open to air and healed, no signs of infection seen.   She has tactile stimulation to their lower FA.  She has normal ROM of all fingers and wrist.  Elbow ROM is 0-180 degrees.  Radial pulse is 2+ and cap refill is < 3 secs.  ROM at the shoulder is 0-150 degrees.  AAROM of the right shoulder is 0-170 degrees.  Mild tenderness to palpation to right side of neck.    RADS: Right shoulder x-ray was obtained and personally reviewed by me, findings show humerus hardware is in proper position, no evidence of hardware failure.  No new fractures seen.        Assessment:  Follow up visit (18 months)    Plan:    ICD-10-CM ICD-9-CM   1. Closed fracture of proximal end of right humerus with routine healing, unspecified fracture morphology, subsequent encounter  S42.201D V54.11     Current care, treatment plan, precautions, activity level/ modifications, limitations, rehabilitation exercises and proposed future treatment were discussed with the patient. We discussed the need to monitor for changes in symptoms and condition and report them to the physician.  Discussed importance of compliance with all appointments and follow up examinations.      68-year-old female who presents for intermittent right shoulder pain.  She is s/p 18 months for a right proximal total shoulder  replacement.  She does endorse some intermittent right-sided neck pain as well.  I believe her pain may be stemming from there.    I will treat the patient with Robaxin and advised her to apply warm moist heat to the area.  I will send the patient back to physical therapy to work on range of motion of the shoulder.    Patient to follow up in the clinic p.r.n., sooner for new or worsening symptoms.  Patient advised if her pain persists in the right shoulder then we will have her see the surgeon.

## 2024-02-20 ENCOUNTER — CLINICAL SUPPORT (OUTPATIENT)
Dept: REHABILITATION | Facility: HOSPITAL | Age: 69
End: 2024-02-20
Payer: MEDICARE

## 2024-02-20 DIAGNOSIS — G89.29 CHRONIC LEFT SHOULDER PAIN: ICD-10-CM

## 2024-02-20 DIAGNOSIS — M25.512 CHRONIC LEFT SHOULDER PAIN: ICD-10-CM

## 2024-02-20 DIAGNOSIS — S42.201D CLOSED FRACTURE OF PROXIMAL END OF RIGHT HUMERUS WITH ROUTINE HEALING, UNSPECIFIED FRACTURE MORPHOLOGY, SUBSEQUENT ENCOUNTER: ICD-10-CM

## 2024-02-20 PROCEDURE — 97161 PT EVAL LOW COMPLEX 20 MIN: CPT

## 2024-02-20 PROCEDURE — 97110 THERAPEUTIC EXERCISES: CPT

## 2024-02-20 NOTE — PLAN OF CARE
[facility]   Physical Therapy Initial Evaluation      Name: Riana Baker   Clinic Number: [id number]    Therapy Diagnosis:   Encounter Diagnoses   Name Primary?    Chronic left shoulder pain     Closed fracture of proximal end of right humerus with routine healing, unspecified fracture morphology, subsequent encounter         Physician: Sergio Jamison MD    Physician Orders: PT Eval and Treat   Medical Diagnosis from Referral: M25.512,G89.29 (ICD-10-CM) - Chronic left shoulder pain   S42.201D (ICD-10-CM) - Closed fracture of proximal end of right humerus with routine healing, unspecified fracture morphology, subsequent encounter   Evaluation Date: 2/20/2024  Authorization Period Expiration: 4/30/2024  Plan of Care Expiration: 4/19/2024  Progress Note Due: 3/19/2024  Date of Surgery: 8/16/2022  Visit # / Visits authorized: 1/ 1   FOTO: 1/ 3    Precautions: Standard, Diabetes, and HTN, OA, osteopenia      Time In: 8:00 AM  Time Out: 9:00 AM  Total Billable Time: 45 minutes    Subjective     Pt refuses use of translation service despite language barrier    Date of onset: chronic condition    History of current condition - Riana reports: many years of left shoulder pain, continued pain in right shoulder s/p right RTS in August 2022 by Dr. Borja.  Pt says she is able to do light duty chores around the house but is unable to lift anything heavy anymore. Pt says right shoulder hurts when it is cold and rainy outside but otherwise feels ok.  Left shoulder hurts when she raises it over her head (demonstrates in internal rotation position)    Falls: not since 2022    Imaging: x-ray:   TECHNIQUE:  Three views of the right shoulder were obtained, with AP, lateral, and axillary projections submitted.     COMPARISON:  Comparison is made to 08/17/2023.     FINDINGS:  Postoperative changes are again identified relating to a prior right reverse shoulder arthroplasty procedure.   Prostheses appear unremarkable.  No evidence of recent or healing fracture, lytic destructive process, development of abnormal periprosthetic lucency, or other significant detrimental interval change since the prior examination of 08/17/2023 is appreciated.     Prior Therapy: yes  Social History:  lives with their spouse  Occupation: retired  Prior Level of Function: independent  Current Level of Function: independent    Pain:  Current 0/10, worst 6/10, best 0/10   Location: bilateral shoulder    Description: Aching  Aggravating Factors: Lifting  Easing Factors: rest    Patients goals: reduce pain     Medical History:   Past Medical History:   Diagnosis Date    Abdominal pain, right upper quadrant 02/04/2014    Anticoagulant long-term use     Anxiety     AR (allergic rhinitis)     Atrophic gastritis without mention of hemorrhage 02/13/2012     Dx updated per 2019 IMO Load    Chronic fatigue syndrome 06/10/2012    Diabetes mellitus     Dizziness     Fatty liver     GERD (gastroesophageal reflux disease)     Gross hematuria 12/01/2020    HTN (hypertension)     Hyperlipidemia     Leg swelling     Memory loss     Osteopenia     PONV (postoperative nausea and vomiting)     Primary osteoarthritis of right knee 10/06/2020    Primary osteoarthritis of right knee 10/06/2020    Right elbow pain 01/16/2019    S/P total hysterectomy     Screening for colon cancer 01/06/2021    Sleep apnea     Status post total right knee replacement 10/6/2020 10/05/2020       Surgical History:   Riana Kay  has a past surgical history that includes Cholecystectomy; Knee arthroscopy w/ debridement (04/2011); Total abdominal hysterectomy w/ bilateral salpingoophorectomy; Rotator cuff repair; Elbow surgery (Right, 07/16/2015); Elbow surgery; Hysterectomy; Colonoscopy (N/A, 01/17/2018); Elbow Arthroplasty (Right, 01/16/2019); Release of ulnar nerve at cubital tunnel (Right, 01/16/2019); Upper gastrointestinal endoscopy;  Esophagogastroduodenoscopy (N/A, 04/15/2019); Knee Arthroplasty (Right, 10/06/2020); Cystoscopy (N/A, 12/01/2020); Retrograde pyelography (Bilateral, 12/01/2020); Colonoscopy (N/A, 01/06/2021); Total knee arthroplasty (Left, 10/19/2021); Reverse total shoulder arthroplasty (Right, 08/16/2022); Esophagogastroduodenoscopy (N/A, 04/21/2023); and Shoulder surgery.    Medications:   Riana has a current medication list which includes the following prescription(s): acetaminophen, atorvastatin, azelastine, blood sugar diagnostic, bupropion, trulicity, duloxetine, baqsimi, humalog kwikpen insulin, insulin, ketoconazole, lancets, latanoprost, losartan, lubiprostone, meclizine, meloxicam, ondansetron, ondansetron, onetouch delica lancets, onetouch verio flex meter, pantoprazole, pen needle, diabetic, rosuvastatin, and [DISCONTINUED] diclofenac sodium.    Allergies:   Review of patient's allergies indicates:   Allergen Reactions    Iodinated contrast media Swelling and Rash    Percocet [oxycodone-acetaminophen] Itching    Macrobid [nitrofurantoin monohyd/m-cryst] Rash    Metformin Rash    Penicillins Rash     Had ancef in 2020 with no adverse rxn     Promethazine Rash     Had compazine in 2021    Sulfa (sulfonamide antibiotics) Rash    Sulfamethoxazole-trimethoprim Rash        Objective      Posture: poor posture, forward head, rounded shoulders, internal rotation of bilateral shoulders      Passive Range of Motion:   Shoulder Left Right   Flexion 168 143   Abduction 155 130   ER at 0 81 60   ER at 90 93 57   IR 78 74      Active Range of Motion:   Shoulder Left Right   Flexion 143 131   Abduction 141 104   ER at 0 NT 42   ER at 90 84 50   IR 80 70     Upper Extremity Strength   (L) UE (R) upper extremity (in available range)   Shoulder flexion: 4/5 3-/5   Shoulder Abduction: 4/5 3-/5   Shoulder ER 4-/5 3-/5   Shoulder IR 4/5 3-/5   Lower Trap 3/5 3-/5   Middle Trap 3/5 3-/5   Rhomboids 3/5 3-/5         Special Tests:  AC  Joint Left Right   AC Joint Compression Test (--) (--)   Empty Can Test (--) (+)   Drop Arm test (--) (--)   Subscaputlaris Lift Off (--)     Clunk test (--) (--)   O'alessandro's test (--) (--)   Hawkin's Kenndy (--) (+)   Speed's test (--) (--)       Joint Mobility: decreased right GH joint    Sensation: intact to light touch    Flexibility: decreased bilateral upper trap    Intake Outcome Measure for FOTO Shoulder Survey    Therapist reviewed FOTO scores for Riana Kay on 2/20/2024.   FOTO report - see Media section or FOTO account episode details.    Intake Score: 50% functional ability  Projected Score: 60% functional ability         Treatment     Total Treatment time (time-based codes) separate from Evaluation: 15 minutes     Riana received the treatments listed below:      therapeutic exercises to develop strength, ROM, and posture for 15 minutes including:  - no monies without resistance x 10  - flexion wall slide x 10 (bilaterally)  - abduction wall slide x 10 right upper extremity   - scapular retractions x 10      Patient Education and Home Exercises     Education provided:   - reviewed anatomy / physiology related to diagnosis  - role of PT, PT POC and goals  - attendance policy     Written Home Exercises Provided: yes. Exercises were reviewed and Riana was able to demonstrate them prior to the end of the session.  Riana demonstrated good  understanding of the education provided. See EMR under Patient Instructions for exercises provided during therapy sessions.    Assessment     Riana is a 68 y.o. female referred to outpatient Physical Therapy with a medical diagnosis of S42.201D (ICD-10-CM) - Closed fracture of proximal end of right humerus with routine healing, unspecified fracture morphology, subsequent encounter. Patient presents with decreased R shoulder ROM, decreased bilateral shoulder strength, postural deficits and bilateral shoulder pain limiting her ability to participate in  activities of daily living.     Patient prognosis is Good.   Patient will benefit from skilled outpatient Physical Therapy to address the deficits stated above and in the chart below, provide patient /family education, and to maximize patientt's level of independence.     Plan of care discussed with patient: Yes  Patient's spiritual, cultural and educational needs considered and patient is agreeable to the plan of care and goals as stated below:     Anticipated Barriers for therapy: chronicity of impairments    Medical Necessity is demonstrated by the following  History  Co-morbidities and personal factors that may impact the plan of care [] LOW: no personal factors / co-morbidities  [] MODERATE: 1-2 personal factors / co-morbidities  [x] HIGH: 3+ personal factors / co-morbidities    Moderate / High Support Documentation:   Co-morbidities affecting plan of care: DM, OA, HTN    Personal Factors:   coping style     Examination  Body Structures and Functions, activity limitations and participation restrictions that may impact the plan of care [x] LOW: addressing 1-2 elements  [] MODERATE: 3+ elements  [] HIGH: 4+ elements (please support below)    Moderate / High Support Documentation: Based on PMHX, co morbidities , data from assessments and functional level of assistance required with task and clinical presentation directly impacting function.        Clinical Presentation [x] LOW: stable  [] MODERATE: Evolving  [] HIGH: Unstable     Decision Making/ Complexity Score: low       Goals:  Short Term Goals: 4 weeks   1. Patient will be compliant with home exercise program to assist therapy in restoring pain free motion of the shoulder.   2. Patient will improve impaired shoulder manual muscle tests 1/2 grade bilaterally to improve strength for functional tasks.  3. Patient will improve right shoulder flexion >/= 20 degrees to improve functional mobility of upper extremities.  4. Patient will improve right shoulder  abduction >/= 20 degrees to improve functional mobility of upper extremities.   5. Pt will demonstrate improved posture and postural awareness with minimal cues.     Long Term Goals: 8 weeks   1. Patient will improve FOTO score to </= 40% limitation to demonstrate improvements in carrying, moving, and handling objects  2. Patient will improve impaired shoulder manual muscle tests in bilateral shoulders to +/< 4+/5 to improve strength for household duties.  3. Patient will improve right shoulder flexion to >/= 160 degrees to improve functional mobility of upper extremities.   4. Patient will improve right shoulder abduction to >/= 160 degrees to improve functional mobility of upper extremities.  5. Patient will increased right shoulder external rotation to at least 75 degrees to allow for improved tolerance of ADLs.   6. Patient will complete household tasks without increase in shoulder pain more than 2/10 on VAS.       Plan     Plan of care Certification: 2/20/2024 to 4/19/2024.    Outpatient Physical Therapy 2 times weekly for 8 weeks to include the following interventions: Aquatic Therapy, Electrical Stimulation PRN, Gait Training, Manual Therapy, Moist Heat/ Ice, Neuromuscular Re-ed, Patient Education, Self Care, Therapeutic Activities, Therapeutic Exercise, and functional dry needling PRN .     Selma Schilling, MARRY        Physician's Signature: _________________________________________ Date: ________________

## 2024-02-27 RX ORDER — MELOXICAM 15 MG/1
15 TABLET ORAL
Qty: 30 TABLET | Refills: 2 | Status: SHIPPED | OUTPATIENT
Start: 2024-02-27 | End: 2024-06-11

## 2024-03-05 ENCOUNTER — OFFICE VISIT (OUTPATIENT)
Dept: GASTROENTEROLOGY | Facility: CLINIC | Age: 69
End: 2024-03-05
Payer: MEDICARE

## 2024-03-05 VITALS — BODY MASS INDEX: 29.69 KG/M2 | WEIGHT: 167.56 LBS | HEIGHT: 63 IN

## 2024-03-05 DIAGNOSIS — R11.0 NAUSEA: ICD-10-CM

## 2024-03-05 DIAGNOSIS — K58.1 IRRITABLE BOWEL SYNDROME WITH CONSTIPATION: Primary | ICD-10-CM

## 2024-03-05 PROCEDURE — 99214 OFFICE O/P EST MOD 30 MIN: CPT | Mod: S$GLB,,, | Performed by: INTERNAL MEDICINE

## 2024-03-05 PROCEDURE — 99999 PR PBB SHADOW E&M-EST. PATIENT-LVL IV: CPT | Mod: PBBFAC,,, | Performed by: INTERNAL MEDICINE

## 2024-03-05 NOTE — PROGRESS NOTES
Subjective:       Patient ID: Riana Kay is a 68 y.o. female.    Chief Complaint: Abdominal Pain and Constipation     Patient here today to follow-up with aforementioned complaints.  She was previously seen by me in September with complaints of constipation.  She was started on Amitiza which was subsequently increased to none 24 mcg b.i.d..  Today patient reports    Amitiza not really working. Can sometimes go 3 days between bm.  Never took Linzess previously d/t cost.     Does report ongoing abd fullness. Occ nausea. Previous GES was + however was on ozempic at the time. Now off.     Review of Systems   Gastrointestinal:  Positive for abdominal distention, abdominal pain, constipation and nausea.         Patient is accompanied by her  who corroborates above history.    The following portions of the patient's history were reviewed and updated as appropriate: allergies, current medications, past family history, past medical history, past social history, past surgical history and problem list.    Objective:      Physical Exam  Constitutional:       Appearance: She is well-developed.   HENT:      Head: Normocephalic and atraumatic.   Eyes:      Conjunctiva/sclera: Conjunctivae normal.   Pulmonary:      Effort: Pulmonary effort is normal. No respiratory distress.   Musculoskeletal:      Cervical back: Normal range of motion.   Neurological:      Mental Status: She is alert and oriented to person, place, and time.   Psychiatric:         Behavior: Behavior normal.         Thought Content: Thought content normal.         Judgment: Judgment normal.           Pertinent labs and imaging studies reviewed    Assessment:       1. Irritable bowel syndrome with constipation    2. Nausea        Plan:       Start Linzess   Repeat gastric emptying study as patient is now off Ozempic    (Portions of this note were dictated using voice recognition software and may contain dictation related errors in  spelling/grammar/syntax not found on text review)

## 2024-03-06 ENCOUNTER — DOCUMENTATION ONLY (OUTPATIENT)
Dept: REHABILITATION | Facility: HOSPITAL | Age: 69
End: 2024-03-06
Payer: MEDICARE

## 2024-03-06 NOTE — PROGRESS NOTES
"Patient was evaluated on 2/20/24.  Pt cancelled all follow up visits with "condition improved" as cancellation reason.  Patient given home exercise program. Patient to be discharged at this time.    JONATAN Warner    "

## 2024-03-12 ENCOUNTER — HOSPITAL ENCOUNTER (OUTPATIENT)
Dept: RADIOLOGY | Facility: HOSPITAL | Age: 69
Discharge: HOME | End: 2024-03-12
Attending: INTERNAL MEDICINE
Payer: MEDICARE

## 2024-03-12 DIAGNOSIS — R11.0 NAUSEA: ICD-10-CM

## 2024-03-12 PROCEDURE — A9541 TC99M SULFUR COLLOID: HCPCS | Performed by: INTERNAL MEDICINE

## 2024-03-12 PROCEDURE — 78264 GASTRIC EMPTYING IMG STUDY: CPT | Mod: 26,,, | Performed by: INTERNAL MEDICINE

## 2024-03-12 PROCEDURE — 78264 GASTRIC EMPTYING IMG STUDY: CPT | Mod: TC

## 2024-03-12 RX ORDER — TECHNETIUM TC 99M SULFUR COLLOID 2 MG
1.5 KIT MISCELLANEOUS
Status: COMPLETED | OUTPATIENT
Start: 2024-03-12 | End: 2024-03-12

## 2024-03-12 RX ADMIN — TECHNETIUM TC 99M SULFUR COLLOID 1.5 MILLICURIE: KIT at 07:03

## 2024-03-30 RX ORDER — DULAGLUTIDE 0.75 MG/.5ML
INJECTION, SOLUTION SUBCUTANEOUS
Qty: 4 PEN | Refills: 2 | Status: SHIPPED | OUTPATIENT
Start: 2024-03-30 | End: 2024-05-20

## 2024-04-03 ENCOUNTER — LAB VISIT (OUTPATIENT)
Dept: LAB | Facility: HOSPITAL | Age: 69
End: 2024-04-03
Payer: MEDICARE

## 2024-04-03 ENCOUNTER — PATIENT MESSAGE (OUTPATIENT)
Dept: INTERNAL MEDICINE | Facility: CLINIC | Age: 69
End: 2024-04-03
Payer: MEDICARE

## 2024-04-03 DIAGNOSIS — Z79.4 TYPE 2 DIABETES MELLITUS WITHOUT COMPLICATION, WITH LONG-TERM CURRENT USE OF INSULIN: ICD-10-CM

## 2024-04-03 DIAGNOSIS — E11.9 TYPE 2 DIABETES MELLITUS WITHOUT COMPLICATION, WITH LONG-TERM CURRENT USE OF INSULIN: ICD-10-CM

## 2024-04-03 LAB
ESTIMATED AVG GLUCOSE: 166 MG/DL (ref 68–131)
HBA1C MFR BLD: 7.4 % (ref 4–5.6)

## 2024-04-03 PROCEDURE — 36415 COLL VENOUS BLD VENIPUNCTURE: CPT | Mod: PO | Performed by: NURSE PRACTITIONER

## 2024-04-03 PROCEDURE — 83036 HEMOGLOBIN GLYCOSYLATED A1C: CPT | Performed by: NURSE PRACTITIONER

## 2024-04-05 NOTE — PROGRESS NOTES
CC: This 69 y.o.  female presents for management of type 2 dm along with the current chronic medical conditions including:  Patient Active Problem List   Diagnosis    Anxiety    Fatty liver    Memory changes    Pathological fracture of right humerus due to osteoporosis with routine healing    Gastroesophageal reflux disease without esophagitis    Hypertension associated with type 2 diabetes mellitus    ZAHIRA (obstructive sleep apnea)    Atherosclerosis of native coronary artery of native heart without angina pectoris    Muscle spasms of neck    Radiculopathy of cervical spine    Overweight (BMI 25.0-29.9)    Type 2 diabetes mellitus without complication, with long-term current use of insulin    Decreased strength of lower extremity    Vitamin D deficiency    Chronic right shoulder pain    Primary osteoarthritis of left knee    Aortic atherosclerosis    Closed fracture of proximal end of right humerus with routine healing s/p total shoulder replacement on 8/16/2022    Anterior dislocation of right shoulder    Carotid arterial disease on ct dated  8/14/2022    Hiatal hernia    Gastroparesis due to DM    Chronic constipation    Impaired functional mobility, balance, gait, and endurance      Status of these conditions is pending review.    H/o fatty liver, HTN, overweight, osteopenia, coronary atherosclerosis, gout, arthritic pain (L) knee, ankle     HPI: Diagnosed with T2DM x > 20 years ago.   Started insulin in 2006.   Seen by Dr. Morse in the past- c peptide- revealed low level -hair   Watch trends with hair in near future.   Last seen by me in fall 2023  Accompanied by .  Memory loss issues at times.  F/uy with GI, had issues with ozempic, 2023.   Doing well with trulicilty over the past 4 months.   Not as active.   Podiatry- 11/8/2023    Dietary habits:  Cup of coffee-milk, splenda, toast (1) or  Sometimes cereal frosted or corn flakes/milk  Soup, veggies, chicken-fried or stir vivar   Dinner: skips  at times   Occ snacks-chips, cookies, crackers/coffee, salad   Fruits  Drinks: water, occ oj, unsweetened tea    Not interested in cgm   Remains on MDI  In past on victoza  and januvia- edema    Highest 200s (low)  Lowest mostly > 70 mg/dl  Eats dinner aroud 5-530p  Fastin mg/dl  A1c improved 8.3% to 7.4%  Lab Results   Component Value Date    HGBA1C 7.4 (H) 2024     On MDI injections 4x a day   Testing 4 x a day  Patient is willing and able to use the device  Demonstrated an understanding of the technology and is motivated to use CGM  Patient expected to adhere to a comprehensive diabetes treatment plan and patient has adequate medical supervision  Patient experiences multiple impaired awareness of hypoglycemia (hypoglycemia unawareness)    True metrix  Has h/o hypoglycemia 39 mg/dl    (R)  Knee replacement sx 10/6/2020  Injury to right shoulder, surgery in 2022     Of note, , retired, fall in , injured (R) elbow  Off metformin related to kidneys.    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   HgA1C Testing and Control   Lab Results   Component Value Date    HGBA1C 7.4 (H) 2024      Annually/Less than 8% Yes   Lipid profile : 2023 Annually Yes   LDL control Lab Results   Component Value Date    LDLCALC 49.2 (L) 2023    Annually/Less than 100 mg/dl  Yes   Nephropathy screening Lab Results   Component Value Date    LABMICR 24.0 2023     Lab Results   Component Value Date    PROTEINUA Trace (A)  07/27/2023    Annually Yes   Blood pressure BP Readings from Last 1 Encounters:   04/09/24 (!) 146/82    Less than 140/90 Yes   Dilated retinal exam : 06/10/2022 Annually Yes   Foot exam   : 11/08/2023 Annually No       ROS:   GEN: +chronic fatigue or weakness, no changes w/ appetite, wt stable  CV:  Denies chest pain, palpitations, edema or cyanosis, denies syncope  SKIN: Skin is intact and heals well, no rashes, pruritis, easy bruising, no hair changes, no intolerance to heat/cold.  RESP: No SOB, cough, FISCHER  FEN/GI: Nml bowel movements, normal appetite, +GERD, +n/v  RENAL: No urinary complaints, no dysuria/hematuia/oliguria   ENDO: no heat/cold intolerance  ID: No skin breakdown, fevers, chills  PSYCH: Denies drug/ETOH abuse, no hx. of eating disorders or depression +anxiety  MS/NEURO: Denies numbness/ tingling in BLE; +bilateral knee and ankle joint pain-(R) knee replacement 10/2020, (R) elbow pain -fall in 2015, surgery, (R) shoulder 8/2022 -weakness       Lab Results   Component Value Date    TSH 1.312 07/27/2023       Chemistry        Component Value Date/Time     07/27/2023 0940    K 4.7 07/27/2023 0940     07/27/2023 0940    CO2 27 07/27/2023 0940    BUN 14 07/27/2023 0940    CREATININE 0.9 07/27/2023 0940     (H) 07/27/2023 0940        Component Value Date/Time    CALCIUM 9.4 07/27/2023 0940    ALKPHOS 103 07/27/2023 0940    AST 18 07/27/2023 0940    ALT 20 07/27/2023 0940    BILITOT 0.5 07/27/2023 0940          Lab Results   Component Value Date    LDLCALC 49.2 (L) 07/27/2023       PE:  GEN: Patient WNWD, WF, NAD, AAOx3, Friendly, talkative, well groomed  HEENT: EOMI, PERRLA, no lid lag, Clear oropharynx  NECK: trachea midline  CV: Regular rate and rhythm, no edema  RESP: No increase work of breathing, No cough, wheeze.  ABD: no tenderness EXT: No C/C/Edema, No skin rash/breakdown  NEURO: CN 2-12 intact, nml gait   FEET: No pressure areas, blisters, ulcers, calluses. Footware  appropriate.      ASSESSMENT and PLAN:  Riana was seen today for diabetes mellitus.    Diagnoses and associated orders for this visit:  1. Type 2 diabetes mellitus without complication, with long-term current use of insulin  Hemoglobin A1C      2. Fatty liver        3. Gastroparesis due to DM        4. Hypertension associated with type 2 diabetes mellitus        5. Atherosclerosis of native coronary artery of native heart without angina pectoris        6. Impaired functional mobility, balance, gait, and endurance        7. ZAHIRA (obstructive sleep apnea)          1-7. Follow up in 4 months   A1c prior -4 months   A1c goal less than 7.5%  Discussed dietary habits, portions   At goal-A1c    Encourage more exercise 3-5 x  a week  F/u with gi prn   Bp elevated during viist   F/u with cards prn   Doing better w/ mobility   Cpap-consistent  Hold off on increasing trulicity   Will work on activity level first

## 2024-04-09 ENCOUNTER — OFFICE VISIT (OUTPATIENT)
Dept: INTERNAL MEDICINE | Facility: CLINIC | Age: 69
End: 2024-04-09
Payer: MEDICARE

## 2024-04-09 VITALS
DIASTOLIC BLOOD PRESSURE: 74 MMHG | WEIGHT: 171.75 LBS | SYSTOLIC BLOOD PRESSURE: 138 MMHG | HEART RATE: 78 BPM | HEIGHT: 63 IN | OXYGEN SATURATION: 97 % | BODY MASS INDEX: 30.43 KG/M2

## 2024-04-09 DIAGNOSIS — K31.84 GASTROPARESIS DUE TO DM: ICD-10-CM

## 2024-04-09 DIAGNOSIS — I25.10 ATHEROSCLEROSIS OF NATIVE CORONARY ARTERY OF NATIVE HEART WITHOUT ANGINA PECTORIS: ICD-10-CM

## 2024-04-09 DIAGNOSIS — E11.59 HYPERTENSION ASSOCIATED WITH TYPE 2 DIABETES MELLITUS: ICD-10-CM

## 2024-04-09 DIAGNOSIS — Z74.09 IMPAIRED FUNCTIONAL MOBILITY, BALANCE, GAIT, AND ENDURANCE: ICD-10-CM

## 2024-04-09 DIAGNOSIS — I15.2 HYPERTENSION ASSOCIATED WITH TYPE 2 DIABETES MELLITUS: ICD-10-CM

## 2024-04-09 DIAGNOSIS — G47.33 OSA (OBSTRUCTIVE SLEEP APNEA): ICD-10-CM

## 2024-04-09 DIAGNOSIS — Z79.4 TYPE 2 DIABETES MELLITUS WITHOUT COMPLICATION, WITH LONG-TERM CURRENT USE OF INSULIN: Primary | ICD-10-CM

## 2024-04-09 DIAGNOSIS — E11.43 GASTROPARESIS DUE TO DM: ICD-10-CM

## 2024-04-09 DIAGNOSIS — E11.9 TYPE 2 DIABETES MELLITUS WITHOUT COMPLICATION, WITH LONG-TERM CURRENT USE OF INSULIN: Primary | ICD-10-CM

## 2024-04-09 DIAGNOSIS — K76.0 FATTY LIVER: ICD-10-CM

## 2024-04-09 PROCEDURE — 1159F MED LIST DOCD IN RCRD: CPT | Mod: CPTII,S$GLB,, | Performed by: NURSE PRACTITIONER

## 2024-04-09 PROCEDURE — 1157F ADVNC CARE PLAN IN RCRD: CPT | Mod: CPTII,S$GLB,, | Performed by: NURSE PRACTITIONER

## 2024-04-09 PROCEDURE — 1160F RVW MEDS BY RX/DR IN RCRD: CPT | Mod: CPTII,S$GLB,, | Performed by: NURSE PRACTITIONER

## 2024-04-09 PROCEDURE — 3288F FALL RISK ASSESSMENT DOCD: CPT | Mod: CPTII,S$GLB,, | Performed by: NURSE PRACTITIONER

## 2024-04-09 PROCEDURE — 4010F ACE/ARB THERAPY RXD/TAKEN: CPT | Mod: CPTII,S$GLB,, | Performed by: NURSE PRACTITIONER

## 2024-04-09 PROCEDURE — 99999 PR PBB SHADOW E&M-EST. PATIENT-LVL V: CPT | Mod: PBBFAC,,, | Performed by: NURSE PRACTITIONER

## 2024-04-09 PROCEDURE — 3075F SYST BP GE 130 - 139MM HG: CPT | Mod: CPTII,S$GLB,, | Performed by: NURSE PRACTITIONER

## 2024-04-09 PROCEDURE — 3051F HG A1C>EQUAL 7.0%<8.0%: CPT | Mod: CPTII,S$GLB,, | Performed by: NURSE PRACTITIONER

## 2024-04-09 PROCEDURE — 1126F AMNT PAIN NOTED NONE PRSNT: CPT | Mod: CPTII,S$GLB,, | Performed by: NURSE PRACTITIONER

## 2024-04-09 PROCEDURE — 99214 OFFICE O/P EST MOD 30 MIN: CPT | Mod: S$GLB,,, | Performed by: NURSE PRACTITIONER

## 2024-04-09 PROCEDURE — 3008F BODY MASS INDEX DOCD: CPT | Mod: CPTII,S$GLB,, | Performed by: NURSE PRACTITIONER

## 2024-04-09 PROCEDURE — 3078F DIAST BP <80 MM HG: CPT | Mod: CPTII,S$GLB,, | Performed by: NURSE PRACTITIONER

## 2024-04-09 PROCEDURE — 1101F PT FALLS ASSESS-DOCD LE1/YR: CPT | Mod: CPTII,S$GLB,, | Performed by: NURSE PRACTITIONER

## 2024-04-09 NOTE — PATIENT INSTRUCTIONS
Follow up in 4 months w/Irielle   A1c prior -4 months     Lab Results   Component Value Date    HGBA1C 7.4 (H) 04/03/2024     Goal less than 7.5%    Lantus 36 units at night   Humalog 12-12-12 units before meals plus scale 180-230+2, etc.  Continue  trulicity 0.75 mg weekly   Consider higher dose trulicity or mounjaro in the near future    [email or web address]  Eat fit brett  Myfitnesspal brett  [email or web address]    mySugr brett     Goal  no higher than 180

## 2024-04-18 ENCOUNTER — OFFICE VISIT (OUTPATIENT)
Dept: PODIATRY | Facility: CLINIC | Age: 69
End: 2024-04-18
Payer: MEDICARE

## 2024-04-18 VITALS
SYSTOLIC BLOOD PRESSURE: 154 MMHG | RESPIRATION RATE: 18 BRPM | BODY MASS INDEX: 30.08 KG/M2 | WEIGHT: 169.75 LBS | DIASTOLIC BLOOD PRESSURE: 73 MMHG | HEART RATE: 67 BPM | HEIGHT: 63 IN

## 2024-04-18 DIAGNOSIS — M25.579 ANKLE PAIN, UNSPECIFIED CHRONICITY, UNSPECIFIED LATERALITY: Primary | ICD-10-CM

## 2024-04-18 DIAGNOSIS — Z79.4 TYPE 2 DIABETES MELLITUS WITH OTHER SPECIFIED COMPLICATION, WITH LONG-TERM CURRENT USE OF INSULIN: ICD-10-CM

## 2024-04-18 DIAGNOSIS — E11.69 TYPE 2 DIABETES MELLITUS WITH OTHER SPECIFIED COMPLICATION, WITH LONG-TERM CURRENT USE OF INSULIN: ICD-10-CM

## 2024-04-18 PROCEDURE — 4010F ACE/ARB THERAPY RXD/TAKEN: CPT | Mod: CPTII,S$GLB,, | Performed by: PODIATRIST

## 2024-04-18 PROCEDURE — 3008F BODY MASS INDEX DOCD: CPT | Mod: CPTII,S$GLB,, | Performed by: PODIATRIST

## 2024-04-18 PROCEDURE — 3051F HG A1C>EQUAL 7.0%<8.0%: CPT | Mod: CPTII,S$GLB,, | Performed by: PODIATRIST

## 2024-04-18 PROCEDURE — 3078F DIAST BP <80 MM HG: CPT | Mod: CPTII,S$GLB,, | Performed by: PODIATRIST

## 2024-04-18 PROCEDURE — 1125F AMNT PAIN NOTED PAIN PRSNT: CPT | Mod: CPTII,S$GLB,, | Performed by: PODIATRIST

## 2024-04-18 PROCEDURE — 1157F ADVNC CARE PLAN IN RCRD: CPT | Mod: CPTII,S$GLB,, | Performed by: PODIATRIST

## 2024-04-18 PROCEDURE — 99999 PR PBB SHADOW E&M-EST. PATIENT-LVL IV: CPT | Mod: PBBFAC,,, | Performed by: PODIATRIST

## 2024-04-18 PROCEDURE — 3077F SYST BP >= 140 MM HG: CPT | Mod: CPTII,S$GLB,, | Performed by: PODIATRIST

## 2024-04-18 PROCEDURE — 99213 OFFICE O/P EST LOW 20 MIN: CPT | Mod: S$GLB,,, | Performed by: PODIATRIST

## 2024-04-18 RX ORDER — AMOXICILLIN 500 MG/1
1000 CAPSULE ORAL 2 TIMES DAILY
COMMUNITY
Start: 2024-04-16

## 2024-04-30 DIAGNOSIS — Z00.00 ENCOUNTER FOR MEDICARE ANNUAL WELLNESS EXAM: ICD-10-CM

## 2024-05-15 ENCOUNTER — OFFICE VISIT (OUTPATIENT)
Dept: INTERNAL MEDICINE | Facility: CLINIC | Age: 69
End: 2024-05-15
Payer: MEDICARE

## 2024-05-15 VITALS
OXYGEN SATURATION: 97 % | HEIGHT: 65 IN | WEIGHT: 168 LBS | SYSTOLIC BLOOD PRESSURE: 128 MMHG | TEMPERATURE: 98 F | RESPIRATION RATE: 18 BRPM | HEART RATE: 79 BPM | DIASTOLIC BLOOD PRESSURE: 62 MMHG | BODY MASS INDEX: 27.99 KG/M2

## 2024-05-15 DIAGNOSIS — J02.0 STREP PHARYNGITIS: Primary | ICD-10-CM

## 2024-05-15 PROCEDURE — 3074F SYST BP LT 130 MM HG: CPT | Mod: CPTII,S$GLB,, | Performed by: FAMILY MEDICINE

## 2024-05-15 PROCEDURE — 3008F BODY MASS INDEX DOCD: CPT | Mod: CPTII,S$GLB,, | Performed by: FAMILY MEDICINE

## 2024-05-15 PROCEDURE — 99214 OFFICE O/P EST MOD 30 MIN: CPT | Mod: S$GLB,,, | Performed by: FAMILY MEDICINE

## 2024-05-15 PROCEDURE — 3051F HG A1C>EQUAL 7.0%<8.0%: CPT | Mod: CPTII,S$GLB,, | Performed by: FAMILY MEDICINE

## 2024-05-15 PROCEDURE — 1159F MED LIST DOCD IN RCRD: CPT | Mod: CPTII,S$GLB,, | Performed by: FAMILY MEDICINE

## 2024-05-15 PROCEDURE — 3078F DIAST BP <80 MM HG: CPT | Mod: CPTII,S$GLB,, | Performed by: FAMILY MEDICINE

## 2024-05-15 PROCEDURE — 1160F RVW MEDS BY RX/DR IN RCRD: CPT | Mod: CPTII,S$GLB,, | Performed by: FAMILY MEDICINE

## 2024-05-15 PROCEDURE — 99999 PR PBB SHADOW E&M-EST. PATIENT-LVL V: CPT | Mod: PBBFAC,,, | Performed by: FAMILY MEDICINE

## 2024-05-15 PROCEDURE — 3288F FALL RISK ASSESSMENT DOCD: CPT | Mod: CPTII,S$GLB,, | Performed by: FAMILY MEDICINE

## 2024-05-15 PROCEDURE — 1157F ADVNC CARE PLAN IN RCRD: CPT | Mod: CPTII,S$GLB,, | Performed by: FAMILY MEDICINE

## 2024-05-15 PROCEDURE — 4010F ACE/ARB THERAPY RXD/TAKEN: CPT | Mod: CPTII,S$GLB,, | Performed by: FAMILY MEDICINE

## 2024-05-15 PROCEDURE — 1101F PT FALLS ASSESS-DOCD LE1/YR: CPT | Mod: CPTII,S$GLB,, | Performed by: FAMILY MEDICINE

## 2024-05-15 PROCEDURE — 1125F AMNT PAIN NOTED PAIN PRSNT: CPT | Mod: CPTII,S$GLB,, | Performed by: FAMILY MEDICINE

## 2024-05-15 RX ORDER — BENZONATATE 200 MG/1
200 CAPSULE ORAL 3 TIMES DAILY PRN
Qty: 30 CAPSULE | Refills: 0 | Status: SHIPPED | OUTPATIENT
Start: 2024-05-15 | End: 2024-05-25

## 2024-05-15 RX ORDER — AZITHROMYCIN 250 MG/1
TABLET, FILM COATED ORAL
Qty: 6 TABLET | Refills: 0 | Status: SHIPPED | OUTPATIENT
Start: 2024-05-15

## 2024-05-15 NOTE — PROGRESS NOTES
Subjective     Patient ID: Riana Kay is a 69 y.o. female.    Chief Complaint: Sore Throat, Headache, Dizziness, and Cough  69-year-old female presents to clinic today accompanied by her  secondary to a complaint of subjective fever and chills, nasal congestion, ear pain, hoarseness, sinus pressure, sore throat, pain with swallowing, headaches, and dry cough since Sunday.  They were in [place] this past weekend visiting their granddaughter but the granddaughter was also sick with similar symptoms.  She has been using Cepacol lozenges with mild relief.  Sore Throat   This is a new problem. The current episode started yesterday. The problem has been gradually worsening. The pain is worse on the right side. There has been no fever. The fever has been present for Less than 1 day. The pain is at a severity of 8/10. The pain is moderate. Associated symptoms include abdominal pain, congestion, coughing, ear pain, headaches, a hoarse voice and trouble swallowing. Pertinent negatives include no diarrhea, drooling, ear discharge, plugged ear sensation, neck pain, shortness of breath, stridor, swollen glands or vomiting. She has tried nothing for the symptoms. The treatment provided no relief.     Review of Systems   Constitutional:  Positive for chills and fever. Negative for appetite change and fatigue.   HENT:  Positive for nasal congestion, ear pain, hoarse voice, sinus pressure/congestion, sore throat and trouble swallowing. Negative for drooling, ear discharge, hearing loss, postnasal drip, rhinorrhea and tinnitus.    Eyes:  Negative for redness, itching and visual disturbance.   Respiratory:  Positive for cough. Negative for chest tightness, shortness of breath and stridor.    Cardiovascular:  Negative for chest pain and palpitations.   Gastrointestinal:  Positive for abdominal pain. Negative for constipation, diarrhea, nausea and vomiting.   Genitourinary:  Negative for decreased urine volume,  difficulty urinating, dysuria, frequency, hematuria and urgency.   Musculoskeletal:  Negative for back pain, myalgias, neck pain and neck stiffness.   Integumentary:  Negative for rash.   Neurological:  Positive for dizziness and headaches. Negative for light-headedness.   Psychiatric/Behavioral: Negative.            Objective     Physical Exam  Vitals and nursing note reviewed.   Constitutional:       General: She is not in acute distress.     Appearance: She is well-developed. She is not diaphoretic.   HENT:      Head: Normocephalic and atraumatic.      Right Ear: External ear normal.      Left Ear: External ear normal. A middle ear effusion is present. Tympanic membrane is bulging.      Nose: Nose normal.      Mouth/Throat:      Pharynx: Oropharyngeal exudate and posterior oropharyngeal erythema present.   Eyes:      General: No scleral icterus.        Right eye: No discharge.         Left eye: No discharge.      Conjunctiva/sclera: Conjunctivae normal.      Pupils: Pupils are equal, round, and reactive to light.   Neck:      Thyroid: No thyromegaly.      Vascular: No JVD.      Trachea: No tracheal deviation.   Cardiovascular:      Rate and Rhythm: Normal rate and regular rhythm.      Heart sounds: Normal heart sounds. No murmur heard.     No friction rub. No gallop.   Pulmonary:      Effort: Pulmonary effort is normal. No respiratory distress.      Breath sounds: Normal breath sounds. No stridor. No wheezing or rales.   Abdominal:      General: Bowel sounds are normal. There is no distension.      Palpations: Abdomen is soft. There is no mass.      Tenderness: There is no abdominal tenderness. There is no guarding or rebound.   Musculoskeletal:         General: No tenderness. Normal range of motion.      Cervical back: Normal range of motion and neck supple.   Lymphadenopathy:      Cervical: Cervical adenopathy present.      Right cervical: Superficial cervical adenopathy present.      Left cervical: Superficial  cervical adenopathy present.   Skin:     General: Skin is warm and dry.      Coloration: Skin is not pale.      Findings: No erythema or rash.   Neurological:      Mental Status: She is alert and oriented to person, place, and time.   Psychiatric:         Behavior: Behavior normal.         Thought Content: Thought content normal.         Judgment: Judgment normal.            Assessment and Plan     1. Strep pharyngitis  -     azithromycin (ZITHROMAX Z-MALICK) 250 MG tablet; Take 2 tablets on day 1, then 1 tablet on days 2-5.  Dispense: 6 tablet; Refill: 0  -     benzonatate (TESSALON) 200 MG capsule; Take 1 capsule (200 mg total) by mouth 3 (three) times daily as needed for Cough.  Dispense: 30 capsule; Refill: 0        Strep pharyngitis  -     azithromycin (ZITHROMAX Z-MALICK) 250 MG tablet; Take 2 tablets on day 1, then 1 tablet on days 2-5.  Dispense: 6 tablet; Refill: 0  -     benzonatate (TESSALON) 200 MG capsule; Take 1 capsule (200 mg total) by mouth 3 (three) times daily as needed for Cough.  Dispense: 30 capsule; Refill: 0   Flonase nasal spray 2 sprays per nostril daily.     Over-the-counter Claritin nightly.     Tylenol and ibuprofen as needed for fever or pain.     Saltwater or Listerine gargle as needed for sore throat.    Return to clinic as needed if symptoms persist or worsen.                 Follow up if symptoms worsen or fail to improve.

## 2024-05-15 NOTE — PATIENT INSTRUCTIONS
Tylenol 1000 mg every 8 hours as needed for fever or pain.  Ibuprofen 400 mg every 6 hours as needed for fever or pain.  Flonase nasal spray 2 sprays in each nostril daily.  Claritin nightly.

## 2024-05-19 ENCOUNTER — PATIENT MESSAGE (OUTPATIENT)
Dept: INTERNAL MEDICINE | Facility: CLINIC | Age: 69
End: 2024-05-19
Payer: MEDICARE

## 2024-05-19 DIAGNOSIS — R41.89 COGNITIVE IMPAIRMENT: Primary | ICD-10-CM

## 2024-05-20 RX ORDER — TIRZEPATIDE 2.5 MG/.5ML
2.5 INJECTION, SOLUTION SUBCUTANEOUS
Qty: 4 PEN | Refills: 5 | Status: SHIPPED | OUTPATIENT
Start: 2024-05-20 | End: 2024-05-21

## 2024-05-20 NOTE — TELEPHONE ENCOUNTER
I have referred the patient to neurology for further memory evaluation.  Please schedule and inform the patient.  Thank you.

## 2024-05-21 ENCOUNTER — PATIENT MESSAGE (OUTPATIENT)
Dept: INTERNAL MEDICINE | Facility: CLINIC | Age: 69
End: 2024-05-21
Payer: MEDICARE

## 2024-05-21 RX ORDER — ORAL SEMAGLUTIDE 3 MG/1
TABLET ORAL
Qty: 30 TABLET | Refills: 6 | Status: SHIPPED | OUTPATIENT
Start: 2024-05-21

## 2024-05-26 DIAGNOSIS — I25.10 ATHEROSCLEROTIC HEART DISEASE OF NATIVE CORONARY ARTERY WITHOUT ANGINA PECTORIS: ICD-10-CM

## 2024-05-26 RX ORDER — ROSUVASTATIN CALCIUM 10 MG/1
10 TABLET, COATED ORAL
Qty: 90 TABLET | Refills: 0 | Status: SHIPPED | OUTPATIENT
Start: 2024-05-26

## 2024-05-26 NOTE — TELEPHONE ENCOUNTER
Care Due:                  Date            Visit Type   Department     Provider  --------------------------------------------------------------------------------                                MYCHART                              FOLLOWUP/OF  [facility] INTERNAL  Last Visit: 05-      FICE VISIT   MEDICINE       Jose Rojas  Next Visit: None Scheduled  None         None Found                                                            Last  Test          Frequency    Reason                     Performed    Due Date  --------------------------------------------------------------------------------    CMP.........  12 months..  DULoxetine, atorvastatin,   07- 07-                             losartan.................    Lipid Panel.  12 months..  atorvastatin.............  07- 07-    [employer] Embedded Care Due Messages. Reference number: [id number].   5/26/2024 12:37:55 AM CDT

## 2024-05-26 NOTE — TELEPHONE ENCOUNTER
Refill Routing Note   Medication(s) are not appropriate for processing by [facility] for the following reason(s):        No active prescription written by provider    [facility] action(s):  Defer   Requires labs : Yes             Appointments  past 12m or future 3m with PCP    Date Provider   Last Visit   5/15/2024 Jose Rojas MD   Next Visit   Visit date not found Jose Rojas MD   ED visits in past 90 days: 0        Note composed:10:18 AM 05/26/2024                           For information on Fall & Injury Prevention, visit: [email or web address] OR  [email or web address] OR  https://www.cdc.gov/steadi/patient.html

## 2024-06-11 RX ORDER — MELOXICAM 15 MG/1
15 TABLET ORAL
Qty: 30 TABLET | Refills: 2 | Status: SHIPPED | OUTPATIENT
Start: 2024-06-11

## 2024-06-12 NOTE — PROGRESS NOTES
Subjective:     Patient ID: Riana Kay is a 69 y.o. female.    Chief Complaint: Ankle Pain (Left ankle pain and swelling )    Riana is a 69 y.o. female who presents to the clinic upon referral from Dr. Lucretia boyce. provider found  for evaluation and treatment of diabetic feet. Riana has a past medical history of Abdominal pain, right upper quadrant (02/04/2014), Anticoagulant long-term use, Anxiety, AR (allergic rhinitis), Atrophic gastritis without mention of hemorrhage (02/13/2012), Chronic fatigue syndrome (06/10/2012), Diabetes mellitus, Dizziness, Fatty liver, GERD (gastroesophageal reflux disease), Gross hematuria (12/01/2020), HTN (hypertension), Hyperlipidemia, Leg swelling, Memory loss, Osteopenia, PONV (postoperative nausea and vomiting), Primary osteoarthritis of right knee (10/06/2020), Primary osteoarthritis of right knee (10/06/2020), Right elbow pain (01/16/2019), S/P total hysterectomy, Screening for colon cancer (01/06/2021), Sleep apnea, and Status post total right knee replacement 10/6/2020 (10/05/2020). Patient relates no major problem with feet. Only complaints today consist of yearly comprehensive diabetic  foot examination and L ankle pain and swelling .    PCP: Jose Rojas MD    Date Last Seen by PCP:   Chief Complaint   Patient presents with    Ankle Pain     Left ankle pain and swelling          Current shoe gear: Casual shoes    Hemoglobin A1C   Date Value Ref Range Status   04/03/2024 7.4 (H) 4.0 - 5.6 % Final     Comment:     ADA Screening Guidelines:  5.7-6.4%  Consistent with prediabetes  >or=6.5%  Consistent with diabetes    High levels of fetal hemoglobin interfere with the HbA1C  assay. Heterozygous hemoglobin variants (HbS, HgC, etc)do  not significantly interfere with this assay.   However, presence of multiple variants may affect accuracy.     11/28/2023 8.3 (H) 4.0 - 5.6 % Final     Comment:     ADA Screening Guidelines:  5.7-6.4%  Consistent with  "prediabetes  >or=6.5%  Consistent with diabetes    High levels of fetal hemoglobin interfere with the HbA1C  assay. Heterozygous hemoglobin variants (HbS, HgC, etc)do  not significantly interfere with this assay.   However, presence of multiple variants may affect accuracy.     08/16/2023 7.7 (H) 4.0 - 5.6 % Final     Comment:     ADA Screening Guidelines:  5.7-6.4%  Consistent with prediabetes  >or=6.5%  Consistent with diabetes    High levels of fetal hemoglobin interfere with the HbA1C  assay. Heterozygous hemoglobin variants (HbS, HgC, etc)do  not significantly interfere with this assay.   However, presence of multiple variants may affect accuracy.           Review of Systems   Constitutional: Negative for chills, decreased appetite and fever.   Cardiovascular:  Negative for leg swelling.   Skin:  Positive for dry skin.   Musculoskeletal:  Positive for joint pain. Negative for arthritis, joint swelling and myalgias.   Gastrointestinal:  Negative for nausea and vomiting.   Neurological:  Negative for loss of balance, numbness and paresthesias.        Objective:     Vitals:    04/18/24 0920   BP: (!) 154/73   Pulse: 67   Resp: 18   Weight: 77 kg (169 lb 12.1 oz)   Height: 5' 3" (1.6 m)   PainSc:   6   PainLoc: Ankle        Physical Exam  Vitals and nursing note reviewed.   Constitutional:       General: She is not in acute distress.     Appearance: She is well-developed. She is not toxic-appearing or diaphoretic.      Comments: alert and oriented x 3.    Cardiovascular:      Pulses:           Dorsalis pedis pulses are 2+ on the right side and 2+ on the left side.        Posterior tibial pulses are 2+ on the right side and 2+ on the left side.      Comments:  Capillary refill time is within normal limits. Digital hair present.   Pulmonary:      Effort: No respiratory distress.   Musculoskeletal:         General: Tenderness (L ankle) present. No deformity.      Right ankle: No tenderness. No lateral malleolus, " medial malleolus, AITF ligament, CF ligament or posterior TF ligament tenderness.      Right Achilles Tendon: No defects. Graf's test negative.      Left ankle: No tenderness. No lateral malleolus, medial malleolus, AITF ligament, CF ligament or posterior TF ligament tenderness.      Left Achilles Tendon: No defects. Graf's test negative.      Right foot: No tenderness or bony tenderness.      Left foot: No tenderness or bony tenderness.      Comments: Pain upon palpation of lateral left  ankle ligaments. Pain w/ inversion of affected limb     Adequate joint range of motion without pain, limitation, nor crepitation Bilateral feet and ankle joints. Muscle strength is 5/5 in all groups bilaterally.           Feet:      Right foot:      Protective Sensation: 5 sites tested.  5 sites sensed.      Skin integrity: Skin integrity normal.      Left foot:      Protective Sensation: 5 sites tested.  5 sites sensed.      Skin integrity: Skin integrity normal.   Lymphadenopathy:      Comments: No lymphatic streaking     Skin:     General: Skin is warm and dry.      Coloration: Skin is not pale.      Findings: No rash.      Nails: There is no clubbing.      Comments: Skin is of normal turgor.   Normal temperature gradient.  Examination of the skin reveals no evidence of significant rashes, open lesions, suspicious appearing nevi or other concerning lesions.        Toenails 1-5 bilaterally are neatly trimmed; of normal color and thickness   Neurological:      Sensory: No sensory deficit.      Motor: No atrophy.      Comments: Light touch present     Psychiatric:         Attention and Perception: She is attentive.         Mood and Affect: Mood is not anxious. Affect is not inappropriate.         Speech: She is communicative. Speech is not slurred.         Behavior: Behavior is not combative.       Assessment:      Encounter Diagnosis   Name Primary?    Ankle pain, unspecified chronicity, unspecified laterality Yes      Plan:     Riana was seen today for ankle pain.    Diagnoses and all orders for this visit:    Ankle pain, unspecified chronicity, unspecified laterality  -     ORTHOPEDIC BRACING FOR HOME USE - LOWER EXTREMITY      I counseled the patient on her conditions, their implications and medical management.    Independent review of patients previous clinical notes    Reviewed current medication(s), and lab(s) specific to foot ailment(s) with patient in detail. All questions answered     Patient instructed on adequate icing techniques. Patient should ice the affected area at least once per day x 10 minutes for 10 days . I advised the  patient that extra icing would also be beneficial to ensure adequate anti inflammatory effect     Patient fitted and dispensed Gauntlet style lace up and instructed on use    - Shoe inspection. Diabetic Foot Education. Patient reminded of the importance of good nutrition and blood sugar control to help prevent podiatric complications of diabetes. Patient instructed on proper foot hygeine. We discussed wearing proper shoe gear, daily foot inspections, never walking without protective shoe gear, caution putting sharp instruments to feet     - Discussed DM foot care:  Wear comfortable, proper fitting shoes. Wash feet daily. Dry well. After drying, apply moisturizer to feet (no lotion to webspaces). Inspect feet daily for skin breaks, blisters, swelling, or redness. Wear cotton socks (preferably white)  Change socks every day. Do NOT walk barefoot. Do NOT use heating pads or warm/hot water soaks     - Discussed importance of daily moisturizer to the feet such as Cerave,Sween, Or Cetaphil    - Patient is low risk for developing lower extremity issues secondary to diabetes.     - Reviewed current medication(s), and lab(s) specific to foot ailment(s) with patient in detail. All questions answered     - Independent review of patients previous clinical notes    - I recommend continued yearly  diabetic foot examinations by Myself or PCP    - Currently  patient has NO pedal manifestations of DM    - Patients with out pedal manifestations of DM, do not qualify for Diabetic Shoes/Inserts nor  nail/callus trimming     - RTC in 1 yr or  PRN       .

## 2024-06-13 ENCOUNTER — TELEPHONE (OUTPATIENT)
Dept: INTERNAL MEDICINE | Facility: CLINIC | Age: 69
End: 2024-06-13
Payer: MEDICARE

## 2024-06-13 DIAGNOSIS — R41.89 COGNITIVE IMPAIRMENT: Primary | ICD-10-CM

## 2024-06-13 NOTE — TELEPHONE ENCOUNTER
Neuropsychology referral has been ordered as there has no dedicated geriatric department.  Please inform the patient.  Thank you.

## 2024-06-13 NOTE — TELEPHONE ENCOUNTER
----- Message from Lulu Retana sent at 6/13/2024  3:10 PM CDT -----  Type: General Call Back     Name of Caller:pts  Kemal   Reason for call back?:geriatrics info  Best Call Back Number:[phone]  Additional Information: patient son states he would like to speak with the providers office about geriatrics. Patients  states he is concerned about the patient short term memory. Patients  states he has scheduled an appointment with neurology but is interested in any other ways of help for the patient.  would like a call back with further assistance.

## 2024-06-13 NOTE — TELEPHONE ENCOUNTER
I spoke with the pt who is asking if geriatric referral that was ordered on 5/20/24 can be scheduled . I have sent a message to our scheduling team

## 2024-06-13 NOTE — TELEPHONE ENCOUNTER
"Pts  called to schedule geriatric referral that was ordered 5/20/24 , isi tried scheduled and was informed     " Marilee Spencer said that is no longer a Department that can be scheduled ... another referral needs to be put in (maybe Neuro[facility]) "  "

## 2024-06-14 ENCOUNTER — OFFICE VISIT (OUTPATIENT)
Dept: URGENT CARE | Facility: CLINIC | Age: 69
End: 2024-06-14
Payer: MEDICARE

## 2024-06-14 VITALS
OXYGEN SATURATION: 96 % | SYSTOLIC BLOOD PRESSURE: 130 MMHG | TEMPERATURE: 99 F | HEART RATE: 67 BPM | DIASTOLIC BLOOD PRESSURE: 76 MMHG | HEIGHT: 65 IN | WEIGHT: 167 LBS | RESPIRATION RATE: 18 BRPM | BODY MASS INDEX: 27.82 KG/M2

## 2024-06-14 DIAGNOSIS — L29.9 PRURITUS: ICD-10-CM

## 2024-06-14 DIAGNOSIS — T78.40XA ALLERGIC REACTION TO DRUG, INITIAL ENCOUNTER: Primary | ICD-10-CM

## 2024-06-14 PROCEDURE — 99213 OFFICE O/P EST LOW 20 MIN: CPT | Mod: S$GLB,,, | Performed by: NURSE PRACTITIONER

## 2024-06-14 RX ORDER — HYDROXYZINE HYDROCHLORIDE 25 MG/1
25 TABLET, FILM COATED ORAL 3 TIMES DAILY PRN
Qty: 30 TABLET | Refills: 0 | Status: SHIPPED | OUTPATIENT
Start: 2024-06-14 | End: 2024-06-24

## 2024-06-14 NOTE — PATIENT INSTRUCTIONS
"Discharge instructions for Allergic reaction to drug (Amoxicillin) and itching to skin. Currently no visible rash to skin.  Clinical Reference Discharge Instruction patient education forms printed for pt.  STOP TAKING AMOXICILLIN  Notify dentist of findings post visit.  ER precautions for Shortness of Breath and tongue swelling discusses with pt.    One serious type of allergy is called an "immediate" allergy because it starts quickly after a drug is taken (usually within an hour or so). It usually happens with drugs that a person had taken before without any problem. Symptoms can include:  Hives, which are raised, red patches of skin that are usually very itchy (picture 1)  Itchy skin  Flushing, which is when your skin turns red and feels hot  Swelling of the face, hands, feet, or throat  Throat tightness, hoarse voice, wheezing, or trouble breathing  Nausea, vomiting, belly pain  Feeling lightheaded    1) See orders for this visit as documented in the electronic medical record.  2) Symptomatic therapy suggested: use acetaminophen/ibuprofen every 6-8 hours prn pain or fever, push fluids.   3) Call or return to clinic prn if these symptoms worsen or fail to improve as anticipated.    Discussed results/diagnosis/plan with patient in clinic.  We had shared decision making for patient's treatment. Patient verbalized understanding and in agreement with current treatment plan.     Patient was instructed to return for re-evaluation with urgent care or PCP for continued outpatient workup and management if symptoms do not improve/worsening symptoms. Strict ED versus clinic precautions given in depth.    Discharge and follow-up instructions given verbally/printed with the patient who expressed understanding. The instructions and results are also available on [employer].      - You must understand that you have received an Urgent Care treatment only and that you may be released before all of your medical problems are known or " treated.   - You, the patient, will arrange for follow up care as instructed.   - Follow up with your PCP or specialty clinic as directed in the next 1-2 weeks if not improved or as needed.  You can call [phone] to schedule an appointment with the appropriate provider.   - If your condition worsens or fails to improve we recommend that you receive another evaluation at the ER immediately or contact your PCP to discuss your concerns or return here.        CESAR Dudley

## 2024-06-14 NOTE — PROGRESS NOTES
"Subjective:      Patient ID: Riana Kay is a 69 y.o. female.    Vitals:  height is 5' 5" (1.651 m) and weight is 75.8 kg (167 lb). Her oral temperature is 98.5 °F (36.9 °C). Her blood pressure is 130/76 and her pulse is 67. Her respiration is 18 and oxygen saturation is 96%.     Chief Complaint: Allergic Reaction    She is complaining of taking amoxicillin for dental root canal, but she started the antibiotic yesterday and started itching all over her body. She said her throat and nose is itching this am. Pt reports no shortness of breath, chest pain, and negative for throat closing or tightness, or swollen tongue. Negative signs of skin rash. Skin is clear.  Pt notfited dentist prior to visit today and was advised to be seen in Urgent care. Pt is Taking Amoxicillin 500 mgs  po TID x 7 days, reaction started after taking 2nd pill.    Allergic Reaction  This is a new problem. The current episode started yesterday. The problem is unchanged. The patient was exposed to an antibiotic. Pertinent negatives include no abdominal pain, chest pain, chest pressure, coughing, diarrhea, difficulty breathing, drooling, eye itching, eye redness, eye watering, globus sensation, hyperventilation, itching, stridor, trouble swallowing, vomiting or wheezing. Past treatments include diphenhydramine. The treatment provided mild relief.       HENT:  Negative for drooling and trouble swallowing.         Negative swelling of tongue, throat clear, no sensation of tightness of throat or closure.   Cardiovascular:  Negative for chest pain.   Eyes:  Negative for eye itching and eye redness.   Respiratory:  Negative for cough, stridor and wheezing.    Gastrointestinal:  Negative for abdominal pain, vomiting and diarrhea.   Skin:         Skin clear, negative for rash, positive for itching sensation to skin.      Objective:     Physical Exam   Constitutional: She is oriented to person, place, and time. She appears well-developed. She is " cooperative. awake  HENT:   Head: Normocephalic and atraumatic.   Ears:   Right Ear: Hearing, tympanic membrane, external ear and ear canal normal.   Left Ear: Hearing, tympanic membrane, external ear and ear canal normal.   Nose: Nose normal. No congestion.   Mouth/Throat: Oropharynx is clear and moist. No posterior oropharyngeal erythema.   Negative for swollen tongue, mucous membranes WNL.      Comments: Negative for swollen tongue, mucous membranes WNL.  Eyes: Conjunctivae, EOM and lids are normal. Pupils are equal, round, and reactive to light. Extraocular movement intact   Neck: Trachea normal and phonation normal. Neck supple. No thyromegaly present.   Cardiovascular: Normal rate, regular rhythm, S1 normal, S2 normal, normal heart sounds and normal pulses.   Pulmonary/Chest: Effort normal and breath sounds normal. No respiratory distress. She has no decreased breath sounds. She has no wheezes. She has no rhonchi. She has no rales.   Abdominal: Bowel sounds are normal. Soft. flat abdomen   Musculoskeletal: Normal range of motion.         General: Normal range of motion.      Right lower leg: No edema.      Left lower leg: No edema.   Lymphadenopathy:     She has no cervical adenopathy.   Neurological: no focal deficit. She is alert and oriented to person, place, and time.   Skin: Skin is warm, dry and intact. Rash: Negative for skin rash.Capillary refill takes less than 2 seconds.   Psychiatric: Her speech is normal and behavior is normal. Mood, judgment and thought content normal.   Nursing note and vitals reviewed.      Assessment:     1. Allergic reaction to drug, initial encounter    2. Pruritus        Plan:       Allergic reaction to drug, initial encounter  -     hydrOXYzine HCL (ATARAX) 25 MG tablet; Take 1 tablet (25 mg total) by mouth 3 (three) times daily as needed for Itching.  Dispense: 30 tablet; Refill: 0    Pruritus  -     hydrOXYzine HCL (ATARAX) 25 MG tablet; Take 1 tablet (25 mg total) by  "mouth 3 (three) times daily as needed for Itching.  Dispense: 30 tablet; Refill: 0        Patient Instructions   Discharge instructions for Allergic reaction to drug (Amoxicillin) and itching to skin. Currently no visible rash to skin.  Clinical Reference Discharge Instruction patient education forms printed for pt.  STOP TAKING AMOXICILLIN  Notify dentist of findings post visit.  ER precautions for Shortness of Breath and tongue swelling discusses with pt.    One serious type of allergy is called an "immediate" allergy because it starts quickly after a drug is taken (usually within an hour or so). It usually happens with drugs that a person had taken before without any problem. Symptoms can include:  Hives, which are raised, red patches of skin that are usually very itchy (picture 1)  Itchy skin  Flushing, which is when your skin turns red and feels hot  Swelling of the face, hands, feet, or throat  Throat tightness, hoarse voice, wheezing, or trouble breathing  Nausea, vomiting, belly pain  Feeling lightheaded    1) See orders for this visit as documented in the electronic medical record.  2) Symptomatic therapy suggested: use acetaminophen/ibuprofen every 6-8 hours prn pain or fever, push fluids.   3) Call or return to clinic prn if these symptoms worsen or fail to improve as anticipated.    Discussed results/diagnosis/plan with patient in clinic.  We had shared decision making for patient's treatment. Patient verbalized understanding and in agreement with current treatment plan.     Patient was instructed to return for re-evaluation with urgent care or PCP for continued outpatient workup and management if symptoms do not improve/worsening symptoms. Strict ED versus clinic precautions given in depth.    Discharge and follow-up instructions given verbally/printed with the patient who expressed understanding. The instructions and results are also available on [facility].      - You must understand that you have " received an Urgent Care treatment only and that you may be released before all of your medical problems are known or treated.   - You, the patient, will arrange for follow up care as instructed.   - Follow up with your PCP or specialty clinic as directed in the next 1-2 weeks if not improved or as needed.  You can call [phone] to schedule an appointment with the appropriate provider.   - If your condition worsens or fails to improve we recommend that you receive another evaluation at the ER immediately or contact your PCP to discuss your concerns or return here.        CESAR Dudley

## 2024-06-23 DIAGNOSIS — I15.2 HYPERTENSION ASSOCIATED WITH TYPE 2 DIABETES MELLITUS: ICD-10-CM

## 2024-06-23 DIAGNOSIS — E11.59 HYPERTENSION ASSOCIATED WITH TYPE 2 DIABETES MELLITUS: ICD-10-CM

## 2024-06-23 RX ORDER — LOSARTAN POTASSIUM 50 MG/1
TABLET ORAL
Qty: 90 TABLET | Refills: 0 | Status: SHIPPED | OUTPATIENT
Start: 2024-06-23

## 2024-06-23 NOTE — TELEPHONE ENCOUNTER
Refill Decision Note   Riana Kay  is requesting a refill authorization.  Brief Assessment and Rationale for Refill:  Approve     Medication Therapy Plan:        Comments:     Note composed:10:57 AM 06/23/2024

## 2024-06-23 NOTE — TELEPHONE ENCOUNTER
No care due was identified.  [facility] Embedded Care Due Messages. Reference number: [id number].   6/23/2024 9:10:35 AM CDT

## 2024-06-27 DIAGNOSIS — I25.10 ATHEROSCLEROTIC HEART DISEASE OF NATIVE CORONARY ARTERY WITHOUT ANGINA PECTORIS: ICD-10-CM

## 2024-06-27 DIAGNOSIS — I25.10 ATHEROSCLEROSIS OF NATIVE CORONARY ARTERY OF NATIVE HEART WITHOUT ANGINA PECTORIS: ICD-10-CM

## 2024-06-27 RX ORDER — ROSUVASTATIN CALCIUM 10 MG/1
10 TABLET, COATED ORAL NIGHTLY
Qty: 90 TABLET | Refills: 0 | Status: SHIPPED | OUTPATIENT
Start: 2024-06-27

## 2024-06-27 RX ORDER — ATORVASTATIN CALCIUM 20 MG/1
20 TABLET, FILM COATED ORAL DAILY
Qty: 90 TABLET | Refills: 3 | OUTPATIENT
Start: 2024-06-27 | End: 2025-06-27

## 2024-06-27 NOTE — TELEPHONE ENCOUNTER
Last refilled 8/9/23 90 x 3.  Pharmacy tells her they have no refills on file.  Lov 5/15/24 sore throat, cough.  Yearly labs due in July.

## 2024-06-27 NOTE — TELEPHONE ENCOUNTER
No care due was identified.  [facility] Embedded Care Due Messages. Reference number: [id number].   6/27/2024 1:29:33 PM CDT

## 2024-06-27 NOTE — TELEPHONE ENCOUNTER
Lipitor was discontinued and the patient was started on Crestor instead.  Refills of Crestor have been sent to the pharmacy.  Please inform the patient.  Thank you.

## 2024-06-30 ENCOUNTER — PATIENT MESSAGE (OUTPATIENT)
Dept: INTERNAL MEDICINE | Facility: CLINIC | Age: 69
End: 2024-06-30
Payer: MEDICARE

## 2024-06-30 ENCOUNTER — PATIENT MESSAGE (OUTPATIENT)
Dept: GASTROENTEROLOGY | Facility: CLINIC | Age: 69
End: 2024-06-30
Payer: MEDICARE

## 2024-07-01 RX ORDER — INSULIN GLARGINE 100 [IU]/ML
INJECTION, SOLUTION SUBCUTANEOUS
Qty: 45 ML | Refills: 3 | Status: SHIPPED | OUTPATIENT
Start: 2024-07-01

## 2024-07-01 RX ORDER — INSULIN GLARGINE-YFGN 100 [IU]/ML
INJECTION, SOLUTION SUBCUTANEOUS
Qty: 45 ML | Refills: 3 | Status: SHIPPED | OUTPATIENT
Start: 2024-07-01 | End: 2024-07-01

## 2024-07-01 RX ORDER — INSULIN GLARGINE 100 [IU]/ML
INJECTION, SOLUTION SUBCUTANEOUS
Qty: 45 ML | Refills: 3 | Status: SHIPPED | OUTPATIENT
Start: 2024-07-01 | End: 2024-07-01

## 2024-07-05 ENCOUNTER — HOSPITAL ENCOUNTER (OUTPATIENT)
Dept: RADIOLOGY | Facility: HOSPITAL | Age: 69
Discharge: HOME | End: 2024-07-05
Attending: NURSE PRACTITIONER
Payer: MEDICARE

## 2024-07-05 ENCOUNTER — OFFICE VISIT (OUTPATIENT)
Dept: ORTHOPEDICS | Facility: CLINIC | Age: 69
End: 2024-07-05
Payer: MEDICARE

## 2024-07-05 DIAGNOSIS — M54.2 CERVICAL PAIN (NECK): ICD-10-CM

## 2024-07-05 DIAGNOSIS — S42.291D OTHER CLOSED DISPLACED FRACTURE OF PROXIMAL END OF RIGHT HUMERUS WITH ROUTINE HEALING, SUBSEQUENT ENCOUNTER: ICD-10-CM

## 2024-07-05 DIAGNOSIS — M25.511 CHRONIC PAIN OF BOTH SHOULDERS: Primary | ICD-10-CM

## 2024-07-05 DIAGNOSIS — Z98.890 POST-OPERATIVE STATE: ICD-10-CM

## 2024-07-05 DIAGNOSIS — G89.29 CHRONIC PAIN OF BOTH SHOULDERS: Primary | ICD-10-CM

## 2024-07-05 DIAGNOSIS — M25.512 LEFT SHOULDER PAIN, UNSPECIFIED CHRONICITY: ICD-10-CM

## 2024-07-05 DIAGNOSIS — M25.512 CHRONIC PAIN OF BOTH SHOULDERS: Primary | ICD-10-CM

## 2024-07-05 PROCEDURE — 73030 X-RAY EXAM OF SHOULDER: CPT | Mod: TC,RT

## 2024-07-05 PROCEDURE — 99999 PR PBB SHADOW E&M-EST. PATIENT-LVL IV: CPT | Mod: PBBFAC,,, | Performed by: NURSE PRACTITIONER

## 2024-07-05 PROCEDURE — 73030 X-RAY EXAM OF SHOULDER: CPT | Mod: 26,RT,, | Performed by: RADIOLOGY

## 2024-07-05 PROCEDURE — 73030 X-RAY EXAM OF SHOULDER: CPT | Mod: TC,LT

## 2024-07-05 NOTE — PROGRESS NOTES
"  SUBJECTIVE:     Chief Complaint & History of Present Illness:  Riana Kay is a Established 69 y.o. year old female patient presenting with intermittent bilateral shoulder pain that started months ago.  Patient is known to our clinic for a right rTSA on the right by Dr. Powell on 8/16/2022.  She denies any falls or injuries.  She does endorse intermittent neck pain.  She denies hand numbness.  She has been using ice, Tylenol and Mobic with some relief.  She describes the pain as a "soreness" and rates the pain at 4/10.    Review of patient's allergies indicates:   Allergen Reactions    Iodinated contrast media Swelling and Rash    Percocet [oxycodone-acetaminophen] Itching    Macrobid [nitrofurantoin monohyd/m-cryst] Rash    Metformin Rash    Penicillins Rash     Had ancef in 2020 with no adverse rxn     Promethazine Rash     Had compazine in 2021    Sulfa (sulfonamide antibiotics) Rash    Sulfamethoxazole-trimethoprim Rash         Current Outpatient Medications   Medication Sig Dispense Refill    acetaminophen (TYLENOL) 500 MG tablet Take 2 tablets (1,000 mg total) by mouth every 8 (eight) hours as needed for Pain. 90 tablet 0    amoxicillin (AMOXIL) 500 MG capsule Take 1,000 mg by mouth 2 (two) times daily.      azelastine (ASTELIN) 137 mcg (0.1 %) nasal spray 1 spray (137 mcg total) by Nasal route 2 (two) times daily. 30 mL 11    azithromycin (ZITHROMAX Z-MALICK) 250 MG tablet Take 2 tablets on day 1, then 1 tablet on days 2-5. 6 tablet 0    blood sugar diagnostic Strp To check BG 3 times daily, to use with insurance preferred meter, e 11.65 100 each 11    buPROPion (WELLBUTRIN XL) 300 MG 24 hr tablet Take 1 tablet (300 mg total) by mouth every morning. 90 tablet 3    DULoxetine (CYMBALTA) 30 MG capsule Take 1 capsule (30 mg total) by mouth once daily. 90 capsule 3    glucagon (BAQSIMI) 3 mg/actuation Spry Give one puff via nostril. Hold device between fingers and thumb, do not push plunger yet, insert " "tip gently into one nostril until finger(s) touch the outside of the nose, then push plunger firmly all the way in . Dose is complete when the green line disappears. 1 each 1    HUMALOG KWIKPEN INSULIN 100 unit/mL pen Inject 10-12 units before meals plus scale 150-200+2, 201-250+4, 251-300+6, 301-350+8, >350+10. Max daily 66 units. 30 mL 6    ketoconazole (NIZORAL) 2 % shampoo Apply topically every 7 days. 120 mL 6    lancets Misc To check BG 3 times daily, to use with insurance preferred meter, e 11.65 100 each 11    LANTUS SOLOSTAR U-100 INSULIN 100 unit/mL (3 mL) InPn pen Inject 24-52 units at night. 45 mL 3    latanoprost 0.005 % ophthalmic solution Place 1 drop into both eyes nightly.      linaCLOtide (LINZESS) 290 mcg Cap capsule Take 1 capsule (290 mcg total) by mouth before breakfast. 30 capsule 5    losartan (COZAAR) 50 MG tablet TAKE 1 TABLET BY MOUTH EVERY DAY 90 tablet 0    meloxicam (MOBIC) 15 MG tablet TAKE 1 TABLET BY MOUTH EVERY DAY 30 tablet 2    ondansetron (ZOFRAN-ODT) 8 MG TbDL Take 1 tablet (8 mg total) by mouth 3 (three) times daily as needed (Nausea). 30 tablet 3    ondansetron (ZOFRAN-ODT) 8 MG TbDL Take 1 tablet (8 mg total) by mouth 3 (three) times daily as needed (nausea/vomiting). 30 tablet 0    ONETOUCH DELICA LANCETS 33 gauge Misc TO CHECK BLOOD GLUCOSE 3 TIMES DAILY      ONETOUCH VERIO FLEX METER Misc TO CHECK BLOOD GLUCOSE 3 TIMES DAILY, TO USE WITH INSURANCE PREFERRED METER, E 11.65      pantoprazole (PROTONIX) 40 MG tablet Take 1 tablet (40 mg total) by mouth once daily. 90 tablet 3    pen needle, diabetic (NOVOFINE 32) 32 gauge x 1/4" Ndle Uses 4 times a day. 90 day via duramed e 11.65 400 each 3    rosuvastatin (CRESTOR) 10 MG tablet Take 1 tablet (10 mg total) by mouth every evening. 90 tablet 0    meclizine (ANTIVERT) 25 mg tablet Take 1 tablet (25 mg total) by mouth 3 (three) times daily as needed for Dizziness. 30 tablet 1     No current facility-administered medications for " this visit.       Past Medical History:   Diagnosis Date    Abdominal pain, right upper quadrant 02/04/2014    Anticoagulant long-term use     Anxiety     AR (allergic rhinitis)     Atrophic gastritis without mention of hemorrhage 02/13/2012     Dx updated per 2019 IMO Load    Chronic fatigue syndrome 06/10/2012    Diabetes mellitus     Dizziness     Fatty liver     GERD (gastroesophageal reflux disease)     Gross hematuria 12/01/2020    HTN (hypertension)     Hyperlipidemia     Leg swelling     Memory loss     Osteopenia     PONV (postoperative nausea and vomiting)     Primary osteoarthritis of right knee 10/06/2020    Primary osteoarthritis of right knee 10/06/2020    Right elbow pain 01/16/2019    S/P total hysterectomy     Screening for colon cancer 01/06/2021    Sleep apnea     Status post total right knee replacement 10/6/2020 10/05/2020       Past Surgical History:   Procedure Laterality Date    CHOLECYSTECTOMY      COLONOSCOPY N/A 01/17/2018    Procedure: COLONOSCOPY with Donnell;  Surgeon: Roland Jacobo MD;  Location: [facility] ([address] [place] [address]);  Service: Endoscopy;  Laterality: N/A;    COLONOSCOPY N/A 01/06/2021    Procedure: COLONOSCOPY;  Surgeon: Yong Rowland MD;  Location: [facility] ([address] [place] [address]);  Service: Endoscopy;  Laterality: N/A;  prep ins. emailed - Dutch speaking,  needed - ERW  [facility] - ERW    CYSTOSCOPY N/A 12/01/2020    Procedure: CYSTOSCOPY;  Surgeon: Ines Stanford MD;  Location: [facility] OR [address] [place] [address];  Service: Urology;  Laterality: N/A;  45 minutes     ELBOW ARTHROPLASTY Right 01/16/2019    Procedure: ARTHROPLASTY, ELBOW right radial head arthroplasty revision;  Surgeon: Katja Hubbard MD;  Location: [facility] OR [address] [place] [address];  Service: Orthopedics;  Laterality: Right;  Anesthesia: General and Regional. Stretcher, hand pan 1 and pan 2, CALL RUCHI SANTAMARIA & Sol notified 1-14 LO    ELBOW SURGERY Right 07/16/2015    ELBOW SURGERY       ESOPHAGOGASTRODUODENOSCOPY N/A 04/15/2019    Procedure: EGD (ESOPHAGOGASTRODUODENOSCOPY);  Surgeon: Buck Irwin MD;  Location: [facility] (4TH FLR);  Service: Endoscopy;  Laterality: N/A;  ray she    ESOPHAGOGASTRODUODENOSCOPY N/A 04/21/2023    Procedure: EGD (ESOPHAGOGASTRODUODENOSCOPY);  Surgeon: Aamir Reyes MD;  Location: [facility];  Service: Endoscopy;  Laterality: N/A;    HYSTERECTOMY      KNEE ARTHROPLASTY Right 10/06/2020    Procedure: ARTHROPLASTY, KNEE-SAME DAY PROTOCOL;  Surgeon: Sabino Damon MD;  Location: [facility] OR;  Service: Orthopedics;  Laterality: Right;    KNEE ARTHROSCOPY W/ DEBRIDEMENT  04/2011    Left    RELEASE OF ULNAR NERVE AT CUBITAL TUNNEL Right 01/16/2019    Procedure: RELEASE, ULNAR TUNNEL right;  Surgeon: Katja Hubbard MD;  Location: [facility] OR 1ST FLR;  Service: Orthopedics;  Laterality: Right;  Anesthesia: General and Regional. Stretcher, hand pan 1 and pan 2, CALL ACCUMED, CLAIRX    RETROGRADE PYELOGRAPHY Bilateral 12/01/2020    Procedure: PYELOGRAM, RETROGRADE;  Surgeon: Ines Stanford MD;  Location: [facility] OR 1ST FLR;  Service: Urology;  Laterality: Bilateral;    REVERSE TOTAL SHOULDER ARTHROPLASTY Right 08/16/2022    Procedure: ARTHROPLASTY, SHOULDER, TOTAL, REVERSE, [place];  Surgeon: Aamir Powell MD;  Location: [facility] OR 2ND FLR;  Service: Orthopedics;  Laterality: Right;  [place] Can't go until after 12    ROTATOR CUFF REPAIR      SHOULDER SURGERY      TOTAL ABDOMINAL HYSTERECTOMY W/ BILATERAL SALPINGOOPHORECTOMY      TOTAL KNEE ARTHROPLASTY Left 10/19/2021    Procedure: ARTHROPLASTY, KNEE, TOTAL;  Surgeon: Sabino Damno MD;  Location: [facility];  Service: Orthopedics;  Laterality: Left;    UPPER GASTROINTESTINAL ENDOSCOPY         Family History   Problem Relation Name Age of Onset    Cancer Father          colon    Colon cancer Father  83        colon cancer    Diabetes Maternal Aunt      Diabetes Maternal Grandmother      Esophageal  "cancer Neg Hx      Stomach cancer Neg Hx      Melanoma Neg Hx             Review of Systems:  ROS:  Constitutional: no fever or chills  Eyes: no visual changes  ENT: no nasal congestion or sore throat  Respiratory: no cough or shortness of breath  Cardiovascular: no chest pain or palpitations  Gastrointestinal: no nausea or vomiting, tolerating diet  Genitourinary: no hematuria or dysuria  Integument/Breast: no rash or pruritis  Hematologic/Lymphatic: no easy bruising or lymphadenopathy  Musculoskeletal: no arthralgias or myalgias  Neurological: no seizures or tremors  Behavioral/Psych: no auditory or visual hallucinations  Endocrine: no heat or cold intolerance      OBJECTIVE:     PHYSICAL EXAM:  Vital Signs (Most Recent)  There were no vitals filed for this visit.     ,   Estimated body mass index is 27.79 kg/m² as calculated from the following:    Height as of 6/14/24: 5' 5" (1.651 m).    Weight as of 6/14/24: 75.8 kg (167 lb).   General Appearance: Well nourished, well developed, in no acute distress.  HENT: Normal cephalic, oropharynx pink and moist  Eyes: PERRLA bilaterally and EOM x 4  Respiratory: Even and unlabored  Skin: Warm and Dry.   Psychiatric: AAO x 4, Mood & affect are normal.    Shoulder exam: bilateral  Tenderness: AC joint  ROM: on left she has full ROM 0-180 degrees.  On right she has ROM of 0-120 and AAROM 0-170.  Shoulder Strength: biceps 5/5, triceps 5/5, abduction 5/5, adduction 5/5, external rotation 5/5 with shoulder at side, flexion 5/5, and extension 5/5  non-specific diffuse tenderness about the shoulder  Stability tests: normal    Mild tenderness with palpation to posterior neck.      RADIOGRAPHS:  Bilateral shoulder xrays were taken.  On right there is a rTSA that appears stable and in proper position.  No fractures seen in either exam.    All radiographs were personally reviewed by me.    ASSESSMENT/PLAN:       ICD-10-CM ICD-9-CM   1. Chronic pain of both shoulders  M25.511 719.41    " G89.29 338.29    M25.512    2. Cervical pain (neck)  M54.2 723.1   3. Other closed displaced fracture of proximal end of right humerus with routine healing, subsequent encounter  S42.291D V54.11         -Riana Baker Rahul presents to clinic with c/c bilateral shoulder pain for the past several months, no recent trauma.  -X-ray as above.    I believe her symptoms may be coming from her neck.  I will refer her to back and spine.    Continue Mobic 15 mg PO daily, Tylenol OTC.  Cannot give steroids right now as A1c is too high.  May try topical creams such as aspercreme or voltaren gel.  May also use warm moist heat to neck PRN.

## 2024-07-11 ENCOUNTER — TELEPHONE (OUTPATIENT)
Dept: ENDOSCOPY | Facility: HOSPITAL | Age: 69
End: 2024-07-11
Payer: MEDICARE

## 2024-07-11 DIAGNOSIS — M50.30 DDD (DEGENERATIVE DISC DISEASE), CERVICAL: Primary | ICD-10-CM

## 2024-07-11 DIAGNOSIS — M51.36 DDD (DEGENERATIVE DISC DISEASE), LUMBAR: Primary | ICD-10-CM

## 2024-07-11 DIAGNOSIS — R19.4 CHANGE IN BOWEL HABITS: Primary | ICD-10-CM

## 2024-07-11 RX ORDER — SODIUM, POTASSIUM,MAG SULFATES 17.5-3.13G
1 SOLUTION, RECONSTITUTED, ORAL ORAL DAILY
Qty: 1 KIT | Refills: 0 | Status: SHIPPED | OUTPATIENT
Start: 2024-07-11 | End: 2024-07-13

## 2024-07-11 NOTE — PROGRESS NOTES
DATE: 7/11/2024  PATIENT: Riana Kay    Supervising Physician: Jalil Donahue M.D.    CHIEF COMPLAINT: neck and bilateral arm pain    HISTORY:  Riana Kay is a 69 y.o. female with a pmhx of DM II here for initial evaluation of neck and bilateral arm pain (Neck - 3, Arm - 3). The pain has been present for years, worsening over time. The patient describes the pain as shooting and aching. The pain is worse with activity and improved by rest. There is mild associated numbness and tingling. There is mild subjective weakness. Prior treatments have included remote hx of ESIs and PT, but no surgery.     The patient denies myelopathic symptoms such as handwriting changes or difficulty with buttons/coins/keys. Denies perineal paresthesias, bowel/bladder dysfunction.    PAST MEDICAL/SURGICAL HISTORY:  Past Medical History:   Diagnosis Date    Abdominal pain, right upper quadrant 02/04/2014    Anticoagulant long-term use     Anxiety     AR (allergic rhinitis)     Atrophic gastritis without mention of hemorrhage 02/13/2012     Dx updated per 2019 IMO Load    Chronic fatigue syndrome 06/10/2012    Diabetes mellitus     Dizziness     Fatty liver     GERD (gastroesophageal reflux disease)     Gross hematuria 12/01/2020    HTN (hypertension)     Hyperlipidemia     Leg swelling     Memory loss     Osteopenia     PONV (postoperative nausea and vomiting)     Primary osteoarthritis of right knee 10/06/2020    Primary osteoarthritis of right knee 10/06/2020    Right elbow pain 01/16/2019    S/P total hysterectomy     Screening for colon cancer 01/06/2021    Sleep apnea     Status post total right knee replacement 10/6/2020 10/05/2020     Past Surgical History:   Procedure Laterality Date    CHOLECYSTECTOMY      COLONOSCOPY N/A 01/17/2018    Procedure: COLONOSCOPY with Donnell;  Surgeon: Roland Jacobo MD;  Location: [facility] ([address] [place] [address]);  Service: Endoscopy;  Laterality: N/A;    COLONOSCOPY N/A 01/06/2021     Procedure: COLONOSCOPY;  Surgeon: Yong Rowland MD;  Location: [facility] ENDO (4TH FLR);  Service: Endoscopy;  Laterality: N/A;  prep ins. emailed - Greek speaking,  needed - ERW  [facility] - ERW    CYSTOSCOPY N/A 12/01/2020    Procedure: CYSTOSCOPY;  Surgeon: Ines Stanford MD;  [facility];  Service: Urology;  Laterality: N/A;  45 minutes     ELBOW ARTHROPLASTY Right 01/16/2019    Procedure: ARTHROPLASTY, ELBOW right radial head arthroplasty revision;  Surgeon: Katja Hubbard MD;  [facility];  Service: Orthopedics;  Laterality: Right;  Anesthesia: General and Regional. Stretcher, hand pan 1 and pan 2, CALL RUCHI SANTAMARIA & Sol notified 1-14 LO    ELBOW SURGERY Right 07/16/2015    ELBOW SURGERY      ESOPHAGOGASTRODUODENOSCOPY N/A 04/15/2019    Procedure: EGD (ESOPHAGOGASTRODUODENOSCOPY);  Surgeon: Buck Irwin MD;  Location: [facility] (4TH FLR);  Service: Endoscopy;  Laterality: N/A;  ray she    ESOPHAGOGASTRODUODENOSCOPY N/A 04/21/2023    Procedure: EGD (ESOPHAGOGASTRODUODENOSCOPY);  Surgeon: Aamir Reyes MD;  Location: [facility];  Service: Endoscopy;  Laterality: N/A;    HYSTERECTOMY      KNEE ARTHROPLASTY Right 10/06/2020    Procedure: ARTHROPLASTY, KNEE-SAME DAY PROTOCOL;  Surgeon: Sabino Damon MD;  Location: [facility];  Service: Orthopedics;  Laterality: Right;    KNEE ARTHROSCOPY W/ DEBRIDEMENT  04/2011    Left    RELEASE OF ULNAR NERVE AT CUBITAL TUNNEL Right 01/16/2019    Procedure: RELEASE, ULNAR TUNNEL right;  Surgeon: Katja Hubbard MD;  [facility];  Service: Orthopedics;  Laterality: Right;  Anesthesia: General and Regional. Stretcher, hand pan 1 and pan 2, CALL RUCHI SANTAMARIA    RETROGRADE PYELOGRAPHY Bilateral 12/01/2020    Procedure: PYELOGRAM, RETROGRADE;  Surgeon: Ines Stanford MD;  Location: [facility] OR [address] [place] [address];  Service: Urology;  Laterality: Bilateral;    REVERSE  TOTAL SHOULDER ARTHROPLASTY Right 08/16/2022    Procedure: ARTHROPLASTY, SHOULDER, TOTAL, REVERSE, [place];  Surgeon: Aamir Powell MD;  Location: [address];  Service: Orthopedics;  Laterality: Right;  [place] Can't go until after 12    ROTATOR CUFF REPAIR      SHOULDER SURGERY      TOTAL ABDOMINAL HYSTERECTOMY W/ BILATERAL SALPINGOOPHORECTOMY      TOTAL KNEE ARTHROPLASTY Left 10/19/2021    Procedure: ARTHROPLASTY, KNEE, TOTAL;  Surgeon: Sabino Damon MD;  Location: [facility];  Service: Orthopedics;  Laterality: Left;    UPPER GASTROINTESTINAL ENDOSCOPY         Medications:  Current Outpatient Medications on File Prior to Visit   Medication Sig Dispense Refill    acetaminophen (TYLENOL) 500 MG tablet Take 2 tablets (1,000 mg total) by mouth every 8 (eight) hours as needed for Pain. 90 tablet 0    amoxicillin (AMOXIL) 500 MG capsule Take 1,000 mg by mouth 2 (two) times daily.      azelastine (ASTELIN) 137 mcg (0.1 %) nasal spray 1 spray (137 mcg total) by Nasal route 2 (two) times daily. 30 mL 11    azithromycin (ZITHROMAX Z-MALICK) 250 MG tablet Take 2 tablets on day 1, then 1 tablet on days 2-5. 6 tablet 0    blood sugar diagnostic Strp To check BG 3 times daily, to use with insurance preferred meter, e 11.65 100 each 11    buPROPion (WELLBUTRIN XL) 300 MG 24 hr tablet Take 1 tablet (300 mg total) by mouth every morning. 90 tablet 3    DULoxetine (CYMBALTA) 30 MG capsule Take 1 capsule (30 mg total) by mouth once daily. 90 capsule 3    glucagon (BAQSIMI) 3 mg/actuation Spry Give one puff via nostril. Hold device between fingers and thumb, do not push plunger yet, insert tip gently into one nostril until finger(s) touch the outside of the nose, then push plunger firmly all the way in . Dose is complete when the green line disappears. 1 each 1    HUMALOG KWIKPEN INSULIN 100 unit/mL pen Inject 10-12 units before meals plus scale 150-200+2, 201-250+4, 251-300+6, 301-350+8, >350+10. Max daily 66 units.  "30 mL 6    ketoconazole (NIZORAL) 2 % shampoo Apply topically every 7 days. 120 mL 6    lancets Misc To check BG 3 times daily, to use with insurance preferred meter, e 11.65 100 each 11    LANTUS SOLOSTAR U-100 INSULIN 100 unit/mL (3 mL) InPn pen Inject 24-52 units at night. 45 mL 3    latanoprost 0.005 % ophthalmic solution Place 1 drop into both eyes nightly.      linaCLOtide (LINZESS) 290 mcg Cap capsule Take 1 capsule (290 mcg total) by mouth before breakfast. 30 capsule 5    losartan (COZAAR) 50 MG tablet TAKE 1 TABLET BY MOUTH EVERY DAY 90 tablet 0    meclizine (ANTIVERT) 25 mg tablet Take 1 tablet (25 mg total) by mouth 3 (three) times daily as needed for Dizziness. 30 tablet 1    meloxicam (MOBIC) 15 MG tablet TAKE 1 TABLET BY MOUTH EVERY DAY 30 tablet 2    ondansetron (ZOFRAN-ODT) 8 MG TbDL Take 1 tablet (8 mg total) by mouth 3 (three) times daily as needed (Nausea). 30 tablet 3    ondansetron (ZOFRAN-ODT) 8 MG TbDL Take 1 tablet (8 mg total) by mouth 3 (three) times daily as needed (nausea/vomiting). 30 tablet 0    ONETOUCH DELICA LANCETS 33 gauge Misc TO CHECK BLOOD GLUCOSE 3 TIMES DAILY      ONETOUCH VERIO FLEX METER Misc TO CHECK BLOOD GLUCOSE 3 TIMES DAILY, TO USE WITH INSURANCE PREFERRED METER, E 11.65      pantoprazole (PROTONIX) 40 MG tablet Take 1 tablet (40 mg total) by mouth once daily. 90 tablet 3    pen needle, diabetic (NOVOFINE 32) 32 gauge x 1/4" Ndle Uses 4 times a day. 90 day via duramed e 11.65 400 each 3    rosuvastatin (CRESTOR) 10 MG tablet Take 1 tablet (10 mg total) by mouth every evening. 90 tablet 0    [DISCONTINUED] diclofenac sodium (VOLTAREN) 1 % Gel APPLY 2 G TOPICALLY 2 (TWO) TIMES DAILY AS NEEDED (PAIN). DO NOT USE DIRECTLY ON INCISION 100 g 0     No current facility-administered medications on file prior to visit.       Social History:   Social History     Socioeconomic History    Marital status:      Spouse name: Kemal    Number of children: 1   Occupational " History    Occupation: Housekeeping     Comment: On disability   Tobacco Use    Smoking status: Never     Passive exposure: Never    Smokeless tobacco: Never   Substance and Sexual Activity    Alcohol use: No    Drug use: No    Sexual activity: Yes     Partners: Male     Birth control/protection: Post-menopausal   Other Topics Concern    Are you pregnant or think you may be? No    Breast-feeding No   Social History Narrative    She retired at [employer] in [employer]; , 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    No stairs     Social Determinants of Health     Financial Resource Strain: Low Risk  (11/13/2023)    Overall Financial Resource Strain (CARDIA)     Difficulty of Paying Living Expenses: Not hard at all   Food Insecurity: No Food Insecurity (11/13/2023)    Hunger Vital Sign     Worried About Running Out of Food in the Last Year: Never true     Ran Out of Food in the Last Year: Never true   Transportation Needs: No Transportation Needs (11/13/2023)    PRAPARE - Transportation     Lack of Transportation (Medical): No     Lack of Transportation (Non-Medical): No   Physical Activity: Sufficiently Active (11/13/2023)    Exercise Vital Sign     Days of Exercise per Week: 5 days     Minutes of Exercise per Session: 90 min   Stress: Stress Concern Present (11/13/2023)    [employer] - Occupational Stress Questionnaire     Feeling of Stress : To some extent   Housing Stability: Low Risk  (11/13/2023)    Housing Stability Vital Sign     Unable to Pay for Housing in the Last Year: No     Number of Places Lived in the Last Year: 1     Unstable Housing in the Last Year: No       REVIEW OF SYSTEMS:  Constitution: Negative. Negative for chills, fever and night sweats.   Cardiovascular: Negative for chest pain and syncope.   Respiratory: Negative for cough and shortness of breath.   Gastrointestinal: See HPI. Negative for nausea/vomiting. Negative for abdominal pain.  Genitourinary: See HPI.  Negative for discoloration or dysuria.  Skin: Negative for dry skin, itching and rash.   Hematologic/Lymphatic: Negative for bleeding problem. Does not bruise/bleed easily.   Musculoskeletal: Negative for falls and muscle weakness.   Neurological: See HPI. No seizures.   Endocrine: Negative for polydipsia, polyphagia and polyuria.   Allergic/Immunologic: Negative for hives and persistent infections.  Psychiatric/Behavioral: Negative for depression and insomnia.         EXAM:  There were no vitals taken for this visit.    General: The patient is a very pleasant 69 y.o. female in no apparent distress, the patient is oriented to person, place and time.  Psych: Normal mood and affect  HEENT: Vision grossly intact, hearing intact to the spoken word.  Lungs: Respirations unlabored.  Gait: Normal station and gait, no difficulty with toe or heel walk.   Skin: Cervical skin negative for rashes, lesions, hairy patches and surgical scars.  Range of motion: Cervical range of motion is acceptable. There is negative tenderness to palpation.  Spinal Balance: Global saggital and coronal spinal balance acceptable, no significant for scoliosis and kyphosis.  Musculoskeletal: No pain with the range of motion of the bilateral shoulders and elbows. Normal bulk and contour of the bilateral hands.  Vascular: Bilateral hands warm and well perfused, radial pulses 2+ bilaterally.  Neurological: Normal strength and tone in all major motor groups in the bilateral upper and lower extremities. Normal sensation to light touch in the C5-T1 and L2-S1 dermatomes bilaterally.  Deep tendon reflexes symmetric 2+ in the bilateral upper and lower extremities.  Negative Inverted Radial Reflex and Gordon's bilaterally. Negative Babinski bilaterally.     IMAGING:   Today I personally reviewed AP, Lat and Flex/Ex  upright C-spine films that demonstrate mild degenerative changes.     There is no height or weight on file to calculate BMI.    Hemoglobin A1C    Date Value Ref Range Status   04/03/2024 7.4 (H) 4.0 - 5.6 % Final     Comment:     ADA Screening Guidelines:  5.7-6.4%  Consistent with prediabetes  >or=6.5%  Consistent with diabetes    High levels of fetal hemoglobin interfere with the HbA1C  assay. Heterozygous hemoglobin variants (HbS, HgC, etc)do  not significantly interfere with this assay.   However, presence of multiple variants may affect accuracy.     11/28/2023 8.3 (H) 4.0 - 5.6 % Final     Comment:     ADA Screening Guidelines:  5.7-6.4%  Consistent with prediabetes  >or=6.5%  Consistent with diabetes    High levels of fetal hemoglobin interfere with the HbA1C  assay. Heterozygous hemoglobin variants (HbS, HgC, etc)do  not significantly interfere with this assay.   However, presence of multiple variants may affect accuracy.     08/16/2023 7.7 (H) 4.0 - 5.6 % Final     Comment:     ADA Screening Guidelines:  5.7-6.4%  Consistent with prediabetes  >or=6.5%  Consistent with diabetes    High levels of fetal hemoglobin interfere with the HbA1C  assay. Heterozygous hemoglobin variants (HbS, HgC, etc)do  not significantly interfere with this assay.   However, presence of multiple variants may affect accuracy.             ASSESSMENT/PLAN:    There are no diagnoses linked to this encounter.    Today we discussed at length all of the different treatment options including anti-inflammatories, acetaminophen, rest, ice, heat, physical therapy including strengthening and stretching exercises, home exercises, ROM, aerobic conditioning, aqua therapy, other modalities including ultrasound, massage, and dry needling, epidural steroid injections and finally surgical intervention.      Pt presents with chronic cervical radiculopathy. Will send lyrica to pharmacy. Pt will fu if pain persists, will consider MRI/ESIs.

## 2024-07-15 ENCOUNTER — HOSPITAL ENCOUNTER (OUTPATIENT)
Dept: RADIOLOGY | Facility: HOSPITAL | Age: 69
Discharge: HOME | End: 2024-07-15
Attending: ORTHOPAEDIC SURGERY
Payer: MEDICARE

## 2024-07-15 ENCOUNTER — OFFICE VISIT (OUTPATIENT)
Dept: ORTHOPEDICS | Facility: CLINIC | Age: 69
End: 2024-07-15
Payer: MEDICARE

## 2024-07-15 VITALS — HEIGHT: 65 IN | BODY MASS INDEX: 22.08 KG/M2 | WEIGHT: 132.5 LBS

## 2024-07-15 DIAGNOSIS — M50.30 DDD (DEGENERATIVE DISC DISEASE), CERVICAL: ICD-10-CM

## 2024-07-15 DIAGNOSIS — M54.12 CERVICAL RADICULOPATHY: Primary | ICD-10-CM

## 2024-07-15 PROCEDURE — 72050 X-RAY EXAM NECK SPINE 4/5VWS: CPT | Mod: TC

## 2024-07-15 PROCEDURE — 3288F FALL RISK ASSESSMENT DOCD: CPT | Mod: CPTII,S$GLB,, | Performed by: ORTHOPAEDIC SURGERY

## 2024-07-15 PROCEDURE — 4010F ACE/ARB THERAPY RXD/TAKEN: CPT | Mod: CPTII,S$GLB,, | Performed by: ORTHOPAEDIC SURGERY

## 2024-07-15 PROCEDURE — 3008F BODY MASS INDEX DOCD: CPT | Mod: CPTII,S$GLB,, | Performed by: ORTHOPAEDIC SURGERY

## 2024-07-15 PROCEDURE — 1157F ADVNC CARE PLAN IN RCRD: CPT | Mod: CPTII,S$GLB,, | Performed by: ORTHOPAEDIC SURGERY

## 2024-07-15 PROCEDURE — 1125F AMNT PAIN NOTED PAIN PRSNT: CPT | Mod: CPTII,S$GLB,, | Performed by: ORTHOPAEDIC SURGERY

## 2024-07-15 PROCEDURE — 1101F PT FALLS ASSESS-DOCD LE1/YR: CPT | Mod: CPTII,S$GLB,, | Performed by: ORTHOPAEDIC SURGERY

## 2024-07-15 PROCEDURE — 99999 PR PBB SHADOW E&M-EST. PATIENT-LVL IV: CPT | Mod: PBBFAC,,, | Performed by: ORTHOPAEDIC SURGERY

## 2024-07-15 PROCEDURE — 99214 OFFICE O/P EST MOD 30 MIN: CPT | Mod: S$GLB,,, | Performed by: ORTHOPAEDIC SURGERY

## 2024-07-15 PROCEDURE — 1159F MED LIST DOCD IN RCRD: CPT | Mod: CPTII,S$GLB,, | Performed by: ORTHOPAEDIC SURGERY

## 2024-07-15 PROCEDURE — 72050 X-RAY EXAM NECK SPINE 4/5VWS: CPT | Mod: 26,,, | Performed by: RADIOLOGY

## 2024-07-15 PROCEDURE — 3051F HG A1C>EQUAL 7.0%<8.0%: CPT | Mod: CPTII,S$GLB,, | Performed by: ORTHOPAEDIC SURGERY

## 2024-07-15 RX ORDER — PREGABALIN 75 MG/1
75 CAPSULE ORAL 2 TIMES DAILY
Qty: 60 CAPSULE | Refills: 5 | Status: SHIPPED | OUTPATIENT
Start: 2024-07-15 | End: 2025-01-13

## 2024-08-06 ENCOUNTER — TELEPHONE (OUTPATIENT)
Dept: GASTROENTEROLOGY | Facility: CLINIC | Age: 69
End: 2024-08-06
Payer: MEDICARE

## 2024-08-06 ENCOUNTER — OFFICE VISIT (OUTPATIENT)
Dept: INTERNAL MEDICINE | Facility: CLINIC | Age: 69
End: 2024-08-06
Payer: MEDICARE

## 2024-08-06 VITALS
RESPIRATION RATE: 16 BRPM | HEIGHT: 65 IN | OXYGEN SATURATION: 99 % | SYSTOLIC BLOOD PRESSURE: 124 MMHG | WEIGHT: 167 LBS | DIASTOLIC BLOOD PRESSURE: 68 MMHG | HEART RATE: 72 BPM | TEMPERATURE: 98 F | BODY MASS INDEX: 27.82 KG/M2

## 2024-08-06 DIAGNOSIS — K59.09 CHRONIC CONSTIPATION: ICD-10-CM

## 2024-08-06 DIAGNOSIS — R30.0 DYSURIA: Primary | ICD-10-CM

## 2024-08-06 LAB
BILIRUB SERPL-MCNC: ABNORMAL MG/DL
BILIRUB UR QL STRIP: NEGATIVE
BLOOD URINE, POC: ABNORMAL
CLARITY UR REFRACT.AUTO: CLEAR
CLARITY, POC UA: ABNORMAL
COLOR UR AUTO: YELLOW
COLOR, POC UA: YELLOW
GLUCOSE UR QL STRIP: NEGATIVE
GLUCOSE UR QL STRIP: NORMAL
HGB UR QL STRIP: NEGATIVE
KETONES UR QL STRIP: ABNORMAL
KETONES UR QL STRIP: NEGATIVE
LEUKOCYTE ESTERASE UR QL STRIP: NEGATIVE
LEUKOCYTE ESTERASE URINE, POC: ABNORMAL
NITRITE UR QL STRIP: NEGATIVE
NITRITE, POC UA: ABNORMAL
PH UR STRIP: 8 [PH] (ref 5–8)
PH, POC UA: 8
PROT UR QL STRIP: ABNORMAL
PROTEIN, POC: 30
SP GR UR STRIP: 1.02 (ref 1–1.03)
SPECIFIC GRAVITY, POC UA: 1005
URN SPEC COLLECT METH UR: ABNORMAL
UROBILINOGEN, POC UA: 1

## 2024-08-06 PROCEDURE — 3078F DIAST BP <80 MM HG: CPT | Mod: CPTII,S$GLB,, | Performed by: FAMILY MEDICINE

## 2024-08-06 PROCEDURE — 81002 URINALYSIS NONAUTO W/O SCOPE: CPT | Mod: S$GLB,,, | Performed by: FAMILY MEDICINE

## 2024-08-06 PROCEDURE — 4010F ACE/ARB THERAPY RXD/TAKEN: CPT | Mod: CPTII,S$GLB,, | Performed by: FAMILY MEDICINE

## 2024-08-06 PROCEDURE — 87088 URINE BACTERIA CULTURE: CPT | Performed by: FAMILY MEDICINE

## 2024-08-06 PROCEDURE — 1160F RVW MEDS BY RX/DR IN RCRD: CPT | Mod: CPTII,S$GLB,, | Performed by: FAMILY MEDICINE

## 2024-08-06 PROCEDURE — 3051F HG A1C>EQUAL 7.0%<8.0%: CPT | Mod: CPTII,S$GLB,, | Performed by: FAMILY MEDICINE

## 2024-08-06 PROCEDURE — 99999 PR PBB SHADOW E&M-EST. PATIENT-LVL V: CPT | Mod: PBBFAC,,, | Performed by: FAMILY MEDICINE

## 2024-08-06 PROCEDURE — 3008F BODY MASS INDEX DOCD: CPT | Mod: CPTII,S$GLB,, | Performed by: FAMILY MEDICINE

## 2024-08-06 PROCEDURE — 3074F SYST BP LT 130 MM HG: CPT | Mod: CPTII,S$GLB,, | Performed by: FAMILY MEDICINE

## 2024-08-06 PROCEDURE — 1126F AMNT PAIN NOTED NONE PRSNT: CPT | Mod: CPTII,S$GLB,, | Performed by: FAMILY MEDICINE

## 2024-08-06 PROCEDURE — 1157F ADVNC CARE PLAN IN RCRD: CPT | Mod: CPTII,S$GLB,, | Performed by: FAMILY MEDICINE

## 2024-08-06 PROCEDURE — 1101F PT FALLS ASSESS-DOCD LE1/YR: CPT | Mod: CPTII,S$GLB,, | Performed by: FAMILY MEDICINE

## 2024-08-06 PROCEDURE — 87086 URINE CULTURE/COLONY COUNT: CPT | Performed by: FAMILY MEDICINE

## 2024-08-06 PROCEDURE — 3288F FALL RISK ASSESSMENT DOCD: CPT | Mod: CPTII,S$GLB,, | Performed by: FAMILY MEDICINE

## 2024-08-06 PROCEDURE — 1159F MED LIST DOCD IN RCRD: CPT | Mod: CPTII,S$GLB,, | Performed by: FAMILY MEDICINE

## 2024-08-06 PROCEDURE — 99214 OFFICE O/P EST MOD 30 MIN: CPT | Mod: S$GLB,,, | Performed by: FAMILY MEDICINE

## 2024-08-06 PROCEDURE — 81003 URINALYSIS AUTO W/O SCOPE: CPT | Performed by: FAMILY MEDICINE

## 2024-08-06 RX ORDER — CIPROFLOXACIN 500 MG/1
500 TABLET ORAL 2 TIMES DAILY
Qty: 14 TABLET | Refills: 0 | Status: SHIPPED | OUTPATIENT
Start: 2024-08-06 | End: 2024-08-13

## 2024-08-06 RX ORDER — DULAGLUTIDE 0.75 MG/.5ML
0.75 INJECTION, SOLUTION SUBCUTANEOUS
COMMUNITY
Start: 2024-06-19

## 2024-08-07 LAB — BACTERIA UR CULT: ABNORMAL

## 2024-08-08 DIAGNOSIS — E11.43 GASTROPARESIS DUE TO DM: ICD-10-CM

## 2024-08-08 DIAGNOSIS — E11.9 TYPE 2 DIABETES MELLITUS WITHOUT COMPLICATION: ICD-10-CM

## 2024-08-08 DIAGNOSIS — K31.84 GASTROPARESIS DUE TO DM: ICD-10-CM

## 2024-08-09 ENCOUNTER — TELEPHONE (OUTPATIENT)
Dept: ENDOSCOPY | Facility: HOSPITAL | Age: 69
End: 2024-08-09
Payer: MEDICARE

## 2024-08-13 ENCOUNTER — LAB VISIT (OUTPATIENT)
Dept: LAB | Facility: HOSPITAL | Age: 69
End: 2024-08-13
Payer: MEDICARE

## 2024-08-13 ENCOUNTER — PATIENT MESSAGE (OUTPATIENT)
Dept: INTERNAL MEDICINE | Facility: CLINIC | Age: 69
End: 2024-08-13
Payer: MEDICARE

## 2024-08-13 DIAGNOSIS — E11.9 TYPE 2 DIABETES MELLITUS WITHOUT COMPLICATION, WITH LONG-TERM CURRENT USE OF INSULIN: ICD-10-CM

## 2024-08-13 DIAGNOSIS — Z79.4 TYPE 2 DIABETES MELLITUS WITHOUT COMPLICATION, WITH LONG-TERM CURRENT USE OF INSULIN: ICD-10-CM

## 2024-08-13 LAB
ESTIMATED AVG GLUCOSE: 189 MG/DL (ref 68–131)
HBA1C MFR BLD: 8.2 % (ref 4–5.6)

## 2024-08-13 PROCEDURE — 36415 COLL VENOUS BLD VENIPUNCTURE: CPT | Mod: PO | Performed by: NURSE PRACTITIONER

## 2024-08-13 PROCEDURE — 83036 HEMOGLOBIN GLYCOSYLATED A1C: CPT | Performed by: NURSE PRACTITIONER

## 2024-08-14 ENCOUNTER — PATIENT MESSAGE (OUTPATIENT)
Dept: GASTROENTEROLOGY | Facility: CLINIC | Age: 69
End: 2024-08-14
Payer: MEDICARE

## 2024-08-15 ENCOUNTER — TELEPHONE (OUTPATIENT)
Dept: GASTROENTEROLOGY | Facility: CLINIC | Age: 69
End: 2024-08-15
Payer: MEDICARE

## 2024-08-15 NOTE — TELEPHONE ENCOUNTER
Left VM for pt to call the office back.       ----- Message from Avis Eason sent at 8/15/2024  1:35 PM CDT -----  Type:  Procedure     Who Called:spouse   Does the patient know what this is regarding?:procedure time   Would the patient rather a call back or a response via [employer]? Call   Best Call Back Number:   Additional Information:

## 2024-08-16 ENCOUNTER — ANESTHESIA (OUTPATIENT)
Dept: ENDOSCOPY | Facility: HOSPITAL | Age: 69
End: 2024-08-16
Payer: MEDICARE

## 2024-08-16 ENCOUNTER — ANESTHESIA EVENT (OUTPATIENT)
Dept: ENDOSCOPY | Facility: HOSPITAL | Age: 69
End: 2024-08-16
Payer: MEDICARE

## 2024-08-16 ENCOUNTER — HOSPITAL ENCOUNTER (OUTPATIENT)
Facility: HOSPITAL | Age: 69
Discharge: HOME | End: 2024-08-16
Attending: INTERNAL MEDICINE | Admitting: INTERNAL MEDICINE
Payer: MEDICARE

## 2024-08-16 VITALS
SYSTOLIC BLOOD PRESSURE: 136 MMHG | HEIGHT: 65 IN | BODY MASS INDEX: 27.66 KG/M2 | TEMPERATURE: 98 F | OXYGEN SATURATION: 100 % | HEART RATE: 60 BPM | WEIGHT: 166 LBS | RESPIRATION RATE: 16 BRPM | DIASTOLIC BLOOD PRESSURE: 70 MMHG

## 2024-08-16 LAB — POCT GLUCOSE: 185 MG/DL (ref 70–110)

## 2024-08-16 PROCEDURE — 27201089 HC SNARE, DISP (ANY): Performed by: INTERNAL MEDICINE

## 2024-08-16 PROCEDURE — 63600175 PHARM REV CODE 636 W HCPCS: Performed by: NURSE ANESTHETIST, CERTIFIED REGISTERED

## 2024-08-16 PROCEDURE — 25000003 PHARM REV CODE 250: Performed by: NURSE ANESTHETIST, CERTIFIED REGISTERED

## 2024-08-16 PROCEDURE — 45385 COLONOSCOPY W/LESION REMOVAL: CPT | Mod: ,,, | Performed by: INTERNAL MEDICINE

## 2024-08-16 PROCEDURE — 37000009 HC ANESTHESIA EA ADD 15 MINS: Performed by: INTERNAL MEDICINE

## 2024-08-16 PROCEDURE — 82962 GLUCOSE BLOOD TEST: CPT | Performed by: INTERNAL MEDICINE

## 2024-08-16 PROCEDURE — 45385 COLONOSCOPY W/LESION REMOVAL: CPT | Performed by: INTERNAL MEDICINE

## 2024-08-16 PROCEDURE — 25000003 PHARM REV CODE 250: Performed by: INTERNAL MEDICINE

## 2024-08-16 PROCEDURE — 88305 TISSUE EXAM BY PATHOLOGIST: CPT | Performed by: PATHOLOGY

## 2024-08-16 PROCEDURE — 37000008 HC ANESTHESIA 1ST 15 MINUTES: Performed by: INTERNAL MEDICINE

## 2024-08-16 RX ORDER — SODIUM CHLORIDE 0.9 % (FLUSH) 0.9 %
10 SYRINGE (ML) INJECTION
Status: DISCONTINUED | OUTPATIENT
Start: 2024-08-16 | End: 2024-08-16 | Stop reason: HOSPADM

## 2024-08-16 RX ORDER — PROPOFOL 10 MG/ML
VIAL (ML) INTRAVENOUS
Status: DISCONTINUED | OUTPATIENT
Start: 2024-08-16 | End: 2024-08-16

## 2024-08-16 RX ORDER — DEXTROMETHORPHAN/PSEUDOEPHED 2.5-7.5/.8
DROPS ORAL
Status: COMPLETED | OUTPATIENT
Start: 2024-08-16 | End: 2024-08-16

## 2024-08-16 RX ORDER — LIDOCAINE HYDROCHLORIDE 20 MG/ML
INJECTION INTRAVENOUS
Status: DISCONTINUED | OUTPATIENT
Start: 2024-08-16 | End: 2024-08-16

## 2024-08-16 RX ORDER — SODIUM CHLORIDE 9 MG/ML
INJECTION, SOLUTION INTRAVENOUS CONTINUOUS
Status: DISCONTINUED | OUTPATIENT
Start: 2024-08-16 | End: 2024-08-16 | Stop reason: HOSPADM

## 2024-08-16 RX ORDER — PROPOFOL 10 MG/ML
VIAL (ML) INTRAVENOUS CONTINUOUS PRN
Status: DISCONTINUED | OUTPATIENT
Start: 2024-08-16 | End: 2024-08-16

## 2024-08-16 RX ADMIN — LIDOCAINE HYDROCHLORIDE 20 MG: 20 INJECTION, SOLUTION INTRAVENOUS at 10:08

## 2024-08-16 RX ADMIN — SODIUM CHLORIDE: 9 INJECTION, SOLUTION INTRAVENOUS at 09:08

## 2024-08-16 RX ADMIN — PROPOFOL 125 MCG/KG/MIN: 10 INJECTION, EMULSION INTRAVENOUS at 10:08

## 2024-08-16 RX ADMIN — PROPOFOL 70 MG: 10 INJECTION, EMULSION INTRAVENOUS at 10:08

## 2024-08-16 RX ADMIN — LIDOCAINE HYDROCHLORIDE 80 MG: 20 INJECTION, SOLUTION INTRAVENOUS at 10:08

## 2024-08-16 NOTE — ANESTHESIA PREPROCEDURE EVALUATION
08/16/2024  Riana Kay is a 69 y.o., female.    Procedure(s) (LRB):  COLONOSCOPY (N/A)    Eval abd lower fullness and constipation      Pre-op Assessment    I have reviewed the Patient Summary Reports.    I have reviewed the NPO Status.   I have reviewed the Medications.     Review of Systems  Anesthesia Hx:  No problems with previous Anesthesia                Cardiovascular:     Hypertension   CAD                                        Pulmonary:        Sleep Apnea                Hepatic/GI:    Hiatal Hernia, GERD Liver Disease,            Musculoskeletal:  Arthritis               Neurological:    Neuromuscular Disease,                                   Endocrine:  Diabetes, poorly controlled, type 2               Physical Exam  General: Alert and Cooperative    Airway:  Mallampati: III / II  Mouth Opening: Normal  TM Distance: Normal  Tongue: Normal    Dental:  Any loose or missing teeth verified with patient      Anesthesia Plan  Type of Anesthesia, risks & benefits discussed:    Anesthesia Type: Gen Natural Airway  Intra-op Monitoring Plan: Standard ASA Monitors  Post Op Pain Control Plan: multimodal analgesia  Induction:  IV  Airway Plan: Direct, Post-Induction  Informed Consent: Informed consent signed with the Patient and all parties understand the risks and agree with anesthesia plan.  All questions answered.   ASA Score: 3  Day of Surgery Review of History & Physical: H&P Update referred to the surgeon/provider.    Ready For Surgery From Anesthesia Perspective.     .

## 2024-08-16 NOTE — H&P
Short Stay Endoscopy History and Physical    PCP - Jose Rojas MD    Procedure - Colonoscopy  ASA - III  Mallampati - per anesthesia  History of Anesthesia problems - no  Family history Anesthesia problems - no     HPI:  This is a 69 y.o. female here for evaluation of : Change in bowel habits    ROS:  Constitutional: No fevers, chills, No weight loss  ENT: No allergies  CV: No chest pain  Pulm: No shortness of breath  GI: see HPI  Derm: No rash    Medical History:  has a past medical history of Abdominal pain, right upper quadrant (02/04/2014), Anticoagulant long-term use, Anxiety, AR (allergic rhinitis), Atrophic gastritis without mention of hemorrhage (02/13/2012), Chronic fatigue syndrome (06/10/2012), Diabetes mellitus, Dizziness, Fatty liver, GERD (gastroesophageal reflux disease), Gross hematuria (12/01/2020), HTN (hypertension), Hyperlipidemia, Leg swelling, Memory loss, Osteopenia, PONV (postoperative nausea and vomiting), Primary osteoarthritis of right knee (10/06/2020), Primary osteoarthritis of right knee (10/06/2020), Right elbow pain (01/16/2019), S/P total hysterectomy, Screening for colon cancer (01/06/2021), Sleep apnea, and Status post total right knee replacement 10/6/2020 (10/05/2020).    Surgical History:  has a past surgical history that includes Cholecystectomy; Knee arthroscopy w/ debridement (04/2011); Total abdominal hysterectomy w/ bilateral salpingoophorectomy; Rotator cuff repair; Elbow surgery (Right, 07/16/2015); Elbow surgery; Hysterectomy; Colonoscopy (N/A, 01/17/2018); Elbow Arthroplasty (Right, 01/16/2019); Release of ulnar nerve at cubital tunnel (Right, 01/16/2019); Upper gastrointestinal endoscopy; Esophagogastroduodenoscopy (N/A, 04/15/2019); Knee Arthroplasty (Right, 10/06/2020); Cystoscopy (N/A, 12/01/2020); Retrograde pyelography (Bilateral, 12/01/2020); Colonoscopy (N/A, 01/06/2021); Total knee arthroplasty (Left, 10/19/2021); Reverse total shoulder arthroplasty  (Right, 08/16/2022); Esophagogastroduodenoscopy (N/A, 04/21/2023); and Shoulder surgery.    Family History: family history includes Cancer in her father; Colon cancer (age of onset: 83) in her father; Diabetes in her maternal aunt and maternal grandmother.. Otherwise no colon cancer, inflammatory bowel disease, or GI malignancies.    Social History:  reports that she has never smoked. She has never been exposed to tobacco smoke. She has never used smokeless tobacco. She reports that she does not drink alcohol and does not use drugs.    Review of patient's allergies indicates:   Allergen Reactions    Iodinated contrast media Swelling and Rash    Percocet [oxycodone-acetaminophen] Itching    Macrobid [nitrofurantoin monohyd/m-cryst] Rash    Metformin Rash    Penicillins Rash     Had ancef in 2020 with no adverse rxn     Promethazine Rash     Had compazine in 2021    Sulfa (sulfonamide antibiotics) Rash    Sulfamethoxazole-trimethoprim Rash       Medications:   Medications Prior to Admission   Medication Sig Dispense Refill Last Dose    buPROPion (WELLBUTRIN XL) 300 MG 24 hr tablet Take 1 tablet (300 mg total) by mouth every morning. 90 tablet 3 8/15/2024    glucagon (BAQSIMI) 3 mg/actuation Spry Give one puff via nostril. Hold device between fingers and thumb, do not push plunger yet, insert tip gently into one nostril until finger(s) touch the outside of the nose, then push plunger firmly all the way in . Dose is complete when the green line disappears. 1 each 1 Past Week    HUMALOG KWIKPEN INSULIN 100 unit/mL pen Inject 10-12 units before meals plus scale 150-200+2, 201-250+4, 251-300+6, 301-350+8, >350+10. Max daily 66 units. 30 mL 6 8/15/2024    LANTUS SOLOSTAR U-100 INSULIN 100 unit/mL (3 mL) InPn pen Inject 24-52 units at night. 45 mL 3 8/15/2024    latanoprost 0.005 % ophthalmic solution Place 1 drop into both eyes nightly.   8/15/2024    linaCLOtide (LINZESS) 290 mcg Cap capsule Take 1 capsule (290 mcg total)  by mouth before breakfast. 30 capsule 5 Past Week    losartan (COZAAR) 50 MG tablet TAKE 1 TABLET BY MOUTH EVERY DAY 90 tablet 0 8/15/2024    pantoprazole (PROTONIX) 40 MG tablet Take 1 tablet (40 mg total) by mouth once daily. 90 tablet 3 Past Week    pregabalin (LYRICA) 75 MG capsule Take 1 capsule (75 mg total) by mouth 2 (two) times daily. 60 capsule 5 8/15/2024    rosuvastatin (CRESTOR) 10 MG tablet Take 1 tablet (10 mg total) by mouth every evening. 90 tablet 0 Past Week    acetaminophen (TYLENOL) 500 MG tablet Take 2 tablets (1,000 mg total) by mouth every 8 (eight) hours as needed for Pain. 90 tablet 0 Unknown    azelastine (ASTELIN) 137 mcg (0.1 %) nasal spray 1 spray (137 mcg total) by Nasal route 2 (two) times daily. 30 mL 11     blood sugar diagnostic Strp To check BG 3 times daily, to use with insurance preferred meter, e 11.65 100 each 11     DULoxetine (CYMBALTA) 30 MG capsule Take 1 capsule (30 mg total) by mouth once daily. (Patient not taking: Reported on 8/6/2024) 90 capsule 3     ketoconazole (NIZORAL) 2 % shampoo Apply topically every 7 days. 120 mL 6     lancets Misc To check BG 3 times daily, to use with insurance preferred meter, e 11.65 100 each 11     meclizine (ANTIVERT) 25 mg tablet Take 1 tablet (25 mg total) by mouth 3 (three) times daily as needed for Dizziness. 30 tablet 1 More than a month    meloxicam (MOBIC) 15 MG tablet TAKE 1 TABLET BY MOUTH EVERY DAY 30 tablet 2 More than a month    ondansetron (ZOFRAN-ODT) 8 MG TbDL Take 1 tablet (8 mg total) by mouth 3 (three) times daily as needed (Nausea). (Patient not taking: Reported on 8/6/2024) 30 tablet 3     ondansetron (ZOFRAN-ODT) 8 MG TbDL Take 1 tablet (8 mg total) by mouth 3 (three) times daily as needed (nausea/vomiting). 30 tablet 0     ONETOUCH DELICA LANCETS 33 gauge Misc TO CHECK BLOOD GLUCOSE 3 TIMES DAILY       ONETOUCH VERIO FLEX METER Misc TO CHECK BLOOD GLUCOSE 3 TIMES DAILY, TO USE WITH INSURANCE PREFERRED METER, E  "11.65       pen needle, diabetic (NOVOFINE 32) 32 gauge x 1/4" Ndle Uses 4 times a day. 90 day via duramed e 11.65 400 each 3     TRULICITY 0.75 mg/0.5 mL pen injector Inject 0.75 mg into the skin every 7 days.   7/31/2024         Objective Findings:    Vital Signs: see nursing notes  Physical Exam:  General Appearance: Well appearing in no acute distress  Eyes:    No scleral icterus  ENT: Neck supple  Lungs: CTA anteriorly  Heart:  S1, S2 normal, no murmurs heard  Abdomen: Soft, non tender, non distended with positive bowel sounds. No hepatosplenomegaly, ascites, or mass  Extremities: no edema  Skin: No rash      Labs:  Lab Results   Component Value Date    WBC 6.78 07/27/2023    HGB 12.9 07/27/2023    HCT 36.3 (L) 07/27/2023     07/27/2023    CHOL 128 07/27/2023    TRIG 149 07/27/2023    HDL 49 07/27/2023    ALT 20 07/27/2023    AST 18 07/27/2023     07/27/2023    K 4.7 07/27/2023     07/27/2023    CREATININE 0.9 07/27/2023    BUN 14 07/27/2023    CO2 27 07/27/2023    TSH 1.312 07/27/2023    INR 0.9 10/14/2021    HGBA1C 8.2 (H) 08/13/2024       I have explained the risks and benefits of endoscopy procedures to the patient including but not limited to bleeding, perforation, infection, and death.    Aamir Reyes MD    "

## 2024-08-16 NOTE — TRANSFER OF CARE
"Anesthesia Transfer of Care Note    Patient: Riana Kay    Procedure(s) Performed: Procedure(s) (LRB):  COLONOSCOPY (N/A)    Patient location: GI    Anesthesia Type: general    Transport from OR: Transported from OR on room air with adequate spontaneous ventilation    Post pain: adequate analgesia    Post assessment: no apparent anesthetic complications    Post vital signs: stable    Level of consciousness: awake    Nausea/Vomiting: no nausea/vomiting    Complications: none    Transfer of care protocol was followed      Last vitals: Visit Vitals  BP (!) 156/70 (BP Location: Left arm, Patient Position: Lying)   Pulse 61   Temp 36.6 °C (97.9 °F) (Temporal)   Resp 16   Ht 5' 5" (1.651 m)   Wt 75.3 kg (166 lb)   SpO2 98%   Breastfeeding No   BMI 27.62 kg/m²     "

## 2024-08-16 NOTE — PROVATION PATIENT INSTRUCTIONS
Discharge Summary/Instructions after an Endoscopic Procedure  Patient Name: Riana Kay  Patient MRN: [id number]  Patient YOB: 1955 Friday, August 16, 2024  Aamir Reyes MD  Dear patient,  As a result of recent federal legislation (The Federal Cures Act), you may   receive lab or pathology results from your procedure in your [facility]   account before your physician is able to contact you. Your physician or   their representative will relay the results to you with their   recommendations at their soonest availability.  Thank you,  Your health is very important to us during the Covid Crisis. Following your   procedure today, you will receive a daily text for 2 weeks asking about   signs or symptoms of Covid 19.  Please respond to this text when you   receive it so we can follow up and keep you as safe as possible.   RESTRICTIONS:  During your procedure today, you received medications for sedation.  These   medications may affect your judgment, balance and coordination.  Therefore,   for 24 hours, you have the following restrictions:   - DO NOT drive a car, operate machinery, make legal/financial decisions,   sign important papers or drink alcohol.    ACTIVITY:  Today: no heavy lifting, straining or running due to procedural   sedation/anesthesia.  The following day: return to full activity including work.  DIET:  Eat and drink normally unless instructed otherwise.     TREATMENT FOR COMMON SIDE EFFECTS:  - Mild abdominal pain, nausea, belching, bloating or excessive gas:  rest,   eat lightly and use a heating pad.  - Sore Throat: treat with throat lozenges and/or gargle with warm salt   water.  - Because air was used during the procedure, expelling large amounts of air   from your rectum or belching is normal.  - If a bowel prep was taken, you may not have a bowel movement for 1-3 days.    This is normal.  SYMPTOMS TO WATCH FOR AND REPORT TO YOUR PHYSICIAN:  1. Abdominal pain or bloating, other  than gas cramps.  2. Chest pain.  3. Back pain.  4. Signs of infection such as: chills or fever occurring within 24 hours   after the procedure.  5. Rectal bleeding, which would show as bright red, maroon, or black stools.   (A tablespoon of blood from the rectum is not serious, especially if   hemorrhoids are present.)  6. Vomiting.  7. Weakness or dizziness.  GO DIRECTLY TO THE NEAREST EMERGENCY ROOM IF YOU HAVE ANY OF THE FOLLOWING:      Difficulty breathing              Chills and/or fever over 101 F   Persistent vomiting and/or vomiting blood   Severe abdominal pain   Severe chest pain   Black, tarry stools   Bleeding- more than one tablespoon   Any other symptom or condition that you feel may need urgent attention  Your doctor recommends these additional instructions:  If any biopsies were taken, your doctors clinic will contact you in 1 to 2   weeks with any results.  - Discharge patient to home (ambulatory).   - Patient has a contact number available for emergencies.  The signs and   symptoms of potential delayed complications were discussed with the   patient.  Return to normal activities tomorrow.  Written discharge   instructions were provided to the patient.   - Resume previous diet.   - Continue present medications.   - Return to primary care physician as previously scheduled.   - Repeat colonoscopy in 5 years for surveillance.  For questions, problems or results please call your physician - Aamir Reyes MD.  EMERGENCY PHONE NUMBER: [phone],  LAB RESULTS: [phone]  IF A COMPLICATION OR EMERGENCY SITUATION ARISES AND YOU ARE UNABLE TO REACH   YOUR PHYSICIAN - GO DIRECTLY TO THE EMERGENCY ROOM.  Aamir Reyes MD  8/16/2024 11:22:22 AM  This report has been verified and signed electronically.  Dear patient,  As a result of recent federal legislation (The Federal Cures Act), you may   receive lab or pathology results from your procedure in your [facility]   account  before your physician is able to contact you. Your physician or   their representative will relay the results to you with their   recommendations at their soonest availability.  Thank you,  PROVATION

## 2024-08-16 NOTE — PLAN OF CARE
Admission process complete. Patient ready for procedure. Plan of care reviewed with patient. Preoperative fasting appropriate for procedure and sedation. Call light in reach and bed in lowest position.   Past Medical History:   Diagnosis Date    Abdominal pain, right upper quadrant 02/04/2014    Anticoagulant long-term use     Anxiety     AR (allergic rhinitis)     Atrophic gastritis without mention of hemorrhage 02/13/2012     Dx updated per 2019 IMO Load    Chronic fatigue syndrome 06/10/2012    Diabetes mellitus     Dizziness     Fatty liver     GERD (gastroesophageal reflux disease)     Gross hematuria 12/01/2020    HTN (hypertension)     Hyperlipidemia     Leg swelling     Memory loss     Osteopenia     PONV (postoperative nausea and vomiting)     Primary osteoarthritis of right knee 10/06/2020    Primary osteoarthritis of right knee 10/06/2020    Right elbow pain 01/16/2019    S/P total hysterectomy     Screening for colon cancer 01/06/2021    Sleep apnea     Status post total right knee replacement 10/6/2020 10/05/2020

## 2024-08-16 NOTE — ANESTHESIA POSTPROCEDURE EVALUATION
Anesthesia Post Evaluation    Patient: Riana Kay    Procedure(s) Performed: Procedure(s) (LRB):  COLONOSCOPY (N/A)    Final Anesthesia Type: general      Patient location during evaluation: PACU  Patient participation: Yes- Able to Participate  Level of consciousness: awake and alert  Post-procedure vital signs: reviewed and stable  Pain management: adequate  Airway patency: patent  ZAHIRA mitigation strategies: Multimodal analgesia  PONV status at discharge: No PONV  Anesthetic complications: no      Cardiovascular status: hemodynamically stable  Respiratory status: spontaneous ventilation and room air  Hydration status: euvolemic  Follow-up not needed.              Vitals Value Taken Time   /70 08/16/24 1151   Temp  08/16/24 1347   Pulse 60 08/16/24 1151   Resp 16 08/16/24 1151   SpO2 100 % 08/16/24 1151         Event Time   Out of Recovery 12:02:19         Pain/Jeffery Score: Jeffery Score: 10 (8/16/2024 11:39 AM)

## 2024-08-19 PROBLEM — E11.65 TYPE 2 DIABETES MELLITUS WITH HYPERGLYCEMIA, WITH LONG-TERM CURRENT USE OF INSULIN: Status: ACTIVE | Noted: 2017-12-14

## 2024-08-19 LAB
FINAL PATHOLOGIC DIAGNOSIS: NORMAL
GROSS: NORMAL
Lab: NORMAL

## 2024-08-19 NOTE — PROGRESS NOTES
CC: This 69 y.o.  female presents for management of type 2 dm along with the current chronic medical conditions including:  Patient Active Problem List   Diagnosis    Anxiety    Fatty liver    Memory changes    Pathological fracture of right humerus due to osteoporosis with routine healing    Gastroesophageal reflux disease without esophagitis    Hypertension associated with type 2 diabetes mellitus    ZAHIRA (obstructive sleep apnea)    Atherosclerosis of native coronary artery of native heart without angina pectoris    Muscle spasms of neck    Radiculopathy of cervical spine    Overweight (BMI 25.0-29.9)    Type 2 diabetes mellitus with hyperglycemia, with long-term current use of insulin    Decreased strength of lower extremity    Vitamin D deficiency    Chronic right shoulder pain    Primary osteoarthritis of left knee    Aortic atherosclerosis    Closed fracture of proximal end of right humerus with routine healing s/p total shoulder replacement on 8/16/2022    Anterior dislocation of right shoulder    Carotid arterial disease on ct dated  8/14/2022    Hiatal hernia    Gastroparesis due to DM    Chronic constipation    Impaired functional mobility, balance, gait, and endurance      Status of these conditions is pending review.    H/o fatty liver, HTN, overweight, osteopenia, coronary atherosclerosis, gout, arthritic pain (L) knee, ankle     HPI: Diagnosed with T2DM x > 20 years ago.   Started insulin in 2006.   Seen by Dr. Morse in the past- c peptide- revealed low level -hair   Watch trends with hair in near future.   Last seen by me in spring 2024.   Being sen by me again today.   Able to tolerate trulicity vs ozempic.  Recent colonoscopy, biopsy, benign polyps.     Lately bg high   7 day 244   14 day 238   30 day 237   Bg 180, 190, 187, 213, 236, 302, 227, 245, 246, 282  mg/dl   Accompanied by .  Memory loss issues at times.  F/uy with GI, had issues with ozempic, 2023.   Not as active.    Podiatry- 11/8/2023    Dietary habits:  3 meals a day   Cup of coffee-milk, splenda, toast (1) or  Sometimes cereal frosted or corn flakes/milk  Soup, veggies, chicken-fried or stir vivar   Dinner: skips at times   Occ snacks-chips, cookies, crackers/coffee, salad   Fruits  Drinks: water, occ oj, unsweetened tea    Not interested in cgm   Remains on MDI  In past on victoza  and januvia- edema      A1c improved 8.3% to 7.4% to 8.2%   Lab Results   Component Value Date    HGBA1C 8.2 (H) 08/13/2024     On MDI injections 4x a day   Testing 4 x a day  Patient is willing and able to use the device  Demonstrated an understanding of the technology and is motivated to use CGM  Patient expected to adhere to a comprehensive diabetes treatment plan and patient has adequate medical supervision  Patient experiences multiple impaired awareness of hypoglycemia (hypoglycemia unawareness)    True metrix  Has h/o hypoglycemia 39 mg/dl    (R)  Knee replacement sx 10/6/2020  Injury to right shoulder, surgery in august 2022     Of note, , retired, fall in 2015, injured (R) elbow  Off metformin related to kidneys.    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   HgA1C Testing and Control   Lab Results   Component Value Date    HGBA1C 8.2 (H) 08/13/2024      Annually/Less than 8% Yes   Lipid profile : 07/27/2023 Annually Yes   LDL control Lab Results   Component Value Date    LDLCALC 49.2 (L) 07/27/2023    Annually/Less than 100 mg/dl  Yes   Nephropathy screening Lab  Results   Component Value Date    LABMICR 24.0 07/27/2023     Lab Results   Component Value Date    PROTEINUA Trace (A) 08/06/2024    Annually Yes   Blood pressure BP Readings from Last 1 Encounters:   08/16/24 136/70    Less than 140/90 Yes   Dilated retinal exam : 01/02/2024 Annually Yes   Foot exam   : 04/18/2024 Annually No       ROS:   GEN: +chronic fatigue or weakness, no changes w/ appetite, wt gain 3#   CV:  Denies chest pain, palpitations, edema or cyanosis, denies syncope  SKIN: Skin is intact and heals well, no rashes, pruritis, easy bruising, no hair changes, no intolerance to heat/cold.  RESP: No SOB, cough, FISCHER  FEN/GI: Nml bowel movements, normal appetite, +GERD, +nausea, no vomiting   RENAL: No urinary complaints, no dysuria/hematuia/oliguria   ENDO: no heat/cold intolerance  ID: No skin breakdown, fevers, chills  PSYCH: Denies drug/ETOH abuse, no hx. of eating disorders or depression +anxiety  MS/NEURO: Denies numbness/ tingling in BLE; +bilateral knee and ankle joint pain-(R) knee replacement 10/2020, (R) elbow pain -fall in 2015, surgery, (R) shoulder 8/2022 -weakness       Lab Results   Component Value Date    TSH 1.312 07/27/2023       Chemistry        Component Value Date/Time     07/27/2023 0940    K 4.7 07/27/2023 0940     07/27/2023 0940    CO2 27 07/27/2023 0940    BUN 14 07/27/2023 0940    CREATININE 0.9 07/27/2023 0940     (H) 07/27/2023 0940        Component Value Date/Time    CALCIUM 9.4 07/27/2023 0940    ALKPHOS 103 07/27/2023 0940    AST 18 07/27/2023 0940    ALT 20 07/27/2023 0940    BILITOT 0.5 07/27/2023 0940          Lab Results   Component Value Date    LDLCALC 49.2 (L) 07/27/2023       PE:  GEN: Patient WNWD, WF, NAD, AAOx3, Friendly, talkative, well groomed  HEENT: EOMI, PERRLA, no lid lag, Clear oropharynx  NECK: trachea midline  CV: Regular rate and rhythm, no edema  RESP: No increase work of breathing, No cough, wheeze.  ABD: no tenderness EXT: No  C/C/Edema, No skin rash/breakdown  NEURO: CN 2-12 intact, nml gait   FEET: No pressure areas, blisters, ulcers, calluses. Footware appropriate.      ASSESSMENT and PLAN:  Riana was seen today for diabetes mellitus.    Diagnoses and associated orders for this visit:  1. Type 2 diabetes mellitus with hyperglycemia, with long-term current use of insulin  Hemoglobin A1C    Lipid Panel    Microalbumin/Creatinine Ratio, Urine  F/u in 3  months   Labs before 11/30/24   Increase trulicity 1.5 mg weekly   Increase lantus to 40 units at night         2. Gastroparesis due to DM  Be mindful of dose for glp1a  Not able to tolerate ozempic, but able to tolerate trulicity       3. Aortic atherosclerosis  On statin, stable  Lab Results   Component Value Date    LDLCALC 49.2 (L) 07/27/2023     Lipid next time       4. Fatty liver  Weight loss will help  Glp1a will help as well       5. Overweight (BMI 25.0-29.9)  Body mass index is 28.25 kg/m². May increase insulin resistance  Encourage exercise 3-5 x a week       6. ZAHIRA (obstructive sleep apnea)  On cpap , will help with heart, metabolism       7. Impaired functional mobility, balance, gait, and endurance  Will like to start back with gym for balance, stretching etc.       8. Hypertension associated with type 2 diabetes mellitus  Bp elevated  On arb       9. Encounter for screening mammogram for malignant neoplasm of breast  Mammo Digital Screening Bilat

## 2024-08-20 ENCOUNTER — OFFICE VISIT (OUTPATIENT)
Dept: INTERNAL MEDICINE | Facility: CLINIC | Age: 69
End: 2024-08-20
Payer: MEDICARE

## 2024-08-20 VITALS
HEIGHT: 65 IN | WEIGHT: 169.75 LBS | DIASTOLIC BLOOD PRESSURE: 78 MMHG | BODY MASS INDEX: 28.28 KG/M2 | SYSTOLIC BLOOD PRESSURE: 136 MMHG | OXYGEN SATURATION: 96 % | HEART RATE: 64 BPM

## 2024-08-20 DIAGNOSIS — I70.0 AORTIC ATHEROSCLEROSIS: ICD-10-CM

## 2024-08-20 DIAGNOSIS — K76.0 FATTY LIVER: ICD-10-CM

## 2024-08-20 DIAGNOSIS — Z79.4 TYPE 2 DIABETES MELLITUS WITH HYPERGLYCEMIA, WITH LONG-TERM CURRENT USE OF INSULIN: Primary | ICD-10-CM

## 2024-08-20 DIAGNOSIS — G47.33 OSA (OBSTRUCTIVE SLEEP APNEA): ICD-10-CM

## 2024-08-20 DIAGNOSIS — E66.3 OVERWEIGHT (BMI 25.0-29.9): ICD-10-CM

## 2024-08-20 DIAGNOSIS — I15.2 HYPERTENSION ASSOCIATED WITH TYPE 2 DIABETES MELLITUS: ICD-10-CM

## 2024-08-20 DIAGNOSIS — E11.65 TYPE 2 DIABETES MELLITUS WITH HYPERGLYCEMIA, WITH LONG-TERM CURRENT USE OF INSULIN: Primary | ICD-10-CM

## 2024-08-20 DIAGNOSIS — Z12.31 ENCOUNTER FOR SCREENING MAMMOGRAM FOR MALIGNANT NEOPLASM OF BREAST: ICD-10-CM

## 2024-08-20 DIAGNOSIS — Z74.09 IMPAIRED FUNCTIONAL MOBILITY, BALANCE, GAIT, AND ENDURANCE: ICD-10-CM

## 2024-08-20 DIAGNOSIS — E11.43 GASTROPARESIS DUE TO DM: ICD-10-CM

## 2024-08-20 DIAGNOSIS — K31.84 GASTROPARESIS DUE TO DM: ICD-10-CM

## 2024-08-20 DIAGNOSIS — E11.59 HYPERTENSION ASSOCIATED WITH TYPE 2 DIABETES MELLITUS: ICD-10-CM

## 2024-08-20 PROCEDURE — 1101F PT FALLS ASSESS-DOCD LE1/YR: CPT | Mod: CPTII,S$GLB,, | Performed by: NURSE PRACTITIONER

## 2024-08-20 PROCEDURE — 1159F MED LIST DOCD IN RCRD: CPT | Mod: CPTII,S$GLB,, | Performed by: NURSE PRACTITIONER

## 2024-08-20 PROCEDURE — 99214 OFFICE O/P EST MOD 30 MIN: CPT | Mod: S$GLB,,, | Performed by: NURSE PRACTITIONER

## 2024-08-20 PROCEDURE — 4010F ACE/ARB THERAPY RXD/TAKEN: CPT | Mod: CPTII,S$GLB,, | Performed by: NURSE PRACTITIONER

## 2024-08-20 PROCEDURE — 3078F DIAST BP <80 MM HG: CPT | Mod: CPTII,S$GLB,, | Performed by: NURSE PRACTITIONER

## 2024-08-20 PROCEDURE — 3075F SYST BP GE 130 - 139MM HG: CPT | Mod: CPTII,S$GLB,, | Performed by: NURSE PRACTITIONER

## 2024-08-20 PROCEDURE — 1126F AMNT PAIN NOTED NONE PRSNT: CPT | Mod: CPTII,S$GLB,, | Performed by: NURSE PRACTITIONER

## 2024-08-20 PROCEDURE — 3288F FALL RISK ASSESSMENT DOCD: CPT | Mod: CPTII,S$GLB,, | Performed by: NURSE PRACTITIONER

## 2024-08-20 PROCEDURE — 3052F HG A1C>EQUAL 8.0%<EQUAL 9.0%: CPT | Mod: CPTII,S$GLB,, | Performed by: NURSE PRACTITIONER

## 2024-08-20 PROCEDURE — 99999 PR PBB SHADOW E&M-EST. PATIENT-LVL V: CPT | Mod: PBBFAC,,, | Performed by: NURSE PRACTITIONER

## 2024-08-20 PROCEDURE — 3008F BODY MASS INDEX DOCD: CPT | Mod: CPTII,S$GLB,, | Performed by: NURSE PRACTITIONER

## 2024-08-20 PROCEDURE — 1157F ADVNC CARE PLAN IN RCRD: CPT | Mod: CPTII,S$GLB,, | Performed by: NURSE PRACTITIONER

## 2024-08-20 PROCEDURE — 1160F RVW MEDS BY RX/DR IN RCRD: CPT | Mod: CPTII,S$GLB,, | Performed by: NURSE PRACTITIONER

## 2024-08-20 RX ORDER — DULAGLUTIDE 1.5 MG/.5ML
1.5 INJECTION, SOLUTION SUBCUTANEOUS
Qty: 4 PEN | Refills: 6 | Status: SHIPPED | OUTPATIENT
Start: 2024-08-20

## 2024-08-20 RX ORDER — INSULIN GLARGINE 100 [IU]/ML
INJECTION, SOLUTION SUBCUTANEOUS
Start: 2024-08-20

## 2024-08-20 NOTE — PATIENT INSTRUCTIONS
Truliclity 1.5 mg weekly   Lantus 40 units at night   Humalog 14 units before meals  Scale use 180-230+2 ,etc.     Lab Results   Component Value Date    HGBA1C 8.2 (H) 08/13/2024     Goal less than 7.5%    [email or web address]  Eat fit brett  Myfitnesspal brett  [email or web address]    mySugr brett     Goal  no higher than 200     Follow up in 3 -4 months   A1c lipid urine mac before 11/30/24, nov   Mammogram 2024

## 2024-08-30 NOTE — PLAN OF CARE
SW completed LOCET and faxed PASRR to state. Waiting on 142.      Christina Martinez LMSW  Case Management   [facility]   Ext. [phone]         Rt side 1.30 N 5  Lt side retroareolar

## 2024-09-07 DIAGNOSIS — I25.10 ATHEROSCLEROSIS OF NATIVE CORONARY ARTERY OF NATIVE HEART WITHOUT ANGINA PECTORIS: ICD-10-CM

## 2024-09-07 NOTE — TELEPHONE ENCOUNTER
No care due was identified.  [facility] Embedded Care Due Messages. Reference number: [id number].   9/07/2024 4:50:56 PM CDT

## 2024-09-08 DIAGNOSIS — K21.9 GASTROESOPHAGEAL REFLUX DISEASE WITHOUT ESOPHAGITIS: ICD-10-CM

## 2024-09-08 RX ORDER — ATORVASTATIN CALCIUM 20 MG/1
20 TABLET, FILM COATED ORAL
Qty: 90 TABLET | Refills: 3 | OUTPATIENT
Start: 2024-09-08

## 2024-09-08 NOTE — TELEPHONE ENCOUNTER
Refill Decision Note   Riana Kay  is requesting a refill authorization.  Brief Assessment and Rationale for Refill:  Quick Discontinue     Medication Therapy Plan:  discontinued on 6/27/24 by Jose Rojas MD; change in therapy      Comments:     Note composed:5:00 PM 09/08/2024

## 2024-09-08 NOTE — TELEPHONE ENCOUNTER
-- DO NOT REPLY / DO NOT REPLY ALL --  -- This inbox is not monitored  -- Message is from Engagement Center Operations (ECO) --    General Patient Message: Gloria from [facility]  is calling in to follow up on plan for home care for patient was sent out on 7/22/24  Caller Information         Type Contact Phone/Fax    07/25/2024 10:27 AM CDT Phone (Incoming) Gloria([place] [address]) (Other) [phone]            Alternative phone number: no    Can a detailed message be left? Yes - Voicemail      Patient has been advised the message will be addressed within 2-3 business days.             No care due was identified.  [facility] Embedded Care Due Messages. Reference number: [id number].   9/08/2024 11:51:00 AM CDT

## 2024-09-09 RX ORDER — PANTOPRAZOLE SODIUM 40 MG/1
40 TABLET, DELAYED RELEASE ORAL DAILY
Qty: 90 TABLET | Refills: 3 | Status: SHIPPED | OUTPATIENT
Start: 2024-09-09

## 2024-09-09 NOTE — TELEPHONE ENCOUNTER
Refill Decision Note   Riana Kay  is requesting a refill authorization.  Brief Assessment and Rationale for Refill:  Approve     Medication Therapy Plan:         Comments:     Note composed:10:50 AM 09/09/2024

## 2024-09-10 ENCOUNTER — TELEPHONE (OUTPATIENT)
Dept: ORTHOPEDICS | Facility: CLINIC | Age: 69
End: 2024-09-10
Payer: MEDICARE

## 2024-09-10 RX ORDER — MELOXICAM 15 MG/1
15 TABLET ORAL
Qty: 30 TABLET | Refills: 2 | Status: SHIPPED | OUTPATIENT
Start: 2024-09-10

## 2024-09-16 ENCOUNTER — OFFICE VISIT (OUTPATIENT)
Dept: NEUROLOGY | Facility: CLINIC | Age: 69
End: 2024-09-16
Payer: MEDICARE

## 2024-09-16 ENCOUNTER — TELEPHONE (OUTPATIENT)
Dept: PALLIATIVE MEDICINE | Facility: CLINIC | Age: 69
End: 2024-09-16
Payer: MEDICARE

## 2024-09-16 ENCOUNTER — LAB VISIT (OUTPATIENT)
Dept: LAB | Facility: HOSPITAL | Age: 69
End: 2024-09-16
Attending: STUDENT IN AN ORGANIZED HEALTH CARE EDUCATION/TRAINING PROGRAM
Payer: MEDICARE

## 2024-09-16 VITALS
HEART RATE: 68 BPM | HEIGHT: 65 IN | OXYGEN SATURATION: 97 % | DIASTOLIC BLOOD PRESSURE: 75 MMHG | BODY MASS INDEX: 28.17 KG/M2 | WEIGHT: 169.06 LBS | SYSTOLIC BLOOD PRESSURE: 131 MMHG

## 2024-09-16 DIAGNOSIS — R41.89 COGNITIVE IMPAIRMENT: Primary | ICD-10-CM

## 2024-09-16 DIAGNOSIS — R41.9 UNSPECIFIED SYMPTOMS AND SIGNS INVOLVING COGNITIVE FUNCTIONS AND AWARENESS: ICD-10-CM

## 2024-09-16 DIAGNOSIS — F41.9 ANXIETY: ICD-10-CM

## 2024-09-16 DIAGNOSIS — R41.89 COGNITIVE IMPAIRMENT: ICD-10-CM

## 2024-09-16 DIAGNOSIS — G47.33 OSA (OBSTRUCTIVE SLEEP APNEA): ICD-10-CM

## 2024-09-16 LAB — VIT B12 SERPL-MCNC: 245 PG/ML (ref 210–950)

## 2024-09-16 PROCEDURE — 3052F HG A1C>EQUAL 8.0%<EQUAL 9.0%: CPT | Mod: CPTII,S$GLB,, | Performed by: STUDENT IN AN ORGANIZED HEALTH CARE EDUCATION/TRAINING PROGRAM

## 2024-09-16 PROCEDURE — 1101F PT FALLS ASSESS-DOCD LE1/YR: CPT | Mod: CPTII,S$GLB,, | Performed by: STUDENT IN AN ORGANIZED HEALTH CARE EDUCATION/TRAINING PROGRAM

## 2024-09-16 PROCEDURE — 3078F DIAST BP <80 MM HG: CPT | Mod: CPTII,S$GLB,, | Performed by: STUDENT IN AN ORGANIZED HEALTH CARE EDUCATION/TRAINING PROGRAM

## 2024-09-16 PROCEDURE — 99215 OFFICE O/P EST HI 40 MIN: CPT | Mod: S$GLB,,, | Performed by: STUDENT IN AN ORGANIZED HEALTH CARE EDUCATION/TRAINING PROGRAM

## 2024-09-16 PROCEDURE — 3288F FALL RISK ASSESSMENT DOCD: CPT | Mod: CPTII,S$GLB,, | Performed by: STUDENT IN AN ORGANIZED HEALTH CARE EDUCATION/TRAINING PROGRAM

## 2024-09-16 PROCEDURE — 1157F ADVNC CARE PLAN IN RCRD: CPT | Mod: CPTII,S$GLB,, | Performed by: STUDENT IN AN ORGANIZED HEALTH CARE EDUCATION/TRAINING PROGRAM

## 2024-09-16 PROCEDURE — 99999 PR PBB SHADOW E&M-EST. PATIENT-LVL V: CPT | Mod: PBBFAC,,, | Performed by: STUDENT IN AN ORGANIZED HEALTH CARE EDUCATION/TRAINING PROGRAM

## 2024-09-16 PROCEDURE — 82607 VITAMIN B-12: CPT | Performed by: STUDENT IN AN ORGANIZED HEALTH CARE EDUCATION/TRAINING PROGRAM

## 2024-09-16 PROCEDURE — 1126F AMNT PAIN NOTED NONE PRSNT: CPT | Mod: CPTII,S$GLB,, | Performed by: STUDENT IN AN ORGANIZED HEALTH CARE EDUCATION/TRAINING PROGRAM

## 2024-09-16 PROCEDURE — 36415 COLL VENOUS BLD VENIPUNCTURE: CPT | Performed by: STUDENT IN AN ORGANIZED HEALTH CARE EDUCATION/TRAINING PROGRAM

## 2024-09-16 PROCEDURE — 3075F SYST BP GE 130 - 139MM HG: CPT | Mod: CPTII,S$GLB,, | Performed by: STUDENT IN AN ORGANIZED HEALTH CARE EDUCATION/TRAINING PROGRAM

## 2024-09-16 PROCEDURE — 1159F MED LIST DOCD IN RCRD: CPT | Mod: CPTII,S$GLB,, | Performed by: STUDENT IN AN ORGANIZED HEALTH CARE EDUCATION/TRAINING PROGRAM

## 2024-09-16 PROCEDURE — 3008F BODY MASS INDEX DOCD: CPT | Mod: CPTII,S$GLB,, | Performed by: STUDENT IN AN ORGANIZED HEALTH CARE EDUCATION/TRAINING PROGRAM

## 2024-09-16 PROCEDURE — 4010F ACE/ARB THERAPY RXD/TAKEN: CPT | Mod: CPTII,S$GLB,, | Performed by: STUDENT IN AN ORGANIZED HEALTH CARE EDUCATION/TRAINING PROGRAM

## 2024-09-16 NOTE — PROGRESS NOTES
Chief Complaint and Duration     Chief Complaint   Patient presents with    Consult    Memory Loss     Referred by dr CISSE, ANTHONY ACOSTA   Confused & memory loss     for years    History of Present Illness     Riana Kay is a 69 y.o. year old female, patient with a history of anxiety risk factors including hypertension, type be arthrosclerosis.  Patient is coming here to me, has been evaluated in the past for cognitive impairment specifically with short-term memory.  Has had neuropsych testing in which low suspicion for neurodegenerative disorders.  Discussed control of anxiety.      Last evaluation for that was back in 2022.  Patient's PCP placed another referral for Neurology as well as neuropsych at this time.  Patient is here today.    Does have history of obstructive sleep apnea.  Takes her CPAP machine nightly.    Patient herself denies significant issues with anxiety and stress, however  at bedside endorses she does.    Review of patient's allergies indicates:   Allergen Reactions    Iodinated contrast media Swelling and Rash    Percocet [oxycodone-acetaminophen] Itching    Macrobid [nitrofurantoin monohyd/m-cryst] Rash    Metformin Rash    Penicillins Rash     Had ancef in 2020 with no adverse rxn     Promethazine Rash     Had compazine in 2021    Sulfa (sulfonamide antibiotics) Rash    Sulfamethoxazole-trimethoprim Rash     Current Outpatient Medications   Medication Sig Dispense Refill    acetaminophen (TYLENOL) 500 MG tablet Take 2 tablets (1,000 mg total) by mouth every 8 (eight) hours as needed for Pain. 90 tablet 0    blood sugar diagnostic Strp To check BG 3 times daily, to use with insurance preferred meter, e 11.65 100 each 11    buPROPion (WELLBUTRIN XL) 300 MG 24 hr tablet Take 1 tablet (300 mg total) by mouth every morning. 90 tablet 3    dulaglutide (TRULICITY) 1.5 mg/0.5 mL pen injector Inject 1.5 mg into the skin every 7 days. 4 pen 6    glucagon (BAQSIMI) 3 mg/actuation  "Spry Give one puff via nostril. Hold device between fingers and thumb, do not push plunger yet, insert tip gently into one nostril until finger(s) touch the outside of the nose, then push plunger firmly all the way in . Dose is complete when the green line disappears. 1 each 1    HUMALOG KWIKPEN INSULIN 100 unit/mL pen Inject 10-12 units before meals plus scale 150-200+2, 201-250+4, 251-300+6, 301-350+8, >350+10. Max daily 66 units. 30 mL 6    ketoconazole (NIZORAL) 2 % shampoo Apply topically every 7 days. 120 mL 6    lancets Misc To check BG 3 times daily, to use with insurance preferred meter, e 11.65 100 each 11    LANTUS SOLOSTAR U-100 INSULIN 100 unit/mL (3 mL) InPn pen Inject 40-52 units at night.      latanoprost 0.005 % ophthalmic solution Place 1 drop into both eyes nightly.      losartan (COZAAR) 50 MG tablet TAKE 1 TABLET BY MOUTH EVERY DAY 90 tablet 0    meloxicam (MOBIC) 15 MG tablet TAKE 1 TABLET BY MOUTH EVERY DAY 30 tablet 2    ondansetron (ZOFRAN-ODT) 8 MG TbDL Take 1 tablet (8 mg total) by mouth 3 (three) times daily as needed (Nausea). 30 tablet 3    ondansetron (ZOFRAN-ODT) 8 MG TbDL Take 1 tablet (8 mg total) by mouth 3 (three) times daily as needed (nausea/vomiting). 30 tablet 0    ONETOUCH DELICA LANCETS 33 gauge Misc TO CHECK BLOOD GLUCOSE 3 TIMES DAILY      ONETOUCH VERIO FLEX METER Misc TO CHECK BLOOD GLUCOSE 3 TIMES DAILY, TO USE WITH INSURANCE PREFERRED METER, E 11.65      pantoprazole (PROTONIX) 40 MG tablet Take 1 tablet (40 mg total) by mouth once daily. 90 tablet 3    pen needle, diabetic (NOVOFINE 32) 32 gauge x 1/4" Ndle Uses 4 times a day. 90 day via duramed e 11.65 400 each 3    pregabalin (LYRICA) 75 MG capsule Take 1 capsule (75 mg total) by mouth 2 (two) times daily. 60 capsule 5    rosuvastatin (CRESTOR) 10 MG tablet Take 1 tablet (10 mg total) by mouth every evening. 90 tablet 0    azelastine (ASTELIN) 137 mcg (0.1 %) nasal spray 1 spray (137 mcg total) by Nasal route 2 " (two) times daily. 30 mL 11    meclizine (ANTIVERT) 25 mg tablet Take 1 tablet (25 mg total) by mouth 3 (three) times daily as needed for Dizziness. 30 tablet 1     No current facility-administered medications for this visit.       Medical History     Past Medical History:   Diagnosis Date    Abdominal pain, right upper quadrant 02/04/2014    Anticoagulant long-term use     Anxiety     AR (allergic rhinitis)     Atrophic gastritis without mention of hemorrhage 02/13/2012     Dx updated per 2019 IMO Load    Chronic fatigue syndrome 06/10/2012    Diabetes mellitus     Dizziness     Fatty liver     GERD (gastroesophageal reflux disease)     Gross hematuria 12/01/2020    HTN (hypertension)     Hyperlipidemia     Leg swelling     Memory loss     Osteopenia     PONV (postoperative nausea and vomiting)     Primary osteoarthritis of right knee 10/06/2020    Primary osteoarthritis of right knee 10/06/2020    Right elbow pain 01/16/2019    S/P total hysterectomy     Screening for colon cancer 01/06/2021    Sleep apnea     Status post total right knee replacement 10/6/2020 10/05/2020     Past Surgical History:   Procedure Laterality Date    CHOLECYSTECTOMY      COLONOSCOPY N/A 01/17/2018    Procedure: COLONOSCOPY with Donnell;  Surgeon: Roland Jacobo MD;  Location: [address]);  Service: Endoscopy;  Laterality: N/A;    COLONOSCOPY N/A 01/06/2021    Procedure: COLONOSCOPY;  Surgeon: Yong Rowland MD;  Location: [facility] ([address] [place] [address]);  Service: Endoscopy;  Laterality: N/A;  prep ins. emailed - Swedish speaking,  needed - ERW  [facility] - ERW    COLONOSCOPY N/A 8/16/2024    Procedure: COLONOSCOPY;  Surgeon: Aamir Reyes MD;  Location: [facility];  Service: Endoscopy;  Laterality: N/A;  8/9/24-Suprep, holding Trulicity, precall complete-DS  8/14/24-LVM to see if pt can come earlier-DS    CYSTOSCOPY N/A 12/01/2020    Procedure: CYSTOSCOPY;  Surgeon: Ines Stanford,  MD;  Location: [facility] OR 1ST FLR;  Service: Urology;  Laterality: N/A;  45 minutes     ELBOW ARTHROPLASTY Right 01/16/2019    Procedure: ARTHROPLASTY, ELBOW right radial head arthroplasty revision;  Surgeon: Katja Hubbard MD;  Location: [facility] OR 1ST FLR;  Service: Orthopedics;  Laterality: Right;  Anesthesia: General and Regional. Stretcher, hand pan 1 and pan 2, CALL ACCUMED, CLAIRX  [employer] notified 1-14 LO    ELBOW SURGERY Right 07/16/2015    ELBOW SURGERY      ESOPHAGOGASTRODUODENOSCOPY N/A 04/15/2019    Procedure: EGD (ESOPHAGOGASTRODUODENOSCOPY);  Surgeon: Buck Irwin MD;  Location: [facility] ENDO (4TH FLR);  Service: Endoscopy;  Laterality: N/A;  ray she    ESOPHAGOGASTRODUODENOSCOPY N/A 04/21/2023    Procedure: EGD (ESOPHAGOGASTRODUODENOSCOPY);  Surgeon: Aamir Reyes MD;  Location: [facility];  Service: Endoscopy;  Laterality: N/A;    HYSTERECTOMY      KNEE ARTHROPLASTY Right 10/06/2020    Procedure: ARTHROPLASTY, KNEE-SAME DAY PROTOCOL;  Surgeon: Sabino Damon MD;  Location: [facility];  Service: Orthopedics;  Laterality: Right;    KNEE ARTHROSCOPY W/ DEBRIDEMENT  04/2011    Left    RELEASE OF ULNAR NERVE AT CUBITAL TUNNEL Right 01/16/2019    Procedure: RELEASE, ULNAR TUNNEL right;  Surgeon: Katja Hubbard MD;  Location: [facility] OR 1ST FLR;  Service: Orthopedics;  Laterality: Right;  Anesthesia: General and Regional. Stretcher, hand pan 1 and pan 2, CALL ACCUMED, CLAIRX    RETROGRADE PYELOGRAPHY Bilateral 12/01/2020    Procedure: PYELOGRAM, RETROGRADE;  Surgeon: Ines Stanford MD;  Location: [facility] OR 1ST FLR;  Service: Urology;  Laterality: Bilateral;    REVERSE TOTAL SHOULDER ARTHROPLASTY Right 08/16/2022    Procedure: ARTHROPLASTY, SHOULDER, TOTAL, REVERSE, [place];  Surgeon: Aamir Powell MD;  Location: [facility] OR 2ND FLR;  Service: Orthopedics;  Laterality: Right;  [place] Can't go until after 12    ROTATOR CUFF REPAIR      SHOULDER SURGERY      TOTAL  ABDOMINAL HYSTERECTOMY W/ BILATERAL SALPINGOOPHORECTOMY      TOTAL KNEE ARTHROPLASTY Left 10/19/2021    Procedure: ARTHROPLASTY, KNEE, TOTAL;  Surgeon: Sabino Damon MD;  Location: [facility];  Service: Orthopedics;  Laterality: Left;    UPPER GASTROINTESTINAL ENDOSCOPY       Family History   Problem Relation Name Age of Onset    Cancer Father          colon    Colon cancer Father  83        colon cancer    Diabetes Maternal Aunt      Diabetes Maternal Grandmother      Esophageal cancer Neg Hx      Stomach cancer Neg Hx      Melanoma Neg Hx       Social History     Socioeconomic History    Marital status:      Spouse name: Kemal    Number of children: 1   Occupational History    Occupation: Housekeeping     Comment: On disability   Tobacco Use    Smoking status: Never     Passive exposure: Never    Smokeless tobacco: Never   Substance and Sexual Activity    Alcohol use: No    Drug use: No    Sexual activity: Yes     Partners: Male     Birth control/protection: Post-menopausal   Other Topics Concern    Are you pregnant or think you may be? No    Breast-feeding No   Social History Narrative    She retired at [employer] in [place]; , 1 kid (21yo).Nonsmoker, social etoh.    No stairs     Social Determinants of Health     Financial Resource Strain: Low Risk  (11/13/2023)    Overall Financial Resource Strain (CARDIA)     Difficulty of Paying Living Expenses: Not hard at all   Food Insecurity: No Food Insecurity (11/13/2023)    Hunger Vital Sign     Worried About Running Out of Food in the Last Year: Never true     Ran Out of Food in the Last Year: Never true   Transportation Needs: No Transportation Needs (11/13/2023)    PRAPARE - Transportation     Lack of Transportation (Medical): No     Lack of Transportation (Non-Medical): No   Physical Activity: Sufficiently Active (11/13/2023)    Exercise Vital Sign     Days of Exercise per Week: 5 days     Minutes of Exercise per Session: 90 min   Stress:  Stress Concern Present (11/13/2023)    Citizen of [place] of Occupational Health - Occupational Stress Questionnaire     Feeling of Stress : To some extent   Housing Stability: Low Risk  (11/13/2023)    Housing Stability Vital Sign     Unable to Pay for Housing in the Last Year: No     Number of Places Lived in the Last Year: 1     Unstable Housing in the Last Year: No       Exam     Vitals:    09/16/24 0954   BP: 131/75   Pulse: 68        Physical Exam:  General: Not in acute distress. Not ill-appearing.   HENT: Normocephalic and atraumatic. Moist mucous membranes.  Eyes: Conjunctivae normal.   Pulmonary: Pulmonary effort is normal.   Skin: Skin is warm and dry. No rashes.   Psychiatric: Mood normal.        Neurologic Exam   Mental status: oriented to person, place, and time  Attention: Normal. Concentration: normal.  Speech: speech is normal.  Cranial Nerves: PERRL, EOMI intact, V1-V3 Facial sensation intact. Symmetric facies. Hearing grossly intact. Palate and uvula midline, symmetric. No tongue deviation. Trapezius strength intact.     Motor exam: bulk and tone normal. Strength 5/5 grossly in upper and lower extremities    Sensory exam: light touch intact    Gait exam: normal  Coordination: normal    Tremor: none  Cogwheel rigidity: none    Labs and Imaging     Labs: reviewed  No results found for this or any previous visit (from the past 24 hour(s)).    HgA1C%:  8.2  LDL:  40    Vit B12:     Imaging:   I have personally reviewed the images performed.   MRI of the brain in 2022 with no acute intracranial processes.  Does have mild chronic small-vessel changes        Assessment and Plan     Problem List Items Addressed This Visit          Neuro    Cognitive impairment - Primary    Relevant Orders    Ambulatory referral/consult to Geriatrics    VITAMIN B12       Psychiatric    Anxiety       Other    ZAHIRA (obstructive sleep apnea)     This is a 69-year-old female with a history of vascular risk factors including  diabetes, hypertension, aortic atherosclerosis.  Patient is coming to me after having been evaluated by Neurology and neuropsych in the past for cognitive impairment, discussed anxiety at that time low suspicion for neurodegenerative diseases.  Continues to have short-term memory problems.  Discussed prior imaging findings with chronic small-vessel changes on there.  Needs better control of her diabetes, blood pressure.    Discussed multifactorial nature of memory, discussed better control of her vascular risk factors including diabetes given the prior imaging showed chronic microvascular changes.    Questions answered at length.    Patient also has a pending referral to neuropsych for another evaluation.  We will message to get patient in with neuropsych.    Follow-up:  With neuropsych    Time spent on this encounter:  Forty-five minutes. This includes face to face time and non-face to face time preparing to see the patient (eg, review of tests), obtaining and/or reviewing separately obtained history, documenting clinical information in the electronic or other health record, independently interpreting results and communicating results to the patient/family/caregiver, or care coordinator.     This note was created by combination of typed  and M-Modal dictation. Transcription and phonetic errors may be present.  If there are any questions, please contact me.

## 2024-09-16 NOTE — TELEPHONE ENCOUNTER
----- Message from Gracia Gonzales MD sent at 9/16/2024 10:32 AM CDT -----    ----- Message -----  From: Leilani Vargas MA  Sent: 9/16/2024  10:20 AM CDT  To: Gracia Gonzales MD    Good morning,     Dr. Lakia Gaxiola placed a referral for patient to see Geriatric Medicine. Can someone please reach out to Ms. Kirby and schedule her on the next available appointment? Thank you in advance.     Have a wonderful day and week to come.     Thank you,   ALCIDES Duke :)        [facility]  [address]  [address]  [place] [address]   [phone]

## 2024-09-18 ENCOUNTER — HOSPITAL ENCOUNTER (OUTPATIENT)
Dept: RADIOLOGY | Facility: HOSPITAL | Age: 69
Discharge: HOME | End: 2024-09-18
Attending: ORTHOPAEDIC SURGERY
Payer: MEDICARE

## 2024-09-18 DIAGNOSIS — M51.36 DDD (DEGENERATIVE DISC DISEASE), LUMBAR: ICD-10-CM

## 2024-09-18 PROCEDURE — 72110 X-RAY EXAM L-2 SPINE 4/>VWS: CPT | Mod: TC,PO

## 2024-09-18 PROCEDURE — 72110 X-RAY EXAM L-2 SPINE 4/>VWS: CPT | Mod: 26,,, | Performed by: RADIOLOGY

## 2024-09-24 ENCOUNTER — OFFICE VISIT (OUTPATIENT)
Dept: PALLIATIVE MEDICINE | Facility: CLINIC | Age: 69
End: 2024-09-24
Payer: MEDICARE

## 2024-09-24 VITALS
WEIGHT: 170.44 LBS | SYSTOLIC BLOOD PRESSURE: 157 MMHG | BODY MASS INDEX: 28.36 KG/M2 | OXYGEN SATURATION: 96 % | HEART RATE: 67 BPM | DIASTOLIC BLOOD PRESSURE: 70 MMHG

## 2024-09-24 DIAGNOSIS — F41.9 ANXIETY: Primary | ICD-10-CM

## 2024-09-24 DIAGNOSIS — R41.89 COGNITIVE IMPAIRMENT: ICD-10-CM

## 2024-09-24 PROCEDURE — 3078F DIAST BP <80 MM HG: CPT | Mod: CPTII,S$GLB,, | Performed by: STUDENT IN AN ORGANIZED HEALTH CARE EDUCATION/TRAINING PROGRAM

## 2024-09-24 PROCEDURE — 3052F HG A1C>EQUAL 8.0%<EQUAL 9.0%: CPT | Mod: CPTII,S$GLB,, | Performed by: STUDENT IN AN ORGANIZED HEALTH CARE EDUCATION/TRAINING PROGRAM

## 2024-09-24 PROCEDURE — 99999 PR PBB SHADOW E&M-EST. PATIENT-LVL V: CPT | Mod: PBBFAC,,, | Performed by: STUDENT IN AN ORGANIZED HEALTH CARE EDUCATION/TRAINING PROGRAM

## 2024-09-24 PROCEDURE — 3008F BODY MASS INDEX DOCD: CPT | Mod: CPTII,S$GLB,, | Performed by: STUDENT IN AN ORGANIZED HEALTH CARE EDUCATION/TRAINING PROGRAM

## 2024-09-24 PROCEDURE — 1101F PT FALLS ASSESS-DOCD LE1/YR: CPT | Mod: CPTII,S$GLB,, | Performed by: STUDENT IN AN ORGANIZED HEALTH CARE EDUCATION/TRAINING PROGRAM

## 2024-09-24 PROCEDURE — 99215 OFFICE O/P EST HI 40 MIN: CPT | Mod: S$GLB,,, | Performed by: STUDENT IN AN ORGANIZED HEALTH CARE EDUCATION/TRAINING PROGRAM

## 2024-09-24 PROCEDURE — 1123F ACP DISCUSS/DSCN MKR DOCD: CPT | Mod: CPTII,S$GLB,, | Performed by: STUDENT IN AN ORGANIZED HEALTH CARE EDUCATION/TRAINING PROGRAM

## 2024-09-24 PROCEDURE — 4010F ACE/ARB THERAPY RXD/TAKEN: CPT | Mod: CPTII,S$GLB,, | Performed by: STUDENT IN AN ORGANIZED HEALTH CARE EDUCATION/TRAINING PROGRAM

## 2024-09-24 PROCEDURE — 1159F MED LIST DOCD IN RCRD: CPT | Mod: CPTII,S$GLB,, | Performed by: STUDENT IN AN ORGANIZED HEALTH CARE EDUCATION/TRAINING PROGRAM

## 2024-09-24 PROCEDURE — 3288F FALL RISK ASSESSMENT DOCD: CPT | Mod: CPTII,S$GLB,, | Performed by: STUDENT IN AN ORGANIZED HEALTH CARE EDUCATION/TRAINING PROGRAM

## 2024-09-24 PROCEDURE — 3077F SYST BP >= 140 MM HG: CPT | Mod: CPTII,S$GLB,, | Performed by: STUDENT IN AN ORGANIZED HEALTH CARE EDUCATION/TRAINING PROGRAM

## 2024-09-24 RX ORDER — BUSPIRONE HYDROCHLORIDE 5 MG/1
5 TABLET ORAL 2 TIMES DAILY
Qty: 60 TABLET | Refills: 11 | Status: SHIPPED | OUTPATIENT
Start: 2024-09-24 | End: 2025-09-24
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
## 2024-09-24 NOTE — PROGRESS NOTES
Advance Care Planning   [facility] 9[facility]  Palliative Care   Psychosocial Assessment    Patient Name: Riana Kay  MRN: [id number]  Palliative Care Provider: Gracia Gonzales MD       Present during Interview: patient and spouse/SO.    Primary Language:English   Needed: no      Past Medical Situation:   PMH:   Past Medical History:   Diagnosis Date    Abdominal pain, right upper quadrant 02/04/2014    Anticoagulant long-term use     Anxiety     AR (allergic rhinitis)     Atrophic gastritis without mention of hemorrhage 02/13/2012     Dx updated per 2019 IMO Load    Chronic fatigue syndrome 06/10/2012    Diabetes mellitus     Dizziness     Fatty liver     GERD (gastroesophageal reflux disease)     Gross hematuria 12/01/2020    HTN (hypertension)     Hyperlipidemia     Leg swelling     Memory loss     Osteopenia     PONV (postoperative nausea and vomiting)     Primary osteoarthritis of right knee 10/06/2020    Primary osteoarthritis of right knee 10/06/2020    Right elbow pain 01/16/2019    S/P total hysterectomy     Screening for colon cancer 01/06/2021    Sleep apnea     Status post total right knee replacement 10/6/2020 10/05/2020   .    Here for initial palliative clinic visit clinic assessment.      Socio-Economic Factors/Resources:  Address: [address] [place] [address]  Phone Number: [phone] (home)     Marital Status:     Household composition: patient lives with spouse and her sister    Children: one son, age 36    Activities of Daily Living: patient is independent with activities of daily living     Support Systems-Family & Community (Home Health, HME etc):     Transportation:  yes    Work/Education History: retired, housekeeping at [employer]     History: no    Financial Resources:Medicare         Spirituality, Culture & Coping Mechanisms:  F- Flower and Belief: Baptist     I - Importance:  yes      C - Community/Culture Values:  yes       A -  Address in Care:  yes        Patient/Family Strengths/Resilience: patient presents as kind and appreciative      Patient/Family Coping Style: adaptive     Self-Care Activities/Hobbies: gardening     Substance Use History: no      Risk of Abuse, neglect or exploitation: no      Current or Previous Trauma and/or evidence of PTSD: no      Non-traditional Health practices: none identified     Patients Mental Status: patient is alert and oriented to person, place, and time     Risk for complicated bereavement: not identified this visit         Goals/Hopes/Expectations: patient is hoping to reduce anxiety and sleep better     Fears/Concerns: patient expressed concern about family in [facility] and their wellbeing     Understanding of diagnosis: patient will benefit from ongoing support regarding diagnosis     Experience/Comfort level with health care system: fair       Advance Care Planning   Advance Directives:   Living Will: Yes        Copy on chart: Yes    Medical Power of : Yes    Goals of Care: The patient and family endorses that what is most important right now is to focus on remaining as independent as possible    Accordingly, we have decided that the best plan to meet the patient's goals includes continuing with treatment                Summary/Next steps: plan to follow up in palliative medicine clinic         Signature: Isaak Delaney LCSW

## 2024-09-25 DIAGNOSIS — M17.12 PRIMARY OSTEOARTHRITIS OF LEFT KNEE: Primary | ICD-10-CM

## 2024-09-26 ENCOUNTER — PATIENT MESSAGE (OUTPATIENT)
Dept: PALLIATIVE MEDICINE | Facility: CLINIC | Age: 69
End: 2024-09-26
Payer: MEDICARE

## 2024-10-01 ENCOUNTER — OFFICE VISIT (OUTPATIENT)
Dept: NEUROLOGY | Facility: CLINIC | Age: 69
End: 2024-10-01
Payer: MEDICARE

## 2024-10-01 DIAGNOSIS — R41.3 MEMORY CHANGES: Primary | ICD-10-CM

## 2024-10-01 DIAGNOSIS — F43.23 ADJUSTMENT DISORDER WITH MIXED ANXIETY AND DEPRESSED MOOD: ICD-10-CM

## 2024-10-01 NOTE — PROGRESS NOTES
"NEUROPSYCHOLOGY CONSULT    Name Riana Kay   MRN [id number]    1955   Age 69 y.o.   Gender female   Ref Provider  Gracia Gonzales MD  [address] [place] [address]   CC/Med. Necessity Cognitive concerns   Visit Type  Virtual visit with synchronous audio and video  Virtual visit with audio only (telephone)    Time Spent 40-minutes   Telemedicine Each patient to whom he or she provides medical services by telemedicine is:  (1) informed of the relationship between the physician and patient and the respective role of any other health care provider with respect to management of the patient; and (2) notified that he or she may decline to receive medical services by telemedicine and may withdraw from such care at any time   Consent The patient and her  expressed an understanding of the purpose of the evaluation and consented to all procedures.        SUMMARY/TREATMENT PLAN   Results from the interview indicate the following diagnoses and treatment plan recommendations. This was discussed and the patient who may need help following a treatment plan independently.    Diagnoses/Plan:  Problem List Items Addressed This Visit          Neuro    Memory changes - Primary    Current Assessment & Plan     Assessment:  Pt: She's not certain about reason for the visit. Reports "a little bit of memory trouble."    Spouse: He's noticed progressive decline since . Specifically, he notes problems with short-term memory that are much worse compared to . Day to day sxs are consistent. He notes family observes problems with her also in Singaporean.   Note:Interview complicated given lack of translation service and  was a vague historian     Plan:  -Neuropsych testing to assess cognitive status, differential diagnosis and treatment plan               Psychiatric    Adjustment disorder with mixed anxiety and depressed mood    Current Assessment & Plan     Assessment:  -primary care " "provider continues to manage depression and anxiety medications  - and patient both note symptoms are much more apparent during the day when she is home alone; additionally symptoms get quite a bit better when sister and  are back from work and on the weekend  Plan:  -After Neuropsych testing, will discuss treatment plan for patient given sx variability                HPI   2022 Initial Neuropsych: No cognitive dx and recommended tx anxiety along with brain health. MMSE=28/30     CURRENT SYMPTOMS       Cognitive Symptoms   Pt: She's not certain about reason for the visit. Reports "a little bit of memory trouble."    Spouse: He's noticed progressive decline since 2022. Specifically, he notes problems with short-term memory that are much worse compared to 2022. Day to day sxs are consistent. He notes family observes problems with her also in Estonian.   Note:Interview complicated given lack of translation service and  was a vague historian     Current Functional Status/Needs   ADLs:   Self-Care Eating Dressing/Mobility Safety   Bathing: Independent  Bathroom: Independent  Other: NA Independent Independent Independent     Instrumental IADLs:    Driving Medications/Health Household Finances   Never drove She handles herself, per . He occasionally reminds for pills  He wakes her up to take insulin but longstanding  No pillbox Cooking fine  They make a list together and shop together.  Handles chores fine  handles      Current Psychiatric and Behavioral Symptoms   Mood:   Depression/Dysphoria Anxiety/Fearfulness Irritability   Pt: "Yes I feel sad, mostly every day."   : Offers various stressors. Sxs are more significant bc she is home and alone. Sxs are better when sister and  come home.  Pt: "No"  : He says, "You have it" to patient. He notes "getting worse" in the past year. He offers some situational stressors in the family. NA     Behavior:   Agitation/Resistance " Delusions/Paranoia Hallucinations   None None None   Apathy/Motivation Repetitive/Restlessness Other   None None None     Neurovegetative:   Sleep/Nighttime  [employer]   Good and uses CPAP  Falls to sleep at 9pm/10pm and wakes up at 7am to take insulin. Falls asleep again until 11am. This has been ongoing for years.  Good Good   She walks occasionally     Current Physical Concerns   Motor: Sensory Other   No recent falls Hearing tested previously and was normal  Vision is normal No pain     PERTINENT BACKGROUND INFORMATION   Social History   Family Status . 1 child and 1 granddaughter. They live in [place].     Current Living Situation: lives at home with  and one of her sisters. Another sister lives not far away    Primary Source of Support good    Daily Activities: I go to Judaism, sister and brother in law are pastors in the Judaism she goes to.    Stressors NA   Educational Status Level Attained: middle school - 7 years   Learning/Attention/Behavior Difficulties: no  Repeated Grade(s): no   Developmental Born & raised: [facility]   Prenatal and  development: wnl  Developmental milestones: wnl  Language Acquisition: Urdu first language. Picked up a little English along the way but no formal training.   Moved to [facility] when she was 27 years old.   Occupational Status Occupational Status: Disability since 2016   Primary Occupation: housekeeping at a hotel    Family History   Neurologic History No known heritable risk factors   Psychiatric History No known heritable risk factors     MEDICAL STATUS   Patient Active Problem List   Diagnosis    Adjustment disorder with mixed anxiety and depressed mood    Fatty liver    Memory changes    Pathological fracture of right humerus due to osteoporosis with routine healing    Gastroesophageal reflux disease without esophagitis    Hypertension associated with type 2 diabetes mellitus    ZAHIRA (obstructive sleep apnea)    Atherosclerosis of native  coronary artery of native heart without angina pectoris    Muscle spasms of neck    Radiculopathy of cervical spine    Overweight (BMI 25.0-29.9)    Type 2 diabetes mellitus with hyperglycemia, with long-term current use of insulin    Decreased strength of lower extremity    Vitamin D deficiency    Chronic right shoulder pain    Primary osteoarthritis of left knee    Aortic atherosclerosis    Closed fracture of proximal end of right humerus with routine healing s/p total shoulder replacement on 8/16/2022    Anterior dislocation of right shoulder    Carotid arterial disease on ct dated  8/14/2022    Hiatal hernia    Gastroparesis due to DM    Chronic constipation    Impaired functional mobility, balance, gait, and endurance    Cognitive impairment     Past Medical History:   Diagnosis Date    Abdominal pain, right upper quadrant 02/04/2014    Anticoagulant long-term use     Anxiety     AR (allergic rhinitis)     Atrophic gastritis without mention of hemorrhage 02/13/2012     Dx updated per 2019 IMO Load    Chronic fatigue syndrome 06/10/2012    Diabetes mellitus     Dizziness     Fatty liver     GERD (gastroesophageal reflux disease)     Gross hematuria 12/01/2020    HTN (hypertension)     Hyperlipidemia     Leg swelling     Memory loss     Osteopenia     PONV (postoperative nausea and vomiting)     Primary osteoarthritis of right knee 10/06/2020    Primary osteoarthritis of right knee 10/06/2020    Right elbow pain 01/16/2019    S/P total hysterectomy     Screening for colon cancer 01/06/2021    Sleep apnea     Status post total right knee replacement 10/6/2020 10/05/2020     Past Surgical History:   Procedure Laterality Date    CHOLECYSTECTOMY      COLONOSCOPY N/A 01/17/2018    Procedure: COLONOSCOPY with Donnell;  Surgeon: Roland Jacobo MD;  Location: [address];  Service: Endoscopy;  Laterality: N/A;    COLONOSCOPY N/A 01/06/2021    Procedure: COLONOSCOPY;  Surgeon: Yong Rowland MD;   Location: [facility] ENDO (4TH FLR);  Service: Endoscopy;  Laterality: N/A;  prep ins. emailed - Occitan speaking,  needed - ERW  COVID [facility] UC - ERW    COLONOSCOPY N/A 8/16/2024    Procedure: COLONOSCOPY;  Surgeon: Aamir Reyes MD;  Location: [facility];  Service: Endoscopy;  Laterality: N/A;  8/9/24-Suprep, holding Trulicity, precall complete-DS  8/14/24-LVM to see if pt can come earlier-DS    CYSTOSCOPY N/A 12/01/2020    Procedure: CYSTOSCOPY;  Surgeon: Ines Stanford MD;  Location: [facility] OR 1ST FLR;  Service: Urology;  Laterality: N/A;  45 minutes     ELBOW ARTHROPLASTY Right 01/16/2019    Procedure: ARTHROPLASTY, ELBOW right radial head arthroplasty revision;  Surgeon: Katja Hubbard MD;  [address];  Service: Orthopedics;  Laterality: Right;  Anesthesia: General and Regional. Stretcher, hand pan 1 and pan 2, CALL ROSALIO, RUCHI [employer] notified 1-14 LO    ELBOW SURGERY Right 07/16/2015    ELBOW SURGERY      ESOPHAGOGASTRODUODENOSCOPY N/A 04/15/2019    Procedure: EGD (ESOPHAGOGASTRODUODENOSCOPY);  Surgeon: Buck Irwin MD;  Location: [facility] (4TH FLR);  Service: Endoscopy;  Laterality: N/A;  ray she    ESOPHAGOGASTRODUODENOSCOPY N/A 04/21/2023    Procedure: EGD (ESOPHAGOGASTRODUODENOSCOPY);  Surgeon: Aamir Reyes MD;  Location: [facility];  Service: Endoscopy;  Laterality: N/A;    HYSTERECTOMY      KNEE ARTHROPLASTY Right 10/06/2020    Procedure: ARTHROPLASTY, KNEE-SAME DAY PROTOCOL;  Surgeon: Sabino Damon MD;  Location: [facility];  Service: Orthopedics;  Laterality: Right;    KNEE ARTHROSCOPY W/ DEBRIDEMENT  04/2011    Left    RELEASE OF ULNAR NERVE AT CUBITAL TUNNEL Right 01/16/2019    Procedure: RELEASE, ULNAR TUNNEL right;  Surgeon: Katja Hubbard MD;  [facility];  Service: Orthopedics;  Laterality: Right;  Anesthesia: General and Regional. Stretcher, hand pan 1 and pan 2, CALL ACCUMED,  "CLAIRX    RETROGRADE PYELOGRAPHY Bilateral 12/01/2020    Procedure: PYELOGRAM, RETROGRADE;  Surgeon: Ines Stanford MD;  Location: [facility] OR 1ST FLR;  Service: Urology;  Laterality: Bilateral;    REVERSE TOTAL SHOULDER ARTHROPLASTY Right 08/16/2022    Procedure: ARTHROPLASTY, SHOULDER, TOTAL, REVERSE, [place];  Surgeon: Aamir Powell MD;  Location: [facility] OR 2ND FLR;  Service: Orthopedics;  Laterality: Right;  [place] Can't go until after 12    ROTATOR CUFF REPAIR      SHOULDER SURGERY      TOTAL ABDOMINAL HYSTERECTOMY W/ BILATERAL SALPINGOOPHORECTOMY      TOTAL KNEE ARTHROPLASTY Left 10/19/2021    Procedure: ARTHROPLASTY, KNEE, TOTAL;  Surgeon: Sabino Damon MD;  Location: [facility];  Service: Orthopedics;  Laterality: Left;    UPPER GASTROINTESTINAL ENDOSCOPY          Relevant Neurologic History   Falls NA   TBIs NA   Seizures NA   CVAs NA   Movement Concerns NA   Other NA     Relevant Labs   Lab Results   Component Value Date    ZCNQPLLL41 245 09/16/2024     No results found for: "RPR"  Lab Results   Component Value Date    FOLATE 15.8 11/25/2013     Lab Results   Component Value Date    TSH 1.312 07/27/2023    V9AKTFK 99 05/29/2009    K0GYBZK 8.3 05/29/2009     Lab Results   Component Value Date    HGBA1C 8.2 (H) 08/13/2024     No results found for: "HIV1X2", "QOS49KKKQ"     Neurodiagnostics   Results for orders placed or performed during the hospital encounter of 08/14/22   CT Head Without Contrast    Narrative    EXAMINATION:  CT HEAD WITHOUT CONTRAST    CLINICAL HISTORY:  Head trauma, minor (Age >= 65y);    TECHNIQUE:  Low dose axial images were obtained through the head.  Coronal and sagittal reformations were also performed. Contrast was not administered.    COMPARISON:  CT 06/03/2022, MRI 08/06/2022    FINDINGS:  There is no evidence of intracranial hemorrhage or parenchymal contusion.  No hydrocephalus mass effect or acute territorial infarct.    The calvarium is intact.    Left sphenoid " chronic mucosal thickening.      Impression    No acute intracranial hemorrhage/injury.      Electronically signed by: Dar Willis  Date:    08/14/2022  Time:    12:36   Results for orders placed or performed during the hospital encounter of 08/06/22   MRI Brain W WO Contrast    Narrative    EXAMINATION:  MRI BRAIN W WO CONTRAST    CLINICAL HISTORY:  Dizziness, non-specific;vertical nystagmus, oculomotor dysfunction; Dizziness and giddiness    TECHNIQUE:  Multiplanar multisequence MR imaging of the brain was performed before and after the administration of 6.5 mL Gadavist intravenous contrast.    COMPARISON:  CT 06/03/2022, MRI 12/09/2013    FINDINGS:  there is no evidence of hydrocephalus, mass effect, intracranial hemorrhage or acute infarct. Few scattered periventricular and subcortical white matter lesions are identified that are nonspecific but may reflect mild chronic small vessel ischemic change. These are minimally progressed from the prior study.  The cerebellum maintains normal signal intensity.    No enhancing intracranial lesion is identified.    Normal arterial flow voids are preserved at the skull base.  Small developmental venous anomaly left cerebellum.    Left sphenoid sinus mucosal thickening, similar to the prior CT study.  Mastoid air cells are clear.      Impression    No acute intracranial process or enhancing intracranial lesion.      Electronically signed by: Dar Willis  Date:    08/06/2022  Time:    08:48     *Note: Due to a large number of results and/or encounters for the requested time period, some results have not been displayed. A complete set of results can be found in Results Review.         Medications     Current Outpatient Medications:     acetaminophen (TYLENOL) 500 MG tablet, Take 2 tablets (1,000 mg total) by mouth every 8 (eight) hours as needed for Pain., Disp: 90 tablet, Rfl: 0    azelastine (ASTELIN) 137 mcg (0.1 %) nasal spray, 1 spray (137 mcg total) by Nasal route  2 (two) times daily., Disp: 30 mL, Rfl: 11    blood sugar diagnostic Strp, To check BG 3 times daily, to use with insurance preferred meter, e 11.65, Disp: 100 each, Rfl: 11    buPROPion (WELLBUTRIN XL) 300 MG 24 hr tablet, Take 1 tablet (300 mg total) by mouth every morning., Disp: 90 tablet, Rfl: 3    busPIRone (BUSPAR) 5 MG Tab, Take 1 tablet (5 mg total) by mouth 2 (two) times daily., Disp: 60 tablet, Rfl: 11    dulaglutide (TRULICITY) 1.5 mg/0.5 mL pen injector, Inject 1.5 mg into the skin every 7 days., Disp: 4 pen , Rfl: 6    glucagon (BAQSIMI) 3 mg/actuation Spry, Give one puff via nostril. Hold device between fingers and thumb, do not push plunger yet, insert tip gently into one nostril until finger(s) touch the outside of the nose, then push plunger firmly all the way in . Dose is complete when the green line disappears., Disp: 1 each, Rfl: 1    HUMALOG KWIKPEN INSULIN 100 unit/mL pen, Inject 10-12 units before meals plus scale 150-200+2, 201-250+4, 251-300+6, 301-350+8, >350+10. Max daily 66 units., Disp: 30 mL, Rfl: 6    ketoconazole (NIZORAL) 2 % shampoo, Apply topically every 7 days., Disp: 120 mL, Rfl: 6    lancets Misc, To check BG 3 times daily, to use with insurance preferred meter, e 11.65, Disp: 100 each, Rfl: 11    LANTUS SOLOSTAR U-100 INSULIN 100 unit/mL (3 mL) InPn pen, Inject 40-52 units at night., Disp: , Rfl:     latanoprost 0.005 % ophthalmic solution, Place 1 drop into both eyes nightly., Disp: , Rfl:     losartan (COZAAR) 50 MG tablet, TAKE 1 TABLET BY MOUTH EVERY DAY, Disp: 90 tablet, Rfl: 0    meloxicam (MOBIC) 15 MG tablet, TAKE 1 TABLET BY MOUTH EVERY DAY, Disp: 30 tablet, Rfl: 2    ondansetron (ZOFRAN-ODT) 8 MG TbDL, Take 1 tablet (8 mg total) by mouth 3 (three) times daily as needed (Nausea)., Disp: 30 tablet, Rfl: 3    ONETOUCH DELICA LANCETS 33 gauge Misc, TO CHECK BLOOD GLUCOSE 3 TIMES DAILY, Disp: , Rfl:     ONETOUCH VERIO FLEX METER Misc, TO CHECK BLOOD GLUCOSE 3 TIMES DAILY,  "TO USE WITH INSURANCE PREFERRED METER, E 11.65, Disp: , Rfl:     pantoprazole (PROTONIX) 40 MG tablet, Take 1 tablet (40 mg total) by mouth once daily., Disp: 90 tablet, Rfl: 3    pen needle, diabetic (NOVOFINE 32) 32 gauge x 1/4" Ndle, Uses 4 times a day. 90 day via duramed e 11.65, Disp: 400 each, Rfl: 3    rosuvastatin (CRESTOR) 10 MG tablet, Take 1 tablet (10 mg total) by mouth every evening., Disp: 90 tablet, Rfl: 0     Pertinent Psychiatric History   Sxs:   Longstanding: NA  Intermittent: depression, anxiety    Substance Use:  Current Use: none  Prior Problems: None   Treatment Hx:  Medication: None  Current: NA  Pertinent prior:NA  Therapy: None  Current: NA  Pertinent prior: NA  Inpatient: None     BEHAVIORAL OBSERVATIONS   APPEARANCE Not assessed   ALERTNESS/ORIENTATION Attentive and alert.  Not aware of current visit or reason for visit. However, she accurately recalled her August 2022 surgery.    GAIT Not assessed   SENSORY Not assessed   MOTOR  Not assessed   SPEECH/LANGUAGE Normal in rate, rhythm, tone. Volume was lower and she needed to speak up. With structured questions, she could respond fairly accurately but was not very expansive given language barrier.    STATED MOOD/AFFECT Stated mood was "good"    INTERPERSONAL BEHAVIOR Rapport was quickly and easily established   SI/HI None reported   HALLUCINATIONS/DELUSIONS None evidenced or endorsed   THOUGHT PROCESSES/INSIGHT Thoughts seemed logical and goal-directed  Difficult to assess insight right now   TEST TAKING BEHAVIOR / VALIDITY NA     BILLING/CODING   Service Description CPT Code Minutes Units   Psychiatric diagnostic evaluation by physician [id number] 1   Neurobehavioral status exam by physician [id number]  0   Each additional hour by physician [id number]  0     PROPOSED PROCEDURES/BATTERY: Mini Mental Status Examination (MMSE);  Wechsler Adult Intelligence Scale, Fourth Edition (WAIS-IV) [Symbol Search subtest]; Wechsler Memory Scale, Fourth Edition " (WMS-IV) [Symbol Search subtest]; Repeatable Battery for the Assessment of Neuropsychological Status (RBANS, form A, Dujeramy et al., 2003 norms); Neuropsychological Assessment Battery (NAB) [Naming subtest, form 1]; Verbal fluency tests (FAS & animal naming) Trails A/B; Geriatric Depression Scale (GDS-30, Scottish version); and Generalized Anxiety Disorder - 7 Item Scale (DIANN-7, Scottish version).

## 2024-10-01 NOTE — ASSESSMENT & PLAN NOTE
"Assessment:  Pt: She's not certain about reason for the visit. Reports "a little bit of memory trouble."    Spouse: He's noticed progressive decline since 2022. Specifically, he notes problems with short-term memory that are much worse compared to 2022. Day to day sxs are consistent. He notes family observes problems with her also in Thai.   Note:Interview complicated given lack of translation service and  was a vague historian     Plan:  -Neuropsych testing to assess cognitive status, differential diagnosis and treatment plan     "

## 2024-10-01 NOTE — ASSESSMENT & PLAN NOTE
Assessment:  -primary care provider continues to manage depression and anxiety medications  - and patient both note symptoms are much more apparent during the day when she is home alone; additionally symptoms get quite a bit better when sister and  are back from work and on the weekend  Plan:  -After Neuropsych testing, will discuss treatment plan for patient given sx variability      4

## 2024-10-02 ENCOUNTER — OFFICE VISIT (OUTPATIENT)
Dept: ORTHOPEDICS | Facility: CLINIC | Age: 69
End: 2024-10-02
Payer: MEDICARE

## 2024-10-02 ENCOUNTER — HOSPITAL ENCOUNTER (OUTPATIENT)
Dept: RADIOLOGY | Facility: HOSPITAL | Age: 69
Discharge: HOME | End: 2024-10-02
Attending: ORTHOPAEDIC SURGERY
Payer: MEDICARE

## 2024-10-02 VITALS — HEIGHT: 65 IN | BODY MASS INDEX: 28.57 KG/M2 | WEIGHT: 171.5 LBS

## 2024-10-02 DIAGNOSIS — M17.12 PRIMARY OSTEOARTHRITIS OF LEFT KNEE: ICD-10-CM

## 2024-10-02 DIAGNOSIS — M70.52 PES ANSERINUS BURSITIS OF LEFT KNEE: Primary | ICD-10-CM

## 2024-10-02 PROCEDURE — 3052F HG A1C>EQUAL 8.0%<EQUAL 9.0%: CPT | Mod: CPTII,S$GLB,, | Performed by: ORTHOPAEDIC SURGERY

## 2024-10-02 PROCEDURE — 3288F FALL RISK ASSESSMENT DOCD: CPT | Mod: CPTII,S$GLB,, | Performed by: ORTHOPAEDIC SURGERY

## 2024-10-02 PROCEDURE — 1159F MED LIST DOCD IN RCRD: CPT | Mod: CPTII,S$GLB,, | Performed by: ORTHOPAEDIC SURGERY

## 2024-10-02 PROCEDURE — 73560 X-RAY EXAM OF KNEE 1 OR 2: CPT | Mod: TC,RT

## 2024-10-02 PROCEDURE — 99213 OFFICE O/P EST LOW 20 MIN: CPT | Mod: S$GLB,,, | Performed by: ORTHOPAEDIC SURGERY

## 2024-10-02 PROCEDURE — 4010F ACE/ARB THERAPY RXD/TAKEN: CPT | Mod: CPTII,S$GLB,, | Performed by: ORTHOPAEDIC SURGERY

## 2024-10-02 PROCEDURE — 1125F AMNT PAIN NOTED PAIN PRSNT: CPT | Mod: CPTII,S$GLB,, | Performed by: ORTHOPAEDIC SURGERY

## 2024-10-02 PROCEDURE — 3008F BODY MASS INDEX DOCD: CPT | Mod: CPTII,S$GLB,, | Performed by: ORTHOPAEDIC SURGERY

## 2024-10-02 PROCEDURE — 99999 PR PBB SHADOW E&M-EST. PATIENT-LVL III: CPT | Mod: PBBFAC,,, | Performed by: ORTHOPAEDIC SURGERY

## 2024-10-02 PROCEDURE — 1101F PT FALLS ASSESS-DOCD LE1/YR: CPT | Mod: CPTII,S$GLB,, | Performed by: ORTHOPAEDIC SURGERY

## 2024-10-02 RX ORDER — DICLOFENAC SODIUM 10 MG/G
2 GEL TOPICAL DAILY
Qty: 100 G | Refills: 3 | Status: SHIPPED | OUTPATIENT
Start: 2024-10-02

## 2024-10-02 NOTE — PROGRESS NOTES
"Subjective:      Patient ID: Riana Kay is a 69 y.o. female.    Chief Complaint: Pain of the Left Knee    HPI  Riana Kay has left knee pain.  In 2 months ago.  There was no trauma.  The pain is in the medial aspect of the knee.  She also has some tightness laterally.  She denies any mechanical symptoms instability or giving way.  Review of Systems   Constitutional: Negative for chills, fever and night sweats.   HENT:  Negative for hearing loss.    Eyes:  Negative for blurred vision and double vision.   Cardiovascular:  Negative for chest pain, claudication and leg swelling.   Respiratory:  Negative for shortness of breath.    Endocrine: Negative for polydipsia, polyphagia and polyuria.   Hematologic/Lymphatic: Negative for adenopathy and bleeding problem. Does not bruise/bleed easily.   Skin:  Negative for poor wound healing.   Gastrointestinal:  Negative for diarrhea and heartburn.   Genitourinary:  Negative for bladder incontinence.   Neurological:  Negative for focal weakness, headaches, numbness, paresthesias and sensory change.   Psychiatric/Behavioral:  The patient is not nervous/anxious.    Allergic/Immunologic: Negative for persistent infections.         Objective:      Body mass index is 28.54 kg/m².  Vitals:    10/02/24 0858   Weight: 77.8 kg (171 lb 8.3 oz)   Height: 5' 5" (1.651 m)           General    Constitutional: She is oriented to person, place, and time. She appears well-developed and well-nourished.   HENT:   Head: Normocephalic and atraumatic.   Eyes: EOM are normal.   Cardiovascular:  Normal rate.            Pulmonary/Chest: Effort normal.   Neurological: She is alert and oriented to person, place, and time.   Psychiatric: She has a normal mood and affect. Her behavior is normal.     General Musculoskeletal Exam   Gait: normal         Left Knee Exam     Inspection   Erythema: absent  Scars: present  Swelling: absent  Effusion: absent  Deformity: absent  Bruising: " absent    Tenderness   The patient tender to palpation of the pes anserinus.    Range of Motion   Extension:  0   Flexion:  120     Tests   Stability   Lachman: normal (-1 to 2mm)   MCL - Valgus: normal (0 to 2mm)  LCL - Varus: normal (0 to 2mm)    Muscle Strength   Left Lower Extremity   Hip Abduction: 5/5   Quadriceps:  5/5   Hamstrin/5     Vascular Exam       Edema  Left Lower Leg: absent    Radiographs obtained today and reviewed by me demonstrate bilateral well-fixed and positioned total knee arthroplasties.          Assessment:       Encounter Diagnosis   Name Primary?    Pes anserinus bursitis of left knee Yes          Plan:       Riana was seen today for pain.    Diagnoses and all orders for this visit:    Pes anserinus bursitis of left knee        I gave her a prescription for Voltaren gel.  I will see her back in 6 weeks.  If she is still having pain we can try an injection at that point.

## 2024-10-04 ENCOUNTER — TELEPHONE (OUTPATIENT)
Dept: PALLIATIVE MEDICINE | Facility: CLINIC | Age: 69
End: 2024-10-04
Payer: MEDICARE

## 2024-10-04 NOTE — TELEPHONE ENCOUNTER
Attempted to reach pt to get appt rescheduled, unfortunately the provider is out and I need to reschedule this appt date.  Left a detailed messaged with new appt and asked for a call back to confirm, also mailed out new appt reminder.

## 2024-10-08 ENCOUNTER — OFFICE VISIT (OUTPATIENT)
Dept: NEUROLOGY | Facility: CLINIC | Age: 69
End: 2024-10-08
Payer: MEDICARE

## 2024-10-08 DIAGNOSIS — F03.90 MAJOR NEUROCOGNITIVE DISORDER: ICD-10-CM

## 2024-10-08 DIAGNOSIS — R41.89 COGNITIVE IMPAIRMENT: ICD-10-CM

## 2024-10-08 PROCEDURE — 99499 UNLISTED E&M SERVICE: CPT | Mod: S$GLB,,, | Performed by: CLINICAL NEUROPSYCHOLOGIST

## 2024-10-08 PROCEDURE — 96132 NRPSYC TST EVAL PHYS/QHP 1ST: CPT | Mod: S$GLB,,, | Performed by: CLINICAL NEUROPSYCHOLOGIST

## 2024-10-08 PROCEDURE — 96133 NRPSYC TST EVAL PHYS/QHP EA: CPT | Mod: S$GLB,,, | Performed by: CLINICAL NEUROPSYCHOLOGIST

## 2024-10-08 PROCEDURE — 96139 PSYCL/NRPSYC TST TECH EA: CPT | Mod: S$GLB,,, | Performed by: CLINICAL NEUROPSYCHOLOGIST

## 2024-10-08 PROCEDURE — 99999 PR PBB SHADOW E&M-EST. PATIENT-LVL I: CPT | Mod: PBBFAC,,, | Performed by: CLINICAL NEUROPSYCHOLOGIST

## 2024-10-08 PROCEDURE — 96138 PSYCL/NRPSYC TECH 1ST: CPT | Mod: S$GLB,,, | Performed by: CLINICAL NEUROPSYCHOLOGIST

## 2024-10-14 NOTE — ASSESSMENT & PLAN NOTE
Assessment:  -Cognitive Testing:   Brief Screen MMSE=22/30 but still oriented to time   More Detailed Measures Overall global cognitive decline compared to 2022.    But the degree of variability is important when reviewing her decline.  Memory is the most variable. Two measures were actually normal and unchanged from 2022. Another memory task making more executive function demands was quite lower.   Language (noting cultural and language factors) shows no significant decline.  Spatial skills are unchanged aside from reduced visual discrimination.  Complex aspects of attention and processing speed do seem worse.     -Etiology/Stage/Present Concerns:  Etiology Vascular disease certainly is contributing  Concern about neurodegenerative condition given progressive decline. But, aspects of testing (normal memory on 2/3 measures) is less c/w Alzheimer's disease - especially since this has been the case since 2022.   No evidence for other neurodegenerative conditions.    Stage/Severity  Mild stage   Behavioral Issues Depression and anxiety but less insight per    Level of Support Recommendations Overall:  is handling more-especially with meds. She needs a system to help him monitor better.          Plan:  Neuropsych:  Will review results, will provide education/support around diagnosis and needs, and will discuss plan of care in a separately scheduled follow-up session   Neurology Recommend referral to behavioral neurology but needs updated labs (ptau [id number]) prior to referral.   If [id number] notable, then e-consult also possible.    Palliative Continue follow-up.   If mood hasn't improved, I will recommend they follow up with Dr. Doll   Care Management NA night now   Referrals Behavioral Neurology: Yes   Family/Caregiver plan Behavioral Symptoms: Will discuss various caregiver-implemented behavioral strategies to address mood.   Brain Health Strategies for family to organize and prompt/monitor Exercise/Physical  Activity: Unless medically not able, daily physical activity is very helpful for physical and mental health for patients with cognitive trouble.   Diet: Talk with your physician or nutritionist about whats right for you, but a Mediterranean diet has been found to be most effective at promoting brain health  Activity: Explore various activities beyond television for mental stimulation. This can include: music, art, simple games, and so forth.   Socialization: Opportunities to see family and friends stimulates mood and our mind. Sometimes patients need fewer people or certain people to improve engagement.

## 2024-10-14 NOTE — PROGRESS NOTES
Outpatient Neuropsychological Evaluation   Name Riana Kay   MRN [id number]    1955   Age 69 y.o.   Gender female   Ref [place] [address]   CC/Med. Necessity Cognitive concerns   Visit Type  Testing, report, treatment plan with initial visit on 10/1/2024     SUMMARY/TREATMENT PLAN   Results indicate the following diagnoses and treatment plan recommendations. This will be discussed in a separately scheduled treatment planning and feedback session.    Diagnoses/Plan:  Problem List Items Addressed This Visit          Neuro    Major neurocognitive disorder    Overview     - MMSE=28/30  - MMSE=22/30         Current Assessment & Plan     Assessment:  -Cognitive Testing:   Brief Screen MMSE=22/ but still oriented to time   More Detailed Measures Overall global cognitive decline compared to .    But the degree of variability is important when reviewing her decline.  Memory is the most variable. Two measures were actually normal and unchanged from . Another memory task making more executive function demands was quite lower.   Language (noting cultural and language factors) shows no significant decline.  Spatial skills are unchanged aside from reduced visual discrimination.  Complex aspects of attention and processing speed do seem worse.     -Etiology/Stage/Present Concerns:  Etiology Vascular disease certainly is contributing  Concern about neurodegenerative condition given progressive decline. But, aspects of testing (normal memory on 2/3 measures) is less c/w Alzheimer's disease - especially since this has been the case since .   No evidence for other neurodegenerative conditions.    Stage/Severity  Mild stage   Behavioral Issues Depression and anxiety but less insight per    Level of Support Recommendations Overall:  is handling more-especially with meds. She needs a system to help him monitor better.       "    Plan:  Neuropsych:  Will review results, will provide education/support around diagnosis and needs, and will discuss plan of care in a separately scheduled follow-up session   Neurology Recommend referral to behavioral neurology but needs updated labs (ptau 181) prior to referral.   If [id number] notable, then e-consult also possible.    Palliative Continue follow-up.   If mood hasn't improved, I will recommend they follow up with Dr. Doll   Care Management NA night now   Referrals Behavioral Neurology: Yes   Family/Caregiver plan Behavioral Symptoms: Will discuss various caregiver-implemented behavioral strategies to address mood.   Brain Health Strategies for family to organize and prompt/monitor Exercise/Physical Activity: Unless medically not able, daily physical activity is very helpful for physical and mental health for patients with cognitive trouble.   Diet: Talk with your physician or nutritionist about whats right for you, but a Mediterranean diet has been found to be most effective at promoting brain health  Activity: Explore various activities beyond television for mental stimulation. This can include: music, art, simple games, and so forth.   Socialization: Opportunities to see family and friends stimulates mood and our mind. Sometimes patients need fewer people or certain people to improve engagement.                Cognitive impairment        HPI   2022 Initial Neuropsych: No cognitive dx and recommended tx anxiety along with brain health. MMSE=28/30     CURRENT SYMPTOMS     Cognitive Symptoms   Pt: She's not certain about reason for the visit. Reports "a little bit of memory trouble."    Spouse: He's noticed progressive decline since 2022. Specifically, he notes problems with short-term memory that are much worse compared to 2022. Day to day sxs are consistent. He notes family observes problems with her also in Greek.   Note:Interview complicated given lack of translation service and  was " "a vague historian     Current Functional Status/Needs   ADLs:   Self-Care Eating Dressing/Mobility Safety   Bathing: Independent  Bathroom: Independent  Other: NA Independent Independent Independent     Instrumental IADLs:    Driving Medications/Health Household Finances   Never drove She handles herself, per . He occasionally reminds for pills  He wakes her up to take insulin but longstanding  No pillbox Cooking fine  They make a list together and shop together.  Handles chores fine  handles      Current Psychiatric and Behavioral Symptoms   Mood:   Depression/Dysphoria Anxiety/Fearfulness Irritability   Pt: "Yes I feel sad, mostly every day."   : Offers various stressors. Sxs are more significant bc she is home and alone. Sxs are better when sister and  come home.  Pt: "No"  : He says, "You have it" to patient. He notes "getting worse" in the past year. He offers some situational stressors in the family. NA     Behavior:   Agitation/Resistance Delusions/Paranoia Hallucinations   None None None   Apathy/Motivation Repetitive/Restlessness Other   None None None     Neurovegetative:   Sleep/Nighttime  [employer]   Good and uses CPAP  Falls to sleep at 9pm/10pm and wakes up at 7am to take insulin. Falls asleep again until 11am. This has been ongoing for years.  Good Good   She walks occasionally     Current Physical Concerns   Motor: Sensory Other   No recent falls Hearing tested previously and was normal  Vision is normal No pain     PERTINENT BACKGROUND INFORMATION   Social History   Family Status . 1 child and 1 granddaughter. They live in [place].     Current Living Situation: lives at home with  and one of her sisters. Another sister lives not far away    Primary Source of Support good    Daily Activities: I go to [employer], sister and brother in law are pastors in the [employer] she goes to.    Stressors NA   Educational Status Level Attained: middle school - 7 " years   Learning/Attention/Behavior Difficulties: no  Repeated Grade(s): no   Developmental Born & raised: [place]   Prenatal and  development: wnl  Developmental milestones: wnl  Language Acquisition: Faroese first language. Picked up a little English along the way but no formal training.   [place] she was 27 years old.   Occupational Status Occupational Status: Disability since 2016   Primary Occupation: housekeeping at a hotel    Family History   Neurologic History No known heritable risk factors   Psychiatric History No known heritable risk factors     MEDICAL STATUS   Patient Active Problem List   Diagnosis    Adjustment disorder with mixed anxiety and depressed mood    Fatty liver    Major neurocognitive disorder    Pathological fracture of right humerus due to osteoporosis with routine healing    Gastroesophageal reflux disease without esophagitis    Hypertension associated with type 2 diabetes mellitus    ZAHIRA (obstructive sleep apnea)    Atherosclerosis of native coronary artery of native heart without angina pectoris    Muscle spasms of neck    Radiculopathy of cervical spine    Overweight (BMI 25.0-29.9)    Type 2 diabetes mellitus with hyperglycemia, with long-term current use of insulin    Decreased strength of lower extremity    Vitamin D deficiency    Chronic right shoulder pain    Primary osteoarthritis of left knee    Aortic atherosclerosis    Closed fracture of proximal end of right humerus with routine healing s/p total shoulder replacement on 2022    Anterior dislocation of right shoulder    Carotid arterial disease on ct dated  2022    Hiatal hernia    Gastroparesis due to DM    Chronic constipation    Impaired functional mobility, balance, gait, and endurance    Cognitive impairment     Past Medical History:   Diagnosis Date    Abdominal pain, right upper quadrant 2014    Anticoagulant long-term use     Anxiety     AR (allergic rhinitis)     Atrophic  gastritis without mention of hemorrhage 02/13/2012     Dx updated per 2019 IMO Load    Chronic fatigue syndrome 06/10/2012    Diabetes mellitus     Dizziness     Fatty liver     GERD (gastroesophageal reflux disease)     Gross hematuria 12/01/2020    HTN (hypertension)     Hyperlipidemia     Leg swelling     Memory loss     Osteopenia     PONV (postoperative nausea and vomiting)     Primary osteoarthritis of right knee 10/06/2020    Primary osteoarthritis of right knee 10/06/2020    Right elbow pain 01/16/2019    S/P total hysterectomy     Screening for colon cancer 01/06/2021    Sleep apnea     Status post total right knee replacement 10/6/2020 10/05/2020     Past Surgical History:   Procedure Laterality Date    CHOLECYSTECTOMY      COLONOSCOPY N/A 01/17/2018    Procedure: COLONOSCOPY with Donnell;  Surgeon: Roland Jacobo MD;  Location: [facility] (4TH FLR);  Service: Endoscopy;  Laterality: N/A;    COLONOSCOPY N/A 01/06/2021    Procedure: COLONOSCOPY;  Surgeon: Yong Rowland MD;  [facility]  Service: Endoscopy;  Laterality: N/A;  prep ins. emailed - Kenyan speaking,  needed - ERW  COVID [facility] - ERW    COLONOSCOPY N/A 8/16/2024    Procedure: COLONOSCOPY;  Surgeon: Aamir Reyes MD;  Location: [facility];  Service: Endoscopy;  Laterality: N/A;  8/9/24-Suprep, holding Trulicity, precall complete-DS  8/14/24-LVM to see if pt can come earlier-DS    CYSTOSCOPY N/A 12/01/2020    Procedure: CYSTOSCOPY;  Surgeon: Ines Stanford MD;  Location: [facility] OR 1ST FLR;  Service: Urology;  Laterality: N/A;  45 minutes     ELBOW ARTHROPLASTY Right 01/16/2019    Procedure: ARTHROPLASTY, ELBOW right radial head arthroplasty revision;  Surgeon: Katja Hubbard MD;  [facility];  Service: Orthopedics;  Laterality: Right;  Anesthesia: General and Regional. Stretcher, hand pan 1 and pan 2, CALL ROSALIO, RUCHI [employer] notified 1-14 LO     ELBOW SURGERY Right 07/16/2015    ELBOW SURGERY      ESOPHAGOGASTRODUODENOSCOPY N/A 04/15/2019    Procedure: EGD (ESOPHAGOGASTRODUODENOSCOPY);  Surgeon: Buck Irwin MD;  Location: [facility] (4TH FLR);  Service: Endoscopy;  Laterality: N/A;  ray she    ESOPHAGOGASTRODUODENOSCOPY N/A 04/21/2023    Procedure: EGD (ESOPHAGOGASTRODUODENOSCOPY);  Surgeon: Aamir Reyes MD;  Location: [facility];  Service: Endoscopy;  Laterality: N/A;    HYSTERECTOMY      KNEE ARTHROPLASTY Right 10/06/2020    Procedure: ARTHROPLASTY, KNEE-SAME DAY PROTOCOL;  Surgeon: Sabino Damon MD;  Location: [facility];  Service: Orthopedics;  Laterality: Right;    KNEE ARTHROSCOPY W/ DEBRIDEMENT  04/2011    Left    RELEASE OF ULNAR NERVE AT CUBITAL TUNNEL Right 01/16/2019    Procedure: RELEASE, ULNAR TUNNEL right;  Surgeon: Katja Hubbard MD;  Location: [facility] 1ST FLR;  Service: Orthopedics;  Laterality: Right;  Anesthesia: General and Regional. Stretcher, hand pan 1 and pan 2, CALL ACCUMED, CLAIRX    RETROGRADE PYELOGRAPHY Bilateral 12/01/2020    Procedure: PYELOGRAM, RETROGRADE;  Surgeon: Ines Stanford MD;  Location: [facility] OR 1ST FLR;  Service: Urology;  Laterality: Bilateral;    REVERSE TOTAL SHOULDER ARTHROPLASTY Right 08/16/2022    Procedure: ARTHROPLASTY, SHOULDER, TOTAL, REVERSE, [place];  Surgeon: Aamir Powell MD;  Location: [facility] OR 2ND FLR;  Service: Orthopedics;  Laterality: Right;  [place] Can't go until after 12    ROTATOR CUFF REPAIR      SHOULDER SURGERY      TOTAL ABDOMINAL HYSTERECTOMY W/ BILATERAL SALPINGOOPHORECTOMY      TOTAL KNEE ARTHROPLASTY Left 10/19/2021    Procedure: ARTHROPLASTY, KNEE, TOTAL;  Surgeon: Sabino Damon MD;  Location: [facility];  Service: Orthopedics;  Laterality: Left;    UPPER GASTROINTESTINAL ENDOSCOPY          Relevant Neurologic History   Falls NA   TBIs NA   Seizures NA   CVAs NA   Movement Concerns NA   Other NA     Relevant Labs   Lab Results   Component  "Value Date    IMHSYDOA73 245 09/16/2024     No results found for: "RPR"  Lab Results   Component Value Date    FOLATE 15.8 11/25/2013     Lab Results   Component Value Date    TSH 1.312 07/27/2023    B8FZBTF 99 05/29/2009    S5MUTVA 8.3 05/29/2009     Lab Results   Component Value Date    HGBA1C 8.2 (H) 08/13/2024     No results found for: "HIV1X2", "UAJ93BLEG"     Neurodiagnostics   Results for orders placed or performed during the hospital encounter of 08/14/22   CT Head Without Contrast    Narrative    EXAMINATION:  CT HEAD WITHOUT CONTRAST    CLINICAL HISTORY:  Head trauma, minor (Age >= 65y);    TECHNIQUE:  Low dose axial images were obtained through the head.  Coronal and sagittal reformations were also performed. Contrast was not administered.    COMPARISON:  CT 06/03/2022, MRI 08/06/2022    FINDINGS:  There is no evidence of intracranial hemorrhage or parenchymal contusion.  No hydrocephalus mass effect or acute territorial infarct.    The calvarium is intact.    Left sphenoid chronic mucosal thickening.      Impression    No acute intracranial hemorrhage/injury.      Electronically signed by: Dar Willis  Date:    08/14/2022  Time:    12:36   Results for orders placed or performed during the hospital encounter of 08/06/22   MRI Brain W WO Contrast    Narrative    EXAMINATION:  MRI BRAIN W WO CONTRAST    CLINICAL HISTORY:  Dizziness, non-specific;vertical nystagmus, oculomotor dysfunction; Dizziness and giddiness    TECHNIQUE:  Multiplanar multisequence MR imaging of the brain was performed before and after the administration of 6.5 mL Gadavist intravenous contrast.    COMPARISON:  CT 06/03/2022, MRI 12/09/2013    FINDINGS:  there is no evidence of hydrocephalus, mass effect, intracranial hemorrhage or acute infarct. Few scattered periventricular and subcortical white matter lesions are identified that are nonspecific but may reflect mild chronic small vessel ischemic change. These are minimally " progressed from the prior study.  The cerebellum maintains normal signal intensity.    No enhancing intracranial lesion is identified.    Normal arterial flow voids are preserved at the skull base.  Small developmental venous anomaly left cerebellum.    Left sphenoid sinus mucosal thickening, similar to the prior CT study.  Mastoid air cells are clear.      Impression    No acute intracranial process or enhancing intracranial lesion.      Electronically signed by: Dar Willis  Date:    08/06/2022  Time:    08:48     *Note: Due to a large number of results and/or encounters for the requested time period, some results have not been displayed. A complete set of results can be found in Results Review.         Medications     Current Outpatient Medications:     acetaminophen (TYLENOL) 500 MG tablet, Take 2 tablets (1,000 mg total) by mouth every 8 (eight) hours as needed for Pain., Disp: 90 tablet, Rfl: 0    azelastine (ASTELIN) 137 mcg (0.1 %) nasal spray, 1 spray (137 mcg total) by Nasal route 2 (two) times daily., Disp: 30 mL, Rfl: 11    blood sugar diagnostic Strp, To check BG 3 times daily, to use with insurance preferred meter, e 11.65, Disp: 100 each, Rfl: 11    buPROPion (WELLBUTRIN XL) 300 MG 24 hr tablet, Take 1 tablet (300 mg total) by mouth every morning., Disp: 90 tablet, Rfl: 3    busPIRone (BUSPAR) 5 MG Tab, Take 1 tablet (5 mg total) by mouth 2 (two) times daily., Disp: 60 tablet, Rfl: 11    diclofenac sodium (VOLTAREN ARTHRITIS PAIN) 1 % Gel, Apply 2 g topically once daily., Disp: 100 g, Rfl: 3    dulaglutide (TRULICITY) 1.5 mg/0.5 mL pen injector, Inject 1.5 mg into the skin every 7 days., Disp: 4 pen , Rfl: 6    glucagon (BAQSIMI) 3 mg/actuation Spry, Give one puff via nostril. Hold device between fingers and thumb, do not push plunger yet, insert tip gently into one nostril until finger(s) touch the outside of the nose, then push plunger firmly all the way in . Dose is complete when the green line  "disappears., Disp: 1 each, Rfl: 1    HUMALOG KWIKPEN INSULIN 100 unit/mL pen, Inject 10-12 units before meals plus scale 150-200+2, 201-250+4, 251-300+6, 301-350+8, >350+10. Max daily 66 units., Disp: 30 mL, Rfl: 6    ketoconazole (NIZORAL) 2 % shampoo, Apply topically every 7 days., Disp: 120 mL, Rfl: 6    lancets Misc, To check BG 3 times daily, to use with insurance preferred meter, e 11.65, Disp: 100 each, Rfl: 11    LANTUS SOLOSTAR U-100 INSULIN 100 unit/mL (3 mL) InPn pen, Inject 40-52 units at night., Disp: , Rfl:     latanoprost 0.005 % ophthalmic solution, Place 1 drop into both eyes nightly., Disp: , Rfl:     losartan (COZAAR) 50 MG tablet, TAKE 1 TABLET BY MOUTH EVERY DAY, Disp: 90 tablet, Rfl: 0    meloxicam (MOBIC) 15 MG tablet, TAKE 1 TABLET BY MOUTH EVERY DAY, Disp: 30 tablet, Rfl: 2    ondansetron (ZOFRAN-ODT) 8 MG TbDL, Take 1 tablet (8 mg total) by mouth 3 (three) times daily as needed (Nausea)., Disp: 30 tablet, Rfl: 3    ONETOUCH DELICA LANCETS 33 gauge Misc, TO CHECK BLOOD GLUCOSE 3 TIMES DAILY, Disp: , Rfl:     ONETOUCH VERIO FLEX METER Misc, TO CHECK BLOOD GLUCOSE 3 TIMES DAILY, TO USE WITH INSURANCE PREFERRED METER, E 11.65, Disp: , Rfl:     pantoprazole (PROTONIX) 40 MG tablet, Take 1 tablet (40 mg total) by mouth once daily., Disp: 90 tablet, Rfl: 3    pen needle, diabetic (NOVOFINE 32) 32 gauge x 1/4" Ndle, Uses 4 times a day. 90 day via duramed e 11.65, Disp: 400 each, Rfl: 3    rosuvastatin (CRESTOR) 10 MG tablet, Take 1 tablet (10 mg total) by mouth every evening., Disp: 90 tablet, Rfl: 0     Pertinent Psychiatric History   Sxs:   Longstanding: NA  Intermittent: depression, anxiety    Substance Use:  Current Use: none  Prior Problems: None   Treatment Hx:  Medication: None  Current: NA  Pertinent prior:NA  Therapy: None  Current: NA  Pertinent prior: NA  Inpatient: None     BEHAVIORAL OBSERVATIONS   APPEARANCE Unremarkable   ALERTNESS/ORIENTATION Attentive and alert.  From Interview: " "Not aware of current visit or reason for visit. However, she accurately recalled her August 2022 surgery without prompting.    GAIT Unremarkable   SENSORY Unremarkable   MOTOR  Unremarkable   SPEECH/LANGUAGE Normal in rate, rhythm, tone. Volume was lower and she needed to speak up.   Language barrier evident particularly on language tasks.  For example consistent with her prior testing, trouble coming up with common object names on 1 particular test.   STATED MOOD/AFFECT Stated mood was "good" with congruent affect   INTERPERSONAL BEHAVIOR Rapport was quickly and easily established   SI/HI None reported   HALLUCINATIONS/DELUSIONS None evidenced or endorsed   THOUGHT PROCESSES/INSIGHT Thoughts seemed logical and goal-directed   TEST TAKING BEHAVIOR / VALIDITY Embedded performance validity measures and observation of effort were suggestive of adequate engagement. The current results, therefore, are likely a valid reflection of the patient's current functioning.      PROCEDURES/TESTS ADMINISTERED   In addition to performing a review of pertinent medical records, reviewing limits to confidentiality, conducting a clinical interview, and explaining procedures, the following measures were administered: Mini Mental Status Examination (MMSE);  Wechsler Adult Intelligence Scale, Fourth Edition (WAIS-IV) [Symbol Search subtest]; Wechsler Memory Scale, Fourth Edition (WMS-IV) [Symbol Search subtest]; Repeatable Battery for the Assessment of Neuropsychological Status (RBANS, form A, Duff et al., 2003 norms); Neuropsychological Assessment Battery (NAB) [Naming subtest, form 1]; Verbal fluency tests (FAS & animal naming) Trails A/B; Geriatric Depression Scale (GDS-30, Hungarian version); and Generalized Anxiety Disorder - 7 Item Scale (DIANN-7, Hungarian version).  Manual norms were used unless otherwise indicated.  Review data Appendix Below for scores and percentiles.      BILLING   Service Description CPT Code Minutes Units   Test " Evaluation Services   Neuropsychological testing evaluation services by physician [phone]   Each additional hour by physician [phone]   Test Administration and Scoring   Psychological or neuropsychological test administration and scoring by technician [id number]   Each additional 30 minutes by technician [phone]     DATA APPENDIX:   Note: It is important to note that scores/percentiles should only be interpreted by a neuropsychologist. It is common for healthy individuals to have 1-3 isolated low/unusual scores that are not indicative of any significant cognitive dysfunction.     COGNITIVE SCREENING Raw Score Standardized Score 2022 Data   MMSE 22 - 28   Orientation - Place 4/5 - 5/5   Orientation - Date 5/5 - 5/5   RBANS      Immediate Memory - 57 98   VS/Construction - 64 78   Language - 54 65   Attention - 49 65   Delayed Memory - 71 93   Total Scale - 51 75   Subtests   -   List Learning 12 2 -   Story Memory 11 4 -   Figure Copy 15 5 -   Line Orientation 8 - -   Naming 6 - -   Fluency 10 3 -   Digit Span 6 4 -   Coding 8 1 -   List Recall 1 - -   List Recognition 16 - -   Story Recall 7 8 -   Figure Recall 14 11 -   LANGUAGE FUNCTIONING Raw Score Standardized Score 2022 Data   RBANS Naming 6 - Raw=6   RBANS Semantic Fluency 10 3 Raw=10   NAB Naming 24 30 Raw=25   NAB Naming Percent Correct After Semantic Cuing 0 - -   NAB Naming Percent Correct After Phonemic Cuing 0 - -   FAS 17 32 Raw=21 / T=36   Letter F  8     Animal Naming 12 39 Raw=8 / T=26   VISUOSPATIAL FUNCTIONING Raw Score Standardized Score 2022 Data   RBANS Line Orientation  8 - Raw=12, T=43   RBANS Figure Copy 15 5 Raw=14, T=37   LEARNING & MEMORY Raw Score Standardized Score 2022 Data   RBANS      Immediate Memory - 57 98   Delayed Memory - 71 93   List Learning 12 2 / T=30 Raw=20 / T=47   List Recall 1 T=37 Raw=4 / T=50   List Recognition 16 T=37 Raw=19 / T=53   Story Memory 11 T=47 Raw=14 / T=53   Story Recall 7 T=50 Raw=6 / T=47    Figure Recall 14 T=53 Raw=10 T=47   ATTENTION/WORKING MEMORY Raw Score Standardized Score 2022 Data   RBANS Digit Span  6 4 Raw=6   MENTAL PROCESSING SPEED Raw Score Standardized Score 2022 Data   WAIS-IV Symbol Search 7 2 Raw=11, SS=4   RBANS Coding 8 1 Raw=17, T=37   TMT A  DC  -   TMT A errors N/A -    EXECUTIVE FUNCTIONING Raw Score Standardized Score 2022 Data   TMT B DC  -   TMT B errors N/A -    MOOD & PERSONALITY Raw Score Standardized Score 2022 Data   GDS-30 7 - 8   DIANN-7 4 - 14

## 2024-10-28 ENCOUNTER — OFFICE VISIT (OUTPATIENT)
Dept: PRIMARY CARE CLINIC | Facility: CLINIC | Age: 69
End: 2024-10-28
Payer: MEDICARE

## 2024-10-28 VITALS
HEART RATE: 71 BPM | SYSTOLIC BLOOD PRESSURE: 130 MMHG | DIASTOLIC BLOOD PRESSURE: 82 MMHG | OXYGEN SATURATION: 98 % | WEIGHT: 171.5 LBS | BODY MASS INDEX: 28.54 KG/M2

## 2024-10-28 DIAGNOSIS — R42 VERTIGO: ICD-10-CM

## 2024-10-28 DIAGNOSIS — R51.9 NONINTRACTABLE HEADACHE, UNSPECIFIED CHRONICITY PATTERN, UNSPECIFIED HEADACHE TYPE: Primary | ICD-10-CM

## 2024-10-28 PROCEDURE — 1126F AMNT PAIN NOTED NONE PRSNT: CPT | Mod: CPTII,S$GLB,, | Performed by: STUDENT IN AN ORGANIZED HEALTH CARE EDUCATION/TRAINING PROGRAM

## 2024-10-28 PROCEDURE — 3008F BODY MASS INDEX DOCD: CPT | Mod: CPTII,S$GLB,, | Performed by: STUDENT IN AN ORGANIZED HEALTH CARE EDUCATION/TRAINING PROGRAM

## 2024-10-28 PROCEDURE — 4010F ACE/ARB THERAPY RXD/TAKEN: CPT | Mod: CPTII,S$GLB,, | Performed by: STUDENT IN AN ORGANIZED HEALTH CARE EDUCATION/TRAINING PROGRAM

## 2024-10-28 PROCEDURE — 99999 PR PBB SHADOW E&M-EST. PATIENT-LVL IV: CPT | Mod: PBBFAC,,, | Performed by: STUDENT IN AN ORGANIZED HEALTH CARE EDUCATION/TRAINING PROGRAM

## 2024-10-28 PROCEDURE — 99214 OFFICE O/P EST MOD 30 MIN: CPT | Mod: S$GLB,,, | Performed by: STUDENT IN AN ORGANIZED HEALTH CARE EDUCATION/TRAINING PROGRAM

## 2024-10-28 PROCEDURE — 3075F SYST BP GE 130 - 139MM HG: CPT | Mod: CPTII,S$GLB,, | Performed by: STUDENT IN AN ORGANIZED HEALTH CARE EDUCATION/TRAINING PROGRAM

## 2024-10-28 PROCEDURE — 3079F DIAST BP 80-89 MM HG: CPT | Mod: CPTII,S$GLB,, | Performed by: STUDENT IN AN ORGANIZED HEALTH CARE EDUCATION/TRAINING PROGRAM

## 2024-10-28 PROCEDURE — 1159F MED LIST DOCD IN RCRD: CPT | Mod: CPTII,S$GLB,, | Performed by: STUDENT IN AN ORGANIZED HEALTH CARE EDUCATION/TRAINING PROGRAM

## 2024-10-28 PROCEDURE — 1160F RVW MEDS BY RX/DR IN RCRD: CPT | Mod: CPTII,S$GLB,, | Performed by: STUDENT IN AN ORGANIZED HEALTH CARE EDUCATION/TRAINING PROGRAM

## 2024-10-28 PROCEDURE — 3052F HG A1C>EQUAL 8.0%<EQUAL 9.0%: CPT | Mod: CPTII,S$GLB,, | Performed by: STUDENT IN AN ORGANIZED HEALTH CARE EDUCATION/TRAINING PROGRAM

## 2024-10-28 RX ORDER — MECLIZINE HCL 12.5 MG 12.5 MG/1
12.5 TABLET ORAL 3 TIMES DAILY PRN
Qty: 30 TABLET | Refills: 0 | Status: SHIPPED | OUTPATIENT
Start: 2024-10-28

## 2024-11-04 DIAGNOSIS — E11.59 HYPERTENSION ASSOCIATED WITH TYPE 2 DIABETES MELLITUS: ICD-10-CM

## 2024-11-04 DIAGNOSIS — I15.2 HYPERTENSION ASSOCIATED WITH TYPE 2 DIABETES MELLITUS: ICD-10-CM

## 2024-11-04 NOTE — TELEPHONE ENCOUNTER
Refill Routing Note   Medication(s) are not appropriate for processing by [facility] for the following reason(s):        Required labs outdated    [facility] action(s):  Defer        Medication Therapy Plan: FLOS      Appointments  past 12m or future 3m with PCP    Date Provider   Last Visit   8/6/2024 Jose Rojas MD   Next Visit   Visit date not found Jose Rojas MD   ED visits in past 90 days: 0        Note composed:2:33 PM 11/04/2024

## 2024-11-04 NOTE — TELEPHONE ENCOUNTER
Care Due:                  Date            Visit Type   Department     Provider  --------------------------------------------------------------------------------                                MYCHART                              FOLLOWUP/OF  [facility] INTERNAL  Last Visit: 08-      FICE VISIT   MEDICINE       Jose Rojas  Next Visit: None Scheduled  None         None Found                                                            Last  Test          Frequency    Reason                     Performed    Due Date  --------------------------------------------------------------------------------    CMP.........  12 months..  losartan, rosuvastatin...  07- 07-    Lipid Panel.  12 months..  rosuvastatin.............  07- 07-    [employer] Embedded Care Due Messages. Reference number: [id number].   11/04/2024 8:32:20 AM CST

## 2024-11-05 RX ORDER — LOSARTAN POTASSIUM 50 MG/1
TABLET ORAL
Qty: 90 TABLET | Refills: 0 | Status: SHIPPED | OUTPATIENT
Start: 2024-11-05

## 2024-11-06 ENCOUNTER — PATIENT MESSAGE (OUTPATIENT)
Dept: NEUROLOGY | Facility: CLINIC | Age: 69
End: 2024-11-06
Payer: MEDICARE

## 2024-11-06 ENCOUNTER — OFFICE VISIT (OUTPATIENT)
Dept: URGENT CARE | Facility: CLINIC | Age: 69
End: 2024-11-06
Payer: MEDICARE

## 2024-11-06 VITALS
OXYGEN SATURATION: 95 % | HEIGHT: 65 IN | HEART RATE: 82 BPM | RESPIRATION RATE: 18 BRPM | DIASTOLIC BLOOD PRESSURE: 78 MMHG | WEIGHT: 171 LBS | SYSTOLIC BLOOD PRESSURE: 146 MMHG | BODY MASS INDEX: 28.49 KG/M2 | TEMPERATURE: 98 F

## 2024-11-06 DIAGNOSIS — R05.9 COUGH, UNSPECIFIED TYPE: ICD-10-CM

## 2024-11-06 DIAGNOSIS — B34.9 ACUTE VIRAL SYNDROME: Primary | ICD-10-CM

## 2024-11-06 LAB
CTP QC/QA: YES
SARS-COV-2 AG RESP QL IA.RAPID: NEGATIVE

## 2024-11-06 PROCEDURE — 99213 OFFICE O/P EST LOW 20 MIN: CPT | Mod: S$GLB,,,

## 2024-11-06 PROCEDURE — 87811 SARS-COV-2 COVID19 W/OPTIC: CPT | Mod: QW,S$GLB,,

## 2024-11-06 RX ORDER — BENZONATATE 200 MG/1
200 CAPSULE ORAL 3 TIMES DAILY PRN
Qty: 30 CAPSULE | Refills: 0 | Status: SHIPPED | OUTPATIENT
Start: 2024-11-06

## 2024-11-06 NOTE — PROGRESS NOTES
"Subjective:      Patient ID: Riana Kay is a 69 y.o. female.    Vitals:  height is 5' 5" (1.651 m) and weight is 77.6 kg (171 lb). Her oral temperature is 97.9 °F (36.6 °C). Her blood pressure is 146/78 (abnormal) and her pulse is 82. Her respiration is 18 and oxygen saturation is 95%.     Chief Complaint: Sore Throat    This is a 69 y.o. female who presents today with a chief complaint of cough onset 3 days. Cough is dry. Pt also has a sore throat, trouble swallowing and headache. Pt took tylenol which did not provide relief.    Provider note starts below:  Patient presents to clinic for evaluation of nonproductive cough, sore throat, and headache which onset 3 days ago. Patient has been taking tylenol at home with no improvement of symptoms. Denies any worsening of symptoms. No known sick contacts. Denies any fever, chills, body aches, ear pain, sinus pain, chest pain, SOB, dizziness, lightheadedness. No other complaints.     Cough  This is a new problem. The current episode started in the past 7 days. The problem has been unchanged. The problem occurs constantly. The cough is Non-productive. Associated symptoms include headaches, nasal congestion and a sore throat. Pertinent negatives include no chest pain, chills, ear pain, eye redness, fever, myalgias, postnasal drip, rash, shortness of breath or wheezing. Nothing aggravates the symptoms. Her past medical history is significant for bronchitis. There is no history of asthma or pneumonia.       Constitution: Negative for chills and fever.   HENT:  Positive for sore throat. Negative for ear pain, congestion, postnasal drip, sinus pain, sinus pressure, trouble swallowing and voice change.    Neck: Negative for neck pain and neck stiffness.   Cardiovascular:  Negative for chest pain and palpitations.   Eyes:  Negative for eye pain and eye redness.   Respiratory:  Positive for cough. Negative for chest tightness, sputum production, shortness of breath and " wheezing.    Gastrointestinal:  Negative for abdominal pain, nausea and vomiting.   Musculoskeletal:  Negative for muscle cramps and muscle ache.   Skin:  Negative for pale and rash.   Allergic/Immunologic: Negative for itching and sneezing.   Neurological:  Positive for headaches. Negative for dizziness, light-headedness, passing out, disorientation and altered mental status.   Psychiatric/Behavioral:  Negative for altered mental status, disorientation and confusion.       Objective:     Physical Exam   Constitutional: She is oriented to person, place, and time.  Non-toxic appearance. She does not appear ill. No distress.   HENT:   Head: Normocephalic and atraumatic.   Ears:   Right Ear: Tympanic membrane, external ear and ear canal normal.   Left Ear: Tympanic membrane, external ear and ear canal normal.   Nose: Nose normal.   Mouth/Throat: Mucous membranes are moist. Posterior oropharyngeal erythema present. No oropharyngeal exudate. Oropharynx is clear.   Eyes: Conjunctivae are normal. Extraocular movement intact   Neck: Neck supple.   Cardiovascular: Normal rate, regular rhythm, normal heart sounds and normal pulses.   Pulmonary/Chest: Effort normal and breath sounds normal. No stridor. No respiratory distress. She has no wheezes. She has no rhonchi. She has no rales.   Abdominal: Normal appearance.   Musculoskeletal: Normal range of motion.         General: Normal range of motion.   Neurological: She is alert, oriented to person, place, and time and at baseline.   Skin: Skin is warm and dry.   Psychiatric: Her behavior is normal. Mood normal.   Nursing note and vitals reviewed.    Assessment:     Results for orders placed or performed in visit on 11/06/24   SARS Coronavirus 2 Antigen, POCT Manual Read    Collection Time: 11/06/24  9:39 AM   Result Value Ref Range    SARS Coronavirus 2 Antigen Negative Negative     Acceptable Yes      *Note: Due to a large number of results and/or encounters for  "the requested time period, some results have not been displayed. A complete set of results can be found in Results Review.       1. Acute viral syndrome    2. Cough, unspecified type        Plan:     Acute viral syndrome  -     (Magic mouthwash) 1:1:1 diphenhydrAMINE(Benadryl) 12.5mg/5ml liq, aluminum & magnesium hydroxide-simethicone (Maalox), LIDOcaine viscous 2%; Swish and spit 5 mLs every 4 (four) hours as needed (for sore throat). for mouth sores  Dispense: 360 mL; Refill: 0    Cough, unspecified type  -     SARS Coronavirus 2 Antigen, POCT Manual Read  -     benzonatate (TESSALON) 200 MG capsule; Take 1 capsule (200 mg total) by mouth 3 (three) times daily as needed for Cough.  Dispense: 30 capsule; Refill: 0            Patient Instructions   COVID test today negative.  "Magic Mouthwash" as needed for sore throat. Please swish, gargle, and spit every 3-4 hours to help soothe the throat.  Benzonatate (tessalon) up to 3 times daily to help with cough.   Recommend over the counter oral antihistamine (zyrtec, allegra, claritin) to help with congestion and sinus pressure.  If not allergic, take Tylenol (Acetaminophen) 650 mg to  1 g every 6 hours as needed for fever and/or Motrin (Ibuprofen) 600 to 800 mg every 6 hours as needed for fever as directed for control of pain and/or fever  Please drink plenty of fluids.  Please get plenty of rest.  Nasal irrigation with a saline spray or Netti Pot like device per their directions is also recommended.  If you smoke, please stop smoking.    To help ease a sore throat, you can:  Use a sore throat spray.  Suck on hard candy or throat lozenges.  Gargle with warm saltwater a few times each day. Mix of 1/4 teaspoon (1.25 grams) salt in 8 ounces (240 mL) of warm water.  Use a cool mist humidifier to help you breathe easier.           Common Cold Medicine Ingredients Cheat sheet  Acetaminophen (APAP) -pain reliever/fever reducer  Dextromethorphan - cough suppressant  Guaifenesin " - expectorant/thins and loosens mucus  Phenylephrine - nasal decongestant  Diphenhydramine or Doxylamine succinate - antihistamine, helps you fall asleep  Promethazine or Brompheniramine - Prescription strength antihistamines    Please remember that you have received care at an urgent care today. Urgent cares are not emergency rooms and are not equipped to handle life threatening emergencies and cannot rule in or out certain medical conditions and you may be released before all of your medical problems are known or treated.     Please arrange follow up with your primary care physician or speciality clinic within 2-5 days if your signs and symptoms have not resolved or worsen.     Patient can call our Referral Hotline at [phone] to make an appointment.    Please return here or go to the Emergency Department for any concerns or worsening of condition.  Signs of infection. These include a fever of 100.4°F (38°C) or higher, chills, cough, more sputum or change in color of sputum.  You are having so much trouble breathing that you can only say one or two words at a time.  You need to sit upright at all times to be able to breathe and or cannot lie down.  You have trouble breathing when talking or sitting still.  You have a fever of 100.4°F (38°C) or higher or chills.  You have chest pain when you cough, have trouble breathing but can still talk in full sentences, or cough up blood.

## 2024-11-06 NOTE — PATIENT INSTRUCTIONS
"COVID test today negative.  "Magic Mouthwash" as needed for sore throat. Please swish, gargle, and spit every 3-4 hours to help soothe the throat.  Benzonatate (tessalon) up to 3 times daily to help with cough.   Recommend over the counter oral antihistamine (zyrtec, allegra, claritin) to help with congestion and sinus pressure.  If not allergic, take Tylenol (Acetaminophen) 650 mg to  1 g every 6 hours as needed for fever and/or Motrin (Ibuprofen) 600 to 800 mg every 6 hours as needed for fever as directed for control of pain and/or fever  Please drink plenty of fluids.  Please get plenty of rest.  Nasal irrigation with a saline spray or Netti Pot like device per their directions is also recommended.  If you smoke, please stop smoking.    To help ease a sore throat, you can:  Use a sore throat spray.  Suck on hard candy or throat lozenges.  Gargle with warm saltwater a few times each day. Mix of 1/4 teaspoon (1.25 grams) salt in 8 ounces (240 mL) of warm water.  Use a cool mist humidifier to help you breathe easier.           Common Cold Medicine Ingredients Cheat sheet  Acetaminophen (APAP) -pain reliever/fever reducer  Dextromethorphan - cough suppressant  Guaifenesin - expectorant/thins and loosens mucus  Phenylephrine - nasal decongestant  Diphenhydramine or Doxylamine succinate - antihistamine, helps you fall asleep  Promethazine or Brompheniramine - Prescription strength antihistamines    Please remember that you have received care at an urgent care today. Urgent cares are not emergency rooms and are not equipped to handle life threatening emergencies and cannot rule in or out certain medical conditions and you may be released before all of your medical problems are known or treated.     Please arrange follow up with your primary care physician or speciality clinic within 2-5 days if your signs and symptoms have not resolved or worsen.     Patient can call our Referral Hotline at [phone] to make an " appointment.    Please return here or go to the Emergency Department for any concerns or worsening of condition.  Signs of infection. These include a fever of 100.4°F (38°C) or higher, chills, cough, more sputum or change in color of sputum.  You are having so much trouble breathing that you can only say one or two words at a time.  You need to sit upright at all times to be able to breathe and or cannot lie down.  You have trouble breathing when talking or sitting still.  You have a fever of 100.4°F (38°C) or higher or chills.  You have chest pain when you cough, have trouble breathing but can still talk in full sentences, or cough up blood.

## 2024-11-13 ENCOUNTER — HOSPITAL ENCOUNTER (OUTPATIENT)
Dept: RADIOLOGY | Facility: HOSPITAL | Age: 69
Discharge: HOME | End: 2024-11-13
Attending: NURSE PRACTITIONER
Payer: MEDICARE

## 2024-11-13 DIAGNOSIS — Z12.31 ENCOUNTER FOR SCREENING MAMMOGRAM FOR MALIGNANT NEOPLASM OF BREAST: ICD-10-CM

## 2024-11-13 PROCEDURE — 77067 SCR MAMMO BI INCL CAD: CPT | Mod: TC

## 2024-11-13 PROCEDURE — 77067 SCR MAMMO BI INCL CAD: CPT | Mod: 26,,, | Performed by: RADIOLOGY

## 2024-11-13 PROCEDURE — 77063 BREAST TOMOSYNTHESIS BI: CPT | Mod: 26,,, | Performed by: RADIOLOGY

## 2024-11-20 NOTE — TELEPHONE ENCOUNTER
No care due was identified.  [facility] Embedded Care Due Messages. Reference number: [id number].   11/20/2024 1:41:35 PM CST

## 2024-11-20 NOTE — TELEPHONE ENCOUNTER
Refill Routing Note   Medication(s) are not appropriate for processing by [facility] for the following reason(s):        No active prescription written by provider    [facility] action(s):  Defer               Appointments  past 12m or future 3m with PCP    Date Provider   Last Visit   8/6/2024 Jose Rojas MD   Next Visit   Visit date not found Jose Rojas MD   ED visits in past 90 days: 0        Note composed:2:45 PM 11/20/2024

## 2024-11-21 RX ORDER — DULOXETIN HYDROCHLORIDE 30 MG/1
30 CAPSULE, DELAYED RELEASE ORAL
Qty: 90 CAPSULE | Refills: 0 | Status: SHIPPED | OUTPATIENT
Start: 2024-11-21

## 2024-11-30 DIAGNOSIS — I25.10 ATHEROSCLEROTIC HEART DISEASE OF NATIVE CORONARY ARTERY WITHOUT ANGINA PECTORIS: ICD-10-CM

## 2024-11-30 NOTE — TELEPHONE ENCOUNTER
Refill Routing Note   Medication(s) are not appropriate for processing by [facility] for the following reason(s):        Required labs outdated    [facility] action(s):  Defer             Appointments  past 12m or future 3m with PCP    Date Provider   Last Visit   8/6/2024 Jose Rojas MD   Next Visit   Visit date not found Jose Rojas MD   ED visits in past 90 days: 0        Note composed:6:36 AM 11/30/2024

## 2024-11-30 NOTE — TELEPHONE ENCOUNTER
No care due was identified.  [facility] Embedded Care Due Messages. Reference number: [id number].   11/30/2024 12:35:23 AM CST

## 2024-12-02 RX ORDER — ROSUVASTATIN CALCIUM 10 MG/1
10 TABLET, COATED ORAL NIGHTLY
Qty: 90 TABLET | Refills: 0 | Status: SHIPPED | OUTPATIENT
Start: 2024-12-02

## 2024-12-05 ENCOUNTER — OFFICE VISIT (OUTPATIENT)
Dept: NEUROLOGY | Facility: CLINIC | Age: 69
End: 2024-12-05
Payer: MEDICARE

## 2024-12-05 ENCOUNTER — TELEPHONE (OUTPATIENT)
Dept: NEUROLOGY | Facility: CLINIC | Age: 69
End: 2024-12-05
Payer: MEDICARE

## 2024-12-05 DIAGNOSIS — F03.90 MAJOR NEUROCOGNITIVE DISORDER: Primary | ICD-10-CM

## 2024-12-05 PROCEDURE — 99499 UNLISTED E&M SERVICE: CPT | Mod: S$GLB,,, | Performed by: CLINICAL NEUROPSYCHOLOGIST

## 2024-12-05 RX ORDER — INSULIN LISPRO 100 [IU]/ML
INJECTION, SOLUTION INTRAVENOUS; SUBCUTANEOUS
Qty: 30 EACH | Refills: 6 | Status: SHIPPED | OUTPATIENT
Start: 2024-12-05

## 2024-12-05 NOTE — TELEPHONE ENCOUNTER
Attempted to call patient, can be added on the schedule for next available day with dr gaxiola.  ----- Message from Lakia Gaxiola MD sent at 12/5/2024  9:55 AM CST -----  Patient needs schedule appt for followup  ----- Message -----  From: TAMI Pearl II, PhD  Sent: 12/5/2024   9:46 AM CST  To: Lakia Gaxiola MD    They need follow up with you for discusion about ptau biomarker and referral to behavioral neurology if needed.

## 2024-12-05 NOTE — ASSESSMENT & PLAN NOTE
-Feedback session completed to discuss results and plan. Review Neuropsychology Consult dated for 10/8/2024 for complete details of feedback discussion, diagnosis, treatment plan.  -Additional concerns:   Discussed mood / motivation issues and they will implement behavioral plan (exercise, activity scheduling) to see how that improves mood and middle insomnia.   If not, will fu with dr. Doll about meds  They will fu with neurology   -Follow-up: 12-months

## 2024-12-05 NOTE — Clinical Note
They need follow up with you for discusion about ptau biomarker and referral to behavioral neurology if needed.

## 2024-12-05 NOTE — PROGRESS NOTES
NEUROPSYCHOLOGY CONSULT - Feedback Visit Note   Name:  Riana Kay    MRN:  [id number]   : 1955    Billing: Visit charges billed on 10/8/2024   Visit Reason Feedback session for results/treatment plan discussion   Visit Type In Clinic   Time 43-min   Problem List Items Addressed This Visit          Neuro    Major neurocognitive disorder - Primary    Overview     - MMSE=28/30  - MMSE=22/30         Current Assessment & Plan     -Feedback session completed to discuss results and plan. Review Neuropsychology Consult dated for 10/8/2024 for complete details of feedback discussion, diagnosis, treatment plan.  -Additional concerns:   Discussed mood / motivation issues and they will implement behavioral plan (exercise, activity scheduling) to see how that improves mood and middle insomnia.   If not, will fu with dr. Doll about meds  They will fu with neurology   -Follow-up: 12-months

## 2024-12-13 ENCOUNTER — LAB VISIT (OUTPATIENT)
Dept: LAB | Facility: HOSPITAL | Age: 69
End: 2024-12-13
Payer: MEDICARE

## 2024-12-13 DIAGNOSIS — E11.65 TYPE 2 DIABETES MELLITUS WITH HYPERGLYCEMIA, WITH LONG-TERM CURRENT USE OF INSULIN: ICD-10-CM

## 2024-12-13 DIAGNOSIS — Z79.4 TYPE 2 DIABETES MELLITUS WITH HYPERGLYCEMIA, WITH LONG-TERM CURRENT USE OF INSULIN: ICD-10-CM

## 2024-12-13 LAB
CHOLEST SERPL-MCNC: 163 MG/DL (ref 120–199)
CHOLEST/HDLC SERPL: 3.4 {RATIO} (ref 2–5)
ESTIMATED AVG GLUCOSE: 160 MG/DL (ref 68–131)
HBA1C MFR BLD: 7.2 % (ref 4–5.6)
HDLC SERPL-MCNC: 48 MG/DL (ref 40–75)
HDLC SERPL: 29.4 % (ref 20–50)
LDLC SERPL CALC-MCNC: 85.6 MG/DL (ref 63–159)
NONHDLC SERPL-MCNC: 115 MG/DL
TRIGL SERPL-MCNC: 147 MG/DL (ref 30–150)

## 2024-12-13 PROCEDURE — 36415 COLL VENOUS BLD VENIPUNCTURE: CPT | Mod: PO | Performed by: NURSE PRACTITIONER

## 2024-12-13 PROCEDURE — 83036 HEMOGLOBIN GLYCOSYLATED A1C: CPT | Performed by: NURSE PRACTITIONER

## 2024-12-13 PROCEDURE — 80061 LIPID PANEL: CPT | Performed by: NURSE PRACTITIONER

## 2024-12-17 ENCOUNTER — PATIENT MESSAGE (OUTPATIENT)
Dept: INTERNAL MEDICINE | Facility: CLINIC | Age: 69
End: 2024-12-17
Payer: MEDICARE

## 2024-12-17 RX ORDER — MELOXICAM 15 MG/1
15 TABLET ORAL
Qty: 30 TABLET | Refills: 2 | Status: SHIPPED | OUTPATIENT
Start: 2024-12-17

## 2024-12-18 ENCOUNTER — TELEPHONE (OUTPATIENT)
Dept: INTERNAL MEDICINE | Facility: CLINIC | Age: 69
End: 2024-12-18
Payer: MEDICARE

## 2025-01-01 RX ORDER — INSULIN GLARGINE 100 [IU]/ML
INJECTION, SOLUTION SUBCUTANEOUS
Qty: 15 ML | Refills: 6 | Status: SHIPPED | OUTPATIENT
Start: 2025-01-01

## 2025-01-01 RX ORDER — INSULIN GLARGINE 100 [IU]/ML
INJECTION, SOLUTION SUBCUTANEOUS
Qty: 30 EACH | Refills: 3 | Status: SHIPPED | OUTPATIENT
Start: 2025-01-01

## 2025-01-02 NOTE — PROGRESS NOTES
DATE: 1/2/2025  PATIENT: Riana Kay    Attending Physician: Jalil Donahue M.D.    HISTORY:  Riana Kay is a 69 y.o. female who returns to me today for follow up.  She was last seen by me 7/15/2024.  Today she is doing well but notes she has been taking lyrica and doing home exercises with minimal relief and continues to have neck and bilateral arm pain with numbness and tingling    The Patient denies myelopathic symptoms such as handwriting changes or difficulty with buttons/coins/keys. Denies perineal paresthesias, bowel/bladder dysfunction.      EXAM:  There were no vitals taken for this visit.    My physical examination was notable for the following findings:     Musculoskeletal and neuro exam stable    IMAGING:    Today I personally re-reviewed AP, Lat and Flex/Ex  upright C-spine films that demonstrate mild degenerative changes.     There is no height or weight on file to calculate BMI.    Hemoglobin A1C   Date Value Ref Range Status   12/13/2024 7.2 (H) 4.0 - 5.6 % Final     Comment:     ADA Screening Guidelines:  5.7-6.4%  Consistent with prediabetes  >or=6.5%  Consistent with diabetes    High levels of fetal hemoglobin interfere with the HbA1C  assay. Heterozygous hemoglobin variants (HbS, HgC, etc)do  not significantly interfere with this assay.   However, presence of multiple variants may affect accuracy.     08/13/2024 8.2 (H) 4.0 - 5.6 % Final     Comment:     ADA Screening Guidelines:  5.7-6.4%  Consistent with prediabetes  >or=6.5%  Consistent with diabetes    High levels of fetal hemoglobin interfere with the HbA1C  assay. Heterozygous hemoglobin variants (HbS, HgC, etc)do  not significantly interfere with this assay.   However, presence of multiple variants may affect accuracy.     04/03/2024 7.4 (H) 4.0 - 5.6 % Final     Comment:     ADA Screening Guidelines:  5.7-6.4%  Consistent with prediabetes  >or=6.5%  Consistent with diabetes    High levels of fetal hemoglobin interfere  with the HbA1C  assay. Heterozygous hemoglobin variants (HbS, HgC, etc)do  not significantly interfere with this assay.   However, presence of multiple variants may affect accuracy.           ASSESSMENT/PLAN:    There are no diagnoses linked to this encounter.    Today we discussed at length all of the different treatment options including anti-inflammatories, acetaminophen, rest, ice, heat, physical therapy including strengthening and stretching exercises, home exercises, ROM, aerobic conditioning, aqua therapy, other modalities including ultrasound, massage, and dry needling, epidural steroid injections and finally surgical intervention.      Pt presents with chronic neck pain and radiculopathy. Failure of conservative rx. Will obtain MRI to further evaluate and call with results/discuss ESIs

## 2025-01-10 ENCOUNTER — OFFICE VISIT (OUTPATIENT)
Dept: ORTHOPEDICS | Facility: CLINIC | Age: 70
End: 2025-01-10
Payer: MEDICARE

## 2025-01-10 ENCOUNTER — PATIENT MESSAGE (OUTPATIENT)
Dept: ORTHOPEDICS | Facility: CLINIC | Age: 70
End: 2025-01-10
Payer: MEDICARE

## 2025-01-10 ENCOUNTER — HOSPITAL ENCOUNTER (OUTPATIENT)
Dept: RADIOLOGY | Facility: HOSPITAL | Age: 70
Discharge: HOME | End: 2025-01-10
Attending: ORTHOPAEDIC SURGERY
Payer: MEDICARE

## 2025-01-10 VITALS — BODY MASS INDEX: 28.02 KG/M2 | WEIGHT: 168.19 LBS | HEIGHT: 65 IN

## 2025-01-10 DIAGNOSIS — M50.30 DDD (DEGENERATIVE DISC DISEASE), CERVICAL: Primary | ICD-10-CM

## 2025-01-10 DIAGNOSIS — M54.12 CERVICAL RADICULOPATHY: Primary | ICD-10-CM

## 2025-01-10 DIAGNOSIS — M50.30 DDD (DEGENERATIVE DISC DISEASE), CERVICAL: ICD-10-CM

## 2025-01-10 PROCEDURE — 72050 X-RAY EXAM NECK SPINE 4/5VWS: CPT | Mod: 26,,, | Performed by: RADIOLOGY

## 2025-01-10 PROCEDURE — 72050 X-RAY EXAM NECK SPINE 4/5VWS: CPT | Mod: TC

## 2025-01-10 PROCEDURE — 99999 PR PBB SHADOW E&M-EST. PATIENT-LVL IV: CPT | Mod: PBBFAC,,, | Performed by: ORTHOPAEDIC SURGERY

## 2025-01-15 DIAGNOSIS — E11.65 TYPE 2 DIABETES MELLITUS WITH HYPERGLYCEMIA, WITH LONG-TERM CURRENT USE OF INSULIN: Primary | ICD-10-CM

## 2025-01-15 DIAGNOSIS — Z79.4 TYPE 2 DIABETES MELLITUS WITH HYPERGLYCEMIA, WITH LONG-TERM CURRENT USE OF INSULIN: Primary | ICD-10-CM

## 2025-01-15 NOTE — PROGRESS NOTES
Subjective     Patient ID: Riana Kay is a 69 y.o. female.    Chief Complaint: Sore Throat (Dry throat) and Follow-up      Patient is a 69 year old female with DM, HTN, HLD, anxiety, and GERD with other co-morbidities that presents to clinic with her spouse today as a new patient to me, established with Dr. Rojas for c/o sore throat. Symptoms started about 2 weeks ago with cough and sore throat. Patient is not coughing anymore but still has sore throat. Denies fever, CP, SOB, abdominal pain, n/v/d. Has history of GERD and takes Protonix as needed. Has been using warm salt water to help with pain.     Sore Throat   This is a new problem. The current episode started in the past 7 days. The problem has been gradually worsening. The pain is worse on the left side. There has been no fever. The pain is at a severity of 7/10. The pain is severe. Associated symptoms include headaches, a hoarse voice, a plugged ear sensation, swollen glands and trouble swallowing. Pertinent negatives include no abdominal pain, congestion, coughing, diarrhea, drooling, ear discharge, ear pain, neck pain, shortness of breath, stridor or vomiting. She has tried NSAIDs and gargles for the symptoms. The treatment provided mild relief.     Review of Systems   Constitutional:  Negative for fatigue and fever.   HENT:  Positive for hoarse voice, sore throat and trouble swallowing. Negative for nasal congestion, drooling, ear discharge, ear pain and sinus pressure/congestion.    Eyes:  Negative for pain and visual disturbance.   Respiratory:  Negative for cough, shortness of breath and stridor.    Cardiovascular:  Negative for chest pain.   Gastrointestinal:  Negative for abdominal pain, diarrhea, nausea, vomiting and reflux.   Genitourinary:  Negative for dysuria.   Musculoskeletal:  Negative for back pain and neck pain.   Integumentary:  Negative for rash.   Neurological:  Positive for headaches.   Hematological:  Negative for adenopathy.    Psychiatric/Behavioral:  Negative for confusion.      Past Medical History:   Diagnosis Date    Abdominal pain, right upper quadrant 02/04/2014    Anticoagulant long-term use     Anxiety     AR (allergic rhinitis)     Atrophic gastritis without mention of hemorrhage 02/13/2012     Dx updated per 2019 IMO Load    Chronic fatigue syndrome 06/10/2012    Diabetes mellitus     Dizziness     Fatty liver     GERD (gastroesophageal reflux disease)     Gross hematuria 12/01/2020    HTN (hypertension)     Hyperlipidemia     Leg swelling     Memory loss     Osteopenia     PONV (postoperative nausea and vomiting)     Primary osteoarthritis of right knee 10/06/2020    Primary osteoarthritis of right knee 10/06/2020    Right elbow pain 01/16/2019    S/P total hysterectomy     Screening for colon cancer 01/06/2021    Sleep apnea     Status post total right knee replacement 10/6/2020 10/05/2020       Past Surgical History:   Procedure Laterality Date    BREAST BIOPSY Left     benign excisional biopsy 1990    CHOLECYSTECTOMY      COLONOSCOPY N/A 01/17/2018    Procedure: COLONOSCOPY with Donnell;  Surgeon: Roland Jacobo MD;  Location: [address]);  Service: Endoscopy;  Laterality: N/A;    COLONOSCOPY N/A 01/06/2021    Procedure: COLONOSCOPY;  Surgeon: Yong Rowland MD;  Location: [facility] ([address] [place] [address]);  Service: Endoscopy;  Laterality: N/A;  prep ins. emailed - [place] speaking,  needed - ERW  COVID [facility] - ERW    COLONOSCOPY N/A 08/16/2024    Procedure: COLONOSCOPY;  Surgeon: Aamir Ryees MD;  Location: [facility];  Service: Endoscopy;  Laterality: N/A;  8/9/24-Suprep, holding Trulicity, precall complete-DS  8/14/24-LVM to see if pt can come earlier-DS    CYSTOSCOPY N/A 12/01/2020    Procedure: CYSTOSCOPY;  Surgeon: Ines Stanford MD;  Location: [address];  Service: Urology;  Laterality: N/A;  45 minutes     ELBOW ARTHROPLASTY Right 01/16/2019    Procedure:  ARTHROPLASTY, ELBOW right radial head arthroplasty revision;  Surgeon: Katja Hubbard MD;  Location: [facility] OR 1ST FLR;  Service: Orthopedics;  Laterality: Right;  Anesthesia: General and Regional. Stretcher, hand pan 1 and pan 2, CALL ACCUMBE CLAIRX  [employer] notified 1-14 LO    ELBOW SURGERY Right 07/16/2015    ELBOW SURGERY      ESOPHAGOGASTRODUODENOSCOPY N/A 04/15/2019    Procedure: EGD (ESOPHAGOGASTRODUODENOSCOPY);  Surgeon: Buck Irwin MD;  Location: [facility] (4TH FLR);  Service: Endoscopy;  Laterality: N/A;  ray she    ESOPHAGOGASTRODUODENOSCOPY N/A 04/21/2023    Procedure: EGD (ESOPHAGOGASTRODUODENOSCOPY);  Surgeon: Aamir Reyes MD;  Location: [facility];  Service: Endoscopy;  Laterality: N/A;    FRACTURE SURGERY      HYSTERECTOMY      JOINT REPLACEMENT  October 6th2020    KNEE ARTHROPLASTY Right 10/06/2020    Procedure: ARTHROPLASTY, KNEE-SAME DAY PROTOCOL;  Surgeon: Sabino Damon MD;  Location: [facility];  Service: Orthopedics;  Laterality: Right;    KNEE ARTHROSCOPY W/ DEBRIDEMENT  04/2011    Left    RELEASE OF ULNAR NERVE AT CUBITAL TUNNEL Right 01/16/2019    Procedure: RELEASE, ULNAR TUNNEL right;  Surgeon: Katja Hubbard MD;  Location: [facility] 1ST FLR;  Service: Orthopedics;  Laterality: Right;  Anesthesia: General and Regional. Stretcher, hand pan 1 and pan 2, CALL ACCUMED, CLAIRX    RETROGRADE PYELOGRAPHY Bilateral 12/01/2020    Procedure: PYELOGRAM, RETROGRADE;  Surgeon: Ines Stanford MD;  Location: [facility] 1ST FLR;  Service: Urology;  Laterality: Bilateral;    REVERSE TOTAL SHOULDER ARTHROPLASTY Right 08/16/2022    Procedure: ARTHROPLASTY, SHOULDER, TOTAL, REVERSE, [place];  Surgeon: Aamir Powell MD;  Location: [facility] 2ND FLR;  Service: Orthopedics;  Laterality: Right;  [place] Can't go until after 12    ROTATOR CUFF REPAIR      SHOULDER SURGERY      TOTAL ABDOMINAL HYSTERECTOMY W/ BILATERAL SALPINGOOPHORECTOMY      TOTAL KNEE  ARTHROPLASTY Left 10/19/2021    Procedure: ARTHROPLASTY, KNEE, TOTAL;  Surgeon: Sabino Damon MD;  Location: [facility];  Service: Orthopedics;  Laterality: Left;    UPPER GASTROINTESTINAL ENDOSCOPY         Family History   Problem Relation Name Age of Onset    Cancer Father Won Baker         colon    Colon cancer Father Won Baker 83        colon cancer    Diabetes Maternal Aunt      Diabetes Maternal Grandmother Family member     Esophageal cancer Neg Hx      Stomach cancer Neg Hx      Melanoma Neg Hx         Social History     Socioeconomic History    Marital status:      Spouse name: Kemal    Number of children: 1   Occupational History    Occupation: Housekeeping     Comment: On disability   Tobacco Use    Smoking status: Never     Passive exposure: Never    Smokeless tobacco: Never   Substance and Sexual Activity    Alcohol use: No    Drug use: No    Sexual activity: Not Currently     Partners: Male     Birth control/protection: Post-menopausal   Other Topics Concern    Are you pregnant or think you may be? No    Breast-feeding No   Social History Narrative    She retired at [employer] in [place]; , 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    No stairs     Social Drivers of Health     Financial Resource Strain: Low Risk  (1/15/2025)    Overall Financial Resource Strain (CARDIA)     Difficulty of Paying Living Expenses: Not hard at all   Food Insecurity: No Food Insecurity (1/15/2025)    Hunger Vital Sign     Worried About Running Out of Food in the Last Year: Never true     Ran Out of Food in the Last Year: Never true   Transportation Needs: No Transportation Needs (11/13/2023)    PRAPARE - Transportation     Lack of Transportation (Medical): No     Lack of Transportation (Non-Medical): No   Physical Activity: Sufficiently Active (1/15/2025)    Exercise Vital Sign     Days of Exercise per Week: 5 days     Minutes of Exercise per Session: 30 min   Stress: Stress Concern Present  (1/15/2025)    [employer] [place] [employer] - Occupational Stress Questionnaire     Feeling of Stress : To some extent   Housing Stability: Low Risk  (11/13/2023)    Housing Stability Vital Sign     Unable to Pay for Housing in the Last Year: No     Number of Places Lived in the Last Year: 1     Unstable Housing in the Last Year: No       Current Outpatient Medications   Medication Sig Dispense Refill    acetaminophen (TYLENOL) 500 MG tablet Take 2 tablets (1,000 mg total) by mouth every 8 (eight) hours as needed for Pain. 90 tablet 0    azelastine (ASTELIN) 137 mcg (0.1 %) nasal spray 1 spray (137 mcg total) by Nasal route 2 (two) times daily. 30 mL 11    benzonatate (TESSALON) 200 MG capsule Take 1 capsule (200 mg total) by mouth 3 (three) times daily as needed for Cough. 30 capsule 0    blood sugar diagnostic Strp To check BG 3 times daily, to use with insurance preferred meter, e 11.65 100 each 11    buPROPion (WELLBUTRIN XL) 300 MG 24 hr tablet Take 1 tablet (300 mg total) by mouth every morning. 90 tablet 3    busPIRone (BUSPAR) 5 MG Tab Take 1 tablet (5 mg total) by mouth 2 (two) times daily. 60 tablet 11    diclofenac sodium (VOLTAREN ARTHRITIS PAIN) 1 % Gel Apply 2 g topically once daily. 100 g 3    dulaglutide (TRULICITY) 1.5 mg/0.5 mL pen injector Inject 1.5 mg into the skin every 7 days. 4 pen 6    glucagon (BAQSIMI) 3 mg/actuation Spry Give one puff via nostril. Hold device between fingers and thumb, do not push plunger yet, insert tip gently into one nostril until finger(s) touch the outside of the nose, then push plunger firmly all the way in . Dose is complete when the green line disappears. 1 each 1    HUMALOG KWIKPEN INSULIN 100 unit/mL pen INJECT 10-12 UNITS IN THE SKIN BEFORE MEALS PLUS SCALE MAX DAILY UNITS 66 UNITS 30 each 6    insulin glargine U-100, Lantus, (LANTUS SOLOSTAR U-100 INSULIN) 100 unit/mL (3 mL) InPn pen INJECT 24 -36 UNITS UNDER THE SKIN AT NIGHT. MAX DAILY 36 UNITS  "30 each 3    lancets Misc To check BG 3 times daily, to use with insurance preferred meter, e 11.65 100 each 11    LANTUS SOLOSTAR U-100 INSULIN 100 unit/mL (3 mL) InPn pen Inject 40-52 units at night. 15 mL 6    losartan (COZAAR) 50 MG tablet TAKE 1 TABLET BY MOUTH EVERY DAY 90 tablet 0    ondansetron (ZOFRAN-ODT) 8 MG TbDL Take 1 tablet (8 mg total) by mouth 3 (three) times daily as needed (Nausea). 30 tablet 3    ONETOUCH DELICA LANCETS 33 gauge Misc TO CHECK BLOOD GLUCOSE 3 TIMES DAILY      ONETOUCH VERIO FLEX METER Misc TO CHECK BLOOD GLUCOSE 3 TIMES DAILY, TO USE WITH INSURANCE PREFERRED METER, E 11.65      pantoprazole (PROTONIX) 40 MG tablet Take 1 tablet (40 mg total) by mouth once daily. 90 tablet 3    pen needle, diabetic (NOVOFINE 32) 32 gauge x 1/4" Ndle Uses 4 times a day. 90 day via duramed e 11.65 400 each 3    rosuvastatin (CRESTOR) 10 MG tablet TAKE 1 TABLET BY MOUTH EVERY DAY IN THE EVENING 90 tablet 0    (Magic mouthwash) 1:1:1 diphenhydrAMINE(Benadryl) 12.5mg/5ml liq, aluminum & magnesium hydroxide-simethicone (Maalox), LIDOcaine viscous 2% Swish and spit 5 mLs every 4 (four) hours as needed (for sore throat). for mouth sores (Patient not taking: Reported on 1/16/2025) 360 mL 0    DULoxetine (CYMBALTA) 30 MG capsule TAKE 1 CAPSULE BY MOUTH EVERY DAY (Patient not taking: Reported on 1/16/2025) 90 capsule 0    ketoconazole (NIZORAL) 2 % shampoo Apply topically every 7 days. (Patient not taking: Reported on 1/16/2025) 120 mL 6    latanoprost 0.005 % ophthalmic solution Place 1 drop into both eyes nightly. (Patient not taking: Reported on 1/16/2025)      LIDOcaine viscous HCl 2% (LIDOCAINE VISCOUS) 2 % Soln by Mucous Membrane route every 6 (six) hours. Take 5 ml every 4-6 hours as needed for sore throat; swish and spit. 100 mL 0    meclizine (ANTIVERT) 12.5 mg tablet Take 1 tablet (12.5 mg total) by mouth 3 (three) times daily as needed for Dizziness. (Patient not taking: Reported on 1/16/2025) 30 " "tablet 0    meloxicam (MOBIC) 15 MG tablet TAKE 1 TABLET BY MOUTH EVERY DAY (Patient not taking: Reported on 1/16/2025) 30 tablet 2     No current facility-administered medications for this visit.       Review of patient's allergies indicates:   Allergen Reactions    Iodinated contrast media Swelling and Rash    Percocet [oxycodone-acetaminophen] Itching    Macrobid [nitrofurantoin monohyd/m-cryst] Rash    Metformin Rash    Penicillins Rash     Had ancef in 2020 with no adverse rxn     Promethazine Rash     Had compazine in 2021    Sulfa (sulfonamide antibiotics) Rash    Sulfamethoxazole-trimethoprim Rash         Objective     Vitals:    01/16/25 0809 01/16/25 0904   BP: (!) 142/78 139/76   Pulse: 80    Temp: 98.3 °F (36.8 °C)    TempSrc: Oral    SpO2: 96%    Weight: 76.2 kg (167 lb 15.9 oz)    Height: 5' 5" (1.651 m)    PainSc:   5    PainLoc: Throat       Physical Exam  Vitals and nursing note reviewed.   Constitutional:       General: She is not in acute distress.     Appearance: Normal appearance. She is not ill-appearing.   HENT:      Head: Normocephalic and atraumatic.      Right Ear: Tympanic membrane normal.      Left Ear: Tympanic membrane normal.      Nose: Nose normal. No congestion or rhinorrhea.      Mouth/Throat:      Mouth: Mucous membranes are moist.      Pharynx: Oropharynx is clear. Posterior oropharyngeal erythema present.   Eyes:      General: No scleral icterus.        Right eye: No discharge.         Left eye: No discharge.      Extraocular Movements: Extraocular movements intact.      Conjunctiva/sclera: Conjunctivae normal.   Cardiovascular:      Rate and Rhythm: Normal rate and regular rhythm.      Pulses: Normal pulses.      Heart sounds: Normal heart sounds. No murmur heard.  Pulmonary:      Effort: Pulmonary effort is normal. No respiratory distress.      Breath sounds: Normal breath sounds.   Abdominal:      General: Abdomen is flat. Bowel sounds are normal. There is no distension.      " Palpations: Abdomen is soft. There is no mass.      Tenderness: There is no abdominal tenderness.   Musculoskeletal:         General: Normal range of motion.      Cervical back: Normal range of motion and neck supple.      Right lower leg: No edema.      Left lower leg: No edema.   Lymphadenopathy:      Cervical: No cervical adenopathy.      Comments: No LAD noted   Skin:     General: Skin is warm and dry.      Findings: No rash.   Neurological:      General: No focal deficit present.      Mental Status: She is alert and oriented to person, place, and time.   Psychiatric:         Mood and Affect: Mood normal.         Behavior: Behavior normal. Behavior is cooperative.         Cognition and Memory: Cognition and memory normal.              Assessment and Plan     1. Viral pharyngitis    2. Sore throat  -     POCT Strep A, Molecular  -     POCT COVID-19 Rapid Screening  -     LIDOcaine viscous HCl 2% (LIDOCAINE VISCOUS) 2 % Soln; by Mucous Membrane route every 6 (six) hours. Take 5 ml every 4-6 hours as needed for sore throat; swish and spit.  Dispense: 100 mL; Refill: 0      New problem; started 2 weeks ago; denies fever/cough/SOB/CP, n/v/d. +sore throat and painful swallowing; concerned for Covid and strep. Covid swab and strep swab done in clinic and both were negative. Take medication as prescribed. Discussed taking tylenol/ibuprofen for pain, warm salt-water gargles, warm fluids, throat lozenges, stay hydrated. RTC if s/s worsen. Follow up with PCP.         Follow up if symptoms worsen or fail to improve.    35 minutes of total time spent on the encounter, which includes face to face time and non-face to face time preparing to see the patient (eg, review of tests), Obtaining and/or reviewing separately obtained history, Documenting clinical information in the electronic or other health record, Independently interpreting results (not separately reported) and communicating results to the patient/family/caregiver,  or Care coordination (not separately reported).      Rica Clark, MSN, APRN, FNP-C  [facility]  Office #[phone]

## 2025-01-16 ENCOUNTER — PATIENT MESSAGE (OUTPATIENT)
Dept: INTERNAL MEDICINE | Facility: CLINIC | Age: 70
End: 2025-01-16
Payer: MEDICARE

## 2025-01-16 ENCOUNTER — OFFICE VISIT (OUTPATIENT)
Dept: FAMILY MEDICINE | Facility: CLINIC | Age: 70
End: 2025-01-16
Payer: MEDICARE

## 2025-01-16 VITALS
TEMPERATURE: 98 F | HEART RATE: 80 BPM | SYSTOLIC BLOOD PRESSURE: 139 MMHG | HEIGHT: 65 IN | BODY MASS INDEX: 27.99 KG/M2 | OXYGEN SATURATION: 96 % | DIASTOLIC BLOOD PRESSURE: 76 MMHG | WEIGHT: 168 LBS

## 2025-01-16 DIAGNOSIS — J02.9 VIRAL PHARYNGITIS: Primary | ICD-10-CM

## 2025-01-16 DIAGNOSIS — J02.9 SORE THROAT: ICD-10-CM

## 2025-01-16 LAB
CTP QC/QA: YES
CTP QC/QA: YES
MOLECULAR STREP A: NEGATIVE
SARS-COV-2 RDRP RESP QL NAA+PROBE: NEGATIVE

## 2025-01-16 PROCEDURE — 1125F AMNT PAIN NOTED PAIN PRSNT: CPT | Mod: CPTII,S$GLB,,

## 2025-01-16 PROCEDURE — 87635 SARS-COV-2 COVID-19 AMP PRB: CPT | Mod: QW,S$GLB,,

## 2025-01-16 PROCEDURE — 3075F SYST BP GE 130 - 139MM HG: CPT | Mod: CPTII,S$GLB,,

## 2025-01-16 PROCEDURE — 3288F FALL RISK ASSESSMENT DOCD: CPT | Mod: CPTII,S$GLB,,

## 2025-01-16 PROCEDURE — 99214 OFFICE O/P EST MOD 30 MIN: CPT | Mod: S$GLB,,,

## 2025-01-16 PROCEDURE — 1159F MED LIST DOCD IN RCRD: CPT | Mod: CPTII,S$GLB,,

## 2025-01-16 PROCEDURE — 87651 STREP A DNA AMP PROBE: CPT | Mod: QW,S$GLB,,

## 2025-01-16 PROCEDURE — 1101F PT FALLS ASSESS-DOCD LE1/YR: CPT | Mod: CPTII,S$GLB,,

## 2025-01-16 PROCEDURE — 3008F BODY MASS INDEX DOCD: CPT | Mod: CPTII,S$GLB,,

## 2025-01-16 PROCEDURE — 3078F DIAST BP <80 MM HG: CPT | Mod: CPTII,S$GLB,,

## 2025-01-16 PROCEDURE — 1160F RVW MEDS BY RX/DR IN RCRD: CPT | Mod: CPTII,S$GLB,,

## 2025-01-16 PROCEDURE — 99999 PR PBB SHADOW E&M-EST. PATIENT-LVL V: CPT | Mod: PBBFAC,,,

## 2025-01-16 RX ORDER — LIDOCAINE HYDROCHLORIDE 20 MG/ML
SOLUTION OROPHARYNGEAL EVERY 6 HOURS
Qty: 100 ML | Refills: 0 | Status: SHIPPED | OUTPATIENT
Start: 2025-01-16

## 2025-01-17 ENCOUNTER — PATIENT MESSAGE (OUTPATIENT)
Dept: INTERNAL MEDICINE | Facility: CLINIC | Age: 70
End: 2025-01-17
Payer: MEDICARE

## 2025-01-17 ENCOUNTER — HOSPITAL ENCOUNTER (OUTPATIENT)
Dept: RADIOLOGY | Facility: HOSPITAL | Age: 70
Discharge: HOME | End: 2025-01-17
Attending: ORTHOPAEDIC SURGERY
Payer: MEDICARE

## 2025-01-17 DIAGNOSIS — M54.12 CERVICAL RADICULOPATHY: ICD-10-CM

## 2025-01-17 PROCEDURE — 72141 MRI NECK SPINE W/O DYE: CPT | Mod: TC

## 2025-01-17 PROCEDURE — 72141 MRI NECK SPINE W/O DYE: CPT | Mod: 26,,, | Performed by: RADIOLOGY

## 2025-01-21 ENCOUNTER — TELEPHONE (OUTPATIENT)
Dept: PALLIATIVE MEDICINE | Facility: CLINIC | Age: 70
End: 2025-01-21
Payer: MEDICARE

## 2025-01-29 ENCOUNTER — PATIENT MESSAGE (OUTPATIENT)
Dept: ORTHOPEDICS | Facility: CLINIC | Age: 70
End: 2025-01-29
Payer: MEDICARE

## 2025-01-30 ENCOUNTER — PATIENT MESSAGE (OUTPATIENT)
Dept: ORTHOPEDICS | Facility: CLINIC | Age: 70
End: 2025-01-30
Payer: MEDICARE

## 2025-01-30 DIAGNOSIS — M50.30 DDD (DEGENERATIVE DISC DISEASE), CERVICAL: Primary | ICD-10-CM

## 2025-01-30 NOTE — PROGRESS NOTES
Date of Surgery - 4/14/2025  Patient class - Outpatient  Provider - Jalil Donahue  Procedure requested - Anterior Cervical Discectomy and Fusion (ACDF)   Levels: C5-6  Plan of Care: 1 midnight  Instrumentation - Spinewave defender plate and accent cage (ACDF)  Medical Necessity - not urgent  CPT codes: 34584 and 96030  post procedural disposition - med/surg  estimated length of stay - 1 midnight    Patient first seen:  7/15/24  Patient's most recent visit: 1/10/25  Patient imaging: xrays, MRI.  Results: MRI demonstrates moderate stenosis and neural foraminal narrowing at C5-6   Treatment failed: the patient has tried and failed conservative treatment including medications and physician guided home exercise program  Functional impairment of ADLs: The patient's severe pain is affecting their quality of life and limiting their daily life, including working and ability to provide self care.       Pertinent physical exam findings: upper extremity weakness and paresthesias or numbness in the upper extremities    *please upload patient clinicals including all notes from Catrina Dial PA-C, Anjali Dan PA-C, M. Abby Elizalde NP, Missael Byers MD, and/or Jalil Donahue MD.  Please also include any and all physical therapy and pain management notes.

## 2025-02-01 DIAGNOSIS — I15.2 HYPERTENSION ASSOCIATED WITH TYPE 2 DIABETES MELLITUS: ICD-10-CM

## 2025-02-01 DIAGNOSIS — E11.59 HYPERTENSION ASSOCIATED WITH TYPE 2 DIABETES MELLITUS: ICD-10-CM

## 2025-02-03 RX ORDER — LOSARTAN POTASSIUM 50 MG/1
50 TABLET ORAL DAILY
Qty: 90 TABLET | Refills: 0 | Status: SHIPPED | OUTPATIENT
Start: 2025-02-03

## 2025-02-03 NOTE — TELEPHONE ENCOUNTER
Care Due:                  Date            Visit Type   Department     Provider  --------------------------------------------------------------------------------                                MYCHART                              FOLLOWUP/OF  [facility] INTERNAL  Last Visit: 08-      FICE VISIT   MEDICINE       Jose Rojas  Next Visit: None Scheduled  None         None Found                                                            Last  Test          Frequency    Reason                     Performed    Due Date  --------------------------------------------------------------------------------    CMP.........  12 months..  DULoxetine, rosuvastatin.  07- 07-    [facility] Embedded Care Due Messages. Reference number: [id number].   2/03/2025 8:51:56 AM CST

## 2025-02-03 NOTE — TELEPHONE ENCOUNTER
Refill Routing Note   Medication(s) are not appropriate for processing by [facility] for the following reason(s):        Required labs outdated    [facility] action(s):  Defer     Requires labs : Yes           Appointments  past 12m or future 3m with PCP    Date Provider   Last Visit   8/6/2024 Jose Rojas MD   Next Visit   Visit date not found Jose Rojas MD   ED visits in past 90 days: 0        Note composed:8:53 AM 02/03/2025

## 2025-02-06 NOTE — TELEPHONE ENCOUNTER
----- Message from Margarita Lomeli MD sent at 3/26/2018 12:20 PM CDT -----  Per our phone discussion  Please note that your xray revealed stool throughout the colon , but no obstruction   Please try Milk of Magnesia ( available over the counter) to treat the constipation  And hold the Victoza until you see your Endocrinologist  Margarita Silva   No

## 2025-02-14 ENCOUNTER — TELEPHONE (OUTPATIENT)
Dept: ORTHOPEDICS | Facility: CLINIC | Age: 70
End: 2025-02-14
Payer: MEDICARE

## 2025-02-14 NOTE — TELEPHONE ENCOUNTER
Called Pt and spoke to pt's spouse and completed PIQ outstanding task questions with patients spouse via phone. Spouse verbalized understanding and has no further questions.

## 2025-02-16 ENCOUNTER — HOSPITAL ENCOUNTER (EMERGENCY)
Facility: HOSPITAL | Age: 70
Discharge: HOME | End: 2025-02-17
Attending: EMERGENCY MEDICINE
Payer: MEDICARE

## 2025-02-16 DIAGNOSIS — R55 SYNCOPE, UNSPECIFIED SYNCOPE TYPE: ICD-10-CM
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
## 2025-02-16 DIAGNOSIS — S09.90XA CLOSED HEAD INJURY, INITIAL ENCOUNTER: ICD-10-CM

## 2025-02-16 DIAGNOSIS — R42 DIZZINESS: ICD-10-CM

## 2025-02-16 PROCEDURE — 93005 ELECTROCARDIOGRAM TRACING: CPT

## 2025-02-16 PROCEDURE — 99285 EMERGENCY DEPT VISIT HI MDM: CPT | Mod: 25

## 2025-02-17 VITALS
OXYGEN SATURATION: 97 % | TEMPERATURE: 98 F | HEIGHT: 65 IN | HEART RATE: 72 BPM | BODY MASS INDEX: 27.99 KG/M2 | WEIGHT: 168 LBS | DIASTOLIC BLOOD PRESSURE: 81 MMHG | RESPIRATION RATE: 20 BRPM | SYSTOLIC BLOOD PRESSURE: 181 MMHG

## 2025-02-17 LAB
ALBUMIN SERPL BCP-MCNC: 3.7 G/DL (ref 3.5–5.2)
ALP SERPL-CCNC: 92 U/L (ref 40–150)
ALT SERPL W/O P-5'-P-CCNC: 24 U/L (ref 10–44)
ANION GAP SERPL CALC-SCNC: 11 MMOL/L (ref 8–16)
AST SERPL-CCNC: 29 U/L (ref 10–40)
BASOPHILS # BLD AUTO: 0.03 K/UL (ref 0–0.2)
BASOPHILS NFR BLD: 0.4 % (ref 0–1.9)
BILIRUB SERPL-MCNC: 0.3 MG/DL (ref 0.1–1)
BILIRUB UR QL STRIP: NEGATIVE
BUN SERPL-MCNC: 13 MG/DL (ref 8–23)
CALCIUM SERPL-MCNC: 9 MG/DL (ref 8.7–10.5)
CHLORIDE SERPL-SCNC: 103 MMOL/L (ref 95–110)
CLARITY UR REFRACT.AUTO: CLEAR
CO2 SERPL-SCNC: 23 MMOL/L (ref 23–29)
COLOR UR AUTO: COLORLESS
CREAT SERPL-MCNC: 0.8 MG/DL (ref 0.5–1.4)
DIFFERENTIAL METHOD BLD: ABNORMAL
EOSINOPHIL # BLD AUTO: 0.1 K/UL (ref 0–0.5)
EOSINOPHIL NFR BLD: 0.8 % (ref 0–8)
ERYTHROCYTE [DISTWIDTH] IN BLOOD BY AUTOMATED COUNT: 11.9 % (ref 11.5–14.5)
EST. GFR  (NO RACE VARIABLE): >60 ML/MIN/1.73 M^2
GLUCOSE SERPL-MCNC: 181 MG/DL (ref 70–110)
GLUCOSE UR QL STRIP: ABNORMAL
HCT VFR BLD AUTO: 35.2 % (ref 37–48.5)
HCV AB SERPL QL IA: NORMAL
HGB BLD-MCNC: 12.1 G/DL (ref 12–16)
HGB UR QL STRIP: NEGATIVE
HIV 1+2 AB+HIV1 P24 AG SERPL QL IA: NORMAL
IMM GRANULOCYTES # BLD AUTO: 0.05 K/UL (ref 0–0.04)
IMM GRANULOCYTES NFR BLD AUTO: 0.6 % (ref 0–0.5)
KETONES UR QL STRIP: NEGATIVE
LEUKOCYTE ESTERASE UR QL STRIP: ABNORMAL
LYMPHOCYTES # BLD AUTO: 2.1 K/UL (ref 1–4.8)
LYMPHOCYTES NFR BLD: 26.2 % (ref 18–48)
MAGNESIUM SERPL-MCNC: 2.1 MG/DL (ref 1.6–2.6)
MCH RBC QN AUTO: 31.8 PG (ref 27–31)
MCHC RBC AUTO-ENTMCNC: 34.4 G/DL (ref 32–36)
MCV RBC AUTO: 92 FL (ref 82–98)
MICROSCOPIC COMMENT: NORMAL
MONOCYTES # BLD AUTO: 0.5 K/UL (ref 0.3–1)
MONOCYTES NFR BLD: 5.8 % (ref 4–15)
NEUTROPHILS # BLD AUTO: 5.3 K/UL (ref 1.8–7.7)
NEUTROPHILS NFR BLD: 66.2 % (ref 38–73)
NITRITE UR QL STRIP: NEGATIVE
NRBC BLD-RTO: 0 /100 WBC
OHS QRS DURATION: 86 MS
OHS QTC CALCULATION: 453 MS
PH UR STRIP: 6 [PH] (ref 5–8)
PLATELET # BLD AUTO: 241 K/UL (ref 150–450)
PMV BLD AUTO: 9.3 FL (ref 9.2–12.9)
POTASSIUM SERPL-SCNC: 4.1 MMOL/L (ref 3.5–5.1)
PROT SERPL-MCNC: 7.3 G/DL (ref 6–8.4)
PROT UR QL STRIP: NEGATIVE
RBC # BLD AUTO: 3.81 M/UL (ref 4–5.4)
RBC #/AREA URNS AUTO: 2 /HPF (ref 0–4)
SODIUM SERPL-SCNC: 137 MMOL/L (ref 136–145)
SP GR UR STRIP: 1 (ref 1–1.03)
SQUAMOUS #/AREA URNS AUTO: 4 /HPF
TROPONIN I SERPL DL<=0.01 NG/ML-MCNC: 3 NG/L (ref 0–14)
URN SPEC COLLECT METH UR: ABNORMAL
WBC # BLD AUTO: 7.94 K/UL (ref 3.9–12.7)
WBC #/AREA URNS AUTO: 5 /HPF (ref 0–5)

## 2025-02-17 PROCEDURE — 25000003 PHARM REV CODE 250: Performed by: EMERGENCY MEDICINE

## 2025-02-17 PROCEDURE — 84484 ASSAY OF TROPONIN QUANT: CPT

## 2025-02-17 PROCEDURE — 83735 ASSAY OF MAGNESIUM: CPT

## 2025-02-17 PROCEDURE — 85025 COMPLETE CBC W/AUTO DIFF WBC: CPT

## 2025-02-17 PROCEDURE — 80053 COMPREHEN METABOLIC PANEL: CPT

## 2025-02-17 PROCEDURE — 63600175 PHARM REV CODE 636 W HCPCS: Mod: JZ,TB | Performed by: EMERGENCY MEDICINE

## 2025-02-17 PROCEDURE — 81001 URINALYSIS AUTO W/SCOPE: CPT

## 2025-02-17 PROCEDURE — 86803 HEPATITIS C AB TEST: CPT | Performed by: PHYSICIAN ASSISTANT

## 2025-02-17 PROCEDURE — 87389 HIV-1 AG W/HIV-1&-2 AB AG IA: CPT | Performed by: PHYSICIAN ASSISTANT

## 2025-02-17 PROCEDURE — 96374 THER/PROPH/DIAG INJ IV PUSH: CPT

## 2025-02-17 RX ORDER — LIDOCAINE 50 MG/G
1 PATCH TOPICAL
Status: DISCONTINUED | OUTPATIENT
Start: 2025-02-17 | End: 2025-02-17 | Stop reason: HOSPADM

## 2025-02-17 RX ORDER — KETOROLAC TROMETHAMINE 30 MG/ML
10 INJECTION, SOLUTION INTRAMUSCULAR; INTRAVENOUS
Status: COMPLETED | OUTPATIENT
Start: 2025-02-17 | End: 2025-02-17

## 2025-02-17 RX ORDER — ACETAMINOPHEN 500 MG
1000 TABLET ORAL
Status: COMPLETED | OUTPATIENT
Start: 2025-02-17 | End: 2025-02-17

## 2025-02-17 RX ADMIN — KETOROLAC TROMETHAMINE 10 MG: 30 INJECTION, SOLUTION INTRAMUSCULAR; INTRAVENOUS at 02:02

## 2025-02-17 RX ADMIN — ACETAMINOPHEN 1000 MG: 500 TABLET ORAL at 02:02

## 2025-02-17 RX ADMIN — LIDOCAINE 1 PATCH: 50 PATCH CUTANEOUS at 02:02

## 2025-02-17 NOTE — ED PROVIDER NOTES
History:  Riana Kay is a 69 y.o. female who presents to the ED with Fall (Pt is S/P fall around 9:30 pm. +LOC. Denies blood thinners. Pt states struck head. Pain to back of head, neck, L side and tailbone. +dizziness prior to fall. C-collar applied in triage.)    Described as 69-year-old female with a history of hypertension, diabetes presenting to the emergency department after syncopal episode.  She reports she is in [facility] home she began to feel palpitations as though her heart was racing.  She reports she stood up and subsequently lost consciousness, striking her head.  Now reporting pain in her right side of her chest, left hand, and pelvis.  No headache or neck pain.  No chest pain or shortness of breath.      Review of Systems: All systems reviewed and are negative except as per history of present illness.    Medications:   Previous Medications    (MAGIC MOUTHWASH) 1:1:1 DIPHENHYDRAMINE(BENADRYL) 12.5MG/5ML LIQ, ALUMINUM & MAGNESIUM HYDROXIDE-SIMETHICONE (MAALOX), LIDOCAINE VISCOUS 2%    Swish and spit 5 mLs every 4 (four) hours as needed (for sore throat). for mouth sores    ACETAMINOPHEN (TYLENOL) 500 MG TABLET    Take 2 tablets (1,000 mg total) by mouth every 8 (eight) hours as needed for Pain.    AZELASTINE (ASTELIN) 137 MCG (0.1 %) NASAL SPRAY    1 spray (137 mcg total) by Nasal route 2 (two) times daily.    BENZONATATE (TESSALON) 200 MG CAPSULE    Take 1 capsule (200 mg total) by mouth 3 (three) times daily as needed for Cough.    BLOOD SUGAR DIAGNOSTIC STRP    To check BG 3 times daily, to use with insurance preferred meter, e 11.65    BUPROPION (WELLBUTRIN XL) 300 MG 24 HR TABLET    Take 1 tablet (300 mg total) by mouth every morning.    BUSPIRONE (BUSPAR) 5 MG TAB    Take 1 tablet (5 mg total) by mouth 2 (two) times daily.    DICLOFENAC SODIUM (VOLTAREN ARTHRITIS PAIN) 1 % GEL    Apply 2 g topically once daily.    DULAGLUTIDE (TRULICITY) 1.5 MG/0.5 ML PEN INJECTOR    Inject 1.5 mg into the  skin every 7 days.    DULOXETINE (CYMBALTA) 30 MG CAPSULE    TAKE 1 CAPSULE BY MOUTH EVERY DAY    GLUCAGON (BAQSIMI) 3 MG/ACTUATION SPRY    Give one puff via nostril. Hold device between fingers and thumb, do not push plunger yet, insert tip gently into one nostril until finger(s) touch the outside of the nose, then push plunger firmly all the way in . Dose is complete when the green line disappears.    HUMALOG KWIKPEN INSULIN 100 UNIT/ML PEN    INJECT 10-12 UNITS IN THE SKIN BEFORE MEALS PLUS SCALE MAX DAILY UNITS 66 UNITS    INSULIN GLARGINE U-100, LANTUS, (LANTUS SOLOSTAR U-100 INSULIN) 100 UNIT/ML (3 ML) INPN PEN    INJECT 24 -36 UNITS UNDER THE SKIN AT NIGHT. MAX DAILY 36 UNITS    KETOCONAZOLE (NIZORAL) 2 % SHAMPOO    Apply topically every 7 days.    LANCETS MISC    To check BG 3 times daily, to use with insurance preferred meter, e 11.65    LANTUS SOLOSTAR U-100 INSULIN 100 UNIT/ML (3 ML) INPN PEN    Inject 40-52 units at night.    LATANOPROST 0.005 % OPHTHALMIC SOLUTION    Place 1 drop into both eyes nightly.    LIDOCAINE VISCOUS HCL 2% (LIDOCAINE VISCOUS) 2 % SOLN    by Mucous Membrane route every 6 (six) hours. Take 5 ml every 4-6 hours as needed for sore throat; swish and spit.    LOSARTAN (COZAAR) 50 MG TABLET    Take 1 tablet (50 mg total) by mouth once daily.    MECLIZINE (ANTIVERT) 12.5 MG TABLET    Take 1 tablet (12.5 mg total) by mouth 3 (three) times daily as needed for Dizziness.    MELOXICAM (MOBIC) 15 MG TABLET    TAKE 1 TABLET BY MOUTH EVERY DAY    ONDANSETRON (ZOFRAN-ODT) 8 MG TBDL    Take 1 tablet (8 mg total) by mouth 3 (three) times daily as needed (Nausea).    ONETOUCH DELICA LANCETS 33 GAUGE MISC    TO CHECK BLOOD GLUCOSE 3 TIMES DAILY    ONETOUCH VERIO FLEX METER MISC    TO CHECK BLOOD GLUCOSE 3 TIMES DAILY, TO USE WITH INSURANCE PREFERRED METER, E 11.65    PANTOPRAZOLE (PROTONIX) 40 MG TABLET    Take 1 tablet (40 mg total) by mouth once daily.    PEN NEEDLE, DIABETIC (NOVOFINE 32) 32  "GAUGE X 1/4" NDLE    Uses 4 times a day. 90 day via duramed e 11.65    ROSUVASTATIN (CRESTOR) 10 MG TABLET    TAKE 1 TABLET BY MOUTH EVERY DAY IN THE EVENING       PMH:   Past Medical History:   Diagnosis Date    Abdominal pain, right upper quadrant 02/04/2014    Anticoagulant long-term use     Anxiety     AR (allergic rhinitis)     Atrophic gastritis without mention of hemorrhage 02/13/2012     Dx updated per 2019 IMO Load    Chronic fatigue syndrome 06/10/2012    Diabetes mellitus     Dizziness     Fatty liver     GERD (gastroesophageal reflux disease)     Gross hematuria 12/01/2020    HTN (hypertension)     Hyperlipidemia     Leg swelling     Memory loss     Osteopenia     PONV (postoperative nausea and vomiting)     Primary osteoarthritis of right knee 10/06/2020    Primary osteoarthritis of right knee 10/06/2020    Right elbow pain 01/16/2019    S/P total hysterectomy     Screening for colon cancer 01/06/2021    Sleep apnea     Status post total right knee replacement 10/6/2020 10/05/2020     PSH:   Past Surgical History:   Procedure Laterality Date    BREAST BIOPSY Left     benign excisional biopsy 1990    CHOLECYSTECTOMY      COLONOSCOPY N/A 01/17/2018    Procedure: COLONOSCOPY with Donnell;  Surgeon: Roland Jacobo MD;  Location: [address]);  Service: Endoscopy;  Laterality: N/A;    COLONOSCOPY N/A 01/06/2021    Procedure: COLONOSCOPY;  Surgeon: Yong Rowland MD;  Location: [address]);  Service: Endoscopy;  Laterality: N/A;  prep ins. emailed - Frisian speaking,  needed - ERW  [facility] - ERW    COLONOSCOPY N/A 08/16/2024    Procedure: COLONOSCOPY;  Surgeon: Aamir Reyes MD;  Location: [facility];  Service: Endoscopy;  Laterality: N/A;  8/9/24-Suprep, holding Trulicity, precall complete-DS  8/14/24-LVM to see if pt can come earlier-DS    CYSTOSCOPY N/A 12/01/2020    Procedure: CYSTOSCOPY;  Surgeon: Ines Stanford MD;  Location: [facility]" OR 1ST FLR;  Service: Urology;  Laterality: N/A;  45 minutes     ELBOW ARTHROPLASTY Right 01/16/2019    Procedure: ARTHROPLASTY, ELBOW right radial head arthroplasty revision;  Surgeon: Katja Hubbard MD;  Location: [facility] OR 1ST FLR;  Service: Orthopedics;  Laterality: Right;  Anesthesia: General and Regional. Stretcher, hand pan 1 and pan 2, CALL ACCUMED, CLAIRX  Sergio [employer] Sol notified 1-14 LO    ELBOW SURGERY Right 07/16/2015    ELBOW SURGERY      ESOPHAGOGASTRODUODENOSCOPY N/A 04/15/2019    Procedure: EGD (ESOPHAGOGASTRODUODENOSCOPY);  Surgeon: Buck Irwin MD;  Location: [facility] ENDO (4TH FLR);  Service: Endoscopy;  Laterality: N/A;  ray she    ESOPHAGOGASTRODUODENOSCOPY N/A 04/21/2023    Procedure: EGD (ESOPHAGOGASTRODUODENOSCOPY);  Surgeon: Aamir Reyes MD;  Location: [facility];  Service: Endoscopy;  Laterality: N/A;    FRACTURE SURGERY      HYSTERECTOMY      JOINT REPLACEMENT  October 6th2020    KNEE ARTHROPLASTY Right 10/06/2020    Procedure: ARTHROPLASTY, KNEE-SAME DAY PROTOCOL;  Surgeon: Sabino Damon MD;  Location: [facility];  Service: Orthopedics;  Laterality: Right;    KNEE ARTHROSCOPY W/ DEBRIDEMENT  04/2011    Left    RELEASE OF ULNAR NERVE AT CUBITAL TUNNEL Right 01/16/2019    Procedure: RELEASE, ULNAR TUNNEL right;  Surgeon: Katja Hubbard MD;  Location: [facility] OR 1ST FLR;  Service: Orthopedics;  Laterality: Right;  Anesthesia: General and Regional. Stretcher, hand pan 1 and pan 2, CALL ACCUMED CLAIRX    RETROGRADE PYELOGRAPHY Bilateral 12/01/2020    Procedure: PYELOGRAM, RETROGRADE;  Surgeon: Ines Stanford MD;  Location: [facility] OR 1ST FLR;  Service: Urology;  Laterality: Bilateral;    REVERSE TOTAL SHOULDER ARTHROPLASTY Right 08/16/2022    Procedure: ARTHROPLASTY, SHOULDER, TOTAL, REVERSE, [place];  Surgeon: Aamir Powell MD;  Location: [facility] OR 2ND FLR;  Service: Orthopedics;  Laterality: Right;  [place] Can't go until after 12    ROTATOR CUFF REPAIR  "     SHOULDER SURGERY      TOTAL ABDOMINAL HYSTERECTOMY W/ BILATERAL SALPINGOOPHORECTOMY      TOTAL KNEE ARTHROPLASTY Left 10/19/2021    Procedure: ARTHROPLASTY, KNEE, TOTAL;  Surgeon: Sabino Damon MD;  Location: [facility];  Service: Orthopedics;  Laterality: Left;    UPPER GASTROINTESTINAL ENDOSCOPY       Allergies: She is allergic to iodinated contrast media, percocet [oxycodone-acetaminophen], macrobid [nitrofurantoin monohyd/m-cryst], metformin, penicillins, promethazine, sulfa (sulfonamide antibiotics), and sulfamethoxazole-trimethoprim.  Social History: Marital Status: . She  reports that she has never smoked. She has never been exposed to tobacco smoke. She has never used smokeless tobacco.. She  reports no history of alcohol use..       Exam:  VITAL SIGNS:   Vitals:    02/16/25 2256   BP: (!) 193/75   Pulse: 82   Resp: 18   Temp: 98.4 °F (36.9 °C)   TempSrc: Oral   SpO2: 99%   Weight: 76.2 kg (167 lb 15.9 oz)   Height: 5' 5" (1.651 m)     Const: Awake and alert, NAD  Head: Atraumatic  Eyes: Normal Conjunctiva  ENT: Normal External Ears, Nose and Mouth.  Neck: Full range of motion. No meningismus.  Resp: Normal respiratory effort, No distress, CTAB. Mild TTP R anterior chest wall.   Cardio: Equal and intact distal pulses, RRR  Abd: Soft, non tender, non distended.   Skin: No petechiae or rashes  Ext: No cyanosis, or edema. Full ROM hand, no bony ttp, no snuff box TTP, strength and sensation intact.   Neur: Awake and alert, GCS 15, strength and sensation intact, CN intact  Back: No midline C/T/L spine TTP, mild TTP along sacrum/coccyx.   Psych: Normal Mood and Affect    Data:  Results for orders placed or performed during the hospital encounter of 02/16/25   Hepatitis C Antibody    Collection Time: 02/17/25 12:10 AM   Result Value Ref Range    Hepatitis C Ab Non-reactive Non-reactive   HIV 1/2 Ag/Ab (4th Gen)    Collection Time: 02/17/25 12:10 AM   Result Value Ref Range    HIV 1/2 Ag/Ab " Non-reactive Non-reactive   CBC auto differential    Collection Time: 02/17/25 12:10 AM   Result Value Ref Range    WBC 7.94 3.90 - 12.70 K/uL    RBC 3.81 (L) 4.00 - 5.40 M/uL    Hemoglobin 12.1 12.0 - 16.0 g/dL    Hematocrit 35.2 (L) 37.0 - 48.5 %    MCV 92 82 - 98 fL    MCH 31.8 (H) 27.0 - 31.0 pg    MCHC 34.4 32.0 - 36.0 g/dL    RDW 11.9 11.5 - 14.5 %    Platelets 241 150 - 450 K/uL    MPV 9.3 9.2 - 12.9 fL    Immature Granulocytes 0.6 (H) 0.0 - 0.5 %    Gran # (ANC) 5.3 1.8 - 7.7 K/uL    Immature Grans (Abs) 0.05 (H) 0.00 - 0.04 K/uL    Lymph # 2.1 1.0 - 4.8 K/uL    Mono # 0.5 0.3 - 1.0 K/uL    Eos # 0.1 0.0 - 0.5 K/uL    Baso # 0.03 0.00 - 0.20 K/uL    nRBC 0 0 /100 WBC    Gran % 66.2 38.0 - 73.0 %    Lymph % 26.2 18.0 - 48.0 %    Mono % 5.8 4.0 - 15.0 %    Eosinophil % 0.8 0.0 - 8.0 %    Basophil % 0.4 0.0 - 1.9 %    Differential Method Automated    Comprehensive metabolic panel    Collection Time: 02/17/25 12:10 AM   Result Value Ref Range    Sodium 137 136 - 145 mmol/L    Potassium 4.1 3.5 - 5.1 mmol/L    Chloride 103 95 - 110 mmol/L    CO2 23 23 - 29 mmol/L    Glucose 181 (H) 70 - 110 mg/dL    BUN 13 8 - 23 mg/dL    Creatinine 0.8 0.5 - 1.4 mg/dL    Calcium 9.0 8.7 - 10.5 mg/dL    Total Protein 7.3 6.0 - 8.4 g/dL    Albumin 3.7 3.5 - 5.2 g/dL    Total Bilirubin 0.3 0.1 - 1.0 mg/dL    Alkaline Phosphatase 92 40 - 150 U/L    AST 29 10 - 40 U/L    ALT 24 10 - 44 U/L    eGFR >60.0 >60 mL/min/1.73 m^2    Anion Gap 11 8 - 16 mmol/L   Magnesium    Collection Time: 02/17/25 12:10 AM   Result Value Ref Range    Magnesium 2.1 1.6 - 2.6 mg/dL   Troponin I High Sensitivity    Collection Time: 02/17/25 12:10 AM   Result Value Ref Range    Troponin I High Sensitivity 3 0 - 14 ng/L   Urinalysis, Reflex to Urine Culture Urine, Clean Catch    Collection Time: 02/17/25 12:40 AM    Specimen: Urine   Result Value Ref Range    Specimen UA Urine, Clean Catch     Color, UA Colorless (A) Yellow, Straw, Annie    Appearance, UA  Clear Clear    pH, UA 6.0 5.0 - 8.0    Specific Gravity, UA 1.005 1.005 - 1.030    Protein, UA Negative Negative    Glucose, UA 1+ (A) Negative    Ketones, UA Negative Negative    Bilirubin (UA) Negative Negative    Occult Blood UA Negative Negative    Nitrite, UA Negative Negative    Leukocytes, UA Trace (A) Negative   Urinalysis Microscopic    Collection Time: 02/17/25 12:40 AM   Result Value Ref Range    RBC, UA 2 0 - 4 /hpf    WBC, UA 5 0 - 5 /hpf    Squam Epithel, UA 4 /hpf    Microscopic Comment SEE COMMENT      *Note: Due to a large number of results and/or encounters for the requested time period, some results have not been displayed. A complete set of results can be found in Results Review.     Imaging Results              CT Head Without Contrast (Final result)  Result time 02/17/25 01:31:54      Final result by Connie Mccall MD (02/17/25 01:31:54)                   Impression:      No acute intracranial abnormality detected.    Chronic left sphenoid sinusitis.    No acute cervical fracture.  Spondylitic changes greatest at C5/C6.      Electronically signed by: Connie Mccall  Date:    02/17/2025  Time:    01:31               Narrative:    EXAMINATION:  CT OF THE HEAD WITHOUT AND CT CERVICAL SPINE    CLINICAL HISTORY:  Trauma;    TECHNIQUE:  5 mm unenhanced axial images were obtained from the skull base to the vertex.  1.25 mm axial images were obtained through the cervical spine.    COMPARISON:  CT head 08/14/2022 and MRI cervical spine 01/17/2025    FINDINGS:  CT head: The ventricles, basal cisterns, and cortical sulci are within normal limits for patient's stated age. There is no acute intracranial hemorrhage, territorial infarct or mass effect, or midline shift. In the visualized paranasal sinuses and mastoid air cells, there is moderately large mucoperiosteal thickening within the left sphenoid sinus with overlying sinus wall thickening.  There is hyperostosis frontalis.    CT cervical spine:  There is straightening of the normal cervical lordosis.  There is no acute fracture.  There is grade 1 anterolisthesis of C5 on C6 secondary to intervertebral disc space narrowing and or ligamentum laxity.  There is moderate intervertebral disc space narrowing at C5/C6.  Marginal osteophytes are seen at the lower cervical spine.  There is facet arthrosis seen.  Vascular calcification is seen at the carotid bulbs.                                       CT Cervical Spine Without Contrast (Final result)  Result time 02/17/25 01:31:54      Final result by Connie Mccall MD (02/17/25 01:31:54)                   Impression:      No acute intracranial abnormality detected.    Chronic left sphenoid sinusitis.    No acute cervical fracture.  Spondylitic changes greatest at C5/C6.      Electronically signed by: Connie Mccall  Date:    02/17/2025  Time:    01:31               Narrative:    EXAMINATION:  CT OF THE HEAD WITHOUT AND CT CERVICAL SPINE    CLINICAL HISTORY:  Trauma;    TECHNIQUE:  5 mm unenhanced axial images were obtained from the skull base to the vertex.  1.25 mm axial images were obtained through the cervical spine.    COMPARISON:  CT head 08/14/2022 and MRI cervical spine 01/17/2025    FINDINGS:  CT head: The ventricles, basal cisterns, and cortical sulci are within normal limits for patient's stated age. There is no acute intracranial hemorrhage, territorial infarct or mass effect, or midline shift. In the visualized paranasal sinuses and mastoid air cells, there is moderately large mucoperiosteal thickening within the left sphenoid sinus with overlying sinus wall thickening.  There is hyperostosis frontalis.    CT cervical spine: There is straightening of the normal cervical lordosis.  There is no acute fracture.  There is grade 1 anterolisthesis of C5 on C6 secondary to intervertebral disc space narrowing and or ligamentum laxity.  There is moderate intervertebral disc space narrowing at C5/C6.   Marginal osteophytes are seen at the lower cervical spine.  There is facet arthrosis seen.  Vascular calcification is seen at the carotid bulbs.                                       X-Ray Sacrum And Coccyx (Final result)  Result time 02/17/25 02:07:26      Final result by Isaak Kerr MD (02/17/25 02:07:26)                   Impression:      Negative sacrum and coccyx.      Electronically signed by: Isaak Kerr  Date:    02/17/2025  Time:    02:07               Narrative:    EXAMINATION:  XR SACRUM AND COCCYX    CLINICAL HISTORY:  Fall on same level, unspecified, initial encounter    TECHNIQUE:  Two or three views of the sacrum and coccyx were performed.    COMPARISON:  None    FINDINGS:  Sacrum and coccyx appear intact.  No displaced fracture or malalignment.  Adjacent bones and soft tissues unremarkable.                                       X-Ray Pelvis Routine AP (Final result)  Result time 02/17/25 02:02:06      Final result by Isaak Kerr MD (02/17/25 02:02:06)                   Impression:      Stable and negative pelvic view.      Electronically signed by: Isaak Kerr  Date:    02/17/2025  Time:    02:02               Narrative:    EXAMINATION:  XR PELVIS ROUTINE AP    CLINICAL HISTORY:  r/o bleeding or hemorrhage;    TECHNIQUE:  AP view of the pelvis was performed.    COMPARISON:  02/15/2005.    FINDINGS:  Pelvic ring remains intact.  Mild narrowing of the femoroacetabular joints.                                       X-Ray Chest 1 View (Final result)  Result time 02/17/25 01:21:19      Final result by Connie Mccall MD (02/17/25 01:21:19)                   Impression:      No acute intrathoracic abnormality.      Electronically signed by: Connie Mccall  Date:    02/17/2025  Time:    01:21               Narrative:    EXAMINATION:  CHEST ONE VIEW    CLINICAL HISTORY:  r/o bleeding or hemorrhage;    TECHNIQUE:  One view of the  chest.    COMPARISON:  2022    FINDINGS:  The cardiac silhouette is within normal limits. There is no focal consolidation, pneumothorax, or pleural effusion.  A calcified granuloma is seen in the left lower lobe.  There is a right shoulder arthroplasty.                                    12-LEAD EKG INTERPRETATION BY ME:  Rate/Rhythm: Normal Sinus Rhythm with rate of 81 beats per minute  QRS, ST, T-waves: No changes consistent with acute ischemia  Impression:  No evidence of ischemia or arrhythmia     Labs & Imaging studies were reviewed independently by me.     Medical Decision Makin-year-old female presenting to the emergency department after a syncopal episode after standing up and Christianity.  No traumatic injury was identified on imaging studies, no ICH or C-spine injury.  She is generally well-appearing.  EKG does not reveal any arrhythmia.  Laboratory studies are unremarkable.  Vital signs remained stable.  She did complain of rib pain, no pneumothorax on chest x-ray, suspect rib contusion.  She is given a Lidoderm patch, Tylenol, and ibuprofen for pain control.  On reassessment, pain is well-controlled.  She is stable for discharge home with outpatient follow up.    Clinical Impression:  1. Ground-level fall    2. Dizziness    3. Syncope, unspecified syncope type    4. Closed head injury, initial encounter             Medication List        ASK your doctor about these medications      (MAGIC MOUTHWASH) 1:1:1 BENADRYL 12.5MG/5ML LIQ, ALUMINUM & MAGNESIUM  Swish and spit 5 mLs every 4 (four) hours as needed (for sore throat). for mouth sores     acetaminophen 500 MG tablet  Commonly known as: TYLENOL  Take 2 tablets (1,000 mg total) by mouth every 8 (eight) hours as needed for Pain.     azelastine 137 mcg (0.1 %) nasal spray  Commonly known as: ASTELIN  1 spray (137 mcg total) by Nasal route 2 (two) times daily.     BAQSIMI 3 mg/actuation Spry  Generic drug: glucagon  Give one puff via nostril. Hold  device between fingers and thumb, do not push plunger yet, insert tip gently into one nostril until finger(s) touch the outside of the nose, then push plunger firmly all the way in . Dose is complete when the green line disappears.     benzonatate 200 MG capsule  Commonly known as: TESSALON  Take 1 capsule (200 mg total) by mouth 3 (three) times daily as needed for Cough.     blood sugar diagnostic Strp  To check BG 3 times daily, to use with insurance preferred meter, e 11.65     buPROPion 300 MG 24 hr tablet  Commonly known as: WELLBUTRIN XL  Take 1 tablet (300 mg total) by mouth every morning.     busPIRone 5 MG Tab  Commonly known as: BUSPAR  Take 1 tablet (5 mg total) by mouth 2 (two) times daily.     diclofenac sodium 1 % Gel  Commonly known as: VOLTAREN ARTHRITIS PAIN  Apply 2 g topically once daily.     DULoxetine 30 MG capsule  Commonly known as: CYMBALTA  TAKE 1 CAPSULE BY MOUTH EVERY DAY     HumaLOG KwikPen Insulin 100 unit/mL pen  Generic drug: insulin lispro  INJECT 10-12 UNITS IN THE SKIN BEFORE MEALS PLUS SCALE MAX DAILY UNITS 66 UNITS     ketoconazole 2 % shampoo  Commonly known as: NIZORAL  Apply topically every 7 days.     * lancets Misc  To check BG 3 times daily, to use with insurance preferred meter, e 11.65     * ONETOUCH DELICA LANCETS 33 gauge Misc  Generic drug: lancets     * LANTUS SOLOSTAR U-100 INSULIN 100 unit/mL (3 mL) Inpn pen  Generic drug: insulin glargine U-100 (Lantus)  INJECT 24 -36 UNITS UNDER THE SKIN AT NIGHT. MAX DAILY 36 UNITS     * LANTUS SOLOSTAR U-100 INSULIN 100 unit/mL (3 mL) Inpn pen  Generic drug: insulin glargine U-100 (Lantus)  Inject 40-52 units at night.     latanoprost 0.005 % ophthalmic solution     LIDOcaine viscous HCl 2% 2 % Soln  Commonly known as: LIDOcaine VISCOUS  by Mucous Membrane route every 6 (six) hours. Take 5 ml every 4-6 hours as needed for sore throat; swish and spit.     losartan 50 MG tablet  Commonly known as: COZAAR  Take 1 tablet (50 mg  "total) by mouth once daily.     meclizine 12.5 mg tablet  Commonly known as: ANTIVERT  Take 1 tablet (12.5 mg total) by mouth 3 (three) times daily as needed for Dizziness.     meloxicam 15 MG tablet  Commonly known as: MOBIC  TAKE 1 TABLET BY MOUTH EVERY DAY     ondansetron 8 MG Tbdl  Commonly known as: ZOFRAN-ODT  Take 1 tablet (8 mg total) by mouth 3 (three) times daily as needed (Nausea).     ONETOUCH VERIO FLEX METER Misc  Generic drug: blood-glucose meter     pantoprazole 40 MG tablet  Commonly known as: PROTONIX  Take 1 tablet (40 mg total) by mouth once daily.     pen needle, diabetic 32 gauge x 1/4" Ndle  Commonly known as: NOVOFINE 32  Uses 4 times a day. 90 day via duramed e 11.65     rosuvastatin 10 MG tablet  Commonly known as: CRESTOR  TAKE 1 TABLET BY MOUTH EVERY DAY IN THE EVENING     TRULICITY 1.5 mg/0.5 mL pen injector  Generic drug: dulaglutide  Inject 1.5 mg into the skin every 7 days.           * This list has 4 medication(s) that are the same as other medications prescribed for you. Read the directions carefully, and ask your doctor or other care provider to review them with you.                       Caitlyn Rabago MD  02/17/25 0256    "

## 2025-02-17 NOTE — ED NOTES
Assumed care of patient at this time. Pt arrive to ED with a complaints of fall ,dizziness.Pt  placed in hospital gown and currently lying in stretcher. Vital signs are stable, provided pt with warm blanket. Pt denied restroom use. No other complaints from pt at this time.      Review of patient's allergies indicates:   Allergen Reactions    Iodinated contrast media Swelling and Rash    Percocet [oxycodone-acetaminophen] Itching    Macrobid [nitrofurantoin monohyd/m-cryst] Rash    Metformin Rash    Penicillins Rash     Had ancef in 2020 with no adverse rxn     Promethazine Rash     Had compazine in 2021    Sulfa (sulfonamide antibiotics) Rash    Sulfamethoxazole-trimethoprim Rash     Past Medical History:   Diagnosis Date    Abdominal pain, right upper quadrant 02/04/2014    Anticoagulant long-term use     Anxiety     AR (allergic rhinitis)     Atrophic gastritis without mention of hemorrhage 02/13/2012     Dx updated per 2019 IMO Load    Chronic fatigue syndrome 06/10/2012    Diabetes mellitus     Dizziness     Fatty liver     GERD (gastroesophageal reflux disease)     Gross hematuria 12/01/2020    HTN (hypertension)     Hyperlipidemia     Leg swelling     Memory loss     Osteopenia     PONV (postoperative nausea and vomiting)     Primary osteoarthritis of right knee 10/06/2020    Primary osteoarthritis of right knee 10/06/2020    Right elbow pain 01/16/2019    S/P total hysterectomy     Screening for colon cancer 01/06/2021    Sleep apnea     Status post total right knee replacement 10/6/2020 10/05/2020

## 2025-02-17 NOTE — DISCHARGE INSTRUCTIONS
Return to emergency department if you began to experience persistent nausea/vomiting, headache, acute vision loss, severe pain, confusion, chest pain, or shortness of breath. You should make an appointment with your PCP to further work up the dizziness you experienced. An ambulatory referral has been placed with cardiology to evaluate for a cardiogenic cause of your dizziness/fall.

## 2025-02-25 ENCOUNTER — PATIENT MESSAGE (OUTPATIENT)
Dept: INTERNAL MEDICINE | Facility: CLINIC | Age: 70
End: 2025-02-25
Payer: MEDICARE

## 2025-02-25 ENCOUNTER — LAB VISIT (OUTPATIENT)
Dept: LAB | Facility: HOSPITAL | Age: 70
End: 2025-02-25
Payer: MEDICARE

## 2025-02-25 DIAGNOSIS — E11.65 TYPE 2 DIABETES MELLITUS WITH HYPERGLYCEMIA, WITH LONG-TERM CURRENT USE OF INSULIN: ICD-10-CM

## 2025-02-25 DIAGNOSIS — Z79.4 TYPE 2 DIABETES MELLITUS WITH HYPERGLYCEMIA, WITH LONG-TERM CURRENT USE OF INSULIN: ICD-10-CM

## 2025-02-25 LAB
ESTIMATED AVG GLUCOSE: 137 MG/DL (ref 68–131)
HBA1C MFR BLD: 6.4 % (ref 4–5.6)

## 2025-02-25 PROCEDURE — 83036 HEMOGLOBIN GLYCOSYLATED A1C: CPT | Performed by: NURSE PRACTITIONER

## 2025-02-25 PROCEDURE — 36415 COLL VENOUS BLD VENIPUNCTURE: CPT | Mod: PO | Performed by: NURSE PRACTITIONER

## 2025-02-26 NOTE — PROGRESS NOTES
CC: This 69 y.o.  female presents for management of type 2 dm along with the current chronic medical conditions including:  Patient Active Problem List   Diagnosis    Adjustment disorder with mixed anxiety and depressed mood    Fatty liver    Major neurocognitive disorder    Pathological fracture of right humerus due to osteoporosis with routine healing    Gastroesophageal reflux disease without esophagitis    Hypertension associated with type 2 diabetes mellitus    ZAHIRA (obstructive sleep apnea)    Atherosclerosis of native coronary artery of native heart without angina pectoris    Muscle spasms of neck    Radiculopathy of cervical spine    Overweight (BMI 25.0-29.9)    Type 2 diabetes mellitus with hyperglycemia, with long-term current use of insulin    Decreased strength of lower extremity    Vitamin D deficiency    Chronic right shoulder pain    Primary osteoarthritis of left knee    Aortic atherosclerosis    Closed fracture of proximal end of right humerus with routine healing s/p total shoulder replacement on 8/16/2022    Anterior dislocation of right shoulder    Carotid arterial disease on ct dated  8/14/2022    Hiatal hernia    Gastroparesis due to DM    Chronic constipation    Impaired functional mobility, balance, gait, and endurance    Cognitive impairment      Status of these conditions is pending review.    Has atty liver, HTN, overweight, osteopenia, coronary atherosclerosis, gout, arthritic pain (L) knee, ankle     HPI: Diagnosed with T2DM x > 20 years ago.   Started insulin in 2006.   Seen by Dr. Morse in the past- c peptide- revealed low level -hair   Watch trends with hair in near future.   Last seen by me in fall 2024.   Being sen by me again today.     Recent syncopal episode/ground fall 2/16/25, work up with cardiology.  Stress test scheduled.   A1c has improved   Lab Results   Component Value Date    HGBA1C 6.4 (H) 02/25/2025       Able to tolerate trulicity vs ozempic.  Recent  colonoscopy, biopsy, benign polyps.     Accompanied by .  Memory loss issues at times.  F/uy with GI, had issues with ozempic, 2023.   Not as active.   Podiatry- 11/8/2023    Dietary habits:  3 meals a day   Cup of coffee-milk, splenda, toast (1) or  Sometimes cereal frosted or corn flakes/milk  Soup, veggies, chicken-fried or stir vivar   Dinner: skips at times   Occ snacks-chips, cookies, crackers/coffee, salad   Fruits  Drinks: water, occ oj, unsweetened tea    Not interested in cgm   Remains on MDI  In past on victoza  and januvia- edema    Lab Results   Component Value Date    HGBA1C 6.4 (H) 02/25/2025     On MDI injections 4x a day   Testing 4 x a day  Patient is willing and able to use the device  Demonstrated an understanding of the technology and is motivated to use CGM  Patient expected to adhere to a comprehensive diabetes treatment plan and patient has adequate medical supervision  Patient experiences multiple impaired awareness of hypoglycemia (hypoglycemia unawareness)    True metrix  Has h/o hypoglycemia 39 mg/dl    (R)  Knee replacement sx 10/6/2020  Injury to right shoulder, surgery in august 2022     Of note, , retired, fall in 2015, injured (R) elbow  Off metformin related to kidneys.    CURRENT DM MEDS:   lantus 36 units qhs, lispro 14-14-14  units  with mod dose correction scale, starting at 180, trulicity 1.5 mg weekly     Social Hx/personal Hx: , work-housekeeping, 1 son (26 y/o)    Riana Samuel Ng forgot glucometer/logs today    DIET/ MEAL PATTERN: see above , great toenail (sx)     EXERCISE: no formal; does yardwork     STANDARDS OF CARE:     Diabetes Management Status    Statin: Not taking  ACE/ARB: Taking    Screening or Prevention Patient's value Goal Complete/Controlled?   HgA1C Testing and Control   Lab Results   Component Value Date    HGBA1C 6.4 (H) 02/25/2025      Annually/Less than 8% Yes   Lipid profile : 12/13/2024 Annually Yes   LDL control Lab Results    Component Value Date    LDLCALC 85.6 12/13/2024    Annually/Less than 100 mg/dl  Yes   Nephropathy screening Lab Results   Component Value Date    LABMICR 24.0 07/27/2023     Lab Results   Component Value Date    PROTEINUA Negative 02/17/2025    Annually Yes   Blood pressure BP Readings from Last 1 Encounters:   02/17/25 (!) 181/81    Less than 140/90 Yes   Dilated retinal exam : 01/02/2024 Annually Yes   Foot exam   : 04/18/2024 Annually No       ROS:   GEN: +chronic fatigue or weakness, no changes w/ appetite, wt loss 5#  CV:  Denies chest pain, palpitations, edema or cyanosis,+ syncope  SKIN: Skin is intact and heals well, no rashes, pruritis, easy bruising, no hair changes, no intolerance to heat/cold.  RESP: No SOB, cough, FISCHER  FEN/GI: Nml bowel movements, normal appetite, +GERD, +nausea, no vomiting   RENAL: No urinary complaints, no dysuria/hematuia/oliguria   ENDO: no heat/cold intolerance  ID: No skin breakdown, fevers, chills  PSYCH: Denies drug/ETOH abuse, no hx. of eating disorders or depression +anxiety  MS/NEURO: Denies numbness/ tingling in BLE; +bilateral knee and ankle joint pain-(R) knee replacement 10/2020, (R) elbow pain -fall in 2015, surgery, (R) shoulder 8/2022 -weakness       Lab Results   Component Value Date    TSH 1.312 07/27/2023       Chemistry        Component Value Date/Time     02/17/2025 0010    K 4.1 02/17/2025 0010     02/17/2025 0010    CO2 23 02/17/2025 0010    BUN 13 02/17/2025 0010    CREATININE 0.8 02/17/2025 0010     (H) 02/17/2025 0010        Component Value Date/Time    CALCIUM 9.0 02/17/2025 0010    ALKPHOS 92 02/17/2025 0010    AST 29 02/17/2025 0010    ALT 24 02/17/2025 0010    BILITOT 0.3 02/17/2025 0010          Lab Results   Component Value Date    LDLCALC 85.6 12/13/2024       PE:  GEN: Patient WNWD, WF, NAD, AAOx3, Friendly, talkative, well groomed  HEENT: EOMI, PERRLA, no lid lag, Clear oropharynx  NECK: trachea midline  CV: Regular rate and  rhythm, no edema  RESP: No increase work of breathing, No cough, wheeze.  ABD: no tenderness EXT: No C/C/Edema, No skin rash/breakdown  NEURO: CN 2-12 intact, nml gait   FEET: No pressure areas, blisters, ulcers, calluses. Footware appropriate.      ASSESSMENT and PLAN:  Riana was seen today for diabetes mellitus.    Diagnoses and associated orders for this visit:  1. Type 2 diabetes mellitus with hyperglycemia, with long-term current use of insulin  Hemoglobin A1C  F/u in 4 months w/ me   A1c goal less than 7.5%  At goal   Change mounjaro to 2.5 mg weekly  Stop trulicity   Continue mdi dosing above   Doing well overall   Discussed dietary habits, stressors   Add podiatry referral  Add linzess 72 mg daily for constipation/glp1a       2. Fatty liver  F/u with hepatology   Mounjaro will help       3. Adjustment disorder with mixed anxiety and depressed mood  Stable   On buspar ,,bupropion       4. Hypertension associated with type 2 diabetes mellitus  Microalbumin/Creatinine Ratio, Urine  On arb/acei   Elevated  Did not take this am       5. ZAHIRA (obstructive sleep apnea)  Using cpap  May affect metabolism, cardiovascular if not consistent      6. Atherosclerosis of native coronary artery of native heart without angina pectoris  On statin  F/u with cards      7. Overweight (BMI 25.0-29.9)  Losing weight   On [id number] to mounjaro       8. Aortic atherosclerosis  See above  On statin      9. Gastroparesis due to DM  Watch glp1a drug class  Lowest dose is best       10. Impaired functional mobility, balance, gait, and endurance  Work up with cards   May affect insulin resistance       11. Chronic fatigue  TSH  Check next time       12. Vitamin D deficiency  Vitamin D  Next time   Work up for if normal at this time  If not advise 2000 iu daily

## 2025-02-27 ENCOUNTER — OFFICE VISIT (OUTPATIENT)
Dept: CARDIOLOGY | Facility: CLINIC | Age: 70
End: 2025-02-27
Payer: MEDICARE

## 2025-02-27 VITALS
SYSTOLIC BLOOD PRESSURE: 147 MMHG | DIASTOLIC BLOOD PRESSURE: 78 MMHG | HEART RATE: 69 BPM | WEIGHT: 167.13 LBS | BODY MASS INDEX: 27.81 KG/M2

## 2025-02-27 DIAGNOSIS — I70.0 AORTIC ATHEROSCLEROSIS: ICD-10-CM

## 2025-02-27 DIAGNOSIS — I15.2 HYPERTENSION ASSOCIATED WITH TYPE 2 DIABETES MELLITUS: ICD-10-CM

## 2025-02-27 DIAGNOSIS — R53.1 LEFT-SIDED WEAKNESS: ICD-10-CM

## 2025-02-27 DIAGNOSIS — R42 DIZZINESS: ICD-10-CM

## 2025-02-27 DIAGNOSIS — R94.31 ABNORMAL ELECTROCARDIOGRAM: ICD-10-CM

## 2025-02-27 DIAGNOSIS — R07.2 PRECORDIAL CHEST PAIN: ICD-10-CM

## 2025-02-27 DIAGNOSIS — R55 SYNCOPE AND COLLAPSE: Primary | ICD-10-CM

## 2025-02-27 DIAGNOSIS — G45.9 TIA (TRANSIENT ISCHEMIC ATTACK): ICD-10-CM

## 2025-02-27 DIAGNOSIS — I77.9 CAROTID ARTERY DISEASE, UNSPECIFIED LATERALITY, UNSPECIFIED TYPE: ICD-10-CM

## 2025-02-27 DIAGNOSIS — I25.10 ATHEROSCLEROSIS OF NATIVE CORONARY ARTERY OF NATIVE HEART WITHOUT ANGINA PECTORIS: ICD-10-CM
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
## 2025-02-27 DIAGNOSIS — E11.59 HYPERTENSION ASSOCIATED WITH TYPE 2 DIABETES MELLITUS: ICD-10-CM

## 2025-02-27 PROCEDURE — 3078F DIAST BP <80 MM HG: CPT | Mod: CPTII,S$GLB,, | Performed by: INTERNAL MEDICINE

## 2025-02-27 PROCEDURE — 1159F MED LIST DOCD IN RCRD: CPT | Mod: CPTII,S$GLB,, | Performed by: INTERNAL MEDICINE

## 2025-02-27 PROCEDURE — 99999 PR PBB SHADOW E&M-EST. PATIENT-LVL IV: CPT | Mod: PBBFAC,,, | Performed by: INTERNAL MEDICINE

## 2025-02-27 PROCEDURE — 3044F HG A1C LEVEL LT 7.0%: CPT | Mod: CPTII,S$GLB,, | Performed by: INTERNAL MEDICINE

## 2025-02-27 PROCEDURE — 1101F PT FALLS ASSESS-DOCD LE1/YR: CPT | Mod: CPTII,S$GLB,, | Performed by: INTERNAL MEDICINE

## 2025-02-27 PROCEDURE — 3008F BODY MASS INDEX DOCD: CPT | Mod: CPTII,S$GLB,, | Performed by: INTERNAL MEDICINE

## 2025-02-27 PROCEDURE — 3077F SYST BP >= 140 MM HG: CPT | Mod: CPTII,S$GLB,, | Performed by: INTERNAL MEDICINE

## 2025-02-27 PROCEDURE — 3288F FALL RISK ASSESSMENT DOCD: CPT | Mod: CPTII,S$GLB,, | Performed by: INTERNAL MEDICINE

## 2025-02-27 PROCEDURE — 99205 OFFICE O/P NEW HI 60 MIN: CPT | Mod: S$GLB,,, | Performed by: INTERNAL MEDICINE

## 2025-02-27 PROCEDURE — 4010F ACE/ARB THERAPY RXD/TAKEN: CPT | Mod: CPTII,S$GLB,, | Performed by: INTERNAL MEDICINE

## 2025-02-27 PROCEDURE — 1126F AMNT PAIN NOTED NONE PRSNT: CPT | Mod: CPTII,S$GLB,, | Performed by: INTERNAL MEDICINE

## 2025-02-27 RX ORDER — ASPIRIN 81 MG/1
81 TABLET ORAL DAILY
COMMUNITY
Start: 2025-02-27 | End: 2026-02-27

## 2025-02-27 NOTE — PROGRESS NOTES
Subjective:   02/27/2025     Patient ID:  Riana Kay is a 69 y.o. female who presents for evaulation of \[facility] [place] [facility]\"" Care (Establish care; ER doc suspects cardiologic cause of syncope)      Patient referred for evaluation emergency room.  She does have underlying cardiac risk factors including diabetes hypertension hyperlipidemia.    She is here seen in the emergency room after an episode of syncope.  She was walking at that time.  She does have episodes of anterior chest pain, these can occur with and without walking.    She also has episodes of transient left-sided weakness.  A CT head was unremarkable in the emergency room recently when she was evaluated.    Records from ER including testing reviewed      Past Medical History:   Diagnosis Date    Abdominal pain, right upper quadrant 02/04/2014    Anticoagulant long-term use     Anxiety     AR (allergic rhinitis)     Atrophic gastritis without mention of hemorrhage 02/13/2012     Dx updated per 2019 IMO Load    Chronic fatigue syndrome 06/10/2012    Diabetes mellitus     Dizziness     Fatty liver     GERD (gastroesophageal reflux disease)     Gross hematuria 12/01/2020    HTN (hypertension)     Hyperlipidemia     Leg swelling     Memory loss     Osteopenia     PONV (postoperative nausea and vomiting)     Primary osteoarthritis of right knee 10/06/2020    Primary osteoarthritis of right knee 10/06/2020    Right elbow pain 01/16/2019    S/P total hysterectomy     Screening for colon cancer 01/06/2021    Sleep apnea     Status post total right knee replacement 10/6/2020 10/05/2020       Review of patient's allergies indicates:   Allergen Reactions    Iodinated contrast media Swelling and Rash    Percocet [oxycodone-acetaminophen] Itching    Macrobid [nitrofurantoin monohyd/m-cryst] Rash    Metformin Rash    Penicillins Rash     Had ancef in 2020 with no adverse rxn     Promethazine Rash     Had compazine in 2021    Sulfa  (sulfonamide antibiotics) Rash    Sulfamethoxazole-trimethoprim Rash       Current Medications[1]     Objective:   Review of Systems   Cardiovascular:  Positive for chest pain, dyspnea on exertion and syncope. Negative for claudication, cyanosis, irregular heartbeat, leg swelling, near-syncope, orthopnea, palpitations and paroxysmal nocturnal dyspnea.   Neurological:  Positive for focal weakness.         Vitals:    02/27/25 0805   BP: (!) 147/78   Pulse: 69     Wt Readings from Last 3 Encounters:   02/27/25 75.8 kg (167 lb 1.7 oz)   02/16/25 76.2 kg (167 lb 15.9 oz)   01/16/25 76.2 kg (167 lb 15.9 oz)     Temp Readings from Last 3 Encounters:   02/17/25 98.2 °F (36.8 °C) (Oral)   01/16/25 98.3 °F (36.8 °C) (Oral)   11/06/24 97.9 °F (36.6 °C) (Oral)     BP Readings from Last 3 Encounters:   02/27/25 (!) 147/78   02/17/25 (!) 181/81   01/16/25 139/76     Pulse Readings from Last 3 Encounters:   02/27/25 69   02/17/25 72   01/16/25 80             Physical Exam  Vitals reviewed.   Constitutional:       General: She is not in acute distress.     Appearance: She is well-developed.   HENT:      Head: Normocephalic and atraumatic.      Nose: Nose normal.   Eyes:      Conjunctiva/sclera: Conjunctivae normal.      Pupils: Pupils are equal, round, and reactive to light.   Neck:      Vascular: No carotid bruit or JVD.   Cardiovascular:      Rate and Rhythm: Normal rate and regular rhythm.      Pulses: Normal pulses and intact distal pulses.      Heart sounds: Normal heart sounds. No murmur heard.     No friction rub. No gallop.   Pulmonary:      Effort: Pulmonary effort is normal. No respiratory distress.      Breath sounds: Normal breath sounds. No wheezing or rales.   Chest:      Chest wall: No tenderness.   Abdominal:      General: Bowel sounds are normal. There is no distension.      Palpations: Abdomen is soft.      Tenderness: There is no abdominal tenderness.   Musculoskeletal:         General: No tenderness or  deformity. Normal range of motion.      Cervical back: Normal range of motion and neck supple.      Right lower leg: No edema.      Left lower leg: No edema.   Skin:     General: Skin is warm and dry.      Findings: No erythema or rash.   Neurological:      Mental Status: She is alert and oriented to person, place, and time.      Cranial Nerves: No cranial nerve deficit.      Motor: No abnormal muscle tone.      Coordination: Coordination normal.   Psychiatric:         Behavior: Behavior normal.         Thought Content: Thought content normal.         Judgment: Judgment normal.         Lab Results   Component Value Date    CHOL 163 12/13/2024    CHOL 128 07/27/2023    CHOL 194 12/05/2022     Lab Results   Component Value Date    HDL 48 12/13/2024    HDL 49 07/27/2023    HDL 50 12/05/2022     Lab Results   Component Value Date    LDLCALC 85.6 12/13/2024    LDLCALC 49.2 (L) 07/27/2023    LDLCALC 115.4 12/05/2022     Lab Results   Component Value Date    ALT 24 02/17/2025    AST 29 02/17/2025    AST 18 07/27/2023    AST 23 04/06/2023     Lab Results   Component Value Date    CREATININE 0.8 02/17/2025    BUN 13 02/17/2025     02/17/2025    K 4.1 02/17/2025    CO2 23 02/17/2025    CO2 27 07/27/2023    CO2 25 04/06/2023     Lab Results   Component Value Date    HGB 12.1 02/17/2025    HCT 35.2 (L) 02/17/2025    HCT 36.3 (L) 07/27/2023    HCT 40.8 04/06/2023             EKG from ER visit, agree interpretation  Normal sinus rhythm   Moderate voltage criteria for LVH, may be normal variant ( R in aVL ,   Danese product )   Possible Lateral infarct ,age undetermined   Abnormal ECG   When compared with ECG of 14-Aug-2022 11:31,   Vent. rate has increased by  34 bpm   Lateral Qs slightly more   Confirmed by Carl Bragg ([id number]) on 2/17/2025 9:43:42 AM           Assessment and Plan:     Syncope and collapse  -     Cardiac Monitor - 3-15 Day Adult; Future    Dizziness  -     Ambulatory referral/consult to  Cardiology    Ground-level fall  -     Ambulatory referral/consult to Cardiology    Atherosclerosis of native coronary artery of native heart without angina pectoris    Carotid artery disease, unspecified laterality, unspecified type    Hypertension associated with type 2 diabetes mellitus    Aortic atherosclerosis    Precordial chest pain  -     Nuclear Stress - Cardiology Interpreted; Future; Expected date: 02/28/2025  -     aspirin (ECOTRIN) 81 MG EC tablet; Take 1 tablet (81 mg total) by mouth once daily.    Left-sided weakness  -     CV Ultrasound Bilateral Doppler Carotid; Future; Expected date: 02/28/2025    TIA (transient ischemic attack)  -     CV Ultrasound Bilateral Doppler Carotid; Future; Expected date: 02/28/2025         No follow-ups on file.          Future Appointments   Date Time Provider Department Center   2/28/2025 10:00 AM Miguel Mccormick APRN, RADHIKAP [facility] IM [facility] PCW   3/7/2025 10:00 AM Anjali Dan PA-C [facility] ORTHO [place]   3/24/2025  9:15 AM Jalil Donahue MD [facility] SPINE [facility]   4/8/2025  8:00 AM Lakia Gaxiola MD [facility] NEURO [facility]   5/7/2025 10:00 AM Paz Cook MD [facility] ENT [facility]   5/9/2025 11:00 AM Gracia Gonzales MD [facility] PAL MED [facility]   7/14/2025  9:30 AM Beckie Bernabe MD [facility] SLEEP [facility]                      [1]    Current Outpatient Medications:     azelastine (ASTELIN) 137 mcg (0.1 %) nasal spray, 1 spray (137 mcg total) by Nasal route 2 (two) times daily., Disp: 30 mL, Rfl: 11    blood sugar diagnostic Strp, To check BG 3 times daily, to use with insurance preferred meter, e 11.65, Disp: 100 each, Rfl: 11    buPROPion (WELLBUTRIN XL) 300 MG 24 hr tablet, Take 1 tablet (300 mg total) by mouth every morning., Disp: 90 tablet, Rfl: 3    busPIRone (BUSPAR) 5 MG Tab, Take 1 tablet (5 mg total) by mouth 2 (two) times daily., Disp: 60 tablet, Rfl: 11    diclofenac sodium (VOLTAREN ARTHRITIS PAIN) 1 % Gel, Apply 2 g topically  once daily., Disp: 100 g, Rfl: 3    dulaglutide (TRULICITY) 1.5 mg/0.5 mL pen injector, Inject 1.5 mg into the skin every 7 days., Disp: 4 pen , Rfl: 6    DULoxetine (CYMBALTA) 30 MG capsule, TAKE 1 CAPSULE BY MOUTH EVERY DAY, Disp: 90 capsule, Rfl: 0    glucagon (BAQSIMI) 3 mg/actuation Spry, Give one puff via nostril. Hold device between fingers and thumb, do not push plunger yet, insert tip gently into one nostril until finger(s) touch the outside of the nose, then push plunger firmly all the way in . Dose is complete when the green line disappears., Disp: 1 each, Rfl: 1    HUMALOG KWIKPEN INSULIN 100 unit/mL pen, INJECT 10-12 UNITS IN THE SKIN BEFORE MEALS PLUS SCALE MAX DAILY UNITS 66 UNITS, Disp: 30 each, Rfl: 6    insulin glargine U-100, Lantus, (LANTUS SOLOSTAR U-100 INSULIN) 100 unit/mL (3 mL) InPn pen, INJECT 24 -36 UNITS UNDER THE SKIN AT NIGHT. MAX DAILY 36 UNITS, Disp: 30 each, Rfl: 3    lancets Misc, To check BG 3 times daily, to use with insurance preferred meter, e 11.65, Disp: 100 each, Rfl: 11    LANTUS SOLOSTAR U-100 INSULIN 100 unit/mL (3 mL) InPn pen, Inject 40-52 units at night., Disp: 15 mL, Rfl: 6    latanoprost 0.005 % ophthalmic solution, Place 1 drop into both eyes nightly., Disp: , Rfl:     losartan (COZAAR) 50 MG tablet, Take 1 tablet (50 mg total) by mouth once daily., Disp: 90 tablet, Rfl: 0    meclizine (ANTIVERT) 12.5 mg tablet, Take 1 tablet (12.5 mg total) by mouth 3 (three) times daily as needed for Dizziness., Disp: 30 tablet, Rfl: 0    meloxicam (MOBIC) 15 MG tablet, TAKE 1 TABLET BY MOUTH EVERY DAY, Disp: 30 tablet, Rfl: 2    ondansetron (ZOFRAN-ODT) 8 MG TbDL, Take 1 tablet (8 mg total) by mouth 3 (three) times daily as needed (Nausea)., Disp: 30 tablet, Rfl: 3    ONETOUCH DELICA LANCETS 33 gauge Misc, TO CHECK BLOOD GLUCOSE 3 TIMES DAILY, Disp: , Rfl:     ONETOUCH VERIO FLEX METER [facility], TO CHECK BLOOD GLUCOSE 3 TIMES DAILY, TO USE WITH INSURANCE PREFERRED METER,  "E 11.65, Disp: , Rfl:     pantoprazole (PROTONIX) 40 MG tablet, Take 1 tablet (40 mg total) by mouth once daily., Disp: 90 tablet, Rfl: 3    pen needle, diabetic (NOVOFINE 32) 32 gauge x 1/4" Ndle, Uses 4 times a day. 90 day via duramed e 11.65, Disp: 400 each, Rfl: 3    rosuvastatin (CRESTOR) 10 MG tablet, TAKE 1 TABLET BY MOUTH EVERY DAY IN THE EVENING, Disp: 90 tablet, Rfl: 0    acetaminophen (TYLENOL) 500 MG tablet, Take 2 tablets (1,000 mg total) by mouth every 8 (eight) hours as needed for Pain. (Patient not taking: Reported on 2/27/2025), Disp: 90 tablet, Rfl: 0    aspirin (ECOTRIN) 81 MG EC tablet, Take 1 tablet (81 mg total) by mouth once daily., Disp: , Rfl:     benzonatate (TESSALON) 200 MG capsule, Take 1 capsule (200 mg total) by mouth 3 (three) times daily as needed for Cough. (Patient not taking: Reported on 2/27/2025), Disp: 30 capsule, Rfl: 0    ketoconazole (NIZORAL) 2 % shampoo, Apply topically every 7 days. (Patient not taking: Reported on 2/27/2025), Disp: 120 mL, Rfl: 6    LIDOcaine viscous HCl 2% (LIDOCAINE VISCOUS) 2 % Soln, by Mucous Membrane route every 6 (six) hours. Take 5 ml every 4-6 hours as needed for sore throat; swish and spit. (Patient not taking: Reported on 2/27/2025), Disp: 100 mL, Rfl: 0  "

## 2025-02-28 ENCOUNTER — OFFICE VISIT (OUTPATIENT)
Dept: INTERNAL MEDICINE | Facility: CLINIC | Age: 70
End: 2025-02-28
Payer: MEDICARE

## 2025-02-28 VITALS
OXYGEN SATURATION: 97 % | WEIGHT: 166.69 LBS | BODY MASS INDEX: 27.77 KG/M2 | HEART RATE: 84 BPM | DIASTOLIC BLOOD PRESSURE: 82 MMHG | HEIGHT: 65 IN | SYSTOLIC BLOOD PRESSURE: 158 MMHG

## 2025-02-28 DIAGNOSIS — E11.43 GASTROPARESIS DUE TO DM: ICD-10-CM

## 2025-02-28 DIAGNOSIS — I15.2 HYPERTENSION ASSOCIATED WITH TYPE 2 DIABETES MELLITUS: ICD-10-CM

## 2025-02-28 DIAGNOSIS — K76.0 FATTY LIVER: ICD-10-CM

## 2025-02-28 DIAGNOSIS — I70.0 AORTIC ATHEROSCLEROSIS: ICD-10-CM

## 2025-02-28 DIAGNOSIS — G47.33 OSA (OBSTRUCTIVE SLEEP APNEA): ICD-10-CM

## 2025-02-28 DIAGNOSIS — I25.10 ATHEROSCLEROSIS OF NATIVE CORONARY ARTERY OF NATIVE HEART WITHOUT ANGINA PECTORIS: ICD-10-CM

## 2025-02-28 DIAGNOSIS — E11.59 HYPERTENSION ASSOCIATED WITH TYPE 2 DIABETES MELLITUS: ICD-10-CM

## 2025-02-28 DIAGNOSIS — K31.84 GASTROPARESIS DUE TO DM: ICD-10-CM

## 2025-02-28 DIAGNOSIS — Z74.09 IMPAIRED FUNCTIONAL MOBILITY, BALANCE, GAIT, AND ENDURANCE: ICD-10-CM

## 2025-02-28 DIAGNOSIS — E11.65 TYPE 2 DIABETES MELLITUS WITH HYPERGLYCEMIA, WITH LONG-TERM CURRENT USE OF INSULIN: Primary | ICD-10-CM

## 2025-02-28 DIAGNOSIS — E66.3 OVERWEIGHT (BMI 25.0-29.9): ICD-10-CM

## 2025-02-28 DIAGNOSIS — E55.9 VITAMIN D DEFICIENCY: ICD-10-CM

## 2025-02-28 DIAGNOSIS — R53.82 CHRONIC FATIGUE: ICD-10-CM

## 2025-02-28 DIAGNOSIS — F43.23 ADJUSTMENT DISORDER WITH MIXED ANXIETY AND DEPRESSED MOOD: ICD-10-CM

## 2025-02-28 DIAGNOSIS — Z79.4 TYPE 2 DIABETES MELLITUS WITH HYPERGLYCEMIA, WITH LONG-TERM CURRENT USE OF INSULIN: Primary | ICD-10-CM

## 2025-02-28 PROCEDURE — 99999 PR PBB SHADOW E&M-EST. PATIENT-LVL V: CPT | Mod: PBBFAC,,, | Performed by: NURSE PRACTITIONER

## 2025-02-28 RX ORDER — TIRZEPATIDE 2.5 MG/.5ML
2.5 INJECTION, SOLUTION SUBCUTANEOUS
Qty: 4 PEN | Refills: 5 | Status: SHIPPED | OUTPATIENT
Start: 2025-02-28

## 2025-02-28 NOTE — PATIENT INSTRUCTIONS
Follow  up in 4 months w/Irielle  A1c urine mac, vit d, tsh  in 4 months  Podiatry 2025     Lab Results   Component Value Date    HGBA1C 6.4 (H) 02/25/2025     Goal less than 7%     Switch to mounjaro 2.5 mg weekly  Stop trulicity   Lantus 36 units at night   Humalog 14-14-14 units before meals scale 180-230 +2, etc.   New med: linzess 72 mg daily--for  constipation     [email or web address]  Eat fit brett  "[employer] brett  [email or web address]    mySugr brett

## 2025-03-02 DIAGNOSIS — I25.10 ATHEROSCLEROTIC HEART DISEASE OF NATIVE CORONARY ARTERY WITHOUT ANGINA PECTORIS: ICD-10-CM

## 2025-03-02 NOTE — TELEPHONE ENCOUNTER
No care due was identified.  [facility] Embedded Care Due Messages. Reference number: [id number].   3/02/2025 12:23:58 AM CST

## 2025-03-03 RX ORDER — ROSUVASTATIN CALCIUM 10 MG/1
10 TABLET, COATED ORAL NIGHTLY
Qty: 90 TABLET | Refills: 1 | Status: SHIPPED | OUTPATIENT
Start: 2025-03-03

## 2025-03-03 NOTE — TELEPHONE ENCOUNTER
Refill Decision Note   Riana Kay  is requesting a refill authorization.  Brief Assessment and Rationale for Refill:  Approve     Medication Therapy Plan:         Extended chart review required: Yes   Comments:     Note composed:9:43 AM 03/03/2025

## 2025-03-06 ENCOUNTER — CLINICAL SUPPORT (OUTPATIENT)
Dept: CARDIOLOGY | Facility: HOSPITAL | Age: 70
End: 2025-03-06
Attending: INTERNAL MEDICINE
Payer: MEDICARE

## 2025-03-06 DIAGNOSIS — R55 SYNCOPE AND COLLAPSE: ICD-10-CM

## 2025-03-06 PROCEDURE — 93242 EXT ECG>48HR<7D RECORDING: CPT

## 2025-03-10 ENCOUNTER — HOSPITAL ENCOUNTER (OUTPATIENT)
Dept: CARDIOLOGY | Facility: HOSPITAL | Age: 70
Discharge: HOME | End: 2025-03-10
Attending: INTERNAL MEDICINE
Payer: MEDICARE

## 2025-03-10 DIAGNOSIS — G45.9 TIA (TRANSIENT ISCHEMIC ATTACK): ICD-10-CM

## 2025-03-10 DIAGNOSIS — R53.1 LEFT-SIDED WEAKNESS: ICD-10-CM

## 2025-03-10 LAB
LEFT ARM DIASTOLIC BLOOD PRESSURE: 87 MMHG
LEFT ARM SYSTOLIC BLOOD PRESSURE: 158 MMHG
LEFT CBA DIAS: 15 CM/S
LEFT CBA SYS: 75 CM/S
LEFT CCA DIST DIAS: 16 CM/S
LEFT CCA DIST SYS: 85 CM/S
LEFT CCA MID DIAS: 11 CM/S
LEFT CCA MID SYS: 68 CM/S
LEFT CCA PROX DIAS: 14 CM/S
LEFT CCA PROX SYS: 98 CM/S
LEFT ECA DIAS: 13 CM/S
LEFT ECA SYS: 124 CM/S
LEFT ICA DIST DIAS: 30 CM/S
LEFT ICA DIST SYS: 128 CM/S
LEFT ICA MID DIAS: 21 CM/S
LEFT ICA MID SYS: 83 CM/S
LEFT ICA PROX DIAS: 18 CM/S
LEFT ICA PROX SYS: 81 CM/S
LEFT VERTEBRAL DIAS: 18 CM/S
LEFT VERTEBRAL SYS: 99 CM/S
OHS CV CAROTID RIGHT ICA EDV HIGHEST: 32
OHS CV CAROTID ULTRASOUND LEFT ICA/CCA RATIO: 1.51
OHS CV CAROTID ULTRASOUND RIGHT ICA/CCA RATIO: 1.89
OHS CV PV CAROTID LEFT HIGHEST CCA: 98
OHS CV PV CAROTID LEFT HIGHEST ICA: 128
OHS CV PV CAROTID RIGHT HIGHEST CCA: 85
OHS CV PV CAROTID RIGHT HIGHEST ICA: 132
OHS CV US CAROTID LEFT HIGHEST EDV: 30
RIGHT ARM DIASTOLIC BLOOD PRESSURE: 77 MMHG
RIGHT ARM SYSTOLIC BLOOD PRESSURE: 159 MMHG
RIGHT CBA DIAS: 15 CM/S
RIGHT CBA SYS: 58 CM/S
RIGHT CCA DIST DIAS: 15 CM/S
RIGHT CCA DIST SYS: 70 CM/S
RIGHT CCA MID DIAS: 14 CM/S
RIGHT CCA MID SYS: 74 CM/S
RIGHT CCA PROX DIAS: 12 CM/S
RIGHT CCA PROX SYS: 85 CM/S
RIGHT ECA DIAS: 12 CM/S
RIGHT ECA SYS: 89 CM/S
RIGHT ICA DIST DIAS: 32 CM/S
RIGHT ICA DIST SYS: 132 CM/S
RIGHT ICA MID DIAS: 20 CM/S
RIGHT ICA MID SYS: 79 CM/S
RIGHT ICA PROX DIAS: 17 CM/S
RIGHT ICA PROX SYS: 86 CM/S
RIGHT VERTEBRAL DIAS: 9 CM/S
RIGHT VERTEBRAL SYS: 47 CM/S

## 2025-03-10 PROCEDURE — 93880 EXTRACRANIAL BILAT STUDY: CPT | Mod: 26,,, | Performed by: INTERNAL MEDICINE

## 2025-03-10 PROCEDURE — 93880 EXTRACRANIAL BILAT STUDY: CPT

## 2025-03-11 ENCOUNTER — PATIENT MESSAGE (OUTPATIENT)
Dept: INTERNAL MEDICINE | Facility: CLINIC | Age: 70
End: 2025-03-11
Payer: MEDICARE

## 2025-03-11 DIAGNOSIS — Z01.818 PREOPERATIVE TESTING: Primary | ICD-10-CM

## 2025-03-11 DIAGNOSIS — E11.9 DIABETES MELLITUS WITHOUT COMPLICATION: ICD-10-CM

## 2025-03-11 DIAGNOSIS — M79.602 PAIN IN BOTH UPPER EXTREMITIES: ICD-10-CM

## 2025-03-11 DIAGNOSIS — M79.601 PAIN IN BOTH UPPER EXTREMITIES: ICD-10-CM

## 2025-03-11 NOTE — PRE-PROCEDURE INSTRUCTIONS
Patient stated gets PONV with every surgery. Will need medical clearance from your PCP, Dr. Jose Rojas. She will make an appt.. Will need poc appt,and labs.  Our  will call to set up these appts. She said she takes ASA 81 for prevention.    Preop instructions given. Hold aspirin, aspirin containing products, nsaids( Aleve, Advil, Motrin, Ibuprofen, Naprosyn, Naproxen, Voltaren, Voltaren Arthritis, Diclofenac, Mobic, Meloxicam, Celebrex, Celecoxib), vitamins and supplements one week prior to surgery.     May take Tylenol. Hold Mounjaro at least 7 days prior to surgery.( Also sent to [facility] portal)   Verbalizes understanding.

## 2025-03-11 NOTE — ANESTHESIA PAT ROS NOTE
4/9/2025   Riana Kay is a 69 y.o., female with Degenerative disc disease, cervical spine, [facility] for [facility]op anesthesia assessment.    Rescheduled labs for 5/5/2025 within 2 wks of surgery date 5/12/2025  Suzette Redman RN  Anesthesia Clinician        Planned Procedure: Procedure(s) (LRB):  DISCECTOMY, SPINE, CERVICAL, ANTERIOR APPROACH, WITH FUSION C5-6 SPINEWAVE SNS: VOCAL CORDS/MOTORS/SSEP (N/A)  Requested Anesthesia Type:General  Surgeon: Jalil Donahue MD  Service: Orthopedics  Known or anticipated Date of Surgery:4/14/2025, Date change 5/12/2025        SURGEON'S NOTE   ASSESSMENT/PLAN:  Cervical radiculopathy     DDD (degenerative disc disease), cervical     Cervical spondylosis        No follow-ups on file.     Patient has cervical spondylosis and stenosis with LUE radiculopathy. I discussed the natural history of their diagnoses as well as surgical and nonsurgical treatment options. I educated the patient on the importance of core/back strengthening, correct posture, bending/lifting ergonomics, and low-impact aerobic exercises (walking, elliptical, and aquatherapy). Continue medications. Patient has failed conservative management and is a candidate for C5-6 ACDF. She will need preop clearance.     I had a sit down discussion with the patient and  regarding the above surgery. We specifically discussed the risks, benefits, and alternatives to surgery. We discussed the surgical procedure including the skin incision, nerve decompression, bone fusion, allograft and/or iliac crest bone graft, and surgical implants including plates and interbody spacers as indicated: they understand the risks include but are not limited to death, paralysis, blindness, bleeding, infection, damage to arteries, veins and nerves, tracheal injury, esophageal injury, vertebral artery injury, dysphagia, spinal fluid leak, continued or worsening pain, no improvement in symptoms, non-union, and the  possible need for more surgery in the future, as well as the possibility other unforseen and unknown complications. We talked about expected hospital stay and recovery period. All questions were answered; they understand and wish to proceed.      Jalil Donahue MD  Orthopaedic Spine Surgeon  Department of Orthopaedic Surgery  [phone]         Electronically signed by Jalil Donahue MD at 3/24/2025  9:56 AM      Pre-op Assessment    I have reviewed the Patient Summary Reports.     I have reviewed the Nursing Notes. I have reviewed the NPO Status.   I have reviewed the Medications.     Review of Systems  Anesthesia Hx:    PATIENT STATED GETS PONV WITH EVERY SURGERY** History of prior surgery of interest to airway management or planning:  Previous anesthesia: MAC       2024  undefined COLONOSCOPY with MAC.   Denies Family Hx of Anesthesia complications.   Personal Hx of Anesthesia complications, Post-Operative Nausea/Vomiting, with every anesthetic, despite treatment                    Social:  Non-Smoker, No Alcohol Use Socioeconomic History   7 items  Occupation  Housekeeping Retired   Marital Status    Spouse Name  Kemal Kay  Spouse  [phone]  [address] [address]  Notify on admission  Number of Children  1     Family History   Mother ()   Father ()                      Colon cancer (83 y)   Maternal Grandmother                   Diabetes  Maternal Aunt                  Diabetes              Hematology/Oncology:    Oncology Normal    -- Anemia:                                  EENT/Dental:  chronic allergic rhinitis           Cardiovascular:     Denies Pacemaker. Hypertension, well controlled   CAD       Denies Angina.       Denies FISCHER.        Coronary Artery Disease:            Atherosclerosis of native coronary artery of native heart without angina pectoris         Carotoid Artery Disease, bilateral , Left stenosis is Vascular calcification is seen at the  carotid bulbs./ CT imaging% , Right stenosis is  Carotid arterial disease on ct dated  8/14/2022%       Hypertension         Pulmonary:        Sleep Apnea, CPAP                Renal/:   no renal calculi  12/01/2020  Cystoscopy (N/A)   12/01/2020  Retrograde pyelography (Bilateral)                Hepatic/GI:  No Bowel Prep. PUD, Hiatal Hernia, GERD, well controlled Liver Disease,  Denies Hepatitis. 08/16/2024  Colonoscopy  04/21/2023  Esophagogastroduodenoscopy  01/06/2021  Colonoscopy   04/15/2019  Esophagogastroduodenoscopy (N/A)   01/17/2018  Colonoscopy (N/A)   Date Unknown  Cholecystectomy   Taking GLP-1 Agonists      Hernia, Hiatal Hernia   Liver Disease, Fatty Liver        Musculoskeletal:  Arthritis    Musculoskeletal General/Symptoms: muscle weakness, localized, neck pain.  Decreased strength of lower extremity    Surgical Hx:  08/16/2022  Reverse total shoulder arthroplasty (Right)   10/19/2021  Total knee arthroplasty (Left)   01/06/2021  Knee arthroplasty (Right)   01/16/2019  Elbow arthroplasty (Right)   01/16/2019  Release of ulnar nerve at cubital tunnel (Right)   07/16/2015  Elbow surgery (Right)  04/2011  Knee arthroscopy w/ debridement       Arthritis, Osteoarthritis, spine, generalized, knee, shoulder, hip   Bone Disorders: Fracture , location of humerus.  Osteopenia, Osteoporosis Closed fracture of proximal end of right humerus with routine healing s/p total shoulder replacement on 8/16/2022    Pathological fracture of right humerus due to osteoporosis with routine healing    Anterior dislocation of right shoulder    Vitamin D deficiency  Spine Disorders: cervical Disc disease, Degenerative disease and Chronic Pain Degenerative Joint Disease     Spinal Stenosis, Cervical Spinal Stenosis Cervical Spine Disorder, Cervical Disc Disease, Radiculopathy, Spondylolisthesis    Muscle spasms of neck  Radiculopathy of cervical spine    CT OF THE HEAD WITHOUT AND CT CERVICAL SPINE     CLINICAL  HISTORY:  Trauma;     TECHNIQUE:  5 mm unenhanced axial images were obtained from the skull base to the vertex.  1.25 mm axial images were obtained through the cervical spine.     COMPARISON:  CT head 08/14/2022 and MRI cervical spine 01/17/2025     FINDINGS:  CT head: The ventricles, basal cisterns, and cortical sulci are within normal limits for patient's stated age.   There is no acute intracranial hemorrhage, territorial infarct or mass effect, or midline shift.   In the visualized paranasal sinuses and mastoid air cells, there is moderately large mucoperiosteal thickening within the left sphenoid sinus with overlying sinus wall thickening.    There is hyperostosis frontalis.     CT cervical spine: There is straightening of the normal cervical lordosis.  There is no acute fracture.  There is grade 1 anterolisthesis of C5 on C6 secondary to intervertebral disc space narrowing and or ligamentum laxity.  There is moderate intervertebral disc space narrowing at C5/C6.  Marginal osteophytes are seen at the lower cervical spine.  There is facet arthrosis seen.  Vascular calcification is seen at the carotid bulbs.     Impression:     No acute intracranial abnormality detected.     Chronic left sphenoid sinusitis.     No acute cervical fracture.  Spondylitic changes greatest at C5/C6.       CT OF THE HEAD WITHOUT AND CT CERVICAL SPINE     CLINICAL HISTORY:  Trauma;     TECHNIQUE:  5 mm unenhanced axial images were obtained from the skull base to the vertex.  1.25 mm axial images were obtained through the cervical spine.     COMPARISON:  CT head 08/14/2022 and MRI cervical spine 01/17/2025     FINDINGS:  CT head: The ventricles, basal cisterns, and cortical sulci are within normal limits for patient's stated age. There is no acute intracranial hemorrhage, territorial infarct or mass effect, or midline shift. In the visualized paranasal sinuses and mastoid air cells, there is moderately large mucoperiosteal thickening  within the left sphenoid sinus with overlying sinus wall thickening.  There is hyperostosis frontalis.     CT cervical spine:   There is straightening of the normal cervical lordosis.    There is no acute fracture.    There is grade 1 anterolisthesis of C5 on C6 secondary to intervertebral disc space narrowing and or ligamentum laxity.    There is moderate intervertebral disc space narrowing at C5/C6.    Marginal osteophytes are seen at the lower cervical spine.    There is facet arthrosis seen.    Vascular calcification is seen at the carotid bulbs.     Impression:   No acute intracranial abnormality detected.     Chronic left sphenoid sinusitis.     No acute cervical fracture.  Spondylitic changes greatest at C5/C6.      Electronically signed by:Connie Mccall  Date:                                            02/17/2025 01/17/2025  MRI CERVICAL SPINE WITHOUT CONTRAST     CLINICAL HISTORY:  Neck pain, chronic, degenerative changes on xray;  Radiculopathy, cervical region.     TECHNIQUE:  Multiplanar, multisequence MR images of the cervical spine were acquired without intravenous contrast.     COMPARISON:  C-spine radiograph 01/10/2025.  MR C-spine 11/22/2014.     FINDINGS:  Alignment: The cervical spine alignment is within normal limits.     Vertebra: Vertebral body heights are within normal limits.  There is no marrow replacing process.  Note made of T2 vertebral body hemangioma.     Discs: Multilevel degenerative disc disease with height loss and desiccation.  Moderate disc height loss at C5-C6.  No evidence for discitis.     Cord: The cervical cord is normal in caliber and signal characteristics.     There are findings of cervical spondylosis as below.     C2-C3: No spinal canal stenosis or neural foraminal narrowing.     C3-C4: Mild posterior disc osteophyte complex.  No spinal canal stenosis or neural foraminal narrowing.     C4-C5: Posterior disc osteophyte complex with left uncovertebral spurring.   Mild left facet arthropathy.  Resultant mild left neural foraminal narrowing.  Mild spinal canal stenosis.     C5-C6:  Posterior disc osteophyte complex with uncovertebral spurring.  Mild bilateral facet arthropathy.  Resultant moderate bilateral foraminal narrowing.  Mild-moderate spinal canal stenosis.     C6-C7:  Posterior disc osteophyte complex and mild uncovertebral spurring.    Resultant mild bilateral foraminal narrowing.    Mild spinal canal stenosis.     C7-T1:  No spinal canal stenosis or neural foraminal narrowing.     Miscellaneous: The craniocervical junction and visualized intracranial contents are unremarkable.     Impression:     Degenerative changes of the cervical spine as detailed above.  Moderate neural foraminal narrowing, and mild-moderate spinal canal stenosis at C5-C6.     Electronically signed by resident: Robinson Christianson  Date:  01/17/2025    1/10/2025 XR CERVICAL SPINE AP LAT WITH FLEX EXTEN     CLINICAL HISTORY:  Other cervical disc degeneration, unspecified cervical region     TECHNIQUE:  Three views of the cervical spine plus flexion and extension views were performed.     COMPARISON:  07/15/2024     FINDINGS:  Cervical vertebra are intact with satisfactory overall alignment.    Lateral flexion extension views show no subluxation or abnormal movement.  Upper cervical disc spaces are satisfactory.   Degenerative changes are again seen at C5-6 disc space with narrowing and small osteophytes.    The prevertebral soft tissues look normal.     Partially shown is a right shoulder arthroplasty.     Impression:   No acute abnormality.    Chronic degenerative changes in cervical spine as above              OB/GYN/PEDS:  Date Unknown  Breast biopsy (Left)  Date Unknown  Hysterectomy  Total abdominal hysterectomy w/ bilateral salpingoophorectom           Neurological:  Denies TIA.  Denies CVA. Neuromuscular Disease,   Denies Seizures.     Neuro Symptoms of weakness, vertigo Impaired functional  mobility, balance, gait, and endurance  Cognitive impairment          Chronic Pain Syndrome Pain Etiology/Diagnosis, Arthritis, Osteoarthritis, spine, generalized, knee, shoulder, hip, Cervical Radiculopathy  Peripheral Neuropathy    Cognitive Impairment                     Neuromuscular Disease   Endocrine:  Diabetes, well controlled, type 2 Denies Hypothyroidism.  Denies Hyperthyroidism. Gastroparesis due to DM  MOUNJARO  tirzepatide (MOUNJARO) 2.5 mg/0.5 mL PnIj    Vitamin D deficiency Diabetes, Type 2 Diabetes  , Complications include Diabetic Gastropathy , controlled by diet.           Gastroparesis due to DM.       Denies Morbid Obesity / BMI > 40  Dermatological:  Skin Normal    Psych:  Psychiatric History anxiety depression Adjustment disorder  Major neurocognitive disorder               Physical Exam  General: Well nourished, Cooperative, Oriented and Alert    Airway:  Mallampati: III   Mouth Opening: Normal  Tongue: Normal  Neck ROM: Flexion Decreased, Extension Decreased, Extension Painful, Left Lateral Motion Decreased, Right Lateral Motion Decreased  Difficulty swallowing meats  Dental:  Partial Dentures  1 permanent and 1 removable partial denture  Chest/Lungs:  Clear to auscultation, Normal Respiratory Rate    Heart:  Rate: Normal  Rhythm: Regular Rhythm  Sounds: Normal          Anesthesia Assessment: Preoperative EQUATION    Surgeon notes: reviewed    Electronic QUestionnaire Assessment completed via nurse interview with patient.        Triage considerations:       Previous anesthesia records:MAC and No problems  8/16/2024   COLONOSCOPY   Airway:  Mallampati: III / II  Mouth Opening: Normal  TM Distance: Normal  Tongue: Normal    **PATIENT STATED GETS PONV WITH EVERY SURGERY**    Last PCP note: 6-12 months ago , within [facility]   Subspecialty notes: Gastroenterology, Neurology, Ortho, NEURO PSYC , PODIATRY,PALLIATIVE MEDICINE  Other important co-morbidities:PER EPIC:  DM2, GERD, HLD, HTN, ZAHIRA, and  CERVICAL -DDD, CAD, FATTY LIVER, HIATAL HERNIA, OSTEOARTHRITIS, OSTEOPENIA       Tests already available:  Available tests,  within 3 months , 3[facility]6 months [facility] . 2/17/2025 CT CERVICAL SPINE W/O CONTRAST, UA, MICRO UA, CMP, CBC, , 216/2025 EKG, 1/17/2025 MRI CERVICAL SPINE W/O CONTRAST, 1/10/2025 XRAY  CERVICAL SPINE AP/LAT W F/E            Instructions given. (See in Nurse's note)    Optimization:  Anesthesia Preop Clinic Assessment  Indicated    Medical Opinion Indicated          Plan:    Testing:  A1C, PT/INR, and T&S   Pre-anesthesia  visit       Visit focus: concerns in complex and/or prolonged anesthesia, COMORBIDITIES     Consultation:Patient's PCP for re-evaluation     Patient  has previously scheduled Medical Appointment:3/21 DR FISCHER, 4/8 NEURO    Navigation: Tests Scheduled. TBD             Consults scheduled.TBD             Results will be tracked by Preop Clinic.    CARDIOLOGY CLEARANCE  4/3 Cardiac clearance given by Dr. Rod Macdonald on 4/2:  The patient cleared for surgery     MEDICAL CLEARANCE  4/3 Jose Rojas MD Prieur, Gaye, RN  Caller: Unspecified (Today,  8:48 AM)  Since the patient is already cleared by Cardiology no visit as needed.  She is cleared from a medical standpoint.  I recommend that she hold Mounjaro for 1 week prior to her surgery.  She can restart Mounjaro after surgery.  Annita Boyle RN BSN

## 2025-03-12 ENCOUNTER — E-CONSULT (OUTPATIENT)
Dept: CARDIOLOGY | Facility: CLINIC | Age: 70
End: 2025-03-12
Payer: MEDICARE

## 2025-03-12 ENCOUNTER — TELEPHONE (OUTPATIENT)
Dept: CARDIOLOGY | Facility: CLINIC | Age: 70
End: 2025-03-12

## 2025-03-12 ENCOUNTER — PATIENT MESSAGE (OUTPATIENT)
Dept: ORTHOPEDICS | Facility: CLINIC | Age: 70
End: 2025-03-12
Payer: MEDICARE

## 2025-03-12 DIAGNOSIS — Z01.818 PREOPERATIVE CLEARANCE: Primary | ICD-10-CM

## 2025-03-12 DIAGNOSIS — I77.9 CAROTID ARTERY DISEASE, UNSPECIFIED LATERALITY, UNSPECIFIED TYPE: Primary | ICD-10-CM

## 2025-03-12 NOTE — TELEPHONE ENCOUNTER
----- Message from Rod Macdonald MD sent at 3/12/2025  3:55 PM CDT -----  Regarding: Office visit for clearance, question stress test    ----- Message -----  From: Annita Boyle RN  Sent: 3/12/2025   7:32 AM CDT  To: Annita Boyle RN; Rod Macdonald Jr., MD;#    Patient is scheduled for ACDF C5-6 on 4/14 with Dr. Donahue.( Approximately 120 minutes of general anesthesia)  She will need cardiac clearance.Last seen 2/27.  Please schedule a preop clearance appt. Thanks!

## 2025-03-12 NOTE — CONSULTS
[facility] ([facility])  Response for E-Consult     Patient Name: Riana Kay  MRN: [id number]  Primary Care Provider: Jose Rojas MD   Requesting Provider: Anjali Dan,*  E-Consult to General Cardiology  Consult performed by: Rod Macdonald Jr., MD  Consult ordered by: Anjali Dan, MAISHA  Reason for consult: Preop clearance  Assessment/Recommendations: Patient recently seen for evaluation of syncopal episode.  Cardiac evaluation is currently ongoing.  If patient needs emergency surgery, certainly okay to proceed, would not need clearance in any case.  Otherwise, would surgery she requiring.  Is it okay to postpone?      Recommendation:  Patient currently undergoing evaluation for syncope, cardiac tests pending.  Does she need emergency surgery?  Would surgery is it that she would have?  Is it okay to postpone this until cardiac evaluation is complete?    Additional future steps to consider:  None    Total time of Consultation: 10 minute    I did not speak to the requesting provider verbally about this.     *This eConsult is based on the clinical data available to me and is furnished without benefit of a physical examination. The eConsult will need to be interpreted in light of any clinical issues or changes in patient status not available to me at the time of filing this eConsults. Significant changes in patient condition or level of acuity should result in immediate formal consultation and reevaluation. Please alert me if you have further questions.    Thank you for this eConsult referral.     Rod Macdonald MD  [facility] ([facility])

## 2025-03-14 ENCOUNTER — RESULTS FOLLOW-UP (OUTPATIENT)
Dept: CARDIOLOGY | Facility: CLINIC | Age: 70
End: 2025-03-14

## 2025-03-14 ENCOUNTER — HOSPITAL ENCOUNTER (OUTPATIENT)
Dept: CARDIOLOGY | Facility: HOSPITAL | Age: 70
Discharge: HOME | End: 2025-03-14
Attending: INTERNAL MEDICINE
Payer: MEDICARE

## 2025-03-14 VITALS
RESPIRATION RATE: 16 BRPM | BODY MASS INDEX: 27.66 KG/M2 | SYSTOLIC BLOOD PRESSURE: 170 MMHG | DIASTOLIC BLOOD PRESSURE: 73 MMHG | HEART RATE: 80 BPM | HEIGHT: 65 IN | WEIGHT: 166 LBS

## 2025-03-14 DIAGNOSIS — R07.2 PRECORDIAL CHEST PAIN: ICD-10-CM

## 2025-03-14 LAB
CV PHARM DOSE: 0.4 MG
CV STRESS BASE HR: 63 BPM
DIASTOLIC BLOOD PRESSURE: 80 MMHG
EJECTION FRACTION- HIGH: 59 %
END DIASTOLIC INDEX-HIGH: 155 ML/M2
END DIASTOLIC INDEX-LOW: 91 ML/M2
END SYSTOLIC INDEX-HIGH: 78 ML/M2
END SYSTOLIC INDEX-LOW: 40 ML/M2
NUC REST DIASTOLIC VOLUME INDEX: 43
NUC REST EJECTION FRACTION: 78
NUC REST SYSTOLIC VOLUME INDEX: 9
NUC STRESS DIASTOLIC VOLUME INDEX: 51
NUC STRESS EJECTION FRACTION: 77 %
NUC STRESS SYSTOLIC VOLUME INDEX: 12
OHS CV CPX 1 MINUTE RECOVERY HEART RATE: 100 BPM
OHS CV CPX 85 PERCENT MAX PREDICTED HEART RATE MALE: 128
OHS CV CPX MAX PREDICTED HEART RATE: 151
OHS CV CPX PATIENT IS FEMALE: 1
OHS CV CPX PATIENT IS MALE: 0
OHS CV CPX PEAK DIASTOLIC BLOOD PRESSURE: 60 MMHG
OHS CV CPX PEAK HEAR RATE: 70 BPM
OHS CV CPX PEAK RATE PRESSURE PRODUCT: NORMAL
OHS CV CPX PEAK SYSTOLIC BLOOD PRESSURE: 177 MMHG
OHS CV CPX PERCENT MAX PREDICTED HEART RATE ACHIEVED: 48
OHS CV CPX RATE PRESSURE PRODUCT PRESENTING: NORMAL
OHS CV INITIAL DOSE: 10.2 MCG/KG/MIN
OHS CV PEAK DOSE: 30.4 MCG/KG/MIN
RETIRED EF AND QEF - SEE NOTES: 47 %
SYSTOLIC BLOOD PRESSURE: 191 MMHG

## 2025-03-14 PROCEDURE — 78452 HT MUSCLE IMAGE SPECT MULT: CPT | Mod: 26,,, | Performed by: INTERNAL MEDICINE

## 2025-03-14 PROCEDURE — 78452 HT MUSCLE IMAGE SPECT MULT: CPT

## 2025-03-14 PROCEDURE — A9502 TC99M TETROFOSMIN: HCPCS | Performed by: INTERNAL MEDICINE

## 2025-03-14 PROCEDURE — 63600175 PHARM REV CODE 636 W HCPCS: Performed by: INTERNAL MEDICINE

## 2025-03-14 PROCEDURE — 93016 CV STRESS TEST SUPVJ ONLY: CPT | Mod: ,,, | Performed by: INTERNAL MEDICINE

## 2025-03-14 PROCEDURE — 93018 CV STRESS TEST I&R ONLY: CPT | Mod: ,,, | Performed by: INTERNAL MEDICINE

## 2025-03-14 RX ORDER — AMINOPHYLLINE 25 MG/ML
75 INJECTION, SOLUTION INTRAVENOUS
Status: COMPLETED | OUTPATIENT
Start: 2025-03-14 | End: 2025-03-14

## 2025-03-14 RX ORDER — REGADENOSON 0.08 MG/ML
0.4 INJECTION, SOLUTION INTRAVENOUS
Status: COMPLETED | OUTPATIENT
Start: 2025-03-14 | End: 2025-03-14

## 2025-03-14 RX ADMIN — REGADENOSON 0.4 MG: 0.08 INJECTION, SOLUTION INTRAVENOUS at 08:03

## 2025-03-14 RX ADMIN — AMINOPHYLLINE 75 MG: 25 INJECTION, SOLUTION INTRAVENOUS at 08:03

## 2025-03-14 RX ADMIN — TETROFOSMIN 30.4 MILLICURIE: 0.23 INJECTION, POWDER, LYOPHILIZED, FOR SOLUTION INTRAVENOUS at 08:03

## 2025-03-14 RX ADMIN — TETROFOSMIN 10.2 MILLICURIE: 0.23 INJECTION, POWDER, LYOPHILIZED, FOR SOLUTION INTRAVENOUS at 08:03

## 2025-03-19 ENCOUNTER — PATIENT MESSAGE (OUTPATIENT)
Dept: ORTHOPEDICS | Facility: CLINIC | Age: 70
End: 2025-03-19
Payer: MEDICARE

## 2025-03-24 ENCOUNTER — OFFICE VISIT (OUTPATIENT)
Dept: ORTHOPEDICS | Facility: CLINIC | Age: 70
End: 2025-03-24
Payer: MEDICARE

## 2025-03-24 VITALS — BODY MASS INDEX: 28.14 KG/M2 | WEIGHT: 168.88 LBS | HEIGHT: 65 IN

## 2025-03-24 DIAGNOSIS — M54.12 CERVICAL RADICULOPATHY: Primary | ICD-10-CM

## 2025-03-24 DIAGNOSIS — Z00.00 ENCOUNTER FOR MEDICARE ANNUAL WELLNESS EXAM: ICD-10-CM

## 2025-03-24 DIAGNOSIS — M50.30 DDD (DEGENERATIVE DISC DISEASE), CERVICAL: ICD-10-CM

## 2025-03-24 DIAGNOSIS — M47.812 CERVICAL SPONDYLOSIS: ICD-10-CM

## 2025-03-24 PROCEDURE — 1160F RVW MEDS BY RX/DR IN RCRD: CPT | Mod: CPTII,S$GLB,, | Performed by: ORTHOPAEDIC SURGERY

## 2025-03-24 PROCEDURE — 3044F HG A1C LEVEL LT 7.0%: CPT | Mod: CPTII,S$GLB,, | Performed by: ORTHOPAEDIC SURGERY

## 2025-03-24 PROCEDURE — 4010F ACE/ARB THERAPY RXD/TAKEN: CPT | Mod: CPTII,S$GLB,, | Performed by: ORTHOPAEDIC SURGERY

## 2025-03-24 PROCEDURE — 99999 PR PBB SHADOW E&M-EST. PATIENT-LVL IV: CPT | Mod: PBBFAC,,, | Performed by: ORTHOPAEDIC SURGERY

## 2025-03-24 PROCEDURE — 1159F MED LIST DOCD IN RCRD: CPT | Mod: CPTII,S$GLB,, | Performed by: ORTHOPAEDIC SURGERY

## 2025-03-24 PROCEDURE — 3288F FALL RISK ASSESSMENT DOCD: CPT | Mod: CPTII,S$GLB,, | Performed by: ORTHOPAEDIC SURGERY

## 2025-03-24 PROCEDURE — 3008F BODY MASS INDEX DOCD: CPT | Mod: CPTII,S$GLB,, | Performed by: ORTHOPAEDIC SURGERY

## 2025-03-24 PROCEDURE — 1125F AMNT PAIN NOTED PAIN PRSNT: CPT | Mod: CPTII,S$GLB,, | Performed by: ORTHOPAEDIC SURGERY

## 2025-03-24 PROCEDURE — 99214 OFFICE O/P EST MOD 30 MIN: CPT | Mod: S$GLB,,, | Performed by: ORTHOPAEDIC SURGERY

## 2025-03-24 PROCEDURE — 1101F PT FALLS ASSESS-DOCD LE1/YR: CPT | Mod: CPTII,S$GLB,, | Performed by: ORTHOPAEDIC SURGERY

## 2025-03-24 NOTE — PROGRESS NOTES
DATE: 3/24/2025  PATIENT: Riana Kay    Attending Physician: Jalil Donahue M.D.    CHIEF COMPLAINT: neck and LUE pain    HISTORY:  Riana Kay is a 69 y.o. female w/ a hx of R rTSA presents for initial evaluation of neck and left arm pain (Neck - 6, Arm - 6). The pain has been present for 3 years. The patient describes the pain as dull and it radiates down LUE to the hand. The pain is worse with activity and improved by rest. There is LUE associated numbness and tingling. There is LUE subjective weakness. Prior treatments have included OTC meds, PT and MIRTHA, but no surgery.     The Patient denies myelopathic symptoms such as handwriting changes or difficulty with buttons/coins/keys. Denies perineal paresthesias, bowel/bladder dysfunction.    The patient does not smoke or endorse IVDU. She has DM (HA1C of 6.4). The patient is not on any blood thinners and does not take chronic narcotics. 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    PAST MEDICAL/SURGICAL HISTORY:  Past Medical History:   Diagnosis Date    Abdominal pain, right upper quadrant 02/04/2014    Anticoagulant long-term use     Anxiety     AR (allergic rhinitis)     Atrophic gastritis without mention of hemorrhage 02/13/2012     Dx updated per 2019 IMO Load    Chronic fatigue syndrome 06/10/2012    Diabetes mellitus     Dizziness     Fatty liver     GERD (gastroesophageal reflux disease)     Gross hematuria 12/01/2020    HTN (hypertension)     Hyperlipidemia     Leg swelling     Memory loss     Osteopenia     PONV (postoperative nausea and vomiting)     Primary osteoarthritis of right knee 10/06/2020    Primary osteoarthritis of right knee 10/06/2020    Right elbow pain 01/16/2019    S/P total hysterectomy     Screening for colon cancer 01/06/2021    Sleep apnea     Status post total right knee replacement 10/6/2020 10/05/2020     Past Surgical History:   Procedure Laterality Date    BREAST BIOPSY Left     benign  excisional biopsy 1990    CHOLECYSTECTOMY      COLONOSCOPY N/A 01/17/2018    Procedure: COLONOSCOPY with Donnell;  Surgeon: Roland Jacobo MD;  Location: [facility] (4TH FLR);  Service: Endoscopy;  Laterality: N/A;    COLONOSCOPY N/A 01/06/2021    Procedure: COLONOSCOPY;  Surgeon: Yong Rowland MD;  Location: [facility] (4TH FLR);  Service: Endoscopy;  Laterality: N/A;  prep ins. emailed - Hebrew speaking,  needed - ERW  COVID [facility] - ERW    COLONOSCOPY N/A 08/16/2024    Procedure: COLONOSCOPY;  Surgeon: Aamir Reyes MD;  Location: [facility];  Service: Endoscopy;  Laterality: N/A;  8/9/24-Suprep, holding Trulicity, precall complete-DS  8/14/24-LVM to see if pt can come earlier-DS    CYSTOSCOPY N/A 12/01/2020    Procedure: CYSTOSCOPY;  Surgeon: Ines Stanford MD;  Location: [facility] 1ST FLR;  Service: Urology;  Laterality: N/A;  45 minutes     ELBOW ARTHROPLASTY Right 01/16/2019    Procedure: ARTHROPLASTY, ELBOW right radial head arthroplasty revision;  Surgeon: Kataj Hubbard MD;  [address];  Service: Orthopedics;  Laterality: Right;  Anesthesia: General and Regional. Stretcher, hand pan 1 and pan 2, CALL ROSALIO, RUCHI [employer] notified 1-14 LO    ELBOW SURGERY Right 07/16/2015    ELBOW SURGERY      ESOPHAGOGASTRODUODENOSCOPY N/A 04/15/2019    Procedure: EGD (ESOPHAGOGASTRODUODENOSCOPY);  Surgeon: Buck Irwin MD;  Location: [facility] (4TH FLR);  Service: Endoscopy;  Laterality: N/A;  ray hse    ESOPHAGOGASTRODUODENOSCOPY N/A 04/21/2023    Procedure: EGD (ESOPHAGOGASTRODUODENOSCOPY);  Surgeon: Aamir Reyes MD;  Location: [facility];  Service: Endoscopy;  Laterality: N/A;    FRACTURE SURGERY      HYSTERECTOMY      JOINT REPLACEMENT  October 6th2020    KNEE ARTHROPLASTY Right 10/06/2020    Procedure: ARTHROPLASTY, KNEE-SAME DAY PROTOCOL;  Surgeon: Sabino Damon MD;  Location: [facility];  Service:  Orthopedics;  Laterality: Right;    KNEE ARTHROSCOPY W/ DEBRIDEMENT  04/2011    Left    RELEASE OF ULNAR NERVE AT CUBITAL TUNNEL Right 01/16/2019    Procedure: RELEASE, ULNAR TUNNEL right;  Surgeon: Katja Hubbard MD;  Location: [facility] OR [address] [place] [address];  Service: Orthopedics;  Laterality: Right;  Anesthesia: General and Regional. Stretcher, hand pan 1 and pan 2, CALL ACCUMED, CLAIRX    RETROGRADE PYELOGRAPHY Bilateral 12/01/2020    Procedure: PYELOGRAM, RETROGRADE;  Surgeon: Ines Stanford MD;  Location: [facility] OR [address] [place] [address];  Service: Urology;  Laterality: Bilateral;    REVERSE TOTAL SHOULDER ARTHROPLASTY Right 08/16/2022    Procedure: ARTHROPLASTY, SHOULDER, TOTAL, REVERSE, [place];  Surgeon: Aamir Powell MD;  Location: [facility] OR [address] [place] [address];  Service: Orthopedics;  Laterality: Right;  [place] Can't go until after 12    ROTATOR CUFF REPAIR      SHOULDER SURGERY      TOTAL ABDOMINAL HYSTERECTOMY W/ BILATERAL SALPINGOOPHORECTOMY      TOTAL KNEE ARTHROPLASTY Left 10/19/2021    Procedure: ARTHROPLASTY, KNEE, TOTAL;  Surgeon: Sabino Damon MD;  Location: [facility];  Service: Orthopedics;  Laterality: Left;    UPPER GASTROINTESTINAL ENDOSCOPY         Current Medications: Current Medications[1]    Social History: Social History[2]    REVIEW OF SYSTEMS:  Constitution: Negative. Negative for chills, fever and night sweats.   Cardiovascular: Negative for chest pain and syncope.   Respiratory: Negative for cough and shortness of breath.   Gastrointestinal: See HPI. Negative for nausea/vomiting. Negative for abdominal pain.  Genitourinary: See HPI. Negative for discoloration or dysuria.  Skin: Negative for dry skin, itching and rash.   Hematologic/Lymphatic: negative for bleeding/clotting disorders.   Musculoskeletal: Negative for falls and muscle weakness.   Neurological: See HPI. no history of seizures. no history of cranial surgery or shunts.  Endocrine: Negative for polydipsia, polyphagia and polyuria.  "  Allergic/Immunologic: Negative for hives and persistent infections.  Psychiatric/Behavioral: Negative for depression and insomnia.         EXAM:  Ht 5' 5" (1.651 m)   Wt 76.6 kg (168 lb 14 oz)   BMI 28.10 kg/m²     General: The patient is a 69 y.o. female in no apparent distress, the patient is orientatied to person, place and time.  Psych: Normal mood and affect  HEENT: Vision grossly intact, hearing intact to the spoken word.  Lungs: Respirations unlabored.  Gait: Normal station and gait, no difficulty with toe or heel walk.   Skin: Cervical skin negative for rashes, lesions, hairy patches and surgical scars.  Range of motion: Cervical range of motion is acceptable. There is posterior cervical tenderness to palpation.  Spinal Balance: Global saggital and coronal spinal balance acceptable, no significant for scoliosis and kyphosis.  Musculoskeletal: No pain with the range of motion of the bilateral shoulders and elbows. Normal bulk and contour of the bilateral hands.  Vascular: Bilateral hands warm and well perfused, Radial pulses 2+ bilaterally.  Neurological: Normal strength and tone in all major motor groups in the bilateral upper and lower extremities. Normal sensation to light touch in the C5-T1 and L2-S1 dermatomes bilaterally.  Deep tendon reflexes symmetric 2+ in the bilateral upper and lower extremities.  Negative Inverted Radial Reflex and Gordon's bilaterally. Negative Babinski bilaterally.     IMAGING:   Today I independently reviewed the following images and my interpretations are as follows:    AP, Lat and Flex/Ex  upright C-spine films demonstrate spondylosis and DDD.    MRI cervical showed C5-6 mod central and mod-severe b/l foraminal stenosis.    CT cervical showed spondylosis.     Body mass index is 28.1 kg/m².  Hemoglobin A1C   Date Value Ref Range Status   02/25/2025 6.4 (H) 4.0 - 5.6 % Final     Comment:     ADA Screening Guidelines:  5.7-6.4%  Consistent with prediabetes  >or=6.5%  " Consistent with diabetes    High levels of fetal hemoglobin interfere with the HbA1C  assay. Heterozygous hemoglobin variants (HbS, HgC, etc)do  not significantly interfere with this assay.   However, presence of multiple variants may affect accuracy.     12/13/2024 7.2 (H) 4.0 - 5.6 % Final     Comment:     ADA Screening Guidelines:  5.7-6.4%  Consistent with prediabetes  >or=6.5%  Consistent with diabetes    High levels of fetal hemoglobin interfere with the HbA1C  assay. Heterozygous hemoglobin variants (HbS, HgC, etc)do  not significantly interfere with this assay.   However, presence of multiple variants may affect accuracy.     08/13/2024 8.2 (H) 4.0 - 5.6 % Final     Comment:     ADA Screening Guidelines:  5.7-6.4%  Consistent with prediabetes  >or=6.5%  Consistent with diabetes    High levels of fetal hemoglobin interfere with the HbA1C  assay. Heterozygous hemoglobin variants (HbS, HgC, etc)do  not significantly interfere with this assay.   However, presence of multiple variants may affect accuracy.         ASSESSMENT/PLAN:  Cervical radiculopathy    DDD (degenerative disc disease), cervical    Cervical spondylosis      No follow-ups on file.    Patient has cervical spondylosis and stenosis with LUE radiculopathy. I discussed the natural history of their diagnoses as well as surgical and nonsurgical treatment options. I educated the patient on the importance of core/back strengthening, correct posture, bending/lifting ergonomics, and low-impact aerobic exercises (walking, elliptical, and aquatherapy). Continue medications. Patient has failed conservative management and is a candidate for C5-6 ACDF. She will need preop clearance.    I had a sit down discussion with the patient and  regarding the above surgery. We specifically discussed the risks, benefits, and alternatives to surgery. We discussed the surgical procedure including the skin incision, nerve decompression, bone fusion, allograft and/or  iliac crest bone graft, and surgical implants including plates and interbody spacers as indicated: they understand the risks include but are not limited to death, paralysis, blindness, bleeding, infection, damage to arteries, veins and nerves, tracheal injury, esophageal injury, vertebral artery injury, dysphagia, spinal fluid leak, continued or worsening pain, no improvement in symptoms, non-union, and the possible need for more surgery in the future, as well as the possibility other unforseen and unknown complications. We talked about expected hospital stay and recovery period. All questions were answered; they understand and wish to proceed.     Jalil Donahue MD  Orthopaedic Spine Surgeon  Department of Orthopaedic Surgery  [phone]         [1]   Current Outpatient Medications:     acetaminophen (TYLENOL) 500 MG tablet, Take 2 tablets (1,000 mg total) by mouth every 8 (eight) hours as needed for Pain., Disp: 90 tablet, Rfl: 0    aspirin (ECOTRIN) 81 MG EC tablet, Take 1 tablet (81 mg total) by mouth once daily., Disp: , Rfl:     benzonatate (TESSALON) 200 MG capsule, Take 1 capsule (200 mg total) by mouth 3 (three) times daily as needed for Cough., Disp: 30 capsule, Rfl: 0    buPROPion (WELLBUTRIN XL) 300 MG 24 hr tablet, Take 1 tablet (300 mg total) by mouth every morning., Disp: 90 tablet, Rfl: 3    busPIRone (BUSPAR) 5 MG Tab, Take 1 tablet (5 mg total) by mouth 2 (two) times daily., Disp: 60 tablet, Rfl: 11    diclofenac sodium (VOLTAREN ARTHRITIS PAIN) 1 % Gel, Apply 2 g topically once daily., Disp: 100 g, Rfl: 3    DULoxetine (CYMBALTA) 30 MG capsule, TAKE 1 CAPSULE BY MOUTH EVERY DAY, Disp: 90 capsule, Rfl: 0    glucagon (BAQSIMI) 3 mg/actuation Spry, Give one puff via nostril. Hold device between fingers and thumb, do not push plunger yet, insert tip gently into one nostril until finger(s) touch the outside of the nose, then push plunger firmly all the way in . Dose is complete when the green line  "disappears., Disp: 1 each, Rfl: 1    HUMALOG KWIKPEN INSULIN 100 unit/mL pen, INJECT 10-12 UNITS IN THE SKIN BEFORE MEALS PLUS SCALE MAX DAILY UNITS 66 UNITS, Disp: 30 each, Rfl: 6    insulin glargine U-100, Lantus, (LANTUS SOLOSTAR U-100 INSULIN) 100 unit/mL (3 mL) InPn pen, INJECT 24 -36 UNITS UNDER THE SKIN AT NIGHT. MAX DAILY 36 UNITS, Disp: 30 each, Rfl: 3    ketoconazole (NIZORAL) 2 % shampoo, Apply topically every 7 days., Disp: 120 mL, Rfl: 6    lancets Misc, To check BG 3 times daily, to use with insurance preferred meter, e 11.65, Disp: 100 each, Rfl: 11    LANTUS SOLOSTAR U-100 INSULIN 100 unit/mL (3 mL) InPn pen, Inject 40-52 units at night., Disp: 15 mL, Rfl: 6    latanoprost 0.005 % ophthalmic solution, Place 1 drop into both eyes nightly., Disp: , Rfl:     LIDOcaine viscous HCl 2% (LIDOCAINE VISCOUS) 2 % Soln, by Mucous Membrane route every 6 (six) hours. Take 5 ml every 4-6 hours as needed for sore throat; swish and spit., Disp: 100 mL, Rfl: 0    losartan (COZAAR) 50 MG tablet, Take 1 tablet (50 mg total) by mouth once daily., Disp: 90 tablet, Rfl: 0    meclizine (ANTIVERT) 12.5 mg tablet, Take 1 tablet (12.5 mg total) by mouth 3 (three) times daily as needed for Dizziness., Disp: 30 tablet, Rfl: 0    ondansetron (ZOFRAN-ODT) 8 MG TbDL, Take 1 tablet (8 mg total) by mouth 3 (three) times daily as needed (Nausea)., Disp: 30 tablet, Rfl: 3    ONETOUCH DELICA LANCETS 33 gauge Misc, TO CHECK BLOOD GLUCOSE 3 TIMES DAILY, Disp: , Rfl:     ONETOUCH VERIO FLEX METER Misc, TO CHECK BLOOD GLUCOSE 3 TIMES DAILY, TO USE WITH INSURANCE PREFERRED METER, E 11.65, Disp: , Rfl:     pantoprazole (PROTONIX) 40 MG tablet, Take 1 tablet (40 mg total) by mouth once daily., Disp: 90 tablet, Rfl: 3    pen needle, diabetic (NOVOFINE 32) 32 gauge x 1/4" Ndle, Uses 4 times a day. 90 day via duramed e 11.65, Disp: 400 each, Rfl: 3    rosuvastatin (CRESTOR) 10 MG tablet, TAKE 1 TABLET BY MOUTH EVERY DAY IN THE EVENING, Disp: 90 " tablet, Rfl: 1    tirzepatide (MOUNJARO) 2.5 mg/0.5 mL PnIj, Inject 2.5 mg into the skin every 7 days. E 11.65, type 2 diabetes (Patient taking differently: Inject 2.5 mg into the skin every 7 days. E 11.65, type 2 diabetes, Wednesdays), Disp: 4 Pen, Rfl: 5    azelastine (ASTELIN) 137 mcg (0.1 %) nasal spray, 1 spray (137 mcg total) by Nasal route 2 (two) times daily., Disp: 30 mL, Rfl: 11  [2]   Social History  Socioeconomic History    Marital status:      Spouse name: Kemal    Number of children: 1   Occupational History    Occupation: Housekeeping     Comment: On disability   Tobacco Use    Smoking status: Never     Passive exposure: Never    Smokeless tobacco: Never   Substance and Sexual Activity    Alcohol use: No    Drug use: No    Sexual activity: Not Currently     Partners: Male     Birth control/protection: Post-menopausal   Other Topics Concern    Are you pregnant or think you may be? No    Breast-feeding No   Social History Narrative    She retired at [employer] in housekeeping; , 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    No stairs     Social Drivers of Health     Financial Resource Strain: Low Risk  (1/15/2025)    Overall Financial Resource Strain (CARDIA)     Difficulty of Paying Living Expenses: Not hard at all   Food Insecurity: No Food Insecurity (1/15/2025)    Hunger Vital Sign     Worried About Running Out of Food in the Last Year: Never true     Ran Out of Food in the Last Year: Never true   Transportation Needs: No Transportation Needs (11/13/2023)    PRAPARE - Transportation     Lack of Transportation (Medical): No     Lack of Transportation (Non-Medical): No   Physical Activity: Sufficiently Active (1/15/2025)    Exercise Vital Sign     Days of Exercise per Week: 5 days     Minutes of Exercise per Session: 30 min   Stress: Stress Concern Present (1/15/2025)    [employer] - Occupational Stress Questionnaire     Feeling of Stress : To some extent    Housing Stability: Low Risk  (11/13/2023)    Housing Stability Vital Sign     Unable to Pay for Housing in the Last Year: No     Number of Places Lived in the Last Year: 1     Unstable Housing in the Last Year: No

## 2025-04-01 RX ORDER — MELOXICAM 15 MG/1
15 TABLET ORAL
Qty: 30 TABLET | Refills: 2 | Status: SHIPPED | OUTPATIENT
Start: 2025-04-01

## 2025-04-02 ENCOUNTER — OFFICE VISIT (OUTPATIENT)
Dept: CARDIOLOGY | Facility: CLINIC | Age: 70
End: 2025-04-02
Payer: MEDICARE

## 2025-04-02 VITALS
HEART RATE: 79 BPM | DIASTOLIC BLOOD PRESSURE: 76 MMHG | WEIGHT: 166.44 LBS | SYSTOLIC BLOOD PRESSURE: 159 MMHG | BODY MASS INDEX: 27.7 KG/M2

## 2025-04-02 DIAGNOSIS — I15.2 HYPERTENSION ASSOCIATED WITH TYPE 2 DIABETES MELLITUS: ICD-10-CM

## 2025-04-02 DIAGNOSIS — Z01.818 PREOPERATIVE CLEARANCE: Primary | ICD-10-CM

## 2025-04-02 DIAGNOSIS — E11.59 HYPERTENSION ASSOCIATED WITH TYPE 2 DIABETES MELLITUS: ICD-10-CM

## 2025-04-02 DIAGNOSIS — I25.10 ATHEROSCLEROSIS OF NATIVE CORONARY ARTERY OF NATIVE HEART WITHOUT ANGINA PECTORIS: ICD-10-CM

## 2025-04-02 DIAGNOSIS — R55 SYNCOPE AND COLLAPSE: ICD-10-CM

## 2025-04-02 DIAGNOSIS — E78.2 MIXED HYPERLIPIDEMIA: ICD-10-CM

## 2025-04-02 DIAGNOSIS — I65.23 BILATERAL CAROTID ARTERY STENOSIS: ICD-10-CM

## 2025-04-02 PROCEDURE — 1101F PT FALLS ASSESS-DOCD LE1/YR: CPT | Mod: CPTII,S$GLB,, | Performed by: INTERNAL MEDICINE

## 2025-04-02 PROCEDURE — 3008F BODY MASS INDEX DOCD: CPT | Mod: CPTII,S$GLB,, | Performed by: INTERNAL MEDICINE

## 2025-04-02 PROCEDURE — 3044F HG A1C LEVEL LT 7.0%: CPT | Mod: CPTII,S$GLB,, | Performed by: INTERNAL MEDICINE

## 2025-04-02 PROCEDURE — 3078F DIAST BP <80 MM HG: CPT | Mod: CPTII,S$GLB,, | Performed by: INTERNAL MEDICINE

## 2025-04-02 PROCEDURE — 1159F MED LIST DOCD IN RCRD: CPT | Mod: CPTII,S$GLB,, | Performed by: INTERNAL MEDICINE

## 2025-04-02 PROCEDURE — 99214 OFFICE O/P EST MOD 30 MIN: CPT | Mod: S$GLB,,, | Performed by: INTERNAL MEDICINE

## 2025-04-02 PROCEDURE — 99999 PR PBB SHADOW E&M-EST. PATIENT-LVL IV: CPT | Mod: PBBFAC,,, | Performed by: INTERNAL MEDICINE

## 2025-04-02 PROCEDURE — 3077F SYST BP >= 140 MM HG: CPT | Mod: CPTII,S$GLB,, | Performed by: INTERNAL MEDICINE

## 2025-04-02 PROCEDURE — 1126F AMNT PAIN NOTED NONE PRSNT: CPT | Mod: CPTII,S$GLB,, | Performed by: INTERNAL MEDICINE

## 2025-04-02 PROCEDURE — 4010F ACE/ARB THERAPY RXD/TAKEN: CPT | Mod: CPTII,S$GLB,, | Performed by: INTERNAL MEDICINE

## 2025-04-02 PROCEDURE — 3288F FALL RISK ASSESSMENT DOCD: CPT | Mod: CPTII,S$GLB,, | Performed by: INTERNAL MEDICINE

## 2025-04-02 RX ORDER — ROSUVASTATIN CALCIUM 20 MG/1
20 TABLET, COATED ORAL NIGHTLY
Qty: 90 TABLET | Refills: 3 | Status: SHIPPED | OUTPATIENT
Start: 2025-04-02

## 2025-04-02 RX ORDER — AMLODIPINE BESYLATE 5 MG/1
5 TABLET ORAL NIGHTLY
Qty: 30 TABLET | Refills: 11 | Status: SHIPPED | OUTPATIENT
Start: 2025-04-02 | End: 2026-04-02

## 2025-04-02 NOTE — PROGRESS NOTES
Subjective:   04/02/2025     Patient ID:  Rinaa Kay is a 69 y.o. female who presents for evaulation of Follow-up (Follow up for NST on 3/14/25)      Patient referred for evaluation emergency room after an episode of syncope.  She was walking at that time.  She did have episodes of anterior chest pain, these can occur with and without walking.  Cardiac evaluation was performed.  Her nuclear stress test showed no ischemia.  Systolic function was normal.      Carotid ultrasound did not show significant stenoses.    She also has episodes of transient left-sided weakness.  A CT head was unremarkable in the emergency room recently when she was evaluated.    A monitor is currently pending        Past Medical History:   Diagnosis Date    Abdominal pain, right upper quadrant 02/04/2014    Anticoagulant long-term use     Anxiety     AR (allergic rhinitis)     Atrophic gastritis without mention of hemorrhage 02/13/2012     Dx updated per 2019 IMO Load    Chronic fatigue syndrome 06/10/2012    Diabetes mellitus     Dizziness     Fatty liver     GERD (gastroesophageal reflux disease)     Gross hematuria 12/01/2020    HTN (hypertension)     Hyperlipidemia     Leg swelling     Memory loss     Osteopenia     PONV (postoperative nausea and vomiting)     Primary osteoarthritis of right knee 10/06/2020    Primary osteoarthritis of right knee 10/06/2020    Right elbow pain 01/16/2019    S/P total hysterectomy     Screening for colon cancer 01/06/2021    Sleep apnea     Status post total right knee replacement 10/6/2020 10/05/2020       Review of patient's allergies indicates:   Allergen Reactions    Iodinated contrast media Swelling and Rash    Percocet [oxycodone-acetaminophen] Itching    Macrobid [nitrofurantoin monohyd/m-cryst] Rash    Metformin Rash    Penicillins Rash     Had ancef in 2020 with no adverse rxn     Promethazine Rash     Had compazine in 2021    Sulfa (sulfonamide antibiotics) Rash     Sulfamethoxazole-trimethoprim Rash       Current Medications[1]     Objective:   Review of Systems   Cardiovascular:  Negative for chest pain, claudication, cyanosis, dyspnea on exertion, irregular heartbeat, leg swelling, near-syncope, orthopnea, palpitations, paroxysmal nocturnal dyspnea and syncope.   Neurological:  Positive for focal weakness.         Vitals:    04/02/25 0930   BP: (!) 159/76   Pulse: 79     Wt Readings from Last 3 Encounters:   04/02/25 75.5 kg (166 lb 7.2 oz)   03/24/25 76.6 kg (168 lb 14 oz)   03/14/25 75.3 kg (166 lb)     Temp Readings from Last 3 Encounters:   02/17/25 98.2 °F (36.8 °C) (Oral)   01/16/25 98.3 °F (36.8 °C) (Oral)   11/06/24 97.9 °F (36.6 °C) (Oral)     BP Readings from Last 3 Encounters:   04/02/25 (!) 159/76   03/14/25 (!) 170/73   02/28/25 (!) 158/82     Pulse Readings from Last 3 Encounters:   04/02/25 79   03/14/25 80   02/28/25 84             Physical Exam  Vitals reviewed.   Constitutional:       General: She is not in acute distress.     Appearance: She is well-developed.   HENT:      Head: Normocephalic and atraumatic.      Nose: Nose normal.   Eyes:      Conjunctiva/sclera: Conjunctivae normal.      Pupils: Pupils are equal, round, and reactive to light.   Neck:      Vascular: No carotid bruit or JVD.   Cardiovascular:      Rate and Rhythm: Normal rate and regular rhythm.      Pulses: Normal pulses and intact distal pulses.      Heart sounds: Normal heart sounds. No murmur heard.     No friction rub. No gallop.   Pulmonary:      Effort: Pulmonary effort is normal. No respiratory distress.      Breath sounds: Normal breath sounds. No wheezing or rales.   Chest:      Chest wall: No tenderness.   Abdominal:      General: Bowel sounds are normal. There is no distension.      Palpations: Abdomen is soft.      Tenderness: There is no abdominal tenderness.   Musculoskeletal:         General: No tenderness or deformity. Normal range of motion.      Cervical back: Normal  range of motion and neck supple.      Right lower leg: No edema.      Left lower leg: No edema.   Skin:     General: Skin is warm and dry.      Findings: No erythema or rash.   Neurological:      Mental Status: She is alert and oriented to person, place, and time.      Cranial Nerves: No cranial nerve deficit.      Motor: No abnormal muscle tone.      Coordination: Coordination normal.   Psychiatric:         Behavior: Behavior normal.         Thought Content: Thought content normal.         Judgment: Judgment normal.           Lab Results   Component Value Date    CHOL 163 12/13/2024    CHOL 128 07/27/2023    CHOL 194 12/05/2022     Lab Results   Component Value Date    HDL 48 12/13/2024    HDL 49 07/27/2023    HDL 50 12/05/2022     Lab Results   Component Value Date    LDLCALC 85.6 12/13/2024    LDLCALC 49.2 (L) 07/27/2023    LDLCALC 115.4 12/05/2022     Lab Results   Component Value Date    ALT 24 02/17/2025    AST 29 02/17/2025    AST 18 07/27/2023    AST 23 04/06/2023     Lab Results   Component Value Date    CREATININE 0.8 02/17/2025    BUN 13 02/17/2025     02/17/2025    K 4.1 02/17/2025    CO2 23 02/17/2025    CO2 27 07/27/2023    CO2 25 04/06/2023     Lab Results   Component Value Date    HGB 12.1 02/17/2025    HCT 35.2 (L) 02/17/2025    HCT 36.3 (L) 07/27/2023    HCT 40.8 04/06/2023             EKG from ER visit, agree interpretation  Normal sinus rhythm   Moderate voltage criteria for LVH, may be normal variant ( R in aVL ,   Wilberforce product )   Possible Lateral infarct ,age undetermined   Abnormal ECG   When compared with ECG of 14-Aug-2022 11:31,   Vent. rate has increased by  34 bpm   Lateral Qs slightly more   Confirmed by Carl Bragg ([id number]) on 2/17/2025 9:43:42 AM           Assessment and Plan:     Preoperative clearance  Comments:  The patient cleared for surgery    Atherosclerosis of native coronary artery of native heart without angina pectoris  Comments:  Nuclear stress test shows no  ischemia  Orders:  -     rosuvastatin (CRESTOR) 20 MG tablet; Take 1 tablet (20 mg total) by mouth every evening.  Dispense: 90 tablet; Refill: 3    Hypertension associated with type 2 diabetes mellitus  Comments:  Will add amlodipine to losartan for optimization of blood pressure  Orders:  -     amLODIPine (NORVASC) 5 MG tablet; Take 1 tablet (5 mg total) by mouth every evening.  Dispense: 30 tablet; Refill: 11    Bilateral carotid artery stenosis  Comments:  Mild stenosis, not significant, less than 50%    Syncope and collapse  Comments:  Not recurrent since initial episode.    Mixed hyperlipidemia  Comments:  Increase rosuvastatin to high-dose         No follow-ups on file.  Okay to follow-up with PCP.          Future Appointments   Date Time Provider Department Center   4/8/2025  8:00 AM Lakia Gaxiola MD [facility] NEURO [facility]   4/9/2025  8:00 AM Nurse Suzette Redman RN [facility] S1 [facility]   4/9/2025  9:05 AM LAB, APPOINTMENT [facility] LAB [facility]   4/22/2025  8:45 AM Laura Jovel DPM [facility] POD [facility]   5/7/2025 10:00 AM Paz Cook MD [facility] ENT [facility]   5/9/2025 11:00 AM Gracia Gonzales MD [facility] PAL MED [facility]   5/16/2025 10:00 AM Anjali Dan PAStanleyC [facility] ORTHO [place]   7/10/2025  8:30 AM LAB, APPOINTMENT [facility] INTMED [facility] LAB IM [facility]   7/14/2025  9:30 AM Beckie Bernabe MD [facility] SLEEP [facility]   7/17/2025  8:00 AM Miguel Mccormick APRN, FNP [facility]                      [1]   Current Outpatient Medications:     acetaminophen (TYLENOL) 500 MG tablet, Take 2 tablets (1,000 mg total) by mouth every 8 (eight) hours as needed for Pain., Disp: 90 tablet, Rfl: 0    aspirin (ECOTRIN) 81 MG EC tablet, Take 1 tablet (81 mg total) by mouth once daily., Disp: , Rfl:     benzonatate (TESSALON) 200 MG capsule, Take 1 capsule (200 mg total) by mouth 3 (three) times daily as needed for Cough., Disp: 30 capsule, Rfl: 0    buPROPion  (WELLBUTRIN XL) 300 MG 24 hr tablet, Take 1 tablet (300 mg total) by mouth every morning., Disp: 90 tablet, Rfl: 3    busPIRone (BUSPAR) 5 MG Tab, Take 1 tablet (5 mg total) by mouth 2 (two) times daily., Disp: 60 tablet, Rfl: 11    diclofenac sodium (VOLTAREN ARTHRITIS PAIN) 1 % Gel, Apply 2 g topically once daily., Disp: 100 g, Rfl: 3    DULoxetine (CYMBALTA) 30 MG capsule, TAKE 1 CAPSULE BY MOUTH EVERY DAY, Disp: 90 capsule, Rfl: 0    glucagon (BAQSIMI) 3 mg/actuation Spry, Give one puff via nostril. Hold device between fingers and thumb, do not push plunger yet, insert tip gently into one nostril until finger(s) touch the outside of the nose, then push plunger firmly all the way in . Dose is complete when the green line disappears., Disp: 1 each, Rfl: 1    HUMALOG KWIKPEN INSULIN 100 unit/mL pen, INJECT 10-12 UNITS IN THE SKIN BEFORE MEALS PLUS SCALE MAX DAILY UNITS 66 UNITS, Disp: 30 each, Rfl: 6    insulin glargine U-100, Lantus, (LANTUS SOLOSTAR U-100 INSULIN) 100 unit/mL (3 mL) InPn pen, INJECT 24 -36 UNITS UNDER THE SKIN AT NIGHT. MAX DAILY 36 UNITS, Disp: 30 each, Rfl: 3    ketoconazole (NIZORAL) 2 % shampoo, Apply topically every 7 days., Disp: 120 mL, Rfl: 6    lancets Misc, To check BG 3 times daily, to use with insurance preferred meter, e 11.65, Disp: 100 each, Rfl: 11    LANTUS SOLOSTAR U-100 INSULIN 100 unit/mL (3 mL) InPn pen, Inject 40-52 units at night., Disp: 15 mL, Rfl: 6    latanoprost 0.005 % ophthalmic solution, Place 1 drop into both eyes nightly., Disp: , Rfl:     LIDOcaine viscous HCl 2% (LIDOCAINE VISCOUS) 2 % Soln, by Mucous Membrane route every 6 (six) hours. Take 5 ml every 4-6 hours as needed for sore throat; swish and spit., Disp: 100 mL, Rfl: 0    losartan (COZAAR) 50 MG tablet, Take 1 tablet (50 mg total) by mouth once daily., Disp: 90 tablet, Rfl: 0    meclizine (ANTIVERT) 12.5 mg tablet, Take 1 tablet (12.5 mg total) by mouth 3 (three) times daily as needed for Dizziness., Disp:  "30 tablet, Rfl: 0    meloxicam (MOBIC) 15 MG tablet, TAKE 1 TABLET BY MOUTH EVERY DAY, Disp: 30 tablet, Rfl: 2    ondansetron (ZOFRAN-ODT) 8 MG TbDL, Take 1 tablet (8 mg total) by mouth 3 (three) times daily as needed (Nausea)., Disp: 30 tablet, Rfl: 3    ONETOUCH DELICA LANCETS 33 gauge Misc, TO CHECK BLOOD GLUCOSE 3 TIMES DAILY, Disp: , Rfl:     ONETOUCH VERIO FLEX METER Misc, TO CHECK BLOOD GLUCOSE 3 TIMES DAILY, TO USE WITH INSURANCE PREFERRED METER, E 11.65, Disp: , Rfl:     pantoprazole (PROTONIX) 40 MG tablet, Take 1 tablet (40 mg total) by mouth once daily., Disp: 90 tablet, Rfl: 3    pen needle, diabetic (NOVOFINE 32) 32 gauge x 1/4" Ndle, Uses 4 times a day. 90 day via duramed e 11.65, Disp: 400 each, Rfl: 3    tirzepatide (MOUNJARO) 2.5 mg/0.5 mL PnIj, Inject 2.5 mg into the skin every 7 days. E 11.65, type 2 diabetes (Patient taking differently: Inject 2.5 mg into the skin every 7 days. E 11.65, type 2 diabetes, Wednesdays), Disp: 4 Pen, Rfl: 5    amLODIPine (NORVASC) 5 MG tablet, Take 1 tablet (5 mg total) by mouth every evening., Disp: 30 tablet, Rfl: 11    azelastine (ASTELIN) 137 mcg (0.1 %) nasal spray, 1 spray (137 mcg total) by Nasal route 2 (two) times daily., Disp: 30 mL, Rfl: 11    rosuvastatin (CRESTOR) 20 MG tablet, Take 1 tablet (20 mg total) by mouth every evening., Disp: 90 tablet, Rfl: 3    "

## 2025-04-03 ENCOUNTER — TELEPHONE (OUTPATIENT)
Dept: PREADMISSION TESTING | Facility: HOSPITAL | Age: 70
End: 2025-04-03
Payer: MEDICARE

## 2025-04-03 ENCOUNTER — TELEPHONE (OUTPATIENT)
Dept: INTERNAL MEDICINE | Facility: CLINIC | Age: 70
End: 2025-04-03
Payer: MEDICARE

## 2025-04-03 NOTE — TELEPHONE ENCOUNTER
----- Message from Jose Rojas MD sent at 4/3/2025  9:02 AM CDT -----  Since the patient is already cleared by Cardiology no visit as needed.  She is cleared from a medical standpoint.  I recommend that she hold Mounjaro for 1 week prior to her surgery.  She can restart Mounjaro after surgery.  ----- Message -----  From: Annita Boyle RN  Sent: 4/3/2025   8:50 AM CDT  To: Annita Boyle RN; Jose Rojas MD; [facility]#    Patient is scheduled for ACDF C5-6 on 5/12 with Dr. Donahue.( Approximately 120 minutes of general anesthesia)  She will need medical clearance. Please schedule a preop clearance appt. She has already gotten her Cardiac clearance form Dr. Macdonald on 4/2.Thanks!

## 2025-04-03 NOTE — TELEPHONE ENCOUNTER
----- Message from Nurse Ariza sent at 4/3/2025  8:48 AM CDT -----  Patient is scheduled for ACDF C5-6 on 5/12 with Dr. Donahue.( Approximately 120 minutes of general anesthesia)  She will need medical clearance. Please schedule a preop clearance appt. She has already gotten her Cardiac clearance form Dr. Macdonald on 4/2.Thanks!

## 2025-04-03 NOTE — TELEPHONE ENCOUNTER
Lvm for pt to contact office to schedule Pre Op appt. With Dr. Rojas. Next available is May 5th and 6th.

## 2025-04-09 ENCOUNTER — HOSPITAL ENCOUNTER (OUTPATIENT)
Dept: PREADMISSION TESTING | Facility: HOSPITAL | Age: 70
Discharge: HOME | End: 2025-04-09
Attending: ORTHOPAEDIC SURGERY
Payer: MEDICARE

## 2025-04-09 VITALS
HEIGHT: 65 IN | DIASTOLIC BLOOD PRESSURE: 86 MMHG | WEIGHT: 267.5 LBS | OXYGEN SATURATION: 99 % | HEART RATE: 52 BPM | BODY MASS INDEX: 44.57 KG/M2 | TEMPERATURE: 98 F | SYSTOLIC BLOOD PRESSURE: 172 MMHG

## 2025-04-09 NOTE — DISCHARGE INSTRUCTIONS
Your surgery has been scheduled for:____5/12/2025______    You should report to: [facility]: [address] [address]     Please Note   Tell your doctor if you take Aspirin, products containing Aspirin, herbal medications  or blood thinners, such as Coumadin, Ticlid, or Plavix.  (Consult your provider regarding holding or stopping before surgery).  Arrange for someone to drive you home following surgery.  You will not be allowed to leave the surgical facility alone or drive yourself home following sedation and anesthesia.    Before Surgery  Stop taking all herbal medications, vitamins, and supplements 7 days prior to surgery  No Motrin/Advil (Ibuprofen) 7 days before surgery  No Aleve (Naproxen) 7 days before surgery  Stop Taking Asprin, products containing Asprin _7____days before surgery  Stop taking blood thinners_______days before surgery  No Goody's/BC  Powder 7 days before surgery  Refrain from drinking alcoholic beverages for 24hours before and after surgery  Stop or limit smoking ___7______days before surgery  You may take Tylenol for pain    Night before Surgery  Do not eat or drink after midnight  Take a shower or bath (shower is recommended).  Bathe with Hibiclens soap or an antibacterial soap from the neck down.  If not supplied by your surgeon, hibiclens soap will need to be purchased over the counter in pharmacy.  Rinse soap off thoroughly.  Shampoo your hair with your regular shampoo    The Day of Surgery  Take another bath or shower with hibiclens or any antibacterial soap, to reduce the chance of infection.  Take heart and blood pressure medications with a small sip of water, as advised by the perioperative team.  Do not take fluid pills  You may brush your teeth and rinse your mouth, but do not swall any additional water.   Do not apply perfumes, powder, body lotions or deodorant on the day of surgery.  Nail polish should be  removed.  Do not wear makeup or moisturizer  Wear comfortable clothes, such as a button front shirt and loose fitting pants.  Leave all jewelry, including body piercings, and valuables at home.    Bring any devices you will neeed after surgery such as crutches or canes.  If you have sleep apnea, please bring your CPAP machine  In the event that your physical condition changes including the onset of a cold or respiratory illness, or if you have to delay or cancel your surgery, please notify your surgeon.     Anesthesia: General Anesthesia     You are watched continuously during your procedure by your anesthesia provider.     Youre due to have surgery. During surgery, youll be given medicine called anesthesia or anesthetic. This will keep you comfortable and pain-free. Your anesthesia provider will use general anesthesia.  What is general anesthesia?  General anesthesia puts you into a state like deep sleep. It goes into the bloodstream (IV anesthetics), into the lungs (gas anesthetics), or both. You feel nothing during the procedure. You will not remember it. During the procedure, the anesthesia provider monitors you continuously. He or she checks your heart rate and rhythm, blood pressure, breathing, and blood oxygen.  IV anesthetics. IV anesthetics are given through an IV line in your arm. Theyre often given first. This is so you are asleep before a gas anesthetic is started. Some kinds of IV anesthetics relieve pain. Others relax you. Your doctor will decide which kind is best in your case.  Gas anesthetics. Gas anesthetics are breathed into the lungs. They are often used to keep you asleep. They can be given through a facemask or a tube placed in your larynx or trachea (breathing tube).  If you have a facemask, your anesthesia provider will most likely place it over your nose and mouth while youre still awake. Youll breathe oxygen through the mask as your IV anesthetic is started. Gas anesthetic may be added  through the mask.  If you have a tube in the larynx or trachea, it will be inserted into your throat after youre asleep.  Anesthesia tools and medicines  You will likely have:  IV anesthetics. These are put into an IV line into your bloodstream.  Gas anesthetics. You breathe these anesthetics into your lungs, where they pass into your bloodstream.  Pulse oximeter. This is a small clip that is attached to the end of your finger. This measures your blood oxygen level.  Electrocardiography leads (electrodes). These are small sticky pads that are placed on your chest. They record your heart rate and rhythm.  Blood pressure cuff. This reads your blood pressure.  Risks and possible complications  General anesthesia has some risks. These include:  Breathing problems  Nausea and vomiting  Sore throat or hoarseness (usually temporary)  Allergic reaction to the anesthetic  Irregular heartbeat (rare)  Cardiac arrest (rare)   Anesthesia safety  Follow all instructions you are given for how long not to eat or drink before your procedure.  Be sure your doctor knows what medicines and drugs you take. This includes over-the-counter medicines, herbs, supplements, alcohol or other drugs. You will be asked when those were last taken.  Have an adult family member or friend drive you home after the procedure.  For the first 24 hours after your surgery:  Do not drive or use heavy equipment.  Do not make important decisions or sign legal documents. If important decisions or signing legal documents is necessary during the first 24 hours after surgery, have a trusted family member or spouse act on your behalf.  Avoid alcohol.  Have a responsible adult stay with you. He or she can watch for problems and help keep you safe.  Date Last Reviewed: 12/1/2016 © 2000-2017 [employer]. [address] [place] [address], [place] [address]. All rights reserved. This information is not intended as a substitute for professional medical care. Always  follow your healthcare professional's instructions.

## 2025-04-17 ENCOUNTER — OFFICE VISIT (OUTPATIENT)
Dept: INTERNAL MEDICINE | Facility: CLINIC | Age: 70
End: 2025-04-17
Payer: MEDICARE

## 2025-04-17 VITALS
BODY MASS INDEX: 26.89 KG/M2 | SYSTOLIC BLOOD PRESSURE: 128 MMHG | DIASTOLIC BLOOD PRESSURE: 64 MMHG | TEMPERATURE: 97 F | HEART RATE: 83 BPM | WEIGHT: 161.38 LBS | RESPIRATION RATE: 18 BRPM | HEIGHT: 65 IN | OXYGEN SATURATION: 99 %

## 2025-04-17 DIAGNOSIS — K44.9 HIATAL HERNIA: ICD-10-CM

## 2025-04-17 DIAGNOSIS — Z01.818 PRE-OP EXAM: Primary | ICD-10-CM

## 2025-04-17 DIAGNOSIS — K21.9 GASTROESOPHAGEAL REFLUX DISEASE WITHOUT ESOPHAGITIS: ICD-10-CM

## 2025-04-17 DIAGNOSIS — E11.43 GASTROPARESIS DUE TO DM: ICD-10-CM

## 2025-04-17 DIAGNOSIS — E78.2 MIXED DIABETIC HYPERLIPIDEMIA ASSOCIATED WITH TYPE 2 DIABETES MELLITUS: ICD-10-CM

## 2025-04-17 DIAGNOSIS — I70.0 AORTIC ATHEROSCLEROSIS: ICD-10-CM

## 2025-04-17 DIAGNOSIS — E11.59 HYPERTENSION ASSOCIATED WITH TYPE 2 DIABETES MELLITUS: ICD-10-CM

## 2025-04-17 DIAGNOSIS — E55.9 VITAMIN D DEFICIENCY: ICD-10-CM

## 2025-04-17 DIAGNOSIS — F03.90 MAJOR NEUROCOGNITIVE DISORDER: ICD-10-CM

## 2025-04-17 DIAGNOSIS — R11.2 NAUSEA AND VOMITING, UNSPECIFIED VOMITING TYPE: ICD-10-CM

## 2025-04-17 DIAGNOSIS — G47.33 OSA (OBSTRUCTIVE SLEEP APNEA): ICD-10-CM

## 2025-04-17 DIAGNOSIS — I65.23 BILATERAL CAROTID ARTERY STENOSIS: ICD-10-CM

## 2025-04-17 DIAGNOSIS — F43.23 ADJUSTMENT DISORDER WITH MIXED ANXIETY AND DEPRESSED MOOD: ICD-10-CM

## 2025-04-17 DIAGNOSIS — I25.10 ATHEROSCLEROSIS OF NATIVE CORONARY ARTERY OF NATIVE HEART WITHOUT ANGINA PECTORIS: ICD-10-CM

## 2025-04-17 DIAGNOSIS — I15.2 HYPERTENSION ASSOCIATED WITH TYPE 2 DIABETES MELLITUS: ICD-10-CM

## 2025-04-17 DIAGNOSIS — E11.65 TYPE 2 DIABETES MELLITUS WITH HYPERGLYCEMIA, WITH LONG-TERM CURRENT USE OF INSULIN: ICD-10-CM

## 2025-04-17 DIAGNOSIS — M54.12 RADICULOPATHY OF CERVICAL SPINE: ICD-10-CM

## 2025-04-17 DIAGNOSIS — Z79.4 TYPE 2 DIABETES MELLITUS WITH HYPERGLYCEMIA, WITH LONG-TERM CURRENT USE OF INSULIN: ICD-10-CM

## 2025-04-17 DIAGNOSIS — E11.69 MIXED DIABETIC HYPERLIPIDEMIA ASSOCIATED WITH TYPE 2 DIABETES MELLITUS: ICD-10-CM

## 2025-04-17 DIAGNOSIS — K31.84 GASTROPARESIS DUE TO DM: ICD-10-CM

## 2025-04-17 PROCEDURE — 3078F DIAST BP <80 MM HG: CPT | Mod: CPTII,S$GLB,, | Performed by: FAMILY MEDICINE

## 2025-04-17 PROCEDURE — 99999 PR PBB SHADOW E&M-EST. PATIENT-LVL V: CPT | Mod: PBBFAC,,, | Performed by: FAMILY MEDICINE

## 2025-04-17 PROCEDURE — 99214 OFFICE O/P EST MOD 30 MIN: CPT | Mod: S$GLB,,, | Performed by: FAMILY MEDICINE

## 2025-04-17 PROCEDURE — 1101F PT FALLS ASSESS-DOCD LE1/YR: CPT | Mod: CPTII,S$GLB,, | Performed by: FAMILY MEDICINE

## 2025-04-17 PROCEDURE — 3008F BODY MASS INDEX DOCD: CPT | Mod: CPTII,S$GLB,, | Performed by: FAMILY MEDICINE

## 2025-04-17 PROCEDURE — 1160F RVW MEDS BY RX/DR IN RCRD: CPT | Mod: CPTII,S$GLB,, | Performed by: FAMILY MEDICINE

## 2025-04-17 PROCEDURE — 3074F SYST BP LT 130 MM HG: CPT | Mod: CPTII,S$GLB,, | Performed by: FAMILY MEDICINE

## 2025-04-17 PROCEDURE — 1126F AMNT PAIN NOTED NONE PRSNT: CPT | Mod: CPTII,S$GLB,, | Performed by: FAMILY MEDICINE

## 2025-04-17 PROCEDURE — 1159F MED LIST DOCD IN RCRD: CPT | Mod: CPTII,S$GLB,, | Performed by: FAMILY MEDICINE

## 2025-04-17 PROCEDURE — G2211 COMPLEX E/M VISIT ADD ON: HCPCS | Mod: S$GLB,,, | Performed by: FAMILY MEDICINE

## 2025-04-17 PROCEDURE — 4010F ACE/ARB THERAPY RXD/TAKEN: CPT | Mod: CPTII,S$GLB,, | Performed by: FAMILY MEDICINE

## 2025-04-17 PROCEDURE — 3288F FALL RISK ASSESSMENT DOCD: CPT | Mod: CPTII,S$GLB,, | Performed by: FAMILY MEDICINE

## 2025-04-17 PROCEDURE — 3044F HG A1C LEVEL LT 7.0%: CPT | Mod: CPTII,S$GLB,, | Performed by: FAMILY MEDICINE

## 2025-04-17 RX ORDER — PANTOPRAZOLE SODIUM 40 MG/1
40 TABLET, DELAYED RELEASE ORAL DAILY
Qty: 90 TABLET | Refills: 3 | Status: SHIPPED | OUTPATIENT
Start: 2025-04-17

## 2025-04-17 RX ORDER — BUPROPION HYDROCHLORIDE 300 MG/1
300 TABLET ORAL EVERY MORNING
Qty: 90 TABLET | Refills: 3 | Status: SHIPPED | OUTPATIENT
Start: 2025-04-17

## 2025-04-17 RX ORDER — ONDANSETRON 8 MG/1
8 TABLET, ORALLY DISINTEGRATING ORAL 3 TIMES DAILY PRN
Qty: 30 TABLET | Refills: 6 | Status: SHIPPED | OUTPATIENT
Start: 2025-04-17

## 2025-04-17 RX ORDER — LOSARTAN POTASSIUM 50 MG/1
50 TABLET ORAL DAILY
Qty: 90 TABLET | Refills: 3 | Status: SHIPPED | OUTPATIENT
Start: 2025-04-17

## 2025-04-17 NOTE — PROGRESS NOTES
Subjective     Patient ID: Riana Kay is a 70 y.o. female.    Chief Complaint: Pre-op Exam (Neck surgery scheduled May 12th)    HPI 70-year-old female with hypertension, hyperlipidemia, bilateral carotid artery stenosis, coronary atherosclerosis, aortic atherosclerosis, type 2 diabetes, GERD, hiatal hernia, nausea, diabetic gastroparesis, vitamin-D deficiency, adjustment disorder with anxiety and depression, major neurocognitive disorder, sleep apnea, and radiculopathy of the cervical spine presents to clinic today for a preoperative exam in order to have cervical spine fusion with Dr. Donahue on May 12, 2025.  The patient has been seen by Cardiology secondary to an episode of syncope.  She has underwent nuclear stress test which revealed no ischemia and systolic function was normal.  The patient has been fully cleared for surgery by Cardiology.  She continues to be followed by Miguel Mccormick NP for treatment of type 2 diabetes which remains well controlled on Mounjaro 2.5 mg weekly, Lantus 40-52 units nightly, and Humalog 10-12 units plus sliding scale with meals.  Current hemoglobin A1c is 6.4.  She continues to note frequent nausea secondary to a hiatal hernia and gastroparesis.  She does use Zofran as needed for nausea with improvement of symptoms.  She continues to take pantoprazole daily secondary to GERD and hiatal hernia.  Vitamin-D deficiency remains well controlled on over-the-counter vitamin-D 2000 units daily.  Anxiety and adjustment disorder remains stable on Wellbutrin, BuSpar, and Cymbalta.  She has been evaluated by Neurology secondary to major neurocognitive disorder which remains stable.  Sleep apnea remains well controlled with use of CPAP nightly.  Review of Systems   Constitutional:  Negative for activity change, appetite change, chills, fatigue, fever and unexpected weight change.   HENT:  Positive for trouble swallowing. Negative for nasal congestion, ear pain, hearing loss, postnasal  drip, rhinorrhea, sinus pressure/congestion, sore throat and tinnitus.    Eyes:  Negative for discharge, redness, itching and visual disturbance.   Respiratory:  Negative for cough, chest tightness, shortness of breath and wheezing.    Cardiovascular:  Negative for chest pain and palpitations.   Gastrointestinal:  Positive for constipation. Negative for abdominal pain, blood in stool, diarrhea, nausea and vomiting.   Endocrine: Negative for polydipsia and polyuria.   Genitourinary:  Negative for decreased urine volume, difficulty urinating, dysuria, frequency, hematuria, menstrual problem and urgency.   Musculoskeletal:  Positive for arthralgias, joint swelling and neck pain. Negative for back pain, myalgias and neck stiffness.   Integumentary:  Negative for rash.   Neurological:  Positive for weakness and headaches. Negative for dizziness and light-headedness.   Psychiatric/Behavioral:  Positive for confusion and dysphoric mood.           Objective     Physical Exam  Vitals and nursing note reviewed.   Constitutional:       General: She is not in acute distress.     Appearance: She is well-developed. She is not diaphoretic.   HENT:      Head: Normocephalic and atraumatic.      Right Ear: External ear normal.      Left Ear: External ear normal.      Nose: Nose normal.      Mouth/Throat:      Pharynx: No oropharyngeal exudate.   Eyes:      General: No scleral icterus.        Right eye: No discharge.         Left eye: No discharge.      Conjunctiva/sclera: Conjunctivae normal.      Pupils: Pupils are equal, round, and reactive to light.   Neck:      Thyroid: No thyromegaly.      Vascular: No JVD.      Trachea: No tracheal deviation.   Cardiovascular:      Rate and Rhythm: Normal rate and regular rhythm.      Heart sounds: Normal heart sounds. No murmur heard.     No friction rub. No gallop.   Pulmonary:      Effort: Pulmonary effort is normal. No respiratory distress.      Breath sounds: Normal breath sounds. No  stridor. No wheezing or rales.   Abdominal:      General: Bowel sounds are normal. There is no distension.      Palpations: Abdomen is soft. There is no mass.      Tenderness: There is no abdominal tenderness. There is no guarding or rebound.   Musculoskeletal:         General: No tenderness. Normal range of motion.      Cervical back: Normal range of motion and neck supple.   Lymphadenopathy:      Cervical: No cervical adenopathy.   Skin:     General: Skin is warm and dry.      Coloration: Skin is not pale.      Findings: No erythema or rash.   Neurological:      Mental Status: She is alert and oriented to person, place, and time.   Psychiatric:         Behavior: Behavior normal.         Thought Content: Thought content normal.         Judgment: Judgment normal.            Assessment and Plan     1. Pre-op exam    2. Hypertension associated with type 2 diabetes mellitus  -     losartan (COZAAR) 50 MG tablet; Take 1 tablet (50 mg total) by mouth once daily.  Dispense: 90 tablet; Refill: 3    3. Mixed diabetic hyperlipidemia associated with type 2 diabetes mellitus    4. Bilateral carotid artery stenosis  Overview:  [phone]  [phone] 8/14/2022    Saad Daily MD  [phone]  [phone] 8/14/2022      Narrative & Impression  EXAMINATION:  CT SHOULDER WITHOUT CONTRAST RIGHT; CT 3D RECON WITH INDEPENDENT WS     CLINICAL HISTORY:  Shoulder trauma, instability or dislocation suspected, xray done;ortho;; Shoulder trauma, instability or dislocation suspected, xray done;ortho;3d;     TECHNIQUE:  Axial 0.625-mm images of the right shoulder obtained without intravenous contrast.  Data submitted for coronal and sagittal reformats.     3D reconstructed images were acquired by post processing on an independent workstation with concurrent supervision and archived for permanent record.     COMPARISON:  Right shoulder radiograph 08/14/2022; chest radiograph 06/11/2021     FINDINGS:  Bony mineralization is  normal.  Comminuted multi-fragment fracture of the humeral head and neck with moderate impaction.  Fracture at least partially involves the articular surface of the humeral head which is dislocated anteriorly and inferiorly with respect to the glenoid.     Acromioclavicular joint alignment is preserved without significant widening or hypertrophy.     Moderate volume joint effusion with fat fluid level suggesting lipohemarthrosis.  Generalized fat stranding infraclavicular and axillary region.  No organized fluid collections.  Muscle bulk is preserved.     Dependent atelectasis in the right lung base.  Dense calcification of the mitral annulus.  Multi-vessel coronary artery calcification.  Eccentric calcification in the right carotid bulb.     Impression:     Comminuted fracture of humeral head and neck with impaction and anterior inferior dislocation of the humeral head.     Moderate volume lipohemarthrosis.     This report was flagged in Epic as abnormal.     Electronically signed by resident: Saad Daily  Date:                                            08/14/2022  Time:                                           16:04     Electronically signed by: Jose Vázquez MD  Date:                                            08/14/2022 8/14/2022      5. Atherosclerosis of native coronary artery of native heart without angina pectoris    6. Aortic atherosclerosis  Overview:  Seen on CT from 09/22/20      7. Type 2 diabetes mellitus with hyperglycemia, with long-term current use of insulin    8. Gastroesophageal reflux disease without esophagitis  -     pantoprazole (PROTONIX) 40 MG tablet; Take 1 tablet (40 mg total) by mouth once daily.  Dispense: 90 tablet; Refill: 3    9. Hiatal hernia    10. Gastroparesis due to DM    11. Vitamin D deficiency    12. Adjustment disorder with mixed anxiety and depressed mood  -     buPROPion (WELLBUTRIN XL) 300 MG 24 hr tablet; Take 1 tablet (300 mg total) by mouth every morning.   Dispense: 90 tablet; Refill: 3    13. ZAHIRA (obstructive sleep apnea)    14. Radiculopathy of cervical spine    15. Anxiety    16. Nausea and vomiting, unspecified vomiting type  -     ondansetron (ZOFRAN-ODT) 8 MG TbDL; Take 1 tablet (8 mg total) by mouth 3 (three) times daily as needed (Nausea).  Dispense: 30 tablet; Refill: 6        1. Preoperative workup from Cardiology has been reviewed and is within normal limits.  2, 3, 4, 5, 6. Continue amlodipine 5 mg nightly, aspirin 81 mg daily, losartan 50 mg daily, and Crestor 20 mg nightly.  Hypertension, hyperlipidemia, carotid artery stenosis, and atherosclerosis remain stable.  Continue follow-up with cardiology as scheduled.    7. Continue Mounjaro, Lantus, and Humalog as prescribed and continue follow-up with Miguel Mccormick NP as scheduled.  Type 2 diabetes is well controlled.  Current hemoglobin A1c is 6.4.    8, 9. Continue pantoprazole 40 mg daily.  GERD and hiatal hernia are stable.  10, 11.  Continue use of Zofran as needed secondary to nausea related to diabetic gastroparesis.  12. Continue over-the-counter vitamin-D 2000 units daily.  Vitamin-D deficiency is stable.    13. Continue Wellbutrin, BuSpar, and Cymbalta as prescribed.  Adjustment disorder with anxiety and depression are stable.    14. Continue follow-up with neurology as scheduled.  Major neurocognitive disorder is stable.    15. Continue CPAP nightly.  Sleep apnea is well-controlled.    16. The patient is okay to proceed with surgery as scheduled.  17. Return to clinic as needed or in 4 months for annual physical exam.             Follow up in about 4 months (around 8/17/2025), or if symptoms worsen or fail to improve, for Annual exam.

## 2025-04-20 ENCOUNTER — PATIENT MESSAGE (OUTPATIENT)
Dept: INTERNAL MEDICINE | Facility: CLINIC | Age: 70
End: 2025-04-20
Payer: MEDICARE

## 2025-04-21 DIAGNOSIS — Z79.4 TYPE 2 DIABETES MELLITUS WITH HYPERGLYCEMIA, WITH LONG-TERM CURRENT USE OF INSULIN: Primary | ICD-10-CM

## 2025-04-21 DIAGNOSIS — E11.65 TYPE 2 DIABETES MELLITUS WITH HYPERGLYCEMIA, WITH LONG-TERM CURRENT USE OF INSULIN: Primary | ICD-10-CM

## 2025-04-21 RX ORDER — TIRZEPATIDE 5 MG/.5ML
INJECTION, SOLUTION SUBCUTANEOUS
Qty: 4 PEN | Refills: 6 | Status: SHIPPED | OUTPATIENT
Start: 2025-04-21

## 2025-04-22 ENCOUNTER — TELEPHONE (OUTPATIENT)
Dept: PODIATRY | Facility: CLINIC | Age: 70
End: 2025-04-22

## 2025-04-22 ENCOUNTER — TELEPHONE (OUTPATIENT)
Dept: INTERNAL MEDICINE | Facility: CLINIC | Age: 70
End: 2025-04-22
Payer: MEDICARE

## 2025-04-22 NOTE — TELEPHONE ENCOUNTER
Prior authorization approved.  Lm for pt today.  Payer: [employer] - Medicare Case ID: [id number]    [phone]    [phone]  Note from payer: Request Reference Number: [id number]. MOUNJARO INJ 5MG/0.5 is approved through 12/31/2025. Your patient may now fill this prescription and it will be covered.  Approval Details    Authorized from April 22, 2025 to December 31, 2025

## 2025-05-05 ENCOUNTER — LAB VISIT (OUTPATIENT)
Dept: LAB | Facility: HOSPITAL | Age: 70
End: 2025-05-05
Attending: ANESTHESIOLOGY
Payer: MEDICARE

## 2025-05-05 DIAGNOSIS — E11.9 DIABETES MELLITUS WITHOUT COMPLICATION: ICD-10-CM

## 2025-05-05 DIAGNOSIS — Z01.818 PREOPERATIVE TESTING: ICD-10-CM

## 2025-05-05 DIAGNOSIS — M54.12 CERVICAL RADICULOPATHY: Primary | ICD-10-CM

## 2025-05-05 DIAGNOSIS — M79.601 PAIN IN BOTH UPPER EXTREMITIES: ICD-10-CM

## 2025-05-05 DIAGNOSIS — M79.602 PAIN IN BOTH UPPER EXTREMITIES: ICD-10-CM

## 2025-05-05 LAB
EAG (OHS): 160 MG/DL (ref 68–131)
HBA1C MFR BLD: 7.2 % (ref 4–5.6)
INDIRECT COOMBS: NORMAL
INR PPP: 1 (ref 0.8–1.2)
PROTHROMBIN TIME: 10.6 SECONDS (ref 9–12.5)
RH BLD: NORMAL
SPECIMEN OUTDATE: NORMAL

## 2025-05-05 PROCEDURE — 36415 COLL VENOUS BLD VENIPUNCTURE: CPT

## 2025-05-05 PROCEDURE — 83036 HEMOGLOBIN GLYCOSYLATED A1C: CPT

## 2025-05-05 PROCEDURE — 86900 BLOOD TYPING SEROLOGIC ABO: CPT | Performed by: ANESTHESIOLOGY

## 2025-05-05 PROCEDURE — 85610 PROTHROMBIN TIME: CPT

## 2025-05-05 RX ORDER — GABAPENTIN 100 MG/1
100 CAPSULE ORAL 3 TIMES DAILY
Qty: 45 CAPSULE | Refills: 0 | Status: SHIPPED | OUTPATIENT
Start: 2025-05-05

## 2025-05-05 RX ORDER — CELECOXIB 100 MG/1
100 CAPSULE ORAL 2 TIMES DAILY
Qty: 28 CAPSULE | Refills: 0 | Status: SHIPPED | OUTPATIENT
Start: 2025-05-05

## 2025-05-05 RX ORDER — METHOCARBAMOL 500 MG/1
1000 TABLET, FILM COATED ORAL 3 TIMES DAILY
Qty: 90 TABLET | Refills: 0 | Status: SHIPPED | OUTPATIENT
Start: 2025-05-05

## 2025-05-05 RX ORDER — ACETAMINOPHEN 500 MG
500 TABLET ORAL 3 TIMES DAILY
Qty: 90 TABLET | Refills: 0 | Status: SHIPPED | OUTPATIENT
Start: 2025-05-05

## 2025-05-05 RX ORDER — OXYCODONE HYDROCHLORIDE 5 MG/1
5 TABLET ORAL EVERY 6 HOURS PRN
Qty: 21 TABLET | Refills: 0 | Status: SHIPPED | OUTPATIENT
Start: 2025-05-05

## 2025-05-05 NOTE — H&P
DATE: 5/5/2025  PATIENT: Riana Kay    Attending Physician: Jalil Donahue M.D.    CHIEF COMPLAINT: neck and LUE pain    HISTORY:  Riana Kay is a 70 y.o. female w/ a hx of R rTSA presents for initial evaluation of neck and left arm pain (Neck - 6, Arm - 6). The pain has been present for 3 years. The patient describes the pain as dull and it radiates down LUE to the hand. The pain is worse with activity and improved by rest. There is LUE associated numbness and tingling. There is LUE subjective weakness. Prior treatments have included OTC meds, PT and MIRTHA, but no surgery.     The Patient denies myelopathic symptoms such as handwriting changes or difficulty with buttons/coins/keys. Denies perineal paresthesias, bowel/bladder dysfunction.    The patient does not smoke or endorse IVDU. She has DM (HA1C of 6.4). The patient is not on any blood thinners and does not take chronic narcotics. 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    PAST MEDICAL/SURGICAL HISTORY:  Past Medical History:   Diagnosis Date    Abdominal pain, right upper quadrant 02/04/2014    Anticoagulant long-term use     Anxiety     AR (allergic rhinitis)     Atrophic gastritis without mention of hemorrhage 02/13/2012     Dx updated per 2019 IMO Load    Chronic fatigue syndrome 06/10/2012    Diabetes mellitus     Dizziness     Fatty liver     GERD (gastroesophageal reflux disease)     Gross hematuria 12/01/2020    HTN (hypertension)     Hyperlipidemia     Leg swelling     Memory loss     Osteopenia     PONV (postoperative nausea and vomiting)     Primary osteoarthritis of right knee 10/06/2020    Primary osteoarthritis of right knee 10/06/2020    Right elbow pain 01/16/2019    S/P total hysterectomy     Screening for colon cancer 01/06/2021    Sleep apnea     Status post total right knee replacement 10/6/2020 10/05/2020     Past Surgical History:   Procedure Laterality Date    BREAST BIOPSY Left     benign  excisional biopsy 1990    CHOLECYSTECTOMY      COLONOSCOPY N/A 01/17/2018    Procedure: COLONOSCOPY with Donnell;  Surgeon: Roland Jacobo MD;  Location: [facility] (4TH FLR);  Service: Endoscopy;  Laterality: N/A;    COLONOSCOPY N/A 01/06/2021    Procedure: COLONOSCOPY;  Surgeon: Yong Rowland MD;  Location: [facility] (4TH FLR);  Service: Endoscopy;  Laterality: N/A;  prep ins. emailed - Malay speaking,  needed - ERW  COVID [facility] - ERW    COLONOSCOPY N/A 08/16/2024    Procedure: COLONOSCOPY;  Surgeon: Aamir Reyes MD;  Location: [facility];  Service: Endoscopy;  Laterality: N/A;  8/9/24-Suprep, holding Trulicity, precall complete-DS  8/14/24-LVM to see if pt can come earlier-DS    CYSTOSCOPY N/A 12/01/2020    Procedure: CYSTOSCOPY;  Surgeon: Ines Stanford MD;  Location: [facility] 1ST FLR;  Service: Urology;  Laterality: N/A;  45 minutes     ELBOW ARTHROPLASTY Right 01/16/2019    Procedure: ARTHROPLASTY, ELBOW right radial head arthroplasty revision;  Surgeon: Katja Hubbard MD;  [address];  Service: Orthopedics;  Laterality: Right;  Anesthesia: General and Regional. Stretcher, hand pan 1 and pan 2, CALL ROSALIO, RUCHI [employer] notified 1-14 LO    ELBOW SURGERY Right 07/16/2015    ELBOW SURGERY      ESOPHAGOGASTRODUODENOSCOPY N/A 04/15/2019    Procedure: EGD (ESOPHAGOGASTRODUODENOSCOPY);  Surgeon: Buck Irwin MD;  Location: [facility] (4TH FLR);  Service: Endoscopy;  Laterality: N/A;  ray she    ESOPHAGOGASTRODUODENOSCOPY N/A 04/21/2023    Procedure: EGD (ESOPHAGOGASTRODUODENOSCOPY);  Surgeon: Aamir Reyes MD;  Location: [facility];  Service: Endoscopy;  Laterality: N/A;    FRACTURE SURGERY      HYSTERECTOMY      JOINT REPLACEMENT  October 6th2020    KNEE ARTHROPLASTY Right 10/06/2020    Procedure: ARTHROPLASTY, KNEE-SAME DAY PROTOCOL;  Surgeon: Sabino Damon MD;  Location: [facility];  Service:  Orthopedics;  Laterality: Right;    KNEE ARTHROSCOPY W/ DEBRIDEMENT  04/2011    Left    RELEASE OF ULNAR NERVE AT CUBITAL TUNNEL Right 01/16/2019    Procedure: RELEASE, ULNAR TUNNEL right;  Surgeon: Katja Hubbard MD;  Location: [facility] OR [address] [place] [address];  Service: Orthopedics;  Laterality: Right;  Anesthesia: General and Regional. Stretcher, hand pan 1 and pan 2, CALL ACCUMED, CLAIRX    RETROGRADE PYELOGRAPHY Bilateral 12/01/2020    Procedure: PYELOGRAM, RETROGRADE;  Surgeon: Ines Stanford MD;  Location: [facility] OR [address] [place] [address];  Service: Urology;  Laterality: Bilateral;    REVERSE TOTAL SHOULDER ARTHROPLASTY Right 08/16/2022    Procedure: ARTHROPLASTY, SHOULDER, TOTAL, REVERSE, [place];  Surgeon: Aamir Powell MD;  Location: [facility] OR [address] [place] [address];  Service: Orthopedics;  Laterality: Right;  [place] Can't go until after 12    ROTATOR CUFF REPAIR      SHOULDER SURGERY      TOTAL ABDOMINAL HYSTERECTOMY W/ BILATERAL SALPINGOOPHORECTOMY      TOTAL KNEE ARTHROPLASTY Left 10/19/2021    Procedure: ARTHROPLASTY, KNEE, TOTAL;  Surgeon: Sabino Damon MD;  Location: [facility];  Service: Orthopedics;  Laterality: Left;    UPPER GASTROINTESTINAL ENDOSCOPY         Current Medications: Current Medications[1]    Social History: Social History[2]    REVIEW OF SYSTEMS:  Constitution: Negative. Negative for chills, fever and night sweats.   Cardiovascular: Negative for chest pain and syncope.   Respiratory: Negative for cough and shortness of breath.   Gastrointestinal: See HPI. Negative for nausea/vomiting. Negative for abdominal pain.  Genitourinary: See HPI. Negative for discoloration or dysuria.  Skin: Negative for dry skin, itching and rash.   Hematologic/Lymphatic: negative for bleeding/clotting disorders.   Musculoskeletal: Negative for falls and muscle weakness.   Neurological: See HPI. no history of seizures. no history of cranial surgery or shunts.  Endocrine: Negative for polydipsia, polyphagia and polyuria.    Allergic/Immunologic: Negative for hives and persistent infections.  Psychiatric/Behavioral: Negative for depression and insomnia.         EXAM:  There were no vitals taken for this visit.    General: The patient is a 70 y.o. female in no apparent distress, the patient is orientatied to person, place and time.  Psych: Normal mood and affect  HEENT: Vision grossly intact, hearing intact to the spoken word.  Lungs: Respirations unlabored.  Gait: Normal station and gait, no difficulty with toe or heel walk.   Skin: Cervical skin negative for rashes, lesions, hairy patches and surgical scars.  Range of motion: Cervical range of motion is acceptable. There is posterior cervical tenderness to palpation.  Spinal Balance: Global saggital and coronal spinal balance acceptable, no significant for scoliosis and kyphosis.  Musculoskeletal: No pain with the range of motion of the bilateral shoulders and elbows. Normal bulk and contour of the bilateral hands.  Vascular: Bilateral hands warm and well perfused, Radial pulses 2+ bilaterally.  Neurological: Normal strength and tone in all major motor groups in the bilateral upper and lower extremities. Normal sensation to light touch in the C5-T1 and L2-S1 dermatomes bilaterally.  Deep tendon reflexes symmetric 2+ in the bilateral upper and lower extremities.  Negative Inverted Radial Reflex and Gordon's bilaterally. Negative Babinski bilaterally.     IMAGING:   Today I independently reviewed the following images and my interpretations are as follows:    AP, Lat and Flex/Ex  upright C-spine films demonstrate spondylosis and DDD.    MRI cervical showed C5-6 mod central and mod-severe b/l foraminal stenosis.    CT cervical showed spondylosis.     There is no height or weight on file to calculate BMI.  Hemoglobin A1C   Date Value Ref Range Status   02/25/2025 6.4 (H) 4.0 - 5.6 % Final     Comment:     ADA Screening Guidelines:  5.7-6.4%  Consistent with prediabetes  >or=6.5%   Consistent with diabetes    High levels of fetal hemoglobin interfere with the HbA1C  assay. Heterozygous hemoglobin variants (HbS, HgC, etc)do  not significantly interfere with this assay.   However, presence of multiple variants may affect accuracy.     12/13/2024 7.2 (H) 4.0 - 5.6 % Final     Comment:     ADA Screening Guidelines:  5.7-6.4%  Consistent with prediabetes  >or=6.5%  Consistent with diabetes    High levels of fetal hemoglobin interfere with the HbA1C  assay. Heterozygous hemoglobin variants (HbS, HgC, etc)do  not significantly interfere with this assay.   However, presence of multiple variants may affect accuracy.     08/13/2024 8.2 (H) 4.0 - 5.6 % Final     Comment:     ADA Screening Guidelines:  5.7-6.4%  Consistent with prediabetes  >or=6.5%  Consistent with diabetes    High levels of fetal hemoglobin interfere with the HbA1C  assay. Heterozygous hemoglobin variants (HbS, HgC, etc)do  not significantly interfere with this assay.   However, presence of multiple variants may affect accuracy.       Hemoglobin A1c   Date Value Ref Range Status   05/05/2025 7.2 (H) 4.0 - 5.6 % Final     Comment:     ADA Screening Guidelines:  5.7-6.4%  Consistent with prediabetes  >=6.5%  Consistent with diabetes    High levels of fetal hemoglobin interfere with the HbA1C  assay. Heterozygous hemoglobin variants (HbS, HgC, etc)do  not significantly interfere with this assay.   However, presence of multiple variants may affect accuracy.       ASSESSMENT/PLAN:      Will proceed with C5-6 ACDF.        [1] No current facility-administered medications for this encounter.    Current Outpatient Medications:     acetaminophen (TYLENOL) 500 MG tablet, Take 2 tablets (1,000 mg total) by mouth every 8 (eight) hours as needed for Pain., Disp: 90 tablet, Rfl: 0    acetaminophen (TYLENOL) 500 MG tablet, Take 1 tablet (500 mg total) by mouth 3 (three) times daily., Disp: 90 tablet, Rfl: 0    amLODIPine (NORVASC) 5 MG tablet, Take 1  tablet (5 mg total) by mouth every evening., Disp: 30 tablet, Rfl: 11    aspirin (ECOTRIN) 81 MG EC tablet, Take 1 tablet (81 mg total) by mouth once daily., Disp: , Rfl:     azelastine (ASTELIN) 137 mcg (0.1 %) nasal spray, 1 spray (137 mcg total) by Nasal route 2 (two) times daily., Disp: 30 mL, Rfl: 11    benzonatate (TESSALON) 200 MG capsule, Take 1 capsule (200 mg total) by mouth 3 (three) times daily as needed for Cough., Disp: 30 capsule, Rfl: 0    buPROPion (WELLBUTRIN XL) 300 MG 24 hr tablet, Take 1 tablet (300 mg total) by mouth every morning., Disp: 90 tablet, Rfl: 3    busPIRone (BUSPAR) 5 MG Tab, Take 1 tablet (5 mg total) by mouth 2 (two) times daily., Disp: 60 tablet, Rfl: 11    celecoxib (CELEBREX) 100 MG capsule, Take 1 capsule (100 mg total) by mouth 2 (two) times daily., Disp: 28 capsule, Rfl: 0    diclofenac sodium (VOLTAREN ARTHRITIS PAIN) 1 % Gel, Apply 2 g topically once daily., Disp: 100 g, Rfl: 3    DULoxetine (CYMBALTA) 30 MG capsule, TAKE 1 CAPSULE BY MOUTH EVERY DAY, Disp: 90 capsule, Rfl: 0    gabapentin (NEURONTIN) 100 MG capsule, Take 1 capsule (100 mg total) by mouth 3 (three) times daily., Disp: 45 capsule, Rfl: 0    glucagon (BAQSIMI) 3 mg/actuation Spry, Give one puff via nostril. Hold device between fingers and thumb, do not push plunger yet, insert tip gently into one nostril until finger(s) touch the outside of the nose, then push plunger firmly all the way in . Dose is complete when the green line disappears., Disp: 1 each, Rfl: 1    HUMALOG KWIKPEN INSULIN 100 unit/mL pen, INJECT 10-12 UNITS IN THE SKIN BEFORE MEALS PLUS SCALE MAX DAILY UNITS 66 UNITS, Disp: 30 each, Rfl: 6    insulin glargine U-100, Lantus, (LANTUS SOLOSTAR U-100 INSULIN) 100 unit/mL (3 mL) InPn pen, INJECT 24 -36 UNITS UNDER THE SKIN AT NIGHT. MAX DAILY 36 UNITS, Disp: 30 each, Rfl: 3    ketoconazole (NIZORAL) 2 % shampoo, Apply topically every 7 days., Disp: 120 mL, Rfl: 6    lancets Misc, To check BG 3  "times daily, to use with insurance preferred meter, e 11.65, Disp: 100 each, Rfl: 11    LANTUS SOLOSTAR U-100 INSULIN 100 unit/mL (3 mL) InPn pen, Inject 40-52 units at night., Disp: 15 mL, Rfl: 6    latanoprost 0.005 % ophthalmic solution, Place 1 drop into both eyes nightly., Disp: , Rfl:     losartan (COZAAR) 50 MG tablet, Take 1 tablet (50 mg total) by mouth once daily., Disp: 90 tablet, Rfl: 3    methocarbamoL (ROBAXIN) 500 MG Tab, Take 2 tablets (1,000 mg total) by mouth 3 (three) times daily., Disp: 90 tablet, Rfl: 0    ondansetron (ZOFRAN-ODT) 8 MG TbDL, Take 1 tablet (8 mg total) by mouth 3 (three) times daily as needed (Nausea)., Disp: 30 tablet, Rfl: 6    ONETOUCH DELICA LANCETS 33 gauge Misc, TO CHECK BLOOD GLUCOSE 3 TIMES DAILY, Disp: , Rfl:     ONETOUCH VERIO FLEX METER Misc, TO CHECK BLOOD GLUCOSE 3 TIMES DAILY, TO USE WITH INSURANCE PREFERRED METER, E 11.65, Disp: , Rfl:     oxyCODONE (ROXICODONE) 5 MG immediate release tablet, Take 1 tablet (5 mg total) by mouth every 6 (six) hours as needed for Pain (for breakthrough pain)., Disp: 21 tablet, Rfl: 0    pantoprazole (PROTONIX) 40 MG tablet, Take 1 tablet (40 mg total) by mouth once daily., Disp: 90 tablet, Rfl: 3    pen needle, diabetic (NOVOFINE 32) 32 gauge x 1/4" Ndle, Uses 4 times a day. 90 day via duramed e 11.65, Disp: 400 each, Rfl: 3    rosuvastatin (CRESTOR) 20 MG tablet, Take 1 tablet (20 mg total) by mouth every evening., Disp: 90 tablet, Rfl: 3    tirzepatide (MOUNJARO) 5 mg/0.5 mL PnIj, Inject 5 mg weekly into skin. Type 2 diabetes e 11.65, Disp: 4 Pen, Rfl: 6  [2]   Social History  Socioeconomic History    Marital status:      Spouse name: Kemal    Number of children: 1   Occupational History    Occupation: Housekeeping     Comment: On disability   Tobacco Use    Smoking status: Never     Passive exposure: Never    Smokeless tobacco: Never   Substance and Sexual Activity    Alcohol use: No    Drug use: No    Sexual activity: Not " Currently     Partners: Male     Birth control/protection: Post-menopausal   Other Topics Concern    Are you pregnant or think you may be? No    Breast-feeding No   Social History Narrative    She retired at [email or web address] in [employer]; , 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    No stairs     Social Drivers of Health     Financial Resource Strain: Low Risk  (1/15/2025)    Overall Financial Resource Strain (CARDIA)     Difficulty of Paying Living Expenses: Not hard at all   Food Insecurity: No Food Insecurity (1/15/2025)    Hunger Vital Sign     Worried About Running Out of Food in the Last Year: Never true     Ran Out of Food in the Last Year: Never true   Transportation Needs: No Transportation Needs (11/13/2023)    PRAPARE - Transportation     Lack of Transportation (Medical): No     Lack of Transportation (Non-Medical): No   Physical Activity: Sufficiently Active (1/15/2025)    Exercise Vital Sign     Days of Exercise per Week: 5 days     Minutes of Exercise per Session: 30 min   Stress: Stress Concern Present (1/15/2025)    [employer] - Occupational Stress Questionnaire     Feeling of Stress : To some extent   Housing Stability: Low Risk  (11/13/2023)    Housing Stability Vital Sign     Unable to Pay for Housing in the Last Year: No     Number of Places Lived in the Last Year: 1     Unstable Housing in the Last Year: No

## 2025-05-09 ENCOUNTER — TELEPHONE (OUTPATIENT)
Dept: ORTHOPEDICS | Facility: CLINIC | Age: 70
End: 2025-05-09
Payer: MEDICARE

## 2025-05-09 ENCOUNTER — PATIENT MESSAGE (OUTPATIENT)
Dept: ORTHOPEDICS | Facility: CLINIC | Age: 70
End: 2025-05-09
Payer: MEDICARE

## 2025-05-09 NOTE — TELEPHONE ENCOUNTER
Spoke to pt, informed her arrival time for surgery Monday is 8:30am at the [place] location, [address]. No food or drink after midnight.  Pt pleased and verbalized understanding.

## 2025-05-12 ENCOUNTER — HOSPITAL ENCOUNTER (OUTPATIENT)
Facility: HOSPITAL | Age: 70
Discharge: HOME | End: 2025-05-13
Attending: ORTHOPAEDIC SURGERY | Admitting: ORTHOPAEDIC SURGERY
Payer: MEDICARE

## 2025-05-12 ENCOUNTER — ANESTHESIA (OUTPATIENT)
Dept: SURGERY | Facility: HOSPITAL | Age: 70
End: 2025-05-12
Payer: MEDICARE

## 2025-05-12 ENCOUNTER — ANESTHESIA EVENT (OUTPATIENT)
Dept: SURGERY | Facility: HOSPITAL | Age: 70
End: 2025-05-12
Payer: MEDICARE

## 2025-05-12 DIAGNOSIS — M54.12 CERVICAL RADICULOPATHY: Primary | ICD-10-CM

## 2025-05-12 LAB
INDIRECT COOMBS: NORMAL
POCT GLUCOSE: 151 MG/DL (ref 70–110)
POCT GLUCOSE: 154 MG/DL (ref 70–110)
POCT GLUCOSE: 208 MG/DL (ref 70–110)
POCT GLUCOSE: 269 MG/DL (ref 70–110)
POCT GLUCOSE: 272 MG/DL (ref 70–110)
POCT GLUCOSE: 316 MG/DL (ref 70–110)
RH BLD: NORMAL

## 2025-05-12 PROCEDURE — 25000003 PHARM REV CODE 250: Performed by: PHYSICIAN ASSISTANT

## 2025-05-12 PROCEDURE — 86850 RBC ANTIBODY SCREEN: CPT | Performed by: ORTHOPAEDIC SURGERY

## 2025-05-12 PROCEDURE — D9220A PRA ANESTHESIA: Mod: ANES,,, | Performed by: STUDENT IN AN ORGANIZED HEALTH CARE EDUCATION/TRAINING PROGRAM

## 2025-05-12 PROCEDURE — 37000008 HC ANESTHESIA 1ST 15 MINUTES: Performed by: ORTHOPAEDIC SURGERY

## 2025-05-12 PROCEDURE — 22853 INSJ BIOMECHANICAL DEVICE: CPT | Mod: ,,, | Performed by: ORTHOPAEDIC SURGERY

## 2025-05-12 PROCEDURE — 27201423 OPTIME MED/SURG SUP & DEVICES STERILE SUPPLY: Performed by: ORTHOPAEDIC SURGERY

## 2025-05-12 PROCEDURE — 82962 GLUCOSE BLOOD TEST: CPT | Performed by: ORTHOPAEDIC SURGERY

## 2025-05-12 PROCEDURE — 63600175 PHARM REV CODE 636 W HCPCS: Performed by: STUDENT IN AN ORGANIZED HEALTH CARE EDUCATION/TRAINING PROGRAM

## 2025-05-12 PROCEDURE — 36000710: Performed by: ORTHOPAEDIC SURGERY

## 2025-05-12 PROCEDURE — 25000003 PHARM REV CODE 250: Performed by: NURSE ANESTHETIST, CERTIFIED REGISTERED

## 2025-05-12 PROCEDURE — C1729 CATH, DRAINAGE: HCPCS | Performed by: ORTHOPAEDIC SURGERY

## 2025-05-12 PROCEDURE — 94761 N-INVAS EAR/PLS OXIMETRY MLT: CPT

## 2025-05-12 PROCEDURE — C1713 ANCHOR/SCREW BN/BN,TIS/BN: HCPCS | Performed by: ORTHOPAEDIC SURGERY

## 2025-05-12 PROCEDURE — 99900035 HC TECH TIME PER 15 MIN (STAT)

## 2025-05-12 PROCEDURE — 25000003 PHARM REV CODE 250: Performed by: STUDENT IN AN ORGANIZED HEALTH CARE EDUCATION/TRAINING PROGRAM

## 2025-05-12 PROCEDURE — 63600175 PHARM REV CODE 636 W HCPCS: Performed by: ORTHOPAEDIC SURGERY

## 2025-05-12 PROCEDURE — 63600175 PHARM REV CODE 636 W HCPCS: Performed by: PHYSICIAN ASSISTANT

## 2025-05-12 PROCEDURE — 63600175 PHARM REV CODE 636 W HCPCS: Performed by: NURSE ANESTHETIST, CERTIFIED REGISTERED

## 2025-05-12 PROCEDURE — 27800903 OPTIME MED/SURG SUP & DEVICES OTHER IMPLANTS: Performed by: ORTHOPAEDIC SURGERY

## 2025-05-12 PROCEDURE — 20936 SP BONE AGRFT LOCAL ADD-ON: CPT | Mod: ,,, | Performed by: ORTHOPAEDIC SURGERY

## 2025-05-12 PROCEDURE — 20930 SP BONE ALGRFT MORSEL ADD-ON: CPT | Mod: ,,, | Performed by: ORTHOPAEDIC SURGERY

## 2025-05-12 PROCEDURE — 94799 UNLISTED PULMONARY SVC/PX: CPT

## 2025-05-12 PROCEDURE — 22845 INSERT SPINE FIXATION DEVICE: CPT | Mod: XU,,, | Performed by: ORTHOPAEDIC SURGERY

## 2025-05-12 PROCEDURE — 37000009 HC ANESTHESIA EA ADD 15 MINS: Performed by: ORTHOPAEDIC SURGERY

## 2025-05-12 PROCEDURE — 71000039 HC RECOVERY, EACH ADD'L HOUR: Performed by: ORTHOPAEDIC SURGERY

## 2025-05-12 PROCEDURE — 71000033 HC RECOVERY, INTIAL HOUR: Performed by: ORTHOPAEDIC SURGERY

## 2025-05-12 PROCEDURE — 22551 ARTHRD ANT NTRBDY CERVICAL: CPT | Mod: ,,, | Performed by: ORTHOPAEDIC SURGERY

## 2025-05-12 PROCEDURE — 25000003 PHARM REV CODE 250: Performed by: ORTHOPAEDIC SURGERY

## 2025-05-12 PROCEDURE — D9220A PRA ANESTHESIA: Mod: CRNA,,, | Performed by: NURSE ANESTHETIST, CERTIFIED REGISTERED

## 2025-05-12 PROCEDURE — 36000711: Performed by: ORTHOPAEDIC SURGERY

## 2025-05-12 DEVICE — SCREW DEFENDER SD 4X16MM: Type: IMPLANTABLE DEVICE | Site: NECK | Status: FUNCTIONAL

## 2025-05-12 DEVICE — IMPLANTABLE DEVICE: Type: IMPLANTABLE DEVICE | Site: NECK | Status: FUNCTIONAL

## 2025-05-12 DEVICE — SPACER ACCENT 6 DEG 12X14X7MM: Type: IMPLANTABLE DEVICE | Site: NECK | Status: FUNCTIONAL

## 2025-05-12 DEVICE — SCREW DEFENDER SPNE 14X4MM: Type: IMPLANTABLE DEVICE | Site: NECK | Status: FUNCTIONAL

## 2025-05-12 DEVICE — PUTTY OSTEOSELECT IN SYR 1CC: Type: IMPLANTABLE DEVICE | Site: NECK | Status: FUNCTIONAL

## 2025-05-12 RX ORDER — LOSARTAN POTASSIUM 25 MG/1
50 TABLET ORAL DAILY
Status: DISCONTINUED | OUTPATIENT
Start: 2025-05-13 | End: 2025-05-13 | Stop reason: HOSPADM

## 2025-05-12 RX ORDER — CEFAZOLIN 2 G/1
2 INJECTION, POWDER, FOR SOLUTION INTRAMUSCULAR; INTRAVENOUS
Status: COMPLETED | OUTPATIENT
Start: 2025-05-12 | End: 2025-05-13

## 2025-05-12 RX ORDER — ACETAMINOPHEN 500 MG
1000 TABLET ORAL
Status: COMPLETED | OUTPATIENT
Start: 2025-05-12 | End: 2025-05-12

## 2025-05-12 RX ORDER — EPHEDRINE SULFATE 50 MG/ML
INJECTION, SOLUTION INTRAVENOUS
Status: DISCONTINUED | OUTPATIENT
Start: 2025-05-12 | End: 2025-05-12

## 2025-05-12 RX ORDER — BUPROPION HYDROCHLORIDE 150 MG/1
300 TABLET ORAL NIGHTLY
Status: DISCONTINUED | OUTPATIENT
Start: 2025-05-12 | End: 2025-05-13 | Stop reason: HOSPADM

## 2025-05-12 RX ORDER — METHOCARBAMOL 750 MG/1
750 TABLET, FILM COATED ORAL 3 TIMES DAILY
Status: DISCONTINUED | OUTPATIENT
Start: 2025-05-12 | End: 2025-05-13 | Stop reason: HOSPADM

## 2025-05-12 RX ORDER — ACETAMINOPHEN 325 MG/1
650 TABLET ORAL EVERY 8 HOURS
Status: DISCONTINUED | OUTPATIENT
Start: 2025-05-12 | End: 2025-05-13 | Stop reason: HOSPADM

## 2025-05-12 RX ORDER — CEFAZOLIN 2 G/1
2 INJECTION, POWDER, FOR SOLUTION INTRAMUSCULAR; INTRAVENOUS ONCE
Status: DISCONTINUED | OUTPATIENT
Start: 2025-05-12 | End: 2025-05-13 | Stop reason: HOSPADM

## 2025-05-12 RX ORDER — INSULIN GLARGINE 100 [IU]/ML
36 INJECTION, SOLUTION SUBCUTANEOUS NIGHTLY
Status: DISCONTINUED | OUTPATIENT
Start: 2025-05-12 | End: 2025-05-13 | Stop reason: HOSPADM

## 2025-05-12 RX ORDER — METOCLOPRAMIDE HYDROCHLORIDE 5 MG/ML
10 INJECTION INTRAMUSCULAR; INTRAVENOUS EVERY 10 MIN PRN
Status: DISCONTINUED | OUTPATIENT
Start: 2025-05-12 | End: 2025-05-12 | Stop reason: HOSPADM

## 2025-05-12 RX ORDER — AMLODIPINE BESYLATE 5 MG/1
5 TABLET ORAL NIGHTLY
Status: DISCONTINUED | OUTPATIENT
Start: 2025-05-12 | End: 2025-05-13 | Stop reason: HOSPADM

## 2025-05-12 RX ORDER — LIDOCAINE HYDROCHLORIDE 10 MG/ML
INJECTION, SOLUTION INTRAVENOUS
Status: DISCONTINUED | OUTPATIENT
Start: 2025-05-12 | End: 2025-05-12

## 2025-05-12 RX ORDER — LIDOCAINE HYDROCHLORIDE 40 MG/ML
SOLUTION TOPICAL
Status: DISCONTINUED | OUTPATIENT
Start: 2025-05-12 | End: 2025-05-12

## 2025-05-12 RX ORDER — INSULIN ASPART 100 [IU]/ML
0-10 INJECTION, SOLUTION INTRAVENOUS; SUBCUTANEOUS
Status: DISCONTINUED | OUTPATIENT
Start: 2025-05-12 | End: 2025-05-13 | Stop reason: HOSPADM

## 2025-05-12 RX ORDER — POLYETHYLENE GLYCOL 3350 17 G/17G
17 POWDER, FOR SOLUTION ORAL DAILY
Status: DISCONTINUED | OUTPATIENT
Start: 2025-05-13 | End: 2025-05-13 | Stop reason: HOSPADM

## 2025-05-12 RX ORDER — GLUCAGON 1 MG
1 KIT INJECTION
Status: DISCONTINUED | OUTPATIENT
Start: 2025-05-12 | End: 2025-05-12 | Stop reason: HOSPADM

## 2025-05-12 RX ORDER — DEXMEDETOMIDINE HYDROCHLORIDE 100 UG/ML
INJECTION, SOLUTION INTRAVENOUS
Status: DISCONTINUED | OUTPATIENT
Start: 2025-05-12 | End: 2025-05-12

## 2025-05-12 RX ORDER — DIPHENHYDRAMINE HCL 25 MG
25 CAPSULE ORAL EVERY 6 HOURS PRN
Status: DISCONTINUED | OUTPATIENT
Start: 2025-05-12 | End: 2025-05-13 | Stop reason: HOSPADM

## 2025-05-12 RX ORDER — CELECOXIB 200 MG/1
200 CAPSULE ORAL DAILY
Status: DISCONTINUED | OUTPATIENT
Start: 2025-05-13 | End: 2025-05-13 | Stop reason: HOSPADM

## 2025-05-12 RX ORDER — ELECTROLYTES/DEXTROSE
400 SOLUTION, ORAL ORAL
Status: DISCONTINUED | OUTPATIENT
Start: 2025-05-12 | End: 2025-05-13 | Stop reason: HOSPADM

## 2025-05-12 RX ORDER — FAMOTIDINE 10 MG/ML
INJECTION, SOLUTION INTRAVENOUS
Status: DISCONTINUED | OUTPATIENT
Start: 2025-05-12 | End: 2025-05-12

## 2025-05-12 RX ORDER — HYDROCODONE BITARTRATE AND ACETAMINOPHEN 5; 325 MG/1; MG/1
1 TABLET ORAL EVERY 6 HOURS PRN
Refills: 0 | Status: DISCONTINUED | OUTPATIENT
Start: 2025-05-12 | End: 2025-05-13 | Stop reason: HOSPADM

## 2025-05-12 RX ORDER — GLUCAGON 1 MG
1 KIT INJECTION
Status: DISCONTINUED | OUTPATIENT
Start: 2025-05-12 | End: 2025-05-13 | Stop reason: HOSPADM

## 2025-05-12 RX ORDER — SUCCINYLCHOLINE CHLORIDE 20 MG/ML
INJECTION INTRAMUSCULAR; INTRAVENOUS
Status: DISCONTINUED | OUTPATIENT
Start: 2025-05-12 | End: 2025-05-12

## 2025-05-12 RX ORDER — BUSPIRONE HYDROCHLORIDE 5 MG/1
5 TABLET ORAL 2 TIMES DAILY
Status: DISCONTINUED | OUTPATIENT
Start: 2025-05-12 | End: 2025-05-13 | Stop reason: HOSPADM

## 2025-05-12 RX ORDER — CELECOXIB 200 MG/1
400 CAPSULE ORAL
Status: COMPLETED | OUTPATIENT
Start: 2025-05-12 | End: 2025-05-12

## 2025-05-12 RX ORDER — OXYCODONE HYDROCHLORIDE 5 MG/1
5 TABLET ORAL
Status: DISCONTINUED | OUTPATIENT
Start: 2025-05-12 | End: 2025-05-12 | Stop reason: HOSPADM

## 2025-05-12 RX ORDER — PHENYLEPHRINE HYDROCHLORIDE 10 MG/ML
INJECTION INTRAVENOUS
Status: DISCONTINUED | OUTPATIENT
Start: 2025-05-12 | End: 2025-05-12

## 2025-05-12 RX ORDER — ONDANSETRON HYDROCHLORIDE 2 MG/ML
4 INJECTION, SOLUTION INTRAVENOUS EVERY 8 HOURS PRN
Status: DISCONTINUED | OUTPATIENT
Start: 2025-05-12 | End: 2025-05-13 | Stop reason: HOSPADM

## 2025-05-12 RX ORDER — DULOXETIN HYDROCHLORIDE 30 MG/1
30 CAPSULE, DELAYED RELEASE ORAL DAILY
Status: DISCONTINUED | OUTPATIENT
Start: 2025-05-13 | End: 2025-05-13 | Stop reason: HOSPADM

## 2025-05-12 RX ORDER — OXYCODONE HYDROCHLORIDE 10 MG/1
10 TABLET ORAL EVERY 4 HOURS PRN
Status: DISCONTINUED | OUTPATIENT
Start: 2025-05-12 | End: 2025-05-13 | Stop reason: HOSPADM

## 2025-05-12 RX ORDER — FENTANYL CITRATE 50 UG/ML
INJECTION, SOLUTION INTRAMUSCULAR; INTRAVENOUS
Status: DISCONTINUED | OUTPATIENT
Start: 2025-05-12 | End: 2025-05-12

## 2025-05-12 RX ORDER — PROPOFOL 10 MG/ML
VIAL (ML) INTRAVENOUS CONTINUOUS PRN
Status: DISCONTINUED | OUTPATIENT
Start: 2025-05-12 | End: 2025-05-12

## 2025-05-12 RX ORDER — OXYCODONE HYDROCHLORIDE 5 MG/1
5 TABLET ORAL EVERY 4 HOURS PRN
Status: DISCONTINUED | OUTPATIENT
Start: 2025-05-12 | End: 2025-05-13 | Stop reason: HOSPADM

## 2025-05-12 RX ORDER — CEFAZOLIN SODIUM 1 G/3ML
INJECTION, POWDER, FOR SOLUTION INTRAMUSCULAR; INTRAVENOUS
Status: DISCONTINUED | OUTPATIENT
Start: 2025-05-12 | End: 2025-05-12

## 2025-05-12 RX ORDER — IBUPROFEN 200 MG
16 TABLET ORAL
Status: DISCONTINUED | OUTPATIENT
Start: 2025-05-12 | End: 2025-05-13 | Stop reason: HOSPADM

## 2025-05-12 RX ORDER — HALOPERIDOL LACTATE 5 MG/ML
0.5 INJECTION, SOLUTION INTRAMUSCULAR EVERY 10 MIN PRN
Status: DISCONTINUED | OUTPATIENT
Start: 2025-05-12 | End: 2025-05-12 | Stop reason: HOSPADM

## 2025-05-12 RX ORDER — HYDROMORPHONE HYDROCHLORIDE 1 MG/ML
0.2 INJECTION, SOLUTION INTRAMUSCULAR; INTRAVENOUS; SUBCUTANEOUS EVERY 5 MIN PRN
Status: DISCONTINUED | OUTPATIENT
Start: 2025-05-12 | End: 2025-05-12 | Stop reason: HOSPADM

## 2025-05-12 RX ORDER — PREGABALIN 75 MG/1
75 CAPSULE ORAL
Status: COMPLETED | OUTPATIENT
Start: 2025-05-12 | End: 2025-05-12

## 2025-05-12 RX ORDER — IBUPROFEN 200 MG
24 TABLET ORAL
Status: DISCONTINUED | OUTPATIENT
Start: 2025-05-12 | End: 2025-05-13 | Stop reason: HOSPADM

## 2025-05-12 RX ORDER — METHOCARBAMOL 500 MG/1
1000 TABLET, FILM COATED ORAL
Status: COMPLETED | OUTPATIENT
Start: 2025-05-12 | End: 2025-05-12

## 2025-05-12 RX ORDER — MIDAZOLAM HYDROCHLORIDE 1 MG/ML
INJECTION INTRAMUSCULAR; INTRAVENOUS
Status: DISCONTINUED | OUTPATIENT
Start: 2025-05-12 | End: 2025-05-12

## 2025-05-12 RX ORDER — DEXAMETHASONE SODIUM PHOSPHATE 4 MG/ML
INJECTION, SOLUTION INTRA-ARTICULAR; INTRALESIONAL; INTRAMUSCULAR; INTRAVENOUS; SOFT TISSUE
Status: DISCONTINUED | OUTPATIENT
Start: 2025-05-12 | End: 2025-05-12

## 2025-05-12 RX ORDER — PROPOFOL 10 MG/ML
VIAL (ML) INTRAVENOUS
Status: DISCONTINUED | OUTPATIENT
Start: 2025-05-12 | End: 2025-05-12

## 2025-05-12 RX ORDER — KETAMINE HCL IN 0.9 % NACL 50 MG/5 ML
SYRINGE (ML) INTRAVENOUS
Status: DISCONTINUED | OUTPATIENT
Start: 2025-05-12 | End: 2025-05-12

## 2025-05-12 RX ORDER — ONDANSETRON HYDROCHLORIDE 2 MG/ML
INJECTION, SOLUTION INTRAVENOUS
Status: DISCONTINUED | OUTPATIENT
Start: 2025-05-12 | End: 2025-05-12

## 2025-05-12 RX ADMIN — BUSPIRONE HYDROCHLORIDE 5 MG: 5 TABLET ORAL at 08:05

## 2025-05-12 RX ADMIN — PHENYLEPHRINE HYDROCHLORIDE 100 MCG: 10 INJECTION INTRAVENOUS at 12:05

## 2025-05-12 RX ADMIN — INSULIN ASPART 6 UNITS: 100 INJECTION, SOLUTION INTRAVENOUS; SUBCUTANEOUS at 06:05

## 2025-05-12 RX ADMIN — SODIUM CHLORIDE 0.05 MCG/KG/MIN: 9 INJECTION, SOLUTION INTRAVENOUS at 12:05

## 2025-05-12 RX ADMIN — HYDROCODONE BITARTRATE AND ACETAMINOPHEN 1 TABLET: 5; 325 TABLET ORAL at 02:05

## 2025-05-12 RX ADMIN — Medication 400 ML: at 07:05

## 2025-05-12 RX ADMIN — CELECOXIB 400 MG: 200 CAPSULE ORAL at 09:05

## 2025-05-12 RX ADMIN — LIDOCAINE HYDROCHLORIDE 120 MG: 40 SOLUTION ORAL at 12:05

## 2025-05-12 RX ADMIN — ONDANSETRON 4 MG: 2 INJECTION INTRAMUSCULAR; INTRAVENOUS at 12:05

## 2025-05-12 RX ADMIN — INSULIN ASPART 4 UNITS: 100 INJECTION, SOLUTION INTRAVENOUS; SUBCUTANEOUS at 10:05

## 2025-05-12 RX ADMIN — ACETAMINOPHEN 650 MG: 325 TABLET ORAL at 10:05

## 2025-05-12 RX ADMIN — PROPOFOL 140 MG: 10 INJECTION, EMULSION INTRAVENOUS at 12:05

## 2025-05-12 RX ADMIN — FAMOTIDINE 20 MG: 10 INJECTION, SOLUTION INTRAVENOUS at 12:05

## 2025-05-12 RX ADMIN — AMLODIPINE BESYLATE 5 MG: 5 TABLET ORAL at 08:05

## 2025-05-12 RX ADMIN — DEXAMETHASONE SODIUM PHOSPHATE 8 MG: 4 INJECTION, SOLUTION INTRAMUSCULAR; INTRAVENOUS at 12:05

## 2025-05-12 RX ADMIN — CEFAZOLIN 2 G: 2 INJECTION, POWDER, FOR SOLUTION INTRAMUSCULAR; INTRAVENOUS at 08:05

## 2025-05-12 RX ADMIN — SODIUM CHLORIDE: 9 INJECTION, SOLUTION INTRAVENOUS at 12:05

## 2025-05-12 RX ADMIN — OXYCODONE 5 MG: 5 TABLET ORAL at 07:05

## 2025-05-12 RX ADMIN — INSULIN GLARGINE 36 UNITS: 100 INJECTION, SOLUTION SUBCUTANEOUS at 11:05

## 2025-05-12 RX ADMIN — Medication 20 MG: at 12:05

## 2025-05-12 RX ADMIN — ACETAMINOPHEN 1000 MG: 500 TABLET ORAL at 09:05

## 2025-05-12 RX ADMIN — MIDAZOLAM HYDROCHLORIDE 2 MG: 1 INJECTION, SOLUTION INTRAMUSCULAR; INTRAVENOUS at 12:05

## 2025-05-12 RX ADMIN — DEXMEDETOMIDINE 20 MCG: 100 INJECTION, SOLUTION, CONCENTRATE INTRAVENOUS at 12:05

## 2025-05-12 RX ADMIN — EPHEDRINE SULFATE 10 MG: 50 INJECTION INTRAVENOUS at 12:05

## 2025-05-12 RX ADMIN — LIDOCAINE HYDROCHLORIDE 100 MG: 10 INJECTION, SOLUTION INTRAVENOUS at 12:05

## 2025-05-12 RX ADMIN — METHOCARBAMOL 1000 MG: 500 TABLET ORAL at 09:05

## 2025-05-12 RX ADMIN — METHOCARBAMOL 750 MG: 750 TABLET ORAL at 08:05

## 2025-05-12 RX ADMIN — SUCCINYLCHOLINE CHLORIDE 100 MG: 20 INJECTION, SOLUTION INTRAMUSCULAR; INTRAVENOUS at 12:05

## 2025-05-12 RX ADMIN — HYDROMORPHONE HYDROCHLORIDE 0.2 MG: 1 INJECTION, SOLUTION INTRAMUSCULAR; INTRAVENOUS; SUBCUTANEOUS at 03:05

## 2025-05-12 RX ADMIN — Medication 400 ML: at 11:05

## 2025-05-12 RX ADMIN — FENTANYL CITRATE 100 MCG: 50 INJECTION, SOLUTION INTRAMUSCULAR; INTRAVENOUS at 12:05

## 2025-05-12 RX ADMIN — BUPROPION HYDROCHLORIDE 300 MG: 150 TABLET, EXTENDED RELEASE ORAL at 08:05

## 2025-05-12 RX ADMIN — HYDROMORPHONE HYDROCHLORIDE 0.2 MG: 1 INJECTION, SOLUTION INTRAMUSCULAR; INTRAVENOUS; SUBCUTANEOUS at 02:05

## 2025-05-12 RX ADMIN — PREGABALIN 75 MG: 75 CAPSULE ORAL at 09:05

## 2025-05-12 RX ADMIN — PROPOFOL 150 MCG/KG/MIN: 10 INJECTION, EMULSION INTRAVENOUS at 12:05

## 2025-05-12 RX ADMIN — ONDANSETRON 4 MG: 2 INJECTION INTRAMUSCULAR; INTRAVENOUS at 05:05

## 2025-05-12 RX ADMIN — CEFAZOLIN 2 G: 330 INJECTION, POWDER, FOR SOLUTION INTRAMUSCULAR; INTRAVENOUS at 12:05

## 2025-05-12 NOTE — HPI
Riana Kay is a 70 y.o. female w/ a hx of R rTSA presents for initial evaluation of neck and left arm pain (Neck - 6, Arm - 6). The pain has been present for 3 years. The patient describes the pain as dull and it radiates down LUE to the hand. The pain is worse with activity and improved by rest. There is LUE associated numbness and tingling. There is LUE subjective weakness. Prior treatments have included OTC meds, PT and MIRTHA, but no surgery.      The Patient denies myelopathic symptoms such as handwriting changes or difficulty with buttons/coins/keys. Denies perineal paresthesias, bowel/bladder dysfunction.     The patient does not smoke or endorse IVDU. She has DM (HA1C of 6.4). The patient is not on any blood thinners and does not take chronic narcotics. 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

## 2025-05-12 NOTE — ANESTHESIA PREPROCEDURE EVALUATION
05/12/2025       Riana Kay is a 70 y.o., female for C5-6 ACDF      Past Medical History:   Diagnosis Date    Abdominal pain, right upper quadrant 02/04/2014    Anticoagulant long-term use     Anxiety     AR (allergic rhinitis)     Atrophic gastritis without mention of hemorrhage 02/13/2012     Dx updated per 2019 IMO Load    Chronic fatigue syndrome 06/10/2012    Diabetes mellitus     Dizziness     Fatty liver     GERD (gastroesophageal reflux disease)     Gross hematuria 12/01/2020    HTN (hypertension)     Hyperlipidemia     Leg swelling     Memory loss     Osteopenia     PONV (postoperative nausea and vomiting)     Primary osteoarthritis of right knee 10/06/2020    Primary osteoarthritis of right knee 10/06/2020    Right elbow pain 01/16/2019    S/P total hysterectomy     Screening for colon cancer 01/06/2021    Sleep apnea     Status post total right knee replacement 10/6/2020 10/05/2020       Past Surgical History:   Procedure Laterality Date    BREAST BIOPSY Left     benign excisional biopsy 1990    CHOLECYSTECTOMY      COLONOSCOPY N/A 01/17/2018    Procedure: COLONOSCOPY with Donnell;  Surgeon: Roland Jacobo MD;  Location: [address]);  Service: Endoscopy;  Laterality: N/A;    COLONOSCOPY N/A 01/06/2021    Procedure: COLONOSCOPY;  Surgeon: Yong Rowland MD;  Location: [address]);  Service: Endoscopy;  Laterality: N/A;  prep ins. emailed - [place] speaking,  needed - ERW  [facility] - ERW    COLONOSCOPY N/A 08/16/2024    Procedure: COLONOSCOPY;  Surgeon: Aamir Reyes MD;  Location: [facility];  Service: Endoscopy;  Laterality: N/A;  8/9/24-Suprep, holding Trulicity, precall complete-DS  8/14/24-LVM to see if pt can come earlier-DS    CYSTOSCOPY N/A 12/01/2020    Procedure: CYSTOSCOPY;  Surgeon: Ines Stanford MD;   Location: [facility] OR 1ST FLR;  Service: Urology;  Laterality: N/A;  45 minutes     ELBOW ARTHROPLASTY Right 01/16/2019    Procedure: ARTHROPLASTY, ELBOW right radial head arthroplasty revision;  Surgeon: Katja Hubbard MD;  Location: [facility] OR 1ST FLR;  Service: Orthopedics;  Laterality: Right;  Anesthesia: General and Regional. Stretcher, hand pan 1 and pan 2, CALL ACCUMED, CLAIRX  [employer]ergio [employer] notified 1-14 LO    ELBOW SURGERY Right 07/16/2015    ELBOW SURGERY      ESOPHAGOGASTRODUODENOSCOPY N/A 04/15/2019    Procedure: EGD (ESOPHAGOGASTRODUODENOSCOPY);  Surgeon: Buck Irwin MD;  Location: [facility] ENDO (4TH FLR);  Service: Endoscopy;  Laterality: N/A;  ray she    ESOPHAGOGASTRODUODENOSCOPY N/A 04/21/2023    Procedure: EGD (ESOPHAGOGASTRODUODENOSCOPY);  Surgeon: Aamir Reyes MD;  Location: [facility];  Service: Endoscopy;  Laterality: N/A;    FRACTURE SURGERY      HYSTERECTOMY      JOINT REPLACEMENT  October 6th2020    KNEE ARTHROPLASTY Right 10/06/2020    Procedure: ARTHROPLASTY, KNEE-SAME DAY PROTOCOL;  Surgeon: Sabino Damon MD;  Location: [facility];  Service: Orthopedics;  Laterality: Right;    KNEE ARTHROSCOPY W/ DEBRIDEMENT  04/2011    Left    RELEASE OF ULNAR NERVE AT CUBITAL TUNNEL Right 01/16/2019    Procedure: RELEASE, ULNAR TUNNEL right;  Surgeon: Katja Hubbard MD;  Location: [facility] OR 1ST FLR;  Service: Orthopedics;  Laterality: Right;  Anesthesia: General and Regional. Stretcher, hand pan 1 and pan 2, CALL ACCUMED, CLAIRX    RETROGRADE PYELOGRAPHY Bilateral 12/01/2020    Procedure: PYELOGRAM, RETROGRADE;  Surgeon: Ines Stanford MD;  Location: [facility] OR 1ST FLR;  Service: Urology;  Laterality: Bilateral;    REVERSE TOTAL SHOULDER ARTHROPLASTY Right 08/16/2022    Procedure: ARTHROPLASTY, SHOULDER, TOTAL, REVERSE, [place];  Surgeon: Aamir Powell MD;  Location: [facility] OR 2ND FLR;  Service: Orthopedics;  Laterality: Right;  [place] Can't go until after 12     ROTATOR CUFF REPAIR      SHOULDER SURGERY      TOTAL ABDOMINAL HYSTERECTOMY W/ BILATERAL SALPINGOOPHORECTOMY      TOTAL KNEE ARTHROPLASTY Left 10/19/2021    Procedure: ARTHROPLASTY, KNEE, TOTAL;  Surgeon: Sabino Damon MD;  Location: [facility];  Service: Orthopedics;  Laterality: Left;    UPPER GASTROINTESTINAL ENDOSCOPY         Family History   Problem Relation Name Age of Onset    Cancer Father Won Baker         colon    Colon cancer Father Won Baker 83        colon cancer    Diabetes Maternal Aunt      Diabetes Maternal Grandmother Family member     Esophageal cancer Neg Hx      Stomach cancer Neg Hx      Melanoma Neg Hx         Anesthesia Evaluation    I have reviewed the Patient Summary Reports.     I have reviewed the Nursing Notes.    I have reviewed the Medications.     Review of Systems  Anesthesia Hx:    PONV, unsure what medication helps History of prior surgery of interest to airway management or planning:  Previous anesthesia: General Cystoscopy 12/01/2020 with general anesthesia.  Procedure performed at an [facility].       Denies Family Hx of Anesthesia complications.   Personal Hx of Anesthesia complications, Post-Operative Nausea/Vomiting, with every anesthetic, treatment not known                    Social:  Non-Smoker, No Alcohol Use       Hematology/Oncology:  Hematology Normal   Oncology Normal                                   EENT/Dental:  EENT/Dental Normal           Cardiovascular:  Exercise tolerance: good   Hypertension, well controlled   Denies MI. CAD       Denies Angina.                                    Pulmonary:    Denies COPD.  Denies Asthma.   Denies Shortness of breath.  Sleep Apnea                Renal/:  Renal/ Normal                 Hepatic/GI:  Bowel Prep.   GERD     Fatty liver             Musculoskeletal:  Arthritis          Spine Disorders: cervical            Neurological:  Neurology Normal                                      Endocrine:  Diabetes,  type 2, using insulin           Dermatological:  Skin Normal    Psych:  Psychiatric Normal                    Physical Exam  General:  Well nourished       Airway/Jaw/Neck:  Airway Findings: Mouth Opening: Normal     Tongue: Normal           Mallampati: II               Eyes/Ears/Nose:  EYES/EARS/NOSE FINDINGS: Normal    Dental:  Dental Findings: In tact      Chest/Lungs:  Chest/Lungs Findings:  Normal Respiratory Rate           Mental Status:  Mental Status Findings: Normal         Anesthesia Plan  Type of Anesthesia, risks & benefits discussed:  Anesthesia Type:  general    Patient's Preference: General  Plan Factors:          Intra-op Monitoring Plan: standard ASA monitors  Intra-op Monitoring Plan Comments:   Post Op Pain Control Plan: multimodal analgesia and IV/PO Opioids PRN  Post Op Pain Control Plan Comments:     Induction:   IV  Beta Blocker:  Patient is not currently on a Beta-Blocker (No further documentation required).       Informed Consent: Informed consent signed with the Patient and all parties understand the risks and agree with anesthesia plan.  All questions answered.  Anesthesia consent signed with patient.  ASA Score: 2     Day of Surgery Review of History & Physical:              Ready For Surgery From Anesthesia Perspective.             Physical Exam  General: Well nourished    Airway:  Mallampati: II   Mouth Opening: Normal  Tongue: Normal    Dental:  In tact    Chest/Lungs:  Normal Respiratory Rate          Anesthesia Plan  Type of Anesthesia, risks & benefits discussed:    Anesthesia Type: general  Intra-op Monitoring Plan: standard ASA monitors  Post Op Pain Control Plan: multimodal analgesia and IV/PO Opioids PRN  Induction:  IV  Informed Consent: Informed consent signed with the Patient and all parties understand the risks and agree with anesthesia plan.  All questions answered.   ASA Score: 2    Ready For Surgery From Anesthesia Perspective.       .

## 2025-05-12 NOTE — DISCHARGE INSTRUCTIONS
DR. ADDISON FISCHER'S POSTOPERATIVE INSTRUCTIONS -   ANTERIOR CERVICAL DISKECTOMY AND FUSION       Antibiotics: You do not need additional antibiotics at home.    NSAIDs: Please refrain from taking ibuprofen (Advil), naproxen (Aleve), and other non steroidal anti-inflammatory medications other than the Celebrex that will be prescribed to you after surgery.    Wound Care: You may remove your dressing and shower 5 days after surgery. Until then please keep your wound clean and dry. Sponge baths are acceptable. Do not go in a pool or hot tub until seen in clinic. Please leave the small steri-strips covering your wound in place until they fall off naturally (2 weeks). You may notice clear suture ends hanging from the sides of your incision after the steri-strips are removed, it is ok to clip these with scissors.    Cervical Collar: If given a collar after surgery you should wear it at all times. The only times it may be removed are for eating and showering.    Pain: We will use a multimodal approach for pain management after your surgery.  You will be given a prescription for pain medicine when you are discharged from the hospital.  You will also be given prescriptions for Robaxin (a muscle relaxer), Gabapentin, Celebrex and Tylenol.  Please note: you will only be given ONE prescription for narcotics when you are discharged from the hospital.  This medication is for breakthrough pain only. This medication will not be refilled.  The other medications given to you may be refilled if needed.      Difficulty Swallowing: This is very common in the postoperative period. You may find it easier to eat a pureed diet for the first 1-2 weeks after surgery. Ice chips and menthol cough drops may also be soothing.    Difficulty Breathing: This is uncommon after surgery and may represent dangerous swelling in your neck. If you experience difficulty breathing please call 911 immediately.    Infection: Signs of infection include increasing  wound drainage and redness around the wound, as well as a temperature over 101.5 degrees. It is unnecessary to take your temperature on a routine basis. Please call the above number if you are concerned about an infection.    Driving and Work: It is ok to return to driving and work as long as you are not taking narcotic pain medications or wearing your cervical collar. Please do not lift over 5 pounds or participate in exercise or sports until cleared by Dr. Donahue.    Deep Venous Thrombosis (Blood Clots): Symptoms include swelling in the legs and shortness of breath. Please call the office or proceed to the nearest emergency room if you have any of these symptoms.    Physical Therapy: The best physical therapy immediately after surgery is walking. Please try to walk as much as possible.    Follow-up: You will be scheduled for a follow-up appointment in 4 weeks with either Dr. Donahue or his physician assistant, Anjali Dan PA-C.    Questions: During business hours please call [phone] for routine questions. For after hours questions please call [phone] and ask to speak with the Orthopaedic resident on call.    Disability: If you submitted short term disability paperwork for us to complete and would like to check the status, please call the Disability Department at [phone].  You may fax any necessary paperwork to [phone].

## 2025-05-12 NOTE — ANESTHESIA PROCEDURE NOTES
Intubation    Date/Time: 5/12/2025 12:24 PM    Performed by: Perla Perez CRNA  Authorized by: Jason Shannon MD    Intubation:     Induction:  Intravenous    Intubated:  Postinduction    Mask Ventilation:  Easy mask    Attempts:  1    Attempted By:  CRNA    Method of Intubation:  Video laryngoscopy    Blade:  Verde 3    Laryngeal View Grade: Grade I - full view of cords      Difficult Airway Encountered?: No      Complications:  None    Airway Device:  EMG ETT (NIMS)    Airway Device Size:  7.0    Style/Cuff Inflation:  Cuffed    Inflation Amount (mL):  5    Tube secured:  21    Secured at:  The lips    Placement Verified By:  Capnometry    Complicating Factors:  None    Findings Post-Intubation:  BS equal bilateral

## 2025-05-12 NOTE — SUBJECTIVE & OBJECTIVE
Principal Problem:Cervical radiculopathy    Principal Orthopedic Problem: s/p C 5-6 ACDF    Interval History: Overnight events: No issues. Pain well controlled. Tolerating PO intake. Voiding independently. No other complaints.     Vitals: VSS on RA.     PT/OT update: pending PT/OT eval today     Drain output: 10cc overnight. Drain pulled.           Review of patient's allergies indicates:   Allergen Reactions    Iodinated contrast media Swelling and Rash    Percocet [oxycodone-acetaminophen] Itching    Macrobid [nitrofurantoin monohyd/m-cryst] Rash    Metformin Rash    Penicillins Rash     Had ancef in 2020 with no adverse rxn     Promethazine Rash     Had compazine in 2021    Sulfa (sulfonamide antibiotics) Rash    Sulfamethoxazole-trimethoprim Rash       Current Facility-Administered Medications   Medication    ceFAZolin 2 g    dextrose 50% injection 12.5 g    dextrose 50% injection 12.5 g    dextrose 50% injection 25 g    gelatin adsorbable 100cm top sponge 100 sponge    glucagon (human recombinant) injection 1 mg    glucagon (human recombinant) injection 1 mg    glucose chewable tablet 16 g    glucose chewable tablet 24 g    haloperidol lactate injection 0.5 mg    HYDROmorphone injection 0.2 mg    insulin aspart U-100 pen 0-10 Units    metoclopramide injection 10 mg    oxyCODONE immediate release tablet 5 mg    thrombin (bovine) solution     Facility-Administered Medications Ordered in Other Encounters   Medication    ceFAZolin injection    dexAMETHasone injection    dexmedeTOMIDine injection    ePHEDrine sulfate    famotidine (PF) injection    fentaNYL injection    ketamine in 0.9 % sod chloride 100 mg/10 mL (10 mg/mL) injection    LIDOcaine (cardiac) PF 50 mg/5 ml (1%) injection    LIDOcaine HCL 4% external solution    midazolam injection    ondansetron injection    PHENYLephrine injection    propofol (DIPRIVAN) 10 mg/mL infusion    propofol (DIPRIVAN) 10 mg/mL infusion    remifentaniL (ULTIVA) 2 mg in 0.9%  "NaCl 40 mL infusion    sodium chloride 0.9% infusion    succinylcholine injection     Objective:     Vital Signs (Most Recent):  Temp: 98.1 °F (36.7 °C) (05/12/25 0932)  Pulse: 71 (05/12/25 1030)  Resp: 16 (05/12/25 1030)  BP: (!) 155/69 (05/12/25 0932)  SpO2: 99 % (05/12/25 1030) Vital Signs (24h Range):  Temp:  [98.1 °F (36.7 °C)] 98.1 °F (36.7 °C)  Pulse:  [71-72] 71  Resp:  [16] 16  SpO2:  [99 %] 99 %  BP: (155)/(69) 155/69     Weight: 73 kg (161 lb)  Height: 5' 5" (165.1 cm)  Body mass index is 26.79 kg/m².      Intake/Output Summary (Last 24 hours) at 5/12/2025 1400  Last data filed at 5/12/2025 1312  Gross per 24 hour   Intake 1000 ml   Output --   Net 1000 ml        Ortho/SPM Exam  Awake/alert/oriented x3, No acute distress, Afebrile, Vital signs stable  Normocephalic, Atraumatic  Good inspiratory effort with unlaboured breathing      Motor            RIGHT  LEFT  Deltoid(C5)        5/5    5/5  Biceps(C5)         5/5    5/5  Brachioradialis(C6)       5/5    5/5   Extensor Carpi Radialis Longus(C6)    5/5    5/5   Triceps(C7)       5/5    5/5      Hip Flexion (L3)                                     5/5                  5/5  Knee Extension (L4)                              5/5                  5/5  Tib Ant (L5)                                            5/5                  5/5       EHL (L5)                                                 5/5                  5/5  Gastrocs (S1)                                         5/5                  5/5  Peroneals (S1)                                       5/5                  5/5       FHL (S2)                                                5/5                  5/5         Significant Labs: All pertinent labs within the past 24 hours have been reviewed.    Significant Imaging: I have reviewed and interpreted all pertinent imaging results/findings.  "

## 2025-05-12 NOTE — ANESTHESIA POSTPROCEDURE EVALUATION
Anesthesia Post Evaluation    Patient: Riana Kay    Procedure(s) Performed: Procedure(s) (LRB):  DISCECTOMY, SPINE, CERVICAL, ANTERIOR APPROACH, WITH FUSION C5-6 SPINEWAVE SNS: VOCAL CORDS/MOTORS/SSEP (N/A)    Final Anesthesia Type: general      Patient location during evaluation: PACU  Patient participation: Yes- Able to Participate  Level of consciousness: awake and alert  Post-procedure vital signs: reviewed and stable  Pain management: adequate  Airway patency: patent  ZAHIRA mitigation strategies: Multimodal analgesia  PONV status at discharge: No PONV  Anesthetic complications: no      Cardiovascular status: blood pressure returned to baseline and hemodynamically stable  Respiratory status: unassisted  Hydration status: euvolemic  Follow-up not needed.              Vitals Value Taken Time   /61 05/12/25 16:16   Temp 36.4 °C (97.6 °F) 05/12/25 16:00   Pulse 76 05/12/25 16:18   Resp 16 05/12/25 16:00   SpO2 94 % 05/12/25 16:18   Vitals shown include unfiled device data.      Event Time   Out of Recovery 16:08:00         Pain/Jeffery Score: Pain Rating Prior to Med Admin: 10 (5/12/2025  3:16 PM)  Jeffery Score: 9 (5/12/2025  3:30 PM)

## 2025-05-12 NOTE — PLAN OF CARE
Care plan reviewed w/ pt and family at bedside. AVSS on 2L NC. R neck dressing CDI, VINCENT drain w/ scant bloody output. C- collar not needed per MD. NV checks WDL. Pain controlled w/ PRN medications. Report given to Buffy in recovery Suites. Pt transferred to room 315.

## 2025-05-12 NOTE — OP NOTE
DATE OF PROCEDURE: 5/12/2025.     SURGEON: Jalil Donahue M.D.     FIRST ASSISTANT: Taylor Hart MD, PGY-3 Orthopedics     PREOPERATIVE DIAGNOSIS: Cervical stenosis C5-6 with radiculopathy.     POSTOPERATIVE DIAGNOSIS: Same     PROCEDURES PERFORMED:  1. Anterior cervical diskectomy and instrumented spinal fusion, including decompression of neurological elements, C5-6.  2. Application of carbon fiber reinforced polyetheretherketone titanium intervertebral spacer to C5-6 disk space.  3. Anterior instrumentation, C5-6.  4. Cadaveric and local bone grafting.     ANESTHESIA: General endotracheal anesthesia.     ESTIMATED BLOOD LOSS: 5 mL.     IMPLANTS: SpineWave  C5-6: 7mm height PEEK cage  2 level plate  14-16mm screws    SPECIMENS: None.     FINDINGS: severe C5-6 stenosis.     DRAINS: One deep.     COMPLICATIONS: None.     SPONGE AND NEEDLE COUNT: Correct x2.     NEUROLOGICAL MONITORING: Motor evoked potentials, somatosensory evoked potential, free-running EMG, and vocal fold monitoring.  There were no changes to preincisional MEP and SSEP baselines.  There was no significant EMG activity.     REASON FOR OPERATION AND BRIEF HISTORY AND PHYSICAL: Riana Kay is a 70 y.o. female with cervical stenosis C5-6 with LUE radiculopathy. They have failed conservative management and are here for surgery today.      DESCRIPTION OF INFORMED CONSENT: Please see my last clinic note for a full description of the informed consent process that I had with the patient.     DESCRIPTION OF PROCEDURE: The patient was met in the preoperative area where their right-sided neck was marked as the operative site. Subsequently, they were brought to the Operating Room where they were placed under general endotracheal anesthesia. Sequential compression devices were placed in the patient's bilateral calves and run throughout the case. A Gurrola catheter was not placed. At this point, a shoulder roll was placed and the patient's neck was gently  hyperextended. The neck was stacked in multiple stack sheets as well as a gel roll. At this point, the patient's anterior cervical spine was prepped and draped in a normal sterile fashion.     A full timeout was then called, identifying the patient, the procedural site and levels, the availability of all instruments and implants, and no specific nursing, surgical, anesthetic, or neurological monitoring concerns.  All present were empowered to identify any concerns at any time. Finally, it was safe to proceed with surgery and the patient was given weight-appropriate dose of Ancef by the Anesthesia Service as well as 8 mg of Decadron.     I then made a transverse incision centered over the patient's anterior cervical spine and carried dissection down through skin and subcutaneous tissues using a combination of sharp dissection and electrocautery where necessary. The platysma was identified and transected in line with the skin incision and subplatysmal flaps were elevated. At this point, I performed a standard [place] approach tothe anterior cervical spine. Any large bridging veins or arteries were tied and ligated.  Small veins were coagulated with bipolar electrocautery. At this point, I  visualized the anterior cervical spine. Peanut dissection allowed me to visualize the vertebral body. I placed a needle in what I took to be the C5-6 disc space and took a lateral radiograph and thus confirmed my level. I then finalized my exposure. At the conclusion of my exposure, I could see from the inferior half of the C5 vertebrae to the superior half of the C6 vertebra  and the disk space from uncinate process to uncinate process.     I then performed a subtotal C5-6 diskectomy under loupe magnification using a disk knife as well as straight and angled curettes, Kerrison punches, and pituitary rongeurs.     Under microscopic visualization, I drilled down the posterior aspect of the disk space with a high-speed drill to  reveal the posterior longitudinal ligament. I used a 4-0 forward-angle curette to enter the median raphe of the ligament, followed by #1 and #2 Kerrison punches to resect the posterior longitudinal ligament. At the end of my neurological decompression, I could see dura from uncinate process to uncinate process. The spinal cord and nerves were decompressed. Epidural hemostasis was finalized with patties and FloSeal. The wound was then thoroughly irrigated. The disk space was sized for a size 7mm height spacer and this was filled with 1 mL of DBX putty as well as locally harvested bone and gently impacted into place. An anterior plate was then applied in the standard fashion, spanning C5-6.     AP and lateral radiographs were taken, demonstrating correct level as well as adequate positionof all implants. The wound was then thoroughly irrigated. The plate screw capture mechanism was then locked with the torque wrench. A deep 10-Swedish VINCENT drain was placed. The platysma was closed with 3-0 Vicryl, followed by 4-0 Monocryl for the skin. A 2-0 silk suture was used to secure down the drain. A soft dressing was placed. The patient was subsequently transferred to his hospital bed, awoken, and transferred to the Recovery Room in stable condition.    Jalil Donahue MD  Orthopaedic Spine Surgeon  Department of Orthopaedic Surgery  [phone]

## 2025-05-12 NOTE — TRANSFER OF CARE
"Anesthesia Transfer of Care Note    Patient: Riana Kay    Procedure(s) Performed: Procedure(s) (LRB):  DISCECTOMY, SPINE, CERVICAL, ANTERIOR APPROACH, WITH FUSION C5-6 SPINEWAVE SNS: VOCAL CORDS/MOTORS/SSEP (N/A)    Patient location: [facility]    Anesthesia Type: general    Transport from OR: Transported from OR on room air with adequate spontaneous ventilation. Transported from OR on 6-10 L/min O2 by face mask with adequate spontaneous ventilation    Post pain: adequate analgesia    Post assessment: no apparent anesthetic complications and tolerated procedure well    Post vital signs: stable    Level of consciousness: awake    Nausea/Vomiting: no nausea/vomiting    Complications: none    Transfer of care protocol was followed      Last vitals: Visit Vitals  BP (!) 155/69 (BP Location: Left arm, Patient Position: Lying)   Pulse 71   Temp 36.7 °C (98.1 °F) (Temporal)   Resp 16   Ht 5' 5" (1.651 m)   Wt 73 kg (161 lb)   SpO2 99%   Breastfeeding No   BMI 26.79 kg/m²     "

## 2025-05-12 NOTE — PLAN OF CARE
Pre-op complete. Waiting on surgery consent, site samina, H&P update, and anesthesia consent. Family at bedside. Bed locked in lowest position with call bell within reach.    22

## 2025-05-12 NOTE — ASSESSMENT & PLAN NOTE
Riana Kay is a 70 y.o. female s/p C5-6 ACDF on 5/12    Pain control: multimodal regimen  PT/OT: WBAT with spine precautions   DVT PPx:  scd's only   Abx:  24hr post op ancef   Drain:  output 10cc, drain pulled   F/u: 4wk fu    Dispo: PT/OT then home today after C spine XR

## 2025-05-12 NOTE — HOSPITAL COURSE
The patient arrived to the [facility] for pre-operative management.  Upon completion of the pre-operative preparation, the patient was taken back to the operative theatre. The above procedure was performed without complication and the patient was transported to the post anesthesia care unit in stable condition.  After appropriate recovery from the anaesthetic agents used during the surgery, the patient was then transported to the inpatient floor. The patient has tolerated regular diet.  The patient's pain has been controlled using a multimodal approach. Currently, the patient's pain is well controlled on an oral regimen.  The patient has been voiding without difficulty.  The patient began participation in physical therapy after surgery and has progressed throughout the entire hospital stay. Once a downward trend was seen the output the drain was removed and dressed with a sterile dressing. Post-operative X-rays show stable hardware. Currently, the patient's progress is sufficient to allow the them to be discharged safely.  The patient agrees with this assessment and desires a discharge today.

## 2025-05-13 VITALS
HEART RATE: 85 BPM | BODY MASS INDEX: 26.82 KG/M2 | OXYGEN SATURATION: 98 % | TEMPERATURE: 99 F | WEIGHT: 161 LBS | DIASTOLIC BLOOD PRESSURE: 69 MMHG | RESPIRATION RATE: 18 BRPM | SYSTOLIC BLOOD PRESSURE: 151 MMHG | HEIGHT: 65 IN

## 2025-05-13 DIAGNOSIS — Z98.890 S/P SPINAL SURGERY: Primary | ICD-10-CM

## 2025-05-13 LAB
BUN SERPL-MCNC: 13 MG/DL (ref 6–30)
CHLORIDE SERPL-SCNC: 99 MMOL/L (ref 95–110)
CREAT SERPL-MCNC: 0.7 MG/DL (ref 0.5–1.4)
GLUCOSE SERPL-MCNC: 265 MG/DL (ref 70–110)
HCT VFR BLD CALC: 35 %PCV (ref 36–54)
POC IONIZED CALCIUM: 1.13 MMOL/L (ref 1.06–1.42)
POC TCO2 (MEASURED): 24 MMOL/L (ref 23–29)
POCT GLUCOSE: 272 MG/DL (ref 70–110)
POTASSIUM BLD-SCNC: 4.3 MMOL/L (ref 3.5–5.1)
SAMPLE: ABNORMAL
SODIUM BLD-SCNC: 136 MMOL/L (ref 136–145)

## 2025-05-13 PROCEDURE — 25000003 PHARM REV CODE 250: Performed by: STUDENT IN AN ORGANIZED HEALTH CARE EDUCATION/TRAINING PROGRAM

## 2025-05-13 PROCEDURE — 84132 ASSAY OF SERUM POTASSIUM: CPT

## 2025-05-13 PROCEDURE — 97165 OT EVAL LOW COMPLEX 30 MIN: CPT

## 2025-05-13 PROCEDURE — 94761 N-INVAS EAR/PLS OXIMETRY MLT: CPT

## 2025-05-13 PROCEDURE — 99900035 HC TECH TIME PER 15 MIN (STAT)

## 2025-05-13 PROCEDURE — 97161 PT EVAL LOW COMPLEX 20 MIN: CPT

## 2025-05-13 PROCEDURE — 82330 ASSAY OF CALCIUM: CPT

## 2025-05-13 PROCEDURE — 63600175 PHARM REV CODE 636 W HCPCS: Performed by: STUDENT IN AN ORGANIZED HEALTH CARE EDUCATION/TRAINING PROGRAM

## 2025-05-13 PROCEDURE — 84295 ASSAY OF SERUM SODIUM: CPT

## 2025-05-13 PROCEDURE — 97535 SELF CARE MNGMENT TRAINING: CPT

## 2025-05-13 PROCEDURE — 85014 HEMATOCRIT: CPT

## 2025-05-13 PROCEDURE — 82565 ASSAY OF CREATININE: CPT

## 2025-05-13 RX ADMIN — METHOCARBAMOL 750 MG: 750 TABLET ORAL at 08:05

## 2025-05-13 RX ADMIN — DULOXETINE HYDROCHLORIDE 30 MG: 30 CAPSULE, DELAYED RELEASE ORAL at 08:05

## 2025-05-13 RX ADMIN — INSULIN ASPART 6 UNITS: 100 INJECTION, SOLUTION INTRAVENOUS; SUBCUTANEOUS at 06:05

## 2025-05-13 RX ADMIN — POLYETHYLENE GLYCOL 3350 17 G: 17 POWDER, FOR SOLUTION ORAL at 08:05

## 2025-05-13 RX ADMIN — LOSARTAN POTASSIUM 50 MG: 25 TABLET, FILM COATED ORAL at 08:05

## 2025-05-13 RX ADMIN — CEFAZOLIN 2 G: 2 INJECTION, POWDER, FOR SOLUTION INTRAMUSCULAR; INTRAVENOUS at 04:05

## 2025-05-13 RX ADMIN — BUSPIRONE HYDROCHLORIDE 5 MG: 5 TABLET ORAL at 08:05

## 2025-05-13 RX ADMIN — CELECOXIB 200 MG: 200 CAPSULE ORAL at 08:05

## 2025-05-13 RX ADMIN — ACETAMINOPHEN 650 MG: 325 TABLET ORAL at 05:05

## 2025-05-13 RX ADMIN — ONDANSETRON 4 MG: 2 INJECTION INTRAMUSCULAR; INTRAVENOUS at 04:05

## 2025-05-13 NOTE — PLAN OF CARE
Pt discharged from unit.  Pt aaox4, vss, no s/s of distress noted.  Dressing to anterior neck remains cdi. IV removed w/ catheter intact, no redness or swelling noted to area.  Discharge instructions given to pt and placed in d/c folder, pt verbalized understanding.  Home meds and f/u appts reviewed as well. Eqmt and meds delivered to bs prior to discharge.  Pt left unit via w/c and was accompanied to front of hospital to meet ride. All personal belongings sent with pt.

## 2025-05-13 NOTE — NURSING
Report received from DEANDRE Armenta. Care assumed. Patient arrived to unit AAOx4 in hospital bed from PACU. VSS, IV S/L. Dressing to anterior neck, CDI.  Pt lying supine in bed, c/o 4/10 pain, PRN meds given in recovery. Pt questions answered and denies any other concerns at this time. See assessment. Patient oriented to room. Bed in lowest position, side rails up x2, bed wheels locked and call light within reach.  Pt instructed to call for assistance, verbalized understanding. NADN. Will continue to observe and report any changes.     
No cyanosis, no pallor, no jaundice, no rash

## 2025-05-13 NOTE — PROGRESS NOTES
[facility])  Orthopedics  Progress Note    Patient Name: Riana Kay  MRN: [id number]  Admission Date: 5/12/2025  Hospital Length of Stay: 0 days  Attending Provider: Jalil Donahue MD  Primary Care Provider: Jose Rojas MD  Follow-up For: Procedure(s) (LRB):  DISCECTOMY, SPINE, CERVICAL, ANTERIOR APPROACH, WITH FUSION C5-6 SPINEWAVE SNS: VOCAL CORDS/MOTORS/SSEP (N/A)    Post-Operative Day: 1 Day Post-Op  Subjective:     Principal Problem:Cervical radiculopathy    Principal Orthopedic Problem: s/p C 5-6 ACDF    Interval History: Overnight events: No issues. Pain well controlled. Tolerating PO intake. Voiding independently. No other complaints.     Vitals: VSS on RA.     PT/OT update: pending PT/OT eval today     Drain output: 10cc overnight. Drain pulled.           Review of patient's allergies indicates:   Allergen Reactions    Iodinated contrast media Swelling and Rash    Percocet [oxycodone-acetaminophen] Itching    Macrobid [nitrofurantoin monohyd/m-cryst] Rash    Metformin Rash    Penicillins Rash     Had ancef in 2020 with no adverse rxn     Promethazine Rash     Had compazine in 2021    Sulfa (sulfonamide antibiotics) Rash    Sulfamethoxazole-trimethoprim Rash       Current Facility-Administered Medications   Medication    ceFAZolin 2 g    dextrose 50% injection 12.5 g    dextrose 50% injection 12.5 g    dextrose 50% injection 25 g    gelatin adsorbable 100cm top sponge 100 sponge    glucagon (human recombinant) injection 1 mg    glucagon (human recombinant) injection 1 mg    glucose chewable tablet 16 g    glucose chewable tablet 24 g    haloperidol lactate injection 0.5 mg    HYDROmorphone injection 0.2 mg    insulin aspart U-100 pen 0-10 Units    metoclopramide injection 10 mg    oxyCODONE immediate release tablet 5 mg    thrombin (bovine) solution     Facility-Administered Medications Ordered in Other Encounters   Medication    ceFAZolin injection    dexAMETHasone  "injection    dexmedeTOMIDine injection    ePHEDrine sulfate    famotidine (PF) injection    fentaNYL injection    ketamine in 0.9 % sod chloride 100 mg/10 mL (10 mg/mL) injection    LIDOcaine (cardiac) PF 50 mg/5 ml (1%) injection    LIDOcaine HCL 4% external solution    midazolam injection    ondansetron injection    PHENYLephrine injection    propofol (DIPRIVAN) 10 mg/mL infusion    propofol (DIPRIVAN) 10 mg/mL infusion    remifentaniL (ULTIVA) 2 mg in 0.9% NaCl 40 mL infusion    sodium chloride 0.9% infusion    succinylcholine injection     Objective:     Vital Signs (Most Recent):  Temp: 98.1 °F (36.7 °C) (05/12/25 0932)  Pulse: 71 (05/12/25 1030)  Resp: 16 (05/12/25 1030)  BP: (!) 155/69 (05/12/25 0932)  SpO2: 99 % (05/12/25 1030) Vital Signs (24h Range):  Temp:  [98.1 °F (36.7 °C)] 98.1 °F (36.7 °C)  Pulse:  [71-72] 71  Resp:  [16] 16  SpO2:  [99 %] 99 %  BP: (155)/(69) 155/69     Weight: 73 kg (161 lb)  Height: 5' 5" (165.1 cm)  Body mass index is 26.79 kg/m².      Intake/Output Summary (Last 24 hours) at 5/12/2025 1400  Last data filed at 5/12/2025 1312  Gross per 24 hour   Intake 1000 ml   Output --   Net 1000 ml        Ortho/SPM Exam  Awake/alert/oriented x3, No acute distress, Afebrile, Vital signs stable  Normocephalic, Atraumatic  Good inspiratory effort with unlaboured breathing      Motor            RIGHT  LEFT  Deltoid(C5)        5/5    5/5  Biceps(C5)         5/5    5/5  Brachioradialis(C6)       5/5    5/5   Extensor Carpi Radialis Longus(C6)    5/5    5/5   Triceps(C7)       5/5    5/5      Hip Flexion (L3)                                     5/5                  5/5  Knee Extension (L4)                              5/5                  5/5  Tib Ant (L5)                                            5/5                  5/5       EHL (L5)                                                 5/5                  5/5  Gastrocs (S1)                                         5/5                  5/5  Peroneals " (S1)                                       5/5 5/5       FHL (S2)                                                5/5 5/5         Significant Labs: All pertinent labs within the past 24 hours have been reviewed.    Significant Imaging: I have reviewed and interpreted all pertinent imaging results/findings.  Assessment/Plan:     * Cervical radiculopathy  Riana Kay is a 70 y.o. female s/p C5-6 ACDF on 5/12    Pain control: multimodal regimen  PT/OT: WBAT with spine precautions   DVT PPx:  scd's only   Abx:  24hr post op ancef   Drain:  output 10cc, drain pulled   F/u: 4wk fu    Dispo: PT/OT then home today after C spine XR            Taylor Hart MD  Orthopedics  [facility] ([facility])

## 2025-05-13 NOTE — PT/OT/SLP EVAL
"Occupational Therapy   Evaluation and Discharge Note    Name: Riana Kay  MRN: [id number]  Admitting Diagnosis: Cervical radiculopathy  Recent Surgery: Procedure(s) (LRB):  DISCECTOMY, SPINE, CERVICAL, ANTERIOR APPROACH, WITH FUSION C5-6 SPINEWAVE SNS: VOCAL CORDS/MOTORS/SSEP (N/A) 1 Day Post-Op    Recommendations:     Discharge Recommendations: No Therapy Indicated  Discharge Equipment Recommendations: none  Barriers to discharge:  None    Assessment:     Riana Kay is a 70 y.o. female with a medical diagnosis of Cervical radiculopathy. Pt was willing to participate, tolerated session well overall. She was able to ambulate to bathroom to perform toilet t/f and hygiene tasks before finishing session in chair to perform dressing tasks. She demonstrated good activity tolerance and dynamic balance during ADLs this date. At this time, patient is functioning at their prior level of function and does not require further acute OT services.     Pt will have support from  and son upon d/c.    Plan:     During this hospitalization, patient does not require further acute OT services.  Please re-consult if situation changes.    Plan of Care Reviewed with: patient, son    Subjective     Chief Complaint: neck pain  Patient/Family Comments/goals: "I sometimes use a walker or cane at home."    Occupational Profile:  Living Environment: SSH, threshold entrance, standard toilet, t/s, lives c/   Previous level of function: indep  Roles and Routines: mother, wife  Equipment Used at home: cane, straight, bedside commode, walker, rolling  Assistance upon Discharge:  and son    Pain/Comfort:  Pain Rating 1: 5/10  Location - Orientation 1: generalized  Location 1: neck  Pain Addressed 1: Reposition, Distraction  Pain Rating Post-Intervention 1:  (not rated)    Patients cultural, spiritual, Voodoo conflicts given the current situation: no    Objective:     Communicated with: RN prior to session.  " Patient found supine with  (no active lines) upon OT entry to room.    General Precautions: Standard, fall  Orthopedic Precautions: spinal precautions  Braces: Cervical collar  Respiratory Status: Room air     Occupational Performance:    Bed Mobility:    Patient completed Rolling/Turning to Left with  modified independence  Patient completed Supine to Sit with modified independence    Functional Mobility/Transfers:  Patient completed Sit <> Stand Transfer with independence  with  no assistive device   Patient completed Toilet Transfer Step Transfer technique with independence with  no AD  Chair t/f: indep no AD  Functional Mobility: from bed to bathroom to chair; indep no AD, no LOB    Activities of Daily Living:  Grooming: independence retrieved items needed; brushed teeth, washed face standing at sink  Upper Body Dressing: independence retrieved and donned shirt  Lower Body Dressing: independence retrieved and donned underwear and shorts  Toileting: pt not needing to void at this time      Cognitive/Visual Perceptual:  Cognitive/Psychosocial Skills:     -       Oriented to: Person, Place, Time, and Situation   -       Follows Commands/attention:Follows multistep  commands  -       Safety awareness/insight to disability: intact     Physical Exam:  Balance:    -       dynamic standing balance: indep  Upper Extremity Range of Motion:     -       Right Upper Extremity: WFL  -       Left Upper Extremity: WFL  Upper Extremity Strength:    -       Right Upper Extremity: WFL  -       Left Upper Extremity: WFL   Strength:    -       Right Upper Extremity: WFL  -       Left Upper Extremity: WFL  Fine Motor Coordination:    -       Intact  Left hand thumb/finger opposition skills, Right hand thumb/finger opposition skills, Left hand, manipulation of objects, and Right hand, manipulation of objects    AMPAC 6 Click ADL:  AMPAC Total Score: 23    Treatment & Education:  Pt edu on role of OT, POC, safety when performing  self care tasks , benefit of performing OOB activity, and safety when performing functional transfers and functional mobility.  - White board updated  - Self care tasks completed-- as noted above    - pt educated on spinal precautions  - pt educated on safe t/f's to prep for ADLs    Patient left up in chair with all lines intact, call button in reach, RN notified, and  and son present      History:     Past Medical History:   Diagnosis Date    Abdominal pain, right upper quadrant 02/04/2014    Anticoagulant long-term use     Anxiety     AR (allergic rhinitis)     Atrophic gastritis without mention of hemorrhage 02/13/2012     Dx updated per 2019 IMO Load    Chronic fatigue syndrome 06/10/2012    Diabetes mellitus     Dizziness     Fatty liver     GERD (gastroesophageal reflux disease)     Gross hematuria 12/01/2020    HTN (hypertension)     Hyperlipidemia     Leg swelling     Memory loss     Osteopenia     PONV (postoperative nausea and vomiting)     Primary osteoarthritis of right knee 10/06/2020    Primary osteoarthritis of right knee 10/06/2020    Right elbow pain 01/16/2019    S/P total hysterectomy     Screening for colon cancer 01/06/2021    Sleep apnea     Status post total right knee replacement 10/6/2020 10/05/2020         Past Surgical History:   Procedure Laterality Date    ANTERIOR CERVICAL DISCECTOMY W/ FUSION N/A 5/12/2025    Procedure: DISCECTOMY, SPINE, CERVICAL, ANTERIOR APPROACH, WITH FUSION C5-6 SPINEWAVE SNS: VOCAL CORDS/MOTORS/SSEP;  Surgeon: Jalil Donahue MD;  Location: [facility];  Service: Orthopedics;  Laterality: N/A;    BREAST BIOPSY Left     benign excisional biopsy 1990    CHOLECYSTECTOMY      COLONOSCOPY N/A 01/17/2018    Procedure: COLONOSCOPY with Donnell;  Surgeon: Roland Jacobo MD;  [facility]  Service: Endoscopy;  Laterality: N/A;    COLONOSCOPY N/A 01/06/2021    Procedure: COLONOSCOPY;  Surgeon: Yong Rowland MD;  Location: [facility] ([facility]  [facility]R);  Service: Endoscopy;  Laterality: N/A;  prep ins. emailed - German speaking,  needed - ERW  COVID screening [facility] - ERW    COLONOSCOPY N/A 08/16/2024    Procedure: COLONOSCOPY;  Surgeon: Aamir Reyes MD;  Location: [facility];  Service: Endoscopy;  Laterality: N/A;  8/9/24-Suprep, holding Trulicity, precall complete-DS  8/14/24-LVM to see if pt can come earlier-DS    CYSTOSCOPY N/A 12/01/2020    Procedure: CYSTOSCOPY;  Surgeon: Ines Stanford MD;  Location: [facility] OR 1ST FLR;  Service: Urology;  Laterality: N/A;  45 minutes     ELBOW ARTHROPLASTY Right 01/16/2019    Procedure: ARTHROPLASTY, ELBOW right radial head arthroplasty revision;  Surgeon: Katja Hubbard MD;  Location: [facility] OR 1ST FLR;  Service: Orthopedics;  Laterality: Right;  Anesthesia: General and Regional. Stretcher, hand pan 1 and pan 2, CALL ROSALIO, RUCHI [employer] notified 1-14 LO    ELBOW SURGERY Right 07/16/2015    ELBOW SURGERY      ESOPHAGOGASTRODUODENOSCOPY N/A 04/15/2019    Procedure: EGD (ESOPHAGOGASTRODUODENOSCOPY);  Surgeon: Buck Irwin MD;  Location: [facility] (4TH FLR);  Service: Endoscopy;  Laterality: N/A;  ray she    ESOPHAGOGASTRODUODENOSCOPY N/A 04/21/2023    Procedure: EGD (ESOPHAGOGASTRODUODENOSCOPY);  Surgeon: Aamir Reyes MD;  Location: [facility];  Service: Endoscopy;  Laterality: N/A;    FRACTURE SURGERY      HYSTERECTOMY      JOINT REPLACEMENT  October 6th2020    KNEE ARTHROPLASTY Right 10/06/2020    Procedure: ARTHROPLASTY, KNEE-SAME DAY PROTOCOL;  Surgeon: Sabino Damon MD;  Location: [facility];  Service: Orthopedics;  Laterality: Right;    KNEE ARTHROSCOPY W/ DEBRIDEMENT  04/2011    Left    RELEASE OF ULNAR NERVE AT CUBITAL TUNNEL Right 01/16/2019    Procedure: RELEASE, ULNAR TUNNEL right;  Surgeon: Katja Hubbard MD;  Location: [facility] OR 1ST FLR;  Service: Orthopedics;  Laterality: Right;  Anesthesia: General and Regional. Stretcher, hand  pan 1 and pan 2, CALL ACCUMED, CLAIRX    RETROGRADE PYELOGRAPHY Bilateral 12/01/2020    Procedure: PYELOGRAM, RETROGRADE;  Surgeon: Ines Stanford MD;  Location: [facility] OR [address] [place] [address];  Service: Urology;  Laterality: Bilateral;    REVERSE TOTAL SHOULDER ARTHROPLASTY Right 08/16/2022    Procedure: ARTHROPLASTY, SHOULDER, TOTAL, REVERSE, [place];  Surgeon: Aamir Powell MD;  Location: [facility] OR [address] [place] [address];  Service: Orthopedics;  Laterality: Right;  [place] Can't go until after 12    ROTATOR CUFF REPAIR      SHOULDER SURGERY      TOTAL ABDOMINAL HYSTERECTOMY W/ BILATERAL SALPINGOOPHORECTOMY      TOTAL KNEE ARTHROPLASTY Left 10/19/2021    Procedure: ARTHROPLASTY, KNEE, TOTAL;  Surgeon: Sabino Damon MD;  Location: [facility];  Service: Orthopedics;  Laterality: Left;    UPPER GASTROINTESTINAL ENDOSCOPY         Time Tracking:     OT Date of Treatment: 05/13/25  OT Start Time: 0854  OT Stop Time: 0915  OT Total Time (min): 21 min    Billable Minutes:Evaluation 8  Self Care/Home Management 13    5/13/2025

## 2025-05-13 NOTE — DISCHARGE SUMMARY
[facility])  Orthopedics  Discharge Summary      Patient Name: Riana Kay  MRN: [id number]  Admission Date: 5/12/2025  Hospital Length of Stay: 0 days  Discharge Date and Time: 05/13/2025 6:17 AM  Attending Physician: Jalil Donahue MD   Discharging Provider: Taylor Hart MD  Primary Care Provider: Jose Rojas MD    HPI:   Riana Kay is a 70 y.o. female w/ a hx of R rTSA presents for initial evaluation of neck and left arm pain (Neck - 6, Arm - 6). The pain has been present for 3 years. The patient describes the pain as dull and it radiates down LUE to the hand. The pain is worse with activity and improved by rest. There is LUE associated numbness and tingling. There is LUE subjective weakness. Prior treatments have included OTC meds, PT and MIRTHA, but no surgery.      The Patient denies myelopathic symptoms such as handwriting changes or difficulty with buttons/coins/keys. Denies perineal paresthesias, bowel/bladder dysfunction.     The patient does not smoke or endorse IVDU. She has DM (HA1C of 6.4). The patient is not on any blood thinners and does not take chronic narcotics. 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    Procedure(s) (LRB):  DISCECTOMY, SPINE, CERVICAL, ANTERIOR APPROACH, WITH FUSION C5-6 SPINEWAVE SNS: VOCAL CORDS/MOTORS/SSEP (N/A)      Hospital Course:  The patient arrived to the [facility] for pre-operative management.  Upon completion of the pre-operative preparation, the patient was taken back to the operative theatre. The above procedure was performed without complication and the patient was transported to the post anesthesia care unit in stable condition.  After appropriate recovery from the anaesthetic agents used during the surgery, the patient was then transported to the inpatient floor. The patient has tolerated regular diet.  The patient's pain has been controlled using a multimodal approach.  Currently, the patient's pain is well controlled on an oral regimen.  The patient has been voiding without difficulty.  The patient began participation in physical therapy after surgery and has progressed throughout the entire hospital stay. Once a downward trend was seen the output the drain was removed and dressed with a sterile dressing. Post-operative X-rays show stable hardware. Currently, the patient's progress is sufficient to allow the them to be discharged safely.  The patient agrees with this assessment and desires a discharge today.      Goals of Care Treatment Preferences:  Code Status: Full Code    Health care agent: Kemal Kay  [facility] care agent number: [phone]    Living Will: Yes     What is most important right now is to focus on remaining as independent as possible.  Accordingly, we have decided that the best plan to meet the patient's goals includes continuing with treatment.          Significant Diagnostic Studies: Labs: All labs within the past 24 hours have been reviewed    Pending Diagnostic Studies:       Procedure Component Value Units Date/Time    X-Ray Cervical Spine AP And Lateral [[id number]]     Order Status: Sent Lab Status: No result           Final Active Diagnoses:    Diagnosis Date Noted POA    PRINCIPAL PROBLEM:  Cervical radiculopathy [M54.12] 11/11/2014 Yes      Problems Resolved During this Admission:      Discharged Condition: stable    Disposition: Home or Self Care    Follow Up:      Medications:  Reconciled Home Medications:      Medication List        ASK your doctor about these medications      * acetaminophen 500 MG tablet  Commonly known as: TYLENOL  Take 2 tablets (1,000 mg total) by mouth every 8 (eight) hours as needed for Pain.     * acetaminophen 500 MG tablet  Commonly known as: TYLENOL  Take 1 tablet (500 mg total) by mouth 3 (three) times daily.     amLODIPine 5 MG tablet  Commonly known as: NORVASC  Take 1 tablet (5 mg total) by mouth every evening.      aspirin 81 MG EC tablet  Commonly known as: ECOTRIN  Take 1 tablet (81 mg total) by mouth once daily.     azelastine 137 mcg (0.1 %) nasal spray  Commonly known as: ASTELIN  1 spray (137 mcg total) by Nasal route 2 (two) times daily.     BAQSIMI 3 mg/actuation Spry  Generic drug: glucagon  Give one puff via nostril. Hold device between fingers and thumb, do not push plunger yet, insert tip gently into one nostril until finger(s) touch the outside of the nose, then push plunger firmly all the way in . Dose is complete when the green line disappears.     benzonatate 200 MG capsule  Commonly known as: TESSALON  Take 1 capsule (200 mg total) by mouth 3 (three) times daily as needed for Cough.     buPROPion 300 MG 24 hr tablet  Commonly known as: WELLBUTRIN XL  Take 1 tablet (300 mg total) by mouth every morning.     busPIRone 5 MG Tab  Commonly known as: BUSPAR  Take 1 tablet (5 mg total) by mouth 2 (two) times daily.     celecoxib 100 MG capsule  Commonly known as: CeleBREX  Take 1 capsule (100 mg total) by mouth 2 (two) times daily.     diclofenac sodium 1 % Gel  Commonly known as: VOLTAREN ARTHRITIS PAIN  Apply 2 g topically once daily.     DULoxetine 30 MG capsule  Commonly known as: CYMBALTA  TAKE 1 CAPSULE BY MOUTH EVERY DAY     gabapentin 100 MG capsule  Commonly known as: NEURONTIN  Take 1 capsule (100 mg total) by mouth 3 (three) times daily.     HumaLOG KwikPen Insulin 100 unit/mL pen  Generic drug: insulin lispro  INJECT 10-12 UNITS IN THE SKIN BEFORE MEALS PLUS SCALE MAX DAILY UNITS 66 UNITS     ketoconazole 2 % shampoo  Commonly known as: NIZORAL  Apply topically every 7 days.     * lancets Misc  To check BG 3 times daily, to use with insurance preferred meter, e 11.65     * ONETOUCH DELICA LANCETS 33 gauge Misc  Generic drug: lancets  TO CHECK BLOOD GLUCOSE 3 TIMES DAILY     * LANTUS SOLOSTAR U-100 INSULIN 100 unit/mL (3 mL) Inpn pen  Generic drug: insulin glargine U-100 (Lantus)  INJECT 24 -36 UNITS  "UNDER THE SKIN AT NIGHT. MAX DAILY 36 UNITS     * LANTUS SOLOSTAR U-100 INSULIN 100 unit/mL (3 mL) Inpn pen  Generic drug: insulin glargine U-100 (Lantus)  Inject 40-52 units at night.     latanoprost 0.005 % ophthalmic solution  Place 1 drop into both eyes nightly.     losartan 50 MG tablet  Commonly known as: COZAAR  Take 1 tablet (50 mg total) by mouth once daily.     methocarbamoL 500 MG Tab  Commonly known as: Robaxin  Take 2 tablets (1,000 mg total) by mouth 3 (three) times daily.     MOUNJARO 5 mg/0.5 mL Pnij  Generic drug: tirzepatide  Inject 5 mg weekly into skin. Type 2 diabetes e 11.65     ondansetron 8 MG Tbdl  Commonly known as: ZOFRAN-ODT  Take 1 tablet (8 mg total) by mouth 3 (three) times daily as needed (Nausea).     ONETOUCH VERIO FLEX METER Misc  Generic drug: blood-glucose meter  TO CHECK BLOOD GLUCOSE 3 TIMES DAILY, TO USE WITH INSURANCE PREFERRED METER, E 11.65     oxyCODONE 5 MG immediate release tablet  Commonly known as: ROXICODONE  Take 1 tablet (5 mg total) by mouth every 6 (six) hours as needed for Pain (for breakthrough pain).     pantoprazole 40 MG tablet  Commonly known as: PROTONIX  Take 1 tablet (40 mg total) by mouth once daily.     pen needle, diabetic 32 gauge x 1/4" Ndle  Commonly known as: NOVOFINE 32  Uses 4 times a day. 90 day via duramed e 11.65     rosuvastatin 20 MG tablet  Commonly known as: CRESTOR  Take 1 tablet (20 mg total) by mouth every evening.           * This list has 6 medication(s) that are the same as other medications prescribed for you. Read the directions carefully, and ask your doctor or other care provider to review them with you.                  Taylor Hart MD  Orthopedics  [facility] ([facility]    "

## 2025-05-13 NOTE — PT/OT/SLP EVAL
Physical Therapy Evaluation and Discharge Note    Patient Name:  Riana Kay   MRN:  [id number]    Recommendations:     Discharge Recommendations: No Therapy Indicated  Discharge Equipment Recommendations: none   Barriers to discharge: None    Assessment:     Riana Kay is a 70 y.o. female admitted with a medical diagnosis of Cervical radiculopathy. Patient ambulated 100ft with RW and CGA. Patient and family educated in spinal precautions. At this time, patient is functioning at their prior level of function and does not require further acute PT services.     Recent Surgery: Procedure(s) (LRB):  DISCECTOMY, SPINE, CERVICAL, ANTERIOR APPROACH, WITH FUSION C5-6 SPINEWAVE SNS: VOCAL CORDS/MOTORS/SSEP (N/A) 1 Day Post-Op    Plan:     During this hospitalization, patient does not require further acute PT services.  Please re-consult if situation changes.      Subjective     Chief Complaint: None.   Patient/Family Comments/goals: To go home.   Pain/Comfort:  Pain Rating 1:  (did not rate)  Location 1: neck  Pain Addressed 1: Reposition, Distraction    Patients cultural, spiritual, Jew conflicts given the current situation:  n/a    Living Environment:  Patient lives with her  in a [facility] with no steps to enter.   Prior to admission, patients level of function was modified independent with AD as needed. Equipment used at home: rollator, walker, rolling, bedside commode, cane, quad. Upon discharge, patient will have assistance from .    Objective:     Communicated with RN prior to session. Patient found up in chair with  (no active lines) upon PT entry to room.  and son present in room.    General Precautions: Standard, fall    Orthopedic Precautions:spinal precautions   Braces: N/A (no Aspen needed per PT order)  Respiratory Status: Room air    Exams:  Cognitive Exam:  Patient is oriented to Person, Place, Time, and Situation  Gross Motor Coordination:  WFL  RLE ROM: WFL  RLE  Strength: WFL  LLE ROM: WFL  LLE Strength: WFL    Functional Mobility:  Transfers:     Sit to Stand:  contact guard assistance with rolling walker x1 from bedside chair   Gait: Patient ambulated 100ft with contact guard assistance and rolling walker. No LOB or SOB noted.     Treatment and Education:  Patient an family educated in:  -PT role and POC  -spinal precautions (no bending, lifting, or twisting)  -log rolling during bed mobility  -safety with transfers including hand placement  -gait sequence and RW management  -out of bed activity to maximize recovery including getting on a daily walking program at home and performing ankle pumps     AM-PAC 6 CLICK MOBILITY  Total Score:23     Patient left up in chair with call button in reach, RN notified, and family present.    GOALS:   Multidisciplinary Problems       Physical Therapy Goals       Not on file                  History:     Past Medical History:   Diagnosis Date    Abdominal pain, right upper quadrant 02/04/2014    Anticoagulant long-term use     Anxiety     AR (allergic rhinitis)     Atrophic gastritis without mention of hemorrhage 02/13/2012     Dx updated per 2019 IMO Load    Chronic fatigue syndrome 06/10/2012    Diabetes mellitus     Dizziness     Fatty liver     GERD (gastroesophageal reflux disease)     Gross hematuria 12/01/2020    HTN (hypertension)     Hyperlipidemia     Leg swelling     Memory loss     Osteopenia     PONV (postoperative nausea and vomiting)     Primary osteoarthritis of right knee 10/06/2020    Primary osteoarthritis of right knee 10/06/2020    Right elbow pain 01/16/2019    S/P total hysterectomy     Screening for colon cancer 01/06/2021    Sleep apnea     Status post total right knee replacement 10/6/2020 10/05/2020       Past Surgical History:   Procedure Laterality Date    ANTERIOR CERVICAL DISCECTOMY W/ FUSION N/A 5/12/2025    Procedure: DISCECTOMY, SPINE, CERVICAL, ANTERIOR APPROACH, WITH FUSION C5-6 SPINEWAVE SNS: VOCAL  CORDS/MOTORS/SSEP;  Surgeon: Jalil Donahue MD;  Location: [facility];  Service: Orthopedics;  Laterality: N/A;    BREAST BIOPSY Left     benign excisional biopsy 1990    CHOLECYSTECTOMY      COLONOSCOPY N/A 01/17/2018    Procedure: COLONOSCOPY with Donnell;  Surgeon: Roland Jacobo MD;  Location: [facility] (4TH FLR);  Service: Endoscopy;  Laterality: N/A;    COLONOSCOPY N/A 01/06/2021    Procedure: COLONOSCOPY;  Surgeon: Yong Rowland MD;  Location: [facility] (4TH FLR);  Service: Endoscopy;  Laterality: N/A;  prep ins. emailed - Serbian speaking,  needed - ERW  COVID screening [facility] - ERW    COLONOSCOPY N/A 08/16/2024    Procedure: COLONOSCOPY;  Surgeon: Aamir Reyes MD;  Location: [facility];  Service: Endoscopy;  Laterality: N/A;  8/9/24-Suprep, holding Trulicity, precall complete-DS  8/14/24-LVM to see if pt can come earlier-DS    CYSTOSCOPY N/A 12/01/2020    Procedure: CYSTOSCOPY;  Surgeon: Ines Stanford MD;  Location: [facility] OR 1ST FLR;  Service: Urology;  Laterality: N/A;  45 minutes     ELBOW ARTHROPLASTY Right 01/16/2019    Procedure: ARTHROPLASTY, ELBOW right radial head arthroplasty revision;  Surgeon: Katja Hubbard MD;  Location: [facility] OR 1ST FLR;  Service: Orthopedics;  Laterality: Right;  Anesthesia: General and Regional. Stretcher, hand pan 1 and pan 2, CALL RUCHI SANTAMARIA & Sol notified 1-14 LO    ELBOW SURGERY Right 07/16/2015    ELBOW SURGERY      ESOPHAGOGASTRODUODENOSCOPY N/A 04/15/2019    Procedure: EGD (ESOPHAGOGASTRODUODENOSCOPY);  Surgeon: Bcuk Irwin MD;  Location: [facility] (4TH FLR);  Service: Endoscopy;  Laterality: N/A;  ray she    ESOPHAGOGASTRODUODENOSCOPY N/A 04/21/2023    Procedure: EGD (ESOPHAGOGASTRODUODENOSCOPY);  Surgeon: Aamir Reyes MD;  Location: [facility];  Service: Endoscopy;  Laterality: N/A;    FRACTURE SURGERY      HYSTERECTOMY      JOINT REPLACEMENT  October 6th2020    KNEE ARTHROPLASTY  Right 10/06/2020    Procedure: ARTHROPLASTY, KNEE-SAME DAY PROTOCOL;  Surgeon: Sabino Damon MD;  Location: [facility];  Service: Orthopedics;  Laterality: Right;    KNEE ARTHROSCOPY W/ DEBRIDEMENT  04/2011    Left    RELEASE OF ULNAR NERVE AT CUBITAL TUNNEL Right 01/16/2019    Procedure: RELEASE, ULNAR TUNNEL right;  Surgeon: Katja Hubbard MD;  Location: [facility] OR 1ST FLR;  Service: Orthopedics;  Laterality: Right;  Anesthesia: General and Regional. Stretcher, hand pan 1 and pan 2, CALL ACCUMED, CLAIRX    RETROGRADE PYELOGRAPHY Bilateral 12/01/2020    Procedure: PYELOGRAM, RETROGRADE;  Surgeon: Ines Stanford MD;  Location: [facility] OR 1ST FLR;  Service: Urology;  Laterality: Bilateral;    REVERSE TOTAL SHOULDER ARTHROPLASTY Right 08/16/2022    Procedure: ARTHROPLASTY, SHOULDER, TOTAL, REVERSE, [place];  Surgeon: Aamir Powell MD;  Location: [facility] OR 2ND FLR;  Service: Orthopedics;  Laterality: Right;  [place] Can't go until after 12    ROTATOR CUFF REPAIR      SHOULDER SURGERY      TOTAL ABDOMINAL HYSTERECTOMY W/ BILATERAL SALPINGOOPHORECTOMY      TOTAL KNEE ARTHROPLASTY Left 10/19/2021    Procedure: ARTHROPLASTY, KNEE, TOTAL;  Surgeon: Sabino Damon MD;  Location: [facility];  Service: Orthopedics;  Laterality: Left;    UPPER GASTROINTESTINAL ENDOSCOPY         Time Tracking:     PT Received On: 05/13/25  PT Start Time: 1011     PT Stop Time: 1021  PT Total Time (min): 10 min     Billable Minutes: Evaluation 10      05/13/2025

## 2025-05-13 NOTE — PLAN OF CARE
Problem: Diabetes Comorbidity  Goal: Blood Glucose Level Within Targeted Range  Intervention: Monitor and Manage Glycemia  Flowsheets (Taken 5/13/2025 1000)  Glycemic Management: blood glucose monitored     Problem: Wound  Goal: Absence of Infection Signs and Symptoms  Intervention: Prevent or Manage Infection  Flowsheets (Taken 5/13/2025 1000)  Infection Management: aseptic technique maintained     Problem: Wound  Goal: Optimal Pain Control and Function  Intervention: Prevent or Manage Pain  Flowsheets (Taken 5/13/2025 1000)  Pain Management Interventions:   care clustered   pain management plan reviewed with patient/caregiver   medication offered but refused     Problem: Spinal Surgery  Goal: Optimal Coping with Surgery  Intervention: Support Psychosocial Response to Surgery  Flowsheets (Taken 5/13/2025 1000)  Supportive Measures: active listening utilized     Problem: Spinal Surgery  Goal: Absence of Bleeding  Intervention: Monitor and Manage Bleeding  Flowsheets (Taken 5/13/2025 1000)  Bleeding Management: dressing monitored     Problem: Spinal Surgery  Goal: Absence of Infection Signs and Symptoms  Intervention: Prevent or Manage Infection  Flowsheets (Taken 5/13/2025 1000)  Infection Prevention: single patient room provided  Infection Management: aseptic technique maintained     Problem: Spinal Surgery  Goal: Optimal Pain Control and Function  Intervention: Prevent or Manage Pain  Flowsheets (Taken 5/13/2025 1000)  Pain Management Interventions:   care clustered   pain management plan reviewed with patient/caregiver   medication offered but refused    Plan of care reviewed with pt, verbalized understanding. Medications and possible side effects reviewed and administered as ordered.  Purposeful rounding completed.  Safety precautions maintained.  Call light placed within reach.  Preparing for discharge.

## 2025-05-18 NOTE — TELEPHONE ENCOUNTER
No care due was identified.  [facility] Embedded Care Due Messages. Reference number: [id number].   5/17/2025 7:30:11 PM CDT

## 2025-05-19 RX ORDER — DULOXETIN HYDROCHLORIDE 30 MG/1
30 CAPSULE, DELAYED RELEASE ORAL DAILY
Qty: 90 CAPSULE | Refills: 0 | Status: SHIPPED | OUTPATIENT
Start: 2025-05-19

## 2025-05-23 ENCOUNTER — PATIENT MESSAGE (OUTPATIENT)
Dept: ORTHOPEDICS | Facility: CLINIC | Age: 70
End: 2025-05-23

## 2025-06-02 NOTE — PROGRESS NOTES
Date: 06/02/2025    Supervising Physician: Jalil Donahue M.D.    Date of Surgery: 5/12/25    Procedure: C5-6 ACDF    History: Riana Kay is seen today for follow-up following the above listed procedure. Overall the patient is doing well but today notes her left arm pain has improved but she is having shoulder soreness and some difficulty swallowing.   Pain is well controlled with current pain medication.  she denies fever, chills, and sweats since the time of the surgery.       Exam: Post op dressing taken down.  Incision is healing well, clean, dry and intact.   There is no sign of infection. Neuro exam is stable. No signs of DVT.    Radiographs: hardware in place no failure    Assessment/Plan: 4 weeks post op.    Doing well postoperatively.  reviewed.    I will plan to see the patient back for the next postop visit in 2 months.     Thank you for the opportunity to participate in this patient's care. Please give me a call if there are any concerns or questions.

## 2025-06-10 ENCOUNTER — HOSPITAL ENCOUNTER (OUTPATIENT)
Dept: RADIOLOGY | Facility: HOSPITAL | Age: 70
Discharge: HOME | End: 2025-06-10
Attending: ORTHOPAEDIC SURGERY
Payer: MEDICARE

## 2025-06-10 ENCOUNTER — OFFICE VISIT (OUTPATIENT)
Dept: ORTHOPEDICS | Facility: CLINIC | Age: 70
End: 2025-06-10
Payer: MEDICARE

## 2025-06-10 VITALS — WEIGHT: 160.94 LBS | BODY MASS INDEX: 26.81 KG/M2 | HEIGHT: 65 IN

## 2025-06-10 DIAGNOSIS — M50.30 DDD (DEGENERATIVE DISC DISEASE), CERVICAL: Primary | ICD-10-CM

## 2025-06-10 DIAGNOSIS — Z98.890 S/P SPINAL SURGERY: ICD-10-CM

## 2025-06-10 DIAGNOSIS — Z98.890 S/P SPINAL SURGERY: Primary | ICD-10-CM

## 2025-06-10 PROCEDURE — 3051F HG A1C>EQUAL 7.0%<8.0%: CPT | Mod: CPTII,S$GLB,, | Performed by: ORTHOPAEDIC SURGERY

## 2025-06-10 PROCEDURE — 1159F MED LIST DOCD IN RCRD: CPT | Mod: CPTII,S$GLB,, | Performed by: ORTHOPAEDIC SURGERY

## 2025-06-10 PROCEDURE — 1125F AMNT PAIN NOTED PAIN PRSNT: CPT | Mod: CPTII,S$GLB,, | Performed by: ORTHOPAEDIC SURGERY

## 2025-06-10 PROCEDURE — 1101F PT FALLS ASSESS-DOCD LE1/YR: CPT | Mod: CPTII,S$GLB,, | Performed by: ORTHOPAEDIC SURGERY

## 2025-06-10 PROCEDURE — 72040 X-RAY EXAM NECK SPINE 2-3 VW: CPT | Mod: TC

## 2025-06-10 PROCEDURE — 4010F ACE/ARB THERAPY RXD/TAKEN: CPT | Mod: CPTII,S$GLB,, | Performed by: ORTHOPAEDIC SURGERY

## 2025-06-10 PROCEDURE — 3288F FALL RISK ASSESSMENT DOCD: CPT | Mod: CPTII,S$GLB,, | Performed by: ORTHOPAEDIC SURGERY

## 2025-06-10 PROCEDURE — 99999 PR PBB SHADOW E&M-EST. PATIENT-LVL II: CPT | Mod: PBBFAC,,, | Performed by: ORTHOPAEDIC SURGERY

## 2025-06-10 PROCEDURE — 99024 POSTOP FOLLOW-UP VISIT: CPT | Mod: S$GLB,,, | Performed by: ORTHOPAEDIC SURGERY

## 2025-06-18 DIAGNOSIS — Z78.0 MENOPAUSE: ICD-10-CM

## 2025-07-01 ENCOUNTER — HOSPITAL ENCOUNTER (OUTPATIENT)
Dept: RADIOLOGY | Facility: HOSPITAL | Age: 70
Discharge: HOME | End: 2025-07-01
Attending: PODIATRIST
Payer: MEDICARE

## 2025-07-01 ENCOUNTER — OFFICE VISIT (OUTPATIENT)
Dept: PODIATRY | Facility: CLINIC | Age: 70
End: 2025-07-01
Payer: MEDICARE

## 2025-07-01 VITALS
HEIGHT: 65 IN | DIASTOLIC BLOOD PRESSURE: 70 MMHG | SYSTOLIC BLOOD PRESSURE: 145 MMHG | BODY MASS INDEX: 26.81 KG/M2 | WEIGHT: 160.94 LBS | HEART RATE: 70 BPM | RESPIRATION RATE: 18 BRPM

## 2025-07-01 DIAGNOSIS — M25.579 ANKLE PAIN, UNSPECIFIED CHRONICITY, UNSPECIFIED LATERALITY: ICD-10-CM

## 2025-07-01 DIAGNOSIS — Z79.4 TYPE 2 DIABETES MELLITUS WITH HYPERGLYCEMIA, WITH LONG-TERM CURRENT USE OF INSULIN: ICD-10-CM

## 2025-07-01 DIAGNOSIS — E11.65 TYPE 2 DIABETES MELLITUS WITH HYPERGLYCEMIA, WITH LONG-TERM CURRENT USE OF INSULIN: ICD-10-CM

## 2025-07-01 DIAGNOSIS — Z79.4 TYPE 2 DIABETES MELLITUS WITH OTHER SPECIFIED COMPLICATION, WITH LONG-TERM CURRENT USE OF INSULIN: Primary | ICD-10-CM

## 2025-07-01 DIAGNOSIS — E11.69 TYPE 2 DIABETES MELLITUS WITH OTHER SPECIFIED COMPLICATION, WITH LONG-TERM CURRENT USE OF INSULIN: Primary | ICD-10-CM

## 2025-07-01 PROCEDURE — 73610 X-RAY EXAM OF ANKLE: CPT | Mod: 26,LT,, | Performed by: RADIOLOGY

## 2025-07-01 PROCEDURE — 73630 X-RAY EXAM OF FOOT: CPT | Mod: 26,LT,, | Performed by: RADIOLOGY

## 2025-07-01 PROCEDURE — 73610 X-RAY EXAM OF ANKLE: CPT | Mod: TC,LT

## 2025-07-01 PROCEDURE — 99999 PR PBB SHADOW E&M-EST. PATIENT-LVL V: CPT | Mod: PBBFAC,,, | Performed by: PODIATRIST

## 2025-07-01 PROCEDURE — 73630 X-RAY EXAM OF FOOT: CPT | Mod: TC,LT

## 2025-07-01 RX ORDER — TRIAMCINOLONE ACETONIDE 40 MG/ML
20 INJECTION, SUSPENSION INTRA-ARTICULAR; INTRAMUSCULAR ONCE
Status: COMPLETED | OUTPATIENT
Start: 2025-07-01 | End: 2025-07-01

## 2025-07-01 RX ORDER — MELOXICAM 15 MG/1
15 TABLET ORAL
COMMUNITY
Start: 2025-05-28

## 2025-07-01 RX ORDER — DEXAMETHASONE SODIUM PHOSPHATE 4 MG/ML
2 INJECTION, SOLUTION INTRA-ARTICULAR; INTRALESIONAL; INTRAMUSCULAR; INTRAVENOUS; SOFT TISSUE ONCE
Status: COMPLETED | OUTPATIENT
Start: 2025-07-01 | End: 2025-07-01

## 2025-07-01 RX ADMIN — DEXAMETHASONE SODIUM PHOSPHATE 2 MG: 4 INJECTION, SOLUTION INTRA-ARTICULAR; INTRALESIONAL; INTRAMUSCULAR; INTRAVENOUS; SOFT TISSUE at 09:07

## 2025-07-01 RX ADMIN — TRIAMCINOLONE ACETONIDE 20 MG: 40 INJECTION, SUSPENSION INTRA-ARTICULAR; INTRAMUSCULAR at 09:07

## 2025-07-01 NOTE — PROGRESS NOTES
Subjective:     Patient ID: Riana Kay is a 70 y.o. female.    Chief Complaint: Ankle Pain (Left ankle pain and swelling )    Riana is a 70 y.o. female who presents to the clinic upon referral from Dr. Mccormick  for evaluation and treatment of diabetic feet. Riana has a past medical history of Abdominal pain, right upper quadrant (02/04/2014), Anticoagulant long-term use, Anxiety, AR (allergic rhinitis), Atrophic gastritis without mention of hemorrhage (02/13/2012), Chronic fatigue syndrome (06/10/2012), Diabetes mellitus, Dizziness, Fatty liver, GERD (gastroesophageal reflux disease), Gross hematuria (12/01/2020), HTN (hypertension), Hyperlipidemia, Leg swelling, Memory loss, Osteopenia, PONV (postoperative nausea and vomiting), Primary osteoarthritis of right knee (10/06/2020), Primary osteoarthritis of right knee (10/06/2020), Right elbow pain (01/16/2019), S/P total hysterectomy, Screening for colon cancer (01/06/2021), Sleep apnea, and Status post total right knee replacement 10/6/2020 (10/05/2020). Patient relates no major problem with feet. Only complaints today consist of yearly comprehensive diabetic  foot examination and L ankle pain.     PCP: Jose Rojas MD    Date Last Seen by PCP:   Chief Complaint   Patient presents with    Ankle Pain     Left ankle pain and swelling          Current shoe gear: Casual shoes    Hemoglobin A1C   Date Value Ref Range Status   02/25/2025 6.4 (H) 4.0 - 5.6 % Final     Comment:     ADA Screening Guidelines:  5.7-6.4%  Consistent with prediabetes  >or=6.5%  Consistent with diabetes    High levels of fetal hemoglobin interfere with the HbA1C  assay. Heterozygous hemoglobin variants (HbS, HgC, etc)do  not significantly interfere with this assay.   However, presence of multiple variants may affect accuracy.     12/13/2024 7.2 (H) 4.0 - 5.6 % Final     Comment:     ADA Screening Guidelines:  5.7-6.4%  Consistent with prediabetes  >or=6.5%  Consistent with  "diabetes    High levels of fetal hemoglobin interfere with the HbA1C  assay. Heterozygous hemoglobin variants (HbS, HgC, etc)do  not significantly interfere with this assay.   However, presence of multiple variants may affect accuracy.     08/13/2024 8.2 (H) 4.0 - 5.6 % Final     Comment:     ADA Screening Guidelines:  5.7-6.4%  Consistent with prediabetes  >or=6.5%  Consistent with diabetes    High levels of fetal hemoglobin interfere with the HbA1C  assay. Heterozygous hemoglobin variants (HbS, HgC, etc)do  not significantly interfere with this assay.   However, presence of multiple variants may affect accuracy.       Hemoglobin A1c   Date Value Ref Range Status   05/05/2025 7.2 (H) 4.0 - 5.6 % Final     Comment:     ADA Screening Guidelines:  5.7-6.4%  Consistent with prediabetes  >=6.5%  Consistent with diabetes    High levels of fetal hemoglobin interfere with the HbA1C  assay. Heterozygous hemoglobin variants (HbS, HgC, etc)do  not significantly interfere with this assay.   However, presence of multiple variants may affect accuracy.         Review of Systems   Constitutional: Negative for chills, decreased appetite and fever.   Cardiovascular:  Negative for leg swelling.   Skin:  Positive for dry skin.   Musculoskeletal:  Positive for joint pain (L ankle). Negative for arthritis, joint swelling and myalgias.   Gastrointestinal:  Negative for nausea and vomiting.   Neurological:  Negative for loss of balance, numbness and paresthesias.        Objective:     Vitals:    07/01/25 0855   BP: (!) 145/70   Pulse: 70   Resp: 18   Weight: 73 kg (160 lb 15 oz)   Height: 5' 5" (1.651 m)   PainSc:   5   PainLoc: Ankle        Physical Exam  Vitals and nursing note reviewed.   Constitutional:       General: She is not in acute distress.     Appearance: She is well-developed. She is not toxic-appearing or diaphoretic.      Comments: alert and oriented x 3.    Cardiovascular:      Pulses:           Dorsalis pedis pulses are " 2+ on the right side and 2+ on the left side.        Posterior tibial pulses are 2+ on the right side and 2+ on the left side.      Comments:  Capillary refill time is within normal limits. Digital hair present.   Pulmonary:      Effort: No respiratory distress.   Musculoskeletal:         General: Tenderness (L ankle) present. No deformity.      Right ankle: No tenderness. No lateral malleolus, medial malleolus, AITF ligament, CF ligament or posterior TF ligament tenderness.      Right Achilles Tendon: No defects. Graf's test negative.      Left ankle: No tenderness. No lateral malleolus, medial malleolus, AITF ligament, CF ligament or posterior TF ligament tenderness.      Left Achilles Tendon: No defects. Graf's test negative.      Right foot: No tenderness or bony tenderness.      Left foot: No tenderness or bony tenderness.      Comments: Pain upon palpation of lateral left  ankle ligaments. Pain w/ inversion of affected limb     Adequate joint range of motion without pain, limitation, nor crepitation Bilateral feet and ankle joints. Muscle strength is 5/5 in all groups bilaterally.           Feet:      Right foot:      Protective Sensation: 5 sites tested.  5 sites sensed.      Skin integrity: Skin integrity normal.      Left foot:      Protective Sensation: 5 sites tested.  5 sites sensed.      Skin integrity: Skin integrity normal.   Lymphadenopathy:      Comments: No lymphatic streaking     Skin:     General: Skin is warm and dry.      Coloration: Skin is not pale.      Findings: No rash.      Nails: There is no clubbing.      Comments: Skin is of normal turgor.   Normal temperature gradient.  Examination of the skin reveals no evidence of significant rashes, open lesions, suspicious appearing nevi or other concerning lesions.        Toenails 1-5 bilaterally are neatly trimmed; of normal color and thickness   Neurological:      Sensory: No sensory deficit.      Motor: No atrophy.      Comments: Light  touch present     Psychiatric:         Attention and Perception: She is attentive.         Mood and Affect: Mood is not anxious. Affect is not inappropriate.         Speech: She is communicative. Speech is not slurred.         Behavior: Behavior is not combative.         Assessment:      Encounter Diagnoses   Name Primary?    Type 2 diabetes mellitus with hyperglycemia, with long-term current use of insulin     Type 2 diabetes mellitus with other specified complication, with long-term current use of insulin Yes    Ankle pain, unspecified chronicity, unspecified laterality - Left Foot      Plan:     Riana was seen today for ankle pain.    Diagnoses and all orders for this visit:    Type 2 diabetes mellitus with other specified complication, with long-term current use of insulin    Type 2 diabetes mellitus with hyperglycemia, with long-term current use of insulin  -     Ambulatory referral/consult to Podiatry  -     X-Ray Foot Complete Left; Future  -     X-Ray Ankle Complete Left; Future    Ankle pain, unspecified chronicity, unspecified laterality - Left Foot    Other orders  -     dexAMETHasone injection 2 mg  -     triamcinolone acetonide injection 20 mg      I counseled the patient on her conditions, their implications and medical management.    Independent review of patients previous clinical notes    Reviewed current medication(s), and lab(s) specific to foot ailment(s) with patient in detail. All questions answered       Patient instructed on adequate icing techniques. Patient should ice the affected area at least once per day x 10 minutes for 10 days . I advised the  patient that extra icing would also be beneficial to ensure adequate anti inflammatory effect     Patient fitted and dispensed Gauntlet style lace up and instructed on use    - Shoe inspection. Diabetic Foot Education. Patient reminded of the importance of good nutrition and blood sugar control to help prevent podiatric complications of  diabetes. Patient instructed on proper foot hygeine. We discussed wearing proper shoe gear, daily foot inspections, never walking without protective shoe gear, caution putting sharp instruments to feet     - Discussed DM foot care:  Wear comfortable, proper fitting shoes. Wash feet daily. Dry well. After drying, apply moisturizer to feet (no lotion to webspaces). Inspect feet daily for skin breaks, blisters, swelling, or redness. Wear cotton socks (preferably white)  Change socks every day. Do NOT walk barefoot. Do NOT use heating pads or warm/hot water soaks     - Discussed importance of daily moisturizer to the feet such as Cerave,Sween, Or Cetaphil    - Patient is low risk for developing lower extremity issues secondary to diabetes.     - Reviewed current medication(s), and lab(s) specific to foot ailment(s) with patient in detail. All questions answered     - Independent review of patients previous clinical notes    - I recommend continued yearly diabetic foot examinations by Myself or PCP    - Currently  patient has NO pedal manifestations of DM    - Patients with out pedal manifestations of DM, do not qualify for Diabetic Shoes/Inserts nor  nail/callus trimming     - RTC in 1 yr or  PRN       . Discussed possible side effects from injection including but NOT limited to discoloration of skin, erosion of soft tissue, and increased likelihood of rupture of a soft tissue structure (ie. Plantar fascia, muscle, tendon, ligament, or capsule in the area of injection). Patient indicates understanding of my explanation, and patient gives verbal consent for injection of affected area. A time out was performed to verify the  correct  patient and site, prior to initiation of procedure.     After sterilizing the area with an alcohol prep, the affected area of the lateral L  was injected as per MAR  The patient tolerated the injection well and reported comfort to the area     Advised patient to monitor blood sugar levels.  Steroid injections may cause temporary elevations in BS. Patient verbally reports understanding and will closely monitor BS and follow up as needed if any elevated or  uncontrollable BS should occur     Resume brace   2W 2W

## 2025-07-03 ENCOUNTER — HOSPITAL ENCOUNTER (OUTPATIENT)
Dept: RADIOLOGY | Facility: HOSPITAL | Age: 70
Discharge: HOME | End: 2025-07-03
Attending: FAMILY MEDICINE
Payer: MEDICARE

## 2025-07-03 DIAGNOSIS — Z78.0 MENOPAUSE: ICD-10-CM

## 2025-07-03 PROCEDURE — 77080 DXA BONE DENSITY AXIAL: CPT | Mod: TC

## 2025-07-10 ENCOUNTER — LAB VISIT (OUTPATIENT)
Dept: LAB | Facility: HOSPITAL | Age: 70
End: 2025-07-10
Attending: NURSE PRACTITIONER
Payer: MEDICARE

## 2025-07-10 DIAGNOSIS — Z79.4 TYPE 2 DIABETES MELLITUS WITH HYPERGLYCEMIA, WITH LONG-TERM CURRENT USE OF INSULIN: ICD-10-CM

## 2025-07-10 DIAGNOSIS — R53.82 CHRONIC FATIGUE: ICD-10-CM

## 2025-07-10 DIAGNOSIS — E11.59 HYPERTENSION ASSOCIATED WITH TYPE 2 DIABETES MELLITUS: ICD-10-CM

## 2025-07-10 DIAGNOSIS — I15.2 HYPERTENSION ASSOCIATED WITH TYPE 2 DIABETES MELLITUS: ICD-10-CM

## 2025-07-10 DIAGNOSIS — E11.65 TYPE 2 DIABETES MELLITUS WITH HYPERGLYCEMIA, WITH LONG-TERM CURRENT USE OF INSULIN: ICD-10-CM

## 2025-07-10 DIAGNOSIS — E55.9 VITAMIN D DEFICIENCY: ICD-10-CM

## 2025-07-10 LAB
25(OH)D3+25(OH)D2 SERPL-MCNC: 18 NG/ML (ref 30–96)
ALBUMIN/CREAT UR: 72.4 UG/MG
CREAT UR-MCNC: 185 MG/DL (ref 15–325)
EAG (OHS): 154 MG/DL (ref 68–131)
HBA1C MFR BLD: 7 % (ref 4–5.6)
MICROALBUMIN UR-MCNC: 134 UG/ML (ref ?–5000)
TSH SERPL-ACNC: 1.45 UIU/ML (ref 0.4–4)

## 2025-07-10 PROCEDURE — 82306 VITAMIN D 25 HYDROXY: CPT

## 2025-07-10 PROCEDURE — 84443 ASSAY THYROID STIM HORMONE: CPT

## 2025-07-10 PROCEDURE — 82043 UR ALBUMIN QUANTITATIVE: CPT

## 2025-07-10 PROCEDURE — 36415 COLL VENOUS BLD VENIPUNCTURE: CPT | Mod: PO

## 2025-07-10 PROCEDURE — 83036 HEMOGLOBIN GLYCOSYLATED A1C: CPT

## 2025-07-13 ENCOUNTER — PATIENT MESSAGE (OUTPATIENT)
Dept: INTERNAL MEDICINE | Facility: CLINIC | Age: 70
End: 2025-07-13
Payer: MEDICARE

## 2025-07-13 DIAGNOSIS — M80.021D: Primary | ICD-10-CM

## 2025-07-13 RX ORDER — SODIUM CHLORIDE 0.9 % (FLUSH) 0.9 %
10 SYRINGE (ML) INJECTION
OUTPATIENT
Start: 2025-07-14

## 2025-07-13 RX ORDER — HEPARIN 100 UNIT/ML
500 SYRINGE INTRAVENOUS
OUTPATIENT
Start: 2025-07-14

## 2025-07-13 RX ORDER — ZOLEDRONIC ACID 5 MG/100ML
5 INJECTION, SOLUTION INTRAVENOUS
OUTPATIENT
Start: 2025-07-14

## 2025-07-14 ENCOUNTER — OFFICE VISIT (OUTPATIENT)
Dept: SLEEP MEDICINE | Facility: CLINIC | Age: 70
End: 2025-07-14
Attending: PSYCHIATRY & NEUROLOGY
Payer: MEDICARE

## 2025-07-14 ENCOUNTER — TELEPHONE (OUTPATIENT)
Dept: INTERNAL MEDICINE | Facility: CLINIC | Age: 70
End: 2025-07-14
Payer: MEDICARE

## 2025-07-14 VITALS
HEIGHT: 65 IN | SYSTOLIC BLOOD PRESSURE: 122 MMHG | DIASTOLIC BLOOD PRESSURE: 73 MMHG | BODY MASS INDEX: 26.38 KG/M2 | HEART RATE: 74 BPM | WEIGHT: 158.31 LBS

## 2025-07-14 DIAGNOSIS — G47.30 SLEEP APNEA, UNSPECIFIED TYPE: Primary | ICD-10-CM

## 2025-07-14 PROCEDURE — 4010F ACE/ARB THERAPY RXD/TAKEN: CPT | Mod: CPTII,S$GLB,, | Performed by: PSYCHIATRY & NEUROLOGY

## 2025-07-14 PROCEDURE — 3066F NEPHROPATHY DOC TX: CPT | Mod: CPTII,S$GLB,, | Performed by: PSYCHIATRY & NEUROLOGY

## 2025-07-14 PROCEDURE — 3288F FALL RISK ASSESSMENT DOCD: CPT | Mod: CPTII,S$GLB,, | Performed by: PSYCHIATRY & NEUROLOGY

## 2025-07-14 PROCEDURE — 3074F SYST BP LT 130 MM HG: CPT | Mod: CPTII,S$GLB,, | Performed by: PSYCHIATRY & NEUROLOGY

## 2025-07-14 PROCEDURE — 3008F BODY MASS INDEX DOCD: CPT | Mod: CPTII,S$GLB,, | Performed by: PSYCHIATRY & NEUROLOGY

## 2025-07-14 PROCEDURE — 99999 PR PBB SHADOW E&M-EST. PATIENT-LVL II: CPT | Mod: PBBFAC,,, | Performed by: PSYCHIATRY & NEUROLOGY

## 2025-07-14 PROCEDURE — 1101F PT FALLS ASSESS-DOCD LE1/YR: CPT | Mod: CPTII,S$GLB,, | Performed by: PSYCHIATRY & NEUROLOGY

## 2025-07-14 PROCEDURE — 1126F AMNT PAIN NOTED NONE PRSNT: CPT | Mod: CPTII,S$GLB,, | Performed by: PSYCHIATRY & NEUROLOGY

## 2025-07-14 PROCEDURE — 3060F POS MICROALBUMINURIA REV: CPT | Mod: CPTII,S$GLB,, | Performed by: PSYCHIATRY & NEUROLOGY

## 2025-07-14 PROCEDURE — 99214 OFFICE O/P EST MOD 30 MIN: CPT | Mod: S$GLB,,, | Performed by: PSYCHIATRY & NEUROLOGY

## 2025-07-14 PROCEDURE — 3051F HG A1C>EQUAL 7.0%<8.0%: CPT | Mod: CPTII,S$GLB,, | Performed by: PSYCHIATRY & NEUROLOGY

## 2025-07-14 PROCEDURE — 3078F DIAST BP <80 MM HG: CPT | Mod: CPTII,S$GLB,, | Performed by: PSYCHIATRY & NEUROLOGY

## 2025-07-14 NOTE — TELEPHONE ENCOUNTER
Spoke with pt's  and agreed to schedule appointment for consent of Reclast infusions on July 21st at 10am. Need override

## 2025-07-14 NOTE — TELEPHONE ENCOUNTER
"----- Message from Jose Rojas MD sent at 7/14/2025  9:28 AM CDT -----  Can you please schedule the patient for a visit to obtain consent for treatment?  Thank you,  Dr. Rojas  ----- Message -----  From: Chikis Crowe MA  Sent: 7/14/2025   9:26 AM CDT  To: Jose Rojas MD; [facility] Staff      The attached patient has been approved for Reclast but the required consent has not yet been uploaded.  To avoid any delays in scheduling, please obtain and UPLOAD the infusion consent so that we can begin the process of scheduling the medication. This may be most convenient if the consent is obtained upon ordering the infusions while the patient is at your appointment, but a telephone consent can be obtained as outlined below.  ________________________________________________________________________     According to [place] state law ([place] RS 40:1157.1) and [facility] Policy OHS.MED.033 (Informed Consent), all patients receiving infusions should have an informed consent initiated by the ordering provider. The consent form has been revised to include all types of infusions and can be found by following these steps:      · WebLink Resources in the Epic Drop-down (My Toolbar Default Items)  · Physicians  · Consent/Refusal of Treatment (you can pin this to your toolbar or drop-down menu for future reference)  · Search "1873" - or -  Scroll to find: 1873 Non-Chemotherapy Ambulatory Infusion Consent (page 3) - print and fill in the medication specific blanks    E-consents can also be obtained by opening the consent tab and clicking the "E-signature Consent" box and searching "[id number]" to pull up the 1873 Non-Chemotherapy Ambulatory Infusion Consent. This may be the best option if you have an I-pad in clinic or will be sending via [facility] for signature.  ________________________________________________________________________    If the patient is not able to return to your office, a telephone consent is " "compliant with verification of patient by patient spelling their first and last name and stating their date of birth. Telephone consents require the provider's signature (as usual) with two staff members to witness the telephone consent procedure.     This should be uploaded to "Media" under "Consents" and labelled "Infusion Consent" with the name of the medication.     For issues or concerns, please reply to [facility] Ambulatory Infusion Clinical Support or call ext [phone] or we can be reached immediately using Epic Secure chat: [facility] Ambulatory Infusion Staff (group).    We also have a staff email: [email or web address]  "

## 2025-07-14 NOTE — PROGRESS NOTES
7/10/2025     9:16 AM 2023     9:20 PM 2021     7:32 AM 2021    10:55 AM 2021     3:37 PM   EPWORTH SLEEPINESS SCALE TOTAL SCORE    Total score 8  13 11 9 7       Patient-reported       Riana Kay is a 70 y.o. female seen today for APAP  follow up. Last seen on 2023.      Current treatment:     []  CPAP [x] APAP [] BPAP [] AutoBPAP [] ASV [] No previous PAP  [] Supplemental oxygen   [] None        Interrogation:  mrpapmachine : Resmed Airsense 11:  6--18  Riana Kay 2025 - 2025 Patient ID: [id number] : 1955 Age: 70 years [employer] [address] [facility], [address] Compliance Report Compliance Payor Standard Usage 2025 - 2025 Usage days 22/30 days (73%) >= 4 hours 17 days (57%) < 4 hours 5 days (17%) Usage hours 128 hours 4 minutes Average usage (total days) 4 hours 16 minutes Average usage (days used) 5 hours 49 minutes Median usage (days used) 6 hours 9 minutes Total used hours (value since last reset - 2025) 6,916 hours AirSense 10 AutoSet Serial number [id number] Mode AutoSet Min Pressure 6 cmH2O Max Pressure 18 cmH2O EPR Fulltime EPR level 3 Response Standard Therapy Pressure - cmH2O Median: 8.2 95th percentile: 10.1 Maximum: 11.0 Leaks - L/min Median: 0.7 95th percentile: 10.7 Maximum: 23.0 Events per hour AI: 2.6 HI: 0.4 AHI: 3.0 Apnea Index Central: 0.8 Obstructive: 1.8 Unknown:    AHI improved from last time to 34- (from 5-6) once the pressure was increased from the original default range.       Using CPAP with  [] Chinstrap [] Mouthtape [] Heated Hose [] Regular Hose [] CPAP Hose Cover       Compliance:      [x] Met [] Failed initial  compliance      [x]  Remains [] Does not stay compliant with PAP therapy      Compliance with the treatment has been [x] Improving [] deteriorating       Response to CPAP Therapy:    Tolerating positive airway pressure [] Well [] Poorly     Possible obstacles to  using CPAP:    Aerophagia                      yes []   no  [x]                Breakthrough Snoring   yes []   no [x]    Pressure Intolerance      yes []   no  [x]               Air Hunger                      yes []   no  [x]    Dry Mouth                      yes [x]   no  []               Nasal dryness                yes []   no  [x]    Condensation                 yes []   no  [x]    Mask Discomfort            yes []   no  [x]              Mask Leaks                    yes []    no  [x]                                             Mouth Leaks:  yes []   no  [x]     On positive airway pressure therapy, the patient experienced:                                 improved  Sleep continuity [x] Yes [] No  improved  Daytime Sleepiness [x] Yes [] No  improved   Fatigue [x] Yes [] No     Nocturia - now 1-2 times    The PAP therapy was provided by ZHAO ZIMMERMAN; met compliance      INTERVAL HISTORY:    2023:            Riana Kay is a 70 y.o. female seen today for CPAP  follow up. Last seen on 2022.    Recently URI - resuming CPAP now; No congesion.  Nod oronasal dryness. No breatk through Snoring. Higher AHI today,.     The patient reports improved sleep continuity and daytime sleepiness on PAP. ESS today is `.  Denies break through snoring.  No dry mouth. No aerophagia or air hunger. No significant mask leaks and discomfort.    Resmed 10 ochme  Likes her Dreamwear  mask    Riana Kay 2023 - 2023 Patient ID: [id number] : 1955 Age: 68 years [employer] [address] [facility], [address] Compliance Report Compliance Payor Standard Usage 2023 - 2023 Usage days 9/30 days (30%) >= 4 hours 8 days (27%) < 4 hours 1 days (3%) Usage hours 57 hours 53 minutes Average usage (total days) 1 hours 56 minutes Average usage (days used) 6 hours 26 minutes Median usage (days used) 7 hours 7 minutes Total used hours (value since last reset - 2023) 4,699  "hours AirSense 10 AutoSet Serial number [id number] Mode AutoSet Min Pressure 5 cmH2O Max Pressure 18 cmH2O EPR Fulltime EPR level 3 Response Standard Therapy Pressure - cmH2O Median: 9.8 95th percentile: 11.7 Maximum: 12.9 Leaks - L/min Median: 0.7 95th percentile: 14.7 Maximum: 26.8 Events per hour AI: 4.6 HI: 0.3 AHI: 4.9 Apnea Index Central: 0.1 Obstructive: 4.4 Unknown: 0.0 RERA Index 0.2 Cheyne-Gonzales respiration (average duration per night) 0 minutes (0%          Bedtime: 10:30  Sleep latency: 30min  Nocturnal awakenings:1-2 to go to the bathroom Returns to sleep in right away  Wake up time: 7 AM    Medications pertinent to sleep disorders: Alek silvino  DME: [facility] Resmed 10 fiven in spring 2021  SLEEP STUDIES: : 5/8/21: Mild to moderate snoring was present. The overall AHI was 29.8 with an oxygen marbella of 70.0%. The supine AHI was 43.1 and the REM AHI was 40.9. The patient did not qualify for a split night study due to an insufficient number of events in the first half of the study. RDI (Respiratory Disturbance Index) was 43.     Medications pertinent to sleep  disorders taken currently: Flonase, Asteline        Occupation:retired  Bed partner: her   Exercise routine: exercise - knee replacement makes it hard  Caffeine: AM coffee  Alcohol: No  Smoking:No        7/10/2025     9:16 AM 12/17/2023     9:20 PM 8/6/2021     7:32 AM 5/18/2021    10:55 AM 4/9/2021     3:37 PM   EPWORTH SLEEPINESS SCALE TOTAL SCORE    Total score 8  13 11 9 7       Patient-reported                        PHYSICAL EXAM:  /73 (BP Location: Left arm, Patient Position: Sitting)   Pulse 74   Ht 5' 5" (1.651 m)   Wt 71.8 kg (158 lb 4.8 oz)   BMI 26.34 kg/m²         ASSESSMENT:    1. ZAHIRA - severe. The patient symptomatically has excessive daytime sleepiness, excessive daytime fatigue, snoring and interrupted sleep with exam findings of "a crowded oral airway and elevated body mass index. The patient has medical " co-morbidities of diabetes,  which can be worsened by ZAHIRA. This warrants treatment. Benefiting from CPAP use in terms of sleep continuity and daytime sleepiness. Met compliance after initial struggles. AHI improved from last time to 34- (from 5-6) once the pressure was increased from the original default range.       PLAN:    Continue  auto CPAP 6-18 - will reorder supplies.  Compliance was reinforced. She was reminded of Medicare criteria.     Will follw up in 3 months - will schedule the follow up.     Education: During our discussion today, we talked about the etiology of obstructive sleep apnea as well as the potential ramifications of untreated sleep apnea, which could include daytime sleepiness, hypertension, heart disease and/or stroke.      We discussed potential treatment options, which could include weight loss, body positioning, continuous positive airway pressure (CPAP), or referral for surgical consideration. The patient preferred CPAP option.       Regular replacement of CPAP mask, tubing and filter was recommended.    The patient was given open opportunity to ask questions and/or express concerns about treatment plan. Two point patient identifier confirmed.     Precautions: The patient was advised to abstain from driving should he feel sleepy or drowsy.    Follow up: me after 31 days  of PAP use.  31-minute visit. >50% spent counseling patient and coordination of care.  You are advised to abstain from driving should you feel sleepy or drowsy.

## 2025-07-16 NOTE — PROGRESS NOTES
CC: This 70 y.o.  female presents for management of type 2 dm along with the current chronic medical conditions including:  Patient Active Problem List   Diagnosis    Adjustment disorder with mixed anxiety and depressed mood    Fatty liver    Major neurocognitive disorder    Pathological fracture of right humerus due to osteoporosis with routine healing    Gastroesophageal reflux disease without esophagitis    Mixed diabetic hyperlipidemia associated with type 2 diabetes mellitus    Hypertension associated with type 2 diabetes mellitus    ZAHIRA (obstructive sleep apnea)    Atherosclerosis of native coronary artery of native heart without angina pectoris    Muscle spasms of neck    Cervical radiculopathy    Overweight (BMI 25.0-29.9)    Type 2 diabetes mellitus with hyperglycemia, with long-term current use of insulin    Decreased strength of lower extremity    Vitamin D deficiency    Chronic right shoulder pain    Primary osteoarthritis of left knee    Aortic atherosclerosis    Closed fracture of proximal end of right humerus with routine healing s/p total shoulder replacement on 8/16/2022    Anterior dislocation of right shoulder    Carotid arterial disease on ct dated  8/14/2022    Hiatal hernia    Gastroparesis due to DM    Chronic constipation    Impaired functional mobility, balance, gait, and endurance    Cognitive impairment    Syncope and collapse      Status of these conditions is pending review.    Has fatty liver, HTN, overweight, osteopenia, coronary atherosclerosis, gout, arthritic pain (L) knee, ankle-f/u with ortho periodically     HPI: Diagnosed with T2DM x > 20 years ago.   Started insulin in 2006.   Seen by Dr. Morse in the past- c peptide- revealed low level -hair   Watch trends with hair in near future.   Last seen by me early 2025.  Being sen by me again today.  5/12/25 cervical, spine diskectomy -recovered well.     Recent syncopal episode/ground fall 2/16/25, work up with  cardiology.  Stress test scheduled, negative.  A1c is stable.   Lab Results   Component Value Date    HGBA1C 7.0 (H) 07/10/2025   Recent colonoscopy, biopsy, benign polyps.     Accompanied by .  Memory loss issues at times.  F/u with GI, had issues with ozempic, 2023.   Tolerating mounjaro, did okay with trulicity.   Not as active.   Podiatry- 11/8/2023    Dietary habits:  3 meals a day   Cup of coffee-milk, splenda, toast (1) or  Sometimes cereal frosted or corn flakes/milk  Soup, veggies, chicken-fried or stir vivar   Dinner: skips at times   Occ snacks-chips, cookies, crackers/coffee, salad   Fruits  Drinks: water, occ oj, unsweetened tea    Not interested in cgm   On MDI injections 4x a day   Testing 4 x a day  Patient is willing and able to use the device  Demonstrated an understanding of the technology and is motivated to use CGM  Patient expected to adhere to a comprehensive diabetes treatment plan and patient has adequate medical supervision  Patient experiences multiple impaired awareness of hypoglycemia (hypoglycemia unawareness)    True metrix  Has h/o hypoglycemia 39 mg/dl    (R)  Knee replacement sx 10/6/2020  Injury to right shoulder, surgery in august 2022     Of note, , retired, fall in 2015, injured (R) elbow  Off metformin related to kidneys.    CURRENT DM MEDS:   lantus 25-30 units qhs, lispro 8-10  units ac  with mod dose correction scale, starting at 180, mounjaro 5 mg weekly     Social Hx/personal Hx: , work-housekeeping, 1 son (28 y/o)    Riana Baker Ng forgot glucometer/logs today    DIET/ MEAL PATTERN: see above , great toenail (sx)     EXERCISE: no formal; does yardwork     STANDARDS OF CARE:     Diabetes Management Status    Statin: Not taking  ACE/ARB: Taking    Screening or Prevention Patient's value Goal Complete/Controlled?   HgA1C Testing and Control   Lab Results   Component Value Date    HGBA1C 7.0 (H) 07/10/2025      Annually/Less than 8% Yes   Lipid  profile : 12/13/2024 Annually Yes   LDL control Lab Results   Component Value Date    LDLCALC 85.6 12/13/2024    Annually/Less than 100 mg/dl  Yes   Nephropathy screening Lab Results   Component Value Date    LABMICR 134.0 07/10/2025     Lab Results   Component Value Date    PROTEINUA Negative 02/17/2025    Annually Yes   Blood pressure BP Readings from Last 1 Encounters:   07/14/25 122/73    Less than 140/90 Yes   Dilated retinal exam : 01/07/2025 Annually Yes   Foot exam   : 07/01/2025 Annually No       ROS:   GEN: +chronic fatigue or weakness, no changes w/ appetite, wt loss 3#  CV:  Denies chest pain, palpitations, edema or cyanosis,+ syncope  SKIN: Skin is intact and heals well, no rashes, pruritis, easy bruising, no hair changes, no intolerance to heat/cold.  RESP: No SOB, cough, FISCHER  FEN/GI: Nml bowel movements, normal appetite, +GERD, +nausea, no vomiting   RENAL: No urinary complaints, no dysuria/hematuia/oliguria   ENDO: no heat/cold intolerance  ID: No skin breakdown, fevers, chills  PSYCH: Denies drug/ETOH abuse, no hx. of eating disorders or depression +anxiety  MS/NEURO: Denies numbness/ tingling in BLE; +bilateral knee and ankle joint pain-(R) knee replacement 10/2020, (R) elbow pain -fall in 2015, surgery, (R) shoulder 8/2022 -weakness       Lab Results   Component Value Date    TSH 1.453 07/10/2025       Chemistry        Component Value Date/Time     02/17/2025 0010    K 4.1 02/17/2025 0010     02/17/2025 0010    CO2 23 02/17/2025 0010    BUN 13 02/17/2025 0010    CREATININE 0.8 02/17/2025 0010     (H) 02/17/2025 0010        Component Value Date/Time    CALCIUM 9.0 02/17/2025 0010    ALKPHOS 92 02/17/2025 0010    AST 29 02/17/2025 0010    ALT 24 02/17/2025 0010    BILITOT 0.3 02/17/2025 0010          Lab Results   Component Value Date    LDLCALC 85.6 12/13/2024       PE:  GEN: Patient WNWD, WF, NAD, AAOx3, Friendly, talkative, well groomed  HEENT: EOMI, PERRLA, no lid lag, Clear  oropharynx  NECK: trachea midline  CV: Regular rate and rhythm, no edema  RESP: No increase work of breathing, No cough, wheeze.  ABD: no tenderness EXT: No C/C/Edema, No skin rash/breakdown  NEURO: CN 2-12 intact, nml gait   FEET: No pressure areas, blisters, ulcers, calluses. Footware appropriate.      ASSESSMENT and PLAN:  Riana was seen today for diabetes mellitus.    Diagnoses and associated orders for this visit:  1. Type 2 diabetes mellitus with hyperglycemia, with long-term current use of insulin  Hemoglobin A1C in 4 months   F/u in 4 months   A1c goal less than 7.5%   Continue regimen   Less insulin use       2. Overweight (BMI 25.0-29.9)  This condition increases insulin resistance  Encourage exercise 3-5 x a week, goal 150 minutes or more/week  If limited, encourage chair exercises -via youtube  Movement is key, walk after meals 15-30 minutes   Watch portions, protein  gm /day, carbs  gm /day, more fiber  Hydrate well, at least 64 oz, half body weight (lbs) in ounces per day  Look at resources per AVS w/ apps/websites       3. ZAHIRA (obstructive sleep apnea)  F/u with sleep clinic recently   Pt is consistent w/ cpap  Stable   F/u with sleep clinic prn  Has all supplies  Being sure to clean cpap machine thoroughly -at least 1-2x a week  This helps bg, metabolism, and cardiovascular manifestations        4. Hypertension associated with type 2 diabetes mellitus  Pt is on arb or acei  Managed per pcp   Stable   Controlled bp helps against microalbuminuria, cardiovascular manifestations        5. Impaired functional mobility, balance, gait, and endurance  Doing better after surgery   Stable   May affect insulin resistance       6. Mixed diabetic hyperlipidemia associated with type 2 diabetes mellitus  Pt is on statin  Goal less than 100 for ldl  Encourage pt to eat more whole grains, fiber  Eat more omega-3 via grilled/baked fish  Exercise more, 3-5 x a week at least 150 mins        7.  Gastroparesis due to DM  Doing well with mounjaro  Has seen gi in the past      8. Fatty liver  Mounjaro is beneficial   F/u with hepatology prn       9. Adjustment disorder with mixed anxiety and depressed mood  On bupropion   Stable   Affects mood, routine, motivation      10. Bilateral carotid artery stenosis  F/u with cards   Stable

## 2025-07-17 ENCOUNTER — OFFICE VISIT (OUTPATIENT)
Dept: INTERNAL MEDICINE | Facility: CLINIC | Age: 70
End: 2025-07-17
Payer: MEDICARE

## 2025-07-17 VITALS
HEART RATE: 72 BPM | OXYGEN SATURATION: 100 % | WEIGHT: 157.44 LBS | DIASTOLIC BLOOD PRESSURE: 62 MMHG | HEIGHT: 65 IN | SYSTOLIC BLOOD PRESSURE: 124 MMHG | BODY MASS INDEX: 26.23 KG/M2

## 2025-07-17 DIAGNOSIS — K76.0 FATTY LIVER: ICD-10-CM

## 2025-07-17 DIAGNOSIS — Z74.09 IMPAIRED FUNCTIONAL MOBILITY, BALANCE, GAIT, AND ENDURANCE: ICD-10-CM

## 2025-07-17 DIAGNOSIS — E11.65 TYPE 2 DIABETES MELLITUS WITH HYPERGLYCEMIA, WITH LONG-TERM CURRENT USE OF INSULIN: Primary | ICD-10-CM

## 2025-07-17 DIAGNOSIS — E66.3 OVERWEIGHT (BMI 25.0-29.9): ICD-10-CM

## 2025-07-17 DIAGNOSIS — I15.2 HYPERTENSION ASSOCIATED WITH TYPE 2 DIABETES MELLITUS: ICD-10-CM

## 2025-07-17 DIAGNOSIS — G47.33 OSA (OBSTRUCTIVE SLEEP APNEA): ICD-10-CM

## 2025-07-17 DIAGNOSIS — Z79.4 TYPE 2 DIABETES MELLITUS WITH HYPERGLYCEMIA, WITH LONG-TERM CURRENT USE OF INSULIN: Primary | ICD-10-CM

## 2025-07-17 DIAGNOSIS — I65.23 BILATERAL CAROTID ARTERY STENOSIS: ICD-10-CM

## 2025-07-17 DIAGNOSIS — E11.43 GASTROPARESIS DUE TO DM: ICD-10-CM

## 2025-07-17 DIAGNOSIS — E11.59 HYPERTENSION ASSOCIATED WITH TYPE 2 DIABETES MELLITUS: ICD-10-CM

## 2025-07-17 DIAGNOSIS — E11.69 MIXED DIABETIC HYPERLIPIDEMIA ASSOCIATED WITH TYPE 2 DIABETES MELLITUS: ICD-10-CM

## 2025-07-17 DIAGNOSIS — E78.2 MIXED DIABETIC HYPERLIPIDEMIA ASSOCIATED WITH TYPE 2 DIABETES MELLITUS: ICD-10-CM

## 2025-07-17 DIAGNOSIS — K31.84 GASTROPARESIS DUE TO DM: ICD-10-CM

## 2025-07-17 DIAGNOSIS — F43.23 ADJUSTMENT DISORDER WITH MIXED ANXIETY AND DEPRESSED MOOD: ICD-10-CM

## 2025-07-17 PROCEDURE — 3008F BODY MASS INDEX DOCD: CPT | Mod: CPTII,S$GLB,, | Performed by: NURSE PRACTITIONER

## 2025-07-17 PROCEDURE — 1160F RVW MEDS BY RX/DR IN RCRD: CPT | Mod: CPTII,S$GLB,, | Performed by: NURSE PRACTITIONER

## 2025-07-17 PROCEDURE — 1101F PT FALLS ASSESS-DOCD LE1/YR: CPT | Mod: CPTII,S$GLB,, | Performed by: NURSE PRACTITIONER

## 2025-07-17 PROCEDURE — 3074F SYST BP LT 130 MM HG: CPT | Mod: CPTII,S$GLB,, | Performed by: NURSE PRACTITIONER

## 2025-07-17 PROCEDURE — 3078F DIAST BP <80 MM HG: CPT | Mod: CPTII,S$GLB,, | Performed by: NURSE PRACTITIONER

## 2025-07-17 PROCEDURE — 99999 PR PBB SHADOW E&M-EST. PATIENT-LVL V: CPT | Mod: PBBFAC,,, | Performed by: NURSE PRACTITIONER

## 2025-07-17 PROCEDURE — 1159F MED LIST DOCD IN RCRD: CPT | Mod: CPTII,S$GLB,, | Performed by: NURSE PRACTITIONER

## 2025-07-17 PROCEDURE — 1126F AMNT PAIN NOTED NONE PRSNT: CPT | Mod: CPTII,S$GLB,, | Performed by: NURSE PRACTITIONER

## 2025-07-17 PROCEDURE — 3066F NEPHROPATHY DOC TX: CPT | Mod: CPTII,S$GLB,, | Performed by: NURSE PRACTITIONER

## 2025-07-17 PROCEDURE — 3060F POS MICROALBUMINURIA REV: CPT | Mod: CPTII,S$GLB,, | Performed by: NURSE PRACTITIONER

## 2025-07-17 PROCEDURE — 3051F HG A1C>EQUAL 7.0%<8.0%: CPT | Mod: CPTII,S$GLB,, | Performed by: NURSE PRACTITIONER

## 2025-07-17 PROCEDURE — 3288F FALL RISK ASSESSMENT DOCD: CPT | Mod: CPTII,S$GLB,, | Performed by: NURSE PRACTITIONER

## 2025-07-17 PROCEDURE — 4010F ACE/ARB THERAPY RXD/TAKEN: CPT | Mod: CPTII,S$GLB,, | Performed by: NURSE PRACTITIONER

## 2025-07-17 PROCEDURE — 99214 OFFICE O/P EST MOD 30 MIN: CPT | Mod: S$GLB,,, | Performed by: NURSE PRACTITIONER

## 2025-07-17 RX ORDER — INSULIN LISPRO 100 [IU]/ML
INJECTION, SOLUTION INTRAVENOUS; SUBCUTANEOUS
Start: 2025-07-17

## 2025-07-17 RX ORDER — INSULIN GLARGINE 100 [IU]/ML
INJECTION, SOLUTION SUBCUTANEOUS
Start: 2025-07-17

## 2025-07-17 NOTE — PATIENT INSTRUCTIONS
Follow up in 4 months w/Irielle  A1c urine mac in 4 months     Lab Results   Component Value Date    HGBA1C 7.0 (H) 07/10/2025       Diabetes Medications/Plan    Insulins:  Lantus 25-30 units at night   Humalog 10 units before meals   180-230+2, 231-280+4, 281-330+6, 331-380+8, >380+10     Weekly injection:   Mounjaro 5 mg weekly       Goal sugar levels:  no higher than 180   Fasting/a.m. sugar goal: < 130   Goal A1c < 7%, average 154 mg/dl or less  Call or message me if consistently have sugar levels > 250 mg/dl, placed on steroids or for sick days.

## 2025-07-21 ENCOUNTER — OFFICE VISIT (OUTPATIENT)
Dept: INTERNAL MEDICINE | Facility: CLINIC | Age: 70
End: 2025-07-21
Payer: MEDICARE

## 2025-07-21 VITALS
OXYGEN SATURATION: 98 % | HEART RATE: 72 BPM | DIASTOLIC BLOOD PRESSURE: 64 MMHG | TEMPERATURE: 97 F | BODY MASS INDEX: 25.75 KG/M2 | SYSTOLIC BLOOD PRESSURE: 116 MMHG | WEIGHT: 154.56 LBS | RESPIRATION RATE: 18 BRPM | HEIGHT: 65 IN

## 2025-07-21 DIAGNOSIS — M80.021D: Primary | ICD-10-CM

## 2025-07-21 PROCEDURE — 3008F BODY MASS INDEX DOCD: CPT | Mod: CPTII,S$GLB,, | Performed by: FAMILY MEDICINE

## 2025-07-21 PROCEDURE — 3288F FALL RISK ASSESSMENT DOCD: CPT | Mod: CPTII,S$GLB,, | Performed by: FAMILY MEDICINE

## 2025-07-21 PROCEDURE — 1101F PT FALLS ASSESS-DOCD LE1/YR: CPT | Mod: CPTII,S$GLB,, | Performed by: FAMILY MEDICINE

## 2025-07-21 PROCEDURE — 3074F SYST BP LT 130 MM HG: CPT | Mod: CPTII,S$GLB,, | Performed by: FAMILY MEDICINE

## 2025-07-21 PROCEDURE — 4010F ACE/ARB THERAPY RXD/TAKEN: CPT | Mod: CPTII,S$GLB,, | Performed by: FAMILY MEDICINE

## 2025-07-21 PROCEDURE — 1159F MED LIST DOCD IN RCRD: CPT | Mod: CPTII,S$GLB,, | Performed by: FAMILY MEDICINE

## 2025-07-21 PROCEDURE — 3060F POS MICROALBUMINURIA REV: CPT | Mod: CPTII,S$GLB,, | Performed by: FAMILY MEDICINE

## 2025-07-21 PROCEDURE — G2211 COMPLEX E/M VISIT ADD ON: HCPCS | Mod: S$GLB,,, | Performed by: FAMILY MEDICINE

## 2025-07-21 PROCEDURE — 1126F AMNT PAIN NOTED NONE PRSNT: CPT | Mod: CPTII,S$GLB,, | Performed by: FAMILY MEDICINE

## 2025-07-21 PROCEDURE — 99999 PR PBB SHADOW E&M-EST. PATIENT-LVL V: CPT | Mod: PBBFAC,,, | Performed by: FAMILY MEDICINE

## 2025-07-21 PROCEDURE — 99214 OFFICE O/P EST MOD 30 MIN: CPT | Mod: S$GLB,,, | Performed by: FAMILY MEDICINE

## 2025-07-21 PROCEDURE — 1160F RVW MEDS BY RX/DR IN RCRD: CPT | Mod: CPTII,S$GLB,, | Performed by: FAMILY MEDICINE

## 2025-07-21 PROCEDURE — 3078F DIAST BP <80 MM HG: CPT | Mod: CPTII,S$GLB,, | Performed by: FAMILY MEDICINE

## 2025-07-21 PROCEDURE — 3051F HG A1C>EQUAL 7.0%<8.0%: CPT | Mod: CPTII,S$GLB,, | Performed by: FAMILY MEDICINE

## 2025-07-21 PROCEDURE — 3066F NEPHROPATHY DOC TX: CPT | Mod: CPTII,S$GLB,, | Performed by: FAMILY MEDICINE

## 2025-07-21 NOTE — PROGRESS NOTES
Subjective     Patient ID: Riana Kay is a 70 y.o. female.    Chief Complaint: paper work (Infusion consent)    HPI 70-year-old female with a previous history of right humerus fracture at 67 and elbow fracture at 60 presents to clinic today accompanied by her  for discussion of treatment for osteoporosis.  She has not previously been on treatment.  Bone density exam was performed on July 3rd and revealed osteoporosis with previous fragility fractures.  Secondary to a high fracture risk it was recommended that the patient be started on treatment with anabolic agents.  Use of Prolia or Reclast have been discussed.  Risks and benefits have been discussed and all patient's questions have been answered.  Consent forms have been signed.  Review of Systems   Constitutional:  Negative for appetite change, chills, fatigue and fever.   HENT:  Negative for nasal congestion, ear pain, hearing loss, postnasal drip, rhinorrhea, sinus pressure/congestion, sore throat and tinnitus.    Eyes:  Negative for redness, itching and visual disturbance.   Respiratory:  Negative for cough, chest tightness and shortness of breath.    Cardiovascular:  Negative for chest pain and palpitations.   Gastrointestinal:  Negative for abdominal pain, constipation, diarrhea, nausea and vomiting.   Genitourinary:  Negative for decreased urine volume, difficulty urinating, dysuria, frequency, hematuria and urgency.   Musculoskeletal:  Negative for back pain, myalgias, neck pain and neck stiffness.   Integumentary:  Negative for rash.   Neurological:  Negative for dizziness, light-headedness and headaches.   Psychiatric/Behavioral: Negative.            Objective     Physical Exam  Constitutional:       Appearance: Normal appearance.   HENT:      Head: Normocephalic and atraumatic.   Pulmonary:      Effort: Pulmonary effort is normal.   Neurological:      Mental Status: She is alert and oriented to person, place, and time.   Psychiatric:          Mood and Affect: Mood normal.         Behavior: Behavior normal.         Thought Content: Thought content normal.         Judgment: Judgment normal.            Assessment and Plan     1. Pathological fracture of right humerus due to age-related osteoporosis with routine healing, subsequent encounter        1. Consent forms have been signed to start treatment with Reclast or Prolia secondary to osteoporosis.  2. Return to clinic as needed or in 1 month for annual physical exam.               Follow up in about 1 month (around 8/21/2025), or if symptoms worsen or fail to improve, for Annual exam.

## 2025-07-22 ENCOUNTER — TELEPHONE (OUTPATIENT)
Dept: INFECTIOUS DISEASES | Facility: HOSPITAL | Age: 70
End: 2025-07-22
Payer: MEDICARE

## 2025-07-22 NOTE — PROGRESS NOTES
Subjective:       Patient ID: Riana Kay is a 64 y.o. female.    Chief Complaint: Hip Pain    HPI 64-year-old female presents to clinic today accompanied by her  secondary to a complaint of left leg pain. She reports the pain to left buttocks with radiation into her left leg which has been progressive over the past 2 weeks.  She reports the pain began after working in her garden pulling weeds.  She states that the pain is worse with sitting and is better with walking or laying flat.  She has attempted use of Tylenol and ibuprofen with minimal relief.  She rates her pain at a 10/10.  Review of Systems   Constitutional: Negative for activity change, appetite change, chills, fatigue, fever and unexpected weight change.   HENT: Negative for congestion, ear pain, hearing loss, postnasal drip, rhinorrhea, sinus pressure, sore throat, tinnitus and trouble swallowing.    Eyes: Negative for discharge, redness, itching and visual disturbance.   Respiratory: Negative for cough, chest tightness, shortness of breath and wheezing.    Cardiovascular: Negative for chest pain and palpitations.   Gastrointestinal: Negative for abdominal pain, blood in stool, constipation, diarrhea, nausea and vomiting.   Endocrine: Negative for polydipsia and polyuria.   Genitourinary: Negative for decreased urine volume, difficulty urinating, dysuria, frequency, hematuria, menstrual problem and urgency.   Musculoskeletal: Positive for arthralgias and back pain (Left lower back with radiation into the left leg). Negative for joint swelling, myalgias, neck pain and neck stiffness.   Skin: Negative for rash.   Neurological: Negative for dizziness, weakness, light-headedness and headaches.   Psychiatric/Behavioral: Negative.  Negative for confusion and dysphoric mood.       Objective:      Physical Exam   Constitutional: She is oriented to person, place, and time. She appears well-developed and well-nourished. No distress.   HENT:    Head: Normocephalic and atraumatic.   Right Ear: External ear normal.   Left Ear: External ear normal.   Nose: Nose normal.   Mouth/Throat: Oropharynx is clear and moist. No oropharyngeal exudate.   Eyes: Pupils are equal, round, and reactive to light. Conjunctivae and EOM are normal. Right eye exhibits no discharge. Left eye exhibits no discharge. No scleral icterus.   Neck: Normal range of motion. Neck supple. No JVD present. No tracheal deviation present. No thyromegaly present.   Cardiovascular: Normal rate, regular rhythm, normal heart sounds and intact distal pulses. Exam reveals no gallop and no friction rub.   No murmur heard.  Pulmonary/Chest: Effort normal and breath sounds normal. No stridor. No respiratory distress. She has no wheezes. She has no rales.   Abdominal: Soft. Bowel sounds are normal. She exhibits no distension and no mass. There is no tenderness. There is no rebound and no guarding.   Musculoskeletal: She exhibits no edema.        Lumbar back: She exhibits decreased range of motion (Positive supine and sitting left straight leg), tenderness, pain and spasm.   Lymphadenopathy:     She has no cervical adenopathy.   Neurological: She is alert and oriented to person, place, and time.   Skin: Skin is warm and dry. No rash noted. She is not diaphoretic. No erythema. No pallor.   Psychiatric: She has a normal mood and affect. Her behavior is normal. Judgment and thought content normal.   Nursing note and vitals reviewed.      Assessment:       1. Left sided sciatica        Plan:       Left sided sciatica  -     meloxicam (MOBIC) 15 MG tablet; Take 1 tablet (15 mg total) by mouth once daily.  Dispense: 30 tablet; Refill: 0  -     methocarbamol (ROBAXIN) 750 MG Tab; Take 1 tablet (750 mg total) by mouth 4 (four) times daily as needed (Muscle strain).  Dispense: 40 tablet; Refill: 0  -     ketorolac injection 30 mg      Heat, massage, and stretching as discussed.  Return to clinic as needed if  symptoms persist or worsen.   today

## 2025-07-26 ENCOUNTER — OFFICE VISIT (OUTPATIENT)
Dept: URGENT CARE | Facility: CLINIC | Age: 70
End: 2025-07-26
Payer: MEDICARE

## 2025-07-26 VITALS
HEIGHT: 65 IN | SYSTOLIC BLOOD PRESSURE: 126 MMHG | TEMPERATURE: 98 F | WEIGHT: 154 LBS | OXYGEN SATURATION: 95 % | DIASTOLIC BLOOD PRESSURE: 76 MMHG | BODY MASS INDEX: 25.66 KG/M2 | RESPIRATION RATE: 18 BRPM | HEART RATE: 84 BPM

## 2025-07-26 DIAGNOSIS — R05.9 COUGH, UNSPECIFIED TYPE: ICD-10-CM

## 2025-07-26 DIAGNOSIS — U07.1 COVID-19: Primary | ICD-10-CM

## 2025-07-26 LAB
CTP QC/QA: YES
SARS-COV+SARS-COV-2 AG RESP QL IA.RAPID: POSITIVE

## 2025-07-26 PROCEDURE — 87811 SARS-COV-2 COVID19 W/OPTIC: CPT | Mod: QW,S$GLB,,

## 2025-07-26 PROCEDURE — 99214 OFFICE O/P EST MOD 30 MIN: CPT | Mod: S$GLB,,,

## 2025-07-26 RX ORDER — BENZONATATE 200 MG/1
200 CAPSULE ORAL 3 TIMES DAILY PRN
Qty: 30 CAPSULE | Refills: 0 | Status: SHIPPED | OUTPATIENT
Start: 2025-07-26 | End: 2025-08-01

## 2025-07-26 NOTE — PROGRESS NOTES
"Subjective:      Patient ID: Riana Kay is a 70 y.o. female.    Vitals:  height is 5' 5" (1.651 m) and weight is 69.9 kg (154 lb). Her oral temperature is 98.2 °F (36.8 °C). Her blood pressure is 126/76 and her pulse is 84. Her respiration is 18 and oxygen saturation is 95%.     Chief Complaint: Sinus Problem    This is a 70 y.o. female who presents today with a chief complaint of sinus problem. Symptoms started on Wednesday. Patient has a productive  cough, fatigue, body aches and dizziness.  Patient has been around her  who just tested positive for COVID on Thursday.  Patient denies taking anything for symptoms.      Sinus Problem  This is a new problem. The current episode started in the past 7 days. The problem has been gradually worsening since onset. Associated symptoms include chills, coughing and headaches. The treatment provided mild relief.       Constitution: Positive for chills.   Respiratory:  Positive for cough.    Neurological:  Positive for headaches.      Objective:     Physical Exam   Constitutional: She is oriented to person, place, and time. She appears well-developed. She is cooperative.  Non-toxic appearance. She does not appear ill. No distress.      Comments:Patient sits comfortably in exam chair. Answers questions in complete sentences. Does not show any signs of distress or discoloration.        HENT:   Head: Normocephalic and atraumatic.   Ears:   Right Ear: Hearing, tympanic membrane, external ear and ear canal normal. no impacted cerumen  Left Ear: Hearing, tympanic membrane, external ear and ear canal normal. no impacted cerumen  Nose: Rhinorrhea and congestion present. No mucosal edema or nasal deformity. No epistaxis. Right sinus exhibits no maxillary sinus tenderness and no frontal sinus tenderness. Left sinus exhibits no maxillary sinus tenderness and no frontal sinus tenderness.   Mouth/Throat: Uvula is midline, oropharynx is clear and moist and mucous membranes " are normal. No trismus in the jaw. Normal dentition. No uvula swelling. No oropharyngeal exudate, posterior oropharyngeal edema or posterior oropharyngeal erythema.   Eyes: Conjunctivae and lids are normal. No scleral icterus.   Neck: Trachea normal and phonation normal. Neck supple. No edema present. No erythema present. No neck rigidity present.   Cardiovascular: Normal rate, regular rhythm, normal heart sounds and normal pulses.   Pulmonary/Chest: Effort normal and breath sounds normal. No stridor. No respiratory distress. She has no decreased breath sounds. She has no wheezes. She has no rhonchi. She has no rales. She exhibits no tenderness.   Abdominal: Normal appearance.   Musculoskeletal: Normal range of motion.         General: No deformity. Normal range of motion.   Neurological: She is alert and oriented to person, place, and time. She exhibits normal muscle tone. Coordination normal.   Skin: Skin is warm, dry, intact, not diaphoretic and not pale.   Psychiatric: Her speech is normal and behavior is normal. Judgment and thought content normal.   Nursing note and vitals reviewed.    Results for orders placed or performed in visit on 07/26/25   SARS Coronavirus 2 Antigen, POCT Manual Read    Collection Time: 07/26/25  9:21 AM   Result Value Ref Range    SARS Coronavirus 2 Antigen Positive (A) Negative, Presumptive Negative     Acceptable Yes      *Note: Due to a large number of results and/or encounters for the requested time period, some results have not been displayed. A complete set of results can be found in Results Review.       Assessment:     1. COVID-19    2. Cough, unspecified type        Plan:   Patient declines Paxlovid, her  just tested positive for COVID and was prescribed Paxlovid and it was too expensive.    BP stable in clinic continue current regimen.    COVID-19    Cough, unspecified type  -     SARS Coronavirus 2 Antigen, POCT Manual Read  -     benzonatate (TESSALON)  200 MG capsule; Take 1 capsule (200 mg total) by mouth 3 (three) times daily as needed for Cough.  Dispense: 30 capsule; Refill: 0             Patient Instructions   You have tested positive for COVID-19 today.      You may return to normal activities when, for at least 24 hours, both are true:     Your symptoms are getting better overall, and:  You have not had a fever AND are not using fever reducing medication    Once you resume to normal activities, you are encouraged to take additional prevention strategies for the next 5 days to curb disease spread, such as taking more steps for  air, enhancing hygiene practices (washing hands frequently), wearing a well-fitting mask, and keeping a distance from others.     The CDC also recommend should you develop a fever or starts to feel worse after you have returned to normal activities, you should return home and away from others for at least another 24 hours.     - Rest.    - Drink plenty of fluids. Increasing your fluid intake will help loosen up mucous.  - Viral upper respiratory infections typically run their course in 10-14 days.      CONGESTION:  - You can take over-the-counter claritin, zyrtec, allegra, OR xyzal as directed. These are antihistamines that can help with runny nose, nasal congestion, sneezing, and helps to dry up post-nasal drip, which usually causes sore throat and cough.   - You can use Flonase (fluticasone) nasal spray as directed for sinus congestion and postnasal drip. This is a steroid nasal spray that works locally over time to decrease the inflammation in your nose/sinuses and help with allergic symptoms. This is not an quick- relief spray like afrin, but it works well if used daily.  Discontinue if you develop nose bleed  - Use nasal saline prior to Flonase.  - Use Ocean Spray Nasal Saline 1-3 puffs each nostril every 2-3 hours then blow out onto tissue. This is to irrigate the nasal passage way to clear the sinus openings. Use until  sinus problem resolved.  - A Neti Pot with sterile saline can help break up nasal congestion and give relief.     COUGH:  - You can take Delsym to help with cough.     SORE THROAT:   - Chloraseptic throat spray can help numb the throat.   - Warm salt water gargles can help with sore throat.  - Warm tea with honey can help with sore throat and cough. Honey is a natural cough suppressant.     - Rest.    - Drink plenty of fluids.    - Acetaminophen (tylenol) or Ibuprofen (advil,motrin) as directed as needed for fever/pain. Avoid tylenol if you have a history of liver disease. Do not take ibuprofen if you have a history of GI bleeding, kidney disease, or if you take blood thinners.   - Ibuprofen dosing for adults: 400 mg by mouth every 4-6 hours as needed. Max: 2400 mg/day; Info: use lowest effective dose, shortest effective treatment duration; give w/ food if GI upset occurs.  - Tylenol dosing for adults: [By mouth route, immediate-release form] Dose: 325-1000 mg by mouth every 4-6h as needed; Max: 1 g/4h and 4 g/day from all sources. [By mouth route, extended-release form] Dose: 650-1300 mg Extended Release by mouth every 8h as needed; Max: 4 g/day from all sources.     - You must understand that you have received an Urgent Care treatment only and that you may be released before all of your medical problems are known or treated.   - You, the patient, will arrange for follow up care as instructed.   - If your condition worsens or fails to improve we recommend that you receive another evaluation at the ER immediately or contact your PCP to discuss your concerns or return here.   - Follow up with your PCP or specialty clinic as directed in the next 1-2 weeks if not improved or as needed.  You can call [phone] to schedule an appointment with the appropriate provider.    If your symptoms do not improve or worsen, go to the emergency room immediately.

## 2025-07-26 NOTE — PATIENT INSTRUCTIONS
You have tested positive for COVID-19 today.      You may return to normal activities when, for at least 24 hours, both are true:     Your symptoms are getting better overall, and:  You have not had a fever AND are not using fever reducing medication    Once you resume to normal activities, you are encouraged to take additional prevention strategies for the next 5 days to curb disease spread, such as taking more steps for  air, enhancing hygiene practices (washing hands frequently), wearing a well-fitting mask, and keeping a distance from others.     The CDC also recommend should you develop a fever or starts to feel worse after you have returned to normal activities, you should return home and away from others for at least another 24 hours.     - Rest.    - Drink plenty of fluids. Increasing your fluid intake will help loosen up mucous.  - Viral upper respiratory infections typically run their course in 10-14 days.      CONGESTION:  - You can take over-the-counter claritin, zyrtec, allegra, OR xyzal as directed. These are antihistamines that can help with runny nose, nasal congestion, sneezing, and helps to dry up post-nasal drip, which usually causes sore throat and cough.   - You can use Flonase (fluticasone) nasal spray as directed for sinus congestion and postnasal drip. This is a steroid nasal spray that works locally over time to decrease the inflammation in your nose/sinuses and help with allergic symptoms. This is not an quick- relief spray like afrin, but it works well if used daily.  Discontinue if you develop nose bleed  - Use nasal saline prior to Flonase.  - Use Ocean Spray Nasal Saline 1-3 puffs each nostril every 2-3 hours then blow out onto tissue. This is to irrigate the nasal passage way to clear the sinus openings. Use until sinus problem resolved.  - A Neti Pot with sterile saline can help break up nasal congestion and give relief.     COUGH:  - You can take Delsym to help with cough.      SORE THROAT:   - Chloraseptic throat spray can help numb the throat.   - Warm salt water gargles can help with sore throat.  - Warm tea with honey can help with sore throat and cough. Honey is a natural cough suppressant.     - Rest.    - Drink plenty of fluids.    - Acetaminophen (tylenol) or Ibuprofen (advil,motrin) as directed as needed for fever/pain. Avoid tylenol if you have a history of liver disease. Do not take ibuprofen if you have a history of GI bleeding, kidney disease, or if you take blood thinners.   - Ibuprofen dosing for adults: 400 mg by mouth every 4-6 hours as needed. Max: 2400 mg/day; Info: use lowest effective dose, shortest effective treatment duration; give w/ food if GI upset occurs.  - Tylenol dosing for adults: [By mouth route, immediate-release form] Dose: 325-1000 mg by mouth every 4-6h as needed; Max: 1 g/4h and 4 g/day from all sources. [By mouth route, extended-release form] Dose: 650-1300 mg Extended Release by mouth every 8h as needed; Max: 4 g/day from all sources.     - You must understand that you have received an Urgent Care treatment only and that you may be released before all of your medical problems are known or treated.   - You, the patient, will arrange for follow up care as instructed.   - If your condition worsens or fails to improve we recommend that you receive another evaluation at the ER immediately or contact your PCP to discuss your concerns or return here.   - Follow up with your PCP or specialty clinic as directed in the next 1-2 weeks if not improved or as needed.  You can call [phone] to schedule an appointment with the appropriate provider.    If your symptoms do not improve or worsen, go to the emergency room immediately.

## 2025-08-01 ENCOUNTER — OFFICE VISIT (OUTPATIENT)
Dept: URGENT CARE | Facility: CLINIC | Age: 70
End: 2025-08-01
Payer: MEDICARE

## 2025-08-01 VITALS
SYSTOLIC BLOOD PRESSURE: 120 MMHG | OXYGEN SATURATION: 95 % | WEIGHT: 154 LBS | BODY MASS INDEX: 25.66 KG/M2 | DIASTOLIC BLOOD PRESSURE: 76 MMHG | HEART RATE: 82 BPM | RESPIRATION RATE: 16 BRPM | HEIGHT: 65 IN | TEMPERATURE: 98 F

## 2025-08-01 DIAGNOSIS — L27.0 DRUG RASH: Primary | ICD-10-CM

## 2025-08-01 RX ORDER — TRIAMCINOLONE ACETONIDE 1 MG/G
CREAM TOPICAL 2 TIMES DAILY
Qty: 80 G | Refills: 0 | Status: SHIPPED | OUTPATIENT
Start: 2025-08-01 | End: 2025-08-15

## 2025-08-01 NOTE — PATIENT INSTRUCTIONS
Offered steroid shot but you declined at this time. Should you change your mind please return. Stop using tessalon medication for cough. Use topical steroid twice a day. Make sure to wash your hands after each application.  Make sure to wipe off old steroid cream before applying new, use warm soap and water.  No topical alcohol no topical hydrogen peroxide.  As we discussed do not use the steroid cream longer than 2 weeks as this can cause permanent pigment changes and thinning of the skin. You can use otc bendaryl as needed for itching this can be sedating do not drink alcohol or drive with this medication. Use as directed.  If symptoms do not improve in 2-3 days please follow up

## 2025-08-01 NOTE — PROGRESS NOTES
"Subjective:      Patient ID: Riana Kay is a 70 y.o. female.    Vitals:  height is 5' 5" (1.651 m) and weight is 69.9 kg (154 lb). Her oral temperature is 98.1 °F (36.7 °C). Her blood pressure is 120/76 and her pulse is 82. Her respiration is 16 and oxygen saturation is 95%.     Chief Complaint: Allergic Reaction    This is a 70 y.o. female who presents today with a chit.ef complaint of Allergic reaction that started 2 days ago. Patient has a rash on her left and right upper leg. She has itching to her back but no rash.   Patient stated that it appeared after taking tessalon prescribed medication for covid. She states the last time she took it was Tuesday and the rash has not changed. No other new exposures. Pt tried otc bendaryl and neosporin with no relief. Pt denies any throat swelling, shortness of breath or facial swelling.       Allergic Reaction  This is a new problem. The current episode started 2 days ago. The patient was exposed to a prescription drug. The exposure occurred at Home. Associated symptoms include a rash. Pertinent negatives include no chest pain or wheezing. Treatments tried: benadryl, neosporin. The treatment provided no relief.       Constitution: Negative for chills and fever.   Neck: Negative for neck pain.   Cardiovascular:  Negative for chest pain.   Eyes:  Negative for eyelid swelling.   Respiratory:  Negative for shortness of breath and wheezing.    Musculoskeletal:  Negative for muscle ache.   Skin:  Positive for rash. Negative for erythema and bruising.      Past Medical History:   Diagnosis Date    Abdominal pain, right upper quadrant 02/04/2014    Anticoagulant long-term use     Anxiety     AR (allergic rhinitis)     Atrophic gastritis without mention of hemorrhage 02/13/2012     Dx updated per 2019 IMO Load    Chronic fatigue syndrome 06/10/2012    Diabetes mellitus     Dizziness     Fatty liver     GERD (gastroesophageal reflux disease)     Gross hematuria 12/01/2020 "    HTN (hypertension)     Hyperlipidemia     Leg swelling     Memory loss     Osteopenia     PONV (postoperative nausea and vomiting)     Primary osteoarthritis of right knee 10/06/2020    Primary osteoarthritis of right knee 10/06/2020    Right elbow pain 01/16/2019    S/P total hysterectomy     Screening for colon cancer 01/06/2021    Sleep apnea     Status post total right knee replacement 10/6/2020 10/05/2020       Past Surgical History:   Procedure Laterality Date    ANTERIOR CERVICAL DISCECTOMY W/ FUSION N/A 5/12/2025    Procedure: DISCECTOMY, SPINE, CERVICAL, ANTERIOR APPROACH, WITH FUSION C5-6 SPINEWAVE SNS: VOCAL CORDS/MOTORS/SSEP;  Surgeon: Jalil Donahue MD;  Location: [facility];  Service: Orthopedics;  Laterality: N/A;    BREAST BIOPSY Left     benign excisional biopsy 1990    CHOLECYSTECTOMY      COLONOSCOPY N/A 01/17/2018    Procedure: COLONOSCOPY with Donnell;  Surgeon: Roland Jacobo MD;  Location: [facility] (4TH FLR);  Service: Endoscopy;  Laterality: N/A;    COLONOSCOPY N/A 01/06/2021    Procedure: COLONOSCOPY;  Surgeon: Yong Rowland MD;  Location: [facility] (4TH FLR);  Service: Endoscopy;  Laterality: N/A;  prep ins. emailed - Japanese speaking,  needed - ERW  [facility] - ERW    COLONOSCOPY N/A 08/16/2024    Procedure: COLONOSCOPY;  Surgeon: Aamir Reyes MD;  Location: [facility];  Service: Endoscopy;  Laterality: N/A;  8/9/24-Suprep, holding Trulicity, precall complete-DS  8/14/24-LVM to see if pt can come earlier-DS    CYSTOSCOPY N/A 12/01/2020    Procedure: CYSTOSCOPY;  Surgeon: Ines Stanford MD;  Location: [facility] OR 1ST FLR;  Service: Urology;  Laterality: N/A;  45 minutes     ELBOW ARTHROPLASTY Right 01/16/2019    Procedure: ARTHROPLASTY, ELBOW right radial head arthroplasty revision;  Surgeon: Katja Hubbard MD;  Location: [facility] OR 1ST FLR;  Service: Orthopedics;  Laterality: Right;  Anesthesia: General and Regional. [place]  hand pan 1 and pan 2, CALL ACCUMEDRUCHI & Sol notified 1-14 LO    ELBOW SURGERY Right 07/16/2015    ELBOW SURGERY      ESOPHAGOGASTRODUODENOSCOPY N/A 04/15/2019    Procedure: EGD (ESOPHAGOGASTRODUODENOSCOPY);  Surgeon: Buck Irwin MD;  Location: [facility] (4TH FLR);  Service: Endoscopy;  Laterality: N/A;  ray she    ESOPHAGOGASTRODUODENOSCOPY N/A 04/21/2023    Procedure: EGD (ESOPHAGOGASTRODUODENOSCOPY);  Surgeon: Aamir Reyes MD;  Location: [facility];  Service: Endoscopy;  Laterality: N/A;    FRACTURE SURGERY      HYSTERECTOMY      JOINT REPLACEMENT  October 6th2020    KNEE ARTHROPLASTY Right 10/06/2020    Procedure: ARTHROPLASTY, KNEE-SAME DAY PROTOCOL;  Surgeon: Sabino Damon MD;  Location: [facility];  Service: Orthopedics;  Laterality: Right;    KNEE ARTHROSCOPY W/ DEBRIDEMENT  04/2011    Left    RELEASE OF ULNAR NERVE AT CUBITAL TUNNEL Right 01/16/2019    Procedure: RELEASE, ULNAR TUNNEL right;  Surgeon: Katja Hubbard MD;  Location: [facility] 1ST FLR;  Service: Orthopedics;  Laterality: Right;  Anesthesia: General and Regional. Stretcher, hand pan 1 and pan 2, CALL ACCUMED, CLAIRX    RETROGRADE PYELOGRAPHY Bilateral 12/01/2020    Procedure: PYELOGRAM, RETROGRADE;  Surgeon: Ines Stanford MD;  Location: [facility] OR 1ST FLR;  Service: Urology;  Laterality: Bilateral;    REVERSE TOTAL SHOULDER ARTHROPLASTY Right 08/16/2022    Procedure: ARTHROPLASTY, SHOULDER, TOTAL, REVERSE, [place];  Surgeon: Aamir Powell MD;  Location: [facility] 2ND FLR;  Service: Orthopedics;  Laterality: Right;  [place] Can't go until after 12    ROTATOR CUFF REPAIR      SHOULDER SURGERY      TOTAL ABDOMINAL HYSTERECTOMY W/ BILATERAL SALPINGOOPHORECTOMY      TOTAL KNEE ARTHROPLASTY Left 10/19/2021    Procedure: ARTHROPLASTY, KNEE, TOTAL;  Surgeon: Sabino Damon MD;  Location: [facility];  Service: Orthopedics;  Laterality: Left;    UPPER GASTROINTESTINAL ENDOSCOPY         Family  History   Problem Relation Name Age of Onset    Cancer Father Won Baker         colon    Colon cancer Father Won Baker 83        colon cancer    Diabetes Maternal Aunt      Diabetes Maternal Grandmother Family member     Esophageal cancer Neg Hx      Stomach cancer Neg Hx      Melanoma Neg Hx         Social History     Socioeconomic History    Marital status:      Spouse name: Kemal    Number of children: 1   Occupational History    Occupation: Housekeeping     Comment: On disability   Tobacco Use    Smoking status: Never     Passive exposure: Never    Smokeless tobacco: Never   Substance and Sexual Activity    Alcohol use: No    Drug use: No    Sexual activity: Not Currently     Partners: Male     Birth control/protection: Post-menopausal   Other Topics Concern    Are you pregnant or think you may be? No    Breast-feeding No   Social History Narrative    She retired at [employer] in [employer]; , 1 kid (21yo).Nonsmoker, social etoh.    No stairs     Social Drivers of Health     Financial Resource Strain: Patient Declined (5/12/2025)    Overall Financial Resource Strain (CARDIA)     Difficulty of Paying Living Expenses: Patient declined   Food Insecurity: Patient Declined (5/12/2025)    Hunger Vital Sign     Worried About Running Out of Food in the Last Year: Patient declined     Ran Out of Food in the Last Year: Patient declined   Transportation Needs: Patient Declined (5/12/2025)    PRAPARE - Transportation     Lack of Transportation (Medical): Patient declined     Lack of Transportation (Non-Medical): Patient declined   Physical Activity: Sufficiently Active (1/15/2025)    Exercise Vital Sign     Days of Exercise per Week: 5 days     Minutes of Exercise per Session: 30 min   Stress: Patient Declined (5/12/2025)    [employer] [place] [employer] - Occupational Stress Questionnaire     Feeling of Stress : Patient declined   Housing Stability: Patient Declined (5/12/2025)     Housing Stability Vital Sign     Unable to Pay for Housing in the Last Year: Patient declined     Homeless in the Last Year: Patient declined       Current Medications[1]    Review of patient's allergies indicates:   Allergen Reactions    Iodinated contrast media Swelling and Rash    Percocet [oxycodone-acetaminophen] Itching    Macrobid [nitrofurantoin monohyd/m-cryst] Rash    Metformin Rash    Penicillins Rash     Had ancef in 2020 with no adverse rxn     Promethazine Rash     Had compazine in 2021    Sulfa (sulfonamide antibiotics) Rash    Sulfamethoxazole-trimethoprim Rash    Tessalon [benzonatate] Rash       Objective:     Physical Exam   Constitutional:  Non-toxic appearance. She does not appear ill. No distress.   HENT:   Mouth/Throat: Uvula is midline and mucous membranes are normal. Mucous membranes are moist. No uvula swelling. No oropharyngeal exudate or posterior oropharyngeal erythema.   Cardiovascular: Normal heart sounds.   No murmur heard.Exam reveals no gallop and no friction rub.   Pulmonary/Chest: Effort normal and breath sounds normal. No stridor. No respiratory distress. She has no wheezes. She has no rhonchi. She has no rales.   Abdominal: Normal appearance.   Neurological: She is alert.   Skin: Skin is warm, dry, not diaphoretic, not pale and rash. No bruising and No erythema      no jaundice  Psychiatric: Her behavior is normal. Mood normal.   Nursing note and vitals reviewed.            Assessment:     1. Drug rash        Plan:       Drug rash  -     triamcinolone acetonide 0.1% (KENALOG) 0.1 % cream; Apply topically 2 (two) times daily. for 14 days  Dispense: 80 g; Refill: 0           I have reviewed the patient chart and pertinent past imaging/labs.  Pt offered steroid injection and she declined due to elevated blood sugar side effect.      Patient Instructions   Offered steroid shot but you declined at this time. Should you change your mind please return. Stop using tessalon medication  for cough. Use topical steroid twice a day. Make sure to wash your hands after each application.  Make sure to wipe off old steroid cream before applying new, use warm soap and water.  No topical alcohol no topical hydrogen peroxide.  As we discussed do not use the steroid cream longer than 2 weeks as this can cause permanent pigment changes and thinning of the skin. You can use otc bendaryl as needed for itching this can be sedating do not drink alcohol or drive with this medication. Use as directed.  If symptoms do not improve in 2-3 days please follow up               [1]   Current Outpatient Medications   Medication Sig Dispense Refill    amLODIPine (NORVASC) 5 MG tablet Take 1 tablet (5 mg total) by mouth every evening. 30 tablet 11    aspirin (ECOTRIN) 81 MG EC tablet Take 1 tablet (81 mg total) by mouth once daily.      buPROPion (WELLBUTRIN XL) 300 MG 24 hr tablet Take 1 tablet (300 mg total) by mouth every morning. 90 tablet 3    busPIRone (BUSPAR) 5 MG Tab Take 1 tablet (5 mg total) by mouth 2 (two) times daily. 60 tablet 11    HUMALOG KWIKPEN INSULIN 100 unit/mL pen INJECT 8-10 UNITS IN THE SKIN BEFORE MEALS PLUS SCALE MAX DAILY UNITS 60 UNITS      lancets Misc To check BG 3 times daily, to use with insurance preferred meter, e 11.65 100 each 11    LANTUS SOLOSTAR U-100 INSULIN 100 unit/mL (3 mL) InPn pen Inject 25-36 units at night.      latanoprost 0.005 % ophthalmic solution Place 1 drop into both eyes nightly.      losartan (COZAAR) 50 MG tablet Take 1 tablet (50 mg total) by mouth once daily. 90 tablet 3    ondansetron (ZOFRAN-ODT) 8 MG TbDL Take 1 tablet (8 mg total) by mouth 3 (three) times daily as needed (Nausea). 30 tablet 6    ONETOUCH DELICA LANCETS 33 gauge Misc TO CHECK BLOOD GLUCOSE 3 TIMES DAILY      ONETOUCH VERIO FLEX METER Misc TO CHECK BLOOD GLUCOSE 3 TIMES DAILY, TO USE WITH INSURANCE PREFERRED METER, E 11.65      pantoprazole (PROTONIX) 40 MG tablet Take 1 tablet (40 mg total) by  "mouth once daily. 90 tablet 3    pen needle, diabetic (NOVOFINE 32) 32 gauge x 1/4" Ndle Uses 4 times a day. 90 day via duramed e 11.65 400 each 3    rosuvastatin (CRESTOR) 20 MG tablet Take 1 tablet (20 mg total) by mouth every evening. 90 tablet 3    tirzepatide (MOUNJARO) 5 mg/0.5 mL PnIj Inject 5 mg weekly into skin. Type 2 diabetes e 11.65 4 Pen 6    azelastine (ASTELIN) 137 mcg (0.1 %) nasal spray 1 spray (137 mcg total) by Nasal route 2 (two) times daily. 30 mL 11    triamcinolone acetonide 0.1% (KENALOG) 0.1 % cream Apply topically 2 (two) times daily. for 14 days 80 g 0     No current facility-administered medications for this visit.     "

## 2025-08-12 ENCOUNTER — HOSPITAL ENCOUNTER (OUTPATIENT)
Dept: RADIOLOGY | Facility: HOSPITAL | Age: 70
Discharge: HOME | End: 2025-08-12
Attending: ORTHOPAEDIC SURGERY
Payer: MEDICARE

## 2025-08-12 ENCOUNTER — OFFICE VISIT (OUTPATIENT)
Dept: ORTHOPEDICS | Facility: CLINIC | Age: 70
End: 2025-08-12
Payer: MEDICARE

## 2025-08-12 VITALS — WEIGHT: 154.13 LBS | HEIGHT: 65 IN | BODY MASS INDEX: 25.68 KG/M2

## 2025-08-12 DIAGNOSIS — M50.30 DDD (DEGENERATIVE DISC DISEASE), CERVICAL: ICD-10-CM

## 2025-08-12 DIAGNOSIS — Z98.890 S/P SPINAL SURGERY: Primary | ICD-10-CM

## 2025-08-12 PROCEDURE — 72040 X-RAY EXAM NECK SPINE 2-3 VW: CPT | Mod: TC

## 2025-08-12 PROCEDURE — 72040 X-RAY EXAM NECK SPINE 2-3 VW: CPT | Mod: 26,,, | Performed by: RADIOLOGY

## 2025-08-12 PROCEDURE — 99999 PR PBB SHADOW E&M-EST. PATIENT-LVL III: CPT | Mod: PBBFAC,,, | Performed by: ORTHOPAEDIC SURGERY

## 2025-08-14 ENCOUNTER — TELEPHONE (OUTPATIENT)
Dept: OTOLARYNGOLOGY | Facility: CLINIC | Age: 70
End: 2025-08-14
Payer: MEDICARE

## 2025-08-15 RX ORDER — DULOXETIN HYDROCHLORIDE 30 MG/1
30 CAPSULE, DELAYED RELEASE ORAL
Qty: 90 CAPSULE | Refills: 0 | OUTPATIENT
Start: 2025-08-15

## 2025-08-23 ENCOUNTER — HOSPITAL ENCOUNTER (EMERGENCY)
Facility: HOSPITAL | Age: 70
Discharge: HOME | End: 2025-08-23
Attending: EMERGENCY MEDICINE
Payer: MEDICARE

## 2025-08-23 VITALS
HEIGHT: 65 IN | DIASTOLIC BLOOD PRESSURE: 60 MMHG | RESPIRATION RATE: 16 BRPM | TEMPERATURE: 98 F | HEART RATE: 84 BPM | WEIGHT: 154.13 LBS | SYSTOLIC BLOOD PRESSURE: 133 MMHG | BODY MASS INDEX: 25.68 KG/M2 | OXYGEN SATURATION: 99 %

## 2025-08-23 DIAGNOSIS — R11.2 NAUSEA AND VOMITING, UNSPECIFIED VOMITING TYPE: Primary | ICD-10-CM

## 2025-08-23 DIAGNOSIS — K59.00 CONSTIPATION, UNSPECIFIED CONSTIPATION TYPE: ICD-10-CM

## 2025-08-23 DIAGNOSIS — R55 NEAR SYNCOPE: ICD-10-CM

## 2025-08-23 DIAGNOSIS — R55 VASOVAGAL SYNCOPE: ICD-10-CM

## 2025-08-23 LAB
ABSOLUTE EOSINOPHIL (OHS): 0.03 K/UL
ABSOLUTE MONOCYTE (OHS): 0.82 K/UL (ref 0.3–1)
ABSOLUTE NEUTROPHIL COUNT (OHS): 16.04 K/UL (ref 1.8–7.7)
ALBUMIN SERPL BCP-MCNC: 4.3 G/DL (ref 3.5–5.2)
ALP SERPL-CCNC: 102 UNIT/L (ref 40–150)
ALT SERPL W/O P-5'-P-CCNC: 29 UNIT/L (ref 0–55)
ANION GAP (OHS): 15 MMOL/L (ref 8–16)
AST SERPL-CCNC: 27 UNIT/L (ref 0–50)
BACTERIA #/AREA URNS AUTO: ABNORMAL /HPF
BASOPHILS # BLD AUTO: 0.05 K/UL
BASOPHILS NFR BLD AUTO: 0.3 %
BILIRUB SERPL-MCNC: 0.5 MG/DL (ref 0.1–1)
BILIRUB UR QL STRIP.AUTO: NEGATIVE
BUN SERPL-MCNC: 16 MG/DL (ref 6–30)
BUN SERPL-MCNC: 18 MG/DL (ref 8–23)
CALCIUM SERPL-MCNC: 10 MG/DL (ref 8.7–10.5)
CHLORIDE SERPL-SCNC: 100 MMOL/L (ref 95–110)
CHLORIDE SERPL-SCNC: 103 MMOL/L (ref 95–110)
CLARITY UR: CLEAR
CO2 SERPL-SCNC: 23 MMOL/L (ref 23–29)
COLOR UR AUTO: YELLOW
CREAT SERPL-MCNC: 0.8 MG/DL (ref 0.5–1.4)
CREAT SERPL-MCNC: 0.8 MG/DL (ref 0.5–1.4)
ERYTHROCYTE [DISTWIDTH] IN BLOOD BY AUTOMATED COUNT: 12.4 % (ref 11.5–14.5)
GFR SERPLBLD CREATININE-BSD FMLA CKD-EPI: >60 ML/MIN/1.73/M2
GLUCOSE SERPL-MCNC: 111 MG/DL (ref 70–110)
GLUCOSE SERPL-MCNC: 116 MG/DL (ref 70–110)
GLUCOSE UR QL STRIP: NEGATIVE
HCT VFR BLD AUTO: 41.3 % (ref 37–48.5)
HCT VFR BLD CALC: 42 %PCV (ref 36–54)
HGB BLD-MCNC: 14 GM/DL (ref 12–16)
HGB UR QL STRIP: NEGATIVE
HYALINE CASTS UR QL AUTO: 11 /LPF (ref 0–1)
IMM GRANULOCYTES # BLD AUTO: 0.09 K/UL (ref 0–0.04)
IMM GRANULOCYTES NFR BLD AUTO: 0.5 % (ref 0–0.5)
KETONES UR QL STRIP: NEGATIVE
LEUKOCYTE ESTERASE UR QL STRIP: ABNORMAL
LIPASE SERPL-CCNC: 21 U/L (ref 4–60)
LYMPHOCYTES # BLD AUTO: 1.53 K/UL (ref 1–4.8)
MAGNESIUM SERPL-MCNC: 2.4 MG/DL (ref 1.6–2.6)
MCH RBC QN AUTO: 31.5 PG (ref 27–31)
MCHC RBC AUTO-ENTMCNC: 33.9 G/DL (ref 32–36)
MCV RBC AUTO: 93 FL (ref 82–98)
MICROSCOPIC COMMENT: ABNORMAL
NITRITE UR QL STRIP: NEGATIVE
NUCLEATED RBC (/100WBC) (OHS): 0 /100 WBC
PH UR STRIP: 6 [PH]
PLATELET # BLD AUTO: 297 K/UL (ref 150–450)
PMV BLD AUTO: 9 FL (ref 9.2–12.9)
POC IONIZED CALCIUM: 1.21 MMOL/L (ref 1.06–1.42)
POC TCO2 (MEASURED): 27 MMOL/L (ref 23–27)
POTASSIUM BLD-SCNC: 3.5 MMOL/L (ref 3.5–5.1)
POTASSIUM SERPL-SCNC: 3.7 MMOL/L (ref 3.5–5.1)
PROT SERPL-MCNC: 8.5 GM/DL (ref 6–8.4)
PROT UR QL STRIP: NEGATIVE
RBC # BLD AUTO: 4.45 M/UL (ref 4–5.4)
RBC #/AREA URNS AUTO: 1 /HPF (ref 0–4)
RELATIVE EOSINOPHIL (OHS): 0.2 %
RELATIVE LYMPHOCYTE (OHS): 8.2 % (ref 18–48)
RELATIVE MONOCYTE (OHS): 4.4 % (ref 4–15)
RELATIVE NEUTROPHIL (OHS): 86.4 % (ref 38–73)
SAMPLE: ABNORMAL
SODIUM BLD-SCNC: 139 MMOL/L (ref 136–145)
SODIUM SERPL-SCNC: 141 MMOL/L (ref 136–145)
SP GR UR STRIP: 1.01
SQUAMOUS #/AREA URNS AUTO: 7 /HPF
TROPONIN I SERPL HS-MCNC: 3 NG/L
TROPONIN I SERPL HS-MCNC: 4 NG/L
UROBILINOGEN UR STRIP-ACNC: NEGATIVE EU/DL
WBC # BLD AUTO: 18.56 K/UL (ref 3.9–12.7)
WBC #/AREA URNS AUTO: 5 /HPF (ref 0–5)

## 2025-08-23 PROCEDURE — 83690 ASSAY OF LIPASE: CPT | Performed by: EMERGENCY MEDICINE

## 2025-08-23 PROCEDURE — 81003 URINALYSIS AUTO W/O SCOPE: CPT | Performed by: EMERGENCY MEDICINE

## 2025-08-23 PROCEDURE — 93010 ELECTROCARDIOGRAM REPORT: CPT | Mod: ,,, | Performed by: INTERNAL MEDICINE

## 2025-08-23 PROCEDURE — 85025 COMPLETE CBC W/AUTO DIFF WBC: CPT | Performed by: EMERGENCY MEDICINE

## 2025-08-23 PROCEDURE — 96374 THER/PROPH/DIAG INJ IV PUSH: CPT

## 2025-08-23 PROCEDURE — 80047 BASIC METABLC PNL IONIZED CA: CPT

## 2025-08-23 PROCEDURE — 84075 ASSAY ALKALINE PHOSPHATASE: CPT | Performed by: EMERGENCY MEDICINE

## 2025-08-23 PROCEDURE — 83735 ASSAY OF MAGNESIUM: CPT | Performed by: PHYSICIAN ASSISTANT

## 2025-08-23 PROCEDURE — 96361 HYDRATE IV INFUSION ADD-ON: CPT

## 2025-08-23 PROCEDURE — 63600175 PHARM REV CODE 636 W HCPCS: Performed by: PHYSICIAN ASSISTANT

## 2025-08-23 PROCEDURE — 84484 ASSAY OF TROPONIN QUANT: CPT | Performed by: PHYSICIAN ASSISTANT

## 2025-08-23 PROCEDURE — 99285 EMERGENCY DEPT VISIT HI MDM: CPT | Mod: 25

## 2025-08-23 PROCEDURE — 93005 ELECTROCARDIOGRAM TRACING: CPT

## 2025-08-23 RX ORDER — ONDANSETRON HYDROCHLORIDE 2 MG/ML
4 INJECTION, SOLUTION INTRAVENOUS
Status: COMPLETED | OUTPATIENT
Start: 2025-08-23 | End: 2025-08-23

## 2025-08-23 RX ORDER — ONDANSETRON 4 MG/1
4 TABLET, FILM COATED ORAL EVERY 6 HOURS
Qty: 12 TABLET | Refills: 0 | Status: SHIPPED | OUTPATIENT
Start: 2025-08-23

## 2025-08-23 RX ADMIN — SODIUM CHLORIDE, POTASSIUM CHLORIDE, SODIUM LACTATE AND CALCIUM CHLORIDE 1000 ML: 600; 310; 30; 20 INJECTION, SOLUTION INTRAVENOUS at 05:08

## 2025-08-23 RX ADMIN — ONDANSETRON 4 MG: 2 INJECTION INTRAMUSCULAR; INTRAVENOUS at 05:08

## 2025-08-24 LAB
HOLD SPECIMEN: NORMAL
OHS QRS DURATION: 88 MS
OHS QTC CALCULATION: 447 MS

## 2025-08-25 ENCOUNTER — OFFICE VISIT (OUTPATIENT)
Dept: OTOLARYNGOLOGY | Facility: CLINIC | Age: 70
End: 2025-08-25
Payer: MEDICARE

## 2025-08-25 DIAGNOSIS — R13.10 DYSPHAGIA, UNSPECIFIED TYPE: Primary | ICD-10-CM

## 2025-08-25 PROCEDURE — 99999 PR PBB SHADOW E&M-EST. PATIENT-LVL III: CPT | Mod: PBBFAC,,, | Performed by: OTOLARYNGOLOGY

## 2025-08-26 ENCOUNTER — TELEPHONE (OUTPATIENT)
Dept: SPEECH THERAPY | Facility: HOSPITAL | Age: 70
End: 2025-08-26
Payer: MEDICARE

## (undated) DEVICE — K WIRE .035
Type: IMPLANTABLE DEVICE | Site: ARM | Status: NON-FUNCTIONAL
Removed: 2019-01-16

## (undated) DEVICE — PADDING CAST 4IN SPECIALIST

## (undated) DEVICE — DRESSING AQUACEL AG ADV 3.5X12

## (undated) DEVICE — SEE MEDLINE ITEM 157131

## (undated) DEVICE — NDL HYPO 27G X 1 1/2

## (undated) DEVICE — DRAPE MINI C-ARM 54 X 64

## (undated) DEVICE — GLOVE BIOGEL SKINSENSE PI 8.0

## (undated) DEVICE — Device

## (undated) DEVICE — SUT VICRYL PLUS 0 CT1 18IN

## (undated) DEVICE — DRAPE U SPLIT SHEET 54X76IN

## (undated) DEVICE — DRESSING AQUACEL AG 3.5X10IN

## (undated) DEVICE — PENCIL VALLEYLAB TELSCP SMK

## (undated) DEVICE — NDL HYPO REG 25G X 1 1/2

## (undated) DEVICE — DRESSING TEGADERM IV 3.5 X 4.5

## (undated) DEVICE — SPONGE COTTON TRAY 4X4IN

## (undated) DEVICE — PRESSURIZER HI VAC HIP KIT

## (undated) DEVICE — SUT VICRYL PLUS 2-0 CT1 18

## (undated) DEVICE — SUT 0 VICRYL / CT-1

## (undated) DEVICE — SEE MEDLINE ITEM 154981

## (undated) DEVICE — GOWN SURGICAL X-LARGE

## (undated) DEVICE — DRESSING N ADH OIL EMUL 3X3

## (undated) DEVICE — ELECTRODE REM PLYHSV RETURN 9

## (undated) DEVICE — BOWL CEMENT

## (undated) DEVICE — SEE MEDLINE ITEM 152622

## (undated) DEVICE — PAD UNDERPAD 30X30

## (undated) DEVICE — SUT MONOCRYL 3-0 SH U/D

## (undated) DEVICE — DRAPE T THYROID STERILE

## (undated) DEVICE — DRAPE C-ARM ELAS CLIP 42X120IN

## (undated) DEVICE — SEE MEDLINE ITEM 152515

## (undated) DEVICE — SYR IRRIGATION BULB STER 60ML

## (undated) DEVICE — SUT VICRYL+ 1 CT1 18IN

## (undated) DEVICE — DRAPE C ARM 42 X 120 10/BX

## (undated) DEVICE — TUBE EMG NIM 7.0MM TRIVANTAGE

## (undated) DEVICE — SUT 4.0 ETHILON

## (undated) DEVICE — NDL MAYO 2

## (undated) DEVICE — SYR B-D DISP CONTROL 10CC100/C

## (undated) DEVICE — PUMP COLD THERAPY

## (undated) DEVICE — SUT FIBERWIRE LOOP STRAIGHT

## (undated) DEVICE — SYR 50CC LL

## (undated) DEVICE — SET CYSTO IRRIGATION UNIV SPIK

## (undated) DEVICE — KIT IRR SUCTION HND PIECE

## (undated) DEVICE — SOL PVP-I SCRUB 7.5% 4OZ

## (undated) DEVICE — ADHESIVE DERMABOND ADVANCED

## (undated) DEVICE — KIT SURGIFLO HEMOSTATIC MATRIX

## (undated) DEVICE — SEE MEDLINE ITEM 152522

## (undated) DEVICE — SYR ONLY LUER LOCK 20CC

## (undated) DEVICE — SET DECANTER MEDICHOICE

## (undated) DEVICE — BIT DRILL 3.1MM

## (undated) DEVICE — PAD COLD THERAPY KNEE WRAP ON

## (undated) DEVICE — SUT MONOCRYL 3-0 PS-1

## (undated) DEVICE — SYS PRINEO SKIN CLOSURE

## (undated) DEVICE — KIT TOTAL KNEE TKOFG

## (undated) DEVICE — RETRIEVER SUTURE HEWSON DISP

## (undated) DEVICE — APPLICATOR CHLORAPREP ORN 26ML

## (undated) DEVICE — SOL IRR NACL .9% 3000ML

## (undated) DEVICE — SEE MEDLINE ITEM 146308

## (undated) DEVICE — MASK FLYTE HOOD PEEL AWAY

## (undated) DEVICE — SUT SILK 2-0 BLK BR PS-2 18

## (undated) DEVICE — DRAPE INCISE IOBAN 2 23X33IN

## (undated) DEVICE — CLOSURE SKIN STERI STRIP 1/2X4

## (undated) DEVICE — DRAPE STERI INSTRUMENT 1018

## (undated) DEVICE — PAD SHOULDER CARE POLAR

## (undated) DEVICE — SUT VICRYL CTD 2-0 GI 27 SH

## (undated) DEVICE — GLOVE GAMMEX 7 PF STERILE

## (undated) DEVICE — SUPPORT SLING SHOT II MEDIUM

## (undated) DEVICE — PAD CAST SPECIALIST STRL 4

## (undated) DEVICE — BURR RND FLUT SFT TOUCH 2.0MM

## (undated) DEVICE — SUT VICRYL 3-0 27 SH

## (undated) DEVICE — PULSAVAC ZIMMER

## (undated) DEVICE — SPONGE LAP 18X18 PREWASHED

## (undated) DEVICE — SEE MEDLINE ITEM 157216

## (undated) DEVICE — UNDERGLOVES BIOGEL PI SIZE 8.5

## (undated) DEVICE — SOL IRR WATER STRL 3000 ML

## (undated) DEVICE — DRAIN PENROSE XRAY 12 X 1/4 ST

## (undated) DEVICE — WRAP PROTECTIVE LEG POS STRL

## (undated) DEVICE — FORCEP STRAIGHT DISP

## (undated) DEVICE — SEE MEDLINE ITEM 156905

## (undated) DEVICE — GLOVE GAMMEX SURG LF PI SZ 7.5

## (undated) DEVICE — DRAIN CHANNEL ROUND 10FR

## (undated) DEVICE — GLOVE BIOGEL SKINSENSE PI 6.5

## (undated) DEVICE — SEE MEDLINE ITEM 152529

## (undated) DEVICE — SEE MEDLINE ITEM 146417

## (undated) DEVICE — COVER CAMERA OPERATING ROOM

## (undated) DEVICE — SOL 9P NACL IRR PIC IL

## (undated) DEVICE — SPONGE GAUZE 16PLY 4X4

## (undated) DEVICE — CORD FOR BIPOLAR FORCEPS 12

## (undated) DEVICE — SLING SHOT II LARGE

## (undated) DEVICE — SYS CLSR DERMABOND PRINEO 22CM

## (undated) DEVICE — BLADE SAGITTAL 18 X 1.27 X 90M

## (undated) DEVICE — DRAPE STERI U-SHAPED 47X51IN

## (undated) DEVICE — TRAY CYSTO BASIN

## (undated) DEVICE — SUT CTD VICRYL CT-1 UND BR

## (undated) DEVICE — BLADE SAG 18.0X1.27X100

## (undated) DEVICE — UNDERGLOVES BIOGEL PI SIZE 7.5

## (undated) DEVICE — NDL SPINAL 18GX3.5 SPINOCAN

## (undated) DEVICE — SET CEMENT (SCULP)

## (undated) DEVICE — SPONGE LAP 4X18 PREWASHED

## (undated) DEVICE — SEE MEDLINE ITEM 146298

## (undated) DEVICE — DRAPE TOP 53X102IN

## (undated) DEVICE — BLADE SURG #15 CARBON STEEL

## (undated) DEVICE — TOURNIQUET SB QC DP 34X4IN

## (undated) DEVICE — DRAPE THREE-QTR REINF 53X77IN

## (undated) DEVICE — SUT CTD VICRYL 0 UND BR

## (undated) DEVICE — IMMOBILIZER SHOULDER W/PAD XL

## (undated) DEVICE — GAUZE SPONGE PEANUT STRL

## (undated) DEVICE — TAPE SURG DURAPORE 2 X10YD

## (undated) DEVICE — BLADE SAGITTA 5/BX

## (undated) DEVICE — TRAY MINOR ORTHO

## (undated) DEVICE — SUT 9/0 5IN ETHILON BLK MON

## (undated) DEVICE — DRAPE STERI-DRAPE 1000 17X11IN

## (undated) DEVICE — SUT STRATAFIX SPRL PS-2 3-0

## (undated) DEVICE — BETADINE OPTHALMIC SOL 5% 30ML

## (undated) DEVICE — GAUZE SPONGE 4X4 12PLY

## (undated) DEVICE — IMMOBILIZER SHOULDER RAINBOW L

## (undated) DEVICE — DRAPE C-ARMOR EQUIPMENT COVER

## (undated) DEVICE — GLOVE BIOGEL SKINSENSE PI 8.5

## (undated) DEVICE — COVER LIGHT HANDLE 80/CA

## (undated) DEVICE — FIBERTAPE COLLAGEN COATED

## (undated) DEVICE — SPLINT PLASTER F.S 4INX15IN

## (undated) DEVICE — PACK UPPER EXTREMITY BAPTIST

## (undated) DEVICE — ALCOHOL 70% ETHYL 16OZ.

## (undated) DEVICE — PIN DISTRACTION 12MM
Type: IMPLANTABLE DEVICE | Site: NECK | Status: NON-FUNCTIONAL
Removed: 2025-05-12

## (undated) DEVICE — WIRE FIXATION REUN NIT PILT
Type: IMPLANTABLE DEVICE | Site: SHOULDER | Status: NON-FUNCTIONAL
Removed: 2022-08-16

## (undated) DEVICE — SHEET SPLIT IMPERVIOUS 60X70

## (undated) DEVICE — CORD BIPOLAR 12 FOOT

## (undated) DEVICE — SUT PROLENE 4-0 MONO 18IN

## (undated) DEVICE — DRAPE SHOULDER BEACH CHAIR

## (undated) DEVICE — DRESSING AQUACEL AG RBBN 2X45

## (undated) DEVICE — SEE MEDLINE ITEM 152487

## (undated) DEVICE — CHLORAPREP 3ML APPLICATOR TINT

## (undated) DEVICE — PACK CYSTO

## (undated) DEVICE — SPLINT PLASTER FAST SET 5X30IN

## (undated) DEVICE — NDL 18GA X1 1/2 REG BEVEL

## (undated) DEVICE — ADHESIVE MASTISOL VIAL 48/BX

## (undated) DEVICE — DRESSING TRANS 4X4 TEGADERM

## (undated) DEVICE — HOSE DUAL W/CPC CONNECTORS

## (undated) DEVICE — HOOD T-5 TEAR AWAY STERILE

## (undated) DEVICE — BUR BONE CUT MICRO TPS 3X3.8MM

## (undated) DEVICE — GOWN X-LG STERILE BACK

## (undated) DEVICE — RESTRAINT LIMB HOLDER ADJ FOAM

## (undated) DEVICE — SUT MCRYL PLUS 4-0 PS2 27IN

## (undated) DEVICE — ELECTRODE PENCIL W/ROCKER NDL

## (undated) DEVICE — SYR 10CC LUER LOCK

## (undated) DEVICE — BUCKET PLASTER DISPOSABLE

## (undated) DEVICE — DRESSING SPONGE 8PLY 4X4 STRL

## (undated) DEVICE — TRAY CATH FOL SIL URIMTR 16FR

## (undated) DEVICE — TIP IRR FEM CANAL CO-AXIAL

## (undated) DEVICE — DRESSING ADAPTIC TOUCH 3X4

## (undated) DEVICE — DRAPE HEAD/BAR 40 X 27 W/ADH

## (undated) DEVICE — TOURNIQUET SB QC SP 18X4IN

## (undated) DEVICE — SUT BLU BR 2 TAPERD NDL 1/2

## (undated) DEVICE — APPLIER CLIP LIAGCLIP 9.375IN

## (undated) DEVICE — SEE MEDLINE ITEM 146268

## (undated) DEVICE — TAPE SILK 3IN

## (undated) DEVICE — SUT VICRYL PLUS 4-0 P3 18IN

## (undated) DEVICE — DRESSING N ADH OIL EMUL 3X8 3S